# Patient Record
Sex: MALE | Race: WHITE | NOT HISPANIC OR LATINO | Employment: OTHER | URBAN - METROPOLITAN AREA
[De-identification: names, ages, dates, MRNs, and addresses within clinical notes are randomized per-mention and may not be internally consistent; named-entity substitution may affect disease eponyms.]

---

## 2017-01-10 ENCOUNTER — ALLIED HEALTH/NURSE VISIT (OUTPATIENT)
Dept: CARDIOLOGY | Facility: CLINIC | Age: 72
End: 2017-01-10
Payer: MEDICARE

## 2017-01-10 DIAGNOSIS — Z95.810 ICD (IMPLANTABLE CARDIOVERTER-DEFIBRILLATOR), SINGLE, IN SITU: Primary | ICD-10-CM

## 2017-01-10 PROCEDURE — 93295 DEV INTERROG REMOTE 1/2/MLT: CPT | Performed by: INTERNAL MEDICINE

## 2017-01-10 PROCEDURE — 93296 REM INTERROG EVL PM/IDS: CPT | Performed by: INTERNAL MEDICINE

## 2017-01-10 NOTE — PROGRESS NOTES
Dianeroniradha Rueda (S) ICD Remote Device Check  : 0 %  Mode: VVI        Presenting Rhythm: VS  Heart Rate: Stable  Sensing: NA    Pacing Threshold: NA    Impedance: WNL  Battery Status: MOL 1 (40%)  Atrial Arrhythmia: NONE  Ventricular Arrhythmia: NONE  ATP: NONE    Shocks: NONE    Care Plan: remote in 3 months. Call to pt with results/ left message. Letter with next transmission date mailed

## 2017-01-17 ENCOUNTER — OFFICE VISIT (OUTPATIENT)
Dept: CARDIOLOGY | Facility: CLINIC | Age: 72
End: 2017-01-17
Payer: MEDICARE

## 2017-01-17 VITALS
HEART RATE: 80 BPM | WEIGHT: 192 LBS | HEIGHT: 72 IN | SYSTOLIC BLOOD PRESSURE: 130 MMHG | DIASTOLIC BLOOD PRESSURE: 60 MMHG | BODY MASS INDEX: 26.01 KG/M2

## 2017-01-17 DIAGNOSIS — I25.10 CORONARY ARTERY DISEASE INVOLVING NATIVE CORONARY ARTERY OF NATIVE HEART WITHOUT ANGINA PECTORIS: Primary | ICD-10-CM

## 2017-01-17 DIAGNOSIS — K21.9 GASTROESOPHAGEAL REFLUX DISEASE, ESOPHAGITIS PRESENCE NOT SPECIFIED: Primary | ICD-10-CM

## 2017-01-17 DIAGNOSIS — I25.5 CARDIOMYOPATHY, ISCHEMIC: ICD-10-CM

## 2017-01-17 DIAGNOSIS — I21.4 NSTEMI (NON-ST ELEVATED MYOCARDIAL INFARCTION) (H): ICD-10-CM

## 2017-01-17 DIAGNOSIS — N18.6 ESRD (END STAGE RENAL DISEASE) ON DIALYSIS (H): ICD-10-CM

## 2017-01-17 DIAGNOSIS — Z99.2 ESRD (END STAGE RENAL DISEASE) ON DIALYSIS (H): ICD-10-CM

## 2017-01-17 PROCEDURE — 99214 OFFICE O/P EST MOD 30 MIN: CPT | Performed by: INTERNAL MEDICINE

## 2017-01-17 RX ORDER — PANTOPRAZOLE SODIUM 40 MG/1
40 TABLET, DELAYED RELEASE ORAL EVERY MORNING
Qty: 30 TABLET | Refills: 10 | Status: SHIPPED | OUTPATIENT
Start: 2017-01-17 | End: 2018-01-11

## 2017-01-17 NOTE — Clinical Note
1/17/2017    Josselin Kolb MD  [facility] [place]  [address] [place] [address]    RE: Westley Ness       Dear Colleague,    I had the pleasure of seeing Westley Grover in Cardiology Clinic today.  He is a 71-year-old male with a past medical history of coronary artery disease, ischemic cardiomyopathy with ejection fraction 25%-30% and previous AICD placement, end-stage renal disease and mild aortic root dilatation who returns for followup.  He had a non-ST elevation MI last year.  He underwent drug-eluting stent placed in the proximal left circumflex and obtuse marginal branch.  In November, he had recurrent event and had angioplasty of the first obtuse marginal branch for restenosis.  He is on dialysis.  Since then he has done well.  In December, he had one day when he was dialyzed long and then had some chest discomfort that responded promptly to sublingual nitro.  This has not recurred.  He remains quite active.  He has a chronic ischemic cardiomyopathy, EF of 25%-30%.  His last echo confirmed that ejection fraction.  He has a chronically occluded LAD and at last viability study in July had shown not much viability in the LAD distribution.      His last LDL was 37 in July on Pravachol 40 mg per day.  HDL remains low at 29.      PHYSICAL EXAMINATION:   VITAL SIGNS:  Blood pressure 130/60, pulse 80 per minute and regular.  Cardiopulmonary examination unremarkable.     Outpatient Encounter Prescriptions as of 1/17/2017   Medication Sig Dispense Refill     carvedilol (COREG) 6.25 MG tablet Take 1 tablet (6.25 mg) by mouth 2 times daily (with meals) Take after dialysis 180 tablet 3     isosorbide mononitrate (IMDUR) 30 MG 24 hr tablet Take 0.5 tablets (15 mg) by mouth daily 90 tablet 3     Levothyroxine Sodium 50 MCG CAPS Take 50 mcg by mouth daily 90 capsule 3     pravastatin (PRAVACHOL) 40 MG tablet Take 1 tablet (40 mg) by mouth daily 90 tablet 3     lisinopril (PRINIVIL,ZESTRIL) 2.5 MG  tablet Take one tablet after dialysis. Take second tablet at bedtime. 60 tablet 3     acetaminophen (TYLENOL) 325 MG tablet Take 2 tablets (650 mg) by mouth 4 times daily 60 tablet 0     nitroglycerin (NITROSTAT) 0.4 MG SL tablet 1 TAB EVERY 5 MIN AS NEEDED, UP TO 3 PER EPISODE 25 tablet 0     clopidogrel (PLAVIX) 75 MG tablet Take 1 tablet (75 mg) by mouth daily To protect your stent(s).  Do not stop taking unless directed by cardiology. 30 tablet 11     [DISCONTINUED] pantoprazole (PROTONIX) 40 MG enteric coated tablet Take 1 tablet (40 mg) by mouth every morning 30 tablet 2     aspirin EC 81 MG EC tablet Take 1 tablet (81 mg) by mouth daily 81 tablet      loperamide (IMODIUM) 2 MG capsule Take 1 mg by mouth as needed        psyllium 0.52 G capsule Take 1 capsule by mouth daily as needed        B complex-C-folic acid (NEPHROCAPS) 1 MG capsule Take 1 capsule by mouth every evening        MEXILETINE HCL PO Take 150 mg by mouth 2 times daily       RANITIDINE HCL PO Take 75 mg by mouth 1-2 tablets daily       sevelamer (RENVELA) 800 MG tablet Take 800-1,600 mg by mouth as needed ON HOLD       No facility-administered encounter medications on file as of 1/17/2017.      IMPRESSION:   1.  Coronary artery disease, stable with occasional chest discomfort that could be possibly angina, but responded promptly to nitroglycerin.  He has had no further episodes despite being quite active.  I told him if it recurs, he should call us and we may consider doing a Lexiscan stress nuclear study.  Otherwise, I will see him back in followup in September with a repeat echocardiogram prior to visit with me.  I asked him to continue Plavix long-term.   2.  Hyperlipidemia.  Continue Pravachol at 40 mg daily.      Thank you for allowing us to participate in the care of this nice patient.     Sincerely,    Po Sne MD     [facility]

## 2017-01-17 NOTE — PROGRESS NOTES
HPI and Plan:   See dictation  [id number]    Orders Placed This Encounter   Procedures     Follow-Up with Cardiologist     Echocardiogram       No orders of the defined types were placed in this encounter.       There are no discontinued medications.      Encounter Diagnoses   Name Primary?     Cardiomyopathy, ischemic      NSTEMI (non-ST elevated myocardial infarction) (H)      Coronary artery disease involving native coronary artery of native heart without angina pectoris Yes     ESRD (end stage renal disease) on dialysis (H)        CURRENT MEDICATIONS:  Current Outpatient Prescriptions   Medication Sig Dispense Refill     carvedilol (COREG) 6.25 MG tablet Take 1 tablet (6.25 mg) by mouth 2 times daily (with meals) Take after dialysis 180 tablet 3     isosorbide mononitrate (IMDUR) 30 MG 24 hr tablet Take 0.5 tablets (15 mg) by mouth daily 90 tablet 3     Levothyroxine Sodium 50 MCG CAPS Take 50 mcg by mouth daily 90 capsule 3     pravastatin (PRAVACHOL) 40 MG tablet Take 1 tablet (40 mg) by mouth daily 90 tablet 3     lisinopril (PRINIVIL,ZESTRIL) 2.5 MG tablet Take one tablet after dialysis. Take second tablet at bedtime. 60 tablet 3     acetaminophen (TYLENOL) 325 MG tablet Take 2 tablets (650 mg) by mouth 4 times daily 60 tablet 0     nitroglycerin (NITROSTAT) 0.4 MG SL tablet 1 TAB EVERY 5 MIN AS NEEDED, UP TO 3 PER EPISODE 25 tablet 0     clopidogrel (PLAVIX) 75 MG tablet Take 1 tablet (75 mg) by mouth daily To protect your stent(s).  Do not stop taking unless directed by cardiology. 30 tablet 11     pantoprazole (PROTONIX) 40 MG enteric coated tablet Take 1 tablet (40 mg) by mouth every morning 30 tablet 2     aspirin EC 81 MG EC tablet Take 1 tablet (81 mg) by mouth daily 81 tablet      loperamide (IMODIUM) 2 MG capsule Take 1 mg by mouth as needed        psyllium 0.52 G capsule Take 1 capsule by mouth daily as needed        B complex-C-folic acid (NEPHROCAPS) 1 MG capsule Take 1 capsule by mouth every evening         MEXILETINE HCL PO Take 150 mg by mouth 2 times daily       RANITIDINE HCL PO Take 75 mg by mouth 1-2 tablets daily       sevelamer (RENVELA) 800 MG tablet Take 800-1,600 mg by mouth as needed ON HOLD         ALLERGIES     Allergies   Allergen Reactions     Contrast Dye Hives     Does fine if he uses benadryl prior.     No Clinical Screening - See Comments      Green beans - Diarrhea.       PAST MEDICAL HISTORY:  Past Medical History   Diagnosis Date     Hypertension      Hyperlipidemia      Shortness of breath      Diabetes (H)      Renal disease      end stage     Syncope      Tachycardia      ICD (implantable cardioverter-defibrillator) in place      CAD (coronary artery disease)      H/O percutaneous transluminal coronary angioplasty 7-     Successful Percutaneous coronary intervention of the proximal OM1     NSTEMI (non-ST elevated myocardial infarction) (H)      Cardiomyopathy, ischemic      Aortic root dilatation (H)      mild     Hypothyroidism        PAST SURGICAL HISTORY:  Past Surgical History   Procedure Laterality Date     Vascular surgery       LUE fistula     Cholecystectomy       Hc left heart catheterization  7/14/2016     Hc left heart catheterization  9/21/2016       FAMILY HISTORY:  Family History   Problem Relation Age of Onset     Hypertension Mother      CEREBROVASCULAR DISEASE Mother      Hypertension Father      C.A.D. Paternal Grandmother        SOCIAL HISTORY:  Social History     Social History     Marital Status: Single     Spouse Name: N/A     Number of Children: N/A     Years of Education: N/A     Social History Main Topics     Smoking status: Former Smoker -- 2.00 packs/day for 35 years     Types: Cigarettes     Quit date: 11/01/1996     Smokeless tobacco: None     Alcohol Use: 0.0 oz/week     0 Standard drinks or equivalent per week      Comment: rare     Drug Use: No     Sexual Activity: No     Other Topics Concern     Parent/Sibling W/ Cabg, Mi Or Angioplasty Before 65f  55m? No     Caffeine Concern No     Sleep Concern No     Special Diet Yes     Exercise Yes     Walking     Seat Belt Yes     Social History Narrative       Review of Systems:  Skin:  Positive for (dialysis access left arm)       Eyes:  Positive for glasses    ENT:  Negative      Respiratory:  Positive for dyspnea on exertion     Cardiovascular:    edema;Positive for    Gastroenterology: Positive for heartburn    Genitourinary:  Positive for (Dialysis)      Musculoskeletal:  Positive for arthritis    Neurologic:  Negative headaches    Psychiatric:  Negative      Heme/Lymph/Imm:  Positive for allergies    Endocrine:  Positive for thyroid disorder      Physical Exam:  Vitals: /60 mmHg  Pulse 80  Ht 1.829 m (6')  Wt 87.091 kg (192 lb)  BMI 26.03 kg/m2    Constitutional:           Skin:           Head:           Eyes:           ENT:           Neck:           Chest:             Cardiac:                    Abdomen:           Vascular:                                          Extremities and Back:                 Neurological:                 CC  Josselin Kolb MD  [facility]  [address] [place] [address]

## 2017-01-17 NOTE — MR AVS SNAPSHOT
After Visit Summary   1/17/2017    Westley Ness    MRN: [id number]           Patient Information     Date Of Birth          1945        Visit Information        Provider Department      1/17/2017 8:45 AM Po Sen MD [facility]        Today's Diagnoses     Coronary artery disease involving native coronary artery of native heart without angina pectoris    -  1     Cardiomyopathy, ischemic         NSTEMI (non-ST elevated myocardial infarction) (H)         ESRD (end stage renal disease) on dialysis (H)            Follow-ups after your visit        Additional Services     Follow-Up with Cardiologist                 Your next 10 appointments already scheduled     Apr 20, 2017  4:30 PM   Remote ICD Check with CARD DCR2   [facility] ([facility])    [address] [place], [place] [address]  [facility] [address] [phone]           This appointment is for a remote check of your debrillator.  This is not an appointment at the office.              Future tests that were ordered for you today     Open Future Orders        Priority Expected Expires Ordered    Echocardiogram Routine 10/14/2017 1/17/2018 1/17/2017    Follow-Up with Cardiologist Routine 10/14/2017 1/17/2018 1/17/2017            Who to contact     If you have questions or need follow up information about today's clinic visit or your schedule please contact [facility] directly at [phone].  Normal or non-critical lab and imaging results will be communicated to you by MyChart, letter or phone within 4 business days after the clinic has received the results. If you do not hear from us within 7 days, please contact the clinic through MyChart or phone. If you have a critical or abnormal lab result, we will notify you by phone as soon as possible.  Submit refill requests through [facility] or call your  "pharmacy and they will forward the refill request to us. Please allow 3 business days for your refill to be completed.          Additional Information About Your Visit        MyChart Information     [employer] lets you send messages to your doctor, view your test results, renew your prescriptions, schedule appointments and more. To sign up, go to [email or web address] . Click on \"Log in\" on the left side of the screen, which will take you to the Welcome page. Then click on \"Sign up Now\" on the right side of the page.     You will be asked to enter the access code listed below, as well as some personal information. Please follow the directions to create your username and password.     Your access code is: [email or web address]  Expires: 2017  1:03 PM     Your access code will  in 90 days. If you need help or a new code, please call your [facility] clinic or [phone].        Care EveryWhere ID     This is your Care EveryWhere ID. This could be used by other organizations to access your [facility] medical records  [id number]        Your Vitals Were     Pulse Height BMI (Body Mass Index)             80 1.829 m (6') 26.03 kg/m2          Blood Pressure from Last 3 Encounters:   17 130/60   16 130/64   16 130/70    Weight from Last 3 Encounters:   17 87.091 kg (192 lb)   16 87.181 kg (192 lb 3.2 oz)   16 87.091 kg (192 lb)               Primary Care Provider Office Phone # Fax #    Josselin Kolb [phone]       [facility] [address] [facility] [address]        Thank you!     Thank you for choosing [facility] [place]  for your care. Our goal is always to provide you with excellent care. Hearing back from our patients is one way we can continue to improve our services. Please take a few minutes to complete the written survey that you may receive in the mail after your visit with us. Thank you!           "   Your Updated Medication List - Protect others around you: Learn how to safely use, store and throw away your medicines at www.disposemymeds.org.          This list is accurate as of: 1/17/17  9:12 AM.  Always use your most recent med list.                   Brand Name Dispense Instructions for use    acetaminophen 325 MG tablet    TYLENOL    60 tablet    Take 2 tablets (650 mg) by mouth 4 times daily       aspirin 81 MG EC tablet     81 tablet    Take 1 tablet (81 mg) by mouth daily       B complex-C-folic acid 1 MG capsule      Take 1 capsule by mouth every evening       carvedilol 6.25 MG tablet    COREG    180 tablet    Take 1 tablet (6.25 mg) by mouth 2 times daily (with meals) Take after dialysis       clopidogrel 75 MG tablet    PLAVIX    30 tablet    Take 1 tablet (75 mg) by mouth daily To protect your stent(s).  Do not stop taking unless directed by cardiology.       isosorbide mononitrate 30 MG 24 hr tablet    IMDUR    90 tablet    Take 0.5 tablets (15 mg) by mouth daily       Levothyroxine Sodium 50 MCG Caps     90 capsule    Take 50 mcg by mouth daily       lisinopril 2.5 MG tablet    PRINIVIL/Zestril    60 tablet    Take one tablet after dialysis. Take second tablet at bedtime.       loperamide 2 MG capsule    IMODIUM     Take 1 mg by mouth as needed       MEXILETINE HCL PO      Take 150 mg by mouth 2 times daily       nitroglycerin 0.4 MG sublingual tablet    NITROSTAT    25 tablet    1 TAB EVERY 5 MIN AS NEEDED, UP TO 3 PER EPISODE       pantoprazole 40 MG EC tablet    PROTONIX    30 tablet    Take 1 tablet (40 mg) by mouth every morning       pravastatin 40 MG tablet    PRAVACHOL    90 tablet    Take 1 tablet (40 mg) by mouth daily       psyllium 0.52 G capsule      Take 1 capsule by mouth daily as needed       RANITIDINE HCL PO      Take 75 mg by mouth 1-2 tablets daily       sevelamer 800 MG tablet    RENVELA     Take 800-1,600 mg by mouth as needed ON HOLD

## 2017-01-17 NOTE — TELEPHONE ENCOUNTER
pantoprazole (PROTONIX) 40 MG enteric coated tablet      Last Written Prescription Date: 09/18/2016  Last Fill Quantity: 30,  # refills: 2   Last Office Visit with [facility], [facility] or [facility] prescribing provider: 12/06/2016

## 2017-01-17 NOTE — PROGRESS NOTES
HISTORY OF PRESENT ILLNESS:   I had the pleasure of seeing Westley Ness in Cardiology Clinic today.  He is a 71-year-old male with a past medical history of coronary artery disease, ischemic cardiomyopathy with ejection fraction 25%-30% and previous AICD placement, end-stage renal disease and mild aortic root dilatation who returns for followup.  He had a non-ST elevation MI last year.  He underwent drug-eluting stent placed in the proximal left circumflex and obtuse marginal branch.  In November, he had recurrent event and had angioplasty of the first obtuse marginal branch for restenosis.  He is on dialysis.  Since then he has done well.  In December, he had one day when he was dialyzed long and then had some chest discomfort that responded promptly to sublingual nitro.  This has not recurred.  He remains quite active.  He has a chronic ischemic cardiomyopathy, EF of 25%-30%.  His last echo confirmed that ejection fraction.  He has a chronically occluded LAD and at last viability study in July had shown not much viability in the LAD distribution.      His last LDL was 37 in July on Pravachol 40 mg per day.  HDL remains low at 29.      PHYSICAL EXAMINATION:   VITAL SIGNS:  Blood pressure 130/60, pulse 80 per minute and regular.  Cardiopulmonary examination unremarkable.      IMPRESSION:   1.  Coronary artery disease, stable with occasional chest discomfort that could be possibly angina, but responded promptly to nitroglycerin.  He has had no further episodes despite being quite active.  I told him if it recurs, he should call us and we may consider doing a Lexiscan stress nuclear study.  Otherwise, I will see him back in followup in September with a repeat echocardiogram prior to visit with me.  I asked him to continue Plavix long-term.   2.  Hyperlipidemia.  Continue Pravachol at 40 mg daily.      Thank you for allowing us to participate in the care of this nice patient.      cc:   Josselin Kolb MD    [facility]   [address]  [address]         DERICK WEST MD             D: 2017 09:30   T: 2017 11:51   MT: SENTHIL      Name:     SOCORRO LÓPEZ   MRN:      [id number]        Account:      [id number]   :      1945           Service Date: 2017      Document: [id number]

## 2017-01-24 ENCOUNTER — TELEPHONE (OUTPATIENT)
Dept: INTERNAL MEDICINE | Facility: CLINIC | Age: 72
End: 2017-01-24

## 2017-01-24 DIAGNOSIS — Z99.2 ESRD (END STAGE RENAL DISEASE) ON DIALYSIS (H): Primary | ICD-10-CM

## 2017-01-24 DIAGNOSIS — N18.6 ESRD (END STAGE RENAL DISEASE) ON DIALYSIS (H): Primary | ICD-10-CM

## 2017-01-24 NOTE — TELEPHONE ENCOUNTER
Reason for Call:  Medication or medication refill:    Do you use a [facility]?  Name of the pharmacy and phone number for the current request:  [facility]  [phone]    Name of the medication requested: triphrocaps (vit B)    Other request: Pt got the medication formerly from Dr Naranjo at [facility]    Can we leave a detailed message on this number? YES    Phone number patient can be reached at: Home number on file [phone] (home)    Best Time: anytime    Call taken on 1/24/2017 at 4:59 PM by LYLA SKELTON

## 2017-01-25 NOTE — TELEPHONE ENCOUNTER
Nephrocaps refilled. Please let patient know and confirm that 1 capsule each evening is correct. Thank you.

## 2017-01-26 ENCOUNTER — CARE COORDINATION (OUTPATIENT)
Dept: CARE COORDINATION | Facility: CLINIC | Age: 72
End: 2017-01-26

## 2017-01-26 NOTE — PROGRESS NOTES
Clinic Care Coordination Contact  [facility]/Voicemail    Referral Source: CTS    No response from calls or letter.     Plan: Closed to clinic care coordination.    Salma Franco RN, CCM - Care Coordinator     1/26/2017    3:45 PM  [phone]

## 2017-02-06 ENCOUNTER — TRANSFERRED RECORDS (OUTPATIENT)
Dept: HEALTH INFORMATION MANAGEMENT | Facility: CLINIC | Age: 72
End: 2017-02-06

## 2017-02-12 ENCOUNTER — TRANSFERRED RECORDS (OUTPATIENT)
Dept: CARDIOLOGY | Facility: CLINIC | Age: 72
End: 2017-02-12

## 2017-02-12 LAB — EJECTION FRACTION: 20

## 2017-02-13 ENCOUNTER — TRANSFERRED RECORDS (OUTPATIENT)
Dept: CARDIOLOGY | Facility: CLINIC | Age: 72
End: 2017-02-13

## 2017-02-14 ENCOUNTER — TRANSFERRED RECORDS (OUTPATIENT)
Dept: CARDIOLOGY | Facility: CLINIC | Age: 72
End: 2017-02-14

## 2017-02-16 ENCOUNTER — TRANSFERRED RECORDS (OUTPATIENT)
Dept: CARDIOLOGY | Facility: CLINIC | Age: 72
End: 2017-02-16

## 2017-02-23 ENCOUNTER — OFFICE VISIT (OUTPATIENT)
Dept: INTERNAL MEDICINE | Facility: CLINIC | Age: 72
End: 2017-02-23
Payer: MEDICARE

## 2017-02-23 ENCOUNTER — TELEPHONE (OUTPATIENT)
Dept: INTERNAL MEDICINE | Facility: CLINIC | Age: 72
End: 2017-02-23

## 2017-02-23 VITALS
HEIGHT: 72 IN | SYSTOLIC BLOOD PRESSURE: 120 MMHG | OXYGEN SATURATION: 96 % | HEART RATE: 85 BPM | TEMPERATURE: 97.4 F | BODY MASS INDEX: 26.09 KG/M2 | DIASTOLIC BLOOD PRESSURE: 50 MMHG | WEIGHT: 192.6 LBS

## 2017-02-23 DIAGNOSIS — I21.9: ICD-10-CM

## 2017-02-23 DIAGNOSIS — Z09 HOSPITAL DISCHARGE FOLLOW-UP: Primary | ICD-10-CM

## 2017-02-23 DIAGNOSIS — I20.9 ANGINA PECTORIS (H): ICD-10-CM

## 2017-02-23 PROCEDURE — 99214 OFFICE O/P EST MOD 30 MIN: CPT | Performed by: INTERNAL MEDICINE

## 2017-02-23 RX ORDER — NITROGLYCERIN 0.4 MG/1
TABLET SUBLINGUAL
Qty: 25 TABLET | Refills: 0 | Status: SHIPPED | OUTPATIENT
Start: 2017-02-23 | End: 2017-12-13

## 2017-02-23 NOTE — MR AVS SNAPSHOT
"              After Visit Summary   2/23/2017    Westley Ness    MRN: [id number]           Patient Information     Date Of Birth          1945        Visit Information        Provider Department      2/23/2017 10:30 AM Josselin Kolb MD [facility]        Care Instructions    Let's get medical records!    Definitely follow-up with cardiology ASAP (best with records).         Follow-ups after your visit        Your next 10 appointments already scheduled     Apr 20, 2017  4:30 PM CDT   Remote ICD Check with CARD DCR2   [facility] [place] ([facility])    [address] [place], [place] [id number]  [facility] [id number] [phone]           This appointment is for a remote check of your debrillator.  This is not an appointment at the office.              Who to contact     If you have questions or need follow up information about today's clinic visit or your schedule please contact [facility] directly at [phone].  Normal or non-critical lab and imaging results will be communicated to you by [employer], letter or phone within 4 business days after the clinic has received the results. If you do not hear from us within 7 days, please contact the clinic through Zarangat or phone. If you have a critical or abnormal lab result, we will notify you by phone as soon as possible.  Submit refill requests through [employer] or call your pharmacy and they will forward the refill request to us. Please allow 3 business days for your refill to be completed.          Additional Information About Your Visit        [employer] Information     [employer] lets you send messages to your doctor, view your test results, renew your prescriptions, schedule appointments and more. To sign up, go to [email or web address] . Click on \"Log in\" on the left side of the screen, which will take you to the Welcome page. Then click on \"Sign up Now\" on the right side of " the page.     You will be asked to enter the access code listed below, as well as some personal information. Please follow the directions to create your username and password.     Your access code is: [email or web address]  Expires: 2017 10:53 AM     Your access code will  in 90 days. If you need help or a new code, please call your [facility] or [phone].        Care EveryWhere ID     This is your Care EveryWhere ID. This could be used by other organizations to access your [place] medical records  [id number]        Your Vitals Were     Pulse Temperature Height Pulse Oximetry BMI (Body Mass Index)       85 97.4  F (36.3  C) (Oral) 6' (1.829 m) 96% 26.12 kg/m2        Blood Pressure from Last 3 Encounters:   17 120/50   17 130/60   16 130/64    Weight from Last 3 Encounters:   17 192 lb 9.6 oz (87.4 kg)   17 192 lb (87.1 kg)   16 192 lb 3.2 oz (87.2 kg)              Today, you had the following     No orders found for display       Primary Care Provider Office Phone # Fax #    Josselin Kolb [phone]       [facility] [address] [facility] [address]        Thank you!     Thank you for choosing [facility]  for your care. Our goal is always to provide you with excellent care. Hearing back from our patients is one way we can continue to improve our services. Please take a few minutes to complete the written survey that you may receive in the mail after your visit with us. Thank you!             Your Updated Medication List - Protect others around you: Learn how to safely use, store and throw away your medicines at www.disposemymeds.org.          This list is accurate as of: 17 10:53 AM.  Always use your most recent med list.                   Brand Name Dispense Instructions for use    acetaminophen 325 MG tablet    TYLENOL    60 tablet    Take 2 tablets (650 mg) by mouth 4 times daily       aspirin  81 MG EC tablet     81 tablet    Take 1 tablet (81 mg) by mouth daily       B complex-C-folic acid 1 MG capsule     90 capsule    Take 1 capsule by mouth every evening       carvedilol 6.25 MG tablet    COREG    180 tablet    Take 1 tablet (6.25 mg) by mouth 2 times daily (with meals) Take after dialysis       clopidogrel 75 MG tablet    PLAVIX    30 tablet    Take 1 tablet (75 mg) by mouth daily To protect your stent(s).  Do not stop taking unless directed by cardiology.       isosorbide mononitrate 30 MG 24 hr tablet    IMDUR    90 tablet    Take 0.5 tablets (15 mg) by mouth daily       Levothyroxine Sodium 50 MCG Caps     90 capsule    Take 50 mcg by mouth daily       lisinopril 2.5 MG tablet    PRINIVIL/Zestril    60 tablet    Take one tablet after dialysis. Take second tablet at bedtime.       loperamide 2 MG capsule    IMODIUM     Take 1 mg by mouth as needed       MEXILETINE HCL PO      Take 150 mg by mouth 2 times daily       nitroglycerin 0.4 MG sublingual tablet    NITROSTAT    25 tablet    1 TAB EVERY 5 MIN AS NEEDED, UP TO 3 PER EPISODE       pantoprazole 40 MG EC tablet    PROTONIX    30 tablet    Take 1 tablet (40 mg) by mouth every morning       pravastatin 40 MG tablet    PRAVACHOL    90 tablet    Take 1 tablet (40 mg) by mouth daily       psyllium 0.52 G capsule      Take 1 capsule by mouth daily as needed       RANITIDINE HCL PO      Take 75 mg by mouth 1-2 tablets daily       sevelamer 800 MG tablet    RENVELA     Take 800-1,600 mg by mouth as needed ON HOLD

## 2017-02-23 NOTE — TELEPHONE ENCOUNTER
Patient will be having records coming over from [facility] faxed 02/23/2017 @ 11:04 am Please call patient once records are received.

## 2017-02-23 NOTE — NURSING NOTE
Chief Complaint   Patient presents with     ER F/U     On 2/12/17. went to ER in [place] for R arm pain and right to mid chest pain.        Initial /50 (BP Location: Right arm, Patient Position: Chair, Cuff Size: Adult Regular)  Pulse 85  Temp 97.4  F (36.3  C) (Oral)  Ht 6' (1.829 m)  Wt 192 lb 9.6 oz (87.4 kg)  SpO2 96%  BMI 26.12 kg/m2 Estimated body mass index is 26.12 kg/(m^2) as calculated from the following:    Height as of this encounter: 6' (1.829 m).    Weight as of this encounter: 192 lb 9.6 oz (87.4 kg).  Medication Reconciliation: complete     Kaminibose MA

## 2017-02-23 NOTE — PROGRESS NOTES
SUBJECTIVE:                                                      HPI: Westley Ness is a pleasant 71 year old male who presents for hospital discharge follow-up:    - hospitalized in [place] 2/12-2/16 for MI (unknown if STEMI or NSTEMI)   - [facility] - records requested   - unfortunately, patient does not know many details   - underwent PCI with angioplasty and 3 stents were placed (vessels intervened on and type of stents placed unknown)  - procedure complicated by bleeding and hematoma at right groin catheter site   - Hgb dropped from ~10.5 to 9s  - no new medications on discharge    Details re: presentation to hospital:  - developed right arm and central chest pain in the early morning (2am) of 2/12   - no improvement with 4 nitro so proceeded to local ER    Update since hospitalization:  - overall, doing well - no complaints  - still gets his occasional chest pains - baseline for him    - none currently  - still get out of breath sometimes - again, baseline for him   - no shortness of breath currently  - appetite normal  - bowel movements maureen  - energy still lower than normal, but improving each day    PMH significant for long-standing ischemic cardiomyopathy (EF 25%-30%, s/p AICD), ESRD on HD, and long-history CAD s/p multiple PCIs.      Last year: 3 NSTEMIs (June, September, and November) s/p 3 stents and an angioplasty performed.      Also with stable angina generally relieved with Nitrostat - needs refill.     The medication, allergy, and problem lists have been reviewed and updated as appropriate.     OBJECTIVE:                                                      /50 (BP Location: Right arm, Patient Position: Chair, Cuff Size: Adult Regular)  Pulse 85  Temp 97.4  F (36.3  C) (Oral)  Ht 6' (1.829 m)  Wt 192 lb 9.6 oz (87.4 kg)  SpO2 96%  BMI 26.12 kg/m2  Constitutional: well-appearing  Respiratory: normal respiratory effort; clear to auscultation bilaterally  Cardiovascular:  regular rate and rhythm; no edema  Psych: normal judgment and insight; normal mood and affect; recent and remote memory intact    ASSESSMENT/PLAN:                                                      (Z09) Hospital discharge follow-up  (primary encounter diagnosis)  (I21.3) Myocardial infarction within last four weeks (H)  Comment:    - s/p PCI (angioplasty and stent placement) - details forthcoming (records requested).   - generally doing well - back to baseline for the most part.   - no significant recurrent symptoms.   - no change in medication regimen.   Plan:    - working on getting outside records.   - patient encouraged to follow-up with cardiologist ASAP.     (I20.9) Angina pectoris (H)  Plan: refill of Nitrostat provided.     The instructions on the AVS were discussed and explained to the patient. Patient expressed understanding of instructions.    Josselin Kolb MD   [address] [address]  T: [phone], F: [phone]

## 2017-03-17 ENCOUNTER — OFFICE VISIT (OUTPATIENT)
Dept: INTERNAL MEDICINE | Facility: CLINIC | Age: 72
End: 2017-03-17
Payer: MEDICARE

## 2017-03-17 VITALS
BODY MASS INDEX: 25.7 KG/M2 | WEIGHT: 189.7 LBS | OXYGEN SATURATION: 98 % | HEART RATE: 76 BPM | TEMPERATURE: 98.6 F | HEIGHT: 72 IN | DIASTOLIC BLOOD PRESSURE: 70 MMHG | SYSTOLIC BLOOD PRESSURE: 120 MMHG

## 2017-03-17 DIAGNOSIS — M25.552 LATERAL PAIN OF LEFT HIP: ICD-10-CM

## 2017-03-17 DIAGNOSIS — M25.422 EFFUSION OF LEFT OLECRANON BURSA: Primary | ICD-10-CM

## 2017-03-17 PROCEDURE — 99214 OFFICE O/P EST MOD 30 MIN: CPT | Performed by: INTERNAL MEDICINE

## 2017-03-17 NOTE — PATIENT INSTRUCTIONS
For olecranon bursa effusion, recommend seeing ortho for possible fluid removal.    Phone number below.     ---    For left left hip pain suspect bursitis.     Best to avoid repetitive hip movements and apply cool compresses as needed.    Okay to use walker for several days to see if it improves.     If it worsens or does not improve, let me know.

## 2017-03-17 NOTE — MR AVS SNAPSHOT
After Visit Summary   3/17/2017    Westley Ness    MRN: [id number]           Patient Information     Date Of Birth          1945        Visit Information        Provider Department      3/17/2017 2:00 PM Josselin Kolb MD [facility]        Today's Diagnoses     Effusion of left olecranon bursa    -  1      Care Instructions    For olecranon bursa effusion, recommend seeing ortho for possible fluid removal.    Phone number below.     ---    For left left hip pain suspect bursitis.     Best to avoid repetitive hip movements and apply cool compresses as needed.    Okay to use walker for several days to see if it improves.     If it worsens or does not improve, let me know.             Follow-ups after your visit        Additional Services     ORTHOPEDICS ADULT REFERRAL       Your provider has referred you to: [facility]: [place] [facility] - [facility] [phone]   [email or web address]    Please be aware that coverage of these services is subject to the terms and limitations of your health insurance plan.  Call member services at your health plan with any benefit or coverage questions.      Please bring the following to your appointment:    >>   Any x-rays, CTs or MRIs which have been performed.  Contact the facility where they were done to arrange for  prior to your scheduled appointment.    >>   List of current medications   >>   This referral request   >>   Any documents/labs given to you for this referral                  Your next 10 appointments already scheduled     Apr 20, 2017  4:30 PM CDT   Remote ICD Check with CARD DCR2   [facility] [place] ([facility])    [address] [place] [id number] [phone]           This appointment is for a remote check of your debrillator.  This is not an appointment  "at the office.              Who to contact     If you have questions or need follow up information about today's clinic visit or your schedule please contact [facility] directly at [phone].  Normal or non-critical lab and imaging results will be communicated to you by MyChart, letter or phone within 4 business days after the clinic has received the results. If you do not hear from us within 7 days, please contact the clinic through MyChart or phone. If you have a critical or abnormal lab result, we will notify you by phone as soon as possible.  Submit refill requests through [employer] or call your pharmacy and they will forward the refill request to us. Please allow 3 business days for your refill to be completed.          Additional Information About Your Visit        "[email or web address] Information     [employer] lets you send messages to your doctor, view your test results, renew your prescriptions, schedule appointments and more. To sign up, go to [email or web address] . Click on \"Log in\" on the left side of the screen, which will take you to the Welcome page. Then click on \"Sign up Now\" on the right side of the page.     You will be asked to enter the access code listed below, as well as some personal information. Please follow the directions to create your username and password.     Your access code is: [email or web address]  Expires: 2017 11:53 AM     Your access code will  in 90 days. If you need help or a new code, please call your [place] clinic or [phone].        Care EveryWhere ID     This is your Care EveryWhere ID. This could be used by other organizations to access your [place] medical records  [id number]        Your Vitals Were     Pulse Temperature Height Pulse Oximetry BMI (Body Mass Index)       76 98.6  F (37  C) (Oral) 6' (1.829 m) 98% 25.73 kg/m2        Blood Pressure from Last 3 Encounters:   17 120/70   17 120/50   17 130/60    Weight from Last 3 " Encounters:   03/17/17 189 lb 11.2 oz (86 kg)   02/23/17 192 lb 9.6 oz (87.4 kg)   01/17/17 192 lb (87.1 kg)              We Performed the Following     ORTHOPEDICS ADULT REFERRAL        Primary Care Provider Office Phone # Fax #    Josselin Kolb [phone]       [facility] [address]  [facility][1] [address]        Thank you!     Thank you for choosing [facility]  for your care. Our goal is always to provide you with excellent care. Hearing back from our patients is one way we can continue to improve our services. Please take a few minutes to complete the written survey that you may receive in the mail after your visit with us. Thank you!             Your Updated Medication List - Protect others around you: Learn how to safely use, store and throw away your medicines at www.disposemymeds.org.          This list is accurate as of: 3/17/17  2:22 PM.  Always use your most recent med list.                   Brand Name Dispense Instructions for use    acetaminophen 325 MG tablet    TYLENOL    60 tablet    Take 2 tablets (650 mg) by mouth 4 times daily       aspirin 81 MG EC tablet     81 tablet    Take 1 tablet (81 mg) by mouth daily       B complex-C-folic acid 1 MG capsule     90 capsule    Take 1 capsule by mouth every evening       carvedilol 6.25 MG tablet    COREG    180 tablet    Take 1 tablet (6.25 mg) by mouth 2 times daily (with meals) Take after dialysis       clopidogrel 75 MG tablet    PLAVIX    30 tablet    Take 1 tablet (75 mg) by mouth daily To protect your stent(s).  Do not stop taking unless directed by cardiology.       isosorbide mononitrate 30 MG 24 hr tablet    IMDUR    90 tablet    Take 0.5 tablets (15 mg) by mouth daily       Levothyroxine Sodium 50 MCG Caps     90 capsule    Take 50 mcg by mouth daily       lisinopril 2.5 MG tablet    PRINIVIL/Zestril    60 tablet    Take one tablet after dialysis. Take second tablet at bedtime.        loperamide 2 MG capsule    IMODIUM     Take 1 mg by mouth as needed       MEXILETINE HCL PO      Take 150 mg by mouth 2 times daily       nitroglycerin 0.4 MG sublingual tablet    NITROSTAT    25 tablet    1 TAB EVERY 5 MIN AS NEEDED, UP TO 3 PER EPISODE       pantoprazole 40 MG EC tablet    PROTONIX    30 tablet    Take 1 tablet (40 mg) by mouth every morning       pravastatin 40 MG tablet    PRAVACHOL    90 tablet    Take 1 tablet (40 mg) by mouth daily       psyllium 0.52 G capsule      Take 1 capsule by mouth daily as needed       RANITIDINE HCL PO      Take 75 mg by mouth 1-2 tablets daily       sevelamer 800 MG tablet    RENVELA     Take 800-1,600 mg by mouth as needed ON HOLD

## 2017-03-17 NOTE — NURSING NOTE
Chief Complaint   Patient presents with     Derm Problem     x 2 weeks. Small swelling on the L Elbow, which opens up during any pessure on it.     Knee Pain     x 9 yrs. L knee pain. Last week, sudden severe pain after may be twisting it.        Initial /70 (BP Location: Right arm, Patient Position: Chair, Cuff Size: Adult Regular)  Pulse 76  Temp 98.6  F (37  C) (Oral)  Ht 6' (1.829 m)  Wt 189 lb 11.2 oz (86 kg)  SpO2 98%  BMI 25.73 kg/m2 Estimated body mass index is 25.73 kg/(m^2) as calculated from the following:    Height as of this encounter: 6' (1.829 m).    Weight as of this encounter: 189 lb 11.2 oz (86 kg).  Medication Reconciliation: complete     Kaminibose MA

## 2017-03-17 NOTE — PROGRESS NOTES
"  SUBJECTIVE:                                                      HPI: Westley Ness is a pleasant 71 year old male who presents with left elbow and knee symptoms:    Re: left elbow:  - reports \"fat\" deposition posterior elbow  - has been present \"for awhile\"  - has been painful in the past, but no longer  - now leaks clear and sometimes blood-tinged fluid with trauma (hitting elbow accidentally on hard surfaces)  - no fevers or chills    PSH significant for left olecranon fracture in 2008 s/p ORIF - hardware still in place    Re: left knee pain:  - has chronic left knee pain due to OA  - uses a cane to walk for stabilization  - developed acute worsening of pain last Friday after walking up the stairs   - no trauma or misstep  - pain was sharp \"inside the knee\"  - knee pain has since subsided, but now with left lateral hip pain:   - this started several days after the knee injury   - occurs with standing or walking, especially after periods of rest   - improves with rest and is less severe the more he walks   - improves with Tylenol and use of walker   - thinks it may due to change in gait to take weight of left knee    PMH significant for long-standing ischemic cardiomyopathy (EF 25%-30%, s/p AICD), ESRD on HD, and long-history CAD s/p multiple PCIs.      The medication, allergy, and problem lists have been reviewed and updated as appropriate.     OBJECTIVE:                                                      /70 (BP Location: Right arm, Patient Position: Chair, Cuff Size: Adult Regular)  Pulse 76  Temp 98.6  F (37  C) (Oral)  Ht 6' (1.829 m)  Wt 189 lb 11.2 oz (86 kg)  SpO2 98%  BMI 25.73 kg/m2  Constitutional: well-appearing  Left elbow:  Inspection: olecranon bursa swelling; overlying skin is pink, but not warm  Non-tender: throughout  Range of Motion: normal  Strength: normal  Left hip: pain reproduced with palpation of left lateral hip  Musculoskeletal: walks with cane    ASSESSMENT/PLAN:            "                                           (M25.422) Effusion of left olecranon bursa  (primary encounter diagnosis)  Comment: asymptomatic other than occasional drainage.   Plan: referred to ortho for evaluation and possible aspiration.     (M25.552) Lateral pain of left hip  Comment: suspect trochanteric bursitis from to change in gait due to left knee pain (right knee pain now resolved).  Plan:    - patient encouraged to use walker for next week or so.   - cool compresses and Tylenol as needed.   - avoid NSAIDs in setting of ESRD.     The instructions on the AVS were discussed and explained to the patient. Patient expressed understanding of instructions.    Josselin Kolb MD   [address] [address]  T: [phone], F: [phone]

## 2017-03-30 ENCOUNTER — OFFICE VISIT (OUTPATIENT)
Dept: ORTHOPEDICS | Facility: CLINIC | Age: 72
End: 2017-03-30
Payer: MEDICARE

## 2017-03-30 ENCOUNTER — RADIANT APPOINTMENT (OUTPATIENT)
Dept: GENERAL RADIOLOGY | Facility: CLINIC | Age: 72
End: 2017-03-30
Attending: PHYSICAL MEDICINE & REHABILITATION
Payer: MEDICARE

## 2017-03-30 VITALS
DIASTOLIC BLOOD PRESSURE: 73 MMHG | WEIGHT: 189 LBS | HEIGHT: 72 IN | SYSTOLIC BLOOD PRESSURE: 144 MMHG | BODY MASS INDEX: 25.6 KG/M2

## 2017-03-30 DIAGNOSIS — M25.522 LEFT ELBOW PAIN: ICD-10-CM

## 2017-03-30 DIAGNOSIS — Z98.890 HISTORY OF ELBOW SURGERY: ICD-10-CM

## 2017-03-30 DIAGNOSIS — M25.422 SWELLING OF LEFT ELBOW: Primary | ICD-10-CM

## 2017-03-30 PROCEDURE — 99203 OFFICE O/P NEW LOW 30 MIN: CPT | Performed by: PHYSICAL MEDICINE & REHABILITATION

## 2017-03-30 PROCEDURE — 73080 X-RAY EXAM OF ELBOW: CPT | Mod: LT | Performed by: PHYSICAL MEDICINE & REHABILITATION

## 2017-03-30 NOTE — Clinical Note
Dear Westley Connell saw me at [facility] on Mar 30, 2017.  Please refer to the visit note at your convenience and feel free to contact me should you have any questions.  Sincerely,  Eric Mendoza DO, CA[facility]

## 2017-03-30 NOTE — MR AVS SNAPSHOT
After Visit Summary   3/30/2017    Westley Ness    MRN: [id number]           Patient Information     Date Of Birth          1945        Visit Information        Provider Department      3/30/2017 11:00 AM Eric Mendoza DO [facility]        Today's Diagnoses     Left elbow pain    -  1      Care Instructions    We addressed the following today:    1. Left posterior elbow swelling    Activity modification as discussed  Topical Treatments: Ice  Over the counter medication: Acetaminophen (Tylenol) 1000 mg every 6 hours with food (Maximum of 3000 mg/day)  Other specific instructions: Monitor for fevers or chills along with any redness or warmth of the left elbow; Call if noted during clinic hours and if noted after hours presented to the emergency room for further evaluation/medical care  Follow up with orthopedic surgery as needed for further evaluation/medical care (sooner if needed; call direct clinic number [[phone]] at any time with questions or concerns)          Follow-ups after your visit        Your next 10 appointments already scheduled     Apr 20, 2017  4:30 PM CDT   Remote ICD Check with CARD DCR2   [facility] ([facility])    [address] [place] [id number] [phone]           This appointment is for a remote check of your debrillator.  This is not an appointment at the office.              Who to contact     If you have questions or need follow up information about today's clinic visit or your schedule please contact [facility] directly at [phone].  Normal or non-critical lab and imaging results will be communicated to you by MyChart, letter or phone within 4 business days after the clinic has received the results. If you do not hear from us within 7 days, please contact the clinic through MyChart or phone. If you have a critical or abnormal lab result, we  "will notify you by phone as soon as possible.  Submit refill requests through [employer] or call your pharmacy and they will forward the refill request to us. Please allow 3 business days for your refill to be completed.          Additional Information About Your Visit        [employer] Information     [employer] lets you send messages to your doctor, view your test results, renew your prescriptions, schedule appointments and more. To sign up, go to [email or web address] . Click on \"Log in\" on the left side of the screen, which will take you to the Welcome page. Then click on \"Sign up Now\" on the right side of the page.     You will be asked to enter the access code listed below, as well as some personal information. Please follow the directions to create your username and password.     Your access code is: [email or web address]  Expires: 2017 11:53 AM     Your access code will  in 90 days. If you need help or a new code, please call your [place] clinic or [phone].        Care EveryWhere ID     This is your Care EveryWhere ID. This could be used by other organizations to access your [place] medical records  [id number]        Your Vitals Were     Height BMI (Body Mass Index)                6' (1.829 m) 25.63 kg/m2           Blood Pressure from Last 3 Encounters:   17 144/73   17 120/70   17 120/50    Weight from Last 3 Encounters:   17 189 lb (85.7 kg)   17 189 lb 11.2 oz (86 kg)   17 192 lb 9.6 oz (87.4 kg)               Primary Care Provider Office Phone # Fax #    Josselin Kolb [phone]       [facility] [address] [place] [address]        Thank you!     Thank you for choosing [facility]  for your care. Our goal is always to provide you with excellent care. Hearing back from our patients is one way we can continue to improve our services. Please take a few minutes to complete the written survey that you " may receive in the mail after your visit with us. Thank you!             Your Updated Medication List - Protect others around you: Learn how to safely use, store and throw away your medicines at www.disposemymeds.org.          This list is accurate as of: 3/30/17 11:43 AM.  Always use your most recent med list.                   Brand Name Dispense Instructions for use    acetaminophen 325 MG tablet    TYLENOL    60 tablet    Take 2 tablets (650 mg) by mouth 4 times daily       aspirin 81 MG EC tablet     81 tablet    Take 1 tablet (81 mg) by mouth daily       B complex-C-folic acid 1 MG capsule     90 capsule    Take 1 capsule by mouth every evening       carvedilol 6.25 MG tablet    COREG    180 tablet    Take 1 tablet (6.25 mg) by mouth 2 times daily (with meals) Take after dialysis       clopidogrel 75 MG tablet    PLAVIX    30 tablet    Take 1 tablet (75 mg) by mouth daily To protect your stent(s).  Do not stop taking unless directed by cardiology.       isosorbide mononitrate 30 MG 24 hr tablet    IMDUR    90 tablet    Take 0.5 tablets (15 mg) by mouth daily       Levothyroxine Sodium 50 MCG Caps     90 capsule    Take 50 mcg by mouth daily       lisinopril 2.5 MG tablet    PRINIVIL/Zestril    60 tablet    Take one tablet after dialysis. Take second tablet at bedtime.       loperamide 2 MG capsule    IMODIUM     Take 1 mg by mouth as needed       MEXILETINE HCL PO      Take 150 mg by mouth 2 times daily       nitroglycerin 0.4 MG sublingual tablet    NITROSTAT    25 tablet    1 TAB EVERY 5 MIN AS NEEDED, UP TO 3 PER EPISODE       pantoprazole 40 MG EC tablet    PROTONIX    30 tablet    Take 1 tablet (40 mg) by mouth every morning       pravastatin 40 MG tablet    PRAVACHOL    90 tablet    Take 1 tablet (40 mg) by mouth daily       psyllium 0.52 G capsule      Take 1 capsule by mouth daily as needed       RANITIDINE HCL PO      Take 75 mg by mouth 1-2 tablets daily       sevelamer 800 MG tablet    RENVELA      Take 800-1,600 mg by mouth as needed ON HOLD

## 2017-03-30 NOTE — NURSING NOTE
Chief Complaint   Patient presents with     Musculoskeletal Problem     L elbow swelling and drainage       Initial /73 (BP Location: Right arm, Patient Position: Chair, Cuff Size: Adult Regular)  Ht 6' (1.829 m)  Wt 189 lb (85.7 kg)  BMI 25.63 kg/m2 Estimated body mass index is 25.63 kg/(m^2) as calculated from the following:    Height as of this encounter: 6' (1.829 m).    Weight as of this encounter: 189 lb (85.7 kg).  Medication Reconciliation: complete     Dario Overton, ATC

## 2017-03-30 NOTE — PROGRESS NOTES
[facility]   Clinic Visit s Mar 30, 2017    Subjective:  Westley Ness is a 71 year old right-hand dominant male, who is seen in consultation at the request of Dr. Kolb for evaluation of left posterior elbow swelling/pain.    Symptoms began 2 weeks ago.  Noticed swelling and drainage from the left elbow at that time.  Reports left elbow pain that is located posterior with radiation absent.  Pain is 1/10 in maximal severity and 0/10 currently.  Symptoms are generally worse with bumping the left elbow and with resting on a hard surface and better with putting something soft under the left elbow.  Other treatment has consisted of wound care (Iodine and bandage) with good relief.  Denies any numbness/tingling/weakness of the left upper extremity. Denies any fevers or chills. Denies any warmth of the left elbow. Notes redness of the left elbow.  Denies any warmth of the left elbow.  History of open reduction and internal fixation completed by Dr. Nava in January 2008 for a left olecranon fracture.    Patient's past medical, surgical, social, and family histories are reviewed today.  Significant medical history as noted above  Past Medical History:   Diagnosis Date     Aortic root dilatation (H)     mild     CAD (coronary artery disease)      Cardiomyopathy, ischemic      Diabetes (H)      H/O percutaneous transluminal coronary angioplasty 7-    Successful Percutaneous coronary intervention of the proximal OM1     Hyperlipidemia      Hypertension      Hypothyroidism      ICD (implantable cardioverter-defibrillator) in place      NSTEMI (non-ST elevated myocardial infarction) (H)      Renal disease     end stage     Shortness of breath      Syncope      Tachycardia        Review of Systems:  Constitutional: NEGATIVE for fever, chills, or change in weight  Skin: NEGATIVE for worrisome rashes, moles, or lesions  Neuro: NEGATIVE for weakness of the left upper extremity  MSK: see  HPI  Additional 10 point ROS is negative other than symptoms noted above and in HPI    Objective:  /73 (BP Location: Right arm, Patient Position: Chair, Cuff Size: Adult Regular)  Ht 6' (1.829 m)  Wt 189 lb (85.7 kg)  BMI 25.63 kg/m2  General: healthy, alert, and in no distress  Skin: no suspicious lesions or rashes  Psych: mentation appears normal and affect normal/bright  HEENT: no scleral icterus  CV: no pedal edema  Resp: normal respiratory effort without conversational dyspnea   Neuro: motor strength as noted below  Lymph: no palpable lymphadenopathy    MSK:    LEFT ELBOW  Inspection:    Minimal serosanguineous drainage noted of the posterior elbow with swelling present without erythema or ecchymosis  Palpation:    No tenderness over the lateral epicondyle, common extensor tendon, medial epicondyle, common flexor tendon, or olecranon bursa  Active Range of Motion:     Extension normal / flexion normal / pronation normal / supination normal  Strength:    Flexion within normal limits and extension within normal limits  Special Tests:    Positive: Pain with resisted pronation    Negative: Pain with resisted wrist extension, pain with resisted middle finger extension, pain with resisted wrist flexion, and pain with resisted supination    Imaging:  Previous films were reviewed today aan results were discussed with the patient  Left elbow x-rays (3 views) were ordered, independent visualization of images was performed, and interpreted in the office today  Impression:   1. Plate and screw fixation noted of the olecranon region from previous surgical intervention without evidence of complication/loosening.  2. Negative for fracture, subluxation, or other acute osseous abnormalities.    ASSESSMENT:  1. Swelling of left elbow  2. Acute left elbow pain  3. History of left elbow surgery    PLAN:  1. Instructed to monitor for any fevers or chills along with any redness or warmth of the left elbow region. If noted,  instructed to call our clinic for further evaluation/medical care (concern for infection).  2. Activity modification as discussed, including limitation of activities that cause pain/discomfort.  3. Acetaminophen as needed for improved pain control.  4. Follow-up as needed with orthopedic surgery if continued symptoms for further evaluation/medical care.  If symptoms resolve completely, can follow-up/call as needed.  Instructed to contact our office should the condition evolve or worsen.    Patient's conditions were thoroughly discussed during today's visit with greater than 50% of the visit spent counseling the patient with total time spent face-to-face with the patient being 15 minutes.    Eric Mendoza DO, CAM  [facility]    Disclaimer: This note consists of symbols derived from keyboarding, dictation and/or voice recognition software. As a result, there may be errors in the script that have gone undetected. Please consider this when interpreting information found in this chart.

## 2017-03-30 NOTE — PATIENT INSTRUCTIONS
We addressed the following today:    1. Left posterior elbow swelling    Activity modification as discussed  Topical Treatments: Ice  Over the counter medication: Acetaminophen (Tylenol) 1000 mg every 6 hours with food (Maximum of 3000 mg/day)  Other specific instructions: Monitor for fevers or chills along with any redness or warmth of the left elbow; Call if noted during clinic hours and if noted after hours present to the emergency room for further evaluation/medical care  Follow up with orthopedic surgery as needed for further evaluation/medical care (sooner if needed; call direct clinic number [[phone]] at any time with questions or concerns)

## 2017-04-05 ENCOUNTER — TRANSFERRED RECORDS (OUTPATIENT)
Dept: HEALTH INFORMATION MANAGEMENT | Facility: CLINIC | Age: 72
End: 2017-04-05

## 2017-04-07 ENCOUNTER — HOSPITAL ENCOUNTER (EMERGENCY)
Facility: CLINIC | Age: 72
Discharge: HOME | End: 2017-04-07
Attending: EMERGENCY MEDICINE | Admitting: EMERGENCY MEDICINE
Payer: MEDICARE

## 2017-04-07 ENCOUNTER — APPOINTMENT (OUTPATIENT)
Dept: GENERAL RADIOLOGY | Facility: CLINIC | Age: 72
End: 2017-04-07
Attending: EMERGENCY MEDICINE
Payer: MEDICARE

## 2017-04-07 ENCOUNTER — APPOINTMENT (OUTPATIENT)
Dept: NUCLEAR MEDICINE | Facility: CLINIC | Age: 72
End: 2017-04-07
Attending: EMERGENCY MEDICINE
Payer: MEDICARE

## 2017-04-07 VITALS
OXYGEN SATURATION: 98 % | BODY MASS INDEX: 25.38 KG/M2 | HEIGHT: 72 IN | DIASTOLIC BLOOD PRESSURE: 73 MMHG | WEIGHT: 187.39 LBS | TEMPERATURE: 99.2 F | SYSTOLIC BLOOD PRESSURE: 145 MMHG | RESPIRATION RATE: 12 BRPM

## 2017-04-07 DIAGNOSIS — J10.1 INFLUENZA B: ICD-10-CM

## 2017-04-07 LAB
ANION GAP SERPL CALCULATED.3IONS-SCNC: 11 MMOL/L (ref 3–14)
BASOPHILS # BLD AUTO: 0.1 10E9/L (ref 0–0.2)
BASOPHILS NFR BLD AUTO: 0.9 %
BUN SERPL-MCNC: 23 MG/DL (ref 7–30)
CALCIUM SERPL-MCNC: 9.3 MG/DL (ref 8.5–10.1)
CHLORIDE SERPL-SCNC: 98 MMOL/L (ref 94–109)
CO2 SERPL-SCNC: 28 MMOL/L (ref 20–32)
CREAT SERPL-MCNC: 5.77 MG/DL (ref 0.66–1.25)
D DIMER PPP FEU-MCNC: 0.9 UG/ML FEU (ref 0–0.5)
DIFFERENTIAL METHOD BLD: ABNORMAL
EOSINOPHIL # BLD AUTO: 0.5 10E9/L (ref 0–0.7)
EOSINOPHIL NFR BLD AUTO: 6.9 %
ERYTHROCYTE [DISTWIDTH] IN BLOOD BY AUTOMATED COUNT: 14.1 % (ref 10–15)
FLUAV+FLUBV AG SPEC QL: ABNORMAL
FLUAV+FLUBV AG SPEC QL: NEGATIVE
GFR SERPL CREATININE-BSD FRML MDRD: 10 ML/MIN/1.7M2
GLUCOSE SERPL-MCNC: 141 MG/DL (ref 70–99)
HCT VFR BLD AUTO: 33.6 % (ref 40–53)
HGB BLD-MCNC: 10.9 G/DL (ref 13.3–17.7)
IMM GRANULOCYTES # BLD: 0 10E9/L (ref 0–0.4)
IMM GRANULOCYTES NFR BLD: 0.1 %
INTERPRETATION ECG - MUSE: NORMAL
LACTATE BLD-SCNC: 0.8 MMOL/L (ref 0.7–2.1)
LYMPHOCYTES # BLD AUTO: 0.8 10E9/L (ref 0.8–5.3)
LYMPHOCYTES NFR BLD AUTO: 11.9 %
MCH RBC QN AUTO: 32.2 PG (ref 26.5–33)
MCHC RBC AUTO-ENTMCNC: 32.4 G/DL (ref 31.5–36.5)
MCV RBC AUTO: 99 FL (ref 78–100)
MONOCYTES # BLD AUTO: 0.4 10E9/L (ref 0–1.3)
MONOCYTES NFR BLD AUTO: 5.6 %
NEUTROPHILS # BLD AUTO: 5.2 10E9/L (ref 1.6–8.3)
NEUTROPHILS NFR BLD AUTO: 74.6 %
NRBC # BLD AUTO: 0 10*3/UL
NRBC BLD AUTO-RTO: 0 /100
PLATELET # BLD AUTO: 178 10E9/L (ref 150–450)
POTASSIUM SERPL-SCNC: 4.5 MMOL/L (ref 3.4–5.3)
RBC # BLD AUTO: 3.39 10E12/L (ref 4.4–5.9)
SODIUM SERPL-SCNC: 137 MMOL/L (ref 133–144)
SPECIMEN SOURCE: ABNORMAL
TROPONIN I SERPL-MCNC: 0.04 UG/L (ref 0–0.04)
WBC # BLD AUTO: 6.9 10E9/L (ref 4–11)

## 2017-04-07 PROCEDURE — 83605 ASSAY OF LACTIC ACID: CPT | Performed by: EMERGENCY MEDICINE

## 2017-04-07 PROCEDURE — 85379 FIBRIN DEGRADATION QUANT: CPT | Performed by: EMERGENCY MEDICINE

## 2017-04-07 PROCEDURE — 99285 EMERGENCY DEPT VISIT HI MDM: CPT | Mod: 25

## 2017-04-07 PROCEDURE — 80048 BASIC METABOLIC PNL TOTAL CA: CPT | Performed by: EMERGENCY MEDICINE

## 2017-04-07 PROCEDURE — 84484 ASSAY OF TROPONIN QUANT: CPT | Performed by: EMERGENCY MEDICINE

## 2017-04-07 PROCEDURE — 87040 BLOOD CULTURE FOR BACTERIA: CPT | Mod: 91 | Performed by: EMERGENCY MEDICINE

## 2017-04-07 PROCEDURE — 27210210 NM LUNG SCAN VENTILATION AND PERFUSION

## 2017-04-07 PROCEDURE — 85025 COMPLETE CBC W/AUTO DIFF WBC: CPT | Performed by: EMERGENCY MEDICINE

## 2017-04-07 PROCEDURE — 71020 XR CHEST 2 VW: CPT

## 2017-04-07 PROCEDURE — 93005 ELECTROCARDIOGRAM TRACING: CPT

## 2017-04-07 PROCEDURE — 34300033 ZZH RX 343: Performed by: EMERGENCY MEDICINE

## 2017-04-07 PROCEDURE — 87804 INFLUENZA ASSAY W/OPTIC: CPT | Mod: 91 | Performed by: EMERGENCY MEDICINE

## 2017-04-07 PROCEDURE — A9567 TECHNETIUM TC-99M AEROSOL: HCPCS | Performed by: EMERGENCY MEDICINE

## 2017-04-07 PROCEDURE — A9540 TC99M MAA: HCPCS | Performed by: EMERGENCY MEDICINE

## 2017-04-07 PROCEDURE — 36415 COLL VENOUS BLD VENIPUNCTURE: CPT

## 2017-04-07 RX ADMIN — Medication 3 MILLICURIE: at 09:17

## 2017-04-07 RX ADMIN — KIT FOR THE PREPARATION OF TECHNETIUM TC 99M PENTETATE 66.3 MILLICURIE: 20 INJECTION, POWDER, LYOPHILIZED, FOR SOLUTION INTRAVENOUS; RESPIRATORY (INHALATION) at 09:17

## 2017-04-07 ASSESSMENT — ENCOUNTER SYMPTOMS
FATIGUE: 1
DIARRHEA: 0
COUGH: 1
SHORTNESS OF BREATH: 1
ABDOMINAL PAIN: 0
NAUSEA: 0
UNEXPECTED WEIGHT CHANGE: 0
VOMITING: 0
CHILLS: 1

## 2017-04-07 NOTE — ED AVS SNAPSHOT
Emergency Department    [address] [place] [address]    Phone:  [phone]    Fax:  [phone]                                       Westley Ness   MRN: [id number]    Department:   Emergency Department   Date of Visit:  4/7/2017           After Visit Summary Signature Page     I have received my discharge instructions, and my questions have been answered. I have discussed any challenges I see with this plan with the nurse or doctor.    ..........................................................................................................................................  Patient/Patient Representative Signature      ..........................................................................................................................................  Patient Representative Print Name and Relationship to Patient    ..................................................               ................................................  Date                                            Time    ..........................................................................................................................................  Reviewed by Signature/Title    ...................................................              ..............................................  Date                                                            Time

## 2017-04-07 NOTE — PROGRESS NOTES
I was asked to set this pt up for Dialysis today.  This pt missed dialysis this morning to come to the ED and will discharge. He can dialyze this afternoon per the ED provider request.  Per chart review [facility] [place] and I contacted them.  This pt can dialyze today if he can be there by 11am. I confirmed with the staff that this can be done and they agreed.  A w/c ride is being set up per the [facility].  I confirmed with [facility] that this pt will be there by 11 am today. The bedside RN updated the patient.

## 2017-04-07 NOTE — ED PROVIDER NOTES
History     Chief Complaint:  Shortness of breath     HPI   Westley Ness is a 71 year old male with a history of CAD, ischemic cardiomyopathy, diabetes, ESRD on dialysis MWF x 12 years, on Plavix, who presents via EMS for evaluation of shortness of breath. The patient reports approximately 5 days of cough and shortness of breath. Shortness of breath is worse with lying down and he has trouble with sleeping at night. The patient's dialysis run 2 days ago was normal and he had no weight gain at that time. He had a mild improvement in his shortness of breath after dialysis. Symptoms worsened this morning, prompting him to call EMS. EMS found the patient to be hypertensive in the 160's, sating 95% on 2 liters. Patient is not on O2 chronically. Here, he states that he also feels fatigued. He did have a flu shot this year. He notes some mild chills the past couple days.  The patient denies any chest pain, abdominal pain, nausea, vomiting, change in baseline mild diarrhea, or any other symptoms.      PE/DVT Risk Factors:  He denies any personal or familial history of PE, DVT or clotting disorder.  He reports no recent travel, surgery or other immobilizations.  He is not on hormone therapy and has no known malignancy.       Allergies:  Contrast Dye - hives  Topical antibitoic    Medications:    nitroglycerin (NITROSTAT) 0.4 MG sublingual tablet  B complex-C-folic acid (NEPHROCAPS/TRIPHROCAPS) 1 MG capsule  pantoprazole (PROTONIX) 40 MG EC tablet  carvedilol (COREG) 6.25 MG tablet  isosorbide mononitrate (IMDUR) 30 MG 24 hr tablet  Levothyroxine Sodium 50 MCG CAPS  pravastatin (PRAVACHOL) 40 MG tablet  lisinopril (PRINIVIL,ZESTRIL) 2.5 MG tablet  acetaminophen (TYLENOL) 325 MG tablet  clopidogrel (PLAVIX) 75 MG tablet  aspirin EC 81 MG EC tablet  sevelamer (RENVELA) 800 MG tablet  loperamide (IMODIUM) 2 MG capsule  psyllium 0.52 G capsule  MEXILETINE HCL PO  RANITIDINE HCL PO    Past Medical History:    Aortic root  dilatation   CAD  Ischemic cardiomyopathy   Diabetes   Hyperlipidemia  Hypertension  ICD in place   NSTEMI  ESRD on dialysis     Past Surgical History:    Cholecystectomy   Left heart cath x 2 (2016)  LUE fistula     Family History:    Mother - hypertension  Father - hypertension  Grandmother - CAD    Social History:  Marital Status:  Single   Smoking status: Former smoker  Alcohol use: Yes      Review of Systems   Constitutional: Positive for chills and fatigue. Negative for unexpected weight change.   Respiratory: Positive for cough and shortness of breath.    Cardiovascular: Negative for chest pain.   Gastrointestinal: Negative for abdominal pain, diarrhea, nausea and vomiting.   All other systems reviewed and are negative.      Physical Exam     Patient Vitals for the past 24 hrs:   BP Temp Temp src Heart Rate Resp SpO2 Height Weight   04/07/17 0933 145/73 - - 96 12 98 % - -   04/07/17 0932 - - - 96 14 97 % - -   04/07/17 0931 141/65 - - 90 24 98 % - -   04/07/17 0814 (!) 146/106 - - 86 16 94 % - -   04/07/17 0715 154/78 99.2  F (37.3  C) Temporal 91 - 93 % 1.829 m (6') 85 kg (187 lb 6.3 oz)      Physical Exam   Physical Exam   General:  Sitting on bed with nasal cannula in place.  HENT:  No obvious trauma to head  Right Ear:  External ear normal.   Left Ear:  External ear normal.   Nose:  Nose normal.   Eyes:  Conjunctivae and EOM are normal. Pupils are equal, round, and reactive.   Neck: Normal range of motion. Neck supple. No tracheal deviation present.   CV:  Normal heart sounds. No murmur heard.  Pulm/Chest: Effort normal and breath sounds normal.   Abd: Soft. No distension. There is no tenderness. There is no rigidity, no rebound and no guarding.   M/S: Normal range of motion. No calf pain or swelling  Neuro: Alert. GCS 15.  Skin: Skin is warm and dry. No rash noted. Not diaphoretic.   Psych: Normal mood and affect. Behavior is normal.      Emergency Department Course   ECG:  @ 0711  Indication: Shortness  of breath   Vent. Rate 91 bpm. UT interval 174 ms. QRS duration 92 ms. QT/QTc 374/460 ms. P-R-T axis 91 149 48.   Normal sinus rhythm. Right axis deviation. Low voltage QRS. Cannot rule out anterior infarct, age undetermined. Abnormal ECG.  No significant change when compared to previous ECG from 11/15/16   Read @ 0712 by Dr. Rea.     Imaging:  Radiographic findings were communicated with the patient who voiced understanding of the findings.    XR Chest 2 views  IMPRESSION: Cardiomegaly with pacemaker in the heart. Pulmonary vascular engorgement. No pleural effusions or infiltrates. No change.  Preliminary radiology read.        NM Lung vent and perf   IMPRESSION: Low probability for pulmonary embolism.   Preliminary radiology read.        Laboratory:  CBC:  WBC 6.9, HGB 10.9 (L),   BMP: Glucose 141 (H), Creatinine 5.77 (H), GFR 10 (L)  Troponin: 0.038   Lactic acid: 0.8   Blood culture: pending x 2    (8028) D dimer: 0.9 (H)    Influenza A/B Antigen: Influenza A negative, Influenza B positive     Emergency Department Course:  The patient arrived in the emergency department via EMS.  Nursing notes and vitals reviewed.    Blood was drawn from the patient. This was sent for laboratory testing, findings above.   The patient was sent for a chest xray while in the emergency department, findings above.      (4033) I performed an exam of the patient as documented above.    (3014) Patient update.    The patient was sent for a VQ while in the emergency department, findings above.      (1018) Findings and plan explained to the patient. Patient discharged home with instructions regarding supportive care, medications, and reasons to return. The importance of close follow-up was reviewed. Patient was sent to dialysis in a cab.     Impression & Plan      Medical Decision Making:  Westley Ness is a very pleasant 71 year old year old patient who presents to the emergency department with concern of cough since  Monday. Patient does have a low grade fever here. He is well appearing. He is not hypoxic. Paramedics report no hypoxia upon arrival. Patient's labs are unremarkable other than the fact that he is positive for influenza B. The patient was complaining of dyspnea, influenza was not back and I considered PE as well. A D dimer is elevated after the influenza returned positive. I reviewed the risks and benefits of doing a VQ scan since he does have a contrast allergy. The patient still desired the scan and consented for this. The scan returned and revealed low probability for PE. At this time I suspect the patient's symptoms are related entirely to the influenza B. The symptoms began on Monday, being that it is Friday he is out of the Tamiflu window. I reviewed the risks and benefits of this and he is in agreement. The remained of his labs are unremarkable. Troponin and ECG are negative. Lactic acid is negative. He has no leukocytosis. The patient feels comfortable going home. He is suppose to have dialysis but he does not appear to be in any significant respiratory distress. I do not suspect any pulmonary edema as there is none seen on chest xray. His potassium is normal. I involved our  Josselyn to help make sure that he gets in later this week or early tomorrow morning. Fortunately, she was able to schedule the pt for dialysis at 11 AM today. Pt was transported there from the ED.    The treatment plan was discussed with the patient and they expressed understanding of this plan and consented to the plan.  In addition, the patient will return to the emergency department if their symptoms persist, worsen, if new symptoms arise or if there is any concern as other pathology may be present that is not evident at this time. They also understand the importance of close follow up in the clinic and if unable to do so will return to the emergency department for a reevaluation. All questions were answered.       Diagnosis:    ICD-10-CM   1. Influenza B J10.1       Disposition:  Patient is discharged to home.        Dario Winters  4/7/2017    EMERGENCY DEPARTMENT    Dario CARMONA, yadiel serving as a scribe on 4/7/2017 at 7:53 AM to personally document services performed by Dr. David Rea based on my observations and the provider's statements to me.       David Rea,   04/07/17 1105

## 2017-04-07 NOTE — ED AVS SNAPSHOT
Emergency Department    [address] [facility] [address]    Phone:  [phone]    Fax:  [phone]                                       Westley Ness   MRN: [id number]    Department:   Emergency Department   Date of Visit:  4/7/2017           Patient Information     Date Of Birth          1945        Your diagnoses for this visit were:     Influenza B        You were seen by David Rea DO.      Follow-up Information     Follow up with Josselin Kolb MD In 3 days.    Specialty:  Internal Medicine    Contact information:    [facility]  [address] [facility] [address]  [phone]          Follow up with  Emergency Department.    Specialty:  EMERGENCY MEDICINE    Why:  If symptoms worsen    Contact information:    [address] [facility] [id number] [phone]        Discharge Instructions         Influenza  Influenza ( the flu ) is an infection that affects your respiratory tract (the mouth, nose, and lungs, and the passages between them). Unlike a cold, the flu can make you very ill. And it can lead to pneumonia, a serious lung infection. For some people, especially older adults, young children, and people with certain chronic conditions, the flu can have serious complications and even be fatal.  What Are the Risk Factors for the Flu?     Viruses that cause influenza spread through the air in droplets when someone who has the flu coughs, sneezes, laughs, or talks.   Anyone can get the flu. But you re more likely to become infected if you:    Have a weakened immune system.    Work in a health care setting where you may be exposed to flu germs.    Live or work with someone who has the flu.    Haven t received an annual flu shot.  How Does the Flu Spread?  The flu is caused by viruses. The viruses spread through the air in droplets when someone who has the flu coughs, sneezes, laughs, or talks. You can become infected when  you inhale these viruses directly. You can also become infected when you touch a surface on which the droplets have landed and then transfer the germs to your eyes, nose, or mouth. Touching used tissues, or sharing utensils, drinking glasses, or a toothbrush with an infected person can expose you to flu viruses, too.  What Are the Symptoms of the Flu?  Flu symptoms tend to come on quickly and may last a few days to a few weeks. They include:    Fever usually higher than 101 F  (38.3 C) and chills    Sore throat and headache    Dry cough    Runny nose    Tiredness and weakness    Muscle aches  Factors That Can Make Flu Worse  For some people, the flu can be very serious. The risk of complications is greater for:    Children under age 5.    Adults 65 years of age and older.    People with a chronic illness, such as diabetes or heart, kidney, or lung disease.    People who live in a nursing home or long-term care facility.   How Is the Flu Treated?  Influenza usually improves after 7 days or so. In some cases, your health care provider may prescribe an antiviral medication. This may help you get well sooner. For the medication to help, you need to take it as soon as possible (ideally within 48 hours) after your symptoms start. If you develop pneumonia or other serious illness, hospital care may be needed.  Easing Flu Symptoms    Drink lots of fluids such as water, juice, and warm soup. A good rule is to drink enough so that you urinate your normal amount.    Get plenty of rest.    Ask your health care provider what to take for fever and pain.    Call your provider if your fever rises over 101 F (38.3 C) or you become dizzy, lightheaded, or short of breath.  Taking Steps to Protect Others    Wash your hands often, especially after coughing or sneezing. Or, clean your hands with an alcohol-based hand  containing at least 60 percent alcohol.    Cough or sneeze into a tissue. Then throw the tissue away and wash your  hands. If you don t have a tissue, cough and sneeze into the crook of your elbow.    Stay home until at least 24 hours after you no longer have a fever or chills. Be sure the fever isn t being hidden by fever-reducing medication.    Don t share food, utensils, drinking glasses, or a toothbrush with others.    Ask your health care provider if others in your household should receive antiviral medication to help them avoid infection.  How Can the Flu Be Prevented?    One of the best ways to avoid the flu is to get a flu vaccination each year. Viruses that cause the flu change from year to year. For that reason, doctors recommend getting the flu vaccine each year, as soon as it's available in your area. The vaccine may be given as a shot or as a nasal spray. Your health care provider can tell you which vaccine is right for you.    Wash your hands often. Frequent handwashing is a proven way to help prevent infection.    Carry an alcohol-based hand gel containing at least 60 percent alcohol. Use it when you don t have access to soap and water. Then wash your hands as soon as you can.    Avoid touching your eyes, nose, and mouth.    At home and work, clean phones, computer keyboards, and toys often with disinfectant wipes.    If possible, avoid close contact with others who have the flu or symptoms of the flu.  Handwashing Tips  Handwashing is one of the best ways to prevent many common infections. If you re caring for or visiting someone with the flu, wash your hands each time you enter and leave the room. Follow these steps:    Use warm water and plenty of soap. Rub your hands together well.    Clean the whole hand, under your nails, between your fingers, and up the wrists.    Wash for at least 15 seconds.    Rinse, letting the water run down your fingers, not up your wrists.    Dry your hands well. Use a paper towel to turn off the faucet and open the door.  Using Alcohol-Based Hand   Alcohol-based hand   are also a good choice. Use them when you don t have access to soap and water. Follow these steps:    Squeeze about a tablespoon of gel into the palm of one hand.    Rub your hands together briskly, cleaning the backs of your hands, the palms, between your fingers, and up the wrists.    Rub until the gel is gone and your hands are completely dry.  Preventing Influenza in Healthcare Settings  The flu is a special concern for people in hospitals and long-term care facilities. To help prevent the spread of flu, many hospitals and nursing homes take these steps:    Health care providers wash their hands or use an alcohol-based hand  before and after treating each patient.    People with the flu have private rooms and bathrooms or share a room with someone with the same infection.    High-risk patients who don t have the flu are encouraged to get the flu and pneumonia vaccines.    All health care workers are encouraged or required to get flu shots.        6625-8380 [employer]. [address] [place] [address]. All rights reserved. This information is not intended as a substitute for professional medical care. Always follow your healthcare professional's instructions.          Future Appointments        Provider Department Dept Phone Center    4/20/2017 4:30 PM CARD [facility] Heart Device RN [facility] [place] [phone] [facility]      24 Hour Appointment Hotline       To make an appointment at any [facility], call [phone] ([phone]). If you don't have a family doctor or clinic, we will help you find one. [facility] clinics are conveniently located to serve the needs of you and your family.             Review of your medicines      Our records show that you are taking the medicines listed below. If these are incorrect, please call your family doctor or clinic.        Dose / Directions Last dose taken    acetaminophen 325 MG tablet   Commonly  known as:  TYLENOL   Dose:  650 mg   Quantity:  60 tablet        Take 2 tablets (650 mg) by mouth 4 times daily   Refills:  0        aspirin 81 MG EC tablet   Dose:  81 mg   Quantity:  81 tablet        Take 1 tablet (81 mg) by mouth daily   Refills:  0        B complex-C-folic acid 1 MG capsule   Dose:  1 capsule   Quantity:  90 capsule        Take 1 capsule by mouth every evening   Refills:  3        carvedilol 6.25 MG tablet   Commonly known as:  COREG   Dose:  6.25 mg   Quantity:  180 tablet        Take 1 tablet (6.25 mg) by mouth 2 times daily (with meals) Take after dialysis   Refills:  3        clopidogrel 75 MG tablet   Commonly known as:  PLAVIX   Dose:  75 mg   Quantity:  30 tablet        Take 1 tablet (75 mg) by mouth daily To protect your stent(s).  Do not stop taking unless directed by cardiology.   Refills:  11        isosorbide mononitrate 30 MG 24 hr tablet   Commonly known as:  IMDUR   Dose:  15 mg   Quantity:  90 tablet        Take 0.5 tablets (15 mg) by mouth daily   Refills:  3        Levothyroxine Sodium 50 MCG Caps   Dose:  50 mcg   Quantity:  90 capsule        Take 50 mcg by mouth daily   Refills:  3        lisinopril 2.5 MG tablet   Commonly known as:  PRINIVIL/Zestril   Quantity:  60 tablet        Take one tablet after dialysis. Take second tablet at bedtime.   Refills:  3        loperamide 2 MG capsule   Commonly known as:  IMODIUM   Dose:  1 mg        Take 1 mg by mouth as needed   Refills:  0        MEXILETINE HCL PO   Dose:  150 mg        Take 150 mg by mouth 2 times daily   Refills:  0        nitroglycerin 0.4 MG sublingual tablet   Commonly known as:  NITROSTAT   Quantity:  25 tablet        1 TAB EVERY 5 MIN AS NEEDED, UP TO 3 PER EPISODE   Refills:  0        pantoprazole 40 MG EC tablet   Commonly known as:  PROTONIX   Dose:  40 mg   Quantity:  30 tablet        Take 1 tablet (40 mg) by mouth every morning   Refills:  10        pravastatin 40 MG tablet   Commonly known as:  PRAVACHOL    Dose:  40 mg   Quantity:  90 tablet        Take 1 tablet (40 mg) by mouth daily   Refills:  3        psyllium 0.52 G capsule   Dose:  1 capsule        Take 1 capsule by mouth daily as needed   Refills:  0        RANITIDINE HCL PO   Dose:  75 mg        Take 75 mg by mouth 1-2 tablets daily   Refills:  0        sevelamer 800 MG tablet   Commonly known as:  RENVELA   Dose:  800-1600 mg        Take 800-1,600 mg by mouth as needed ON HOLD   Refills:  0                Procedures and tests performed during your visit     Procedure/Test Number of Times Performed    Basic metabolic panel 1    Blood culture 2    CBC with platelets differential 1    D dimer quantitative 1    EKG 12 lead 1    Influenza A/B antigen 1    Lactic acid 1    Lung vent and perf (NM) 1    Troponin I 1    XR Chest 2 Views 1      Orders Needing Specimen Collection     None      Pending Results     Date and Time Order Name Status Description    4/7/2017 0832 Lung vent and perf (NM) Preliminary     4/7/2017 0731 Blood culture In process     4/7/2017 0731 Blood culture In process             Pending Culture Results     Date and Time Order Name Status Description    4/7/2017 0731 Blood culture In process     4/7/2017 0731 Blood culture In process             Test Results From Your Hospital Stay        4/7/2017  7:42 AM      Component Results     Component Value Ref Range & Units Status    WBC 6.9 4.0 - 11.0 10e9/L Final    RBC Count 3.39 (L) 4.4 - 5.9 10e12/L Final    Hemoglobin 10.9 (L) 13.3 - 17.7 g/dL Final    Hematocrit 33.6 (L) 40.0 - 53.0 % Final    MCV 99 78 - 100 fl Final    MCH 32.2 26.5 - 33.0 pg Final    MCHC 32.4 31.5 - 36.5 g/dL Final    RDW 14.1 10.0 - 15.0 % Final    Platelet Count 178 150 - 450 10e9/L Final    Diff Method Automated Method  Final    % Neutrophils 74.6 % Final    % Lymphocytes 11.9 % Final    % Monocytes 5.6 % Final    % Eosinophils 6.9 % Final    % Basophils 0.9 % Final    % Immature Granulocytes 0.1 % Final    Nucleated  RBCs 0 0 /100 Final    Absolute Neutrophil 5.2 1.6 - 8.3 10e9/L Final    Absolute Lymphocytes 0.8 0.8 - 5.3 10e9/L Final    Absolute Monocytes 0.4 0.0 - 1.3 10e9/L Final    Absolute Eosinophils 0.5 0.0 - 0.7 10e9/L Final    Absolute Basophils 0.1 0.0 - 0.2 10e9/L Final    Abs Immature Granulocytes 0.0 0 - 0.4 10e9/L Final    Absolute Nucleated RBC 0.0  Final         4/7/2017  8:04 AM      Component Results     Component Value Ref Range & Units Status    Sodium 137 133 - 144 mmol/L Final    Potassium 4.5 3.4 - 5.3 mmol/L Final    Chloride 98 94 - 109 mmol/L Final    Carbon Dioxide 28 20 - 32 mmol/L Final    Anion Gap 11 3 - 14 mmol/L Final    Glucose 141 (H) 70 - 99 mg/dL Final    Urea Nitrogen 23 7 - 30 mg/dL Final    Creatinine 5.77 (H) 0.66 - 1.25 mg/dL Final    GFR Estimate 10 (L) >60 mL/min/1.7m2 Final    Non  GFR Calc    GFR Estimate If Black 12 (L) >60 mL/min/1.7m2 Final    African American GFR Calc    Calcium 9.3 8.5 - 10.1 mg/dL Final         4/7/2017  8:09 AM      Component Results     Component Value Ref Range & Units Status    Troponin I ES 0.038 0.000 - 0.045 ug/L Final    The 99th percentile for upper reference range is 0.045 ug/L.  Troponin values   in   the range of 0.045 - 0.120 ug/L may be associated with risks of adverse   clinical events.           4/7/2017 10:03 AM         4/7/2017  8:18 AM         4/7/2017  9:56 AM      Narrative     CHEST TWO VIEWS April 7, 2017 7:49 AM     HISTORY: Cough.    COMPARISON: 11/13/2016.        Impression     IMPRESSION: Cardiomegaly with pacemaker in the heart. Pulmonary  vascular engorgement. No pleural effusions or infiltrates. No change.    OBED MENDEZ MD         4/7/2017  7:44 AM      Component Results     Component Value Ref Range & Units Status    Lactic Acid 0.8 0.7 - 2.1 mmol/L Final         4/7/2017  8:26 AM      Component Results     Component Value Ref Range & Units Status    Influenza A/B Agn Specimen Nose  Final    Influenza A  Negative NEG Final    Influenza B  NEG Final    Positive   Test results must be correlated with clinical data. If necessary, results   should be confirmed by a molecular assay or viral culture.   (A)         4/7/2017  8:21 AM      Component Results     Component Value Ref Range & Units Status    D Dimer 0.9 (H) 0.0 - 0.50 ug/ml FEU Final    This D-dimer assay is intended for use in conjuntion with a clinical pretest   probability assessment model to exclude pulmonary embolism (PE) and as an aid   in the diagnosis of deep venous thrombosis (DVT) in outpatients suspected of   PE   or DVT. The cut-off value is 0.5 g/mL FEU.           4/7/2017 10:14 AM      Narrative     NUCLEAR MEDICINE LUNG SCAN VENTILATION AND PERFUSION April 7, 2017  9:34 AM    HISTORY: Shortness of breath. Elevated D-dimer.    COMPARISON: Chest radiograph performed earlier today.    TECHNIQUE: Patient was administered 3 mCi Tc 99m MAA intravenously  prior to performing the perfusion images. Patient was then given  aerosolized 66.3 mCi Tc 99m DTPA for the ventilation images.     FINDINGS: Mild central deposition of DTPA is likely related to COPD.  Nonsegmental matched defect in the right midlung is due to the  presence of a cardiac device in the right chest wall. No other focal  perfusion defects are identified in the lungs. No mismatched  peripheral defects are present to suggest the presence of pulmonary  emboli.         Impression     IMPRESSION: Low probability for pulmonary embolism.                 Clinical Quality Measure: Blood Pressure Screening     Your blood pressure was checked while you were in the emergency department today. The last reading we obtained was  BP: 145/73 . Please read the guidelines below about what these numbers mean and what you should do about them.  If your systolic blood pressure (the top number) is less than 120 and your diastolic blood pressure (the bottom number) is less than 80, then your blood pressure is  "normal. There is nothing more that you need to do about it.  If your systolic blood pressure (the top number) is 120-139 or your diastolic blood pressure (the bottom number) is 80-89, your blood pressure may be higher than it should be. You should have your blood pressure rechecked within a year by a primary care provider.  If your systolic blood pressure (the top number) is 140 or greater or your diastolic blood pressure (the bottom number) is 90 or greater, you may have high blood pressure. High blood pressure is treatable, but if left untreated over time it can put you at risk for heart attack, stroke, or kidney failure. You should have your blood pressure rechecked by a primary care provider within the next 4 weeks.  If your provider in the emergency department today gave you specific instructions to follow-up with your doctor or provider even sooner than that, you should follow that instruction and not wait for up to 4 weeks for your follow-up visit.        Thank you for choosing [facility]       Thank you for choosing [facility] for your care. Our goal is always to provide you with excellent care. Hearing back from our patients is one way we can continue to improve our services. Please take a few minutes to complete the written survey that you may receive in the mail after you visit with us. Thank you!        [employer] Information     [employer] lets you send messages to your doctor, view your test results, renew your prescriptions, schedule appointments and more. To sign up, go to [email or web address] . Click on \"Log in\" on the left side of the screen, which will take you to the Welcome page. Then click on \"Sign up Now\" on the right side of the page.     You will be asked to enter the access code listed below, as well as some personal information. Please follow the directions to create your username and password.     Your access code is: [email or web address]  Expires: 2017 11:53 AM     Your access code will  in " 90 days. If you need help or a new code, please call your [facility] clinic or [phone].        Care EveryWhere ID     This is your Care EveryWhere ID. This could be used by other organizations to access your [facility] medical records  [id number]        After Visit Summary       This is your record. Keep this with you and show to your community pharmacist(s) and doctor(s) at your next visit.

## 2017-04-07 NOTE — ED NOTES
Bed: ED26  Expected date: 4/7/17  Expected time: 7:00 AM  Means of arrival: Ambulance  Comments:  Asaf 516 71M SOB

## 2017-04-07 NOTE — DISCHARGE INSTRUCTIONS
Influenza  Influenza ( the flu ) is an infection that affects your respiratory tract (the mouth, nose, and lungs, and the passages between them). Unlike a cold, the flu can make you very ill. And it can lead to pneumonia, a serious lung infection. For some people, especially older adults, young children, and people with certain chronic conditions, the flu can have serious complications and even be fatal.  What Are the Risk Factors for the Flu?     Viruses that cause influenza spread through the air in droplets when someone who has the flu coughs, sneezes, laughs, or talks.   Anyone can get the flu. But you re more likely to become infected if you:    Have a weakened immune system.    Work in a health care setting where you may be exposed to flu germs.    Live or work with someone who has the flu.    Haven t received an annual flu shot.  How Does the Flu Spread?  The flu is caused by viruses. The viruses spread through the air in droplets when someone who has the flu coughs, sneezes, laughs, or talks. You can become infected when you inhale these viruses directly. You can also become infected when you touch a surface on which the droplets have landed and then transfer the germs to your eyes, nose, or mouth. Touching used tissues, or sharing utensils, drinking glasses, or a toothbrush with an infected person can expose you to flu viruses, too.  What Are the Symptoms of the Flu?  Flu symptoms tend to come on quickly and may last a few days to a few weeks. They include:    Fever usually higher than 101 F  (38.3 C) and chills    Sore throat and headache    Dry cough    Runny nose    Tiredness and weakness    Muscle aches  Factors That Can Make Flu Worse  For some people, the flu can be very serious. The risk of complications is greater for:    Children under age 5.    Adults 65 years of age and older.    People with a chronic illness, such as diabetes or heart, kidney, or lung disease.    People who live in a nursing  home or long-term care facility.   How Is the Flu Treated?  Influenza usually improves after 7 days or so. In some cases, your health care provider may prescribe an antiviral medication. This may help you get well sooner. For the medication to help, you need to take it as soon as possible (ideally within 48 hours) after your symptoms start. If you develop pneumonia or other serious illness, hospital care may be needed.  Easing Flu Symptoms    Drink lots of fluids such as water, juice, and warm soup. A good rule is to drink enough so that you urinate your normal amount.    Get plenty of rest.    Ask your health care provider what to take for fever and pain.    Call your provider if your fever rises over 101 F (38.3 C) or you become dizzy, lightheaded, or short of breath.  Taking Steps to Protect Others    Wash your hands often, especially after coughing or sneezing. Or, clean your hands with an alcohol-based hand  containing at least 60 percent alcohol.    Cough or sneeze into a tissue. Then throw the tissue away and wash your hands. If you don t have a tissue, cough and sneeze into the crook of your elbow.    Stay home until at least 24 hours after you no longer have a fever or chills. Be sure the fever isn t being hidden by fever-reducing medication.    Don t share food, utensils, drinking glasses, or a toothbrush with others.    Ask your health care provider if others in your household should receive antiviral medication to help them avoid infection.  How Can the Flu Be Prevented?    One of the best ways to avoid the flu is to get a flu vaccination each year. Viruses that cause the flu change from year to year. For that reason, doctors recommend getting the flu vaccine each year, as soon as it's available in your area. The vaccine may be given as a shot or as a nasal spray. Your health care provider can tell you which vaccine is right for you.    Wash your hands often. Frequent handwashing is a proven way  to help prevent infection.    Carry an alcohol-based hand gel containing at least 60 percent alcohol. Use it when you don t have access to soap and water. Then wash your hands as soon as you can.    Avoid touching your eyes, nose, and mouth.    At home and work, clean phones, computer keyboards, and toys often with disinfectant wipes.    If possible, avoid close contact with others who have the flu or symptoms of the flu.  Handwashing Tips  Handwashing is one of the best ways to prevent many common infections. If you re caring for or visiting someone with the flu, wash your hands each time you enter and leave the room. Follow these steps:    Use warm water and plenty of soap. Rub your hands together well.    Clean the whole hand, under your nails, between your fingers, and up the wrists.    Wash for at least 15 seconds.    Rinse, letting the water run down your fingers, not up your wrists.    Dry your hands well. Use a paper towel to turn off the faucet and open the door.  Using Alcohol-Based Hand   Alcohol-based hand  are also a good choice. Use them when you don t have access to soap and water. Follow these steps:    Squeeze about a tablespoon of gel into the palm of one hand.    Rub your hands together briskly, cleaning the backs of your hands, the palms, between your fingers, and up the wrists.    Rub until the gel is gone and your hands are completely dry.  Preventing Influenza in Healthcare Settings  The flu is a special concern for people in hospitals and long-term care facilities. To help prevent the spread of flu, many hospitals and nursing homes take these steps:    Health care providers wash their hands or use an alcohol-based hand  before and after treating each patient.    People with the flu have private rooms and bathrooms or share a room with someone with the same infection.    High-risk patients who don t have the flu are encouraged to get the flu and pneumonia  vaccines.    All health care workers are encouraged or required to get flu shots.        1762-7652 [employer]. [address] [place] [address], [place] [address]. All rights reserved. This information is not intended as a substitute for professional medical care. Always follow your healthcare professional's instructions.

## 2017-04-10 ENCOUNTER — ALLIED HEALTH/NURSE VISIT (OUTPATIENT)
Dept: CARDIOLOGY | Facility: CLINIC | Age: 72
End: 2017-04-10
Payer: MEDICARE

## 2017-04-10 ENCOUNTER — CARE COORDINATION (OUTPATIENT)
Dept: CARE COORDINATION | Facility: CLINIC | Age: 72
End: 2017-04-10

## 2017-04-10 DIAGNOSIS — Z95.810 ICD (IMPLANTABLE CARDIOVERTER-DEFIBRILLATOR) IN PLACE: Primary | ICD-10-CM

## 2017-04-10 PROCEDURE — 93296 REM INTERROG EVL PM/IDS: CPT | Performed by: INTERNAL MEDICINE

## 2017-04-10 PROCEDURE — 93295 DEV INTERROG REMOTE 1/2/MLT: CPT | Performed by: INTERNAL MEDICINE

## 2017-04-10 NOTE — MR AVS SNAPSHOT
After Visit Summary   4/10/2017    Westley Ness    MRN: [id number]           Patient Information     Date Of Birth          1945        Visit Information        Provider Department      4/10/2017 4:30 PM CARD DCR2 [facility]        Today's Diagnoses     ICD (implantable cardioverter-defibrillator) in place    -  1       Follow-ups after your visit        Your next 10 appointments already scheduled     Apr 10, 2017  4:30 PM CDT   Remote ICD Check with CARD DCR2   [facility] ([facility])    [address] [place] [id number] [phone]           This appointment is for a remote check of your debrillator.  This is not an appointment at the office.            Apr 12, 2017  2:30 PM CDT   Office Visit with Josselin Kolb MD   [facility] ([facility])    [address] [place] [id number] [phone]           Bring a current list of meds and any records pertaining to this visit.  For Physicals, please bring immunization records and any forms needing to be filled out.  Please arrive 10 minutes early to complete paperwork.            Jul 13, 2017  4:30 PM CDT   Remote ICD Check with CARD DCR2   [facility] ([facility])    [address] [place] [id number] [phone]           This appointment is for a remote check of your debrillator.  This is not an appointment at the office.              Who to contact     If you have questions or need follow up information about today's clinic visit or your schedule please contact [facility] directly at [phone].  Normal or non-critical lab and imaging results will be communicated to you by MyChart, letter or phone within 4 business days after the clinic has received the  "results. If you do not hear from us within 7 days, please contact the clinic through PushCoin or phone. If you have a critical or abnormal lab result, we will notify you by phone as soon as possible.  Submit refill requests through PushCoin or call your pharmacy and they will forward the refill request to us. Please allow 3 business days for your refill to be completed.          Additional Information About Your Visit        [employer] Consumer Information     PushCoin lets you send messages to your doctor, view your test results, renew your prescriptions, schedule appointments and more. To sign up, go to [email or web address] . Click on \"Log in\" on the left side of the screen, which will take you to the Welcome page. Then click on \"Sign up Now\" on the right side of the page.     You will be asked to enter the access code listed below, as well as some personal information. Please follow the directions to create your username and password.     Your access code is: [email or web address]  Expires: 2017 11:53 AM     Your access code will  in 90 days. If you need help or a new code, please call your [place] clinic or [phone].        Care EveryWhere ID     This is your Care EveryWhere ID. This could be used by other organizations to access your [place] medical records  [id number]         Blood Pressure from Last 3 Encounters:   17 145/73   17 144/73   17 120/70    Weight from Last 3 Encounters:   17 85 kg (187 lb 6.3 oz)   17 85.7 kg (189 lb)   17 86 kg (189 lb 11.2 oz)              We Performed the Following     ICD DEVICE INTERROGAT REMOTE (88399)     INTERROGATION DEVICE EVAL REMOTE, PACER/ICD (47395)        Primary Care Provider Office Phone # Fax #    Josselin Kolb [phone]       [facility] [address] [facility] [address]        Thank you!     Thank you for choosing [facility] [place]  for your care. Our " goal is always to provide you with excellent care. Hearing back from our patients is one way we can continue to improve our services. Please take a few minutes to complete the written survey that you may receive in the mail after your visit with us. Thank you!             Your Updated Medication List - Protect others around you: Learn how to safely use, store and throw away your medicines at www.disposemymeds.org.          This list is accurate as of: 4/10/17  3:49 PM.  Always use your most recent med list.                   Brand Name Dispense Instructions for use    acetaminophen 325 MG tablet    TYLENOL    60 tablet    Take 2 tablets (650 mg) by mouth 4 times daily       aspirin 81 MG EC tablet     81 tablet    Take 1 tablet (81 mg) by mouth daily       B complex-C-folic acid 1 MG capsule     90 capsule    Take 1 capsule by mouth every evening       carvedilol 6.25 MG tablet    COREG    180 tablet    Take 1 tablet (6.25 mg) by mouth 2 times daily (with meals) Take after dialysis       clopidogrel 75 MG tablet    PLAVIX    30 tablet    Take 1 tablet (75 mg) by mouth daily To protect your stent(s).  Do not stop taking unless directed by cardiology.       isosorbide mononitrate 30 MG 24 hr tablet    IMDUR    90 tablet    Take 0.5 tablets (15 mg) by mouth daily       Levothyroxine Sodium 50 MCG Caps     90 capsule    Take 50 mcg by mouth daily       lisinopril 2.5 MG tablet    PRINIVIL/Zestril    60 tablet    Take one tablet after dialysis. Take second tablet at bedtime.       loperamide 2 MG capsule    IMODIUM     Take 1 mg by mouth as needed       MEXILETINE HCL PO      Take 150 mg by mouth 2 times daily       nitroglycerin 0.4 MG sublingual tablet    NITROSTAT    25 tablet    1 TAB EVERY 5 MIN AS NEEDED, UP TO 3 PER EPISODE       pantoprazole 40 MG EC tablet    PROTONIX    30 tablet    Take 1 tablet (40 mg) by mouth every morning       pravastatin 40 MG tablet    PRAVACHOL    90 tablet    Take 1 tablet (40 mg) by  mouth daily       psyllium 0.52 G capsule      Take 1 capsule by mouth daily as needed       RANITIDINE HCL PO      Take 75 mg by mouth 1-2 tablets daily       sevelamer 800 MG tablet    RENVELA     Take 800-1,600 mg by mouth as needed ON HOLD

## 2017-04-10 NOTE — PROGRESS NOTES
Clinic Care Coordination Contact  OUTREACH    Referral Information:  Referral Source: ED Follow-Up  Reason for Contact: ED visit 4/7/2014 SOB/Influenza B (Dialysis patient)        Universal Utilization:   ED Visits in last year: 0  Hospital visits in last year: 2  Last PCP appointment: 09/22/16  Missed Appointments: 0  Concerns: No  Multiple Providers or Specialists: Yes    Clinical Concerns:  Functional Status:  Mobility Status: Independent w/Device  Equipment Currently Used at Home: cane, quad     Psychosocial:  Current living arrangement:: I live alone     Resources and Interventions:  Current Resources: Transitional Care;  (NA)  PAS Number: [id number]  Senior Linkage Line Referral Placed: 09/29/16  Advanced Care Plans/Directives on file:: Yes  Referrals Placed: Senior Linkage Line    Clinic Care Coordination Contact  UTC/Voicemail    Referral Source: ED Follow-Up  Clinical Data: Care Coordinator Outreach  Outreach attempted x 1.  Left message on voicemail with call back information and requested return call.  Plan:  Care Coordinator will try to reach patient again in 1-2 business days.  Mariel Santos RN  Care Coordinator-[facility]  [email or web address]  [phone]

## 2017-04-10 NOTE — PROGRESS NOTES
[employer] Lumax ICD Remote Device Check  AP: 0 % : 0 %  Mode: VVI 40        Presenting Rhythm: NSR  Heart Rate: Stable with adequate variability  Sensing: Stable    Pacing Threshold: Stable    Impedance: Stable  Battery Status: MOL2 at 37% at 3.02 V with charge time of 12.1 seconds  Atrial Arrhythmia: NA  Ventricular Arrhythmia: None  ATP: None    Shocks: None    Care Plan: Writer called pt with results of transmission and he has no complaints. Informed of next remote scheduled for July. LAURENT Obrien RN.

## 2017-04-10 NOTE — PROGRESS NOTES
Incoming call from the patient .  Patient reports he is feeling better and decreased SOB,denies any wheezing or fever.  Coughing up the same green color sputum  Instructed to take Tylenol prn.  Patient started symptoms on Monday and ED visit was Friday and to late to take Tamiflu  CC transferred to the appointment line to make a hospital follow up with Dr Kolb .   Patient instructed to monitor increased symptoms of concern and take deep breaths and cough to exercise his lungs and rest.  Patient agreed with the plan .      CC will follow up in 3-5 business days     Mariel Santos RN  Care Coordinator-[facility]  [email or web address]  [phone]

## 2017-04-12 ENCOUNTER — OFFICE VISIT (OUTPATIENT)
Dept: INTERNAL MEDICINE | Facility: CLINIC | Age: 72
End: 2017-04-12
Payer: MEDICARE

## 2017-04-12 VITALS
HEIGHT: 72 IN | OXYGEN SATURATION: 96 % | DIASTOLIC BLOOD PRESSURE: 50 MMHG | TEMPERATURE: 98.6 F | BODY MASS INDEX: 25.75 KG/M2 | WEIGHT: 190.1 LBS | SYSTOLIC BLOOD PRESSURE: 110 MMHG | HEART RATE: 78 BPM

## 2017-04-12 DIAGNOSIS — M70.22 OLECRANON BURSITIS OF LEFT ELBOW: ICD-10-CM

## 2017-04-12 DIAGNOSIS — M25.552 HIP PAIN, LEFT: ICD-10-CM

## 2017-04-12 DIAGNOSIS — J10.1 INFLUENZA B: Primary | ICD-10-CM

## 2017-04-12 DIAGNOSIS — M25.622 ELBOW STIFFNESS, LEFT: ICD-10-CM

## 2017-04-12 PROCEDURE — 20605 DRAIN/INJ JOINT/BURSA W/O US: CPT | Performed by: INTERNAL MEDICINE

## 2017-04-12 PROCEDURE — 99214 OFFICE O/P EST MOD 30 MIN: CPT | Mod: 25 | Performed by: INTERNAL MEDICINE

## 2017-04-12 NOTE — MR AVS SNAPSHOT
"              After Visit Summary   4/12/2017    Westley Ness    MRN: [id number]           Patient Information     Date Of Birth          1945        Visit Information        Provider Department      4/12/2017 2:30 PM Josselin Kolb MD [facility]        Care Instructions    May try ibuprofen and Aleve - sparingly for elbow and left hip pain.    If symptoms worsen or don't improve, please let me know - will make an appropriate referral.         Follow-ups after your visit        Your next 10 appointments already scheduled     Jul 13, 2017  4:30 PM CDT   Remote ICD Check with CARD DCR2   [facility] [place] ([facility])    [address] [place], [place] [id number]  [facility] [id number] [phone]           This appointment is for a remote check of your debrillator.  This is not an appointment at the office.              Who to contact     If you have questions or need follow up information about today's clinic visit or your schedule please contact [facility] directly at [phone].  Normal or non-critical lab and imaging results will be communicated to you by [email or web address], letter or phone within 4 business days after the clinic has received the results. If you do not hear from us within 7 days, please contact the clinic through [email or web address] or phone. If you have a critical or abnormal lab result, we will notify you by phone as soon as possible.  Submit refill requests through [employer] or call your pharmacy and they will forward the refill request to us. Please allow 3 business days for your refill to be completed.          Additional Information About Your Visit        MyChart Information     [employer] lets you send messages to your doctor, view your test results, renew your prescriptions, schedule appointments and more. To sign up, go to [email or web address] . Click on \"Log in\" on the left side of the screen, which will take you " "to the Welcome page. Then click on \"Sign up Now\" on the right side of the page.     You will be asked to enter the access code listed below, as well as some personal information. Please follow the directions to create your username and password.     Your access code is: [email or web address]  Expires: 2017 11:53 AM     Your access code will  in 90 days. If you need help or a new code, please call your [place] clinic or [phone].        Care EveryWhere ID     This is your Care EveryWhere ID. This could be used by other organizations to access your [place] medical records  [id number]        Your Vitals Were     Pulse Temperature Height Pulse Oximetry BMI (Body Mass Index)       78 98.6  F (37  C) (Oral) 6' (1.829 m) 96% 25.78 kg/m2        Blood Pressure from Last 3 Encounters:   17 110/50   17 145/73   17 144/73    Weight from Last 3 Encounters:   17 190 lb 1.6 oz (86.2 kg)   17 187 lb 6.3 oz (85 kg)   17 189 lb (85.7 kg)              Today, you had the following     No orders found for display       Primary Care Provider Office Phone # Fax #    Josselin Kolb [phone]       [facility] [address] [facility] [address]        Thank you!     Thank you for choosing [facility]  for your care. Our goal is always to provide you with excellent care. Hearing back from our patients is one way we can continue to improve our services. Please take a few minutes to complete the written survey that you may receive in the mail after your visit with us. Thank you!             Your Updated Medication List - Protect others around you: Learn how to safely use, store and throw away your medicines at www.disposemymeds.org.          This list is accurate as of: 17  3:13 PM.  Always use your most recent med list.                   Brand Name Dispense Instructions for use    acetaminophen 325 MG tablet    TYLENOL    60 " tablet    Take 2 tablets (650 mg) by mouth 4 times daily       aspirin 81 MG EC tablet     81 tablet    Take 1 tablet (81 mg) by mouth daily       B complex-C-folic acid 1 MG capsule     90 capsule    Take 1 capsule by mouth every evening       carvedilol 6.25 MG tablet    COREG    180 tablet    Take 1 tablet (6.25 mg) by mouth 2 times daily (with meals) Take after dialysis       clopidogrel 75 MG tablet    PLAVIX    30 tablet    Take 1 tablet (75 mg) by mouth daily To protect your stent(s).  Do not stop taking unless directed by cardiology.       isosorbide mononitrate 30 MG 24 hr tablet    IMDUR    90 tablet    Take 0.5 tablets (15 mg) by mouth daily       Levothyroxine Sodium 50 MCG Caps     90 capsule    Take 50 mcg by mouth daily       lisinopril 2.5 MG tablet    PRINIVIL/Zestril    60 tablet    Take one tablet after dialysis. Take second tablet at bedtime.       loperamide 2 MG capsule    IMODIUM     Take 1 mg by mouth as needed       MEXILETINE HCL PO      Take 150 mg by mouth 2 times daily       nitroglycerin 0.4 MG sublingual tablet    NITROSTAT    25 tablet    1 TAB EVERY 5 MIN AS NEEDED, UP TO 3 PER EPISODE       pantoprazole 40 MG EC tablet    PROTONIX    30 tablet    Take 1 tablet (40 mg) by mouth every morning       pravastatin 40 MG tablet    PRAVACHOL    90 tablet    Take 1 tablet (40 mg) by mouth daily       psyllium 0.52 G capsule      Take 1 capsule by mouth daily as needed       RANITIDINE HCL PO      Take 75 mg by mouth 1-2 tablets daily       sevelamer 800 MG tablet    RENVELA     Take 800-1,600 mg by mouth as needed ON HOLD

## 2017-04-12 NOTE — NURSING NOTE
Chief Complaint   Patient presents with     ER F/U     4/7/17 [facility] for Influenza B     Musculoskeletal Problem     X 1 - 2 months. L sided hip and elbow pain.       Initial /50 (BP Location: Right arm, Patient Position: Chair, Cuff Size: Adult Regular)  Pulse 78  Temp 98.6  F (37  C) (Oral)  Ht 6' (1.829 m)  Wt 190 lb 1.6 oz (86.2 kg)  SpO2 96%  BMI 25.78 kg/m2 Estimated body mass index is 25.78 kg/(m^2) as calculated from the following:    Height as of this encounter: 6' (1.829 m).    Weight as of this encounter: 190 lb 1.6 oz (86.2 kg).  Medication Reconciliation: complete       Kaminibose MA

## 2017-04-12 NOTE — PROGRESS NOTES
SUBJECTIVE:                                                      HPI: Westley Ness is a pleasant 71 year old male who presents for follow-up of multiple issues:    1. Recently seen in ER for shortness of breath, cough, and fatigue  - labs unremarkable other than elevated D-dimer and positive influenza B  - V/Q scan low probability for PE  - because symptoms had been ongoing for awhile prior to presentation, Tamiflu was not recommended  - discharged home    Update since then:  - breathing and fatigue significantly better  - continued cough, but somewhat better  - overall, feels like he is on the mend    PMH significant for long-standing ischemic cardiomyopathy (EF 25%-30%, s/p AICD), ESRD on HD, and long-history CAD s/p multiple  PCIs.     2. Continued left elbow swelling and drainage:  - this was addressed at last visit (please see that note for details)  - thought to be olecranon bursitis  - was referred to ortho for possible drainage    - no particular intervention performed during that evaluation    - clear fluid continues to drain from elbow when pressed or lightly bumped  - needs to wear a bandage at all times  - no associated pain, discomfort, or redness  - does report associated stiffness - this is his main concern  - no fevers or chills    PSH significant for left olecranon fracture in 2008 s/p ORIF - hardware still in place    3. Continue left hip pain:  - this was also addressed at last visit (please see that note for details)  - was thought to be trochanteric bursitis from change in gait related to right knee pain  - right knee pain has improved and gait is normalizing  - left hip pain is still present, though improved from last visit  - patient wants to know if NSAIDs would help with pain    PMH significant for ESRD on HD and CAD on Plavix:  - NSAIDs okay-d by patient's nephrologist from a renal perspective  - bleeding risk, especially when combined with Plavix, discussed with patient    The  medication, allergy, and problem lists have been reviewed and updated as appropriate.       OBJECTIVE:                                                      /50 (BP Location: Right arm, Patient Position: Chair, Cuff Size: Adult Regular)  Pulse 78  Temp 98.6  F (37  C) (Oral)  Ht 6' (1.829 m)  Wt 190 lb 1.6 oz (86.2 kg)  SpO2 96%  BMI 25.78 kg/m2  Constitutional: well-appearing  Respiratory: normal respiratory effort; some rhonchi, but no crackles or wheezes and good air movement  Cardiovascular: regular rate and rhythm; no edema  Left elbow: olecranon bursa swelling; overlying skin is pink, but not warm or tender    Procedure:    Discussed potential risks and benefits of aspiration of olecranon bursa including pain, bleeding, infection, incomplete drainage, and recurrence.    Patient agreeable to proceed with procedure.     Area cleaned with alcohol swab.    Area numbed with 1% lidocaine with epi.    ~5cc of Straw-colored fluid aspirated from bursa.     Patient tolerated procedure well. EBL <2cc.     Time of procedure: 5 minutes.     Patient reports improved elbow stiffness after fluid removed.    ASSESSMENT/PLAN:                                                      (J10.1) Influenza B  (primary encounter diagnosis)  Comment: patient is improving over time.  Plan: patient to continue to monitor symptoms and contact M.D. if any worsening or change in symptoms.    (M28.665) Elbow stiffness, left  Comment:    - may be related to olecranon bursitis.   - suggested trial of physical therapy - patient declines.  Plan:    - encouraged to remain active.   - if recurrent swelling in olecranon bursa, will refer to different orthopedic surgeon for evaluation.   - patient may use NSAIDs very sparingly - made aware of potential risks including bleeding and GI upset.    (M70.22) Olecranon bursitis of left elbow  Comment:    - 5 cc of straw-colored fluid aspirated today.   - suspect more fluid remains.   - low suspicion  for infection due to lack of erythema, fevers, chills, or tenderness to palpation.  Plan: if area continues to drain or swell, will refer to different orthopedic surgeon for evaluation.    (M25.552) Hip pain, left  Comment:    - continue to suspect trochanteric bursitis.   - this is improving over time, as his gait is normalizing.   - physical therapy suggested - patient declines.  Plan:    - continue to monitor.   - encouraged to remain active.   - patient may use NSAIDs very sparingly - made aware of potential risks including bleeding and GI upset.    The instructions on the AVS were discussed and explained to the patient. Patient expressed understanding of instructions.    A total of 30 minutes were spent face-to-face with this patient during this encounter and over half of that time was spent on counseling and coordination of care re: above diagnoses and plans of care.     Josselin Kolb MD   [address] [address]  T: [phone], F: [phone]

## 2017-04-12 NOTE — PATIENT INSTRUCTIONS
May try ibuprofen and Aleve - sparingly for elbow and left hip pain.    If symptoms worsen or don't improve, please let me know - will make an appropriate referral.

## 2017-04-13 LAB
BACTERIA SPEC CULT: NO GROWTH
BACTERIA SPEC CULT: NO GROWTH
Lab: NORMAL
Lab: NORMAL
MICRO REPORT STATUS: NORMAL
MICRO REPORT STATUS: NORMAL
SPECIMEN SOURCE: NORMAL
SPECIMEN SOURCE: NORMAL

## 2017-04-17 DIAGNOSIS — I21.4 NSTEMI (NON-ST ELEVATED MYOCARDIAL INFARCTION) (H): ICD-10-CM

## 2017-04-17 RX ORDER — LISINOPRIL 2.5 MG/1
TABLET ORAL
Qty: 60 TABLET | Refills: 3 | Status: SHIPPED | OUTPATIENT
Start: 2017-04-17 | End: 2017-08-14

## 2017-04-18 ENCOUNTER — CARE COORDINATION (OUTPATIENT)
Dept: CARE COORDINATION | Facility: CLINIC | Age: 72
End: 2017-04-18

## 2017-04-18 DIAGNOSIS — R05.9 COUGH: Primary | ICD-10-CM

## 2017-04-18 RX ORDER — GUAIFENESIN/DEXTROMETHORPHAN 100-10MG/5
5 SYRUP ORAL EVERY 4 HOURS PRN
Qty: 560 ML | Refills: 3 | Status: SHIPPED | OUTPATIENT
Start: 2017-04-18 | End: 2017-12-08

## 2017-04-18 NOTE — PROGRESS NOTES
Clinic Care Coordination RN :      Incoming call from the patient requesting an RX for cough syrup.  Please send RX  to the [facility] pharmacy   Patient has a cough when he lies down. Describes as a green/brown productive cough and no recent change in the color.  Some rattles hear in chest but this sound is there just before dialysis     Mariel Satnos RN  Care Coordinator-[facility]  [email or web address]  [phone]

## 2017-04-26 ENCOUNTER — CARE COORDINATION (OUTPATIENT)
Dept: CARE COORDINATION | Facility: CLINIC | Age: 72
End: 2017-04-26

## 2017-04-26 NOTE — PROGRESS NOTES
Clinic Care Coordination RN:    Incoming call from the patient stating he is feeling much better .  Sleeping better at night since he is using cough syrup.  Sputum has changed from yellow brown to clear.   Patient is wondering if his records have come from [place].  CC informed the patient that they have not been scanned into the chart and that  Bjorn Nevarez Lead Station Coordinator / Lead Patient Representative will mail another medical release form    Mariel Santos RN  Care Coordinator-[facility]  [email or web address]  [phone]

## 2017-05-01 ENCOUNTER — TRANSFERRED RECORDS (OUTPATIENT)
Dept: HEALTH INFORMATION MANAGEMENT | Facility: CLINIC | Age: 72
End: 2017-05-01

## 2017-05-12 ENCOUNTER — CARE COORDINATION (OUTPATIENT)
Dept: CARE COORDINATION | Facility: CLINIC | Age: 72
End: 2017-05-12

## 2017-05-12 NOTE — PROGRESS NOTES
Clinic Care Coordination Contact  OUTREACH    Referral Information:  Referral Source: ED Follow-Up  Reason for Contact: Reason for Contact: ED visit 4/7/2014 SOB/Influenza B (Dialysis patient)  Care Conference: No     Universal Utilization:   ED Visits in last year: 0  Hospital visits in last year: 2  Last PCP appointment: 09/22/16  Missed Appointments: 0  Concerns: No  Multiple Providers or Specialists: Yes    Clinical Concerns:  Current Medical Concerns: Patient reports he has a intermittent cough clear in color.  Patient feels he has his strength back and is feeling good   Current Behavioral Concerns: Not discussed     Clinical Pathway Name: AMI        Functional Status:  Mobility Status: Independent w/Device  Equipment Currently Used at Home: cane, quad     Psychosocial:  Current living arrangement:: I live alone     Resources and Interventions:  Current Resources: Transitional Care;  (NA)  PAS Number: [id number]  Senior Linkage Line Referral Placed: 09/29/16  Advanced Care Plans/Directives on file:: Yes  Referrals Placed: Senior Linkage Line     Goals:   Goal 1 Statement:  (I will be aware of increased symptoms of concern)  Goal 1 Progression Percent: 100%  Goal 1 Progression Date: 05/12/17        Barriers: No barriers identified   Strengths: Recovered from Influenza B      Plan: No unmet needs, no further care coordination needed at this time       Mariel Santos, RN  Care Coordinator-[facility]  [email or web address]  [phone]

## 2017-05-19 ENCOUNTER — TRANSFERRED RECORDS (OUTPATIENT)
Dept: INTERNAL MEDICINE | Facility: CLINIC | Age: 72
End: 2017-05-19

## 2017-05-19 NOTE — PROGRESS NOTES
Records received from [facility]  Records forwarded to Dr. Kolb

## 2017-06-02 ENCOUNTER — TELEPHONE (OUTPATIENT)
Dept: CARDIOLOGY | Facility: CLINIC | Age: 72
End: 2017-06-02

## 2017-06-02 NOTE — TELEPHONE ENCOUNTER
Called Pt left message asking if he wanted hospital D/C follow up?  Received records from [facility] in [place] that Pt had been hospitalized for NSTEMI and had stenting of LAD and OM. TAYO Bauman RN

## 2017-06-07 ENCOUNTER — HOSPITAL ENCOUNTER (INPATIENT)
Facility: CLINIC | Age: 72
LOS: 2 days | Discharge: HOME | DRG: 273 | End: 2017-06-09
Attending: EMERGENCY MEDICINE | Admitting: INTERNAL MEDICINE
Payer: MEDICARE

## 2017-06-07 ENCOUNTER — APPOINTMENT (OUTPATIENT)
Dept: CARDIOLOGY | Facility: CLINIC | Age: 72
DRG: 273 | End: 2017-06-07
Attending: PHYSICIAN ASSISTANT
Payer: MEDICARE

## 2017-06-07 ENCOUNTER — TRANSFERRED RECORDS (OUTPATIENT)
Dept: HEALTH INFORMATION MANAGEMENT | Facility: CLINIC | Age: 72
End: 2017-06-07

## 2017-06-07 ENCOUNTER — APPOINTMENT (OUTPATIENT)
Dept: GENERAL RADIOLOGY | Facility: CLINIC | Age: 72
DRG: 273 | End: 2017-06-07
Attending: EMERGENCY MEDICINE
Payer: MEDICARE

## 2017-06-07 DIAGNOSIS — I48.91 ATRIAL FIBRILLATION, UNSPECIFIED TYPE (H): ICD-10-CM

## 2017-06-07 DIAGNOSIS — N18.9 CHRONIC RENAL FAILURE, UNSPECIFIED STAGE: ICD-10-CM

## 2017-06-07 DIAGNOSIS — R07.9 ACUTE CHEST PAIN: ICD-10-CM

## 2017-06-07 PROBLEM — I50.21 ACUTE SYSTOLIC CONGESTIVE HEART FAILURE (H): Status: ACTIVE | Noted: 2017-06-07

## 2017-06-07 LAB
ANION GAP SERPL CALCULATED.3IONS-SCNC: 9 MMOL/L (ref 3–14)
BASOPHILS # BLD AUTO: 0 10E9/L (ref 0–0.2)
BASOPHILS NFR BLD AUTO: 0.4 %
BUN SERPL-MCNC: 36 MG/DL (ref 7–30)
CALCIUM SERPL-MCNC: 9.7 MG/DL (ref 8.5–10.1)
CHLORIDE SERPL-SCNC: 102 MMOL/L (ref 94–109)
CO2 SERPL-SCNC: 26 MMOL/L (ref 20–32)
CREAT SERPL-MCNC: 5.25 MG/DL (ref 0.66–1.25)
DIFFERENTIAL METHOD BLD: ABNORMAL
EOSINOPHIL # BLD AUTO: 0.1 10E9/L (ref 0–0.7)
EOSINOPHIL NFR BLD AUTO: 1.7 %
ERYTHROCYTE [DISTWIDTH] IN BLOOD BY AUTOMATED COUNT: 15.7 % (ref 10–15)
ERYTHROCYTE [DISTWIDTH] IN BLOOD BY AUTOMATED COUNT: 15.7 % (ref 10–15)
GFR SERPL CREATININE-BSD FRML MDRD: 11 ML/MIN/1.7M2
GLUCOSE SERPL-MCNC: 166 MG/DL (ref 70–99)
HCT VFR BLD AUTO: 36 % (ref 40–53)
HCT VFR BLD AUTO: 37.2 % (ref 40–53)
HGB BLD-MCNC: 11.5 G/DL (ref 13.3–17.7)
HGB BLD-MCNC: 11.9 G/DL (ref 13.3–17.7)
IMM GRANULOCYTES # BLD: 0 10E9/L (ref 0–0.4)
IMM GRANULOCYTES NFR BLD: 0.3 %
INTERPRETATION ECG - MUSE: NORMAL
LYMPHOCYTES # BLD AUTO: 1 10E9/L (ref 0.8–5.3)
LYMPHOCYTES NFR BLD AUTO: 12.6 %
MAGNESIUM SERPL-MCNC: 1.9 MG/DL (ref 1.6–2.3)
MCH RBC QN AUTO: 30.7 PG (ref 26.5–33)
MCH RBC QN AUTO: 30.7 PG (ref 26.5–33)
MCHC RBC AUTO-ENTMCNC: 31.9 G/DL (ref 31.5–36.5)
MCHC RBC AUTO-ENTMCNC: 32 G/DL (ref 31.5–36.5)
MCV RBC AUTO: 96 FL (ref 78–100)
MCV RBC AUTO: 96 FL (ref 78–100)
MONOCYTES # BLD AUTO: 0.6 10E9/L (ref 0–1.3)
MONOCYTES NFR BLD AUTO: 8 %
NEUTROPHILS # BLD AUTO: 6 10E9/L (ref 1.6–8.3)
NEUTROPHILS NFR BLD AUTO: 77 %
NRBC # BLD AUTO: 0 10*3/UL
NRBC BLD AUTO-RTO: 0 /100
PHOSPHATE SERPL-MCNC: 3.4 MG/DL (ref 2.5–4.5)
PLATELET # BLD AUTO: 218 10E9/L (ref 150–450)
PLATELET # BLD AUTO: 250 10E9/L (ref 150–450)
POTASSIUM SERPL-SCNC: 4.2 MMOL/L (ref 3.4–5.3)
RBC # BLD AUTO: 3.75 10E12/L (ref 4.4–5.9)
RBC # BLD AUTO: 3.88 10E12/L (ref 4.4–5.9)
SODIUM SERPL-SCNC: 137 MMOL/L (ref 133–144)
TROPONIN I SERPL-MCNC: 0.04 UG/L (ref 0–0.04)
TSH SERPL DL<=0.005 MIU/L-ACNC: 3.15 MU/L (ref 0.4–4)
WBC # BLD AUTO: 7.8 10E9/L (ref 4–11)
WBC # BLD AUTO: 9.1 10E9/L (ref 4–11)

## 2017-06-07 PROCEDURE — 93306 TTE W/DOPPLER COMPLETE: CPT | Mod: 26 | Performed by: INTERNAL MEDICINE

## 2017-06-07 PROCEDURE — 25000128 H RX IP 250 OP 636: Performed by: INTERNAL MEDICINE

## 2017-06-07 PROCEDURE — 90937 HEMODIALYSIS REPEATED EVAL: CPT

## 2017-06-07 PROCEDURE — 36415 COLL VENOUS BLD VENIPUNCTURE: CPT | Performed by: PHYSICIAN ASSISTANT

## 2017-06-07 PROCEDURE — 25000132 ZZH RX MED GY IP 250 OP 250 PS 637: Mod: GY | Performed by: INTERNAL MEDICINE

## 2017-06-07 PROCEDURE — 96374 THER/PROPH/DIAG INJ IV PUSH: CPT

## 2017-06-07 PROCEDURE — 25000125 ZZHC RX 250: Performed by: INTERNAL MEDICINE

## 2017-06-07 PROCEDURE — 96376 TX/PRO/DX INJ SAME DRUG ADON: CPT

## 2017-06-07 PROCEDURE — 87340 HEPATITIS B SURFACE AG IA: CPT | Performed by: INTERNAL MEDICINE

## 2017-06-07 PROCEDURE — 84443 ASSAY THYROID STIM HORMONE: CPT | Performed by: EMERGENCY MEDICINE

## 2017-06-07 PROCEDURE — 25000125 ZZHC RX 250: Performed by: EMERGENCY MEDICINE

## 2017-06-07 PROCEDURE — 84100 ASSAY OF PHOSPHORUS: CPT | Performed by: EMERGENCY MEDICINE

## 2017-06-07 PROCEDURE — 86706 HEP B SURFACE ANTIBODY: CPT | Performed by: INTERNAL MEDICINE

## 2017-06-07 PROCEDURE — 85027 COMPLETE CBC AUTOMATED: CPT | Performed by: INTERNAL MEDICINE

## 2017-06-07 PROCEDURE — 99222 1ST HOSP IP/OBS MODERATE 55: CPT | Mod: 25 | Performed by: INTERNAL MEDICINE

## 2017-06-07 PROCEDURE — A9270 NON-COVERED ITEM OR SERVICE: HCPCS | Mod: GY | Performed by: INTERNAL MEDICINE

## 2017-06-07 PROCEDURE — 99285 EMERGENCY DEPT VISIT HI MDM: CPT | Mod: 25

## 2017-06-07 PROCEDURE — 71020 XR CHEST 2 VW: CPT

## 2017-06-07 PROCEDURE — 25500064 ZZH RX 255 OP 636: Performed by: INTERNAL MEDICINE

## 2017-06-07 PROCEDURE — 40000264 ECHO COMPLETE WITH LUMASON

## 2017-06-07 PROCEDURE — 99223 1ST HOSP IP/OBS HIGH 75: CPT | Mod: AI | Performed by: INTERNAL MEDICINE

## 2017-06-07 PROCEDURE — 80048 BASIC METABOLIC PNL TOTAL CA: CPT | Performed by: EMERGENCY MEDICINE

## 2017-06-07 PROCEDURE — 84484 ASSAY OF TROPONIN QUANT: CPT | Performed by: PHYSICIAN ASSISTANT

## 2017-06-07 PROCEDURE — 99207 ZZC APP CREDIT; MD BILLING SHARED VISIT: CPT | Performed by: PHYSICIAN ASSISTANT

## 2017-06-07 PROCEDURE — 21000001 ZZH R&B HEART CARE

## 2017-06-07 PROCEDURE — 36415 COLL VENOUS BLD VENIPUNCTURE: CPT | Performed by: INTERNAL MEDICINE

## 2017-06-07 PROCEDURE — 93005 ELECTROCARDIOGRAM TRACING: CPT

## 2017-06-07 PROCEDURE — 84484 ASSAY OF TROPONIN QUANT: CPT | Performed by: EMERGENCY MEDICINE

## 2017-06-07 PROCEDURE — 40000141 ZZH STATISTIC PERIPHERAL IV START W/O US GUIDANCE

## 2017-06-07 PROCEDURE — 83735 ASSAY OF MAGNESIUM: CPT | Performed by: EMERGENCY MEDICINE

## 2017-06-07 PROCEDURE — 25000132 ZZH RX MED GY IP 250 OP 250 PS 637: Mod: GY | Performed by: PHYSICIAN ASSISTANT

## 2017-06-07 PROCEDURE — A9270 NON-COVERED ITEM OR SERVICE: HCPCS | Mod: GY | Performed by: PHYSICIAN ASSISTANT

## 2017-06-07 PROCEDURE — 85025 COMPLETE CBC W/AUTO DIFF WBC: CPT | Performed by: EMERGENCY MEDICINE

## 2017-06-07 PROCEDURE — 85027 COMPLETE CBC AUTOMATED: CPT | Performed by: PHYSICIAN ASSISTANT

## 2017-06-07 RX ORDER — MEXILETINE HYDROCHLORIDE 150 MG/1
150 CAPSULE ORAL 2 TIMES DAILY
Status: DISCONTINUED | OUTPATIENT
Start: 2017-06-07 | End: 2017-06-09 | Stop reason: HOSPADM

## 2017-06-07 RX ORDER — LOPERAMIDE HCL 2 MG
2 CAPSULE ORAL 4 TIMES DAILY PRN
Status: DISCONTINUED | OUTPATIENT
Start: 2017-06-07 | End: 2017-06-09 | Stop reason: HOSPADM

## 2017-06-07 RX ORDER — PANTOPRAZOLE SODIUM 40 MG/1
40 TABLET, DELAYED RELEASE ORAL EVERY MORNING
Status: DISCONTINUED | OUTPATIENT
Start: 2017-06-07 | End: 2017-06-09 | Stop reason: HOSPADM

## 2017-06-07 RX ORDER — CARVEDILOL 6.25 MG/1
6.25 TABLET ORAL 2 TIMES DAILY WITH MEALS
Status: DISCONTINUED | OUTPATIENT
Start: 2017-06-07 | End: 2017-06-09 | Stop reason: HOSPADM

## 2017-06-07 RX ORDER — ALBUMIN, HUMAN INJ 5% 5 %
250 SOLUTION INTRAVENOUS
Status: DISCONTINUED | OUTPATIENT
Start: 2017-06-07 | End: 2017-06-09

## 2017-06-07 RX ORDER — PRAVASTATIN SODIUM 40 MG
40 TABLET ORAL DAILY
Status: DISCONTINUED | OUTPATIENT
Start: 2017-06-07 | End: 2017-06-09 | Stop reason: HOSPADM

## 2017-06-07 RX ORDER — ONDANSETRON 2 MG/ML
4 INJECTION INTRAMUSCULAR; INTRAVENOUS EVERY 6 HOURS PRN
Status: DISCONTINUED | OUTPATIENT
Start: 2017-06-07 | End: 2017-06-09 | Stop reason: HOSPADM

## 2017-06-07 RX ORDER — ONDANSETRON 4 MG/1
4 TABLET, ORALLY DISINTEGRATING ORAL EVERY 6 HOURS PRN
Status: DISCONTINUED | OUTPATIENT
Start: 2017-06-07 | End: 2017-06-09 | Stop reason: HOSPADM

## 2017-06-07 RX ORDER — NALOXONE HYDROCHLORIDE 0.4 MG/ML
.1-.4 INJECTION, SOLUTION INTRAMUSCULAR; INTRAVENOUS; SUBCUTANEOUS
Status: DISCONTINUED | OUTPATIENT
Start: 2017-06-07 | End: 2017-06-09 | Stop reason: HOSPADM

## 2017-06-07 RX ORDER — LIDOCAINE 40 MG/G
CREAM TOPICAL
Status: DISCONTINUED | OUTPATIENT
Start: 2017-06-07 | End: 2017-06-07

## 2017-06-07 RX ORDER — ACETAMINOPHEN 325 MG/1
650 TABLET ORAL 4 TIMES DAILY
Status: DISCONTINUED | OUTPATIENT
Start: 2017-06-07 | End: 2017-06-09 | Stop reason: HOSPADM

## 2017-06-07 RX ORDER — ASPIRIN 81 MG/1
81 TABLET ORAL DAILY
Status: DISCONTINUED | OUTPATIENT
Start: 2017-06-07 | End: 2017-06-08 | Stop reason: ALTCHOICE

## 2017-06-07 RX ORDER — PROCHLORPERAZINE MALEATE 5 MG
5 TABLET ORAL EVERY 6 HOURS PRN
Status: DISCONTINUED | OUTPATIENT
Start: 2017-06-07 | End: 2017-06-09 | Stop reason: HOSPADM

## 2017-06-07 RX ORDER — PROCHLORPERAZINE 25 MG
12.5 SUPPOSITORY, RECTAL RECTAL EVERY 12 HOURS PRN
Status: DISCONTINUED | OUTPATIENT
Start: 2017-06-07 | End: 2017-06-09 | Stop reason: HOSPADM

## 2017-06-07 RX ORDER — METOPROLOL TARTRATE 1 MG/ML
5 INJECTION, SOLUTION INTRAVENOUS EVERY 5 MIN PRN
Status: DISCONTINUED | OUTPATIENT
Start: 2017-06-07 | End: 2017-06-07

## 2017-06-07 RX ORDER — LISINOPRIL 2.5 MG/1
2.5 TABLET ORAL 2 TIMES DAILY
Status: DISCONTINUED | OUTPATIENT
Start: 2017-06-07 | End: 2017-06-09 | Stop reason: HOSPADM

## 2017-06-07 RX ORDER — LEVOTHYROXINE SODIUM 50 UG/1
50 TABLET ORAL DAILY
Status: DISCONTINUED | OUTPATIENT
Start: 2017-06-08 | End: 2017-06-09 | Stop reason: HOSPADM

## 2017-06-07 RX ORDER — CLOPIDOGREL BISULFATE 75 MG/1
75 TABLET ORAL DAILY
Status: DISCONTINUED | OUTPATIENT
Start: 2017-06-07 | End: 2017-06-09 | Stop reason: HOSPADM

## 2017-06-07 RX ORDER — METOPROLOL TARTRATE 1 MG/ML
5-15 INJECTION, SOLUTION INTRAVENOUS EVERY 6 HOURS PRN
Status: DISCONTINUED | OUTPATIENT
Start: 2017-06-07 | End: 2017-06-09 | Stop reason: HOSPADM

## 2017-06-07 RX ADMIN — METOPROLOL TARTRATE 5 MG: 5 INJECTION INTRAVENOUS at 08:14

## 2017-06-07 RX ADMIN — ACETAMINOPHEN 650 MG: 325 TABLET, FILM COATED ORAL at 13:57

## 2017-06-07 RX ADMIN — METOPROLOL TARTRATE 5 MG: 5 INJECTION INTRAVENOUS at 10:18

## 2017-06-07 RX ADMIN — PRAVASTATIN SODIUM 40 MG: 40 TABLET ORAL at 22:56

## 2017-06-07 RX ADMIN — SODIUM CHLORIDE 250 ML: 9 INJECTION, SOLUTION INTRAVENOUS at 19:23

## 2017-06-07 RX ADMIN — SODIUM CHLORIDE 250 ML: 9 INJECTION, SOLUTION INTRAVENOUS at 19:22

## 2017-06-07 RX ADMIN — APIXABAN 2.5 MG: 2.5 TABLET, FILM COATED ORAL at 22:56

## 2017-06-07 RX ADMIN — HUMAN ALBUMIN MICROSPHERES AND PERFLUTREN 3 ML: 10; .22 INJECTION, SOLUTION INTRAVENOUS at 15:45

## 2017-06-07 RX ADMIN — HEPARIN SODIUM 1000 UNITS/HR: 10000 INJECTION, SOLUTION INTRAVENOUS at 14:29

## 2017-06-07 RX ADMIN — MEXILETINE HYDROCHLORIDE 150 MG: 150 CAPSULE ORAL at 22:57

## 2017-06-07 RX ADMIN — DILTIAZEM HYDROCHLORIDE 5 MG/HR: 5 INJECTION INTRAVENOUS at 13:54

## 2017-06-07 RX ADMIN — LOPERAMIDE HYDROCHLORIDE 2 MG: 2 CAPSULE ORAL at 22:57

## 2017-06-07 RX ADMIN — CARVEDILOL 6.25 MG: 6.25 TABLET, FILM COATED ORAL at 22:57

## 2017-06-07 RX ADMIN — ACETAMINOPHEN 650 MG: 325 TABLET, FILM COATED ORAL at 22:56

## 2017-06-07 RX ADMIN — Medication 5000 UNITS: at 14:32

## 2017-06-07 RX ADMIN — PANTOPRAZOLE SODIUM 40 MG: 40 TABLET, DELAYED RELEASE ORAL at 22:57

## 2017-06-07 RX ADMIN — LISINOPRIL 2.5 MG: 2.5 TABLET ORAL at 22:56

## 2017-06-07 ASSESSMENT — ACTIVITIES OF DAILY LIVING (ADL)
WHICH_OF_THE_ABOVE_FUNCTIONAL_RISKS_HAD_A_RECENT_ONSET_OR_CHANGE?: AMBULATION
SWALLOWING: 0-->SWALLOWS FOODS/LIQUIDS WITHOUT DIFFICULTY
COGNITION: 0 - NO COGNITION ISSUES REPORTED
AMBULATION: 1-->ASSISTIVE EQUIPMENT
TOILETING: 0-->INDEPENDENT
RETIRED_COMMUNICATION: 0-->UNDERSTANDS/COMMUNICATES WITHOUT DIFFICULTY
TRANSFERRING: 1-->ASSISTIVE EQUIPMENT
RETIRED_EATING: 0-->INDEPENDENT

## 2017-06-07 NOTE — CONSULTS
[facility]    Cardiac Electrophysiology Consultation     Date of Admission:  6/7/2017  Date of Consult (When I saw the patient): 06/07/17    Assessment & Plan   Westley Ness is a 71 year old male who was admitted on 6/7/2017. I was asked to see the patient for new onset of atrial flutter. Last saw pt in 2011 for recurrent VT/VF in the background of severe ICM. Normal QRS, single chamber ICD (Biotronik) implanted at that time. No ICD shocks since. On HD since 2004. noted intermittent palpitations. Yesterday am felt not too well but no palpitations. This am noted rate was 120. At HD rate continued to be fast. ECG confirms atrial flutter with variable AV conduction.     Loss of AV synchrony along with mildly rapid rate likely responsible for feeling sob and fatigue. Agree with EP study and ablation. Since episode started <24 hours, no need for VIET. Given ventricular rate is less than <150 bpm doubtful that ICD would record any arrhythmias. Stop heparin and start Eliquis 2.5 mg bid and would need 4 weeks post ablation.    Brad Mayen    Code Status    Full Code    Primary Care Physician   Josselin Kolb    History is obtained from the patient    Past Medical History   I have reviewed this patient's medical history and updated it with pertinent information if needed.   Past Medical History:   Diagnosis Date     Aortic root dilatation (H)     mild     CAD (coronary artery disease)      Cardiomyopathy, ischemic      Diabetes (H)      H/O percutaneous transluminal coronary angioplasty 7-    Successful Percutaneous coronary intervention of the proximal OM1     Hyperlipidemia      Hypertension      Hypothyroidism      ICD (implantable cardioverter-defibrillator) in place      NSTEMI (non-ST elevated myocardial infarction) (H)      Renal disease     end stage     Shortness of breath      Syncope      Tachycardia        Past Surgical History   I have reviewed this patient's surgical history and  updated it with pertinent information if needed.  Past Surgical History:   Procedure Laterality Date     CHOLECYSTECTOMY       HC LEFT HEART CATHETERIZATION  7/14/2016     HC LEFT HEART CATHETERIZATION  9/21/2016     VASCULAR SURGERY      LUE fistula       Prior to Admission Medications   Prior to Admission Medications   Prescriptions Last Dose Informant Patient Reported? Taking?   B complex-C-folic acid (NEPHROCAPS/TRIPHROCAPS) 1 MG capsule 6/6/2017 at Unknown time Self No Yes   Sig: Take 1 capsule by mouth every evening   Levothyroxine Sodium 50 MCG CAPS 6/7/2017 at Unknown time Self No Yes   Sig: Take 50 mcg by mouth daily   MEXILETINE HCL PO 6/7/2017 at AM Self Yes Yes   Sig: Take 150 mg by mouth 2 times daily   RANITIDINE HCL PO 6/6/2017 at Unknown time Self Yes Yes   Sig: Take 75 mg by mouth 1-2 tablets daily   acetaminophen (TYLENOL) 325 MG tablet x 2 today Self No Yes   Sig: Take 2 tablets (650 mg) by mouth 4 times daily   aspirin EC 81 MG EC tablet 6/6/2017 at Unknown time Self No Yes   Sig: Take 1 tablet (81 mg) by mouth daily   carvedilol (COREG) 6.25 MG tablet 6/6/2017 at Unknown time Self No Yes   Sig: Take 1 tablet (6.25 mg) by mouth 2 times daily (with meals) Take after dialysis   clopidogrel (PLAVIX) 75 MG tablet 6/6/2017 at Unknown time Self No Yes   Sig: Take 1 tablet (75 mg) by mouth daily To protect your stent(s).  Do not stop taking unless directed by cardiology.   guaiFENesin-dextromethorphan (ROBITUSSIN DM) 100-10 MG/5ML syrup PRN Self No Yes   Sig: Take 5 mLs by mouth every 4 hours as needed for cough   isosorbide mononitrate (IMDUR) 30 MG 24 hr tablet 6/6/2017 at Unknown time Self No Yes   Sig: Take 0.5 tablets (15 mg) by mouth daily   lisinopril (PRINIVIL/ZESTRIL) 2.5 MG tablet 6/6/2017 at Unknown time Self No Yes   Sig: Take one tablet after dialysis. Take second tablet at bedtime.   loperamide (IMODIUM) 2 MG capsule PRN Self Yes Yes   Sig: Take 1 mg by mouth as needed    nitroglycerin  (NITROSTAT) 0.4 MG sublingual tablet PRN Self No Yes   Si TAB EVERY 5 MIN AS NEEDED, UP TO 3 PER EPISODE   pantoprazole (PROTONIX) 40 MG EC tablet 2017 at Unknown time Self No Yes   Sig: Take 1 tablet (40 mg) by mouth every morning   pravastatin (PRAVACHOL) 40 MG tablet 2017 at Unknown time Self No Yes   Sig: Take 1 tablet (40 mg) by mouth daily   psyllium 0.52 G capsule PRN Self Yes Yes   Sig: Take 1 capsule by mouth daily as needed    sevelamer (RENVELA) 800 MG tablet PRN Self Yes Yes   Sig: Take 800-1,600 mg by mouth as needed ON HOLD      Facility-Administered Medications: None     Allergies   Allergies   Allergen Reactions     Contrast Dye Hives     Does fine if he uses benadryl prior.     No Clinical Screening - See Comments      Green beans - Diarrhea.    Topical antibiotic - name unknown - caused swelling of the penis.       Social History   I have reviewed this patient's social history and updated it with pertinent information if needed. Westley HASSAN Estelitachrissy  reports that he quit smoking about 20 years ago. His smoking use included Cigarettes. He has a 70.00 pack-year smoking history. He does not have any smokeless tobacco history on file. He reports that he drinks alcohol. He reports that he does not use illicit drugs.    Family History   I have reviewed this patient's family history and updated it with pertinent information if needed.   Family History   Problem Relation Age of Onset     Hypertension Mother      CEREBROVASCULAR DISEASE Mother      Hypertension Father      C.A.D. Paternal Grandmother        Review of Systems   Comprehensive review of systems was performed with pertinent positives and negatives listed in assessment and plan section.    Physical Exam   Temp: 97.6  F (36.4  C) Temp src: Oral BP: 125/87 Pulse: 89 Heart Rate: 126 Resp: 17 SpO2: 97 % O2 Device: None (Room air)    Vital Signs with Ranges  Temp:  [97.6  F (36.4  C)] 97.6  F (36.4  C)  Pulse:  [] 89  Heart Rate:   [] 126  Resp:  [7-17] 17  BP: (101-134)/(44-95) 125/87  SpO2:  [93 %-98 %] 97 %  188 lbs 4.37 oz    Constitutional: awake, alert, cooperative, no apparent distress, and appears stated age  Eyes: Lids and lashes normal, pupils equal, round and reactive to light, extra ocular muscles intact, sclera clear, conjunctiva normal  ENT: Normocephalic, without obvious abnormality, atraumatic, sinuses nontender on palpation, external ears without lesions, oral pharynx with moist mucous membranes, tonsils without erythema or exudates, gums normal and good dentition.  Hematologic / Lymphatic: no cervical lymphadenopathy  Respiratory: No increased work of breathing, good air exchange, clear to auscultation bilaterally, no crackles or wheezing  Cardiovascular: irregularly irregular rhythm  GI: No scars, normal bowel sounds, soft, non-distended, non-tender, no masses palpated, no hepatosplenomegally  Skin: no bruising or bleeding  Musculoskeletal: There is no redness, warmth, or swelling of the joints.  Full range of motion noted.  Motor strength is 5 out of 5 all extremities bilaterally.  Tone is normal.  Neurologic: Awake, alert, oriented to name, place and time.    Neuropsychiatric: General: normal, calm and normal eye contact    Data   I personally reviewed all recent ECGs and images.  Results for orders placed or performed during the hospital encounter of 06/07/17 (from the past 24 hour(s))   EKG 12 lead   Result Value Ref Range    Interpretation ECG Click View Image link to view waveform and result    CBC with platelets differential   Result Value Ref Range    WBC 7.8 4.0 - 11.0 10e9/L    RBC Count 3.88 (L) 4.4 - 5.9 10e12/L    Hemoglobin 11.9 (L) 13.3 - 17.7 g/dL    Hematocrit 37.2 (L) 40.0 - 53.0 %    MCV 96 78 - 100 fl    MCH 30.7 26.5 - 33.0 pg    MCHC 32.0 31.5 - 36.5 g/dL    RDW 15.7 (H) 10.0 - 15.0 %    Platelet Count 250 150 - 450 10e9/L    Diff Method Automated Method     % Neutrophils 77.0 %    % Lymphocytes  12.6 %    % Monocytes 8.0 %    % Eosinophils 1.7 %    % Basophils 0.4 %    % Immature Granulocytes 0.3 %    Nucleated RBCs 0 0 /100    Absolute Neutrophil 6.0 1.6 - 8.3 10e9/L    Absolute Lymphocytes 1.0 0.8 - 5.3 10e9/L    Absolute Monocytes 0.6 0.0 - 1.3 10e9/L    Absolute Eosinophils 0.1 0.0 - 0.7 10e9/L    Absolute Basophils 0.0 0.0 - 0.2 10e9/L    Abs Immature Granulocytes 0.0 0 - 0.4 10e9/L    Absolute Nucleated RBC 0.0    Basic metabolic panel   Result Value Ref Range    Sodium 137 133 - 144 mmol/L    Potassium 4.2 3.4 - 5.3 mmol/L    Chloride 102 94 - 109 mmol/L    Carbon Dioxide 26 20 - 32 mmol/L    Anion Gap 9 3 - 14 mmol/L    Glucose 166 (H) 70 - 99 mg/dL    Urea Nitrogen 36 (H) 7 - 30 mg/dL    Creatinine 5.25 (H) 0.66 - 1.25 mg/dL    GFR Estimate 11 (L) >60 mL/min/1.7m2    GFR Estimate If Black 13 (L) >60 mL/min/1.7m2    Calcium 9.7 8.5 - 10.1 mg/dL   Troponin I   Result Value Ref Range    Troponin I ES 0.037 0.000 - 0.045 ug/L   Magnesium   Result Value Ref Range    Magnesium 1.9 1.6 - 2.3 mg/dL   Phosphorus   Result Value Ref Range    Phosphorus 3.4 2.5 - 4.5 mg/dL   TSH with free T4 reflex   Result Value Ref Range    TSH 3.15 0.40 - 4.00 mU/L   XR Chest 2 Views    Narrative    CHEST TWO VIEWS June 7, 2017 8:40 AM     HISTORY: Chest pain.    COMPARISON: 4/7/2017.      Impression    IMPRESSION: Pacemaker in the heart. Cardiomegaly. Slight pulmonary  vascular engorgement. No pulmonary infiltrates or pleural effusions.  No change.    OBED MENDEZ MD   Cardiology IP Consult: Patient to be seen: Routine - within 24 hours; New onset A fib with RVR; hx of CAD s/p multiple stent placement, followed by Dr. Sen; Consultant may enter orders: Yes    Narrative    Marlo Warren MD     6/7/2017  1:00 PM  [facility]    Cardiology Consultation     Date of Admission:  6/7/2017  Date of Consult (When I saw the patient): 06/07/17    Assessment & Plan   Westley Ness is a 71 year old male  who was admitted on   6/7/2017.    1-atrial flutter with rapid ventricular response, 2:1 and 3:1   conduction, commonly 2:1.  Patient symptomatic gently with just   some fatigue and lack of energy without new shortness of breath   or orthopnea.  No ischemic symptoms.  Already on beta blocker for   his ischemic cardiomyopathy.  Given his significant/severe   ischemic cardiomyopathy think the long-term lack of atrial   transport and rate control issue, as well as his need for   long-term dual antiplatelet therapy, merits a little control   approach and would recommend consideration of flutter ablation   this admission.  I have contacted the EP service to see the   patient.  In the meantime he should be on anticoagulation.  Clinically the   episode started last night and has been less than 24 hours.    Given continued relatively rapid rate I will try some IV   diltiazem for rate control until he can be considered for flutter   ablation,  as he did not get dialysis today and I do not want to   have him slipped into acute decompensated congestive heart   failure.    2-coronary artery disease with history of MI and ischemic   cardiomyopathy.  In April this year in [place] he had complex   extensive stenting of the mid LAD as well as in-stent restenosis   in the ostial OM.  He has not had recurrence of unstable new or   some limited angina since then.  Tolerating dual antiplatelet   therapy.    3-ischemic cardiomyopathy.  He is experiencing some PND the last   couple of months which may be slowly worsening.  He states his   appetite has not been that great but his weight has not changed   and so in fact he may have a somewhat lower dry weight and need   more volume removal at dialysis that he is getting.  I will ask   nephrology to consider this.    4-history of VT.  On mexiletine.  Status post ICD.  Clinically   without VT and no shocks.  Last ICD interrogation in April of   this year showed no significant ventricular  arrhythmias as well   as no atrial fibrillation or flutter at that time..    Marlo Warren M.D.    Primary Care Physician   Josselin Kolb    Reason for Consult   Reason for consult: I was asked by the Mar aMrks MD to   evaluate this patient for atrial arrythmia.    History of Present Illness   Westley Ness is a 71 year old male who presents with   complaints of tachycardia and fatigue.  This is a gentleman complex perivascular history including   previous MI, multiple coronary stenting, severe ischemic   cardiomyopathy, history of ventricular tachycardia status post   ICD and on chronic mexiletine, end-stage renal failure on chronic   hemodialysis and a number of other conditions listed below.  He notes that last evening his heart rate was rapid.  Generally   felt poor energy and fatigue but was not having shortness of   breath, orthopnea, dizziness, syncope, chest discomfort.  He went   to dialysis this morning and told him about this and they took   his heart rate was around 140 cm the emergency department where   an EKG shows atrial flutter with rapid ventricular response.    This is a new diagnosis for him.  His last ICD interrogation in   April of this year is reviewed and shows no ventricular or atrial   arrhythmias over the preceding interval.  He has a history of coronary disease with previous anterior   infarct is LAD territory haven't been stented with in-stent   restenosis in the past, but felt to be nonviable.  He underwent   stenting of the proximal circumflex and OM1 last year for   ischemic symptoms and a non-STEMI, then had in-stent restenosis   at the ostium of the OM1 for a underwent cutting balloon   angioplasty last November.  I reviewed those angiograms and he   had excellent results and a widely patent circumflex after that   study.  In April he was in [place] and presented with an acute   coronary syndrome.  He was taken to the catheter lab and he had   again in-stent restenosis  in the ostial LAD.  They did not obtain   all of his past medical records and they proceeded not only to   stent that to stand his entire mid LAD with 2 drug-eluting   stents.  They used an Impala percutaneous LVAD during that time.    Following that he had quite substantial hematoma in his groin   that eventually resolved.  He has not had recurrence of his   angina since that time.    He notes for the last several months he has been intermittently   waking up in the middle the night feeling uncomfortable and has   to sit up for about 10-15 minutes in then feels normal and can go   back to sleep.  He does not exactly describe marked dyspnea but   the way describes sounds like shortness of breath.  He states his   appetite has not been so good last few months, he weighs himself   every day and his weight pattern has not changed.  This raises   the issue whether he has lost some lean mass and is being under   dialyzed.    He denies any bleeding episodes on dual antiplatelet therapy, has   been without orthopnea.  He has chronic edema which she states is   not changed.  Denies ICD discharges, chest discomfort with   activity or during dialysis, syncope or falls, bleeding, fever   chills or rash, cough, nausea or vomiting, diaphoresis, trauma.    Today's EKG is personally reviewed.  He demonstrates atrial   flutter with 2-1 and 3-1 block after heart rate around 130.  When   I'm examining him his heart rate is around 140.  There are   occasional ectopic beats.  No acute ST changes.      Patient Active Problem List   Diagnosis     Tachycardia     CAD (coronary artery disease)     Syncope     Diabetes mellitus, type 2 (H)     Hypertension     Hyperlipidemia     Automatic implantable cardioverter-defibrillator in situ     Ischemic cardiomyopathy     ACS (acute coronary syndrome) (H)     CAD     ACP (advance care planning)     NSTEMI (non-ST elevated myocardial infarction) (H)     Health Care Home     Patellar sleeve  fracture of right knee     Closed fracture of right elbow with routine healing     Cardiomyopathy, ischemic     ESRD (end stage renal disease) on dialysis (H)     Coronary artery disease involving native coronary artery of   native heart without angina pectoris     Atrial fibrillation with RVR (H)       Past Medical History   I have reviewed this patient's medical history and updated it   with pertinent information if needed.   Past Medical History:   Diagnosis Date     Aortic root dilatation (H)     mild     CAD (coronary artery disease)      Cardiomyopathy, ischemic      Diabetes (H)      H/O percutaneous transluminal coronary angioplasty 7-    Successful Percutaneous coronary intervention of the proximal   OM1     Hyperlipidemia      Hypertension      Hypothyroidism      ICD (implantable cardioverter-defibrillator) in place      NSTEMI (non-ST elevated myocardial infarction) (H)      Renal disease     end stage     Shortness of breath      Syncope      Tachycardia        Past Surgical History   I have reviewed this patient's surgical history and updated it   with pertinent information if needed.  Past Surgical History:   Procedure Laterality Date     CHOLECYSTECTOMY       HC LEFT HEART CATHETERIZATION  7/14/2016     HC LEFT HEART CATHETERIZATION  9/21/2016     VASCULAR SURGERY      LUE fistula       Prior to Admission Medications   Prior to Admission Medications   Prescriptions Last Dose Informant Patient Reported? Taking?   B complex-C-folic acid (NEPHROCAPS/TRIPHROCAPS) 1 MG capsule   6/6/2017 at Unknown time Self No Yes   Sig: Take 1 capsule by mouth every evening   Levothyroxine Sodium 50 MCG CAPS 6/7/2017 at Unknown time Self No   Yes   Sig: Take 50 mcg by mouth daily   MEXILETINE HCL PO 6/7/2017 at AM Self Yes Yes   Sig: Take 150 mg by mouth 2 times daily   RANITIDINE HCL PO 6/6/2017 at Unknown time Self Yes Yes   Sig: Take 75 mg by mouth 1-2 tablets daily   acetaminophen (TYLENOL) 325 MG tablet x 2  today Self No Yes   Sig: Take 2 tablets (650 mg) by mouth 4 times daily   aspirin EC 81 MG EC tablet 2017 at Unknown time Self No Yes   Sig: Take 1 tablet (81 mg) by mouth daily   carvedilol (COREG) 6.25 MG tablet 2017 at Unknown time Self   No Yes   Sig: Take 1 tablet (6.25 mg) by mouth 2 times daily (with meals)   Take after dialysis   clopidogrel (PLAVIX) 75 MG tablet 2017 at Unknown time Self   No Yes   Sig: Take 1 tablet (75 mg) by mouth daily To protect your   stent(s).  Do not stop taking unless directed by cardiology.   guaiFENesin-dextromethorphan (ROBITUSSIN DM) 100-10 MG/5ML syrup   PRN Self No Yes   Sig: Take 5 mLs by mouth every 4 hours as needed for cough   isosorbide mononitrate (IMDUR) 30 MG 24 hr tablet 2017 at   Unknown time Self No Yes   Sig: Take 0.5 tablets (15 mg) by mouth daily   lisinopril (PRINIVIL/ZESTRIL) 2.5 MG tablet 2017 at Unknown   time Self No Yes   Sig: Take one tablet after dialysis. Take second tablet at   bedtime.   loperamide (IMODIUM) 2 MG capsule PRN Self Yes Yes   Sig: Take 1 mg by mouth as needed    nitroglycerin (NITROSTAT) 0.4 MG sublingual tablet PRN Self No   Yes   Si TAB EVERY 5 MIN AS NEEDED, UP TO 3 PER EPISODE   pantoprazole (PROTONIX) 40 MG EC tablet 2017 at Unknown time   Self No Yes   Sig: Take 1 tablet (40 mg) by mouth every morning   pravastatin (PRAVACHOL) 40 MG tablet 2017 at Unknown time   Self No Yes   Sig: Take 1 tablet (40 mg) by mouth daily   psyllium 0.52 G capsule PRN Self Yes Yes   Sig: Take 1 capsule by mouth daily as needed    sevelamer (RENVELA) 800 MG tablet PRN Self Yes Yes   Sig: Take 800-1,600 mg by mouth as needed ON HOLD      Facility-Administered Medications: None     Current Facility-Administered Medications   Medication Dose Route Frequency     acetaminophen  650 mg Oral 4x Daily     isosorbide mononitrate  15 mg Oral Daily     [START ON 2017] levothyroxine  50 mcg Oral Daily     lisinopril  2.5 mg Oral  BID     mexiletine (MEXITIL) capsule 150 mg  150 mg Oral BID     pantoprazole  40 mg Oral QAM     pravastatin  40 mg Oral Daily     aspirin  81 mg Oral Daily     carvedilol  6.25 mg Oral BID w/meals     clopidogrel  75 mg Oral Daily     Current Facility-Administered Medications   Medication Last Rate     Reason anticoagulant not prescribed for atrial fibrillation       Allergies   Allergies   Allergen Reactions     Contrast Dye Hives     Does fine if he uses benadryl prior.     No Clinical Screening - See Comments      Green beans - Diarrhea.    Topical antibiotic - name unknown - caused swelling of the penis.         Social History    reports that he quit smoking about 20 years ago. His smoking use   included Cigarettes. He has a 70.00 pack-year smoking history. He   does not have any smokeless tobacco history on file. He reports   that he drinks alcohol. He reports that he does not use illicit   drugs.    Family History   Family History   Problem Relation Age of Onset     Hypertension Mother      CEREBROVASCULAR DISEASE Mother      Hypertension Father      C.A.D. Paternal Grandmother        Review of Systems   The 10 point Review of Systems is negative other than noted in   the HPI or here.     Physical Exam   Vital Signs with Ranges  Temp:  [97.6  F (36.4  C)] 97.6  F (36.4  C)  Pulse:  [] 89  Heart Rate:  [] 126  Resp:  [7-17] 16  BP: (101-134)/(44-95) 127/86  SpO2:  [93 %-98 %] 97 %  Vitals:    06/07/17 0747 06/07/17 1130   Weight: 86.8 kg (191 lb 5.8 oz) 85.4 kg (188 lb 4.4 oz)          Vitals: /86 (BP Location: Right arm)  Pulse 89  Temp 97.6    F (36.4  C) (Oral)  Resp 16  Ht 1.829 m (6')  Wt 85.4 kg (188   lb 4.4 oz)  SpO2 97%  BMI 25.53 kg/m2    Constitutional: Very nice gentleman normally developed and in no   acute distress    Skin: Clear    Head/Eyes/ENT:  No icterus, cyanosis, pallor, throat clear, no   trauma    Neck:  Supple, normal carotid upstrokes without bruits.  No  mass    Chest:  Lungs are clear.  There is a supraclavicular bruit on the   left consistent with his fistula.  ICD site in the right   infraclavicular area is stable and healed    Cardiac: Rapid regular rhythm with occasional premature beats.    Soft systolic murmur at the base, probably reflects transmission   of his left supraclavicular bruit however.  No apical murmurs, no   gallop or heave.  No JVD.    Abdomen:  Nondistended, nontender, no hepatomegaly or bruits.    Vascular: Right radial pulse is normal.  Left radial pulse   decreased.  Fistula present in the left forearm.  Femoral pulses   are deep and 1+ without bruits.  Dorsalis pedis pulses are   normal.  Slight decrease in posterior pulse pedal pulses    Extremities and Back:  No cyanosis or erythema.  There is trace   to 1+ edema above the sock line bilaterally.     Neurological:  Alert and oriented ×3.  Extraocular motions   intact.  Pupils equal.  Face is symmetrical and speech fluent.    Upper and lower motor tone grossly normal and symmetrical.  No   focal motor findings.      Recent Labs  Lab 06/07/17  1155 06/07/17  0811   TROPI 0.043 0.037         Recent Labs  Lab 06/07/17  1155 06/07/17  0811   WBC  --  7.8   HGB  --  11.9*   MCV  --  96   PLT  --  250   NA  --  137   POTASSIUM  --  4.2   CHLORIDE  --  102   CO2  --  26   BUN  --  36*   CR  --  5.25*   GFRESTIMATED  --  11*   GFRESTBLACK  --  13*   ANIONGAP  --  9   CHRISTINE  --  9.7   GLC  --  166*   TROPI 0.043 0.037       Imaging:  Recent Results (from the past 48 hour(s))   XR Chest 2 Views    Narrative    CHEST TWO VIEWS June 7, 2017 8:40 AM     HISTORY: Chest pain.    COMPARISON: 4/7/2017.      Impression    IMPRESSION: Pacemaker in the heart. Cardiomegaly. Slight   pulmonary  vascular engorgement. No pulmonary infiltrates or pleural   effusions.  No change.    OBED MENDEZ MD       Cc:  Po Sen M.D.   [facility]    Jah Morgan M.D.   Troponin I -  Now then in 6 hours x 2     Result Value Ref Range    Troponin I ES 0.043 0.000 - 0.045 ug/L   CBC with platelets   Result Value Ref Range    WBC 9.1 4.0 - 11.0 10e9/L    RBC Count 3.75 (L) 4.4 - 5.9 10e12/L    Hemoglobin 11.5 (L) 13.3 - 17.7 g/dL    Hematocrit 36.0 (L) 40.0 - 53.0 %    MCV 96 78 - 100 fl    MCH 30.7 26.5 - 33.0 pg    MCHC 31.9 31.5 - 36.5 g/dL    RDW 15.7 (H) 10.0 - 15.0 %    Platelet Count 218 150 - 450 10e9/L

## 2017-06-07 NOTE — ED NOTES
Bed: ED10  Expected date:   Expected time:   Means of arrival:   Comments:  satinder - 516 - 71M - tachycardia eta 7251

## 2017-06-07 NOTE — CONSULTS
[facility]    Cardiology Consultation     Date of Admission:  6/7/2017  Date of Consult (When I saw the patient): 06/07/17    Assessment & Plan   Westley Ness is a 71 year old male who was admitted on 6/7/2017.    1-atrial flutter with rapid ventricular response, 2:1 and 3:1 conduction, commonly 2:1.  Patient symptomatic gently with just some fatigue and lack of energy without new shortness of breath or orthopnea.  No ischemic symptoms.  Already on beta blocker for his ischemic cardiomyopathy.  Given his significant/severe ischemic cardiomyopathy think the long-term lack of atrial transport and rate control issue, as well as his need for long-term dual antiplatelet therapy, merits a little control approach and would recommend consideration of flutter ablation this admission.  I have contacted the EP service to see the patient.  In the meantime he should be on anticoagulation.  Clinically the episode started last night and has been less than 24 hours.  Given continued relatively rapid rate I will try some IV diltiazem for rate control until he can be considered for flutter ablation,  as he did not get dialysis today and I do not want to have him slipped into acute decompensated congestive heart failure.    2-coronary artery disease with history of MI and ischemic cardiomyopathy.  In April this year in [place] he had complex extensive stenting of the mid LAD as well as in-stent restenosis in the ostial OM.  He has not had recurrence of unstable new or some limited angina since then.  Tolerating dual antiplatelet therapy.    3-ischemic cardiomyopathy.  He is experiencing some PND the last couple of months which may be slowly worsening.  He states his appetite has not been that great but his weight has not changed and so in fact he may have a somewhat lower dry weight and need more volume removal at dialysis that he is getting.  I will ask nephrology to consider this.    4-history of VT.  On  mexiletine.  Status post ICD.  Clinically without VT and no shocks.  Last ICD interrogation in April of this year showed no significant ventricular arrhythmias as well as no atrial fibrillation or flutter at that time..    Marlo Warren M.D.    Primary Care Physician   Josselin Kolb    Reason for Consult   Reason for consult: I was asked by the Mar Marks MD to evaluate this patient for atrial arrythmia.    History of Present Illness   Westley Ness is a 71 year old male who presents with complaints of tachycardia and fatigue.  This is a gentleman complex perivascular history including previous MI, multiple coronary stenting, severe ischemic cardiomyopathy, history of ventricular tachycardia status post ICD and on chronic mexiletine, end-stage renal failure on chronic hemodialysis and a number of other conditions listed below.  He notes that last evening his heart rate was rapid.  Generally felt poor energy and fatigue but was not having shortness of breath, orthopnea, dizziness, syncope, chest discomfort.  He went to dialysis this morning and told him about this and they took his heart rate was around 140 cm the emergency department where an EKG shows atrial flutter with rapid ventricular response.  This is a new diagnosis for him.  His last ICD interrogation in April of this year is reviewed and shows no ventricular or atrial arrhythmias over the preceding interval.  He has a history of coronary disease with previous anterior infarct is LAD territory haven't been stented with in-stent restenosis in the past, but felt to be nonviable.  He underwent stenting of the proximal circumflex and OM1 last year for ischemic symptoms and a non-STEMI, then had in-stent restenosis at the ostium of the OM1 for a underwent cutting balloon angioplasty last November.  I reviewed those angiograms and he had excellent results and a widely patent circumflex after that study.  In April he was in [place] and presented with  an acute coronary syndrome.  He was taken to the catheter lab and he had again in-stent restenosis in the ostial LAD.  They did not obtain all of his past medical records and they proceeded not only to stent that to stand his entire mid LAD with 2 drug-eluting stents.  They used an Impala percutaneous LVAD during that time.  Following that he had quite substantial hematoma in his groin that eventually resolved.  He has not had recurrence of his angina since that time.    He notes for the last several months he has been intermittently waking up in the middle the night feeling uncomfortable and has to sit up for about 10-15 minutes in then feels normal and can go back to sleep.  He does not exactly describe marked dyspnea but the way describes sounds like shortness of breath.  He states his appetite has not been so good last few months, he weighs himself every day and his weight pattern has not changed.  This raises the issue whether he has lost some lean mass and is being under dialyzed.    He denies any bleeding episodes on dual antiplatelet therapy, has been without orthopnea.  He has chronic edema which she states is not changed.  Denies ICD discharges, chest discomfort with activity or during dialysis, syncope or falls, bleeding, fever chills or rash, cough, nausea or vomiting, diaphoresis, trauma.    Today's EKG is personally reviewed.  He demonstrates atrial flutter with 2-1 and 3-1 block after heart rate around 130.  When I'm examining him his heart rate is around 140.  There are occasional ectopic beats.  No acute ST changes.      Patient Active Problem List   Diagnosis     Tachycardia     CAD (coronary artery disease)     Syncope     Diabetes mellitus, type 2 (H)     Hypertension     Hyperlipidemia     Automatic implantable cardioverter-defibrillator in situ     Ischemic cardiomyopathy     ACS (acute coronary syndrome) (H)     CAD     ACP (advance care planning)     NSTEMI (non-ST elevated myocardial  infarction) (H)     Health Care Home     Patellar sleeve fracture of right knee     Closed fracture of right elbow with routine healing     Cardiomyopathy, ischemic     ESRD (end stage renal disease) on dialysis (H)     Coronary artery disease involving native coronary artery of native heart without angina pectoris     Atrial fibrillation with RVR (H)       Past Medical History   I have reviewed this patient's medical history and updated it with pertinent information if needed.   Past Medical History:   Diagnosis Date     Aortic root dilatation (H)     mild     CAD (coronary artery disease)      Cardiomyopathy, ischemic      Diabetes (H)      H/O percutaneous transluminal coronary angioplasty 7-    Successful Percutaneous coronary intervention of the proximal OM1     Hyperlipidemia      Hypertension      Hypothyroidism      ICD (implantable cardioverter-defibrillator) in place      NSTEMI (non-ST elevated myocardial infarction) (H)      Renal disease     end stage     Shortness of breath      Syncope      Tachycardia        Past Surgical History   I have reviewed this patient's surgical history and updated it with pertinent information if needed.  Past Surgical History:   Procedure Laterality Date     CHOLECYSTECTOMY       HC LEFT HEART CATHETERIZATION  7/14/2016     HC LEFT HEART CATHETERIZATION  9/21/2016     VASCULAR SURGERY      LUE fistula       Prior to Admission Medications   Prior to Admission Medications   Prescriptions Last Dose Informant Patient Reported? Taking?   B complex-C-folic acid (NEPHROCAPS/TRIPHROCAPS) 1 MG capsule 6/6/2017 at Unknown time Self No Yes   Sig: Take 1 capsule by mouth every evening   Levothyroxine Sodium 50 MCG CAPS 6/7/2017 at Unknown time Self No Yes   Sig: Take 50 mcg by mouth daily   MEXILETINE HCL PO 6/7/2017 at AM Self Yes Yes   Sig: Take 150 mg by mouth 2 times daily   RANITIDINE HCL PO 6/6/2017 at Unknown time Self Yes Yes   Sig: Take 75 mg by mouth 1-2 tablets daily    acetaminophen (TYLENOL) 325 MG tablet x 2 today Self No Yes   Sig: Take 2 tablets (650 mg) by mouth 4 times daily   aspirin EC 81 MG EC tablet 2017 at Unknown time Self No Yes   Sig: Take 1 tablet (81 mg) by mouth daily   carvedilol (COREG) 6.25 MG tablet 2017 at Unknown time Self No Yes   Sig: Take 1 tablet (6.25 mg) by mouth 2 times daily (with meals) Take after dialysis   clopidogrel (PLAVIX) 75 MG tablet 2017 at Unknown time Self No Yes   Sig: Take 1 tablet (75 mg) by mouth daily To protect your stent(s).  Do not stop taking unless directed by cardiology.   guaiFENesin-dextromethorphan (ROBITUSSIN DM) 100-10 MG/5ML syrup PRN Self No Yes   Sig: Take 5 mLs by mouth every 4 hours as needed for cough   isosorbide mononitrate (IMDUR) 30 MG 24 hr tablet 2017 at Unknown time Self No Yes   Sig: Take 0.5 tablets (15 mg) by mouth daily   lisinopril (PRINIVIL/ZESTRIL) 2.5 MG tablet 2017 at Unknown time Self No Yes   Sig: Take one tablet after dialysis. Take second tablet at bedtime.   loperamide (IMODIUM) 2 MG capsule PRN Self Yes Yes   Sig: Take 1 mg by mouth as needed    nitroglycerin (NITROSTAT) 0.4 MG sublingual tablet PRN Self No Yes   Si TAB EVERY 5 MIN AS NEEDED, UP TO 3 PER EPISODE   pantoprazole (PROTONIX) 40 MG EC tablet 2017 at Unknown time Self No Yes   Sig: Take 1 tablet (40 mg) by mouth every morning   pravastatin (PRAVACHOL) 40 MG tablet 2017 at Unknown time Self No Yes   Sig: Take 1 tablet (40 mg) by mouth daily   psyllium 0.52 G capsule PRN Self Yes Yes   Sig: Take 1 capsule by mouth daily as needed    sevelamer (RENVELA) 800 MG tablet PRN Self Yes Yes   Sig: Take 800-1,600 mg by mouth as needed ON HOLD      Facility-Administered Medications: None     Current Facility-Administered Medications   Medication Dose Route Frequency     acetaminophen  650 mg Oral 4x Daily     isosorbide mononitrate  15 mg Oral Daily     [START ON 2017] levothyroxine  50 mcg Oral Daily      lisinopril  2.5 mg Oral BID     mexiletine (MEXITIL) capsule 150 mg  150 mg Oral BID     pantoprazole  40 mg Oral QAM     pravastatin  40 mg Oral Daily     aspirin  81 mg Oral Daily     carvedilol  6.25 mg Oral BID w/meals     clopidogrel  75 mg Oral Daily     Current Facility-Administered Medications   Medication Last Rate     Reason anticoagulant not prescribed for atrial fibrillation       Allergies   Allergies   Allergen Reactions     Contrast Dye Hives     Does fine if he uses benadryl prior.     No Clinical Screening - See Comments      Green beans - Diarrhea.    Topical antibiotic - name unknown - caused swelling of the penis.       Social History    reports that he quit smoking about 20 years ago. His smoking use included Cigarettes. He has a 70.00 pack-year smoking history. He does not have any smokeless tobacco history on file. He reports that he drinks alcohol. He reports that he does not use illicit drugs.    Family History   Family History   Problem Relation Age of Onset     Hypertension Mother      CEREBROVASCULAR DISEASE Mother      Hypertension Father      C.A.D. Paternal Grandmother        Review of Systems   The 10 point Review of Systems is negative other than noted in the HPI or here.     Physical Exam   Vital Signs with Ranges  Temp:  [97.6  F (36.4  C)] 97.6  F (36.4  C)  Pulse:  [] 89  Heart Rate:  [] 126  Resp:  [7-17] 16  BP: (101-134)/(44-95) 127/86  SpO2:  [93 %-98 %] 97 %  Vitals:    06/07/17 0747 06/07/17 1130   Weight: 86.8 kg (191 lb 5.8 oz) 85.4 kg (188 lb 4.4 oz)          Vitals: /86 (BP Location: Right arm)  Pulse 89  Temp 97.6  F (36.4  C) (Oral)  Resp 16  Ht 1.829 m (6')  Wt 85.4 kg (188 lb 4.4 oz)  SpO2 97%  BMI 25.53 kg/m2    Constitutional: Very nice gentleman normally developed and in no acute distress    Skin: Clear    Head/Eyes/ENT:  No icterus, cyanosis, pallor, throat clear, no trauma    Neck:  Supple, normal carotid upstrokes without bruits.  No  mass    Chest:  Lungs are clear.  There is a supraclavicular bruit on the left consistent with his fistula.  ICD site in the right infraclavicular area is stable and healed    Cardiac: Rapid regular rhythm with occasional premature beats.  Soft systolic murmur at the base, probably reflects transmission of his left supraclavicular bruit however.  No apical murmurs, no gallop or heave.  No JVD.    Abdomen:  Nondistended, nontender, no hepatomegaly or bruits.    Vascular: Right radial pulse is normal.  Left radial pulse decreased.  Fistula present in the left forearm.  Femoral pulses are deep and 1+ without bruits.  Dorsalis pedis pulses are normal.  Slight decrease in posterior pulse pedal pulses    Extremities and Back:  No cyanosis or erythema.  There is trace to 1+ edema above the sock line bilaterally.     Neurological:  Alert and oriented ×3.  Extraocular motions intact.  Pupils equal.  Face is symmetrical and speech fluent.  Upper and lower motor tone grossly normal and symmetrical.  No focal motor findings.      Recent Labs  Lab 06/07/17  1155 06/07/17  0811   TROPI 0.043 0.037         Recent Labs  Lab 06/07/17  1155 06/07/17  0811   WBC  --  7.8   HGB  --  11.9*   MCV  --  96   PLT  --  250   NA  --  137   POTASSIUM  --  4.2   CHLORIDE  --  102   CO2  --  26   BUN  --  36*   CR  --  5.25*   GFRESTIMATED  --  11*   GFRESTBLACK  --  13*   ANIONGAP  --  9   CHRISTINE  --  9.7   GLC  --  166*   TROPI 0.043 0.037       Imaging:  Recent Results (from the past 48 hour(s))   XR Chest 2 Views    Narrative    CHEST TWO VIEWS June 7, 2017 8:40 AM     HISTORY: Chest pain.    COMPARISON: 4/7/2017.      Impression    IMPRESSION: Pacemaker in the heart. Cardiomegaly. Slight pulmonary  vascular engorgement. No pulmonary infiltrates or pleural effusions.  No change.    OBED MENDEZ MD       Cc:  Po Sen M.D.   [facility] heart care, [facility]    Jah Morgan M.D.

## 2017-06-07 NOTE — ED PROVIDER NOTES
History     Chief Complaint:  Tachycardia.    HPI   Westley Ness is a 71 year old male with history of non-sustained ventricular tachycardia (2012) and a pacemaker in place who presents to the ED for evaluation of tachycardia. The patient reports that he had gone for dialysis today (per his regular schedule of MWF) but was told that his heart rate was too fast at the time; he denies having any issues with dialysis when he went on Monday. Upon evaluation, the patient reports to having some chest heaviness. The patient notes that he recently has had trouble sleeping, and took 1 Nitro last night and the night before to manage chest tightness and right arm pain. The patient notes that he uses his right arm for his cane, so is usually unsure if his arm pain is secondary to this. Of note, the patient mentioned having had a heart attack in the beginning of February of this past year, and had to have three stents placed as a result. The patient reports to having taking his medications this morning. Upon evaluation, he notes that he has some chest heaviness but the pain is  less than a 1  out of 10.     Allergies:  Contrast Dye      Medications:    Robitussin  Lisinopril  Protonix  Nitroglycerin  Coreg  Imdur  Pravachol  Tylenol  Plavix  Renvela  Aspirin 81 mg  Mexiletine  Ranitidine    Past Medical History:    ESRD on dialysis  CAD  Cardiomyopathy ischemic  Patellar sleeve fracture  NSTEMI  ACS  Type 2 diabetes  Hypertension  Hyperlipidemia  AICD  Aortic root dilatation    Past Surgical History:    Cholecystectomy  Left heart cath x2 - 2016  Create fistula left upper extremity    Family History:    Hypertension  CVD    Social History:  Smoking status: Former smoker  Alcohol use: Rarely    Marital Status:  Single      Review of Systems   Cardiovascular:        Positive for tachycardia, chest tightness, right arm pain.    All other systems reviewed and are negative.      Physical Exam   First Vitals:  BP: 134/87  Pulse:  129  Heart Rate: 130  Temp: 97.6  F (36.4  C)  Resp: 16  Height: 182.9 cm (6')  Weight: 86.8 kg (191 lb 5.8 oz)  SpO2: 97 %      Physical Exam  GENERAL: well developed, pleasant  HEAD: atraumatic  EYES: pupils reactive, extraocular muscles intact, conjunctivae normal  ENT:  mucus membranes moist  NECK:  trachea midline, normal range of motion  RESPIRATORY: no tachypnea. Diminished lung sounds  CVS: normal S1/S2, no murmurs, intact distal pulses. Tachycardic   ABDOMEN: soft, nontender, nondistention  MUSCULOSKELETAL: no deformities  SKIN: warm and dry, no acute rashes or ulceration  NEURO: GCS 15, cranial nerves intact, alert and oriented x3  PSYCH:  Mood/affect normal    Emergency Department Course     ECG (7:48:23):  Rate 126 bpm. FL interval *. QRS duration 82. QT/QTc 328/475. P-R-T axes * 116 63. Atrial fibrillation with RVR or aberrantly conducted complexes. Low voltage QRS. Possible anterolateral infarct, age undetermined. Interpreted at 0750 by Martin Monreal MD.     Imaging:  Radiographic findings were communicated with the patient who voiced understanding of the findings.    X-ray Chest, 2 views:  Pacemaker in the heart. Cardiomegaly. Slight pulmonary  vascular engorgement. No pulmonary infiltrates or pleural effusions.  No change.  Result per radiology.     Laboratory:  0811 - Troponin: 0.037  CBC: HGB 11.9 (L), o/w WNL (WBC 7.8, )    BMP: Glucose 166 (H), BUN 36 (H), Cr 5.25 (H), GFR 11 (L), o/w WNL  Magnesium: 1.9  Phosphorus: 3.4  TSH with free T4: 3.15     Interventions:  Medications   acetaminophen (TYLENOL) tablet 650 mg (not administered)   isosorbide mononitrate (IMDUR) 24 hr half-tab 15 mg (not administered)   levothyroxine (SYNTHROID/LEVOTHROID) tablet 50 mcg (not administered)   lisinopril (PRINIVIL/Zestril) tablet 2.5 mg (not administered)   mexiletine (MEXITIL) capsule 150 mg (not administered)   pantoprazole (PROTONIX) EC tablet 40 mg (not administered)   pravastatin (PRAVACHOL) tablet  40 mg (not administered)   naloxone (NARCAN) injection 0.1-0.4 mg (not administered)   No anticoagulants IF patient has had acute trauma/surgery or recent intracranial, GI or urinary tract bleeding.  (not administered)   ondansetron (ZOFRAN-ODT) ODT tab 4 mg (not administered)     Or   ondansetron (ZOFRAN) injection 4 mg (not administered)   prochlorperazine (COMPAZINE) injection 5 mg (not administered)     Or   prochlorperazine (COMPAZINE) tablet 5 mg (not administered)     Or   prochlorperazine (COMPAZINE) Suppository 12.5 mg (not administered)   metoprolol (LOPRESSOR) injection 5-15 mg (not administered)   aspirin EC EC tablet 81 mg (not administered)   carvedilol (COREG) tablet 6.25 mg (not administered)   clopidogrel (PLAVIX) tablet 75 mg (not administered)   diltiazem (CARDIZEM) 125 mg in NaCl 0.9 % 125 mL infusion (not administered)   1018 - Lopressor 5 mg IV    Emergency Department Course:  The patient arrived in the emergency department via EMS.   Past medical records, nursing notes, and vitals reviewed.  0750: I performed an exam of the patient and obtained history, as documented above.      IV inserted and blood drawn. The patient was placed on continuous cardiac monitoring and pulse oximetry.   The patient was sent for a X-ray while in the emergency department, findings above.     Interventions given as above.    Findings and plan explained to the Patient who consents to admission. Discussed the patient with Dr. Marks, who will admit the patient to a cardiac telemetry bed for further monitoring, evaluation, and treatment.    Impression & Plan      Medical Decision Making:  Westley Ness is a 71 year old male who presents with tachycardia from a dialysis center. EKG looks to be consistent with afib with RVR. I do not see this in his past medical history. He does have a history of non-sustained v-tach and has a defibrillator. The patient was given one dose of Metoprolol which brought his pulse rate  down to a 100. The patient does look to be in need of dialysis. I spoke to the nephrologist as well as the hospitalist regarding admission.     Diagnosis:    ICD-10-CM    1. Acute chest pain R07.9 Magnesium     Magnesium     Phosphorus     Phosphorus     TSH with free T4 reflex     TSH with free T4 reflex     Troponin I - Now then in 6 hours x 2     2. Atrial fibrillation, unspecified type (H) I48.91    3. Chronic renal failure, unspecified stage N18.9        Disposition:  Admitted to Anupama Gregorio, am serving as a scribe at 7:50 AM on 6/7/2017 to document services personally performed by Martin Monreal MD based on my observations and the provider's statements to me.    EMERGENCY DEPARTMENT       Martin Monreal MD  06/07/17 1949

## 2017-06-07 NOTE — IP AVS SNAPSHOT
MRN:[id number]                      After Visit Summary   6/7/2017    Westley Ness    MRN: [id number]           Thank you!     Thank you for choosing [facility] for your care. Our goal is always to provide you with excellent care. Hearing back from our patients is one way we can continue to improve our services. Please take a few minutes to complete the written survey that you may receive in the mail after you visit with us. Thank you!        Patient Information     Date Of Birth          1945        Designated Caregiver       Most Recent Value    Caregiver    Will someone help with your care after discharge? yes    Name of designated caregiver home aid (pt refused to provide the name    Phone number of caregiver n/a    Caregiver address n/a      About your hospital stay     You were admitted on:  June 7, 2017 You last received care in the:  [facility]    You were discharged on:  June 9, 2017        Reason for your hospital stay       Atrial flutter w/ RVR                  Who to Call     For medical emergencies, please call 911.  For non-urgent questions about your medical care, please call your primary care provider or clinic, [phone]          Attending Provider     Provider Specialty    Martin Monreal MD Emergency Medicine    [facility]       Primary Care Provider Office Phone # Fax #    Josselin Kolb [phone]      After Care Instructions     Activity       Your activity upon discharge: activity as tolerated            Diet       Follow this diet upon discharge:  Dialysis Diet                  Follow-up Appointments     Follow-up and recommended labs and tests        HD center on Monday, 6/12/17.                  Your next 10 appointments already scheduled     Jun 22, 2017  1:50 PM CDT   Return Discharge with Lisa Flaherty PA-C   [facility]YSIC[facility] [place] ([facility]  [facility])    [address]  [place] [address]   [phone]            Jul 12, 2017  3:10 PM CDT   Return Discharge with MARCIO Aviels CNP   [facility] ([facility])    [address]  [place] [address]   [phone]            Jul 13, 2017  4:30 PM CDT   Remote ICD Check with CARD DCR2   [facility] ([facility])    [address]  [place] [address]   [phone]           This appointment is for a remote check of your debrillator.  This is not an appointment at the office.              Additional Services     Follow-Up with Cardiac Advanced Practice Provider       S/P ablation                  Future tests that were ordered for you     EKG 12-lead complete w/read  (to be scheduled)                 Pending Results     Date and Time Order Name Status Description    6/8/2017 1156 EKG 12-lead, tracing only Preliminary     6/8/2017 0737 EKG 12-lead, tracing only Preliminary             Statement of Approval     Ordered          06/09/17 9436  I have reviewed and agree with all the recommendations and orders detailed in this document.  EFFECTIVE NOW     Approved and electronically signed by:  Charlette Mcrae MD           06/09/17 7414  I have reviewed and agree with all the recommendations and orders detailed in this document.  EFFECTIVE NOW     Approved and electronically signed by:  Charlette Mcrae MD             Admission Information     Date & Time Provider Department Dept. Phone    6/7/2017 [place] Marks MD [facility] [phone]      Your Vitals Were     Blood Pressure Pulse Temperature Respirations Height Weight    143/73 72 97.8  F (36.6  C) (Oral) 16 1.829 m (6') 83.2 kg (183 lb 6.8 oz)    Pulse Oximetry BMI (Body Mass Index)                96% 24.88 kg/m2          MyChart Information     [employer] lets you send  "messages to your doctor, view your test results, renew your prescriptions, schedule appointments and more. To sign up, go to [email or web address] . Click on \"Log in\" on the left side of the screen, which will take you to the Welcome page. Then click on \"Sign up Now\" on the right side of the page.     You will be asked to enter the access code listed below, as well as some personal information. Please follow the directions to create your username and password.     Your access code is: [email or web address]  Expires: 2017 10:05 AM     Your access code will  in 90 days. If you need help or a new code, please call your [facility] clinic or [phone].        Care EveryWhere ID     This is your Care EveryWhere ID. This could be used by other organizations to access your [facility] medical records  [id number]           Review of your medicines      START taking        Dose / Directions    apixaban ANTICOAGULANT 2.5 MG tablet   Commonly known as:  ELIQUIS   Used for:  Atrial fibrillation, unspecified type (H)        Dose:  2.5 mg   Take 1 tablet (2.5 mg) by mouth 2 times daily   Quantity:  60 tablet   Refills:  3         CONTINUE these medicines which have NOT CHANGED        Dose / Directions    acetaminophen 325 MG tablet   Commonly known as:  TYLENOL   Used for:  Closed nondisplaced fracture of right patella, unspecified fracture morphology, initial encounter, Elbow fracture, right, closed, initial encounter        Dose:  650 mg   Take 2 tablets (650 mg) by mouth 4 times daily   Quantity:  60 tablet   Refills:  0       aspirin 81 MG EC tablet   Used for:  Coronary artery disease involving native coronary artery of native heart without angina pectoris        Dose:  81 mg   Take 1 tablet (81 mg) by mouth daily   Quantity:  81 tablet   Refills:  0       B complex-C-folic acid 1 MG capsule   Used for:  ESRD (end stage renal disease) on dialysis (H)        Dose:  1 capsule   Take 1 capsule by mouth every evening "   Quantity:  90 capsule   Refills:  3       carvedilol 6.25 MG tablet   Commonly known as:  COREG   Used for:  NSTEMI (non-ST elevated myocardial infarction) (H), Mild CAD        Dose:  6.25 mg   Take 1 tablet (6.25 mg) by mouth 2 times daily (with meals) Take after dialysis   Quantity:  180 tablet   Refills:  3       clopidogrel 75 MG tablet   Commonly known as:  PLAVIX   Used for:  Coronary artery disease involving native coronary artery of native heart without angina pectoris        Dose:  75 mg   Take 1 tablet (75 mg) by mouth daily To protect your stent(s).  Do not stop taking unless directed by cardiology.   Quantity:  30 tablet   Refills:  11       guaiFENesin-dextromethorphan 100-10 MG/5ML syrup   Commonly known as:  ROBITUSSIN DM   Used for:  Cough        Dose:  5 mL   Take 5 mLs by mouth every 4 hours as needed for cough   Quantity:  560 mL   Refills:  3       isosorbide mononitrate 30 MG 24 hr tablet   Commonly known as:  IMDUR   Used for:  NSTEMI (non-ST elevated myocardial infarction) (H)        Dose:  15 mg   Take 0.5 tablets (15 mg) by mouth daily   Quantity:  90 tablet   Refills:  3       Levothyroxine Sodium 50 MCG Caps   Used for:  Hypothyroidism, unspecified type        Dose:  50 mcg   Take 50 mcg by mouth daily   Quantity:  90 capsule   Refills:  3       lisinopril 2.5 MG tablet   Commonly known as:  PRINIVIL/Zestril   Used for:  NSTEMI (non-ST elevated myocardial infarction) (H)        Take one tablet after dialysis. Take second tablet at bedtime.   Quantity:  60 tablet   Refills:  3       loperamide 2 MG capsule   Commonly known as:  IMODIUM        Dose:  1 mg   Take 1 mg by mouth as needed   Refills:  0       MEXILETINE HCL PO        Dose:  150 mg   Take 150 mg by mouth 2 times daily   Refills:  0       nitroglycerin 0.4 MG sublingual tablet   Commonly known as:  NITROSTAT   Used for:  Angina pectoris (H)        1 TAB EVERY 5 MIN AS NEEDED, UP TO 3 PER EPISODE   Quantity:  25 tablet   Refills:   0       pantoprazole 40 MG EC tablet   Commonly known as:  PROTONIX   Used for:  Gastroesophageal reflux disease, esophagitis presence not specified        Dose:  40 mg   Take 1 tablet (40 mg) by mouth every morning   Quantity:  30 tablet   Refills:  10       pravastatin 40 MG tablet   Commonly known as:  PRAVACHOL   Used for:  Coronary artery disease involving native coronary artery of native heart without angina pectoris        Dose:  40 mg   Take 1 tablet (40 mg) by mouth daily   Quantity:  90 tablet   Refills:  3       psyllium 0.52 G capsule        Dose:  1 capsule   Take 1 capsule by mouth daily as needed   Refills:  0       RANITIDINE HCL PO        Dose:  75 mg   Take 75 mg by mouth 1-2 tablets daily   Refills:  0       sevelamer 800 MG tablet   Commonly known as:  RENVELA        Dose:  800-1600 mg   Take 800-1,600 mg by mouth as needed ON HOLD   Refills:  0            Where to get your medicines      These medications were sent to [facility] - [address]  [address], [place] [address]     Phone:  [phone]     apixaban ANTICOAGULANT 2.5 MG tablet                Protect others around you: Learn how to safely use, store and throw away your medicines at www.disposemymeds.org.             Medication List: This is a list of all your medications and when to take them. Check marks below indicate your daily home schedule. Keep this list as a reference.      Medications           Morning Afternoon Evening Bedtime As Needed    acetaminophen 325 MG tablet   Commonly known as:  TYLENOL   Take 2 tablets (650 mg) by mouth 4 times daily   Last time this was given:  650 mg on 6/9/2017 12:58 PM   Next Dose Due:  Evening                                             apixaban ANTICOAGULANT 2.5 MG tablet   Commonly known as:  ELIQUIS   Take 1 tablet (2.5 mg) by mouth 2 times daily   Last time this was given:  2.5 mg on 6/9/2017 12:58 PM   Next Dose Due:  Tonight                                        aspirin 81 MG EC tablet   Take 1 tablet (81 mg) by mouth daily   Last time this was given:  81 mg on 6/8/2017  9:33 AM   Next Dose Due:  tmrw                                    B complex-C-folic acid 1 MG capsule   Take 1 capsule by mouth every evening                                   carvedilol 6.25 MG tablet   Commonly known as:  COREG   Take 1 tablet (6.25 mg) by mouth 2 times daily (with meals) Take after dialysis   Last time this was given:  6.25 mg on 6/9/2017 12:57 PM   Next Dose Due:  evening                                      clopidogrel 75 MG tablet   Commonly known as:  PLAVIX   Take 1 tablet (75 mg) by mouth daily To protect your stent(s).  Do not stop taking unless directed by cardiology.   Last time this was given:  75 mg on 6/9/2017 12:58 PM   Next Dose Due:  tmrw                                    guaiFENesin-dextromethorphan 100-10 MG/5ML syrup   Commonly known as:  ROBITUSSIN DM   Take 5 mLs by mouth every 4 hours as needed for cough                                isosorbide mononitrate 30 MG 24 hr tablet   Commonly known as:  IMDUR   Take 0.5 tablets (15 mg) by mouth daily   Last time this was given:  15 mg on 6/9/2017 12:58 PM                                Levothyroxine Sodium 50 MCG Caps   Take 50 mcg by mouth daily                                lisinopril 2.5 MG tablet   Commonly known as:  PRINIVIL/Zestril   Take one tablet after dialysis. Take second tablet at bedtime.   Last time this was given:  2.5 mg on 6/9/2017 12:57 PM                                   loperamide 2 MG capsule   Commonly known as:  IMODIUM   Take 1 mg by mouth as needed   Last time this was given:  2 mg on 6/7/2017 10:57 PM                                MEXILETINE HCL PO   Take 150 mg by mouth 2 times daily   Last time this was given:  150 mg on 6/9/2017 12:57 PM   Next Dose Due:  tonight                                      nitroglycerin 0.4 MG sublingual tablet   Commonly known as:   NITROSTAT   1 TAB EVERY 5 MIN AS NEEDED, UP TO 3 PER EPISODE                                pantoprazole 40 MG EC tablet   Commonly known as:  PROTONIX   Take 1 tablet (40 mg) by mouth every morning   Last time this was given:  40 mg on 6/9/2017 12:59 PM   Next Dose Due:  tmrw                                    pravastatin 40 MG tablet   Commonly known as:  PRAVACHOL   Take 1 tablet (40 mg) by mouth daily   Last time this was given:  40 mg on 6/8/2017  9:42 PM   Next Dose Due:  tmrw                                   psyllium 0.52 G capsule   Take 1 capsule by mouth daily as needed                                RANITIDINE HCL PO   Take 75 mg by mouth 1-2 tablets daily                                sevelamer 800 MG tablet   Commonly known as:  RENVELA   Take 800-1,600 mg by mouth as needed ON HOLD                                          More Information        Discharge Instructions for Cardiac Ablation  You have had a procedure called cardiac ablation, which was used to correct an abnormal heartbeat or rhythm. This procedure destroyed (ablated) a few of the cells in your heart that were causing your heart rhythm problem. During the procedure, a thin, flexible wire (called a catheter) was inserted into a blood vessel in your upper thigh and threaded up to the heart.  Home care    No one can drive home from a procedure after having sedation. You will need to make arrangements for a ride. Doctors typically advise that you not drive for 24 hours after the procedure.    Avoid heavy physical activity for several days after the procedure to allow your body to heal.    Ask your doctor when you can expect to return to work.    Take your temperature and check your incision for signs of infection (redness, swelling, drainage, or warmth) every day for a week. It is normal to have a small bruise or lump where the catheter was inserted.    Take your medications exactly as directed. Don t skip doses. There may be changes to your  medications as a result of the ablation procedure, so be sure to go over your medication instructions with your doctor before you are discharged.    Learn to take your own pulse. Keep a record of your results. Ask your doctor which readings mean that you need medical attention.    Avoid lifting heavy objects for a period of time after your ablation. Ask your doctor for specific recommendations.  Follow-up care  Make a follow-up appointment as directed by our staff.     When to call your doctor  Call your doctor right away if you have any of the following:    Redness, pain, swelling, bleeding, or drainage from your incision    Chest pain, shortness of breath, or dizziness    Fever above 100.0 F (37.7 C)    Sudden coldness, pain, or numbness in the leg or arm with the insertion site    Nausea or vomiting  Note: Ask your doctor what to expect about your heartbeat. Sometimes the irregularity goes away immediately after the procedure. Other times it may take longer to subside.     2350-4194 [employer]. [address] [place] [address], [place] [address]. All rights reserved. This information is not intended as a substitute for professional medical care. Always follow your healthcare professional's instructions.

## 2017-06-07 NOTE — PLAN OF CARE
Problem: Goal Outcome Summary  Goal: Goal Outcome Summary  Outcome: No Change  Patient transferred from ED at around 1100. Denies pain, no SOB. Up with SBA. TELE A-fib/flutter with RVR. HR in 130s. Cardiology consulted. Diltiazem started at 5 ml/hr.  Heparin gtt started at 1000 units/hr. Plan for flutter wave ablation tomorrow. Will continue to monitor.

## 2017-06-07 NOTE — H&P
DATE OF ADMISSION:  06/07/2017.       PRIMARY CARE PHYSICIAN:  Josselin Kolb MD.       CARDIOLOGIST:  Po Sen MD.       CHIEF COMPLAINT:  Fast heart rate.      History obtained from patient and review of the EMR.      HISTORY OF PRESENT ILLNESS:  Westley Ness is a 71-year-old gentleman with past medical history of CAD, status post numerous stent placements, most recently 2 drug-eluting stents to LAD and OM in 02/2017, history of nonsustained ventricular tachycardic,  AICD placement, ischemic cardiomyopathy with EF of 25-30%, end-stage renal disease on dialysis, hypothyroidism and diet-controlled type 2 diabetes mellitus who presents to the Emergency Department today from dialysis clinic after findings of a fast heart rate.  Earlier today the patient notes that he went to his dialysis clinic to receive dialysis.  He receives dialysis Monday, Wednesday, Friday.  At the clinic staff noted that his heart rates were in the 130s so he was therefore sent to the Emergency Department.      In the Emergency Department, the patient was seen and assessed by Dr. Martin Monreal who obtained basic labs and imaging which revealed initial vital signs within normal limits aside from a heart rate in the 130s.  Today labs revealed a creatinine of 5.25, troponin 0.037, hemoglobin 11.9.  Chest x-ray showed cardiomegaly and slight pulmonary vascular engorgement.  Initial EKG showed atrial fibrillation with rapid ventricular response with ventricular rate of 126.  This is a new diagnosis for the patient.  He has never been told of atrial fibrillation before.  He was given 2 doses of metoprolol 5 mg IV in the ED without significant improvement in heart rates.  The patient is asymptomatic but due to this new diagnosis the decision was made to admit for further evaluation and treatment.      On my interview, the patient confirms the above history.  He again notes he is asymptomatic without chest pain, palpitations, dizziness,  "lightheadedness, nausea or vomiting.  He again has never been diagnosed with this arrhythmia in the past.  Noted in the ED notes were complaints of some chest heaviness with associated right arm pain and patient taking a dose of nitro at home last evening.  When asked about this, he states \"I've had it for 20 years.\"  There has been no change in these symptoms.  He states that he will have chest heaviness, specifically with activity but when he stops, takes a few deep breaths in, the pain will go away and occasionally he will have a right arm pain.  He denies any shortness of breath at this time.  In regards to his cardiac history, it appears that his most recent intervention was in 02/2017 when he was in [place].  He was seen at [facility] at which time he presented with chest pain and was found to have an elevated troponin and underwent left heart catheterization with drug-eluting stent placement x2 to the LAD and OM.  Prior to that, he was seen in 11/2016 at which time a left heart catheterization revealed ostial and proximal segment of M1 in-stent restenosis so PCI was performed.  Then prior to that in 07/2016, he was noted to have 2-vessel disease with severe OM1 and LAD disease and the anterior wall was noted to not be viable, as he has a chronically occluded LAD.  He is followed by Dr. Sen of Cardiology and it appears that he was last seen in 01/2017.  He has known ischemic cardiomyopathy with last echocardiogram revealing an EF of 25-30%.  No recent hospitalizations for CHF.  Not on a diuretic.  He also has an AICD which appears to be last interrogated in April 2017 and unremarkable findings at that time.      REVIEW OF SYSTEMS:  A 10-point review of systems was performed and is otherwise negative unless specified in the HPI.      PAST MEDICAL HISTORY:   1.  CAD, status post numerous stent placements; initial heart attack in 1996 with recurrent NSTEMI in 07/2016 at which time a " stent was placed in the proximal OM1.  In November 2016, the patient had ostial and proximal segment of M1 in-stent restenosis so PCR was performed.  Most recently had NSTEMI in 02/2017 at which time drug-eluting stent placement was performed to the LAD and OM.  Followed by Dr. Sen.     2.  Ischemic cardiomyopathy with last echocardiogram showing an EF of 25-30%.   3.  AICD placement; pacemaker interrogated 04/2017.    4.  History of nonsustained ventricular tachycardia in 2012.    5.  End-stage renal disease repeating dialysis Monday, Wednesday, Friday, followed by Dr. Hammond.   6.  Hypothyroidism.   7.  Diet-controlled type 2 diabetes mellitus.      MEDICATIONS:    Prior to Admission Medications   Prescriptions Last Dose Informant Patient Reported? Taking?   B complex-C-folic acid (NEPHROCAPS/TRIPHROCAPS) 1 MG capsule 6/6/2017 at Unknown time Self No Yes   Sig: Take 1 capsule by mouth every evening   Levothyroxine Sodium 50 MCG CAPS 6/7/2017 at Unknown time Self No Yes   Sig: Take 50 mcg by mouth daily   MEXILETINE HCL PO 6/7/2017 at AM Self Yes Yes   Sig: Take 150 mg by mouth 2 times daily   RANITIDINE HCL PO 6/6/2017 at Unknown time Self Yes Yes   Sig: Take 75 mg by mouth 1-2 tablets daily   acetaminophen (TYLENOL) 325 MG tablet x 2 today Self No Yes   Sig: Take 2 tablets (650 mg) by mouth 4 times daily   aspirin EC 81 MG EC tablet 6/6/2017 at Unknown time Self No Yes   Sig: Take 1 tablet (81 mg) by mouth daily   carvedilol (COREG) 6.25 MG tablet 6/6/2017 at Unknown time Self No Yes   Sig: Take 1 tablet (6.25 mg) by mouth 2 times daily (with meals) Take after dialysis   clopidogrel (PLAVIX) 75 MG tablet 6/6/2017 at Unknown time Self No Yes   Sig: Take 1 tablet (75 mg) by mouth daily To protect your stent(s).  Do not stop taking unless directed by cardiology.   guaiFENesin-dextromethorphan (ROBITUSSIN DM) 100-10 MG/5ML syrup PRN Self No Yes   Sig: Take 5 mLs by mouth every 4 hours as needed for cough    isosorbide mononitrate (IMDUR) 30 MG 24 hr tablet 2017 at Unknown time Self No Yes   Sig: Take 0.5 tablets (15 mg) by mouth daily   lisinopril (PRINIVIL/ZESTRIL) 2.5 MG tablet 2017 at Unknown time Self No Yes   Sig: Take one tablet after dialysis. Take second tablet at bedtime.   loperamide (IMODIUM) 2 MG capsule PRN Self Yes Yes   Sig: Take 1 mg by mouth as needed    nitroglycerin (NITROSTAT) 0.4 MG sublingual tablet PRN Self No Yes   Si TAB EVERY 5 MIN AS NEEDED, UP TO 3 PER EPISODE   pantoprazole (PROTONIX) 40 MG EC tablet 2017 at Unknown time Self No Yes   Sig: Take 1 tablet (40 mg) by mouth every morning   pravastatin (PRAVACHOL) 40 MG tablet 2017 at Unknown time Self No Yes   Sig: Take 1 tablet (40 mg) by mouth daily   psyllium 0.52 G capsule PRN Self Yes Yes   Sig: Take 1 capsule by mouth daily as needed    sevelamer (RENVELA) 800 MG tablet PRN Self Yes Yes   Sig: Take 800-1,600 mg by mouth as needed ON HOLD      Facility-Administered Medications: None      ALLERGIES:  Contrast dye causing hives, otherwise reviewed and no medication allergies.      PAST SURGICAL HISTORY:    1. Left heart catheterization 2017, 2016, 2016  2. Vascular surgery; LUE fistula   3. Cholecystectomy      FAMILY HISTORY:  Coronary artery disease in both parents.      SOCIAL HISTORY:  The patient quit smoking in .  Up until that point he had about a 33-pack-year smoking history.  Rare alcohol use.  His hobby revolves around cedar and he was most recently at a conference for this in [place] where he had his most recent STEMI.  He is right-hand dominant and uses a cane for ambulatory assistance.      LABORATORY DATA:  Reviewed per Epic and noted in the HPI.      IMAGING:  Noted in HPI.        PHYSICAL EXAMINATION:   VITAL SIGNS:  Temperature 97.6, pulse 95, /44, R11, SpO2 94% on room air.   CONSTITUTIONAL:  The patient lying in bed, dressed in hospital garb.  Appears comfortable.  Cooperative with  interview.   HEENT:  Normocephalic, atraumatic.  Negative for conjunctival redness or scleral icterus.  Oral mucosa pink and moist; negative for ulcerations, erythema or exudates.   CARDIOVASCULAR:  Irregularly irregular rhythm.  Tachycardic.  No murmurs, rubs or extra heart sounds appreciated.   VASCULATURE:  Pulses +2/4 and irregular in upper and lower extremities bilaterally.   RESPIRATORY:  No increased work of breathing.  No supplemental oxygen at this time.   LUNGS:  Clear to auscultation bilaterally; no wheezes, rales or rhonchi appreciated.   GASTROINTESTINAL:  Abdomen soft, nondistended.  Bowel sounds auscultated in all 4 quadrants.  Negative for tenderness to palpation.   MUSCULOSKELETAL:  No gross deformities noted.  Normal muscle tone.   LYMPHATICS:  Negative for lower extremity edema bilaterally.   NEUROLOGIC:  Alert and oriented to person, place and time.   strength intact.  No focal neuro deficits appreciated.   SKIN:  Warm, dry, intact.  No jaundice noted.  Negative for suspicious lesions, rashes, bruising, open sores or abrasions.      ASSESSMENT AND PLAN:  Westley Ness is a 71-year-old gentleman with past medical history significant for coronary artery disease, status post numerous stent placements, ischemic cardiomyopathy, end-stage renal disease on hemodialysis, history of nonsustained V-tachycardia, AICD placement, hypothyroidism and diet-controlled type 2 diabetes mellitus who presents to the Emergency Department with complaints about fast heart rate from dialysis clinic, found to have atrial fibrillation with rapid ventricular response, which is a new diagnosis.  Admitted for further evaluation and treatment.  Vitals currently within normal limits aside from tachycardia in the 120s.  The patient is currently stable.     Atrial fibrillation with rapid ventricular response, new diagnosis.  Patient with no prior history of this.  He does have significant history for coronary artery disease.   He also has thyroid disease but this has been well controlled and he has been compliant with his current medication regimen.  No excessive alcohol use.  Electrocardiogram confirming atrial fibrillation with rapid ventricular response with ventricular rate at 126.  The patient was given 2 doses of intravenous metoprolol 5 mg in the Emergency Department.     -- Admit under inpatient status   -- Cardiology consulted; appreciate assistance, specifically with anticoagulation and further management given extensive heart history and CM.  I have decided not to initially start patient on heparin at this time as he notes prolonged bleeding times, specifically after a hemodialysis run, taking anywhere from 20-60 minutes.  His CHADS2-VASc score, however, is 4 so he is at high risk for thromboembolic event.     -- Continue prior to admission beta blocker, will have p.r.n. intravenous beta blocker available.   -- Monitor on telemetry.   -- Will check a thyroid stimulating hormone (TSH) and magnesium level.   -- Complete blood count and basic metabolic panel in the a.m.   -- Will also recheck an echocardiogram.     Coronary artery disease:  The patient with initial myocardial infarction in 1996, subsequent non-ST-segment myocardial infarction in 07/2016 at which time, catheterization showed 2-vessel coronary artery disease of left anterior descending and circumflex, proximal obtuse marginal 1.   Left anterior descending was noted to be chronically occluded.  The patient did have a stent placed to the proximal left circumflex obtuse marginal previously.  Presented in 11/2016 and found to have non-ST-segment myocardial infarction at which time he was noted to have ostial and proximal segment of obtuse marginal 1 in-stent restenosis so percutaneous coronary intervention was performed.  Subsequently, in 02/2017 an non-ST-segment myocardial infarction while in [place] and had drug-eluting stent placement x2 to the left anterior  descending and obtuse marginal.  Followed by Dr. Sen of Cardiology.   -- At this time, will continue prior to admission Plavix and aspirin.   -- Will continue prior to admission beta blocker, angiotensin converting enzyme inhibitor and statin.     Ischemic cardiomyopathy:  Last echocardiogram from our records from 11/2016 showed an ejection fraction of 25-30%.  The patient notes that he had repeat echocardiogram during non-ST-segment myocardial infarction in [place].    -- Given new diagnosis of atrial fibrillation, will repeat echocardiogram at this time.  The patient without shortness of breath.  Does not appear to be volume overloaded at this time.  Will monitor I's and O's and daily weights.      Hypothyroidism:  Continue prior to admission Synthroid.  Check thyroid stimulating hormone (TSH).       Type 2 diabetes mellitus, diet-controlled.  Patient not maintained on any medications.  States A1c's in the past have been around 5.7.  Will repeat A1c at this time.     History of nonsustained V-tachycardia s/p AICD placement (2011).  History of automatic implantable cardioverter defibrillator placement.  The patient last had his placement interrogated in 04/2017 without any abnormal findings at that time.  We will have a pacemaker interrogated while here.     Deep venous thrombosis prophylaxis:  Encourage ambulation.      CODE STATUS:  Full code.  This was confirmed with patient at bedside.  The patient notes that if he were to not be able to speak for himself and then his medical decision makers would be his friends down in [facility] and [facility] which can be seen in patient's health care agent information in the chart.        The patient was seen and assessed with Dr. Marks who agrees with the plan as outlined above.         LANDON MARKS MD       As dictated by FAWAD DUNN PA-C            D: 06/07/2017 11:08   T: 06/07/2017 12:26   MT: [id number]      Name:     SOCORRO LÓPEZ   MRN:      [id number]         Account:      [id number]   :      1945           Admitted:     [id number]      Document: [id number]       cc: Josselin Kolb MD

## 2017-06-07 NOTE — ED NOTES
"[facility]  ED Nurse Handoff Report    ED Chief complaint: Tachycardia (at dialysis, wouldn't dialyze d/t heart rate. EMS EKG shows ST in 120s. No CP, SOB)      ED Diagnosis:   Final diagnoses:   Acute chest pain   Atrial fibrillation, unspecified type (H)   Chronic renal failure, unspecified stage       Code Status: admitting MD to confirm    Allergies:   Allergies   Allergen Reactions     Contrast Dye Hives     Does fine if he uses benadryl prior.     No Clinical Screening - See Comments      Green beans - Diarrhea.    Topical antibiotic - name unknown - caused swelling of the penis.       Activity level - Baseline/Home:  Independent    Activity Level - Current:   Independent     Needed?: No    Isolation: No  Infection: Not Applicable    Bariatric?: No    Vital Signs:   Vitals:    06/07/17 0847 06/07/17 0859 06/07/17 0900 06/07/17 0915   BP: 103/78  113/75 131/44   Pulse:       Resp: 8 8  11   Temp:       TempSrc:       SpO2: 96% 98%  94%   Weight:       Height:           Cardiac Rhythm: ,   Cardiac  Cardiac Rhythm: Atrial fibrillation    Pain level: 0-10 Pain Scale: 0    Is this patient confused?: No    Patient Report: Initial Complaint: went to dialysis this morning. RNs called EMS. Patient tachy. Patient reports 'feeling odd\" last night as well but not necessarily palpitations  Focused Assessment: c/o scant chest pressure (but reports has had variable chest pressure for 20+ years), a.fib/flutter, HR 120s-130s,   Tests Performed: ekg, labs, cxr  Abnormal Results: creat 5.25, trop 0.037, hgb 11.9  Treatments provided: 5 mg metoprolol IV x2, monitoring    Family Comments: pt to notify    OBS brochure/video discussed/provided to patient: N/A    ED Medications:   Medications   lidocaine 1 % 1 mL (not administered)   lidocaine (LMX4) cream (not administered)   sodium chloride (PF) 0.9% PF flush 3 mL (not administered)   sodium chloride (PF) 0.9% PF flush 3 mL (not administered) "   metoprolol (LOPRESSOR) injection 5 mg (5 mg Intravenous Given 6/7/17 0814)       Drips infusing?:  No      ED NURSE PHONE NUMBER: [phone]

## 2017-06-07 NOTE — PROGRESS NOTES
I agree with the upcoming A+P per Ramandeep Del Cid PAC. Pt with ESRD on HD M/W/F who presented with palpitations, found to have a fib/flutter. Is asymptomatic. Has extensive CAD hx with multiple PCI's most recently in 02/2017. Will admit him to tele.    Temp: 97.6  F (36.4  C) Temp src: Oral BP: 127/86 Pulse: 89 Heart Rate: 126 Resp: 16 SpO2: 97 % O2 Device: None (Room air)       Gen - AAO x 3 in NAD.  Lungs - diminished BS.  Heart - tachycardic, S1+S2, no m/g/r.  Abd - soft, NT, ND + BS.  Ext - Trace edema.    A/P - Rate control with AVN blocking agents, cardiology consult, likely will need AC, will have cardiology assess, as he is already on ASA, plavix.

## 2017-06-07 NOTE — CONSULTS
RENAL CONSULTATION NOTE    REFERRING MD: Ramandeep Del Cid PA-C     REASON FOR CONSULTATION:  ESRD    HPI:  71 y.o man with extensive history of CAD s/p multiple coronary stents, ICM, s/p pacemaker and ESRD, who was sent directly from the dialysis unit for tachycardia. Patient has been on dialysis ins 2004. He gets dialysis on MWF at the [facility] in [place]. He came to to dialysis this morning, and he was found to have heart rate in the 140s. In the ER, he was found to have to Atria flutter. He was evaluated by cardiology. He was started on diltiazem and heparin gtt, and plan for possible ablation. Currently, he is still in atria flutter with RVR. He has some shortness of breath but not bad. He has fatigue with the atria flutter. He does not have any other complaints.     ROS:  A complete review of systems was performed and is negative except as noted above.    PMH:    Past Medical History:   Diagnosis Date     Aortic root dilatation (H)     mild     CAD (coronary artery disease)      Cardiomyopathy, ischemic      Diabetes (H)      H/O percutaneous transluminal coronary angioplasty 7-    Successful Percutaneous coronary intervention of the proximal OM1     Hyperlipidemia      Hypertension      Hypothyroidism      ICD (implantable cardioverter-defibrillator) in place      NSTEMI (non-ST elevated myocardial infarction) (H)      Renal disease     end stage     Shortness of breath      Syncope      Tachycardia        PSH:    Past Surgical History:   Procedure Laterality Date     CHOLECYSTECTOMY       HC LEFT HEART CATHETERIZATION  7/14/2016     HC LEFT HEART CATHETERIZATION  9/21/2016     VASCULAR SURGERY      LUE fistula       MEDICATIONS:      acetaminophen  650 mg Oral 4x Daily     isosorbide mononitrate  15 mg Oral Daily     [START ON 6/8/2017] levothyroxine  50 mcg Oral Daily     lisinopril  2.5 mg Oral BID     mexiletine (MEXITIL) capsule 150 mg  150 mg Oral BID     pantoprazole  40 mg Oral QAM      pravastatin  40 mg Oral Daily     aspirin  81 mg Oral Daily     carvedilol  6.25 mg Oral BID w/meals     clopidogrel  75 mg Oral Daily       ALLERGIES:    Allergies as of 06/07/2017 - Fernando as Reviewed 06/07/2017   Allergen Reaction Noted     Contrast dye Hives 07/10/2016     No clinical screening - see comments  12/06/2016       FH:    Family History   Problem Relation Age of Onset     Hypertension Mother      CEREBROVASCULAR DISEASE Mother      Hypertension Father      C.A.D. Paternal Grandmother        SH:    Social History     Social History     Marital status: Single     Spouse name: N/A     Number of children: N/A     Years of education: N/A     Occupational History     Not on file.     Social History Main Topics     Smoking status: Former Smoker     Packs/day: 2.00     Years: 35.00     Types: Cigarettes     Quit date: 11/1/1996     Smokeless tobacco: Not on file     Alcohol use 0.0 oz/week     0 Standard drinks or equivalent per week      Comment: rare     Drug use: No     Sexual activity: No     Other Topics Concern     Parent/Sibling W/ Cabg, Mi Or Angioplasty Before 65f 55m? No     Caffeine Concern No     Sleep Concern No     Special Diet Yes     Exercise Yes     Walking     Seat Belt Yes     Social History Narrative       PHYSICAL EXAM:    BP (!) 130/91  Pulse 89  Temp 97.6  F (36.4  C) (Oral)  Resp 17  Ht 1.829 m (6')  Wt 85.4 kg (188 lb 4.4 oz)  SpO2 97%  BMI 25.53 kg/m2  GENERAL: pleasant, alert, NAD  HEENT:  Normocephalic. No gross abnormalities.  Pupils equal.  MMM.    CV: RRR, no murmurs, no clicks, gallops, or rubs, no edema, no carotid bruits  RESP: Clear bilaterally with good efforts. No wheezes or crackles  GI: Abdomen obese, soft, NT  MUSCULOSKELETAL: extremities nl - no gross deformities noted  SKIN: no suspicious lesions or rashes, dry to touch   NEURO:  Strength normal and symmetric. A/O x 3  PSYCH: mood good, affect appropriate  LYMPH: No palpable ant/post cervical   LAVF    LABS:       CBC RESULTS:     Recent Labs  Lab 06/07/17  1322 06/07/17  0811   WBC 9.1 7.8   RBC 3.75* 3.88*   HGB 11.5* 11.9*   HCT 36.0* 37.2*    250       BMP RESULTS:    Recent Labs  Lab 06/07/17  0811      POTASSIUM 4.2   CHLORIDE 102   CO2 26   BUN 36*   CR 5.25*   *   CHRISTINE 9.7       INRNo lab results found in last 7 days.     DIAGNOSTICS:  Reviewed  CXR: no edema or infiltrate  TTE: pending result    A/P:  71 y.o man with extensive history of CAD s/p multiple coronary stents and ESRD, admitted for atria flutter with RVR.     1. ESRD   -3.5 hrs, LAVF, eDW 85 kg, Epogen 5000 units, Hectorol 3 mcg    2. Anemia.    -5000 units of Epogen    3. Atria flutter   -heparin gtt and diltiazem    4. Extensive history of CAD and ICM.     Brady Kelsey MD  [facility] - Nephrology  Office Phone: [phone]  Pager: [phone]

## 2017-06-08 ENCOUNTER — ALLIED HEALTH/NURSE VISIT (OUTPATIENT)
Dept: CARDIOLOGY | Facility: CLINIC | Age: 72
End: 2017-06-08
Payer: MEDICARE

## 2017-06-08 ENCOUNTER — APPOINTMENT (OUTPATIENT)
Dept: CARDIOLOGY | Facility: CLINIC | Age: 72
DRG: 273 | End: 2017-06-08
Attending: INTERNAL MEDICINE
Payer: MEDICARE

## 2017-06-08 DIAGNOSIS — Z95.810 ICD (IMPLANTABLE CARDIOVERTER-DEFIBRILLATOR) IN PLACE: Primary | ICD-10-CM

## 2017-06-08 LAB
ANION GAP SERPL CALCULATED.3IONS-SCNC: 8 MMOL/L (ref 3–14)
BASOPHILS # BLD AUTO: 0 10E9/L (ref 0–0.2)
BASOPHILS NFR BLD AUTO: 0.5 %
BUN SERPL-MCNC: 16 MG/DL (ref 7–30)
CALCIUM SERPL-MCNC: 9 MG/DL (ref 8.5–10.1)
CHLORIDE SERPL-SCNC: 101 MMOL/L (ref 94–109)
CO2 SERPL-SCNC: 29 MMOL/L (ref 20–32)
CREAT SERPL-MCNC: 3.31 MG/DL (ref 0.66–1.25)
DIFFERENTIAL METHOD BLD: ABNORMAL
EOSINOPHIL # BLD AUTO: 0.1 10E9/L (ref 0–0.7)
EOSINOPHIL NFR BLD AUTO: 1.4 %
ERYTHROCYTE [DISTWIDTH] IN BLOOD BY AUTOMATED COUNT: 15.7 % (ref 10–15)
GFR SERPL CREATININE-BSD FRML MDRD: 18 ML/MIN/1.7M2
GLUCOSE BLDC GLUCOMTR-MCNC: 105 MG/DL (ref 70–99)
GLUCOSE BLDC GLUCOMTR-MCNC: 132 MG/DL (ref 70–99)
GLUCOSE SERPL-MCNC: 132 MG/DL (ref 70–99)
HBV SURFACE AB SERPL IA-ACNC: 3.69 M[IU]/ML
HBV SURFACE AG SERPL QL IA: NONREACTIVE
HCT VFR BLD AUTO: 34.1 % (ref 40–53)
HGB BLD-MCNC: 10.8 G/DL (ref 13.3–17.7)
IMM GRANULOCYTES # BLD: 0 10E9/L (ref 0–0.4)
IMM GRANULOCYTES NFR BLD: 0.1 %
LYMPHOCYTES # BLD AUTO: 1.1 10E9/L (ref 0.8–5.3)
LYMPHOCYTES NFR BLD AUTO: 13.6 %
MCH RBC QN AUTO: 30.3 PG (ref 26.5–33)
MCHC RBC AUTO-ENTMCNC: 31.7 G/DL (ref 31.5–36.5)
MCV RBC AUTO: 96 FL (ref 78–100)
MONOCYTES # BLD AUTO: 0.8 10E9/L (ref 0–1.3)
MONOCYTES NFR BLD AUTO: 9.6 %
NEUTROPHILS # BLD AUTO: 5.9 10E9/L (ref 1.6–8.3)
NEUTROPHILS NFR BLD AUTO: 74.8 %
NRBC # BLD AUTO: 0 10*3/UL
NRBC BLD AUTO-RTO: 0 /100
PLATELET # BLD AUTO: 193 10E9/L (ref 150–450)
POTASSIUM SERPL-SCNC: 3.8 MMOL/L (ref 3.4–5.3)
RBC # BLD AUTO: 3.57 10E12/L (ref 4.4–5.9)
SODIUM SERPL-SCNC: 138 MMOL/L (ref 133–144)
WBC # BLD AUTO: 7.9 10E9/L (ref 4–11)

## 2017-06-08 PROCEDURE — 27210886 ZZH ACCESSORY CR5

## 2017-06-08 PROCEDURE — 02583ZZ DESTRUCTION OF CONDUCTION MECHANISM, PERCUTANEOUS APPROACH: ICD-10-PCS | Performed by: INTERNAL MEDICINE

## 2017-06-08 PROCEDURE — C1732 CATH, EP, DIAG/ABL, 3D/VECT: HCPCS

## 2017-06-08 PROCEDURE — 93005 ELECTROCARDIOGRAM TRACING: CPT

## 2017-06-08 PROCEDURE — 36415 COLL VENOUS BLD VENIPUNCTURE: CPT | Performed by: PHYSICIAN ASSISTANT

## 2017-06-08 PROCEDURE — 4A0234Z MEASUREMENT OF CARDIAC ELECTRICAL ACTIVITY, PERCUTANEOUS APPROACH: ICD-10-PCS | Performed by: INTERNAL MEDICINE

## 2017-06-08 PROCEDURE — 93653 COMPRE EP EVAL TX SVT: CPT | Performed by: INTERNAL MEDICINE

## 2017-06-08 PROCEDURE — 93613 INTRACARDIAC EPHYS 3D MAPG: CPT | Performed by: INTERNAL MEDICINE

## 2017-06-08 PROCEDURE — 25000125 ZZHC RX 250: Performed by: INTERNAL MEDICINE

## 2017-06-08 PROCEDURE — 99153 MOD SED SAME PHYS/QHP EA: CPT

## 2017-06-08 PROCEDURE — 4A023FZ MEASUREMENT OF CARDIAC RHYTHM, PERCUTANEOUS APPROACH: ICD-10-PCS | Performed by: INTERNAL MEDICINE

## 2017-06-08 PROCEDURE — 27210796 ZZH PAD EXTRNAL REFRENCE CARDIAC MAPPING CR14

## 2017-06-08 PROCEDURE — A9270 NON-COVERED ITEM OR SERVICE: HCPCS | Mod: GY | Performed by: INTERNAL MEDICINE

## 2017-06-08 PROCEDURE — 00000146 ZZHCL STATISTIC GLUCOSE BY METER IP

## 2017-06-08 PROCEDURE — 99232 SBSQ HOSP IP/OBS MODERATE 35: CPT | Mod: 25 | Performed by: INTERNAL MEDICINE

## 2017-06-08 PROCEDURE — 27210995 ZZH RX 272: Performed by: INTERNAL MEDICINE

## 2017-06-08 PROCEDURE — 93613 INTRACARDIAC EPHYS 3D MAPG: CPT

## 2017-06-08 PROCEDURE — 93010 ELECTROCARDIOGRAM REPORT: CPT | Performed by: INTERNAL MEDICINE

## 2017-06-08 PROCEDURE — 99153 MOD SED SAME PHYS/QHP EA: CPT | Performed by: INTERNAL MEDICINE

## 2017-06-08 PROCEDURE — 93621 COMP EP EVL L PAC&REC C SINS: CPT | Mod: 26 | Performed by: INTERNAL MEDICINE

## 2017-06-08 PROCEDURE — 25000128 H RX IP 250 OP 636: Performed by: INTERNAL MEDICINE

## 2017-06-08 PROCEDURE — 25000132 ZZH RX MED GY IP 250 OP 250 PS 637: Mod: GY | Performed by: PHYSICIAN ASSISTANT

## 2017-06-08 PROCEDURE — 99152 MOD SED SAME PHYS/QHP 5/>YRS: CPT | Performed by: INTERNAL MEDICINE

## 2017-06-08 PROCEDURE — 93621 COMP EP EVL L PAC&REC C SINS: CPT

## 2017-06-08 PROCEDURE — 21000001 ZZH R&B HEART CARE

## 2017-06-08 PROCEDURE — 85025 COMPLETE CBC W/AUTO DIFF WBC: CPT | Performed by: PHYSICIAN ASSISTANT

## 2017-06-08 PROCEDURE — 27210782 ZZH KIT EP TOTES DISP CR7

## 2017-06-08 PROCEDURE — 80048 BASIC METABOLIC PNL TOTAL CA: CPT | Performed by: PHYSICIAN ASSISTANT

## 2017-06-08 PROCEDURE — 93653 COMPRE EP EVAL TX SVT: CPT

## 2017-06-08 PROCEDURE — 02K83ZZ MAP CONDUCTION MECHANISM, PERCUTANEOUS APPROACH: ICD-10-PCS | Performed by: INTERNAL MEDICINE

## 2017-06-08 PROCEDURE — A9270 NON-COVERED ITEM OR SERVICE: HCPCS | Mod: GY | Performed by: PHYSICIAN ASSISTANT

## 2017-06-08 PROCEDURE — 99207 ZZC NO CHARGE LOS: CPT | Performed by: INTERNAL MEDICINE

## 2017-06-08 PROCEDURE — 99232 SBSQ HOSP IP/OBS MODERATE 35: CPT | Performed by: INTERNAL MEDICINE

## 2017-06-08 PROCEDURE — C1893 INTRO/SHEATH, FIXED,NON-PEEL: HCPCS

## 2017-06-08 PROCEDURE — 99152 MOD SED SAME PHYS/QHP 5/>YRS: CPT

## 2017-06-08 PROCEDURE — 25000132 ZZH RX MED GY IP 250 OP 250 PS 637: Mod: GY | Performed by: INTERNAL MEDICINE

## 2017-06-08 PROCEDURE — 27210795 ZZH PAD DEFIB QUICK CR4

## 2017-06-08 RX ORDER — PROTAMINE SULFATE 10 MG/ML
5-40 INJECTION, SOLUTION INTRAVENOUS EVERY 10 MIN PRN
Status: DISCONTINUED | OUTPATIENT
Start: 2017-06-08 | End: 2017-06-08 | Stop reason: HOSPADM

## 2017-06-08 RX ORDER — PROTAMINE SULFATE 10 MG/ML
1-5 INJECTION, SOLUTION INTRAVENOUS
Status: DISCONTINUED | OUTPATIENT
Start: 2017-06-08 | End: 2017-06-08 | Stop reason: HOSPADM

## 2017-06-08 RX ORDER — NALOXONE HYDROCHLORIDE 0.4 MG/ML
0.4 INJECTION, SOLUTION INTRAMUSCULAR; INTRAVENOUS; SUBCUTANEOUS EVERY 5 MIN PRN
Status: DISCONTINUED | OUTPATIENT
Start: 2017-06-08 | End: 2017-06-08 | Stop reason: HOSPADM

## 2017-06-08 RX ORDER — LORAZEPAM 2 MG/ML
.5-2 INJECTION INTRAMUSCULAR EVERY 10 MIN PRN
Status: DISCONTINUED | OUTPATIENT
Start: 2017-06-08 | End: 2017-06-08 | Stop reason: HOSPADM

## 2017-06-08 RX ORDER — SODIUM CHLORIDE 450 MG/100ML
INJECTION, SOLUTION INTRAVENOUS CONTINUOUS
Status: DISCONTINUED | OUTPATIENT
Start: 2017-06-08 | End: 2017-06-08 | Stop reason: HOSPADM

## 2017-06-08 RX ORDER — LIDOCAINE 40 MG/G
CREAM TOPICAL
Status: DISCONTINUED | OUTPATIENT
Start: 2017-06-08 | End: 2017-06-09 | Stop reason: HOSPADM

## 2017-06-08 RX ORDER — PROMETHAZINE HYDROCHLORIDE 25 MG/ML
6.25-25 INJECTION, SOLUTION INTRAMUSCULAR; INTRAVENOUS EVERY 4 HOURS PRN
Status: DISCONTINUED | OUTPATIENT
Start: 2017-06-08 | End: 2017-06-08 | Stop reason: HOSPADM

## 2017-06-08 RX ORDER — FLUMAZENIL 0.1 MG/ML
0.2 INJECTION, SOLUTION INTRAVENOUS
Status: DISCONTINUED | OUTPATIENT
Start: 2017-06-08 | End: 2017-06-08 | Stop reason: HOSPADM

## 2017-06-08 RX ORDER — DEXTROSE MONOHYDRATE 25 G/50ML
25-50 INJECTION, SOLUTION INTRAVENOUS
Status: DISCONTINUED | OUTPATIENT
Start: 2017-06-08 | End: 2017-06-09 | Stop reason: HOSPADM

## 2017-06-08 RX ORDER — BUPIVACAINE HYDROCHLORIDE 2.5 MG/ML
10-30 INJECTION, SOLUTION EPIDURAL; INFILTRATION; INTRACAUDAL
Status: DISCONTINUED | OUTPATIENT
Start: 2017-06-08 | End: 2017-06-08 | Stop reason: HOSPADM

## 2017-06-08 RX ORDER — ONDANSETRON 2 MG/ML
4 INJECTION INTRAMUSCULAR; INTRAVENOUS EVERY 4 HOURS PRN
Status: DISCONTINUED | OUTPATIENT
Start: 2017-06-08 | End: 2017-06-08 | Stop reason: HOSPADM

## 2017-06-08 RX ORDER — LIDOCAINE 40 MG/G
CREAM TOPICAL
Status: DISCONTINUED | OUTPATIENT
Start: 2017-06-08 | End: 2017-06-08 | Stop reason: HOSPADM

## 2017-06-08 RX ORDER — FENTANYL CITRATE 50 UG/ML
25-50 INJECTION, SOLUTION INTRAMUSCULAR; INTRAVENOUS
Status: DISCONTINUED | OUTPATIENT
Start: 2017-06-08 | End: 2017-06-08 | Stop reason: HOSPADM

## 2017-06-08 RX ORDER — DIPHENHYDRAMINE HYDROCHLORIDE 50 MG/ML
25-50 INJECTION INTRAMUSCULAR; INTRAVENOUS
Status: DISCONTINUED | OUTPATIENT
Start: 2017-06-08 | End: 2017-06-08 | Stop reason: HOSPADM

## 2017-06-08 RX ORDER — LIDOCAINE HYDROCHLORIDE AND EPINEPHRINE 10; 10 MG/ML; UG/ML
10-30 INJECTION, SOLUTION INFILTRATION; PERINEURAL
Status: DISCONTINUED | OUTPATIENT
Start: 2017-06-08 | End: 2017-06-08 | Stop reason: HOSPADM

## 2017-06-08 RX ORDER — MORPHINE SULFATE 2 MG/ML
1-2 INJECTION, SOLUTION INTRAMUSCULAR; INTRAVENOUS EVERY 5 MIN PRN
Status: DISCONTINUED | OUTPATIENT
Start: 2017-06-08 | End: 2017-06-08 | Stop reason: HOSPADM

## 2017-06-08 RX ORDER — ADENOSINE 3 MG/ML
6-12 INJECTION, SOLUTION INTRAVENOUS EVERY 5 MIN PRN
Status: DISCONTINUED | OUTPATIENT
Start: 2017-06-08 | End: 2017-06-08 | Stop reason: HOSPADM

## 2017-06-08 RX ORDER — DOBUTAMINE HYDROCHLORIDE 200 MG/100ML
5-40 INJECTION INTRAVENOUS CONTINUOUS PRN
Status: DISCONTINUED | OUTPATIENT
Start: 2017-06-08 | End: 2017-06-08 | Stop reason: HOSPADM

## 2017-06-08 RX ORDER — KETOROLAC TROMETHAMINE 30 MG/ML
15 INJECTION, SOLUTION INTRAMUSCULAR; INTRAVENOUS
Status: DISCONTINUED | OUTPATIENT
Start: 2017-06-08 | End: 2017-06-08 | Stop reason: HOSPADM

## 2017-06-08 RX ORDER — IBUTILIDE FUMARATE 1 MG/10ML
0.01 INJECTION, SOLUTION INTRAVENOUS
Status: DISCONTINUED | OUTPATIENT
Start: 2017-06-08 | End: 2017-06-08 | Stop reason: HOSPADM

## 2017-06-08 RX ORDER — NICOTINE POLACRILEX 4 MG
15-30 LOZENGE BUCCAL
Status: DISCONTINUED | OUTPATIENT
Start: 2017-06-08 | End: 2017-06-09 | Stop reason: HOSPADM

## 2017-06-08 RX ORDER — NALOXONE HYDROCHLORIDE 0.4 MG/ML
.1-.4 INJECTION, SOLUTION INTRAMUSCULAR; INTRAVENOUS; SUBCUTANEOUS
Status: DISCONTINUED | OUTPATIENT
Start: 2017-06-08 | End: 2017-06-08

## 2017-06-08 RX ORDER — IBUTILIDE FUMARATE 1 MG/10ML
1 INJECTION, SOLUTION INTRAVENOUS
Status: DISCONTINUED | OUTPATIENT
Start: 2017-06-08 | End: 2017-06-08 | Stop reason: HOSPADM

## 2017-06-08 RX ORDER — ATROPINE SULFATE 0.1 MG/ML
.5-1 INJECTION INTRAVENOUS
Status: DISCONTINUED | OUTPATIENT
Start: 2017-06-08 | End: 2017-06-08 | Stop reason: HOSPADM

## 2017-06-08 RX ORDER — HEPARIN SODIUM 1000 [USP'U]/ML
1000-10000 INJECTION, SOLUTION INTRAVENOUS; SUBCUTANEOUS EVERY 5 MIN PRN
Status: DISCONTINUED | OUTPATIENT
Start: 2017-06-08 | End: 2017-06-08 | Stop reason: HOSPADM

## 2017-06-08 RX ORDER — FUROSEMIDE 10 MG/ML
20-100 INJECTION INTRAMUSCULAR; INTRAVENOUS
Status: DISCONTINUED | OUTPATIENT
Start: 2017-06-08 | End: 2017-06-08 | Stop reason: HOSPADM

## 2017-06-08 RX ORDER — LIDOCAINE HYDROCHLORIDE 10 MG/ML
10-30 INJECTION, SOLUTION EPIDURAL; INFILTRATION; INTRACAUDAL; PERINEURAL
Status: DISCONTINUED | OUTPATIENT
Start: 2017-06-08 | End: 2017-06-08 | Stop reason: HOSPADM

## 2017-06-08 RX ORDER — LIDOCAINE HYDROCHLORIDE 10 MG/ML
10-30 INJECTION, SOLUTION EPIDURAL; INFILTRATION; INTRACAUDAL; PERINEURAL
Status: COMPLETED | OUTPATIENT
Start: 2017-06-08 | End: 2017-06-08

## 2017-06-08 RX ADMIN — ACETAMINOPHEN 650 MG: 325 TABLET, FILM COATED ORAL at 09:33

## 2017-06-08 RX ADMIN — MEXILETINE HYDROCHLORIDE 150 MG: 150 CAPSULE ORAL at 21:20

## 2017-06-08 RX ADMIN — LISINOPRIL 2.5 MG: 2.5 TABLET ORAL at 09:33

## 2017-06-08 RX ADMIN — CLOPIDOGREL BISULFATE 75 MG: 75 TABLET, FILM COATED ORAL at 09:32

## 2017-06-08 RX ADMIN — Medication 15 MG: at 09:32

## 2017-06-08 RX ADMIN — FENTANYL CITRATE 25 MCG: 50 INJECTION, SOLUTION INTRAMUSCULAR; INTRAVENOUS at 10:13

## 2017-06-08 RX ADMIN — MIDAZOLAM HYDROCHLORIDE 0.5 MG: 1 INJECTION, SOLUTION INTRAMUSCULAR; INTRAVENOUS at 11:03

## 2017-06-08 RX ADMIN — SODIUM CHLORIDE: 4.5 INJECTION, SOLUTION INTRAVENOUS at 09:40

## 2017-06-08 RX ADMIN — ACETAMINOPHEN 650 MG: 325 TABLET, FILM COATED ORAL at 13:27

## 2017-06-08 RX ADMIN — FENTANYL CITRATE 25 MCG: 50 INJECTION, SOLUTION INTRAMUSCULAR; INTRAVENOUS at 10:22

## 2017-06-08 RX ADMIN — MIDAZOLAM HYDROCHLORIDE 1 MG: 1 INJECTION, SOLUTION INTRAMUSCULAR; INTRAVENOUS at 10:09

## 2017-06-08 RX ADMIN — PRAVASTATIN SODIUM 40 MG: 40 TABLET ORAL at 21:42

## 2017-06-08 RX ADMIN — CARVEDILOL 6.25 MG: 6.25 TABLET, FILM COATED ORAL at 09:32

## 2017-06-08 RX ADMIN — CARVEDILOL 6.25 MG: 6.25 TABLET, FILM COATED ORAL at 18:07

## 2017-06-08 RX ADMIN — LIDOCAINE HYDROCHLORIDE 100 MG: 10 INJECTION, SOLUTION EPIDURAL; INFILTRATION; INTRACAUDAL; PERINEURAL at 10:20

## 2017-06-08 RX ADMIN — ACETAMINOPHEN 650 MG: 325 TABLET, FILM COATED ORAL at 18:07

## 2017-06-08 RX ADMIN — FENTANYL CITRATE 50 MCG: 50 INJECTION, SOLUTION INTRAMUSCULAR; INTRAVENOUS at 11:04

## 2017-06-08 RX ADMIN — APIXABAN 2.5 MG: 2.5 TABLET, FILM COATED ORAL at 21:20

## 2017-06-08 RX ADMIN — MIDAZOLAM HYDROCHLORIDE 0.5 MG: 1 INJECTION, SOLUTION INTRAMUSCULAR; INTRAVENOUS at 11:09

## 2017-06-08 RX ADMIN — PANTOPRAZOLE SODIUM 40 MG: 40 TABLET, DELAYED RELEASE ORAL at 09:33

## 2017-06-08 RX ADMIN — MEXILETINE HYDROCHLORIDE 150 MG: 150 CAPSULE ORAL at 09:32

## 2017-06-08 RX ADMIN — LISINOPRIL 2.5 MG: 2.5 TABLET ORAL at 21:20

## 2017-06-08 RX ADMIN — FENTANYL CITRATE 25 MCG: 50 INJECTION, SOLUTION INTRAMUSCULAR; INTRAVENOUS at 11:09

## 2017-06-08 RX ADMIN — MIDAZOLAM HYDROCHLORIDE 1 MG: 1 INJECTION, SOLUTION INTRAMUSCULAR; INTRAVENOUS at 10:51

## 2017-06-08 RX ADMIN — ACETAMINOPHEN 650 MG: 325 TABLET, FILM COATED ORAL at 21:42

## 2017-06-08 RX ADMIN — LEVOTHYROXINE SODIUM 50 MCG: 50 TABLET ORAL at 09:32

## 2017-06-08 RX ADMIN — MIDAZOLAM HYDROCHLORIDE 0.5 MG: 1 INJECTION, SOLUTION INTRAMUSCULAR; INTRAVENOUS at 10:20

## 2017-06-08 RX ADMIN — MIDAZOLAM HYDROCHLORIDE 0.5 MG: 1 INJECTION, SOLUTION INTRAMUSCULAR; INTRAVENOUS at 10:13

## 2017-06-08 RX ADMIN — DILTIAZEM HYDROCHLORIDE 5 MG/HR: 5 INJECTION INTRAVENOUS at 04:02

## 2017-06-08 RX ADMIN — ASPIRIN 81 MG: 81 TABLET, COATED ORAL at 09:33

## 2017-06-08 RX ADMIN — APIXABAN 2.5 MG: 2.5 TABLET, FILM COATED ORAL at 09:33

## 2017-06-08 ASSESSMENT — ACTIVITIES OF DAILY LIVING (ADL)
TRANSFERRING: 1-->ASSISTIVE EQUIPMENT
SWALLOWING: 0-->SWALLOWS FOODS/LIQUIDS WITHOUT DIFFICULTY
BATHING: 0-->INDEPENDENT
COGNITION: 0 - NO COGNITION ISSUES REPORTED
AMBULATION: 2-->ASSISTIVE PERSON
TRANSFERRING: 2-->ASSISTIVE PERSON
SWALLOWING: 0-->SWALLOWS FOODS/LIQUIDS WITHOUT DIFFICULTY
DRESS: 0-->INDEPENDENT
EATING: 0-->INDEPENDENT
AMBULATION: 1-->ASSISTIVE EQUIPMENT
COMMUNICATION: 0-->UNDERSTANDS/COMMUNICATES WITHOUT DIFFICULTY
BATHING: 2-->ASSISTIVE PERSON
DRESS: 2-->ASSISTIVE PERSON
WHICH_OF_THE_ABOVE_FUNCTIONAL_RISKS_HAD_A_RECENT_ONSET_OR_CHANGE?: AMBULATION
RETIRED_COMMUNICATION: 0-->UNDERSTANDS/COMMUNICATES WITHOUT DIFFICULTY
TOILETING: 0-->INDEPENDENT
TOILETING: 2-->ASSISTIVE PERSON
FALL_HISTORY_WITHIN_LAST_SIX_MONTHS: NO
RETIRED_EATING: 0-->INDEPENDENT

## 2017-06-08 NOTE — PLAN OF CARE
Problem: Goal Outcome Summary  Goal: Goal Outcome Summary  Outcome: No Change  A&O, VSS, tele is A-flutter 3:1 conduction with a HR in the lower 70s, denies having chest pains, on RA, requires SBA for BRPs, diltiazem gtt running @ 5mg/hr. Pt has been NPO since MN for a planned ablation today.

## 2017-06-08 NOTE — PHARMACY
Xarelto is preferred over Eliquis per ins (Eliquis copay is 50% cost of medication    Eliquis #60 copay = $202. (50% of cost of medication)    Pradaxa #60 copay = $47.    Savaysa is non-formulary and would require a prior authorization.    Xarelto #30 copay = $47.    Thank you,  Jaime Ernst [facility] Discharge Pharmacy Liaison  [phone]

## 2017-06-08 NOTE — PROGRESS NOTES
[facility]    Cardiology Progress Note    Date of Service (when I saw the patient): 06/08/2017     Assessment & Plan   Westley Ness is a 71 year old male who was admitted on 6/7/2017 with symptoms of tachycardia and fatigue. Hx of previous MI, multiple coronary stenting, severe ischemic CM, VT s/p ICD on chronic mexiletine, DM on ESRD on hemodialysis.    Aflutter with RVR  -Eliquis 2,5 BID indicated for ESRD  -[id number] 5  S/P successful aflutter ablation, HR 60s    Severe CAD with previous NSTEMI  -7/14/16: OM1 and LAD disease with PTCA and FOREST to proximal OM1  -11/14/16: PTCA/cutting balloon of ostial and  Proximal first marginal (in stent restenosis)  -2/14/17: arthrectomy/rotablator LAD with mid LAD FOREST placement and Ostial OM1 in-stent restenosis with FOREST placement   Plan  Stop ASA to avoid triple therapy now on eliquis.   Continue statin, Imdur, BB and Plavix    Acute on chronic CHF with hx of Ischemic CM  -EF 20-25%, mod RV dysfunction s/p ICD  -coreg, lisinopril, imdur  -dialysis 3x/week, wt 187 today    ESRD  -dialysis yesterday    Interval History   S/P aflutter ablation    Physical Exam   Temp: 97.4  F (36.3  C) Temp src: Oral BP: 103/62 Pulse: 76 Heart Rate: 63 Resp: 16 SpO2: 91 % O2 Device: None (Room air)    Vitals:    06/07/17 0747 06/07/17 1130 06/08/17 0551   Weight: 86.8 kg (191 lb 5.8 oz) 85.4 kg (188 lb 4.4 oz) 84.9 kg (187 lb 2.7 oz)     Vital Signs with Ranges  Temp:  [97.4  F (36.3  C)-97.8  F (36.6  C)] 97.4  F (36.3  C)  Pulse:  [71-78] 76  Heart Rate:  [63-75] 63  Resp:  [14-20] 16  BP: (101-133)/(54-91) 103/62  SpO2:  [91 %-97 %] 91 %  I/O last 3 completed shifts:  In: 500 [I.V.:500]  Out: 3500 [Other:3500]    Constitutional     alert and oriented, in no acute distress.     Skin     warm and dry to touch    ENT     no pallor or cyanosis    Neck    Supple, JVP normal, no carotid bruit    Chest     no tenderness to palpation    Lungs  clear to auscultation      Cardiac  regular rhythm, S1 normal, S2 normal, No S3 or S4, no murmurs, no rubs    Abdomen     abdomen soft, bowel sounds normoactive, no hepatosplenomegaly    Extremities and Back     No edema observed.        Neurological     no gross motor deficits noted, affect appropriate, oriented to time, person and place.    Medications     Reason anticoagulant not prescribed for atrial fibrillation       diltiazem (CARDIZEM) infusion ADULT Stopped (17 1132)     - MEDICATION INSTRUCTIONS -         insulin aspart  1-7 Units Subcutaneous TID AC     insulin aspart  1-5 Units Subcutaneous At Bedtime     sodium chloride (PF)  3 mL Intracatheter Q8H     acetaminophen  650 mg Oral 4x Daily     isosorbide mononitrate  15 mg Oral Daily     levothyroxine  50 mcg Oral Daily     lisinopril  2.5 mg Oral BID     mexiletine (MEXITIL) capsule 150 mg  150 mg Oral BID     pantoprazole  40 mg Oral QAM     pravastatin  40 mg Oral Daily     carvedilol  6.25 mg Oral BID w/meals     clopidogrel  75 mg Oral Daily     sodium chloride (PF)  10 mL Intravenous Once     apixaban ANTICOAGULANT  2.5 mg Oral BID       Data   Results for orders placed or performed during the hospital encounter of 17 (from the past 24 hour(s))   Echo Complete with Lumason    Narrative    [id number]  [id number]  [id number]  [id number]^MONA^FAWAD^JACQUIE           [facility]  Echocardiography Laboratory  [address] [place] [address]        Name: SOCORRO LÓPEZ  MRN: [id number]  : 1945  Study Date: 2017 02:52 PM  Age: 71 yrs  Gender: Male  Patient Location: [facility]  Reason For Study: Afib  Ordering Physician: FAWAD DUNN  Referring Physician: FAWAD DUNN  Performed By: [facility] Yessenia Huffman     BSA: 2.1 m2  Height: 72 in  Weight: 188 lb  HR: 110  BP: 127/86 mmHg  _____________________________________________________________________________  __        Procedure  Complete Portable Echo Adult. Contrast  Optison.  _____________________________________________________________________________  __        Interpretation Summary     Left ventricular systolic function is severely reduced.The visual ejection  fraction is estimated at 20-25%.E by E prime ratio is greater than 15, that  likely suggests increased left ventricular filling pressures.  Moderately decreased right ventricular systolic function  The left atrium is severely dilated.  The IVC is dilated and fails to change with respiration, suggesting elevated  central venous pressure.  Mild aortic root dilatation.The ascending aorta is mildly dilated.  The rhythm was atrial fibrillation.     On direct comparision to echo images dated 11/15/2016 LVEF appears similar,  rhythm now is afib.        _____________________________________________________________________________  __        Left Ventricle  The left ventricle is normal in size. There is normal left ventricular wall  thickness. E by E prime ratio is greater than 15, that likely suggests  increased left ventricular filling pressures. Left ventricular systolic  function is severely reduced. The visual ejection fraction is estimated at 20-  25%. apical, anterior wall and distal anteroseptal wall akinesia  other walls are hypokinetic. There is no thrombus seen in the left ventricle.     Right Ventricle  The right ventricle is normal size. Moderately decreased right ventricular  systolic function. There is a pacemaker lead in the right ventricle.     Atria  The left atrium is severely dilated. The right atrium is mildly dilated. There  is no color Doppler evidence of an atrial shunt.     Mitral Valve  There is mild mitral annular calcification. There is trace mitral  regurgitation.        Tricuspid Valve  There is trace tricuspid regurgitation. Right ventricular systolic pressure  could not be approximated due to inadequate tricuspid regurgitation.     Aortic Valve  The aortic valve is trileaflet. No aortic  regurgitation is present. No aortic  stenosis is present.     Pulmonic Valve  The pulmonic valve is not well visualized. There is trace pulmonic valvular  regurgitation. There is no pulmonic valvular stenosis.     Vessels  Mild aortic root dilatation. 4.0 cm. The ascending aorta is Mildly dilated.  3.8 cm. The IVC is dilated and fails to change with respiration, suggesting  elevated central venous pressure.     Pericardium  There is no pericardial effusion.        Rhythm  The rhythm was atrial fibrillation.  _____________________________________________________________________________  __  MMode/2D Measurements & Calculations  IVSd: 0.93 cm     LVIDd: 5.4 cm  LVIDs: 4.6 cm  LVPWd: 1.0 cm  FS: 14.1 %  EDV(Teich): 139.2 ml  ESV(Teich): 97.6 ml  LV mass(C)d: 201.0 grams  LV mass(C)dI: 96.9 grams/m2  Ao root diam: 4.0 cm  LA dimension: 4.7 cm  asc Aorta Diam: 3.8 cm  LA/Ao: 1.2  LVOT diam: 2.4 cm  LVOT area: 4.6 cm2  LA Volume (BP): 113.0 ml  LA Volume Index (BP): 54.3 ml/m2           Doppler Measurements & Calculations  MV E max zach: 109.4 cm/sec  MV dec time: 0.11 sec  Ao V2 max: 85.4 cm/sec  Ao max PG: 3.0 mmHg  Ao V2 mean: 62.8 cm/sec  Ao mean P.8 mmHg  Ao V2 VTI: 15.5 cm  MILIND(I,D): 3.4 cm2  MILIND(V,D): 3.9 cm2  LV V1 max P.1 mmHg  LV V1 max: 72.4 cm/sec  LV V1 VTI: 11.5 cm  SV(LVOT): 53.2 ml  SI(LVOT): 25.7 ml/m2  MILIND Index (cm2/m2): 1.7  Lateral E/e': 12.9  Medial E/e': 22.7           _____________________________________________________________________________  __           Report approved by: Mariana Jamison 2017 04:44 PM      Troponin I - Now then in 6 hours x 2     Result Value Ref Range    Troponin I ES 0.045 0.000 - 0.045 ug/L   Basic metabolic panel   Result Value Ref Range    Sodium 138 133 - 144 mmol/L    Potassium 3.8 3.4 - 5.3 mmol/L    Chloride 101 94 - 109 mmol/L    Carbon Dioxide 29 20 - 32 mmol/L    Anion Gap 8 3 - 14 mmol/L    Glucose 132 (H) 70 - 99 mg/dL    Urea Nitrogen 16 7 - 30  mg/dL    Creatinine 3.31 (H) 0.66 - 1.25 mg/dL    GFR Estimate 18 (L) >60 mL/min/1.7m2    GFR Estimate If Black 22 (L) >60 mL/min/1.7m2    Calcium 9.0 8.5 - 10.1 mg/dL   CBC with platelets differential   Result Value Ref Range    WBC 7.9 4.0 - 11.0 10e9/L    RBC Count 3.57 (L) 4.4 - 5.9 10e12/L    Hemoglobin 10.8 (L) 13.3 - 17.7 g/dL    Hematocrit 34.1 (L) 40.0 - 53.0 %    MCV 96 78 - 100 fl    MCH 30.3 26.5 - 33.0 pg    MCHC 31.7 31.5 - 36.5 g/dL    RDW 15.7 (H) 10.0 - 15.0 %    Platelet Count 193 150 - 450 10e9/L    Diff Method Automated Method     % Neutrophils 74.8 %    % Lymphocytes 13.6 %    % Monocytes 9.6 %    % Eosinophils 1.4 %    % Basophils 0.5 %    % Immature Granulocytes 0.1 %    Nucleated RBCs 0 0 /100    Absolute Neutrophil 5.9 1.6 - 8.3 10e9/L    Absolute Lymphocytes 1.1 0.8 - 5.3 10e9/L    Absolute Monocytes 0.8 0.0 - 1.3 10e9/L    Absolute Eosinophils 0.1 0.0 - 0.7 10e9/L    Absolute Basophils 0.0 0.0 - 0.2 10e9/L    Abs Immature Granulocytes 0.0 0 - 0.4 10e9/L    Absolute Nucleated RBC 0.0    EKG 12-lead, tracing only   Result Value Ref Range    Interpretation ECG Click View Image link to view waveform and result    EP Ablation atrial flutter    Narrative    PROCEDURES PERFORMED:  1. Conscious sedation.  2. Cardiac fluoroscopy (4 min 59 sec).  3. Electroanatomical mapping.  4. Electrophysiology study with left atrium recording/pacing via  coronary sinus catheter.  5. Atrial flutter ablation.    INDICATION FOR PROCEDURE: Symptomatic atrial flutter.    HISTORY OF PRESENT ILLNESS:  71-year-old gentleman with a history of  hypertension, diabetes, ESRD-HD and ischemic cardiomyopathy (EF of  25-30%; ?AICD placement), who presents with symptomatic atrial  flutter.  Device interrogation confirmed recent onset of arrhythmia  (06/07 at 4:30AM; less than 48 hrs).  He was started on  anticoagulation and was referred for electrophysiology study and  possible ablation.    Risks and benefits of the procedure  were reviewed including but not  limited to arrhythmias, pain, infection, bleeding, skin damage from  ionized radiation, kidney damage, allergic reaction to contrast dye,  injury to the neighboring vascular structures, need for emergent  cardiopulmonary resuscitation or permanent pacemaker implantation,  heart attacks, strokes, and/or death. Alternatives were discussed  including doing nothing.    All questions were answered to patient's satisfaction. The patient  wished to proceed, and consent was signed.    SEDATION PROCEDURE:   Sedatives were given by the nurse staff under my direct supervision.   The patient received a total of 4 mg of Versed and  125 mcg of  Fentanyl during procedure.  Total sedation time was 81 minutes. Heart  rate, blood pressure, oxygen saturation and patient responses were  monitored throughout the procedure with the RN assistance.    PROCEDURE DESCRIPTION:  After written informed consent was obtain, the patient was brought to  the EP lab. After the usual sterile prep and drape, the right was  locally anesthetized, and venous vascular sheaths were inserted via  ultrasound guidance and modified Seldinger technique (8 Citizen of [place] x1, 7  Citizen of [place] x1 and 6 Citizen of [place] x1).    With routine hemodynamic and electrocardiographic monitoring,  electrophysiology catheters were advanced under fluoroscopic guidance  to the right ventricle, right atrium and coronary sinus position.  Intracardiac electrograms and conduction were measured at rest and  with program stimulation.    Entrainment mapping revealed cavo tricuspid isthmus dependent atrial  flutter.  Using the Ac[employer] mapping system, an endocardial  mapping of the right atrium was created, and the cavo tricuspid  isthmus was localized.  Using a 8-mm tip radiofrequency ablation  catheter, the isthmus was ablated resulting in termination of atrial  flutter.   Bi-directional block across the cavo-tricuspid isthmus was  confirmed after ablation.    All  catheters and sheaths were removed and homeostasis obtained in the  EP lab prior to transfer to the recovery area.    MEASURED DATA:  Pre ablation: Atrial flutter.  milliseconds,   milliseconds,  milliseconds.  Post ablation: Normal sinus rhythm.   milliseconds,   milliseconds,  milliseconds,  milliseconds.   milliseconds and HV 39 milliseconds. AV node /500.       Impression    IMPRESSION:  - Typical counterclockwise cavotricuspid isthmus dependent flutter was  evidenced by:  1. Atrial flutter at baseline (flutter cycle length of 210 ms).  2. Pacing at the cavo-tricuspid isthmus demonstrated both concealed  entrainment and close approximation of the post-pacing interval (230  ms) to the native tachycardia cycle length (210 ms), suggestive of  isthmus-dependent flutter.  3. Using a 8-mm tip radiofrequency ablation catheter, the isthmus was  ablated resulting in termination of the arrhythmia.    - Successful ablation was evidenced by:  1. Bidirectional block across the cavo-tricuspid isthmus was confirmed  after ablation.  Cavo-tricuspid isthmus time post ablation of 110-120  milliseconds.  2. Failure to re-induce atrial flutter by atrial burst pacing and  atrial program stimulation.    RECOMMENDATIONS:  1. Please keep patient flat for at least an hour after the procedure.  2. Continue anticoagulation for at least a month after procedure.  3. No strenuous activity for 5 days.  4. Follow up in cardiology clinic in a month.    COMPLICATIONS:  No immediate complications.          MD Cass CANDELARIO APRN, CNP  Cardiology  Pager:  [phone]

## 2017-06-08 NOTE — PROVIDER NOTIFICATION
MD Notification    Notified Person:  On call hospitalist    Notified Persons Name: Dr. Edwards    Notification Date/Time: 6-7-17 2230    Notification Interaction:  Talked with Physician    Purpose of Notification: Pt requesting Immodium    Orders Received:    Comments:

## 2017-06-08 NOTE — PROGRESS NOTES
Patient is status post Aflutter ablation.  Procedure details are below:    Indication:  Symptomatic Aflutter  Findings: Successful Aflutter ablation  Estimated blood loss was minimal (< 20 cc)    No immediate complications are apparent.  Of note, pt is currently anticoagulated with heparin/ NOAC.  Device interrogation confirmed that arrhythmia started 06/07 around 4:30 AM (<24hrs of arrhythmia).        Jono Mejia MD

## 2017-06-08 NOTE — PLAN OF CARE
Problem: Goal Outcome Summary  Goal: Goal Outcome Summary  Outcome: Improving  Pt is A&O. Denies CP/SOB. Tele at end of shift is a flutter with CVR, HR in 70s. Diltiazem infusing at 10 cc/hr. Slight DACOSTA noted. Fine crackles present in RLL. Pt off unit at dialysis for majority of shift. Returned to unit around 2130. VSS. Pt is up to bathroom with SBA. Plan is for ablation tomorrow. Consent signed by provider. Pt NPO at midnight.

## 2017-06-08 NOTE — PROGRESS NOTES
AF-AFL  get with AHA guidelines  note  1. [id number]-Eyfh score: 5 (age, HTN, diabetes, CAD, CHF)  2. Anticoagulant on discharge: Eliquis     3. EF:20-25%  a. ACEI/ARB at discharge for EF<40%: lisinopril  b. Beta blocker at discharge for EF<40%:Coreg    4. History of CAD, PAD, CVA/TIA: Yes  a. Statin at discharge:pravastatin

## 2017-06-08 NOTE — PLAN OF CARE
Problem: Goal Outcome Summary  Goal: Goal Outcome Summary  Outcome: Improving  A/O, VSS, O2sats 96% on RA. Prior to ablation tele aflutter 4:1 CVR, now SR HR 60-70s. Site WDL, CMS intact, no hematoma or bruit, dressing CDI. Currently denying pain. Ax1 c cane, currently bedrest until 16:00.  Plan for possible d/c tomorrow.

## 2017-06-08 NOTE — PROVIDER NOTIFICATION
Brief update:    Reordered pt's PTA imodium per pt request.    Unable to provide 1 mg cap (1/2 capsule) in orders, though pt could use 1/2 capsule if able; request nursing to document this in the MAR if pt self administers 1/2 dose.    Eric Edwards MD  10:40 PM

## 2017-06-08 NOTE — PROGRESS NOTES
Potassium   Date Value Ref Range Status   06/07/2017 4.2 3.4 - 5.3 mmol/L Final     Lab Results   Component Value Date    HGB 11.5 06/07/2017     Weight: 85.4 kg (188 lb 4.4 oz)  POST WT: 83.9kg  DIALYSIS PROCEDURE NOTE    Patient dialyzed for 3.5 hrs. on a 3 K bath with a net fluid removal of  1.5 L.  A BFR of 450 ml/min was obtained via a left upper-arm AV fistula using 15 gauge needles.    The patient was seen by Dr. Kelsey during the treatment.  Total heparin received during the treatment: 0 units. Sites held x 12 min then  pressure drsgs applied.  Meds  Given: none Complications: none.  Procedure and ESRD teaching done and questions answered. See flowsheet in EPIC for further details and post assessment.  Machine water alarm in place and functioning.  Consent for dialysis signed prior to start of treatment.  Pt returned via wheelchair.  Vascular Access: Aseptic prep done for both on/off.  Report received from: Yessenia Sousa RN  Report given to: Yessenia Sousa RN  HEPATITIS B SURFACE ANTIGEN: Unknown DATE: drawn 6/7/17   HEPATITIS B SURFACE ANTIBODY: susceptible  Chlorine/Chloramine water system checked every 4 hours.  Outpatient Dialysis at [facility]    Alaina Duff RN  [facility]

## 2017-06-08 NOTE — PROGRESS NOTES
[facility]    Hospitalist Progress Note    Date of Service (when I saw the patient): 06/08/2017    Assessment & Plan   72 yo gentleman w/ h/o CAD, NSTEMI s/p PCI/stent placement to proximal OM1 in 7/16, ostial and proximal segment of M1 in-stent restenosis in 11/16 so PCR was performed, NSTEMI in 02/2017 and required FOREST placement to LAD and OM.  Also has a h/o VT/VF, chronic systolic CHF w/ EF of 25-30% requiring AICD placement in 4/17, ESKD on M, W and F HD since 2004, aortic root dilatation, DM type II and hypothyroidism.  He did not feel well on 6/6/17 and checked his HR and noted to be 135.  He was at HD center on 6/7/17 when he noted his HR is still fast.  EKG at HD center revealed Aflutter w/ RVR.  Sent to [facility] ED for further eval.  Admitted on 6/7/17.    1)    Aflutter w/ variable heart rate:  Seen by EP cardiology consult.  Recommendation is EPS w/ ablation today.  Continue dilt drip for rate control and Heparin drip for stroke prophylaxis (will d/c with Eliquis).    2)   CAD w/ multiple PCI/stents placement:   Continue ASA, Coreg, plavix, imdur, Lisinopril and pravachol.      3)   Chronic systolic CHF and h/o VT/VF:   Has an indwelling AICD.  Continue Mexiletine.    4)    ESKD:   Continue M, W and F HD.    5)    DM type II:   Diet controlled.  Start SSI.    DVT prophylaxis:  Heparin drip  Code status:  Full.    Interval History   No complaints.  Still iin aflutter but rate is better.  Denied CP or SOB.    -Data reviewed today: I reviewed all new labs and imaging results over the last 24 hours..    Physical Exam   Temp: 97.4  F (36.3  C) Temp src: Oral BP: 114/67 Pulse: 76 Heart Rate: 72 Resp: 18 SpO2: 97 % O2 Device: None (Room air)    Vitals:    06/07/17 0747 06/07/17 1130 06/08/17 0551   Weight: 86.8 kg (191 lb 5.8 oz) 85.4 kg (188 lb 4.4 oz) 84.9 kg (187 lb 2.7 oz)     Vital Signs with Ranges  Temp:  [97.4  F (36.3  C)-97.8  F (36.6  C)] 97.4  F (36.3  C)  Pulse:  [71-78] 76  Heart Rate:   [] 72  Resp:  [7-18] 18  BP: (101-133)/(44-95) 114/67  SpO2:  [93 %-98 %] 97 %  I/O last 3 completed shifts:  In: 500 [I.V.:500]  Out: 3500 [Other:3500]    General: aao x 3, NAD.  HEENT:  NC/AT, PERRL, EOMI, neck supple, no thyromegaly, op clear, mmm.  CVS:  irregular, no m/r/g.  Lungs:  CTA B/L.   Abd:  Soft, + bs, NT, no rebound or gaurding, no fluid shift.  Ext:  No c/c.  Lymph:  No edema.  Neuro:  Nonfocal.  Musculoskeletal: No calf tenderness to palpation.    Skin:  No rash.  Psychiatry:  Mood and affect appropriate.    Medications     NaCl       Reason anticoagulant not prescribed for atrial fibrillation       diltiazem (CARDIZEM) infusion ADULT 5 mg/hr (06/08/17 0402)     - MEDICATION INSTRUCTIONS -         sodium chloride (PF)  3 mL Intracatheter Q8H     acetaminophen  650 mg Oral 4x Daily     isosorbide mononitrate  15 mg Oral Daily     levothyroxine  50 mcg Oral Daily     lisinopril  2.5 mg Oral BID     mexiletine (MEXITIL) capsule 150 mg  150 mg Oral BID     pantoprazole  40 mg Oral QAM     pravastatin  40 mg Oral Daily     aspirin  81 mg Oral Daily     carvedilol  6.25 mg Oral BID w/meals     clopidogrel  75 mg Oral Daily     sodium chloride (PF)  10 mL Intravenous Once     apixaban ANTICOAGULANT  2.5 mg Oral BID       Data     Recent Labs  Lab 06/08/17  0530 06/07/17  1810 06/07/17  1322 06/07/17  1155 06/07/17  0811   WBC 7.9  --  9.1  --  7.8   HGB 10.8*  --  11.5*  --  11.9*   MCV 96  --  96  --  96     --  218  --  250     --   --   --  137   POTASSIUM 3.8  --   --   --  4.2   CHLORIDE 101  --   --   --  102   CO2 29  --   --   --  26   BUN 16  --   --   --  36*   CR 3.31*  --   --   --  5.25*   ANIONGAP 8  --   --   --  9   CHRISTINE 9.0  --   --   --  9.7   *  --   --   --  166*   TROPI  --  0.045  --  0.043 0.037       No results found for this or any previous visit (from the past 24 hour(s)).

## 2017-06-08 NOTE — PROGRESS NOTES
Courtesy check: No charge. Remote ICD alert for one episode of SVT on 6/7/17 at 2:30 PM lasting 6 sec. EGM shows  SVT at rate 120s with no change in farfield morphology. Pt admitted with new afib/flutter. He is having EPS today.

## 2017-06-08 NOTE — PROGRESS NOTES
EP Progress Note          Assessment and Plan:   Westley Ness is a 71-year-old gentleman with past medical history of CAD, status post numerous stent placements, most recently 2 drug-eluting stents to LAD and OM in 02/2017, history of nonsustained ventricular tachycardic,  AICD placement, ischemic cardiomyopathy with EF of 25-30%, end-stage renal disease on dialysis, hypothyroidism and diet-controlled type 2 diabetes mellitus who presents to the Emergency Department today from dialysis clinic after findings of a fast heart rate.  At dialysis his heart rates were in the 130s so he was sent to the Emergency Department.  He has noted to be in atrial flutter and EP was consulted to assist with his care.    1. Atrial flutter  Onset <24 hours, no need for VIET  Eliquis 2.5 mg bid started on 6/7/17 and will need to continue at least 4 weeks post ablation. Eliquis is the only one that indicated for ESRD. Explained risks and benefits. Risks including, but not limited to: bruising, bleeding, pericardial perforation/effusion, and rare risk of CVA, MI, death. Pt understands and agrees to accept these risks. Consent form signed.      2. CHF  Likely related to loss of AV synchrony and RVR  Echo shows EF 20-25%, with moderate RV dysfunction.  Has single chamber Biotronik ICD at VVI 40  On coreg, lisinopril, Imdur  Euvolemic with dialysis 3 x week    3. Severe CAD with previous NSTEMI  7/14/16: OM1 and LAD disease with PTCA and FOREST to proximal OM1  11/14/16: PTCA/cutting balloon of ostial and  Proximal first marginal (in stent restenosis)  2/14/17: arthrectomy/rotablator LAD with mid LAD FOREST placement and Ostial OM1 in-stent restenosis with FOREST placement     Statin, Imdur, bb, Plavix, asa, but will stop asa for now to reduce risk of bleeding while on Eliquis.    4. Renal failure  On hemodialysis 3 x week   Ref. Range 4/7/2017 07:28 6/7/2017 08:11 6/8/2017 05:30   Creatinine Latest Ref Range: 0.66 - 1.25 mg/dL 5.77 (H) 5.25 (H)  3.31 (H)       Lacy Taylor, CNP        Interval History:   Pt feeling well and NPO awaiting his flutter ablation. VSSA, tele-flutter with CVR on diltiazem gtt         Medications:       sodium chloride (PF)  3 mL Intracatheter Q8H     acetaminophen  650 mg Oral 4x Daily     isosorbide mononitrate  15 mg Oral Daily     levothyroxine  50 mcg Oral Daily     lisinopril  2.5 mg Oral BID     mexiletine (MEXITIL) capsule 150 mg  150 mg Oral BID     pantoprazole  40 mg Oral QAM     pravastatin  40 mg Oral Daily     aspirin  81 mg Oral Daily     carvedilol  6.25 mg Oral BID w/meals     clopidogrel  75 mg Oral Daily     sodium chloride (PF)  10 mL Intravenous Once     apixaban ANTICOAGULANT  2.5 mg Oral BID             Review of Systems: If done, described below  The Review of Systems is negative other than noted in the HPI             Physical Exam:   Blood pressure 114/67, pulse 76, temperature 97.4  F (36.3  C), temperature source Oral, resp. rate 18, height 1.829 m (6'), weight 84.9 kg (187 lb 2.7 oz), SpO2 97 %.        Vital Sign Ranges  Temperature Temp  Av.6  F (36.4  C)  Min: 97.4  F (36.3  C)  Max: 97.8  F (36.6  C)   Blood pressure Systolic (24hrs), Av , Min:101 , Max:133        Diastolic (24hrs), Av, Min:44, Max:95      Pulse Pulse  Av.2  Min: 71  Max: 78   Respirations Resp  Av.6  Min: 7  Max: 18   Pulse oximetry SpO2  Av.3 %  Min: 93 %  Max: 98 %         Intake/Output Summary (Last 24 hours) at 17 0843  Last data filed at 17 2110   Gross per 24 hour   Intake              500 ml   Output             3500 ml   Net            -3000 ml       Constitutional:   in no apparent distress     Lungs:   symmetric, clear to auscultation     Cardiovascular:   regular rate and rhythm and no murmur noted     Extremities and Back:   No edema, no femoral bruits noted              Data:     Lab Results   Component Value Date    WBC 7.9 2017    HGB 10.8 (L) 2017    HCT 34.1 (L)  06/08/2017     06/08/2017     06/08/2017    POTASSIUM 3.8 06/08/2017    CHLORIDE 101 06/08/2017    CO2 29 06/08/2017    BUN 16 06/08/2017    CR 3.31 (H) 06/08/2017     (H) 06/08/2017    DD 0.9 (H) 04/07/2017    NTBNPI >57107 (H) 07/22/2011    TROPI 0.045 06/07/2017    AST 12 10/30/2016    ALT 15 10/30/2016    ALKPHOS 130 10/30/2016    BILITOTAL 0.8 10/30/2016    INR 0.91 07/14/2016

## 2017-06-08 NOTE — MR AVS SNAPSHOT
After Visit Summary   6/8/2017    Westley Ness    MRN: [id number]           Patient Information     Date Of Birth          1945        Visit Information        Provider Department      6/8/2017 4:30 PM CARD DCR2 [facility]        Today's Diagnoses     ICD (implantable cardioverter-defibrillator) in place    -  1       Follow-ups after your visit        Your next 10 appointments already scheduled     Jun 08, 2017  4:30 PM CDT   Courtesy Device Check with CARD DCR2   [facility] ([facility])    [address]  [place] [address]   [phone]            Jun 22, 2017  1:50 PM CDT   Return Discharge with Lisa Flaherty PA-C   [facility] ([facility])    [address]  [place] [address]   [phone]            Jul 13, 2017  4:30 PM CDT   Remote ICD Check with CARD DCR2   [facility] ([facility])    [address]  [place] [address]   [phone]           This appointment is for a remote check of your debrillator.  This is not an appointment at the office.              Future tests that were ordered for you today     Open Standing Orders        Priority Remaining Interval Expires Ordered    Hemoglobin A1c Routine 1/1 AM DRAW  6/8/2017    Glucose monitor nursing POCT Routine 100/100 CONDITIONAL (SPECIFY)  6/8/2017    Glucose monitor nursing POCT Routine 100/100 CONDITIONAL (SPECIFY)  6/8/2017    Notify Provider Routine [id number] PRN  6/8/2017    Glucose monitor nursing POCT- IF PO (eating meals or on bolus enteral feedings), within 30 minutes prior to each meal and at bedtime. Routine 60/63 4 TIMES DAILY BEFORE MEALS & AT BEDTIME  6/8/2017    NPO for Medical/Clinical Reasons Except for: Other; Specify: May take medications with a drink of water  "prior to Electrophysiology study Routine  DIET EFFECTIVE NOW  2017    EKG 12-lead, tracing only STAT 100/100 CONDITIONAL (SPECIFY)  2017    Notify Provider (A-fib/flutter) Routine [id number] PRN  2017    Notify Provider (A-fib/flutter) Routine [id number] PRN  2017    Oxygen: Nasal cannula Routine [id number] PRN  2017    EKG 12-lead, tracing only (A-fib/flutter) Routine  CONDITIONAL X 1  2017    Activity: Up with assist Routine [id number] PRN  2017            Who to contact     If you have questions or need follow up information about today's clinic visit or your schedule please contact [facility] [place] directly at [phone].  Normal or non-critical lab and imaging results will be communicated to you by [employer], letter or phone within 4 business days after the clinic has received the results. If you do not hear from us within 7 days, please contact the clinic through [employer] or phone. If you have a critical or abnormal lab result, we will notify you by phone as soon as possible.  Submit refill requests through [employer] or call your pharmacy and they will forward the refill request to us. Please allow 3 business days for your refill to be completed.          Additional Information About Your Visit        [employer] Information     [employer] lets you send messages to your doctor, view your test results, renew your prescriptions, schedule appointments and more. To sign up, go to [email or web address] . Click on \"Log in\" on the left side of the screen, which will take you to the Welcome page. Then click on \"Sign up Now\" on the right side of the page.     You will be asked to enter the access code listed below, as well as some personal information. Please follow the directions to create your username and password.     Your access code is: [email or web address]  Expires: 2017 10:05 AM     Your access code will  in 90 days. If you need help or a new " code, please call your [facility] clinic or [phone].        Care EveryWhere ID     This is your Care EveryWhere ID. This could be used by other organizations to access your [facility] medical records  [id number]         Blood Pressure from Last 3 Encounters:   06/08/17 114/67   04/12/17 110/50   04/07/17 145/73    Weight from Last 3 Encounters:   06/08/17 84.9 kg (187 lb 2.7 oz)   04/12/17 86.2 kg (190 lb 1.6 oz)   04/07/17 85 kg (187 lb 6.3 oz)              Today, you had the following     No orders found for display         Today's Medication Changes      Notice     This visit is during an admission. Changes to the med list made in this visit will be reflected in the After Visit Summary of the admission.             Primary Care Provider Office Phone # Fax #    Josselin Kolb [phone]       [facility] [address] [place] [id number]        Thank you!     Thank you for choosing [facility] [place]  for your care. Our goal is always to provide you with excellent care. Hearing back from our patients is one way we can continue to improve our services. Please take a few minutes to complete the written survey that you may receive in the mail after your visit with us. Thank you!             Your Updated Medication List - Protect others around you: Learn how to safely use, store and throw away your medicines at www.disposemymeds.org.      Notice     This visit is during an admission. Changes to the med list made in this visit will be reflected in the After Visit Summary of the admission.

## 2017-06-09 VITALS
HEART RATE: 72 BPM | HEIGHT: 72 IN | WEIGHT: 183.42 LBS | TEMPERATURE: 97.8 F | SYSTOLIC BLOOD PRESSURE: 143 MMHG | BODY MASS INDEX: 24.84 KG/M2 | DIASTOLIC BLOOD PRESSURE: 73 MMHG | OXYGEN SATURATION: 96 % | RESPIRATION RATE: 16 BRPM

## 2017-06-09 LAB
GLUCOSE BLDC GLUCOMTR-MCNC: 113 MG/DL (ref 70–99)
GLUCOSE BLDC GLUCOMTR-MCNC: 93 MG/DL (ref 70–99)
HBA1C MFR BLD: 6 % (ref 4.3–6)

## 2017-06-09 PROCEDURE — 00000146 ZZHCL STATISTIC GLUCOSE BY METER IP

## 2017-06-09 PROCEDURE — 99232 SBSQ HOSP IP/OBS MODERATE 35: CPT | Performed by: NURSE PRACTITIONER

## 2017-06-09 PROCEDURE — 25000128 H RX IP 250 OP 636: Performed by: INTERNAL MEDICINE

## 2017-06-09 PROCEDURE — 5A1D00Z PERFORMANCE OF URINARY FILTRATION, SINGLE: ICD-10-PCS | Performed by: INTERNAL MEDICINE

## 2017-06-09 PROCEDURE — 36415 COLL VENOUS BLD VENIPUNCTURE: CPT | Performed by: INTERNAL MEDICINE

## 2017-06-09 PROCEDURE — 83036 HEMOGLOBIN GLYCOSYLATED A1C: CPT | Performed by: INTERNAL MEDICINE

## 2017-06-09 PROCEDURE — 93010 ELECTROCARDIOGRAM REPORT: CPT | Performed by: INTERNAL MEDICINE

## 2017-06-09 PROCEDURE — A9270 NON-COVERED ITEM OR SERVICE: HCPCS | Mod: GY | Performed by: INTERNAL MEDICINE

## 2017-06-09 PROCEDURE — 25000132 ZZH RX MED GY IP 250 OP 250 PS 637: Mod: GY | Performed by: INTERNAL MEDICINE

## 2017-06-09 PROCEDURE — 90937 HEMODIALYSIS REPEATED EVAL: CPT

## 2017-06-09 PROCEDURE — A9270 NON-COVERED ITEM OR SERVICE: HCPCS | Mod: GY | Performed by: PHYSICIAN ASSISTANT

## 2017-06-09 PROCEDURE — 25000132 ZZH RX MED GY IP 250 OP 250 PS 637: Mod: GY | Performed by: PHYSICIAN ASSISTANT

## 2017-06-09 PROCEDURE — 93005 ELECTROCARDIOGRAM TRACING: CPT

## 2017-06-09 PROCEDURE — 99239 HOSP IP/OBS DSCHRG MGMT >30: CPT | Performed by: INTERNAL MEDICINE

## 2017-06-09 PROCEDURE — 99232 SBSQ HOSP IP/OBS MODERATE 35: CPT | Performed by: INTERNAL MEDICINE

## 2017-06-09 PROCEDURE — 63400005 ZZH RX 634: Performed by: INTERNAL MEDICINE

## 2017-06-09 RX ORDER — ALBUMIN, HUMAN INJ 5% 5 %
250 SOLUTION INTRAVENOUS
Status: DISCONTINUED | OUTPATIENT
Start: 2017-06-09 | End: 2017-06-09

## 2017-06-09 RX ORDER — DOXERCALCIFEROL 4 UG/2ML
3 INJECTION INTRAVENOUS
Status: COMPLETED | OUTPATIENT
Start: 2017-06-09 | End: 2017-06-09

## 2017-06-09 RX ADMIN — PANTOPRAZOLE SODIUM 40 MG: 40 TABLET, DELAYED RELEASE ORAL at 12:59

## 2017-06-09 RX ADMIN — DOXERCALCIFEROL 3 MCG: 2 INJECTION, SOLUTION INTRAVENOUS at 08:42

## 2017-06-09 RX ADMIN — Medication 15 MG: at 12:58

## 2017-06-09 RX ADMIN — ACETAMINOPHEN 650 MG: 325 TABLET, FILM COATED ORAL at 12:58

## 2017-06-09 RX ADMIN — CLOPIDOGREL BISULFATE 75 MG: 75 TABLET, FILM COATED ORAL at 12:58

## 2017-06-09 RX ADMIN — SODIUM CHLORIDE 250 ML: 9 INJECTION, SOLUTION INTRAVENOUS at 07:30

## 2017-06-09 RX ADMIN — LISINOPRIL 2.5 MG: 2.5 TABLET ORAL at 12:57

## 2017-06-09 RX ADMIN — EPOETIN ALFA 5000 UNITS: 3000 SOLUTION INTRAVENOUS; SUBCUTANEOUS at 08:42

## 2017-06-09 RX ADMIN — CARVEDILOL 6.25 MG: 6.25 TABLET, FILM COATED ORAL at 12:57

## 2017-06-09 RX ADMIN — MEXILETINE HYDROCHLORIDE 150 MG: 150 CAPSULE ORAL at 12:57

## 2017-06-09 RX ADMIN — SODIUM CHLORIDE 400 ML: 9 INJECTION, SOLUTION INTRAVENOUS at 07:30

## 2017-06-09 RX ADMIN — APIXABAN 2.5 MG: 2.5 TABLET, FILM COATED ORAL at 12:58

## 2017-06-09 RX ADMIN — LEVOTHYROXINE SODIUM 50 MCG: 50 TABLET ORAL at 12:59

## 2017-06-09 NOTE — PROGRESS NOTES
EP Progress Note          Assessment and Plan:   Westley Ness is a 71-year-old gentleman with past medical history of CAD, status post numerous stent placements, most recently 2 drug-eluting stents to LAD and OM in 02/2017, history of nonsustained ventricular tachycardic,  AICD placement, ischemic cardiomyopathy with EF of 25-30%, end-stage renal disease on dialysis, hypothyroidism and diet-controlled type 2 diabetes mellitus who presents to the Emergency Department today from dialysis clinic after findings of a fast heart rate.  At dialysis his heart rates were in the 130s so he was sent to the Emergency Department.  He has noted to be in atrial flutter and EP was consulted to assist with his care.     1. Atrial flutter (resolved)  S/P flutter ablation on 6/8/17 with   Onset <24 hours, no need for VIET  Eliquis 2.5 mg bid started on 6/7/17 and will need to continue at least 4 weeks post ablation. Eliquis is the only one that indicated for ESRD.       2. CHF  Likely related to loss of AV synchrony and RVR  SOB improved in sinus  Echo shows EF 20-25%, with moderate RV dysfunction.  Has single chamber Biotronik ICD at VVI 40  On coreg, lisinopril, Imdur  Euvolemic with dialysis 3 x week     3. Severe CAD with previous NSTEMI  7/14/16: OM1 and LAD disease with PTCA and FOREST to proximal OM1  11/14/16: PTCA/cutting balloon of ostial and  Proximal first marginal (in stent restenosis)  2/14/17: arthrectomy/rotablator LAD with mid LAD FOREST placement and Ostial OM1 in-stent restenosis with FOREST placement      Statin, Imdur, bb, Plavix, asa, but will stop asa for now to reduce risk of bleeding while on Eliquis.     4. Renal failure  Dialysis Mon, Wed, and Fri-on run now    Restrictions and when to call were reviewed with the pt. Eliquis at least for 1 month then can be d/c'd. Can restart asa once Eliquis is stopped.  Pt scheduled to see [facility] 6/22 for CAD will add an EKG for that visit. Followed by  for general  cardiology and  for EP.  Will have pt see EP NORMA in 4 weeks with an EKG and groin check. Scheduled for  at 3:10 pm    EP signing off      Lacy Taylor CNP        Interval History:   Pt's breathing is better and is feeling good this morning. VSSA, tele sinus 70's and 12 lead sinus 74 bpm       Medications:       insulin aspart  1-7 Units Subcutaneous TID AC     insulin aspart  1-5 Units Subcutaneous At Bedtime     sodium chloride (PF)  3 mL Intracatheter Q8H     acetaminophen  650 mg Oral 4x Daily     isosorbide mononitrate  15 mg Oral Daily     levothyroxine  50 mcg Oral Daily     lisinopril  2.5 mg Oral BID     mexiletine (MEXITIL) capsule 150 mg  150 mg Oral BID     pantoprazole  40 mg Oral QAM     pravastatin  40 mg Oral Daily     carvedilol  6.25 mg Oral BID w/meals     clopidogrel  75 mg Oral Daily     apixaban ANTICOAGULANT  2.5 mg Oral BID             Review of Systems: If done, described below  The Review of Systems is negative other than noted in the HPI             Physical Exam:   Blood pressure 127/70, pulse 74, temperature 97.4  F (36.3  C), temperature source Oral, resp. rate 16, height 1.829 m (6'), weight 83.2 kg (183 lb 6.8 oz), SpO2 96 %.        Vital Sign Ranges  Temperature Temp  Av.4  F (36.3  C)  Min: 97.4  F (36.3  C)  Max: 97.6  F (36.4  C)   Blood pressure Systolic (24hrs), Av , Min:101 , Max:131        Diastolic (24hrs), Av, Min:54, Max:74      Pulse Pulse  Av  Min: 65  Max: 78   Respirations Resp  Av.3  Min: 16  Max: 20   Pulse oximetry SpO2  Av.1 %  Min: 91 %  Max: 97 %         Intake/Output Summary (Last 24 hours) at 17 0937  Last data filed at 17 2200   Gross per 24 hour   Intake              590 ml   Output                0 ml   Net              590 ml       Constitutional:   in no apparent distress     Lungs:   symmetric, clear to auscultation     Cardiovascular:   regular rate and rhythm, normal S1 and S2 and no murmur noted      Extremities and Back:   No edema, right femoral access site without hematoma or bruit.              Data:     Lab Results   Component Value Date    WBC 7.9 06/08/2017    HGB 10.8 (L) 06/08/2017    HCT 34.1 (L) 06/08/2017     06/08/2017     06/08/2017    POTASSIUM 3.8 06/08/2017    CHLORIDE 101 06/08/2017    CO2 29 06/08/2017    BUN 16 06/08/2017    CR 3.31 (H) 06/08/2017     (H) 06/08/2017    DD 0.9 (H) 04/07/2017    NTBNPI >66740 (H) 07/22/2011    TROPI 0.045 06/07/2017    AST 12 10/30/2016    ALT 15 10/30/2016    ALKPHOS 130 10/30/2016    BILITOTAL 0.8 10/30/2016    INR 0.91 07/14/2016

## 2017-06-09 NOTE — PROGRESS NOTES
[facility]    Cardiology Progress Note    Date of Service (when I saw the patient): 06/09/2017     Assessment & Plan   Westley Ness is a 71 year old male who was admitted on 6/7/2017 with symptoms of tachycardia and fatigue. Hx of previous MI, multiple coronary stenting, severe ischemic CM, VT s/p ICD on chronic mexiletine, DM on ESRD on hemodialysis.    Aflutter with RVR  -Eliquis 2,5 BID indicated for ESRD  -[id number] 5  S/P successful aflutter ablation, HR 60s    Severe CAD with previous NSTEMI  -7/14/16: OM1 and LAD disease with PTCA and FOREST to proximal OM1  -11/14/16: PTCA/cutting balloon of ostial and  Proximal first marginal (in stent restenosis)  -2/14/17: arthrectomy/rotablator LAD with mid LAD FOREST placement and Ostial OM1 in-stent restenosis with FOREST placement     Acute on chronic CHF with hx of Ischemic CM  -EF 20-25%, mod RV dysfunction s/p ICD  -coreg, lisinopril, imdur  -dialysis 3x/week, wt 183 today    ESRD  -dialysis today    PLAN  Ok for discharge today  Follow-up appts made  Continue with coreg, lisinopril and imdur    Interval History   S/P aflutter ablation-maintaining SR    Physical Exam   Temp: 97.4  F (36.3  C) Temp src: Oral BP: 137/65 Pulse: 70 Heart Rate: 71 Resp: 16 SpO2: 96 % O2 Device: None (Room air)    Vitals:    06/08/17 0551 06/09/17 0300 06/09/17 0735   Weight: 84.9 kg (187 lb 2.7 oz) 83.2 kg (183 lb 6.8 oz) 83.2 kg (183 lb 6.8 oz)     Vital Signs with Ranges  Temp:  [97.4  F (36.3  C)-97.6  F (36.4  C)] 97.4  F (36.3  C)  Pulse:  [65-78] 70  Heart Rate:  [62-74] 71  Resp:  [16-20] 16  BP: (101-137)/(54-74) 137/65  SpO2:  [91 %-97 %] 96 %  I/O last 3 completed shifts:  In: 740 [P.O.:740]  Out: -     Constitutional     alert and oriented, in no acute distress.     Skin     warm and dry to touch    ENT     no pallor or cyanosis    Neck    Supple, JVP normal, no carotid bruit    Chest     no tenderness to palpation    Lungs  clear to auscultation      Cardiac  regular rhythm, S1 normal, S2 normal, No S3 or S4, no murmurs, no rubs    Abdomen     abdomen soft, bowel sounds normoactive, no hepatosplenomegaly    Extremities and Back     No edema observed.        Neurological     no gross motor deficits noted, affect appropriate, oriented to time, person and place.    Medications     - MEDICATION INSTRUCTIONS -       Reason anticoagulant not prescribed for atrial fibrillation       diltiazem (CARDIZEM) infusion ADULT Stopped (06/08/17 1132)     - MEDICATION INSTRUCTIONS -         insulin aspart  1-7 Units Subcutaneous TID AC     insulin aspart  1-5 Units Subcutaneous At Bedtime     sodium chloride (PF)  3 mL Intracatheter Q8H     acetaminophen  650 mg Oral 4x Daily     isosorbide mononitrate  15 mg Oral Daily     levothyroxine  50 mcg Oral Daily     lisinopril  2.5 mg Oral BID     mexiletine (MEXITIL) capsule 150 mg  150 mg Oral BID     pantoprazole  40 mg Oral QAM     pravastatin  40 mg Oral Daily     carvedilol  6.25 mg Oral BID w/meals     clopidogrel  75 mg Oral Daily     apixaban ANTICOAGULANT  2.5 mg Oral BID       Data   Results for orders placed or performed during the hospital encounter of 06/07/17 (from the past 24 hour(s))   EP Ablation atrial flutter    Narrative    PROCEDURES PERFORMED:  1. Conscious sedation.  2. Cardiac fluoroscopy (4 min 59 sec).  3. Electroanatomical mapping.  4. Electrophysiology study with left atrium recording/pacing via  coronary sinus catheter.  5. Atrial flutter ablation.    INDICATION FOR PROCEDURE: Symptomatic atrial flutter.    HISTORY OF PRESENT ILLNESS:  71-year-old gentleman with a history of  hypertension, diabetes, ESRD-HD and ischemic cardiomyopathy (EF of  25-30%; ?AICD placement), who presents with symptomatic atrial  flutter.  Device interrogation confirmed recent onset of arrhythmia  (06/07 at 4:30AM; less than 48 hrs).  He was started on  anticoagulation and was referred for electrophysiology study  and  possible ablation.    Risks and benefits of the procedure were reviewed including but not  limited to arrhythmias, pain, infection, bleeding, skin damage from  ionized radiation, kidney damage, allergic reaction to contrast dye,  injury to the neighboring vascular structures, need for emergent  cardiopulmonary resuscitation or permanent pacemaker implantation,  heart attacks, strokes, and/or death. Alternatives were discussed  including doing nothing.    All questions were answered to patient's satisfaction. The patient  wished to proceed, and consent was signed.    SEDATION PROCEDURE:   Sedatives were given by the nurse staff under my direct supervision.   The patient received a total of 4 mg of Versed and  125 mcg of  Fentanyl during procedure.  Total sedation time was 81 minutes. Heart  rate, blood pressure, oxygen saturation and patient responses were  monitored throughout the procedure with the RN assistance.    PROCEDURE DESCRIPTION:  After written informed consent was obtain, the patient was brought to  the EP lab. After the usual sterile prep and drape, the right was  locally anesthetized, and venous vascular sheaths were inserted via  ultrasound guidance and modified Seldinger technique (8 Northern Irish x1, 7  Northern Irish x1 and 6 Northern Irish x1).    With routine hemodynamic and electrocardiographic monitoring,  electrophysiology catheters were advanced under fluoroscopic guidance  to the right ventricle, right atrium and coronary sinus position.  Intracardiac electrograms and conduction were measured at rest and  with program stimulation.    Entrainment mapping revealed cavo tricuspid isthmus dependent atrial  flutter.  Using the [employer] mapping system, an endocardial  mapping of the right atrium was created, and the cavo tricuspid  isthmus was localized.  Using a 8-mm tip radiofrequency ablation  catheter, the isthmus was ablated resulting in termination of atrial  flutter.   Bi-directional block across the  cavo-tricuspid isthmus was  confirmed after ablation.    All catheters and sheaths were removed and homeostasis obtained in the  EP lab prior to transfer to the recovery area.    MEASURED DATA:  Pre ablation: Atrial flutter.  milliseconds,   milliseconds,  milliseconds.  Post ablation: Normal sinus rhythm.   milliseconds,   milliseconds,  milliseconds,  milliseconds.   milliseconds and HV 39 milliseconds. AV node /500.       Impression    IMPRESSION:  - Typical counterclockwise cavotricuspid isthmus dependent flutter was  evidenced by:  1. Atrial flutter at baseline (flutter cycle length of 210 ms).  2. Pacing at the cavo-tricuspid isthmus demonstrated both concealed  entrainment and close approximation of the post-pacing interval (230  ms) to the native tachycardia cycle length (210 ms), suggestive of  isthmus-dependent flutter.  3. Using a 8-mm tip radiofrequency ablation catheter, the isthmus was  ablated resulting in termination of the arrhythmia.    - Successful ablation was evidenced by:  1. Bidirectional block across the cavo-tricuspid isthmus was confirmed  after ablation.  Cavo-tricuspid isthmus time post ablation of 110-120  milliseconds.  2. Failure to re-induce atrial flutter by atrial burst pacing and  atrial program stimulation.    RECOMMENDATIONS:  1. Please keep patient flat for at least an hour after the procedure.  2. Continue anticoagulation for at least a month after procedure.  3. No strenuous activity for 5 days.  4. Follow up in cardiology clinic in a month.    COMPLICATIONS:  No immediate complications.          EDIS DE JESUS MD   Glucose by meter   Result Value Ref Range    Glucose 105 (H) 70 - 99 mg/dL   Glucose by meter   Result Value Ref Range    Glucose 132 (H) 70 - 99 mg/dL   Hemoglobin A1c   Result Value Ref Range    Hemoglobin A1C 6.0 4.3 - 6.0 %   EKG 12-lead, tracing only   Result Value Ref Range    Interpretation ECG  Click View Image link to view waveform and result    Glucose by meter   Result Value Ref Range    Glucose 113 (H) 70 - 99 mg/dL       MARCIO Contreras, CNP  Cardiology  Pager:  [phone]

## 2017-06-09 NOTE — PROGRESS NOTES
"Potassium   Date Value Ref Range Status   06/08/2017 3.8 3.4 - 5.3 mmol/L Final     Lab Results   Component Value Date    HGB 10.8 06/08/2017     Weight: 83.2 kg (183 lb 6.8 oz)  POST WT 81.7 kg    DIALYSIS PROCEDURE NOTE    Patient dialyzed for 3.5 hrs. on a 3 K bath with a net fluid removal of  1.5 L.  A BFR of 450 ml/min was obtained via a LUAVF using 15 gauge needles. The patient was seen by Dr. Kelsey during the treatment. Total heparin received during the treatment: 0 units. Sites held x 20  min then  pressure drsgs applied.   Meds Given: Epogen 5,000 units IV and Hectorol 3 mcg IV.      Complications: Asymptomatic hypotension.  Pt wanted oxygen for comfort during dialysis, states, \"I use oxygen at my dialysis clinic to keep my blood pressure up, and I feel better.\" O2 applied at 2LPM.  Pt tolerated treatment well.     Procedure and ESRD teaching done and questions answered. See flowsheet in EPIC for further details and post assessment. Machine water alarm in place and functioning. Consent for dialysis signed prior.     Pt returned via w/c alert and oriented x4  Vascular Access: Aseptic prep done for both on/off. +T/B. Adequate BFR.   Report received from:  MIGUEL Berg RN  Report given to: MIGUEL Jensen RN    HEPATITIS B SURFACE ANTIGEN negative DATE 6/7/17 HEPATITIS B SURFACE ANTIBODY suceptible DATE 6/7/17. Chlorine/Chloramine water system checked every 4 hours.    Outpatient Dialysis at [facility]  Discharging today.     Bree Mann RN  "

## 2017-06-09 NOTE — PROGRESS NOTES
Inpatient Dialysis Progress Note        Assessment and Plan:     71 y.o man with extensive history of CAD s/p multiple coronary stents and ESRD, admitted for atria flutter with RVR s/p ablation     1. ESRD                         -3.5 hrs, LAVF, eDW 85 kg, Epogen 5000 units, Hectorol 3 mcg     2. Anemia.                          -5000 units of Epogen     3. Atria flutter s/p ablation     4. Extensive history of CAD and ICM.            Interval History:     Pt is doing well. He is getting dialysis. He does not have any cardiopulmonary complaints.          Dialysis Parameters:     Vitals:    06/08/17 0551 06/09/17 0300 06/09/17 0735   Weight: 84.9 kg (187 lb 2.7 oz) 83.2 kg (183 lb 6.8 oz) 83.2 kg (183 lb 6.8 oz)     I/O last 3 completed shifts:  In: 740 [P.O.:740]  Out: -   Temp:  [97.4  F (36.3  C)-97.6  F (36.4  C)] 97.4  F (36.3  C)  Pulse:  [65-78] 70  Heart Rate:  [62-74] 71  Resp:  [16-20] 16  BP: (101-137)/(54-74) 137/65  SpO2:  [91 %-97 %] 96 %    Current Weight: 85  Dry Weight: 83.2  Dialysis Temp: 36.5  C  Access Device: AVF  Access Site: left  Dialyzer: revaclear  Dialysis Bath: 3K  Sodium Profile: none  UF Goal: 1.2 literes  Blood Flow Rate (mL/min): 450  Total Treatment Time (hrs): 3.5  Heparin: none    Review of Systems:     Medications:     EPO dose: 5000 units  Hectorol: 3 mcg       Physical Exam:     Vitals were reviewed  Patient Vitals for the past 24 hrs:   BP Temp Temp src Pulse Heart Rate Resp SpO2 Weight   06/09/17 1030 137/65 - - 70 - - - -   06/09/17 1000 116/57 - - 70 - - - -   06/09/17 0930 127/70 - - 74 - - - -   06/09/17 0900 120/59 - - 70 - - - -   06/09/17 0830 111/59 - - 65 - - - -   06/09/17 0800 117/62 - - 71 - - - -   06/09/17 0744 128/63 - - 72 - - - -   06/09/17 0735 131/69 97.4  F (36.3  C) Oral 78 - 16 96 % 83.2 kg (183 lb 6.8 oz)   06/09/17 0433 111/65 97.4  F (36.3  C) Oral - 71 18 96 % -   06/09/17 0300 - - - - - - - 83.2 kg (183 lb 6.8 oz)   06/08/17 2119 124/67 - - - 74 18  - -   17 1909 106/66 97.6  F (36.4  C) Oral - 74 16 95 % -   17 1806 106/54 - - 74 - - - -   17 1630 125/74 - - - - - - -   17 1617 - - - 72 - 16 - -   17 1615 118/74 - - - - - - -   17 1600 122/71 - - - - - 95 % -   17 1545 123/73 - - - - - 96 % -   17 1535 119/66 97.4  F (36.3  C) Oral - 72 16 96 % -   17 1530 119/66 - - - - - 95 % -   17 1515 115/69 - - - - - 97 % -   17 1500 112/72 - - - - - 97 % -   17 1445 118/64 - - - - - 96 % -   17 1435 109/62 - - - 70 - 96 % -   17 1430 109/62 - - - - - 96 % -   17 1415 110/64 - - - - - 96 % -   17 1400 104/58 - - - - - 93 % -   17 1323 - - - - - - 94 % -   17 1315 101/61 97.4  F (36.3  C) Oral - 62 - - -   17 1240 102/62 - - - - - - -   17 1220 103/62 - - - 63 16 91 % -   17 1205 109/61 - - - 67 20 93 % -   17 1145 113/71 - - - 64 20 93 % -       Temperatures:  Current - Temp: 97.4  F (36.3  C); Max - Temp  Av.4  F (36.3  C)  Min: 97.4  F (36.3  C)  Max: 97.6  F (36.4  C)  Respiration range: Resp  Av.3  Min: 16  Max: 20  Pulse range: Pulse  Av.6  Min: 65  Max: 78  Blood pressure range: Systolic (24hrs), Av , Min:101 , Max:137   ; Diastolic (24hrs), Av, Min:54, Max:74    Pulse oximetry range: SpO2  Av.1 %  Min: 91 %  Max: 97 %  I/O last 3 completed shifts:  In: 740 [P.O.:740]  Out: -     Intake/Output Summary (Last 24 hours) at 17 1056  Last data filed at 17 2200   Gross per 24 hour   Intake              590 ml   Output                0 ml   Net              590 ml       Gen: NAD  CV: RRR  Pulm: Ctab  Abd; soft, nt  Ext: no edema  Neuro: a/o x3    Data:     Recent Labs   Lab Test  17   0530  17   0811   NA  138  137   POTASSIUM  3.8  4.2   CHLORIDE  101  102   CO2  29  26   ANIONGAP  8  9   GLC  132*  166*   BUN  16  36*   CR  3.31*  5.25*   CHRISTINE  9.0  9.7       Hemoglobin   Date Value Ref  Range Status   06/08/2017 10.8 (L) 13.3 - 17.7 g/dL Final              Brady Kelsey MD

## 2017-06-09 NOTE — PLAN OF CARE
Problem: Goal Outcome Summary  Goal: Goal Outcome Summary  Outcome: Improving  Pt A&O x 4, scheduled Tylenol for L knee and hip pain, Successful A-flutter ablation done - VSS now in SR, ambulating with cane and SBA, R groin site C/D/I good CMS. Plan to watch overnight and possible D/C home after dialysis.

## 2017-06-09 NOTE — DISCHARGE SUMMARY
[facility]    Discharge Summary  Hospitalist    Date of Admission:  6/7/2017  Date of Discharge:  6/9/2017  Discharging Provider: Charlette Mcrae MD  Date of Service (when I saw the patient): 06/09/17    Discharge Diagnoses   Atrial flutter with rapid ventricular response.  ESKD, om M, W and F HD.    PAST MEDICAL HISTORY:   1.  CAD, status post numerous stent placements; initial heart attack in 1996 with recurrent NSTEMI in 07/2016 at which time a stent was placed in the proximal OM1.  In November 2016, the patient had ostial and proximal segment of M1 in-stent restenosis so PCR was performed.  Most recently had NSTEMI in 02/2017 at which time drug-eluting stent placement was performed to the LAD and OM.  Followed by Dr. Sen.     2.  Ischemic cardiomyopathy with last echocardiogram showing an EF of 25-30%.   3.  AICD placement; pacemaker interrogated 04/2017.    4.  History of nonsustained ventricular tachycardia in 2012.    5.  End-stage renal disease repeating dialysis Monday, Wednesday, Friday, followed by Dr. Hammond.   6.  Hypothyroidism.   7.  Diet-controlled type 2 diabetes mellitus    History of Present Illness   70 yo gentleman w/ h/o CAD, NSTEMI s/p PCI/stent placement to proximal OM1 in 7/16, ostial and proximal segment of M1 in-stent restenosis in 11/16 so PCR was performed, NSTEMI in 02/2017 and required FOREST placement to LAD and OM.  Also has a h/o VT/VF, chronic systolic CHF w/ EF of 25-30% requiring AICD placement in 4/17, ESKD on M, W and F HD since 2004, aortic root dilatation, DM type II and hypothyroidism.  He did not feel well on 6/6/17 and checked his HR and noted to be 135.  He was at HD center on 6/7/17 when he noted his HR in the 130s.  Sent to [facility] ED for further eval.  EKG on the ED revealed Aflutter w/ RVR.  Admitted on 6/7/17.    Hospital Course   Westley Ness was admitted on 6/7/2017.  The following problems were addressed during his hospitalization:    1)    Aflutter  w/ variable heart rate:   Ruled for ACS w/ serial troponin and EKG.  TSH wnl.  no clear evidence of infection, dehydration or fluid overload.  Started on dilt drip for rate control and Heparin drip for stroke prophylaxis.  Seen by EP cardiology consult. Recommendation was EPS w/ ablation.   S/p Aflutter ablation on 6/8/17.  No indications for VIET since onset was < 24 hours PTA.  Will d/c to home with Eliquis 2.5 mg po bid, at least for a month.  PTA Coreg for rate control.     2)   CAD w/ multiple PCI/stents placement:   Angio on 7/14/16 discovered OM1 and LAD disease, s/p PTCA and FOREST to proximal OM1.  Angio on 11/14/16 with PTCA/cutting balloon of ostial and  Proximal first marginal (in stent restenosis) and in 2/14/17, s/p arthrectomy/rotablator LAD with mid LAD FOREST placement and Ostial OM1 in-stent restenosis with FOREST placement.  Continue ASA, Coreg, plavix, imdur, Lisinopril and pravachol.       3)   Chronic systolic CHF and h/o VT/VF:   Has an indwelling AICD.  Continue Mexiletine.     4)    ESKD:   Continue M, W and F HD.  He was dialyzed prior to d/c to home earlier today.     5)    DM type II:   Diet controlled.       Code status:  Full.     Charlette Mcrae MD.   Hospitalist.  [phone], pager.       Primary Care Physician   Josselin Kolb    Physical Exam   Temp: 97.4  F (36.3  C) Temp src: Oral BP: 127/70 Pulse: 74 Heart Rate: 71 Resp: 16 SpO2: 96 % O2 Device: None (Room air)    Vitals:    06/08/17 0551 06/09/17 0300 06/09/17 0735   Weight: 84.9 kg (187 lb 2.7 oz) 83.2 kg (183 lb 6.8 oz) 83.2 kg (183 lb 6.8 oz)     Vital Signs with Ranges  Temp:  [97.4  F (36.3  C)-97.6  F (36.4  C)] 97.4  F (36.3  C)  Pulse:  [65-78] 74  Heart Rate:  [62-74] 71  Resp:  [16-20] 16  BP: (101-131)/(54-74) 127/70  SpO2:  [91 %-97 %] 96 %  I/O last 3 completed shifts:  In: 740 [P.O.:740]  Out: -     General: aao x 3, NAD.  HEENT:  NC/AT, PERRL, EOMI, neck supple, no thyromegaly, op clear, mmm.  CVS:  NL s 1 and s2, no  m/r/g.  Lungs:  CTA B/L.   Abd:  Soft, + bs, NT, no rebound or gaurding, no fluid shift.  Ext:  No c/c.  Lymph:  No edema.  Neuro:  Nonfocal.  Musculoskeletal: No calf tenderness to palpation.    Skin:  No rash.  Psychiatry:  Mood and affect appropriate.    Discharge Disposition   Discharged to home  Condition at discharge: Stable    Consultations This Hospital Stay   CARDIOLOGY IP CONSULT  NEPHROLOGY IP CONSULT  PHARMACY TO DOSE HEPARIN  PHARMACY TO DOSE HEPARIN    Time Spent on this Encounter   I, Charlette Mcrae, personally saw the patient today and spent greater than 30 minutes discharging this patient.    Discharge Orders     Reason for your hospital stay   Atrial flutter w/ RVR     Follow-up and recommended labs and tests    HD center on Monday, 6/12/17.     Activity   Your activity upon discharge: activity as tolerated     Diet   Follow this diet upon discharge:  Dialysis Diet       Discharge Medications   Current Discharge Medication List      START taking these medications    Details   apixaban ANTICOAGULANT (ELIQUIS) 2.5 MG tablet Take 1 tablet (2.5 mg) by mouth 2 times daily  Qty: 60 tablet, Refills: 3    Associated Diagnoses: Atrial fibrillation, unspecified type (H)         CONTINUE these medications which have NOT CHANGED    Details   guaiFENesin-dextromethorphan (ROBITUSSIN DM) 100-10 MG/5ML syrup Take 5 mLs by mouth every 4 hours as needed for cough  Qty: 560 mL, Refills: 3    Associated Diagnoses: Cough      lisinopril (PRINIVIL/ZESTRIL) 2.5 MG tablet Take one tablet after dialysis. Take second tablet at bedtime.  Qty: 60 tablet, Refills: 3    Associated Diagnoses: NSTEMI (non-ST elevated myocardial infarction) (H)      nitroglycerin (NITROSTAT) 0.4 MG sublingual tablet 1 TAB EVERY 5 MIN AS NEEDED, UP TO 3 PER EPISODE  Qty: 25 tablet, Refills: 0    Associated Diagnoses: Angina pectoris (H)      B complex-C-folic acid (NEPHROCAPS/TRIPHROCAPS) 1 MG capsule Take 1 capsule by mouth every evening  Qty: 90  capsule, Refills: 3    Associated Diagnoses: ESRD (end stage renal disease) on dialysis (H)      pantoprazole (PROTONIX) 40 MG EC tablet Take 1 tablet (40 mg) by mouth every morning  Qty: 30 tablet, Refills: 10    Associated Diagnoses: Gastroesophageal reflux disease, esophagitis presence not specified      carvedilol (COREG) 6.25 MG tablet Take 1 tablet (6.25 mg) by mouth 2 times daily (with meals) Take after dialysis  Qty: 180 tablet, Refills: 3    Associated Diagnoses: NSTEMI (non-ST elevated myocardial infarction) (H); Mild CAD      isosorbide mononitrate (IMDUR) 30 MG 24 hr tablet Take 0.5 tablets (15 mg) by mouth daily  Qty: 90 tablet, Refills: 3    Associated Diagnoses: NSTEMI (non-ST elevated myocardial infarction) (H)      Levothyroxine Sodium 50 MCG CAPS Take 50 mcg by mouth daily  Qty: 90 capsule, Refills: 3    Associated Diagnoses: Hypothyroidism, unspecified type      pravastatin (PRAVACHOL) 40 MG tablet Take 1 tablet (40 mg) by mouth daily  Qty: 90 tablet, Refills: 3    Associated Diagnoses: Coronary artery disease involving native coronary artery of native heart without angina pectoris      acetaminophen (TYLENOL) 325 MG tablet Take 2 tablets (650 mg) by mouth 4 times daily  Qty: 60 tablet, Refills: 0    Associated Diagnoses: Closed nondisplaced fracture of right patella, unspecified fracture morphology, initial encounter; Elbow fracture, right, closed, initial encounter      clopidogrel (PLAVIX) 75 MG tablet Take 1 tablet (75 mg) by mouth daily To protect your stent(s).  Do not stop taking unless directed by cardiology.  Qty: 30 tablet, Refills: 11    Associated Diagnoses: Coronary artery disease involving native coronary artery of native heart without angina pectoris      aspirin EC 81 MG EC tablet Take 1 tablet (81 mg) by mouth daily  Qty: 81 tablet    Associated Diagnoses: Coronary artery disease involving native coronary artery of native heart without angina pectoris      sevelamer (RENVELA) 800  MG tablet Take 800-1,600 mg by mouth as needed ON HOLD      loperamide (IMODIUM) 2 MG capsule Take 1 mg by mouth as needed       psyllium 0.52 G capsule Take 1 capsule by mouth daily as needed       MEXILETINE HCL PO Take 150 mg by mouth 2 times daily      RANITIDINE HCL PO Take 75 mg by mouth 1-2 tablets daily           Allergies   Allergies   Allergen Reactions     Contrast Dye Hives     Does fine if he uses benadryl prior.     No Clinical Screening - See Comments      Green beans - Diarrhea.    Topical antibiotic - name unknown - caused swelling of the penis.

## 2017-06-09 NOTE — PLAN OF CARE
Problem: Goal Outcome Summary  Goal: Goal Outcome Summary  VSS, A&O, tele sinus rhythm w/ first degree AVB, Room air, SBA, pt denies pain. Right groin site is c/d/i and no bruit; old, dried blood on dressing; CMS intact. plan: dialysis and possible discharge. Will continue to monitor.

## 2017-06-09 NOTE — PROGRESS NOTES
D: Pt dc'd from IP today.  I: faxed to Zeinab Barron the following  PN 6/9 Nephrology  PN RN HD IP 6/9,,,   AVS    Discussed w/ OP Millstonesushila Arriola that today would be last IP HD w/ plan to fax records    Rebecca Benoit Care Transitions Nurse,  RN, BSN, [facility]  Cell Phone # [phone]

## 2017-06-10 LAB — INTERPRETATION ECG - MUSE: NORMAL

## 2017-06-12 ENCOUNTER — CARE COORDINATION (OUTPATIENT)
Dept: CARDIOLOGY | Facility: CLINIC | Age: 72
End: 2017-06-12

## 2017-06-12 NOTE — PROGRESS NOTES
Called patient and left  to discuss any post hospital d/c questions he may have, review medication changes, and confirm f/u appts. RN advised patient in  that he has an apt scheduled on 6/22/17 with NORMA Lisa Flaherty. Patient advised in  to call clinic with any cardiac related questions or concerns prior to his apt on 6/22/17.

## 2017-06-14 LAB — INTERPRETATION ECG - MUSE: NORMAL

## 2017-06-22 ENCOUNTER — OFFICE VISIT (OUTPATIENT)
Dept: CARDIOLOGY | Facility: CLINIC | Age: 72
End: 2017-06-22
Payer: MEDICARE

## 2017-06-22 VITALS
BODY MASS INDEX: 26.24 KG/M2 | HEART RATE: 68 BPM | WEIGHT: 193.7 LBS | SYSTOLIC BLOOD PRESSURE: 126 MMHG | DIASTOLIC BLOOD PRESSURE: 58 MMHG | HEIGHT: 72 IN

## 2017-06-22 DIAGNOSIS — Z95.810 ICD (IMPLANTABLE CARDIOVERTER-DEFIBRILLATOR) IN PLACE: ICD-10-CM

## 2017-06-22 DIAGNOSIS — Z99.2 ESRD (END STAGE RENAL DISEASE) ON DIALYSIS (H): ICD-10-CM

## 2017-06-22 DIAGNOSIS — I25.5 CARDIOMYOPATHY, ISCHEMIC: Primary | ICD-10-CM

## 2017-06-22 DIAGNOSIS — N18.6 ESRD (END STAGE RENAL DISEASE) ON DIALYSIS (H): ICD-10-CM

## 2017-06-22 DIAGNOSIS — I48.91 ATRIAL FIBRILLATION, UNSPECIFIED TYPE (H): ICD-10-CM

## 2017-06-22 DIAGNOSIS — I25.10 CORONARY ARTERY DISEASE INVOLVING NATIVE CORONARY ARTERY OF NATIVE HEART WITHOUT ANGINA PECTORIS: ICD-10-CM

## 2017-06-22 PROCEDURE — 99213 OFFICE O/P EST LOW 20 MIN: CPT | Mod: 25 | Performed by: PHYSICIAN ASSISTANT

## 2017-06-22 PROCEDURE — 93000 ELECTROCARDIOGRAM COMPLETE: CPT | Performed by: PHYSICIAN ASSISTANT

## 2017-06-22 NOTE — PROGRESS NOTES
HPI and Plan:   See dictation. Confirmation # [id number].    Orders this Visit:  Orders Placed This Encounter   Procedures     EKG 12-lead complete w/read - Clinics (performed today)     No orders of the defined types were placed in this encounter.    Medications Discontinued During This Encounter   Medication Reason     aspirin EC 81 MG EC tablet          Encounter Diagnoses   Name Primary?     ICD (implantable cardioverter-defibrillator) in place      Coronary artery disease involving native coronary artery of native heart without angina pectoris      Atrial fibrillation, unspecified type (H)      ESRD (end stage renal disease) on dialysis (H)      Cardiomyopathy, ischemic Yes       CURRENT MEDICATIONS:  Current Outpatient Prescriptions   Medication Sig Dispense Refill     apixaban ANTICOAGULANT (ELIQUIS) 2.5 MG tablet Take 1 tablet (2.5 mg) by mouth 2 times daily 60 tablet 3     guaiFENesin-dextromethorphan (ROBITUSSIN DM) 100-10 MG/5ML syrup Take 5 mLs by mouth every 4 hours as needed for cough 560 mL 3     lisinopril (PRINIVIL/ZESTRIL) 2.5 MG tablet Take one tablet after dialysis. Take second tablet at bedtime. 60 tablet 3     nitroglycerin (NITROSTAT) 0.4 MG sublingual tablet 1 TAB EVERY 5 MIN AS NEEDED, UP TO 3 PER EPISODE 25 tablet 0     B complex-C-folic acid (NEPHROCAPS/TRIPHROCAPS) 1 MG capsule Take 1 capsule by mouth every evening 90 capsule 3     pantoprazole (PROTONIX) 40 MG EC tablet Take 1 tablet (40 mg) by mouth every morning 30 tablet 10     carvedilol (COREG) 6.25 MG tablet Take 1 tablet (6.25 mg) by mouth 2 times daily (with meals) Take after dialysis 180 tablet 3     isosorbide mononitrate (IMDUR) 30 MG 24 hr tablet Take 0.5 tablets (15 mg) by mouth daily 90 tablet 3     Levothyroxine Sodium 50 MCG CAPS Take 50 mcg by mouth daily 90 capsule 3     pravastatin (PRAVACHOL) 40 MG tablet Take 1 tablet (40 mg) by mouth daily 90 tablet 3     acetaminophen (TYLENOL) 325 MG tablet Take 2 tablets (650 mg) by  mouth 4 times daily 60 tablet 0     clopidogrel (PLAVIX) 75 MG tablet Take 1 tablet (75 mg) by mouth daily To protect your stent(s).  Do not stop taking unless directed by cardiology. 30 tablet 11     sevelamer (RENVELA) 800 MG tablet Take 800-1,600 mg by mouth as needed ON HOLD       loperamide (IMODIUM) 2 MG capsule Take 1 mg by mouth as needed        psyllium 0.52 G capsule Take 1 capsule by mouth daily as needed        MEXILETINE HCL PO Take 150 mg by mouth 2 times daily       RANITIDINE HCL PO Take 75 mg by mouth 1-2 tablets daily         ALLERGIES     Allergies   Allergen Reactions     Contrast Dye Hives     Does fine if he uses benadryl prior.     No Clinical Screening - See Comments      Green beans - Diarrhea.    Topical antibiotic - name unknown - caused swelling of the penis.       PAST MEDICAL HISTORY:  Past Medical History:   Diagnosis Date     Aortic root dilatation (H)     mild     CAD (coronary artery disease)      Cardiomyopathy, ischemic      Diabetes (H)      H/O percutaneous transluminal coronary angioplasty 7-    Successful Percutaneous coronary intervention of the proximal OM1     Hyperlipidemia      Hypertension      Hypothyroidism      ICD (implantable cardioverter-defibrillator) in place      NSTEMI (non-ST elevated myocardial infarction) (H)      Renal disease     end stage     Shortness of breath      Syncope      Tachycardia        PAST SURGICAL HISTORY:  Past Surgical History:   Procedure Laterality Date     CHOLECYSTECTOMY       HC LEFT HEART CATHETERIZATION  7/14/2016     HC LEFT HEART CATHETERIZATION  9/21/2016     VASCULAR SURGERY      LUE fistula       FAMILY HISTORY:  Family History   Problem Relation Age of Onset     Hypertension Mother      CEREBROVASCULAR DISEASE Mother      Hypertension Father      C.A.D. Paternal Grandmother        SOCIAL HISTORY:  Social History     Social History     Marital status: Single     Spouse name: N/A     Number of children: N/A     Years of  "education: N/A     Social History Main Topics     Smoking status: Former Smoker     Packs/day: 2.00     Years: 35.00     Types: Cigarettes     Quit date: 11/1/1996     Smokeless tobacco: None     Alcohol use 0.0 oz/week     0 Standard drinks or equivalent per week      Comment: rare     Drug use: No     Sexual activity: No     Other Topics Concern     Parent/Sibling W/ Cabg, Mi Or Angioplasty Before 65f 55m? No     Caffeine Concern No     Sleep Concern No     Special Diet Yes     Exercise Yes     Walking     Seat Belt Yes     Social History Narrative       Review of Systems:  Skin:  Positive for (dialysis access left arm)     Eyes:  Positive for glasses  ENT:  Negative    Respiratory:  Positive for dyspnea on exertion  Cardiovascular:    edema;Positive for;chest pain  Gastroenterology: Positive for heartburn  Genitourinary:  Positive for (Dialysis)    Musculoskeletal:  Positive for arthritis  Neurologic:  Negative headaches  Psychiatric:  Negative    Heme/Lymph/Imm:  Positive for allergies  Endocrine:  Positive for thyroid disorder    Physical Exam:  Vitals: /58  Pulse 68  Ht 1.829 m (6' 0.01\")  Wt 87.9 kg (193 lb 11.2 oz)  BMI 26.26 kg/m2   Please refer to dictation for physical exam    Recent Lab Results:  LIPID RESULTS:  Lab Results   Component Value Date    CHOL 98 07/11/2016    HDL 29 (L) 07/11/2016    LDL 37 07/11/2016    TRIG 162 (H) 07/11/2016       LIVER ENZYME RESULTS:  Lab Results   Component Value Date    AST 12 10/30/2016    ALT 15 10/30/2016       CBC RESULTS:  Lab Results   Component Value Date    WBC 7.9 06/08/2017    RBC 3.57 (L) 06/08/2017    HGB 10.8 (L) 06/08/2017    HCT 34.1 (L) 06/08/2017    MCV 96 06/08/2017    MCH 30.3 06/08/2017    MCHC 31.7 06/08/2017    RDW 15.7 (H) 06/08/2017     06/08/2017       BMP RESULTS:  Lab Results   Component Value Date     06/08/2017    POTASSIUM 3.8 06/08/2017    CHLORIDE 101 06/08/2017    CO2 29 06/08/2017    ANIONGAP 8 06/08/2017    GLC " 132 (H) 06/08/2017    BUN 16 06/08/2017    CR 3.31 (H) 06/08/2017    GFRESTIMATED 18 (L) 06/08/2017    GFRESTBLACK 22 (L) 06/08/2017    CHRISTINE 9.0 06/08/2017        A1C RESULTS:  Lab Results   Component Value Date    A1C 6.0 06/09/2017       INR RESULTS:  Lab Results   Component Value Date    INR 0.91 07/14/2016    INR 1.09 08/12/2011           Lisa Flaherty PA-C   6/22/2017  Pager: [phone]

## 2017-06-22 NOTE — MR AVS SNAPSHOT
After Visit Summary   6/22/2017    Westley Ness    MRN: [id number]           Patient Information     Date Of Birth          1945        Visit Information        Provider Department      6/22/2017 1:50 PM Lisa Flaherty PA-C [facility] HEART AT [place]        Today's Diagnoses     Cardiomyopathy, ischemic    -  1    ICD (implantable cardioverter-defibrillator) in place        Coronary artery disease involving native coronary artery of native heart without angina pectoris        Atrial fibrillation, unspecified type (H)        ESRD (end stage renal disease) on dialysis (H)          Care Instructions    Today's Plan:   I am glad you are feeling better. You should not be on Aspirin. Continue with Plavix and Eliquis. Your Eliquis should be continued for at least 4 weeks.     If you have questions or concerns please call my nurse at [phone].     Scheduling phone number: [phone]  Reminder: Please bring in all current medications, over the counter supplements and vitamin bottles to your next appointment.    It was a pleasure seeing you today!     Lisa Flaherty  6/22/2017              Follow-ups after your visit        Your next 10 appointments already scheduled     Jul 12, 2017  3:10 PM CDT   Return Discharge with MARCIO Aviles CNP   [facility]ART [facility] ([facility])    [address] [place] [address] [address]  [facility] [id number] [phone]            Jul 13, 2017  4:30 PM CDT   Remote ICD Check with CARD DCR2   [facility] ([facility])    [address] [place], [place] [id number]  [facility] [id number] [phone]           This appointment is for a remote check of your debrillator.  This is not an appointment at the office.              Who to contact     If you have questions or need follow up information about today's clinic visit or your schedule please contact  "[facility]YS[facility] HEART AT [place] directly at [phone].  Normal or non-critical lab and imaging results will be communicated to you by MyChart, letter or phone within 4 business days after the clinic has received the results. If you do not hear from us within 7 days, please contact the clinic through [employer] or phone. If you have a critical or abnormal lab result, we will notify you by phone as soon as possible.  Submit refill requests through [employer] or call your pharmacy and they will forward the refill request to us. Please allow 3 business days for your refill to be completed.          Additional Information About Your Visit        [employer] Information     [employer] lets you send messages to your doctor, view your test results, renew your prescriptions, schedule appointments and more. To sign up, go to [email or web address] . Click on \"Log in\" on the left side of the screen, which will take you to the Welcome page. Then click on \"Sign up Now\" on the right side of the page.     You will be asked to enter the access code listed below, as well as some personal information. Please follow the directions to create your username and password.     Your access code is: [email or web address]  Expires: 2017 10:05 AM     Your access code will  in 90 days. If you need help or a new code, please call your [place] clinic or [phone].        Care EveryWhere ID     This is your Care EveryWhere ID. This could be used by other organizations to access your [place] medical records  [id number]        Your Vitals Were     Pulse Height BMI (Body Mass Index)             68 1.829 m (6' 0.01\") 26.26 kg/m2          Blood Pressure from Last 3 Encounters:   17 126/58   17 143/73   17 110/50    Weight from Last 3 Encounters:   17 87.9 kg (193 lb 11.2 oz)   17 83.2 kg (183 lb 6.8 oz)   17 86.2 kg (190 lb 1.6 oz)              We Performed the Following     EKG 12-lead complete " w/read - Clinics (performed today)          Today's Medication Changes          These changes are accurate as of: 6/22/17  2:23 PM.  If you have any questions, ask your nurse or doctor.               Stop taking these medicines if you haven't already. Please contact your care team if you have questions.     aspirin 81 MG EC tablet   Stopped by:  Lisa Flaherty PA-C                    Primary Care Provider Office Phone # Fax #    Josselin Kolb [phone]       [facility] [address] [facility] [address]        Equal Access to Services     [facility]: Hadii aad ku hadasho Soomaali, waaxda luqadaha, qaybta kaalmada adesauravyada, purnima cuenca . So [facility] [phone].    ATENCIÓN: Si habla español, tiene a jacome disposición servicios gratuitos de asistencia lingüística. [facility] [phone].    We comply with applicable federal civil rights laws and [place] laws. We do not discriminate on the basis of race, color, national origin, age, disability sex, sexual orientation or gender identity.            Thank you!     Thank you for choosing [facility] HEART AT [place]  for your care. Our goal is always to provide you with excellent care. Hearing back from our patients is one way we can continue to improve our services. Please take a few minutes to complete the written survey that you may receive in the mail after your visit with us. Thank you!             Your Updated Medication List - Protect others around you: Learn how to safely use, store and throw away your medicines at www.disposemymeds.org.          This list is accurate as of: 6/22/17  2:23 PM.  Always use your most recent med list.                   Brand Name Dispense Instructions for use Diagnosis    acetaminophen 325 MG tablet    TYLENOL    60 tablet    Take 2 tablets (650 mg) by mouth 4 times daily    Closed nondisplaced fracture of right patella, unspecified  fracture morphology, initial encounter, Elbow fracture, right, closed, initial encounter       apixaban ANTICOAGULANT 2.5 MG tablet    ELIQUIS    60 tablet    Take 1 tablet (2.5 mg) by mouth 2 times daily    Atrial fibrillation, unspecified type (H)       B complex-C-folic acid 1 MG capsule     90 capsule    Take 1 capsule by mouth every evening    ESRD (end stage renal disease) on dialysis (H)       carvedilol 6.25 MG tablet    COREG    180 tablet    Take 1 tablet (6.25 mg) by mouth 2 times daily (with meals) Take after dialysis    NSTEMI (non-ST elevated myocardial infarction) (H), Mild CAD       clopidogrel 75 MG tablet    PLAVIX    30 tablet    Take 1 tablet (75 mg) by mouth daily To protect your stent(s).  Do not stop taking unless directed by cardiology.    Coronary artery disease involving native coronary artery of native heart without angina pectoris       guaiFENesin-dextromethorphan 100-10 MG/5ML syrup    ROBITUSSIN DM    560 mL    Take 5 mLs by mouth every 4 hours as needed for cough    Cough       isosorbide mononitrate 30 MG 24 hr tablet    IMDUR    90 tablet    Take 0.5 tablets (15 mg) by mouth daily    NSTEMI (non-ST elevated myocardial infarction) (H)       Levothyroxine Sodium 50 MCG Caps     90 capsule    Take 50 mcg by mouth daily    Hypothyroidism, unspecified type       lisinopril 2.5 MG tablet    PRINIVIL/Zestril    60 tablet    Take one tablet after dialysis. Take second tablet at bedtime.    NSTEMI (non-ST elevated myocardial infarction) (H)       loperamide 2 MG capsule    IMODIUM     Take 1 mg by mouth as needed        MEXILETINE HCL PO      Take 150 mg by mouth 2 times daily        nitroglycerin 0.4 MG sublingual tablet    NITROSTAT    25 tablet    1 TAB EVERY 5 MIN AS NEEDED, UP TO 3 PER EPISODE    Angina pectoris (H)       pantoprazole 40 MG EC tablet    PROTONIX    30 tablet    Take 1 tablet (40 mg) by mouth every morning    Gastroesophageal reflux disease, esophagitis presence not  specified       pravastatin 40 MG tablet    PRAVACHOL    90 tablet    Take 1 tablet (40 mg) by mouth daily    Coronary artery disease involving native coronary artery of native heart without angina pectoris       psyllium 0.52 G capsule      Take 1 capsule by mouth daily as needed        RANITIDINE HCL PO      Take 75 mg by mouth 1-2 tablets daily        sevelamer 800 MG tablet    RENVELA     Take 800-1,600 mg by mouth as needed ON HOLD

## 2017-06-22 NOTE — LETTER
6/22/2017    Josselin Kolb MD  [address] [facility] [address]    RE: Westley Ness       Dear Colleague,    I had the pleasure of seeing Westley Ness in the [facility].    PRIMARY CARDIOLOGIST:  Po Sen MD      REASON FOR CLINIC VISIT:  Post-hospitalization followup.      Westley is a delightful 71-year-old gentleman who was admitted on 06/07/2017 with symptoms of tachycardia and fatigue.  His past medical history is notable for previous MI with multiple coronary stentings (in 07/2016 he had OM1 and LAD disease with PTCA and FOREST to proximal OM1; in 11/2016 he had PTCA and cutting balloon to ostial and proximal 1st OM [ISR]; more recently, in 02/2017, he had arthrectomy/Rotablator, LAD, with mid-LAD FOREST placement and ostial OM1 ISR with FOREST placement), severe ischemic cardiomyopathy with ejection fraction 20%-25% (status post ICD), ESRD (dialysis 3 times a week), recent successful atrial flutter ablation.  He was placed on Eliquis and his aspirin was discontinued.  He is to continue with Plavix and Eliquis for 4 weeks.  He will follow up with my colleague, Lacy Taylor NP, and at that time discontinuation of his Eliquis will be considered.      Westley tells me today that he is feeling much better.  He has no palpitations and his energy level is back.  He denies any shortness of breath, orthopnea, PND, abdominal distention or peripheral edema.  He has no new concerns.      CURRENT CARDIAC MEDICATIONS:   1.  Apixaban 2.5 mg 2 times daily.   2.  Lisinopril 2.5 mg 1 tablet after dialysis with second tablet at bedtime.   3.  Nitroglycerin 0.4 mg as needed for chest discomfort.   4.  Carvedilol 6.25 mg 2 times daily.   5.  Imdur 15 mg 1 time daily.   6.  Clopidogrel 75 mg daily.      The remainder of his allergies, social history and review of systems was reviewed as documented separately.      PHYSICAL EXAMINATION:   VITAL SIGNS:  Blood pressure 126/50 mmHg, pulse 68  beats per minute, weight 183 pounds.   GENERAL:  NAD, obese.   NECK:  Normal JVP.  No carotid bruits.   LUNGS:  Clear to auscultation bilaterally.   CARDIAC:  Regular rate and rhythm without murmurs, rubs.   ABDOMEN:  Soft, nontender.   EXTREMITIES:  No edema.   NEUROLOGIC:  Alert and oriented x3.      ASSESSMENT AND PLAN:   Westley is a delightful 71-year-old gentleman who was admitted on 06/07/2017 with symptoms of tachycardia and fatigue.  He was found to have rapid atrial flutter, for which he underwent ablation.  He was placed on Eliquis and his aspirin was discontinued.  He has done quite well and he was discharged in stable condition.      Westley tells me that he is doing well.  He denies any new symptoms.  His 12-lead EKG today demonstrates normal sinus rhythm with borderline 1st-degree AV block.  The EKG has low voltage.  Compared to his post atrial flutter ablation EKG, there is no significant difference.      He still has aspirin listed on his medication list and the patient has confusion regarding this.  I did inform him that he should not be taking aspirin.  He should only be on Eliquis and Plavix.  He does have an appointment with my colleague, Lacy Taylor NP, for a 4-week post-hospitalization followup.  At that time, possible discontinuation of Eliquis will be discussed.      Thank you for allowing me to participate in the care of this delightful patient today.     Sincerely,    Lisa Flaherty PA-C     [facility]

## 2017-06-22 NOTE — PATIENT INSTRUCTIONS
Today's Plan:   I am glad you are feeling better. You should not be on Aspirin. Continue with Plavix and Eliquis. Your Eliquis should be continued for at least 4 weeks.     If you have questions or concerns please call my nurse at [phone].     Scheduling phone number: [phone]  Reminder: Please bring in all current medications, over the counter supplements and vitamin bottles to your next appointment.    It was a pleasure seeing you today!     Lisa Flaherty  6/22/2017

## 2017-06-23 NOTE — PROGRESS NOTES
PRIMARY CARDIOLOGIST:  Po Sen MD      REASON FOR CLINIC VISIT:  Post-hospitalization followup.      HISTORY OF PRESENT ILLNESS:  Westley is a delightful 71-year-old gentleman who was admitted on 06/07/2017 with symptoms of tachycardia and fatigue.  His past medical history is notable for previous MI with multiple coronary stentings (in 07/2016 he had OM1 and LAD disease with PTCA and FOREST to proximal OM1; in 11/2016 he had PTCA and cutting balloon to ostial and proximal 1st OM [ISR]; more recently, in 02/2017, he had arthrectomy/Rotablator, LAD, with mid-LAD FOREST placement and ostial OM1 ISR with FOREST placement), severe ischemic cardiomyopathy with ejection fraction 20%-25% (status post ICD), ESRD (dialysis 3 times a week), recent successful atrial flutter ablation.  He was placed on Eliquis and his aspirin was discontinued.  He is to continue with Plavix and Eliquis for 4 weeks.  He will follow up with my colleague, Lacy Taylor NP, and at that time discontinuation of his Eliquis will be considered.      Westley tells me today that he is feeling much better.  He has no palpitations and his energy level is back.  He denies any shortness of breath, orthopnea, PND, abdominal distention or peripheral edema.  He has no new concerns.      CURRENT CARDIAC MEDICATIONS:   1.  Apixaban 2.5 mg 2 times daily.   2.  Lisinopril 2.5 mg 1 tablet after dialysis with second tablet at bedtime.   3.  Nitroglycerin 0.4 mg as needed for chest discomfort.   4.  Carvedilol 6.25 mg 2 times daily.   5.  Imdur 15 mg 1 time daily.   6.  Clopidogrel 75 mg daily.      The remainder of his allergies, social history and review of systems was reviewed as documented separately.      PHYSICAL EXAMINATION:   VITAL SIGNS:  Blood pressure 126/50 mmHg, pulse 68 beats per minute, weight 183 pounds.   GENERAL:  NAD, obese.   NECK:  Normal JVP.  No carotid bruits.   LUNGS:  Clear to auscultation bilaterally.   CARDIAC:  Regular rate and rhythm without  murmurs, rubs.   ABDOMEN:  Soft, nontender.   EXTREMITIES:  No edema.   NEUROLOGIC:  Alert and oriented x3.      ASSESSMENT AND PLAN:   Westley is a delightful 71-year-old gentleman who was admitted on 2017 with symptoms of tachycardia and fatigue.  He was found to have rapid atrial flutter, for which he underwent ablation.  He was placed on Eliquis and his aspirin was discontinued.  He has done quite well and he was discharged in stable condition.      Westley tells me that he is doing well.  He denies any new symptoms.  His 12-lead EKG today demonstrates normal sinus rhythm with borderline 1st-degree AV block.  The EKG has low voltage.  Compared to his post atrial flutter ablation EKG, there is no significant difference.      He still has aspirin listed on his medication list and the patient has confusion regarding this.  I did inform him that he should not be taking aspirin.  He should only be on Eliquis and Plavix.  He does have an appointment with my colleague, Layc Taylor NP, for a 4-week post-hospitalization followup.  At that time, possible discontinuation of Eliquis will be discussed.      Thank you for allowing me to participate in the care of this delightful patient today.         PILAR WILCOX PA-C             D: 2017 14:33   T: 2017 13:12   MT: ELISA      Name:     WESTLEY LÓPEZ   MRN:      [id number]        Account:      [id number]   :      1945           Service Date: 2017      Document: [id number]

## 2017-07-12 ENCOUNTER — OFFICE VISIT (OUTPATIENT)
Dept: CARDIOLOGY | Facility: CLINIC | Age: 72
End: 2017-07-12
Attending: NURSE PRACTITIONER
Payer: MEDICARE

## 2017-07-12 VITALS
BODY MASS INDEX: 25.6 KG/M2 | HEIGHT: 72 IN | WEIGHT: 189 LBS | SYSTOLIC BLOOD PRESSURE: 118 MMHG | DIASTOLIC BLOOD PRESSURE: 60 MMHG | HEART RATE: 72 BPM

## 2017-07-12 DIAGNOSIS — I48.3 TYPICAL ATRIAL FLUTTER (H): ICD-10-CM

## 2017-07-12 DIAGNOSIS — I25.5 CARDIOMYOPATHY, ISCHEMIC: Primary | ICD-10-CM

## 2017-07-12 PROCEDURE — 99214 OFFICE O/P EST MOD 30 MIN: CPT | Mod: 25 | Performed by: NURSE PRACTITIONER

## 2017-07-12 PROCEDURE — 93000 ELECTROCARDIOGRAM COMPLETE: CPT | Performed by: NURSE PRACTITIONER

## 2017-07-12 NOTE — MR AVS SNAPSHOT
"              After Visit Summary   7/12/2017    Westley Ness    MRN: [id number]           Patient Information     Date Of Birth          1945        Visit Information        Provider Department      7/12/2017 3:10 PM Lacy Taylor APRN CNP [facility]        Today's Diagnoses     Atrial fibrillation, unspecified type (H)          Care Instructions    Complete Eliquis prescription. When completed then restart aspirin 81 mg daily.  Continue Plavix           Follow-ups after your visit        Your next 10 appointments already scheduled     Jul 13, 2017  4:30 PM CDT   Remote ICD Check with CARD DCR2   [facility] ([facility])    [address] [place], [place] [address]  [facility] [id number] [phone]           This appointment is for a remote check of your debrillator.  This is not an appointment at the office.              Who to contact     If you have questions or need follow up information about today's clinic visit or your schedule please contact [facility] directly at [phone].  Normal or non-critical lab and imaging results will be communicated to you by [employer], letter or phone within 4 business days after the clinic has received the results. If you do not hear from us within 7 days, please contact the clinic through [employer] or phone. If you have a critical or abnormal lab result, we will notify you by phone as soon as possible.  Submit refill requests through [employer] or call your pharmacy and they will forward the refill request to us. Please allow 3 business days for your refill to be completed.          Additional Information About Your Visit        [employer] Information     [employer] lets you send messages to your doctor, view your test results, renew your prescriptions, schedule appointments and more. To sign up, go to [email or web address] . Click on \"Log in\" on the " "left side of the screen, which will take you to the Welcome page. Then click on \"Sign up Now\" on the right side of the page.     You will be asked to enter the access code listed below, as well as some personal information. Please follow the directions to create your username and password.     Your access code is: [email or web address]  Expires: 2017 10:05 AM     Your access code will  in 90 days. If you need help or a new code, please call your [facility] or [phone].        Care EveryWhere ID     This is your Care EveryWhere ID. This could be used by other organizations to access your [place] medical records  [id number]        Your Vitals Were     Pulse Height BMI (Body Mass Index)             72 1.829 m (6') 25.63 kg/m2          Blood Pressure from Last 3 Encounters:   17 118/60   17 126/58   17 143/73    Weight from Last 3 Encounters:   17 85.7 kg (189 lb)   17 87.9 kg (193 lb 11.2 oz)   17 83.2 kg (183 lb 6.8 oz)              We Performed the Following     EKG 12-lead complete w/read  (to be scheduled)     Follow-Up with Cardiac Advanced Practice Provider        Primary Care Provider Office Phone # Fax #    Josselin Kolb [phone]       [facility] [address] [facility] [address]        Equal Access to Services     LAZARO ROMERO : Hadii aad ku hadasho Soomaali, waaxda luqadaha, qaybta kaalmada adeegyada, purnima marquez. So [place] [phone].    ATENCIÓN: Si habla español, tiene a jacome disposición servicios gratuitos de asistencia lingüística. Llame al [phone].    We comply with applicable federal civil rights laws and [place] laws. We do not discriminate on the basis of race, color, national origin, age, disability sex, sexual orientation or gender identity.            Thank you!     Thank you for choosing [facility] HEART AT [place]  for your care. Our goal is " always to provide you with excellent care. Hearing back from our patients is one way we can continue to improve our services. Please take a few minutes to complete the written survey that you may receive in the mail after your visit with us. Thank you!             Your Updated Medication List - Protect others around you: Learn how to safely use, store and throw away your medicines at www.disposemymeds.org.          This list is accurate as of: 7/12/17  3:15 PM.  Always use your most recent med list.                   Brand Name Dispense Instructions for use Diagnosis    acetaminophen 325 MG tablet    TYLENOL    60 tablet    Take 2 tablets (650 mg) by mouth 4 times daily    Closed nondisplaced fracture of right patella, unspecified fracture morphology, initial encounter, Elbow fracture, right, closed, initial encounter       apixaban ANTICOAGULANT 2.5 MG tablet    ELIQUIS    60 tablet    Take 1 tablet (2.5 mg) by mouth 2 times daily    Atrial fibrillation, unspecified type (H)       B complex-C-folic acid 1 MG capsule     90 capsule    Take 1 capsule by mouth every evening    ESRD (end stage renal disease) on dialysis (H)       carvedilol 6.25 MG tablet    COREG    180 tablet    Take 1 tablet (6.25 mg) by mouth 2 times daily (with meals) Take after dialysis    NSTEMI (non-ST elevated myocardial infarction) (H), Mild CAD       clopidogrel 75 MG tablet    PLAVIX    30 tablet    Take 1 tablet (75 mg) by mouth daily To protect your stent(s).  Do not stop taking unless directed by cardiology.    Coronary artery disease involving native coronary artery of native heart without angina pectoris       guaiFENesin-dextromethorphan 100-10 MG/5ML syrup    ROBITUSSIN DM    560 mL    Take 5 mLs by mouth every 4 hours as needed for cough    Cough       isosorbide mononitrate 30 MG 24 hr tablet    IMDUR    90 tablet    Take 0.5 tablets (15 mg) by mouth daily    NSTEMI (non-ST elevated myocardial infarction) (H)       Levothyroxine  Sodium 50 MCG Caps     90 capsule    Take 50 mcg by mouth daily    Hypothyroidism, unspecified type       lisinopril 2.5 MG tablet    PRINIVIL/Zestril    60 tablet    Take one tablet after dialysis. Take second tablet at bedtime.    NSTEMI (non-ST elevated myocardial infarction) (H)       loperamide 2 MG capsule    IMODIUM     Take 1 mg by mouth as needed        MEXILETINE HCL PO      Take 150 mg by mouth 2 times daily        nitroGLYcerin 0.4 MG sublingual tablet    NITROSTAT    25 tablet    1 TAB EVERY 5 MIN AS NEEDED, UP TO 3 PER EPISODE    Angina pectoris (H)       pantoprazole 40 MG EC tablet    PROTONIX    30 tablet    Take 1 tablet (40 mg) by mouth every morning    Gastroesophageal reflux disease, esophagitis presence not specified       pravastatin 40 MG tablet    PRAVACHOL    90 tablet    Take 1 tablet (40 mg) by mouth daily    Coronary artery disease involving native coronary artery of native heart without angina pectoris       psyllium 0.52 G capsule      Take 1 capsule by mouth daily as needed        RANITIDINE HCL PO      Take 75 mg by mouth 1-2 tablets daily        sevelamer 800 MG tablet    RENVELA     Take 800-1,600 mg by mouth as needed ON HOLD

## 2017-07-12 NOTE — LETTER
2017    Josselin Kolb MD  [facility]   [address]  [facility] [address]    RE: Westley Ness       Dear Colleague,    I had the pleasure of seeing Westley Ness in the [facility].    Name:     WESTLEY NESS   MRN:      [id number]        Account:      [id number]   :      1945      HPI:   Mr. Ness is a delightful 71-year-old gentleman who was admitted on 2017 with symptoms of tachycardia and fatigue.   He was noted to be in atrial flutter with rapid ventricular response. EP was consulted. Device interrogation confirmed onset of arrhythmia ( at 4:30AM; less than 48 hrs from admission).  He was started on anticoagulation and was referred for electrophysiology study on 17 and was found to have a typical counterclockwise cavotricuspid isthmus dependent flutter which was successfully ablated. He was placed on Eliquis and his aspirin was discontinued. He is to continue with Plavix and Eliquis for at least 4 weeks.     Art's past medical history is notable for previous MI with multiple coronary stentings (in 2016 he had OM1 and LAD disease with PTCA and FOREST to proximal OM1; in 2016 he had PTCA and cutting balloon to ostial and proximal 1st OM [ISR]; more recently, in 2017, he had arthrectomy/Rotablator, LAD, with mid-LAD FOREST placement and ostial OM1 ISR with FOREST placement), severe ischemic cardiomyopathy with ejection fraction 20%-25% (status post ICD), and ESRD (dialysis 3 times a week).      He has no palpitations and his energy level is ok. He still tires easily. He denies any shortness of breath, orthopnea, PND, abdominal distention or peripheral edema.  He has no new concerns.     12 lead EKG today shows sinus rhythm with 1st degree AVB.    Assessment and Plan:  1. Atrial flutter (resolved)-sinus  S/P flutter ablation on 17 with   Onset <24 hours, no need for VIET  Eliquis 2.5 mg bid started on  6/7/17 Eliquis is the only one that indicated for ESRD.    Pt just refilled his script. Will complete his Eliquis then return to ASA 81 mg qd along with Plavix 75 mg qd.      2. Cardiomyopathy  SOB improved after flutter ablation, but still tired.  Echo shows EF 20-25% (25-30% previously), with moderate RV dysfunction while in flutter.  On coreg, lisinopril, Imdur  Euvolemic with dialysis 3 x week  Repeat echo ordered for October with  follow up.      3. Severe CAD with previous NSTEMI  7/14/16: OM1 and LAD disease with PTCA and FOREST to proximal OM1  11/14/16: PTCA/cutting balloon of ostial and  Proximal first marginal (in stent restenosis)  2/14/17: arthrectomy/rotablator LAD with mid LAD FOREST placement and Ostial OM1 in-stent restenosis with FOREST placement       No c/o of angina. Lipids are low: TC 98, HDL 29, LDL 37,  done 7/2016.  Pravastatin 40 mg, Imdur, coreg, Plavix.   Off asa, but will resume in 3 weeks when Eliquis is completed.      4. Renal failure  Dialysis Mon, Wed, and Fri-on run now.    5. VT/VF (2011)  Has single chamber Biotronik ICD at VVI 40 implanted 2011.   Normal QRS (~100 ms), however, monitor in the future to see if he's a candidate for a Bi-V upgrade.  Device check is scheduled for tomorrow.    Pt to call with questions or concerns. Follow up in October for echo and .    Lacy Taylor NP      No orders of the defined types were placed in this encounter.      No orders of the defined types were placed in this encounter.      There are no discontinued medications.      Encounter Diagnosis   Name Primary?     Atrial fibrillation, unspecified type (H)        CURRENT MEDICATIONS:  Current Outpatient Prescriptions   Medication Sig Dispense Refill     apixaban ANTICOAGULANT (ELIQUIS) 2.5 MG tablet Take 1 tablet (2.5 mg) by mouth 2 times daily 60 tablet 3     guaiFENesin-dextromethorphan (ROBITUSSIN DM) 100-10 MG/5ML syrup Take 5 mLs by mouth every 4 hours as needed for cough 560  mL 3     lisinopril (PRINIVIL/ZESTRIL) 2.5 MG tablet Take one tablet after dialysis. Take second tablet at bedtime. 60 tablet 3     nitroglycerin (NITROSTAT) 0.4 MG sublingual tablet 1 TAB EVERY 5 MIN AS NEEDED, UP TO 3 PER EPISODE 25 tablet 0     B complex-C-folic acid (NEPHROCAPS/TRIPHROCAPS) 1 MG capsule Take 1 capsule by mouth every evening 90 capsule 3     pantoprazole (PROTONIX) 40 MG EC tablet Take 1 tablet (40 mg) by mouth every morning 30 tablet 10     carvedilol (COREG) 6.25 MG tablet Take 1 tablet (6.25 mg) by mouth 2 times daily (with meals) Take after dialysis 180 tablet 3     isosorbide mononitrate (IMDUR) 30 MG 24 hr tablet Take 0.5 tablets (15 mg) by mouth daily 90 tablet 3     Levothyroxine Sodium 50 MCG CAPS Take 50 mcg by mouth daily 90 capsule 3     pravastatin (PRAVACHOL) 40 MG tablet Take 1 tablet (40 mg) by mouth daily 90 tablet 3     acetaminophen (TYLENOL) 325 MG tablet Take 2 tablets (650 mg) by mouth 4 times daily 60 tablet 0     clopidogrel (PLAVIX) 75 MG tablet Take 1 tablet (75 mg) by mouth daily To protect your stent(s).  Do not stop taking unless directed by cardiology. 30 tablet 11     sevelamer (RENVELA) 800 MG tablet Take 800-1,600 mg by mouth as needed ON HOLD       loperamide (IMODIUM) 2 MG capsule Take 1 mg by mouth as needed        psyllium 0.52 G capsule Take 1 capsule by mouth daily as needed        MEXILETINE HCL PO Take 150 mg by mouth 2 times daily       RANITIDINE HCL PO Take 75 mg by mouth 1-2 tablets daily         ALLERGIES     Allergies   Allergen Reactions     Contrast Dye Hives     Does fine if he uses benadryl prior.     No Clinical Screening - See Comments      Green beans - Diarrhea.    Topical antibiotic - name unknown - caused swelling of the penis.       PAST MEDICAL HISTORY:  Past Medical History:   Diagnosis Date     Aortic root dilatation (H)     mild     Atrial flutter (H)      CAD (coronary artery disease)      Cardiomyopathy, ischemic      Diabetes (H)       H/O percutaneous transluminal coronary angioplasty 7-    Successful Percutaneous coronary intervention of the proximal OM1     Hyperlipidemia      Hypertension      Hypothyroidism      ICD (implantable cardioverter-defibrillator) in place      NSTEMI (non-ST elevated myocardial infarction) (H)      Renal disease     end stage     Shortness of breath      Syncope      Tachycardia        PAST SURGICAL HISTORY:  Past Surgical History:   Procedure Laterality Date     CHOLECYSTECTOMY       H ABLATION ATRIAL FLUTTER  06/08/2017     HC LEFT HEART CATHETERIZATION  7/14/2016     HC LEFT HEART CATHETERIZATION  9/21/2016     VASCULAR SURGERY      LUE fistula       FAMILY HISTORY:  Family History   Problem Relation Age of Onset     Hypertension Mother      CEREBROVASCULAR DISEASE Mother      Hypertension Father      C.A.D. Paternal Grandmother        SOCIAL HISTORY:  Social History     Social History     Marital status: Single     Spouse name: N/A     Number of children: N/A     Years of education: N/A     Social History Main Topics     Smoking status: Former Smoker     Packs/day: 2.00     Years: 35.00     Types: Cigarettes     Quit date: 11/1/1996     Smokeless tobacco: None     Alcohol use 0.0 oz/week     0 Standard drinks or equivalent per week      Comment: rare     Drug use: No     Sexual activity: No     Other Topics Concern     Parent/Sibling W/ Cabg, Mi Or Angioplasty Before 65f 55m? No     Caffeine Concern No     Sleep Concern No     Special Diet Yes     Exercise Yes     Walking     Seat Belt Yes     Social History Narrative       Review of Systems:  Skin:  Positive for (dialysis access left arm)       Eyes:  Positive for glasses    ENT:  Negative      Respiratory:  Positive for dyspnea on exertion     Cardiovascular:    edema;Positive for;chest pain mild chest pain within last week; took his Nitro at that time.  Gastroenterology: Positive for heartburn    Genitourinary:  Positive for (Dialysis)       Musculoskeletal:  Positive for arthritis    Neurologic:  Negative headaches    Psychiatric:  Negative      Heme/Lymph/Imm:  Positive for allergies    Endocrine:  Positive for thyroid disorder      Physical Exam:  Vitals: /60  Pulse 72  Ht 1.829 m (6')  Wt 85.7 kg (189 lb)  BMI 25.63 kg/m2    Constitutional:  alert and oriented        Skin:  warm and dry to the touch        Head:  normocephalic, no masses or lesions        Eyes:  pupils equal and round        ENT:  no pallor or cyanosis, dentition good        Neck:       No JVD    Chest:  normal breath sounds, clear to auscultation, normal A-P diameter, normal symmetry, normal respiratory excursion, no use of accessory muscles   ICD incision in the right infraclavicular area was well-healed      Cardiac: regular rhythm;normal S1 and S2       RUSB;systolic ejection murmur;grade 1          Abdomen:  abdomen soft;BS normoactive        Extremities and Back:  no deformities, clubbing, cyanosis, erythema observed;no edema             Neurological:  affect appropriate, oriented to time, person and place        Thank you for allowing me to participate in the care of your patient.    Sincerely,     Lacy Taylor, NP, APRN CNP     [facility]

## 2017-07-12 NOTE — PROGRESS NOTES
Name:     SOCORRO NESS   MRN:      [id number]        Account:      [id number]   :      1945     HPI:   Mr. Ness is a delightful 71-year-old gentleman who was admitted on 2017 with symptoms of tachycardia and fatigue.  He was noted to be in atrial flutter with rapid ventricular response. EP was consulted. Device interrogation confirmed onset of arrhythmia ( at 4:30AM; less than 48 hrs from admission).  He was started on anticoagulation and was referred for electrophysiology study on 17 and was found to have a typical counterclockwise cavotricuspid isthmus dependent flutter which was successfully ablated. He was placed on Eliquis and his aspirin was discontinued. He is to continue with Plavix and Eliquis for at least 4 weeks.     Art's past medical history is notable for previous MI with multiple coronary stentings (in 2016 he had OM1 and LAD disease with PTCA and FOREST to proximal OM1; in 2016 he had PTCA and cutting balloon to ostial and proximal 1st OM [ISR]; more recently, in 2017, he had arthrectomy/Rotablator, LAD, with mid-LAD FOREST placement and ostial OM1 ISR with FOREST placement), severe ischemic cardiomyopathy with ejection fraction 20%-25% (status post ICD), and ESRD (dialysis 3 times a week).      He has no palpitations and his energy level is ok. He still tires easily. He denies any shortness of breath, orthopnea, PND, abdominal distention or peripheral edema.  He has no new concerns.     12 lead EKG today shows sinus rhythm with 1st degree AVB.    Assessment and Plan:  1. Atrial flutter (resolved)-sinus  S/P flutter ablation on 17 with   Onset <24 hours, no need for VIET  Eliquis 2.5 mg bid started on 17 Eliquis is the only one that indicated for ESRD.    Pt just refilled his script. Will complete his Eliquis then return to ASA 81 mg qd along with Plavix 75 mg qd.      2. Cardiomyopathy  SOB improved after flutter ablation, but still tired.  Echo shows  EF 20-25% (25-30% previously), with moderate RV dysfunction while in flutter.  On coreg, lisinopril, Imdur  Euvolemic with dialysis 3 x week  Repeat echo ordered for October with  follow up.      3. Severe CAD with previous NSTEMI  7/14/16: OM1 and LAD disease with PTCA and FOREST to proximal OM1  11/14/16: PTCA/cutting balloon of ostial and  Proximal first marginal (in stent restenosis)  2/14/17: arthrectomy/rotablator LAD with mid LAD FOREST placement and Ostial OM1 in-stent restenosis with FOREST placement       No c/o of angina. Lipids are low: TC 98, HDL 29, LDL 37,  done 7/2016.  Pravastatin 40 mg, Imdur, coreg, Plavix.   Off asa, but will resume in 3 weeks when Eliquis is completed.      4. Renal failure  Dialysis Mon, Wed, and Fri-on run now.    5. VT/VF (2011)  Has single chamber Biotronik ICD at VVI 40 implanted 2011.   Normal QRS (~100 ms), however, monitor in the future to see if he's a candidate for a Bi-V upgrade.  Device check is scheduled for tomorrow.    Pt to call with questions or concerns. Follow up in October for echo and .    Lacy Taylor NP      No orders of the defined types were placed in this encounter.      No orders of the defined types were placed in this encounter.      There are no discontinued medications.      Encounter Diagnosis   Name Primary?     Atrial fibrillation, unspecified type (H)        CURRENT MEDICATIONS:  Current Outpatient Prescriptions   Medication Sig Dispense Refill     apixaban ANTICOAGULANT (ELIQUIS) 2.5 MG tablet Take 1 tablet (2.5 mg) by mouth 2 times daily 60 tablet 3     guaiFENesin-dextromethorphan (ROBITUSSIN DM) 100-10 MG/5ML syrup Take 5 mLs by mouth every 4 hours as needed for cough 560 mL 3     lisinopril (PRINIVIL/ZESTRIL) 2.5 MG tablet Take one tablet after dialysis. Take second tablet at bedtime. 60 tablet 3     nitroglycerin (NITROSTAT) 0.4 MG sublingual tablet 1 TAB EVERY 5 MIN AS NEEDED, UP TO 3 PER EPISODE 25 tablet 0     B  complex-C-folic acid (NEPHROCAPS/TRIPHROCAPS) 1 MG capsule Take 1 capsule by mouth every evening 90 capsule 3     pantoprazole (PROTONIX) 40 MG EC tablet Take 1 tablet (40 mg) by mouth every morning 30 tablet 10     carvedilol (COREG) 6.25 MG tablet Take 1 tablet (6.25 mg) by mouth 2 times daily (with meals) Take after dialysis 180 tablet 3     isosorbide mononitrate (IMDUR) 30 MG 24 hr tablet Take 0.5 tablets (15 mg) by mouth daily 90 tablet 3     Levothyroxine Sodium 50 MCG CAPS Take 50 mcg by mouth daily 90 capsule 3     pravastatin (PRAVACHOL) 40 MG tablet Take 1 tablet (40 mg) by mouth daily 90 tablet 3     acetaminophen (TYLENOL) 325 MG tablet Take 2 tablets (650 mg) by mouth 4 times daily 60 tablet 0     clopidogrel (PLAVIX) 75 MG tablet Take 1 tablet (75 mg) by mouth daily To protect your stent(s).  Do not stop taking unless directed by cardiology. 30 tablet 11     sevelamer (RENVELA) 800 MG tablet Take 800-1,600 mg by mouth as needed ON HOLD       loperamide (IMODIUM) 2 MG capsule Take 1 mg by mouth as needed        psyllium 0.52 G capsule Take 1 capsule by mouth daily as needed        MEXILETINE HCL PO Take 150 mg by mouth 2 times daily       RANITIDINE HCL PO Take 75 mg by mouth 1-2 tablets daily         ALLERGIES     Allergies   Allergen Reactions     Contrast Dye Hives     Does fine if he uses benadryl prior.     No Clinical Screening - See Comments      Green beans - Diarrhea.    Topical antibiotic - name unknown - caused swelling of the penis.       PAST MEDICAL HISTORY:  Past Medical History:   Diagnosis Date     Aortic root dilatation (H)     mild     Atrial flutter (H)      CAD (coronary artery disease)      Cardiomyopathy, ischemic      Diabetes (H)      H/O percutaneous transluminal coronary angioplasty 7-    Successful Percutaneous coronary intervention of the proximal OM1     Hyperlipidemia      Hypertension      Hypothyroidism      ICD (implantable cardioverter-defibrillator) in place       NSTEMI (non-ST elevated myocardial infarction) (H)      Renal disease     end stage     Shortness of breath      Syncope      Tachycardia        PAST SURGICAL HISTORY:  Past Surgical History:   Procedure Laterality Date     CHOLECYSTECTOMY       H ABLATION ATRIAL FLUTTER  06/08/2017     HC LEFT HEART CATHETERIZATION  7/14/2016     HC LEFT HEART CATHETERIZATION  9/21/2016     VASCULAR SURGERY      LUE fistula       FAMILY HISTORY:  Family History   Problem Relation Age of Onset     Hypertension Mother      CEREBROVASCULAR DISEASE Mother      Hypertension Father      C.A.D. Paternal Grandmother        SOCIAL HISTORY:  Social History     Social History     Marital status: Single     Spouse name: N/A     Number of children: N/A     Years of education: N/A     Social History Main Topics     Smoking status: Former Smoker     Packs/day: 2.00     Years: 35.00     Types: Cigarettes     Quit date: 11/1/1996     Smokeless tobacco: None     Alcohol use 0.0 oz/week     0 Standard drinks or equivalent per week      Comment: rare     Drug use: No     Sexual activity: No     Other Topics Concern     Parent/Sibling W/ Cabg, Mi Or Angioplasty Before 65f 55m? No     Caffeine Concern No     Sleep Concern No     Special Diet Yes     Exercise Yes     Walking     Seat Belt Yes     Social History Narrative       Review of Systems:  Skin:  Positive for (dialysis access left arm)       Eyes:  Positive for glasses    ENT:  Negative      Respiratory:  Positive for dyspnea on exertion     Cardiovascular:    edema;Positive for;chest pain mild chest pain within last week; took his Nitro at that time.  Gastroenterology: Positive for heartburn    Genitourinary:  Positive for (Dialysis)      Musculoskeletal:  Positive for arthritis    Neurologic:  Negative headaches    Psychiatric:  Negative      Heme/Lymph/Imm:  Positive for allergies    Endocrine:  Positive for thyroid disorder      Physical Exam:  Vitals: /60  Pulse 72  Ht 1.829 m  (6')  Wt 85.7 kg (189 lb)  BMI 25.63 kg/m2    Constitutional:  alert and oriented        Skin:  warm and dry to the touch        Head:  normocephalic, no masses or lesions        Eyes:  pupils equal and round        ENT:  no pallor or cyanosis, dentition good        Neck:       No JVD    Chest:  normal breath sounds, clear to auscultation, normal A-P diameter, normal symmetry, normal respiratory excursion, no use of accessory muscles   ICD incision in the right infraclavicular area was well-healed      Cardiac: regular rhythm;normal S1 and S2       RUSB;systolic ejection murmur;grade 1          Abdomen:  abdomen soft;BS normoactive        Extremities and Back:  no deformities, clubbing, cyanosis, erythema observed;no edema             Neurological:  affect appropriate, oriented to time, person and place              CC  Lacy Taylor, APRN CNP  [facility]  [address]  [place] [id number]

## 2017-07-17 ENCOUNTER — ALLIED HEALTH/NURSE VISIT (OUTPATIENT)
Dept: CARDIOLOGY | Facility: CLINIC | Age: 72
End: 2017-07-17
Payer: MEDICARE

## 2017-07-17 DIAGNOSIS — Z95.810 ICD (IMPLANTABLE CARDIOVERTER-DEFIBRILLATOR) IN PLACE: Primary | ICD-10-CM

## 2017-07-17 PROCEDURE — 93295 DEV INTERROG REMOTE 1/2/MLT: CPT | Performed by: INTERNAL MEDICINE

## 2017-07-17 PROCEDURE — 93296 REM INTERROG EVL PM/IDS: CPT | Performed by: INTERNAL MEDICINE

## 2017-07-17 NOTE — MR AVS SNAPSHOT
"              After Visit Summary   7/17/2017    Westley Ness    MRN: [id number]           Patient Information     Date Of Birth          1945        Visit Information        Provider Department      7/17/2017 4:30 PM CARD DCR2 [facility]        Today's Diagnoses     ICD (implantable cardioverter-defibrillator) in place    -  1       Follow-ups after your visit        Additional Services     Follow-Up with Device Clinic       Please coordinate with Dr. Sen annual OV also due in October.                  Future tests that were ordered for you today     Open Future Orders        Priority Expected Expires Ordered    Follow-Up with Device Clinic Routine 10/18/2017 7/18/2018 7/18/2017            Who to contact     If you have questions or need follow up information about today's clinic visit or your schedule please contact [facility] directly at [phone].  Normal or non-critical lab and imaging results will be communicated to you by [employer], letter or phone within 4 business days after the clinic has received the results. If you do not hear from us within 7 days, please contact the clinic through MyChart or phone. If you have a critical or abnormal lab result, we will notify you by phone as soon as possible.  Submit refill requests through [employer] or call your pharmacy and they will forward the refill request to us. Please allow 3 business days for your refill to be completed.          Additional Information About Your Visit        MyChart Information     [employer] lets you send messages to your doctor, view your test results, renew your prescriptions, schedule appointments and more. To sign up, go to [email or web address] . Click on \"Log in\" on the left side of the screen, which will take you to the Welcome page. Then click on \"Sign up Now\" on the right side of the page.     You will be asked to enter the access code listed " below, as well as some personal information. Please follow the directions to create your username and password.     Your access code is: [email or web address]  Expires: 2017 10:05 AM     Your access code will  in 90 days. If you need help or a new code, please call your [place] clinic or [phone].        Care EveryWhere ID     This is your Care EveryWhere ID. This could be used by other organizations to access your [facility] medical records  [id number]         Blood Pressure from Last 3 Encounters:   17 118/60   17 126/58   17 143/73    Weight from Last 3 Encounters:   17 85.7 kg (189 lb)   17 87.9 kg (193 lb 11.2 oz)   17 83.2 kg (183 lb 6.8 oz)              We Performed the Following     ICD DEVICE INTERROGAT REMOTE (92251)     INTERROGATION DEVICE EVAL REMOTE, PACER/ICD (68812)        Primary Care Provider Office Phone # Fax #    Josselin Kolb [phone]       [place] [address] [facility] [address]        Equal Access to Services     LAZARO ROMERO AH: Hadii aad ku hadasho Soomaali, waaxda luqadaha, qaybta kaalmada adeegyada, waxay idiin hayhelenn felicia marquez. So [facility] [phone].    ATENCIÓN: Si habla español, tiene a jacome disposición servicios gratuitos de asistencia lingüística. Llame al [phone].    We comply with applicable federal civil rights laws and [place] laws. We do not discriminate on the basis of race, color, national origin, age, disability sex, sexual orientation or gender identity.            Thank you!     Thank you for choosing [facility] [place]  for your care. Our goal is always to provide you with excellent care. Hearing back from our patients is one way we can continue to improve our services. Please take a few minutes to complete the written survey that you may receive in the mail after your visit with us. Thank you!             Your Updated Medication List  - Protect others around you: Learn how to safely use, store and throw away your medicines at www.disposemymeds.org.          This list is accurate as of: 7/17/17 11:59 PM.  Always use your most recent med list.                   Brand Name Dispense Instructions for use Diagnosis    acetaminophen 325 MG tablet    TYLENOL    60 tablet    Take 2 tablets (650 mg) by mouth 4 times daily    Closed nondisplaced fracture of right patella, unspecified fracture morphology, initial encounter, Elbow fracture, right, closed, initial encounter       apixaban ANTICOAGULANT 2.5 MG tablet    ELIQUIS    60 tablet    Take 1 tablet (2.5 mg) by mouth 2 times daily    Atrial fibrillation, unspecified type (H)       B complex-C-folic acid 1 MG capsule     90 capsule    Take 1 capsule by mouth every evening    ESRD (end stage renal disease) on dialysis (H)       carvedilol 6.25 MG tablet    COREG    180 tablet    Take 1 tablet (6.25 mg) by mouth 2 times daily (with meals) Take after dialysis    NSTEMI (non-ST elevated myocardial infarction) (H), Mild CAD       clopidogrel 75 MG tablet    PLAVIX    30 tablet    Take 1 tablet (75 mg) by mouth daily To protect your stent(s).  Do not stop taking unless directed by cardiology.    Coronary artery disease involving native coronary artery of native heart without angina pectoris       guaiFENesin-dextromethorphan 100-10 MG/5ML syrup    ROBITUSSIN DM    560 mL    Take 5 mLs by mouth every 4 hours as needed for cough    Cough       isosorbide mononitrate 30 MG 24 hr tablet    IMDUR    90 tablet    Take 0.5 tablets (15 mg) by mouth daily    NSTEMI (non-ST elevated myocardial infarction) (H)       Levothyroxine Sodium 50 MCG Caps     90 capsule    Take 50 mcg by mouth daily    Hypothyroidism, unspecified type       lisinopril 2.5 MG tablet    PRINIVIL/Zestril    60 tablet    Take one tablet after dialysis. Take second tablet at bedtime.    NSTEMI (non-ST elevated myocardial infarction) (H)        loperamide 2 MG capsule    IMODIUM     Take 1 mg by mouth as needed        MEXILETINE HCL PO      Take 150 mg by mouth 2 times daily        nitroGLYcerin 0.4 MG sublingual tablet    NITROSTAT    25 tablet    1 TAB EVERY 5 MIN AS NEEDED, UP TO 3 PER EPISODE    Angina pectoris (H)       pantoprazole 40 MG EC tablet    PROTONIX    30 tablet    Take 1 tablet (40 mg) by mouth every morning    Gastroesophageal reflux disease, esophagitis presence not specified       pravastatin 40 MG tablet    PRAVACHOL    90 tablet    Take 1 tablet (40 mg) by mouth daily    Coronary artery disease involving native coronary artery of native heart without angina pectoris       psyllium 0.52 G capsule      Take 1 capsule by mouth daily as needed        RANITIDINE HCL PO      Take 75 mg by mouth 1-2 tablets daily        sevelamer 800 MG tablet    RENVELA     Take 800-1,600 mg by mouth as needed ON HOLD

## 2017-07-18 NOTE — PROGRESS NOTES
NuoDBroniCapsoVision Lumax ICD Remote Device Check  AP: 0 % : 0 %  Mode: VVI 40        Presenting Rhythm: NSR  Heart Rate: Stable with adequate variability  Sensing: Stable    Pacing Threshold: Stable    Impedance: Stable  Battery Status: 2.98 V at MOL 2 at 33% with charge time of 12.4 seconds  Atrial Arrhythmia: NA  Ventricular Arrhythmia: None  ATP: None    Shocks: None    Care Plan: Writer called pt with results and he has no complaints. Informed that he is due for annual threshold in Oct and he states he would like to coordinate this gilda't with Dr. Graf OV. Order sandra Obrien RN.

## 2017-07-19 ENCOUNTER — TELEPHONE (OUTPATIENT)
Dept: INTERNAL MEDICINE | Facility: CLINIC | Age: 72
End: 2017-07-19

## 2017-07-19 DIAGNOSIS — R00.0 TACHYCARDIA: Primary | ICD-10-CM

## 2017-07-19 RX ORDER — MEXILETINE HYDROCHLORIDE 150 MG/1
150 CAPSULE ORAL 2 TIMES DAILY
Qty: 180 CAPSULE | Refills: 3 | Status: SHIPPED | OUTPATIENT
Start: 2017-07-19 | End: 2018-07-15

## 2017-07-19 NOTE — TELEPHONE ENCOUNTER
MEXILETINE HCL PO     --   Sig: Take 150 mg by mouth 2 times daily   Class: Historical             Last Written Prescription Date: NA  Last Fill Quantity: 0,    # refills: 0  Last Office Visit with [facility] or [facility] prescribing provider:  4/12/17      Lab Results   Component Value Date    WBC 7.9 06/08/2017     Lab Results   Component Value Date    RBC 3.57 06/08/2017     Lab Results   Component Value Date    HGB 10.8 06/08/2017     Lab Results   Component Value Date    HCT 34.1 06/08/2017     No components found for: MCT  Lab Results   Component Value Date    MCV 96 06/08/2017     Lab Results   Component Value Date    MCH 30.3 06/08/2017     Lab Results   Component Value Date    MCHC 31.7 06/08/2017     Lab Results   Component Value Date    RDW 15.7 06/08/2017     Lab Results   Component Value Date     06/08/2017     Lab Results   Component Value Date    AST 12 10/30/2016     Lab Results   Component Value Date    ALT 15 10/30/2016     Creatinine   Date Value Ref Range Status   06/08/2017 3.31 (H) 0.66 - 1.25 mg/dL Final

## 2017-07-27 ENCOUNTER — TRANSFERRED RECORDS (OUTPATIENT)
Dept: HEALTH INFORMATION MANAGEMENT | Facility: CLINIC | Age: 72
End: 2017-07-27

## 2017-07-27 ENCOUNTER — HOSPITAL ENCOUNTER (EMERGENCY)
Facility: CLINIC | Age: 72
Discharge: HOME | End: 2017-07-27
Attending: EMERGENCY MEDICINE | Admitting: EMERGENCY MEDICINE
Payer: MEDICARE

## 2017-07-27 ENCOUNTER — APPOINTMENT (OUTPATIENT)
Dept: GENERAL RADIOLOGY | Facility: CLINIC | Age: 72
End: 2017-07-27
Attending: EMERGENCY MEDICINE
Payer: MEDICARE

## 2017-07-27 ENCOUNTER — TELEPHONE (OUTPATIENT)
Dept: INTERNAL MEDICINE | Facility: CLINIC | Age: 72
End: 2017-07-27

## 2017-07-27 VITALS
WEIGHT: 185.63 LBS | HEIGHT: 71 IN | BODY MASS INDEX: 25.99 KG/M2 | RESPIRATION RATE: 18 BRPM | HEART RATE: 77 BPM | TEMPERATURE: 97.8 F | DIASTOLIC BLOOD PRESSURE: 76 MMHG | SYSTOLIC BLOOD PRESSURE: 145 MMHG | OXYGEN SATURATION: 94 %

## 2017-07-27 DIAGNOSIS — R06.00 DYSPNEA, UNSPECIFIED TYPE: ICD-10-CM

## 2017-07-27 LAB
ANION GAP SERPL CALCULATED.3IONS-SCNC: 9 MMOL/L (ref 3–14)
BASOPHILS # BLD AUTO: 0 10E9/L (ref 0–0.2)
BASOPHILS NFR BLD AUTO: 0.5 %
BUN SERPL-MCNC: 10 MG/DL (ref 7–30)
CALCIUM SERPL-MCNC: 9 MG/DL (ref 8.5–10.1)
CHLORIDE SERPL-SCNC: 95 MMOL/L (ref 94–109)
CO2 SERPL-SCNC: 33 MMOL/L (ref 20–32)
CREAT SERPL-MCNC: 2.62 MG/DL (ref 0.66–1.25)
DIFFERENTIAL METHOD BLD: ABNORMAL
EOSINOPHIL # BLD AUTO: 0.1 10E9/L (ref 0–0.7)
EOSINOPHIL NFR BLD AUTO: 2.1 %
ERYTHROCYTE [DISTWIDTH] IN BLOOD BY AUTOMATED COUNT: 16.6 % (ref 10–15)
GFR SERPL CREATININE-BSD FRML MDRD: 24 ML/MIN/1.7M2
GLUCOSE SERPL-MCNC: 93 MG/DL (ref 70–99)
HCT VFR BLD AUTO: 37.2 % (ref 40–53)
HGB BLD-MCNC: 12.2 G/DL (ref 13.3–17.7)
IMM GRANULOCYTES # BLD: 0 10E9/L (ref 0–0.4)
IMM GRANULOCYTES NFR BLD: 0.2 %
INTERPRETATION ECG - MUSE: NORMAL
LYMPHOCYTES # BLD AUTO: 1 10E9/L (ref 0.8–5.3)
LYMPHOCYTES NFR BLD AUTO: 16.3 %
MCH RBC QN AUTO: 30.2 PG (ref 26.5–33)
MCHC RBC AUTO-ENTMCNC: 32.8 G/DL (ref 31.5–36.5)
MCV RBC AUTO: 92 FL (ref 78–100)
MONOCYTES # BLD AUTO: 0.5 10E9/L (ref 0–1.3)
MONOCYTES NFR BLD AUTO: 8.2 %
NEUTROPHILS # BLD AUTO: 4.5 10E9/L (ref 1.6–8.3)
NEUTROPHILS NFR BLD AUTO: 72.7 %
NRBC # BLD AUTO: 0 10*3/UL
NRBC BLD AUTO-RTO: 0 /100
PLATELET # BLD AUTO: 164 10E9/L (ref 150–450)
POTASSIUM SERPL-SCNC: 3.3 MMOL/L (ref 3.4–5.3)
RBC # BLD AUTO: 4.04 10E12/L (ref 4.4–5.9)
SODIUM SERPL-SCNC: 137 MMOL/L (ref 133–144)
TROPONIN I SERPL-MCNC: 0.04 UG/L (ref 0–0.04)
TROPONIN I SERPL-MCNC: 0.04 UG/L (ref 0–0.04)
WBC # BLD AUTO: 6.2 10E9/L (ref 4–11)

## 2017-07-27 PROCEDURE — 93005 ELECTROCARDIOGRAM TRACING: CPT

## 2017-07-27 PROCEDURE — 71020 XR CHEST 2 VW: CPT

## 2017-07-27 PROCEDURE — 80048 BASIC METABOLIC PNL TOTAL CA: CPT | Performed by: EMERGENCY MEDICINE

## 2017-07-27 PROCEDURE — 99285 EMERGENCY DEPT VISIT HI MDM: CPT | Mod: 25

## 2017-07-27 PROCEDURE — 84484 ASSAY OF TROPONIN QUANT: CPT | Mod: 91 | Performed by: EMERGENCY MEDICINE

## 2017-07-27 PROCEDURE — 85025 COMPLETE CBC W/AUTO DIFF WBC: CPT | Performed by: EMERGENCY MEDICINE

## 2017-07-27 ASSESSMENT — ENCOUNTER SYMPTOMS
SHORTNESS OF BREATH: 1
FATIGUE: 1
COUGH: 1

## 2017-07-27 NOTE — ED AVS SNAPSHOT
Emergency Department    [address] [facility] [address]    Phone:  [phone]    Fax:  [phone]                                       Westley Ness   MRN: [id number]    Department:   Emergency Department   Date of Visit:  7/27/2017           Patient Information     Date Of Birth          1945        Your diagnoses for this visit were:     Dyspnea, unspecified type        You were seen by David Rea DO.      Follow-up Information     Follow up with Josselin Kolb MD In 1 day.    Specialty:  Internal Medicine    Contact information:    [facility]  [address] [facility] [address]  [phone]          Follow up with  Emergency Department.    Specialty:  EMERGENCY MEDICINE    Why:  If symptoms worsen    Contact information:    [address] [facility] [address] [phone]        Discharge Instructions         Shortness of Breath (Dyspnea)  Shortness of breath is the feeling that you can't catch your breath or get enough air. It is also known as dyspnea.  Dyspnea can be caused by many different conditions. They include:    Acute asthma attack.    Worsening of chronic lung diseases such as chronic bronchitis and emphysema.    Heart failure. This is when weak heart muscle allows extra fluid to collect in the lungs.    Panic attacks or anxiety. Fear can cause rapid breathing (hyperventilation).    Pneumonia, or an infection in the lung tissue.    Exposure to toxic substances, fumes, smoke, or certain medicines.    Blood clot in the lung (pulmonary embolism). This is often from a piece of blood clot in a deep vein of the leg (deep vein thrombosis) that breaks off and travels to the lungs.    Heart attack or heart-related chest pain (angina).    Anemia.    Collapsed lung (pneumothorax).    Dehydration.    Pregnancy.  Based on your visit today, the exact cause of your shortness of breath is not certain. Your tests don t show  any of the serious causes of dyspnea. You may need other tests to find out if you have a serious problem. It s important to watch for any new symptoms or symptoms that get worse. Follow up with your healthcare provider as directed.  Home care  Follow these tips to take care of yourself at home:    When your symptoms are better, go back to your usual activities.    If you smoke, you should stop. Join a quit-smoking program or ask your healthcare provider for help.    Eat a healthy diet and get plenty of sleep.    Get regular exercise. Talk with your healthcare provider before starting to exercise, especially if you have other medical problems.    Cut down on the amount of caffeine and stimulants you consume.  Follow-up care  Follow up with your healthcare provider, or as advised.  If tests were done, you will be told if your treatment needs to be changed. You can call as directed for the results.  (Note: If an X-ray was taken, a specialist will review it. You will be notified of any new findings that may affect your care.)  Call 911 or get immediate medical care  Shortness of breath may be a sign of a serious medical problem. For example, it may be a problem with your heart or lungs. Call 911 if you have worsening shortness of breath or trouble breathing, especially with any of the symptoms below:    You are confused or it s difficult to wake you.    You faint or lose consciousness.    You have a fast heartbeat, or your heartbeat is irregular.    You are coughing up blood.    You have pain in your chest, arm, shoulder, neck, or upper back.    You break out in a sweat.  When to seek medical advice  Call your healthcare provider right away if any of these occur:    Slight shortness of breath or wheezing    Redness, pain or swelling in your leg, arm, or other body area    Swelling in both legs or ankles    Fast weight gain    Dizziness or weakness    Fever of 100.4 F (38 C) or higher, or as directed by your healthcare  provider  Date Last Reviewed: 9/13/2015 2000-2017 [employer]. [address] [place] [address], [place] [address]. All rights reserved. This information is not intended as a substitute for professional medical care. Always follow your healthcare professional's instructions.          24 Hour Appointment Hotline       To make an appointment at any [facility], call [phone] ([phone]). If you don't have a family doctor or clinic, we will help you find one. [facility] are conveniently located to serve the needs of you and your family.             Review of your medicines      Our records show that you are taking the medicines listed below. If these are incorrect, please call your family doctor or clinic.        Dose / Directions Last dose taken    acetaminophen 325 MG tablet   Commonly known as:  TYLENOL   Dose:  650 mg   Quantity:  60 tablet        Take 2 tablets (650 mg) by mouth 4 times daily   Refills:  0        apixaban ANTICOAGULANT 2.5 MG tablet   Commonly known as:  ELIQUIS   Dose:  2.5 mg   Quantity:  60 tablet        Take 1 tablet (2.5 mg) by mouth 2 times daily   Refills:  3        B complex-C-folic acid 1 MG capsule   Dose:  1 capsule   Quantity:  90 capsule        Take 1 capsule by mouth every evening   Refills:  3        carvedilol 6.25 MG tablet   Commonly known as:  COREG   Dose:  6.25 mg   Quantity:  180 tablet        Take 1 tablet (6.25 mg) by mouth 2 times daily (with meals) Take after dialysis   Refills:  3        clopidogrel 75 MG tablet   Commonly known as:  PLAVIX   Dose:  75 mg   Quantity:  30 tablet        Take 1 tablet (75 mg) by mouth daily To protect your stent(s).  Do not stop taking unless directed by cardiology.   Refills:  11        guaiFENesin-dextromethorphan 100-10 MG/5ML syrup   Commonly known as:  ROBITUSSIN DM   Dose:  5 mL   Quantity:  560 mL        Take 5 mLs by mouth every 4 hours as needed for cough   Refills:  3        isosorbide mononitrate 30 MG  24 hr tablet   Commonly known as:  IMDUR   Dose:  15 mg   Quantity:  90 tablet        Take 0.5 tablets (15 mg) by mouth daily   Refills:  3        Levothyroxine Sodium 50 MCG Caps   Dose:  50 mcg   Quantity:  90 capsule        Take 50 mcg by mouth daily   Refills:  3        lisinopril 2.5 MG tablet   Commonly known as:  PRINIVIL/Zestril   Quantity:  60 tablet        Take one tablet after dialysis. Take second tablet at bedtime.   Refills:  3        loperamide 2 MG capsule   Commonly known as:  IMODIUM   Dose:  1 mg        Take 1 mg by mouth as needed   Refills:  0        mexiletine 150 MG capsule   Commonly known as:  MEXITIL   Dose:  150 mg   Quantity:  180 capsule        Take 1 capsule (150 mg) by mouth 2 times daily   Refills:  3        nitroGLYcerin 0.4 MG sublingual tablet   Commonly known as:  NITROSTAT   Quantity:  25 tablet        1 TAB EVERY 5 MIN AS NEEDED, UP TO 3 PER EPISODE   Refills:  0        pantoprazole 40 MG EC tablet   Commonly known as:  PROTONIX   Dose:  40 mg   Quantity:  30 tablet        Take 1 tablet (40 mg) by mouth every morning   Refills:  10        pravastatin 40 MG tablet   Commonly known as:  PRAVACHOL   Dose:  40 mg   Quantity:  90 tablet        Take 1 tablet (40 mg) by mouth daily   Refills:  3        psyllium 0.52 G capsule   Dose:  1 capsule        Take 1 capsule by mouth daily as needed   Refills:  0        RANITIDINE HCL PO   Dose:  75 mg        Take 75 mg by mouth 1-2 tablets daily   Refills:  0        sevelamer 800 MG tablet   Commonly known as:  RENVELA   Dose:  800-1600 mg        Take 800-1,600 mg by mouth as needed ON HOLD   Refills:  0                Procedures and tests performed during your visit     Procedure/Test Number of Times Performed    Basic metabolic panel 1    CBC with platelets differential 1    EKG 12-lead, tracing only 1    Troponin I 2    XR Chest 2 Views 1      Orders Needing Specimen Collection     None      Pending Results     No orders found from  7/25/2017 to 7/28/2017.            Pending Culture Results     No orders found from 7/25/2017 to 7/28/2017.            Pending Results Instructions     If you had any lab results that were not finalized at the time of your Discharge, you can call the ED Lab Result RN at [phone]. You will be contacted by this team for any positive Lab results or changes in treatment. The nurses are available 7 days a week from 10A to 6:30P.  You can leave a message 24 hours per day and they will return your call.        Test Results From Your Hospital Stay        7/27/2017 12:29 PM      Component Results     Component Value Ref Range & Units Status    WBC 6.2 4.0 - 11.0 10e9/L Final    RBC Count 4.04 (L) 4.4 - 5.9 10e12/L Final    Hemoglobin 12.2 (L) 13.3 - 17.7 g/dL Final    Hematocrit 37.2 (L) 40.0 - 53.0 % Final    MCV 92 78 - 100 fl Final    MCH 30.2 26.5 - 33.0 pg Final    MCHC 32.8 31.5 - 36.5 g/dL Final    RDW 16.6 (H) 10.0 - 15.0 % Final    Platelet Count 164 150 - 450 10e9/L Final    Diff Method Automated Method  Final    % Neutrophils 72.7 % Final    % Lymphocytes 16.3 % Final    % Monocytes 8.2 % Final    % Eosinophils 2.1 % Final    % Basophils 0.5 % Final    % Immature Granulocytes 0.2 % Final    Nucleated RBCs 0 0 /100 Final    Absolute Neutrophil 4.5 1.6 - 8.3 10e9/L Final    Absolute Lymphocytes 1.0 0.8 - 5.3 10e9/L Final    Absolute Monocytes 0.5 0.0 - 1.3 10e9/L Final    Absolute Eosinophils 0.1 0.0 - 0.7 10e9/L Final    Absolute Basophils 0.0 0.0 - 0.2 10e9/L Final    Abs Immature Granulocytes 0.0 0 - 0.4 10e9/L Final    Absolute Nucleated RBC 0.0  Final         7/27/2017 12:42 PM      Component Results     Component Value Ref Range & Units Status    Sodium 137 133 - 144 mmol/L Final    Potassium 3.3 (L) 3.4 - 5.3 mmol/L Final    Chloride 95 94 - 109 mmol/L Final    Carbon Dioxide 33 (H) 20 - 32 mmol/L Final    Anion Gap 9 3 - 14 mmol/L Final    Glucose 93 70 - 99 mg/dL Final    Urea Nitrogen 10 7 - 30 mg/dL  Final    Creatinine 2.62 (H) 0.66 - 1.25 mg/dL Final    GFR Estimate 24 (L) >60 mL/min/1.7m2 Final    Non  GFR Calc    GFR Estimate If Black 29 (L) >60 mL/min/1.7m2 Final    African American GFR Calc    Calcium 9.0 8.5 - 10.1 mg/dL Final         7/27/2017 12:46 PM      Component Results     Component Value Ref Range & Units Status    Troponin I ES 0.037 0.000 - 0.045 ug/L Final    The 99th percentile for upper reference range is 0.045 ug/L.  Troponin values   in   the range of 0.045 - 0.120 ug/L may be associated with risks of adverse   clinical events.           7/27/2017 12:51 PM      Narrative     XR CHEST 2 VW 7/27/2017 12:31 PM    COMPARISON: 6/7/2017    HISTORY: Cough        Impression     IMPRESSION: Enlarged cardiac silhouette is again seen and unchanged.  Single-lead implanted cardiac pacer/defibrillator over the right  chest. No pleural effusion or pneumothorax. No focal airspace disease.    ELIJAH CARBALLO         7/27/2017  2:45 PM      Component Results     Component Value Ref Range & Units Status    Troponin I ES 0.035 0.000 - 0.045 ug/L Final    The 99th percentile for upper reference range is 0.045 ug/L.  Troponin values   in   the range of 0.045 - 0.120 ug/L may be associated with risks of adverse   clinical events.                  Clinical Quality Measure: Blood Pressure Screening     Your blood pressure was checked while you were in the emergency department today. The last reading we obtained was  BP: 132/71 . Please read the guidelines below about what these numbers mean and what you should do about them.  If your systolic blood pressure (the top number) is less than 120 and your diastolic blood pressure (the bottom number) is less than 80, then your blood pressure is normal. There is nothing more that you need to do about it.  If your systolic blood pressure (the top number) is 120-139 or your diastolic blood pressure (the bottom number) is 80-89, your blood pressure may be higher  "than it should be. You should have your blood pressure rechecked within a year by a primary care provider.  If your systolic blood pressure (the top number) is 140 or greater or your diastolic blood pressure (the bottom number) is 90 or greater, you may have high blood pressure. High blood pressure is treatable, but if left untreated over time it can put you at risk for heart attack, stroke, or kidney failure. You should have your blood pressure rechecked by a primary care provider within the next 4 weeks.  If your provider in the emergency department today gave you specific instructions to follow-up with your doctor or provider even sooner than that, you should follow that instruction and not wait for up to 4 weeks for your follow-up visit.        Thank you for choosing [facility]       Thank you for choosing [facility] for your care. Our goal is always to provide you with excellent care. Hearing back from our patients is one way we can continue to improve our services. Please take a few minutes to complete the written survey that you may receive in the mail after you visit with us. Thank you!        [employer] Information     [employer] lets you send messages to your doctor, view your test results, renew your prescriptions, schedule appointments and more. To sign up, go to [email or web address] . Click on \"Log in\" on the left side of the screen, which will take you to the Welcome page. Then click on \"Sign up Now\" on the right side of the page.     You will be asked to enter the access code listed below, as well as some personal information. Please follow the directions to create your username and password.     Your access code is: [email or web address]  Expires: 2017 10:05 AM     Your access code will  in 90 days. If you need help or a new code, please call your [facility] clinic or [phone].        Care EveryWhere ID     This is your Care EveryWhere ID. This could be used by other organizations to access your [facility] " medical records  [id number]        Equal Access to Services     LAZARO ROMERO : Timo Jeff, enmanuel maher, purnima kaplan. So [facility] [phone].    ATENCIÓN: Si habla español, tiene a jacome disposición servicios gratuitos de asistencia lingüística. Llame al [phone].    We comply with applicable federal civil rights laws and [place] laws. We do not discriminate on the basis of race, color, national origin, age, disability sex, sexual orientation or gender identity.            After Visit Summary       This is your record. Keep this with you and show to your community pharmacist(s) and doctor(s) at your next visit.

## 2017-07-27 NOTE — DISCHARGE INSTRUCTIONS
Shortness of Breath (Dyspnea)  Shortness of breath is the feeling that you can't catch your breath or get enough air. It is also known as dyspnea.  Dyspnea can be caused by many different conditions. They include:    Acute asthma attack.    Worsening of chronic lung diseases such as chronic bronchitis and emphysema.    Heart failure. This is when weak heart muscle allows extra fluid to collect in the lungs.    Panic attacks or anxiety. Fear can cause rapid breathing (hyperventilation).    Pneumonia, or an infection in the lung tissue.    Exposure to toxic substances, fumes, smoke, or certain medicines.    Blood clot in the lung (pulmonary embolism). This is often from a piece of blood clot in a deep vein of the leg (deep vein thrombosis) that breaks off and travels to the lungs.    Heart attack or heart-related chest pain (angina).    Anemia.    Collapsed lung (pneumothorax).    Dehydration.    Pregnancy.  Based on your visit today, the exact cause of your shortness of breath is not certain. Your tests don t show any of the serious causes of dyspnea. You may need other tests to find out if you have a serious problem. It s important to watch for any new symptoms or symptoms that get worse. Follow up with your healthcare provider as directed.  Home care  Follow these tips to take care of yourself at home:    When your symptoms are better, go back to your usual activities.    If you smoke, you should stop. Join a quit-smoking program or ask your healthcare provider for help.    Eat a healthy diet and get plenty of sleep.    Get regular exercise. Talk with your healthcare provider before starting to exercise, especially if you have other medical problems.    Cut down on the amount of caffeine and stimulants you consume.  Follow-up care  Follow up with your healthcare provider, or as advised.  If tests were done, you will be told if your treatment needs to be changed. You can call as directed for the results.  (Note:  If an X-ray was taken, a specialist will review it. You will be notified of any new findings that may affect your care.)  Call 911 or get immediate medical care  Shortness of breath may be a sign of a serious medical problem. For example, it may be a problem with your heart or lungs. Call 911 if you have worsening shortness of breath or trouble breathing, especially with any of the symptoms below:    You are confused or it s difficult to wake you.    You faint or lose consciousness.    You have a fast heartbeat, or your heartbeat is irregular.    You are coughing up blood.    You have pain in your chest, arm, shoulder, neck, or upper back.    You break out in a sweat.  When to seek medical advice  Call your healthcare provider right away if any of these occur:    Slight shortness of breath or wheezing    Redness, pain or swelling in your leg, arm, or other body area    Swelling in both legs or ankles    Fast weight gain    Dizziness or weakness    Fever of 100.4 F (38 C) or higher, or as directed by your healthcare provider  Date Last Reviewed: 9/13/2015 2000-2017 [employer]. [address] [place] [address]. All rights reserved. This information is not intended as a substitute for professional medical care. Always follow your healthcare professional's instructions.

## 2017-07-27 NOTE — ED PROVIDER NOTES
History     Chief Complaint:  Shortness of Breath      HPI   Westley Ness is a 71 year old male with a complex medical history including ESRD currently receiving dialysis (MWF) who presents with shortness of breath and generalized weakness. The patient reports for the past 2 weeks he has had a cough productive of green sputum as well as waxing and waning shortness of breath. The patient was scheduled for dialysis yesterday but felt too fatigued and so received a full run again today. The patient described his symptoms to his dialysis nurse who advised he visit the emergency department and 911 services were called. The patient feels at his typical baseline after he receives dialysis. No fever.    Allergies:  Contrast Dye       Medications:    Eliquis  Mexitil  Lisinopril  Protonix  Nitroglycerin  Coreg  Imdur  Pravachol  Tylenol  Plavix  Renvela  Aspirin 81 mg  Mexiletine  Ranitidine     Past Medical History:    ESRD on dialysis  CAD  Cardiomyopathy ischemic  Patellar sleeve fracture  NSTEMI  ACS  Type 2 diabetes  Hypertension  Hyperlipidemia  AICD  Aortic root dilatation     Past Surgical History:    Cholecystectomy  Left heart cath x2 - 2016  Create fistula left upper extremity     Family History:    Hypertension  CVD     Social History:  Smoking status: Former smoker  Alcohol use: Rarely    Marital Status:  Single       Review of Systems   Constitutional: Positive for fatigue.   Respiratory: Positive for cough and shortness of breath.    All other systems reviewed and are negative.    Physical Exam     Patient Vitals for the past 24 hrs:   BP Temp Temp src Pulse Resp SpO2 Height Weight   07/27/17 1500 145/76 - - - - 94 % - -   07/27/17 1445 144/70 - - - - 94 % - -   07/27/17 1430 132/71 - - - - 94 % - -   07/27/17 1415 133/60 - - - - 95 % - -   07/27/17 1400 140/66 - - - - 94 % - -   07/27/17 1345 137/69 - - - - 93 % - -   07/27/17 1330 132/68 - - - - 92 % - -   07/27/17 1315 125/83 - - - - 95 % - -  "  07/27/17 1300 131/77 - - - - 99 % - -   07/27/17 1245 123/66 - - - - 93 % - -   07/27/17 1234 - - - - - 94 % - -   07/27/17 1233 136/69 - - - - - - -   07/27/17 1204 139/67 97.8  F (36.6  C) Oral 77 18 97 % 1.803 m (5' 11\") 84.2 kg (185 lb 10 oz)   07/27/17 1203 139/67 - - - - - - -    :       Physical Exam   General:  Sitting on bed. Patient talkative and in no significant distress. Talking in full sentences.  HENT:  No obvious trauma to head  Right Ear:  External ear normal.   Left Ear:  External ear normal.   Nose:  Nose normal.   Eyes:  Conjunctivae and EOM are normal. Pupils are equal, round, and reactive.   Neck: Normal range of motion. Neck supple. No tracheal deviation present.   CV:  Normal heart sounds. No murmur heard.  Pulm/Chest: Effort normal and breath sounds normal.   Abd: Soft. No distension. There is no tenderness. There is no rigidity, no rebound and no guarding.   M/S: Normal range of motion. LUE with AV fistula, good thrill. No calf pain or swelling bilaterally.   Neuro: Alert.   Skin: Skin is warm and dry. No rash noted. Not diaphoretic.   Psych: Normal mood and affect. Behavior is normal.     Emergency Department Course   ECG:  Completed at 1209.  Read at 1210.   Rate 76 bpm. OH interval 188. QRS duration 90. QT/QTc 432/486. P-R-T axes 47 110 0. Sinus rhythm with occasional PVCs, low voltage QRS, possible inferior infarct, possible anterolateral infarct, abnormal ECG     Imaging:  Radiographic findings were communicated with the patient who voiced understanding of the findings.    Chest X-ray (PA & LAT):    Enlarged cardiac silhouette is again seen and unchanged. Single-lead implanted cardiac pacer/defibrillator over the right chest. No pleural effusion or pneumothorax. No focal airspace disease.  Results per radiology.     Laboratory:  CBC: HGB 12.2 (L), ow wnl (WBC 6.2, )  BMP: Creat 2.62 (H), K 3.3 (L), CO2 33 (H), ow wnl  1210 Troponin: 0.037   1410 Troponin: 0.035    Emergency " Department Course:  Nursing notes and vitals reviewed.  I performed an exam of the patient as documented above.     The work up above was undertaken.     1430: Updated patient on lab findings.    Findings and plan explained to the Patient. Patient discharged home with instructions regarding supportive care, medications, and reasons to return. The importance of close follow-up was reviewed.     Impression & Plan    Medical Decision Making:  Westley Ness is a very pleasant 71 year old year old patient who presents to the emergency department with concern of dyspnea. The patient also felt generally weak. The patient had dialysis yesterday and another one today because he thought that might help him feel better. The patient reports he feels at his baseline after receiving dialysis. The patient's main concern is having a cough for the past 2 weeks. The patient is on Eliquis and pulmonary embolism is therefore unlikely. The chest x-ray shows no evidence of pneumonia. The patient's lungs are clear. No wheezing to suggest bronchospasm. A screening EKG and troponin are both obtained and found to be negative. The patient had a strong desire to go home. He consented for a 2 hour troponin which is obtained and found to be negative as well. I suspect the patient has an upper respiratory infection. The patient was in agreement and suspected this as well. I do not believe antibiotics are necessary and I reviewed this with the patient. He agreed. He'll return if he develops a fever otherwise will follow up with his PCP.    The treatment plan was discussed with the patient and they expressed understanding of this plan and consented to the plan.  In addition, the patient will return to the emergency department if their symptoms persist, worsen, if new symptoms arise or if there is any concern as other pathology may be present that is not evident at this time. They also understand the importance of close follow up in the clinic  and if unable to do so will return to the emergency department for a reevaluation. All questions were answered.    Diagnosis:    ICD-10-CM    1. Dyspnea, unspecified type R06.00        Disposition:  Discharged.    Aris CARMONA, am serving as a scribe at 2:27 PM on 7/27/2017 to document services personally performed by Dr. Rea, based on my observations and the provider's statements to me.     EMERGENCY DEPARTMENT       David Rea,   07/27/17 1522

## 2017-07-27 NOTE — ED NOTES
Bed: ED11  Expected date: 7/27/17  Expected time: 11:49 AM  Means of arrival: Ambulance  Comments:  514 71M SOB after HD

## 2017-07-27 NOTE — TELEPHONE ENCOUNTER
Arelis from [facility] called reg the pt.    He is currently on Dialysis and feels very sick. He has been feeling tired with shortness of breath x 1 week.    Discussed with Dr Kolb, provided pt's previous complex medical history he needs to go to the ER.    Advised Arelis at the Dialysis center.

## 2017-07-31 ENCOUNTER — HOSPITAL ENCOUNTER (OUTPATIENT)
Facility: CLINIC | Age: 72
Setting detail: OBSERVATION
Discharge: HOME | End: 2017-08-01
Attending: EMERGENCY MEDICINE | Admitting: INTERNAL MEDICINE
Payer: MEDICARE

## 2017-07-31 ENCOUNTER — APPOINTMENT (OUTPATIENT)
Dept: GENERAL RADIOLOGY | Facility: CLINIC | Age: 72
End: 2017-07-31
Attending: EMERGENCY MEDICINE
Payer: MEDICARE

## 2017-07-31 DIAGNOSIS — R07.9 CHEST PAIN: ICD-10-CM

## 2017-07-31 LAB
ANION GAP SERPL CALCULATED.3IONS-SCNC: 10 MMOL/L (ref 3–14)
BASOPHILS # BLD AUTO: 0 10E9/L (ref 0–0.2)
BASOPHILS NFR BLD AUTO: 0.5 %
BUN SERPL-MCNC: 22 MG/DL (ref 7–30)
CALCIUM SERPL-MCNC: 9.2 MG/DL (ref 8.5–10.1)
CHLORIDE SERPL-SCNC: 101 MMOL/L (ref 94–109)
CO2 SERPL-SCNC: 27 MMOL/L (ref 20–32)
CREAT SERPL-MCNC: 4.63 MG/DL (ref 0.66–1.25)
DIFFERENTIAL METHOD BLD: ABNORMAL
EOSINOPHIL # BLD AUTO: 0.2 10E9/L (ref 0–0.7)
EOSINOPHIL NFR BLD AUTO: 2.3 %
ERYTHROCYTE [DISTWIDTH] IN BLOOD BY AUTOMATED COUNT: 17.2 % (ref 10–15)
GFR SERPL CREATININE-BSD FRML MDRD: 13 ML/MIN/1.7M2
GLUCOSE BLDC GLUCOMTR-MCNC: 107 MG/DL (ref 70–99)
GLUCOSE BLDC GLUCOMTR-MCNC: 124 MG/DL (ref 70–99)
GLUCOSE BLDC GLUCOMTR-MCNC: 126 MG/DL (ref 70–99)
GLUCOSE SERPL-MCNC: 165 MG/DL (ref 70–99)
HCT VFR BLD AUTO: 36.4 % (ref 40–53)
HGB BLD-MCNC: 11.4 G/DL (ref 13.3–17.7)
IMM GRANULOCYTES # BLD: 0 10E9/L (ref 0–0.4)
IMM GRANULOCYTES NFR BLD: 0.1 %
INTERPRETATION ECG - MUSE: NORMAL
LYMPHOCYTES # BLD AUTO: 0.8 10E9/L (ref 0.8–5.3)
LYMPHOCYTES NFR BLD AUTO: 9.9 %
MCH RBC QN AUTO: 29.6 PG (ref 26.5–33)
MCHC RBC AUTO-ENTMCNC: 31.3 G/DL (ref 31.5–36.5)
MCV RBC AUTO: 95 FL (ref 78–100)
MONOCYTES # BLD AUTO: 0.6 10E9/L (ref 0–1.3)
MONOCYTES NFR BLD AUTO: 7.5 %
NEUTROPHILS # BLD AUTO: 6.7 10E9/L (ref 1.6–8.3)
NEUTROPHILS NFR BLD AUTO: 79.7 %
PLATELET # BLD AUTO: 151 10E9/L (ref 150–450)
POTASSIUM SERPL-SCNC: 3.7 MMOL/L (ref 3.4–5.3)
RBC # BLD AUTO: 3.85 10E12/L (ref 4.4–5.9)
SODIUM SERPL-SCNC: 138 MMOL/L (ref 133–144)
TROPONIN I SERPL-MCNC: 0.03 UG/L (ref 0–0.04)
TROPONIN I SERPL-MCNC: 0.04 UG/L (ref 0–0.04)
WBC # BLD AUTO: 8.4 10E9/L (ref 4–11)

## 2017-07-31 PROCEDURE — A9270 NON-COVERED ITEM OR SERVICE: HCPCS | Mod: GY | Performed by: PHYSICIAN ASSISTANT

## 2017-07-31 PROCEDURE — 84484 ASSAY OF TROPONIN QUANT: CPT | Mod: 91 | Performed by: EMERGENCY MEDICINE

## 2017-07-31 PROCEDURE — 71020 XR CHEST 2 VW: CPT

## 2017-07-31 PROCEDURE — 93005 ELECTROCARDIOGRAM TRACING: CPT

## 2017-07-31 PROCEDURE — A9270 NON-COVERED ITEM OR SERVICE: HCPCS | Mod: GY | Performed by: INTERNAL MEDICINE

## 2017-07-31 PROCEDURE — 25000132 ZZH RX MED GY IP 250 OP 250 PS 637: Mod: GY | Performed by: PHYSICIAN ASSISTANT

## 2017-07-31 PROCEDURE — 84484 ASSAY OF TROPONIN QUANT: CPT | Mod: 91 | Performed by: PHYSICIAN ASSISTANT

## 2017-07-31 PROCEDURE — 99214 OFFICE O/P EST MOD 30 MIN: CPT | Performed by: INTERNAL MEDICINE

## 2017-07-31 PROCEDURE — 80048 BASIC METABOLIC PNL TOTAL CA: CPT | Performed by: EMERGENCY MEDICINE

## 2017-07-31 PROCEDURE — G0257 UNSCHED DIALYSIS ESRD PT HOS: HCPCS

## 2017-07-31 PROCEDURE — 25000128 H RX IP 250 OP 636: Performed by: INTERNAL MEDICINE

## 2017-07-31 PROCEDURE — 90937 HEMODIALYSIS REPEATED EVAL: CPT

## 2017-07-31 PROCEDURE — 36415 COLL VENOUS BLD VENIPUNCTURE: CPT | Performed by: PHYSICIAN ASSISTANT

## 2017-07-31 PROCEDURE — 99220 ZZC INITIAL OBSERVATION CARE,LEVL III: CPT | Mod: AI | Performed by: INTERNAL MEDICINE

## 2017-07-31 PROCEDURE — 85025 COMPLETE CBC W/AUTO DIFF WBC: CPT | Performed by: EMERGENCY MEDICINE

## 2017-07-31 PROCEDURE — 99285 EMERGENCY DEPT VISIT HI MDM: CPT | Mod: 25

## 2017-07-31 PROCEDURE — 25000132 ZZH RX MED GY IP 250 OP 250 PS 637: Mod: GY | Performed by: INTERNAL MEDICINE

## 2017-07-31 PROCEDURE — G0378 HOSPITAL OBSERVATION PER HR: HCPCS

## 2017-07-31 PROCEDURE — 00000146 ZZHCL STATISTIC GLUCOSE BY METER IP

## 2017-07-31 RX ORDER — HEPARIN SODIUM 1000 [USP'U]/ML
1000 INJECTION, SOLUTION INTRAVENOUS; SUBCUTANEOUS CONTINUOUS
Status: DISCONTINUED | OUTPATIENT
Start: 2017-07-31 | End: 2017-08-01

## 2017-07-31 RX ORDER — ACETAMINOPHEN 650 MG/1
650 SUPPOSITORY RECTAL EVERY 4 HOURS PRN
Status: DISCONTINUED | OUTPATIENT
Start: 2017-07-31 | End: 2017-08-01 | Stop reason: HOSPADM

## 2017-07-31 RX ORDER — NITROGLYCERIN 0.4 MG/1
0.4 TABLET SUBLINGUAL EVERY 5 MIN PRN
Status: DISCONTINUED | OUTPATIENT
Start: 2017-07-31 | End: 2017-08-01

## 2017-07-31 RX ORDER — CLOPIDOGREL BISULFATE 75 MG/1
75 TABLET ORAL DAILY
Status: DISCONTINUED | OUTPATIENT
Start: 2017-07-31 | End: 2017-08-01 | Stop reason: HOSPADM

## 2017-07-31 RX ORDER — NALOXONE HYDROCHLORIDE 0.4 MG/ML
.1-.4 INJECTION, SOLUTION INTRAMUSCULAR; INTRAVENOUS; SUBCUTANEOUS
Status: DISCONTINUED | OUTPATIENT
Start: 2017-07-31 | End: 2017-08-01 | Stop reason: HOSPADM

## 2017-07-31 RX ORDER — HEPARIN SODIUM 1000 [USP'U]/ML
1000 INJECTION, SOLUTION INTRAVENOUS; SUBCUTANEOUS
Status: COMPLETED | OUTPATIENT
Start: 2017-07-31 | End: 2017-07-31

## 2017-07-31 RX ORDER — LIDOCAINE 40 MG/G
CREAM TOPICAL
Status: DISCONTINUED | OUTPATIENT
Start: 2017-07-31 | End: 2017-08-01 | Stop reason: HOSPADM

## 2017-07-31 RX ORDER — NICOTINE POLACRILEX 4 MG
15-30 LOZENGE BUCCAL
Status: DISCONTINUED | OUTPATIENT
Start: 2017-07-31 | End: 2017-08-01 | Stop reason: HOSPADM

## 2017-07-31 RX ORDER — ALUMINA, MAGNESIA, AND SIMETHICONE 2400; 2400; 240 MG/30ML; MG/30ML; MG/30ML
15-30 SUSPENSION ORAL EVERY 4 HOURS PRN
Status: DISCONTINUED | OUTPATIENT
Start: 2017-07-31 | End: 2017-08-01 | Stop reason: HOSPADM

## 2017-07-31 RX ORDER — CARVEDILOL 6.25 MG/1
6.25 TABLET ORAL 2 TIMES DAILY WITH MEALS
Status: DISCONTINUED | OUTPATIENT
Start: 2017-07-31 | End: 2017-08-01 | Stop reason: HOSPADM

## 2017-07-31 RX ORDER — LISINOPRIL 2.5 MG/1
2.5 TABLET ORAL 2 TIMES DAILY
Status: DISCONTINUED | OUTPATIENT
Start: 2017-07-31 | End: 2017-08-01 | Stop reason: HOSPADM

## 2017-07-31 RX ORDER — PRAVASTATIN SODIUM 40 MG
40 TABLET ORAL AT BEDTIME
Status: DISCONTINUED | OUTPATIENT
Start: 2017-07-31 | End: 2017-08-01 | Stop reason: HOSPADM

## 2017-07-31 RX ORDER — ACETAMINOPHEN 325 MG/1
650 TABLET ORAL EVERY 4 HOURS PRN
Status: DISCONTINUED | OUTPATIENT
Start: 2017-07-31 | End: 2017-08-01 | Stop reason: HOSPADM

## 2017-07-31 RX ORDER — ALBUTEROL SULFATE 90 UG/1
2 AEROSOL, METERED RESPIRATORY (INHALATION) 4 TIMES DAILY
Status: DISCONTINUED | OUTPATIENT
Start: 2017-07-31 | End: 2017-08-01 | Stop reason: HOSPADM

## 2017-07-31 RX ORDER — MEXILETINE HYDROCHLORIDE 150 MG/1
150 CAPSULE ORAL 2 TIMES DAILY
Status: DISCONTINUED | OUTPATIENT
Start: 2017-07-31 | End: 2017-08-01 | Stop reason: HOSPADM

## 2017-07-31 RX ORDER — CODEINE PHOSPHATE AND GUAIFENESIN 10; 100 MG/5ML; MG/5ML
5 SOLUTION ORAL EVERY 4 HOURS PRN
Status: DISCONTINUED | OUTPATIENT
Start: 2017-07-31 | End: 2017-08-01 | Stop reason: HOSPADM

## 2017-07-31 RX ORDER — ACETAMINOPHEN 325 MG/1
650 TABLET ORAL 4 TIMES DAILY
Status: DISCONTINUED | OUTPATIENT
Start: 2017-07-31 | End: 2017-08-01 | Stop reason: HOSPADM

## 2017-07-31 RX ORDER — DEXTROSE MONOHYDRATE 25 G/50ML
25-50 INJECTION, SOLUTION INTRAVENOUS
Status: DISCONTINUED | OUTPATIENT
Start: 2017-07-31 | End: 2017-08-01 | Stop reason: HOSPADM

## 2017-07-31 RX ORDER — DOXERCALCIFEROL 4 UG/2ML
2.5 INJECTION INTRAVENOUS ONCE
Status: COMPLETED | OUTPATIENT
Start: 2017-07-31 | End: 2017-07-31

## 2017-07-31 RX ORDER — PANTOPRAZOLE SODIUM 40 MG/1
40 TABLET, DELAYED RELEASE ORAL
Status: DISCONTINUED | OUTPATIENT
Start: 2017-08-01 | End: 2017-08-01 | Stop reason: HOSPADM

## 2017-07-31 RX ADMIN — SODIUM CHLORIDE 250 ML: 9 INJECTION, SOLUTION INTRAVENOUS at 14:42

## 2017-07-31 RX ADMIN — MEXILETINE HYDROCHLORIDE 150 MG: 150 CAPSULE ORAL at 21:38

## 2017-07-31 RX ADMIN — RANITIDINE 75 MG: 75 TABLET, FILM COATED ORAL at 21:37

## 2017-07-31 RX ADMIN — CARVEDILOL 6.25 MG: 6.25 TABLET, FILM COATED ORAL at 18:36

## 2017-07-31 RX ADMIN — ACETAMINOPHEN 650 MG: 325 TABLET, FILM COATED ORAL at 18:42

## 2017-07-31 RX ADMIN — ISOSORBIDE MONONITRATE 15 MG: 30 TABLET, EXTENDED RELEASE ORAL at 18:37

## 2017-07-31 RX ADMIN — ALBUTEROL SULFATE 2 PUFF: 90 AEROSOL, METERED RESPIRATORY (INHALATION) at 18:44

## 2017-07-31 RX ADMIN — Medication 1 CAPSULE: at 21:37

## 2017-07-31 RX ADMIN — PRAVASTATIN SODIUM 40 MG: 40 TABLET ORAL at 21:38

## 2017-07-31 RX ADMIN — APIXABAN 2.5 MG: 2.5 TABLET, FILM COATED ORAL at 18:34

## 2017-07-31 RX ADMIN — CLOPIDOGREL BISULFATE 75 MG: 75 TABLET, FILM COATED ORAL at 18:36

## 2017-07-31 RX ADMIN — DOXERCALCIFEROL 2.5 MCG: 4 INJECTION INTRAVENOUS at 14:43

## 2017-07-31 RX ADMIN — ACETAMINOPHEN 650 MG: 325 TABLET, FILM COATED ORAL at 23:37

## 2017-07-31 RX ADMIN — HEPARIN SODIUM 1000 UNITS/HR: 1000 INJECTION, SOLUTION INTRAVENOUS; SUBCUTANEOUS at 14:44

## 2017-07-31 RX ADMIN — HEPARIN SODIUM 1000 UNITS: 1000 INJECTION, SOLUTION INTRAVENOUS; SUBCUTANEOUS at 14:44

## 2017-07-31 RX ADMIN — LISINOPRIL 2.5 MG: 2.5 TABLET ORAL at 21:38

## 2017-07-31 ASSESSMENT — ENCOUNTER SYMPTOMS
MYALGIAS: 1
SHORTNESS OF BREATH: 1
COUGH: 1

## 2017-07-31 ASSESSMENT — PAIN DESCRIPTION - DESCRIPTORS
DESCRIPTORS: ACHING
DESCRIPTORS: ACHING

## 2017-07-31 NOTE — PROGRESS NOTES
Case Management:  MD order for observation noted.  Explained observation status to patient and gave brochure.  Patient declined copy of Obs brochure stating he knew all about it and didn't have any questions.

## 2017-07-31 NOTE — CONSULTS
[facility]    Nephrology Consultation     Date of Admission:  7/31/2017    Assessment & Plan      Westley Ness is a 71 year old male with ESRD who was admitted on 7/31/2017.       1) ESRD due to DM/HTN.  On HD since 2004.  [facility].  Dr. Hammond.  CASSIDY AVF .  TW 85 kg.    2) Chest Heaviness:  CAD vs chest wall symptoms from coughing.    3) HTN.  Controlled.    4) Ischemic CM/HFrEF - on Coreg + ACEi    5) Anemia:  EPO held as hgb 11.4.      Plan:    HD per usual today.  Cards eval.      Marlo Tabor MD  [facility] - Nephrology  [phone]    Reason for Consult      I was asked to see the patient for management of ESRD.    Primary Care Physician      Josselin Kolb    Chief Complaint      Chest pain.     History is obtained from the patient    History of Present Illness      Westley Ness is a 71 year old male who presents with exertional chest heaviness.  He has had symptoms of a URI over the last 10 days accompanied more recently by exertional chest heaviness.  EKG and troponin so far not suggestive of acute coronary syndrome.        Past Medical History   I have reviewed this patient's medical history and updated it with pertinent information if needed.   Past Medical History:   Diagnosis Date     Aortic root dilatation (H)     mild     Atrial flutter (H)      CAD (coronary artery disease)      Cardiomyopathy, ischemic      Diabetes (H)      H/O percutaneous transluminal coronary angioplasty 7-    Successful Percutaneous coronary intervention of the proximal OM1     Hyperlipidemia      Hypertension      Hypothyroidism      ICD (implantable cardioverter-defibrillator) in place      NSTEMI (non-ST elevated myocardial infarction) (H)      Renal disease     end stage     Shortness of breath      Syncope      Tachycardia        Past Surgical History   I have reviewed this patient's surgical history and updated it with pertinent information if needed.  Past  Surgical History:   Procedure Laterality Date     CHOLECYSTECTOMY       H ABLATION ATRIAL FLUTTER  06/08/2017     HC LEFT HEART CATHETERIZATION  7/14/2016     HC LEFT HEART CATHETERIZATION  9/21/2016     VASCULAR SURGERY      LUE fistula       Prior to Admission Medications   Prior to Admission Medications   Prescriptions Last Dose Informant Patient Reported? Taking?   B complex-C-folic acid (NEPHROCAPS/TRIPHROCAPS) 1 MG capsule 7/30/2017 at Unknown time Self No Yes   Sig: Take 1 capsule by mouth every evening   Levothyroxine Sodium 50 MCG CAPS 7/31/2017 at Unknown time Self No Yes   Sig: Take 50 mcg by mouth daily   RANITIDINE HCL PO 7/30/2017 at Unknown time Self Yes Yes   Sig: Take 75 mg by mouth At Bedtime    acetaminophen (TYLENOL) 325 MG tablet 7/31/2017 at x1 Self No Yes   Sig: Take 2 tablets (650 mg) by mouth 4 times daily   apixaban ANTICOAGULANT (ELIQUIS) 2.5 MG tablet 7/30/2017 at Unknown time Self No Yes   Sig: Take 1 tablet (2.5 mg) by mouth 2 times daily   carvedilol (COREG) 6.25 MG tablet 7/30/2017 at Unknown time Self No Yes   Sig: Take 1 tablet (6.25 mg) by mouth 2 times daily (with meals) Take after dialysis   clopidogrel (PLAVIX) 75 MG tablet 7/30/2017 at Unknown time Self No Yes   Sig: Take 1 tablet (75 mg) by mouth daily To protect your stent(s).  Do not stop taking unless directed by cardiology.   guaiFENesin-dextromethorphan (ROBITUSSIN DM) 100-10 MG/5ML syrup PRN Self No Yes   Sig: Take 5 mLs by mouth every 4 hours as needed for cough   isosorbide mononitrate (IMDUR) 30 MG 24 hr tablet 7/30/2017 at Unknown time Self No Yes   Sig: Take 0.5 tablets (15 mg) by mouth daily   lisinopril (PRINIVIL/ZESTRIL) 2.5 MG tablet 7/30/2017 at Unknown time Self No Yes   Sig: Take one tablet after dialysis. Take second tablet at bedtime.   loperamide (IMODIUM) 2 MG capsule PRN Self Yes Yes   Sig: Take 1 mg by mouth as needed    mexiletine (MEXITIL) 150 MG capsule 7/31/2017 at x1 Self No Yes   Sig: Take 1  capsule (150 mg) by mouth 2 times daily   nitroglycerin (NITROSTAT) 0.4 MG sublingual tablet PRN Self No Yes   Si TAB EVERY 5 MIN AS NEEDED, UP TO 3 PER EPISODE   pantoprazole (PROTONIX) 40 MG EC tablet 2017 at Unknown time Self No Yes   Sig: Take 1 tablet (40 mg) by mouth every morning   pravastatin (PRAVACHOL) 40 MG tablet 2017 at HS Self No Yes   Sig: Take 1 tablet (40 mg) by mouth daily   psyllium 0.52 G capsule PRN Self Yes Yes   Sig: Take 1 capsule by mouth daily as needed    sevelamer (RENVELA) 800 MG tablet PRN Self Yes Yes   Sig: Take 800-1,600 mg by mouth as needed ON HOLD      Facility-Administered Medications: None     Allergies   Allergies   Allergen Reactions     Contrast Dye Hives     Does fine if he uses benadryl prior.     No Clinical Screening - See Comments      Green beans - Diarrhea.    Topical antibiotic - name unknown - caused swelling of the penis.       Social History   I have reviewed this patient's social history and updated it with pertinent information if needed. Westley Ness  reports that he quit smoking about 20 years ago. His smoking use included Cigarettes. He has a 70.00 pack-year smoking history. He has never used smokeless tobacco. He reports that he drinks alcohol. He reports that he does not use illicit drugs.    Family History   I have reviewed this patient's family history and updated it with pertinent information if needed.   Family History   Problem Relation Age of Onset     Hypertension Mother      CEREBROVASCULAR DISEASE Mother      Hypertension Father      C.A.D. Paternal Grandmother        Review of Systems   The 10 point Review of Systems is negative other than noted in the HPI or here.     Physical Exam   Temp: 97.5  F (36.4  C) Temp src: Oral BP: 142/78 Pulse: 68 Heart Rate: 78 Resp: 18 SpO2: 98 % O2 Device: None (Room air) Oxygen Delivery: 3 LPM  Vital Signs with Ranges  Temp:  [97.5  F (36.4  C)-98.2  F (36.8  C)] 97.5  F (36.4  C)  Pulse:  [68-70]  68  Heart Rate:  [16-80] 78  Resp:  [13-20] 18  BP: (115-142)/(64-84) 142/78  SpO2:  [93 %-99 %] 98 %  192 lbs 14.44 oz    GENERAL: healthy, alert, NAD  HEENT:  Normocephalic. No gross abnormalities.  Pupils equal.  MMM.  Dentition is ok.  CV: RRR, no murmurs, no clicks, gallops, or rubs, no edema, no carotid bruits  RESP: Clear bilaterally with good efforts  GI: Abdomen soft/nt/nd, BS normal. No masses, organomegaly  MUSCULOSKELETAL: extremities nl - no gross deformities noted  SKIN: no suspicious lesions or rashes, dry to touch  NEURO:  Strength normal and symmetric.   PSYCH: mood good, affect appropriate  LYMPH: No palpable ant/post cervical and supraclavicular adenopathy    Data   BMP  Recent Labs  Lab 07/31/17  0730 07/27/17  1210    137   POTASSIUM 3.7 3.3*   CHLORIDE 101 95   CHRISTINE 9.2 9.0   CO2 27 33*   BUN 22 10   CR 4.63* 2.62*   * 93     Phos@[id number](phos:4)  CBC)  Recent Labs  Lab 07/31/17  0730 07/27/17  1210   WBC 8.4 6.2   HGB 11.4* 12.2*   HCT 36.4* 37.2*   MCV 95 92    164     No lab results found in last 7 days.    Invalid input(s): BILIRUBININDIRECT  No lab results found in last 7 days.  No results found for: D2VIT, D3VIT, DTOT    Recent Labs  Lab 07/31/17  0730   HGB 11.4*   HCT 36.4*   MCV 95     No results for input(s): PTHI in the last 168 hours.

## 2017-07-31 NOTE — PLAN OF CARE
Problem: Goal Outcome Summary  Goal: Goal Outcome Summary  Outcome: Improving  Pt arrived on unit at 1000. Slightly hypertensive but other VSS. Denies pain. Regular diet, no appetite this shift. Tele normal SR. Independent in room. Pt left floor for dialysis at 1300 and is due to return later this afternoon. Plan for continued observation.

## 2017-07-31 NOTE — ED PROVIDER NOTES
"  History     Chief Complaint:  Chest Pain     HPI   Westley Ness is a 71 year old male with a complex medical history including ESRD currently receiving dialysis (MWF), who is anticoagulated on Eliquis and presents to the emergency department today for evaluation of chest pain. In February, the patient was in [place] when he had 3 stents placed during his last cath lab visit. The patient was seen back in June of this year for chest pain and a visit for shortness of breath about a week ago. The patient notes that he has been coughing and cold-like symptoms for the past 7 days. The patient reports having a \"heaviness\" feeling on his chest with movement and causes him associated shortness of breath for this morning. He reports having these symptoms intermittently while he has been experiencing the cold-like symptoms. No arm pain was noted with his past episodes of these symptoms, but today he reports that he was having arm pain. The patient was transported via EMS to the hospital this morning and received a dose of nitroglycerin in transport. He states feeling better here in the hospital with some improvement of his shortness of breath and some heaviness feeling in his chest.   Of note, the patient next scheduled follow up with his primary care physician on Thursday later this week. He is a patient of the [facility].    Allergies:  Contrast Dye        Medications:    Eliquis  Mexitil  Lisinopril  Protonix  Nitroglycerin  Coreg  Imdur  Pravachol  Tylenol  Plavix  Renvela  Aspirin 81 mg  Mexiletine  Ranitidine      Past Medical History:    ESRD on dialysis  CAD  Cardiomyopathy ischemic  Patellar sleeve fracture  NSTEMI  ACS  Type 2 diabetes  Hypertension  Hyperlipidemia  AICD  Aortic root dilatation      Past Surgical History:    Cholecystectomy  Left heart cath - 2016  Create fistula left upper extremity      Family History:    Hypertension  CVD      Social History:  Smoking status: " Former smoker  Alcohol use: Rarely    Marital Status:  Single       Review of Systems   HENT: Positive for congestion.    Respiratory: Positive for cough and shortness of breath.    Cardiovascular: Positive for chest pain (heaviness).   Musculoskeletal: Positive for myalgias (arm pains).   All other systems reviewed and are negative.    Physical Exam     Patient Vitals for the past 24 hrs:   BP Temp Temp src Pulse Heart Rate Resp SpO2 Height Weight   07/31/17 1030 140/70 - - - 75 20 97 % - -   07/31/17 1012 130/64 - - 68 - 20 94 % - -   07/31/17 1000 - - - - - - 93 % - -   07/31/17 0945 - - - - - - 95 % - -   07/31/17 0930 - - - - - - 93 % - -   07/31/17 0915 - - - - - - 95 % - -   07/31/17 0900 - - - - - - 94 % - -   07/31/17 0845 - - - - - - 94 % - -   07/31/17 0830 135/67 - - 70 - 20 98 % - -   07/31/17 0715 - - - - 74 13 98 % - -   07/31/17 0701 134/70 98.2  F (36.8  C) Oral - 80 - 99 % 1.829 m (6') 87.5 kg (192 lb 14.4 oz)      Physical Exam  General: Appears well-developed and well-nourished.   Head: No signs of trauma.   CV: Normal rate and regular rhythm.    Resp: Effort normal and breath sounds normal. No respiratory distress.   GI: Soft. There is no tenderness or guarding.  Normal bowel sounds.  No CVA tenderness.  MSK: Normal range of motion. No Calf tenderness.  Neuro: The patient is alert and oriented.  Speech normal.  GCS 15  Skin: Skin is warm and dry. No rash noted.   Psych: normal mood and affect. behavior is normal.     Emergency Department Course     ECG:  ECG taken at 0705, ECG read at 0706  Normal sinus rhythm   Possible lateral infarct, age undetermined   Abnormal ECG   Rate 78 bpm. ID interval 178. QRS duration 90. QT/QTc 410/467. P-R-T axes 35,125,8.    Imaging:  Radiology findings were communicated with the patient who voiced understanding of the findings.  Chest X-ray   Right subclavian unipolar cardiac device. Cardiomegaly.  No infiltrates or acute process. Otherwise negative.   Reading  per radiology     Laboratory:  Laboratory findings were communicated with the patient who voiced understanding of the findings.  CBC: HGB 11.4 (L) o/w WNL. (WBC 8.4, )   BMP: Creat 4.63 (H), GFR 13 (L), Glucose 165 (H) o/w WNL  Troponin (Collected 0730): 0.028      Emergency Department Course:  Nursing notes and vitals reviewed.  0705 I performed an exam of the patient as documented above.   IV was inserted and blood was drawn for laboratory testing, results above.  The patient was sent for an Xray while in the emergency department, results above.    0905 the patient was rechecked and was updated on the results of his laboratory and imaging studies.   I discussed the treatment plan with the patient. They expressed understanding of this plan and consented to admission.   0928 I discussed the patient with Dr. Norris, who will admit the patient to a monitored bed for further evaluation and treatment.    Impression & Plan      Medical Decision Making:  Westley Ness is a 71-year-old gentleman with a complex medical history including end-stage renal disease on dialysis and significant CAD with multiple stenting, who presents due to chest pressure.  He was at home getting ready to go to dialysis, then developed chest pressure, which became worse with activity.  He ultimately called EMS, and he received nitroglycerin, which improved his symptoms.  On my evaluation, he did not appear in distress.  EKG did not show significant ST segment changes or differences from prior.  Blood work was obtained that showed a negative troponin.  Chest x-ray did not show any signs of infiltrate, or others acute process.  Given the patient's complex cardiac history, I did discuss with him diagnostic options, ultimately the patient preferred to come in to the hospital for further workup, which I feel is reasonable.  The patient was admitted to the hospital service for further evaluation and monitoring. He remained stable through his  time in the ER.    Diagnosis:   ICD-10-CM   Chest pain R07.9     Disposition:   The patient was admitted to the hospital for observation and further care.    Scribe Disclosure:  I, Anish Carrasco, am serving as a scribe at 7:09 AM on 7/31/2017 to document services personally performed by Scott Ojeda MD, based on my observations and the provider's statements to me.  7/31/2017    EMERGENCY DEPARTMENT       Scott Ojeda MD  08/01/17 1420

## 2017-07-31 NOTE — IP AVS SNAPSHOT
MRN:[id number]                      After Visit Summary   7/31/2017    Westley Ness    MRN: [id number]           Thank you!     Thank you for choosing [facility] for your care. Our goal is always to provide you with excellent care. Hearing back from our patients is one way we can continue to improve our services. Please take a few minutes to complete the written survey that you may receive in the mail after you visit with us. Thank you!        Patient Information     Date Of Birth          1945        About your hospital stay     You were admitted on:  July 31, 2017 You last received care in the:  [facility]    You were discharged on:  August 1, 2017        Reason for your hospital stay       You were admitted with atypical, non cardiac chest pain, your troponin were negative                  Who to Call     For medical emergencies, please call 911.  For non-urgent questions about your medical care, please call your primary care provider or clinic, [phone]          Attending Provider     Provider Specialty    Brice Vickers MD Emergency Medicine    [facility], Scott CRANDALL MD --    Karri Norris MD Internal Medicine       Primary Care Provider Office Phone # Fax #    Josselin Kolb [phone]      After Care Instructions     Activity       Your activity upon discharge: activity as tolerated            Diet       Follow this diet upon discharge: Orders Placed This Encounter      Combination Diet 3552-6045 Calories: Moderate Consistent CHO (4-6 CHO units/meal); Low Saturated Fat Na <2400mg Diet                  Follow-up Appointments     Follow-up and recommended labs and tests        Follow up with primary care provider, Josselin Kolb, within 7 days for hospital follow- up if not improved.                  Your next 10 appointments already scheduled     Aug 03, 2017  6:00 PM CDT   Office Visit with Josselin Kolb MD  "  [facility]    [address] [address] [phone]           Bring a current list of meds and any records pertaining to this visit. For Physicals, please bring immunization records and any forms needing to be filled out. Please arrive 10 minutes early to complete paperwork.              Pending Results     No orders found for last 3 day(s).            Statement of Approval     Ordered          17 1025  I have reviewed and agree with all the recommendations and orders detailed in this document.  EFFECTIVE NOW     Approved and electronically signed by:  Jackson Atkinson MD             Admission Information     Date & Time Provider Department Dept. Phone    2017 Karri Norris MD [facility] [phone]      Your Vitals Were     Blood Pressure Pulse Temperature Respirations Height Weight    109/55 (BP Location: Right arm) 68 97.6  F (36.4  C) (Oral) 16 1.829 m (6') 87.5 kg (192 lb 14.4 oz)    Pulse Oximetry BMI (Body Mass Index)                91% 26.16 kg/m2          MyChart Information     [employer] lets you send messages to your doctor, view your test results, renew your prescriptions, schedule appointments and more. To sign up, go to [email or web address] . Click on \"Log in\" on the left side of the screen, which will take you to the Welcome page. Then click on \"Sign up Now\" on the right side of the page.     You will be asked to enter the access code listed below, as well as some personal information. Please follow the directions to create your username and password.     Your access code is: [email or web address]  Expires: 2017 10:05 AM     Your access code will  in 90 days. If you need help or a new code, please call your [facility] or [phone].        Care EveryWhere ID     This is your Care EveryWhere ID. This could be used by other organizations to access your " [facility] medical records  [id number]        Equal Access to Services     MUSHAYNA NICK : Hadii aad ku hadalinapati Jackilandon, wajessicada luqadaha, qaybta kamerlykeeley duarteosmankeeley, purnima bhandarimattsydni marquez. So [facility] [phone].    ATENCIÓN: Si habla español, tiene a jacome disposición servicios gratuitos de asistencia lingüística. Llame al [phone].    We comply with applicable federal civil rights laws and [place] laws. We do not discriminate on the basis of race, color, national origin, age, disability sex, sexual orientation or gender identity.               Review of your medicines      CONTINUE these medicines which have NOT CHANGED        Dose / Directions    acetaminophen 325 MG tablet   Commonly known as:  TYLENOL   Used for:  Closed nondisplaced fracture of right patella, unspecified fracture morphology, initial encounter, Elbow fracture, right, closed, initial encounter        Dose:  650 mg   Take 2 tablets (650 mg) by mouth 4 times daily   Quantity:  60 tablet   Refills:  0       apixaban ANTICOAGULANT 2.5 MG tablet   Commonly known as:  ELIQUIS   Used for:  Atrial fibrillation, unspecified type (H)        Dose:  2.5 mg   Take 1 tablet (2.5 mg) by mouth 2 times daily   Quantity:  60 tablet   Refills:  3       B complex-C-folic acid 1 MG capsule   Used for:  ESRD (end stage renal disease) on dialysis (H)        Dose:  1 capsule   Take 1 capsule by mouth every evening   Quantity:  90 capsule   Refills:  3       carvedilol 6.25 MG tablet   Commonly known as:  COREG   Used for:  NSTEMI (non-ST elevated myocardial infarction) (H), Mild CAD        Dose:  6.25 mg   Take 1 tablet (6.25 mg) by mouth 2 times daily (with meals) Take after dialysis   Quantity:  180 tablet   Refills:  3       clopidogrel 75 MG tablet   Commonly known as:  PLAVIX   Used for:  Coronary artery disease involving native coronary artery of native heart without angina pectoris        Dose:  75 mg   Take 1 tablet (75 mg) by mouth daily To  protect your stent(s).  Do not stop taking unless directed by cardiology.   Quantity:  30 tablet   Refills:  11       guaiFENesin-dextromethorphan 100-10 MG/5ML syrup   Commonly known as:  ROBITUSSIN DM   Used for:  Cough        Dose:  5 mL   Take 5 mLs by mouth every 4 hours as needed for cough   Quantity:  560 mL   Refills:  3       isosorbide mononitrate 30 MG 24 hr tablet   Commonly known as:  IMDUR   Used for:  NSTEMI (non-ST elevated myocardial infarction) (H)        Dose:  15 mg   Take 0.5 tablets (15 mg) by mouth daily   Quantity:  90 tablet   Refills:  3       Levothyroxine Sodium 50 MCG Caps   Used for:  Hypothyroidism, unspecified type        Dose:  50 mcg   Take 50 mcg by mouth daily   Quantity:  90 capsule   Refills:  3       lisinopril 2.5 MG tablet   Commonly known as:  PRINIVIL/Zestril   Used for:  NSTEMI (non-ST elevated myocardial infarction) (H)        Take one tablet after dialysis. Take second tablet at bedtime.   Quantity:  60 tablet   Refills:  3       loperamide 2 MG capsule   Commonly known as:  IMODIUM        Dose:  1 mg   Take 1 mg by mouth as needed   Refills:  0       mexiletine 150 MG capsule   Commonly known as:  MEXITIL   Used for:  Tachycardia        Dose:  150 mg   Take 1 capsule (150 mg) by mouth 2 times daily   Quantity:  180 capsule   Refills:  3       nitroGLYcerin 0.4 MG sublingual tablet   Commonly known as:  NITROSTAT   Used for:  Angina pectoris (H)        1 TAB EVERY 5 MIN AS NEEDED, UP TO 3 PER EPISODE   Quantity:  25 tablet   Refills:  0       pantoprazole 40 MG EC tablet   Commonly known as:  PROTONIX   Used for:  Gastroesophageal reflux disease, esophagitis presence not specified        Dose:  40 mg   Take 1 tablet (40 mg) by mouth every morning   Quantity:  30 tablet   Refills:  10       pravastatin 40 MG tablet   Commonly known as:  PRAVACHOL   Used for:  Coronary artery disease involving native coronary artery of native heart without angina pectoris        Dose:  40  mg   Take 1 tablet (40 mg) by mouth daily   Quantity:  90 tablet   Refills:  3       psyllium 0.52 G capsule        Dose:  1 capsule   Take 1 capsule by mouth daily as needed   Refills:  0       RANITIDINE HCL PO        Dose:  75 mg   Take 75 mg by mouth At Bedtime   Refills:  0       sevelamer 800 MG tablet   Commonly known as:  RENVELA        Dose:  800-1600 mg   Take 800-1,600 mg by mouth as needed ON HOLD   Refills:  0                Protect others around you: Learn how to safely use, store and throw away your medicines at www.disposemymeds.org.             Medication List: This is a list of all your medications and when to take them. Check marks below indicate your daily home schedule. Keep this list as a reference.      Medications           Morning Afternoon Evening Bedtime As Needed    acetaminophen 325 MG tablet   Commonly known as:  TYLENOL   Take 2 tablets (650 mg) by mouth 4 times daily   Last time this was given:  650 mg on 8/1/2017  6:37 AM                                         apixaban ANTICOAGULANT 2.5 MG tablet   Commonly known as:  ELIQUIS   Take 1 tablet (2.5 mg) by mouth 2 times daily   Last time this was given:  2.5 mg on 8/1/2017  8:13 AM   Next Dose Due:  8/1/17 bedtime                                        B complex-C-folic acid 1 MG capsule   Take 1 capsule by mouth every evening   Last time this was given:  1 capsule on 7/31/2017  9:37 PM   Next Dose Due:  8/1/17 evening                                      carvedilol 6.25 MG tablet   Commonly known as:  COREG   Take 1 tablet (6.25 mg) by mouth 2 times daily (with meals) Take after dialysis   Last time this was given:  6.25 mg on 8/1/2017  8:13 AM   Next Dose Due:  8/1/17 bedtime                                         clopidogrel 75 MG tablet   Commonly known as:  PLAVIX   Take 1 tablet (75 mg) by mouth daily To protect your stent(s).  Do not stop taking unless directed by cardiology.   Last time this was given:  75 mg on 8/1/2017  8:13  AM   Next Dose Due:  8/2/17 AM                                      guaiFENesin-dextromethorphan 100-10 MG/5ML syrup   Commonly known as:  ROBITUSSIN DM   Take 5 mLs by mouth every 4 hours as needed for cough                                   isosorbide mononitrate 30 MG 24 hr tablet   Commonly known as:  IMDUR   Take 0.5 tablets (15 mg) by mouth daily   Last time this was given:  15 mg on 8/1/2017  8:13 AM   Next Dose Due:  8/2/17 AM                                    Levothyroxine Sodium 50 MCG Caps   Take 50 mcg by mouth daily   Next Dose Due:  8/2/17 AM                                      lisinopril 2.5 MG tablet   Commonly known as:  PRINIVIL/Zestril   Take one tablet after dialysis. Take second tablet at bedtime.   Last time this was given:  2.5 mg on 8/1/2017  8:13 AM   Next Dose Due:  8/1/17 bedtime                                        loperamide 2 MG capsule   Commonly known as:  IMODIUM   Take 1 mg by mouth as needed                                   mexiletine 150 MG capsule   Commonly known as:  MEXITIL   Take 1 capsule (150 mg) by mouth 2 times daily   Last time this was given:  150 mg on 8/1/2017  8:13 AM   Next Dose Due:  8/1/17 bedtime                                       nitroGLYcerin 0.4 MG sublingual tablet   Commonly known as:  NITROSTAT   1 TAB EVERY 5 MIN AS NEEDED, UP TO 3 PER EPISODE                                   pantoprazole 40 MG EC tablet   Commonly known as:  PROTONIX   Take 1 tablet (40 mg) by mouth every morning   Last time this was given:  40 mg on 8/1/2017  6:37 AM   Next Dose Due:  8/2/17 AM                                    pravastatin 40 MG tablet   Commonly known as:  PRAVACHOL   Take 1 tablet (40 mg) by mouth daily   Last time this was given:  40 mg on 7/31/2017  9:38 PM   Next Dose Due:  8/1/17 bedtime                                     psyllium 0.52 G capsule   Take 1 capsule by mouth daily as needed                                   RANITIDINE HCL PO   Take 75 mg by  mouth At Bedtime   Last time this was given:  75 mg on 7/31/2017  9:37 PM   Next Dose Due:  8/1/17 bedtime                                     sevelamer 800 MG tablet   Commonly known as:  RENVELA   Take 800-1,600 mg by mouth as needed ON HOLD

## 2017-07-31 NOTE — PROGRESS NOTES
Potassium   Date Value Ref Range Status   07/31/2017 3.7 3.4 - 5.3 mmol/L Final     Lab Results   Component Value Date    HGB 11.4 07/31/2017     Weight: 87.5 kg (192 lb 14.4 oz)  POST WT 85.5kg  DIALYSIS PROCEDURE NOTE    Patient dialyzed for 3.5 hrs. on a 3 K bath with a net fluid removal of  2L.  A BFR of 450 ml/min was obtained via a LUAF using 15gauge needles.    The patient was seen by Dr. Tabor during the treatment.  Total heparin received during the treatment: 2000 units.Meds  Given: Hectoral 2.5mcgIV Complications:  NONE.  Procedure and ESRD teaching done and questions answered. See flowsheet in EPIC for further details and post assessment.  Machine water alarm in place and functioning.  Consent for dialysis signed 7/31/17  Pt returned via wheelchair  Vascular Access: Aseptic prep done for both on/off.  Report received from:Asmita Walker R.N.  Report given to:Castillo Huffman R.N.  HEPATITIS B SURFACE ANTIGEN neg DATE 7/7/17 HEPATITIS B SURFACE ANTIBODY Succept DATE 4/5/17  Chlorine/Chloramine water system checked every 4 hours.  Outpatient Dialysis at [facility]

## 2017-07-31 NOTE — IP AVS SNAPSHOT
[facility]    [address] [place] [address]., [address]    [place] [address]    Phone:  [phone]                                       After Visit Summary   7/31/2017    Westley Ness    MRN: [id number]           After Visit Summary Signature Page     I have received my discharge instructions, and my questions have been answered. I have discussed any challenges I see with this plan with the nurse or doctor.    ..........................................................................................................................................  Patient/Patient Representative Signature      ..........................................................................................................................................  Patient Representative Print Name and Relationship to Patient    ..................................................               ................................................  Date                                            Time    ..........................................................................................................................................  Reviewed by Signature/Title    ...................................................              ..............................................  Date                                                            Time

## 2017-07-31 NOTE — PHARMACY-ADMISSION MEDICATION HISTORY
Admission medication history interview status for the 7/31/2017  admission is complete. See EPIC admission navigator for prior to admission medications     Medication history source reliability:Good    Actions taken by pharmacist (provider contacted, etc): spoke with patient     Additional medication history information not noted on PTA med list : Pt states he takes his BP meds and blood thinners after dialysis (noonish) on HD days (MWF). Also surescripts shows a metoprolol XL 50mg daily fileld 7/27 but patient does not know about this or if he's being changed from carvedilol.  Also, his renvella is a standing prescription which sometimes he takes depending on his twice monthly phosphorous checks.  Currently he is not taking regularly.    Medication reconciliation/reorder completed by provider prior to medication history? Yes    Time spent in this activity: 20 minutes    Prior to Admission medications    Medication Sig Last Dose Taking? Auth Provider   mexiletine (MEXITIL) 150 MG capsule Take 1 capsule (150 mg) by mouth 2 times daily 7/31/2017 at x1 Yes Josselin Kolb MD   apixaban ANTICOAGULANT (ELIQUIS) 2.5 MG tablet Take 1 tablet (2.5 mg) by mouth 2 times daily 7/30/2017 at Unknown time Yes Charlette Mcrae MD   guaiFENesin-dextromethorphan (ROBITUSSIN DM) 100-10 MG/5ML syrup Take 5 mLs by mouth every 4 hours as needed for cough PRN Yes Josselin Kolb MD   lisinopril (PRINIVIL/ZESTRIL) 2.5 MG tablet Take one tablet after dialysis. Take second tablet at bedtime. 7/30/2017 at Unknown time Yes Lisa Flaherty PA-C   nitroglycerin (NITROSTAT) 0.4 MG sublingual tablet 1 TAB EVERY 5 MIN AS NEEDED, UP TO 3 PER EPISODE PRN Yes Josselin Kolb MD   B complex-C-folic acid (NEPHROCAPS/TRIPHROCAPS) 1 MG capsule Take 1 capsule by mouth every evening 7/30/2017 at Unknown time Yes Josselin Kolb MD   pantoprazole (PROTONIX) 40 MG EC tablet Take 1 tablet (40 mg) by mouth every morning 7/31/2017 at Unknown  time Yes Josselin Kobl MD   carvedilol (COREG) 6.25 MG tablet Take 1 tablet (6.25 mg) by mouth 2 times daily (with meals) Take after dialysis 7/30/2017 at Unknown time Yes Josselin Kolb MD   isosorbide mononitrate (IMDUR) 30 MG 24 hr tablet Take 0.5 tablets (15 mg) by mouth daily 7/30/2017 at Unknown time Yes Josselin Kolb MD   Levothyroxine Sodium 50 MCG CAPS Take 50 mcg by mouth daily 7/31/2017 at Unknown time Yes Josselin Kolb MD   pravastatin (PRAVACHOL) 40 MG tablet Take 1 tablet (40 mg) by mouth daily 7/30/2017 at HS Yes Josselin Kolb MD   acetaminophen (TYLENOL) 325 MG tablet Take 2 tablets (650 mg) by mouth 4 times daily 7/31/2017 at x1 Yes Marco Crockett MD   clopidogrel (PLAVIX) 75 MG tablet Take 1 tablet (75 mg) by mouth daily To protect your stent(s).  Do not stop taking unless directed by cardiology. 7/30/2017 at Unknown time Yes Jackson Wolf MD   sevelamer (RENVELA) 800 MG tablet Take 800-1,600 mg by mouth as needed ON HOLD PRN Yes Unknown, Entered By History   loperamide (IMODIUM) 2 MG capsule Take 1 mg by mouth as needed  PRN Yes Reported, Patient   psyllium 0.52 G capsule Take 1 capsule by mouth daily as needed  PRN Yes Reported, Patient   RANITIDINE HCL PO Take 75 mg by mouth At Bedtime  7/30/2017 at Unknown time Yes Reported, Patient       Meg Jett, PharmD

## 2017-07-31 NOTE — H&P
[facility]  History and Physical   Hospitalist Service  7/31/2017  Eve Barroso PA-C pager [phone]    Westley Ness MRN# [id number]   YOB: 1945 Age: 71 year old      Date of Admission:  7/31/2017    Primary Care Physician: Josselin Kolb [phone]      CHIEF COMPLAINT: Chest pain    HISTORY OBTAINED: Patient and chart review    HPI  Westley Ness is a very pleasant 71-year-old male with past medical history of coronary artery disease with multiple previous stentings most recently in February 2017 he had rotablation to the LAD with mid LAD drug-eluting stent placement. His ischemic cardiomyopathy with ejection fraction of 20-25% status post ICD placement. Atrial flutter status post ablation as well as end-stage renal disease on dialysis who presented to the emergency department with complaints of exertional chest heaviness. Despite his complex cardiac history he states that overall he does quite well from a cardiac standpoint. He is able to do basic activities of daily living and grocery shopping without exertional symptoms. For the past 10 days he is been suffering from an upper respiratory infection. Started as a runny nose and sore throat and has progressed to congestion and cough. He's noted worsening exertional chest heaviness over the past few days is unclear if it's related to his URI versus symptomatic coronary artery disease. It feels very similar however it is not associated with right arm pain which he typically experiences during cardiac events. He was seen in our emergency department last week with negative workup and discharged home. He had recurrent symptoms today and given his complex cardiac history elected to present for evaluation.    He was given nitroglycerin by EMS and Route with mild improvement in his chest pain. He is overall feeling better. Workup in the emergency department is essentially negative with normal troponin, nonischemic EKG  and negative chest x-ray. Given his complex cardiac history admission was requested for observation      PAST MEDICAL HISTORY  Past Medical History:   Diagnosis Date     Aortic root dilatation (H)     mild     Atrial flutter (H)      CAD (coronary artery disease)      Cardiomyopathy, ischemic      Diabetes (H)      H/O percutaneous transluminal coronary angioplasty 7-    Successful Percutaneous coronary intervention of the proximal OM1     Hyperlipidemia      Hypertension      Hypothyroidism      ICD (implantable cardioverter-defibrillator) in place      NSTEMI (non-ST elevated myocardial infarction) (H)      Renal disease     end stage     Shortness of breath      Syncope      Tachycardia        PAST SURGICAL HISTORY  Past Surgical History:   Procedure Laterality Date     CHOLECYSTECTOMY       H ABLATION ATRIAL FLUTTER  06/08/2017     HC LEFT HEART CATHETERIZATION  7/14/2016     HC LEFT HEART CATHETERIZATION  9/21/2016     VASCULAR SURGERY      LUE fistula       HOME MEDICATIONS  Prior to Admission medications    Medication Sig Last Dose Taking? Auth Provider   mexiletine (MEXITIL) 150 MG capsule Take 1 capsule (150 mg) by mouth 2 times daily   Josselin Kolb MD   apixaban ANTICOAGULANT (ELIQUIS) 2.5 MG tablet Take 1 tablet (2.5 mg) by mouth 2 times daily   Charlette Mcrae MD   guaiFENesin-dextromethorphan (ROBITUSSIN DM) 100-10 MG/5ML syrup Take 5 mLs by mouth every 4 hours as needed for cough   Josselin Kolb MD   lisinopril (PRINIVIL/ZESTRIL) 2.5 MG tablet Take one tablet after dialysis. Take second tablet at bedtime.   Lisa Flaherty, PA-C   nitroglycerin (NITROSTAT) 0.4 MG sublingual tablet 1 TAB EVERY 5 MIN AS NEEDED, UP TO 3 PER EPISODE   Josselin Kolb MD   B complex-C-folic acid (NEPHROCAPS/TRIPHROCAPS) 1 MG capsule Take 1 capsule by mouth every evening   Josselin Kolb MD   pantoprazole (PROTONIX) 40 MG EC tablet Take 1 tablet (40 mg) by mouth every morning   Josselin Kolb  MD AIME   carvedilol (COREG) 6.25 MG tablet Take 1 tablet (6.25 mg) by mouth 2 times daily (with meals) Take after dialysis   Josselin Kolb MD   isosorbide mononitrate (IMDUR) 30 MG 24 hr tablet Take 0.5 tablets (15 mg) by mouth daily   Josselin Kolb MD   Levothyroxine Sodium 50 MCG CAPS Take 50 mcg by mouth daily   Josselin Kolb MD   pravastatin (PRAVACHOL) 40 MG tablet Take 1 tablet (40 mg) by mouth daily   Josselin Kolb MD   acetaminophen (TYLENOL) 325 MG tablet Take 2 tablets (650 mg) by mouth 4 times daily   Marco Crockett MD   clopidogrel (PLAVIX) 75 MG tablet Take 1 tablet (75 mg) by mouth daily To protect your stent(s).  Do not stop taking unless directed by cardiology.   Jackson Wolf MD   sevelamer (RENVELA) 800 MG tablet Take 800-1,600 mg by mouth as needed ON HOLD   Unknown, Entered By History   loperamide (IMODIUM) 2 MG capsule Take 1 mg by mouth as needed    Reported, Patient   psyllium 0.52 G capsule Take 1 capsule by mouth daily as needed    Reported, Patient   RANITIDINE HCL PO Take 75 mg by mouth 1-2 tablets daily   Reported, Patient       ALLERGIES  Allergies   Allergen Reactions     Contrast Dye Hives     Does fine if he uses benadryl prior.     No Clinical Screening - See Comments      Green beans - Diarrhea.    Topical antibiotic - name unknown - caused swelling of the penis.       SOCIAL HISTORY   reports that he quit smoking about 20 years ago. His smoking use included Cigarettes. He has a 70.00 pack-year smoking history. He has never used smokeless tobacco. He reports that he drinks alcohol. He reports that he does not use illicit drugs.    FAMILY HISTORY  family history includes C.A.D. in his paternal grandmother; CEREBROVASCULAR DISEASE in his mother; Hypertension in his father and mother.    REVIEW OF SYSTEMS  A 10 point ROS was negative other than the symptoms noted above in the HPI.    PHYSICAL EXAM  /67  Pulse 70  Temp 98.2  F (36.8  C) (Oral)   Resp 20  Ht 1.829 m (6')  Wt 87.5 kg (192 lb 14.4 oz)  SpO2 94%  BMI 26.16 kg/m2  Gen: sitting in bed, alert, cooperative and in no acute distress  HEENT: normocephalic; oropharynx clear; thyroid not enlarged  Card: RRR, S1, S2, no murmurs  Resp: lungs clear to auscultation bilaterally, no wheezing  GI: abdomen soft, not-tender, non-distended, no organomegaly, +BS  MSK: normal muscle tone, no LE edema  Neuro: CX II-XII grossly in tact; ROM in all four extremities grossly in tact  Psych: alert and oriented x3; normal affect  Heme/Lymph: no supraclavicular or cervical adenopathy  Skin: warm, no suspicious rashes or lesions noted    DATA  Laboratory Results:  Recent Labs   Lab Test  07/31/17   0730  07/27/17   1210   07/14/16   1144   08/12/11   1325   WBC  8.4  6.2   < >   --    < >  5.0   HGB  11.4*  12.2*   < >   --    < >  11.7*   MCV  95  92   < >   --    < >  93   PLT  151  164   < >   --    < >  201   INR   --    --    --   0.91   --   1.09    < > = values in this interval not displayed.      Recent Labs   Lab Test  07/31/17   0730  07/27/17   1210   NA  138  137   POTASSIUM  3.7  3.3*   CHLORIDE  101  95   CO2  27  33*   BUN  22  10   CR  4.63*  2.62*   ANIONGAP  10  9   CHRISTINE  9.2  9.0   GLC  165*  93     Recent Labs   Lab Test  07/31/17   0730  07/27/17   1410  07/27/17   1210   TROPI  0.028  0.035  0.037       Imaging Results:   Personally reviewed today as per Epic.   Recent Results (from the past 24 hour(s))   Chest XR,  PA & LAT    Narrative    XR CHEST 2 VW  7/31/2017 7:47 AM    HISTORY:  chest pain, cough    COMPARISON:  7/27/2017      Impression    IMPRESSION:  Right subclavian unipolar cardiac device. Cardiomegaly.  No infiltrates or acute process. Otherwise negative.     RENEA BUTCHER MD       ASSESSMENT AND PLAN: Westley Ness is a 71 year old male with a PMH of ischemic cardiomyopathy, end-stage renal disease, and atrial flutter who presented to the emergency department with complaints of  chest heaviness is currently being admitted for observation.    Chest heaviness suspect likely secondary to viral URI  Ischemic cardiomyopathy with ejection fraction 25%  Coronary artery disease: Patient has a complex cardiac history with multiple previous stenting most recently in February 2017. He presents with exertional symptoms that have worsened since development of a viral upper respiratory infection 10 days ago. EKG and troponins are negative. Chest x-ray without infiltrate. He is afebrile with no leukocytosis.  - Admit to observation  - Serial troponins  - Given complex cardiac history will consult cardiology for further recommendations. He recently underwent an echocardiogram in June 2017 so will not order any cardiac imaging at this time.  - Continue protein admission cardiac medications including Eliquis, Coreg, Plavix, lisinopril, and pravastatin  - Robitussin-AC and albuterol inhaler ordered for viral URI    End-stage renal disease: He dialyzes Monday Wednesday Friday. His nephrologist is   - Nephrology consult for dialysis  - Continue protein admission Nephrocaps    Atrial flutter: He is status post recent ablation.  - Continue prior to admission Eliquis and Coreg. Per chart review when patient completes Eliquis he is due to transition to ASA    Diet-controlled type 2 diabetes, A1c 6.0:   - SSI    Hypothyroidism: Hold Synthroid will observation    DVT Prophylaxis: Ambulate every shift    Code Status: Full    Dispo: Likely tomorrow if trops negative and Cardiology does not recommend further work-up

## 2017-08-01 VITALS
DIASTOLIC BLOOD PRESSURE: 55 MMHG | BODY MASS INDEX: 26.13 KG/M2 | WEIGHT: 192.9 LBS | TEMPERATURE: 97.6 F | RESPIRATION RATE: 16 BRPM | SYSTOLIC BLOOD PRESSURE: 109 MMHG | HEART RATE: 68 BPM | HEIGHT: 72 IN | OXYGEN SATURATION: 91 %

## 2017-08-01 LAB
ANION GAP SERPL CALCULATED.3IONS-SCNC: 8 MMOL/L (ref 3–14)
BUN SERPL-MCNC: 14 MG/DL (ref 7–30)
CALCIUM SERPL-MCNC: 9.5 MG/DL (ref 8.5–10.1)
CHLORIDE SERPL-SCNC: 102 MMOL/L (ref 94–109)
CO2 SERPL-SCNC: 31 MMOL/L (ref 20–32)
CREAT SERPL-MCNC: 3.33 MG/DL (ref 0.66–1.25)
GFR SERPL CREATININE-BSD FRML MDRD: 18 ML/MIN/1.7M2
GLUCOSE BLDC GLUCOMTR-MCNC: 117 MG/DL (ref 70–99)
GLUCOSE BLDC GLUCOMTR-MCNC: 138 MG/DL (ref 70–99)
GLUCOSE BLDC GLUCOMTR-MCNC: 97 MG/DL (ref 70–99)
GLUCOSE SERPL-MCNC: 94 MG/DL (ref 70–99)
POTASSIUM SERPL-SCNC: 4 MMOL/L (ref 3.4–5.3)
SODIUM SERPL-SCNC: 141 MMOL/L (ref 133–144)

## 2017-08-01 PROCEDURE — 99217 ZZC OBSERVATION CARE DISCHARGE: CPT | Performed by: INTERNAL MEDICINE

## 2017-08-01 PROCEDURE — A9270 NON-COVERED ITEM OR SERVICE: HCPCS | Mod: GY | Performed by: PHYSICIAN ASSISTANT

## 2017-08-01 PROCEDURE — 25000132 ZZH RX MED GY IP 250 OP 250 PS 637: Mod: GY | Performed by: INTERNAL MEDICINE

## 2017-08-01 PROCEDURE — A9270 NON-COVERED ITEM OR SERVICE: HCPCS | Mod: GY | Performed by: INTERNAL MEDICINE

## 2017-08-01 PROCEDURE — 36415 COLL VENOUS BLD VENIPUNCTURE: CPT | Performed by: PHYSICIAN ASSISTANT

## 2017-08-01 PROCEDURE — 80048 BASIC METABOLIC PNL TOTAL CA: CPT | Performed by: PHYSICIAN ASSISTANT

## 2017-08-01 PROCEDURE — 25000132 ZZH RX MED GY IP 250 OP 250 PS 637: Mod: GY | Performed by: PHYSICIAN ASSISTANT

## 2017-08-01 PROCEDURE — G0378 HOSPITAL OBSERVATION PER HR: HCPCS

## 2017-08-01 PROCEDURE — 99214 OFFICE O/P EST MOD 30 MIN: CPT | Performed by: INTERNAL MEDICINE

## 2017-08-01 PROCEDURE — 00000146 ZZHCL STATISTIC GLUCOSE BY METER IP

## 2017-08-01 RX ORDER — SEVELAMER CARBONATE 800 MG/1
800-1600 TABLET, FILM COATED ORAL
Status: DISCONTINUED | OUTPATIENT
Start: 2017-08-01 | End: 2017-08-01 | Stop reason: HOSPADM

## 2017-08-01 RX ORDER — NITROGLYCERIN 0.4 MG/1
0.4 TABLET SUBLINGUAL EVERY 5 MIN PRN
Status: DISCONTINUED | OUTPATIENT
Start: 2017-08-01 | End: 2017-08-01 | Stop reason: HOSPADM

## 2017-08-01 RX ORDER — LEVOTHYROXINE SODIUM 50 UG/1
50 TABLET ORAL DAILY
Status: DISCONTINUED | OUTPATIENT
Start: 2017-08-01 | End: 2017-08-01 | Stop reason: HOSPADM

## 2017-08-01 RX ADMIN — LISINOPRIL 2.5 MG: 2.5 TABLET ORAL at 08:13

## 2017-08-01 RX ADMIN — ALBUTEROL SULFATE 2 PUFF: 90 AEROSOL, METERED RESPIRATORY (INHALATION) at 01:36

## 2017-08-01 RX ADMIN — GUAIFENESIN AND CODEINE PHOSPHATE 5 ML: 100; 10 SOLUTION ORAL at 05:23

## 2017-08-01 RX ADMIN — APIXABAN 2.5 MG: 2.5 TABLET, FILM COATED ORAL at 08:13

## 2017-08-01 RX ADMIN — ISOSORBIDE MONONITRATE 15 MG: 30 TABLET, EXTENDED RELEASE ORAL at 08:13

## 2017-08-01 RX ADMIN — ACETAMINOPHEN 650 MG: 325 TABLET, FILM COATED ORAL at 06:37

## 2017-08-01 RX ADMIN — LEVOTHYROXINE SODIUM 50 MCG: 50 TABLET ORAL at 11:10

## 2017-08-01 RX ADMIN — CARVEDILOL 6.25 MG: 6.25 TABLET, FILM COATED ORAL at 08:13

## 2017-08-01 RX ADMIN — CLOPIDOGREL BISULFATE 75 MG: 75 TABLET, FILM COATED ORAL at 08:13

## 2017-08-01 RX ADMIN — MEXILETINE HYDROCHLORIDE 150 MG: 150 CAPSULE ORAL at 08:13

## 2017-08-01 RX ADMIN — PANTOPRAZOLE SODIUM 40 MG: 40 TABLET, DELAYED RELEASE ORAL at 06:37

## 2017-08-01 NOTE — PROGRESS NOTES
[facility] Cardiology Progress Note       Assessment and Plan:   Musculoskeletal chest pain.  Chronic CAD:  Stable, continue DAPT, no bleeding.  ESRD:  On HD.  ICD in place:  Ischemic VT.  Ischemic CM:  Severe LV systolic dysfunction.    Cardiac status stable, can go home today.            Interval History:   No further chest pain.                  Medications:     Current Facility-Administered Medications   Medication     acetaminophen (TYLENOL) tablet 650 mg     apixaban ANTICOAGULANT (ELIQUIS) tablet 2.5 mg     B complex-C-folic acid (NEPHROCAPS/TRIPHROCAPS) capsule 1 capsule     carvedilol (COREG) tablet 6.25 mg     clopidogrel (PLAVIX) tablet 75 mg     isosorbide mononitrate (IMDUR) 24 hr half-tab 15 mg     lisinopril (PRINIVIL/Zestril) tablet 2.5 mg     mexiletine (MEXITIL) capsule 150 mg     pantoprazole (PROTONIX) EC tablet 40 mg     pravastatin (PRAVACHOL) tablet 40 mg     guaiFENesin-codeine (ROBITUSSIN AC) 100-10 MG/5ML solution 5 mL     albuterol (PROAIR HFA/PROVENTIL HFA/VENTOLIN HFA) Inhaler 2 puff     lidocaine 1 % 1 mL     lidocaine (LMX4) cream     sodium chloride (PF) 0.9% PF flush 3 mL     sodium chloride (PF) 0.9% PF flush 3 mL     nitroGLYcerin (NITROSTAT) sublingual tablet 0.4 mg     alum & mag hydroxide-simethicone (MYLANTA ES/MAALOX  ES) suspension 15-30 mL     acetaminophen (TYLENOL) tablet 650 mg     acetaminophen (TYLENOL) Suppository 650 mg     naloxone (NARCAN) injection 0.1-0.4 mg     glucose 40 % gel 15-30 g    Or     dextrose 50 % injection 25-50 mL    Or     glucagon injection 1 mg     insulin aspart (NovoLOG) inj (RAPID ACTING)     insulin aspart (NovoLOG) inj (RAPID ACTING)     0.9% sodium chloride BOLUS     0.9% sodium chloride BOLUS     heparin 10,000 units/10 mL infusion (DIALYSIS USE)     - MEDICATION INSTRUCTIONS for Dialysis Patients -     ranitidine (ZANTAC) tablet 75 mg             Physical Exam:   Blood pressure 124/66, pulse 68, temperature 97.4  F (36.3  C),  temperature source Oral, resp. rate 16, height 1.829 m (6'), weight 87.5 kg (192 lb 14.4 oz), SpO2 98 %.  Wt Readings from Last 4 Encounters:   17 87.5 kg (192 lb 14.4 oz)   17 84.2 kg (185 lb 10 oz)   17 85.7 kg (189 lb)   17 87.9 kg (193 lb 11.2 oz)         Vital Sign Ranges  Temperature Temp  Av.8  F (36.6  C)  Min: 97.4  F (36.3  C)  Max: 98.8  F (37.1  C)   Blood pressure Systolic (24hrs), Av , Min:115 , Max:145        Diastolic (24hrs), Av, Min:62, Max:88      Pulse Pulse  Av  Min: 68  Max: 70   Respirations Resp  Av.5  Min: 15  Max: 20   Pulse oximetry SpO2  Av.5 %  Min: 93 %  Max: 98 %         Intake/Output Summary (Last 24 hours) at 17 0822  Last data filed at 17 1945   Gross per 24 hour   Intake              100 ml   Output             2000 ml   Net            -1900 ml          Cardiovascular:   Normal apical impulse, regular rate and rhythm, normal S1 and S2, no S3 or S4, and no murmur noted   Chest clear, icd in place.  No peripheral oedema         Data:     Labs:  Lab Results   Component Value Date     2017    Lab Results   Component Value Date    CHLORIDE 102 2017    Lab Results   Component Value Date    BUN 14 2017      Lab Results   Component Value Date    POTASSIUM 4.0 2017    Lab Results   Component Value Date    CO2 31 2017    Lab Results   Component Value Date    CR 3.33 2017        Lab Results   Component Value Date    WBC 8.4 2017    HGB 11.4 (L) 2017    HCT 36.4 (L) 2017    MCV 95 2017     2017     Lab Results   Component Value Date    TROPI 0.039 2017           Attestation:  I have reviewed today's vital signs, notes, medications, labs and imaging.         DR JARED IBARRA MD 2017  8:22 AM

## 2017-08-01 NOTE — PLAN OF CARE
Problem: Goal Outcome Summary  Goal: Goal Outcome Summary  Outcome: Improving  Pt VSS on 2-3L for comfort. NSR. A&Ox4. SBA, using bathroom. Denies pain. Pt did report some SOB after exertion, eased with oxygen. Given robitussin w/ codeine for cough with relief. Slept. Plan for cardiology consult, possible discharge home pending cards. Continue to monitor.

## 2017-08-01 NOTE — PLAN OF CARE
Problem: Goal Outcome Summary  Goal: Goal Outcome Summary  Outcome: No Change  Pt is alert and oriented.VSS.NSR.c/o Sob.O2 92% in RA.put on 2L oxygen for comfort.satting 97% with 2L NC.Pt had dialysis today.pt likely to d/c if trops negative and cards does not recommend  further work up as per MD.Will caitlin.to monitor.

## 2017-08-01 NOTE — PLAN OF CARE
Problem: Goal Outcome Summary  Goal: Goal Outcome Summary  Outcome: Improving  VSS. Tele: SR. Denies pain. Weaned off of O2. IV out and monitor off. Reviewed discharge instructions and medications with pt. All questions and concerns were addressed.Pt states he has all of his belongings. Pt got wallet from security. Pt brought down in WC to door 6 where a friend picked him up.

## 2017-08-01 NOTE — DISCHARGE SUMMARY
[facility]  Discharge Summary        Westley Ness MRN# [id number]   YOB: 1945 Age: 71 year old     Date of Admission:  7/31/2017  Date of Discharge:  8/1/2017  Admitting Physician:  Karri Norris MD  Discharge Physician: Jackson Atkinson MD  Discharging Service: Hospitalist     Primary Provider:  Josselin Kolb  Primary Care Physician Phone Number: [phone]     Discharge Diagnoses:     1. Atypical chest pain, suspected bronchitis  2. ASCVD with troponin above reference range secondary to ESRD  3. ESRD- s/p HD  4. Viral bronchitis  5. Atrial flutter  6. Hypothyroidism    Problem Oriented Hospital Course   Westley Ness was admitted on 7/31/2017 by Karri Norris MD and I would refer you to their history and physical.  The following problems were addressed during his hospitalization:    ASSESSMENT AND PLAN: Westley Ness is a 71 year old male with a PMH of ischemic cardiomyopathy, end-stage renal disease, and atrial flutter who presented to the emergency department with complaints of chest heaviness is currently being admitted for observation.     Chest heaviness suspect likely secondary to viral URI  Ischemic cardiomyopathy with ejection fraction 25%  Coronary artery disease: Patient has a complex cardiac history with multiple previous stenting most recently in February 2017. He presents with exertional symptoms that have worsened since development of a viral upper respiratory infection 10 days ago. EKG and troponins are negative. Chest x-ray without infiltrate. He is afebrile with no leukocytosis.  - Admit to observation  - Serial troponins were flat  - Given complex cardiac history will consult cardiology for further recommendations. He recently underwent an echocardiogram in June 2017 so will not order any cardiac imaging at this time.  - Continue protein admission cardiac medications including Eliquis, Coreg, Plavix, lisinopril, and pravastatin  -  Robitussin-AC and albuterol inhaler ordered for viral URI  - seen by cardiology and in agreement this is non-cardiac chest pain  - has follow up with PCP on 8/3, can consider z-brien if not improving, he tells me he is improving.     End-stage renal disease: He dialyzes Monday Wednesday Friday. His nephrologist is   - Nephrology consulted and underwent HD on 7/31  - Continue chronic RRT medications.     Atrial flutter: He is status post recent ablation.  - Continue prior to admission Eliquis and Coreg. Per chart review when patient completes Eliquis he is due to transition to ASA     Diet-controlled type 2 diabetes, A1c 6.0:   - SSI     Hypothyroidism:   - resume     DVT Prophylaxis: Ambulate every shift   Code Status: Full     Dispo:  - home today          Code Status:      Full Code        Brief Hospital Stay Summary Sent Home With Patient in AVS:        Reason for your hospital stay       You were admitted with atypical, non cardiac chest pain, your troponin   were negative                        Important Results:      See below        Pending Results:        Unresulted Labs Ordered in the Past 30 Days of this Admission     No orders found for last 61 day(s).            Discharge Instructions and Follow-Up:      Follow-up Appointments     Follow-up and recommended labs and tests        Follow up with primary care provider, Josselin Kolb, within 7 days   for hospital follow- up if not improved.                      Discharge Disposition:      Discharged to home        Discharge Medications:        Current Discharge Medication List      CONTINUE these medications which have NOT CHANGED    Details   mexiletine (MEXITIL) 150 MG capsule Take 1 capsule (150 mg) by mouth 2 times daily  Qty: 180 capsule, Refills: 3    Associated Diagnoses: Tachycardia      apixaban ANTICOAGULANT (ELIQUIS) 2.5 MG tablet Take 1 tablet (2.5 mg) by mouth 2 times daily  Qty: 60 tablet, Refills: 3    Associated Diagnoses: Atrial  fibrillation, unspecified type (H)      guaiFENesin-dextromethorphan (ROBITUSSIN DM) 100-10 MG/5ML syrup Take 5 mLs by mouth every 4 hours as needed for cough  Qty: 560 mL, Refills: 3    Associated Diagnoses: Cough      lisinopril (PRINIVIL/ZESTRIL) 2.5 MG tablet Take one tablet after dialysis. Take second tablet at bedtime.  Qty: 60 tablet, Refills: 3    Associated Diagnoses: NSTEMI (non-ST elevated myocardial infarction) (H)      nitroglycerin (NITROSTAT) 0.4 MG sublingual tablet 1 TAB EVERY 5 MIN AS NEEDED, UP TO 3 PER EPISODE  Qty: 25 tablet, Refills: 0    Associated Diagnoses: Angina pectoris (H)      B complex-C-folic acid (NEPHROCAPS/TRIPHROCAPS) 1 MG capsule Take 1 capsule by mouth every evening  Qty: 90 capsule, Refills: 3    Associated Diagnoses: ESRD (end stage renal disease) on dialysis (H)      pantoprazole (PROTONIX) 40 MG EC tablet Take 1 tablet (40 mg) by mouth every morning  Qty: 30 tablet, Refills: 10    Associated Diagnoses: Gastroesophageal reflux disease, esophagitis presence not specified      carvedilol (COREG) 6.25 MG tablet Take 1 tablet (6.25 mg) by mouth 2 times daily (with meals) Take after dialysis  Qty: 180 tablet, Refills: 3    Associated Diagnoses: NSTEMI (non-ST elevated myocardial infarction) (H); Mild CAD      isosorbide mononitrate (IMDUR) 30 MG 24 hr tablet Take 0.5 tablets (15 mg) by mouth daily  Qty: 90 tablet, Refills: 3    Associated Diagnoses: NSTEMI (non-ST elevated myocardial infarction) (H)      Levothyroxine Sodium 50 MCG CAPS Take 50 mcg by mouth daily  Qty: 90 capsule, Refills: 3    Associated Diagnoses: Hypothyroidism, unspecified type      pravastatin (PRAVACHOL) 40 MG tablet Take 1 tablet (40 mg) by mouth daily  Qty: 90 tablet, Refills: 3    Associated Diagnoses: Coronary artery disease involving native coronary artery of native heart without angina pectoris      acetaminophen (TYLENOL) 325 MG tablet Take 2 tablets (650 mg) by mouth 4 times daily  Qty: 60 tablet,  Refills: 0    Associated Diagnoses: Closed nondisplaced fracture of right patella, unspecified fracture morphology, initial encounter; Elbow fracture, right, closed, initial encounter      clopidogrel (PLAVIX) 75 MG tablet Take 1 tablet (75 mg) by mouth daily To protect your stent(s).  Do not stop taking unless directed by cardiology.  Qty: 30 tablet, Refills: 11    Associated Diagnoses: Coronary artery disease involving native coronary artery of native heart without angina pectoris      sevelamer (RENVELA) 800 MG tablet Take 800-1,600 mg by mouth as needed ON HOLD      loperamide (IMODIUM) 2 MG capsule Take 1 mg by mouth as needed       psyllium 0.52 G capsule Take 1 capsule by mouth daily as needed       RANITIDINE HCL PO Take 75 mg by mouth At Bedtime                Allergies:         Allergies   Allergen Reactions     Contrast Dye Hives     Does fine if he uses benadryl prior.     No Clinical Screening - See Comments      Green beans - Diarrhea.    Topical antibiotic - name unknown - caused swelling of the penis.           Consultations This Hospital Stay:      Consultation during this admission received from cardiology and nephrology        Condition and Physical on Discharge:      Discharge condition: Stable   Vitals: Blood pressure 124/66, pulse 68, temperature 97.4  F (36.3  C), temperature source Oral, resp. rate 16, height 1.829 m (6'), weight 87.5 kg (192 lb 14.4 oz), SpO2 98 %.     Constitutional: NAD   Lungs: diminished   Cardiovascular: RRR   Abdomen: soft   Skin: No edema   Other:          Discharge Time:      Less than 30 minutes.        Image Results From This Hospital Stay (For Non-EPIC Providers):        Results for orders placed or performed during the hospital encounter of 07/31/17   Chest XR,  PA & LAT    Narrative    XR CHEST 2 VW  7/31/2017 7:47 AM    HISTORY:  chest pain, cough    COMPARISON:  7/27/2017      Impression    IMPRESSION:  Right subclavian unipolar cardiac device.  Cardiomegaly.  No infiltrates or acute process. Otherwise negative.     RENEA BUTCHER MD           Most Recent Lab Results In EPIC (For Non-EPIC Providers):    Most Recent 3 CBC's:  Recent Labs   Lab Test  07/31/17   0730  07/27/17   1210  06/08/17   0530   WBC  8.4  6.2  7.9   HGB  11.4*  12.2*  10.8*   MCV  95  92  96   PLT  151  164  193      Most Recent 3 BMP's:  Recent Labs   Lab Test  08/01/17   0520  07/31/17   0730  07/27/17   1210   NA  141  138  137   POTASSIUM  4.0  3.7  3.3*   CHLORIDE  102  101  95   CO2  31  27  33*   BUN  14  22  10   CR  3.33*  4.63*  2.62*   ANIONGAP  8  10  9   CHRISTINE  9.5  9.2  9.0   GLC  94  165*  93     Most Recent 3 Troponin's:  Recent Labs   Lab Test  07/31/17   2250  07/31/17   1645  07/31/17   1117   TROPI  0.039  0.034  0.032     Most Recent 3 INR's:  Recent Labs   Lab Test  07/14/16   1144  08/12/11   1325  07/22/11   1545   INR  0.91  1.09  1.02     Most Recent 2 LFT's:  Recent Labs   Lab Test  10/30/16   1014  09/14/16   0300   AST  12  22   ALT  15  15   ALKPHOS  130  82   BILITOTAL  0.8  0.6     Most Recent Cholesterol Panel:  Recent Labs   Lab Test  07/11/16   0302   CHOL  98   LDL  37   HDL  29*   TRIG  162*     Most Recent 6 Bacteria Isolates From Any Culture (See EPIC Reports for Culture Details):  Recent Labs   Lab Test  04/07/17   0755  04/07/17   0736   CULT  No growth  No growth     Most Recent TSH, T4 and HgbA1c:   Recent Labs   Lab Test  06/09/17   0505  06/07/17   0811   TSH   --   3.15   A1C  6.0   --

## 2017-08-02 ENCOUNTER — TELEPHONE (OUTPATIENT)
Dept: INTERNAL MEDICINE | Facility: CLINIC | Age: 72
End: 2017-08-02

## 2017-08-02 ENCOUNTER — CARE COORDINATION (OUTPATIENT)
Dept: CARDIOLOGY | Facility: CLINIC | Age: 72
End: 2017-08-02

## 2017-08-02 NOTE — CONSULTS
[facility]    Cardiology Consultation     Date of Admission:  7/31/2017    Primary Care Physician   Josselin Kolb     REFERRING PHYSICIAN:  Hospitalist service.    IMPRESSION:    1.  Noncardiac chest pain.  2.  Recent flu-like illness with profuse coughing leading to number 1.    3.  Ischemic cardiomyopathy with severe left ventricular systolic dysfunction.  ICD in place for secondary prophylaxis.  4.  Extensive and severe coronary artery disease status post recent stenting to the left anterior descending in 02/2007.  Multiple other stents placed as well in the past.  5.  Atrial flutter status post ablation.  On Eliquis as well as Plavix.  6.  End-stage renal disease.  7.  Longstanding diabetes mellitus.  8.  Hypertension.    This gentleman's pain is atypical of that of his usual angina.  It is worsened with coughing.  The location of the pain is upper abdominal and across his chest.  It is more consistent with musculoskeletal pain.  There is no significant rise and fall in his troponins.  He does have a microscopic elevation entirely consistent with the presence of chronic kidney disease.  His EKG showed no acute changes.    He is on all the appropriate medications from my point of view.  I reassured him and at this time I do not feel that there is any need for further cardiac evaluation.  I will sign off.    HISTORY OF PRESENT ILLNESS:  A very pleasant gentleman on dialysis with multiple illnesses as stated above.  He has had multiple stenting in 2016 and 2017.  He has had interventions to OM1, LAD.  Ejection fraction is severely depressed.    He was well until recently when he developed a cold with severe profuse coughing.  When he was preparing to come to dialysis this morning, he experienced diffuse chest discomfort across the lower part of his chest and upper abdomen.  It is unlike his usual angina as it does not radiate down his arm.  There is no nausea, vomiting, diaphoresis.  He has  been feeling more fatigued recently due to the flu-like illness.  As he is not sure whether this is from his heart, he came to the hospital from where he was admitted for further evaluation and treatment.  Currently he feels fine.    Past Medical History   I have reviewed this patient's medical history and updated it with pertinent information if needed.   Past Medical History:   Diagnosis Date     Aortic root dilatation (H)     mild     Atrial flutter (H)      CAD (coronary artery disease)      Cardiomyopathy, ischemic      Diabetes (H)      H/O percutaneous transluminal coronary angioplasty 7-    Successful Percutaneous coronary intervention of the proximal OM1     Hyperlipidemia      Hypertension      Hypothyroidism      ICD (implantable cardioverter-defibrillator) in place      NSTEMI (non-ST elevated myocardial infarction) (H)      Renal disease     end stage     Shortness of breath      Syncope      Tachycardia        Past Surgical History   I have reviewed this patient's surgical history and updated it with pertinent information if needed.  Past Surgical History:   Procedure Laterality Date     CHOLECYSTECTOMY       H ABLATION ATRIAL FLUTTER  06/08/2017     HC LEFT HEART CATHETERIZATION  7/14/2016     HC LEFT HEART CATHETERIZATION  9/21/2016     VASCULAR SURGERY      LUE fistula       Prior to Admission Medications   Prior to Admission Medications   Prescriptions Last Dose Informant Patient Reported? Taking?   B complex-C-folic acid (NEPHROCAPS/TRIPHROCAPS) 1 MG capsule 7/30/2017 at Unknown time Self No Yes   Sig: Take 1 capsule by mouth every evening   Levothyroxine Sodium 50 MCG CAPS 7/31/2017 at Unknown time Self No Yes   Sig: Take 50 mcg by mouth daily   RANITIDINE HCL PO 7/30/2017 at Unknown time Self Yes Yes   Sig: Take 75 mg by mouth At Bedtime    acetaminophen (TYLENOL) 325 MG tablet 7/31/2017 at x1 Self No Yes   Sig: Take 2 tablets (650 mg) by mouth 4 times daily   apixaban ANTICOAGULANT  (ELIQUIS) 2.5 MG tablet 2017 at Unknown time Self No Yes   Sig: Take 1 tablet (2.5 mg) by mouth 2 times daily   carvedilol (COREG) 6.25 MG tablet 2017 at Unknown time Self No Yes   Sig: Take 1 tablet (6.25 mg) by mouth 2 times daily (with meals) Take after dialysis   clopidogrel (PLAVIX) 75 MG tablet 2017 at Unknown time Self No Yes   Sig: Take 1 tablet (75 mg) by mouth daily To protect your stent(s).  Do not stop taking unless directed by cardiology.   guaiFENesin-dextromethorphan (ROBITUSSIN DM) 100-10 MG/5ML syrup PRN Self No Yes   Sig: Take 5 mLs by mouth every 4 hours as needed for cough   isosorbide mononitrate (IMDUR) 30 MG 24 hr tablet 2017 at Unknown time Self No Yes   Sig: Take 0.5 tablets (15 mg) by mouth daily   lisinopril (PRINIVIL/ZESTRIL) 2.5 MG tablet 2017 at Unknown time Self No Yes   Sig: Take one tablet after dialysis. Take second tablet at bedtime.   loperamide (IMODIUM) 2 MG capsule PRN Self Yes Yes   Sig: Take 1 mg by mouth as needed    mexiletine (MEXITIL) 150 MG capsule 2017 at x1 Self No Yes   Sig: Take 1 capsule (150 mg) by mouth 2 times daily   nitroglycerin (NITROSTAT) 0.4 MG sublingual tablet PRN Self No Yes   Si TAB EVERY 5 MIN AS NEEDED, UP TO 3 PER EPISODE   pantoprazole (PROTONIX) 40 MG EC tablet 2017 at Unknown time Self No Yes   Sig: Take 1 tablet (40 mg) by mouth every morning   pravastatin (PRAVACHOL) 40 MG tablet 2017 at HS Self No Yes   Sig: Take 1 tablet (40 mg) by mouth daily   psyllium 0.52 G capsule PRN Self Yes Yes   Sig: Take 1 capsule by mouth daily as needed    sevelamer (RENVELA) 800 MG tablet PRN Self Yes Yes   Sig: Take 800-1,600 mg by mouth as needed ON HOLD      Facility-Administered Medications: None     Allergies   Allergies   Allergen Reactions     Contrast Dye Hives     Does fine if he uses benadryl prior.     No Clinical Screening - See Comments      Green beans - Diarrhea.    Topical antibiotic - name unknown -  caused swelling of the penis.       Social History   I have reviewed this patient's social history and updated it with pertinent information if needed. Westley Ness  reports that he quit smoking about 20 years ago. His smoking use included Cigarettes. He has a 70.00 pack-year smoking history. He has never used smokeless tobacco. He reports that he drinks alcohol. He reports that he does not use illicit drugs.    Family History   I have reviewed this patient's family history and updated it with pertinent information if needed.   Family History   Problem Relation Age of Onset     Hypertension Mother      CEREBROVASCULAR DISEASE Mother      Hypertension Father      C.A.D. Paternal Grandmother        Review of Systems   The 10 point Review of Systems is negative other than noted in the HPI or here.     Physical Exam          PHYSICAL EXAMINATION:  A pleasant gentleman in no acute distress.  Blood pressure 131/74, afebrile, heart rate is in the 60s to 70s, normal sinus rhythm.  Respiratory rate is normal.  HEENT examination unremarkable.  Mucous membranes normal.     There is no clubbing, no peripheral or central cyanosis.  Neck examination revealed no raised JVP and no thyromegaly.      Cardiac examination: Cardiac apex not palpable.  Soft systolic murmur at the left sternal border.  Chest examination symmetrical expansion without the use of accessory muscles.  Breath sounds are normal.  Abdomen is soft, nontender.  No hepatosplenomegaly.  The abdominal aorta is not palpable.  ICD insertion site is clean and nontender.     No significant peripheral edema.  Foot pulses are diminished.     Central nervous system examination grossly intact.    Psychiatric examination:  Mood appears normal.    LABORATORY DATA:  His EKG shows sinus rhythm with nonspecific ST-segment changes.  Troponins are 0.028 and 0.032.  This is very similar to recent numbers.  Creatinine is 4.63.  Potassium is 3.7, sodium is 138.  Hemoglobin is 11.4,  white cell count 8.4, platelet count of 151.  His chest x-ray is reported as showing no infiltrates or acute processes.                     Vital Signs with Ranges     192 lbs 14.44 oz    Data   No results found for this or any previous visit (from the past 24 hour(s)).                  Julius De León MD, [facility]    D:  07/31/2017 17:19 T:  07/31/2017 20:43  Document:  [id number]

## 2017-08-02 NOTE — TELEPHONE ENCOUNTER
"Hospital/TCU/ED for chronic condition Discharge Protocol    \"Hi, my name is Lola Rodriguez, a registered nurse, and I am calling from [facility].  I am calling to follow up and see how things are going for you after your recent emergency visit/hospital/TCU stay.\"    Tell me how you are doing now that you are home?\" I am doing better thanks.  I guess the chest pain I was having was due to suspected bronchitis.      Discharge Instructions    \"Let's review your discharge instructions.  What is/are the follow-up recommendations?  Pt. Response: I am going to see Dr. Kolb tomorrow.    \"Has an appointment with your primary care provider been scheduled?\"   Yes. (confirm)    \"When you see the provider, I would recommend that you bring your medications with you.\"    Medications    \"Tell me what changed about your medicines when you discharged?\"    Changes to chronic meds?    0-1    \"What questions do you have about your medications?\"    None     New diagnoses of heart failure, COPD, diabetes, or MI?    No                  Medication reconciliation completed? Yes  Was MTM referral placed (*Make sure to put transitions as reason for referral)?   No    Call Summary    \"What questions or concerns do you have about your recent visit and your follow-up care?\"     none    \"If you have questions or things don't continue to improve, we encourage you contact us through the main clinic number (give number).  Even if the clinic is not open, triage nurses are available 24/7 to help you.     We would like you to know that our clinic has extended hours (provide information).  We also have urgent care (provide details on closest location and hours/contact info)\"      \"Thank you for your time and take care!\"         "

## 2017-08-02 NOTE — TELEPHONE ENCOUNTER
ED / Discharge Outreach Protocol    Patient Contact    Attempt # 1    Was call answered?  No.  Left message on voicemail with information to call me back. Has appointment tomorrow.

## 2017-08-02 NOTE — PROGRESS NOTES
Called patient and left VM to discuss any post hospital d/c questions he may have, review medication changes, and confirm f/u appts. RN advised patient in  that per d/c instructions patient should schedule a f/u apt with PMD. Patient advised in  to call clinic with any cardiac related questions or concerns.

## 2017-08-03 ENCOUNTER — OFFICE VISIT (OUTPATIENT)
Dept: INTERNAL MEDICINE | Facility: CLINIC | Age: 72
End: 2017-08-03
Payer: MEDICARE

## 2017-08-03 VITALS
BODY MASS INDEX: 25.37 KG/M2 | DIASTOLIC BLOOD PRESSURE: 60 MMHG | HEART RATE: 76 BPM | HEIGHT: 72 IN | SYSTOLIC BLOOD PRESSURE: 124 MMHG | OXYGEN SATURATION: 95 % | WEIGHT: 187.3 LBS

## 2017-08-03 DIAGNOSIS — Z76.89 ENCOUNTER TO ESTABLISH CARE: ICD-10-CM

## 2017-08-03 DIAGNOSIS — Z09 HOSPITAL DISCHARGE FOLLOW-UP: Primary | ICD-10-CM

## 2017-08-03 DIAGNOSIS — J06.9 UPPER RESPIRATORY TRACT INFECTION, UNSPECIFIED TYPE: ICD-10-CM

## 2017-08-03 DIAGNOSIS — R07.89 ATYPICAL CHEST PAIN: ICD-10-CM

## 2017-08-03 PROBLEM — I48.3 TYPICAL ATRIAL FLUTTER (H): Status: RESOLVED | Noted: 2017-06-07 | Resolved: 2017-08-03

## 2017-08-03 PROBLEM — I25.10 CORONARY ARTERY DISEASE INVOLVING NATIVE CORONARY ARTERY OF NATIVE HEART WITHOUT ANGINA PECTORIS: Status: RESOLVED | Noted: 2017-01-17 | Resolved: 2017-08-03

## 2017-08-03 PROBLEM — Z99.2 ESRD (END STAGE RENAL DISEASE) ON DIALYSIS (H): Status: RESOLVED | Noted: 2017-01-17 | Resolved: 2017-08-03

## 2017-08-03 PROBLEM — N18.6 ESRD (END STAGE RENAL DISEASE) ON DIALYSIS (H): Status: RESOLVED | Noted: 2017-01-17 | Resolved: 2017-08-03

## 2017-08-03 PROBLEM — I50.21 ACUTE SYSTOLIC CONGESTIVE HEART FAILURE (H): Status: RESOLVED | Noted: 2017-06-07 | Resolved: 2017-08-03

## 2017-08-03 PROCEDURE — 99496 TRANSJ CARE MGMT HIGH F2F 7D: CPT | Performed by: INTERNAL MEDICINE

## 2017-08-03 RX ORDER — CODEINE PHOSPHATE AND GUAIFENESIN 10; 100 MG/5ML; MG/5ML
1 SOLUTION ORAL EVERY 4 HOURS PRN
Qty: 120 ML | Refills: 0 | Status: SHIPPED | OUTPATIENT
Start: 2017-08-03 | End: 2017-12-08

## 2017-08-03 RX ORDER — AZITHROMYCIN 250 MG/1
TABLET, FILM COATED ORAL
Qty: 6 TABLET | Refills: 0 | Status: SHIPPED | OUTPATIENT
Start: 2017-08-03 | End: 2017-12-08

## 2017-08-03 NOTE — PROGRESS NOTES
SUBJECTIVE:      Westley Ness is a pleasant 71 year old male who presents for hospital follow-up:    Hospital: [facility]  Date of Admission: 7/31/17  Date of Discharge: 8/1/17  Reason(s) for Admission: chest pain    Diagnostic tests, treatments/interventions, and discharge summary reviewed.    PMH significant for CAD s/p multiple PCI's, ischemic cardiomyopathy (EF 25-30% s/p AICD), and ESRD on HD.     Summary of hospitalization:  - had been suffering from URI symptoms (runny nose, congestion, sore throat, cough) for 10 days prior to admission  - presented to ER with worsening, exertional chest heaviness over a couple of days  - was given nitroglycerin en route to ER from dialysis with mild improvement in chest pain  - workup in the ER was essentially negative with normal troponin, nonischemic EKG, and negative chest x-ray  - admitted overnight for observation in light of his complex cardiac history  - presenting chest pain thought to be due to viral URI  - patient was discharged on HOD#2 after unremarkable observation period    Medication changes since discharge: none  Adherent to discharge medications: yes  Problems taking discharge medications: no     Follow-up: n/a     Update since discharge:   - chest heaviness is improved, but still present due to continued coughing  - continued URI symptoms - now ongoing 10-14 days   - nose is still running   - continued productive cough   - sinus congestion and right-sided sinus pressure and pain   - shortness of breath    - no fevers, chills, and sweats    Additionally, patient's PMH, PSH, FH, SH, medications, allergies, and immunizations were reviewed today:    Past Medical History:   Diagnosis Date     Acid reflux disease      Atrial flutter (H)     s/p ablation 6/2017     CAD (coronary artery disease)     s/p multiple NSTEMIs and PCI's     ESRD on hemodialysis (H)     MWF     Hypothyroidism      Ischemic cardiomyopathy     EF 25-30%, s/p AICD     NSTEMI (non-ST elevated  myocardial infarction) (H)     multiple     Type 2 diabetes mellitus (H)     diet-controlled     Past Surgical History:   Procedure Laterality Date     CHOLECYSTECTOMY, OPEN  2013     ELBOW SURGERY Left 2008    ORIF - plates still in place     H ABLATION ATRIAL FLUTTER  06/08/2017     HC LEFT HEART CATHETERIZATION  7/14/2016     HC LEFT HEART CATHETERIZATION  9/21/2016     IMPLANT VENTRICULAR DEVICE  08/15/2011     TONSILLECTOMY & ADENOIDECTOMY       VASCULAR SURGERY  2004, 2010    LUE fistulas (upper and lower); upper one is functional     Family History   Problem Relation Age of Onset     CEREBROVASCULAR DISEASE Mother      later in life     Hypertension Father      Bladder Cancer Father      Myocardial Infarction Paternal Grandmother      DIABETES No family hx of      Prostate Cancer No family hx of      Colon Cancer No family hx of      Occupational History     Retired - CPA      Social History Main Topics     Smoking status: Former Smoker     Packs/day: 2.00     Years: 35.00     Types: Cigarettes     Quit date: 11/1/1996     Smokeless tobacco: Never Used     Alcohol use       Comment: rare     Drug use: No     Sexual activity: No     Social History Narrative    Single.    No kids.     Maria Dolores Pabon (friend- healthcare POA), Joaquina Nair (friend - healthcare POA)    No formal exercise.      Immunization History   Administered Date(s) Administered     Hepatitis A Vac Ped/Adol-2 Dose 08/06/2004, 09/10/2004, 10/20/2004, 02/25/2005, 03/16/2012, 07/10/2013     Influenza (H1N1) 01/08/2010     Influenza (High Dose) 3 valent vaccine 10/16/2015, 10/16/2016     Influenza Vaccine IM 3yrs+ 4 Valent IIV4 09/14/2009, 09/22/2010, 09/21/2011, 09/24/2012, 09/13/2013, 09/10/2014     Influenza Vaccine, 3 YRS +, IM (QUADRIVALENT W/PRESERVATIVES) 11/20/2004, 10/24/2005, 11/13/2006, 10/03/2007, 10/26/2007, 10/29/2008     Pneumococcal (PCV 13) 09/22/2016     Pneumococcal 23 valent 10/15/2009      Allergies   Allergen Reactions      Contrast Dye Hives     Does fine if he uses benadryl prior.     No Clinical Screening - See Comments      Green beans - Diarrhea.  Topical antibiotic - name unknown - caused swelling of the penis.     Current Outpatient Prescriptions   Medication Sig     azithromycin (ZITHROMAX) 250 MG tablet Two tablets first day, then one tablet daily for four days.     guaiFENesin-codeine (ROBITUSSIN AC) 100-10 MG/5ML SOLN solution Take 5 mLs by mouth every 4 hours as needed for cough     mexiletine (MEXITIL) 150 MG capsule Take 1 capsule (150 mg) by mouth 2 times daily     apixaban ANTICOAGULANT (ELIQUIS) 2.5 MG tablet Take 1 tablet (2.5 mg) by mouth 2 times daily     guaiFENesin-dextromethorphan (ROBITUSSIN DM) 100-10 MG/5ML syrup Take 5 mLs by mouth every 4 hours as needed for cough     lisinopril (PRINIVIL/ZESTRIL) 2.5 MG tablet Take one tablet after dialysis. Take second tablet at bedtime.     nitroglycerin (NITROSTAT) 0.4 MG sublingual tablet 1 TAB EVERY 5 MIN AS NEEDED, UP TO 3 PER EPISODE     B complex-C-folic acid (NEPHROCAPS/TRIPHROCAPS) 1 MG capsule Take 1 capsule by mouth every evening     pantoprazole (PROTONIX) 40 MG EC tablet Take 1 tablet (40 mg) by mouth every morning     carvedilol (COREG) 6.25 MG tablet Take 1 tablet (6.25 mg) by mouth 2 times daily (with meals) Take after dialysis     isosorbide mononitrate (IMDUR) 30 MG 24 hr tablet Take 0.5 tablets (15 mg) by mouth daily     Levothyroxine Sodium 50 MCG CAPS Take 50 mcg by mouth daily     pravastatin (PRAVACHOL) 40 MG tablet Take 1 tablet (40 mg) by mouth daily     acetaminophen (TYLENOL) 325 MG tablet Take 2 tablets (650 mg) by mouth 4 times daily     clopidogrel (PLAVIX) 75 MG tablet Take 1 tablet (75 mg) by mouth daily To protect your stent(s).  Do not stop taking unless directed by cardiology.     sevelamer (RENVELA) 800 MG tablet Take 800-1,600 mg by mouth as needed ON HOLD     loperamide (IMODIUM) 2 MG capsule Take 1 mg by mouth as needed      psyllium  0.52 G capsule Take 1 capsule by mouth daily as needed      RANITIDINE HCL PO Take 75 mg by mouth At Bedtime      OBJECTIVE:       /60 (BP Location: Right arm, Patient Position: Chair, Cuff Size: Adult Regular)  Pulse 76  Ht 6' (1.829 m)  Wt 187 lb 4.8 oz (85 kg)  SpO2 95%  BMI 25.4 kg/m2  Constitutional: well-appearing  Head, Ears, Nose, and Throat: normocephalic, atraumatic; normal external auditory canal and pinna; tympanic membranes visualized and normal; nasal septum straight; nasal mucosa pink; no oropharyngeal lesions or ulcers; dentition normal; no tonsillar exudates  Respiratory: normal respiratory effort; rhonchi upper lung fields; no crackles or wheezes; frequent cough  Cardiovascular: regular rate and rhythm; trace edema bilaterally   Musculoskeletal: walks with a 4-pronged cane   Psych: normal judgment and insight; normal mood and affect; recent and remote memory intact    ASSESSMENT/PLAN:       (Z09) Hospital discharge follow-up  (primary encounter diagnosis)  (R07.89) Atypical chest pain  (J06.9) Upper respiratory tract infection, unspecified type  Comment:    - chest pain improved but still present due to continued URI symptoms.   - rhonchi on exam.   - due to chronicity of symptoms and underlying comorbid conditions, recommend course of antibiotics.  Plan:    - course of his azithromycin.   - Guaifenesin (Robitussin) as needed during the day for cough.   - Robitussin AC as needed during the night for cough.   - if symptoms worsen, change, or do not improve, patient to contact M.D.    (Z76.89) Encounter to establish care  Comment: patient's PMH, PSH, FH, SH, medications, allergies, and immunizations were reviewed today.    The instructions on the AVS were discussed and explained to the patient. Patient expressed understanding of instructions.    Josselin Kolb MD   [address] [address]  T: [phone], F: [phone]

## 2017-08-03 NOTE — NURSING NOTE
Chief Complaint   Patient presents with     Hospital F/U     7/31/17 - 8/1/17 [facility] for chest pain       Initial /60 (BP Location: Right arm, Patient Position: Chair, Cuff Size: Adult Regular)  Pulse 76  Ht 6' (1.829 m)  Wt 187 lb 4.8 oz (85 kg)  SpO2 95%  BMI 25.4 kg/m2 Estimated body mass index is 25.4 kg/(m^2) as calculated from the following:    Height as of this encounter: 6' (1.829 m).    Weight as of this encounter: 187 lb 4.8 oz (85 kg).  Medication Reconciliation: complete     Kaminibose MA

## 2017-08-03 NOTE — PATIENT INSTRUCTIONS
We'll email my note to you tomorrow.     ---    Azithromycin - 2 tabs the first day, one tab daily x 4 days after that.    Cough syrup at night.    Robitussin during the day.    ---

## 2017-08-03 NOTE — MR AVS SNAPSHOT
"              After Visit Summary   8/3/2017    Westley Ness    MRN: [id number]           Patient Information     Date Of Birth          1945        Visit Information        Provider Department      8/3/2017 6:00 PM Josselin Kolb MD [facility]        Today's Diagnoses     Upper respiratory tract infection, unspecified type    -  1      Care Instructions    We'll email my note to you tomorrow.     ---    Azithromycin - 2 tabs the first day, one tab daily x 4 days after that.    Cough syrup at night.    Robitussin during the day.    ---                  Follow-ups after your visit        Who to contact     If you have questions or need follow up information about today's clinic visit or your schedule please contact [facility] directly at [phone].  Normal or non-critical lab and imaging results will be communicated to you by MyChart, letter or phone within 4 business days after the clinic has received the results. If you do not hear from us within 7 days, please contact the clinic through MyChart or phone. If you have a critical or abnormal lab result, we will notify you by phone as soon as possible.  Submit refill requests through [employer] or call your pharmacy and they will forward the refill request to us. Please allow 3 business days for your refill to be completed.          Additional Information About Your Visit        MyChart Information     [employer] lets you send messages to your doctor, view your test results, renew your prescriptions, schedule appointments and more. To sign up, go to [email or web address] . Click on \"Log in\" on the left side of the screen, which will take you to the Welcome page. Then click on \"Sign up Now\" on the right side of the page.     You will be asked to enter the access code listed below, as well as some personal information. Please follow the directions to create your username and password.     Your access code " is: [email or web address]  Expires: 2017 10:05 AM     Your access code will  in 90 days. If you need help or a new code, please call your [facility] clinic or [phone].        Care EveryWhere ID     This is your Care EveryWhere ID. This could be used by other organizations to access your [facility] medical records  [id number]        Your Vitals Were     Pulse Height Pulse Oximetry BMI (Body Mass Index)          76 6' (1.829 m) 95% 25.4 kg/m2         Blood Pressure from Last 3 Encounters:   17 124/60   17 109/55   17 145/76    Weight from Last 3 Encounters:   17 187 lb 4.8 oz (85 kg)   17 192 lb 14.4 oz (87.5 kg)   17 185 lb 10 oz (84.2 kg)              Today, you had the following     No orders found for display         Today's Medication Changes          These changes are accurate as of: 8/3/17  6:34 PM.  If you have any questions, ask your nurse or doctor.               Start taking these medicines.        Dose/Directions    azithromycin 250 MG tablet   Commonly known as:  ZITHROMAX   Used for:  Upper respiratory tract infection, unspecified type   Started by:  Josselin Kolb MD        Two tablets first day, then one tablet daily for four days.   Quantity:  6 tablet   Refills:  0       guaiFENesin-codeine 100-10 MG/5ML Soln solution   Commonly known as:  ROBITUSSIN AC   Used for:  Upper respiratory tract infection, unspecified type   Started by:  Josselin Kolb MD        Dose:  1 tsp.   Take 5 mLs by mouth every 4 hours as needed for cough   Quantity:  120 mL   Refills:  0            Where to get your medicines      These medications were sent to [address] [address]     Phone:  [phone]     azithromycin 250 MG tablet         Some of these will need a paper prescription and others can be bought over the counter.  Ask your nurse if you have questions.     Bring a paper prescription for each  of these medications     guaiFENesin-codeine 100-10 MG/5ML Soln solution                Primary Care Provider Office Phone # Fax #    Josselin Kolb [phone]       [facility] [address] [facility] [address]        Equal Access to Services     LAZARO ROMERO : Hadii aad ku hadasho Soomaali, waaxda luqadaha, qaybta kaalmada adeegyada, waxay idiin hayaan adeeg kharash la'aan ah. So [facility] [phone].    ATENCIÓN: Si habla español, tiene a jacome disposición servicios gratuitos de asistencia lingüística. Llame al [phone].    We comply with applicable federal civil rights laws and [place] laws. We do not discriminate on the basis of race, color, national origin, age, disability sex, sexual orientation or gender identity.            Thank you!     Thank you for choosing [facility]  for your care. Our goal is always to provide you with excellent care. Hearing back from our patients is one way we can continue to improve our services. Please take a few minutes to complete the written survey that you may receive in the mail after your visit with us. Thank you!             Your Updated Medication List - Protect others around you: Learn how to safely use, store and throw away your medicines at www.disposemymeds.org.          This list is accurate as of: 8/3/17  6:34 PM.  Always use your most recent med list.                   Brand Name Dispense Instructions for use Diagnosis    acetaminophen 325 MG tablet    TYLENOL    60 tablet    Take 2 tablets (650 mg) by mouth 4 times daily    Closed nondisplaced fracture of right patella, unspecified fracture morphology, initial encounter, Elbow fracture, right, closed, initial encounter       apixaban ANTICOAGULANT 2.5 MG tablet    ELIQUIS    60 tablet    Take 1 tablet (2.5 mg) by mouth 2 times daily    Atrial fibrillation, unspecified type (H)       azithromycin 250 MG tablet    ZITHROMAX    6 tablet    Two tablets  first day, then one tablet daily for four days.    Upper respiratory tract infection, unspecified type       B complex-C-folic acid 1 MG capsule     90 capsule    Take 1 capsule by mouth every evening    ESRD (end stage renal disease) on dialysis (H)       carvedilol 6.25 MG tablet    COREG    180 tablet    Take 1 tablet (6.25 mg) by mouth 2 times daily (with meals) Take after dialysis    NSTEMI (non-ST elevated myocardial infarction) (H), Mild CAD       clopidogrel 75 MG tablet    PLAVIX    30 tablet    Take 1 tablet (75 mg) by mouth daily To protect your stent(s).  Do not stop taking unless directed by cardiology.    Coronary artery disease involving native coronary artery of native heart without angina pectoris       guaiFENesin-codeine 100-10 MG/5ML Soln solution    ROBITUSSIN AC    120 mL    Take 5 mLs by mouth every 4 hours as needed for cough    Upper respiratory tract infection, unspecified type       guaiFENesin-dextromethorphan 100-10 MG/5ML syrup    ROBITUSSIN DM    560 mL    Take 5 mLs by mouth every 4 hours as needed for cough    Cough       isosorbide mononitrate 30 MG 24 hr tablet    IMDUR    90 tablet    Take 0.5 tablets (15 mg) by mouth daily    NSTEMI (non-ST elevated myocardial infarction) (H)       Levothyroxine Sodium 50 MCG Caps     90 capsule    Take 50 mcg by mouth daily    Hypothyroidism, unspecified type       lisinopril 2.5 MG tablet    PRINIVIL/Zestril    60 tablet    Take one tablet after dialysis. Take second tablet at bedtime.    NSTEMI (non-ST elevated myocardial infarction) (H)       loperamide 2 MG capsule    IMODIUM     Take 1 mg by mouth as needed        mexiletine 150 MG capsule    MEXITIL    180 capsule    Take 1 capsule (150 mg) by mouth 2 times daily    Tachycardia       nitroGLYcerin 0.4 MG sublingual tablet    NITROSTAT    25 tablet    1 TAB EVERY 5 MIN AS NEEDED, UP TO 3 PER EPISODE    Angina pectoris (H)       pantoprazole 40 MG EC tablet    PROTONIX    30 tablet    Take 1  tablet (40 mg) by mouth every morning    Gastroesophageal reflux disease, esophagitis presence not specified       pravastatin 40 MG tablet    PRAVACHOL    90 tablet    Take 1 tablet (40 mg) by mouth daily    Coronary artery disease involving native coronary artery of native heart without angina pectoris       psyllium 0.52 G capsule      Take 1 capsule by mouth daily as needed        RANITIDINE HCL PO      Take 75 mg by mouth At Bedtime        sevelamer 800 MG tablet    RENVELA     Take 800-1,600 mg by mouth as needed ON HOLD

## 2017-08-14 DIAGNOSIS — I21.4 NSTEMI (NON-ST ELEVATED MYOCARDIAL INFARCTION) (H): ICD-10-CM

## 2017-08-14 RX ORDER — LISINOPRIL 2.5 MG/1
TABLET ORAL
Qty: 180 TABLET | Refills: 3 | Status: SHIPPED | OUTPATIENT
Start: 2017-08-14 | End: 2018-09-06

## 2017-10-03 DIAGNOSIS — I25.10 CORONARY ARTERY DISEASE INVOLVING NATIVE CORONARY ARTERY OF NATIVE HEART WITHOUT ANGINA PECTORIS: ICD-10-CM

## 2017-10-03 RX ORDER — CLOPIDOGREL BISULFATE 75 MG/1
75 TABLET ORAL DAILY
Qty: 30 TABLET | Refills: 11 | Status: SHIPPED | OUTPATIENT
Start: 2017-10-03 | End: 2018-10-01

## 2017-10-03 NOTE — TELEPHONE ENCOUNTER
clopidogrel           Last Written Prescription Date: 09/18/16  Last Fill Quantity: 30, # refills: 11    Last Office Visit with [facility] or [facility] prescribing provider:  08/03/17   Future Office Visit:       Lab Results   Component Value Date    WBC 8.4 07/31/2017     Lab Results   Component Value Date    RBC 3.85 07/31/2017     Lab Results   Component Value Date    HGB 11.4 07/31/2017     Lab Results   Component Value Date    HCT 36.4 07/31/2017     No components found for: MCT  Lab Results   Component Value Date    MCV 95 07/31/2017     Lab Results   Component Value Date    MCH 29.6 07/31/2017     Lab Results   Component Value Date    MCHC 31.3 07/31/2017     Lab Results   Component Value Date    RDW 17.2 07/31/2017     Lab Results   Component Value Date     07/31/2017     Lab Results   Component Value Date    AST 12 10/30/2016     Lab Results   Component Value Date    ALT 15 10/30/2016     Creatinine   Date Value Ref Range Status   08/01/2017 3.33 (H) 0.66 - 1.25 mg/dL Final   ]

## 2017-10-31 DIAGNOSIS — N18.6 ESRD (END STAGE RENAL DISEASE) ON DIALYSIS (H): ICD-10-CM

## 2017-10-31 DIAGNOSIS — Z99.2 ESRD (END STAGE RENAL DISEASE) ON DIALYSIS (H): ICD-10-CM

## 2017-10-31 RX ORDER — B COMPLEX, C NO.20/FOLIC ACID 1 MG
CAPSULE ORAL
Qty: 90 CAPSULE | Refills: 3 | Status: SHIPPED | OUTPATIENT
Start: 2017-10-31 | End: 2018-11-05

## 2017-12-08 ENCOUNTER — APPOINTMENT (OUTPATIENT)
Dept: GENERAL RADIOLOGY | Facility: CLINIC | Age: 72
DRG: 273 | End: 2017-12-08
Attending: EMERGENCY MEDICINE
Payer: MEDICARE

## 2017-12-08 ENCOUNTER — HOSPITAL ENCOUNTER (INPATIENT)
Facility: CLINIC | Age: 72
LOS: 4 days | Discharge: HOME | DRG: 273 | End: 2017-12-12
Attending: EMERGENCY MEDICINE | Admitting: INTERNAL MEDICINE
Payer: MEDICARE

## 2017-12-08 DIAGNOSIS — I25.10 MILD CAD: ICD-10-CM

## 2017-12-08 DIAGNOSIS — I21.4 NSTEMI (NON-ST ELEVATED MYOCARDIAL INFARCTION) (H): ICD-10-CM

## 2017-12-08 DIAGNOSIS — R06.00 DYSPNEA, UNSPECIFIED TYPE: ICD-10-CM

## 2017-12-08 DIAGNOSIS — I48.92 ATRIAL FLUTTER WITH RAPID VENTRICULAR RESPONSE (H): ICD-10-CM

## 2017-12-08 LAB
ALBUMIN SERPL-MCNC: 3.6 G/DL (ref 3.4–5)
ALP SERPL-CCNC: 192 U/L (ref 40–150)
ALT SERPL W P-5'-P-CCNC: 22 U/L (ref 0–70)
ANION GAP SERPL CALCULATED.3IONS-SCNC: 11 MMOL/L (ref 3–14)
AST SERPL W P-5'-P-CCNC: 29 U/L (ref 0–45)
BASOPHILS # BLD AUTO: 0 10E9/L (ref 0–0.2)
BASOPHILS NFR BLD AUTO: 0.4 %
BILIRUB SERPL-MCNC: 1.4 MG/DL (ref 0.2–1.3)
BUN SERPL-MCNC: 30 MG/DL (ref 7–30)
CALCIUM SERPL-MCNC: 9.5 MG/DL (ref 8.5–10.1)
CHLORIDE SERPL-SCNC: 97 MMOL/L (ref 94–109)
CO2 SERPL-SCNC: 27 MMOL/L (ref 20–32)
CREAT SERPL-MCNC: 5.63 MG/DL (ref 0.66–1.25)
DIFFERENTIAL METHOD BLD: ABNORMAL
EOSINOPHIL # BLD AUTO: 0.2 10E9/L (ref 0–0.7)
EOSINOPHIL NFR BLD AUTO: 2.7 %
ERYTHROCYTE [DISTWIDTH] IN BLOOD BY AUTOMATED COUNT: 14.9 % (ref 10–15)
GFR SERPL CREATININE-BSD FRML MDRD: 10 ML/MIN/1.7M2
GLUCOSE SERPL-MCNC: 145 MG/DL (ref 70–99)
HCT VFR BLD AUTO: 34.9 % (ref 40–53)
HGB BLD-MCNC: 11.6 G/DL (ref 13.3–17.7)
IMM GRANULOCYTES # BLD: 0 10E9/L (ref 0–0.4)
IMM GRANULOCYTES NFR BLD: 0.3 %
INTERPRETATION ECG - MUSE: NORMAL
LMWH PPP CHRO-ACNC: 0.22 IU/ML
LMWH PPP CHRO-ACNC: NORMAL IU/ML
LYMPHOCYTES # BLD AUTO: 0.5 10E9/L (ref 0.8–5.3)
LYMPHOCYTES NFR BLD AUTO: 6.5 %
MCH RBC QN AUTO: 33.4 PG (ref 26.5–33)
MCHC RBC AUTO-ENTMCNC: 33.2 G/DL (ref 31.5–36.5)
MCV RBC AUTO: 101 FL (ref 78–100)
MONOCYTES # BLD AUTO: 0.7 10E9/L (ref 0–1.3)
MONOCYTES NFR BLD AUTO: 10.3 %
NEUTROPHILS # BLD AUTO: 5.7 10E9/L (ref 1.6–8.3)
NEUTROPHILS NFR BLD AUTO: 79.8 %
NRBC # BLD AUTO: 0 10*3/UL
NRBC BLD AUTO-RTO: 0 /100
NT-PROBNP SERPL-MCNC: ABNORMAL PG/ML (ref 0–900)
PHOSPHATE SERPL-MCNC: 4 MG/DL (ref 2.5–4.5)
PLATELET # BLD AUTO: 164 10E9/L (ref 150–450)
POTASSIUM SERPL-SCNC: 4.6 MMOL/L (ref 3.4–5.3)
PROT SERPL-MCNC: 7.3 G/DL (ref 6.8–8.8)
RBC # BLD AUTO: 3.47 10E12/L (ref 4.4–5.9)
SODIUM SERPL-SCNC: 135 MMOL/L (ref 133–144)
TROPONIN I SERPL-MCNC: 0.03 UG/L (ref 0–0.04)
TROPONIN I SERPL-MCNC: 0.04 UG/L (ref 0–0.04)
TROPONIN I SERPL-MCNC: 0.04 UG/L (ref 0–0.04)
WBC # BLD AUTO: 7.1 10E9/L (ref 4–11)

## 2017-12-08 PROCEDURE — 84100 ASSAY OF PHOSPHORUS: CPT | Performed by: EMERGENCY MEDICINE

## 2017-12-08 PROCEDURE — 93005 ELECTROCARDIOGRAM TRACING: CPT

## 2017-12-08 PROCEDURE — 85520 HEPARIN ASSAY: CPT | Performed by: INTERNAL MEDICINE

## 2017-12-08 PROCEDURE — 83880 ASSAY OF NATRIURETIC PEPTIDE: CPT | Performed by: EMERGENCY MEDICINE

## 2017-12-08 PROCEDURE — 96365 THER/PROPH/DIAG IV INF INIT: CPT

## 2017-12-08 PROCEDURE — 99285 EMERGENCY DEPT VISIT HI MDM: CPT | Mod: 25

## 2017-12-08 PROCEDURE — 25000128 H RX IP 250 OP 636: Performed by: EMERGENCY MEDICINE

## 2017-12-08 PROCEDURE — 99223 1ST HOSP IP/OBS HIGH 75: CPT | Mod: AI | Performed by: INTERNAL MEDICINE

## 2017-12-08 PROCEDURE — 25000125 ZZHC RX 250: Performed by: EMERGENCY MEDICINE

## 2017-12-08 PROCEDURE — 96366 THER/PROPH/DIAG IV INF ADDON: CPT

## 2017-12-08 PROCEDURE — 85025 COMPLETE CBC W/AUTO DIFF WBC: CPT | Performed by: EMERGENCY MEDICINE

## 2017-12-08 PROCEDURE — 25000128 H RX IP 250 OP 636: Performed by: INTERNAL MEDICINE

## 2017-12-08 PROCEDURE — 71020 XR CHEST 2 VW: CPT

## 2017-12-08 PROCEDURE — 80053 COMPREHEN METABOLIC PANEL: CPT | Performed by: EMERGENCY MEDICINE

## 2017-12-08 PROCEDURE — A9270 NON-COVERED ITEM OR SERVICE: HCPCS | Mod: GY | Performed by: INTERNAL MEDICINE

## 2017-12-08 PROCEDURE — 84484 ASSAY OF TROPONIN QUANT: CPT | Performed by: INTERNAL MEDICINE

## 2017-12-08 PROCEDURE — 36415 COLL VENOUS BLD VENIPUNCTURE: CPT | Performed by: INTERNAL MEDICINE

## 2017-12-08 PROCEDURE — 21000001 ZZH R&B HEART CARE

## 2017-12-08 PROCEDURE — 25000132 ZZH RX MED GY IP 250 OP 250 PS 637: Mod: GY | Performed by: INTERNAL MEDICINE

## 2017-12-08 PROCEDURE — 84484 ASSAY OF TROPONIN QUANT: CPT | Performed by: EMERGENCY MEDICINE

## 2017-12-08 PROCEDURE — 96368 THER/DIAG CONCURRENT INF: CPT

## 2017-12-08 RX ORDER — LEVOTHYROXINE SODIUM 50 UG/1
50 TABLET ORAL DAILY
Status: DISCONTINUED | OUTPATIENT
Start: 2017-12-09 | End: 2017-12-12 | Stop reason: HOSPADM

## 2017-12-08 RX ORDER — NITROGLYCERIN 0.4 MG/1
0.4 TABLET SUBLINGUAL EVERY 5 MIN PRN
Status: DISCONTINUED | OUTPATIENT
Start: 2017-12-08 | End: 2017-12-12 | Stop reason: HOSPADM

## 2017-12-08 RX ORDER — ASPIRIN 81 MG/1
81 TABLET ORAL DAILY
Status: DISCONTINUED | OUTPATIENT
Start: 2017-12-09 | End: 2017-12-12

## 2017-12-08 RX ORDER — POTASSIUM CL/LIDO/0.9 % NACL 10MEQ/0.1L
10 INTRAVENOUS SOLUTION, PIGGYBACK (ML) INTRAVENOUS
Status: DISCONTINUED | OUTPATIENT
Start: 2017-12-08 | End: 2017-12-08

## 2017-12-08 RX ORDER — PANTOPRAZOLE SODIUM 40 MG/1
40 TABLET, DELAYED RELEASE ORAL EVERY MORNING
Status: DISCONTINUED | OUTPATIENT
Start: 2017-12-09 | End: 2017-12-12 | Stop reason: HOSPADM

## 2017-12-08 RX ORDER — ACETAMINOPHEN 325 MG/1
650 TABLET ORAL 4 TIMES DAILY
Status: DISCONTINUED | OUTPATIENT
Start: 2017-12-08 | End: 2017-12-12 | Stop reason: HOSPADM

## 2017-12-08 RX ORDER — LOPERAMIDE HCL 2 MG
2 CAPSULE ORAL 2 TIMES DAILY PRN
Status: DISCONTINUED | OUTPATIENT
Start: 2017-12-08 | End: 2017-12-12 | Stop reason: HOSPADM

## 2017-12-08 RX ORDER — SEVELAMER CARBONATE 800 MG/1
800 TABLET, FILM COATED ORAL
Status: DISCONTINUED | OUTPATIENT
Start: 2017-12-09 | End: 2017-12-09

## 2017-12-08 RX ORDER — NALOXONE HYDROCHLORIDE 0.4 MG/ML
.1-.4 INJECTION, SOLUTION INTRAMUSCULAR; INTRAVENOUS; SUBCUTANEOUS
Status: DISCONTINUED | OUTPATIENT
Start: 2017-12-08 | End: 2017-12-12

## 2017-12-08 RX ORDER — ONDANSETRON 4 MG/1
4 TABLET, ORALLY DISINTEGRATING ORAL EVERY 6 HOURS PRN
Status: DISCONTINUED | OUTPATIENT
Start: 2017-12-08 | End: 2017-12-12 | Stop reason: HOSPADM

## 2017-12-08 RX ORDER — CLOPIDOGREL BISULFATE 75 MG/1
75 TABLET ORAL DAILY
Status: DISCONTINUED | OUTPATIENT
Start: 2017-12-09 | End: 2017-12-12 | Stop reason: HOSPADM

## 2017-12-08 RX ORDER — ONDANSETRON 2 MG/ML
4 INJECTION INTRAMUSCULAR; INTRAVENOUS EVERY 6 HOURS PRN
Status: DISCONTINUED | OUTPATIENT
Start: 2017-12-08 | End: 2017-12-12 | Stop reason: HOSPADM

## 2017-12-08 RX ORDER — POTASSIUM CHLORIDE 1.5 G/1.58G
20-40 POWDER, FOR SOLUTION ORAL
Status: DISCONTINUED | OUTPATIENT
Start: 2017-12-08 | End: 2017-12-08

## 2017-12-08 RX ORDER — MEXILETINE HYDROCHLORIDE 150 MG/1
150 CAPSULE ORAL 2 TIMES DAILY
Status: DISCONTINUED | OUTPATIENT
Start: 2017-12-08 | End: 2017-12-12 | Stop reason: HOSPADM

## 2017-12-08 RX ORDER — POTASSIUM CHLORIDE 1500 MG/1
20-40 TABLET, EXTENDED RELEASE ORAL
Status: DISCONTINUED | OUTPATIENT
Start: 2017-12-08 | End: 2017-12-08

## 2017-12-08 RX ORDER — ACETAMINOPHEN 325 MG/1
650 TABLET ORAL EVERY 4 HOURS PRN
Status: DISCONTINUED | OUTPATIENT
Start: 2017-12-08 | End: 2017-12-12 | Stop reason: HOSPADM

## 2017-12-08 RX ORDER — POTASSIUM CHLORIDE 29.8 MG/ML
20 INJECTION INTRAVENOUS
Status: DISCONTINUED | OUTPATIENT
Start: 2017-12-08 | End: 2017-12-08

## 2017-12-08 RX ORDER — POTASSIUM CHLORIDE 7.45 MG/ML
10 INJECTION INTRAVENOUS
Status: DISCONTINUED | OUTPATIENT
Start: 2017-12-08 | End: 2017-12-08

## 2017-12-08 RX ORDER — PRAVASTATIN SODIUM 40 MG
40 TABLET ORAL AT BEDTIME
Status: DISCONTINUED | OUTPATIENT
Start: 2017-12-08 | End: 2017-12-12 | Stop reason: HOSPADM

## 2017-12-08 RX ORDER — LISINOPRIL 2.5 MG/1
2.5 TABLET ORAL AT BEDTIME
Status: DISCONTINUED | OUTPATIENT
Start: 2017-12-08 | End: 2017-12-12 | Stop reason: HOSPADM

## 2017-12-08 RX ORDER — CARVEDILOL 6.25 MG/1
6.25 TABLET ORAL 2 TIMES DAILY WITH MEALS
Status: DISCONTINUED | OUTPATIENT
Start: 2017-12-08 | End: 2017-12-09

## 2017-12-08 RX ADMIN — Medication 1 CAPSULE: at 22:00

## 2017-12-08 RX ADMIN — ACETAMINOPHEN 650 MG: 325 TABLET, FILM COATED ORAL at 22:01

## 2017-12-08 RX ADMIN — SODIUM CHLORIDE 250 ML: 9 INJECTION, SOLUTION INTRAVENOUS at 23:57

## 2017-12-08 RX ADMIN — LISINOPRIL 2.5 MG: 2.5 TABLET ORAL at 22:01

## 2017-12-08 RX ADMIN — CARVEDILOL 6.25 MG: 6.25 TABLET, FILM COATED ORAL at 22:00

## 2017-12-08 RX ADMIN — DILTIAZEM HYDROCHLORIDE 5 MG/HR: 5 INJECTION INTRAVENOUS at 07:58

## 2017-12-08 RX ADMIN — HEPARIN SODIUM 950 UNITS/HR: 10000 INJECTION, SOLUTION INTRAVENOUS at 10:11

## 2017-12-08 RX ADMIN — RANITIDINE 75 MG: 75 TABLET, FILM COATED ORAL at 22:45

## 2017-12-08 RX ADMIN — Medication 4800 UNITS: at 10:11

## 2017-12-08 RX ADMIN — PRAVASTATIN SODIUM 40 MG: 40 TABLET ORAL at 22:01

## 2017-12-08 RX ADMIN — MEXILETINE HYDROCHLORIDE 150 MG: 150 CAPSULE ORAL at 22:45

## 2017-12-08 ASSESSMENT — ACTIVITIES OF DAILY LIVING (ADL)
BATHING: 0-->INDEPENDENT
TRANSFERRING: 0-->INDEPENDENT
AMBULATION: 0-->INDEPENDENT
SWALLOWING: 0-->SWALLOWS FOODS/LIQUIDS WITHOUT DIFFICULTY
DRESS: 0-->INDEPENDENT
FALL_HISTORY_WITHIN_LAST_SIX_MONTHS: NO
RETIRED_EATING: 0-->INDEPENDENT
TOILETING: 0-->INDEPENDENT
COGNITION: 0 - NO COGNITION ISSUES REPORTED
RETIRED_COMMUNICATION: 0-->UNDERSTANDS/COMMUNICATES WITHOUT DIFFICULTY

## 2017-12-08 ASSESSMENT — ENCOUNTER SYMPTOMS
SHORTNESS OF BREATH: 1
COUGH: 1
CHILLS: 0
BLOOD IN STOOL: 0

## 2017-12-08 NOTE — IP AVS SNAPSHOT
[facility]    [address] [place] [address]., [address]    [place] [address]    Phone:  [phone]                                       After Visit Summary   12/8/2017    Westley Ness    MRN: [id number]           After Visit Summary Signature Page     I have received my discharge instructions, and my questions have been answered. I have discussed any challenges I see with this plan with the nurse or doctor.    ..........................................................................................................................................  Patient/Patient Representative Signature      ..........................................................................................................................................  Patient Representative Print Name and Relationship to Patient    ..................................................               ................................................  Date                                            Time    ..........................................................................................................................................  Reviewed by Signature/Title    ...................................................              ..............................................  Date                                                            Time

## 2017-12-08 NOTE — ED PROVIDER NOTES
History     Chief Complaint:    Shortness of breath       HPI   Westley Ness is a 71 year old male who presents to the ED today with shortness of breath. The patient states that he has had shortness of breath on and off for quite some time now. He states that he decided to come in to the ED today since he could not walk more than 10 feet without stopping to catch his breath. He denies any chills, bloody stools, or leg swelling, but reports to some minor chest pain, coughing, and a fever. With the coughing, he reports to having some white colored sputum as well. He states that his cold symptoms started about a week ago. Of note, the patient has a pacemaker defibrillator and is on dialysis with his last session being 2 days ago. He was also given 324 of Asprin and 1 Nitro by EMS, and reported to some relief of his symptoms.     Allergies:  Contrast dye - hives      Medications:    Triphrocaps   Plavix   Zithromax   Mexitil   Nitrostat   Protonix   Coreg   Imdur   Pravachol   Tylenol   Reneleva   Imodium   Baby aspirin      Past Medical History:    Acid reflux disease   Atril flutter   CAD   ESRD on hemodialysis  Hypothyroidism   Ischemic cardiomyopathy   NSTEMI   Type 2 diabetes mellitis    Past Surgical History:    Cholecystectomy - 2013  Elbow surgery - 2008  Ablation atrial flutter - 06/08/2017  Left heart catheterization - 7/14/2016, 9/21/2016  Implant ventricular device - 8/15/2011  Vascular surgery - 2004, 2010    Family History:    Cerebrovascular disease - mother   Hypertension - father   Bladder cancer - father     Social History:  Marital Status: single   Presents to the ED alone   Tobacco Use: former smoker   Alcohol Use: no   PCP: Josselin Kolb     Review of Systems   Constitutional: Negative for chills.   Respiratory: Positive for cough and shortness of breath.    Cardiovascular: Positive for chest pain. Negative for leg swelling.   Gastrointestinal: Negative for blood in stool.   All other  systems reviewed and are negative.      Physical Exam   First Vitals:  BP: 121/75  Heart Rate: 109  Temp: 98.4  F (36.9  C)  Resp: 20  Height: 182.9 cm (6')  Weight: 85 kg (187 lb 6.3 oz)  SpO2: 98 %      Physical Exam  Nursing note and vitals reviewed.  Constitutional:  Appears well-developed and well-nourished.   HENT:   Head:    Atraumatic.   Mouth/Throat:   Oropharynx is clear and moist. No oropharyngeal exudate. Multiple missing teeth but no oral bleeding or signs of infection.  Eyes:    Pupils are equal, round, and reactive to light.   Neck:    Normal range of motion. Neck supple.      No tracheal deviation present. No thyromegaly present.   Cardiovascular:  Mildly tachycardic rate, regular rhythm, no murmur   Pulmonary/Chest: Bibasilar crackles   Abdominal:   Soft. Bowel sounds are normal. Exhibits no distension and      no mass. There is no tenderness.      There is no rebound and no guarding.   Musculoskeletal:  Exhibits 1+ pitting edema bilateral lower extremities  Lymphadenopathy:  No cervical adenopathy.   Neurological:   Alert and oriented to person, place, and time. GCS 15.  CN 2-12 intact.  and proximal upper extremity strength strong and equal.  Bilateral lower extremity strength strong and equal, including strong dorsiflexion and plantarflexion strength.  Sensation intact and equal to the face, arms and legs.  No facial droop or weakness. Normal speech.  Follows commands and answers questions normally.     Skin:    Skin is warm and dry. No rash noted. No pallor. Dialysis fistula in left arm.    Emergency Department Course   ECG:  @ 0645  Indication: shortness of breath   Vent. Rate 104 bpm. LA interval * ms. QRS duration 82 ms. QT/QTc 328/431 ms. P-R-T axis * 221 -59.   Atrial flutter with variable AV block with premature ventricular or aberrantly conducted complexes. Low voltage QRS, Septal infarct, age undetermined. Possible lateral infarct, age undetermined. Abnormal ECG.   Read @ 0646 by  Dr. Lujan.     Imaging:  Xray chest, PA and LAT   Enlarged cardiac silhouette is again seen and unchanged.  Single-lead implanted cardiac pacer/defibrillator over the right chest  remains in place. No focal airspace disease, pleural effusion or  pneumothorax. No significant pulmonary edema is seen.  Report per radiology.   Radiographic findings were communicated with the patient who voiced understanding of the findings.    Laboratory:  CBC:  WBC 7.1, HGB 11.6 (L),   CMP: glucose 145 (H), creatinine 5.63 (H), GFR estimate 10 (L), bilirubin total 1.4 (H), alkphos 192 (H), otherwise WNL  Phosphorous: 4.0  BNP: >867821 (H)   Troponin I: 0.034    Interventions:  (0758) Cardizem 5 mg/hr, IV infusion    (1011) Heparin 950 units per hour, IV infusion   (1011) Heparin 4,800bolus, IV bolus    Emergency Department Course:  Nursing notes and vitals reviewed.  (7461) I performed an exam of the patient as documented above.    EKG was done, interpretation as above.   A peripheral IV was established. Blood was drawn from the patient. This was sent for laboratory testing, findings above.    The patient was sent for a chest xray while in the emergency department, findings above.    Findings and plan explained to the patient who consents to admission.   (7219) I discussed the patient with Dr. Bautista of the hospitalist service, who will [place] the patient [place] [facility] [place] further monitoring, evaluation, and treatment.   Impression & Plan    Medical Decision Making:  Westley Ness is a 71 year old male who presents with dyspnea, that I feel is caused by him going back into atrial flutter with rapid ventricular response. He was placed on a diltiazem drip and a heparin drip since he is no longer on the Elequis. I felt that the patient likely does not tolerate the rapid Afib because he has an ejection fraction less than 25%. He is having some signs of congestive heart failure with a  Very high BMP, but there is no signs of  acute myocardial infarction and I did not feel that was likely any pulmonary embolism. There was no sign of pneumonia on his chest xray either. HE was admitted to the [facility] under the care of the hospitalist, Dr. Bautista.      Diagnosis:    ICD-10-CM    1. Atrial flutter with rapid ventricular response (H) I48.92    2. Dyspnea, unspecified type R06.00        Disposition:  Admitted to hospitalist.     ISunitha, am serving as a scribe on 12/8/2017 at 6:37 AM to personally document services performed by Dr. Lujan based on my observations and the provider's statements to me.      12/8/2017    EMERGENCY DEPARTMENT       Gabbi Lujan MD  12/08/17 7520

## 2017-12-08 NOTE — ED NOTES
[facility]  ED Nurse Handoff Report    ED Chief complaint: Shortness of Breath (PT c/o intermittent Chest pain and SOB for 10 days that has progressively gotten worse in the last couple of days.)      ED Diagnosis:   Final diagnoses:   Atrial flutter with rapid ventricular response (H)   Dyspnea, unspecified type       Code Status: Full Code    Allergies:   Allergies   Allergen Reactions     Contrast Dye Hives     Does fine if he uses benadryl prior.     No Clinical Screening - See Comments      Green beans - Diarrhea.    Topical antibiotic - name unknown - caused swelling of the penis.       Activity level - Baseline/Home:  Independent    Activity Level - Current:   Stand with Assist     Needed?: No    Isolation: No  Infection: Not Applicable    Bariatric?: No    Vital Signs:   Vitals:    12/08/17 0648 12/08/17 0700 12/08/17 0735 12/08/17 0800   BP: 121/75 112/61 128/73 129/76   Resp: 20 15 12 (!) 31   Temp: 98.4  F (36.9  C)      TempSrc: Oral      SpO2: 98% 97% 98% 98%   Weight: 85 kg (187 lb 6.3 oz)      Height: 1.829 m (6')          Cardiac Rhythm: ,   Cardiac  Cardiac Rhythm: (S) Atrial flutter    Pain level:      Is this patient confused?: No    Patient Report: Initial Complaint: pt with sob with activity this am pt is a dialysis pt is due for dialysis this am  Focused Assessment: pt found to be in atrial flutter with bnp [id number]  Tests Performed: labs and chest x ray  Abnormal Results: elevated bnp  Treatments provided: cardizem gtt started and will start a heparin gtt     Family Comments: none here    OBS brochure/video discussed/provided to patient: N/A    ED Medications:   Medications   diltiazem (CARDIZEM) 125 mg in NaCl 0.9 % 125 mL infusion (5 mg/hr Intravenous New Bag 12/8/17 4869)       Drips infusing?:  Yes      ED NURSE PHONE NUMBER: [phone]

## 2017-12-08 NOTE — H&P
PRIMARY CARE PROVIDER:  Josselin Kolb MD      DATE OF ADMISSION: 12/08/2017.      CHIEF COMPLAINT:  Shortness of breath.      HISTORY OF PRESENT ILLNESS:  Mr. Westley Ness is a 71-year-old gentleman with history of atrial flutter status post ablation in 06/2017, coronary artery disease with multiple PCIs, chronic systolic congestive heart failure with last EF of 20% to 25%, history of VT and VF status post AICD, end-stage renal disease on hemodialysis Monday, Wednesday and Friday, diabetes mellitus type 2, hyperlipidemia, hypertension, hypothyroidism who presents to the emergency room with shortness of breath.  The patient has been having ongoing symptoms for the past 10 days or so, which acutely got worse in the last 48 hours.  Lately he has noticed that he is getting more short of breath.  However, for the past 2 days he has been short of breath with minimal activities which is unusual for him.  He does endorse intermittent chest pain which is chronically had without any new changes.  He does have lower extremity swelling which is at baseline; however, he has noticed that lately he has been more orthopneic and has been sleeping pretty much upright.  He also endorses cough with white sputum production.  No fever or chills.  He does dialyze Monday, Wednesday and Friday.  Last dialysis was Wednesday.  Reportedly, his weight has been stable without any new changes.  He does have a SpO2 probe at home and yesterday he was checking his heart rate and it was in the 130s, but it stayed there only for 10-15 minutes and for the most part he thinks his heart rate has been in the 90s-100s.  The patient was previously on Eliquis after his a-flutter ablation, but now is just on aspirin and Plavix.  Denies any hematochezia or melena.  Denies diarrhea or vomiting.  No dysuria, urinary urgency or frequency.      In the emergency room, the patient was evaluated by Dr. Gabbi Lujan.  He was found to be in atrial flutter with  heart rate fluctuating between 100 to 120s and was started on Cardizem drip and also heparin drip.      PAST MEDICAL HISTORY:   1.  Ischemic cardiomyopathy.   2.  Coronary artery disease with multiple PCIs.   3.  Diabetes mellitus type 2.   4.  Hypertension.   5.  Hyperlipidemia.   6.  Hypothyroidism.   7.  Atrial flutter, status post ablation.   8.  VT, status post AICD placement.   9.  End-stage renal disease on hemodialysis Monday, Wednesday and Friday.      PAST SURGICAL HISTORY:  Status post cholecystectomy and left upper extremity fistula creation.      ALLERGIES:  Contrast dyes.      PERSONAL HISTORY:  Former smoker, quit in the 1990s, lives by himself.  No alcohol or recreational drug use.      FAMILY HISTORY:  Mother with cerebrovascular accident, hypertension in father and mother.      HOME MEDICATIONS:   Prior to Admission Medications   Prescriptions Last Dose Informant Patient Reported? Taking?   ASPIRIN EC PO 12/7/2017 at noon  Yes No   Sig: Take 81 mg by mouth daily noon   RaNITidine HCl (ZANTAC PO) prn  Yes Yes   Sig: Take 75 mg by mouth nightly as needed for heartburn   TRIPHROCAPS 1 MG capsule 12/7/2017 at hs  No Yes   Sig: TAKE 1 CAPSULE BY MOUTH EVERY EVENING   acetaminophen (TYLENOL) 325 MG tablet 12/7/2017 Self No Yes   Sig: Take 2 tablets (650 mg) by mouth 4 times daily   clopidogrel (PLAVIX) 75 MG tablet 12/7/2017 at noon  No Yes   Sig: Take 1 tablet (75 mg) by mouth daily To protect your stent(s).  Do not stop taking unless directed by cardiology.   isosorbide mononitrate (IMDUR) 30 MG 24 hr tablet 12/7/2017 at Unknown time Self No Yes   Sig: Take 0.5 tablets (15 mg) by mouth daily   lisinopril (PRINIVIL/ZESTRIL) 2.5 MG tablet 12/7/2017  No Yes   Sig: Take one tablet after dialysis. Take second tablet at bedtime.   loperamide (IMODIUM) 2 MG capsule prn Self Yes Yes   Sig: Take 1 mg by mouth as needed    mexiletine (MEXITIL) 150 MG capsule 12/7/2017 Self No Yes   Sig: Take 1 capsule (150 mg)  by mouth 2 times daily   pantoprazole (PROTONIX) 40 MG EC tablet 12/7/2017 at am Self No Yes   Sig: Take 1 tablet (40 mg) by mouth every morning   psyllium 0.52 G capsule prn Self Yes Yes   Sig: Take 1 capsule by mouth daily as needed       Facility-Administered Medications: None        REVIEW OF SYSTEMS:  A complete review of system was done and was negative for anything else other than that mentioned in the HPI.      PHYSICAL EXAMINATION:   GENERAL:  Mr. Ness is a 71-year-old male.  He is not in distress, but does appear to be slightly tachypneic and mildly uncomfortable.   VITAL SIGNS:  Temperature  98.4, blood pressure is 129/73, heart rate is 97, respiration rate is 31 and O2 sat is 98% on 3 liters nasal cannula.   HEENT:  Atraumatic, normocephalic, no pallor or icterus.   NECK:  Supple, with elevated JVD.   RESPIRATORY:  Crackles at the bases and he has scattered wheezes bilaterally in all the lung fields.  He is slightly tachypneic but not using accessory muscles of respiration.  He is orthopneic at the time of my evaluation.   CARDIOVASCULAR:  Irregular, tachycardic.  There is a soft 2/6 systolic murmur in the left precordium.   ABDOMEN:  Soft, nontender, bowel sounds normoactive.     EXTREMITIES:  Trace to 1+ pitting edema bilaterally.   SKIN:  Warm and dry.   NEUROLOGIC:  Awake, alert, oriented x3, moving all 4 extremities without any problems.  Cranial nerves II-XII intact.      LABORATORY AND DIAGNOSTIC DATA:  Sodium is 135, potassium is 4.6, creatinine is 5.63.  BNP is more than 175,000.  Troponin is detectable but in the normal range at 0.034.  White cell count is 7.1.  Hemoglobin 11.6, hematocrit is 34.9.  Chest x-ray shows cardiomegaly but no infiltrates.      A 12-lead EKG shows atrial flutter with heart rate of 104 with variable blocks, this was reviewed by me.      ASSESSMENT AND PLAN:  Mr. Westlye Ness is a 71-year-old male with multiple medical problems as described above who presents to the  emergency room with shortness of breath.   1.  Atrial flutter with rapid ventricular response.  The patient does have a history of atrial flutter but was ablated in June.  Since then, it appears like he was in sinus rhythm and actually came off Eliquis in August.  The patient has noticed intermittent tachycardia at home also.  This could be the primary reason for his worsening shortness of breath.  Probably he is not tolerating the tachycardia well given his significant ischemic cardiomyopathy with last ejection fraction in June of 20-25%.  At this time, he has been started on Cardizem drip.  I will continue that.  He is also on a heparin drip.  I will continue that until reevaluated by Electrophysiology.  Will consult Electrophysiology for further guidance and input.  In the meantime, he is also on Coreg at home 6.25 mg twice a day.  We will continue that.   2.  Acute exacerbation of chronic systolic congestive heart failure.  The patient does present with worsening dyspnea, orthopnea and is requiring 3 liters oxygen at the moment.  His chest x-ray does not appear to have florid pulmonary edema; however, clinically he does appear to be in congestive heart failure.  More than likely this has been exacerbated by his rapid heart rate.  He does dialyze Monday, Wednesday and Friday. We will place a consult for Nephrology for dialysis.  Hopefully, that will help.  Moreover, controlling his rate will likely help.   3.  History of coronary artery disease with multiple PCIs.  The patient endorses intermittent chest pain, but on questioning further, he says that is not new for him.  He has always had intermittent chest pain, the character or the intensity has not changed.  Will cycle his troponins.  If it stays negative, no further ischemic workup at this time.  Continue his aspirin and Plavix and pravastatin.   4.  Hypertension.  His blood pressure is optimal at the moment.  We will continue his lisinopril 2.5 mg daily,  Coreg 6.25 mg twice a day.   5.  Hypothyroidism:  Continue prior to admission levothyroxine.   6.  End-stage renal disease on Monday, Wednesday and Friday.  Consult Nephrology for hemodialysis.  His electrolytes are okay.   7.  Chronic anemia, most likely due to chronic kidney disease.  This appears to be stable at 11.6.  The patient denies any history of hematochezia or melena.  Monitor it intermittently.   8.  Diabetes mellitus type 2, diet-controlled.  It does not appear like he is on any treatment for this currently.  His blood glucose was 145 in the emergency room, monitor it intermittently.      Anticipated length of stay more than 2 midnights.      CODE STATUS:  Full code.         VAN SHEA MD             D: 2017 09:31   T: 2017 10:04   MT: [id number]      Name:     SOCORRO LÓPEZ   MRN:      [id number]        Account:      [id number]   :      1945           Admitted:     [id number]      Document: [id number]       cc: Josselin Kolb MD

## 2017-12-08 NOTE — PHARMACY-ADMISSION MEDICATION HISTORY
Admission medication history interview status for the 12/8/2017  admission is complete. See EPIC admission navigator for prior to admission medications     Medication history source reliability:Good    Actions taken by pharmacist (provider contacted, etc):  Interviewed pt.  Pt knows his medications well.     Additional medication history information not noted on PTA med list :    ---  Pt usually goes to dialysis MWF.    ---  He stopped taking Eliquis (2.5 mg po bid) a few months ago.  He is now on aspirin and Plavix.  ---  Per pt, he is on a low phos diet so he hasn't need his Sevelamer in months but still would like to have it on his list since he still have the medication at home and will use when needed.    Medication reconciliation/reorder completed by provider prior to medication history? No    Time spent in this activity: 15 minutes    Prior to Admission medications    Medication Sig Last Dose Taking? Auth Provider   ASPIRIN EC PO Take 81 mg by mouth daily noon 12/7/2017 at noon Yes Unknown, Entered By History   RaNITidine HCl (ZANTAC PO) Take 75 mg by mouth nightly as needed for heartburn prn Yes Unknown, Entered By History   TRIPHROCAPS 1 MG capsule TAKE 1 CAPSULE BY MOUTH EVERY EVENING 12/7/2017 at hs Yes Josselin Kolb MD   clopidogrel (PLAVIX) 75 MG tablet Take 1 tablet (75 mg) by mouth daily To protect your stent(s).  Do not stop taking unless directed by cardiology. 12/7/2017 at noon Yes Josselin Kolb MD   lisinopril (PRINIVIL/ZESTRIL) 2.5 MG tablet Take one tablet after dialysis. Take second tablet at bedtime. 12/7/2017 Yes Lacy Taylor, APRN CNP   mexiletine (MEXITIL) 150 MG capsule Take 1 capsule (150 mg) by mouth 2 times daily 12/7/2017 Yes Josselin Kolb MD   nitroglycerin (NITROSTAT) 0.4 MG sublingual tablet 1 TAB EVERY 5 MIN AS NEEDED, UP TO 3 PER EPISODE prn Yes Josselin Kolb MD   pantoprazole (PROTONIX) 40 MG EC tablet Take 1 tablet (40 mg) by mouth every morning 12/7/2017  at am Yes Josselin Kolb MD   carvedilol (COREG) 6.25 MG tablet Take 1 tablet (6.25 mg) by mouth 2 times daily (with meals) Take after dialysis 12/7/2017 Yes Josselin Kolb MD   isosorbide mononitrate (IMDUR) 30 MG 24 hr tablet Take 0.5 tablets (15 mg) by mouth daily 12/7/2017 at Unknown time Yes Josselin Kolb MD   Levothyroxine Sodium 50 MCG CAPS Take 50 mcg by mouth daily 12/7/2017 at am Yes Josselin Kolb MD   pravastatin (PRAVACHOL) 40 MG tablet Take 1 tablet (40 mg) by mouth daily 12/7/2017 at hs Yes Josselin oKlb MD   acetaminophen (TYLENOL) 325 MG tablet Take 2 tablets (650 mg) by mouth 4 times daily 12/7/2017 Yes Marco Crockett MD   loperamide (IMODIUM) 2 MG capsule Take 1 mg by mouth as needed  prn Yes Reported, Patient   psyllium 0.52 G capsule Take 1 capsule by mouth daily as needed  prn Yes Reported, Patient   RANITIDINE HCL PO Take 75 mg by mouth At Bedtime Sometimes need an additional 75 mg qhs prn 12/7/2017 at hs Yes Reported, Patient   sevelamer (RENVELA) 800 MG tablet Take 800-1,600 mg by mouth as needed ON HOLD More than a month  Unknown, Entered By History

## 2017-12-08 NOTE — IP AVS SNAPSHOT
MRN:[id number]                      After Visit Summary   12/8/2017    Westley Ness    MRN: [id number]           Thank you!     Thank you for choosing [facility] for your care. Our goal is always to provide you with excellent care. Hearing back from our patients is one way we can continue to improve our services. Please take a few minutes to complete the written survey that you may receive in the mail after you visit with us. Thank you!        Patient Information     Date Of Birth          1945        Designated Caregiver       Most Recent Value    Caregiver    Will someone help with your care after discharge? no      About your hospital stay     You were admitted on:  December 8, 2017 You last received care in the:  [facility]    You were discharged on:  December 12, 2017        Reason for your hospital stay       A-flutter with RVR                  Who to Call     For medical emergencies, please call 911.  For non-urgent questions about your medical care, please call your primary care provider or clinic, [phone]          Attending Provider     Provider Specialty    Gabbi Lujan MD Emergency Medicine    [facility]       Primary Care Provider Office Phone # Fax #    Josselin Kolb [phone]      After Care Instructions     Activity       Your activity upon discharge: activity as tolerated            Diet       Follow this diet upon discharge: Orders Placed This Encounter      Combination Diet Regular Diet Adult; 2 gm K Diet; 8389-3704 Calories: Moderate Consistent CHO (4-6 CHO units/meal)                    Follow-up Appointments     Follow-up and recommended labs and tests       Follow up with primary care provider, Josselin Kolb, within 7 days for hospital follow- up.   Cardiology in 1-2 weeks                  Pending Results     Date and Time Order Name Status Description    12/11/2017 1748  "EKG 12-lead, tracing only Preliminary     2017 1019 EKG 12-lead, tracing only Preliminary             Statement of Approval     Ordered          17 1324  I have reviewed and agree with all the recommendations and orders detailed in this document.  EFFECTIVE NOW     Approved and electronically signed by:  Deondre Bautista MD             Admission Information     Date & Time Provider Department Dept. Phone    2017 Deondre Bautista MD [facility] [phone]      Your Vitals Were     Blood Pressure Pulse Temperature Respirations Height Weight    87/45 (BP Location: Right arm) 83 97.4  F (36.3  C) (Oral) 16 1.829 m (6') 80.7 kg (178 lb)    Pulse Oximetry BMI (Body Mass Index)                96% 24.14 kg/m2          [facility] Information     [employer] [facility] lets you send messages to your doctor, view your test results, renew your prescriptions, schedule appointments and more. To sign up, go to [email or web address] . Click on \"Log in\" on the left side of the screen, which will take you to the Welcome page. Then click on \"Sign up Now\" on the right side of the page.     You will be asked to enter the access code listed below, as well as some personal information. Please follow the directions to create your username and password.     Your access code is: [email or web address]  Expires: 3/12/2018  1:40 PM     Your access code will  in 90 days. If you need help or a new code, please call your [facility] clinic or [phone].        Care EveryWhere ID     This is your Care EveryWhere ID. This could be used by other organizations to access your [facility] medical records  [id number]        Equal Access to Services     [facility] AH: Hadii fede Jeff, wajessicada luqadaha, qaybta kaalmada ademartida, purnima marquez. So [facility] [phone].    ATENCIÓN: Si habla español, tiene a jacome disposición servicios gratuitos de asistencia lingüística. Llame al " [phone].    We comply with applicable federal civil rights laws and [place] laws. We do not discriminate on the basis of race, color, national origin, age, disability, sex, sexual orientation, or gender identity.               Review of your medicines      START taking        Dose / Directions    apixaban ANTICOAGULANT 2.5 MG tablet   Commonly known as:  ELIQUIS        Dose:  2.5 mg   Take 1 tablet (2.5 mg) by mouth 2 times daily   Quantity:  60 tablet   Refills:  0         CONTINUE these medicines which have NOT CHANGED        Dose / Directions    acetaminophen 325 MG tablet   Commonly known as:  TYLENOL   Used for:  Closed nondisplaced fracture of right patella, unspecified fracture morphology, initial encounter, Elbow fracture, right, closed, initial encounter        Dose:  650 mg   Take 2 tablets (650 mg) by mouth 4 times daily   Quantity:  60 tablet   Refills:  0       carvedilol 6.25 MG tablet   Commonly known as:  COREG   Used for:  NSTEMI (non-ST elevated myocardial infarction) (H), Mild CAD        Dose:  6.25 mg   Take 1 tablet (6.25 mg) by mouth 2 times daily (with meals) Take after dialysis   Quantity:  60 tablet   Refills:  1       clopidogrel 75 MG tablet   Commonly known as:  PLAVIX   Used for:  Coronary artery disease involving native coronary artery of native heart without angina pectoris        Dose:  75 mg   Take 1 tablet (75 mg) by mouth daily To protect your stent(s).  Do not stop taking unless directed by cardiology.   Quantity:  30 tablet   Refills:  11       isosorbide mononitrate 30 MG 24 hr tablet   Commonly known as:  IMDUR   Used for:  NSTEMI (non-ST elevated myocardial infarction) (H)        Dose:  15 mg   Take 0.5 tablets (15 mg) by mouth daily   Quantity:  90 tablet   Refills:  3       Levothyroxine Sodium 50 MCG Caps   Used for:  Hypothyroidism, unspecified type        Dose:  50 mcg   Take 50 mcg by mouth daily   Quantity:  90 capsule   Refills:  3       lisinopril 2.5 MG tablet    Commonly known as:  PRINIVIL/Zestril   Used for:  NSTEMI (non-ST elevated myocardial infarction) (H)        Take one tablet after dialysis. Take second tablet at bedtime.   Quantity:  180 tablet   Refills:  3       loperamide 2 MG capsule   Commonly known as:  IMODIUM        Dose:  1 mg   Take 1 mg by mouth as needed   Refills:  0       mexiletine 150 MG capsule   Commonly known as:  MEXITIL   Used for:  Tachycardia        Dose:  150 mg   Take 1 capsule (150 mg) by mouth 2 times daily   Quantity:  180 capsule   Refills:  3       nitroGLYcerin 0.4 MG sublingual tablet   Commonly known as:  NITROSTAT   Used for:  Angina pectoris (H)        1 TAB EVERY 5 MIN AS NEEDED, UP TO 3 PER EPISODE   Quantity:  25 tablet   Refills:  0       pantoprazole 40 MG EC tablet   Commonly known as:  PROTONIX   Used for:  Gastroesophageal reflux disease, esophagitis presence not specified        Dose:  40 mg   Take 1 tablet (40 mg) by mouth every morning   Quantity:  30 tablet   Refills:  10       pravastatin 40 MG tablet   Commonly known as:  PRAVACHOL   Used for:  Coronary artery disease involving native coronary artery of native heart without angina pectoris        Dose:  40 mg   Take 1 tablet (40 mg) by mouth daily   Quantity:  90 tablet   Refills:  3       psyllium 0.52 G capsule        Dose:  1 capsule   Take 1 capsule by mouth daily as needed   Refills:  0       * RANITIDINE HCL PO        Dose:  75 mg   Take 75 mg by mouth At Bedtime Sometimes need an additional 75 mg qhs prn   Refills:  0       * ZANTAC PO        Dose:  75 mg   Take 75 mg by mouth nightly as needed for heartburn   Refills:  0       sevelamer 800 MG tablet   Commonly known as:  RENVELA        Dose:  800-1600 mg   Take 800-1,600 mg by mouth as needed ON HOLD   Refills:  0       TRIPHROCAPS 1 MG capsule   Used for:  ESRD (end stage renal disease) on dialysis (H)   Generic drug:  NEPHROCAPS        TAKE 1 CAPSULE BY MOUTH EVERY EVENING   Quantity:  90 capsule    Refills:  3       * Notice:  This list has 2 medication(s) that are the same as other medications prescribed for you. Read the directions carefully, and ask your doctor or other care provider to review them with you.      STOP taking     ASPIRIN EC PO                Where to get your medicines      These medications were sent to [facility] [place], [place] - [address]  [address], [place] [address]     Phone:  [phone]     apixaban ANTICOAGULANT 2.5 MG tablet    carvedilol 6.25 MG tablet                Protect others around you: Learn how to safely use, store and throw away your medicines at www.disposemymeds.org.             Medication List: This is a list of all your medications and when to take them. Check marks below indicate your daily home schedule. Keep this list as a reference.      Medications           Morning Afternoon Evening Bedtime As Needed    acetaminophen 325 MG tablet   Commonly known as:  TYLENOL   Take 2 tablets (650 mg) by mouth 4 times daily   Last time this was given:  650 mg on 12/12/2017 12:52 PM   Next Dose Due:  Tonight 12/12/17                          Tonight 12/12/17                  apixaban ANTICOAGULANT 2.5 MG tablet   Commonly known as:  ELIQUIS   Take 1 tablet (2.5 mg) by mouth 2 times daily   Last time this was given:  2.5 mg on 12/12/2017 12:52 PM   Next Dose Due:  Tonight 12/12/17                       Tonight 12/12/17               carvedilol 6.25 MG tablet   Commonly known as:  COREG   Take 1 tablet (6.25 mg) by mouth 2 times daily (with meals) Take after dialysis   Last time this was given:  12.5 mg on 12/12/2017  8:48 AM   Next Dose Due:  Tonight 12/12/17                       Tonight 12/12/17               clopidogrel 75 MG tablet   Commonly known as:  PLAVIX   Take 1 tablet (75 mg) by mouth daily To protect your stent(s).  Do not stop taking unless directed by cardiology.   Last time this was given:  75 mg on 12/12/2017 12:52 PM    Next Dose Due:  Tomorrow morning 12/13/17            Tomorrow morning 12/13/17                       isosorbide mononitrate 30 MG 24 hr tablet   Commonly known as:  IMDUR   Take 0.5 tablets (15 mg) by mouth daily   Next Dose Due:  Tomorrow morning 12/13/17            Tomorrow morning 12/13/17                       Levothyroxine Sodium 50 MCG Caps   Take 50 mcg by mouth daily   Next Dose Due:  Tomorrow morning 12/13/17            Tomorrow morning 12/13/17                       lisinopril 2.5 MG tablet   Commonly known as:  PRINIVIL/Zestril   Take one tablet after dialysis. Take second tablet at bedtime.   Last time this was given:  2.5 mg on 12/10/2017 10:03 PM   Next Dose Due:  Tomorrow morning 12/13/17 after dialysis                 Tomorrow morning 12/13/17 after dialysis                      loperamide 2 MG capsule   Commonly known as:  IMODIUM   Take 1 mg by mouth as needed   Last time this was given:  2 mg on 12/10/2017 12:26 PM   Next Dose Due:  As needed                            As needed       mexiletine 150 MG capsule   Commonly known as:  MEXITIL   Take 1 capsule (150 mg) by mouth 2 times daily   Last time this was given:  150 mg on 12/12/2017  8:48 AM                       Tonight 12/12/17               nitroGLYcerin 0.4 MG sublingual tablet   Commonly known as:  NITROSTAT   1 TAB EVERY 5 MIN AS NEEDED, UP TO 3 PER EPISODE   Next Dose Due:  As needed for chest pain                            As needed for chest pain       pantoprazole 40 MG EC tablet   Commonly known as:  PROTONIX   Take 1 tablet (40 mg) by mouth every morning   Last time this was given:  40 mg on 12/12/2017  8:48 AM            Tomorrow 12/13/17                       pravastatin 40 MG tablet   Commonly known as:  PRAVACHOL   Take 1 tablet (40 mg) by mouth daily   Last time this was given:  40 mg on 12/11/2017  9:36 PM   Next Dose Due:  Tomorrow 12/13/17                    Tomorrow 12/13/17               psyllium 0.52 G capsule   Take  1 capsule by mouth daily as needed   Next Dose Due:  As needed                            As needed       * RANITIDINE HCL PO   Take 75 mg by mouth At Bedtime Sometimes need an additional 75 mg qhs prn   Last time this was given:  75 mg on 12/11/2017  9:36 PM   Next Dose Due:  Tonight 12/12/17                        Tonight 12/12/17              * ZANTAC PO   Take 75 mg by mouth nightly as needed for heartburn   Next Dose Due:  Tonight 12/12/17                        Tonight 12/12/17           sevelamer 800 MG tablet   Commonly known as:  RENVELA   Take 800-1,600 mg by mouth as needed ON HOLD                                TRIPHROCAPS 1 MG capsule   TAKE 1 CAPSULE BY MOUTH EVERY EVENING   Last time this was given:  1 capsule on 12/11/2017  9:36 PM   Generic drug:  NEPHROCAPS   Next Dose Due:  Tonight 12/12/17                        Tonight 12/12/17           * Notice:  This list has 2 medication(s) that are the same as other medications prescribed for you. Read the directions carefully, and ask your doctor or other care provider to review them with you.

## 2017-12-09 LAB
ANION GAP SERPL CALCULATED.3IONS-SCNC: 11 MMOL/L (ref 3–14)
BASOPHILS # BLD AUTO: 0 10E9/L (ref 0–0.2)
BASOPHILS NFR BLD AUTO: 0.6 %
BUN SERPL-MCNC: 44 MG/DL (ref 7–30)
CALCIUM SERPL-MCNC: 8.9 MG/DL (ref 8.5–10.1)
CHLORIDE SERPL-SCNC: 97 MMOL/L (ref 94–109)
CO2 SERPL-SCNC: 28 MMOL/L (ref 20–32)
CREAT SERPL-MCNC: 6.88 MG/DL (ref 0.66–1.25)
DIFFERENTIAL METHOD BLD: ABNORMAL
EOSINOPHIL # BLD AUTO: 0.2 10E9/L (ref 0–0.7)
EOSINOPHIL NFR BLD AUTO: 3.1 %
ERYTHROCYTE [DISTWIDTH] IN BLOOD BY AUTOMATED COUNT: 14.9 % (ref 10–15)
GFR SERPL CREATININE-BSD FRML MDRD: 8 ML/MIN/1.7M2
GLUCOSE BLDC GLUCOMTR-MCNC: 112 MG/DL (ref 70–99)
GLUCOSE BLDC GLUCOMTR-MCNC: 114 MG/DL (ref 70–99)
GLUCOSE BLDC GLUCOMTR-MCNC: 117 MG/DL (ref 70–99)
GLUCOSE BLDC GLUCOMTR-MCNC: 124 MG/DL (ref 70–99)
GLUCOSE BLDC GLUCOMTR-MCNC: 133 MG/DL (ref 70–99)
GLUCOSE SERPL-MCNC: 110 MG/DL (ref 70–99)
HCT VFR BLD AUTO: 33.8 % (ref 40–53)
HGB BLD-MCNC: 11.3 G/DL (ref 13.3–17.7)
IMM GRANULOCYTES # BLD: 0 10E9/L (ref 0–0.4)
IMM GRANULOCYTES NFR BLD: 0.2 %
LMWH PPP CHRO-ACNC: 0.3 IU/ML
LMWH PPP CHRO-ACNC: 1.05 IU/ML
LYMPHOCYTES # BLD AUTO: 0.7 10E9/L (ref 0.8–5.3)
LYMPHOCYTES NFR BLD AUTO: 10.2 %
MCH RBC QN AUTO: 33.9 PG (ref 26.5–33)
MCHC RBC AUTO-ENTMCNC: 33.4 G/DL (ref 31.5–36.5)
MCV RBC AUTO: 102 FL (ref 78–100)
MONOCYTES # BLD AUTO: 0.9 10E9/L (ref 0–1.3)
MONOCYTES NFR BLD AUTO: 14 %
NEUTROPHILS # BLD AUTO: 4.7 10E9/L (ref 1.6–8.3)
NEUTROPHILS NFR BLD AUTO: 71.9 %
NRBC # BLD AUTO: 0 10*3/UL
NRBC BLD AUTO-RTO: 0 /100
PLATELET # BLD AUTO: 132 10E9/L (ref 150–450)
POTASSIUM SERPL-SCNC: 4.9 MMOL/L (ref 3.4–5.3)
RBC # BLD AUTO: 3.33 10E12/L (ref 4.4–5.9)
SODIUM SERPL-SCNC: 136 MMOL/L (ref 133–144)
TROPONIN I SERPL-MCNC: 0.05 UG/L (ref 0–0.04)
WBC # BLD AUTO: 6.5 10E9/L (ref 4–11)

## 2017-12-09 PROCEDURE — 85520 HEPARIN ASSAY: CPT | Performed by: EMERGENCY MEDICINE

## 2017-12-09 PROCEDURE — 80048 BASIC METABOLIC PNL TOTAL CA: CPT | Performed by: INTERNAL MEDICINE

## 2017-12-09 PROCEDURE — 25000132 ZZH RX MED GY IP 250 OP 250 PS 637: Mod: GY | Performed by: INTERNAL MEDICINE

## 2017-12-09 PROCEDURE — A9270 NON-COVERED ITEM OR SERVICE: HCPCS | Mod: GY | Performed by: INTERNAL MEDICINE

## 2017-12-09 PROCEDURE — 85025 COMPLETE CBC W/AUTO DIFF WBC: CPT | Performed by: INTERNAL MEDICINE

## 2017-12-09 PROCEDURE — 90937 HEMODIALYSIS REPEATED EVAL: CPT

## 2017-12-09 PROCEDURE — 00000146 ZZHCL STATISTIC GLUCOSE BY METER IP

## 2017-12-09 PROCEDURE — 85520 HEPARIN ASSAY: CPT | Performed by: INTERNAL MEDICINE

## 2017-12-09 PROCEDURE — 84484 ASSAY OF TROPONIN QUANT: CPT | Performed by: INTERNAL MEDICINE

## 2017-12-09 PROCEDURE — 21000001 ZZH R&B HEART CARE

## 2017-12-09 PROCEDURE — 25000125 ZZHC RX 250: Performed by: INTERNAL MEDICINE

## 2017-12-09 PROCEDURE — 5A1D70Z PERFORMANCE OF URINARY FILTRATION, INTERMITTENT, LESS THAN 6 HOURS PER DAY: ICD-10-PCS | Performed by: INTERNAL MEDICINE

## 2017-12-09 PROCEDURE — 36415 COLL VENOUS BLD VENIPUNCTURE: CPT | Performed by: INTERNAL MEDICINE

## 2017-12-09 PROCEDURE — 99221 1ST HOSP IP/OBS SF/LOW 40: CPT | Performed by: INTERNAL MEDICINE

## 2017-12-09 PROCEDURE — 25000128 H RX IP 250 OP 636: Performed by: INTERNAL MEDICINE

## 2017-12-09 PROCEDURE — 25000128 H RX IP 250 OP 636

## 2017-12-09 PROCEDURE — 99232 SBSQ HOSP IP/OBS MODERATE 35: CPT | Performed by: INTERNAL MEDICINE

## 2017-12-09 RX ORDER — CARVEDILOL 12.5 MG/1
12.5 TABLET ORAL 2 TIMES DAILY WITH MEALS
Status: DISCONTINUED | OUTPATIENT
Start: 2017-12-09 | End: 2017-12-12

## 2017-12-09 RX ORDER — DOXERCALCIFEROL 4 UG/2ML
3 INJECTION INTRAVENOUS ONCE
Status: COMPLETED | OUTPATIENT
Start: 2017-12-09 | End: 2017-12-09

## 2017-12-09 RX ADMIN — RANITIDINE 75 MG: 75 TABLET, FILM COATED ORAL at 22:37

## 2017-12-09 RX ADMIN — MEXILETINE HYDROCHLORIDE 150 MG: 150 CAPSULE ORAL at 22:36

## 2017-12-09 RX ADMIN — PRAVASTATIN SODIUM 40 MG: 40 TABLET ORAL at 22:37

## 2017-12-09 RX ADMIN — LOPERAMIDE HYDROCHLORIDE 2 MG: 2 CAPSULE ORAL at 10:51

## 2017-12-09 RX ADMIN — ACETAMINOPHEN 650 MG: 325 TABLET, FILM COATED ORAL at 17:18

## 2017-12-09 RX ADMIN — ONDANSETRON 4 MG: 4 TABLET, ORALLY DISINTEGRATING ORAL at 17:26

## 2017-12-09 RX ADMIN — DOXERCALCIFEROL 3 MCG: 2 INJECTION, SOLUTION INTRAVENOUS at 13:45

## 2017-12-09 RX ADMIN — Medication 1 CAPSULE: at 22:37

## 2017-12-09 RX ADMIN — LEVOTHYROXINE SODIUM 50 MCG: 50 TABLET ORAL at 06:48

## 2017-12-09 RX ADMIN — HEPARIN SODIUM 950 UNITS/HR: 10000 INJECTION, SOLUTION INTRAVENOUS at 08:01

## 2017-12-09 RX ADMIN — SODIUM CHLORIDE 1000 ML: 9 INJECTION, SOLUTION INTRAVENOUS at 12:00

## 2017-12-09 RX ADMIN — PANTOPRAZOLE SODIUM 40 MG: 40 TABLET, DELAYED RELEASE ORAL at 08:17

## 2017-12-09 RX ADMIN — ACETAMINOPHEN 650 MG: 325 TABLET, FILM COATED ORAL at 22:37

## 2017-12-09 RX ADMIN — CARVEDILOL 12.5 MG: 12.5 TABLET, FILM COATED ORAL at 17:18

## 2017-12-09 RX ADMIN — MEXILETINE HYDROCHLORIDE 150 MG: 150 CAPSULE ORAL at 08:24

## 2017-12-09 RX ADMIN — ACETAMINOPHEN 650 MG: 325 TABLET, FILM COATED ORAL at 08:17

## 2017-12-09 NOTE — PROGRESS NOTES
[facility]    Hospitalist Progress Note    Assessment & Plan    Mr. Westley Ness is a 71-year-old male with multiple medical problems as described above who presents to the emergency room with shortness of breath.    Atrial flutter with rapid ventricular response.    -History of atrial flutter ablation in June.    -Since then, it appears like he was in sinus rhythm and actually came off Eliquis in August.    -Placed on Cardizem drip at presentation; heart rate better controlled now, off Cardizem drip  -appreciate cardiology consult  -noted plans to increase beta-blocker while discontinuing nitrate  -discontinued heparin drip per cardiology   -per cardiology follow-up with Dr. Graf to discuss options such as amiodarone    Acute exacerbation of chronic systolic congestive heart failure.    -The patient does presented with worsening dyspnea, orthopnea and requiring 3 liters oxygen  -continues to require oxygen at 4 L  -hemodialysis today; await response to hemodialysis    History of coronary artery disease with multiple PCIs.    Hypertension:  -Ongoing intermittent chest pain  -troponin flat  -continue prior to admission aspirin, Plavix, beta-blocker, patient intolerant to statins    Hypothyroidism:    -Continue prior to admission levothyroxine.     End-stage renal disease  -dialyzes on Monday, Wednesday and Friday   -Nephrology consult for hemodialysis    Chronic anemia, most likely due to chronic kidney disease.    -Stable at baseline    Diabetes mellitus type 2, diet-controlled.    -It does not appear like he is on any treatment for this currently.   -Adequate control of the moment    D/W: RN  DVT Prophylaxis: Pneumatic Compression Devices  Code Status: Full Code    Disposition: Expected discharge in 1-2 days    Deondre Bautista MD    Interval History   dyspnea is marginally better today. Heartrate better control today. Cough with clear sputum production. Blood pressure have been hypotensive to  soft. Getting dialyzed today    -Data reviewed today: I reviewed all new labs and imaging results over the last 24 hours. I personally reviewed the EKG tracing showing controlled A-flutter.    Physical Exam   Temp: 97.8  F (36.6  C) Temp src: Oral BP: 96/55   Heart Rate: 86 Resp: 16 SpO2: 100 % O2 Device: Nasal cannula Oxygen Delivery: 4 LPM  Vitals:    12/08/17 0648 12/09/17 0501   Weight: 85 kg (187 lb 6.3 oz) 85.2 kg (187 lb 13.3 oz)     Vital Signs with Ranges  Temp:  [97.2  F (36.2  C)-97.9  F (36.6  C)] 97.8  F (36.6  C)  Heart Rate:  [60-96] 86  Resp:  [11-19] 16  BP: ()/() 96/55  SpO2:  [91 %-100 %] 100 %  I/O last 3 completed shifts:  In: 1174 [P.O.:650; I.V.:524]  Out: -     Constitutional: AAOX3, NAD, Appears comfortable  HEENT: Moist oral mucosa, no oral lesions, No pallor or icterus  Neck- Supple, Good ROM, No JVD  Respiratory: scattered bibasilar crackles, normal effort of breathing  Cardiovascular: RRR, No murmur  GI: Soft, Non- tender, BS- normoactive, No Guarding/rebound/rigidity  Skin/Integument: Warm and dry, no rashes  MSK: No joint deformity or swelling, trace edema  Neuro: CN- grossly intact     Medications     - MEDICATION INSTRUCTIONS -         sodium chloride 0.9%  250 mL Hemodialysis Machine Once     sodium chloride 0.9%  250-1,000 mL Intravenous Once in dialysis     doxercalciferol  3 mcg Intravenous Once     carvedilol  12.5 mg Oral BID w/meals     - MEDICATION INSTRUCTIONS for Dialysis Patients -   Does not apply See Admin Instructions     acetaminophen  650 mg Oral 4x Daily     aspirin EC EC tablet 81 mg  81 mg Oral Daily     clopidogrel  75 mg Oral Daily     levothyroxine  50 mcg Oral Daily     NEPHROCAPS  1 capsule Oral At Bedtime     sevelamer  800 mg Oral TID w/meals     ranitidine  75 mg Oral At Bedtime     pravastatin  40 mg Oral At Bedtime     pantoprazole  40 mg Oral QAM     mexiletine  150 mg Oral BID     lisinopril  2.5 mg Oral At Bedtime       Data     Recent  Labs  Lab 12/09/17  0555 12/09/17  0135 12/08/17  2143 12/08/17  1730 12/08/17  0713   WBC 6.5  --   --   --  7.1   HGB 11.3*  --   --   --  11.6*   *  --   --   --  101*   *  --   --   --  164     --   --   --  135   POTASSIUM 4.9  --   --   --  4.6   CHLORIDE 97  --   --   --  97   CO2 28  --   --   --  27   BUN 44*  --   --   --  30   CR 6.88*  --   --   --  5.63*   ANIONGAP 11  --   --   --  11   CHRISTINE 8.9  --   --   --  9.5   *  --   --   --  145*   ALBUMIN  --   --   --   --  3.6   PROTTOTAL  --   --   --   --  7.3   BILITOTAL  --   --   --   --  1.4*   ALKPHOS  --   --   --   --  192*   ALT  --   --   --   --  22   AST  --   --   --   --  29   TROPI  --  0.048* 0.040 0.039 0.034       No results found for this or any previous visit (from the past 24 hour(s)).

## 2017-12-09 NOTE — PLAN OF CARE
Problem: Cardiac: Heart Failure (Adult)  Goal: Signs and Symptoms of Listed Potential Problems Will be Absent, Minimized or Managed (Cardiac: Heart Failure)  Signs and symptoms of listed potential problems will be absent, minimized or managed by discharge/transition of care (reference Cardiac: Heart Failure (Adult) CPG).   Outcome: No Change  Heart Failure Care Pathway  GOALS TO BE MET BEFORE DISCHARGE:    1. Decrease congestion and/or edema with diuretic therapy to achieve near      optimal volume status.            Dyspnea improved:  Yes, per pt report. Pt up in chair to sleep throughout shift.             Edema improved:     No, please explain: still has LE edema         Net I/O and Weights since admission:          12/03 2300 - 12/08 2259  In: 480 [P.O.:480]  Out: -   Net: 480            Vitals:    12/08/17 0648 12/09/17 0501   Weight: 85 kg (187 lb 6.3 oz) 85.2 kg (187 lb 13.3 oz)       2.  O2 sats > 92% on RA or at prior home O2 therapy level.          Current oxygenation status:       SpO2: 99 %         O2 Device: Nasal cannula,  Oxygen Delivery: 4 LPM         Able to wean O2 this shift to keep sats > 92%:  No, please explain: requiring 4L NC to maintain O2 sats > 92%       Does patient use Home O2? No    3.  Tolerates ambulation and mobility near baseline: No, please explain: unable to lay flat still.         How many times did the patient ambulate with nursing staff this shift? 2    Please review the Heart Failure Care Pathway for additional HF goal outcomes.    Pt hypotensive during shift, asymptomatic, received 250 ml bolus BP improving. Tele Aflutter CVR, dilt gtt off overnight, HR in 60's. Heparin gtt running at 950 ml/hr, next Hep10A this AM. Plan for dialysis today around noon, and cardiology consult. Will continue to monitor.     Oanh Li RN  12/9/2017

## 2017-12-09 NOTE — PLAN OF CARE
Problem: Patient Care Overview  Goal: Plan of Care/Patient Progress Review  Outcome: No Change  VSS, reports no pain, systolic  >100, HR 70. Sat in chair all evening. Aflutter with RVR; dilt and heparin drip all evening.

## 2017-12-09 NOTE — CONSULTS
[facility]    Cardiology Consultation     Date of Admission:  12/8/2017  Date of Consult (When I saw the patient): 12/09/17    Assessment & Plan   Westley Ness is a 71 year old male who was admitted on 12/8/2017. I was asked to see the patient for atrial fibrillation.    Active Problems:    Atrial flutter with rapid ventricular response (H)    Fatigue    HFrEF      Assessment/Plan: The patient has been in controlled atrial fibrillation with the diltiazem drip.  He still is symptomatic with fatigue and general feeling of being unwell.  We'll go ahead and discontinue the drip and increase his beta blocker while discontinuing his isosorbide mononitrate to give us more room in terms of blood pressure.  Would recommend an outpatient follow-up with Dr. Graf to discuss further options such as amiodarone which the patient claims does not work.  CV will sign off for now. This was discussed with Dr. Rizo and he was in agreement.      David Harvey MD    Code Status    Full Code    Reason for Consult   Reason for consult: I was asked by Dr. Holden to evaluate this patient for atrial fibrillation.    Primary Care Physician   Josselin Kolb    Chief Complaint   Fatigue and malaise.    History is obtained from the patient.    History of Present Illness   Westley Ness is a 71 year old male who presents with atrial fibrillation. The patient states he has been feeling unwell for quite some time, however yesterday took his pulse and found it to be elevated above 100 bpm. He presented to the ED and was found to be in rapid afib.  he was placed on a diltiazem drip, subsequently has had slower rates, however still feels unwell.  He states there is some dyspnea on exertion that has not changed as well as some mild lower extremity edema which has not changed. He denies any chest pain, jaw or arm pain, claudication, cough, wheeze, or any other cardiovascular signs or symptoms.        Past Medical History    I have reviewed this patient's medical history and updated it with pertinent information if needed.   Past Medical History:   Diagnosis Date     Acid reflux disease      Atrial flutter (H)     s/p ablation 6/2017     CAD (coronary artery disease)     s/p multiple NSTEMIs and PCI's     ESRD on hemodialysis (H)     MWF     Hypothyroidism      Ischemic cardiomyopathy     EF 25-30%, s/p AICD     NSTEMI (non-ST elevated myocardial infarction) (H)     multiple     Type 2 diabetes mellitus (H)     diet-controlled       Past Surgical History   I have reviewed this patient's surgical history and updated it with pertinent information if needed.  Past Surgical History:   Procedure Laterality Date     CHOLECYSTECTOMY, OPEN  2013     ELBOW SURGERY Left 2008    ORIF - plates still in place     H ABLATION ATRIAL FLUTTER  06/08/2017     HC LEFT HEART CATHETERIZATION  7/14/2016     HC LEFT HEART CATHETERIZATION  9/21/2016     IMPLANT VENTRICULAR DEVICE  08/15/2011     TONSILLECTOMY & ADENOIDECTOMY       VASCULAR SURGERY  2004, 2010    LUE fistulas (upper and lower); upper one is functional       Prior to Admission Medications   Prior to Admission Medications   Prescriptions Last Dose Informant Patient Reported? Taking?   ASPIRIN EC PO 12/7/2017 at noon  Yes Yes   Sig: Take 81 mg by mouth daily noon   Levothyroxine Sodium 50 MCG CAPS 12/7/2017 at am Self No Yes   Sig: Take 50 mcg by mouth daily   RANITIDINE HCL PO 12/7/2017 at hs Self Yes Yes   Sig: Take 75 mg by mouth At Bedtime Sometimes need an additional 75 mg qhs prn   RaNITidine HCl (ZANTAC PO) prn  Yes Yes   Sig: Take 75 mg by mouth nightly as needed for heartburn   TRIPHROCAPS 1 MG capsule 12/7/2017 at hs  No Yes   Sig: TAKE 1 CAPSULE BY MOUTH EVERY EVENING   acetaminophen (TYLENOL) 325 MG tablet 12/7/2017 Self No Yes   Sig: Take 2 tablets (650 mg) by mouth 4 times daily   carvedilol (COREG) 6.25 MG tablet 12/7/2017 Self No Yes   Sig: Take 1 tablet (6.25 mg) by mouth 2  times daily (with meals) Take after dialysis   clopidogrel (PLAVIX) 75 MG tablet 2017 at noon  No Yes   Sig: Take 1 tablet (75 mg) by mouth daily To protect your stent(s).  Do not stop taking unless directed by cardiology.   isosorbide mononitrate (IMDUR) 30 MG 24 hr tablet 2017 at Unknown time Self No Yes   Sig: Take 0.5 tablets (15 mg) by mouth daily   lisinopril (PRINIVIL/ZESTRIL) 2.5 MG tablet 2017  No Yes   Sig: Take one tablet after dialysis. Take second tablet at bedtime.   loperamide (IMODIUM) 2 MG capsule prn Self Yes Yes   Sig: Take 1 mg by mouth as needed    mexiletine (MEXITIL) 150 MG capsule 2017 Self No Yes   Sig: Take 1 capsule (150 mg) by mouth 2 times daily   nitroglycerin (NITROSTAT) 0.4 MG sublingual tablet prn Self No Yes   Si TAB EVERY 5 MIN AS NEEDED, UP TO 3 PER EPISODE   pantoprazole (PROTONIX) 40 MG EC tablet 2017 at am Self No Yes   Sig: Take 1 tablet (40 mg) by mouth every morning   pravastatin (PRAVACHOL) 40 MG tablet 2017 at hs Self No Yes   Sig: Take 1 tablet (40 mg) by mouth daily   psyllium 0.52 G capsule prn Self Yes Yes   Sig: Take 1 capsule by mouth daily as needed    sevelamer (RENVELA) 800 MG tablet More than a month Self Yes No   Sig: Take 800-1,600 mg by mouth as needed ON HOLD      Facility-Administered Medications: None     Allergies   Allergies   Allergen Reactions     Contrast Dye Hives     Does fine if he uses benadryl prior.     No Clinical Screening - See Comments      Green beans - Diarrhea.    Topical antibiotic - name unknown - caused swelling of the penis.       Social History   I have reviewed this patient's social history and updated it with pertinent information if needed. Westley MO Ness  reports that he quit smoking about 21 years ago. His smoking use included Cigarettes. He has a 70.00 pack-year smoking history. He has never used smokeless tobacco. He reports that he drinks alcohol. He reports that he does not use illicit  drugs.    Family History   I have reviewed this patient's family history and updated it with pertinent information if needed.   Family History   Problem Relation Age of Onset     CEREBROVASCULAR DISEASE Mother      later in life     Hypertension Father      Bladder Cancer Father      Myocardial Infarction Paternal Grandmother      DIABETES No family hx of      Prostate Cancer No family hx of      Colon Cancer No family hx of        Review of Systems   The 10 point Review of Systems is negative other than noted in the HPI.    Physical Exam   Temp: 97.2  F (36.2  C) Temp src: Oral BP: 106/61   Heart Rate: 88 Resp: 16 SpO2: 97 % O2 Device: Nasal cannula Oxygen Delivery: 4 LPM  Vital Signs with Ranges  Temp:  [97.2  F (36.2  C)-97.9  F (36.6  C)] 97.2  F (36.2  C)  Heart Rate:  [60-93] 88  Resp:  [11-24] 16  BP: ()/() 106/61  SpO2:  [97 %-100 %] 97 %  187 lbs 13.31 oz    Constitutional: Examined and normal.  Hematologic / Lymphatic: Examined and normal.  Respiratory: Crackles heard at the bases with expiratory rales heard diffusely.  Cardiovascular: Precordium is quiet with normal JVP, PMI is slightly enlarged and displaced  GI: No scars, normal bowel sounds, soft, non-distended, non-tender, no masses palpated, no hepatosplenomegally  Skin: No redness, warmth, or swelling  Musculoskeletal: Trace lower extremity pitting edema present      Data   Most Recent 2 LFT's:  Recent Labs   Lab Test  12/08/17   0713  10/30/16   1014   AST  29  12   ALT  22  15   ALKPHOS  192*  130   BILITOTAL  1.4*  0.8     Most Recent 3 Creatinines:  Recent Labs   Lab Test  12/09/17   0555  12/08/17   0713  08/01/17   0520   CR  6.88*  5.63*  3.33*     Most Recent 3 Hemoglobins:  Recent Labs   Lab Test  12/09/17   0555  12/08/17   0713  07/31/17   0730   HGB  11.3*  11.6*  11.4*     Most Recent 3 Troponin's:  Recent Labs   Lab Test  12/09/17   0135  12/08/17   2143  12/08/17   1730   TROPI  0.048*  0.040  0.039     Most Recent  Urinalysis:  Recent Labs   Lab Test  07/11/16   1715   COLOR  Yellow   APPEARANCE  Clear   URINEGLC  150*   URINEBILI  Negative   URINEKETONE  Negative   SG  1.022   UBLD  Negative   URINEPH  8.5*   PROTEIN  300*   NITRITE  Negative   LEUKEST  Negative   RBCU  1   WBCU  0     Most Recent CPK:No lab results found.

## 2017-12-09 NOTE — PROVIDER NOTIFICATION
MD Notification    Notified Persons Name:  Adina    Notification Date/Time: 12/09/17 2:25 AM     Notification Interaction:  Talked with Physician    Purpose of Notification:  BP 88/46 but improving. Troponin 0.048.     Comments: No new orders at this time. BP improving after fluid bolus, pt asymptomatic. Troponin stable, Heparin gtt running at 950 units/hr. Will continue to monitor.

## 2017-12-09 NOTE — PROVIDER NOTIFICATION
Hep 10A:1.05, pharmacist notified, requested to have lab redrawn and continue gtt at current rate. STAT redraw order placed, lab notified. Will await new results.

## 2017-12-09 NOTE — PROGRESS NOTES
Asked pt if he would like to wear cpap at [facility] and he refused.  Will continue to follow  Valentín Tristan

## 2017-12-09 NOTE — PLAN OF CARE
Off CSC for several hours this afternoon due to HD.  Returned feeling nauseated with AFlutter up to 130/mn.  Given scheduled Coreg.  Refuses to eat supper.  States he did not eat lunch in HD either.  Appears to be depressed and withdrawn.

## 2017-12-09 NOTE — PROVIDER NOTIFICATION
MD Notification    Notified Person:  MD    Notified Persons Name:Dr. Dave    Notification Date/Time:12/8/2017 at 2100    Notification Interaction:  Talked with Physician    Purpose of Notification:repaged for home meds to be ordered.    Orders Received:no call back.    Comments:

## 2017-12-09 NOTE — PROGRESS NOTES
Westley Ness is a 71 year old male with ESRD who was admitted on 12/8/2017.      1) Aflutter with RVR - Better rate control.  History of A Fib on and off.   Decreased LVEF noted.     2) ESRD:  He runs in the [place].  MWF.  Missed his run yesterday.  3.5 H.  LAVF .  Target weight of 84 kg.  He has been getting to his target weight.  He normally has 2-3 KG removed.     3) Anemia:  He has not been on OSBALDO of late.  HGB consistently 11-12.       BB titrated up  Avoid amiodarone due to concern for proarrhythmia and Ca++ blockers with CM/CHF   EMILY Sherman

## 2017-12-09 NOTE — PROGRESS NOTES
Called by the RN for low BP at 70/30. Dialysis pt who received his scheduled coreg, lisinopril at 10PM. Pt is asymptomatic with that BP. Most likley meds contributed. Hold parameters placed on coreg and lisinopril. Will give 250cc IVF bolus and monitor for now  Krysten Holden MD

## 2017-12-09 NOTE — PROVIDER NOTIFICATION
MD Notification    Notified Person:  MD    Notified Persons Name:Dr. aDve    Notification Date/Time:12/8/2017 at 2030    Notification Interaction:  Talked with Physician    Purpose of Notification:need home meds reordered    Orders Received:no call back    Comments:

## 2017-12-09 NOTE — PROGRESS NOTES
Lab Results   Component Value Date    POTASSIUM 4.9 12/09/2017     Lab Results   Component Value Date    HGB 11.3 12/09/2017          Hemodialysis Note:      All machine safety checks completed and passed.  Total chlorine checks less than 0.1ppm   All connections secured, saline line double clamped.  Venous and arterial parameters set  Report rec'd from Radha Londono RN    Rec'd pt per w/c acc'd by transport team. Consent obtained for dialysis Rx this hospitalization.  Hepatitis B labs verified on machine log from previous patient.  Good circulation to fistula arm. Time out taken.    LUAF cannulated with 15 Ga needles with no difficulty. Tourniquet used.  Pt ran 3.5 hours on a K 2 bath, with 3L removed. Blood flow rate of 450 ml/min was obtained.   PRE-WEIGHT:85.2kgs,    TARGET WEIGHT 84kgs,     POST-WT 82.2kgs.      Complications: None.   Education regarding ESRD given to patient with good understanding.     Protocol explained to staff RN regarding possibility of incapacitated dialysis RN.  Vascular Access: Aseptic prep done for both on/off  Total Heparin given: 0 units    Meds given: [facility].      Dr. Tabor visited pt during run.   Transducers checked Q 15 minutes, remain clear throughout Rx.  Machine water alarms in place and functioning.  Total blood volume processed:87.1L  Hemostasis of needle sites achieved after 30 minutes. Patient dialyzes at [place] Q M-W-F.  POST ASSESSMENTS: Skin warm and dry, alert, denies discomfort, Lungs coarse with loose cough, Resp rate regular, apical rate regular. No edema.  See flowsheet in EPIC for further details.  Report given to Radha Londono RN.   Evelyn Jean-Baptiste RN  [facility]

## 2017-12-09 NOTE — CONSULTS
[facility]    Nephrology Consultation     Date of Admission:  12/8/2017    Assessment & Plan      Westley Ness is a 71 year old male with ESRD who was admitted on 12/8/2017.     1) Aflutter with RVR - Better rate control.    2) ESRD:  He runs in the [facility].  MWF.  Missed his run yesterday.  3.5 H.  LAVF .  Target weight of 84 kg.  He has been getting to his target weight.  He normally has 2-3 KG removed.    3) Anemia:  He has not been on OSBALDO of late.  HGB consistently 11-12.      4) 2HPT:  .  Hectorol 4 mcg.      Plan:      Attempt 3 kg off as BP tolerates.    Marlo Tabor MD  [facility] - Nephrology  [phone]    Reason for Consult      I was asked to see the patient for ESRD.      Primary Care Physician      Josselin Kolb    Chief Complaint      I don't know how much fluid I have on.      History is obtained from the patient and chart review.      History of Present Illness      Westley Ness is a 71 year old male who presents with SOB and aflutter with RVR.    His SOB had been gradually getting worse for about a week.    He was found to be in aflutter with RVR when he came into ED on 12/8/17.    He has been on rate control with IV diltiazem.    His beta blocker was then increased and his IV dilt was d/c'd.    He is now under going HD.      Past Medical History   I have reviewed this patient's medical history and updated it with pertinent information if needed.   Past Medical History:   Diagnosis Date     Acid reflux disease      Atrial flutter (H)     s/p ablation 6/2017     CAD (coronary artery disease)     s/p multiple NSTEMIs and PCI's     ESRD on hemodialysis (H)     MWF     Hypothyroidism      Ischemic cardiomyopathy     EF 25-30%, s/p AICD     NSTEMI (non-ST elevated myocardial infarction) (H)     multiple     Type 2 diabetes mellitus (H)     diet-controlled       Past Surgical History   I have reviewed this patient's surgical  history and updated it with pertinent information if needed.  Past Surgical History:   Procedure Laterality Date     CHOLECYSTECTOMY, OPEN  2013     ELBOW SURGERY Left 2008    ORIF - plates still in place     H ABLATION ATRIAL FLUTTER  06/08/2017     HC LEFT HEART CATHETERIZATION  7/14/2016     HC LEFT HEART CATHETERIZATION  9/21/2016     IMPLANT VENTRICULAR DEVICE  08/15/2011     TONSILLECTOMY & ADENOIDECTOMY       VASCULAR SURGERY  2004, 2010    LUE fistulas (upper and lower); upper one is functional       Prior to Admission Medications   Prior to Admission Medications   Prescriptions Last Dose Informant Patient Reported? Taking?   ASPIRIN EC PO 12/7/2017 at noon  Yes Yes   Sig: Take 81 mg by mouth daily noon   Levothyroxine Sodium 50 MCG CAPS 12/7/2017 at am Self No Yes   Sig: Take 50 mcg by mouth daily   RANITIDINE HCL PO 12/7/2017 at hs Self Yes Yes   Sig: Take 75 mg by mouth At Bedtime Sometimes need an additional 75 mg qhs prn   RaNITidine HCl (ZANTAC PO) prn  Yes Yes   Sig: Take 75 mg by mouth nightly as needed for heartburn   TRIPHROCAPS 1 MG capsule 12/7/2017 at hs  No Yes   Sig: TAKE 1 CAPSULE BY MOUTH EVERY EVENING   acetaminophen (TYLENOL) 325 MG tablet 12/7/2017 Self No Yes   Sig: Take 2 tablets (650 mg) by mouth 4 times daily   carvedilol (COREG) 6.25 MG tablet 12/7/2017 Self No Yes   Sig: Take 1 tablet (6.25 mg) by mouth 2 times daily (with meals) Take after dialysis   clopidogrel (PLAVIX) 75 MG tablet 12/7/2017 at noon  No Yes   Sig: Take 1 tablet (75 mg) by mouth daily To protect your stent(s).  Do not stop taking unless directed by cardiology.   isosorbide mononitrate (IMDUR) 30 MG 24 hr tablet 12/7/2017 at Unknown time Self No Yes   Sig: Take 0.5 tablets (15 mg) by mouth daily   lisinopril (PRINIVIL/ZESTRIL) 2.5 MG tablet 12/7/2017  No Yes   Sig: Take one tablet after dialysis. Take second tablet at bedtime.   loperamide (IMODIUM) 2 MG capsule prn Self Yes Yes   Sig: Take 1 mg by mouth as needed     mexiletine (MEXITIL) 150 MG capsule 2017 Self No Yes   Sig: Take 1 capsule (150 mg) by mouth 2 times daily   nitroglycerin (NITROSTAT) 0.4 MG sublingual tablet prn Self No Yes   Si TAB EVERY 5 MIN AS NEEDED, UP TO 3 PER EPISODE   pantoprazole (PROTONIX) 40 MG EC tablet 2017 at am Self No Yes   Sig: Take 1 tablet (40 mg) by mouth every morning   pravastatin (PRAVACHOL) 40 MG tablet 2017 at hs Self No Yes   Sig: Take 1 tablet (40 mg) by mouth daily   psyllium 0.52 G capsule prn Self Yes Yes   Sig: Take 1 capsule by mouth daily as needed    sevelamer (RENVELA) 800 MG tablet More than a month Self Yes No   Sig: Take 800-1,600 mg by mouth as needed ON HOLD      Facility-Administered Medications: None     Allergies   Allergies   Allergen Reactions     Contrast Dye Hives     Does fine if he uses benadryl prior.     No Clinical Screening - See Comments      Green beans - Diarrhea.    Topical antibiotic - name unknown - caused swelling of the penis.       Social History   I have reviewed this patient's social history and updated it with pertinent information if needed. Westley Ness  reports that he quit smoking about 21 years ago. His smoking use included Cigarettes. He has a 70.00 pack-year smoking history. He has never used smokeless tobacco. He reports that he drinks alcohol. He reports that he does not use illicit drugs.    Family History   I have reviewed this patient's family history and updated it with pertinent information if needed.   Family History   Problem Relation Age of Onset     CEREBROVASCULAR DISEASE Mother      later in life     Hypertension Father      Bladder Cancer Father      Myocardial Infarction Paternal Grandmother      DIABETES No family hx of      Prostate Cancer No family hx of      Colon Cancer No family hx of        Review of Systems   The 10 point Review of Systems is negative other than noted in the HPI.      Physical Exam   Temp: 97.8  F (36.6  C) Temp src: Oral BP:  128/57   Heart Rate: 99 Resp: 16 SpO2: 99 % O2 Device: Nasal cannula Oxygen Delivery: 4 LPM  Vital Signs with Ranges  Temp:  [97.2  F (36.2  C)-97.9  F (36.6  C)] 97.8  F (36.6  C)  Heart Rate:  [60-99] 99  Resp:  [11-18] 16  BP: ()/(37-71) 128/57  SpO2:  [91 %-100 %] 99 %  187 lbs 13.31 oz    GENERAL: healthy, alert, NAD  HEENT:  Normocephalic. No gross abnormalities.  Pupils equal.  MMM.  Dentition is fair.    CV: RRR, no murmurs, no clicks, gallops, or rubs, no edema, no carotid bruits  RESP: upper airway rattle.    GI: Abdomen soft/nt/nd, BS normal. No masses, organomegaly  MUSCULOSKELETAL: extremities nl - no gross deformities noted  SKIN: no suspicious lesions or rashes, dry to touch  NEURO:  Strength normal and symmetric.   PSYCH: mood good, affect appropriate  LYMPH: No palpable ant/post cervical and supraclavicular adenopathy    Data   BMP  Recent Labs  Lab 12/09/17 0555 12/08/17  0713    135   POTASSIUM 4.9 4.6   CHLORIDE 97 97   CHRISTINE 8.9 9.5   CO2 28 27   BUN 44* 30   CR 6.88* 5.63*   * 145*     [id number](phos:4)  CBC)  Recent Labs  Lab 12/09/17 0555 12/08/17  0713   WBC 6.5 7.1   HGB 11.3* 11.6*   HCT 33.8* 34.9*   * 101*   * 164       Recent Labs  Lab 12/08/17  0713   AST 29   ALT 22   ALKPHOS 192*   BILITOTAL 1.4*     No lab results found in last 7 days.  No results found for: D2VIT, D3VIT, DTOT    Recent Labs  Lab 12/09/17  0555   HGB 11.3*   HCT 33.8*   *     No results for input(s): PTHI in the last 168 hours.

## 2017-12-09 NOTE — PLAN OF CARE
Problem: Patient Care Overview  Goal: Plan of Care/Patient Progress Review  PT: Pt attempted at scheduled time. Is out of room at dialysis at this time. Will attempt back as able once pt is finished, will otherwise r/s for 12/10/17.

## 2017-12-10 ENCOUNTER — APPOINTMENT (OUTPATIENT)
Dept: PHYSICAL THERAPY | Facility: CLINIC | Age: 72
DRG: 273 | End: 2017-12-10
Attending: INTERNAL MEDICINE
Payer: MEDICARE

## 2017-12-10 LAB
GLUCOSE BLDC GLUCOMTR-MCNC: 137 MG/DL (ref 70–99)
GLUCOSE BLDC GLUCOMTR-MCNC: 156 MG/DL (ref 70–99)
GLUCOSE BLDC GLUCOMTR-MCNC: 92 MG/DL (ref 70–99)

## 2017-12-10 PROCEDURE — 97161 PT EVAL LOW COMPLEX 20 MIN: CPT | Mod: GP

## 2017-12-10 PROCEDURE — 25000132 ZZH RX MED GY IP 250 OP 250 PS 637: Mod: GY | Performed by: INTERNAL MEDICINE

## 2017-12-10 PROCEDURE — A9270 NON-COVERED ITEM OR SERVICE: HCPCS | Mod: GY | Performed by: INTERNAL MEDICINE

## 2017-12-10 PROCEDURE — 21000001 ZZH R&B HEART CARE

## 2017-12-10 PROCEDURE — 97116 GAIT TRAINING THERAPY: CPT | Mod: GP

## 2017-12-10 PROCEDURE — 00000146 ZZHCL STATISTIC GLUCOSE BY METER IP

## 2017-12-10 PROCEDURE — 97530 THERAPEUTIC ACTIVITIES: CPT | Mod: GP

## 2017-12-10 PROCEDURE — 99232 SBSQ HOSP IP/OBS MODERATE 35: CPT | Performed by: INTERNAL MEDICINE

## 2017-12-10 PROCEDURE — 40000193 ZZH STATISTIC PT WARD VISIT

## 2017-12-10 RX ADMIN — MEXILETINE HYDROCHLORIDE 150 MG: 150 CAPSULE ORAL at 09:45

## 2017-12-10 RX ADMIN — RANITIDINE 75 MG: 75 TABLET, FILM COATED ORAL at 22:03

## 2017-12-10 RX ADMIN — ASPIRIN 81 MG: 81 TABLET, COATED ORAL at 12:27

## 2017-12-10 RX ADMIN — ACETAMINOPHEN 650 MG: 325 TABLET, FILM COATED ORAL at 09:44

## 2017-12-10 RX ADMIN — ACETAMINOPHEN 650 MG: 325 TABLET, FILM COATED ORAL at 18:06

## 2017-12-10 RX ADMIN — ACETAMINOPHEN 650 MG: 325 TABLET, FILM COATED ORAL at 22:03

## 2017-12-10 RX ADMIN — LISINOPRIL 2.5 MG: 2.5 TABLET ORAL at 22:03

## 2017-12-10 RX ADMIN — LEVOTHYROXINE SODIUM 50 MCG: 50 TABLET ORAL at 06:54

## 2017-12-10 RX ADMIN — PRAVASTATIN SODIUM 40 MG: 40 TABLET ORAL at 22:03

## 2017-12-10 RX ADMIN — Medication 1 CAPSULE: at 22:02

## 2017-12-10 RX ADMIN — LOPERAMIDE HYDROCHLORIDE 2 MG: 2 CAPSULE ORAL at 12:26

## 2017-12-10 RX ADMIN — PANTOPRAZOLE SODIUM 40 MG: 40 TABLET, DELAYED RELEASE ORAL at 09:45

## 2017-12-10 RX ADMIN — CLOPIDOGREL 75 MG: 75 TABLET, FILM COATED ORAL at 12:27

## 2017-12-10 RX ADMIN — CARVEDILOL 12.5 MG: 12.5 TABLET, FILM COATED ORAL at 09:45

## 2017-12-10 RX ADMIN — MEXILETINE HYDROCHLORIDE 150 MG: 150 CAPSULE ORAL at 22:02

## 2017-12-10 RX ADMIN — CARVEDILOL 12.5 MG: 12.5 TABLET, FILM COATED ORAL at 18:07

## 2017-12-10 NOTE — PROGRESS NOTES
12/10/17 1000   Quick Adds   Type of Visit Initial PT Evaluation   Living Environment   Lives With alone   Living Arrangements house   Home Accessibility stairs to enter home;stairs within home;grab bars present (bathtub);grab bars present (toilet)   Number of Stairs to Enter Home 9  (1x4 and 1x5 with landing)   Number of Stairs Within Home 5  (up to second level and down to basement)   Stair Railings at Home inside, present at both sides;outside, present at both sides   Transportation Available car  (pt drives at baseline)   Living Environment Comment pt s IND with all daily activities including driving, 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   Self-Care   Usual Activity Tolerance good   Current Activity Tolerance moderate   Regular Exercise no   Equipment Currently Used at Home cane, straight;walker, rolling;grab bar   Functional Level Prior   Ambulation 1-->assistive equipment   Transferring 0-->independent   Toileting 0-->independent   Bathing 1-->assistive equipment  (grab bars)   Dressing 0-->independent   Eating 0-->independent   Communication 0-->understands/communicates without difficulty   Swallowing 0-->swallows foods/liquids without difficulty   Cognition 0 - no cognition issues reported   Fall history within last six months no   Which of the above functional risks had a recent onset or change? ambulation   Prior Functional Level Comment pt uses SEC with ambulation at most times, reports occasional IND ambulation in home, occasional use of walker out of home   General Information   Onset of Illness/Injury or Date of Surgery - Date 12/08/17   Referring Physician Deondre Bautista MD   Patient/Family Goals Statement home   Pertinent History of Current Problem (include personal factors and/or comorbidities that impact the POC) Pt is a 72 y/o male admitted 12/8/17 for further evaluation of shortness of breath, unable to ambulate >10', pt also with productive cough. Pt with  "acute exacerbation of CHF, on 2L O2 at this time, reports only use of supplemental O2 outside of when hospitalized is at dialysis \"too keep his blood pressure stable\". Pt also with atrial flutter with rapid ventricular response, better rate control at this time d/t medical management.    Precautions/Limitations fall precautions;oxygen therapy device and L/min  (2L)   General Observations pt is pleasant and cooperative   General Info Comments Activity: up with assist   Cognitive Status Examination   Orientation orientation to person, place and time   Level of Consciousness alert   Follows Commands and Answers Questions 100% of the time;able to follow multistep instructions   Personal Safety and Judgment intact   Memory intact   Pain Assessment   Patient Currently in Pain No   Integumentary/Edema   Integumentary/Edema no deficits were identifed   Posture    Posture Forward head position;Protracted shoulders   Range of Motion (ROM)   ROM Quick Adds No deficits were identified   Strength   Manual Muscle Testing Quick Adds No deficits were identified   Strength Comments pt with general fatigue, demonstrates good functional strength   Bed Mobility   Bed Mobility Comments pt completes supine<>Sit IND   Transfer Skills   Transfer Comments pt completes sit<>stand IND, bed> chair Lazaro with use of FWW   Gait   Gait Comments pt ambulates with FWW and SBAx10', slowed brock, flexed posturing   Balance   Balance Comments no LOB or unsteadiness with mobility; pt declined use of SEC with mobiltiy 2/2 fatigue   Sensory Examination   Sensory Perception no deficits were identified   General Therapy Interventions   Planned Therapy Interventions gait training;home program guidelines;progressive activity/exercise   Clinical Impression   Criteria for Skilled Therapeutic Intervention yes, treatment indicated   PT Diagnosis difficulty with gait   Influenced by the following impairments decreased activity tolerance, increased supplemental " "O2 needs   Functional limitations due to impairments difficulty with gait, stairs, transfers   Clinical Presentation Evolving/Changing   Clinical Presentation Rationale pt down to 2L O2 supplemental, does not use at baseline with mobility   Clinical Decision Making (Complexity) Low complexity   Therapy Frequency` 5 times/week   Predicted Duration of Therapy Intervention (days/wks) 1 week   Anticipated Discharge Disposition Home   Risk & Benefits of therapy have been explained Yes   Patient, Family & other staff in agreement with plan of care Yes   [facility] AM-PAC TM \"6 Clicks\"   2016, Trustees of [facility], under license to [employer].  All rights reserved.   6 Clicks Short Forms Basic Mobility Inpatient Short Form   [facility] AM-PAC  \"6 Clicks\" V.2 Basic Mobility Inpatient Short Form   1. Turning from your back to your side while in a flat bed without using bedrails? 4 - None   2. Moving from lying on your back to sitting on the side of a flat bed without using bedrails? 4 - None   3. Moving to and from a bed to a chair (including a wheelchair)? 4 - None   4. Standing up from a chair using your arms (e.g., wheelchair, or bedside chair)? 4 - None   5. To walk in hospital room? 3 - A Little   6. Climbing 3-5 steps with a railing? 3 - A Little   Basic Mobility Raw Score (Score out of 24.Lower scores equate to lower levels of function) 22   Total Evaluation Time   Total Evaluation Time (Minutes) 10     "

## 2017-12-10 NOTE — PLAN OF CARE
Problem: Patient Care Overview  Goal: Plan of Care/Patient Progress Review  Outcome: No Change  A&Ox4. BP's soft but stable. All other VSS on 2 L O2. Tele: A flutter HR 80-90's w/ occasional bump into 100's. Mike SOB/CP. Up w/ 1 and walker. Able to wean from 4 L O2 down to 2 L w/ sats 94%. 90% oxygen on room air. Encouraged activity throughout shift, pt refused getting out of bed. Pt did manage to sit at edge of bed w/ feet dangling overnight. Will continue to encourage activity.

## 2017-12-10 NOTE — PROVIDER NOTIFICATION
Spoke with Dr. Sherman regarding the SBP hold parameters of 110 for Coreg. Coreg increased yesterday to 12.5 BID and Imdur d/c'd to help with low BP's. Pt has had SBP ranging from  with MAP's maintained above 74.  Per Dr. Sherman SBP parameter will be lowered to hold if less than 90.

## 2017-12-10 NOTE — PLAN OF CARE
"Problem: Patient Care Overview  Goal: Plan of Care/Patient Progress Review  PT: Orders received, chart reviewed, eval completed and treatment initiated. Pt is a 72 y/o male admitted 12/8/17 for further evaluation of shortness of breath, unable to ambulate >10', pt also with productive cough. Pt with acute exacerbation of CHF, on 2L O2 at this time, reports only use of supplemental O2 outside of when hospitalized is at dialysis \"too keep his blood pressure stable\". Pt also with atrial flutter with rapid ventricular response, better rate control at this time d/t medical management. 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    Discharge Planner PT   Patient plan for discharge: home  Current status: Pt completes bed mobility and sit<>stand transfers IND, bed<>chair transfer with FWW, gait with FWW and SBA, ascended/descended 5 stairs with B rails and SBA. See below for pt's O2 sats/ needs with activity.  Barriers to return to prior living situation: current level of assist, O2 needs, decreased activity tolerance  Recommendations for discharge: anticipate home, at this time may benefit from HHPT as well  Rationale for recommendations: pending pt progression and ability to demonstrate ambulation with use of SEC anticipate home without further therapy needs, however if continues to be limited by decreased activity tolerance and requesting use of walker would benefit from HHPT to progress towards PLOF.       Entered by: Theresa Medina 12/10/2017 10:36 AM       *Pulse oximetry (SpO2) = 92% on room air at rest while awake.  *SpO2 improved to 96% on 2liters/minute at rest.  *SpO2 = 91% on room air after short distance ambulation in [place]; 84% after stairs and second bought of ambulation, " increases to 88% on RA in 45-60 seconds, increases to >90% in <30 seconds.  *SpO2 improved to 94% on 2liters/minute during short distance ambulation in room.

## 2017-12-10 NOTE — PROGRESS NOTES
[facility]    Hospitalist Progress Note    Assessment & Plan    Mr. Westley Ness is a 71-year-old male with multiple medical problems as described above who presents to the emergency room with shortness of breath.    Atrial flutter with rapid ventricular response.    -History of atrial flutter ablation in June.    -Since then, it appears like he was in sinus rhythm and actually came off Eliquis in August.    -Placed on Cardizem drip at presentation; heart rate better controlled now, off Cardizem drip  -appreciate cardiology consult  -Coreg increase by cardiology to 12.5 mg BID; continue mexitil  -patient does not think he has made significant improvement, will discuss with Dr. Graf tomorrow morning.    Acute exacerbation of chronic systolic congestive heart failure.    -The patient does presented with worsening dyspnea, orthopnea and requiring 3 liters oxygen  -oxygen weaned down to 2 L  -s/p hemodialysis yesterday, hemodialysis per nephrology    History of coronary artery disease with multiple PCIs.    Hypertension:  -Ongoing intermittent chest pain  -troponin flat  -continue prior to admission aspirin, Plavix, beta-blocker, patient intolerant to statins    Hypothyroidism:    -Continue prior to admission levothyroxine.     End-stage renal disease  -dialyzes on Monday, Wednesday and Friday   -HD per nephrology    Chronic anemia, most likely due to chronic kidney disease.    -Stable; at baseline    Diabetes mellitus type 2, diet-controlled.    -Adequate control of the moment    D/W: RN  DVT Prophylaxis: Pneumatic Compression Devices  Code Status: Full Code    Disposition: Expected discharge in 1-2 days    Deondre Bautista MD    Interval History   dyspnea is about the same, patient is frustrated that he is not getting significantly better. Endorses dry cough. No chest pain. Heart rate adequately controlled    -Data reviewed today: I reviewed all new labs and imaging results over the last 24 hours.  I personally reviewed the EKG tracing showing controlled A-flutter.    Physical Exam   Temp: 97.6  F (36.4  C) Temp src: Oral BP: (!) 80/44   Heart Rate: 87 Resp: 18 SpO2: 98 % O2 Device: Nasal cannula Oxygen Delivery: 2 LPM  Vitals:    12/08/17 0648 12/09/17 0501 12/10/17 0500   Weight: 85 kg (187 lb 6.3 oz) 85.2 kg (187 lb 13.3 oz) 82.5 kg (181 lb 14.1 oz)     Vital Signs with Ranges  Temp:  [97.4  F (36.3  C)-98.1  F (36.7  C)] 97.6  F (36.4  C)  Heart Rate:  [] 87  Resp:  [16-20] 18  BP: ()/(37-95) 80/44  SpO2:  [91 %-100 %] 98 %  I/O last 3 completed shifts:  In: -   Out: 3075 [Urine:75; Other:3000]    Constitutional: AAOX3, NAD, Appears comfortable  HEENT: Moist oral mucosa, no oral lesions, No pallor or icterus  Neck- Supple, Good ROM, No JVD  Respiratory: scattered bibasilar crackles, normal effort of breathing  Cardiovascular: RRR, No murmur  GI: Soft, Non- tender, BS- normoactive, No Guarding/rebound/rigidity  Skin/Integument: Warm and dry, no rashes  MSK: No joint deformity or swelling, trace edema  Neuro: CN- grossly intact     Medications        carvedilol  12.5 mg Oral BID w/meals     - MEDICATION INSTRUCTIONS for Dialysis Patients -   Does not apply See Admin Instructions     acetaminophen  650 mg Oral 4x Daily     aspirin EC EC tablet 81 mg  81 mg Oral Daily     clopidogrel  75 mg Oral Daily     levothyroxine  50 mcg Oral Daily     NEPHROCAPS  1 capsule Oral At Bedtime     ranitidine  75 mg Oral At Bedtime     pravastatin  40 mg Oral At Bedtime     pantoprazole  40 mg Oral QAM     mexiletine  150 mg Oral BID     lisinopril  2.5 mg Oral At Bedtime       Data     Recent Labs  Lab 12/09/17  0555 12/09/17  0135 12/08/17  2143 12/08/17  1730 12/08/17  0713   WBC 6.5  --   --   --  7.1   HGB 11.3*  --   --   --  11.6*   *  --   --   --  101*   *  --   --   --  164     --   --   --  135   POTASSIUM 4.9  --   --   --  4.6   CHLORIDE 97  --   --   --  97   CO2 28  --   --   --   27   BUN 44*  --   --   --  30   CR 6.88*  --   --   --  5.63*   ANIONGAP 11  --   --   --  11   CHRISTINE 8.9  --   --   --  9.5   *  --   --   --  145*   ALBUMIN  --   --   --   --  3.6   PROTTOTAL  --   --   --   --  7.3   BILITOTAL  --   --   --   --  1.4*   ALKPHOS  --   --   --   --  192*   ALT  --   --   --   --  22   AST  --   --   --   --  29   TROPI  --  0.048* 0.040 0.039 0.034       No results found for this or any previous visit (from the past 24 hour(s)).

## 2017-12-10 NOTE — PLAN OF CARE
Problem: Patient Care Overview  Goal: Plan of Care/Patient Progress Review  Outcome: No Change  Neuro: A/O x 4.    Cardiac:  Hypotensive at times, SBP 80's after coreg dose however pt tolerating.  HR 90's A-flutter.  Resp:  O2 sats > 90 on RA however pt requesting to stay on supplemental O2.  Currently 98% on 1.5 liters per NC.  LS are clear-diminished.     GI:  Diarrhea x 1.  Requested Imodium.    :  Anuric, received hemodialysis yesterday.  Plan-continue to monitor.  Will see primary cardiologist Dr. Graf tomorrow re: arrhythmia.

## 2017-12-11 ENCOUNTER — APPOINTMENT (OUTPATIENT)
Dept: CARDIOLOGY | Facility: CLINIC | Age: 72
DRG: 273 | End: 2017-12-11
Attending: INTERNAL MEDICINE
Payer: MEDICARE

## 2017-12-11 LAB
ALBUMIN SERPL-MCNC: 3.3 G/DL (ref 3.4–5)
ANION GAP SERPL CALCULATED.3IONS-SCNC: 9 MMOL/L (ref 3–14)
BUN SERPL-MCNC: 39 MG/DL (ref 7–30)
CALCIUM SERPL-MCNC: 9.1 MG/DL (ref 8.5–10.1)
CHLORIDE SERPL-SCNC: 97 MMOL/L (ref 94–109)
CO2 SERPL-SCNC: 31 MMOL/L (ref 20–32)
CREAT SERPL-MCNC: 6.28 MG/DL (ref 0.66–1.25)
ERYTHROCYTE [DISTWIDTH] IN BLOOD BY AUTOMATED COUNT: 14.5 % (ref 10–15)
ERYTHROCYTE [DISTWIDTH] IN BLOOD BY AUTOMATED COUNT: 14.7 % (ref 10–15)
GFR SERPL CREATININE-BSD FRML MDRD: 9 ML/MIN/1.7M2
GLUCOSE SERPL-MCNC: 96 MG/DL (ref 70–99)
HCT VFR BLD AUTO: 35.8 % (ref 40–53)
HCT VFR BLD AUTO: 37.1 % (ref 40–53)
HGB BLD-MCNC: 11.5 G/DL (ref 13.3–17.7)
HGB BLD-MCNC: 11.5 G/DL (ref 13.3–17.7)
HGB BLD-MCNC: 12.1 G/DL (ref 13.3–17.7)
INR PPP: 1 (ref 0.86–1.14)
MCH RBC QN AUTO: 33 PG (ref 26.5–33)
MCH RBC QN AUTO: 33.5 PG (ref 26.5–33)
MCHC RBC AUTO-ENTMCNC: 32.1 G/DL (ref 31.5–36.5)
MCHC RBC AUTO-ENTMCNC: 32.6 G/DL (ref 31.5–36.5)
MCV RBC AUTO: 103 FL (ref 78–100)
MCV RBC AUTO: 103 FL (ref 78–100)
PHOSPHATE SERPL-MCNC: 4.1 MG/DL (ref 2.5–4.5)
PLATELET # BLD AUTO: 132 10E9/L (ref 150–450)
PLATELET # BLD AUTO: 133 10E9/L (ref 150–450)
POTASSIUM SERPL-SCNC: 4 MMOL/L (ref 3.4–5.3)
RBC # BLD AUTO: 3.49 10E12/L (ref 4.4–5.9)
RBC # BLD AUTO: 3.61 10E12/L (ref 4.4–5.9)
SODIUM SERPL-SCNC: 137 MMOL/L (ref 133–144)
WBC # BLD AUTO: 6.7 10E9/L (ref 4–11)
WBC # BLD AUTO: 6.9 10E9/L (ref 4–11)

## 2017-12-11 PROCEDURE — 85027 COMPLETE CBC AUTOMATED: CPT | Performed by: INTERNAL MEDICINE

## 2017-12-11 PROCEDURE — 93320 DOPPLER ECHO COMPLETE: CPT | Mod: 26 | Performed by: INTERNAL MEDICINE

## 2017-12-11 PROCEDURE — 02K83ZZ MAP CONDUCTION MECHANISM, PERCUTANEOUS APPROACH: ICD-10-PCS | Performed by: INTERNAL MEDICINE

## 2017-12-11 PROCEDURE — 25000128 H RX IP 250 OP 636: Performed by: INTERNAL MEDICINE

## 2017-12-11 PROCEDURE — 4A0234Z MEASUREMENT OF CARDIAC ELECTRICAL ACTIVITY, PERCUTANEOUS APPROACH: ICD-10-PCS | Performed by: INTERNAL MEDICINE

## 2017-12-11 PROCEDURE — 93320 DOPPLER ECHO COMPLETE: CPT

## 2017-12-11 PROCEDURE — 25000128 H RX IP 250 OP 636

## 2017-12-11 PROCEDURE — 25000125 ZZHC RX 250: Performed by: INTERNAL MEDICINE

## 2017-12-11 PROCEDURE — 93613 INTRACARDIAC EPHYS 3D MAPG: CPT

## 2017-12-11 PROCEDURE — 93653 COMPRE EP EVAL TX SVT: CPT | Performed by: INTERNAL MEDICINE

## 2017-12-11 PROCEDURE — C1893 INTRO/SHEATH, FIXED,NON-PEEL: HCPCS

## 2017-12-11 PROCEDURE — A9270 NON-COVERED ITEM OR SERVICE: HCPCS | Mod: GY | Performed by: INTERNAL MEDICINE

## 2017-12-11 PROCEDURE — 36415 COLL VENOUS BLD VENIPUNCTURE: CPT | Performed by: INTERNAL MEDICINE

## 2017-12-11 PROCEDURE — 93613 INTRACARDIAC EPHYS 3D MAPG: CPT | Performed by: INTERNAL MEDICINE

## 2017-12-11 PROCEDURE — 27210995 ZZH RX 272: Performed by: INTERNAL MEDICINE

## 2017-12-11 PROCEDURE — 93621 COMP EP EVL L PAC&REC C SINS: CPT | Mod: 26 | Performed by: INTERNAL MEDICINE

## 2017-12-11 PROCEDURE — 27210886 ZZH ACCESSORY CR5

## 2017-12-11 PROCEDURE — 80069 RENAL FUNCTION PANEL: CPT | Performed by: INTERNAL MEDICINE

## 2017-12-11 PROCEDURE — 99153 MOD SED SAME PHYS/QHP EA: CPT

## 2017-12-11 PROCEDURE — 99232 SBSQ HOSP IP/OBS MODERATE 35: CPT | Performed by: INTERNAL MEDICINE

## 2017-12-11 PROCEDURE — 27210782 ZZH KIT EP TOTES DISP CR7

## 2017-12-11 PROCEDURE — 27210796 ZZH PAD EXTRNAL REFRENCE CARDIAC MAPPING CR14

## 2017-12-11 PROCEDURE — 93312 ECHO TRANSESOPHAGEAL: CPT | Mod: 26 | Performed by: INTERNAL MEDICINE

## 2017-12-11 PROCEDURE — 25000132 ZZH RX MED GY IP 250 OP 250 PS 637: Mod: GY | Performed by: INTERNAL MEDICINE

## 2017-12-11 PROCEDURE — 21000001 ZZH R&B HEART CARE

## 2017-12-11 PROCEDURE — 27210795 ZZH PAD DEFIB QUICK CR4

## 2017-12-11 PROCEDURE — C1732 CATH, EP, DIAG/ABL, 3D/VECT: HCPCS

## 2017-12-11 PROCEDURE — 93653 COMPRE EP EVAL TX SVT: CPT

## 2017-12-11 PROCEDURE — 99152 MOD SED SAME PHYS/QHP 5/>YRS: CPT | Performed by: INTERNAL MEDICINE

## 2017-12-11 PROCEDURE — 93005 ELECTROCARDIOGRAM TRACING: CPT

## 2017-12-11 PROCEDURE — 90937 HEMODIALYSIS REPEATED EVAL: CPT

## 2017-12-11 PROCEDURE — 40000235 ZZH STATISTIC TELEMETRY

## 2017-12-11 PROCEDURE — 99222 1ST HOSP IP/OBS MODERATE 55: CPT | Mod: 25 | Performed by: INTERNAL MEDICINE

## 2017-12-11 PROCEDURE — 99152 MOD SED SAME PHYS/QHP 5/>YRS: CPT

## 2017-12-11 PROCEDURE — 85610 PROTHROMBIN TIME: CPT | Performed by: INTERNAL MEDICINE

## 2017-12-11 PROCEDURE — 02583ZZ DESTRUCTION OF CONDUCTION MECHANISM, PERCUTANEOUS APPROACH: ICD-10-PCS | Performed by: INTERNAL MEDICINE

## 2017-12-11 PROCEDURE — 85018 HEMOGLOBIN: CPT | Performed by: INTERNAL MEDICINE

## 2017-12-11 PROCEDURE — 27210964 ZZH ACCESS EP PROC CR8

## 2017-12-11 PROCEDURE — 40000857 ZZH STATISTIC TEE INCLUDES SEDATION

## 2017-12-11 PROCEDURE — 93621 COMP EP EVL L PAC&REC C SINS: CPT

## 2017-12-11 PROCEDURE — 4A023FZ MEASUREMENT OF CARDIAC RHYTHM, PERCUTANEOUS APPROACH: ICD-10-PCS | Performed by: INTERNAL MEDICINE

## 2017-12-11 PROCEDURE — 93010 ELECTROCARDIOGRAM REPORT: CPT | Performed by: INTERNAL MEDICINE

## 2017-12-11 PROCEDURE — 93325 DOPPLER ECHO COLOR FLOW MAPG: CPT | Mod: 26 | Performed by: INTERNAL MEDICINE

## 2017-12-11 RX ORDER — GLYCOPYRROLATE 0.2 MG/ML
0.1 INJECTION, SOLUTION INTRAMUSCULAR; INTRAVENOUS ONCE
Status: COMPLETED | OUTPATIENT
Start: 2017-12-11 | End: 2017-12-11

## 2017-12-11 RX ORDER — BUPIVACAINE HYDROCHLORIDE 2.5 MG/ML
10-30 INJECTION, SOLUTION EPIDURAL; INFILTRATION; INTRACAUDAL
Status: DISCONTINUED | OUTPATIENT
Start: 2017-12-11 | End: 2017-12-11 | Stop reason: HOSPADM

## 2017-12-11 RX ORDER — FLUMAZENIL 0.1 MG/ML
0.2 INJECTION, SOLUTION INTRAVENOUS
Status: DISCONTINUED | OUTPATIENT
Start: 2017-12-11 | End: 2017-12-11 | Stop reason: HOSPADM

## 2017-12-11 RX ORDER — LIDOCAINE HYDROCHLORIDE 10 MG/ML
10-30 INJECTION, SOLUTION EPIDURAL; INFILTRATION; INTRACAUDAL; PERINEURAL
Status: DISCONTINUED | OUTPATIENT
Start: 2017-12-11 | End: 2017-12-11 | Stop reason: HOSPADM

## 2017-12-11 RX ORDER — DIPHENHYDRAMINE HYDROCHLORIDE 50 MG/ML
25-50 INJECTION INTRAMUSCULAR; INTRAVENOUS
Status: DISCONTINUED | OUTPATIENT
Start: 2017-12-11 | End: 2017-12-11 | Stop reason: HOSPADM

## 2017-12-11 RX ORDER — KETOROLAC TROMETHAMINE 30 MG/ML
15 INJECTION, SOLUTION INTRAMUSCULAR; INTRAVENOUS
Status: DISCONTINUED | OUTPATIENT
Start: 2017-12-11 | End: 2017-12-11 | Stop reason: HOSPADM

## 2017-12-11 RX ORDER — NALOXONE HYDROCHLORIDE 0.4 MG/ML
.1-.4 INJECTION, SOLUTION INTRAMUSCULAR; INTRAVENOUS; SUBCUTANEOUS
Status: DISCONTINUED | OUTPATIENT
Start: 2017-12-11 | End: 2017-12-12 | Stop reason: HOSPADM

## 2017-12-11 RX ORDER — IBUTILIDE FUMARATE 1 MG/10ML
0.01 INJECTION, SOLUTION INTRAVENOUS
Status: DISCONTINUED | OUTPATIENT
Start: 2017-12-11 | End: 2017-12-11 | Stop reason: HOSPADM

## 2017-12-11 RX ORDER — NALOXONE HYDROCHLORIDE 0.4 MG/ML
0.4 INJECTION, SOLUTION INTRAMUSCULAR; INTRAVENOUS; SUBCUTANEOUS EVERY 5 MIN PRN
Status: DISCONTINUED | OUTPATIENT
Start: 2017-12-11 | End: 2017-12-11 | Stop reason: HOSPADM

## 2017-12-11 RX ORDER — LIDOCAINE HYDROCHLORIDE 40 MG/ML
1.5 SOLUTION TOPICAL ONCE
Status: COMPLETED | OUTPATIENT
Start: 2017-12-11 | End: 2017-12-11

## 2017-12-11 RX ORDER — NALOXONE HYDROCHLORIDE 0.4 MG/ML
.1-.4 INJECTION, SOLUTION INTRAMUSCULAR; INTRAVENOUS; SUBCUTANEOUS
Status: DISCONTINUED | OUTPATIENT
Start: 2017-12-11 | End: 2017-12-11 | Stop reason: HOSPADM

## 2017-12-11 RX ORDER — DOXERCALCIFEROL 4 UG/2ML
3 INJECTION INTRAVENOUS
Status: COMPLETED | OUTPATIENT
Start: 2017-12-11 | End: 2017-12-11

## 2017-12-11 RX ORDER — FENTANYL CITRATE 50 UG/ML
25-50 INJECTION, SOLUTION INTRAMUSCULAR; INTRAVENOUS
Status: COMPLETED | OUTPATIENT
Start: 2017-12-11 | End: 2017-12-11

## 2017-12-11 RX ORDER — FUROSEMIDE 10 MG/ML
20-100 INJECTION INTRAMUSCULAR; INTRAVENOUS
Status: DISCONTINUED | OUTPATIENT
Start: 2017-12-11 | End: 2017-12-11 | Stop reason: HOSPADM

## 2017-12-11 RX ORDER — LIDOCAINE 40 MG/G
CREAM TOPICAL
Status: DISCONTINUED | OUTPATIENT
Start: 2017-12-11 | End: 2017-12-11 | Stop reason: HOSPADM

## 2017-12-11 RX ORDER — LIDOCAINE HYDROCHLORIDE 10 MG/ML
10-30 INJECTION, SOLUTION EPIDURAL; INFILTRATION; INTRACAUDAL; PERINEURAL
Status: COMPLETED | OUTPATIENT
Start: 2017-12-11 | End: 2017-12-11

## 2017-12-11 RX ORDER — ONDANSETRON 2 MG/ML
4 INJECTION INTRAMUSCULAR; INTRAVENOUS EVERY 4 HOURS PRN
Status: DISCONTINUED | OUTPATIENT
Start: 2017-12-11 | End: 2017-12-11 | Stop reason: HOSPADM

## 2017-12-11 RX ORDER — PROTAMINE SULFATE 10 MG/ML
5-40 INJECTION, SOLUTION INTRAVENOUS EVERY 10 MIN PRN
Status: DISCONTINUED | OUTPATIENT
Start: 2017-12-11 | End: 2017-12-11 | Stop reason: HOSPADM

## 2017-12-11 RX ORDER — PROTAMINE SULFATE 10 MG/ML
1-5 INJECTION, SOLUTION INTRAVENOUS
Status: DISCONTINUED | OUTPATIENT
Start: 2017-12-11 | End: 2017-12-11 | Stop reason: HOSPADM

## 2017-12-11 RX ORDER — FENTANYL CITRATE 50 UG/ML
25-50 INJECTION, SOLUTION INTRAMUSCULAR; INTRAVENOUS
Status: DISCONTINUED | OUTPATIENT
Start: 2017-12-11 | End: 2017-12-11 | Stop reason: HOSPADM

## 2017-12-11 RX ORDER — FENTANYL CITRATE 50 UG/ML
25 INJECTION, SOLUTION INTRAMUSCULAR; INTRAVENOUS
Status: DISCONTINUED | OUTPATIENT
Start: 2017-12-11 | End: 2017-12-11 | Stop reason: HOSPADM

## 2017-12-11 RX ORDER — LIDOCAINE HYDROCHLORIDE AND EPINEPHRINE 10; 10 MG/ML; UG/ML
10-30 INJECTION, SOLUTION INFILTRATION; PERINEURAL
Status: DISCONTINUED | OUTPATIENT
Start: 2017-12-11 | End: 2017-12-11 | Stop reason: HOSPADM

## 2017-12-11 RX ORDER — IBUTILIDE FUMARATE 1 MG/10ML
1 INJECTION, SOLUTION INTRAVENOUS
Status: DISCONTINUED | OUTPATIENT
Start: 2017-12-11 | End: 2017-12-11 | Stop reason: HOSPADM

## 2017-12-11 RX ORDER — DOBUTAMINE HYDROCHLORIDE 200 MG/100ML
5-40 INJECTION INTRAVENOUS CONTINUOUS PRN
Status: DISCONTINUED | OUTPATIENT
Start: 2017-12-11 | End: 2017-12-11 | Stop reason: HOSPADM

## 2017-12-11 RX ORDER — PROMETHAZINE HYDROCHLORIDE 25 MG/ML
6.25-25 INJECTION, SOLUTION INTRAMUSCULAR; INTRAVENOUS EVERY 4 HOURS PRN
Status: DISCONTINUED | OUTPATIENT
Start: 2017-12-11 | End: 2017-12-11 | Stop reason: HOSPADM

## 2017-12-11 RX ORDER — MORPHINE SULFATE 2 MG/ML
1-2 INJECTION, SOLUTION INTRAMUSCULAR; INTRAVENOUS EVERY 5 MIN PRN
Status: DISCONTINUED | OUTPATIENT
Start: 2017-12-11 | End: 2017-12-11 | Stop reason: HOSPADM

## 2017-12-11 RX ORDER — ATROPINE SULFATE 0.1 MG/ML
.5-1 INJECTION INTRAVENOUS
Status: DISCONTINUED | OUTPATIENT
Start: 2017-12-11 | End: 2017-12-11 | Stop reason: HOSPADM

## 2017-12-11 RX ORDER — SODIUM CHLORIDE 450 MG/100ML
INJECTION, SOLUTION INTRAVENOUS CONTINUOUS
Status: DISCONTINUED | OUTPATIENT
Start: 2017-12-11 | End: 2017-12-11 | Stop reason: HOSPADM

## 2017-12-11 RX ORDER — OXYCODONE AND ACETAMINOPHEN 5; 325 MG/1; MG/1
1 TABLET ORAL EVERY 4 HOURS PRN
Status: DISCONTINUED | OUTPATIENT
Start: 2017-12-11 | End: 2017-12-12 | Stop reason: HOSPADM

## 2017-12-11 RX ORDER — ADENOSINE 3 MG/ML
6-12 INJECTION, SOLUTION INTRAVENOUS EVERY 5 MIN PRN
Status: DISCONTINUED | OUTPATIENT
Start: 2017-12-11 | End: 2017-12-11 | Stop reason: HOSPADM

## 2017-12-11 RX ORDER — LORAZEPAM 2 MG/ML
.5-2 INJECTION INTRAMUSCULAR EVERY 10 MIN PRN
Status: DISCONTINUED | OUTPATIENT
Start: 2017-12-11 | End: 2017-12-11 | Stop reason: HOSPADM

## 2017-12-11 RX ORDER — HEPARIN SODIUM 1000 [USP'U]/ML
1000-10000 INJECTION, SOLUTION INTRAVENOUS; SUBCUTANEOUS EVERY 5 MIN PRN
Status: DISCONTINUED | OUTPATIENT
Start: 2017-12-11 | End: 2017-12-11 | Stop reason: HOSPADM

## 2017-12-11 RX ORDER — SODIUM CHLORIDE 9 MG/ML
INJECTION, SOLUTION INTRAVENOUS CONTINUOUS PRN
Status: DISCONTINUED | OUTPATIENT
Start: 2017-12-11 | End: 2017-12-11 | Stop reason: HOSPADM

## 2017-12-11 RX ORDER — LIDOCAINE 40 MG/G
CREAM TOPICAL
Status: DISCONTINUED | OUTPATIENT
Start: 2017-12-11 | End: 2017-12-12 | Stop reason: HOSPADM

## 2017-12-11 RX ADMIN — MIDAZOLAM 2.5 MG: 1 INJECTION INTRAMUSCULAR; INTRAVENOUS at 17:23

## 2017-12-11 RX ADMIN — GLYCOPYRROLATE 0.1 MG: 0.2 INJECTION, SOLUTION INTRAMUSCULAR; INTRAVENOUS at 14:09

## 2017-12-11 RX ADMIN — SODIUM CHLORIDE 250 ML: 9 INJECTION, SOLUTION INTRAVENOUS at 08:47

## 2017-12-11 RX ADMIN — LIDOCAINE HYDROCHLORIDE 300 MG: 10 INJECTION, SOLUTION EPIDURAL; INFILTRATION; INTRACAUDAL; PERINEURAL at 15:51

## 2017-12-11 RX ADMIN — RANITIDINE 75 MG: 75 TABLET, FILM COATED ORAL at 21:36

## 2017-12-11 RX ADMIN — CLOPIDOGREL 75 MG: 75 TABLET, FILM COATED ORAL at 12:54

## 2017-12-11 RX ADMIN — Medication 1 CAPSULE: at 21:36

## 2017-12-11 RX ADMIN — FENTANYL CITRATE 175 MCG: 50 INJECTION, SOLUTION INTRAMUSCULAR; INTRAVENOUS at 17:22

## 2017-12-11 RX ADMIN — LIDOCAINE HYDROCHLORIDE 1.5 ML: 40 SOLUTION TOPICAL at 14:12

## 2017-12-11 RX ADMIN — DOXERCALCIFEROL 3 MCG: 4 INJECTION, SOLUTION INTRAVENOUS at 09:45

## 2017-12-11 RX ADMIN — ACETAMINOPHEN 650 MG: 325 TABLET, FILM COATED ORAL at 12:54

## 2017-12-11 RX ADMIN — ASPIRIN 81 MG: 81 TABLET, COATED ORAL at 12:55

## 2017-12-11 RX ADMIN — SODIUM CHLORIDE: 9 INJECTION, SOLUTION INTRAVENOUS at 14:25

## 2017-12-11 RX ADMIN — SODIUM CHLORIDE 1000 ML: 9 INJECTION, SOLUTION INTRAVENOUS at 08:46

## 2017-12-11 RX ADMIN — MEXILETINE HYDROCHLORIDE 150 MG: 150 CAPSULE ORAL at 21:36

## 2017-12-11 RX ADMIN — TOPICAL ANESTHETIC 2 SPRAY: 200 SPRAY DENTAL; PERIODONTAL at 14:25

## 2017-12-11 RX ADMIN — HEPARIN SODIUM 950 UNITS/HR: 10000 INJECTION, SOLUTION INTRAVENOUS at 21:36

## 2017-12-11 RX ADMIN — ACETAMINOPHEN 650 MG: 325 TABLET, FILM COATED ORAL at 20:21

## 2017-12-11 RX ADMIN — FENTANYL CITRATE 50 MCG: 50 INJECTION, SOLUTION INTRAMUSCULAR; INTRAVENOUS at 14:36

## 2017-12-11 RX ADMIN — SODIUM CHLORIDE: 4.5 INJECTION, SOLUTION INTRAVENOUS at 13:23

## 2017-12-11 RX ADMIN — MIDAZOLAM HYDROCHLORIDE 1.5 MG: 1 INJECTION, SOLUTION INTRAMUSCULAR; INTRAVENOUS at 14:36

## 2017-12-11 RX ADMIN — PRAVASTATIN SODIUM 40 MG: 40 TABLET ORAL at 21:36

## 2017-12-11 NOTE — PROGRESS NOTES
[facility]    Hospitalist Progress Note    Assessment & Plan    Mr. Westley Ness is a 71-year-old male with multiple medical problems as described above who presents to the emergency room with shortness of breath.    Atrial flutter with rapid ventricular response.    -History of atrial flutter ablation in June.    -Since then, it appears like he was in sinus rhythm and actually came off Eliquis in August.    -Placed on Cardizem drip at presentation; heart rate better controlled now, off Cardizem drip  -patient still continues to be dyspneic  -discussed with Dr. Graf this morning, plans for VIET and A-flutter ablation today  -continue Coreg 12.5 mg BID; monitor blood pressure closely    Acute exacerbation of chronic systolic congestive heart failure.    -The patient presented with worsening dyspnea, orthopnea requiring 3 liters oxygen  -oxygen weaned down to 1.5 L  -s/p hemodialysis today    History of coronary artery disease with multiple PCIs.    Hypertension:  -troponin flat  -continue prior to admission aspirin, Plavix, beta-blocker, patient intolerant to statins    Hypothyroidism:    -Continue prior to admission levothyroxine.     End-stage renal disease  -dialyzes on Monday, Wednesday and Friday   -HD per nephrology    Chronic anemia, most likely due to chronic kidney disease.    -Stable; at baseline    Diabetes mellitus type 2, diet-controlled.    -Adequate control of the moment    D/W: RN  DVT Prophylaxis: Pneumatic Compression Devices  Code Status: Full Code    Disposition: Expected discharge 12/12    Deondre Bautista MD    Interval History   Heart rate adequately controlled however dyspnea is about the same, maybe marginally better. Denies chest pain today. No nausea or vomiting    -Data reviewed today: I reviewed all new labs and imaging results over the last 24 hours. I personally reviewed the EKG tracing showing controlled A-flutter.    Physical Exam   Temp: 97.8  F (36.6  C) Temp src:  Oral BP: 107/62   Heart Rate: 87 Resp: 16 SpO2: 99 % O2 Device: Nasal cannula Oxygen Delivery: 1.5 LPM  Vitals:    12/09/17 0501 12/10/17 0500 12/11/17 0500   Weight: 85.2 kg (187 lb 13.3 oz) 82.5 kg (181 lb 14.1 oz) 81 kg (178 lb 9.2 oz)     Vital Signs with Ranges  Temp:  [97.2  F (36.2  C)-97.8  F (36.6  C)] 97.8  F (36.6  C)  Heart Rate:  [80-94] 87  Resp:  [16] 16  BP: ()/(36-63) 107/62  SpO2:  [99 %-100 %] 99 %  I/O last 3 completed shifts:  In: 800 [P.O.:800]  Out: 0     Constitutional: AAOX3, NAD, Appears comfortable  HEENT: Moist oral mucosa, no oral lesions, No pallor or icterus  Neck- Supple, Good ROM, No JVD  Respiratory: diminished breath sounds at the base, normal effort of breathing  Cardiovascular: irregular, no murmur  GI: Soft, Non- tender, BS- normoactive  Skin/Integument: Warm and dry, no rashes  MSK: No joint deformity or swelling, trace edema  Neuro: CN- grossly intact     Medications     NaCl 30 mL/hr at 12/11/17 1323     - MEDICATION INSTRUCTIONS -       - MEDICATION INSTRUCTIONS -       NaCl         sodium chloride (PF)  3 mL Intracatheter Q8H     sodium chloride (PF)  3 mL Intravenous Q8H     sodium chloride (PF)  3 mL Intracatheter Q8H     glycopyrrolate  0.1 mg Intravenous Once     Benzocaine  1-4 spray Mouth/Throat Once     midazolam  0.5-1 mg Intravenous Once within 24 hrs     fentaNYL  25-50 mcg Intravenous Once within 24 hrs     lidocaine  1.5 mL Topical Once     carvedilol  12.5 mg Oral BID w/meals     - MEDICATION INSTRUCTIONS for Dialysis Patients -   Does not apply See Admin Instructions     acetaminophen  650 mg Oral 4x Daily     aspirin EC EC tablet 81 mg  81 mg Oral Daily     clopidogrel  75 mg Oral Daily     levothyroxine  50 mcg Oral Daily     NEPHROCAPS  1 capsule Oral At Bedtime     ranitidine  75 mg Oral At Bedtime     pravastatin  40 mg Oral At Bedtime     pantoprazole  40 mg Oral QAM     mexiletine  150 mg Oral BID     lisinopril  2.5 mg Oral At Bedtime       Data      Recent Labs  Lab 12/11/17  1030 12/11/17  0540 12/09/17  0555 12/09/17  0135 12/08/17  2143 12/08/17  1730 12/08/17  0713   WBC 6.7  --  6.5  --   --   --  7.1   HGB 11.5* 11.5* 11.3*  --   --   --  11.6*   *  --  102*  --   --   --  101*   *  --  132*  --   --   --  164   INR 1.00  --   --   --   --   --   --    NA  --  137 136  --   --   --  135   POTASSIUM  --  4.0 4.9  --   --   --  4.6   CHLORIDE  --  97 97  --   --   --  97   CO2  --  31 28  --   --   --  27   BUN  --  39* 44*  --   --   --  30   CR  --  6.28* 6.88*  --   --   --  5.63*   ANIONGAP  --  9 11  --   --   --  11   CHRISTINE  --  9.1 8.9  --   --   --  9.5   GLC  --  96 110*  --   --   --  145*   ALBUMIN  --  3.3*  --   --   --   --  3.6   PROTTOTAL  --   --   --   --   --   --  7.3   BILITOTAL  --   --   --   --   --   --  1.4*   ALKPHOS  --   --   --   --   --   --  192*   ALT  --   --   --   --   --   --  22   AST  --   --   --   --   --   --  29   TROPI  --   --   --  0.048* 0.040 0.039 0.034       No results found for this or any previous visit (from the past 24 hour(s)).

## 2017-12-11 NOTE — PLAN OF CARE
Problem: Patient Care Overview  Goal: Plan of Care/Patient Progress Review  PT pt in dialysis this am, will attempt to return later today.

## 2017-12-11 NOTE — PROGRESS NOTES
Sedation Post Procedure Summary:    Immediately prior to starting the procedure a Time Out was conducted with procedural staff and re-confirmed the patient s name, procedure, and site/side. Md completed sedation assessment.   Consent obtained from patient after discussing the risks, benefits and alternatives.       Indication:  Sedation was required to allow for VIET    Sedatives: Fentanyl 50 Mcg and Versed 1.5 Mg    Vital signs, airway, and pulse oximetry were monitored throughout the procedure and sedation was maintained until the procedure was complete.      Patient tolerance: Patient tolerated the procedure well with no immediate complications.    Post-procedure report will be given to: Morgan LEMOS and pt transferred to E.p dept for Cardiac ablation    (See Doc Flow-sheets and MAR for additional information)    Eric Watters

## 2017-12-11 NOTE — PROCEDURES
Prelim VIET report    LVEF around 25-30%  No left atrial appendage clot  Negative bubble study    Full report to follow.

## 2017-12-11 NOTE — PROGRESS NOTES
Patient is status post Aflutter ablation.  Procedure details are below:    Indication: Symptomatic Afluter  Findings: Successful Aflutter ablation  Estimated blood loss was minimal (< 20 cc)    No immediate complications are apparent.      Jono Mejia MD

## 2017-12-11 NOTE — PLAN OF CARE
"Problem: Cardiac: Heart Failure (Adult)  Goal: Signs and Symptoms of Listed Potential Problems Will be Absent, Minimized or Managed (Cardiac: Heart Failure)  Signs and symptoms of listed potential problems will be absent, minimized or managed by discharge/transition of care (reference Cardiac: Heart Failure (Adult) CPG).   Outcome: Improving  Heart Failure Care Pathway  GOALS TO BE MET BEFORE DISCHARGE:    1. Decrease congestion and/or edema with diuretic therapy to achieve near      optimal volume status.            Dyspnea improved:  Yes            Edema improved:     NA        Net I/O and Weights since admission:          12/05 2300 - 12/10 2259  In: 1794 [P.O.:1270; I.V.:524]  Out: 3075 [Urine:75]  Net: -1281            Vitals:    12/08/17 0648 12/09/17 0501 12/10/17 0500 12/11/17 0500   Weight: 85 kg (187 lb 6.3 oz) 85.2 kg (187 lb 13.3 oz) 82.5 kg (181 lb 14.1 oz) 81 kg (178 lb 9.2 oz)       2.  O2 sats > 92% on RA or at prior home O2 therapy level.          Current oxygenation status:       SpO2: 100 %         O2 Device: Nasal cannula,  Oxygen Delivery: 1.5 LPM         Able to wean O2 this shift to keep sats > 92%:  NA       Does patient use Home O2?  No  3.  Tolerates ambulation and mobility near baseline: Yes        How many times did the patient ambulate with nursing staff this shift? 0    Please review the Heart Failure Care Pathway for additional HF goal outcomes.    Patient had stable night.  Wearing oxygen mainly for comfort and, according to patient, renal nurses told him \"it would help keep his BP's up\".  BP's soft, but SBP >95.  Patient down in weight by 9 pounds since admission.  Patient will have EPS consult today and aware of remaining NPO.  Dialysis scheduled.    Cortney Berg RN  12/11/2017         "

## 2017-12-11 NOTE — PLAN OF CARE
Problem: Patient Care Overview  Goal: Plan of Care/Patient Progress Review  Outcome: No Change  A&OX$. Denies pain, SOB on exertion. On 2L/min via NC. O2 sat stable 95-96%. Came back from dialysis at 1300, 1.5L removed. EP consulted. Kept NPO. Plan for VIET and ablation today. Up with SBA with walker. TELE A-flutter with CVR. Will continue to monitor.

## 2017-12-11 NOTE — CONSULTS
[facility]    Cardiac Electrophysiology Consultation     Date of Admission:  12/8/2017  Date of Consult (When I saw the patient): 12/11/17    Assessment & Plan   Westley Ness is a 71 year old male who was admitted on 12/8/2017. I was asked to see the patient for recurrent atrial flutter. Noted intermittent hr in 130's this past Thursday along with worsening sob. Normally rate would be in 70-80's. Known ICM with EF of 25% requiring multiple PCIs and ESRD on HD. Had typical right sided isthmus dependent atrial flutter this past July. Current ECG is suggestive of recurrent typical right sided atrial flutter. Pt has been on Mexiletine for frequent non-sustained VT with excellent result. No ICD shocks since a single chamber device was implanted in 2011.     Recurrent atrial flutter past several days. Recommending VIET prior to EP study and ablation. Discussed risks and benefits including but not limited to vascular injury and CVA. Pt does need to be on OAC (Eliquis which took before) for at least a month post ablation.    Brad Mayen    Code Status    Full Code    Primary Care Physician   Josselin Kolb    History is obtained from the patient    Past Medical History   I have reviewed this patient's medical history and updated it with pertinent information if needed.   Past Medical History:   Diagnosis Date     Acid reflux disease      Atrial flutter (H)     s/p ablation 6/2017     CAD (coronary artery disease)     s/p multiple NSTEMIs and PCI's     ESRD on hemodialysis (H)     MWF     Hypothyroidism      Ischemic cardiomyopathy     EF 25-30%, s/p AICD     NSTEMI (non-ST elevated myocardial infarction) (H)     multiple     Type 2 diabetes mellitus (H)     diet-controlled       Past Surgical History   I have reviewed this patient's surgical history and updated it with pertinent information if needed.  Past Surgical History:   Procedure Laterality Date     CHOLECYSTECTOMY, OPEN  2013     ELBOW  SURGERY Left 2008    ORIF - plates still in place     H ABLATION ATRIAL FLUTTER  06/08/2017     HC LEFT HEART CATHETERIZATION  7/14/2016     HC LEFT HEART CATHETERIZATION  9/21/2016     IMPLANT VENTRICULAR DEVICE  08/15/2011     TONSILLECTOMY & ADENOIDECTOMY       VASCULAR SURGERY  2004, 2010    LUE fistulas (upper and lower); upper one is functional       Prior to Admission Medications   Prior to Admission Medications   Prescriptions Last Dose Informant Patient Reported? Taking?   ASPIRIN EC PO 12/7/2017 at noon  Yes Yes   Sig: Take 81 mg by mouth daily noon   Levothyroxine Sodium 50 MCG CAPS 12/7/2017 at am Self No Yes   Sig: Take 50 mcg by mouth daily   RANITIDINE HCL PO 12/7/2017 at hs Self Yes Yes   Sig: Take 75 mg by mouth At Bedtime Sometimes need an additional 75 mg qhs prn   RaNITidine HCl (ZANTAC PO) prn  Yes Yes   Sig: Take 75 mg by mouth nightly as needed for heartburn   TRIPHROCAPS 1 MG capsule 12/7/2017 at hs  No Yes   Sig: TAKE 1 CAPSULE BY MOUTH EVERY EVENING   acetaminophen (TYLENOL) 325 MG tablet 12/7/2017 Self No Yes   Sig: Take 2 tablets (650 mg) by mouth 4 times daily   carvedilol (COREG) 6.25 MG tablet 12/7/2017 Self No Yes   Sig: Take 1 tablet (6.25 mg) by mouth 2 times daily (with meals) Take after dialysis   clopidogrel (PLAVIX) 75 MG tablet 12/7/2017 at noon  No Yes   Sig: Take 1 tablet (75 mg) by mouth daily To protect your stent(s).  Do not stop taking unless directed by cardiology.   isosorbide mononitrate (IMDUR) 30 MG 24 hr tablet 12/7/2017 at Unknown time Self No Yes   Sig: Take 0.5 tablets (15 mg) by mouth daily   lisinopril (PRINIVIL/ZESTRIL) 2.5 MG tablet 12/7/2017  No Yes   Sig: Take one tablet after dialysis. Take second tablet at bedtime.   loperamide (IMODIUM) 2 MG capsule prn Self Yes Yes   Sig: Take 1 mg by mouth as needed    mexiletine (MEXITIL) 150 MG capsule 12/7/2017 Self No Yes   Sig: Take 1 capsule (150 mg) by mouth 2 times daily   nitroglycerin (NITROSTAT) 0.4 MG  sublingual tablet prn Self No Yes   Si TAB EVERY 5 MIN AS NEEDED, UP TO 3 PER EPISODE   pantoprazole (PROTONIX) 40 MG EC tablet 2017 at am Self No Yes   Sig: Take 1 tablet (40 mg) by mouth every morning   pravastatin (PRAVACHOL) 40 MG tablet 2017 at hs Self No Yes   Sig: Take 1 tablet (40 mg) by mouth daily   psyllium 0.52 G capsule prn Self Yes Yes   Sig: Take 1 capsule by mouth daily as needed    sevelamer (RENVELA) 800 MG tablet More than a month Self Yes No   Sig: Take 800-1,600 mg by mouth as needed ON HOLD      Facility-Administered Medications: None     Allergies   Allergies   Allergen Reactions     Contrast Dye Hives     Does fine if he uses benadryl prior.     No Clinical Screening - See Comments      Green beans - Diarrhea.    Topical antibiotic - name unknown - caused swelling of the penis.       Social History   I have reviewed this patient's social history and updated it with pertinent information if needed. Westley Ness  reports that he quit smoking about 21 years ago. His smoking use included Cigarettes. He has a 70.00 pack-year smoking history. He has never used smokeless tobacco. He reports that he drinks alcohol. He reports that he does not use illicit drugs.    Family History   I have reviewed this patient's family history and updated it with pertinent information if needed.   Family History   Problem Relation Age of Onset     CEREBROVASCULAR DISEASE Mother      later in life     Hypertension Father      Bladder Cancer Father      Myocardial Infarction Paternal Grandmother      DIABETES No family hx of      Prostate Cancer No family hx of      Colon Cancer No family hx of        Review of Systems   Comprehensive review of systems was performed with pertinent positives and negatives listed in assessment and plan section.    Physical Exam   Temp: 97.8  F (36.6  C) Temp src: Oral BP: 109/61   Heart Rate: 86 Resp: 16 SpO2: 99 % O2 Device: Nasal cannula Oxygen Delivery: 1.5 LPM  Vital  Signs with Ranges  Temp:  [97.2  F (36.2  C)-97.8  F (36.6  C)] 97.8  F (36.6  C)  Heart Rate:  [80-94] 86  Resp:  [16-18] 16  BP: ()/(36-63) 109/61  SpO2:  [98 %-100 %] 99 %  178 lbs 9.16 oz    Constitutional: awake, alert, cooperative, no apparent distress, and appears stated age  Eyes: Lids and lashes normal, pupils equal, round and reactive to light, extra ocular muscles intact, sclera clear, conjunctiva normal  ENT: Normocephalic, without obvious abnormality, atraumatic, sinuses nontender on palpation, external ears without lesions, oral pharynx with moist mucous membranes, tonsils without erythema or exudates, gums normal and good dentition.  Hematologic / Lymphatic: no cervical lymphadenopathy  Respiratory: No increased work of breathing, good air exchange, clear to auscultation bilaterally, no crackles or wheezing  Cardiovascular: regular rate and rhythm  GI: No scars, normal bowel sounds, soft, non-distended, non-tender, no masses palpated, no hepatosplenomegally  Skin: no bruising or bleeding  Musculoskeletal: There is no redness, warmth, or swelling of the joints.  Full range of motion noted.    Neurologic: Awake, alert, oriented to name, place and time.   Neuropsychiatric: General: normal, calm and normal eye contact    Data   I personally reviewed all recent ECGs and images.  Results for orders placed or performed during the hospital encounter of 12/08/17 (from the past 24 hour(s))   Glucose by meter   Result Value Ref Range    Glucose 156 (H) 70 - 99 mg/dL   Glucose by meter   Result Value Ref Range    Glucose 92 70 - 99 mg/dL   Glucose by meter   Result Value Ref Range    Glucose 137 (H) 70 - 99 mg/dL   Renal panel   Result Value Ref Range    Sodium 137 133 - 144 mmol/L    Potassium 4.0 3.4 - 5.3 mmol/L    Chloride 97 94 - 109 mmol/L    Carbon Dioxide 31 20 - 32 mmol/L    Anion Gap 9 3 - 14 mmol/L    Glucose 96 70 - 99 mg/dL    Urea Nitrogen 39 (H) 7 - 30 mg/dL    Creatinine 6.28 (H) 0.66 - 1.25  mg/dL    GFR Estimate 9 (L) >60 mL/min/1.7m2    GFR Estimate If Black 11 (L) >60 mL/min/1.7m2    Calcium 9.1 8.5 - 10.1 mg/dL    Phosphorus 4.1 2.5 - 4.5 mg/dL    Albumin 3.3 (L) 3.4 - 5.0 g/dL   Hemoglobin   Result Value Ref Range    Hemoglobin 11.5 (L) 13.3 - 17.7 g/dL

## 2017-12-11 NOTE — PROGRESS NOTES
Potassium   Date Value Ref Range Status   12/11/2017 4.0 3.4 - 5.3 mmol/L Final   ]  Lab Results   Component Value Date    HGB 11.5 12/11/2017     Weight: 81 kg (178 lb 9.2 oz)    DIALYSIS PROCEDURE NOTE    Patient dialyzed for 3.5 hrs on a 3 K bath with a  net fluid removal of 1L.  A BFR of 450ml/min was obtained via LUAF and all connections secured.   Patient was seen by  during treatment.  Total heparin received during treatment:: 0units.   Meds given:Hectoral. Complications:none   Procedure and ESRD teaching done and questions answered.  See flowsheet in EPIC for further details.  Hepatitis status confirmed on previous patient.    RO log completed  Prime given: NS  Saline double clamped and Arterial/Venous parameters set.   Patient transported via wc to the dialysis unit   Aseptic prep done for both on/off.  Transducer connectors checked q15 minutes with vital sign check  :  Report received from: KARLEY Lui RN  Report given to: GAMAL Cleaning RN  Outpatient Dialysis at  [facility]    Hepatitis Antigen neg 12/4/17  Hepatitis Antibody susceptible

## 2017-12-11 NOTE — PLAN OF CARE
Problem: Patient Care Overview  Goal: Plan of Care/Patient Progress Review  PT: PM treatment attempted, pt off unit for VIET/ablation. Will continue to follow.

## 2017-12-11 NOTE — PROGRESS NOTES
Inpatient Dialysis Progress Note        Assessment and Plan:     # ESRD  # Atria flutter s/p ablation with recurrent  # 2HPTH. He is getting Hectorol and Renveal.   # CAD with multiple PCIs  # VT s/o AICD         Interval History:     He says he feels okay. He has a cough. He is getting HD treatment.         Dialysis Parameters:     Vitals:    12/09/17 0501 12/10/17 0500 12/11/17 0500   Weight: 85.2 kg (187 lb 13.3 oz) 82.5 kg (181 lb 14.1 oz) 81 kg (178 lb 9.2 oz)     I/O last 3 completed shifts:  In: 800 [P.O.:800]  Out: 0   Temp:  [97.2  F (36.2  C)-97.8  F (36.6  C)] 97.8  F (36.6  C)  Heart Rate:  [80-94] 88  Resp:  [16-18] 16  BP: ()/(44-63) 113/60  SpO2:  [98 %-100 %] 99 %    Current Weight: 81  Dry Weight: 84  Dialysis Temp: 36.5  C  Access Device: AVF  Access Site: L arm  Dialyzer: revaclear  Dialysis Bath: 3K  Sodium Profile: none  UF Goal: 1-1.5 liters  Blood Flow Rate (mL/min): 450  Total Treatment Time (hrs): 3.5hrs  Heparin: none    Review of Systems:          Medications:     EPO dose: none  Hectorol: 3 mcg       Physical Exam:     Vitals were reviewed  Patient Vitals for the past 24 hrs:   BP Temp Temp src Heart Rate Resp SpO2 Weight   12/11/17 1000 113/60 - - 88 - - -   12/11/17 0945 112/55 - - 84 - - -   12/11/17 0930 104/44 - - 84 - - -   12/11/17 0915 116/54 - - 82 - - -   12/11/17 0900 112/54 - - 94 - - -   12/11/17 0845 116/57 - - 90 - - -   12/11/17 0830 116/63 - - 89 - - -   12/11/17 0815 112/61 - - 84 - - -   12/11/17 0800 108/59 97.8  F (36.6  C) Oral 90 16 99 % -   12/11/17 0500 109/56 - - 90 16 - 81 kg (178 lb 9.2 oz)   12/10/17 2200 96/49 - - 80 16 - -   12/10/17 1932 (!) 86/49 97.2  F (36.2  C) Oral 83 16 100 % -   12/10/17 1800 92/47 - - 87 - - -   12/10/17 1535 96/49 97.5  F (36.4  C) Oral 89 16 100 % -   12/10/17 1227 (!) 81/51 - - 88 - 99 % -   12/10/17 1125 (!) 80/44 97.6  F (36.4  C) Oral 87 18 98 % -       Temperatures:  Current - Temp: 97.8  F (36.6  C); Max - Temp  Avg:  97.5  F (36.4  C)  Min: 97.2  F (36.2  C)  Max: 97.8  F (36.6  C)  Respiration range: Resp  Av.3  Min: 16  Max: 18  Pulse range: No Data Recorded  Blood pressure range: Systolic (24hrs), Av , Min:80 , Max:116   ; Diastolic (24hrs), Av, Min:44, Max:63    Pulse oximetry range: SpO2  Av.2 %  Min: 98 %  Max: 100 %  I/O last 3 completed shifts:  In: 800 [P.O.:800]  Out: 0     Intake/Output Summary (Last 24 hours) at 17 1007  Last data filed at 17 0600   Gross per 24 hour   Intake              420 ml   Output                0 ml   Net              420 ml       Gen: NAD  CV: irregular  Pulm: Poor airflow  Abd: soft, NT  Ext: no edema  Neuro: a/o x3      Data:     Recent Labs   Lab Test  17   0540  17   0555   NA  137  136   POTASSIUM  4.0  4.9   CHLORIDE  97  97   CO2  31  28   ANIONGAP  9  11   GLC  96  110*   BUN  39*  44*   CR  6.28*  6.88*   CHRISTINE  9.1  8.9       Hemoglobin   Date Value Ref Range Status   2017 11.5 (L) 13.3 - 17.7 g/dL Final              Brady Kelsey MD

## 2017-12-12 VITALS
SYSTOLIC BLOOD PRESSURE: 87 MMHG | HEIGHT: 72 IN | DIASTOLIC BLOOD PRESSURE: 45 MMHG | WEIGHT: 178 LBS | TEMPERATURE: 97.4 F | OXYGEN SATURATION: 96 % | RESPIRATION RATE: 16 BRPM | HEART RATE: 83 BPM | BODY MASS INDEX: 24.11 KG/M2

## 2017-12-12 LAB
GLUCOSE BLDC GLUCOMTR-MCNC: 134 MG/DL (ref 70–99)
LMWH PPP CHRO-ACNC: 0.18 IU/ML

## 2017-12-12 PROCEDURE — 99239 HOSP IP/OBS DSCHRG MGMT >30: CPT | Performed by: INTERNAL MEDICINE

## 2017-12-12 PROCEDURE — 25000132 ZZH RX MED GY IP 250 OP 250 PS 637: Mod: GY | Performed by: INTERNAL MEDICINE

## 2017-12-12 PROCEDURE — A9270 NON-COVERED ITEM OR SERVICE: HCPCS | Mod: GY | Performed by: INTERNAL MEDICINE

## 2017-12-12 PROCEDURE — 93005 ELECTROCARDIOGRAM TRACING: CPT

## 2017-12-12 PROCEDURE — 40000193 ZZH STATISTIC PT WARD VISIT

## 2017-12-12 PROCEDURE — 99232 SBSQ HOSP IP/OBS MODERATE 35: CPT | Performed by: INTERNAL MEDICINE

## 2017-12-12 PROCEDURE — 85520 HEPARIN ASSAY: CPT | Performed by: INTERNAL MEDICINE

## 2017-12-12 PROCEDURE — 97116 GAIT TRAINING THERAPY: CPT | Mod: GP

## 2017-12-12 PROCEDURE — 00000146 ZZHCL STATISTIC GLUCOSE BY METER IP

## 2017-12-12 PROCEDURE — 36415 COLL VENOUS BLD VENIPUNCTURE: CPT | Performed by: INTERNAL MEDICINE

## 2017-12-12 PROCEDURE — 93010 ELECTROCARDIOGRAM REPORT: CPT | Performed by: INTERNAL MEDICINE

## 2017-12-12 RX ORDER — CARVEDILOL 6.25 MG/1
6.25 TABLET ORAL 2 TIMES DAILY WITH MEALS
Qty: 60 TABLET | Refills: 1 | Status: SHIPPED | OUTPATIENT
Start: 2017-12-12 | End: 2018-01-05

## 2017-12-12 RX ORDER — CARVEDILOL 6.25 MG/1
6.25 TABLET ORAL 2 TIMES DAILY WITH MEALS
Status: DISCONTINUED | OUTPATIENT
Start: 2017-12-12 | End: 2017-12-12 | Stop reason: HOSPADM

## 2017-12-12 RX ADMIN — MEXILETINE HYDROCHLORIDE 150 MG: 150 CAPSULE ORAL at 08:48

## 2017-12-12 RX ADMIN — ACETAMINOPHEN 650 MG: 325 TABLET, FILM COATED ORAL at 08:48

## 2017-12-12 RX ADMIN — LEVOTHYROXINE SODIUM 50 MCG: 50 TABLET ORAL at 06:54

## 2017-12-12 RX ADMIN — CLOPIDOGREL 75 MG: 75 TABLET, FILM COATED ORAL at 12:52

## 2017-12-12 RX ADMIN — PANTOPRAZOLE SODIUM 40 MG: 40 TABLET, DELAYED RELEASE ORAL at 08:48

## 2017-12-12 RX ADMIN — CARVEDILOL 12.5 MG: 12.5 TABLET, FILM COATED ORAL at 08:48

## 2017-12-12 RX ADMIN — ACETAMINOPHEN 650 MG: 325 TABLET, FILM COATED ORAL at 12:52

## 2017-12-12 RX ADMIN — APIXABAN 2.5 MG: 2.5 TABLET, FILM COATED ORAL at 12:52

## 2017-12-12 NOTE — PROGRESS NOTES
EP- Cardiology Progress Note           Assessment and Plan:   71-year-old gentleman with a history of hypertension, diabetes, ESRD-HD, ischemic cardiomyopathy (EF of  25-30%; previous ICD placement) and symptomatic atrial flutter (previous ablation done 06/2017), who presents with recurrent of atrial flutter. He underwent re-do ablation yesterday.    He continues to do well and is back inn NSR.    Plan:  1. Atrial flutter. S/p ablation.  OK to decrease coreg dose to 6.25 mg BID.  2. Embolic prevention.  He will need at least 1 month of OAC and likely indefinitely if Afib is found.  In ESRD, I favor coumadin.  However, patient would like to go home and tolerated Eliquis well in the past.  Since eliquis is FDA approved for OAC in HD pts, I agree with starting eliquis.  We will stop heparin and start Eliquis.  We will discussed with pharmacy ideal dose. He should switch to coumadin after 1 months of OAC. May stop ASA.   3.HTN.  BP is well controlled.  4.  I-CMP.  Continue lisinopril, statin and coreg    He may go home today and f/u in clinic in 1 week to discuss change in OAC.    Physical Exam:  Vitals: /59 (BP Location: Right arm)  Pulse 83  Temp 97.7  F (36.5  C) (Oral)  Resp 16  Ht 1.829 m (6')  Wt 80.7 kg (178 lb)  SpO2 94%  BMI 24.14 kg/m2      Intake/Output Summary (Last 24 hours) at 12/12/17 1006  Last data filed at 12/11/17 2300   Gross per 24 hour   Intake              957 ml   Output                1 ml   Net              956 ml     Vitals:    12/08/17 0648 12/09/17 0501 12/10/17 0500 12/11/17 0500   Weight: 85 kg (187 lb 6.3 oz) 85.2 kg (187 lb 13.3 oz) 82.5 kg (181 lb 14.1 oz) 81 kg (178 lb 9.2 oz)    12/12/17 0557   Weight: 80.7 kg (178 lb)       Constitutional:  AAO x3.  Pt is in NAD.  HEAD: Normocephalic.  SKIN: Skin normal color, texture and turgor with no lesions or eruptions.  Eyes: PERRL, EOMI.  ENT:  Supple, normal JVP. No lymphadenopathy or thyroid enlargement.  Chest:  CTAB.  Cardiac:   RRR, normal  S1 and S2.  Systolic murmur in LLSB II/VI.  Abdomen:  Normal BS.  Soft, non-tender and non-distended.  No rebound or guarding.    Extremities:  Pedious pulses palpable B/L.  No LE edema noticed.   Neurological: Strength and sensation grossly symmetric and intact throughout.                            Review of Systems:   As per subjective, otherwise 5 systems reviewed and negative.         Medications:          sodium chloride (PF)  3 mL Intracatheter Q8H     carvedilol  12.5 mg Oral BID w/meals     - MEDICATION INSTRUCTIONS for Dialysis Patients -   Does not apply See Admin Instructions     acetaminophen  650 mg Oral 4x Daily     aspirin EC EC tablet 81 mg  81 mg Oral Daily     clopidogrel  75 mg Oral Daily     levothyroxine  50 mcg Oral Daily     NEPHROCAPS  1 capsule Oral At Bedtime     ranitidine  75 mg Oral At Bedtime     pravastatin  40 mg Oral At Bedtime     pantoprazole  40 mg Oral QAM     mexiletine  150 mg Oral BID     lisinopril  2.5 mg Oral At Bedtime     PRN Meds: lidocaine (buffered or not buffered), lidocaine 4%, sodium chloride (PF), oxyCODONE-acetaminophen, naloxone, HOLD MEDICATION, HOLD MEDICATION, acetaminophen, melatonin, ondansetron **OR** ondansetron, ranitidine, psyllium, nitroGLYcerin, loperamide             Data:       Recent Labs  Lab 12/11/17  2030 12/11/17  1030 12/11/17  0540 12/09/17  0555 12/09/17  0135 12/08/17  2143 12/08/17  1730 12/08/17  0713   WBC 6.9 6.7  --  6.5  --   --   --  7.1   HGB 12.1* 11.5* 11.5* 11.3*  --   --   --  11.6*   * 103*  --  102*  --   --   --  101*   * 133*  --  132*  --   --   --  164   INR  --  1.00  --   --   --   --   --   --    NA  --   --  137 136  --   --   --  135   POTASSIUM  --   --  4.0 4.9  --   --   --  4.6   CHLORIDE  --   --  97 97  --   --   --  97   CO2  --   --  31 28  --   --   --  27   BUN  --   --  39* 44*  --   --   --  30   CR  --   --  6.28* 6.88*  --   --   --  5.63*   ANIONGAP  --   --  9 11  --   --    --  11   CHRISTINE  --   --  9.1 8.9  --   --   --  9.5   GLC  --   --  96 110*  --   --   --  145*   ALBUMIN  --   --  3.3*  --   --   --   --  3.6   PROTTOTAL  --   --   --   --   --   --   --  7.3   BILITOTAL  --   --   --   --   --   --   --  1.4*   ALKPHOS  --   --   --   --   --   --   --  192*   ALT  --   --   --   --   --   --   --  22   AST  --   --   --   --   --   --   --  29   TROPI  --   --   --   --  0.048* 0.040 0.039 0.034

## 2017-12-12 NOTE — PROGRESS NOTES
Patient is alert and oriented x 4. Tele SR with rates in the 60's and BP low in the 90's/60's. Coreg dose has been decreased after morning dose. Patient was discharged this afternoon with a friend as transport home with services. He had all belongings at the time of DC. All DC paperwork was reviewed with the patient and he was able to verbalize understanding. One new prescription was sent home with the patient.

## 2017-12-12 NOTE — DISCHARGE SUMMARY
[facility]    Discharge Summary  Hospitalist    Date of Admission:  12/8/2017  Date of Discharge:  12/12/2017  Discharging Provider: Deondre Bautista MD    Discharge Diagnoses     Atrial flutter with rapid ventricular response s/p ablation.    Acute exacerbation of chronic systolic congestive heart failure.    History of coronary artery disease with multiple PCIs.    Hypertension  Hypothyroidism:    End-stage renal disease  Chronic anemia, most likely due to chronic kidney disease.    Diabetes mellitus type 2, diet-controlled.      Hospital Course   Mr. Westley Ness is a 71-year-old gentleman with history of atrial flutter status post ablation in 06/2017, coronary artery disease with multiple PCIs, chronic systolic congestive heart failure with last EF of 20% to 25%, history of VT and VF status post AICD, end-stage renal disease on hemodialysis Monday, Wednesday and Friday, diabetes mellitus type 2, hyperlipidemia, hypertension, hypothyroidism who presents to the emergency room with shortness of breath.     Atrial flutter with rapid ventricular response.    -History of atrial flutter ablation in June.    -Since then, it appears like he was in sinus rhythm and actually came off Eliquis in August.    -At presentation again found to be a flutter with rapid ventricular response with the heartrate in the 120s  -despite heartrate control, patient continued to be dyspneic; electrophysiology was consulted again  -patient underwent VIET and ablation on 12/11, with improvement of symptoms  -patient has been restarted on Eliquis, 2.5 mg PO BID  -continue Coreg at 6.25 mg PO BID     Acute exacerbation of chronic systolic congestive heart failure.    -The patient presented with worsening dyspnea, orthopnea requiring 3 liters oxygen  -more than likely his heart failure was exacerbated by rapid A-flutter   -off oxygen post ablation     History of coronary artery disease with multiple PCIs.     Hypertension:  -continue prior to admission Plavix, beta-blocker, patient intolerant to statins  -aspirin discontinued.     Hypothyroidism:    -Continue prior to admission levothyroxine.      End-stage renal disease  -dialyzes on Monday, Wednesday and Friday   -HD per nephrology     Chronic anemia, most likely due to chronic kidney disease.    -Stable; at baseline     Diabetes mellitus type 2, diet-controlled.    -Adequate control of the moment        Deondre Bautista MD    Significant Results and Procedures   See below    Pending Results     Unresulted Labs Ordered in the Past 30 Days of this Admission     No orders found from 10/9/2017 to 12/9/2017.          Code Status   Full Code       Primary Care Physician   Josselin Kolb    Physical Exam   Temp: 97.4  F (36.3  C) Temp src: Oral BP: (!) 87/45 Pulse: 83 Heart Rate: 68 Resp: 16 SpO2: 96 % O2 Device: None (Room air)      Constitutional: AAOX3, NAD, Appears comfortable  Neck- Supple, Good ROM, No JVD  Respiratory: diminished at both base, normal effort of breathing  Cardiovascular: RRR, No murmur  GI: Soft, Non- tender, BS- normoactive  Skin/Integument: Warm and dry, no rashes  MSK: No joint deformity or swelling, no edema  Neuro: CN- grossly intact, moving all four appropriately    Discharge Disposition   Discharged to home  Condition at discharge: Stable    Consultations This Hospital Stay   PHARMACY TO DOSE HEPARIN  PHYSICAL THERAPY ADULT IP CONSULT  CARDIOLOGY IP CONSULT  NEPHROLOGY IP CONSULT  PHARMACY TO DOSE HEPARIN  PHARMACY TO DOSE HEPARIN    Time Spent on this Encounter   I, Deondre Bautista, personally saw the patient today and spent greater than 30 minutes discharging this patient.    Discharge Orders     Reason for your hospital stay   A-flutter with RVR     Follow-up and recommended labs and tests   Follow up with primary care provider, Josselin Kolb, within 7 days for hospital follow- up.   Cardiology in 1-2 weeks     Activity   Your  activity upon discharge: activity as tolerated     Full Code     Diet   Follow this diet upon discharge: Orders Placed This Encounter     Combination Diet Regular Diet Adult; 2 gm K Diet; 5733-8484 Calories: Moderate Consistent CHO (4-6 CHO units/meal)         Discharge Medications   Current Discharge Medication List      START taking these medications    Details   apixaban ANTICOAGULANT (ELIQUIS) 2.5 MG tablet Take 1 tablet (2.5 mg) by mouth 2 times daily  Qty: 60 tablet, Refills: 0    Associated Diagnoses: Atrial flutter with rapid ventricular response (H)         CONTINUE these medications which have CHANGED    Details   carvedilol (COREG) 6.25 MG tablet Take 1 tablet (6.25 mg) by mouth 2 times daily (with meals) Take after dialysis  Qty: 60 tablet, Refills: 1    Associated Diagnoses: NSTEMI (non-ST elevated myocardial infarction) (H); Mild CAD         CONTINUE these medications which have NOT CHANGED    Details   !! RaNITidine HCl (ZANTAC PO) Take 75 mg by mouth nightly as needed for heartburn      TRIPHROCAPS 1 MG capsule TAKE 1 CAPSULE BY MOUTH EVERY EVENING  Qty: 90 capsule, Refills: 3    Associated Diagnoses: ESRD (end stage renal disease) on dialysis (H)      clopidogrel (PLAVIX) 75 MG tablet Take 1 tablet (75 mg) by mouth daily To protect your stent(s).  Do not stop taking unless directed by cardiology.  Qty: 30 tablet, Refills: 11    Associated Diagnoses: Coronary artery disease involving native coronary artery of native heart without angina pectoris      lisinopril (PRINIVIL/ZESTRIL) 2.5 MG tablet Take one tablet after dialysis. Take second tablet at bedtime.  Qty: 180 tablet, Refills: 3    Associated Diagnoses: NSTEMI (non-ST elevated myocardial infarction) (H)      mexiletine (MEXITIL) 150 MG capsule Take 1 capsule (150 mg) by mouth 2 times daily  Qty: 180 capsule, Refills: 3    Associated Diagnoses: Tachycardia      nitroglycerin (NITROSTAT) 0.4 MG sublingual tablet 1 TAB EVERY 5 MIN AS NEEDED, UP TO  3 PER EPISODE  Qty: 25 tablet, Refills: 0    Associated Diagnoses: Angina pectoris (H)      pantoprazole (PROTONIX) 40 MG EC tablet Take 1 tablet (40 mg) by mouth every morning  Qty: 30 tablet, Refills: 10    Associated Diagnoses: Gastroesophageal reflux disease, esophagitis presence not specified      isosorbide mononitrate (IMDUR) 30 MG 24 hr tablet Take 0.5 tablets (15 mg) by mouth daily  Qty: 90 tablet, Refills: 3    Associated Diagnoses: NSTEMI (non-ST elevated myocardial infarction) (H)      Levothyroxine Sodium 50 MCG CAPS Take 50 mcg by mouth daily  Qty: 90 capsule, Refills: 3    Associated Diagnoses: Hypothyroidism, unspecified type      pravastatin (PRAVACHOL) 40 MG tablet Take 1 tablet (40 mg) by mouth daily  Qty: 90 tablet, Refills: 3    Associated Diagnoses: Coronary artery disease involving native coronary artery of native heart without angina pectoris      acetaminophen (TYLENOL) 325 MG tablet Take 2 tablets (650 mg) by mouth 4 times daily  Qty: 60 tablet, Refills: 0    Associated Diagnoses: Closed nondisplaced fracture of right patella, unspecified fracture morphology, initial encounter; Elbow fracture, right, closed, initial encounter      loperamide (IMODIUM) 2 MG capsule Take 1 mg by mouth as needed       psyllium 0.52 G capsule Take 1 capsule by mouth daily as needed       !! RANITIDINE HCL PO Take 75 mg by mouth At Bedtime Sometimes need an additional 75 mg qhs prn      sevelamer (RENVELA) 800 MG tablet Take 800-1,600 mg by mouth as needed ON HOLD       !! - Potential duplicate medications found. Please discuss with provider.      STOP taking these medications       ASPIRIN EC PO Comments:   Reason for Stopping:             Allergies   Allergies   Allergen Reactions     Contrast Dye Hives     Does fine if he uses benadryl prior.     No Clinical Screening - See Comments      Green beans - Diarrhea.    Topical antibiotic - name unknown - caused swelling of the penis.     Data   Most Recent 3  CBC's:  Recent Labs   Lab Test  12/11/17   2030  12/11/17   1030  12/11/17   0540  12/09/17   0555   WBC  6.9  6.7   --   6.5   HGB  12.1*  11.5*  11.5*  11.3*   MCV  103*  103*   --   102*   PLT  132*  133*   --   132*      Most Recent 3 BMP's:  Recent Labs   Lab Test  12/11/17   0540  12/09/17   0555  12/08/17   0713   NA  137  136  135   POTASSIUM  4.0  4.9  4.6   CHLORIDE  97  97  97   CO2  31  28  27   BUN  39*  44*  30   CR  6.28*  6.88*  5.63*   ANIONGAP  9  11  11   CHRISTINE  9.1  8.9  9.5   GLC  96  110*  145*     Most Recent 2 LFT's:  Recent Labs   Lab Test  12/08/17   0713  10/30/16   1014   AST  29  12   ALT  22  15   ALKPHOS  192*  130   BILITOTAL  1.4*  0.8     Most Recent INR's and Anticoagulation Dosing History:  Anticoagulation Dose History     Recent Dosing and Labs Latest Ref Rng & Units 2/2/2008 5/20/2008 9/18/2009 7/22/2011 8/12/2011 7/14/2016 12/11/2017    INR 0.86 - 1.14 1.51(H) 1.01 0.88 1.02 1.09 0.91 1.00        Most Recent 3 Troponin's:  Recent Labs   Lab Test  12/09/17   0135  12/08/17   2143  12/08/17   1730   TROPI  0.048*  0.040  0.039     Most Recent Cholesterol Panel:  Recent Labs   Lab Test  07/11/16   0302   CHOL  98   LDL  37   HDL  29*   TRIG  162*     Most Recent 6 Bacteria Isolates From Any Culture (See EPIC Reports for Culture Details):  Recent Labs   Lab Test  04/07/17   0755  04/07/17   0736   CULT  No growth  No growth     Most Recent TSH, T4 and A1c Labs:  Recent Labs   Lab Test  06/09/17   0505  06/07/17   0811   TSH   --   3.15   A1C  6.0   --        Results for orders placed or performed during the hospital encounter of 12/08/17   Chest XR,  PA & LAT    Narrative    XR CHEST 2 VW 12/8/2017 7:26 AM    COMPARISON: 7/31/2017    HISTORY: Shortness of breath, concern for congestive heart failure.      Impression    IMPRESSION: Enlarged cardiac silhouette is again seen and unchanged.  Single-lead implanted cardiac pacer/defibrillator over the right chest  remains in place. No  focal airspace disease, pleural effusion or  pneumothorax. No significant pulmonary edema is seen.    ELIJAH CARBALLO MD

## 2017-12-12 NOTE — PLAN OF CARE
Problem: Patient Care Overview  Goal: Plan of Care/Patient Progress Review  Outcome: Improving  VSS, soft BP's. A&O.  Tele SR w/ PAC. Up with 1 and walker.  L arm fistula, R arm heparin PIV @ 950units/hr. C/o RT shoulder pain, managed with tylenol. Denies chest pain and shortness of breath. Reports of baseline numbness in extremities. Rt groin site, band-aid in place, c/d/i.  Plan for discharge today.  Will continue to monitor.

## 2017-12-12 NOTE — PLAN OF CARE
Problem: Patient Care Overview  Goal: Plan of Care/Patient Progress Review  Discharge Planner PT   Patient plan for discharge: home today  Current status: Pt demonstrates IND with bed mobility and sit<>stand transfers. Pt ambulates with FWW ModIND x375' and x75'. Pt ascends/descends 5 stairs with B rails x1 with SBA, x1 ModIND. O2 sats >92% with activity throughout. Pt without further concerns for home.  Barriers to return to prior living situation: none  Recommendations for discharge: home  Rationale for recommendations: pt is at baseline with functional mobility, has walker at home for mobility and prefers to use at this time; declines use of SEC currently d/t fatigue but feels he can transition without therapies when he feels appropriate       Entered by: Theresa Medina 12/12/2017 3:33 PM     Physical Therapy Discharge Summary    Reason for therapy discharge:    Discharged to home.    Progress towards therapy goal(s). See goals on Care Plan in [facility] electronic health record for goal details.  Goals met    Therapy recommendation(s):    No further therapy is recommended.

## 2017-12-12 NOTE — PLAN OF CARE
Problem: Cardiac: Heart Failure (Adult)  Goal: Signs and Symptoms of Listed Potential Problems Will be Absent, Minimized or Managed (Cardiac: Heart Failure)  Signs and symptoms of listed potential problems will be absent, minimized or managed by discharge/transition of care (reference Cardiac: Heart Failure (Adult) CPG).   Outcome: No Change  Heart Failure Care Pathway  GOALS TO BE MET BEFORE DISCHARGE:    D/C to TCU tomorrow    1. Decrease congestion and/or edema with diuretic therapy to achieve near      optimal volume status.            Dyspnea improved:  Yes            Edema improved:     Yes        Net I/O and Weights since admission:          12/06 2300 - 12/11 2259  In: 2691 [P.O.:2167; I.V.:524]  Out: 3076 [Urine:75]  Net: -385            Vitals:    12/08/17 0648 12/09/17 0501 12/10/17 0500 12/11/17 0500   Weight: 85 kg (187 lb 6.3 oz) 85.2 kg (187 lb 13.3 oz) 82.5 kg (181 lb 14.1 oz) 81 kg (178 lb 9.2 oz)       2.  O2 sats > 92% on RA or at prior home O2 therapy level.          Current oxygenation status:       SpO2: 93 %         O2 Device: None (Room air),  Oxygen Delivery: 1.5 LPM         Able to wean O2 this shift to keep sats > 92%:  Yes       Does patient use Home O2? No    3.  Tolerates ambulation and mobility near baseline: Yes        How many times did the patient ambulate with nursing staff this shift? 2    Please review the Heart Failure Care Pathway for additional HF goal outcomes.    Sarina Guevara RN  12/11/2017

## 2017-12-13 ENCOUNTER — TELEPHONE (OUTPATIENT)
Dept: INTERNAL MEDICINE | Facility: CLINIC | Age: 72
End: 2017-12-13

## 2017-12-13 ENCOUNTER — CARE COORDINATION (OUTPATIENT)
Dept: CARDIOLOGY | Facility: CLINIC | Age: 72
End: 2017-12-13

## 2017-12-13 DIAGNOSIS — I48.91 ATRIAL FIBRILLATION (H): Primary | ICD-10-CM

## 2017-12-13 DIAGNOSIS — I20.9 ANGINA PECTORIS (H): ICD-10-CM

## 2017-12-13 LAB
INTERPRETATION ECG - MUSE: NORMAL
INTERPRETATION ECG - MUSE: NORMAL

## 2017-12-13 RX ORDER — NITROGLYCERIN 0.4 MG/1
TABLET SUBLINGUAL
Qty: 25 TABLET | Refills: 0 | Status: SHIPPED | OUTPATIENT
Start: 2017-12-13 | End: 2019-07-09

## 2017-12-13 NOTE — PROGRESS NOTES
Patient was evaluated by cardiology while inpatient for afib and underwent ablation. Patient d/c in NSR. Called patient to discuss any post hospital d/c questions he may have, review medication changes, and confirm f/u appts.Patient denied any questions regarding new medications or changes to some of his current medications that he was taking prior to admission. Patient denied any SOB, chest pain, or light headedness. RN confirmed with patient that we would like patient to f/u with EP NORMA within 1 week (per Dr. Mejia). Patient advised to call clinic with any cardiac related questions or concerns. Patient verbalized understanding and agreed with plan. RN transferred patient to scheduling to arrange f/u apt with EP NORMA.

## 2017-12-18 ENCOUNTER — APPOINTMENT (OUTPATIENT)
Dept: GENERAL RADIOLOGY | Facility: CLINIC | Age: 72
End: 2017-12-18
Attending: EMERGENCY MEDICINE
Payer: MEDICARE

## 2017-12-18 ENCOUNTER — HOSPITAL ENCOUNTER (OUTPATIENT)
Facility: CLINIC | Age: 72
Setting detail: OBSERVATION
Discharge: HOME | End: 2017-12-19
Attending: EMERGENCY MEDICINE | Admitting: INTERNAL MEDICINE
Payer: MEDICARE

## 2017-12-18 DIAGNOSIS — Z99.2 ESRD (END STAGE RENAL DISEASE) ON DIALYSIS (H): ICD-10-CM

## 2017-12-18 DIAGNOSIS — N18.6 ESRD (END STAGE RENAL DISEASE) ON DIALYSIS (H): ICD-10-CM

## 2017-12-18 DIAGNOSIS — J06.9 UPPER RESPIRATORY TRACT INFECTION, UNSPECIFIED TYPE: Primary | ICD-10-CM

## 2017-12-18 DIAGNOSIS — I50.9 ACUTE ON CHRONIC CONGESTIVE HEART FAILURE, UNSPECIFIED CONGESTIVE HEART FAILURE TYPE: ICD-10-CM

## 2017-12-18 PROBLEM — R06.02 SHORTNESS OF BREATH: Status: ACTIVE | Noted: 2017-12-18

## 2017-12-18 LAB
ALBUMIN SERPL-MCNC: 2.9 G/DL (ref 3.4–5)
ALP SERPL-CCNC: 154 U/L (ref 40–150)
ALT SERPL W P-5'-P-CCNC: 15 U/L (ref 0–70)
ANION GAP SERPL CALCULATED.3IONS-SCNC: 7 MMOL/L (ref 3–14)
AST SERPL W P-5'-P-CCNC: 13 U/L (ref 0–45)
BASOPHILS # BLD AUTO: 0 10E9/L (ref 0–0.2)
BASOPHILS NFR BLD AUTO: 0.2 %
BILIRUB SERPL-MCNC: 1.8 MG/DL (ref 0.2–1.3)
BUN SERPL-MCNC: 35 MG/DL (ref 7–30)
CALCIUM SERPL-MCNC: 9.7 MG/DL (ref 8.5–10.1)
CHLORIDE SERPL-SCNC: 100 MMOL/L (ref 94–109)
CO2 SERPL-SCNC: 30 MMOL/L (ref 20–32)
CREAT SERPL-MCNC: 6.47 MG/DL (ref 0.66–1.25)
DIFFERENTIAL METHOD BLD: ABNORMAL
EOSINOPHIL # BLD AUTO: 0.1 10E9/L (ref 0–0.7)
EOSINOPHIL NFR BLD AUTO: 1 %
ERYTHROCYTE [DISTWIDTH] IN BLOOD BY AUTOMATED COUNT: 14.4 % (ref 10–15)
GFR SERPL CREATININE-BSD FRML MDRD: 9 ML/MIN/1.7M2
GLUCOSE BLDC GLUCOMTR-MCNC: 123 MG/DL (ref 70–99)
GLUCOSE SERPL-MCNC: 146 MG/DL (ref 70–99)
HCT VFR BLD AUTO: 34.1 % (ref 40–53)
HGB BLD-MCNC: 11.3 G/DL (ref 13.3–17.7)
IMM GRANULOCYTES # BLD: 0 10E9/L (ref 0–0.4)
IMM GRANULOCYTES NFR BLD: 0.2 %
INTERPRETATION ECG - MUSE: NORMAL
LYMPHOCYTES # BLD AUTO: 0.7 10E9/L (ref 0.8–5.3)
LYMPHOCYTES NFR BLD AUTO: 6 %
MCH RBC QN AUTO: 33.3 PG (ref 26.5–33)
MCHC RBC AUTO-ENTMCNC: 33.1 G/DL (ref 31.5–36.5)
MCV RBC AUTO: 101 FL (ref 78–100)
MONOCYTES # BLD AUTO: 1.5 10E9/L (ref 0–1.3)
MONOCYTES NFR BLD AUTO: 12.4 %
NEUTROPHILS # BLD AUTO: 9.8 10E9/L (ref 1.6–8.3)
NEUTROPHILS NFR BLD AUTO: 80.2 %
NRBC # BLD AUTO: 0 10*3/UL
NRBC BLD AUTO-RTO: 0 /100
NT-PROBNP SERPL-MCNC: ABNORMAL PG/ML (ref 0–900)
PLATELET # BLD AUTO: 197 10E9/L (ref 150–450)
POTASSIUM SERPL-SCNC: 4.2 MMOL/L (ref 3.4–5.3)
PROT SERPL-MCNC: 6.9 G/DL (ref 6.8–8.8)
RBC # BLD AUTO: 3.39 10E12/L (ref 4.4–5.9)
SODIUM SERPL-SCNC: 137 MMOL/L (ref 133–144)
TROPONIN I SERPL-MCNC: 0.08 UG/L (ref 0–0.04)
WBC # BLD AUTO: 12.1 10E9/L (ref 4–11)

## 2017-12-18 PROCEDURE — 25000128 H RX IP 250 OP 636: Performed by: INTERNAL MEDICINE

## 2017-12-18 PROCEDURE — 00000146 ZZHCL STATISTIC GLUCOSE BY METER IP

## 2017-12-18 PROCEDURE — G0257 UNSCHED DIALYSIS ESRD PT HOS: HCPCS

## 2017-12-18 PROCEDURE — 36415 COLL VENOUS BLD VENIPUNCTURE: CPT | Performed by: EMERGENCY MEDICINE

## 2017-12-18 PROCEDURE — G0378 HOSPITAL OBSERVATION PER HR: HCPCS

## 2017-12-18 PROCEDURE — 25000132 ZZH RX MED GY IP 250 OP 250 PS 637: Mod: GY | Performed by: INTERNAL MEDICINE

## 2017-12-18 PROCEDURE — 90937 HEMODIALYSIS REPEATED EVAL: CPT

## 2017-12-18 PROCEDURE — 25000125 ZZHC RX 250: Performed by: EMERGENCY MEDICINE

## 2017-12-18 PROCEDURE — 80053 COMPREHEN METABOLIC PANEL: CPT | Performed by: EMERGENCY MEDICINE

## 2017-12-18 PROCEDURE — 99285 EMERGENCY DEPT VISIT HI MDM: CPT | Mod: 25

## 2017-12-18 PROCEDURE — 94640 AIRWAY INHALATION TREATMENT: CPT

## 2017-12-18 PROCEDURE — 84484 ASSAY OF TROPONIN QUANT: CPT | Performed by: EMERGENCY MEDICINE

## 2017-12-18 PROCEDURE — 83880 ASSAY OF NATRIURETIC PEPTIDE: CPT | Performed by: EMERGENCY MEDICINE

## 2017-12-18 PROCEDURE — 71020 XR CHEST 2 VW: CPT

## 2017-12-18 PROCEDURE — 93005 ELECTROCARDIOGRAM TRACING: CPT

## 2017-12-18 PROCEDURE — A9270 NON-COVERED ITEM OR SERVICE: HCPCS | Mod: GY | Performed by: INTERNAL MEDICINE

## 2017-12-18 PROCEDURE — 99220 ZZC INITIAL OBSERVATION CARE,LEVL III: CPT | Performed by: INTERNAL MEDICINE

## 2017-12-18 PROCEDURE — 85025 COMPLETE CBC W/AUTO DIFF WBC: CPT | Performed by: EMERGENCY MEDICINE

## 2017-12-18 RX ORDER — LISINOPRIL 2.5 MG/1
2.5 TABLET ORAL DAILY
Status: DISCONTINUED | OUTPATIENT
Start: 2017-12-18 | End: 2017-12-19 | Stop reason: HOSPADM

## 2017-12-18 RX ORDER — ACETAMINOPHEN 325 MG/1
650 TABLET ORAL EVERY 4 HOURS PRN
Status: DISCONTINUED | OUTPATIENT
Start: 2017-12-18 | End: 2017-12-19 | Stop reason: HOSPADM

## 2017-12-18 RX ORDER — CLOPIDOGREL BISULFATE 75 MG/1
75 TABLET ORAL DAILY
Status: DISCONTINUED | OUTPATIENT
Start: 2017-12-18 | End: 2017-12-19 | Stop reason: HOSPADM

## 2017-12-18 RX ORDER — NALOXONE HYDROCHLORIDE 0.4 MG/ML
.1-.4 INJECTION, SOLUTION INTRAMUSCULAR; INTRAVENOUS; SUBCUTANEOUS
Status: DISCONTINUED | OUTPATIENT
Start: 2017-12-18 | End: 2017-12-19 | Stop reason: HOSPADM

## 2017-12-18 RX ORDER — AZITHROMYCIN 250 MG/1
500 TABLET, FILM COATED ORAL ONCE
Status: COMPLETED | OUTPATIENT
Start: 2017-12-18 | End: 2017-12-18

## 2017-12-18 RX ORDER — CARVEDILOL 6.25 MG/1
6.25 TABLET ORAL 2 TIMES DAILY WITH MEALS
Status: DISCONTINUED | OUTPATIENT
Start: 2017-12-18 | End: 2017-12-19 | Stop reason: HOSPADM

## 2017-12-18 RX ORDER — DOXERCALCIFEROL 4 UG/2ML
3 INJECTION INTRAVENOUS
Status: DISCONTINUED | OUTPATIENT
Start: 2017-12-18 | End: 2017-12-19 | Stop reason: HOSPADM

## 2017-12-18 RX ORDER — PROCHLORPERAZINE 25 MG
12.5 SUPPOSITORY, RECTAL RECTAL EVERY 12 HOURS PRN
Status: DISCONTINUED | OUTPATIENT
Start: 2017-12-18 | End: 2017-12-19 | Stop reason: HOSPADM

## 2017-12-18 RX ORDER — ONDANSETRON 4 MG/1
4 TABLET, ORALLY DISINTEGRATING ORAL EVERY 6 HOURS PRN
Status: DISCONTINUED | OUTPATIENT
Start: 2017-12-18 | End: 2017-12-19 | Stop reason: HOSPADM

## 2017-12-18 RX ORDER — MEXILETINE HYDROCHLORIDE 150 MG/1
150 CAPSULE ORAL 2 TIMES DAILY
Status: DISCONTINUED | OUTPATIENT
Start: 2017-12-18 | End: 2017-12-19 | Stop reason: HOSPADM

## 2017-12-18 RX ORDER — ONDANSETRON 2 MG/ML
4 INJECTION INTRAMUSCULAR; INTRAVENOUS EVERY 6 HOURS PRN
Status: DISCONTINUED | OUTPATIENT
Start: 2017-12-18 | End: 2017-12-19 | Stop reason: HOSPADM

## 2017-12-18 RX ORDER — ACETAMINOPHEN 325 MG/1
650 TABLET ORAL 4 TIMES DAILY
Status: DISCONTINUED | OUTPATIENT
Start: 2017-12-18 | End: 2017-12-19 | Stop reason: HOSPADM

## 2017-12-18 RX ORDER — ALBUTEROL SULFATE 5 MG/ML
2.5 SOLUTION RESPIRATORY (INHALATION) EVERY 10 MIN PRN
Status: DISCONTINUED | OUTPATIENT
Start: 2017-12-18 | End: 2017-12-18

## 2017-12-18 RX ORDER — AZITHROMYCIN 250 MG/1
250 TABLET, FILM COATED ORAL DAILY
Status: DISCONTINUED | OUTPATIENT
Start: 2017-12-19 | End: 2017-12-19 | Stop reason: HOSPADM

## 2017-12-18 RX ORDER — ACETAMINOPHEN 650 MG/1
650 SUPPOSITORY RECTAL EVERY 4 HOURS PRN
Status: DISCONTINUED | OUTPATIENT
Start: 2017-12-18 | End: 2017-12-19 | Stop reason: HOSPADM

## 2017-12-18 RX ORDER — PROCHLORPERAZINE MALEATE 5 MG
5 TABLET ORAL EVERY 6 HOURS PRN
Status: DISCONTINUED | OUTPATIENT
Start: 2017-12-18 | End: 2017-12-19 | Stop reason: HOSPADM

## 2017-12-18 RX ADMIN — AZITHROMYCIN 500 MG: 250 TABLET, FILM COATED ORAL at 18:36

## 2017-12-18 RX ADMIN — ALBUTEROL SULFATE 2.5 MG: 2.5 SOLUTION RESPIRATORY (INHALATION) at 09:38

## 2017-12-18 RX ADMIN — DOXERCALCIFEROL 3 MCG: 4 INJECTION, SOLUTION INTRAVENOUS at 15:54

## 2017-12-18 RX ADMIN — GUAIFENESIN 10 ML: 200 SOLUTION ORAL at 20:39

## 2017-12-18 RX ADMIN — LISINOPRIL 2.5 MG: 2.5 TABLET ORAL at 18:36

## 2017-12-18 RX ADMIN — APIXABAN 2.5 MG: 2.5 TABLET, FILM COATED ORAL at 20:36

## 2017-12-18 RX ADMIN — CARVEDILOL 6.25 MG: 6.25 TABLET, FILM COATED ORAL at 18:36

## 2017-12-18 RX ADMIN — ACETAMINOPHEN 650 MG: 325 TABLET, FILM COATED ORAL at 20:36

## 2017-12-18 RX ADMIN — RANITIDINE 75 MG: 75 TABLET, FILM COATED ORAL at 20:36

## 2017-12-18 RX ADMIN — ISOSORBIDE MONONITRATE 15 MG: 30 TABLET, EXTENDED RELEASE ORAL at 18:36

## 2017-12-18 RX ADMIN — Medication 1 CAPSULE: at 20:36

## 2017-12-18 RX ADMIN — MEXILETINE HYDROCHLORIDE 150 MG: 150 CAPSULE ORAL at 20:36

## 2017-12-18 RX ADMIN — ALBUTEROL SULFATE 2.5 MG: 2.5 SOLUTION RESPIRATORY (INHALATION) at 10:31

## 2017-12-18 RX ADMIN — CLOPIDOGREL 75 MG: 75 TABLET, FILM COATED ORAL at 18:36

## 2017-12-18 ASSESSMENT — ENCOUNTER SYMPTOMS
FEVER: 0
SHORTNESS OF BREATH: 1
COUGH: 1

## 2017-12-18 NOTE — ED PROVIDER NOTES
History     Chief Complaint:  Shortness of Breath    HPI   Westley Ness is a 71 year old male with a complex medical history, including CAD, CHF, atrial flutter, type II DM, ERSD on dialysis MWF, anticoagulated on Eliquis, who presents with shortness of breath. The patient had recently been admitted to this hospital from 12/8-12/12 with atrial flutter with RVR. The patient reports he began feeling short of breath shortly after being discharged, and this has been worsening over the past week. He has been coughing up a thick sputum, and his coughing has been getting worse as well. The patient has not measured any fevers. He does report his current symptoms are somewhat similar to an illness he had last July, which he reports was just a bad cold.     Allergies:  Contrast dye     Medications:    Eliquis  Coreg  Zantac  Plavix  Lisinopril  Mexitil  Protonix  Imdur  Levothyroxine sodium  Pravachol  Renvela  Imodium  Psyllium  Ranitidine HCl     Past Medical History:    Atrial flutter  CAD  ERSD  Hypothyroidism  Ischemic cardiomyopathy  NSTEMI  Type II DM  HLD  HTN  CHF    Past Surgical History:    Cholecystectomy  Cardiac ablation  Elbow ORIF  LUE fistulas  Tonsillectomy & adenoidectomy  Heart catheterization    Family History:    Cerebrovascular disease  Bladder cancer  HTN    Social History:  Relationship status: Single  Tobacco use: Former smoker (Quit 1996)  Alcohol use: Seldom  The patient presents via EMS.    The patient lives by himself.    Review of Systems   Constitutional: Negative for fever.   Respiratory: Positive for cough and shortness of breath.    All other systems reviewed and are negative.      Physical Exam   First Vitals:  BP: 123/84  Pulse: 80  Heart Rate: 80  Temp: 98.4  F (36.9  C)  Resp: 20  Weight: 85 kg (187 lb 6.3 oz)  SpO2: 96 %    Physical Exam  Physical Exam   Nursing note and vitals reviewed.  General: Oriented to person, place, and time. Appears well-developed and well-nourished.    Head: No signs of trauma.   Mouth/Throat: Oropharynx is clear and moist.   Eyes: Conjunctivae are normal. Pupils are equal, round, and reactive to light.   Neck: Normal range of motion. No nuchal rigidity.   Cardiovascular: Normal rate and regular rhythm.    Respiratory: Coarse sounding breath sounds. No respiratory distress.   Abdominal: Soft. There is no tenderness. There is no guarding.   Musculoskeletal: Normal range of motion. no edema. Left upper arm shunt.   Neurological: The patient is alert and oriented to person, place, and time.  PERRLA, EOMI, visual fields intact, strength in upper/lower extremities normal and symmetrical.   Sensation normal. Speech normal  GCS eye subscore is 4. GCS verbal subscore is 5. GCS motor subscore is 6.   Skin: Skin is warm and dry. No rash noted.   Psychiatric: normal mood and affect. behavior is normal.       Emergency Department Course   ECG (9:31:08):  Indication: Shortness of breath.   Rate 78 bpm. IL interval 190. QRS duration 86. QT/QTc 402/458. P-R-T axes 130.   Interpretation: Sinus rhythm with premature atrial complexes. Right axis deviation. Low voltage QRS. Cannot rule out anterior infarct, age undetermined. T wave abnormality, consider inferior ischemia  Agree with computer interpretation.   No significant change compared to EKG dated 12/12/2017.   Interpreted at 0935 by Dr. Louise.    Imaging:  Radiographic findings were communicated with the patient who voiced understanding of the findings.    Chest XR, per radiology:  Right-sided cardiac device is unchanged in position. Mild atelectasis or scarring of the right lateral lung base, similar to prior exam. Mild atelectasis of the left lateral lung base. No pleural effusions or pneumothorax. Stable cardiomegaly.     Laboratory:  CBC: WBC 12.1 (H), HGB 11.3 (L),   CMP: Glucose 146 (H), BUN 35 (H), GFR 9 (L), Bilirubin 1.8 (H), Albumin 2.9 (L), ALKPHOS 154 (H), Creatinine 6.47 (H), o/w WNL  0950: Troponin I:  0.080 (H)  BNP: >060088 (H)     Interventions:  1031: Albuterol neb, 2.5 mg, Nebulization    Emergency Department Course:  Nursing notes and vitals reviewed.  I performed an exam of the patient as documented above.  The above workup was undertaken.   I rechecked the patient and discussed results.  1137: I discussed the patient with Dr. Shen of cardiology.  1151: I discussed the patient with Dr. Dickens of the hospitalist service.  1213: I discussed the patient with Dr. Smith of nephrology.      Findings and plan explained to the Patient who consents to admission. Discussed the patient with Dr. Dickens, who will admit the patient to an Obs bed for further monitoring, evaluation, and treatment.      Impression & Plan      Medical Decision Making:  Westley Ness is a 71 year old male with a PMH of CAD, CHF, a-fib, type II DM, ERSD, who presents for evaluation of shortness of breath.  I considered a broad differential of their dyspnea including CHF exacerbation, PE, dissection, hemothorax, pleural effusion, pneumonia, acute coronary syndrome, reactive airway disease, bronchitis, upper airway obstruction, foreign body, etc.  The patient complains of increased shortness of breath and coarse lung sounds on pulmonary exam. CHF is the most likely diagnosis.  Troponin obtained and 0.080.  (This is most likely related to cardiac strain, however, should be trended.)    Given the history and exam plus laboratory findings I feel congestive heart failure is the most likely etiology.   Will admit at this time for further cares. On oxygen.        Diagnosis:    ICD-10-CM   1. Acute on chronic congestive heart failure, unspecified congestive heart failure type (H) I50.9   2. ESRD (end stage renal disease) on dialysis (H) N18.6    Z99.2     Disposition:  Admit to an Obs bed under the care of Dr. Dickens.    MATHEW, Jaron Cazares, am serving as a scribe on 12/18/2017 at 9:18 AM to personally document services performed by León Louise  MD Glen, based on my observations and the provider's statements to me.     EMERGENCY DEPARTMENT       León Louise MD  12/19/17 012

## 2017-12-18 NOTE — IP AVS SNAPSHOT
[facility]    [address] [place] [id number]    Phone:  [phone]                                       After Visit Summary   12/18/2017    Westley Ness    MRN: [id number]           After Visit Summary Signature Page     I have received my discharge instructions, and my questions have been answered. I have discussed any challenges I see with this plan with the nurse or doctor.    ..........................................................................................................................................  Patient/Patient Representative Signature      ..........................................................................................................................................  Patient Representative Print Name and Relationship to Patient    ..................................................               ................................................  Date                                            Time    ..........................................................................................................................................  Reviewed by Signature/Title    ...................................................              ..............................................  Date                                                            Time

## 2017-12-18 NOTE — PROGRESS NOTES
Inpatient Dialysis Progress Note           Assessment and Plan:   ESRD status quo.  Examined on run, thus far stable.  Expect good chemical exchange and improved fluid balance by end of run.  Next run likely 12/20, but will assess for additional fluid removal needs in AM.  He may need further reduction of goal wt with dialysis due to recent poor oral intake.      * No active hospital problems. *    * No resolved hospital problems. *               Interval History:   Examined during run.  Pt re-admitted only 6 days after last discharge with renewed dyspnea.  He has marked LV systolic dysfunction.  Previous admn was for control of atrial tachyarrhythmias.  Last HD 12/15 at [facility] was stable, but post-run wt 83.8 was slightly higher than current goal wt, 83.5 kg.  Productive cough but no fever.  Pt wonders about new resp infection, though note CXR shows no infiltrate.  Recently poor oral intake but no other GI sx's.  Makes little urine.  VS ok.  Good SaO2 with NC supp.  Meds and labs reviewed.  No antibiotic Rx yet, though Dr. Dickens is considering Z-Jose.  CBC shows WBC 12K with left shift; Hgb ok, >11.  Chemistries ok, except very high BNP and sl high Troponin I.                  Dialysis Details:   Current weight: 85 kg (actual weight)  Dry Weight: 83.5  Dialysis Temp: 36.5  C  Access Device: AVF  Access Site: left  Dialyzer: Optiflux 160  Dialysis Bath: K+ 3  Sodium Profile: no  UF Goal: 2-2.5L  Blood Flow Rate (mL/min): 450  Total Treatment Time (hrs): 3.5  Heparin: none    Medications:  EPO dose: no  Zemplar: 3 mcg  IV Fe: no            Medications:       gelatin absorbable  1 each Topical During Hemodialysis (from stock)     doxercalciferol  3 mcg Intravenous Once per day on Mon Wed Fri                       Physical Exam:       Vital Sign Ranges  Temp:  [98.4  F (36.9  C)] 98.4  F (36.9  C)  Pulse:  [80] 80  Heart Rate:  [73-83] 82  Resp:  [10-20] 16  BP: (109-135)/(50-84) 123/61  SpO2:  [96 %-99 %]  99 %    Weight, current: 85 kg (actual weight)  Weight change:          Physical Exam:   General:  Patient comfortable, in no apparent distress.  Awake, alert, oriented x3.  Neck:  Supple, no JVD.  Lungs:  Few crackles to auscultation bilaterally.  Cardiac:  Regular rate and rhythm, no murmurs, rub, or gallops.  Abdomen:  Soft, nontender, physiologic sounds.  Extremities:  Trace edema.  2+ pulses.  Skin:  Warm, dry.  Neurologic:  No focal deficits.             Data:        Lab Results   Component Value Date     12/18/2017    Lab Results   Component Value Date    CHLORIDE 100 12/18/2017    Lab Results   Component Value Date    BUN 35 (H) 12/18/2017      Lab Results   Component Value Date    POTASSIUM 4.2 12/18/2017    Lab Results   Component Value Date    CO2 30 12/18/2017    Lab Results   Component Value Date    CR 6.47 (H) 12/18/2017        Lab Results   Component Value Date     12/18/2017     12/11/2017     12/09/2017     Lab Results   Component Value Date    POTASSIUM 4.2 12/18/2017    POTASSIUM 4.0 12/11/2017    POTASSIUM 4.9 12/09/2017     Lab Results   Component Value Date    CHLORIDE 100 12/18/2017    CHLORIDE 97 12/11/2017    CHLORIDE 97 12/09/2017     Lab Results   Component Value Date    CO2 30 12/18/2017    CO2 31 12/11/2017    CO2 28 12/09/2017     Lab Results   Component Value Date    CR 6.47 (H) 12/18/2017    CR 6.28 (H) 12/11/2017    CR 6.88 (H) 12/09/2017     Lab Results   Component Value Date    BUN 35 (H) 12/18/2017    BUN 39 (H) 12/11/2017    BUN 44 (H) 12/09/2017     Lab Results   Component Value Date    HGB 11.3 (L) 12/18/2017    HGB 12.1 (L) 12/11/2017    HGB 11.5 (L) 12/11/2017     No results found for: PH, PHARTERIAL, PO2, [id number], SAT, PCO2, HCO3, BASEEXCESS, DONNIE, BEB        Reji Smith MD  Nephrology; [employer]  [phone]

## 2017-12-18 NOTE — H&P
DATE OF SERVICE:  12/18/2017      PRIMARY CARE PHYSICIAN:  Josselin Kolb.      CHIEF COMPLAINT:  Shortness of breath.      HISTORY OF PRESENT ILLNESS:  Westley Ness is a very pleasant 71-year-old male with a rather complex past medical history which includes chronic systolic CHF with most recent ejection fraction of 25% to 30% on echocardiogram on 12/8/17, atrial fibrillation, status post recent ablation on 12/11/17, hypertension, end-stage renal disease on dialysis, coronary artery disease with history of stent placement, type 2 diabetes, hypothyroidism and chronic anemia who presents to the emergency room today for increased shortness of breath.      He was recently hospitalized here from 12/8/17 to 12/12/17 for management of atrial flutter with rapid ventricular response and exacerbation of his chronic systolic CHF.  His CHF exacerbation was attributed to difficulties with rate control prior to undergoing an ablation on 12/11/17.  His respiratory status improved.  He was started on Eliquis and his Coreg dose was adjusted.  He was not requiring supplemental oxygen at time of discharge.  He underwent dialysis per routine during that hospitalization.  The patient says he discharged home and initially felt okay for a day or so.  He says within 2-3 days after discharge, he began to feel more short of breath.  He endorses a cough which is productive of thick greenish sputum though tells me this has been ongoing for some time and not particularly worse in the past few days.  He does not endorse fevers.  He tells me he dialyzed per his routine with his last run of dialysis being Friday, 12/15.  Over the weekend, his breathing continued to worsen.  He denies any chest pain, palpitations, dizziness or lightheadedness.  Today, he felt too short of breath to be able to drive himself to dialysis.  Thus, he presents to the emergency room.  He denies any changes in appetite.  No upset stomach, nausea or vomiting and has been  taking all his medications as prescribed.  He tells me his lower extremities may be more swollen than usual today.      In the emergency room, he was seen and evaluated by Dr. Louise.  He was afebrile and hemodynamically stable.  He remains in normal sinus rhythm.  He is requiring 2 liters of supplemental oxygen to maintain sats in the 90s.  His labs are notable for a proBNP of greater than 175,000 and his initial troponin is 0.08 in the context of end-stage renal disease.  EKG showed normal sinus rhythm with no acute ischemic changes.  His CBC showed a white count of 12.1 and a hemoglobin of 11.3.  Chest x-ray was obtained, which did not show significant pulmonary edema.  He did have some findings of scarring and atelectasis at the bilateral lung bases.  There is no evidence of pleural effusion and no infiltrate.  In the emergency room, he was given an albuterol neb with minimal improvement in symptoms.  Dr. Louise discussed the patient's presentation with Dr. Garcia of the Cardiology Service and Dr. Smith of Nephrology Service.  The plan at this point is to have the patient undergo dialysis and be admitted under observation status following dialysis to continue to monitor his respiratory status.  When I saw the patient, he was alert and resting comfortably on the gurney and was speaking in complete sentences without evidence of increased work of breathing.      PAST MEDICAL HISTORY:   1.  Coronary artery disease with history of multiple stent placements.   2.  Atrial flutter with history of ablation initially in 06/2017 with subsequent ablation on 12/11/2017.   3.  Ischemic cardiomyopathy with ejection fraction of 25% to 30% on most recent echocardiogram.  He has history of AICD placement in 2011.   4.  Type 2 diabetes.  This reportedly is diet-controlled.   5.  End-stage renal disease on hemodialysis Monday, Wednesday and Fridays.   6.  Hypothyroidism.      PAST SURGICAL HISTORY:   1.  History of left upper  extremity fistula formation in 2004 and 2010.   2.  History of open cholecystectomy in 2013.   3.  History of left elbow surgery (ORIF) in 2008.   4.  A-flutter ablation as above.   5.  History of AICD placement in 2011.   6.  Remote tonsillectomy and adenoidectomy.      FAMILY HISTORY:  His mother had a history of stroke, his father a history of hypertension and bladder cancer.      SOCIAL HISTORY:  The patient has a history of tobacco use.  He smoked 2 packs per day for 35 years, though quit in 1996.  He does not consume alcohol.  He currently lives at home alone.  He is not .  He says he has someone who comes and helps him around his home on an as-needed basis.  Since the time of last discharge he has been ambulating with the use of a walker.      HOME MEDICATIONS:    1.  Sublingual nitro as needed.    2.  Eliquis 2.5 mg b.i.d.   3.  Coreg 6.25 mg b.i.d.   4.  Zantac 75 mg daily as needed for heartburn.     5.  Triphrocaps 1 capsule every day.   6.  Plavix 75 mg daily.   7.  Lisinopril 2.5 mg daily after dialysis at bedtime.    8.  Mexiletine 150 mg b.i.d.   9.  Protonix 40 mg daily.   10.  Imdur 15 mg daily.   11.  Levothyroxine 55 mcg daily.   12.  Pravastatin 40 mg daily.   13.  Tylenol 650 mg 4 times daily.   14.  Imodium 1 mg daily as needed.   15.  Psyllium 1 capsule daily as needed.      ALLERGIES:  Contrast dye causes hives though patient does well if he receives Benadryl prior.  Green beans cause diarrhea and a history of reaction to a topical antibiotic, which causes swelling of the penis.      REVIEW OF SYSTEMS:  A full 10-point systems was discussed with the patient and negative unless otherwise stated per HPI.      PHYSICAL EXAMINATION:   VITAL SIGNS:  Temperature 98.4, pulse 83, respirations 10, blood pressure 124/61, O2 sat is 97% on 2 liters nasal cannula.   GENERAL:  The patient is a well-nourished, well-developed male who appears stated age, is alert, conversing appropriately.  Appears to  be in no acute distress.   HEENT:  Pupils equal, round, reactive to light.  Extraocular movements are intact.  Mucous membranes are moist.    CARDIOVASCULAR:  Heart rate and rhythm regular.  No murmurs, gallops or rubs.  Pulses are +2 and symmetric in bilateral upper and lower extremities.  There is positive bilateral lower extremity pitting edema to the mid shins.  The patient has a fistula in his left upper extremity with palpable thrill.   RESPIRATORY:  There is good air movement throughout all fields.  I do not appreciate any wheezes, rales or rhonchi, no increased work of breathing or accessory muscle use.   ABDOMEN:  Soft, nontender, nondistended, positive bowel sounds throughout.   INTEGUMENT:  Skin is warm and dry, no rash, jaundice or ecchymosis.        LABORATORY DATA AND IMAGING:  BMP shows sodium of 137, potassium 4.2, creatinine 6.47, calcium 9.7 and glucose 123.        LFTs showed AST 13, ALT of 15, alkaline phosphatase of 154, total bilirubin 1.8.  ProBNP was greater than 175,000.  Initial troponin 0.080.      EKG showed normal sinus rhythm with no acute ST-T wave changes.  PVCs are appreciated.        CBC showed a white count of 12.1, hemoglobin 11.3 and platelet count of 197,000.      Chest x-ray showed mild atelectasis and scarring at the bilateral lung bases which was overall similar to exam just over a week ago and there is no evidence of pulmonary vascular congestion, edema, effusions or pneumothorax, and no acute infiltrate was identified.      ASSESSMENT AND PLAN:  Westley Ness is a 71-year-old male with a complicated past medical history including coronary artery disease with history of stent placement, chronic ischemic cardiomyopathy and systolic congestive heart failure with most recent ejection fraction of 25% to 30% with history of AICD placement, history of atrial flutter, status post recent ablation on chronic anticoagulation, end-stage renal disease on hemodialysis and hypothyroidism  who presents to the emergency room today for evaluation of worsening shortness of breath.   1.  Shortness of breath.  I suspect his clinical presentation is secondary to fluid overload in the setting of his known congestive heart failure and his end-stage renal disease for which he is on dialysis.  The patient did have some issues with shortness of breath during his last stay which was thought to be secondary to his atrial flutter and rapid ventricular response.  His respiratory status improved at time of discharge.  Given his end-stage renal disease and his need for dialysis, he is not maintained on any diuretics.  He does not endorse any dietary indiscretions and says he has otherwise been compliant with medications.  He has had no complaints of chest pain or palpitations.  He does endorse some ongoing cough that has been productive of greenish sputum but does not recall any fevers.  At this juncture, I am inclined to think his respiratory difficulties are related to volume.  Nephrology has been contacted and he will undergo dialysis here this afternoon.  He does endorse a cough with has been productive of greenish sputum and a mildly elevated white count at 12.1 though no fevers.  There is no evidence of infiltrate or pneumonia on his chest x-ray.  He could possibly have some component of a viral respiratory infection.  Will place him on a Z-Jose and monitor his response.  Can use p.r.n.s such as Robitussin for cough should it persist following dialysis.  No need to repeat an echocardiogram at this time as he just had one over a week ago.   2.  Coronary artery disease with history of stent placement, hypertension, hyperlipidemia and ischemic cardiomyopathy.  Again, he denies complaints of chest pain or discomfort.  His troponin is mildly elevated on admission and again in the setting of known end-stage renal disease.  Given his lack of complaints of chest pain and the fact that his initial EKG does not show any  concerning ischemic findings per my interpretation, I do not feel we need to continue to trend troponins at this time.  If patient develops chest discomfort, would consider obtaining a troponin and repeat EKG. At this juncture, I will have him continue on his home regimen of medications which include Coreg, lisinopril, Imdur and his Plavix.  Given he will be admitted under observation, his statin can be held for now and resumed at time of discharge.   3.  Atrial flutter status post recent ablation.  He remains in normal sinus rhythm today.  Will continue his Coreg.  Continue his anticoagulation with Eliquis which has been renally dosed.   4.  End-stage renal disease.  He typically dialyzes Monday, Wednesday and Friday.  He endorses compliance with going to dialysis.  As above, Nephrology has been consulted and he will undergo dialysis today.   5.  Anemia of chronic disease.  Hemoglobin is stable.  No acute concerns at this time.   6.  Hypothyroidism.  Chronic and stable on levothyroxine.  This will be held given admission under observation and can be resumed at discharge or sooner if needed.   7.  Type 2 diabetes, reportedly diet-controlled.  No concerns at this time.   8.  Deep venous thrombosis prophylaxis.  Continue chronic anticoagulation with Eliquis.     Code status:  The patient is full code.      Following dialysis today, patient will be admitted under observation.  I anticipate his breathing will improve after dialysis and he can discharge home the following morning, thus he is felt to be appropriate for admission under observation.         RADHA QUEZADA DO             D: 2017 13:16   T: 2017 14:18   MT: MINNA      Name:     SOCORRO LÓPEZ   MRN:      [id number]        Account:      [id number]   :      1945           Admitted:     [id number]      Document: [id number]       cc: Josselin Kolb MD

## 2017-12-18 NOTE — IP AVS SNAPSHOT
MRN:[id number]                      After Visit Summary   12/18/2017    Westley Ness    MRN: [id number]           Thank you!     Thank you for choosing [facility] for your care. Our goal is always to provide you with excellent care. Hearing back from our patients is one way we can continue to improve our services. Please take a few minutes to complete the written survey that you may receive in the mail after you visit with us. Thank you!        Patient Information     Date Of Birth          1945        About your hospital stay     You were admitted on:  December 18, 2017 [facility]    You were discharged on:  December 19, 2017        Reason for your hospital stay       You were admitted with worsening shortness of breath. Your symptoms appeared to be related to an excessive amount of volume in your system and therefore you received an additional run of dialysis. Additionally, your cough and congestion symptoms suggest you may have a respiratory infection contributing to symptoms. You will be discharged with an antibiotic for treatment.                  Who to Call     For medical emergencies, please call 911.  For non-urgent questions about your medical care, please call your primary care provider or clinic, [phone]          Attending Provider     Provider Specialty    León Louise MD Emergency Medicine    Latesha Dickens DO Internal Medicine       Primary Care Provider Office Phone # Fax #    Josselin Kolb [phone]      After Care Instructions     Activity       Your activity upon discharge: activity as tolerated            Diet       Follow this diet upon discharge: Orders Placed This Encounter      Dialysis Diet                  Follow-up Appointments     Follow-up and recommended labs and tests        Follow up with primary care provider, Josselin Kolb, within 5 days for hospital follow- up.   The following labs/tests are recommended: vital sign check.                  Your next 10 appointments already scheduled     Dec 20, 2017  1:30 PM CST   [facility]    [address] [id number] [phone]           Bring a current list of meds and any records pertaining to this visit. For Physicals, please bring immunization records and any forms needing to be filled out. Please arrive 10 minutes early to complete paperwork.            Jan 11, 2018  8:30 AM CST   [facility] EP RETURN with MARCIO Aviles CNP   [facility] ([facility])    [address]  [facility] [id number] [phone]            Jan 11, 2018  9:00 AM CST   ICD Check with CARD DCR2   [facility] ([facility])    [address]  [facility] [id number] [phone]              Further instructions from your care team       A follow-up appointment was scheduled for you [see above].  It is very important to go to it--bring these papers and your insurance card with you.  At the visit, talk about your hospital stay.  Tell your doctor how you feel.  Show your new list of medications.  Your doctor will update records, make sure you are still doing OK, and decide if any tests or medication changes are needed.    + Keep the appointment you already made for your heart rhythm, but tell them about your Observation stay too.  + You will also resume your MWF dialysis at [facility] (Phone [phone] Fax [phone]).       Pending Results     Date and Time Order Name Status Description    12/19/2017 0943 EKG 12-lead, tracing only Preliminary             Statement of Approval     Ordered          12/19/17 1416  I have reviewed and agree with all the recommendations and orders detailed in this document.   "EFFECTIVE NOW     Approved and electronically signed by:  Leno Fallon PA-C             Admission Information     Date & Time Provider Department Dept. Phone    2017 Latesha Dickens DO [facility] [phone]      Your Vitals Were     Blood Pressure Pulse Temperature Respirations Height Weight    112/51 (BP Location: Right arm) 67 96.8  F (36  C) (Oral) 18 1.778 m (5' 10\") 83.6 kg (184 lb 4.9 oz)    Pulse Oximetry BMI (Body Mass Index)                91% 26.44 kg/m2          [facility] Information     [facility] lets you send messages to your doctor, view your test results, renew your prescriptions, schedule appointments and more. To sign up, go to [email or web address] . Click on \"Log in\" on the left side of the screen, which will take you to the Welcome page. Then click on \"Sign up Now\" on the right side of the page.     You will be asked to enter the access code listed below, as well as some personal information. Please follow the directions to create your username and password.     Your access code is: [email or web address]  Expires: 3/12/2018  1:40 PM     Your access code will  in 90 days. If you need help or a new code, please call your [facility] clinic or [phone].        Care EveryWhere ID     This is your Care EveryWhere ID. This could be used by other organizations to access your [facility] medical records  [id number]        Equal Access to Services     [facility] Center[facility]IME : Hadii fede ku hadasho Soomaali, waaxda luqadaha, qaybta kaalmada adeegyada, purnima cuenca . So [facility] [phone].    ATENCIÓN: Si habla español, tiene a jacome disposición servicios gratuitos de asistencia lingüística. Llame al [phone].    We comply with applicable federal civil rights laws and [place] laws. We do not discriminate on the basis of race, color, national origin, age, disability, sex, sexual orientation, or gender identity.               Review of your medicines      START " taking        Dose / Directions    azithromycin 250 MG tablet   Commonly known as:  ZITHROMAX   Indication:  URI   Used for:  Upper respiratory tract infection, unspecified type        Dose:  250 mg   Start taking on:  12/20/2017   Take 1 tablet (250 mg) by mouth daily   Quantity:  4 tablet   Refills:  0         CONTINUE these medicines which have NOT CHANGED        Dose / Directions    acetaminophen 325 MG tablet   Commonly known as:  TYLENOL   Used for:  Closed nondisplaced fracture of right patella, unspecified fracture morphology, initial encounter, Elbow fracture, right, closed, initial encounter        Dose:  650 mg   Take 2 tablets (650 mg) by mouth 4 times daily   Quantity:  60 tablet   Refills:  0       apixaban ANTICOAGULANT 2.5 MG tablet   Commonly known as:  ELIQUIS   Used for:  Atrial flutter with rapid ventricular response (H)        Dose:  2.5 mg   Take 1 tablet (2.5 mg) by mouth 2 times daily   Quantity:  60 tablet   Refills:  0       carvedilol 6.25 MG tablet   Commonly known as:  COREG   Used for:  NSTEMI (non-ST elevated myocardial infarction) (H), Mild CAD        Dose:  6.25 mg   Take 1 tablet (6.25 mg) by mouth 2 times daily (with meals) Take after dialysis   Quantity:  60 tablet   Refills:  1       clopidogrel 75 MG tablet   Commonly known as:  PLAVIX   Used for:  Coronary artery disease involving native coronary artery of native heart without angina pectoris        Dose:  75 mg   Take 1 tablet (75 mg) by mouth daily To protect your stent(s).  Do not stop taking unless directed by cardiology.   Quantity:  30 tablet   Refills:  11       isosorbide mononitrate 30 MG 24 hr tablet   Commonly known as:  IMDUR   Used for:  NSTEMI (non-ST elevated myocardial infarction) (H)        Dose:  15 mg   Take 0.5 tablets (15 mg) by mouth daily   Quantity:  90 tablet   Refills:  3       Levothyroxine Sodium 50 MCG Caps   Used for:  Hypothyroidism, unspecified type        Dose:  50 mcg   Take 50 mcg by mouth  daily   Quantity:  90 capsule   Refills:  3       lisinopril 2.5 MG tablet   Commonly known as:  PRINIVIL/Zestril   Used for:  NSTEMI (non-ST elevated myocardial infarction) (H)        Take one tablet after dialysis. Take second tablet at bedtime.   Quantity:  180 tablet   Refills:  3       loperamide 2 MG capsule   Commonly known as:  IMODIUM        Dose:  1 mg   Take 1 mg by mouth as needed   Refills:  0       mexiletine 150 MG capsule   Commonly known as:  MEXITIL   Used for:  Tachycardia        Dose:  150 mg   Take 1 capsule (150 mg) by mouth 2 times daily   Quantity:  180 capsule   Refills:  3       nitroGLYcerin 0.4 MG sublingual tablet   Commonly known as:  NITROSTAT   Used for:  Angina pectoris (H)        1 TAB EVERY 5 MIN AS NEEDED, UP TO 3 PER EPISODE   Quantity:  25 tablet   Refills:  0       pantoprazole 40 MG EC tablet   Commonly known as:  PROTONIX   Used for:  Gastroesophageal reflux disease, esophagitis presence not specified        Dose:  40 mg   Take 1 tablet (40 mg) by mouth every morning   Quantity:  30 tablet   Refills:  10       pravastatin 40 MG tablet   Commonly known as:  PRAVACHOL   Used for:  Coronary artery disease involving native coronary artery of native heart without angina pectoris        Dose:  40 mg   Take 1 tablet (40 mg) by mouth daily   Quantity:  90 tablet   Refills:  3       psyllium 0.52 G capsule        Dose:  1 capsule   Take 1 capsule by mouth daily as needed   Refills:  0       TRIPHROCAPS 1 MG capsule   Generic drug:  NEPHROCAPS        TAKE 1 CAPSULE BY MOUTH EVERY EVENING   Quantity:  90 capsule   Refills:  3       ZANTAC PO        Dose:  75 mg   Take 75 mg by mouth nightly as needed for heartburn   Refills:  0            Where to get your medicines      These medications were sent to [facility] - [address]  [address] [place] [address]     Phone:  [phone]     azithromycin 250 MG tablet               ANTIBIOTIC  INSTRUCTION     You've Been Prescribed an Antibiotic - Now What?  Your healthcare team thinks that you or your loved one might have an infection. Some infections can be treated with antibiotics, which are powerful, life-saving drugs. Like all medications, antibiotics have side effects and should only be used when necessary. There are some important things you should know about your antibiotic treatment.      Your healthcare team may run tests before you start taking an antibiotic.    Your team may take samples (e.g., from your blood, urine or other areas) to run tests to look for bacteria. These test can be important to determine if you need an antibiotic at all and, if you do, which antibiotic will work best.      Within a few days, your healthcare team might change or even stop your antibiotic.    Your team may start you on an antibiotic while they are working to find out what is making you sick.    Your team might change your antibiotic because test results show that a different antibiotic would be better to treat your infection.    In some cases, once your team has more information, they learn that you do not need an antibiotic at all. They may find out that you don't have an infection, or that the antibiotic you're taking won't work against your infection. For example, an infection caused by a virus can't be treated with antibiotics. Staying on an antibiotic when you don't need it is more likely to be harmful than helpful.      You may experience side effects from your antibiotic.    Like all medications, antibiotics have side effects. Some of these can be serious.    Let you healthcare team know if you have any known allergies when you are admitted to the hospital.    One significant side effect of nearly all antibiotics is the risk of severe and sometimes deadly diarrhea caused by Clostridium difficile (C. Difficile). This occurs when a person takes antibiotics because some good germs are destroyed.  Antibiotic use allows C. diificile to take over, putting patients at high risk for this serious infection.    As a patient or caregiver, it is important to understand your or your loved one's antibiotic treatment. It is especially important for caregivers to speak up when patients can't speak for themselves. Here are some important questions to ask your healthcare team.    What infection is this antibiotic treating and how do you know I have that infection?    What side effects might occur from this antibiotic?    How long will I need to take this antibiotic?    Is it safe to take this antibiotic with other medications or supplements (e.g., vitamins) that I am taking?     Are there any special directions I need to know about taking this antibiotic? For example, should I take it with food?    How will I be monitored to know whether my infection is responding to the antibiotic?    What tests may help to make sure the right antibiotic is prescribed for me?      Information provided by:  www.cdc.gov/getsmart  U.S. Department of Health and Human Services  Centers for disease Control and Prevention  National Center for Emerging and Zoonotic Infectious Diseases  Division of Healthcare Quality Promotion         Protect others around you: Learn how to safely use, store and throw away your medicines at www.disposemymeds.org.             Medication List: This is a list of all your medications and when to take them. Check marks below indicate your daily home schedule. Keep this list as a reference.      Medications           Morning Afternoon Evening Bedtime As Needed    acetaminophen 325 MG tablet   Commonly known as:  TYLENOL   Take 2 tablets (650 mg) by mouth 4 times daily   Last time this was given:  650 mg on 12/19/2017  9:05 AM                                apixaban ANTICOAGULANT 2.5 MG tablet   Commonly known as:  ELIQUIS   Take 1 tablet (2.5 mg) by mouth 2 times daily   Last time this was given:  2.5 mg on 12/19/2017  11:36 AM                                azithromycin 250 MG tablet   Commonly known as:  ZITHROMAX   Take 1 tablet (250 mg) by mouth daily   Start taking on:  12/20/2017   Last time this was given:  250 mg on 12/19/2017  9:06 AM                                carvedilol 6.25 MG tablet   Commonly known as:  COREG   Take 1 tablet (6.25 mg) by mouth 2 times daily (with meals) Take after dialysis   Last time this was given:  6.25 mg on 12/19/2017 11:37 AM                                clopidogrel 75 MG tablet   Commonly known as:  PLAVIX   Take 1 tablet (75 mg) by mouth daily To protect your stent(s).  Do not stop taking unless directed by cardiology.   Last time this was given:  75 mg on 12/19/2017 11:36 AM                                isosorbide mononitrate 30 MG 24 hr tablet   Commonly known as:  IMDUR   Take 0.5 tablets (15 mg) by mouth daily   Last time this was given:  15 mg on 12/19/2017 11:36 AM                                Levothyroxine Sodium 50 MCG Caps   Take 50 mcg by mouth daily                                lisinopril 2.5 MG tablet   Commonly known as:  PRINIVIL/Zestril   Take one tablet after dialysis. Take second tablet at bedtime.   Last time this was given:  2.5 mg on 12/19/2017 11:36 AM                                loperamide 2 MG capsule   Commonly known as:  IMODIUM   Take 1 mg by mouth as needed                                mexiletine 150 MG capsule   Commonly known as:  MEXITIL   Take 1 capsule (150 mg) by mouth 2 times daily   Last time this was given:  150 mg on 12/19/2017  9:06 AM                                nitroGLYcerin 0.4 MG sublingual tablet   Commonly known as:  NITROSTAT   1 TAB EVERY 5 MIN AS NEEDED, UP TO 3 PER EPISODE                                pantoprazole 40 MG EC tablet   Commonly known as:  PROTONIX   Take 1 tablet (40 mg) by mouth every morning                                pravastatin 40 MG tablet   Commonly known as:  PRAVACHOL   Take 1 tablet (40 mg) by  mouth daily                                psyllium 0.52 G capsule   Take 1 capsule by mouth daily as needed                                TRIPHROCAPS 1 MG capsule   TAKE 1 CAPSULE BY MOUTH EVERY EVENING   Last time this was given:  1 capsule on 12/18/2017  8:36 PM   Generic drug:  NEPHROCAPS                                ZANTAC PO   Take 75 mg by mouth nightly as needed for heartburn

## 2017-12-18 NOTE — ED NOTES
[facility]  ED Nurse Handoff Report    ED Chief complaint: Shortness of Breath (pt having increasing sob over weekend with cough.)      ED Diagnosis:   Final diagnoses:   Acute on chronic congestive heart failure, unspecified congestive heart failure type (H)   ESRD (end stage renal disease) on dialysis (H)       Code Status: see epic    Allergies:   Allergies   Allergen Reactions     Contrast Dye Hives     Does fine if he uses benadryl prior.     No Clinical Screening - See Comments      Green beans - Diarrhea.    Topical antibiotic - name unknown - caused swelling of the penis.       Activity level - Baseline/Home:  Independent    Activity Level - Current:   Stand with Assist c walker      Needed?: No    Isolation: No  Infection: Not Applicable    Bariatric?: No    Vital Signs:   Vitals:    12/18/17 0859 12/18/17 1032 12/18/17 1126   BP: 123/84 115/75 126/69   Pulse: 80     Resp: 20 10 19   Temp: 98.4  F (36.9  C)     TempSrc: Oral     SpO2: 96% 97% 97%   Weight: 85 kg (187 lb 6.3 oz)         Cardiac Rhythm: ,   Cardiac  Cardiac Rhythm: Normal sinus rhythm    Pain level: 0-10 Pain Scale: 2    Is this patient confused?: No    Patient Report: Initial Complaint: lives at home independently, has an aide that comes in 2x/week. C/o productive cough X 1 week, c increased generalized weakness. Recently admitted to [facility]. Dialysis pt, was due for dialysis today but didn't feel he could make it due to not feeling well, anju helped him to call EMS.  Focused Assessment: lungs diminished. Pale. Frequent productive cough, coughing up copious amounts of sputum. Given nebs in ED, states they helped. AOX4. Sating WDL on RA, but prefers O2. Placed on 2L O2 for comfort   Tests Performed: labs, cxr  Abnormal Results: wbc^, crt^, BNP^, trop^  Treatments provided: nebs, plan for inpt dialysis today    Family Comments: none present, states he has friends in the area    OBS brochure/video discussed/provided to  patient: No    ED Medications:   Medications   albuterol (PROVENTIL) neb solution 2.5 mg (2.5 mg Nebulization Given 12/18/17 1031)       Drips infusing?:  No      ED NURSE PHONE NUMBER: [phone]

## 2017-12-18 NOTE — PROGRESS NOTES
Potassium   Date Value Ref Range Status   12/18/2017 4.2 3.4 - 5.3 mmol/L Final     Lab Results   Component Value Date    HGB 11.3 12/18/2017     Weight: 85 kg (187 lb 6.3 oz)  POST WT  DIALYSIS PROCEDURE NOTE  Hepatitis status of previous patient on machine log was checked and verified ok to use with this patients hepatitis status.  Patient dialyzed for 3.5 hrs. on a 3 K bath with a net fluid removal of  2L.  A BFR of 450 ml/min was obtained via a LUE AVF using 15gauge needles.    The patient was seen by Dr. Smith during the treatment.  Total heparin received during the treatment: 0 units. Sites held x 15 min then  pressure drsgs applied.  Meds  Given: Hectorol. Complications: None.  Procedure and ESRD teaching done and questions answered. See flowsheet in EPIC for further details and post assessment.  Machine water alarm in place and functioning.  Pt returned via WC  Vascular Access: Aseptic prep done for both on/off.  Report received from: CHANCE Adame RN  Report given to:  HEPATITIS B SURFACE ANTIGEN Non-Reactive DATE 12/4/17   HEPATITIS B SURFACE ANTIBODY Susceptible DATE 12/4/17  Chlorine/Chloramine water system checked every 4 hours.  Outpatient Dialysis at [facility]

## 2017-12-18 NOTE — PHARMACY-ADMISSION MEDICATION HISTORY
Admission medication history interview status for the 12/18/2017  admission is complete. See EPIC admission navigator for prior to admission medications     Medication history source reliability:Good    Actions taken by pharmacist (provider contacted, etc):None     Additional medication history information not noted on PTA med list :None    Medication reconciliation/reorder completed by provider prior to medication history? No    Time spent in this activity: 15min    Prior to Admission medications    Medication Sig Last Dose Taking? Auth Provider   nitroGLYcerin (NITROSTAT) 0.4 MG sublingual tablet 1 TAB EVERY 5 MIN AS NEEDED, UP TO 3 PER EPISODE prn Yes Josselin Kolb MD   apixaban ANTICOAGULANT (ELIQUIS) 2.5 MG tablet Take 1 tablet (2.5 mg) by mouth 2 times daily 12/17/2017 at Unknown time Yes Deondre Bautista MD   carvedilol (COREG) 6.25 MG tablet Take 1 tablet (6.25 mg) by mouth 2 times daily (with meals) Take after dialysis 12/17/2017 at Unknown time Yes Deondre Bautista MD   RaNITidine HCl (ZANTAC PO) Take 75 mg by mouth nightly as needed for heartburn prn Yes Unknown, Entered By History   TRIPHROCAPS 1 MG capsule TAKE 1 CAPSULE BY MOUTH EVERY EVENING 12/17/2017 at Unknown time Yes Josselin Kolb MD   clopidogrel (PLAVIX) 75 MG tablet Take 1 tablet (75 mg) by mouth daily To protect your stent(s).  Do not stop taking unless directed by cardiology. 12/17/2017 at Unknown time Yes Josselin Kolb MD   lisinopril (PRINIVIL/ZESTRIL) 2.5 MG tablet Take one tablet after dialysis. Take second tablet at bedtime. 12/17/2017 at Unknown time Yes Lacy Taylor APRN CNP   mexiletine (MEXITIL) 150 MG capsule Take 1 capsule (150 mg) by mouth 2 times daily 12/17/2017 at Unknown time Yes Josseiln Kolb MD   pantoprazole (PROTONIX) 40 MG EC tablet Take 1 tablet (40 mg) by mouth every morning 12/17/2017 at Unknown time Yes Josselin Kolb MD   isosorbide mononitrate (IMDUR) 30 MG 24 hr tablet Take 0.5  tablets (15 mg) by mouth daily 12/17/2017 at Unknown time Yes Josselin Kolb MD   Levothyroxine Sodium 50 MCG CAPS Take 50 mcg by mouth daily 12/17/2017 at Unknown time Yes Josselin Kolb MD   pravastatin (PRAVACHOL) 40 MG tablet Take 1 tablet (40 mg) by mouth daily 12/17/2017 at Unknown time Yes Josselin Kolb MD   acetaminophen (TYLENOL) 325 MG tablet Take 2 tablets (650 mg) by mouth 4 times daily prn Yes Marco Crockett MD   loperamide (IMODIUM) 2 MG capsule Take 1 mg by mouth as needed  prn Yes Reported, Patient   psyllium 0.52 G capsule Take 1 capsule by mouth daily as needed  prn Yes Reported, Patient

## 2017-12-18 NOTE — ED NOTES
Bed: ED15  Expected date: 12/18/17  Expected time: 8:51 AM  Means of arrival: Ambulance  Comments:  514 71m SOB

## 2017-12-19 VITALS
TEMPERATURE: 96.8 F | OXYGEN SATURATION: 91 % | BODY MASS INDEX: 26.39 KG/M2 | RESPIRATION RATE: 18 BRPM | SYSTOLIC BLOOD PRESSURE: 112 MMHG | HEART RATE: 67 BPM | DIASTOLIC BLOOD PRESSURE: 51 MMHG | WEIGHT: 184.3 LBS | HEIGHT: 70 IN

## 2017-12-19 LAB
ERYTHROCYTE [DISTWIDTH] IN BLOOD BY AUTOMATED COUNT: 14.4 % (ref 10–15)
FLUAV+FLUBV AG SPEC QL: NEGATIVE
FLUAV+FLUBV AG SPEC QL: NEGATIVE
HCT VFR BLD AUTO: 31.2 % (ref 40–53)
HGB BLD-MCNC: 10.3 G/DL (ref 13.3–17.7)
MCH RBC QN AUTO: 33.3 PG (ref 26.5–33)
MCHC RBC AUTO-ENTMCNC: 33 G/DL (ref 31.5–36.5)
MCV RBC AUTO: 101 FL (ref 78–100)
PLATELET # BLD AUTO: 187 10E9/L (ref 150–450)
PROCALCITONIN SERPL-MCNC: 0.88 NG/ML
RBC # BLD AUTO: 3.09 10E12/L (ref 4.4–5.9)
SPECIMEN SOURCE: NORMAL
WBC # BLD AUTO: 13.5 10E9/L (ref 4–11)

## 2017-12-19 PROCEDURE — 84145 PROCALCITONIN (PCT): CPT | Performed by: PHYSICIAN ASSISTANT

## 2017-12-19 PROCEDURE — 87804 INFLUENZA ASSAY W/OPTIC: CPT | Performed by: PHYSICIAN ASSISTANT

## 2017-12-19 PROCEDURE — 85027 COMPLETE CBC AUTOMATED: CPT | Performed by: INTERNAL MEDICINE

## 2017-12-19 PROCEDURE — 93005 ELECTROCARDIOGRAM TRACING: CPT

## 2017-12-19 PROCEDURE — 25000132 ZZH RX MED GY IP 250 OP 250 PS 637: Mod: GY | Performed by: INTERNAL MEDICINE

## 2017-12-19 PROCEDURE — A9270 NON-COVERED ITEM OR SERVICE: HCPCS | Mod: GY | Performed by: INTERNAL MEDICINE

## 2017-12-19 PROCEDURE — 36415 COLL VENOUS BLD VENIPUNCTURE: CPT | Performed by: INTERNAL MEDICINE

## 2017-12-19 PROCEDURE — 99217 ZZC OBSERVATION CARE DISCHARGE: CPT | Performed by: PHYSICIAN ASSISTANT

## 2017-12-19 PROCEDURE — 36415 COLL VENOUS BLD VENIPUNCTURE: CPT | Performed by: PHYSICIAN ASSISTANT

## 2017-12-19 PROCEDURE — G0378 HOSPITAL OBSERVATION PER HR: HCPCS

## 2017-12-19 RX ORDER — AZITHROMYCIN 250 MG/1
250 TABLET, FILM COATED ORAL DAILY
Qty: 4 TABLET | Refills: 0 | Status: SHIPPED | OUTPATIENT
Start: 2017-12-20 | End: 2017-12-29

## 2017-12-19 RX ADMIN — GUAIFENESIN 10 ML: 200 SOLUTION ORAL at 11:35

## 2017-12-19 RX ADMIN — ISOSORBIDE MONONITRATE 15 MG: 30 TABLET, EXTENDED RELEASE ORAL at 11:36

## 2017-12-19 RX ADMIN — AZITHROMYCIN 250 MG: 250 TABLET, FILM COATED ORAL at 09:06

## 2017-12-19 RX ADMIN — CLOPIDOGREL 75 MG: 75 TABLET, FILM COATED ORAL at 11:36

## 2017-12-19 RX ADMIN — LISINOPRIL 2.5 MG: 2.5 TABLET ORAL at 11:36

## 2017-12-19 RX ADMIN — GUAIFENESIN 10 ML: 200 SOLUTION ORAL at 04:28

## 2017-12-19 RX ADMIN — MEXILETINE HYDROCHLORIDE 150 MG: 150 CAPSULE ORAL at 09:06

## 2017-12-19 RX ADMIN — APIXABAN 2.5 MG: 2.5 TABLET, FILM COATED ORAL at 11:36

## 2017-12-19 RX ADMIN — GUAIFENESIN 10 ML: 200 SOLUTION ORAL at 00:02

## 2017-12-19 RX ADMIN — ACETAMINOPHEN 650 MG: 325 TABLET, FILM COATED ORAL at 09:05

## 2017-12-19 RX ADMIN — CARVEDILOL 6.25 MG: 6.25 TABLET, FILM COATED ORAL at 11:37

## 2017-12-19 NOTE — PROGRESS NOTES
Nephrology Progress Note          Assessment and Plan:   ESRD status quo.  Looks better today, even though still O2 dependent.  Seems possible that he has some new infection, despite no fever.  Watch on empiric Z-Jose.  Next dialysis 12/20 AM.      Shortness of breath    * No resolved hospital problems. *               Interval History:   no new complaints, alert, oriented to person, place and time and doing well; no cp, sob, n/v/d, or abd pain.  VS ok; no fever or tachycardia.  Good SaO2 with low flow NC supp.  Fair intake.  Some UO.  Wt down to less than goal wt [facility] by [facility].  Meds and labs reviewed.  On Z-Jose.  Note nasal swab for Influenza is negative.  WBC higher, 13.5K, and Procalcitonin in moderate range.                 Medications:       - MEDICATION INSTRUCTIONS for Dialysis Patients -   Does not apply See Admin Instructions     acetaminophen  650 mg Oral 4x Daily     apixaban ANTICOAGULANT  2.5 mg Oral BID     carvedilol  6.25 mg Oral BID w/meals     clopidogrel  75 mg Oral Daily     isosorbide mononitrate  15 mg Oral Daily     mexiletine  150 mg Oral BID     NEPHROCAPS  1 capsule Oral QPM     lisinopril  2.5 mg Oral Daily     azithromycin  250 mg Oral Daily     doxercalciferol  3 mcg Intravenous Once per day on Mon Wed Fri                       Physical Exam:       Vital Sign Ranges  Temp:  [96.7  F (35.9  C)-98.1  F (36.7  C)] 96.8  F (36  C)  Pulse:  [67] 67  Heart Rate:  [74-89] 74  Resp:  [10-18] 18  BP: ()/(39-64) 112/51  SpO2:  [95 %-99 %] 96 %    Weight, current:  83.6 kg (actual weight)  Weight change:     I/O last 3 completed shifts:  In: 240 [P.O.:240]  Out: 2000 [Other:2000]    Physical Exam:   General:  Patient comfortable, in no apparent distress.  Awake, alert, oriented x3.  Neck:  Supple, no JVD.  Lungs:  Few crackles at bases to auscultation bilaterally.  Cardiac:  Regular rate and rhythm, no murmurs, rub, or gallops.  Abdomen:  Soft, nontender, physiologic  sounds.  Extremities:  Less edema.  2+ pulses.  Skin:  Warm, dry.  Neurologic:  No focal deficits.             Data:        Lab Results   Component Value Date     12/18/2017    Lab Results   Component Value Date    CHLORIDE 100 12/18/2017    Lab Results   Component Value Date    BUN 35 (H) 12/18/2017      Lab Results   Component Value Date    POTASSIUM 4.2 12/18/2017    Lab Results   Component Value Date    CO2 30 12/18/2017    Lab Results   Component Value Date    CR 6.47 (H) 12/18/2017        Lab Results   Component Value Date     12/18/2017     12/11/2017     12/09/2017     Lab Results   Component Value Date    POTASSIUM 4.2 12/18/2017    POTASSIUM 4.0 12/11/2017    POTASSIUM 4.9 12/09/2017     Lab Results   Component Value Date    CHLORIDE 100 12/18/2017    CHLORIDE 97 12/11/2017    CHLORIDE 97 12/09/2017     Lab Results   Component Value Date    CO2 30 12/18/2017    CO2 31 12/11/2017    CO2 28 12/09/2017     Lab Results   Component Value Date    CR 6.47 (H) 12/18/2017    CR 6.28 (H) 12/11/2017    CR 6.88 (H) 12/09/2017     Lab Results   Component Value Date    BUN 35 (H) 12/18/2017    BUN 39 (H) 12/11/2017    BUN 44 (H) 12/09/2017     Lab Results   Component Value Date    HGB 10.3 (L) 12/19/2017    HGB 11.3 (L) 12/18/2017    HGB 12.1 (L) 12/11/2017     No results found for: PH, PHARTERIAL, PO2, [id number], SAT, PCO2, HCO3, BASEEXCESS, DONNIE, BEB          Reji Smith MD  Nephrology; [employer]  [phone]

## 2017-12-19 NOTE — PROGRESS NOTES
Care Coordination:    Pt to d/c and resume MWF dialysis at [facility] (Phone [phone] Fax [phone]).      Called to confirm [facility] would like records sent.  They would, and I faxed our dialysis records.      Hosp f/u scheduled for 12/20 at 1:30pm.       Anette Bloom RN, BSN  [facility] Care Coordinator   Mobile Phone: [phone]

## 2017-12-19 NOTE — PLAN OF CARE
Problem: Patient Care Overview  Goal: Plan of Care/Patient Progress Review   A&O4. VSS on 2L O2 via NC at 95% SPO2 . Denies pain. Frequent non-productive/congested cough and dyspnea on exertion. Robitussin given x2. Crackles, course, and diminishment noted in bilateral lower lobes. +2 edema to BLE. Dialysis done yesterday, olyguirc. Up A1. Nephrology following pt.

## 2017-12-19 NOTE — PLAN OF CARE
Problem: Patient Care Overview  Goal: Plan of Care/Patient Progress Review  Outcome: No Change  [id number]: A&O. VSS on 2L O2 via NC. Denies pain. Frequent productive cough and DACOSTA. Robitussin given x1. Some SOB reported. Crackles noted in bilateral lower lobes. +2 edema to BLE. Dialysis done today. Up A1.

## 2017-12-19 NOTE — PLAN OF CARE
Problem: Patient Care Overview  Goal: Plan of Care/Patient Progress Review  -vital signs normal or at patient baseline: Not met, continues to be on O2 per NC.     Nurse to notify provider when observation goals have been met and patient is ready for discharge.

## 2017-12-19 NOTE — PLAN OF CARE
Problem: Patient Care Overview  Goal: Plan of Care/Patient Progress Review  Outcome: No Change  -vital signs normal or at patient baseline - met    Pt admitted to OBS unit. AO, VSS on 2L. Denies pain. Frequent productive cough and DACOSTA. Crackles noted in bila lower lobes. +2 edema BLE. Denies pain. Dialysis today. Up A1.

## 2017-12-19 NOTE — PLAN OF CARE
Problem: Patient Care Overview  Goal: Plan of Care/Patient Progress Review  Outcome: Adequate for Discharge Date Met: 12/19/17  Patient discharged to home TCU by wheelchair at 1530 PM 12/19/17.  Medication regimen and new medications discussed with patient and patient verbalizes understanding.  Diet and activity discussed with patient.  Upcoming appointments reviewed.  No questions at this time.

## 2017-12-19 NOTE — DISCHARGE SUMMARY
[facility]    Discharge Summary  Hospitalist    Date of Admission:  12/18/2017  Date of Discharge:  12/19/2017  Discharging Provider: Leno Fallon PA-C    Discharge Diagnoses      Acute on chronic congestive heart failure, unspecified congestive heart failure type (H)  ESRD (end stage renal disease) on dialysis (H)  Upper respiratory tract infection, unspecified type    History of Present Illness   Westley Ness is an 72 year old male who presented with shortness of breath.    HPI from admission H&P:  Westley Nses is a very pleasant 71-year-old male with a rather complex past medical history which includes chronic systolic CHF with most recent ejection fraction of 25% to 30% on echocardiogram on 12/8/17, atrial fibrillation, status post recent ablation on 12/11/17, hypertension, end-stage renal disease on dialysis, coronary artery disease with history of stent placement, type 2 diabetes, hypothyroidism and chronic anemia who presents to the emergency room today for increased shortness of breath.       He was recently hospitalized here from 12/8/17 to 12/12/17 for management of atrial flutter with rapid ventricular response and exacerbation of his chronic systolic CHF.  His CHF exacerbation was attributed to difficulties with rate control prior to undergoing an ablation on 12/11/17.  His respiratory status improved.  He was started on Eliquis and his Coreg dose was adjusted.  He was not requiring supplemental oxygen at time of discharge.  He underwent dialysis per routine during that hospitalization.  The patient says he discharged home and initially felt okay for a day or so.  He says within 2-3 days after discharge, he began to feel more short of breath.  He endorses a cough which is productive of thick greenish sputum though tells me this has been ongoing for some time and not particularly worse in the past few days.  He does not endorse fevers.  He tells me he dialyzed per his routine with  his last run of dialysis being Friday, 12/15.  Over the weekend, his breathing continued to worsen.  He denies any chest pain, palpitations, dizziness or lightheadedness.  Today, he felt too short of breath to be able to drive himself to dialysis.  Thus, he presents to the emergency room.  He denies any changes in appetite.  No upset stomach, nausea or vomiting and has been taking all his medications as prescribed.  He tells me his lower extremities may be more swollen than usual today.       In the emergency room, he was seen and evaluated by Dr. Louise.  He was afebrile and hemodynamically stable.  He remains in normal sinus rhythm.  He is requiring 2 liters of supplemental oxygen to maintain sats in the 90s.  His labs are notable for a proBNP of greater than 175,000 and his initial troponin is 0.08 in the context of end-stage renal disease.  EKG showed normal sinus rhythm with no acute ischemic changes.  His CBC showed a white count of 12.1 and a hemoglobin of 11.3.  Chest x-ray was obtained, which did not show significant pulmonary edema.  He did have some findings of scarring and atelectasis at the bilateral lung bases.  There is no evidence of pleural effusion and no infiltrate.  In the emergency room, he was given an albuterol neb with minimal improvement in symptoms.  Dr. Louise discussed the patient's presentation with Dr. Garcia of the Cardiology Service and Dr. Smith of Nephrology Service.  The plan at this point is to have the patient undergo dialysis and be admitted under observation status following dialysis to continue to monitor his respiratory status.  When I saw the patient, he was alert and resting comfortably on the gurney and was speaking in complete sentences without evidence of increased work of breathing.     Hospital Course   Westley Ness was admitted on 12/18/2017.  The following problems were addressed during his hospitalization:    Shortness of breath  Likely secondary to a  combination of volume overload and probable viral URI. The patient did have some issues with shortness of breath during recent admission which was thought to be secondary to his atrial flutter and rapid ventricular response.  His respiratory status improved at time of discharge.  Given his end-stage renal disease and his need for dialysis, he is not maintained on any diuretics. No dietary indiscretions, chest pain or palpitations. Also with complaints of productive cough with green sputum, afebrile with a negative CXR. He was seen by nephrology who dialyzed him per his usual schedule on 12/18 with improvement in symptoms. He was also started on a Z-rbien for slight leukocytosis and elevated procalcitonin. On day of discharge, he was able to ambulate in halls without requiring supplemental oxgyen or developing worsening shortness of breath. He will follow closely with PCP at discharge and volume status will need to be closely monitored in correlation with nephrology. He was discharged with prescription to complete the Z-brien.    Coronary artery disease with history of stent placement, hypertension, hyperlipidemia and ischemic cardiomyopathy  Asymptomatic on admission. With mild troponin elevation on admission, in setting of ESRD-HD. Patient remained asymptomatic during hospitalization. He was resumed on PTA Coreg, lisinopril, Imdur, statin, and Plavix.    End-stage renal disease.  He typically dialyzes Monday, Wednesday and Friday.      Chronic medical conditions that remained stable:  Atrial flutter status post recent ablation.  He remained in normal sinus rhythm.  Will continue his Coreg.  Continue his anticoagulation with Eliquis which has been renally dosed.   Anemia of chronic disease.  Hemoglobin is stable.   Hypothyroidism.  Chronic and stable on levothyroxine.    Type 2 diabetes, reportedly diet-controlled.  No concerns at this time.     Leno Fallon PA-C    Significant Results and Procedures   As noted  below    Pending Results   These results will be followed up by n/a  Unresulted Labs Ordered in the Past 30 Days of this Admission     No orders found for last 61 day(s).          Code Status   Full Code       Primary Care Physician   Josselin Kolb    Physical Exam   Temp: 96.8  F (36  C) Temp src: Oral BP: 112/51 Pulse: 67 Heart Rate: 74 Resp: 18 SpO2: 91 % (at rest) O2 Device: None (Room air) Oxygen Delivery: 2 LPM  Vitals:    12/18/17 0859 12/18/17 1300 12/19/17 0905   Weight: 85 kg (187 lb 6.3 oz) 85 kg (187 lb 6.3 oz) 83.6 kg (184 lb 4.9 oz)     Vital Signs with Ranges  Temp:  [96.7  F (35.9  C)-98.1  F (36.7  C)] 96.8  F (36  C)  Pulse:  [67] 67  Heart Rate:  [74-89] 74  Resp:  [16-18] 18  BP: ()/(39-64) 112/51  SpO2:  [91 %-96 %] 91 %  I/O last 3 completed shifts:  In: 240 [P.O.:240]  Out: 2000 [Other:2000]    GEN: well-developed, well-nourished, appears comfortable  PULM: lungs with faint crackles in bilateral bases, no increased work of breathing, no wheeze  CV: RRR, S1 & S2  GI: soft, nontender, nondistended, no guarding or rigidity, +BS in all 4 quadrants  SKIN: warm & dry without rash, wound, trace pedal edema    Discharge Disposition   Discharged to home  Condition at discharge: Stable    Consultations This Hospital Stay   NEPHROLOGY IP CONSULT    Time Spent on this Encounter   ILeno, personally saw the patient today and spent less than or equal to 30 minutes discharging this patient.    Discharge Orders     Reason for your hospital stay   You were admitted with worsening shortness of breath. Your symptoms appeared to be related to an excessive amount of volume in your system and therefore you received an additional run of dialysis. Additionally, your cough and congestion symptoms suggest you may have a respiratory infection contributing to symptoms. You will be discharged with an antibiotic for treatment.     Follow-up and recommended labs and tests    Follow up with primary care  provider, Josselin Kolb, within 5 days for hospital follow- up.  The following labs/tests are recommended: vital sign check.     Activity   Your activity upon discharge: activity as tolerated     Diet   Follow this diet upon discharge: Orders Placed This Encounter     Dialysis Diet       Discharge Medications   Current Discharge Medication List      START taking these medications    Details   azithromycin (ZITHROMAX) 250 MG tablet Take 1 tablet (250 mg) by mouth daily  Qty: 4 tablet, Refills: 0    Associated Diagnoses: Upper respiratory tract infection, unspecified type         CONTINUE these medications which have NOT CHANGED    Details   nitroGLYcerin (NITROSTAT) 0.4 MG sublingual tablet 1 TAB EVERY 5 MIN AS NEEDED, UP TO 3 PER EPISODE  Qty: 25 tablet, Refills: 0    Associated Diagnoses: Angina pectoris (H)      apixaban ANTICOAGULANT (ELIQUIS) 2.5 MG tablet Take 1 tablet (2.5 mg) by mouth 2 times daily  Qty: 60 tablet, Refills: 0    Associated Diagnoses: Atrial flutter with rapid ventricular response (H)      carvedilol (COREG) 6.25 MG tablet Take 1 tablet (6.25 mg) by mouth 2 times daily (with meals) Take after dialysis  Qty: 60 tablet, Refills: 1    Associated Diagnoses: NSTEMI (non-ST elevated myocardial infarction) (H); Mild CAD      RaNITidine HCl (ZANTAC PO) Take 75 mg by mouth nightly as needed for heartburn      TRIPHROCAPS 1 MG capsule TAKE 1 CAPSULE BY MOUTH EVERY EVENING  Qty: 90 capsule, Refills: 3    Associated Diagnoses: ESRD (end stage renal disease) on dialysis (H)      clopidogrel (PLAVIX) 75 MG tablet Take 1 tablet (75 mg) by mouth daily To protect your stent(s).  Do not stop taking unless directed by cardiology.  Qty: 30 tablet, Refills: 11    Associated Diagnoses: Coronary artery disease involving native coronary artery of native heart without angina pectoris      lisinopril (PRINIVIL/ZESTRIL) 2.5 MG tablet Take one tablet after dialysis. Take second tablet at bedtime.  Qty: 180 tablet,  Refills: 3    Associated Diagnoses: NSTEMI (non-ST elevated myocardial infarction) (H)      mexiletine (MEXITIL) 150 MG capsule Take 1 capsule (150 mg) by mouth 2 times daily  Qty: 180 capsule, Refills: 3    Associated Diagnoses: Tachycardia      pantoprazole (PROTONIX) 40 MG EC tablet Take 1 tablet (40 mg) by mouth every morning  Qty: 30 tablet, Refills: 10    Associated Diagnoses: Gastroesophageal reflux disease, esophagitis presence not specified      isosorbide mononitrate (IMDUR) 30 MG 24 hr tablet Take 0.5 tablets (15 mg) by mouth daily  Qty: 90 tablet, Refills: 3    Associated Diagnoses: NSTEMI (non-ST elevated myocardial infarction) (H)      Levothyroxine Sodium 50 MCG CAPS Take 50 mcg by mouth daily  Qty: 90 capsule, Refills: 3    Associated Diagnoses: Hypothyroidism, unspecified type      pravastatin (PRAVACHOL) 40 MG tablet Take 1 tablet (40 mg) by mouth daily  Qty: 90 tablet, Refills: 3    Associated Diagnoses: Coronary artery disease involving native coronary artery of native heart without angina pectoris      acetaminophen (TYLENOL) 325 MG tablet Take 2 tablets (650 mg) by mouth 4 times daily  Qty: 60 tablet, Refills: 0    Associated Diagnoses: Closed nondisplaced fracture of right patella, unspecified fracture morphology, initial encounter; Elbow fracture, right, closed, initial encounter      loperamide (IMODIUM) 2 MG capsule Take 1 mg by mouth as needed       psyllium 0.52 G capsule Take 1 capsule by mouth daily as needed            Allergies   Allergies   Allergen Reactions     Contrast Dye Hives     Does fine if he uses benadryl prior.     No Clinical Screening - See Comments      Green beans - Diarrhea.    Topical antibiotic - name unknown - caused swelling of the penis.     Data   Most Recent 3 CBC's:  Recent Labs   Lab Test  12/19/17   0610  12/18/17   0950  12/11/17   2030   WBC  13.5*  12.1*  6.9   HGB  10.3*  11.3*  12.1*   MCV  101*  101*  103*   PLT  187  197  132*      Most Recent 3  BMP's:  Recent Labs   Lab Test  12/18/17   0950  12/11/17   0540  12/09/17   0555   NA  137  137  136   POTASSIUM  4.2  4.0  4.9   CHLORIDE  100  97  97   CO2  30  31  28   BUN  35*  39*  44*   CR  6.47*  6.28*  6.88*   ANIONGAP  7  9  11   CHRISTINE  9.7  9.1  8.9   GLC  146*  96  110*     Most Recent 2 LFT's:  Recent Labs   Lab Test  12/18/17   0950  12/08/17   0713   AST  13  29   ALT  15  22   ALKPHOS  154*  192*   BILITOTAL  1.8*  1.4*     Most Recent INR's and Anticoagulation Dosing History:  Anticoagulation Dose History     Recent Dosing and Labs Latest Ref Rng & Units 2/2/2008 5/20/2008 9/18/2009 7/22/2011 8/12/2011 7/14/2016 12/11/2017    INR 0.86 - 1.14 1.51(H) 1.01 0.88 1.02 1.09 0.91 1.00        Most Recent 3 Troponin's:  Recent Labs   Lab Test  12/18/17   0950  12/09/17   0135  12/08/17   2143   TROPI  0.080*  0.048*  0.040     Most Recent Cholesterol Panel:  Recent Labs   Lab Test  07/11/16   0302   CHOL  98   LDL  37   HDL  29*   TRIG  162*     Most Recent 6 Bacteria Isolates From Any Culture (See EPIC Reports for Culture Details):  Recent Labs   Lab Test  04/07/17   0755  04/07/17   0736   CULT  No growth  No growth     Most Recent TSH, T4 and A1c Labs:  Recent Labs   Lab Test  06/09/17   0505  06/07/17   0811   TSH   --   3.15   A1C  6.0   --      Results for orders placed or performed during the hospital encounter of 12/18/17   XR Chest 2 Views    Narrative    XR CHEST 2 VW 12/18/2017 10:20 AM    HISTORY: Pain.    COMPARISON: December 8, 2017.      Impression    IMPRESSION: Right-sided cardiac device is unchanged in position. Mild  atelectasis or scarring of the right lateral lung base, similar to  prior exam. Mild atelectasis of the left lateral lung base. No pleural  effusions or pneumothorax. Stable cardiomegaly.    KRISS ZAFAR MD

## 2017-12-19 NOTE — DISCHARGE INSTRUCTIONS
A follow-up appointment was scheduled for you [see above].  It is very important to go to it--bring these papers and your insurance card with you.  At the visit, talk about your hospital stay.  Tell your doctor how you feel.  Show your new list of medications.  Your doctor will update records, make sure you are still doing OK, and decide if any tests or medication changes are needed.    + Keep the appointment you already made for your heart rhythm, but tell them about your Observation stay too.  + You will also resume your MWF dialysis at [facility] (Phone [phone] Fax [phone]).

## 2017-12-20 ENCOUNTER — TELEPHONE (OUTPATIENT)
Dept: INTERNAL MEDICINE | Facility: CLINIC | Age: 72
End: 2017-12-20

## 2017-12-21 ENCOUNTER — TELEPHONE (OUTPATIENT)
Dept: INTERNAL MEDICINE | Facility: CLINIC | Age: 72
End: 2017-12-21

## 2017-12-21 DIAGNOSIS — I25.10 CORONARY ARTERY DISEASE INVOLVING NATIVE CORONARY ARTERY OF NATIVE HEART WITHOUT ANGINA PECTORIS: ICD-10-CM

## 2017-12-21 DIAGNOSIS — E03.9 HYPOTHYROIDISM, UNSPECIFIED TYPE: ICD-10-CM

## 2017-12-21 LAB — INTERPRETATION ECG - MUSE: NORMAL

## 2017-12-21 RX ORDER — PRAVASTATIN SODIUM 40 MG
40 TABLET ORAL DAILY
Qty: 30 TABLET | Refills: 0 | Status: SHIPPED | OUTPATIENT
Start: 2017-12-21 | End: 2018-01-16

## 2017-12-21 RX ORDER — LEVOTHYROXINE SODIUM 50 UG/1
50 CAPSULE ORAL DAILY
Qty: 90 CAPSULE | Refills: 1 | Status: SHIPPED | OUTPATIENT
Start: 2017-12-21 | End: 2018-06-17

## 2017-12-21 NOTE — TELEPHONE ENCOUNTER
"Hospital/TCU/ED for chronic condition Discharge Protocol    \"Hi, my name is Evy Hernandez, a registered nurse, and I am calling from [facility].  I am calling to follow up and see how things are going for you after your recent emergency visit/hospital/TCU stay.\"    Tell me how you are doing now that you are home?\" Patient stated to be feeling better.  Reports breathing has improved.  Denies any questions or concerns.        Discharge Instructions    \"Let's review your discharge instructions.  What is/are the follow-up recommendations?  Pt. Response: Follow up with Dr. Kolb     \"Has an appointment with your primary care provider been scheduled?\"   Yes. (confirm)    \"When you see the provider, I would recommend that you bring your medications with you.\"    Medications    \"Tell me what changed about your medicines when you discharged?\"    Changes to chronic meds?    0-1    \"What questions do you have about your medications?\"    None     New diagnoses of heart failure, COPD, diabetes, or MI?    No                  Medication reconciliation completed? Yes  Was MTM referral placed (*Make sure to put transitions as reason for referral)?   No    Call Summary    \"What questions or concerns do you have about your recent visit and your follow-up care?\"     none    \"If you have questions or things don't continue to improve, we encourage you contact us through the main clinic number (give number).  Even if the clinic is not open, triage nurses are available 24/7 to help you.     We would like you to know that our clinic has extended hours (provide information).  We also have urgent care (provide details on closest location and hours/contact info)\"      \"Thank you for your time and take care!\"         "

## 2017-12-21 NOTE — TELEPHONE ENCOUNTER
ED / Discharge Outreach Protocol    Patient Contact    Attempt # 2    Was call answered?  No.  Left message on voicemail with information to call me back.  Pt has appt 12/29

## 2017-12-29 ENCOUNTER — OFFICE VISIT (OUTPATIENT)
Dept: INTERNAL MEDICINE | Facility: CLINIC | Age: 72
End: 2017-12-29
Payer: MEDICARE

## 2017-12-29 VITALS
SYSTOLIC BLOOD PRESSURE: 118 MMHG | OXYGEN SATURATION: 96 % | WEIGHT: 185.3 LBS | BODY MASS INDEX: 26.53 KG/M2 | TEMPERATURE: 98.2 F | HEIGHT: 70 IN | HEART RATE: 72 BPM | DIASTOLIC BLOOD PRESSURE: 60 MMHG

## 2017-12-29 DIAGNOSIS — I50.23 ACUTE ON CHRONIC SYSTOLIC HEART FAILURE (H): ICD-10-CM

## 2017-12-29 DIAGNOSIS — Z09 HOSPITAL DISCHARGE FOLLOW-UP: Primary | ICD-10-CM

## 2017-12-29 DIAGNOSIS — R09.82 POST-NASAL DRIP: ICD-10-CM

## 2017-12-29 DIAGNOSIS — Z23 NEED FOR VACCINATION: ICD-10-CM

## 2017-12-29 DIAGNOSIS — I48.92 ATRIAL FLUTTER WITH RAPID VENTRICULAR RESPONSE (H): ICD-10-CM

## 2017-12-29 PROCEDURE — 90732 PPSV23 VACC 2 YRS+ SUBQ/IM: CPT | Performed by: INTERNAL MEDICINE

## 2017-12-29 PROCEDURE — 99495 TRANSJ CARE MGMT MOD F2F 14D: CPT | Mod: 25 | Performed by: INTERNAL MEDICINE

## 2017-12-29 PROCEDURE — G0009 ADMIN PNEUMOCOCCAL VACCINE: HCPCS | Performed by: INTERNAL MEDICINE

## 2017-12-29 RX ORDER — FLUTICASONE PROPIONATE 50 MCG
2 SPRAY, SUSPENSION (ML) NASAL DAILY
Qty: 1 BOTTLE | Refills: 11 | Status: SHIPPED | OUTPATIENT
Start: 2017-12-29 | End: 2018-04-23

## 2017-12-29 NOTE — NURSING NOTE
Screening Questionnaire for Adult Immunization    Are you sick today?   No   Do you have allergies to medications, food, a vaccine component or latex?   No   Have you ever had a serious reaction after receiving a vaccination?   No   Do you have a long-term health problem with heart disease, lung disease, asthma, kidney disease, metabolic disease (e.g. diabetes), anemia, or other blood disorder?   Yes   Do you have cancer, leukemia, HIV/AIDS, or any other immune system problem?   No   In the past 3 months, have you taken medications that affect  your immune system, such as prednisone, other steroids, or anticancer drugs; drugs for the treatment of rheumatoid arthritis, Crohn s disease, or psoriasis; or have you had radiation treatments?   No   Have you had a seizure, or a brain or other nervous system problem?   No   During the past year, have you received a transfusion of blood or blood     products, or been given immune (gamma) globulin or antiviral drug?   No   For women: Are you pregnant or is there a chance you could become        pregnant during the next month?   No   Have you received any vaccinations in the past 4 weeks?   No     Immunization questionnaire was positive for at least one answer.  Notified Dr Kolb.        Per orders of Dr. Kolb, injection of Pneumovax 23 given by Aaron Boyce. Patient instructed to remain in clinic for 15 minutes afterwards, and to report any adverse reaction to me immediately.       Screening performed by Aaron Boyce on 12/29/2017 at 1:51 PM.

## 2017-12-29 NOTE — NURSING NOTE
"Chief Complaint   Patient presents with     Hospital F/U     12/8/17 and 12/18/17 [facility]       Initial /60 (BP Location: Left arm, Patient Position: Chair, Cuff Size: Adult Regular)  Pulse 72  Temp 98.2  F (36.8  C) (Oral)  Ht 5' 10\" (1.778 m)  Wt 185 lb 4.8 oz (84.1 kg)  SpO2 96%  BMI 26.59 kg/m2 Estimated body mass index is 26.59 kg/(m^2) as calculated from the following:    Height as of this encounter: 5' 10\" (1.778 m).    Weight as of this encounter: 185 lb 4.8 oz (84.1 kg).  Medication Reconciliation: complete     Kaminibose MA      "

## 2017-12-29 NOTE — PATIENT INSTRUCTIONS
Pneumonia vaccine today.    ---    Refill of Eliquis sent to [facility].    TRY Flonase nasal spray - 2 sprays/nostril daily - use consistently for best results.

## 2017-12-29 NOTE — MR AVS SNAPSHOT
After Visit Summary   12/29/2017    Westley Ness    MRN: [id number]           Patient Information     Date Of Birth          1945        Visit Information        Provider Department      12/29/2017 1:00 PM Josselin Kolb MD [facility]        Today's Diagnoses     Post-nasal drip    -  1    Atrial flutter with rapid ventricular response (H)        Need for vaccination          Care Instructions    Pneumonia vaccine today.    ---    Refill of Eliquis sent to [facility].    TRY Flonase nasal spray - 2 sprays/nostril daily - use consistently for best results.           Follow-ups after your visit        Your next 10 appointments already scheduled     Jan 11, 2018  8:30 AM CST   [facility] EP RETURN with MARCIO Aviles CNP   [facility])    [address] [place] [id number] [phone]            Jan 11, 2018  9:00 AM CST   ICD Check with CARD DCR2   [facility])    [address] [place] [id number] [phone]              Who to contact     If you have questions or need follow up information about today's clinic visit or your schedule please contact [facility] directly at [phone].  Normal or non-critical lab and imaging results will be communicated to you by MyChart, letter or phone within 4 business days after the clinic has received the results. If you do not hear from us within 7 days, please contact the clinic through MyChart or phone. If you have a critical or abnormal lab result, we will notify you by phone as soon as possible.  Submit refill requests through [employer] or call your pharmacy and they will forward the refill request to us. Please allow 3 business days for your refill to be completed.          Additional Information About Your Visit        MyChart Information  "    [employer] lets you send messages to your doctor, view your test results, renew your prescriptions, schedule appointments and more. To sign up, go to [email or web address] . Click on \"Log in\" on the left side of the screen, which will take you to the Welcome page. Then click on \"Sign up Now\" on the right side of the page.     You will be asked to enter the access code listed below, as well as some personal information. Please follow the directions to create your username and password.     Your access code is: [email or web address]  Expires: 3/12/2018  1:40 PM     Your access code will  in 90 days. If you need help or a new code, please call your [place] clinic or [phone].        Care EveryWhere ID     This is your Care EveryWhere ID. This could be used by other organizations to access your [place] medical records  [id number]        Your Vitals Were     Pulse Temperature Height Pulse Oximetry BMI (Body Mass Index)       72 98.2  F (36.8  C) (Oral) 5' 10\" (1.778 m) 96% 26.59 kg/m2        Blood Pressure from Last 3 Encounters:   17 118/60   17 112/51   17 (!) 87/45    Weight from Last 3 Encounters:   17 185 lb 4.8 oz (84.1 kg)   17 184 lb 4.9 oz (83.6 kg)   17 178 lb (80.7 kg)              We Performed the Following     Pneumococcal vaccine 23 valent PPSV23  (Pneumovax) [27223]          Today's Medication Changes          These changes are accurate as of: 17  1:42 PM.  If you have any questions, ask your nurse or doctor.               Start taking these medicines.        Dose/Directions    fluticasone 50 MCG/ACT spray   Commonly known as:  FLONASE   Used for:  Post-nasal drip   Started by:  Josselin Kolb MD        Dose:  2 spray   Spray 2 sprays into both nostrils daily   Quantity:  1 Bottle   Refills:  11            Where to get your medicines      These medications were sent to [address], " [facility] [address]     Phone:  [phone]     apixaban ANTICOAGULANT 2.5 MG tablet    fluticasone 50 MCG/ACT spray                Primary Care Provider Office Phone # Fax #    Josselin Kolb [phone]       [address]  [facility] [address]        Equal Access to Services     MUSHAYNA NICK : Hadii aad ku hadasho Soomaali, waaxda luqadaha, qaybta kaalmada adeegyada, waxay idiin hayaan adeeg khmattsh la'aan . So [facility] [phone].    ATENCIÓN: Si habla español, tiene a jacome disposición servicios gratuitos de asistencia lingüística. Llame al [phone].    We comply with applicable federal civil rights laws and [place] laws. We do not discriminate on the basis of race, color, national origin, age, disability, sex, sexual orientation, or gender identity.            Thank you!     Thank you for choosing [facility]  for your care. Our goal is always to provide you with excellent care. Hearing back from our patients is one way we can continue to improve our services. Please take a few minutes to complete the written survey that you may receive in the mail after your visit with us. Thank you!             Your Updated Medication List - Protect others around you: Learn how to safely use, store and throw away your medicines at www.disposemymeds.org.          This list is accurate as of: 12/29/17  1:42 PM.  Always use your most recent med list.                   Brand Name Dispense Instructions for use Diagnosis    acetaminophen 325 MG tablet    TYLENOL    60 tablet    Take 2 tablets (650 mg) by mouth 4 times daily    Closed nondisplaced fracture of right patella, unspecified fracture morphology, initial encounter, Elbow fracture, right, closed, initial encounter       apixaban ANTICOAGULANT 2.5 MG tablet    ELIQUIS    60 tablet    Take 1 tablet (2.5 mg) by mouth 2 times daily    Atrial flutter with rapid ventricular response (H)       carvedilol 6.25 MG tablet    COREG     60 tablet    Take 1 tablet (6.25 mg) by mouth 2 times daily (with meals) Take after dialysis    NSTEMI (non-ST elevated myocardial infarction) (H), Mild CAD       clopidogrel 75 MG tablet    PLAVIX    30 tablet    Take 1 tablet (75 mg) by mouth daily To protect your stent(s).  Do not stop taking unless directed by cardiology.    Coronary artery disease involving native coronary artery of native heart without angina pectoris       fluticasone 50 MCG/ACT spray    FLONASE    1 Bottle    Spray 2 sprays into both nostrils daily    Post-nasal drip       isosorbide mononitrate 30 MG 24 hr tablet    IMDUR    90 tablet    Take 0.5 tablets (15 mg) by mouth daily    NSTEMI (non-ST elevated myocardial infarction) (H)       Levothyroxine Sodium 50 MCG Caps     90 capsule    Take 50 mcg by mouth daily    Hypothyroidism, unspecified type       lisinopril 2.5 MG tablet    PRINIVIL/Zestril    180 tablet    Take one tablet after dialysis. Take second tablet at bedtime.    NSTEMI (non-ST elevated myocardial infarction) (H)       loperamide 2 MG capsule    IMODIUM     Take 1 mg by mouth as needed        mexiletine 150 MG capsule    MEXITIL    180 capsule    Take 1 capsule (150 mg) by mouth 2 times daily    Tachycardia       nitroGLYcerin 0.4 MG sublingual tablet    NITROSTAT    25 tablet    1 TAB EVERY 5 MIN AS NEEDED, UP TO 3 PER EPISODE    Angina pectoris (H)       pantoprazole 40 MG EC tablet    PROTONIX    30 tablet    Take 1 tablet (40 mg) by mouth every morning    Gastroesophageal reflux disease, esophagitis presence not specified       pravastatin 40 MG tablet    PRAVACHOL    30 tablet    Take 1 tablet (40 mg) by mouth daily    Coronary artery disease involving native coronary artery of native heart without angina pectoris       psyllium 0.52 G capsule      Take 1 capsule by mouth daily as needed        TRIPHROCAPS 1 MG capsule   Generic drug:  NEPHROCAPS     90 capsule    TAKE 1 CAPSULE BY MOUTH EVERY EVENING    ESRD (end stage  renal disease) on dialysis (H)       ZANTAC PO      Take 75 mg by mouth nightly as needed for heartburn

## 2018-01-05 DIAGNOSIS — I25.10 MILD CAD: ICD-10-CM

## 2018-01-05 DIAGNOSIS — I21.4 NSTEMI (NON-ST ELEVATED MYOCARDIAL INFARCTION) (H): ICD-10-CM

## 2018-01-06 NOTE — TELEPHONE ENCOUNTER
Requested Prescriptions   Pending Prescriptions Disp Refills     carvedilol (COREG) 6.25 MG tablet [Pharmacy Med Name: CARVEDILOL 6.25 MG TAB 6.25 TAB]  Last Written Prescription Date:  12/12/17  Last Fill Quantity: 60 tablet,  # refills: 1   Last Office Visit with [facility], P or [facility] prescribing provider:  12/29/17   Future Office Visit:      180 tablet 3     Sig: TAKE 1 TABLET BY MOUTH TWICE DAILY WITH MEALS AFTER DIALYSIS    Beta-Blockers Protocol Passed    1/5/2018  3:43 PM       Passed - Blood pressure under 140/90    BP Readings from Last 3 Encounters:   12/29/17 118/60   12/19/17 112/51   12/12/17 (!) 87/45          Passed - Patient is age 6 or older       Passed - Recent or future visit with authorizing provider's specialty    Patient had office visit in the last year or has a visit in the next 30 days with authorizing provider.  See chart review.

## 2018-01-08 RX ORDER — CARVEDILOL 6.25 MG/1
TABLET ORAL
Qty: 180 TABLET | Refills: 3 | Status: SHIPPED | OUTPATIENT
Start: 2018-01-08 | End: 2019-01-03

## 2018-01-09 PROBLEM — I48.92 ATRIAL FLUTTER WITH RAPID VENTRICULAR RESPONSE (H): Status: RESOLVED | Noted: 2017-12-08 | Resolved: 2018-01-09

## 2018-01-11 ENCOUNTER — ALLIED HEALTH/NURSE VISIT (OUTPATIENT)
Dept: CARDIOLOGY | Facility: CLINIC | Age: 73
End: 2018-01-11
Payer: MEDICARE

## 2018-01-11 ENCOUNTER — OFFICE VISIT (OUTPATIENT)
Dept: CARDIOLOGY | Facility: CLINIC | Age: 73
End: 2018-01-11
Attending: INTERNAL MEDICINE
Payer: MEDICARE

## 2018-01-11 VITALS
SYSTOLIC BLOOD PRESSURE: 123 MMHG | DIASTOLIC BLOOD PRESSURE: 71 MMHG | HEIGHT: 72 IN | HEART RATE: 73 BPM | BODY MASS INDEX: 25.12 KG/M2 | WEIGHT: 185.5 LBS

## 2018-01-11 DIAGNOSIS — K21.9 GASTROESOPHAGEAL REFLUX DISEASE, ESOPHAGITIS PRESENCE NOT SPECIFIED: ICD-10-CM

## 2018-01-11 DIAGNOSIS — I48.0 PAROXYSMAL ATRIAL FIBRILLATION (H): ICD-10-CM

## 2018-01-11 DIAGNOSIS — I48.92 ATRIAL FLUTTER WITH RAPID VENTRICULAR RESPONSE (H): ICD-10-CM

## 2018-01-11 DIAGNOSIS — I25.5 CARDIOMYOPATHY, ISCHEMIC: ICD-10-CM

## 2018-01-11 DIAGNOSIS — Z95.810 ICD (IMPLANTABLE CARDIOVERTER-DEFIBRILLATOR) IN PLACE: Primary | ICD-10-CM

## 2018-01-11 DIAGNOSIS — I25.10 CORONARY ARTERY DISEASE INVOLVING NATIVE CORONARY ARTERY OF NATIVE HEART WITHOUT ANGINA PECTORIS: Primary | ICD-10-CM

## 2018-01-11 PROCEDURE — 93282 PRGRMG EVAL IMPLANTABLE DFB: CPT | Performed by: INTERNAL MEDICINE

## 2018-01-11 PROCEDURE — 99214 OFFICE O/P EST MOD 30 MIN: CPT | Mod: 25 | Performed by: NURSE PRACTITIONER

## 2018-01-11 PROCEDURE — 93000 ELECTROCARDIOGRAM COMPLETE: CPT | Performed by: NURSE PRACTITIONER

## 2018-01-11 RX ORDER — PANTOPRAZOLE SODIUM 40 MG/1
40 TABLET, DELAYED RELEASE ORAL EVERY MORNING
Qty: 30 TABLET | Refills: 10 | Status: SHIPPED | OUTPATIENT
Start: 2018-01-11 | End: 2018-12-04

## 2018-01-11 NOTE — MR AVS SNAPSHOT
After Visit Summary   1/11/2018    Westley Ness    MRN: [id number]           Patient Information     Date Of Birth          1945        Visit Information        Provider Department      1/11/2018 8:30 AM Lacy Taylor APRN CNP [facility]        Today's Diagnoses     Coronary artery disease involving native coronary artery of native heart without angina pectoris    -  1    Paroxysmal atrial fibrillation (H)        Atrial flutter with rapid ventricular response (H)          Care Instructions    No changes  If you have bleeding concerns we will need to switch Eliquis to warfarin  See  with labs next month          Follow-ups after your visit        Future tests that were ordered for you today     Open Future Orders        Priority Expected Expires Ordered    Lipid Profile Routine 2/10/2018 1/11/2019 1/11/2018    ALT Routine 2/10/2018 1/11/2019 1/11/2018    TSH Routine 2/11/2018 1/11/2019 1/11/2018            Who to contact     If you have questions or need follow up information about today's clinic visit or your schedule please contact [facility] directly at [phone].  Normal or non-critical lab and imaging results will be communicated to you by [employer], letter or phone within 4 business days after the clinic has received the results. If you do not hear from us within 7 days, please contact the clinic through [employer] or phone. If you have a critical or abnormal lab result, we will notify you by phone as soon as possible.  Submit refill requests through [employer] or call your pharmacy and they will forward the refill request to us. Please allow 3 business days for your refill to be completed.          Additional Information About Your Visit        [employer] Information     [employer] lets you send messages to your doctor, view your test results, renew your prescriptions, schedule appointments and more. To sign up,  "go to [email or web address] . Click on \"Log in\" on the left side of the screen, which will take you to the Welcome page. Then click on \"Sign up Now\" on the right side of the page.     You will be asked to enter the access code listed below, as well as some personal information. Please follow the directions to create your username and password.     Your access code is: [email or web address]  Expires: 3/12/2018  1:40 PM     Your access code will  in 90 days. If you need help or a new code, please call your [place] clinic or [phone].        Care EveryWhere ID     This is your Care EveryWhere ID. This could be used by other organizations to access your [place] medical records  [id number]        Your Vitals Were     Pulse Height BMI (Body Mass Index)             73 1.829 m (6') 25.16 kg/m2          Blood Pressure from Last 3 Encounters:   18 123/71   17 118/60   17 112/51    Weight from Last 3 Encounters:   18 84.1 kg (185 lb 8 oz)   17 84.1 kg (185 lb 4.8 oz)   17 83.6 kg (184 lb 4.9 oz)              We Performed the Following     EKG 12-lead complete w/read - Clinics (performed today)     EKG 12-lead complete w/read - Clinics (performed today)     Follow-Up with Cardiac Advanced Practice Provider        Primary Care Provider Office Phone # Fax #    Josselin Kolb [phone]       [address] [place] [id number]        Equal Access to Services     LAZARO ROMERO : Hadjessie Jeff, enmanuel maher, qapurnima mccarty. So [facility] [phone].    ATENCIÓN: Si habla español, tiene a jacome disposición servicios gratuitos de asistencia lingüística. Ava al [phone].    We comply with applicable federal civil rights laws and [place] laws. We do not discriminate on the basis of race, color, national origin, age, disability, sex, sexual orientation, or gender identity.            Thank you!     " Thank you for choosing [facility]  for your care. Our goal is always to provide you with excellent care. Hearing back from our patients is one way we can continue to improve our services. Please take a few minutes to complete the written survey that you may receive in the mail after your visit with us. Thank you!             Your Updated Medication List - Protect others around you: Learn how to safely use, store and throw away your medicines at www.disposemymeds.org.          This list is accurate as of: 1/11/18  9:04 AM.  Always use your most recent med list.                   Brand Name Dispense Instructions for use Diagnosis    acetaminophen 325 MG tablet    TYLENOL    60 tablet    Take 2 tablets (650 mg) by mouth 4 times daily    Closed nondisplaced fracture of right patella, unspecified fracture morphology, initial encounter, Elbow fracture, right, closed, initial encounter       apixaban ANTICOAGULANT 2.5 MG tablet    ELIQUIS    60 tablet    Take 1 tablet (2.5 mg) by mouth 2 times daily    Atrial flutter with rapid ventricular response (H)       carvedilol 6.25 MG tablet    COREG    180 tablet    TAKE 1 TABLET BY MOUTH TWICE DAILY WITH MEALS AFTER DIALYSIS    NSTEMI (non-ST elevated myocardial infarction) (H), Mild CAD       clopidogrel 75 MG tablet    PLAVIX    30 tablet    Take 1 tablet (75 mg) by mouth daily To protect your stent(s).  Do not stop taking unless directed by cardiology.    Coronary artery disease involving native coronary artery of native heart without angina pectoris       fluticasone 50 MCG/ACT spray    FLONASE    1 Bottle    Spray 2 sprays into both nostrils daily    Post-nasal drip       isosorbide mononitrate 30 MG 24 hr tablet    IMDUR    90 tablet    Take 0.5 tablets (15 mg) by mouth daily    NSTEMI (non-ST elevated myocardial infarction) (H)       Levothyroxine Sodium 50 MCG Caps     90 capsule    Take 50 mcg by mouth daily    Hypothyroidism,  unspecified type       lisinopril 2.5 MG tablet    PRINIVIL/Zestril    180 tablet    Take one tablet after dialysis. Take second tablet at bedtime.    NSTEMI (non-ST elevated myocardial infarction) (H)       loperamide 2 MG capsule    IMODIUM     Take 1 mg by mouth as needed        mexiletine 150 MG capsule    MEXITIL    180 capsule    Take 1 capsule (150 mg) by mouth 2 times daily    Tachycardia       nitroGLYcerin 0.4 MG sublingual tablet    NITROSTAT    25 tablet    1 TAB EVERY 5 MIN AS NEEDED, UP TO 3 PER EPISODE    Angina pectoris (H)       pantoprazole 40 MG EC tablet    PROTONIX    30 tablet    Take 1 tablet (40 mg) by mouth every morning    Gastroesophageal reflux disease, esophagitis presence not specified       pravastatin 40 MG tablet    PRAVACHOL    30 tablet    Take 1 tablet (40 mg) by mouth daily    Coronary artery disease involving native coronary artery of native heart without angina pectoris       psyllium 0.52 G capsule      Take 1 capsule by mouth daily as needed        TRIPHROCAPS 1 MG capsule   Generic drug:  NEPHROCAPS     90 capsule    TAKE 1 CAPSULE BY MOUTH EVERY EVENING    ESRD (end stage renal disease) on dialysis (H)       ZANTAC PO      Take 75 mg by mouth nightly as needed for heartburn

## 2018-01-11 NOTE — TELEPHONE ENCOUNTER
"Requested Prescriptions   Pending Prescriptions Disp Refills     pantoprazole (PROTONIX) 40 MG EC tablet  Last Written Prescription Date:  01/17/2017  Last Fill Quantity: 30,  # refills: 10   Last Office Visit with G, P or [facility] prescribing provider:  12/29/2017   Future Office Visit:      30 tablet 10     Sig: Take 1 tablet (40 mg) by mouth every morning    PPI Protocol Passed    1/11/2018 10:07 AM       Passed - Not on Clopidogrel (unless Pantoprazole ordered)       Passed - No diagnosis of osteoporosis on record       Passed - Recent or future visit with authorizing provider's specialty    Patient had office visit in the last year or has a visit in the next 30 days with authorizing provider.  See \"Patient Info\" tab in inbasket, or \"Choose Columns\" in Meds & Orders section of the refill encounter.              Passed - Patient is age 18 or older          "

## 2018-01-11 NOTE — PROGRESS NOTES
[employer] Lumax (S) ICD Device Check    : 0 %  Mode: VVI 40        Underlying Rhythm: SR in the 70s  Heart Rate: stable with excellent variability  Sensing: WNL    Pacing Threshold: WNL    Impedance: WNL  Battery Status: MOL2 with 25% remaining  Device Site: remains well healed  Ventricular Arrhythmia: none  ATP: 0    Shocks: 0  Setting Change: no changes made today    Care Plan: Return to Q3 month remote checks on 5/1/2018. Migdalia

## 2018-01-11 NOTE — LETTER
1/11/2018    Josselin Kolb MD  [address] [facility] [address]    RE: Westley Ness       Dear Colleague,    I had the pleasure of seeing Westley Ness in the [facility].    Westley Ness  MRN: [id number]  Male, 72 year old, 1945    HPI    Mr. Ness is a delightful 72-year-old gentleman who was admitted on 06/07/2017 with symptoms of tachycardia and fatigue.  He was noted to be in atrial flutter with rapid ventricular response. EP was consulted. Device interrogation confirmed onset of arrhythmia (06/07 at 4:30AM; less than 48 hrs from admission).  He was started on anticoagulation and was referred for electrophysiology study on 6/8/17 and was found to have a typical counterclockwise cavotricuspid isthmus dependent flutter which was successfully ablated. He was placed on Eliquis and his aspirin was discontinued. Art's past medical history is notable for previous MI with multiple coronary stentings (in 07/2016 he had OM1 and LAD disease with PTCA and FOREST to proximal OM1; in 11/2016 he had PTCA and cutting balloon to ostial and proximal 1st OM [ISR]; more recently, in 02/2017, he had arthrectomy/Rotablator, LAD, with mid-LAD FOREST placement and ostial OM1 ISR with FOREST placement), severe ischemic cardiomyopathy with ejection fraction 20%-25% (status post ICD), and ESRD (dialysis 3 times a week).  He is to continue with Plavix and Eliquis for at least 4 weeks.     There was admitted on December 8 with recurrent atrial flutter. His first ablation was in June 2017. He presented with a rate in the 120s and underwent a VIET with a repeat ablation on December 11. He was restarted on Eliquis 2.5 mg b.i.d. Although he is on hemodialysis, he has tolerated Eliquis without bleeding difficulties. The plan was to change the patient to refrain however, he just refilled his Eliquis. His right femoral access site is completely healed. Twelve-lead EKG shows sinus rhythm. Currently  denies chest discomfort, shortness of breath, lightheadedness, dizziness. He still is suffering from fatigue. This has been ongoing for 2 years. He has been on dialysis for 13 years and has chronic anemia secondary to kidney disease.    Review of systems and past medical history noted below.    Plan:  1.  Recurrent atrial flutter with rapid ventricular response  Repeat ablation has terminated the rhythm. He remains on Eliquis.     2. Coronary artery disease with multiple PCI's  On Plavix, Coreg. Aspirin was discontinued. He is intolerant of statins.    3. End-stage renal disease with hemodialysis and chronic anemia. Patient is dialysis 3 times a week.    4. Hypothyroidism  Patient's on levothyroxin however, TSH has not recently been completed. This will be completed with his next lab draw.    5. Type 2 diabetes mellitus, diet controlled    The patient will either follow-up with Dr. Sen and Dr. Mejia next month. I will leave it up to the cardiologist discretion whether or not the patient should remain on our course be changed to warfarin. Again the patient prefers Eliquis.   Thank you for including us in his care    Lacy Taylor NP      Orders Placed This Encounter   Procedures     Lipid Profile     ALT     TSH     EKG 12-lead complete w/read - Clinics (performed today)     EKG 12-lead complete w/read - Clinics (performed today)       No orders of the defined types were placed in this encounter.      There are no discontinued medications.      Encounter Diagnoses   Name Primary?     Paroxysmal atrial fibrillation (H)      Coronary artery disease involving native coronary artery of native heart without angina pectoris Yes     Atrial flutter with rapid ventricular response (H)        CURRENT MEDICATIONS:  Current Outpatient Prescriptions   Medication Sig Dispense Refill     carvedilol (COREG) 6.25 MG tablet TAKE 1 TABLET BY MOUTH TWICE DAILY WITH MEALS AFTER DIALYSIS 180 tablet 3     fluticasone (FLONASE) 50 MCG/ACT  spray Spray 2 sprays into both nostrils daily 1 Bottle 11     apixaban ANTICOAGULANT (ELIQUIS) 2.5 MG tablet Take 1 tablet (2.5 mg) by mouth 2 times daily 60 tablet 3     Levothyroxine Sodium 50 MCG CAPS Take 50 mcg by mouth daily 90 capsule 1     pravastatin (PRAVACHOL) 40 MG tablet Take 1 tablet (40 mg) by mouth daily 30 tablet 0     nitroGLYcerin (NITROSTAT) 0.4 MG sublingual tablet 1 TAB EVERY 5 MIN AS NEEDED, UP TO 3 PER EPISODE 25 tablet 0     RaNITidine HCl (ZANTAC PO) Take 75 mg by mouth nightly as needed for heartburn       TRIPHROCAPS 1 MG capsule TAKE 1 CAPSULE BY MOUTH EVERY EVENING 90 capsule 3     clopidogrel (PLAVIX) 75 MG tablet Take 1 tablet (75 mg) by mouth daily To protect your stent(s).  Do not stop taking unless directed by cardiology. 30 tablet 11     lisinopril (PRINIVIL/ZESTRIL) 2.5 MG tablet Take one tablet after dialysis. Take second tablet at bedtime. 180 tablet 3     mexiletine (MEXITIL) 150 MG capsule Take 1 capsule (150 mg) by mouth 2 times daily 180 capsule 3     isosorbide mononitrate (IMDUR) 30 MG 24 hr tablet Take 0.5 tablets (15 mg) by mouth daily 90 tablet 3     acetaminophen (TYLENOL) 325 MG tablet Take 2 tablets (650 mg) by mouth 4 times daily 60 tablet 0     loperamide (IMODIUM) 2 MG capsule Take 1 mg by mouth as needed        psyllium 0.52 G capsule Take 1 capsule by mouth daily as needed        pantoprazole (PROTONIX) 40 MG EC tablet Take 1 tablet (40 mg) by mouth every morning 30 tablet 10       ALLERGIES     Allergies   Allergen Reactions     Contrast Dye Hives     Does fine if he uses benadryl prior.     No Clinical Screening - See Comments      Green beans - Diarrhea.    Topical antibiotic - name unknown - caused swelling of the penis.       PAST MEDICAL HISTORY:  Past Medical History:   Diagnosis Date     Acid reflux disease      CAD (coronary artery disease)     s/p multiple NSTEMIs and PCI's     ESRD on hemodialysis (H)     MWF     Hypothyroidism      Ischemic  cardiomyopathy     EF 25-30%, s/p AICD     NSTEMI (non-ST elevated myocardial infarction) (H)     multiple     Type 2 diabetes mellitus (H)     diet-controlled     Typical atrial flutter (H)        PAST SURGICAL HISTORY:  Past Surgical History:   Procedure Laterality Date     CHOLECYSTECTOMY, OPEN  2013     ELBOW SURGERY Left 2008    ORIF - plates still in place     H ABLATION ATRIAL FLUTTER  06/08/2017, 12/11/17     HC LEFT HEART CATHETERIZATION  7/14/2016     HC LEFT HEART CATHETERIZATION  9/21/2016     IMPLANT VENTRICULAR DEVICE  08/15/2011     TONSILLECTOMY & ADENOIDECTOMY       VASCULAR SURGERY  2004, 2010    LUE fistulas (upper and lower); upper one is functional       FAMILY HISTORY:  Family History   Problem Relation Age of Onset     CEREBROVASCULAR DISEASE Mother      later in life     Hypertension Father      Bladder Cancer Father      Myocardial Infarction Paternal Grandmother      DIABETES No family hx of      Prostate Cancer No family hx of      Colon Cancer No family hx of        SOCIAL HISTORY:  Social History     Social History     Marital status: Single     Spouse name: N/A     Number of children: N/A     Years of education: N/A     Occupational History     Retired - CPA      Social History Main Topics     Smoking status: Former Smoker     Packs/day: 2.00     Years: 35.00     Types: Cigarettes     Quit date: 11/1/1996     Smokeless tobacco: Never Used     Alcohol use 0.0 oz/week     0 Standard drinks or equivalent per week      Comment: rare     Drug use: No     Sexual activity: No     Other Topics Concern     Parent/Sibling W/ Cabg, Mi Or Angioplasty Before 65f 55m? No     Caffeine Concern No     Sleep Concern No     Special Diet Yes     Exercise Yes     Walking     Seat Belt Yes     Social History Narrative    Single.    No kids.     Maria Dolores Pabon (friend- healthcare POA), Joaquina Nari (friend - healthcare POA)    No formal exercise.        Review of Systems:  Skin:  Negative       Eyes:  Positive  for glasses;cataracts pt reports early stage cataracts; no change within the previous year.  ENT:  Positive for postnasal drainage occ  Respiratory:  Positive for dyspnea on exertion pt reports occ.   Cardiovascular:    chest pain;Positive for;edema;fatigue pt reports mild chest pain on occ.  Gastroenterology: Positive for heartburn    Genitourinary:         Musculoskeletal:  Positive for arthritis;joint pain left knee pain  Neurologic:  Positive for numbness or tingling of hands due to pinched nerve.  Psychiatric:  Negative      Heme/Lymph/Imm:  Positive for allergies    Endocrine:  Positive for thyroid disorder takes thyroid pill    Physical Exam:  Vitals: /71  Pulse 73  Ht 1.829 m (6')  Wt 84.1 kg (185 lb 8 oz)  BMI 25.16 kg/m2    Constitutional:           Skin:  warm and dry to the touch   ICD incision in the right infraclavicular area was well-healed      Head:  normocephalic, no masses or lesions        Eyes:  pupils equal and round        Lymph:      ENT:  no pallor or cyanosis, dentition good        Neck:           Respiratory:  normal breath sounds, clear to auscultation, normal A-P diameter, normal symmetry, normal respiratory excursion, no use of accessory muscles         Cardiac: regular rhythm;normal S1 and S2       RUSB;systolic ejection murmur;grade 1        not assessed this visit                                   Left AV fistula, alexander heard over left hand and infraclavicular region    GI:  abdomen soft;BS normoactive        Extremities and Muscular Skeletal:  no deformities, clubbing, cyanosis, erythema observed;no edema         RT leg in brace     Neurological:  no gross motor deficits        Psych:  Alert and Oriented x 3      Thank you for allowing me to participate in the care of your patient.    Sincerely,     Lacy Taylor, RULA, APRN CNP     [facility]

## 2018-01-11 NOTE — PATIENT INSTRUCTIONS
No changes  If you have bleeding concerns we will need to switch Eliquis to warfarin  See  with labs next month

## 2018-01-11 NOTE — MR AVS SNAPSHOT
After Visit Summary   1/11/2018    Westley Ness    MRN: [id number]           Patient Information     Date Of Birth          1945        Visit Information        Provider Department      1/11/2018 9:00 AM STEPHIE WINTER2 [facility]        Today's Diagnoses     ICD (implantable cardioverter-defibrillator) in place    -  1    Cardiomyopathy, ischemic           Follow-ups after your visit        Your next 10 appointments already scheduled     Feb 27, 2018  9:50 AM CST   LAB with CARD LAB   [facility] ([facility])    [address] [place] [address]   [phone]           Please do not eat 10-12 hours before your appointment if you are coming in fasting for labs on lipids, cholesterol, or glucose (sugar). This does not apply to pregnant women. Water, hot tea and black coffee (with nothing added) are okay. Do not drink other fluids, diet soda or chew gum.            Feb 27, 2018 10:45 AM CST   [facility] EP RETURN with Jono Mejia MD   [facility] ([facility])    [address] [place] [address]   [phone]            May 01, 2018  4:30 PM CDT   Remote ICD Check with STEPHIE WINTER2   [facility] ([facility])    [address] [place] [address]   [phone]           This appointment is for a remote check of your debrillator.  This is not an appointment at the office.              Future tests that were ordered for you today     Open Future Orders        Priority Expected Expires Ordered    Lipid Profile Routine 2/10/2018 1/11/2019 1/11/2018    ALT Routine 2/10/2018 1/11/2019 1/11/2018    TSH Routine 2/11/2018 1/11/2019 1/11/2018            Who to contact     If you have questions or need follow up information about today's clinic visit or your schedule please  "contact [facility] directly at [phone].  Normal or non-critical lab and imaging results will be communicated to you by MyChart, letter or phone within 4 business days after the clinic has received the results. If you do not hear from us within 7 days, please contact the clinic through MyChart or phone. If you have a critical or abnormal lab result, we will notify you by phone as soon as possible.  Submit refill requests through [employer] or call your pharmacy and they will forward the refill request to us. Please allow 3 business days for your refill to be completed.          Additional Information About Your Visit        [employer] Account[employer] Information     [employer] lets you send messages to your doctor, view your test results, renew your prescriptions, schedule appointments and more. To sign up, go to [email or web address] . Click on \"Log in\" on the left side of the screen, which will take you to the Welcome page. Then click on \"Sign up Now\" on the right side of the page.     You will be asked to enter the access code listed below, as well as some personal information. Please follow the directions to create your username and password.     Your access code is: [email or web address]  Expires: 3/12/2018  1:40 PM     Your access code will  in 90 days. If you need help or a new code, please call your [place] clinic or [phone].        Care EveryWhere ID     This is your Care EveryWhere ID. This could be used by other organizations to access your [place] medical records  [id number]         Blood Pressure from Last 3 Encounters:   18 123/71   17 118/60   17 112/51    Weight from Last 3 Encounters:   18 84.1 kg (185 lb 8 oz)   17 84.1 kg (185 lb 4.8 oz)   17 83.6 kg (184 lb 4.9 oz)              We Performed the Following     ICD DEVICE PROGRAMMING EVAL, SINGLE LEAD ICD (66590)        Primary Care Provider Office Phone # Fax #    Josselin Kolb MD " [phone]       [address] [facility] [address]        Equal Access to Services     LAZARO ROMERO : Hadjessie fede monge randee Jeff, wajessicada lufarnaz, qaybta kamerlyda jw, purnima maldonadohandy alma. So [facility] [phone].    ATENCIÓN: Si habla español, tiene a jacome disposición servicios gratuitos de asistencia lingüística. Llame al [phone].    We comply with applicable federal civil rights laws and [place] laws. We do not discriminate on the basis of race, color, national origin, age, disability, sex, sexual orientation, or gender identity.            Thank you!     Thank you for choosing [facility]  for your care. Our goal is always to provide you with excellent care. Hearing back from our patients is one way we can continue to improve our services. Please take a few minutes to complete the written survey that you may receive in the mail after your visit with us. Thank you!             Your Updated Medication List - Protect others around you: Learn how to safely use, store and throw away your medicines at www.disposemymeds.org.          This list is accurate as of: 1/11/18  9:40 AM.  Always use your most recent med list.                   Brand Name Dispense Instructions for use Diagnosis    acetaminophen 325 MG tablet    TYLENOL    60 tablet    Take 2 tablets (650 mg) by mouth 4 times daily    Closed nondisplaced fracture of right patella, unspecified fracture morphology, initial encounter, Elbow fracture, right, closed, initial encounter       apixaban ANTICOAGULANT 2.5 MG tablet    ELIQUIS    60 tablet    Take 1 tablet (2.5 mg) by mouth 2 times daily    Atrial flutter with rapid ventricular response (H)       carvedilol 6.25 MG tablet    COREG    180 tablet    TAKE 1 TABLET BY MOUTH TWICE DAILY WITH MEALS AFTER DIALYSIS    NSTEMI (non-ST elevated myocardial infarction) (H), Mild CAD       clopidogrel 75 MG tablet    PLAVIX    30  tablet    Take 1 tablet (75 mg) by mouth daily To protect your stent(s).  Do not stop taking unless directed by cardiology.    Coronary artery disease involving native coronary artery of native heart without angina pectoris       fluticasone 50 MCG/ACT spray    FLONASE    1 Bottle    Spray 2 sprays into both nostrils daily    Post-nasal drip       isosorbide mononitrate 30 MG 24 hr tablet    IMDUR    90 tablet    Take 0.5 tablets (15 mg) by mouth daily    NSTEMI (non-ST elevated myocardial infarction) (H)       Levothyroxine Sodium 50 MCG Caps     90 capsule    Take 50 mcg by mouth daily    Hypothyroidism, unspecified type       lisinopril 2.5 MG tablet    PRINIVIL/Zestril    180 tablet    Take one tablet after dialysis. Take second tablet at bedtime.    NSTEMI (non-ST elevated myocardial infarction) (H)       loperamide 2 MG capsule    IMODIUM     Take 1 mg by mouth as needed        mexiletine 150 MG capsule    MEXITIL    180 capsule    Take 1 capsule (150 mg) by mouth 2 times daily    Tachycardia       nitroGLYcerin 0.4 MG sublingual tablet    NITROSTAT    25 tablet    1 TAB EVERY 5 MIN AS NEEDED, UP TO 3 PER EPISODE    Angina pectoris (H)       pantoprazole 40 MG EC tablet    PROTONIX    30 tablet    Take 1 tablet (40 mg) by mouth every morning    Gastroesophageal reflux disease, esophagitis presence not specified       pravastatin 40 MG tablet    PRAVACHOL    30 tablet    Take 1 tablet (40 mg) by mouth daily    Coronary artery disease involving native coronary artery of native heart without angina pectoris       psyllium 0.52 G capsule      Take 1 capsule by mouth daily as needed        TRIPHROCAPS 1 MG capsule   Generic drug:  NEPHROCAPS     90 capsule    TAKE 1 CAPSULE BY MOUTH EVERY EVENING    ESRD (end stage renal disease) on dialysis (H)       ZANTAC PO      Take 75 mg by mouth nightly as needed for heartburn

## 2018-01-11 NOTE — PROGRESS NOTES
Westley Ness  MRN: [id number]  Male, 72 year old, 1945    HPI    Mr. Ness is a delightful 72-year-old gentleman who was admitted on 06/07/2017 with symptoms of tachycardia and fatigue.  He was noted to be in atrial flutter with rapid ventricular response. EP was consulted. Device interrogation confirmed onset of arrhythmia (06/07 at 4:30AM; less than 48 hrs from admission).  He was started on anticoagulation and was referred for electrophysiology study on 6/8/17 and was found to have a typical counterclockwise cavotricuspid isthmus dependent flutter which was successfully ablated. He was placed on Eliquis and his aspirin was discontinued. Bill's past medical history is notable for previous MI with multiple coronary stentings (in 07/2016 he had OM1 and LAD disease with PTCA and FOREST to proximal OM1; in 11/2016 he had PTCA and cutting balloon to ostial and proximal 1st OM [ISR]; more recently, in 02/2017, he had arthrectomy/Rotablator, LAD, with mid-LAD FOREST placement and ostial OM1 ISR with FOREST placement), severe ischemic cardiomyopathy with ejection fraction 20%-25% (status post ICD), and ESRD (dialysis 3 times a week).  He is to continue with Plavix and Eliquis for at least 4 weeks.     There was admitted on December 8 with recurrent atrial flutter. His first ablation was in June 2017. He presented with a rate in the 120s and underwent a VIET with a repeat ablation on December 11. He was restarted on Eliquis 2.5 mg b.i.d. Although he is on hemodialysis, he has tolerated Eliquis without bleeding difficulties. The plan was to change the patient to refrain however, he just refilled his Eliquis. His right femoral access site is completely healed. Twelve-lead EKG shows sinus rhythm. Currently denies chest discomfort, shortness of breath, lightheadedness, dizziness. He still is suffering from fatigue. This has been ongoing for 2 years. He has been on dialysis for 13 years and has chronic anemia secondary to  kidney disease.    Review of systems and past medical history noted below.    Plan:  1.  Recurrent atrial flutter with rapid ventricular response  Repeat ablation has terminated the rhythm. He remains on Eliquis.     2. Coronary artery disease with multiple PCI's  On Plavix, Coreg. Aspirin was discontinued. He is intolerant of statins.    3. End-stage renal disease with hemodialysis and chronic anemia. Patient is dialysis 3 times a week.    4. Hypothyroidism  Patient's on levothyroxin however, TSH has not recently been completed. This will be completed with his next lab draw.    5. Type 2 diabetes mellitus, diet controlled    The patient will either follow-up with Dr. Sen and Dr. Mejia next month. I will leave it up to the cardiologist discretion whether or not the patient should remain on our course be changed to warfarin. Again the patient prefers Eliquis.   Thank you for including us in his care    Lacy Taylor NP      Orders Placed This Encounter   Procedures     Lipid Profile     ALT     TSH     EKG 12-lead complete w/read - Clinics (performed today)     EKG 12-lead complete w/read - Clinics (performed today)       No orders of the defined types were placed in this encounter.      There are no discontinued medications.      Encounter Diagnoses   Name Primary?     Paroxysmal atrial fibrillation (H)      Coronary artery disease involving native coronary artery of native heart without angina pectoris Yes     Atrial flutter with rapid ventricular response (H)        CURRENT MEDICATIONS:  Current Outpatient Prescriptions   Medication Sig Dispense Refill     carvedilol (COREG) 6.25 MG tablet TAKE 1 TABLET BY MOUTH TWICE DAILY WITH MEALS AFTER DIALYSIS 180 tablet 3     fluticasone (FLONASE) 50 MCG/ACT spray Spray 2 sprays into both nostrils daily 1 Bottle 11     apixaban ANTICOAGULANT (ELIQUIS) 2.5 MG tablet Take 1 tablet (2.5 mg) by mouth 2 times daily 60 tablet 3     Levothyroxine Sodium 50 MCG CAPS Take 50 mcg  by mouth daily 90 capsule 1     pravastatin (PRAVACHOL) 40 MG tablet Take 1 tablet (40 mg) by mouth daily 30 tablet 0     nitroGLYcerin (NITROSTAT) 0.4 MG sublingual tablet 1 TAB EVERY 5 MIN AS NEEDED, UP TO 3 PER EPISODE 25 tablet 0     RaNITidine HCl (ZANTAC PO) Take 75 mg by mouth nightly as needed for heartburn       TRIPHROCAPS 1 MG capsule TAKE 1 CAPSULE BY MOUTH EVERY EVENING 90 capsule 3     clopidogrel (PLAVIX) 75 MG tablet Take 1 tablet (75 mg) by mouth daily To protect your stent(s).  Do not stop taking unless directed by cardiology. 30 tablet 11     lisinopril (PRINIVIL/ZESTRIL) 2.5 MG tablet Take one tablet after dialysis. Take second tablet at bedtime. 180 tablet 3     mexiletine (MEXITIL) 150 MG capsule Take 1 capsule (150 mg) by mouth 2 times daily 180 capsule 3     isosorbide mononitrate (IMDUR) 30 MG 24 hr tablet Take 0.5 tablets (15 mg) by mouth daily 90 tablet 3     acetaminophen (TYLENOL) 325 MG tablet Take 2 tablets (650 mg) by mouth 4 times daily 60 tablet 0     loperamide (IMODIUM) 2 MG capsule Take 1 mg by mouth as needed        psyllium 0.52 G capsule Take 1 capsule by mouth daily as needed        pantoprazole (PROTONIX) 40 MG EC tablet Take 1 tablet (40 mg) by mouth every morning 30 tablet 10       ALLERGIES     Allergies   Allergen Reactions     Contrast Dye Hives     Does fine if he uses benadryl prior.     No Clinical Screening - See Comments      Green beans - Diarrhea.    Topical antibiotic - name unknown - caused swelling of the penis.       PAST MEDICAL HISTORY:  Past Medical History:   Diagnosis Date     Acid reflux disease      CAD (coronary artery disease)     s/p multiple NSTEMIs and PCI's     ESRD on hemodialysis (H)     MWF     Hypothyroidism      Ischemic cardiomyopathy     EF 25-30%, s/p AICD     NSTEMI (non-ST elevated myocardial infarction) (H)     multiple     Type 2 diabetes mellitus (H)     diet-controlled     Typical atrial flutter (H)        PAST SURGICAL  HISTORY:  Past Surgical History:   Procedure Laterality Date     CHOLECYSTECTOMY, OPEN  2013     ELBOW SURGERY Left 2008    ORIF - plates still in place     H ABLATION ATRIAL FLUTTER  06/08/2017, 12/11/17     HC LEFT HEART CATHETERIZATION  7/14/2016     HC LEFT HEART CATHETERIZATION  9/21/2016     IMPLANT VENTRICULAR DEVICE  08/15/2011     TONSILLECTOMY & ADENOIDECTOMY       VASCULAR SURGERY  2004, 2010    LUE fistulas (upper and lower); upper one is functional       FAMILY HISTORY:  Family History   Problem Relation Age of Onset     CEREBROVASCULAR DISEASE Mother      later in life     Hypertension Father      Bladder Cancer Father      Myocardial Infarction Paternal Grandmother      DIABETES No family hx of      Prostate Cancer No family hx of      Colon Cancer No family hx of        SOCIAL HISTORY:  Social History     Social History     Marital status: Single     Spouse name: N/A     Number of children: N/A     Years of education: N/A     Occupational History     Retired - CPA      Social History Main Topics     Smoking status: Former Smoker     Packs/day: 2.00     Years: 35.00     Types: Cigarettes     Quit date: 11/1/1996     Smokeless tobacco: Never Used     Alcohol use 0.0 oz/week     0 Standard drinks or equivalent per week      Comment: rare     Drug use: No     Sexual activity: No     Other Topics Concern     Parent/Sibling W/ Cabg, Mi Or Angioplasty Before 65f 55m? No     Caffeine Concern No     Sleep Concern No     Special Diet Yes     Exercise Yes     Walking     Seat Belt Yes     Social History Narrative    Single.    No kids.     Maria Dolores Pabon (friend- healthcare POA), Joaquina Nair (friend - healthcare POA)    No formal exercise.        Review of Systems:  Skin:  Negative       Eyes:  Positive for glasses;cataracts pt reports early stage cataracts; no change within the previous year.  ENT:  Positive for postnasal drainage occ  Respiratory:  Positive for dyspnea on exertion pt reports occ.    Cardiovascular:    chest pain;Positive for;edema;fatigue pt reports mild chest pain on occ.  Gastroenterology: Positive for heartburn    Genitourinary:         Musculoskeletal:  Positive for arthritis;joint pain left knee pain  Neurologic:  Positive for numbness or tingling of hands due to pinched nerve.  Psychiatric:  Negative      Heme/Lymph/Imm:  Positive for allergies    Endocrine:  Positive for thyroid disorder takes thyroid pill    Physical Exam:  Vitals: /71  Pulse 73  Ht 1.829 m (6')  Wt 84.1 kg (185 lb 8 oz)  BMI 25.16 kg/m2    Constitutional:           Skin:  warm and dry to the touch   ICD incision in the right infraclavicular area was well-healed      Head:  normocephalic, no masses or lesions        Eyes:  pupils equal and round        Lymph:      ENT:  no pallor or cyanosis, dentition good        Neck:           Respiratory:  normal breath sounds, clear to auscultation, normal A-P diameter, normal symmetry, normal respiratory excursion, no use of accessory muscles         Cardiac: regular rhythm;normal S1 and S2       RUSB;systolic ejection murmur;grade 1        not assessed this visit                                   Left AV fistula, alexander heard over left hand and infraclavicular region    GI:  abdomen soft;BS normoactive        Extremities and Muscular Skeletal:  no deformities, clubbing, cyanosis, erythema observed;no edema         RT leg in brace     Neurological:  no gross motor deficits        Psych:  Alert and Oriented x 3        CC  Jono Mejia MD  [address]  [place] [id number]

## 2018-02-06 ENCOUNTER — TELEPHONE (OUTPATIENT)
Dept: CARDIOLOGY | Facility: CLINIC | Age: 73
End: 2018-02-06

## 2018-02-06 NOTE — TELEPHONE ENCOUNTER
Received message from [employer] Web site that no messages have been received from home monitor for 21 days. Pt has reset home monitor but still no messages. Called [employer] and there is interference with his signal. They will send him new home monitor. Called and informed pt.

## 2018-02-27 ENCOUNTER — OFFICE VISIT (OUTPATIENT)
Dept: CARDIOLOGY | Facility: CLINIC | Age: 73
End: 2018-02-27
Payer: MEDICARE

## 2018-02-27 VITALS
HEART RATE: 68 BPM | DIASTOLIC BLOOD PRESSURE: 52 MMHG | BODY MASS INDEX: 25.19 KG/M2 | WEIGHT: 186 LBS | SYSTOLIC BLOOD PRESSURE: 110 MMHG | HEIGHT: 72 IN

## 2018-02-27 DIAGNOSIS — I48.3 TYPICAL ATRIAL FLUTTER (H): Primary | ICD-10-CM

## 2018-02-27 DIAGNOSIS — I25.10 CORONARY ARTERY DISEASE INVOLVING NATIVE CORONARY ARTERY OF NATIVE HEART WITHOUT ANGINA PECTORIS: ICD-10-CM

## 2018-02-27 DIAGNOSIS — I21.4 NSTEMI (NON-ST ELEVATED MYOCARDIAL INFARCTION) (H): ICD-10-CM

## 2018-02-27 DIAGNOSIS — I48.92 ATRIAL FLUTTER WITH RAPID VENTRICULAR RESPONSE (H): ICD-10-CM

## 2018-02-27 DIAGNOSIS — I48.0 PAROXYSMAL ATRIAL FIBRILLATION (H): ICD-10-CM

## 2018-02-27 DIAGNOSIS — R06.02 SHORTNESS OF BREATH: ICD-10-CM

## 2018-02-27 DIAGNOSIS — I25.5 ISCHEMIC CARDIOMYOPATHY: ICD-10-CM

## 2018-02-27 LAB
ALT SERPL W P-5'-P-CCNC: <5 U/L (ref 5–30)
CHOLEST SERPL-MCNC: 104 MG/DL
HDLC SERPL-MCNC: 44 MG/DL
LDLC SERPL CALC-MCNC: 46 MG/DL
NONHDLC SERPL-MCNC: 60 MG/DL
TRIGL SERPL-MCNC: 71 MG/DL
TSH SERPL DL<=0.005 MIU/L-ACNC: 2.92 MU/L (ref 0.4–4)

## 2018-02-27 PROCEDURE — 99214 OFFICE O/P EST MOD 30 MIN: CPT | Performed by: INTERNAL MEDICINE

## 2018-02-27 PROCEDURE — 84460 ALANINE AMINO (ALT) (SGPT): CPT | Performed by: NURSE PRACTITIONER

## 2018-02-27 PROCEDURE — 84443 ASSAY THYROID STIM HORMONE: CPT | Performed by: NURSE PRACTITIONER

## 2018-02-27 PROCEDURE — 80061 LIPID PANEL: CPT | Performed by: NURSE PRACTITIONER

## 2018-02-27 PROCEDURE — 36415 COLL VENOUS BLD VENIPUNCTURE: CPT | Performed by: NURSE PRACTITIONER

## 2018-02-27 NOTE — MR AVS SNAPSHOT
After Visit Summary   2/27/2018    Westley Ness    MRN: [id number]           Patient Information     Date Of Birth          1945        Visit Information        Provider Department      2/27/2018 10:45 AM Jono Mejia MD [facility]        Today's Diagnoses     Typical atrial flutter (H)    -  1    NSTEMI (non-ST elevated myocardial infarction) (H)        Shortness of breath        Coronary artery disease involving native coronary artery of native heart without angina pectoris        Ischemic cardiomyopathy          Care Instructions    - Stop Eliquis    - Start Aspirin (81 mg daily)    - Ziopatch monitor for 2 weeks              Follow-ups after your visit        Additional Services     Follow-Up with Cardiac Advanced Practice Provider                 Your next 10 appointments already scheduled     Mar 01, 2018  9:30 AM CST   ZIOPATCH MONITOR with [facility] Radiology - [facility] ([facility])    [address]  [place] [address]   [phone]            May 01, 2018  4:30 PM CDT   Remote ICD Check with CARD DCR2   [facility] ([facility])    [address] [facility] [address]  [place] [address] [phone]           This appointment is for a remote check of your debrillator.  This is not an appointment at the office.              Future tests that were ordered for you today     Open Future Orders        Priority Expected Expires Ordered    Zio Patch Monitor Routine 3/6/2018 2/27/2019 2/27/2018    Follow-Up with Cardiac Advanced Practice Provider Routine 8/26/2018 2/27/2019 2/27/2018            Who to contact     If you have questions or need follow up information about today's clinic visit or your schedule please contact [facility] directly at [phone].  Normal or non-critical lab and imaging results  "will be communicated to you by MyChart, letter or phone within 4 business days after the clinic has received the results. If you do not hear from us within 7 days, please contact the clinic through [employer] or phone. If you have a critical or abnormal lab result, we will notify you by phone as soon as possible.  Submit refill requests through [employer] or call your pharmacy and they will forward the refill request to us. Please allow 3 business days for your refill to be completed.          Additional Information About Your Visit        [employer] Information     [employer] lets you send messages to your doctor, view your test results, renew your prescriptions, schedule appointments and more. To sign up, go to [email or web address] . Click on \"Log in\" on the left side of the screen, which will take you to the Welcome page. Then click on \"Sign up Now\" on the right side of the page.     You will be asked to enter the access code listed below, as well as some personal information. Please follow the directions to create your username and password.     Your access code is: [email or web address]  Expires: 3/12/2018  1:40 PM     Your access code will  in 90 days. If you need help or a new code, please call your [facility] clinic or [phone].        Care EveryWhere ID     This is your Care EveryWhere ID. This could be used by other organizations to access your [facility] medical records  [id number]        Your Vitals Were     Pulse Height BMI (Body Mass Index)             68 1.829 m (6' 0.01\") 25.22 kg/m2          Blood Pressure from Last 3 Encounters:   18 110/52   18 123/71   17 118/60    Weight from Last 3 Encounters:   18 84.4 kg (186 lb)   18 84.1 kg (185 lb 8 oz)   17 84.1 kg (185 lb 4.8 oz)               Primary Care Provider Office Phone # Fax #    Josselin Kolb [phone]       [address] [facility] [address]        Equal Access to Services     LAZARO ROMERO AH: " Hadii aad ku nayelialinao Sorizwanali, waaxda luqadaha, qaybta kaalmada jw, purnima anne-marienayla marquez. So [facility] [phone].    ATENCIÓN: Si konrad hawyard, tiene a jacome disposición servicios gratuitos de asistencia lingüística. Llame al [phone].    We comply with applicable federal civil rights laws and [place] laws. We do not discriminate on the basis of race, color, national origin, age, disability, sex, sexual orientation, or gender identity.            Thank you!     Thank you for choosing [facility]  for your care. Our goal is always to provide you with excellent care. Hearing back from our patients is one way we can continue to improve our services. Please take a few minutes to complete the written survey that you may receive in the mail after your visit with us. Thank you!             Your Updated Medication List - Protect others around you: Learn how to safely use, store and throw away your medicines at www.disposemymeds.org.          This list is accurate as of 2/27/18 10:59 AM.  Always use your most recent med list.                   Brand Name Dispense Instructions for use Diagnosis    acetaminophen 325 MG tablet    TYLENOL    60 tablet    Take 2 tablets (650 mg) by mouth 4 times daily    Closed nondisplaced fracture of right patella, unspecified fracture morphology, initial encounter, Elbow fracture, right, closed, initial encounter       apixaban ANTICOAGULANT 2.5 MG tablet    ELIQUIS    60 tablet    Take 1 tablet (2.5 mg) by mouth 2 times daily    Atrial flutter with rapid ventricular response (H)       carvedilol 6.25 MG tablet    COREG    180 tablet    TAKE 1 TABLET BY MOUTH TWICE DAILY WITH MEALS AFTER DIALYSIS    NSTEMI (non-ST elevated myocardial infarction) (H), Mild CAD       clopidogrel 75 MG tablet    PLAVIX    30 tablet    Take 1 tablet (75 mg) by mouth daily To protect your stent(s).  Do not stop taking unless directed by cardiology.     Coronary artery disease involving native coronary artery of native heart without angina pectoris       fluticasone 50 MCG/ACT spray    FLONASE    1 Bottle    Spray 2 sprays into both nostrils daily    Post-nasal drip       isosorbide mononitrate 30 MG 24 hr tablet    IMDUR    90 tablet    TAKE 1/2 TABLET BY MOUTH ONCE DAILY    NSTEMI (non-ST elevated myocardial infarction) (H)       Levothyroxine Sodium 50 MCG Caps     90 capsule    Take 50 mcg by mouth daily    Hypothyroidism, unspecified type       lisinopril 2.5 MG tablet    PRINIVIL/Zestril    180 tablet    Take one tablet after dialysis. Take second tablet at bedtime.    NSTEMI (non-ST elevated myocardial infarction) (H)       loperamide 2 MG capsule    IMODIUM     Take 1 mg by mouth as needed        mexiletine 150 MG capsule    MEXITIL    180 capsule    Take 1 capsule (150 mg) by mouth 2 times daily    Tachycardia       nitroGLYcerin 0.4 MG sublingual tablet    NITROSTAT    25 tablet    1 TAB EVERY 5 MIN AS NEEDED, UP TO 3 PER EPISODE    Angina pectoris (H)       pantoprazole 40 MG EC tablet    PROTONIX    30 tablet    Take 1 tablet (40 mg) by mouth every morning    Gastroesophageal reflux disease, esophagitis presence not specified       pravastatin 40 MG tablet    PRAVACHOL    90 tablet    TAKE 1 TABLET (40 MG) BY MOUTH DAILY    Coronary artery disease involving native coronary artery of native heart without angina pectoris       psyllium 0.52 G capsule      Take 1 capsule by mouth daily as needed        TRIPHROCAPS 1 MG capsule   Generic drug:  NEPHROCAPS     90 capsule    TAKE 1 CAPSULE BY MOUTH EVERY EVENING    ESRD (end stage renal disease) on dialysis (H)       ZANTAC PO      Take 75 mg by mouth nightly as needed for heartburn

## 2018-02-27 NOTE — LETTER
"2/27/2018    Josselin Kolb MD  [address] [facility] [address]    RE: Westley Ness       Dear Colleague,    I had the pleasure of seeing Westley Ness in the [facility].    [id number]    Electrophysiology/ Clinic Note         H&P and Plan:         Jono Mejia MD    Physical Exam:  Vitals: /52  Pulse 68  Ht 1.829 m (6' 0.01\")  Wt 84.4 kg (186 lb)  BMI 25.22 kg/m2    Constitutional:  AAO x3.  Pt is in NAD.  HEAD: normocephalic.  SKIN: Skin normal color, texture and turgor with no lesions or eruptions.  Eyes: PERRL, EOMI.  ENT:  Supple, normal JVP. No lymphadenopathy or thyroid enlargement.  Chest:  CTAB.  Cardiac: RRR, normal  S1 and S2.  No murmurs rubs or gallop.    Abdomen:  Normal BS.  Soft, non-tender and non-distended.  No rebound or guarding.    Extremities:  Pedious pulses palpable B/L.  Trace LE edema noticed.   Neurological: Strength and sensation grossly symmetric and intact throughout.       CURRENT MEDICATIONS:  Current Outpatient Prescriptions   Medication Sig Dispense Refill     pravastatin (PRAVACHOL) 40 MG tablet TAKE 1 TABLET (40 MG) BY MOUTH DAILY 90 tablet 3     isosorbide mononitrate (IMDUR) 30 MG 24 hr tablet TAKE 1/2 TABLET BY MOUTH ONCE DAILY 90 tablet 2     pantoprazole (PROTONIX) 40 MG EC tablet Take 1 tablet (40 mg) by mouth every morning 30 tablet 10     carvedilol (COREG) 6.25 MG tablet TAKE 1 TABLET BY MOUTH TWICE DAILY WITH MEALS AFTER DIALYSIS 180 tablet 3     fluticasone (FLONASE) 50 MCG/ACT spray Spray 2 sprays into both nostrils daily 1 Bottle 11     apixaban ANTICOAGULANT (ELIQUIS) 2.5 MG tablet Take 1 tablet (2.5 mg) by mouth 2 times daily 60 tablet 3     Levothyroxine Sodium 50 MCG CAPS Take 50 mcg by mouth daily 90 capsule 1     nitroGLYcerin (NITROSTAT) 0.4 MG sublingual tablet 1 TAB EVERY 5 MIN AS NEEDED, UP TO 3 PER EPISODE 25 tablet 0     RaNITidine HCl (ZANTAC PO) Take 75 mg by mouth nightly as needed for heartburn   "     TRIPHROCAPS 1 MG capsule TAKE 1 CAPSULE BY MOUTH EVERY EVENING 90 capsule 3     clopidogrel (PLAVIX) 75 MG tablet Take 1 tablet (75 mg) by mouth daily To protect your stent(s).  Do not stop taking unless directed by cardiology. 30 tablet 11     lisinopril (PRINIVIL/ZESTRIL) 2.5 MG tablet Take one tablet after dialysis. Take second tablet at bedtime. 180 tablet 3     mexiletine (MEXITIL) 150 MG capsule Take 1 capsule (150 mg) by mouth 2 times daily 180 capsule 3     acetaminophen (TYLENOL) 325 MG tablet Take 2 tablets (650 mg) by mouth 4 times daily 60 tablet 0     loperamide (IMODIUM) 2 MG capsule Take 1 mg by mouth as needed        psyllium 0.52 G capsule Take 1 capsule by mouth daily as needed          ALLERGIES     Allergies   Allergen Reactions     Contrast Dye Hives     Does fine if he uses benadryl prior.     No Clinical Screening - See Comments      Green beans - Diarrhea.    Topical antibiotic - name unknown - caused swelling of the penis.       PAST MEDICAL HISTORY:  Past Medical History:   Diagnosis Date     Acid reflux disease      CAD (coronary artery disease)     s/p multiple NSTEMIs and PCI's     ESRD on hemodialysis (H)     MWF     Hypothyroidism      Ischemic cardiomyopathy     EF 25-30%, s/p AICD     NSTEMI (non-ST elevated myocardial infarction) (H)     multiple     Type 2 diabetes mellitus (H)     diet-controlled     Typical atrial flutter (H)        PAST SURGICAL HISTORY:  Past Surgical History:   Procedure Laterality Date     CHOLECYSTECTOMY, OPEN  2013     ELBOW SURGERY Left 2008    ORIF - plates still in place     H ABLATION ATRIAL FLUTTER  06/08/2017, 12/11/17      LEFT HEART CATHETERIZATION  7/14/2016      LEFT HEART CATHETERIZATION  9/21/2016     IMPLANT VENTRICULAR DEVICE  08/15/2011     TONSILLECTOMY & ADENOIDECTOMY       VASCULAR SURGERY  2004, 2010    LUE fistulas (upper and lower); upper one is functional       FAMILY HISTORY:  Family History   Problem Relation Age of Onset      CEREBROVASCULAR DISEASE Mother      later in life     Hypertension Father      Bladder Cancer Father      Myocardial Infarction Paternal Grandmother      DIABETES No family hx of      Prostate Cancer No family hx of      Colon Cancer No family hx of        SOCIAL HISTORY:  Social History     Social History     Marital status: Single     Spouse name: N/A     Number of children: N/A     Years of education: N/A     Occupational History     Retired - CPA      Social History Main Topics     Smoking status: Former Smoker     Packs/day: 2.00     Years: 35.00     Types: Cigarettes     Quit date: 11/1/1996     Smokeless tobacco: Never Used     Alcohol use 0.0 oz/week     0 Standard drinks or equivalent per week      Comment: rare     Drug use: No     Sexual activity: No     Other Topics Concern     Parent/Sibling W/ Cabg, Mi Or Angioplasty Before 65f 55m? No     Caffeine Concern No     Sleep Concern No     Special Diet Yes     Exercise Yes     Walking     Seat Belt Yes     Social History Narrative    Single.    No kids.     Maria Dolores Pabon (friend- healthcare POA), Joaquina Nair (friend - healthcare POA)    No formal exercise.        Review of Systems:  Skin:  Negative     Eyes:  Positive for glasses;cataracts  ENT:  Positive for postnasal drainage  Respiratory:  Positive for dyspnea on exertion  Cardiovascular:    chest pain;Positive for;edema;fatigue  Gastroenterology: Positive for heartburn  Genitourinary:       Musculoskeletal:  Positive for arthritis;joint pain  Neurologic:  Positive for numbness or tingling of hands  Psychiatric:  Negative    Heme/Lymph/Imm:  Positive for allergies  Endocrine:  Positive for thyroid disorder;diabetes      Recent Lab Results:  LIPID RESULTS:  Lab Results   Component Value Date    CHOL 104 02/27/2018    HDL 44 02/27/2018    LDL 46 02/27/2018    TRIG 71 02/27/2018       LIVER ENZYME RESULTS:  Lab Results   Component Value Date    AST 13 12/18/2017    ALT <5 (L) 02/27/2018       CBC  RESULTS:  Lab Results   Component Value Date    WBC 13.5 (H) 12/19/2017    RBC 3.09 (L) 12/19/2017    HGB 10.3 (L) 12/19/2017    HCT 31.2 (L) 12/19/2017     (H) 12/19/2017    MCH 33.3 (H) 12/19/2017    MCHC 33.0 12/19/2017    RDW 14.4 12/19/2017     12/19/2017       BMP RESULTS:  Lab Results   Component Value Date     12/18/2017    POTASSIUM 4.2 12/18/2017    CHLORIDE 100 12/18/2017    CO2 30 12/18/2017    ANIONGAP 7 12/18/2017     (H) 12/18/2017    BUN 35 (H) 12/18/2017    CR 6.47 (H) 12/18/2017    GFRESTIMATED 9 (L) 12/18/2017    GFRESTBLACK 10 (L) 12/18/2017    CHRISTINE 9.7 12/18/2017        A1C RESULTS:  Lab Results   Component Value Date    A1C 6.0 06/09/2017       INR RESULTS:  Lab Results   Component Value Date    INR 1.00 12/11/2017    INR 0.91 07/14/2016         ECHOCARDIOGRAM  No results found for this or any previous visit (from the past 8760 hour(s)).      Orders Placed This Encounter   Procedures     Follow-Up with Cardiac Advanced Practice Provider     Zio Patch Monitor     No orders of the defined types were placed in this encounter.    There are no discontinued medications.      Encounter Diagnoses   Name Primary?     Typical atrial flutter (H) Yes     NSTEMI (non-ST elevated myocardial infarction) (H)      Shortness of breath      Coronary artery disease involving native coronary artery of native heart without angina pectoris      Ischemic cardiomyopathy          CC  No referring provider defined for this encounter.                Thank you for allowing me to participate in the care of your patient.      Sincerely,     Jono Mejia MD     [facility]    cc:   No referring provider defined for this encounter.

## 2018-02-27 NOTE — LETTER
2/27/2018      Josselin Kolb MD  [address] [facility] [address]      RE: Westley Ness       Dear Colleague,    I had the pleasure of seeing Westley Ness in the [facility].    Service Date: 02/27/2018      REASON FOR VISIT:  Evaluation after atrial flutter ablation.      HISTORY OF PRESENT ILLNESS:  Mr. Ness is a pleasant 72-year-old gentleman with a history of hypertension, diabetes, hyperlipidemia, end-stage renal disease on hemodialysis and heart failure secondary to ischemic cardiomyopathy (EF around 25%-30%, previous ICD implantation) who presents with recurrent symptomatic atrial flutter (ablation done in 06/2017 and redo ablation done in 12/2017) who is here for routine followup.      At the moment, he is doing well.  He was recently readmitted with recurrence of typical flutter on 12/11/2017.  He underwent successful redo ablation.  Since ablation, he has not had any recurrence of atrial flutter or fibrillation.      During this visit he denies any symptoms such as chest pain, lightheadedness, near syncope or syncopal episode.  EKG obtained on 01/11/2018 showed normal sinus rhythm.      Of note, patient has no diagnosis of atrial fibrillation.      ASSESSMENT AND PLAN:   1.  Recurrent episodes of typical atrial flutter status post second ablation procedure.  The ablation procedure seemed to be successful.  Today, he is requesting to come off of the Eliquis.      We discussed that if he does have atrial fibrillation, he may have to continue anticoagulation indefinitely.  However, we have not confirmed the diagnosis of atrial fibrillation.  His defibrillator is set up for monitor zone and heart rate at 130 beats per minute.  Have not seen any fast heart rates.  At this time, I recommend Zio Patch monitor for 2 weeks to rule out underlying atrial fibrillation.  If no atrial fibrillation is seen, I am okay to discontinue anticoagulation, as also there is no good  data confirming that anticoagulation in patients with end-stage renal disease will be beneficial.  He should restart aspirin and Plavix.        2.  Embolic prevention.  Plan as detailed above.        3.  Hypertension.  Blood pressure well controlled.        4.  Heart failure secondary to ischemic cardiomyopathy.  He is euvolemic on physical exam.  Will continue pravastatin, Imdur, Coreg, lisinopril, Plavix and aspirin.        5.  Previous VT.  He is taking mexiletine.  Will continue current medical therapy.       6.  Hyperlipidemia.  A lipid profile was checked today.  His total cholesterol is 104 with HDL 44 and LDL 46.  Continue pravastatin.         EDIS DE JESUS MD             D: 2018   T: 2018   MT: WILLAM      Name:     SOCORRO LÓPEZ   MRN:      [id number]        Account:      [id number]   :      1945           Service Date: 2018      Document: [id number]         Outpatient Encounter Prescriptions as of 2018   Medication Sig Dispense Refill     pravastatin (PRAVACHOL) 40 MG tablet TAKE 1 TABLET (40 MG) BY MOUTH DAILY 90 tablet 3     isosorbide mononitrate (IMDUR) 30 MG 24 hr tablet TAKE 1/2 TABLET BY MOUTH ONCE DAILY 90 tablet 2     pantoprazole (PROTONIX) 40 MG EC tablet Take 1 tablet (40 mg) by mouth every morning 30 tablet 10     carvedilol (COREG) 6.25 MG tablet TAKE 1 TABLET BY MOUTH TWICE DAILY WITH MEALS AFTER DIALYSIS 180 tablet 3     fluticasone (FLONASE) 50 MCG/ACT spray Spray 2 sprays into both nostrils daily 1 Bottle 11     apixaban ANTICOAGULANT (ELIQUIS) 2.5 MG tablet Take 1 tablet (2.5 mg) by mouth 2 times daily 60 tablet 3     Levothyroxine Sodium 50 MCG CAPS Take 50 mcg by mouth daily 90 capsule 1     nitroGLYcerin (NITROSTAT) 0.4 MG sublingual tablet 1 TAB EVERY 5 MIN AS NEEDED, UP TO 3 PER EPISODE 25 tablet 0     RaNITidine HCl (ZANTAC PO) Take 75 mg by mouth nightly as needed for heartburn       TRIPHROCAPS 1 MG capsule TAKE 1 CAPSULE BY MOUTH EVERY  EVENING 90 capsule 3     clopidogrel (PLAVIX) 75 MG tablet Take 1 tablet (75 mg) by mouth daily To protect your stent(s).  Do not stop taking unless directed by cardiology. 30 tablet 11     lisinopril (PRINIVIL/ZESTRIL) 2.5 MG tablet Take one tablet after dialysis. Take second tablet at bedtime. 180 tablet 3     mexiletine (MEXITIL) 150 MG capsule Take 1 capsule (150 mg) by mouth 2 times daily 180 capsule 3     acetaminophen (TYLENOL) 325 MG tablet Take 2 tablets (650 mg) by mouth 4 times daily 60 tablet 0     loperamide (IMODIUM) 2 MG capsule Take 1 mg by mouth as needed        psyllium 0.52 G capsule Take 1 capsule by mouth daily as needed        No facility-administered encounter medications on file as of 2/27/2018.        Again, thank you for allowing me to participate in the care of your patient.      Sincerely,    Jono Mejia MD     [facility]

## 2018-02-27 NOTE — PROGRESS NOTES
Service Date: 02/27/2018      REASON FOR VISIT:  Evaluation after atrial flutter ablation.      HISTORY OF PRESENT ILLNESS:  Mr. Ness is a pleasant 72-year-old gentleman with a history of hypertension, diabetes, hyperlipidemia, end-stage renal disease on hemodialysis and heart failure secondary to ischemic cardiomyopathy (EF around 25%-30%, previous ICD implantation) who presents with recurrent symptomatic atrial flutter (ablation done in 06/2017 and redo ablation done in 12/2017), who is here for routine followup.      At the moment, he is doing well.  He was recently readmitted with recurrence of typical flutter on 12/11/2017.  He underwent successful redo ablation.  Since ablation, he has not had any recurrence of atrial flutter or fibrillation.      During this visit he denies any symptoms such as chest pain, lightheadedness, near syncope or syncopal episode.  EKG obtained on 01/11/2018 showed normal sinus rhythm.      Of note, patient has no diagnosis of atrial fibrillation.      ASSESSMENT AND PLAN:   1.  Recurrent episodes of typical atrial flutter, status post second ablation procedure.  The ablation procedure seemed to be successful.  Today, he is requesting to come off of the Eliquis.      We discussed that if he does have atrial fibrillation, he may have to continue anticoagulation indefinitely.  However, we have not confirmed the diagnosis of atrial fibrillation.  His defibrillator is set up for monitor zone at 130 beats per minute, and based on this settings we have not seen any episodes of Afib.  At this time, I recommend Zio Patch monitor for 2 weeks to rule out underlying slower atrial fibrillation.  If no atrial fibrillation is seen, due to the fact that he would like to come off OAC, I am okay to discontinue anticoagulation.  Additionally, there is no strong evidence data use of OAC in patients with end-stage renal disease is beneficial on long term.  He should restart aspirin and Plavix.         2.  Embolic prevention.  Plan as detailed above.        3.  Hypertension.  Blood pressure well controlled.        4.  Heart failure secondary to ischemic cardiomyopathy.  He is euvolemic on physical exam.  Will continue pravastatin, Imdur, Coreg, lisinopril, Plavix and aspirin.        5.  Previous VT.  He is taking mexiletine.  Will continue current medical therapy.       6.  Hyperlipidemia.  A lipid profile was checked today.  His total cholesterol is 104 with HDL 44 and LDL 46.  Continue pravastatin.         EDIS DE JESUS MD             D: 2018   T: 2018   MT: WILLAM      Name:     SOCORRO LÓPEZ   MRN:      [id number]        Account:      [id number]   :      1945           Service Date: 2018      Document: [id number]

## 2018-02-27 NOTE — PROGRESS NOTES
"[id number]    Electrophysiology/ Clinic Note         H&P and Plan:         Jono Mejia MD    Physical Exam:  Vitals: /52  Pulse 68  Ht 1.829 m (6' 0.01\")  Wt 84.4 kg (186 lb)  BMI 25.22 kg/m2    Constitutional:  AAO x3.  Pt is in NAD.  HEAD: normocephalic.  SKIN: Skin normal color, texture and turgor with no lesions or eruptions.  Eyes: PERRL, EOMI.  ENT:  Supple, normal JVP. No lymphadenopathy or thyroid enlargement.  Chest:  CTAB.  Cardiac: RRR, normal  S1 and S2.  No murmurs rubs or gallop.    Abdomen:  Normal BS.  Soft, non-tender and non-distended.  No rebound or guarding.    Extremities:  Pedious pulses palpable B/L.  Trace LE edema noticed.   Neurological: Strength and sensation grossly symmetric and intact throughout.       CURRENT MEDICATIONS:  Current Outpatient Prescriptions   Medication Sig Dispense Refill     pravastatin (PRAVACHOL) 40 MG tablet TAKE 1 TABLET (40 MG) BY MOUTH DAILY 90 tablet 3     isosorbide mononitrate (IMDUR) 30 MG 24 hr tablet TAKE 1/2 TABLET BY MOUTH ONCE DAILY 90 tablet 2     pantoprazole (PROTONIX) 40 MG EC tablet Take 1 tablet (40 mg) by mouth every morning 30 tablet 10     carvedilol (COREG) 6.25 MG tablet TAKE 1 TABLET BY MOUTH TWICE DAILY WITH MEALS AFTER DIALYSIS 180 tablet 3     fluticasone (FLONASE) 50 MCG/ACT spray Spray 2 sprays into both nostrils daily 1 Bottle 11     apixaban ANTICOAGULANT (ELIQUIS) 2.5 MG tablet Take 1 tablet (2.5 mg) by mouth 2 times daily 60 tablet 3     Levothyroxine Sodium 50 MCG CAPS Take 50 mcg by mouth daily 90 capsule 1     nitroGLYcerin (NITROSTAT) 0.4 MG sublingual tablet 1 TAB EVERY 5 MIN AS NEEDED, UP TO 3 PER EPISODE 25 tablet 0     RaNITidine HCl (ZANTAC PO) Take 75 mg by mouth nightly as needed for heartburn       TRIPHROCAPS 1 MG capsule TAKE 1 CAPSULE BY MOUTH EVERY EVENING 90 capsule 3     clopidogrel (PLAVIX) 75 MG tablet Take 1 tablet (75 mg) by mouth daily To protect your stent(s).  Do not stop taking unless directed by " cardiology. 30 tablet 11     lisinopril (PRINIVIL/ZESTRIL) 2.5 MG tablet Take one tablet after dialysis. Take second tablet at bedtime. 180 tablet 3     mexiletine (MEXITIL) 150 MG capsule Take 1 capsule (150 mg) by mouth 2 times daily 180 capsule 3     acetaminophen (TYLENOL) 325 MG tablet Take 2 tablets (650 mg) by mouth 4 times daily 60 tablet 0     loperamide (IMODIUM) 2 MG capsule Take 1 mg by mouth as needed        psyllium 0.52 G capsule Take 1 capsule by mouth daily as needed          ALLERGIES     Allergies   Allergen Reactions     Contrast Dye Hives     Does fine if he uses benadryl prior.     No Clinical Screening - See Comments      Green beans - Diarrhea.    Topical antibiotic - name unknown - caused swelling of the penis.       PAST MEDICAL HISTORY:  Past Medical History:   Diagnosis Date     Acid reflux disease      CAD (coronary artery disease)     s/p multiple NSTEMIs and PCI's     ESRD on hemodialysis (H)     MWF     Hypothyroidism      Ischemic cardiomyopathy     EF 25-30%, s/p AICD     NSTEMI (non-ST elevated myocardial infarction) (H)     multiple     Type 2 diabetes mellitus (H)     diet-controlled     Typical atrial flutter (H)        PAST SURGICAL HISTORY:  Past Surgical History:   Procedure Laterality Date     CHOLECYSTECTOMY, OPEN  2013     ELBOW SURGERY Left 2008    ORIF - plates still in place     H ABLATION ATRIAL FLUTTER  06/08/2017, 12/11/17      LEFT HEART CATHETERIZATION  7/14/2016      LEFT HEART CATHETERIZATION  9/21/2016     IMPLANT VENTRICULAR DEVICE  08/15/2011     TONSILLECTOMY & ADENOIDECTOMY       VASCULAR SURGERY  2004, 2010    LUE fistulas (upper and lower); upper one is functional       FAMILY HISTORY:  Family History   Problem Relation Age of Onset     CEREBROVASCULAR DISEASE Mother      later in life     Hypertension Father      Bladder Cancer Father      Myocardial Infarction Paternal Grandmother      DIABETES No family hx of      Prostate Cancer No family hx of       Colon Cancer No family hx of        SOCIAL HISTORY:  Social History     Social History     Marital status: Single     Spouse name: N/A     Number of children: N/A     Years of education: N/A     Occupational History     Retired - CPA      Social History Main Topics     Smoking status: Former Smoker     Packs/day: 2.00     Years: 35.00     Types: Cigarettes     Quit date: 11/1/1996     Smokeless tobacco: Never Used     Alcohol use 0.0 oz/week     0 Standard drinks or equivalent per week      Comment: rare     Drug use: No     Sexual activity: No     Other Topics Concern     Parent/Sibling W/ Cabg, Mi Or Angioplasty Before 65f 55m? No     Caffeine Concern No     Sleep Concern No     Special Diet Yes     Exercise Yes     Walking     Seat Belt Yes     Social History Narrative    Single.    No kids.     Maria Dolores Pabon (friend- healthcare POA), Joaquina Nair (friend - healthcare POA)    No formal exercise.        Review of Systems:  Skin:  Negative     Eyes:  Positive for glasses;cataracts  ENT:  Positive for postnasal drainage  Respiratory:  Positive for dyspnea on exertion  Cardiovascular:    chest pain;Positive for;edema;fatigue  Gastroenterology: Positive for heartburn  Genitourinary:       Musculoskeletal:  Positive for arthritis;joint pain  Neurologic:  Positive for numbness or tingling of hands  Psychiatric:  Negative    Heme/Lymph/Imm:  Positive for allergies  Endocrine:  Positive for thyroid disorder;diabetes      Recent Lab Results:  LIPID RESULTS:  Lab Results   Component Value Date    CHOL 104 02/27/2018    HDL 44 02/27/2018    LDL 46 02/27/2018    TRIG 71 02/27/2018       LIVER ENZYME RESULTS:  Lab Results   Component Value Date    AST 13 12/18/2017    ALT <5 (L) 02/27/2018       CBC RESULTS:  Lab Results   Component Value Date    WBC 13.5 (H) 12/19/2017    RBC 3.09 (L) 12/19/2017    HGB 10.3 (L) 12/19/2017    HCT 31.2 (L) 12/19/2017     (H) 12/19/2017    MCH 33.3 (H) 12/19/2017    MCHC 33.0  12/19/2017    RDW 14.4 12/19/2017     12/19/2017       BMP RESULTS:  Lab Results   Component Value Date     12/18/2017    POTASSIUM 4.2 12/18/2017    CHLORIDE 100 12/18/2017    CO2 30 12/18/2017    ANIONGAP 7 12/18/2017     (H) 12/18/2017    BUN 35 (H) 12/18/2017    CR 6.47 (H) 12/18/2017    GFRESTIMATED 9 (L) 12/18/2017    GFRESTBLACK 10 (L) 12/18/2017    CHRISTINE 9.7 12/18/2017        A1C RESULTS:  Lab Results   Component Value Date    A1C 6.0 06/09/2017       INR RESULTS:  Lab Results   Component Value Date    INR 1.00 12/11/2017    INR 0.91 07/14/2016         ECHOCARDIOGRAM  No results found for this or any previous visit (from the past 8760 hour(s)).      Orders Placed This Encounter   Procedures     Follow-Up with Cardiac Advanced Practice Provider     Zio Patch Monitor     No orders of the defined types were placed in this encounter.    There are no discontinued medications.      Encounter Diagnoses   Name Primary?     Typical atrial flutter (H) Yes     NSTEMI (non-ST elevated myocardial infarction) (H)      Shortness of breath      Coronary artery disease involving native coronary artery of native heart without angina pectoris      Ischemic cardiomyopathy          CC  No referring provider defined for this encounter.

## 2018-03-01 ENCOUNTER — HOSPITAL ENCOUNTER (OUTPATIENT)
Dept: CARDIOLOGY | Facility: CLINIC | Age: 73
Discharge: HOME | End: 2018-03-01
Attending: INTERNAL MEDICINE | Admitting: INTERNAL MEDICINE
Payer: MEDICARE

## 2018-03-01 DIAGNOSIS — I48.3 TYPICAL ATRIAL FLUTTER (H): ICD-10-CM

## 2018-03-01 PROCEDURE — 0298T ZZC EXT ECG > 48HR TO 21 DAY REVIEW AND INTERPRETATN: CPT | Performed by: INTERNAL MEDICINE

## 2018-03-01 PROCEDURE — 0296T ZIO PATCH HOLTER: CPT

## 2018-03-23 ENCOUNTER — TELEPHONE (OUTPATIENT)
Dept: CARDIOLOGY | Facility: CLINIC | Age: 73
End: 2018-03-23

## 2018-03-23 NOTE — TELEPHONE ENCOUNTER
LM Requesting call back to discuss below. Chart update with D/C the AC and adding 81 mg ASA. Reggie,RN      3-26-18 7978: Call from pt; results given per notes. SueLangenbrunnerRN    Monitor did not show Afib.  Defibrillator monitor zone is at 130 beats per minute, and we have not seen Afib through the device either.       I believe he is interested in discontinue OAC, I am okay to discontinue anticoagulation, start  aspirin and Plavix and continue monitoring through the defibrillation.       Please update patient on results and plan.     Thank you,     Jono Mejia MD

## 2018-04-23 ENCOUNTER — APPOINTMENT (OUTPATIENT)
Dept: GENERAL RADIOLOGY | Facility: CLINIC | Age: 73
DRG: 871 | End: 2018-04-23
Attending: EMERGENCY MEDICINE
Payer: MEDICARE

## 2018-04-23 ENCOUNTER — HOSPITAL ENCOUNTER (INPATIENT)
Facility: CLINIC | Age: 73
LOS: 3 days | Discharge: HOME HEALTH | DRG: 871 | End: 2018-04-26
Attending: EMERGENCY MEDICINE | Admitting: INTERNAL MEDICINE
Payer: MEDICARE

## 2018-04-23 DIAGNOSIS — N18.6 ESRD ON HEMODIALYSIS (H): ICD-10-CM

## 2018-04-23 DIAGNOSIS — R78.81 GRAM-POSITIVE BACTEREMIA: Primary | ICD-10-CM

## 2018-04-23 DIAGNOSIS — J18.9 HCAP (HEALTHCARE-ASSOCIATED PNEUMONIA): ICD-10-CM

## 2018-04-23 DIAGNOSIS — Z99.2 ESRD ON HEMODIALYSIS (H): ICD-10-CM

## 2018-04-23 LAB
ALBUMIN SERPL-MCNC: 3.3 G/DL (ref 3.4–5)
ALP SERPL-CCNC: 202 U/L (ref 40–150)
ALT SERPL W P-5'-P-CCNC: 15 U/L (ref 0–70)
ANION GAP SERPL CALCULATED.3IONS-SCNC: 11 MMOL/L (ref 3–14)
APTT PPP: 33 SEC (ref 22–37)
AST SERPL W P-5'-P-CCNC: 19 U/L (ref 0–45)
BASOPHILS # BLD AUTO: 0 10E9/L (ref 0–0.2)
BASOPHILS NFR BLD AUTO: 0.2 %
BILIRUB SERPL-MCNC: 2.3 MG/DL (ref 0.2–1.3)
BUN SERPL-MCNC: 35 MG/DL (ref 7–30)
CALCIUM SERPL-MCNC: 9.2 MG/DL (ref 8.5–10.1)
CHLORIDE SERPL-SCNC: 99 MMOL/L (ref 94–109)
CO2 SERPL-SCNC: 26 MMOL/L (ref 20–32)
CREAT SERPL-MCNC: 6.4 MG/DL (ref 0.66–1.25)
DIFFERENTIAL METHOD BLD: ABNORMAL
EOSINOPHIL # BLD AUTO: 0.1 10E9/L (ref 0–0.7)
EOSINOPHIL NFR BLD AUTO: 1 %
ERYTHROCYTE [DISTWIDTH] IN BLOOD BY AUTOMATED COUNT: 15 % (ref 10–15)
GFR SERPL CREATININE-BSD FRML MDRD: 9 ML/MIN/1.7M2
GLUCOSE BLDC GLUCOMTR-MCNC: 123 MG/DL (ref 70–99)
GLUCOSE BLDC GLUCOMTR-MCNC: 127 MG/DL (ref 70–99)
GLUCOSE BLDC GLUCOMTR-MCNC: 87 MG/DL (ref 70–99)
GLUCOSE SERPL-MCNC: 134 MG/DL (ref 70–99)
HBA1C MFR BLD: 6.2 % (ref 0–6.4)
HCT VFR BLD AUTO: 35 % (ref 40–53)
HGB BLD-MCNC: 11.7 G/DL (ref 13.3–17.7)
IMM GRANULOCYTES # BLD: 0 10E9/L (ref 0–0.4)
IMM GRANULOCYTES NFR BLD: 0.2 %
INR PPP: 1.2 (ref 0.86–1.14)
INTERPRETATION ECG - MUSE: NORMAL
LACTATE BLD-SCNC: 0.8 MMOL/L (ref 0.7–2)
LYMPHOCYTES # BLD AUTO: 0.4 10E9/L (ref 0.8–5.3)
LYMPHOCYTES NFR BLD AUTO: 2.9 %
MCH RBC QN AUTO: 33.2 PG (ref 26.5–33)
MCHC RBC AUTO-ENTMCNC: 33.4 G/DL (ref 31.5–36.5)
MCV RBC AUTO: 99 FL (ref 78–100)
MONOCYTES # BLD AUTO: 1.2 10E9/L (ref 0–1.3)
MONOCYTES NFR BLD AUTO: 9 %
NEUTROPHILS # BLD AUTO: 11.8 10E9/L (ref 1.6–8.3)
NEUTROPHILS NFR BLD AUTO: 86.7 %
NRBC # BLD AUTO: 0 10*3/UL
NRBC BLD AUTO-RTO: 0 /100
PLATELET # BLD AUTO: 136 10E9/L (ref 150–450)
POTASSIUM SERPL-SCNC: 4 MMOL/L (ref 3.4–5.3)
PROT SERPL-MCNC: 7.3 G/DL (ref 6.8–8.8)
RBC # BLD AUTO: 3.52 10E12/L (ref 4.4–5.9)
SODIUM SERPL-SCNC: 136 MMOL/L (ref 133–144)
WBC # BLD AUTO: 13.6 10E9/L (ref 4–11)

## 2018-04-23 PROCEDURE — 83036 HEMOGLOBIN GLYCOSYLATED A1C: CPT | Performed by: EMERGENCY MEDICINE

## 2018-04-23 PROCEDURE — 80053 COMPREHEN METABOLIC PANEL: CPT | Performed by: EMERGENCY MEDICINE

## 2018-04-23 PROCEDURE — 93005 ELECTROCARDIOGRAM TRACING: CPT

## 2018-04-23 PROCEDURE — 99223 1ST HOSP IP/OBS HIGH 75: CPT | Mod: AI | Performed by: INTERNAL MEDICINE

## 2018-04-23 PROCEDURE — 96375 TX/PRO/DX INJ NEW DRUG ADDON: CPT

## 2018-04-23 PROCEDURE — 71046 X-RAY EXAM CHEST 2 VIEWS: CPT

## 2018-04-23 PROCEDURE — 83605 ASSAY OF LACTIC ACID: CPT | Performed by: EMERGENCY MEDICINE

## 2018-04-23 PROCEDURE — 25000128 H RX IP 250 OP 636: Performed by: INTERNAL MEDICINE

## 2018-04-23 PROCEDURE — 87181 SC STD AGAR DILUTION PER AGT: CPT | Performed by: EMERGENCY MEDICINE

## 2018-04-23 PROCEDURE — 87077 CULTURE AEROBIC IDENTIFY: CPT | Performed by: EMERGENCY MEDICINE

## 2018-04-23 PROCEDURE — 5A1D70Z PERFORMANCE OF URINARY FILTRATION, INTERMITTENT, LESS THAN 6 HOURS PER DAY: ICD-10-PCS | Performed by: INTERNAL MEDICINE

## 2018-04-23 PROCEDURE — 00000146 ZZHCL STATISTIC GLUCOSE BY METER IP

## 2018-04-23 PROCEDURE — 87800 DETECT AGNT MULT DNA DIREC: CPT | Performed by: EMERGENCY MEDICINE

## 2018-04-23 PROCEDURE — 85730 THROMBOPLASTIN TIME PARTIAL: CPT | Performed by: EMERGENCY MEDICINE

## 2018-04-23 PROCEDURE — 87040 BLOOD CULTURE FOR BACTERIA: CPT | Performed by: EMERGENCY MEDICINE

## 2018-04-23 PROCEDURE — 25000128 H RX IP 250 OP 636: Performed by: EMERGENCY MEDICINE

## 2018-04-23 PROCEDURE — 85610 PROTHROMBIN TIME: CPT | Performed by: EMERGENCY MEDICINE

## 2018-04-23 PROCEDURE — 21000001 ZZH R&B HEART CARE

## 2018-04-23 PROCEDURE — 90937 HEMODIALYSIS REPEATED EVAL: CPT

## 2018-04-23 PROCEDURE — 99285 EMERGENCY DEPT VISIT HI MDM: CPT | Mod: 25

## 2018-04-23 PROCEDURE — 96365 THER/PROPH/DIAG IV INF INIT: CPT

## 2018-04-23 PROCEDURE — A9270 NON-COVERED ITEM OR SERVICE: HCPCS | Mod: GY | Performed by: INTERNAL MEDICINE

## 2018-04-23 PROCEDURE — 85025 COMPLETE CBC W/AUTO DIFF WBC: CPT | Performed by: EMERGENCY MEDICINE

## 2018-04-23 PROCEDURE — 25000132 ZZH RX MED GY IP 250 OP 250 PS 637: Mod: GY | Performed by: INTERNAL MEDICINE

## 2018-04-23 RX ORDER — POLYETHYLENE GLYCOL 3350 17 G/17G
17 POWDER, FOR SOLUTION ORAL DAILY PRN
Status: DISCONTINUED | OUTPATIENT
Start: 2018-04-23 | End: 2018-04-26 | Stop reason: HOSPADM

## 2018-04-23 RX ORDER — ACETAMINOPHEN 325 MG/1
650 TABLET ORAL EVERY 4 HOURS PRN
Status: DISCONTINUED | OUTPATIENT
Start: 2018-04-23 | End: 2018-04-26 | Stop reason: HOSPADM

## 2018-04-23 RX ORDER — DEXTROSE MONOHYDRATE 25 G/50ML
25-50 INJECTION, SOLUTION INTRAVENOUS
Status: DISCONTINUED | OUTPATIENT
Start: 2018-04-23 | End: 2018-04-26 | Stop reason: HOSPADM

## 2018-04-23 RX ORDER — ACETAMINOPHEN 650 MG/1
650 SUPPOSITORY RECTAL EVERY 4 HOURS PRN
Status: DISCONTINUED | OUTPATIENT
Start: 2018-04-23 | End: 2018-04-26 | Stop reason: HOSPADM

## 2018-04-23 RX ORDER — MEXILETINE HYDROCHLORIDE 150 MG/1
150 CAPSULE ORAL 2 TIMES DAILY
Status: DISCONTINUED | OUTPATIENT
Start: 2018-04-23 | End: 2018-04-26 | Stop reason: HOSPADM

## 2018-04-23 RX ORDER — PIPERACILLIN SODIUM, TAZOBACTAM SODIUM 2; .25 G/10ML; G/10ML
2.25 INJECTION, POWDER, LYOPHILIZED, FOR SOLUTION INTRAVENOUS ONCE
Status: COMPLETED | OUTPATIENT
Start: 2018-04-23 | End: 2018-04-23

## 2018-04-23 RX ORDER — AMOXICILLIN 250 MG
2 CAPSULE ORAL 2 TIMES DAILY PRN
Status: DISCONTINUED | OUTPATIENT
Start: 2018-04-23 | End: 2018-04-26 | Stop reason: HOSPADM

## 2018-04-23 RX ORDER — AMOXICILLIN 250 MG
1 CAPSULE ORAL 2 TIMES DAILY PRN
Status: DISCONTINUED | OUTPATIENT
Start: 2018-04-23 | End: 2018-04-26 | Stop reason: HOSPADM

## 2018-04-23 RX ORDER — HEPARIN SODIUM 1000 [USP'U]/ML
1000 INJECTION, SOLUTION INTRAVENOUS; SUBCUTANEOUS
Status: DISCONTINUED | OUTPATIENT
Start: 2018-04-23 | End: 2018-04-23

## 2018-04-23 RX ORDER — PROCHLORPERAZINE 25 MG
12.5 SUPPOSITORY, RECTAL RECTAL EVERY 12 HOURS PRN
Status: DISCONTINUED | OUTPATIENT
Start: 2018-04-23 | End: 2018-04-26 | Stop reason: HOSPADM

## 2018-04-23 RX ORDER — PIPERACILLIN SODIUM, TAZOBACTAM SODIUM 2; .25 G/10ML; G/10ML
2.25 INJECTION, POWDER, LYOPHILIZED, FOR SOLUTION INTRAVENOUS EVERY 8 HOURS SCHEDULED
Status: DISCONTINUED | OUTPATIENT
Start: 2018-04-23 | End: 2018-04-25

## 2018-04-23 RX ORDER — PROCHLORPERAZINE MALEATE 5 MG
5 TABLET ORAL EVERY 6 HOURS PRN
Status: DISCONTINUED | OUTPATIENT
Start: 2018-04-23 | End: 2018-04-26 | Stop reason: HOSPADM

## 2018-04-23 RX ORDER — FLUTICASONE PROPIONATE 50 MCG
2 SPRAY, SUSPENSION (ML) NASAL DAILY PRN
COMMUNITY
End: 2019-02-26

## 2018-04-23 RX ORDER — CALCIUM CARBONATE 500 MG/1
1000 TABLET, CHEWABLE ORAL 4 TIMES DAILY PRN
Status: DISCONTINUED | OUTPATIENT
Start: 2018-04-23 | End: 2018-04-26 | Stop reason: HOSPADM

## 2018-04-23 RX ORDER — CARVEDILOL 6.25 MG/1
6.25 TABLET ORAL 2 TIMES DAILY WITH MEALS
Status: DISCONTINUED | OUTPATIENT
Start: 2018-04-23 | End: 2018-04-26 | Stop reason: HOSPADM

## 2018-04-23 RX ORDER — ONDANSETRON 2 MG/ML
4 INJECTION INTRAMUSCULAR; INTRAVENOUS EVERY 6 HOURS PRN
Status: DISCONTINUED | OUTPATIENT
Start: 2018-04-23 | End: 2018-04-26 | Stop reason: HOSPADM

## 2018-04-23 RX ORDER — HEPARIN SODIUM 5000 [USP'U]/.5ML
5000 INJECTION, SOLUTION INTRAVENOUS; SUBCUTANEOUS EVERY 12 HOURS
Status: DISCONTINUED | OUTPATIENT
Start: 2018-04-23 | End: 2018-04-26 | Stop reason: HOSPADM

## 2018-04-23 RX ORDER — IPRATROPIUM BROMIDE AND ALBUTEROL SULFATE 2.5; .5 MG/3ML; MG/3ML
3 SOLUTION RESPIRATORY (INHALATION) EVERY 4 HOURS PRN
Status: DISCONTINUED | OUTPATIENT
Start: 2018-04-23 | End: 2018-04-26 | Stop reason: HOSPADM

## 2018-04-23 RX ORDER — ONDANSETRON 2 MG/ML
4 INJECTION INTRAMUSCULAR; INTRAVENOUS EVERY 30 MIN PRN
Status: DISCONTINUED | OUTPATIENT
Start: 2018-04-23 | End: 2018-04-23

## 2018-04-23 RX ORDER — LEVOTHYROXINE SODIUM 50 UG/1
50 CAPSULE ORAL DAILY
Status: DISCONTINUED | OUTPATIENT
Start: 2018-04-23 | End: 2018-04-23

## 2018-04-23 RX ORDER — LEVOTHYROXINE SODIUM 50 UG/1
50 TABLET ORAL DAILY
Status: DISCONTINUED | OUTPATIENT
Start: 2018-04-23 | End: 2018-04-26 | Stop reason: HOSPADM

## 2018-04-23 RX ORDER — LIDOCAINE 40 MG/G
CREAM TOPICAL
Status: DISCONTINUED | OUTPATIENT
Start: 2018-04-23 | End: 2018-04-26 | Stop reason: HOSPADM

## 2018-04-23 RX ORDER — NICOTINE POLACRILEX 4 MG
15-30 LOZENGE BUCCAL
Status: DISCONTINUED | OUTPATIENT
Start: 2018-04-23 | End: 2018-04-26 | Stop reason: HOSPADM

## 2018-04-23 RX ORDER — ASPIRIN 81 MG/1
81 TABLET ORAL DAILY
Status: DISCONTINUED | OUTPATIENT
Start: 2018-04-23 | End: 2018-04-26 | Stop reason: HOSPADM

## 2018-04-23 RX ORDER — BISACODYL 10 MG
10 SUPPOSITORY, RECTAL RECTAL DAILY PRN
Status: DISCONTINUED | OUTPATIENT
Start: 2018-04-23 | End: 2018-04-26 | Stop reason: HOSPADM

## 2018-04-23 RX ORDER — LISINOPRIL 2.5 MG/1
2.5 TABLET ORAL
Status: DISCONTINUED | OUTPATIENT
Start: 2018-04-23 | End: 2018-04-26 | Stop reason: HOSPADM

## 2018-04-23 RX ORDER — CLOPIDOGREL BISULFATE 75 MG/1
75 TABLET ORAL DAILY
Status: DISCONTINUED | OUTPATIENT
Start: 2018-04-23 | End: 2018-04-26 | Stop reason: HOSPADM

## 2018-04-23 RX ORDER — NALOXONE HYDROCHLORIDE 0.4 MG/ML
.1-.4 INJECTION, SOLUTION INTRAMUSCULAR; INTRAVENOUS; SUBCUTANEOUS
Status: DISCONTINUED | OUTPATIENT
Start: 2018-04-23 | End: 2018-04-26 | Stop reason: HOSPADM

## 2018-04-23 RX ORDER — ONDANSETRON 4 MG/1
4 TABLET, ORALLY DISINTEGRATING ORAL EVERY 6 HOURS PRN
Status: DISCONTINUED | OUTPATIENT
Start: 2018-04-23 | End: 2018-04-26 | Stop reason: HOSPADM

## 2018-04-23 RX ORDER — PRAVASTATIN SODIUM 40 MG
40 TABLET ORAL DAILY
Status: DISCONTINUED | OUTPATIENT
Start: 2018-04-23 | End: 2018-04-26 | Stop reason: HOSPADM

## 2018-04-23 RX ORDER — HEPARIN SODIUM 1000 [USP'U]/ML
1000 INJECTION, SOLUTION INTRAVENOUS; SUBCUTANEOUS CONTINUOUS
Status: DISCONTINUED | OUTPATIENT
Start: 2018-04-23 | End: 2018-04-23

## 2018-04-23 RX ORDER — PANTOPRAZOLE SODIUM 40 MG/1
40 TABLET, DELAYED RELEASE ORAL EVERY MORNING
Status: DISCONTINUED | OUTPATIENT
Start: 2018-04-23 | End: 2018-04-26 | Stop reason: HOSPADM

## 2018-04-23 RX ADMIN — ASPIRIN 81 MG: 81 TABLET, COATED ORAL at 19:17

## 2018-04-23 RX ADMIN — LEVOTHYROXINE SODIUM 50 MCG: 50 TABLET ORAL at 11:52

## 2018-04-23 RX ADMIN — Medication 1 CAPSULE: at 22:20

## 2018-04-23 RX ADMIN — HEPARIN SODIUM 5000 UNITS: 5000 INJECTION, SOLUTION INTRAVENOUS; SUBCUTANEOUS at 22:21

## 2018-04-23 RX ADMIN — PIPERACILLIN SODIUM,TAZOBACTAM SODIUM 2.25 G: 2; .25 INJECTION, POWDER, FOR SOLUTION INTRAVENOUS at 19:17

## 2018-04-23 RX ADMIN — RANITIDINE 75 MG: 75 TABLET, FILM COATED ORAL at 22:20

## 2018-04-23 RX ADMIN — PANTOPRAZOLE SODIUM 40 MG: 40 TABLET, DELAYED RELEASE ORAL at 11:52

## 2018-04-23 RX ADMIN — PRAVASTATIN SODIUM 40 MG: 40 TABLET ORAL at 22:20

## 2018-04-23 RX ADMIN — MEXILETINE HYDROCHLORIDE 150 MG: 150 CAPSULE ORAL at 11:52

## 2018-04-23 RX ADMIN — MEXILETINE HYDROCHLORIDE 150 MG: 150 CAPSULE ORAL at 22:21

## 2018-04-23 RX ADMIN — SODIUM CHLORIDE 250 ML: 9 INJECTION, SOLUTION INTRAVENOUS at 14:12

## 2018-04-23 RX ADMIN — Medication 15 MG: at 22:29

## 2018-04-23 RX ADMIN — ONDANSETRON 4 MG: 2 INJECTION INTRAMUSCULAR; INTRAVENOUS at 07:21

## 2018-04-23 RX ADMIN — PIPERACILLIN SODIUM,TAZOBACTAM SODIUM 2.25 G: 2; .25 INJECTION, POWDER, FOR SOLUTION INTRAVENOUS at 08:13

## 2018-04-23 RX ADMIN — CLOPIDOGREL 75 MG: 75 TABLET, FILM COATED ORAL at 19:17

## 2018-04-23 RX ADMIN — SODIUM CHLORIDE 300 ML: 9 INJECTION, SOLUTION INTRAVENOUS at 14:11

## 2018-04-23 ASSESSMENT — ENCOUNTER SYMPTOMS
DIARRHEA: 0
NAUSEA: 1
SHORTNESS OF BREATH: 1
FEVER: 0
ABDOMINAL PAIN: 0
COUGH: 1

## 2018-04-23 ASSESSMENT — ACTIVITIES OF DAILY LIVING (ADL)
TOILETING: 1-->ASSISTIVE EQUIPMENT
BATHING: 1-->ASSISTIVE EQUIPMENT
FALL_HISTORY_WITHIN_LAST_SIX_MONTHS: NO
DRESS: 0-->INDEPENDENT
SWALLOWING: 0-->SWALLOWS FOODS/LIQUIDS WITHOUT DIFFICULTY
COGNITION: 0 - NO COGNITION ISSUES REPORTED
AMBULATION: 1-->ASSISTIVE EQUIPMENT
TRANSFERRING: 1-->ASSISTIVE EQUIPMENT
RETIRED_EATING: 0-->INDEPENDENT
RETIRED_COMMUNICATION: 0-->UNDERSTANDS/COMMUNICATES WITHOUT DIFFICULTY

## 2018-04-23 NOTE — PHARMACY-ADMISSION MEDICATION HISTORY
Admission medication history interview status for the 4/23/2018  admission is complete. See EPIC admission navigator for prior to admission medications     Medication history source reliability:Good    Actions taken by pharmacist (provider contacted, etc): interview with patient.     Additional medication history information not noted on PTA med list :None    Medication reconciliation/reorder completed by provider prior to medication history? No    Time spent in this activity: 15 min    Prior to Admission medications    Medication Sig Last Dose Taking? Auth Provider   acetaminophen (TYLENOL) 325 MG tablet Take 2 tablets (650 mg) by mouth 4 times daily 4/22/2018 at pm Yes Marco Crockett MD   aspirin 81 MG tablet Take 1 tablet (81 mg) by mouth daily 4/22/2018 at am Yes Jono Mejia MD   carvedilol (COREG) 6.25 MG tablet TAKE 1 TABLET BY MOUTH TWICE DAILY WITH MEALS AFTER DIALYSIS 4/22/2018 at pm Yes Josselin Kolb MD   clopidogrel (PLAVIX) 75 MG tablet Take 1 tablet (75 mg) by mouth daily To protect your stent(s).  Do not stop taking unless directed by cardiology. 4/22/2018 at am Yes Josselin Kolb MD   fluticasone (FLONASE) 50 MCG/ACT spray Spray 2 sprays into both nostrils daily as needed for rhinitis or allergies prn Yes Unknown, Entered By History   isosorbide mononitrate (IMDUR) 30 MG 24 hr tablet TAKE 1/2 TABLET BY MOUTH ONCE DAILY 4/22/2018 at am Yes Josselin Kolb MD   Levothyroxine Sodium 50 MCG CAPS Take 50 mcg by mouth daily 4/22/2018 at am Yes Josselin Kolb MD   lisinopril (PRINIVIL/ZESTRIL) 2.5 MG tablet Take one tablet after dialysis. Take second tablet at bedtime.  Patient taking differently: Take 2.5 mg by mouth 2 times daily Take one tablet after dialysis. Take second tablet at bedtime. 4/22/2018 at pm Yes Lacy Taylor APRN CNP   loperamide (IMODIUM) 2 MG capsule Take 1 mg by mouth as needed  prn Yes Reported, Patient   mexiletine (MEXITIL) 150 MG capsule Take  1 capsule (150 mg) by mouth 2 times daily 4/22/2018 at pm Yes Josselin Kolb MD   nitroGLYcerin (NITROSTAT) 0.4 MG sublingual tablet 1 TAB EVERY 5 MIN AS NEEDED, UP TO 3 PER EPISODE prn Yes Josselin Kolb MD   pantoprazole (PROTONIX) 40 MG EC tablet Take 1 tablet (40 mg) by mouth every morning 4/22/2018 at am Yes Josselin Kolb MD   pravastatin (PRAVACHOL) 40 MG tablet TAKE 1 TABLET (40 MG) BY MOUTH DAILY 4/22/2018 at hs Yes Josselin Kolb MD   psyllium 0.52 G capsule Take 1 capsule by mouth daily as needed  prn Yes Reported, Patient   RaNITidine HCl (ZANTAC PO) Take 75 mg by mouth At Bedtime  4/22/2018 at hs Yes Unknown, Entered By History   TRIPHROCAPS 1 MG capsule TAKE 1 CAPSULE BY MOUTH EVERY EVENING 4/22/2018 at hs Yes Josselin Kolb MD

## 2018-04-23 NOTE — CONSULTS
See dictation and today's dialysis note.    Reji Smith MD  Nephrology; [facility] [facility]  [phone]

## 2018-04-23 NOTE — IP AVS SNAPSHOT
[facility] [address] [facility]    [address] [place] [address]    Phone:  [phone]                                       After Visit Summary   4/23/2018    Westley Ness    MRN: [id number]           After Visit Summary Signature Page     I have received my discharge instructions, and my questions have been answered. I have discussed any challenges I see with this plan with the nurse or doctor.    ..........................................................................................................................................  Patient/Patient Representative Signature      ..........................................................................................................................................  Patient Representative Print Name and Relationship to Patient    ..................................................               ................................................  Date                                            Time    ..........................................................................................................................................  Reviewed by Signature/Title    ...................................................              ..............................................  Date                                                            Time

## 2018-04-23 NOTE — PROGRESS NOTES
Potassium   Date Value Ref Range Status   04/23/2018 4.0 3.4 - 5.3 mmol/L Final   ]  Lab Results   Component Value Date    HGB 11.7 04/23/2018     Weight: 83.5 kg (184 lb)    DIALYSIS PROCEDURE NOTE    Patient dialyzed for 3.5 hrs. on a 3 K bath with a net fluid removal of  2L.  A BFR of 450 ml/min was obtained via a LUAF using 15guage needles and all connections were secured. The patient was seen by Dr. Smith during the treatment.  Total heparin received during the treatment: 0 units. Sites held x 15 min then  pressure drsgs applied.  Meds  given:none Complications: some cramping resolved with decreasing UF and NS.  Procedure and ESRD teaching done and questions answered. See flowsheet in EPIC for further details.  Hepatitis B labs verified on machine log from previous patient.  Medical aseptic prep utilized  Patient dialyzed at bedside in patients room.  Water alarm in place and used.  [facility] consent form signed yes  Incapacitated dialysis nurse policy reviewed.

## 2018-04-23 NOTE — ED PROVIDER NOTES
History     Chief Complaint:  Shortness of breath    HPI   Westley Ness is a 72 year old male who presents with shortness of breath.  Patient says that for the last 2-3 weeks he has had a cough and shortness of breath, which he thought was due to a cold.  The patient goes to dialysis on Monday, Wednesday, and Friday but today woke up at 0430 for his dialysis appointment but did not feel like he had energy to go to his appointment.  Therefore, the patient presents the ED via EMS for further evaluation.  Here in the ED, the patient notes significant fatigue, cough, and was found to be febrile.  He denies arm pain, fever, abdominal pain, diarrhea, or recent antibiotic use.  Of note, the patient is on Plavix and aspirin, has a right-sided pacemaker, and lives alone in a private home.  Also, the patient does not use at home oxygen.    Allergies:  Contrast Dye  No Clinical Screening - See Comments     Medications:    acetaminophen (TYLENOL)  aspirin   carvedilol (COREG)   clopidogrel (PLAVIX)   fluticasone (FLONASE)   isosorbide mononitrate (IMDUR)   Levothyroxine Sodium  lisinopril (PRINIVIL/ZESTRIL)   loperamide (IMODIUM)  mexiletine (MEXITIL)   nitroGLYcerin (NITROSTAT)   pantoprazole (PROTONIX)   pravastatin (PRAVACHOL)   psyllium  RaNITidine HCl (ZANTAC PO)  TRIPHROCAPS    Past Medical History:    Acid reflux disease   CAD (coronary artery disease)   ESRD on hemodialysis    Hypothyroidism   Ischemic cardiomyopathy   NSTEMI (non-ST elevated myocardial infarction)    Type 2 diabetes mellitus    Typical atrial flutter      Past Surgical History:    Cholecystectomy   Elbow surgery  Ablation atrial flutter  Left heart catheterization  Implant ventricular device  Tonsillectomy and adenoidectomy  Vascular surgery    Family History:    Cerebrovascular disease  Hypertension  Bladder cancer    Social History:  Smoking Status: Former Smoker  Alcohol Use: No  Patient presents alone.  Marital Status:  Single [1]     Review  of Systems   Constitutional: Negative for fever.   Respiratory: Positive for cough and shortness of breath.    Cardiovascular: Negative for chest pain.   Gastrointestinal: Positive for nausea. Negative for abdominal pain and diarrhea.   All other systems reviewed and are negative.    Physical Exam     Patient Vitals for the past 24 hrs:   BP Temp Temp src Pulse Heart Rate Resp SpO2   04/23/18 0815 98/52 - - - 73 14 97 %   04/23/18 0658 113/62 - - - 76 23 97 %   04/23/18 0630 - - - - 80 9 94 %   04/23/18 0610 115/55 101.6  F (38.7  C) Oral 83 - 16 94 %       Physical Exam  General: Alert and cooperative with exam. Patient in mild distress. Normal mentation.  Head:  Scalp is NC/AT  Eyes:  No scleral icterus, PERRL  ENT:  The external nose and ears are normal. The oropharynx is normal and without erythema; mucus membranes are moist. Uvula midline, no evidence of deep space infection.  Neck:  Normal range of motion without rigidity.  CV:  Regular rate and rhythm    No pathologic murmur     Right sided pacemaker, left upper extremity dialysis fistula C/D/I  Resp:  Non-labored, no retractions or accessory muscle use    Diminished air movement throughout, poor respiratory output, crackles in lung bases, nasal cannula in place  GI:  Abdomen is soft, no distension, no tenderness. No peritoneal signs  MS:  No lower extremity edema. Left upper extremity dialysis fistula    Skin:  Warm and dry, No rash or lesions noted.  Neuro: Oriented x 3. No gross motor deficits.      Emergency Department Course   ECG (6:14:27):  Rate 82 bpm. KY interval 200. QRS duration 86. QT/QTc 402/469. P-R-T axes 75 98 29.  Normal sinus rhythm, low voltage QRS, possible anterolateral infarct, age undetermined, abnormal ECG interpreted at 0620 by Rafa Aguilar DO.    Imaging:  Radiographic findings were communicated with the patient who voiced understanding of the findings.  X-ray chest, 2 views:  Left basilar and left perihilar infiltrate may  be  pneumonia.  Result per radiology.     Laboratory:  Blood culture: Pending  CBC: WBC: 13.6 high, HGB: 11.7 low, PLT: 136 low  INR: 1.20 high  Partial thromboplastin time: 33  CMP: Glucose 134 high, BUN 35 high, creatinine 6.40 high, GFR estimate 9 low, GFR estimate if black 10 low, bilirubin total 2.3 high, albumin 3.3 low, alk phos 202 high, o/w WNL   Blood culture: Pending  0632 Lactic Acid: 0.8    Interventions:  Medications   ondansetron (ZOFRAN) injection 4 mg (4 mg Intravenous Given 4/23/18 0721)   piperacillin-tazobactam (ZOSYN) 2.25 g vial to attach to  ml bag (2.25 g Intravenous New Bag 4/23/18 0813)   0721 Zofran 4 mg IV      Emergency Department Course:  Nursing notes and vitals reviewed. 0627 I performed an exam of the patient as documented above.     IV inserted. Medicine administered as documented above. Blood drawn. This was sent to the lab for further testing, results above.    0614 Patient had an EKG, results above.    The patient was sent for a Chest XR while in the emergency department, findings above.     0726 I rechecked the patient and discussed the results of his workup thus far.     0820  I consulted with Dr. Singh of the hospitalist services. They are in agreement to accept the patient for admission.    Findings and plan explained to the Patient who consents to admission. Discussed the patient with Dr. Singh, who will admit the patient to a med/surg bed for further monitoring, evaluation, and treatment.    Impression & Plan      Medical Decision Making:  Patient is a 72-year-old male presents with cough and shortness of breath; noted to be febrile in the emergency department.  Patient's medical history and records reviewed.  Initial consideration for, not limited to, ACS, volume overload, pneumothorax, electrolyte abnormality, metabolic disturbance, among others.  Labs, EKG, imaging was obtained.  EKG demonstrates normal sinus rhythm which is without change from previous;  patient denies chest pain.  Labs notable for elevation of white count (13.6), mild thrombocytopenia (136), chronic anemia (hemoglobin 11.7), and chronic elevations of creatinine and LFTs as noted above (patient dialysis dependent).  Lactic acid is not significantly elevated and patient remained normotensive throughout the ED course.  Chest x-ray shows left basilar and perihilar infiltrates which is a likely pneumonia given patient's symptoms.  Elected to treat with Zosyn; deferred additional antibiotics to the hospitalist team is patient will require dialysis today per his normal schedule.  Case discussed with Dr. Singh the hospitalist service who excepts for admission.    Diagnosis:    ICD-10-CM    1. HCAP (healthcare-associated pneumonia) J18.9        Disposition:  Admitted to med/surg bed    Estevan Huertaana  4/23/2018     EMERGENCY DEPARTMENT  Estevan CARMONA am serving as a scribe at 6:27 AM on 4/23/2018 to document services personally performed by Rafa Aguilar DO based on my observations and the provider's statements to me.        Rafa Aguilar DO  04/23/18 6521

## 2018-04-23 NOTE — PLAN OF CARE
Has been resting in bed since arrival from ED.  No complaints.  HD being run in pt's room with HD RN in attendance.

## 2018-04-23 NOTE — ED NOTES
[facility]  ED Nurse Handoff Report    ED Chief complaint: Shortness of Breath (SOB for a few weeks, worse today with cough, productive with green brown and clear sputum. hx dialysis, due today)      ED Diagnosis:   Final diagnoses:   HCAP (healthcare-associated pneumonia)       Code Status: Full Code    Allergies:   Allergies   Allergen Reactions     Contrast Dye Hives     Does fine if he uses benadryl prior.     No Clinical Screening - See Comments      Green beans - Diarrhea.    Topical antibiotic - name unknown - caused swelling of the penis.       Activity level - Baseline/Home:  Stand with Assist    Activity Level - Current:   Stand with Assist     Needed?: No    Isolation: No  Infection: Not Applicable    Bariatric?: No    Vital Signs:   Vitals:    04/23/18 0610 04/23/18 0630 04/23/18 0658 04/23/18 0815   BP: 115/55  113/62 98/52   Pulse: 83      Resp: 16 9 23 14   Temp: 101.6  F (38.7  C)      TempSrc: Oral      SpO2: 94% 94% 97% 97%       Cardiac Rhythm: ,   Cardiac  Cardiac Rhythm: Normal sinus rhythm    Pain level:      Is this patient confused?: No     Patient Report: Initial Complaint: Cough  Focused Assessment: 72 year old male who presents with shortness of breath.  Patient says that for the last 2-3 weeks he has had a cough and shortness of breath, which he thought was due to a cold.  The patient goes to dialysis on Monday, Wednesday, and Friday but today woke up at 0430 for his dialysis appointment but did not feel like he had energy to go to his appointment.  The patient does not use at home oxygen. XR shows pneumonia    Tests Performed: labs, xr  Abnormal Results:   Results for orders placed or performed during the hospital encounter of 04/23/18   XR Chest 2 Views    Narrative    CHEST TWO VIEWS April 23, 2018 7:08 AM     HISTORY: Chest pain/shortness of breath.    COMPARISON: 12/18/2017.    FINDINGS: Right subclavian cardiac device in place. No pneumothorax.  The heart  is enlarged. No pulmonary edema. There is infiltrate in the  left lower lung, increased since the previous exam. There is also  probable left perihilar infiltrate. The lungs are otherwise clear. No  pleural effusion. Degenerative disease in the thoracic spine.       Impression    IMPRESSION: Left basilar and left perihilar infiltrate may be  pneumonia.   CBC with platelets differential   Result Value Ref Range    WBC 13.6 (H) 4.0 - 11.0 10e9/L    RBC Count 3.52 (L) 4.4 - 5.9 10e12/L    Hemoglobin 11.7 (L) 13.3 - 17.7 g/dL    Hematocrit 35.0 (L) 40.0 - 53.0 %    MCV 99 78 - 100 fl    MCH 33.2 (H) 26.5 - 33.0 pg    MCHC 33.4 31.5 - 36.5 g/dL    RDW 15.0 10.0 - 15.0 %    Platelet Count 136 (L) 150 - 450 10e9/L    Diff Method Automated Method     % Neutrophils 86.7 %    % Lymphocytes 2.9 %    % Monocytes 9.0 %    % Eosinophils 1.0 %    % Basophils 0.2 %    % Immature Granulocytes 0.2 %    Nucleated RBCs 0 0 /100    Absolute Neutrophil 11.8 (H) 1.6 - 8.3 10e9/L    Absolute Lymphocytes 0.4 (L) 0.8 - 5.3 10e9/L    Absolute Monocytes 1.2 0.0 - 1.3 10e9/L    Absolute Eosinophils 0.1 0.0 - 0.7 10e9/L    Absolute Basophils 0.0 0.0 - 0.2 10e9/L    Abs Immature Granulocytes 0.0 0 - 0.4 10e9/L    Absolute Nucleated RBC 0.0    INR   Result Value Ref Range    INR 1.20 (H) 0.86 - 1.14   Partial thromboplastin time   Result Value Ref Range    PTT 33 22 - 37 sec   Comprehensive metabolic panel   Result Value Ref Range    Sodium 136 133 - 144 mmol/L    Potassium 4.0 3.4 - 5.3 mmol/L    Chloride 99 94 - 109 mmol/L    Carbon Dioxide 26 20 - 32 mmol/L    Anion Gap 11 3 - 14 mmol/L    Glucose 134 (H) 70 - 99 mg/dL    Urea Nitrogen 35 (H) 7 - 30 mg/dL    Creatinine 6.40 (H) 0.66 - 1.25 mg/dL    GFR Estimate 9 (L) >60 mL/min/1.7m2    GFR Estimate If Black 10 (L) >60 mL/min/1.7m2    Calcium 9.2 8.5 - 10.1 mg/dL    Bilirubin Total 2.3 (H) 0.2 - 1.3 mg/dL    Albumin 3.3 (L) 3.4 - 5.0 g/dL    Protein Total 7.3 6.8 - 8.8 g/dL    Alkaline  Phosphatase 202 (H) 40 - 150 U/L    ALT 15 0 - 70 U/L    AST 19 0 - 45 U/L   Lactic acid   Result Value Ref Range    Lactic Acid 0.8 0.7 - 2.0 mmol/L   EKG 12-lead, tracing only   Result Value Ref Range    Interpretation ECG Click View Image link to view waveform and result        Treatments provided: abx    Family Comments: no family at bedside    OBS brochure/video discussed/provided to patient: N/A    ED Medications:   Medications   ondansetron (ZOFRAN) injection 4 mg (4 mg Intravenous Given 4/23/18 1021)   piperacillin-tazobactam (ZOSYN) 2.25 g vial to attach to  ml bag (2.25 g Intravenous New Bag 4/23/18 6713)       Drips infusing?:  No    For the majority of the shift this patient was Green.   Interventions performed were .    Severe Sepsis OR Septic Shock Diagnosis Present: No      ED NURSE PHONE NUMBER: [phone]

## 2018-04-23 NOTE — IP AVS SNAPSHOT
MRN:[id number]                      After Visit Summary   4/23/2018    Westley Ness    MRN: [id number]           Thank you!     Thank you for choosing [facility] for your care. Our goal is always to provide you with excellent care. Hearing back from our patients is one way we can continue to improve our services. Please take a few minutes to complete the written survey that you may receive in the mail after you visit with us. Thank you!        Patient Information     Date Of Birth          1945        About your hospital stay     You were admitted on:  April 23, 2018 You last received care in the:  [facility] [facility] [address] [facility]    You were discharged on:  April 26, 2018        Reason for your hospital stay       Pneumonia                  Who to Call     For medical emergencies, please call 911.  For non-urgent questions about your medical care, please call your primary care provider or clinic, [phone]          Attending Provider     Provider Specialty    Rafa Aguilar DO Emergency Medicine    Zulema Singh MD Internal Medicine       Primary Care Provider Office Phone # Fax #    Josselin Kolb [phone]      After Care Instructions     Activity       Your activity upon discharge: activity as tolerated            Diet       Follow this diet upon discharge: Orders Placed This Encounter      Combination Diet Dialysis Diet                  Follow-up Appointments     Follow-up and recommended labs and tests        Follow up with primary care provider, Josselin Kolb, within 2-4 weeks, for hospital follow- up. The following labs/tests are recommended: Blood cultures 1-2 weeks after finishing antibiotics.                  Your next 10 appointments already scheduled     May 01, 2018  4:30 PM CDT   Remote ICD Check with CARD DCR2   [facility] ([facility])    [address] [place] [address]  [address]  [place] [address] [phone] OPT 2           This appointment is for a remote check of your debrillator.  This is not an appointment at the office.            May 02, 2018  2:00 PM CDT   Office Visit with Josselin Kolb MD   [facility] ([facility]    [address] [facility] [address] [phone]           Bring a current list of meds and any records pertaining to this visit. For Physicals, please bring immunization records and any forms needing to be filled out. Please arrive 10 minutes early to complete paperwork.              Additional Services     Home care nursing referral       Resumption of home health     Your provider has ordered home care nursing services. If you have not been contacted within 2 days of your discharge please call the inpatient department phone number at [phone] .                  Pending Results     Date and Time Order Name Status Description    4/26/2018 0000 Blood culture In process     4/25/2018 0842 Blood culture Preliminary     4/23/2018 2359 Blood culture Preliminary             Statement of Approval     Ordered          04/26/18 0910  I have reviewed and agree with all the recommendations and orders detailed in this document.  EFFECTIVE NOW     Approved and electronically signed by:  Mika Varela,              Admission Information     Date & Time Provider Department Dept. Phone    4/23/2018 Zulema Singh MD [place] [address] [facility] [phone]      Your Vitals Were     Blood Pressure Pulse Temperature Respirations Weight Pulse Oximetry    120/63 (BP Location: Right arm) 67 97.8  F (36.6  C) (Oral) 16 78.7 kg (173 lb 8 oz) 94%    BMI (Body Mass Index)                   23.53 kg/m2           MyChart Information     [employer] lets you send messages to your doctor, view your test results, renew your prescriptions, schedule appointments and more. To sign up, go  "to [email or web address] . Click on \"Log in\" on the left side of the screen, which will take you to the Welcome page. Then click on \"Sign up Now\" on the right side of the page.     You will be asked to enter the access code listed below, as well as some personal information. Please follow the directions to create your username and password.     Your access code is: [email or web address]  Expires: 2018  1:16 PM     Your access code will  in 90 days. If you need help or a new code, please call your [facility] clinic or [phone].        Care EveryWhere ID     This is your Care EveryWhere ID. This could be used by other organizations to access your [facility] medical records  [id number]        Equal Access to Services     LAZARO ROMERO : Timo Jeff, enmanuel maher, zahra escalera, purnima cuenca . So [facility] [phone].    ATENCIÓN: Si habla español, tiene a jacome disposición servicios gratuitos de asistencia lingüística. Ava al [phone].    We comply with applicable federal civil rights laws and [place] laws. We do not discriminate on the basis of race, color, national origin, age, disability, sex, sexual orientation, or gender identity.               Review of your medicines      START taking        Dose / Directions    cefuroxime 500 MG tablet   Commonly known as:  CEFTIN   Used for:  Gram-positive bacteremia        Dose:  500 mg   Start taking on:  2018   Take 1 tablet (500 mg) by mouth daily for 10 days   Quantity:  10 tablet   Refills:  0         CONTINUE these medicines which may have CHANGED, or have new prescriptions. If we are uncertain of the size of tablets/capsules you have at home, strength may be listed as something that might have changed.        Dose / Directions    lisinopril 2.5 MG tablet   Commonly known as:  PRINIVIL/Zestril   This may have changed:    - how much to take  - how to take this  - when to take this  - additional " instructions   Used for:  NSTEMI (non-ST elevated myocardial infarction) (H)        Take one tablet after dialysis. Take second tablet at bedtime.   Quantity:  180 tablet   Refills:  3         CONTINUE these medicines which have NOT CHANGED        Dose / Directions    acetaminophen 325 MG tablet   Commonly known as:  TYLENOL   Used for:  Closed nondisplaced fracture of right patella, unspecified fracture morphology, initial encounter, Elbow fracture, right, closed, initial encounter        Dose:  650 mg   Take 2 tablets (650 mg) by mouth 4 times daily   Quantity:  60 tablet   Refills:  0       aspirin 81 MG tablet        Dose:  81 mg   Take 1 tablet (81 mg) by mouth daily   Quantity:  1 tablet   Refills:  0       carvedilol 6.25 MG tablet   Commonly known as:  COREG   Used for:  NSTEMI (non-ST elevated myocardial infarction) (H), Mild CAD        TAKE 1 TABLET BY MOUTH TWICE DAILY WITH MEALS AFTER DIALYSIS   Quantity:  180 tablet   Refills:  3       clopidogrel 75 MG tablet   Commonly known as:  PLAVIX   Used for:  Coronary artery disease involving native coronary artery of native heart without angina pectoris        Dose:  75 mg   Take 1 tablet (75 mg) by mouth daily To protect your stent(s).  Do not stop taking unless directed by cardiology.   Quantity:  30 tablet   Refills:  11       fluticasone 50 MCG/ACT spray   Commonly known as:  FLONASE        Dose:  2 spray   Spray 2 sprays into both nostrils daily as needed for rhinitis or allergies   Refills:  0       isosorbide mononitrate 30 MG 24 hr tablet   Commonly known as:  IMDUR   Used for:  NSTEMI (non-ST elevated myocardial infarction) (H)        TAKE 1/2 TABLET BY MOUTH ONCE DAILY   Quantity:  90 tablet   Refills:  2       Levothyroxine Sodium 50 MCG Caps   Used for:  Hypothyroidism, unspecified type        Dose:  50 mcg   Take 50 mcg by mouth daily   Quantity:  90 capsule   Refills:  1       loperamide 2 MG capsule   Commonly known as:  IMODIUM        Dose:  1  mg   Take 1 mg by mouth as needed   Refills:  0       mexiletine 150 MG capsule   Commonly known as:  MEXITIL   Used for:  Tachycardia        Dose:  150 mg   Take 1 capsule (150 mg) by mouth 2 times daily   Quantity:  180 capsule   Refills:  3       nitroGLYcerin 0.4 MG sublingual tablet   Commonly known as:  NITROSTAT   Used for:  Angina pectoris (H)        1 TAB EVERY 5 MIN AS NEEDED, UP TO 3 PER EPISODE   Quantity:  25 tablet   Refills:  0       pantoprazole 40 MG EC tablet   Commonly known as:  PROTONIX   Used for:  Gastroesophageal reflux disease, esophagitis presence not specified        Dose:  40 mg   Take 1 tablet (40 mg) by mouth every morning   Quantity:  30 tablet   Refills:  10       pravastatin 40 MG tablet   Commonly known as:  PRAVACHOL   Used for:  Coronary artery disease involving native coronary artery of native heart without angina pectoris        TAKE 1 TABLET (40 MG) BY MOUTH DAILY   Quantity:  90 tablet   Refills:  3       psyllium 0.52 g capsule        Dose:  1 capsule   Take 1 capsule by mouth daily as needed   Refills:  0       TRIPHROCAPS 1 MG capsule   Used for:  ESRD (end stage renal disease) on dialysis (H)   Generic drug:  NEPHROCAPS        TAKE 1 CAPSULE BY MOUTH EVERY EVENING   Quantity:  90 capsule   Refills:  3       ZANTAC PO        Dose:  75 mg   Take 75 mg by mouth At Bedtime   Refills:  0            Where to get your medicines      These medications were sent to [facility] - [address]  [address], [place] [address]     Phone:  [phone]     cefuroxime 500 MG tablet                Protect others around you: Learn how to safely use, store and throw away your medicines at www.disposemymeds.org.        ANTIBIOTIC INSTRUCTION     You've Been Prescribed an Antibiotic - Now What?  Your healthcare team thinks that you or your loved one might have an infection. Some infections can be treated with antibiotics, which are powerful,  life-saving drugs. Like all medications, antibiotics have side effects and should only be used when necessary. There are some important things you should know about your antibiotic treatment.      Your healthcare team may run tests before you start taking an antibiotic.    Your team may take samples (e.g., from your blood, urine or other areas) to run tests to look for bacteria. These test can be important to determine if you need an antibiotic at all and, if you do, which antibiotic will work best.      Within a few days, your healthcare team might change or even stop your antibiotic.    Your team may start you on an antibiotic while they are working to find out what is making you sick.    Your team might change your antibiotic because test results show that a different antibiotic would be better to treat your infection.    In some cases, once your team has more information, they learn that you do not need an antibiotic at all. They may find out that you don't have an infection, or that the antibiotic you're taking won't work against your infection. For example, an infection caused by a virus can't be treated with antibiotics. Staying on an antibiotic when you don't need it is more likely to be harmful than helpful.      You may experience side effects from your antibiotic.    Like all medications, antibiotics have side effects. Some of these can be serious.    Let you healthcare team know if you have any known allergies when you are admitted to the hospital.    One significant side effect of nearly all antibiotics is the risk of severe and sometimes deadly diarrhea caused by Clostridium difficile (C. Difficile). This occurs when a person takes antibiotics because some good germs are destroyed. Antibiotic use allows C. diificile to take over, putting patients at high risk for this serious infection.    As a patient or caregiver, it is important to understand your or your loved one's antibiotic treatment. It is  especially important for caregivers to speak up when patients can't speak for themselves. Here are some important questions to ask your healthcare team.    What infection is this antibiotic treating and how do you know I have that infection?    What side effects might occur from this antibiotic?    How long will I need to take this antibiotic?    Is it safe to take this antibiotic with other medications or supplements (e.g., vitamins) that I am taking?     Are there any special directions I need to know about taking this antibiotic? For example, should I take it with food?    How will I be monitored to know whether my infection is responding to the antibiotic?    What tests may help to make sure the right antibiotic is prescribed for me?      Information provided by:  www.cdc.gov/getsmart  U.S. Department of Health and Human Services  Centers for disease Control and Prevention  National Center for Emerging and Zoonotic Infectious Diseases  Division of Healthcare Quality Promotion             Medication List: This is a list of all your medications and when to take them. Check marks below indicate your daily home schedule. Keep this list as a reference.      Medications           Morning Afternoon Evening Bedtime As Needed    acetaminophen 325 MG tablet   Commonly known as:  TYLENOL   Take 2 tablets (650 mg) by mouth 4 times daily   Last time this was given:  650 mg on 4/26/2018 12:16 AM         Take 4 times daily                       aspirin 81 MG tablet   Take 1 tablet (81 mg) by mouth daily                                   carvedilol 6.25 MG tablet   Commonly known as:  COREG   TAKE 1 TABLET BY MOUTH TWICE DAILY WITH MEALS AFTER DIALYSIS   Last time this was given:  6.25 mg on 4/26/2018  8:05 AM                                cefuroxime 500 MG tablet   Commonly known as:  CEFTIN   Take 1 tablet (500 mg) by mouth daily for 10 days   Start taking on:  4/27/2018         1 tab by mouth for 10 days                        clopidogrel 75 MG tablet   Commonly known as:  PLAVIX   Take 1 tablet (75 mg) by mouth daily To protect your stent(s).  Do not stop taking unless directed by cardiology.   Last time this was given:  75 mg on 4/26/2018  8:05 AM                                   fluticasone 50 MCG/ACT spray   Commonly known as:  FLONASE   Spray 2 sprays into both nostrils daily as needed for rhinitis or allergies                                   isosorbide mononitrate 30 MG 24 hr tablet   Commonly known as:  IMDUR   TAKE 1/2 TABLET BY MOUTH ONCE DAILY   Last time this was given:  15 mg on 4/26/2018  8:05 AM                                   Levothyroxine Sodium 50 MCG Caps   Take 50 mcg by mouth daily                                   lisinopril 2.5 MG tablet   Commonly known as:  PRINIVIL/Zestril   Take one tablet after dialysis. Take second tablet at bedtime.   Last time this was given:  2.5 mg on 4/25/2018 10:03 PM                                loperamide 2 MG capsule   Commonly known as:  IMODIUM   Take 1 mg by mouth as needed                                   mexiletine 150 MG capsule   Commonly known as:  MEXITIL   Take 1 capsule (150 mg) by mouth 2 times daily   Last time this was given:  150 mg on 4/26/2018  8:05 AM                                      nitroGLYcerin 0.4 MG sublingual tablet   Commonly known as:  NITROSTAT   1 TAB EVERY 5 MIN AS NEEDED, UP TO 3 PER EPISODE                                   pantoprazole 40 MG EC tablet   Commonly known as:  PROTONIX   Take 1 tablet (40 mg) by mouth every morning   Last time this was given:  40 mg on 4/26/2018  8:05 AM                                   pravastatin 40 MG tablet   Commonly known as:  PRAVACHOL   TAKE 1 TABLET (40 MG) BY MOUTH DAILY   Last time this was given:  40 mg on 4/25/2018 10:03 PM                                   psyllium 0.52 g capsule   Take 1 capsule by mouth daily as needed                                   TRIPHROCAPS 1 MG capsule   TAKE 1 CAPSULE  BY MOUTH EVERY EVENING   Last time this was given:  1 capsule on 4/25/2018 10:03 PM   Generic drug:  NEPHROCAPS                                   ZANTAC PO   Take 75 mg by mouth At Bedtime

## 2018-04-23 NOTE — CONSULTS
Consult Date:  04/23/2018      Westley Ness is a 72-year-old man with end-stage renal disease likely secondary to hypertension and diabetes mellitus type 2.  The patient dialyzes under the care of Dr. Hammond from our group at the [facility] every Monday, Wednesday and Friday.  The patient presented to the emergency room earlier today with a several-day history of a cough, shortness of breath and generalized weakness.  He has been evaluated in the emergency room and chest x-ray indicates a probable left-sided pneumonia.  The patient has been begun on IV antibiotic therapy.  He did not have his usually scheduled dialysis earlier today.      Mr. Ness was most recently hospitalized here last December.  At that time, he also had problems with respiratory insufficiency.  He does have a significant cardiac history with marked left ventricular systolic dysfunction and a history of atrial tachyarrhythmias.  The patient's recent dialysis treatments at the [facility] unit have apparently been uncomplicated, and he has reached his target postdialysis weight of approximately 82 to 83 kilograms routinely.      Since admission, the patient has been on nasal cannula oxygen supplementation and has had a satisfactory O2 saturation.  He has been afebrile after an initial temperature of 101.6 in the emergency room.  He is hemodynamically stable except that blood pressure is only about 100-105/50-55.  His respiratory rate is not increased, and he is in no tadeo respiratory distress.      His prior to admission medication list is lengthy.  It includes aspirin and clopidogrel.  He is on low doses of carvedilol, isosorbide and lisinopril to assist with his heart failure.  Other noteworthy medications include ranitidine in the evening and pantoprazole in the morning for reflux symptoms and also a generous dose of Pravachol for hyperlipidemia.  He is on replacement therapy for hypothyroidism.  He is on no medication for  his diabetes.      LABORATORY DATA:  On admission show generally satisfactory chemistries, electrolytes are normal with potassium 4.0 and a normal acid base status, BUN and creatinine are 35 and 6.4.  He has a mildly elevated bilirubin and alkaline phosphatase, but otherwise normal liver function tests.  Albumin is 3.3.  His lactate level is normal at 0.8.  Hemoglobin A1c is good 6.2%.  Glucoses since admission have been about 120-130.  CBC shows an elevated white blood cell count of 13,600 with a left shifted differential, platelet count of 136,000 reflects what has been in the past a mildly low platelet count on most occasions.  Hemoglobin is satisfactory on erythropoietin through the dialysis unit at 11.7.  His electrocardiogram shows a sinus rhythm with low voltage.  There are no diagnostic abnormalities.  The patient has had poor R-wave progression across the precordium for many years.      PHYSICAL EXAMINATION:   GENERAL:  Mr. Ness is examined during the course of an urgently initiated dialysis run, which he is tolerating well.   VITAL SIGNS:  His vital signs throughout dialysis have remained satisfactory, but for the above-mentioned soft blood pressure.  Most recent oxygen saturation on low flow nasal cannula supplement is 98% and respiratory rate is 16.   SKIN:  Shows satisfactory color and turgor with no suspicious skin lesions.  There is no lymphadenopathy appreciated.   HEENT:  Unremarkable.     NECK:  Unremarkable.  There is no JVD.   LUNGS:  Do not seem significantly abnormal at this time, though the patient is examined only in a supine position during dialysis.   HEART:  Demonstrates no murmur, rub or gallop.  Peripheral pulses are satisfactory throughout.   ABDOMEN:  Soft with normal bowel sounds, no tenderness, masses or organomegaly.   EXTREMITIES:  Warm.  There is no edema.  Peripheral pulses are satisfactory.  Left upper arm AV fistula is functioning well.      ASSESSMENT:  Mr. Ness's  presentation does indeed seem consistent with an infectious cause of his respiratory insufficiency.  There is no evidence of significant volume excess at this time.  I see that he has been continued on his usual doses of aspirin, clopidogrel, carvedilol, lisinopril and isosorbide.  We will continue monitoring of his volume status and blood chemistry balance and dialyze more frequently than usual as needed.  I expect, however, that he will not need to dialyze again until the usually scheduled run on 2018.      Thank you for the opportunity to assist again in Mr. Ness's care.  Our group will follow as appropriate during the remainder of his hospital stay.         CARLO MARTINEZ MD             D: 2018   T: 2018   MT: JOYA      Name:     SOCORRO NESS   MRN:      [id number]        Account:       [id number]   :      1945           Consult Date:  2018      Document: [id number]       cc: Zulema Singh MD

## 2018-04-23 NOTE — H&P
[facility]    History and Physical  Hospitalist       Date of Admission:  4/23/2018  Date of Service (when I saw the patient): 04/23/18    Assessment & Plan   Westley Ness is a 72 year old male with complex PMHx including ESRD on HD, CAD s/p PCI, hx of VTach, CHF s/p AICD, paroxysmal a-flutter s/p ablation, and DM2 who presents with cough, shortness of breath, and generalized weakness, and is being admitted for HCAP with sepsis    Left-sided HCAP with sepsis  Presented with 3 week history of productive cough and shortness of breath. Fever to 101.6 and leukocytosis to 13.6k present on arrival. CXR on arrival showed left-sided infiltrates. In the ED, he was initiated on pip-tazo.  - Continues on pip-tazo. Hold vancomycin unless patient worsens or fails to improve with pip-tazo  - Encourage incentive spirometry  - Blood cultures x2 NGTD    Generalized weakness  Typically ambulates with a cane occasionally, but has been using it more frequently since becoming acutely ill  - PT/OT    ESRD  Dialyzes at [facility] via LUE AVF. Primary nephrologist: Dr. Hammond  - HD will be continued as per Nephrology  - Continues on home Triphocaps    CAD, native heart, native vessels s/p PCI  Chronic systolic CHF due to ischemic cardiomyopathy, s/p AICD  *[PTA: aspirin 81 mg daily, Plavix, carvedilol 6.25 mg BID, Imdur 15 mg daily, lisinopril 2.5 mg BID, pravastatin 40 mg daily]  *LVEF 20-25% per TTE on 6/7/2017  - Appears compensated  - Continues on PTA meds    History of VTach  Maintained on mexiletine which has been continued    Paroxysmal atrial flutter s/p ablation  [PTA: carvedilol 6.25 mg BID] Recently taken off anticoagulation by Dr. Mejia due to questionable utility in patient with ESRD. He has since been resumed on aspirin and Plavix.  - Continues on PTA carvedilol    DM2 with nephropathy  Diet-controlled.  - A1c ordered  - Low SSI available    Hypothyroidism  Chronic and stable on  levothyroxine    GERD  Chronic and stable on pantoprazole    FEN: Dialysis diet  DVT Prophylaxis: Heparin SQ  Code Status: Full Code    Disposition: Expected discharge once afebrile >24 hours and on oral antibiotics, in the next 2-3 days.    Zulema Singh    Primary Care Physician   Josselin Kolb    Chief Complaint   Cough, shortness of breath, weakness    History is obtained from the patient and medical records    History of Present Illness   Westley Ness is a 72 year old male with complex PMHx including ESRD on HD, CAD s/p PCI, CHF s/p AICD, paroxysmal a-flutter s/p ablation, and DM2 who presents with cough, shortness of breath, and generalized weakness. The patient reports 3-week history of productive cough and progressive shortness of breath. He was getting ready to go to dialysis today, but felt too weak to drive himself there, so presented to the Emergency Dept. He denies fevers/chills. He endorses diffuse chest discomfort associated with cough, but otherwise denies dizziness/lightheadedness, or syncope. He denies PND/orthopnea, or LE edema. He reports poor appetite with decreased intake. He typically ambulates only occasionally with a cane, but has been using it more lately. He denies recent falls.    In the ED, he was found to be febrile to 101.6 with leukocytosis to 13.6k. CXR showed left-sided infiltrates. He was initiated on pip-tazo for HCAP.    Past Medical History    I have reviewed this patient's medical history and updated the medical record  Past Medical History:   Diagnosis Date     Acid reflux disease      CAD (coronary artery disease)     s/p multiple NSTEMIs and PCI's     ESRD on hemodialysis (H)     MWF     Hypothyroidism      Ischemic cardiomyopathy     EF 25-30%, s/p AICD     NSTEMI (non-ST elevated myocardial infarction) (H)     multiple     Type 2 diabetes mellitus (H)     diet-controlled     Typical atrial flutter (H)        Past Surgical History   I have reviewed this  patient's surgical history and updated the medical record  Past Surgical History:   Procedure Laterality Date     CHOLECYSTECTOMY, OPEN  2013     ELBOW SURGERY Left 2008    ORIF - plates still in place     H ABLATION ATRIAL FLUTTER  2017, 17     HC LEFT HEART CATHETERIZATION  2016     HC LEFT HEART CATHETERIZATION  2016     IMPLANT VENTRICULAR DEVICE  08/15/2011     TONSILLECTOMY & ADENOIDECTOMY       VASCULAR SURGERY  2004, 2010    LUE fistulas (upper and lower); upper one is functional       Prior to Admission Medications   Prior to Admission Medications   Prescriptions Last Dose Informant Patient Reported? Taking?   Levothyroxine Sodium 50 MCG CAPS   No No   Sig: Take 50 mcg by mouth daily   RaNITidine HCl (ZANTAC PO)   Yes No   Sig: Take 75 mg by mouth nightly as needed for heartburn   TRIPHROCAPS 1 MG capsule   No No   Sig: TAKE 1 CAPSULE BY MOUTH EVERY EVENING   acetaminophen (TYLENOL) 325 MG tablet  Self No No   Sig: Take 2 tablets (650 mg) by mouth 4 times daily   aspirin 81 MG tablet   Yes No   Sig: Take 1 tablet (81 mg) by mouth daily   carvedilol (COREG) 6.25 MG tablet   No No   Sig: TAKE 1 TABLET BY MOUTH TWICE DAILY WITH MEALS AFTER DIALYSIS   clopidogrel (PLAVIX) 75 MG tablet   No No   Sig: Take 1 tablet (75 mg) by mouth daily To protect your stent(s).  Do not stop taking unless directed by cardiology.   fluticasone (FLONASE) 50 MCG/ACT spray   No No   Sig: Spray 2 sprays into both nostrils daily   isosorbide mononitrate (IMDUR) 30 MG 24 hr tablet   No No   Sig: TAKE 1/2 TABLET BY MOUTH ONCE DAILY   lisinopril (PRINIVIL/ZESTRIL) 2.5 MG tablet   No No   Sig: Take one tablet after dialysis. Take second tablet at bedtime.   loperamide (IMODIUM) 2 MG capsule  Self Yes No   Sig: Take 1 mg by mouth as needed    mexiletine (MEXITIL) 150 MG capsule  Self No No   Sig: Take 1 capsule (150 mg) by mouth 2 times daily   nitroGLYcerin (NITROSTAT) 0.4 MG sublingual tablet   No No   Si TAB  EVERY 5 MIN AS NEEDED, UP TO 3 PER EPISODE   pantoprazole (PROTONIX) 40 MG EC tablet   No No   Sig: Take 1 tablet (40 mg) by mouth every morning   pravastatin (PRAVACHOL) 40 MG tablet   No No   Sig: TAKE 1 TABLET (40 MG) BY MOUTH DAILY   psyllium 0.52 G capsule  Self Yes No   Sig: Take 1 capsule by mouth daily as needed       Facility-Administered Medications: None     Allergies   Allergies   Allergen Reactions     Contrast Dye Hives     Does fine if he uses benadryl prior.     No Clinical Screening - See Comments      Green beans - Diarrhea.    Topical antibiotic - name unknown - caused swelling of the penis.       Social History   Quit smoking in 1996. He denies alcohol use. He lives alone and is independent of cares. He sometimes uses a cane to ambulate    Family History   I have reviewed this patient's family history  Family History   Problem Relation Age of Onset     CEREBROVASCULAR DISEASE Mother      later in life     Hypertension Father      Bladder Cancer Father      Myocardial Infarction Paternal Grandmother      DIABETES No family hx of      Prostate Cancer No family hx of      Colon Cancer No family hx of        Review of Systems   The 10 point Review of Systems is negative other than noted in the HPI    Physical Exam   Temp: 101.6  F (38.7  C) Temp src: Oral BP: 98/52 Pulse: 83 Heart Rate: 73 Resp: 14 SpO2: 97 % O2 Device: None (Room air)    Vital Signs with Ranges  Temp:  [101.6  F (38.7  C)] 101.6  F (38.7  C)  Pulse:  [83] 83  Heart Rate:  [73-80] 73  Resp:  [9-23] 14  BP: ()/(52-62) 98/52  SpO2:  [94 %-97 %] 97 %  0 lbs 0 oz    Constitutional: Chronically-ill appearing, NAD  HEENT: NC/AT, sclera white, conjunctiva clear, EOMI, MMM  Respiratory: Breathing non-labored. Diminished breath sounds bilaterally  Cardiovascular: Heart RRR, no m/r/g. No pedal edema. +Palpable thrill over LUE  GI: +BS. Abd soft/NT  Lymph/Hematologic: No cervical LAD  Skin: Warm and dry. No rash.  Musculoskeletal: Normal  muscle bulk and tone  Neurologic: Alert and appropriate. HUANG  Psychiatric: Normal affect    Data   Data reviewed today:  I personally reviewed the EKG tracing showing sinus rhythm without ischemic changes and the chest x-ray image(s) showing left-sided infiltrates.    Recent Labs  Lab 04/23/18  0632   WBC 13.6*   HGB 11.7*   MCV 99   *   INR 1.20*      POTASSIUM 4.0   CHLORIDE 99   CO2 26   BUN 35*   CR 6.40*   ANIONGAP 11   CHRISTINE 9.2   *   ALBUMIN 3.3*   PROTTOTAL 7.3   BILITOTAL 2.3*   ALKPHOS 202*   ALT 15   AST 19       Recent Results (from the past 24 hour(s))   XR Chest 2 Views    Narrative    CHEST TWO VIEWS April 23, 2018 7:08 AM     HISTORY: Chest pain/shortness of breath.    COMPARISON: 12/18/2017.    FINDINGS: Right subclavian cardiac device in place. No pneumothorax.  The heart is enlarged. No pulmonary edema. There is infiltrate in the  left lower lung, increased since the previous exam. There is also  probable left perihilar infiltrate. The lungs are otherwise clear. No  pleural effusion. Degenerative disease in the thoracic spine.       Impression    IMPRESSION: Left basilar and left perihilar infiltrate may be  pneumonia.

## 2018-04-23 NOTE — PLAN OF CARE
Problem: Patient Care Overview  Goal: Plan of Care/Patient Progress Review  OT and PT: Patient just admitted. Rescheduling evals for tomorrow to allow for medical work up and plan in place.

## 2018-04-23 NOTE — LETTER
Transition Communication Hand-off for Care Transitions to Next Level of Care Provider    Name: Westley Ness  : 1945  MRN #: [id number]  Primary Care Provider: Josselin Kolb     Primary Clinic: [address] [place] [id number]     Reason for Hospitalization:  HCAP (healthcare-associated pneumonia) [J18.9]  Pneumonia [J18.9]  Admit Date/Time: 2018  6:12 AM  Discharge Date: 2018  Payor Source: Payor: MEDICARE / Plan: MEDICARE / Product Type: Medicare /     Readmission Assessment Measure (PAUL) Risk Score/category: Elevated  Reason for Communication Hand-off Referral: Admission diagnoses: PN    Discharge Plan:Home   Concern for non-adherence with plan of care: NO    Discharge Needs Assessment:  Needs       Most Recent Value    Equipment Currently Used at Home cane, straight, walker, rolling, grab bar    Transportation Available car          Already enrolled in Tele-monitoring program and name of program:  No  Follow-up specialty is recommended: Yes    Follow-up plan:  Future Appointments  Date Time Provider Department Center   2018 4:30 PM CARD DCR2 [facility]   2018 2:00 PM Josselin Kolb MD [facility]       Any outstanding tests or procedures:        Referrals     Future Labs/Procedures    Home care nursing referral     Comments:    Resumption of home health     Your provider has ordered home care nursing services. If you have not been contacted within 2 days of your discharge please call the inpatient department phone number at [phone] .          Becerril Recommendations:   Lives alone,has home health aide for grocery shopping and house keeping. H/o ESRD on dialysis MWF,admitted with pneumonia. Patient stable at discharge on room air and denies any shortness of breath. 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 Patient at baseline uses a cane for ambulation and continues to  drive to dialysis. Patient would benefit from a follow up phone call.    Jl Varghese RN CC    AVS/Discharge Summary is the source of truth; this is a helpful guide for improved communication of patient story

## 2018-04-23 NOTE — PROGRESS NOTES
Inpatient Dialysis Progress Note           Assessment and Plan:   ESRD status quo.  Stable dialysis run thus far.  Expect good chemical exchange and improved fluid balance by end of run.  Next HD likely 4/25, per usual schedule.      HCAP (healthcare-associated pneumonia)    * No resolved hospital problems. *               Interval History:   alert, oriented to person, place and time and doing well; no cp, sob, n/v/d, or abd pain.  Examined during run.  Stable VS though soft BP.                   Dialysis Details:   Current weight: 83.5 kg (actual weight)  Dry Weight: ~82  Dialysis Temp: 36.5  C  Access Device: AVF  Access Site: [facility]  Dialyzer: Optiflux 160  Dialysis Bath: K+ 3  Sodium Profile: no  UF Goal: 2L  Blood Flow Rate (mL/min): 450  Total Treatment Time (hrs): 3.5  Heparin: Low dose as required    Medications:  EPO dose: no  Zemplar: no  IV Fe: no            Medications:       aspirin EC  81 mg Oral Daily     carvedilol  6.25 mg Oral BID w/meals     clopidogrel  75 mg Oral Daily     heparin (porcine)  1,000 Units Hemodialysis Machine Once in dialysis     heparin  5,000 Units Subcutaneous Q12H     insulin aspart  1-3 Units Subcutaneous TID AC     insulin aspart  1-3 Units Subcutaneous At Bedtime     isosorbide mononitrate  15 mg Oral Daily     levothyroxine  50 mcg Oral Daily     lisinopril  2.5 mg Oral 2 times per day     mexiletine  150 mg Oral BID     NEPHROCAPS  1 capsule Oral QPM     pantoprazole  40 mg Oral QAM     piperacillin-tazobactam  2.25 g Intravenous Q8H SUSHMA     pravastatin  40 mg Oral Daily     ranitidine  75 mg Oral At Bedtime     sodium chloride (PF)  3 mL Intracatheter Q8H       heparin (porcine)                      Physical Exam:       Vital Sign Ranges  Temp:  [97.8  F (36.6  C)-101.6  F (38.7  C)] 97.8  F (36.6  C)  Pulse:  [66-83] 66  Heart Rate:  [] 66  Resp:  [9-23] 18  BP: ()/(40-62) 105/51  SpO2:  [94 %-100 %] 98 %    Weight, current: 83.5 kg (actual weight)  Weight  change:          Physical Exam:   General:  Patient comfortable, in no apparent distress.  Awake, alert, oriented x3.  Neck:  Supple, no JVD.  Lungs:  Clear to auscultation bilaterally.  Cardiac:  Regular rate and rhythm, no murmurs, rub, or gallops.  Abdomen:  Soft, nontender, physiologic sounds.  Extremities:  Without edema.  2+ pulses.  Skin:  Warm, dry.  Neurologic:  No focal deficits.             Data:        Lab Results   Component Value Date     04/23/2018    Lab Results   Component Value Date    CHLORIDE 99 04/23/2018    Lab Results   Component Value Date    BUN 35 (H) 04/23/2018      Lab Results   Component Value Date    POTASSIUM 4.0 04/23/2018    Lab Results   Component Value Date    CO2 26 04/23/2018    Lab Results   Component Value Date    CR 6.40 (H) 04/23/2018        Lab Results   Component Value Date     04/23/2018     12/18/2017     12/11/2017     Lab Results   Component Value Date    POTASSIUM 4.0 04/23/2018    POTASSIUM 4.2 12/18/2017    POTASSIUM 4.0 12/11/2017     Lab Results   Component Value Date    CHLORIDE 99 04/23/2018    CHLORIDE 100 12/18/2017    CHLORIDE 97 12/11/2017     Lab Results   Component Value Date    CO2 26 04/23/2018    CO2 30 12/18/2017    CO2 31 12/11/2017     Lab Results   Component Value Date    CR 6.40 (H) 04/23/2018    CR 6.47 (H) 12/18/2017    CR 6.28 (H) 12/11/2017     Lab Results   Component Value Date    BUN 35 (H) 04/23/2018    BUN 35 (H) 12/18/2017    BUN 39 (H) 12/11/2017     Lab Results   Component Value Date    HGB 11.7 (L) 04/23/2018    HGB 10.3 (L) 12/19/2017    HGB 11.3 (L) 12/18/2017     No results found for: PH, PHARTERIAL, PO2, [id number], SAT, PCO2, HCO3, BASEEXCESS, DONNIE, BEB        Reji Smith MD  Nephrology; [facility]  [phone]

## 2018-04-24 ENCOUNTER — APPOINTMENT (OUTPATIENT)
Dept: CARDIOLOGY | Facility: CLINIC | Age: 73
DRG: 871 | End: 2018-04-24
Attending: INTERNAL MEDICINE
Payer: MEDICARE

## 2018-04-24 ENCOUNTER — APPOINTMENT (OUTPATIENT)
Dept: PHYSICAL THERAPY | Facility: CLINIC | Age: 73
DRG: 871 | End: 2018-04-24
Attending: INTERNAL MEDICINE
Payer: MEDICARE

## 2018-04-24 LAB
ANION GAP SERPL CALCULATED.3IONS-SCNC: 9 MMOL/L (ref 3–14)
BUN SERPL-MCNC: 20 MG/DL (ref 7–30)
CALCIUM SERPL-MCNC: 8.5 MG/DL (ref 8.5–10.1)
CHLORIDE SERPL-SCNC: 100 MMOL/L (ref 94–109)
CO2 SERPL-SCNC: 29 MMOL/L (ref 20–32)
CREAT SERPL-MCNC: 4.02 MG/DL (ref 0.66–1.25)
ERYTHROCYTE [DISTWIDTH] IN BLOOD BY AUTOMATED COUNT: 15.1 % (ref 10–15)
GFR SERPL CREATININE-BSD FRML MDRD: 15 ML/MIN/1.7M2
GLUCOSE BLDC GLUCOMTR-MCNC: 104 MG/DL (ref 70–99)
GLUCOSE BLDC GLUCOMTR-MCNC: 107 MG/DL (ref 70–99)
GLUCOSE BLDC GLUCOMTR-MCNC: 108 MG/DL (ref 70–99)
GLUCOSE BLDC GLUCOMTR-MCNC: 129 MG/DL (ref 70–99)
GLUCOSE BLDC GLUCOMTR-MCNC: 93 MG/DL (ref 70–99)
GLUCOSE SERPL-MCNC: 87 MG/DL (ref 70–99)
HCT VFR BLD AUTO: 30.4 % (ref 40–53)
HGB BLD-MCNC: 10.1 G/DL (ref 13.3–17.7)
MCH RBC QN AUTO: 33.6 PG (ref 26.5–33)
MCHC RBC AUTO-ENTMCNC: 33.2 G/DL (ref 31.5–36.5)
MCV RBC AUTO: 101 FL (ref 78–100)
PLATELET # BLD AUTO: 133 10E9/L (ref 150–450)
POTASSIUM SERPL-SCNC: 3.9 MMOL/L (ref 3.4–5.3)
RBC # BLD AUTO: 3.01 10E12/L (ref 4.4–5.9)
SODIUM SERPL-SCNC: 138 MMOL/L (ref 133–144)
WBC # BLD AUTO: 6.5 10E9/L (ref 4–11)

## 2018-04-24 PROCEDURE — 25000132 ZZH RX MED GY IP 250 OP 250 PS 637: Mod: GY | Performed by: INTERNAL MEDICINE

## 2018-04-24 PROCEDURE — 00000146 ZZHCL STATISTIC GLUCOSE BY METER IP

## 2018-04-24 PROCEDURE — 93306 TTE W/DOPPLER COMPLETE: CPT | Mod: 26 | Performed by: INTERNAL MEDICINE

## 2018-04-24 PROCEDURE — 85027 COMPLETE CBC AUTOMATED: CPT | Performed by: INTERNAL MEDICINE

## 2018-04-24 PROCEDURE — 40000264 ECHO COMPLETE WITH OPTISON

## 2018-04-24 PROCEDURE — 25000128 H RX IP 250 OP 636: Performed by: INTERNAL MEDICINE

## 2018-04-24 PROCEDURE — 87040 BLOOD CULTURE FOR BACTERIA: CPT | Performed by: INTERNAL MEDICINE

## 2018-04-24 PROCEDURE — 36415 COLL VENOUS BLD VENIPUNCTURE: CPT | Performed by: INTERNAL MEDICINE

## 2018-04-24 PROCEDURE — 97161 PT EVAL LOW COMPLEX 20 MIN: CPT | Mod: GP

## 2018-04-24 PROCEDURE — 40000193 ZZH STATISTIC PT WARD VISIT

## 2018-04-24 PROCEDURE — 21000001 ZZH R&B HEART CARE

## 2018-04-24 PROCEDURE — 80048 BASIC METABOLIC PNL TOTAL CA: CPT | Performed by: INTERNAL MEDICINE

## 2018-04-24 PROCEDURE — 99232 SBSQ HOSP IP/OBS MODERATE 35: CPT | Performed by: INTERNAL MEDICINE

## 2018-04-24 PROCEDURE — A9270 NON-COVERED ITEM OR SERVICE: HCPCS | Mod: GY | Performed by: INTERNAL MEDICINE

## 2018-04-24 PROCEDURE — 25500064 ZZH RX 255 OP 636: Performed by: INTERNAL MEDICINE

## 2018-04-24 RX ADMIN — PIPERACILLIN SODIUM,TAZOBACTAM SODIUM 2.25 G: 2; .25 INJECTION, POWDER, FOR SOLUTION INTRAVENOUS at 18:51

## 2018-04-24 RX ADMIN — Medication 1 CAPSULE: at 22:20

## 2018-04-24 RX ADMIN — MEXILETINE HYDROCHLORIDE 150 MG: 150 CAPSULE ORAL at 09:01

## 2018-04-24 RX ADMIN — LEVOTHYROXINE SODIUM 50 MCG: 50 TABLET ORAL at 09:01

## 2018-04-24 RX ADMIN — ASPIRIN 81 MG: 81 TABLET, COATED ORAL at 09:01

## 2018-04-24 RX ADMIN — RANITIDINE 75 MG: 75 TABLET, FILM COATED ORAL at 22:20

## 2018-04-24 RX ADMIN — PRAVASTATIN SODIUM 40 MG: 40 TABLET ORAL at 22:20

## 2018-04-24 RX ADMIN — HEPARIN SODIUM 5000 UNITS: 5000 INJECTION, SOLUTION INTRAVENOUS; SUBCUTANEOUS at 22:23

## 2018-04-24 RX ADMIN — HEPARIN SODIUM 5000 UNITS: 5000 INJECTION, SOLUTION INTRAVENOUS; SUBCUTANEOUS at 10:39

## 2018-04-24 RX ADMIN — PIPERACILLIN SODIUM,TAZOBACTAM SODIUM 2.25 G: 2; .25 INJECTION, POWDER, FOR SOLUTION INTRAVENOUS at 03:05

## 2018-04-24 RX ADMIN — CARVEDILOL 6.25 MG: 6.25 TABLET, FILM COATED ORAL at 09:01

## 2018-04-24 RX ADMIN — CARVEDILOL 6.25 MG: 6.25 TABLET, FILM COATED ORAL at 18:42

## 2018-04-24 RX ADMIN — ACETAMINOPHEN 650 MG: 325 TABLET, FILM COATED ORAL at 03:45

## 2018-04-24 RX ADMIN — CLOPIDOGREL 75 MG: 75 TABLET, FILM COATED ORAL at 09:01

## 2018-04-24 RX ADMIN — LISINOPRIL 2.5 MG: 2.5 TABLET ORAL at 16:14

## 2018-04-24 RX ADMIN — MEXILETINE HYDROCHLORIDE 150 MG: 150 CAPSULE ORAL at 22:20

## 2018-04-24 RX ADMIN — PANTOPRAZOLE SODIUM 40 MG: 40 TABLET, DELAYED RELEASE ORAL at 09:01

## 2018-04-24 RX ADMIN — HUMAN ALBUMIN MICROSPHERES AND PERFLUTREN 9 ML: 10; .22 INJECTION, SOLUTION INTRAVENOUS at 15:54

## 2018-04-24 RX ADMIN — PIPERACILLIN SODIUM,TAZOBACTAM SODIUM 2.25 G: 2; .25 INJECTION, POWDER, FOR SOLUTION INTRAVENOUS at 10:39

## 2018-04-24 RX ADMIN — LISINOPRIL 2.5 MG: 2.5 TABLET ORAL at 22:23

## 2018-04-24 RX ADMIN — Medication 15 MG: at 09:01

## 2018-04-24 ASSESSMENT — PAIN DESCRIPTION - DESCRIPTORS: DESCRIPTORS: DISCOMFORT;DULL

## 2018-04-24 NOTE — PROVIDER NOTIFICATION
"Notified Person:  Hospitalist  Notified Persons Name: Dr. Varela  Notification Date/Time: 4/24/18; 1230  Notification Interaction: web paged  Purpose of Notification:  update  Orders Received:  Comments: text paged MD, \"FYI pt just had 10 beat VT. Asymptomatic, VS stable.\"    "

## 2018-04-24 NOTE — PLAN OF CARE
Problem: Patient Care Overview  Goal: Plan of Care/Patient Progress Review  Outcome: No Change  [id number] Patient A/Ox4. Hypotensive at times asymptomatic,other VSS on RA. Held medications per parameters. Slight back discomfort relieved with Tylenol. Patient denies SOB, nausea. RUE numbness/tingling baseline. Right dorsalis pulses + 2 doppler, unable to palpate. Dialysis this evening 2L removed. LS diminished , frequent productive cough. Tele SR PAC, then SR. LUE fistula, bruit/thrill present. Tolerating renal diet. BG 87,127,107. On call notified about multiple positive cultures critical values. Plan: continue monitoring of his volume status and blood chemistry balance and dialyze more frequently than usual as needed

## 2018-04-24 NOTE — CONSULTS
"CLINICAL NUTRITION SERVICES  -  ASSESSMENT NOTE      Malnutrition:   % Weight Loss:  None noted  % Intake:  </= 50% for >/= 1 month (severe malnutrition)  Subcutaneous Fat Loss:  None observed  Muscle Loss:  None observed  Fluid Retention:  None noted    Malnutrition Diagnosis: Patient does not meet two of the above criteria necessary for diagnosing malnutrition        REASON FOR ASSESSMENT  Westley Ness is a 72 year old male seen by Registered Dietitian for Admission Nutrition Risk Screen - Reduced oral intake over the last month      NUTRITION HISTORY  - Information obtained from patient.  He states that he has had a poor appetite for the last month.  During this time period he has been consuming about half of what he normally does.  He does follow with an RD at his outpatient dialysis center and they are aware of his poor appetite and intake.  Patient states that he still tries to do lean protein with meals and avoids high K, high Phos, and high Na food items.  He has used Nepro in the past and \"hates it\".  Has recently thrown out half a case as it was .  Will take Zone Bars for snacks if he hasn't been eating much during the day.      CURRENT NUTRITION ORDERS  Diet Order:     Dialysis     Current Intake/Tolerance:  Consumed 100% dinner last night ---> Boiled egg, blueberry muffin, and lemonade + 100% breakfast today ---> Omelet, banana bread, and lemonade.  States that appetite isn't necessarily any better but just \"hasn't eaten much in awhile\".      PHYSICAL FINDINGS  Observed  No nutrition-related physical findings observed  Obtained from Chart/Interdisciplinary Team  None noted    ANTHROPOMETRICS  Height: 6'0\"  Weight: 81.7 kg (180#)()  Body mass index is 24.43 kg/(m^2)  Weight Status:  Normal BMI  IBW: 80.9 kg   % IBW: 101%  Weight History: Patient denies any weight loss --> 180# is \"my new normal\".  Noted that Neph indicated goal weight is 82-83 kg (180#-183#)    LABS  Labs " reviewed    MEDICATIONS  Nephrocaps       ASSESSED NUTRITION NEEDS PER APPROVED PRACTICE GUIDELINES:    Dosing Weight 81.7 kg   Estimated Energy Needs: 1752-2490 kcals (25-30 Kcal/Kg)  Justification: maintenance  Estimated Protein Needs: 100-125 grams protein (1.2-1.5 g pro/Kg)  Justification: dialysis and preservation of lean body mass  Estimated Fluid Needs: 5338-3183 mL (1 mL/Kcal)  Justification: maintenance    MALNUTRITION:  % Weight Loss:  None noted  % Intake:  </= 50% for >/= 1 month (severe malnutrition)  Subcutaneous Fat Loss:  None observed  Muscle Loss:  None observed  Fluid Retention:  None noted    Malnutrition Diagnosis: Patient does not meet two of the above criteria necessary for diagnosing malnutrition    NUTRITION DIAGNOSIS:  Predicted inadequate nutrient intake related to recent decreased appetite x 1 month as evidenced by consuming < 50% of meals at home but increased intake during hospitalization       NUTRITION INTERVENTIONS  Recommendations / Nutrition Prescription  Dialysis diet as tolerated  Recommended Nepro supplements between meals but patient declined     Implementation  Nutrition education: Per Provider order if indicated.  Patient does follow with a dietitian for education needs at the dialysis clinic.     Nutrition Goals  Patient will consume >/= 75% meals TID     MONITORING AND EVALUATION:  Progress towards goals will be monitored and evaluated per protocol and Practice Guidelines    Yvette Strong RD, LD, CNSC   Clinical Dietitian - [facility]

## 2018-04-24 NOTE — PROGRESS NOTES
Nephrology Progress Note          Assessment and Plan:   ESRD status quo.  No need for dialysis today with good fluid balance and improved chemistries.  Next HD 4/26.  Continue IV antibiotic for Strep pneumo bacteremia.      HCAP (healthcare-associated pneumonia)    * No resolved hospital problems. *               Interval History:   no new complaints, up and ambulating, alert, oriented to person, place and time and doing well; no cp, sob, n/v/d, or abd pain.  Feels much better.  VS ok.  No fever.  Good rm air SaO2.  Good intake.  Small amt UO.  Wt down ~2kg.  Meds and labs reviewed.  Positive BC's for pneumococcus.  Lytes nl.  BUN/Cr in expected range.  WBC down to nl.                Medications:       aspirin EC  81 mg Oral Daily     carvedilol  6.25 mg Oral BID w/meals     clopidogrel  75 mg Oral Daily     heparin  5,000 Units Subcutaneous Q12H     insulin aspart  1-3 Units Subcutaneous TID AC     insulin aspart  1-3 Units Subcutaneous At Bedtime     isosorbide mononitrate  15 mg Oral Daily     levothyroxine  50 mcg Oral Daily     lisinopril  2.5 mg Oral 2 times per day     mexiletine  150 mg Oral BID     NEPHROCAPS  1 capsule Oral QPM     pantoprazole  40 mg Oral QAM     piperacillin-tazobactam  2.25 g Intravenous Q8H SUSHMA     pravastatin  40 mg Oral Daily     ranitidine  75 mg Oral At Bedtime     sodium chloride (PF)  3 mL Intracatheter Q8H                      Physical Exam:       Vital Sign Ranges  Temp:  [97.5  F (36.4  C)-98.3  F (36.8  C)] 98.2  F (36.8  C)  Pulse:  [64] 64  Heart Rate:  [64-73] 69  Resp:  [15-18] 18  BP: ()/(39-55) 101/55  SpO2:  [93 %-98 %] 93 %    Weight, current:  81.7 kg (actual weight)  Weight change:     I/O last 3 completed shifts:  In: 450 [P.O.:440; I.V.:10]  Out: 2025 [Urine:25; Other:2000]    Physical Exam:   General:  Patient comfortable, in no apparent distress.  Awake, alert, oriented x3.  Neck:  Supple, no JVD.  Lungs:  Crackles at bases to auscultation bilaterally,  L>R.  Cardiac:  Regular rate and rhythm, no murmurs, rub, or gallops.  Abdomen:  Soft, nontender, physiologic sounds.  Extremities:  Without edema.  2+ pulses.  Skin:  Warm, dry.  Neurologic:  No focal deficits.             Data:        Lab Results   Component Value Date     04/24/2018    Lab Results   Component Value Date    CHLORIDE 100 04/24/2018    Lab Results   Component Value Date    BUN 20 04/24/2018      Lab Results   Component Value Date    POTASSIUM 3.9 04/24/2018    Lab Results   Component Value Date    CO2 29 04/24/2018    Lab Results   Component Value Date    CR 4.02 (H) 04/24/2018        Lab Results   Component Value Date     04/24/2018     04/23/2018     12/18/2017     Lab Results   Component Value Date    POTASSIUM 3.9 04/24/2018    POTASSIUM 4.0 04/23/2018    POTASSIUM 4.2 12/18/2017     Lab Results   Component Value Date    CHLORIDE 100 04/24/2018    CHLORIDE 99 04/23/2018    CHLORIDE 100 12/18/2017     Lab Results   Component Value Date    CO2 29 04/24/2018    CO2 26 04/23/2018    CO2 30 12/18/2017     Lab Results   Component Value Date    CR 4.02 (H) 04/24/2018    CR 6.40 (H) 04/23/2018    CR 6.47 (H) 12/18/2017     Lab Results   Component Value Date    BUN 20 04/24/2018    BUN 35 (H) 04/23/2018    BUN 35 (H) 12/18/2017     Lab Results   Component Value Date    HGB 10.1 (L) 04/24/2018    HGB 11.7 (L) 04/23/2018    HGB 10.3 (L) 12/19/2017     No results found for: PH, PHARTERIAL, PO2, [id number], SAT, PCO2, HCO3, BASEEXCESS, DONNIE, BEB          Reji Smith MD  Nephrology; [facility]  [phone]

## 2018-04-24 NOTE — PROGRESS NOTES
[facility]    Hospitalist Progress Note    Assessment & Plan   Westley Ness is a 72 year old male with complex PMHx including ESRD on HD, CAD s/p PCI, hx of VTach, CHF s/p AICD, paroxysmal a-flutter s/p ablation, and DM2 who presents with cough, shortness of breath, and generalized weakness, and is being admitted for HCAP with sepsis     Left-sided HCAP   Bacteremia w/GPC  Sepsis  Presented with 3 week history of productive cough and shortness of breath. Fever to 101.6 and leukocytosis to 13.6k present on arrival. CXR on arrival showed left-sided infiltrates. In the ED, he was initiated on pip-tazo.  Since starting antibiotics no recurrence of fever and his WBC has gone down  Unfortunately 2/2 blood cultures on arrival have grown GPC  - Continues on pip-tazo. Will continue to hold vancomycin unless patient worsens or fails to improve with pip-tazo  - Encourage incentive spirometry  - Follow sensitivities  - Echocardiogram ordered  - ID consulted to help with antibiotic management      Generalized weakness  Typically ambulates with a cane occasionally, but has been using it more frequently since becoming acutely ill  - PT/OT     ESRD  Dialyzes at [facility] via LUE AVF. Primary nephrologist: Dr. Hammond  - HD will be continued as per Nephrology  - Continues on home Triphocaps     CAD, native heart, native vessels s/p PCI  Chronic systolic CHF due to ischemic cardiomyopathy, s/p AICD  PTA: aspirin 81 mg daily, Plavix, carvedilol 6.25 mg BID, Imdur 15 mg daily, lisinopril 2.5 mg BID, pravastatin 40 mg daily.  LVEF 20-25% per TTE on 6/7/2017  - Appears compensated  - Continues on PTA meds     History of V Tach  Maintained on mexiletine which has been continued     Paroxysmal atrial flutter s/p ablation  PTA: carvedilol 6.25 mg BID Recently taken off anticoagulation by Dr. Mejia due to questionable utility in patient with ESRD. He has since been resumed on aspirin and Plavix.  - Continues  on PTA carvedilol     DM2 with nephropathy  Diet-controlled.  HgbA1c is 6.2 on this admission   - Low SSI available     Hypothyroidism  Chronic and stable on levothyroxine     GERD  Chronic and stable on pantoprazole    # Pain Assessment:  Current Pain Score 4/24/2018   Patient currently in pain? denies   Pain score (0-10) -   Pain location -   Pain descriptors -   Westley warner pain level was assessed and he currently denies pain.        DVT Prophylaxis: Pneumatic Compression Devices  Code Status: Full Code    Disposition: Expected discharge TBD.  Will need to see sensitivities and determine antibiotic regimen.    Mika Varela, DO  Text Page (7am to 6pm)    Interval History   Patient seen and examined.  Overall he is feeling improved.  No fevers over night. No pain or difficulty breathing currently.     -Data reviewed today: I reviewed all new labs and imaging results over the last 24 hours. I personally reviewed no images or EKG's today.    Physical Exam   Temp: 98  F (36.7  C) Temp src: Oral BP: 92/46 Pulse: 66 Heart Rate: 65 Resp: 16 SpO2: 95 % O2 Device: None (Room air) Oxygen Delivery: 2 LPM  Vitals:    04/23/18 1238 04/24/18 0300   Weight: 83.5 kg (184 lb) 81.7 kg (180 lb 3.2 oz)     Vital Signs with Ranges  Temp:  [97.5  F (36.4  C)-98.3  F (36.8  C)] 98  F (36.7  C)  Pulse:  [66] 66  Heart Rate:  [] 65  Resp:  [15-18] 16  BP: ()/(40-59) 92/46  SpO2:  [95 %-98 %] 95 %  I/O last 3 completed shifts:  In: 200 [P.O.:200]  Out: 2025 [Urine:25; Other:2000]    Constitutional: Awake, alert, cooperative, no apparent distress  Respiratory: Clear to auscultation bilaterally, no crackles or wheezing  Cardiovascular: Regular rate and rhythm, normal S1 and S2, and no murmur noted  GI: Normal bowel sounds, soft, non-distended, non-tender  Skin/Integumen: No rashes, no cyanosis  MSK: No edema     Medications       aspirin EC  81 mg Oral Daily     carvedilol  6.25 mg Oral BID w/meals     clopidogrel  75 mg Oral  Daily     heparin  5,000 Units Subcutaneous Q12H     insulin aspart  1-3 Units Subcutaneous TID AC     insulin aspart  1-3 Units Subcutaneous At Bedtime     isosorbide mononitrate  15 mg Oral Daily     levothyroxine  50 mcg Oral Daily     lisinopril  2.5 mg Oral 2 times per day     mexiletine  150 mg Oral BID     NEPHROCAPS  1 capsule Oral QPM     pantoprazole  40 mg Oral QAM     piperacillin-tazobactam  2.25 g Intravenous Q8H SUSHMA     pravastatin  40 mg Oral Daily     ranitidine  75 mg Oral At Bedtime     sodium chloride (PF)  3 mL Intracatheter Q8H       Data     Recent Labs  Lab 04/24/18  0625 04/23/18  0632   WBC 6.5 13.6*   HGB 10.1* 11.7*   * 99   * 136*   INR  --  1.20*    136   POTASSIUM 3.9 4.0   CHLORIDE 100 99   CO2 29 26   BUN 20 35*   CR 4.02* 6.40*   ANIONGAP 9 11   CHRISTINE 8.5 9.2   GLC 87 134*   ALBUMIN  --  3.3*   PROTTOTAL  --  7.3   BILITOTAL  --  2.3*   ALKPHOS  --  202*   ALT  --  15   AST  --  19       Imaging:   No results found for this or any previous visit (from the past 24 hour(s)).

## 2018-04-24 NOTE — PLAN OF CARE
Problem: Patient Care Overview  Goal: Plan of Care/Patient Progress Review  PT: Orders received, evaluation completed. 72 year old male admitted with L sided HCAP, sepsis. Pt reports mod I to IND with ambulation (intermittent use of straight cane). Lives alone in a house with 9 stairs to enter and 5 inside. Has HHA once a week to assist with shopping. Also has hired a /laundry assist. Pt manages his own meds and completes all ADLs indep.    Discharge Planner PT   Patient plan for discharge: Home  Current status: Pt currently performs bed mobility, transfers and ambulation with mod I to IND.  Ambulates 300' with quad cane; no LOB. Able to pick an object off the floor with no LOB. Performs toilet cares/transfers with mod I and use of grab bar.   Barriers to return to prior living situation: None.   Recommendations for discharge: Home with previous assist.   Rationale for recommendations: Patient currently moving close to his baseline levels. He denies concerns with returning home when medically ready. No further skilled IP PT/OT needs identified. Pt agreeable to ambulate at least three times per day in [place] with RN staff for duration of stay to maintain strength.        Entered by: Bertram López 04/24/2018 8:32 AM

## 2018-04-24 NOTE — PROGRESS NOTES
INTERNAL MEDICINE UPDATE    Called re: positive blood cultures    BC's from 4/23 growing:  Gram positive cocci in pairs and chains     PLAN:  - Continue Zosyn.  - Repeat cultures this am.    Hair Shea Jr., MD  [phone] (p)  Text Page

## 2018-04-24 NOTE — PLAN OF CARE
Problem: Patient Care Overview  Goal: Plan of Care/Patient Progress Review  Discharge Planner OT   Patient plan for discharge: home  Current status: OT orders received, chart reviewed and per consult with physical therapy it was determined pt has no skilled OT needs as it ambulating and performing self-cares at or near his baseline level of performance. Therefore, OT order completed.   Barriers to return to prior living situation: see PT note  Recommendations for discharge: home w/previous level of assist  Rationale for recommendations: see PT note       Entered by: Piotr Singh 04/24/2018 9:58 AM

## 2018-04-24 NOTE — PROGRESS NOTES
04/24/18 0800   Quick Adds   Type of Visit Initial PT Evaluation   Living Environment   Lives With alone   Living Arrangements house   Home Accessibility stairs to enter home;stairs within home;grab bars present (bathtub);grab bars present (toilet)   Number of Stairs to Enter Home 9   Number of Stairs Within Home 5   Transportation Available car   Living Environment Comment Has HHA once a week to assist with shopping. Also has hired a /laundry assist. Pt manages his own meds and completes all ADLs indep.   Self-Care   Usual Activity Tolerance good  (Able to walk community distances)   Current Activity Tolerance fair   Equipment Currently Used at Home cane, straight;walker, rolling;grab bar   Functional Level Prior   Ambulation 1-->assistive equipment   Transferring 1-->assistive equipment   Toileting 1-->assistive equipment   Bathing 1-->assistive equipment   Dressing 0-->independent   Eating 0-->independent   Which of the above functional risks had a recent onset or change? none   General Information   Onset of Illness/Injury or Date of Surgery - Date 04/23/18   Referring Physician MD Francisco   Patient/Family Goals Statement Return home   Pertinent History of Current Problem (include personal factors and/or comorbidities that impact the POC) 72 year old male admitted with L sided HCAP, sepsis.    Precautions/Limitations fall precautions   Cognitive Status Examination   Orientation orientation to person, place and time   Level of Consciousness alert   Follows Commands and Answers Questions 100% of the time;able to follow multistep instructions   Personal Safety and Judgment intact   Pain Assessment   Patient Currently in Pain No   Posture    Posture Forward head position   Range of Motion (ROM)   ROM Comment LE/UE AROM WFLs.    Strength   Strength Comments 5/5 bilateral LE   Bed Mobility   Bed Mobility Comments Supine to sit with mod I   Transfer Skills   Transfer Comments Sit to/from stand with mod  "I from bed and toilet.    Gait   Gait Comments Ambulates 300' with mod I with quad cane   Balance   Balance Comments No LOB; overall steady with ambulation. Able to reach to the ground to  trash without LOB.    Coordination   Coordination Comments Able to perform standing brief management with assist.    Clinical Impression   Criteria for Skilled Therapeutic Intervention evaluation only;no problems identified which require skilled intervention;current level of function same as previous level of function   Clinical Presentation Stable/Uncomplicated   Clinical Presentation Rationale Stable.    Clinical Decision Making (Complexity) Low complexity   Anticipated Discharge Disposition Home  (With previous assist)   Risk & Benefits of therapy have been explained Yes   Patient, Family & other staff in agreement with plan of care Yes   [facility] [employer]-PAC TM \"6 Clicks\"   2016, Trustees of [facility], under license to [employer].  All rights reserved.   6 Clicks Short Forms Basic Mobility Inpatient Short Form   [facility] AM-PAC  \"6 Clicks\" V.2 Basic Mobility Inpatient Short Form   1. Turning from your back to your side while in a flat bed without using bedrails? 4 - None   2. Moving from lying on your back to sitting on the side of a flat bed without using bedrails? 4 - None   3. Moving to and from a bed to a chair (including a wheelchair)? 4 - None   4. Standing up from a chair using your arms (e.g., wheelchair, or bedside chair)? 4 - None   5. To walk in hospital room? 4 - None   6. Climbing 3-5 steps with a railing? 4 - None   Basic Mobility Raw Score (Score out of 24.Lower scores equate to lower levels of function) 24   Total Evaluation Time   Total Evaluation Time (Minutes) 19     "

## 2018-04-24 NOTE — PLAN OF CARE
Problem: Patient Care Overview  Goal: Plan of Care/Patient Progress Review  Outcome: No Change  [id number] VS stable, /50. Did have 10 beat run VT, asymptomatic, MD notified.  Amb halls x2, tolerated really well.  Denies pain, SOB. Continues on IV Zosyn, ID Consulted today for continuing + blood cultures.

## 2018-04-24 NOTE — PROVIDER NOTIFICATION
MD Shea  04/23/18 2350    Continued low BP, asymptomatic. Positives cultures    MD will order recheck of cultures in am. Already on Zosyn

## 2018-04-25 LAB
ANION GAP SERPL CALCULATED.3IONS-SCNC: 9 MMOL/L (ref 3–14)
BACTERIA SPEC CULT: ABNORMAL
BUN SERPL-MCNC: 34 MG/DL (ref 7–30)
CALCIUM SERPL-MCNC: 8.9 MG/DL (ref 8.5–10.1)
CHLORIDE SERPL-SCNC: 100 MMOL/L (ref 94–109)
CO2 SERPL-SCNC: 29 MMOL/L (ref 20–32)
CREAT SERPL-MCNC: 5.24 MG/DL (ref 0.66–1.25)
ERYTHROCYTE [DISTWIDTH] IN BLOOD BY AUTOMATED COUNT: 14.6 % (ref 10–15)
GFR SERPL CREATININE-BSD FRML MDRD: 11 ML/MIN/1.7M2
GLUCOSE BLDC GLUCOMTR-MCNC: 100 MG/DL (ref 70–99)
GLUCOSE BLDC GLUCOMTR-MCNC: 109 MG/DL (ref 70–99)
GLUCOSE BLDC GLUCOMTR-MCNC: 115 MG/DL (ref 70–99)
GLUCOSE BLDC GLUCOMTR-MCNC: 83 MG/DL (ref 70–99)
GLUCOSE BLDC GLUCOMTR-MCNC: 97 MG/DL (ref 70–99)
GLUCOSE SERPL-MCNC: 109 MG/DL (ref 70–99)
HCT VFR BLD AUTO: 31.6 % (ref 40–53)
HGB BLD-MCNC: 10.5 G/DL (ref 13.3–17.7)
Lab: ABNORMAL
MCH RBC QN AUTO: 33.1 PG (ref 26.5–33)
MCHC RBC AUTO-ENTMCNC: 33.2 G/DL (ref 31.5–36.5)
MCV RBC AUTO: 100 FL (ref 78–100)
PLATELET # BLD AUTO: 144 10E9/L (ref 150–450)
POTASSIUM SERPL-SCNC: 4 MMOL/L (ref 3.4–5.3)
RBC # BLD AUTO: 3.17 10E12/L (ref 4.4–5.9)
SODIUM SERPL-SCNC: 138 MMOL/L (ref 133–144)
SPECIMEN SOURCE: ABNORMAL
WBC # BLD AUTO: 4.6 10E9/L (ref 4–11)

## 2018-04-25 PROCEDURE — 63400005 ZZH RX 634: Performed by: INTERNAL MEDICINE

## 2018-04-25 PROCEDURE — A9270 NON-COVERED ITEM OR SERVICE: HCPCS | Mod: GY | Performed by: INTERNAL MEDICINE

## 2018-04-25 PROCEDURE — 25000128 H RX IP 250 OP 636: Performed by: INTERNAL MEDICINE

## 2018-04-25 PROCEDURE — 25000132 ZZH RX MED GY IP 250 OP 250 PS 637: Mod: GY | Performed by: INTERNAL MEDICINE

## 2018-04-25 PROCEDURE — 90937 HEMODIALYSIS REPEATED EVAL: CPT

## 2018-04-25 PROCEDURE — 36415 COLL VENOUS BLD VENIPUNCTURE: CPT | Performed by: INTERNAL MEDICINE

## 2018-04-25 PROCEDURE — 80048 BASIC METABOLIC PNL TOTAL CA: CPT | Performed by: INTERNAL MEDICINE

## 2018-04-25 PROCEDURE — 99232 SBSQ HOSP IP/OBS MODERATE 35: CPT | Performed by: INTERNAL MEDICINE

## 2018-04-25 PROCEDURE — 00000146 ZZHCL STATISTIC GLUCOSE BY METER IP

## 2018-04-25 PROCEDURE — 12000000 ZZH R&B MED SURG/OB

## 2018-04-25 PROCEDURE — 87040 BLOOD CULTURE FOR BACTERIA: CPT

## 2018-04-25 PROCEDURE — 85027 COMPLETE CBC AUTOMATED: CPT | Performed by: INTERNAL MEDICINE

## 2018-04-25 RX ORDER — HEPARIN SODIUM 1000 [USP'U]/ML
500 INJECTION, SOLUTION INTRAVENOUS; SUBCUTANEOUS
Status: DISCONTINUED | OUTPATIENT
Start: 2018-04-25 | End: 2018-04-25

## 2018-04-25 RX ORDER — DOXERCALCIFEROL 4 UG/2ML
2 INJECTION INTRAVENOUS
Status: DISCONTINUED | OUTPATIENT
Start: 2018-04-25 | End: 2018-04-26 | Stop reason: HOSPADM

## 2018-04-25 RX ORDER — HEPARIN SODIUM 1000 [USP'U]/ML
500 INJECTION, SOLUTION INTRAVENOUS; SUBCUTANEOUS CONTINUOUS
Status: DISCONTINUED | OUTPATIENT
Start: 2018-04-25 | End: 2018-04-25

## 2018-04-25 RX ORDER — CEFTRIAXONE 2 G/1
2 INJECTION, POWDER, FOR SOLUTION INTRAMUSCULAR; INTRAVENOUS EVERY 24 HOURS
Status: DISCONTINUED | OUTPATIENT
Start: 2018-04-25 | End: 2018-04-26 | Stop reason: HOSPADM

## 2018-04-25 RX ADMIN — LISINOPRIL 2.5 MG: 2.5 TABLET ORAL at 22:03

## 2018-04-25 RX ADMIN — CEFTRIAXONE SODIUM 2 G: 2 INJECTION, POWDER, FOR SOLUTION INTRAMUSCULAR; INTRAVENOUS at 13:05

## 2018-04-25 RX ADMIN — Medication 1 CAPSULE: at 22:03

## 2018-04-25 RX ADMIN — ASPIRIN 81 MG: 81 TABLET, COATED ORAL at 13:07

## 2018-04-25 RX ADMIN — SODIUM CHLORIDE 300 ML: 9 INJECTION, SOLUTION INTRAVENOUS at 08:42

## 2018-04-25 RX ADMIN — SODIUM CHLORIDE 250 ML: 9 INJECTION, SOLUTION INTRAVENOUS at 08:43

## 2018-04-25 RX ADMIN — MEXILETINE HYDROCHLORIDE 150 MG: 150 CAPSULE ORAL at 13:06

## 2018-04-25 RX ADMIN — PRAVASTATIN SODIUM 40 MG: 40 TABLET ORAL at 22:03

## 2018-04-25 RX ADMIN — CLOPIDOGREL 75 MG: 75 TABLET, FILM COATED ORAL at 13:07

## 2018-04-25 RX ADMIN — LISINOPRIL 2.5 MG: 2.5 TABLET ORAL at 17:05

## 2018-04-25 RX ADMIN — DOXERCALCIFEROL 2 MCG: 4 INJECTION, SOLUTION INTRAVENOUS at 10:01

## 2018-04-25 RX ADMIN — HEPARIN SODIUM 5000 UNITS: 5000 INJECTION, SOLUTION INTRAVENOUS; SUBCUTANEOUS at 13:06

## 2018-04-25 RX ADMIN — MEXILETINE HYDROCHLORIDE 150 MG: 150 CAPSULE ORAL at 22:03

## 2018-04-25 RX ADMIN — PANTOPRAZOLE SODIUM 40 MG: 40 TABLET, DELAYED RELEASE ORAL at 13:06

## 2018-04-25 RX ADMIN — PIPERACILLIN SODIUM,TAZOBACTAM SODIUM 2.25 G: 2; .25 INJECTION, POWDER, FOR SOLUTION INTRAVENOUS at 03:10

## 2018-04-25 RX ADMIN — RANITIDINE 75 MG: 75 TABLET, FILM COATED ORAL at 22:03

## 2018-04-25 RX ADMIN — LEVOTHYROXINE SODIUM 50 MCG: 50 TABLET ORAL at 13:06

## 2018-04-25 RX ADMIN — EPOETIN ALFA 4000 UNITS: 4000 SOLUTION INTRAVENOUS; SUBCUTANEOUS at 10:02

## 2018-04-25 RX ADMIN — Medication 15 MG: at 13:07

## 2018-04-25 RX ADMIN — CARVEDILOL 6.25 MG: 6.25 TABLET, FILM COATED ORAL at 13:06

## 2018-04-25 NOTE — PROGRESS NOTES
"Potassium   Date Value Ref Range Status   04/25/2018 4.0 3.4 - 5.3 mmol/L Final     Comment:     Specimen slightly hemolyzed, potassium may be falsely elevated     Lab Results   Component Value Date    HGB 10.5 04/25/2018     Weight: 82.8 kg (182 lb 8.7 oz)  POST WT 80.8 kg  DIALYSIS PROCEDURE NOTE  Hepatitis status of previous patient on machine log was checked and verified ok to use with this patients hepatitis status.  Patient dialyzed for 3.5 hrs. on a 3 K bath with a net fluid removal of  2 L.  A BFR of 450 ml/min was obtained via a LUAVF using 15 gauge needles. The patient was seen by Dr. Smith during the treatment.  Total heparin received during the treatment: 0 units. Sites held x 30 min then pressure drsgs applied.    Meds Given: Epogen and Hectorol IV   Complications: Post bleeding 30 min. This is \"normal\" for him, per patient.     Procedure and ESRD teaching done and questions answered. See flowsheet in EPIC for further details and post assessment.  Machine water alarm in place and functioning.  Pt returned via w/c alert and oriented x4  Vascular Access: Aseptic prep done for both on/off. Site WDL. No issues cannulating.   Report received from: MARIA E Burns RN  Report given to: MARIA E Burns RN  HEPATITIS B SURFACE ANTIGEN non-reactive DATE 4/20/18 HEPATITIS B SURFACE ANTIBODY susceptible DATE 4/20/18  Chlorine/Chloramine water system checked every 4 hours.  Outpatient Dialysis at [facility]    Bree Mann RN  "

## 2018-04-25 NOTE — CONSULTS
ID consult dictated IMP 1 71 yo male acute pneumonia, strep pneumo bacteremia    REC assume FU BC neg should be OK ceftriaxone while here, as early as tomorrow po ceftin 500 daily n10 days and home, despite AICD no need extended IV

## 2018-04-25 NOTE — PROGRESS NOTES
Inpatient Dialysis Progress Note           Assessment and Plan:   ESRD status quo.  Stable dialysis today.  Expect good chemical exchange and improved fluid balance by end of run.  Next HD 4/27; assume back at [facility] by then.  Await disposition plan.  Recovering well from pneumococcal bacteremia.      HCAP (healthcare-associated pneumonia)    * No resolved hospital problems. *               Interval History:   no new complaints, up and ambulating, alert, oriented to person, place and time and doing well; no cp, sob, n/v/d, or abd pain.  Feeling well.  Examined during run.  No resp sx's.  VS ok.  Good rm air SaO2.  Good intake.  Wt up ~1kg.  Minimal UO.  Meds and labs reviewed.  Lytes nl.  BUN/Cr in expected range.  CBC ok.  Hgb sl higher, 10.5.                     Dialysis Details:   Current weight: 82.8 kg (actual weight)  Dry Weight: TBD  Dialysis Temp: 36.5  C  Access Device: AVF  Access Site: left  Dialyzer: Optiflux 160  Dialysis Bath: K+ 3  Sodium Profile: no  UF Goal: 2L  Blood Flow Rate (mL/min): 400  Total Treatment Time (hrs): 3.5  Heparin: none    Medications:  EPO dose: 4000  Zemplar: 2mcg  IV Fe: no            Medications:       aspirin EC  81 mg Oral Daily     carvedilol  6.25 mg Oral BID w/meals     cefTRIAXone  2 g Intravenous Q24H     clopidogrel  75 mg Oral Daily     doxercalciferol  2 mcg Intravenous Once per day on Mon Wed Fri     epoetin wei (EPOGEN,PROCRIT) inj ESRD  4,000 Units Intravenous Once per day on Mon Wed Fri     heparin  5,000 Units Subcutaneous Q12H     insulin aspart  1-3 Units Subcutaneous TID AC     insulin aspart  1-3 Units Subcutaneous At Bedtime     isosorbide mononitrate  15 mg Oral Daily     levothyroxine  50 mcg Oral Daily     lisinopril  2.5 mg Oral 2 times per day     mexiletine  150 mg Oral BID     NEPHROCAPS  1 capsule Oral QPM     pantoprazole  40 mg Oral QAM     pravastatin  40 mg Oral Daily     ranitidine  75 mg Oral At Bedtime     sodium chloride (PF)   3 mL Intracatheter Q8H                      Physical Exam:       Vital Sign Ranges  Temp:  [97.4  F (36.3  C)-98.5  F (36.9  C)] 97.6  F (36.4  C)  Pulse:  [64] 64  Heart Rate:  [58-72] 72  Resp:  [16-18] 16  BP: ()/(37-84) 122/67  SpO2:  [93 %-98 %] 96 %    Weight, current: 82.8 kg (actual weight)  Weight change: -0.635 kg (-1 lb 6.4 oz)    I/O last 3 completed shifts:  In: 562 [P.O.:440; I.V.:122]  Out: 50 [Urine:50]    Physical Exam:   General:  Patient comfortable, in no apparent distress.  Awake, alert, oriented x3.  Neck:  Supple, no JVD.  Lungs:  Few crackles to auscultation bilaterally still, L>R.  Cardiac:  Regular rate and rhythm, no murmurs, rub, or gallops.  Abdomen:  Soft, nontender, physiologic sounds.  Extremities:  Without edema.  2+ pulses.  Skin:  Warm, dry.  Neurologic:  No focal deficits.             Data:        Lab Results   Component Value Date     04/25/2018    Lab Results   Component Value Date    CHLORIDE 100 04/25/2018    Lab Results   Component Value Date    BUN 34 (H) 04/25/2018      Lab Results   Component Value Date    POTASSIUM 4.0 04/25/2018    Lab Results   Component Value Date    CO2 29 04/25/2018    Lab Results   Component Value Date    CR 5.24 (H) 04/25/2018        Lab Results   Component Value Date     04/25/2018     04/24/2018     04/23/2018     Lab Results   Component Value Date    POTASSIUM 4.0 04/25/2018    POTASSIUM 3.9 04/24/2018    POTASSIUM 4.0 04/23/2018     Lab Results   Component Value Date    CHLORIDE 100 04/25/2018    CHLORIDE 100 04/24/2018    CHLORIDE 99 04/23/2018     Lab Results   Component Value Date    CO2 29 04/25/2018    CO2 29 04/24/2018    CO2 26 04/23/2018     Lab Results   Component Value Date    CR 5.24 (H) 04/25/2018    CR 4.02 (H) 04/24/2018    CR 6.40 (H) 04/23/2018     Lab Results   Component Value Date    BUN 34 (H) 04/25/2018    BUN 20 04/24/2018    BUN 35 (H) 04/23/2018     Lab Results   Component Value Date    HGB  10.5 (L) 04/25/2018    HGB 10.1 (L) 04/24/2018    HGB 11.7 (L) 04/23/2018     No results found for: PH, PHARTERIAL, PO2, [id number], SAT, PCO2, HCO3, BASEEXCESS, DONNIE, BEB        Reji Smith MD  Nephrology; [employer]  [phone]

## 2018-04-25 NOTE — CONSULTS
Consult Date:  04/25/2018      INFECTIOUS DISEASE CONSULTATION       LOCATION:  Room [facility].      REFERRING PHYSICIAN:  Mika Varela DO      IMPRESSION:   1.  A 72-year-old male with a complicated medical history, admitted with acute fever and respiratory symptoms, found to have pneumonia, concern for hospital-acquired pneumonia given dialysis status, etc., but blood cultures growing Streptococcus pneumoniae should define the illness.  Despite positive blood cultures and an automatic implantable cardioverter-defibrillator in place, quite unlikely any secondary involved site, almost certainly conventional pneumonia, clinically improved on antibiotics.   2.  End-stage renal disease on dialysis.   3.  Coronary artery disease.   4.  History of cardiomyopathy and an automatic implantable cardioverter-defibrillator in place.   5.  Paroxysmal atrial flutter, status post ablation.   6.  Diabetes mellitus.      RECOMMENDATIONS:     1.  Ceftriaxone while here, dose today, dose tomorrow and then probably oral therapy acceptable.   2.  If followup blood cultures are positive, reconsider options, but assuming followup cultures are negative and he is still doing clinically well, I would be okay with Ceftin 500 mg once daily (renal reduced dose)  for 10 days and disposition home.   3.  On small chance of an AICD infection, probably reasonable to get blood cultures in roughly 2 weeks after completes the antibiotics.      HISTORY:  This 72-year-old male is seen in consultation due to bacteremia.  The patient has a history of being in his usual state of health until about 2 or 3 weeks ago when he started having upper respiratory symptoms.  He then over the last several days developed increasing weakness, malaise, ongoing cough and fever into the 101 degree range.  He called 911 and was brought to the hospital.  He was found to have pneumonia on x-ray.  Blood cultures were obtained and are growing  Streptococcus pneumoniae.  He has no significant headaches, no sinus congestion or symptoms.  He does have an AICD in place, but has not had any problems or issues up until now.  He has fairly frequent recurrent upper respiratory infections, but has not had pneumonia recently.      PAST MEDICAL HISTORY:  End-stage renal disease on dialysis, history of coronary artery disease and cardiomyopathy and paroxysmal atrial fibrillation and has had an ablation and has an AICD in place due to prior v. tach, diabetes reasonably controlled.      ALLERGIES:  NO ANTIMICROBIAL ALLERGIES.      SOCIAL AND FAMILY HISTORY:  No recent travels or exposures.  No one else he has been around has been ill, at risk for resistant pathogens due to his dialysis status.      REVIEW OF SYSTEMS:  Largely as listed above.  Feels much better currently.  The weakness has  improved, shortness of breath is better, not really requiring oxygen.      PHYSICAL EXAMINATION:   GENERAL:  The patient appears his stated age.  He looks relatively well.   VITAL SIGNS:  Currently normal including being afebrile.   HEENT:  No thrush or intraoral lesions.  No sinus tenderness.   NECK:  Supple and nontender without lymphadenopathy or thyromegaly.   HEART:  Regular rhythm, maybe slight irregular beats present.  No major murmur.  His AICD site looks unremarkable.   LUNGS:  Relatively clear, although crackles and rhonchi actually in both lateral lung fields.   ABDOMEN:  Soft and nontender, no hepatosplenomegaly.   EXTREMITIES:  No rash or skin lesions.  His fistula site in his arm looks okay.      LABORATORY DATA:  Blood cultures 2/2 growing a pansensitive Streptococcus pneumoniae.        Chest x-ray shows infiltrate.      Thank you very much for consultation.  I will follow the patient with you.         BROOK MEJIAS MD             D: 04/25/2018   T: 04/25/2018   MT: ANTOINE      Name:     SOCORRO LÓPEZ   MRN:      [id number]        Account:       [id number]    :      1945           Consult Date:  2018      Document: [id number]       cc: Josselin Kolb MD

## 2018-04-25 NOTE — PLAN OF CARE
Problem: Patient Care Overview  Goal: Plan of Care/Patient Progress Review  Outcome: Improving  A&O. Soft BP's overnight, all other VSS on room air. Tele: SR w/ PAC's, HR 60's. No c/o SOB or CP. Up w/ SBA w/ cane. IV zosyn given overnight. Plan for dialysis today w/ possible discharge to follow.

## 2018-04-25 NOTE — PROGRESS NOTES
[facility]    Hospitalist Progress Note    Assessment & Plan   Westley Ness is a 72 year old male with complex PMHx including ESRD on HD, CAD s/p PCI, hx of VTach, CHF s/p AICD, paroxysmal a-flutter s/p ablation, and DM2 who presents with cough, shortness of breath, and generalized weakness, and is being admitted for HCAP with sepsis      Left-sided HCAP   Strep Pneumoniae Bacteremia  Sepsis  Presented with 3 week history of productive cough and shortness of breath. Fever to 101.6 and leukocytosis to 13.6k present on arrival. CXR on arrival showed left-sided infiltrates. In the ED, he was initiated on pip-tazo.  Since starting antibiotics no recurrence of fever and his WBC has gone down  2/2 initial blood cultures have grown Strep pneumoniae that is pan-sensitive.  Echocardiogram was performed without any evidence of vegetations or new valvular disorder   - Continue Zosyn at this time   - Encourage incentive spirometry  - ID consulted to help with antibiotic management       Generalized weakness  Typically ambulates with a cane occasionally, but has been using it more frequently since becoming acutely ill  - PT/OT      ESRD  Dialyzes at [facility] via LUE AVF. Primary nephrologist: Dr. Hammond  - HD will be continued as per Nephrology  - Continues on home Triphocaps      CAD, native heart, native vessels s/p PCI  Chronic systolic CHF due to ischemic cardiomyopathy, s/p AICD  PTA: aspirin 81 mg daily, Plavix, carvedilol 6.25 mg BID, Imdur 15 mg daily, lisinopril 2.5 mg BID, pravastatin 40 mg daily.  LVEF 20-25% per TTE on 6/7/2017 and repeat here showed baseline EF   - Appears compensated  - Continues on PTA meds      History of V Tach  Maintained on mexiletine which has been continued      Paroxysmal atrial flutter s/p ablation  PTA: carvedilol 6.25 mg BID Recently taken off anticoagulation by Dr. Mejia due to questionable utility in patient with ESRD. He has since been resumed on  aspirin and Plavix.  - Continues on PTA carvedilol      DM2 with nephropathy  Diet-controlled.  HgbA1c is 6.2 on this admission   - Low SSI available      Hypothyroidism  Chronic and stable on levothyroxine      GERD  Chronic and stable on pantoprazole      # Pain Assessment:  Current Pain Score 4/25/2018   Patient currently in pain? denies   Pain score (0-10) -   Pain location -   Pain descriptors -   Westley warner pain level was assessed and he currently denies pain.        DVT Prophylaxis: Heparin SQ  Code Status: Full Code    Disposition: Expected discharge in 2-3 days once antibiotic regimen determined.    Mika Varela, DO  Text Page (7am to 6pm)    Interval History   Patient seen and examined at dialysis.  Doing well with no complaints.  No pain at this time.  No difficulty breathing.     -Data reviewed today: I reviewed all new labs and imaging results over the last 24 hours. I personally reviewed echocardiogram results    Physical Exam   Temp: 97.6  F (36.4  C) Temp src: Oral BP: 122/67 Pulse: 64 Heart Rate: 72 Resp: 16 SpO2: 96 % O2 Device: None (Room air)    Vitals:    04/24/18 0300 04/25/18 0645 04/25/18 0734   Weight: 81.7 kg (180 lb 3.2 oz) 82.8 kg (182 lb 9.6 oz) 82.8 kg (182 lb 8.7 oz)     Vital Signs with Ranges  Temp:  [97.4  F (36.3  C)-98.5  F (36.9  C)] 97.6  F (36.4  C)  Pulse:  [64] 64  Heart Rate:  [58-72] 72  Resp:  [16-18] 16  BP: ()/(37-84) 122/67  SpO2:  [93 %-98 %] 96 %  I/O last 3 completed shifts:  In: 562 [P.O.:440; I.V.:122]  Out: 50 [Urine:50]    Constitutional: Awake, alert, cooperative, no apparent distress  Respiratory: Clear to auscultation bilaterally, no crackles or wheezing  Cardiovascular: Regular rate and rhythm, normal S1 and S2, and no murmur noted  GI: Normal bowel sounds, soft, non-distended, non-tender  Skin/Integumen: No rashes, no cyanosis  MSK: No edema     Medications       - MEDICATION INSTRUCTIONS for Dialysis Patients -   Does not apply See Admin  Instructions     aspirin EC  81 mg Oral Daily     carvedilol  6.25 mg Oral BID w/meals     cefTRIAXone  2 g Intravenous Q24H     clopidogrel  75 mg Oral Daily     doxercalciferol  2 mcg Intravenous Once per day on Mon Wed Fri     epoetin wei (EPOGEN,PROCRIT) inj ESRD  4,000 Units Intravenous Once per day on Mon Wed Fri     heparin  5,000 Units Subcutaneous Q12H     insulin aspart  1-3 Units Subcutaneous TID AC     insulin aspart  1-3 Units Subcutaneous At Bedtime     isosorbide mononitrate  15 mg Oral Daily     levothyroxine  50 mcg Oral Daily     lisinopril  2.5 mg Oral 2 times per day     mexiletine  150 mg Oral BID     NEPHROCAPS  1 capsule Oral QPM     pantoprazole  40 mg Oral QAM     pravastatin  40 mg Oral Daily     ranitidine  75 mg Oral At Bedtime     sodium chloride (PF)  3 mL Intracatheter Q8H       Data     Recent Labs  Lab 04/25/18  0543 04/24/18  0625 04/23/18  0632   WBC 4.6 6.5 13.6*   HGB 10.5* 10.1* 11.7*    101* 99   * 133* 136*   INR  --   --  1.20*    138 136   POTASSIUM 4.0 3.9 4.0   CHLORIDE 100 100 99   CO2 29 29 26   BUN 34* 20 35*   CR 5.24* 4.02* 6.40*   ANIONGAP 9 9 11   CHRISTINE 8.9 8.5 9.2   * 87 134*   ALBUMIN  --   --  3.3*   PROTTOTAL  --   --  7.3   BILITOTAL  --   --  2.3*   ALKPHOS  --   --  202*   ALT  --   --  15   AST  --   --  19       Imaging:   No results found for this or any previous visit (from the past 24 hour(s)).

## 2018-04-25 NOTE — PLAN OF CARE
Problem: Patient Care Overview  Goal: Plan of Care/Patient Progress Review  Outcome: No Change  Vss, AOx4. Lung sounds are diminished bilaterally. Fistula in L arm, wdl. Tele: nsr. Tolerating room air. Up with assist of 1 and cane. Dialysis diet. Denies pain, denies nausea.

## 2018-04-26 VITALS
BODY MASS INDEX: 23.53 KG/M2 | HEART RATE: 67 BPM | WEIGHT: 173.5 LBS | RESPIRATION RATE: 16 BRPM | DIASTOLIC BLOOD PRESSURE: 63 MMHG | OXYGEN SATURATION: 94 % | SYSTOLIC BLOOD PRESSURE: 120 MMHG | TEMPERATURE: 97.8 F

## 2018-04-26 LAB
BACTERIA SPEC CULT: ABNORMAL
GLUCOSE BLDC GLUCOMTR-MCNC: 101 MG/DL (ref 70–99)
GLUCOSE BLDC GLUCOMTR-MCNC: 138 MG/DL (ref 70–99)
GLUCOSE BLDC GLUCOMTR-MCNC: 157 MG/DL (ref 70–99)
Lab: ABNORMAL
PLATELET # BLD AUTO: 194 10E9/L (ref 150–450)
SPECIMEN SOURCE: ABNORMAL

## 2018-04-26 PROCEDURE — 99238 HOSP IP/OBS DSCHRG MGMT 30/<: CPT | Performed by: INTERNAL MEDICINE

## 2018-04-26 PROCEDURE — 25000132 ZZH RX MED GY IP 250 OP 250 PS 637: Mod: GY | Performed by: INTERNAL MEDICINE

## 2018-04-26 PROCEDURE — A9270 NON-COVERED ITEM OR SERVICE: HCPCS | Mod: GY | Performed by: INTERNAL MEDICINE

## 2018-04-26 PROCEDURE — 25000128 H RX IP 250 OP 636: Performed by: INTERNAL MEDICINE

## 2018-04-26 PROCEDURE — 00000146 ZZHCL STATISTIC GLUCOSE BY METER IP

## 2018-04-26 PROCEDURE — 36415 COLL VENOUS BLD VENIPUNCTURE: CPT | Performed by: INTERNAL MEDICINE

## 2018-04-26 PROCEDURE — 25000131 ZZH RX MED GY IP 250 OP 636 PS 637: Mod: GY | Performed by: INTERNAL MEDICINE

## 2018-04-26 PROCEDURE — 85049 AUTOMATED PLATELET COUNT: CPT | Performed by: INTERNAL MEDICINE

## 2018-04-26 PROCEDURE — 87040 BLOOD CULTURE FOR BACTERIA: CPT | Performed by: INTERNAL MEDICINE

## 2018-04-26 RX ORDER — CEFUROXIME AXETIL 500 MG/1
500 TABLET ORAL DAILY
Qty: 10 TABLET | Refills: 0 | Status: SHIPPED | OUTPATIENT
Start: 2018-04-27 | End: 2018-05-07

## 2018-04-26 RX ADMIN — LEVOTHYROXINE SODIUM 50 MCG: 50 TABLET ORAL at 08:05

## 2018-04-26 RX ADMIN — MEXILETINE HYDROCHLORIDE 150 MG: 150 CAPSULE ORAL at 08:05

## 2018-04-26 RX ADMIN — CEFTRIAXONE SODIUM 2 G: 2 INJECTION, POWDER, FOR SOLUTION INTRAMUSCULAR; INTRAVENOUS at 11:14

## 2018-04-26 RX ADMIN — HEPARIN SODIUM 5000 UNITS: 5000 INJECTION, SOLUTION INTRAVENOUS; SUBCUTANEOUS at 00:16

## 2018-04-26 RX ADMIN — ASPIRIN 81 MG: 81 TABLET, COATED ORAL at 08:05

## 2018-04-26 RX ADMIN — INSULIN ASPART 1 UNITS: 100 INJECTION, SOLUTION INTRAVENOUS; SUBCUTANEOUS at 12:36

## 2018-04-26 RX ADMIN — CLOPIDOGREL 75 MG: 75 TABLET, FILM COATED ORAL at 08:05

## 2018-04-26 RX ADMIN — ACETAMINOPHEN 650 MG: 325 TABLET, FILM COATED ORAL at 00:16

## 2018-04-26 RX ADMIN — Medication 15 MG: at 08:05

## 2018-04-26 RX ADMIN — CARVEDILOL 6.25 MG: 6.25 TABLET, FILM COATED ORAL at 08:05

## 2018-04-26 RX ADMIN — PANTOPRAZOLE SODIUM 40 MG: 40 TABLET, DELAYED RELEASE ORAL at 08:05

## 2018-04-26 NOTE — DISCHARGE SUMMARY
[facility]    Discharge Summary  Hospitalist    Date of Admission:  4/23/2018  Date of Discharge:  4/26/2018  2:07 PM  Discharging Provider: Mika Varela DO    Discharge Diagnoses   1. Left sided HCAP   2. Strep pneumo bacteremia  3. Sepsis   4. ESRD  5. H/o CAD   6. Chronic systolic CHF, not decompensated  7. History of paroxysmal V Tach   8. Paroxysmal A flutter  9. DM type II   10. Hypothyroidism  11. GERD     History of Present Illness from Dr. Christy's H&P  Westley Ness is a 72 year old male with complex PMHx including ESRD on HD, CAD s/p PCI, CHF s/p AICD, paroxysmal a-flutter s/p ablation, and DM2 who presents with cough, shortness of breath, and generalized weakness. The patient reports 3-week history of productive cough and progressive shortness of breath. He was getting ready to go to dialysis today, but felt too weak to drive himself there, so presented to the Emergency Dept. He denies fevers/chills. He endorses diffuse chest discomfort associated with cough, but otherwise denies dizziness/lightheadedness, or syncope. He denies PND/orthopnea, or LE edema. He reports poor appetite with decreased intake. He typically ambulates only occasionally with a cane, but has been using it more lately. He denies recent falls.    Hospital Course   Westley Ness was admitted on 4/23/2018.  The following problems were addressed during his hospitalization:    Presented with fevers, cough and SOB.  In the ED, he was found to be febrile to 101.6 with leukocytosis to 13.6k. CXR showed left-sided infiltrates. He was initiated on pip-tazo for HCAP.  Given the fever and leukocytosis patient met sepsis criteria but lactic acid was negative and no evidence of organ dysfunction to suggest severe sepsis.  2/2 initial blood cultures grew strep pneumo that was pan-sensitive.  Because of the bacteremia ID was consulted and patient was switched to Ceftriaxone IV while here and then discharged with a 10  day course of Ceftin.      While here nephrology was also consulted to manage ESRD and his dialysis       # Discharge Pain Plan:   - Patient currently has NO PAIN and is not being prescribed pain medications on discharge.    Mika Varela, DO    Significant Results and Procedures   See below    Pending Results   These results will be followed up by PCP   Unresulted Labs Ordered in the Past 30 Days of this Admission     Date and Time Order Name Status Description    4/25/2018 0842 Blood culture Preliminary     4/23/2018 2359 Blood culture Preliminary           Code Status   Full Code       Primary Care Physician   Josselin Kolb    Physical Exam   Temp: 97.8  F (36.6  C) Temp src: Oral BP: 120/63 Pulse: 67 Heart Rate: 65 Resp: 16 SpO2: 94 % O2 Device: None (Room air)    Vitals:    04/25/18 0645 04/25/18 0734 04/26/18 0650   Weight: 82.8 kg (182 lb 9.6 oz) 82.8 kg (182 lb 8.7 oz) 78.7 kg (173 lb 8 oz)     Vital Signs with Ranges  Temp:  [97.5  F (36.4  C)-98.7  F (37.1  C)] 97.8  F (36.6  C)  Pulse:  [64-67] 67  Heart Rate:  [61-72] 65  Resp:  [16] 16  BP: (103-126)/(47-67) 120/63  SpO2:  [94 %-100 %] 94 %  I/O last 3 completed shifts:  In: 640 [P.O.:540; I.V.:100]  Out: 2000 [Other:2000]    Constitutional: Resting comfortably.  NAD  Eyes: EOMI  ENT: Moist mucous membranes  Respiratory: Clear to auscultation.  No respiratory distress   Cardiovascular: RRR  GI: Bowel sounds present   Musculoskeletal: No edema       Discharge Disposition   Discharged to home  Condition at discharge: Stable    Consultations This Hospital Stay   PHYSICAL THERAPY ADULT IP CONSULT  OCCUPATIONAL THERAPY ADULT IP CONSULT  NEPHROLOGY IP CONSULT  INFECTIOUS DISEASES IP CONSULT    Time Spent on this Encounter   IMika, personally saw the patient today and spent less than or equal to 30 minutes discharging this patient.    Discharge Orders     Home care nursing referral     Reason for your hospital stay   Pneumonia      Follow-up and recommended labs and tests    Follow up with primary care provider, Josselin Kolb, within 2-4 weeks, for hospital follow- up. The following labs/tests are recommended: Blood cultures 1-2 weeks after finishing antibiotics.     Activity   Your activity upon discharge: activity as tolerated     Full Code     Diet   Follow this diet upon discharge: Orders Placed This Encounter     Combination Diet Dialysis Diet       Discharge Medications   Current Discharge Medication List      START taking these medications    Details   cefuroxime (CEFTIN) 500 MG tablet Take 1 tablet (500 mg) by mouth daily for 10 days  Qty: 10 tablet, Refills: 0    Associated Diagnoses: Gram-positive bacteremia         CONTINUE these medications which have NOT CHANGED    Details   acetaminophen (TYLENOL) 325 MG tablet Take 2 tablets (650 mg) by mouth 4 times daily  Qty: 60 tablet, Refills: 0    Associated Diagnoses: Closed nondisplaced fracture of right patella, unspecified fracture morphology, initial encounter; Elbow fracture, right, closed, initial encounter      aspirin 81 MG tablet Take 1 tablet (81 mg) by mouth daily  Qty: 1 tablet, Refills: 0      carvedilol (COREG) 6.25 MG tablet TAKE 1 TABLET BY MOUTH TWICE DAILY WITH MEALS AFTER DIALYSIS  Qty: 180 tablet, Refills: 3    Associated Diagnoses: NSTEMI (non-ST elevated myocardial infarction) (H); Mild CAD      clopidogrel (PLAVIX) 75 MG tablet Take 1 tablet (75 mg) by mouth daily To protect your stent(s).  Do not stop taking unless directed by cardiology.  Qty: 30 tablet, Refills: 11    Associated Diagnoses: Coronary artery disease involving native coronary artery of native heart without angina pectoris      fluticasone (FLONASE) 50 MCG/ACT spray Spray 2 sprays into both nostrils daily as needed for rhinitis or allergies      isosorbide mononitrate (IMDUR) 30 MG 24 hr tablet TAKE 1/2 TABLET BY MOUTH ONCE DAILY  Qty: 90 tablet, Refills: 2    Associated Diagnoses: NSTEMI  (non-ST elevated myocardial infarction) (H)      Levothyroxine Sodium 50 MCG CAPS Take 50 mcg by mouth daily  Qty: 90 capsule, Refills: 1    Associated Diagnoses: Hypothyroidism, unspecified type      lisinopril (PRINIVIL/ZESTRIL) 2.5 MG tablet Take one tablet after dialysis. Take second tablet at bedtime.  Qty: 180 tablet, Refills: 3    Associated Diagnoses: NSTEMI (non-ST elevated myocardial infarction) (H)      loperamide (IMODIUM) 2 MG capsule Take 1 mg by mouth as needed       mexiletine (MEXITIL) 150 MG capsule Take 1 capsule (150 mg) by mouth 2 times daily  Qty: 180 capsule, Refills: 3    Associated Diagnoses: Tachycardia      nitroGLYcerin (NITROSTAT) 0.4 MG sublingual tablet 1 TAB EVERY 5 MIN AS NEEDED, UP TO 3 PER EPISODE  Qty: 25 tablet, Refills: 0    Associated Diagnoses: Angina pectoris (H)      pantoprazole (PROTONIX) 40 MG EC tablet Take 1 tablet (40 mg) by mouth every morning  Qty: 30 tablet, Refills: 10    Associated Diagnoses: Gastroesophageal reflux disease, esophagitis presence not specified      pravastatin (PRAVACHOL) 40 MG tablet TAKE 1 TABLET (40 MG) BY MOUTH DAILY  Qty: 90 tablet, Refills: 3    Associated Diagnoses: Coronary artery disease involving native coronary artery of native heart without angina pectoris      psyllium 0.52 G capsule Take 1 capsule by mouth daily as needed       RaNITidine HCl (ZANTAC PO) Take 75 mg by mouth At Bedtime       TRIPHROCAPS 1 MG capsule TAKE 1 CAPSULE BY MOUTH EVERY EVENING  Qty: 90 capsule, Refills: 3    Associated Diagnoses: ESRD (end stage renal disease) on dialysis (H)           Allergies   Allergies   Allergen Reactions     Contrast Dye Hives     Does fine if he uses benadryl prior.     No Clinical Screening - See Comments      Green beans - Diarrhea.    Topical antibiotic - name unknown - caused swelling of the penis.     Data   Most Recent 3 CBC's:  Recent Labs   Lab Test  04/26/18   0914  04/25/18   0543  04/24/18   0625  04/23/18   0632   WBC   --    4.6  6.5  13.6*   HGB   --   10.5*  10.1*  11.7*   MCV   --   100  101*  99   PLT  194  144*  133*  136*      Most Recent 3 BMP's:  Recent Labs   Lab Test  04/25/18   0543  04/24/18   0625  04/23/18   0632   NA  138  138  136   POTASSIUM  4.0  3.9  4.0   CHLORIDE  100  100  99   CO2  29  29  26   BUN  34*  20  35*   CR  5.24*  4.02*  6.40*   ANIONGAP  9  9  11   CHRISTINE  8.9  8.5  9.2   GLC  109*  87  134*     Most Recent 2 LFT's:  Recent Labs   Lab Test  04/23/18   0632  02/27/18   0921  12/18/17   0950   AST  19   --   13   ALT  15  <5*  15   ALKPHOS  202*   --   154*   BILITOTAL  2.3*   --   1.8*     Most Recent INR's and Anticoagulation Dosing History:  Anticoagulation Dose History     Recent Dosing and Labs Latest Ref Rng & Units 5/20/2008 9/18/2009 7/22/2011 8/12/2011 7/14/2016 12/11/2017 4/23/2018    INR 0.86 - 1.14 1.01 0.88 1.02 1.09 0.91 1.00 1.20(H)        Most Recent 3 Troponin's:  Recent Labs   Lab Test  12/18/17   0950  12/09/17   0135  12/08/17   2143   TROPI  0.080*  0.048*  0.040     Most Recent Cholesterol Panel:  Recent Labs   Lab Test  02/27/18   0921   CHOL  104   LDL  46   HDL  44   TRIG  71     Most Recent 6 Bacteria Isolates From Any Culture (See EPIC Reports for Culture Details):  Recent Labs   Lab Test  04/26/18   0914  04/25/18   1040  04/24/18   0625  04/23/18   0702  04/23/18   0632  04/07/17   0755   CULT  No growth after 4 hours  No growth after 1 day  No growth after 2 days  Cultured on the 1st day of incubation:  Streptococcus pneumoniae  *  Critical Value/Significant Value, preliminary result only, called to and read back by   LUZMARIA HAMM RN @6601 4/23/18. CT    (Note)  POSITIVE for STREPTOCOCCUS PNEUMONIAE by [email or web address] multiplex nucleic  acid test. Note: Streptococcus mitis group is known to cross react  with S. pneumoniae. Correlation with culture results and clinical  presentation is necessary. Final identification and antimicrobial  susceptibility testing will be verified by  standard methods.    Specimen tested with Rhoman[employer]ig[employer] multiplex, gram-positive blood culture  nucleic acid test for the following targets: Staph aureus, Staph  epidermidis, Staph lugdunensis, other Staph species, Enterococcus  faecalis, Enterococcus faecium, Streptococcus species, S. agalactiae,  S. anginosus grp., S. pneumoniae, S. pyogenes, Listeria sp., mecA  (methicillin resistance) and Ginette/B (vancomycin resistance).    Critical Value/Significant Value called to and read back by LUZMARIA HAMM  RN @0153 4/24/18. SCG      Cultured on the 1st day of incubation:  Streptococcus pneumoniae  *  Critical Value/Significant Value, preliminary result only, called to and read back by  LUZMARIA HAMM RN ([facility]).  04.24.18 0302 GJS    Susceptibility testing done on previous specimen  No growth     Most Recent TSH, T4 and A1c Labs:  Recent Labs   Lab Test  04/23/18   0632  02/27/18   0921   TSH   --   2.92   A1C  6.2   --      Results for orders placed or performed during the hospital encounter of 04/23/18   XR Chest 2 Views    Narrative    CHEST TWO VIEWS April 23, 2018 7:08 AM     HISTORY: Chest pain/shortness of breath.    COMPARISON: 12/18/2017.    FINDINGS: Right subclavian cardiac device in place. No pneumothorax.  The heart is enlarged. No pulmonary edema. There is infiltrate in the  left lower lung, increased since the previous exam. There is also  probable left perihilar infiltrate. The lungs are otherwise clear. No  pleural effusion. Degenerative disease in the thoracic spine.       Impression    IMPRESSION: Left basilar and left perihilar infiltrate may be  pneumonia.    MEDINA ROJAS MD

## 2018-04-26 NOTE — PROGRESS NOTES
Doing well.  Home today on oral antibiotic Rx.  Next dialysis 4/27, per usual, at [facility]  I have called them and updated orders.  Thanks.    Reji Smith MD  Nephrology; [email or web address]  [phone]

## 2018-04-26 NOTE — PROGRESS NOTES
Care Transition Initial Assessment - RN  Met with: Patient.  DATA   Active Problems:    HCAP (healthcare-associated pneumonia)       Cognitive Status: alert and oriented.  Contact information and PCP information verified: Yes  Lives With: alone  Living Arrangements: house   Insurance concerns: No Insurance issues identified  ASSESSMENT  Patient currently receives the following services:  HHA through [facility]        Identified issues/concerns regarding health management: Lives alone,has HHA for 2 days /week for grocery shopping and house keeping. Patient stating he still drives for appointment and Dialysis MWF,he has stairs in his home to the bedroom and basement. Patient uses a cane for ambulation and ensures that he will diligently use it. Patient declining home RN or therapies. States he will be able to manage on his own. Dialysis RN can monitor his respiratory status. 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 Patient is being transported by the Home Health Agency aide at 1400  PLAN  Financial costs for the patient include None .  Patient given options and choices for discharge yes  Patient/family is agreeable to the plan?  Yes:   Patient anticipates discharging to Home        Patient anticipates needs for home equipment: Yes  Plan/Disposition: Home   Appointments: Completed 5/2 at 2 pm     Care  (CTS) will continue to follow as needed.

## 2018-04-26 NOTE — PROGRESS NOTES
[facility]  Infectious Disease Progress Note          Assessment and Plan:      IMPRESSION:   1.  A 72-year-old male with a complicated medical history, admitted with acute fever and respiratory symptoms, found to have pneumonia, concern for hospital-acquired pneumonia given dialysis status, etc., but blood cultures growing Streptococcus pneumoniae should define the illness.  Despite positive blood cultures and an automatic implantable cardioverter-defibrillator in place, quite unlikely any secondary involved site, almost certainly conventional pneumonia, clinically improved on antibiotics.   2.  End-stage renal disease on dialysis.   3.  Coronary artery disease.   4.  History of cardiomyopathy and an automatic implantable cardioverter-defibrillator in place.   5.  Paroxysmal atrial flutter, status post ablation.   6.  Diabetes mellitus.       RECOMMENDATIONS:     1.  Ceftriaxone , dose today, then can switch to PO   Ceftin 500 mg once daily (renal reduced dose)  for 10 days and disposition home.   3.  On small chance of an AICD infection, probably reasonable to get blood cultures in roughly 2 weeks after completes the antibiotics.             Interval History:   Feels much better.  Feels well enough to go home.  Remains afebrile.  Repeat blood cxs NGTD.              Medications:       - MEDICATION INSTRUCTIONS for Dialysis Patients -   Does not apply See Admin Instructions     aspirin EC  81 mg Oral Daily     carvedilol  6.25 mg Oral BID w/meals     cefTRIAXone  2 g Intravenous Q24H     clopidogrel  75 mg Oral Daily     doxercalciferol  2 mcg Intravenous Once per day on Mon Wed Fri     epoetin wei (EPOGEN,PROCRIT) inj ESRD  4,000 Units Intravenous Once per day on Mon Wed Fri     heparin  5,000 Units Subcutaneous Q12H     insulin aspart  1-3 Units Subcutaneous TID AC     insulin aspart  1-3 Units Subcutaneous At Bedtime     isosorbide mononitrate  15 mg Oral Daily     levothyroxine  50 mcg  Oral Daily     lisinopril  2.5 mg Oral 2 times per day     mexiletine  150 mg Oral BID     NEPHROCAPS  1 capsule Oral QPM     pantoprazole  40 mg Oral QAM     pravastatin  40 mg Oral Daily     ranitidine  75 mg Oral At Bedtime     sodium chloride (PF)  3 mL Intracatheter Q8H                  Physical Exam:   Blood pressure 113/56, pulse 67, temperature 97.6  F (36.4  C), temperature source Oral, resp. rate 16, weight 78.7 kg (173 lb 8 oz), SpO2 95 %.  [unfilled]  Vital Signs with Ranges  Temp:  [97.5  F (36.4  C)-98.7  F (37.1  C)] 97.6  F (36.4  C)  Pulse:  [64-67] 67  Heart Rate:  [58-72] 64  Resp:  [16-18] 16  BP: (101-126)/(47-84) 113/56  SpO2:  [94 %-100 %] 95 %    Constitutional: Awake, alert, cooperative, no apparent distress   Lungs: Clear to auscultation bilaterally, no crackles or wheezing   Cardiovascular: Regular rate and rhythm, normal S1 and S2, and no murmur noted   Abdomen: Normal bowel sounds, soft, non-distended, non-tender   Skin: No rashes, no cyanosis, no edema   Other:           Data:   All microbiology laboratory data reviewed.  Recent Labs   Lab Test  04/25/18   0543  04/24/18   0625  04/23/18   0632   WBC  4.6  6.5  13.6*   HGB  10.5*  10.1*  11.7*   HCT  31.6*  30.4*  35.0*   MCV  100  101*  99   PLT  144*  133*  136*     Recent Labs   Lab Test  04/25/18   0543  04/24/18   0625  04/23/18   0632   CR  5.24*  4.02*  6.40*     No lab results found.

## 2018-04-26 NOTE — PLAN OF CARE
Problem: Patient Care Overview  Goal: Plan of Care/Patient Progress Review  Pt alert and oriented x 4. VSS on RA. Denies pain, nausea, sob. Up with SBA and GB, ambulating in hallways x 1. LS crackles in BLLs. Tele SR with BBB. , 100. Dialysis MWF. Neph, ID following. Nursing will continue to monitor.

## 2018-04-26 NOTE — PLAN OF CARE
Problem: Patient Care Overview  Goal: Plan of Care/Patient Progress Review  Outcome: Improving  A&Ox4, VSS on RA. Tele NSR with PACs. LS crackles in bases. Stand by assist with gait belt. Dialysis diet. Thrill and bruit auscultated. Dialysis MWF. Denies pain/nausea/shortness of breath. Will continue to monitor.

## 2018-04-26 NOTE — PLAN OF CARE
Problem: Patient Care Overview  Goal: Plan of Care/Patient Progress Review  Outcome: Improving  A/Ox4, denies pain, nausea, SOB.  SBA to bathroom with cane.  Plan is discharge after noon dose of antibiotic.

## 2018-04-26 NOTE — PROGRESS NOTES
Discharge    Patient discharged to home via wheelchair with home health aide    AVS, prescriptions reviewed. Belongings returned from security.  Patient dressed, IV removed and follow up plans discussed.  Patient denies questions, verbalizes understanding.    Listed belongings gathered and returned to patient. Yes, envelope from security  Care Plan and Patient education resolved: yes  Prescriptions if needed, hard copies sent with patient  Yes, given to patient, reviewed administration and follow up plan  Home and hospital acquired medications returned to patient: n/a  Medication Bin checked and emptied on discharge yes   Follow up appointment made for patient: yes, reviewed via AVS

## 2018-04-27 ENCOUNTER — TELEPHONE (OUTPATIENT)
Dept: INTERNAL MEDICINE | Facility: CLINIC | Age: 73
End: 2018-04-27

## 2018-04-27 ENCOUNTER — PATIENT OUTREACH (OUTPATIENT)
Dept: INTERNAL MEDICINE | Facility: CLINIC | Age: 73
End: 2018-04-27

## 2018-04-27 DIAGNOSIS — J18.9 HCAP (HEALTHCARE-ASSOCIATED PNEUMONIA): Primary | ICD-10-CM

## 2018-04-27 NOTE — TELEPHONE ENCOUNTER
See care coordination encounter.  This encounter closed     Mariel Santos RN / Clinical Care Coordinator     [address] [address]  [email or web address]  Office :  [phone] / Fax :  [phone]

## 2018-04-27 NOTE — PROGRESS NOTES
Clinic Care Coordination Contact  [facility]/Voicemail       Clinical Data: Care Coordinator Outreach  Hospitalization at [facility]  4/23-4/26/2018-HCAP (Healthcare associated pneumonia)   Outreach attempted x 1.  Left message on voicemail with call back information and requested return call. Left a message with a reminder he has a hospital follow up with Dr Kolb 5/2/2018    Plan: will try to reach patient again in 1-2 business days.  Mariel Santos RN / Clinical Care Coordinator     [address] [address]  [email or web address]  Office :  [phone] / Fax :  [phone]

## 2018-04-30 LAB
BACTERIA SPEC CULT: NO GROWTH
Lab: NORMAL
SPECIMEN SOURCE: NORMAL

## 2018-04-30 NOTE — PROGRESS NOTES
Clinic Care Coordination Contact    OUTREACH    Referral Information:  Referral Source: IP Handoff    Primary Diagnosis: Pneumonia    Chief Complaint   Patient presents with     Clinic Care Coordination - Post Hospital     Clinic Care Coordination RN         [place] Utilization: Hospitalization at [facility]  4/23-4/26/2018-HCAP (Healthcare associated pneumonia)  Utilization    Last refreshed: 4/30/2018  2:33 PM:  No Show Count (past year) 1       Last refreshed: 4/30/2018  2:33 PM:  ED visits 1       Last refreshed: 4/30/2018  2:33 PM:  Hospital admissions 5          Current as of: 4/30/2018  2:33 PM             Clinical Concerns:  Current Medical Concerns:  Patient has had loose stools from his antibiotic since last Thursday patient will finish the antibiotic on Sunday.  Controlled with taking imodium  Clinic Care Coordination Pneumonia Assessment:  Discharge:    Day of hospital discharge:  4/26/2018    What recommendations were made for follow up after your recent hospitalization? Fish course of Antibiotic     Have the follow up appointments been scheduled? Yes    If not, can I help you set up the appointments? No    Transportation concerns? No    Is there a referral/need for Pulmonary Rehab? No    Is the patient enrolled in [employer] Tele-Assurance program? No    Symptom Review:    How have you been feeling now that you are home?  A little tired     Are you having any increased shortness of breath? No    Are you having any fevers? No    When you cough, are you coughing up anything? No  Medications:     Were you started on any new medications?  Ceftin    Were any of your previous medications changed? No    Do you have all of your medications? Yes,     Have you had trouble filling your prescriptions? No    Are your medications effective in controlling your symptoms? Yes,     Are you currently on Prednisone?  No    Medication reconciliation completed? Yes    Was MTM or Diabetic Education referral  placed ? No    Oxygen/DME    Are you currently on oxygen? No     Review with patient how to use/maintain nebulizers and inhalers: No  Activity:    Have you had to reduce your activities because of dyspnea or other symptoms? Pacing activity     Were you instructed on how to cough and deep breathe? Yes,   Emotions/Lifestyle:      Do you smoke? No  Clinical Pathway Name: Pneumonia  Health Maintenance Reviewed: Not assessed    Medication Management:  Reviewed     Transportation means:: Accessible car        Goals:   Goals        General    Medical (pt-stated)     Notes - Note created  4/30/2018  2:32 PM by Mariel Santos, RN    Goal Statement: I will prevent re-occurence of Pneumonia  Measure of Success: decreased hospital visits   Supportive Steps to Achieve: I will cough deep breathe and drink water within his restrictions due to dialysis   Barriers: No barriers identified   Strengths: Continues antibiotic   Date to Achieve By: After course of antibiotic           Patient/Caregiver understanding: Patient expresses good understanding of discharge instructions   Outreach Frequency: 2 weeks  Future Appointments              Tomorrow CARD DCR2 [facility] PSA CLIN    In 2 days Josselin Kolb MD [facility]           Plan: CC will follow up in 3-5 business days   Mariel Santos RN / Clinical Care Coordinator     [address] [address]  [email or web address]  Office :  [phone] / Fax :  [phone]

## 2018-04-30 NOTE — PROGRESS NOTES
Clinic Care Coordination Contact  [facility]/Voicemail    Referral Source: IP Handoff  Clinical Data: Care Coordinator Outreach  Outreach attempted x 2.  Left message on voicemail with call back information and requested return call.  Plan: Care Coordinator will await a return call from the patient   Mariel Santos RN / Clinical Care Coordinator     [address] [address]  [email or web address]  Office :  [phone] / Fax :  [phone]

## 2018-05-01 LAB
BACTERIA SPEC CULT: NO GROWTH
Lab: NORMAL
SPECIMEN SOURCE: NORMAL

## 2018-05-02 ENCOUNTER — OFFICE VISIT (OUTPATIENT)
Dept: INTERNAL MEDICINE | Facility: CLINIC | Age: 73
End: 2018-05-02
Payer: MEDICARE

## 2018-05-02 VITALS
SYSTOLIC BLOOD PRESSURE: 122 MMHG | HEART RATE: 65 BPM | RESPIRATION RATE: 17 BRPM | TEMPERATURE: 97.5 F | DIASTOLIC BLOOD PRESSURE: 60 MMHG | OXYGEN SATURATION: 96 % | WEIGHT: 179.9 LBS | BODY MASS INDEX: 27.26 KG/M2 | HEIGHT: 68 IN

## 2018-05-02 DIAGNOSIS — Z09 HOSPITAL DISCHARGE FOLLOW-UP: Primary | ICD-10-CM

## 2018-05-02 DIAGNOSIS — J18.9 HEALTHCARE-ASSOCIATED PNEUMONIA: ICD-10-CM

## 2018-05-02 LAB
BACTERIA SPEC CULT: NO GROWTH
Lab: NORMAL
SPECIMEN SOURCE: NORMAL

## 2018-05-02 PROCEDURE — 99495 TRANSJ CARE MGMT MOD F2F 14D: CPT | Performed by: INTERNAL MEDICINE

## 2018-05-02 RX ORDER — APIXABAN 2.5 MG/1
2.5 TABLET, FILM COATED ORAL PRN
COMMUNITY
Start: 2018-02-06 | End: 2018-07-04

## 2018-05-02 ASSESSMENT — PAIN SCALES - GENERAL: PAINLEVEL: NO PAIN (0)

## 2018-05-02 NOTE — PROGRESS NOTES
"  SUBJECTIVE:      Westley Ness is a pleasant 72 year old male who presents for hospital follow-up:    Hospital: [facility]  Date of Admission: 4/23/18  Date of Discharge: 4/26/18  Reason(s) for Admission: Left-sided healthcare-associated pneumonia    Diagnostic tests, treatments/interventions, and discharge summary reviewed.    Summary of hospitalization:  - presented with cough, shortness of breath, and generalized weakness  - febrile to 101.6 in the ER with leukocytosis of 13.6  - CXR demonstrated left-sided infiltrates   - started on pip-tazo for HCAP  - 2/2 initial blood cultures grew strep pneumonia   - ID consulted; patient switched to IV ceftriaxone  - discharged on 10-day course of Ceftin    Medication changes since discharge: none  Adherent to discharge medications: yes  Problems taking discharge medications: no     Follow-up: blood cultures 1-2 weeks after completing antibiotics    Update since discharge:   - overall, feeling better  - cough has significantly improved  - shortness of breath has resolved  - no fevers or chills   - energy is improving each day, though still low  - continues to have mild nausea and poor appetite  - has been having a couple \"flaky\" bowel movements daily; no abdominal pain, melena, hematochezia     OBJECTIVE:       /60 (BP Location: Right arm, Patient Position: Chair, Cuff Size: Adult Regular)  Pulse 65  Temp 97.5  F (36.4  C) (Oral)  Resp 17  Ht 5' 8\" (1.727 m)  Wt 179 lb 14.4 oz (81.6 kg)  SpO2 96%  BMI 27.35 kg/m2  Constitutional: well, but tired appearing  Respiratory: normal respiratory effort; clear to auscultation bilaterally -no crackles, rhonchi, or wheezes  Cardiovascular: regular rate and rhythm; no edema  Gastrointestinal: soft, non-tender, non-distended, and bowel sounds present; no organomegaly or masses   Psych: normal judgment and insight; normal mood and affect; recent and remote memory intact    ASSESSMENT/PLAN:       (Z09) Hospital discharge " follow-up  (primary encounter diagnosis)  (J18.9) Healthcare-associated pneumonia  Comment:    - overall, improving.   - mild loose stools likely a side effect from antibiotic.   - nausea and anorexia may be a side effect from the antibiotic or due to resolving pneumonia.  Plan:    - continue and complete antibiotics.   - rest and stay well-hydrated.   - plan for blood cultures 1-2 weeks after completing antibiotics.   - if symptoms worsen, change, or if new symptoms develop, patient to contact MD.    The instructions on the AVS were discussed and explained to the patient. Patient expressed understanding of instructions.    (Chart documentation was completed, in part, with [employer] voice-recognition software. Even though reviewed, some grammatical, spelling, and word errors may remain.)    Josselin Kolb MD   [address] [address]  T: [phone], F: [phone]

## 2018-05-02 NOTE — MR AVS SNAPSHOT
"              After Visit Summary   2018    Westley Ness    MRN: [id number]           Patient Information     Date Of Birth          1945        Visit Information        Provider Department      2018 2:00 PM Josselin Kolb MD [facility]        Care Instructions    Continue and complete antibiotics (sorry).    Try to eat and drink during the day.           Follow-ups after your visit        Who to contact     If you have questions or need follow up information about today's clinic visit or your schedule please contact [facility] directly at [phone].  Normal or non-critical lab and imaging results will be communicated to you by [employer] e[employer]coveryhart, letter or phone within 4 business days after the clinic has received the results. If you do not hear from us within 7 days, please contact the clinic through [employer] or phone. If you have a critical or abnormal lab result, we will notify you by phone as soon as possible.  Submit refill requests through M[employer]ax or call your pharmacy and they will forward the refill request to us. Please allow 3 business days for your refill to be completed.          Additional Information About Your Visit        MyChart Information     Mpax lets you send messages to your doctor, view your test results, renew your prescriptions, schedule appointments and more. To sign up, go to [email or web address] . Click on \"Log in\" on the left side of the screen, which will take you to the Welcome page. Then click on \"Sign up Now\" on the right side of the page.     You will be asked to enter the access code listed below, as well as some personal information. Please follow the directions to create your username and password.     Your access code is: [email or web address]  Expires: 2018  1:16 PM     Your access code will  in 90 days. If you need help or a new code, please call your [facility] or [phone].        Care EveryWhere " "ID     This is your Care EveryWhere ID. This could be used by other organizations to access your [facility] medical records  [id number]        Your Vitals Were     Pulse Temperature Respirations Height Pulse Oximetry BMI (Body Mass Index)    65 97.5  F (36.4  C) (Oral) 17 5' 8\" (1.727 m) 96% 27.35 kg/m2       Blood Pressure from Last 3 Encounters:   05/02/18 122/60   04/26/18 120/63   02/27/18 110/52    Weight from Last 3 Encounters:   05/02/18 179 lb 14.4 oz (81.6 kg)   04/26/18 173 lb 8 oz (78.7 kg)   02/27/18 186 lb (84.4 kg)              Today, you had the following     No orders found for display         Today's Medication Changes          These changes are accurate as of 5/2/18  2:22 PM.  If you have any questions, ask your nurse or doctor.               These medicines have changed or have updated prescriptions.        Dose/Directions    lisinopril 2.5 MG tablet   Commonly known as:  PRINIVIL/Zestril   This may have changed:    - how much to take  - how to take this  - when to take this  - additional instructions   Used for:  NSTEMI (non-ST elevated myocardial infarction) (H)        Take one tablet after dialysis. Take second tablet at bedtime.   Quantity:  180 tablet   Refills:  3                Primary Care Provider Office Phone # Fax #    Josselin Kolb [phone]       [address] [place] [address]        Goals        General    Medical (pt-stated)     Notes - Note created  4/30/2018  2:32 PM by Mariel Santos, RN    Goal Statement: I will prevent re-occurence of Pneumonia  Measure of Success: decreased hospital visits   Supportive Steps to Achieve: I will cough deep breathe and drink water within his restrictions due to dialysis   Barriers: No barriers identified   Strengths: Continues antibiotic   Date to Achieve By: After course of antibiotic         Equal Access to Services     LAZARO ROMERO AH: Hadii fede Jeff, hardikda gunnar, qaybta kapurnima blackburn " tom bazansaurav kam'aan ah. So [facility] [phone].    ATENCIÓN: Si konrad hayward, tiene a jacome disposición servicios gratuitos de asistencia lingüística. Ava juárez [phone].    We comply with applicable federal civil rights laws and [place] laws. We do not discriminate on the basis of race, color, national origin, age, disability, sex, sexual orientation, or gender identity.            Thank you!     Thank you for choosing [facility]  for your care. Our goal is always to provide you with excellent care. Hearing back from our patients is one way we can continue to improve our services. Please take a few minutes to complete the written survey that you may receive in the mail after your visit with us. Thank you!             Your Updated Medication List - Protect others around you: Learn how to safely use, store and throw away your medicines at www.disposemymeds.org.          This list is accurate as of 5/2/18  2:22 PM.  Always use your most recent med list.                   Brand Name Dispense Instructions for use Diagnosis    acetaminophen 325 MG tablet    TYLENOL    60 tablet    Take 2 tablets (650 mg) by mouth 4 times daily    Closed nondisplaced fracture of right patella, unspecified fracture morphology, initial encounter, Elbow fracture, right, closed, initial encounter       aspirin 81 MG tablet     1 tablet    Take 1 tablet (81 mg) by mouth daily        carvedilol 6.25 MG tablet    COREG    180 tablet    TAKE 1 TABLET BY MOUTH TWICE DAILY WITH MEALS AFTER DIALYSIS    NSTEMI (non-ST elevated myocardial infarction) (H), Mild CAD       cefuroxime 500 MG tablet    CEFTIN    10 tablet    Take 1 tablet (500 mg) by mouth daily for 10 days    Gram-positive bacteremia       clopidogrel 75 MG tablet    PLAVIX    30 tablet    Take 1 tablet (75 mg) by mouth daily To protect your stent(s).  Do not stop taking unless directed by cardiology.    Coronary artery disease involving native  coronary artery of native heart without angina pectoris       ELIQUIS 2.5 MG tablet   Generic drug:  apixaban ANTICOAGULANT      Take 2.5 mg by mouth as needed As directed by cardiology        fluticasone 50 MCG/ACT spray    FLONASE     Spray 2 sprays into both nostrils daily as needed for rhinitis or allergies        isosorbide mononitrate 30 MG 24 hr tablet    IMDUR    90 tablet    TAKE 1/2 TABLET BY MOUTH ONCE DAILY    NSTEMI (non-ST elevated myocardial infarction) (H)       Levothyroxine Sodium 50 MCG Caps     90 capsule    Take 50 mcg by mouth daily    Hypothyroidism, unspecified type       lisinopril 2.5 MG tablet    PRINIVIL/Zestril    180 tablet    Take one tablet after dialysis. Take second tablet at bedtime.    NSTEMI (non-ST elevated myocardial infarction) (H)       loperamide 2 MG capsule    IMODIUM     Take 1 mg by mouth as needed        mexiletine 150 MG capsule    MEXITIL    180 capsule    Take 1 capsule (150 mg) by mouth 2 times daily    Tachycardia       nitroGLYcerin 0.4 MG sublingual tablet    NITROSTAT    25 tablet    1 TAB EVERY 5 MIN AS NEEDED, UP TO 3 PER EPISODE    Angina pectoris (H)       pantoprazole 40 MG EC tablet    PROTONIX    30 tablet    Take 1 tablet (40 mg) by mouth every morning    Gastroesophageal reflux disease, esophagitis presence not specified       pravastatin 40 MG tablet    PRAVACHOL    90 tablet    TAKE 1 TABLET (40 MG) BY MOUTH DAILY    Coronary artery disease involving native coronary artery of native heart without angina pectoris       psyllium 0.52 g capsule      Take 1 capsule by mouth daily as needed        TRIPHROCAPS 1 MG capsule   Generic drug:  NEPHROCAPS     90 capsule    TAKE 1 CAPSULE BY MOUTH EVERY EVENING    ESRD (end stage renal disease) on dialysis (H)       ZANTAC PO      Take 75 mg by mouth At Bedtime

## 2018-05-09 ENCOUNTER — PATIENT OUTREACH (OUTPATIENT)
Dept: INTERNAL MEDICINE | Facility: CLINIC | Age: 73
End: 2018-05-09

## 2018-05-09 DIAGNOSIS — J18.9 HCAP (HEALTHCARE-ASSOCIATED PNEUMONIA): Primary | ICD-10-CM

## 2018-05-09 NOTE — LETTER
[facility]  Complex Care Plan  About Me  Patient Name:  Westley Ness    YOB: 1945  Age:     72 year old   [facility] MRN:   [id number] Telephone Information:    Home Phone [phone]   Mobile Not on file.       Address:    [address] [facility] [address] Email address:  [email or web address]      Emergency Contact(s)  Name Relationship Lgl Grd Work Phone Home Phone Mobile Phone   1. LEELEE HUGHES Friend   [phone]   2. DIONNA EMIGDIO Friend   [phone]   3. ALIARENEAZAHRA Friend   [phone]           Primary language:  English     needed? No   [facility] [employer]:  [phone] op. 1  Other communication barriers:    Preferred Method of Communication:  Mail  Current living arrangement:    Mobility Status/ Medical Equipment:      Health Maintenance  Health Maintenance Reviewed: Not assessed    My Access Plan  Medical Emergency 911   Primary Clinic Line   -     24 Hour Appointment Line [phone] or  [phone]) (toll-free)   24 Hour Nurse Line [phone] (toll-free)   Preferred Urgent Care     Preferred Hospital     Preferred Pharmacy [place], [place] - [address] [place] [address]     Behavioral Health Crisis Line The National Suicide Prevention Lifeline at 1-229.895.6832 or 911     My Care Team Members    Patient Care Team       Relationship Specialty Notifications Start End    Josselin Kolb MD PCP - General Internal Medicine  9/17/16     Phone: [phone] Fax: [phone]         [address] [place] [address]            My Care Plans  Self Management and Treatment Plan  Goals and (Comments)  Goals        General    Medical (pt-stated)     Notes - Note created  4/30/2018  2:32 PM by Mariel Santos, OLAMIDE    Goal Statement: I will prevent re-occurence of Pneumonia  Measure of Success: decreased hospital visits   Supportive Steps to Achieve: I will cough deep breathe and  drink water within his restrictions due to dialysis   Barriers: No barriers identified   Strengths: Continues antibiotic   Date to Achieve By: After course of antibiotic              Action Plans on File: Hear Failure Action Plan                       Advance Care Plans/Directives Type: None       My Medical and Care Information  Problem List   Patient Active Problem List   Diagnosis     CAD (coronary artery disease)     Ischemic cardiomyopathy     ESRD on hemodialysis (H)     Hypothyroidism     Type 2 diabetes mellitus (H)     Acid reflux disease     Shortness of breath     Typical atrial flutter (H)     NSTEMI (non-ST elevated myocardial infarction) (H)     HCAP (healthcare-associated pneumonia)      Current Medications and Allergies:  See printed Medication Report.    Care Coordination Start Date: 4/27/2018   Frequency of Care Coordination:  Closed to CC 5/9/2018   Form Last Updated: 05/09/2018

## 2018-05-09 NOTE — LETTER
[facility]  [address] [address]  [phone]      5/9/2018       Westley Ness  [address] [facility] [address]        Dear Westley,  It was a pleasure to speak with you over the phone on Wednesday .  I would like to provide you with the enclosed emergency contact information  for your records.      As always, feel free to contact your nurse care coordinator  if you have any questions or concerns.  I look forward to working with you in the effort to achieve your health care and wellness goals .        Sincerely,        Mariel Santos RN, Care Coordinator  [facility]   [email or web address]   [phone]

## 2018-05-09 NOTE — PROGRESS NOTES
Clinic Care Coordination Contact  [facility]/Voicemail       Clinical Data: Care Coordinator Outreach  Hospitalization at [facility]  4/23-4/26/2018-HCAP (Healthcare associated pneumonia    Outreach attempted x 1.  Left message on voicemail with call back information and requested return call.    Plan: . Care Coordinator will try to reach patient again in 3-5 business days.    Mariel Santos RN / Clinical Care Coordinator     [place] [address]  [email or web address]  Office :  [phone] / Fax :  [phone]

## 2018-05-09 NOTE — PROGRESS NOTES
Clinic Care Coordination Contact    Clinic Care Coordination Contact  OUTREACH    Referral Information:            Chief Complaint   Patient presents with     Clinic Care Coordination - Follow-up     ministerio Care Coordination RN         [place] Utilization: Hospitalization at [facility]  4/23-4/26/2018-HCAP (Healthcare associated pneumonia         Utilization    Last refreshed: 5/4/2018  9:35 PM:  No Show Count (past year) 1       Last refreshed: 5/4/2018  9:35 PM:  ED visits 1       Last refreshed: 5/4/2018  9:35 PM:  Hospital admissions 5          Current as of: 5/4/2018  9:35 PM             Clinical Concerns:  Current Medical Concerns:  Patient stopped the antibiotic May 6  Continues to have 2 loose stools a day  Using the Imodium.  Patient will call the clinic in a few days  if loose stools continue or increase     Health Maintenance Reviewed: Not assessed  Clinical Pathway: Clinic Care Coordination Pneumonia Assessment  Symptom Review:    How have you been feeling now that you are home?  Stronger and more energy     Are you having any increased shortness of breath? No    Are you having any fevers? No    When you cough, are you coughing up anything? No      Medications:     Were you started on any new medications?  Finished antibiotic Sunday 5/6    Are your medications effective in controlling your symptoms? Yes,     Are you currently on Prednisone (* does pt understand the tapering instructions)?  No    Medication reconciliation completed? No, no changes     Was MTM or Diabetic Education referral placed? No    Oxygen/DME    Are you currently on oxygen? No   Activity:    Have you had to reduce your activities because of dyspnea or other symptoms?  Pacing activity    Were you instructed on how to cough and deep breathe? Yes,   Emotions/Lifestyle:      Do you smoke? reports that he quit smoking about 21 years ago. His smoking use included Cigarettes. He has a 70.00 pack-year smoking history. He  has never used smokeless tobacco.    Functional Status: Independent with walker or cane        Living Situation:       Diet/Exercise/Sleep:  Inadequate activity/exercise (GOAL):: No  Significant changes in sleep pattern (GOAL): No         Psychosocial:  Informal Support system:: Family        Goals:   Goals        General    Medical (pt-stated)     Notes - Note created  4/30/2018  2:32 PM by Mariel Santos, RN    Goal Statement: I will prevent re-occurence of Pneumonia  Measure of Success: decreased hospital visits   Supportive Steps to Achieve: I will cough deep breathe and drink water within his restrictions due to dialysis   Barriers: No barriers identified   Strengths: Continues antibiotic   Date to Achieve By: After course of antibiotic                 Plan: no unmet needs, no further care coordination needed at this time     Mariel Santos RN / Clinical Care Coordinator     [address] [address]  [email or web address]  Office :  [phone] / Fax :  [phone]

## 2018-05-17 ENCOUNTER — ALLIED HEALTH/NURSE VISIT (OUTPATIENT)
Dept: CARDIOLOGY | Facility: CLINIC | Age: 73
End: 2018-05-17
Payer: MEDICARE

## 2018-05-17 DIAGNOSIS — Z95.810 ICD (IMPLANTABLE CARDIOVERTER-DEFIBRILLATOR) IN PLACE: Primary | ICD-10-CM

## 2018-05-17 PROCEDURE — 93295 DEV INTERROG REMOTE 1/2/MLT: CPT | Performed by: INTERNAL MEDICINE

## 2018-05-17 PROCEDURE — 93296 REM INTERROG EVL PM/IDS: CPT | Performed by: INTERNAL MEDICINE

## 2018-05-17 NOTE — PROGRESS NOTES
[employer] Remote ICD Device Check  AP: 0 % : 0 %  Mode: VVI 40        Underlying Rhythm: NSR  Heart Rate: Stable   Sensing: Stable    Pacing Threshold: Stable    Impedance: Stable  Battery Status: 2.94 V with charge time of 12.8, MOL 2 at 21%  Atrial Arrhythmia: NA  Ventricular Arrhythmia: None  ATP: None    Shocks: None  Setting Change: None    Care Plan: Writer called pt with results, and he has no complaints. Next remote scheduled for Sept. Due to see Lacy in Sept. DBravo Obrien RN.

## 2018-05-17 NOTE — MR AVS SNAPSHOT
After Visit Summary   5/17/2018    Westley Ness    MRN: [id number]           Patient Information     Date Of Birth          1945        Visit Information        Provider Department      5/17/2018 4:30 PM CARD DCR2 [facility]        Today's Diagnoses     ICD (implantable cardioverter-defibrillator) in place    -  1       Follow-ups after your visit        Your next 10 appointments already scheduled     May 17, 2018  4:30 PM CDT   Remote ICD Check with CARD DCR2   [facility] ([facility])    [address] [id number] [phone] OPT 2           This appointment is for a remote check of your debrillator.  This is not an appointment at the office.            Aug 16, 2018 10:00 AM CDT   [facility] EP RETURN with MARCIO Aviles CNP   [facility] ([facility])    [address] [id number] [phone] OPT 2            Sep 13, 2018  4:30 PM CDT   Remote ICD Check with CARD DCR2   [facility] ([facility])    [address] [id number] [phone] OPT 2           This appointment is for a remote check of your debrillator.  This is not an appointment at the office.              Who to contact     If you have questions or need follow up information about today's clinic visit or your schedule please contact [facility] directly at [phone].  Normal or non-critical lab and imaging results will be communicated to you by MyChart, letter or phone within 4 business days after the clinic has received the results. If you do not hear from us within 7 days, please contact the clinic through MyChart or phone. If you have a critical or abnormal lab result, we will notify you by phone as soon as possible.  Submit refill  "requests through [employer] or call your pharmacy and they will forward the refill request to us. Please allow 3 business days for your refill to be completed.          Additional Information About Your Visit        [employer] Information     [employer] lets you send messages to your doctor, view your test results, renew your prescriptions, schedule appointments and more. To sign up, go to [email or web address] . Click on \"Log in\" on the left side of the screen, which will take you to the Welcome page. Then click on \"Sign up Now\" on the right side of the page.     You will be asked to enter the access code listed below, as well as some personal information. Please follow the directions to create your username and password.     Your access code is: [email or web address]  Expires: 2018  1:16 PM     Your access code will  in 90 days. If you need help or a new code, please call your [facility] clinic or [phone].        Care EveryWhere ID     This is your Care EveryWhere ID. This could be used by other organizations to access your [facility] medical records  [id number]         Blood Pressure from Last 3 Encounters:   18 122/60   18 120/63   18 110/52    Weight from Last 3 Encounters:   18 81.6 kg (179 lb 14.4 oz)   18 78.7 kg (173 lb 8 oz)   18 84.4 kg (186 lb)              We Performed the Following     ICD DEVICE INTERROGAT REMOTE (76477)     INTERROGATION DEVICE EVAL REMOTE, PACER/ICD (48780)          Today's Medication Changes          These changes are accurate as of 18  2:51 PM.  If you have any questions, ask your nurse or doctor.               These medicines have changed or have updated prescriptions.        Dose/Directions    lisinopril 2.5 MG tablet   Commonly known as:  PRINIVIL/Zestril   This may have changed:    - how much to take  - how to take this  - when to take this  - additional instructions   Used for:  NSTEMI (non-ST elevated myocardial infarction) (H)        " Take one tablet after dialysis. Take second tablet at bedtime.   Quantity:  180 tablet   Refills:  3                Primary Care Provider Office Phone # Fax #    Josselin Kolb [phone] [address] [place] [id number]        Goals        General    Medical (pt-stated)     Notes - Note created  4/30/2018  2:32 PM by Mariel Santos, RN    Goal Statement: I will prevent re-occurence of Pneumonia  Measure of Success: decreased hospital visits   Supportive Steps to Achieve: I will cough deep breathe and drink water within his restrictions due to dialysis   Barriers: No barriers identified   Strengths: Continues antibiotic   Date to Achieve By: After course of antibiotic         Equal Access to Services     [facility]O [facility] : Hadii aad ku hadasho Soomaali, waaxda luqadaha, qaybta kaalmada adeegyada, waxmissy cuenca . So [facility] [phone].    ATENCIÓN: Si habla español, tiene a jacome disposición servicios gratuitos de asistencia lingüística. Llame al [phone].    We comply with applicable federal civil rights laws and [place] laws. We do not discriminate on the basis of race, color, national origin, age, disability, sex, sexual orientation, or gender identity.            Thank you!     Thank you for choosing [facility]  for your care. Our goal is always to provide you with excellent care. Hearing back from our patients is one way we can continue to improve our services. Please take a few minutes to complete the written survey that you may receive in the mail after your visit with us. Thank you!             Your Updated Medication List - Protect others around you: Learn how to safely use, store and throw away your medicines at www.disposemymeds.org.          This list is accurate as of 5/17/18  2:51 PM.  Always use your most recent med list.                   Brand Name Dispense Instructions for use Diagnosis    acetaminophen 325  MG tablet    TYLENOL    60 tablet    Take 2 tablets (650 mg) by mouth 4 times daily    Closed nondisplaced fracture of right patella, unspecified fracture morphology, initial encounter, Elbow fracture, right, closed, initial encounter       aspirin 81 MG tablet     1 tablet    Take 1 tablet (81 mg) by mouth daily        carvedilol 6.25 MG tablet    COREG    180 tablet    TAKE 1 TABLET BY MOUTH TWICE DAILY WITH MEALS AFTER DIALYSIS    NSTEMI (non-ST elevated myocardial infarction) (H), Mild CAD       clopidogrel 75 MG tablet    PLAVIX    30 tablet    Take 1 tablet (75 mg) by mouth daily To protect your stent(s).  Do not stop taking unless directed by cardiology.    Coronary artery disease involving native coronary artery of native heart without angina pectoris       ELIQUIS 2.5 MG tablet   Generic drug:  apixaban ANTICOAGULANT      Take 2.5 mg by mouth as needed As directed by cardiology        fluticasone 50 MCG/ACT spray    FLONASE     Spray 2 sprays into both nostrils daily as needed for rhinitis or allergies        isosorbide mononitrate 30 MG 24 hr tablet    IMDUR    90 tablet    TAKE 1/2 TABLET BY MOUTH ONCE DAILY    NSTEMI (non-ST elevated myocardial infarction) (H)       Levothyroxine Sodium 50 MCG Caps     90 capsule    Take 50 mcg by mouth daily    Hypothyroidism, unspecified type       lisinopril 2.5 MG tablet    PRINIVIL/Zestril    180 tablet    Take one tablet after dialysis. Take second tablet at bedtime.    NSTEMI (non-ST elevated myocardial infarction) (H)       loperamide 2 MG capsule    IMODIUM     Take 1 mg by mouth as needed        mexiletine 150 MG capsule    MEXITIL    180 capsule    Take 1 capsule (150 mg) by mouth 2 times daily    Tachycardia       nitroGLYcerin 0.4 MG sublingual tablet    NITROSTAT    25 tablet    1 TAB EVERY 5 MIN AS NEEDED, UP TO 3 PER EPISODE    Angina pectoris (H)       pantoprazole 40 MG EC tablet    PROTONIX    30 tablet    Take 1 tablet (40 mg) by mouth every morning     Gastroesophageal reflux disease, esophagitis presence not specified       pravastatin 40 MG tablet    PRAVACHOL    90 tablet    TAKE 1 TABLET (40 MG) BY MOUTH DAILY    Coronary artery disease involving native coronary artery of native heart without angina pectoris       psyllium 0.52 g capsule      Take 1 capsule by mouth daily as needed        TRIPHROCAPS 1 MG capsule   Generic drug:  NEPHROCAPS     90 capsule    TAKE 1 CAPSULE BY MOUTH EVERY EVENING    ESRD (end stage renal disease) on dialysis (H)       ZANTAC PO      Take 75 mg by mouth At Bedtime

## 2018-05-24 DIAGNOSIS — J18.9 HEALTHCARE-ASSOCIATED PNEUMONIA: ICD-10-CM

## 2018-05-24 DIAGNOSIS — Z09 HOSPITAL DISCHARGE FOLLOW-UP: ICD-10-CM

## 2018-05-24 PROCEDURE — 36415 COLL VENOUS BLD VENIPUNCTURE: CPT | Performed by: INTERNAL MEDICINE

## 2018-05-24 PROCEDURE — 87040 BLOOD CULTURE FOR BACTERIA: CPT | Mod: 91 | Performed by: INTERNAL MEDICINE

## 2018-05-30 LAB
BACTERIA SPEC CULT: NO GROWTH
BACTERIA SPEC CULT: NO GROWTH
Lab: NORMAL
Lab: NORMAL
SPECIMEN SOURCE: NORMAL
SPECIMEN SOURCE: NORMAL

## 2018-06-17 DIAGNOSIS — E03.9 HYPOTHYROIDISM, UNSPECIFIED TYPE: ICD-10-CM

## 2018-06-18 RX ORDER — LEVOTHYROXINE SODIUM 50 UG/1
TABLET ORAL
Qty: 90 TABLET | Refills: 2 | Status: SHIPPED | OUTPATIENT
Start: 2018-06-18 | End: 2019-03-12

## 2018-06-18 NOTE — TELEPHONE ENCOUNTER
"Requested Prescriptions   Pending Prescriptions Disp Refills     levothyroxine (SYNTHROID/LEVOTHROID) 50 MCG tablet [Pharmacy Med Name: LEVOTHYROXINE 50 MCG TAB 50 TAB] 90 tablet 1     Sig: TAKE 1 TABLET BY MOUTH DAILY    Thyroid Protocol Passed    6/17/2018  2:21 PM       Passed - Patient is 12 years or older       Passed - Recent (12 mo) or future (30 days) visit within the authorizing provider's specialty    Patient had office visit in the last 12 months or has a visit in the next 30 days with authorizing provider or within the authorizing provider's specialty.  See \"Patient Info\" tab in inbasket, or \"Choose Columns\" in Meds & Orders section of the refill encounter.           Passed - Normal TSH on file in past 12 months    Recent Labs   Lab Test  02/27/18   0921   TSH  2.92              Last Written Prescription Date:  12/21/17  Last Fill Quantity: 90,  # refills: 0   Last office visit: 5/2/2018 with prescribing provider:     Future Office Visit:      "

## 2018-07-04 ENCOUNTER — HOSPITAL ENCOUNTER (EMERGENCY)
Facility: CLINIC | Age: 73
Discharge: HOME | End: 2018-07-04
Attending: EMERGENCY MEDICINE | Admitting: EMERGENCY MEDICINE
Payer: MEDICARE

## 2018-07-04 ENCOUNTER — APPOINTMENT (OUTPATIENT)
Dept: CT IMAGING | Facility: CLINIC | Age: 73
End: 2018-07-04
Attending: EMERGENCY MEDICINE
Payer: MEDICARE

## 2018-07-04 VITALS
HEIGHT: 68 IN | RESPIRATION RATE: 18 BRPM | SYSTOLIC BLOOD PRESSURE: 145 MMHG | OXYGEN SATURATION: 94 % | TEMPERATURE: 98 F | DIASTOLIC BLOOD PRESSURE: 78 MMHG

## 2018-07-04 DIAGNOSIS — N39.0 URINARY TRACT INFECTION WITH HEMATURIA, SITE UNSPECIFIED: ICD-10-CM

## 2018-07-04 DIAGNOSIS — R31.9 URINARY TRACT INFECTION WITH HEMATURIA, SITE UNSPECIFIED: ICD-10-CM

## 2018-07-04 DIAGNOSIS — N21.9 CALCULUS OF LOWER URINARY TRACT: ICD-10-CM

## 2018-07-04 DIAGNOSIS — N13.30 HYDRONEPHROSIS OF LEFT KIDNEY: ICD-10-CM

## 2018-07-04 LAB
ALBUMIN UR-MCNC: >600 MG/DL
ANION GAP SERPL CALCULATED.3IONS-SCNC: 11 MMOL/L (ref 3–14)
APPEARANCE UR: ABNORMAL
BASOPHILS # BLD AUTO: 0 10E9/L (ref 0–0.2)
BASOPHILS NFR BLD AUTO: 0.2 %
BILIRUB UR QL STRIP: NEGATIVE
BUN SERPL-MCNC: 22 MG/DL (ref 7–30)
CALCIUM SERPL-MCNC: 9.5 MG/DL (ref 8.5–10.1)
CHLORIDE SERPL-SCNC: 101 MMOL/L (ref 94–109)
CO2 SERPL-SCNC: 27 MMOL/L (ref 20–32)
COLOR UR AUTO: ABNORMAL
CREAT SERPL-MCNC: 5 MG/DL (ref 0.66–1.25)
DIFFERENTIAL METHOD BLD: ABNORMAL
EOSINOPHIL # BLD AUTO: 0.1 10E9/L (ref 0–0.7)
EOSINOPHIL NFR BLD AUTO: 1.2 %
ERYTHROCYTE [DISTWIDTH] IN BLOOD BY AUTOMATED COUNT: 14.6 % (ref 10–15)
GFR SERPL CREATININE-BSD FRML MDRD: 11 ML/MIN/1.7M2
GLUCOSE SERPL-MCNC: 155 MG/DL (ref 70–99)
GLUCOSE UR STRIP-MCNC: NEGATIVE MG/DL
HCT VFR BLD AUTO: 38.2 % (ref 40–53)
HGB BLD-MCNC: 12.6 G/DL (ref 13.3–17.7)
HGB UR QL STRIP: ABNORMAL
IMM GRANULOCYTES # BLD: 0 10E9/L (ref 0–0.4)
IMM GRANULOCYTES NFR BLD: 0.2 %
KETONES UR STRIP-MCNC: NEGATIVE MG/DL
LEUKOCYTE ESTERASE UR QL STRIP: ABNORMAL
LYMPHOCYTES # BLD AUTO: 0.6 10E9/L (ref 0.8–5.3)
LYMPHOCYTES NFR BLD AUTO: 6.1 %
MCH RBC QN AUTO: 33.6 PG (ref 26.5–33)
MCHC RBC AUTO-ENTMCNC: 33 G/DL (ref 31.5–36.5)
MCV RBC AUTO: 102 FL (ref 78–100)
MONOCYTES # BLD AUTO: 0.7 10E9/L (ref 0–1.3)
MONOCYTES NFR BLD AUTO: 7.9 %
NEUTROPHILS # BLD AUTO: 7.9 10E9/L (ref 1.6–8.3)
NEUTROPHILS NFR BLD AUTO: 84.4 %
NITRATE UR QL: NEGATIVE
NRBC # BLD AUTO: 0 10*3/UL
NRBC BLD AUTO-RTO: 0 /100
PH UR STRIP: 7.5 PH (ref 5–7)
PLATELET # BLD AUTO: 135 10E9/L (ref 150–450)
POTASSIUM SERPL-SCNC: 3.7 MMOL/L (ref 3.4–5.3)
RBC # BLD AUTO: 3.75 10E12/L (ref 4.4–5.9)
RBC #/AREA URNS AUTO: >182 /HPF (ref 0–2)
SODIUM SERPL-SCNC: 139 MMOL/L (ref 133–144)
SOURCE: ABNORMAL
SP GR UR STRIP: 1.01 (ref 1–1.03)
UROBILINOGEN UR STRIP-MCNC: 4 MG/DL (ref 0–2)
WBC # BLD AUTO: 9.3 10E9/L (ref 4–11)
WBC #/AREA URNS AUTO: >182 /HPF (ref 0–5)

## 2018-07-04 PROCEDURE — 87088 URINE BACTERIA CULTURE: CPT | Performed by: EMERGENCY MEDICINE

## 2018-07-04 PROCEDURE — 96376 TX/PRO/DX INJ SAME DRUG ADON: CPT

## 2018-07-04 PROCEDURE — 96375 TX/PRO/DX INJ NEW DRUG ADDON: CPT

## 2018-07-04 PROCEDURE — 80048 BASIC METABOLIC PNL TOTAL CA: CPT | Performed by: EMERGENCY MEDICINE

## 2018-07-04 PROCEDURE — 85025 COMPLETE CBC W/AUTO DIFF WBC: CPT | Performed by: EMERGENCY MEDICINE

## 2018-07-04 PROCEDURE — 99285 EMERGENCY DEPT VISIT HI MDM: CPT | Mod: 25

## 2018-07-04 PROCEDURE — 74176 CT ABD & PELVIS W/O CONTRAST: CPT

## 2018-07-04 PROCEDURE — 81001 URINALYSIS AUTO W/SCOPE: CPT | Performed by: EMERGENCY MEDICINE

## 2018-07-04 PROCEDURE — 87186 SC STD MICRODIL/AGAR DIL: CPT | Performed by: EMERGENCY MEDICINE

## 2018-07-04 PROCEDURE — 87086 URINE CULTURE/COLONY COUNT: CPT | Performed by: EMERGENCY MEDICINE

## 2018-07-04 PROCEDURE — 25000128 H RX IP 250 OP 636: Performed by: EMERGENCY MEDICINE

## 2018-07-04 PROCEDURE — 96365 THER/PROPH/DIAG IV INF INIT: CPT

## 2018-07-04 PROCEDURE — 25000128 H RX IP 250 OP 636

## 2018-07-04 RX ORDER — CEFTRIAXONE 1 G/1
1 INJECTION, POWDER, FOR SOLUTION INTRAMUSCULAR; INTRAVENOUS ONCE
Status: COMPLETED | OUTPATIENT
Start: 2018-07-04 | End: 2018-07-04

## 2018-07-04 RX ORDER — MORPHINE SULFATE 4 MG/ML
INJECTION, SOLUTION INTRAMUSCULAR; INTRAVENOUS
Status: COMPLETED
Start: 2018-07-04 | End: 2018-07-04

## 2018-07-04 RX ORDER — CEPHALEXIN 500 MG/1
500 CAPSULE ORAL 3 TIMES DAILY
Qty: 21 CAPSULE | Refills: 0 | Status: SHIPPED | OUTPATIENT
Start: 2018-07-04 | End: 2018-07-06 | Stop reason: ALTCHOICE

## 2018-07-04 RX ORDER — MORPHINE SULFATE 4 MG/ML
4 INJECTION, SOLUTION INTRAMUSCULAR; INTRAVENOUS
Status: COMPLETED | OUTPATIENT
Start: 2018-07-04 | End: 2018-07-04

## 2018-07-04 RX ORDER — ONDANSETRON 2 MG/ML
4 INJECTION INTRAMUSCULAR; INTRAVENOUS EVERY 30 MIN PRN
Status: DISCONTINUED | OUTPATIENT
Start: 2018-07-04 | End: 2018-07-04 | Stop reason: HOSPADM

## 2018-07-04 RX ORDER — ONDANSETRON 2 MG/ML
INJECTION INTRAMUSCULAR; INTRAVENOUS
Status: COMPLETED
Start: 2018-07-04 | End: 2018-07-04

## 2018-07-04 RX ORDER — ONDANSETRON 4 MG/1
4 TABLET, ORALLY DISINTEGRATING ORAL EVERY 8 HOURS PRN
Qty: 10 TABLET | Refills: 0 | Status: SHIPPED | OUTPATIENT
Start: 2018-07-04 | End: 2018-07-07

## 2018-07-04 RX ORDER — HYDROCODONE BITARTRATE AND ACETAMINOPHEN 5; 325 MG/1; MG/1
1 TABLET ORAL EVERY 6 HOURS PRN
Qty: 10 TABLET | Refills: 0 | Status: SHIPPED | OUTPATIENT
Start: 2018-07-04 | End: 2019-02-26

## 2018-07-04 RX ORDER — MORPHINE SULFATE 4 MG/ML
4 INJECTION, SOLUTION INTRAMUSCULAR; INTRAVENOUS ONCE
Status: DISCONTINUED | OUTPATIENT
Start: 2018-07-04 | End: 2018-07-04 | Stop reason: HOSPADM

## 2018-07-04 RX ADMIN — ONDANSETRON 4 MG: 2 INJECTION INTRAMUSCULAR; INTRAVENOUS at 01:28

## 2018-07-04 RX ADMIN — MORPHINE SULFATE 4 MG: 4 INJECTION INTRAVENOUS at 04:34

## 2018-07-04 RX ADMIN — MORPHINE SULFATE 4 MG: 4 INJECTION INTRAVENOUS at 01:29

## 2018-07-04 RX ADMIN — CEFTRIAXONE 1 G: 1 INJECTION, POWDER, FOR SOLUTION INTRAMUSCULAR; INTRAVENOUS at 05:16

## 2018-07-04 RX ADMIN — ONDANSETRON 4 MG: 2 INJECTION INTRAMUSCULAR; INTRAVENOUS at 04:13

## 2018-07-04 RX ADMIN — MORPHINE SULFATE 4 MG: 4 INJECTION, SOLUTION INTRAMUSCULAR; INTRAVENOUS at 04:34

## 2018-07-04 RX ADMIN — MORPHINE SULFATE 4 MG: 4 INJECTION, SOLUTION INTRAMUSCULAR; INTRAVENOUS at 01:29

## 2018-07-04 ASSESSMENT — ENCOUNTER SYMPTOMS
SHORTNESS OF BREATH: 0
HEMATURIA: 0
COUGH: 0
NAUSEA: 1
DYSURIA: 0
BACK PAIN: 1
FLANK PAIN: 1
FEVER: 0
VOMITING: 1
ABDOMINAL PAIN: 1

## 2018-07-04 NOTE — ED AVS SNAPSHOT
Emergency Department    [address] [facility] [address]    Phone:  [phone]    Fax:  [phone]                                       Westley Ness   MRN: [id number]    Department:   Emergency Department   Date of Visit:  7/4/2018           Patient Information     Date Of Birth          1945        Your diagnoses for this visit were:     Hydronephrosis of left kidney     Urinary tract infection with hematuria, site unspecified     Calculus of lower urinary tract        You were seen by Lisbet Alonzo MD.      Follow-up Information     Follow up with Josselin Kolb MD.    Specialty:  Internal Medicine    Contact information:    [address] [facility] [address] [phone]          Follow up with dialysis.        Follow up with  Emergency Department.    Specialty:  EMERGENCY MEDICINE    Why:  If symptoms worsen    Contact information:    [address] [facility] [address] [phone]        Discharge Instructions         Blood in the Urine    Blood in the urine (hematuria) has many possible causes. If it occurs after an injury (such as a car accident or fall), it is most often a sign of bruising to the kidney or bladder. Common causes of blood in the urine include urinary tract infections, kidney stones, inflammation, tumors, or certain other diseases of the kidney or bladder. Menstruation can cause blood to appear in the urine sample, although it is not coming from the urinary tract.  If only a trace amount of blood is present, it will show up on the urine test, even though the urine may be yellow and not pink or red. This may occur with any of the above conditions, as well as heavy exercise or high fever. In this case, your doctor may want to repeat the urine test on another day. This will show if the blood is still present. If it is, then other tests can be done to find out the cause.  Home care  Follow these home care guidelines:    If your  "urine does not appear bloody (pink, brown or red) then you do not need to restrict your activity in any way.    If you can see blood in your urine, rest and avoid heavy exertion until your next exam. Do not use aspirin, blood thinners, or anti-platelet or anti-inflammatory medicines. These include ibuprofen and naproxen. These thin the blood and may increase bleeding.  Follow-up care  Follow up with your healthcare provider, or as advised. If you were injured and had blood in your urine, you should have a repeat urine test in 1 to 2 days. Contact your doctor for this test.  A radiologist will review any X-rays that were taken. You will be told of any new findings that may affect your care.  When to seek medical advice  Call your healthcare provider right away if any of these occur:    Bright red blood or blood clots in the urine (if you did not have this before)    Weakness, dizziness or fainting    New groin, abdominal, or back pain    Fever of 100.4 F (38 C) or higher, or as directed by your healthcare provider    Repeated vomiting    Bleeding from the nose or gums or easy bruising  Date Last Reviewed: 9/1/2016 2000-2017 [employer]. [address] [place] [address]. All rights reserved. This information is not intended as a substitute for professional medical care. Always follow your healthcare professional's instructions.             * BLADDER INFECTION: Male (Adult)    A bladder infection (\"cystitis\" or \"UTI\") usually causes a constant urge to urinate, and a burning when passing urine. Urine may be cloudy, smelly or dark. There may be also be pain in the lower abdomen.  Cystitis in males is not common. It may be caused by a partial blockage in the urinary system that keeps the bladder from emptying completely. This is most often related to an enlarged prostate gland.  HOME CARE:  1. Drink lots of fluids (at least 6-8 glasses a day). This will flush the bacteria out of your bladder. " Cranberry juice has been shown to help clear out the bacteria.  2. Avoid sexual intercourse until your symptoms are gone.  3. A bladder infection is treated with antibiotics. You may also be given Pyridium (generic - phenazopyridine) to reduce burning with urination. This will cause urine to become a bright orange color, which can stain clothing.  FOLLOW UP with your doctor or this facility if ALL symptoms have not cleared within five days. It is important to keep your follow up appointment to discuss with your doctor the need for further tests of the urinary tract.  GET PROMPT MEDICAL ATTENTION if any of the following occur:    Fever over 101  F (38.3  C)    No improvement by the third day of treatment    Increasing back or abdominal pain    Repeated vomiting; unable to keep medicine down    Weakness, dizziness or fainting    4265-6334 [employer]. [address] [place] [address]. All rights reserved. This information is not intended as a substitute for professional medical care. Always follow your healthcare professional's instructions.  This information has been modified by your health care provider with permission from the publisher.      Your next 10 appointments already scheduled     Aug 15, 2018  3:50 PM CDT   [facility] EP RETURN with MARCIO Aviles CNP   [facility]    [address] [place], [place] [id number]  [facility] [id number] [phone] OPT 2            Sep 13, 2018  4:30 PM CDT   Remote ICD Check with CARD DCR2   [facility])    [address] [place] [address] [address]  [facility] [id number] [phone] OPT 2           This appointment is for a remote check of your debrillator.  This is not an appointment at the office.              24 Hour Appointment Hotline       To make an appointment at any [facility], call [phone] ([phone]). If you don't have a family  doctor or clinic, we will help you find one. [facility] are conveniently located to serve the needs of you and your family.             Review of your medicines      START taking        Dose / Directions Last dose taken    cephALEXin 500 MG capsule   Commonly known as:  KEFLEX   Dose:  500 mg   Quantity:  21 capsule        Take 1 capsule (500 mg) by mouth 3 times daily for 7 days   Refills:  0        HYDROcodone-acetaminophen 5-325 MG per tablet   Commonly known as:  NORCO   Dose:  1 tablet   Quantity:  10 tablet        Take 1 tablet by mouth every 6 hours as needed for severe pain   Refills:  0        ondansetron 4 MG ODT tab   Commonly known as:  ZOFRAN ODT   Dose:  4 mg   Quantity:  10 tablet        Take 1 tablet (4 mg) by mouth every 8 hours as needed   Refills:  0          Our records show that you are taking the medicines listed below. If these are incorrect, please call your family doctor or clinic.        Dose / Directions Last dose taken    acetaminophen 325 MG tablet   Commonly known as:  TYLENOL   Dose:  650 mg   Quantity:  60 tablet        Take 2 tablets (650 mg) by mouth 4 times daily   Refills:  0        aspirin 81 MG tablet   Dose:  81 mg   Quantity:  1 tablet        Take 1 tablet (81 mg) by mouth daily   Refills:  0        carvedilol 6.25 MG tablet   Commonly known as:  COREG   Quantity:  180 tablet        TAKE 1 TABLET BY MOUTH TWICE DAILY WITH MEALS AFTER DIALYSIS   Refills:  3        clopidogrel 75 MG tablet   Commonly known as:  PLAVIX   Dose:  75 mg   Quantity:  30 tablet        Take 1 tablet (75 mg) by mouth daily To protect your stent(s).  Do not stop taking unless directed by cardiology.   Refills:  11        fluticasone 50 MCG/ACT spray   Commonly known as:  FLONASE   Dose:  2 spray        Spray 2 sprays into both nostrils daily as needed for rhinitis or allergies   Refills:  0        isosorbide mononitrate 30 MG 24 hr tablet   Commonly known as:  IMDUR   Quantity:  90 tablet         TAKE 1/2 TABLET BY MOUTH ONCE DAILY   Refills:  2        levothyroxine 50 MCG tablet   Commonly known as:  SYNTHROID/LEVOTHROID   Quantity:  90 tablet        TAKE 1 TABLET BY MOUTH DAILY   Refills:  2        lisinopril 2.5 MG tablet   Commonly known as:  PRINIVIL/Zestril   Quantity:  180 tablet        Take one tablet after dialysis. Take second tablet at bedtime.   Refills:  3        loperamide 2 MG capsule   Commonly known as:  IMODIUM   Dose:  1 mg        Take 1 mg by mouth as needed   Refills:  0        mexiletine 150 MG capsule   Commonly known as:  MEXITIL   Dose:  150 mg   Quantity:  180 capsule        Take 1 capsule (150 mg) by mouth 2 times daily   Refills:  3        nitroGLYcerin 0.4 MG sublingual tablet   Commonly known as:  NITROSTAT   Quantity:  25 tablet        1 TAB EVERY 5 MIN AS NEEDED, UP TO 3 PER EPISODE   Refills:  0        pantoprazole 40 MG EC tablet   Commonly known as:  PROTONIX   Dose:  40 mg   Quantity:  30 tablet        Take 1 tablet (40 mg) by mouth every morning   Refills:  10        pravastatin 40 MG tablet   Commonly known as:  PRAVACHOL   Quantity:  90 tablet        TAKE 1 TABLET (40 MG) BY MOUTH DAILY   Refills:  3        psyllium 0.52 g capsule   Dose:  1 capsule        Take 1 capsule by mouth daily as needed   Refills:  0        TRIPHROCAPS 1 MG capsule   Quantity:  90 capsule   Generic drug:  NEPHROCAPS        TAKE 1 CAPSULE BY MOUTH EVERY EVENING   Refills:  3        ZANTAC PO   Dose:  75 mg        Take 75 mg by mouth At Bedtime   Refills:  0                Information about OPIOIDS     PRESCRIPTION OPIOIDS: WHAT YOU NEED TO KNOW   We gave you an opioid (narcotic) pain medicine. It is important to manage your pain, but opioids are not always the best choice. You should first try all the other options your care team gave you. Take this medicine for as short a time (and as few doses) as possible.     These medicines have risks:    DO NOT drive when on new or higher doses of pain  medicine. These medicines can affect your alertness and reaction times, and you could be arrested for driving under the influence (DUI). If you need to use opioids long-term, talk to your care team about driving.    DO NOT operate heave machinery    DO NOT do any other dangerous activities while taking these medicines.     DO NOT drink any alcohol while taking these medicines.      If the opioid prescribed includes acetaminophen, DO NOT take with any other medicines that contain acetaminophen. Read all labels carefully. Look for the word  acetaminophen  or  Tylenol.  Ask your pharmacist if you have questions or are unsure.    You can get addicted to pain medicines, especially if you have a history of addiction (chemical, alcohol or substance dependence). Talk to your care team about ways to reduce this risk.    Store your pills in a secure place, locked if possible. We will not replace any lost or stolen medicine. If you don t finish your medicine, please throw away (dispose) as directed by your pharmacist. The [place] [employer]ution [employer] has more information about safe disposal: https://www.pca.[facility].[place].us/living-green/managing-unwanted-medications.     All opioids tend to cause constipation. Drink plenty of water and eat foods that have a lot of fiber, such as fruits, vegetables, prune juice, apple juice and high-fiber cereal. Take a laxative (Miralax, milk of magnesia, Colace, Senna) if you don t move your bowels at least every other day.         Prescriptions were sent or printed at these locations (3 Prescriptions)                   Other Prescriptions                Printed at Department/Unit printer (3 of 3)         cephALEXin (KEFLEX) 500 MG capsule               HYDROcodone-acetaminophen (NORCO) 5-325 MG per tablet               ondansetron (ZOFRAN ODT) 4 MG ODT tab                Procedures and tests performed during your visit     Basic metabolic panel    CBC with platelets differential    CT  Abdomen Pelvis w/o Contrast    UA reflex to Microscopic and Culture    Urine Culture Aerobic Bacterial      Orders Needing Specimen Collection     None      Pending Results     Date and Time Order Name Status Description    7/4/2018 0425 Urine Culture Aerobic Bacterial In process             Pending Culture Results     Date and Time Order Name Status Description    7/4/2018 0425 Urine Culture Aerobic Bacterial In process             Pending Results Instructions     If you had any lab results that were not finalized at the time of your Discharge, you can call the ED Lab Result RN at [phone]. You will be contacted by this team for any positive Lab results or changes in treatment. The nurses are available 7 days a week from 10A to 6:30P.  You can leave a message 24 hours per day and they will return your call.        Test Results From Your Hospital Stay        7/4/2018  1:19 AM      Component Results     Component Value Ref Range & Units Status    WBC 9.3 4.0 - 11.0 10e9/L Final    RBC Count 3.75 (L) 4.4 - 5.9 10e12/L Final    Hemoglobin 12.6 (L) 13.3 - 17.7 g/dL Final    Hematocrit 38.2 (L) 40.0 - 53.0 % Final     (H) 78 - 100 fl Final    MCH 33.6 (H) 26.5 - 33.0 pg Final    MCHC 33.0 31.5 - 36.5 g/dL Final    RDW 14.6 10.0 - 15.0 % Final    Platelet Count 135 (L) 150 - 450 10e9/L Final    Diff Method Automated Method  Final    % Neutrophils 84.4 % Final    % Lymphocytes 6.1 % Final    % Monocytes 7.9 % Final    % Eosinophils 1.2 % Final    % Basophils 0.2 % Final    % Immature Granulocytes 0.2 % Final    Nucleated RBCs 0 0 /100 Final    Absolute Neutrophil 7.9 1.6 - 8.3 10e9/L Final    Absolute Lymphocytes 0.6 (L) 0.8 - 5.3 10e9/L Final    Absolute Monocytes 0.7 0.0 - 1.3 10e9/L Final    Absolute Eosinophils 0.1 0.0 - 0.7 10e9/L Final    Absolute Basophils 0.0 0.0 - 0.2 10e9/L Final    Abs Immature Granulocytes 0.0 0 - 0.4 10e9/L Final    Absolute Nucleated RBC 0.0  Final         7/4/2018  1:33 AM       Component Results     Component Value Ref Range & Units Status    Sodium 139 133 - 144 mmol/L Final    Potassium 3.7 3.4 - 5.3 mmol/L Final    Chloride 101 94 - 109 mmol/L Final    Carbon Dioxide 27 20 - 32 mmol/L Final    Anion Gap 11 3 - 14 mmol/L Final    Glucose 155 (H) 70 - 99 mg/dL Final    Urea Nitrogen 22 7 - 30 mg/dL Final    Creatinine 5.00 (H) 0.66 - 1.25 mg/dL Final    GFR Estimate 11 (L) >60 mL/min/1.7m2 Final    Non  GFR Calc    GFR Estimate If Black 14 (L) >60 mL/min/1.7m2 Final    African American GFR Calc    Calcium 9.5 8.5 - 10.1 mg/dL Final         7/4/2018  4:44 AM      Component Results     Component Value Ref Range & Units Status    Color Urine Dark Yellow  Final    Appearance Urine Cloudy  Final    Glucose Urine Negative NEG^Negative mg/dL Final    Bilirubin Urine Negative NEG^Negative Final    Ketones Urine Negative NEG^Negative mg/dL Final    Specific Gravity Urine 1.015 1.003 - 1.035 Final    Blood Urine Large (A) NEG^Negative Final    pH Urine 7.5 (H) 5.0 - 7.0 pH Final    Protein Albumin Urine >600 (A) NEG^Negative mg/dL Final    Urobilinogen mg/dL 4.0 (H) 0.0 - 2.0 mg/dL Final    Nitrite Urine Negative NEG^Negative Final    Leukocyte Esterase Urine Large (A) NEG^Negative Final    Source Midstream Urine  Final    RBC Urine >182 (H) 0 - 2 /HPF Final    WBC Urine >182 (H) 0 - 5 /HPF Final         7/4/2018  5:12 AM      Narrative     CT ABDOMEN PELVIS WITHOUT CONTRAST  7/4/2018 4:23 AM    TECHNIQUE: Images from diaphragm to pubic symphysis without contrast.  Radiation dose for this scan was reduced using automated exposure  control, adjustment of the mA and/or kV according to patient size, or  iterative reconstruction technique.    HISTORY: Left flank pain.    COMPARISON: 1/27/2008 CT chest, abdomen and pelvis.    FINDINGS:   Abdomen and Pelvis: Mild left hydronephrosis with mild stranding near  the left renal pelvis and ureter. There is no cause for obstruction or  stone  identified. Possibilities include a recently passed stone versus  nonvisualized cause for obstruction. There is a 0.2 cm nonobstructing  kidney stone lower pole left kidney. No evidence for right  hydronephrosis or ureteral calculi.    Bilateral renal atrophy with cortical thinning and prominent  peripelvic fat. Multiple hypodense bilateral kidney lesions consistent  with probable cysts but incompletely characterized. 1 cm round high  attenuation lateral right renal cortex lesion consistent with a  hyperdense cyst.    Extensive atherosclerotic vascular calcification extending into the  proximal thighs. Cholecystectomy clips. Liver, spleen, pancreas and  adrenal glands demonstrate no worrisome focal lesion or acute  abnormality within the limits of a noncontrast exam. No periaortic or  pelvic adenopathy or free fluid. Mild sigmoid diverticulosis. No acute  bowel abnormality. Very small fat-containing midline ventral hernia  extends to the umbilicus. No aggressive bone lesions.    Lung bases: Curvilinear calcification at the left ventricular apex.  Lead in the right ventricle. Minimal opacity right lung base may be  atelectasis.        Impression     IMPRESSION:  1. Mild left hydronephrosis. No obstructing stone identified. This  could be due to a recently passed stone. Cannot exclude left urinary  tract obstruction without identifiable cause. There is a  nonobstructing stone in the lower pole of the left kidney.  2. Bilateral probable renal cysts and 1 cm hyperdense cyst in the  right kidney.  3. Extensive atherosclerotic vascular calcification.  4. Cardiomegaly, pacing lead, left ventricular calcification which is  nonspecific, could be related to aneurysm or previous myocardial  infarct. No definite aneurysm identified on CT.    TODD DELGADO MD         7/4/2018  4:55 AM                Clinical Quality Measure: Blood Pressure Screening     Your blood pressure was checked while you were in the emergency department  "today. The last reading we obtained was  BP: 133/77 . Please read the guidelines below about what these numbers mean and what you should do about them.  If your systolic blood pressure (the top number) is less than 120 and your diastolic blood pressure (the bottom number) is less than 80, then your blood pressure is normal. There is nothing more that you need to do about it.  If your systolic blood pressure (the top number) is 120-139 or your diastolic blood pressure (the bottom number) is 80-89, your blood pressure may be higher than it should be. You should have your blood pressure rechecked within a year by a primary care provider.  If your systolic blood pressure (the top number) is 140 or greater or your diastolic blood pressure (the bottom number) is 90 or greater, you may have high blood pressure. High blood pressure is treatable, but if left untreated over time it can put you at risk for heart attack, stroke, or kidney failure. You should have your blood pressure rechecked by a primary care provider within the next 4 weeks.  If your provider in the emergency department today gave you specific instructions to follow-up with your doctor or provider even sooner than that, you should follow that instruction and not wait for up to 4 weeks for your follow-up visit.        Thank you for choosing [facility]       Thank you for choosing [facility] for your care. Our goal is always to provide you with excellent care. Hearing back from our patients is one way we can continue to improve our services. Please take a few minutes to complete the written survey that you may receive in the mail after you visit with us. Thank you!        [employer] Information     [employer] lets you send messages to your doctor, view your test results, renew your prescriptions, schedule appointments and more. To sign up, go to [email or web address] . Click on \"Log in\" on the left side of the screen, which will take you to the Welcome page. Then " "click on \"Sign up Now\" on the right side of the page.     You will be asked to enter the access code listed below, as well as some personal information. Please follow the directions to create your username and password.     Your access code is: [email or web address]  Expires: 2018  1:16 PM     Your access code will  in 90 days. If you need help or a new code, please call your [facility] clinic or [phone].        Care EveryWhere ID     This is your Care EveryWhere ID. This could be used by other organizations to access your [facility] medical records  [id number]        Equal Access to Services     [facility]: Timo Jeff, enmanuel maher, zahra escalera, purnima cuenca . So [facility] [phone].    ATENCIÓN: Si habla español, tiene a jacome disposición servicios gratuitos de asistencia lingüística. Llame al [phone].    We comply with applicable federal civil rights laws and [place] laws. We do not discriminate on the basis of race, color, national origin, age, disability, sex, sexual orientation, or gender identity.            After Visit Summary       This is your record. Keep this with you and show to your community pharmacist(s) and doctor(s) at your next visit.                  "

## 2018-07-04 NOTE — DISCHARGE INSTRUCTIONS
Blood in the Urine    Blood in the urine (hematuria) has many possible causes. If it occurs after an injury (such as a car accident or fall), it is most often a sign of bruising to the kidney or bladder. Common causes of blood in the urine include urinary tract infections, kidney stones, inflammation, tumors, or certain other diseases of the kidney or bladder. Menstruation can cause blood to appear in the urine sample, although it is not coming from the urinary tract.  If only a trace amount of blood is present, it will show up on the urine test, even though the urine may be yellow and not pink or red. This may occur with any of the above conditions, as well as heavy exercise or high fever. In this case, your doctor may want to repeat the urine test on another day. This will show if the blood is still present. If it is, then other tests can be done to find out the cause.  Home care  Follow these home care guidelines:    If your urine does not appear bloody (pink, brown or red) then you do not need to restrict your activity in any way.    If you can see blood in your urine, rest and avoid heavy exertion until your next exam. Do not use aspirin, blood thinners, or anti-platelet or anti-inflammatory medicines. These include ibuprofen and naproxen. These thin the blood and may increase bleeding.  Follow-up care  Follow up with your healthcare provider, or as advised. If you were injured and had blood in your urine, you should have a repeat urine test in 1 to 2 days. Contact your doctor for this test.  A radiologist will review any X-rays that were taken. You will be told of any new findings that may affect your care.  When to seek medical advice  Call your healthcare provider right away if any of these occur:    Bright red blood or blood clots in the urine (if you did not have this before)    Weakness, dizziness or fainting    New groin, abdominal, or back pain    Fever of 100.4 F (38 C) or higher, or as directed by  "your healthcare provider    Repeated vomiting    Bleeding from the nose or gums or easy bruising  Date Last Reviewed: 9/1/2016 2000-2017 [employer]. [address] [place] [address]. All rights reserved. This information is not intended as a substitute for professional medical care. Always follow your healthcare professional's instructions.             * BLADDER INFECTION: Male (Adult)    A bladder infection (\"cystitis\" or \"UTI\") usually causes a constant urge to urinate, and a burning when passing urine. Urine may be cloudy, smelly or dark. There may be also be pain in the lower abdomen.  Cystitis in males is not common. It may be caused by a partial blockage in the urinary system that keeps the bladder from emptying completely. This is most often related to an enlarged prostate gland.  HOME CARE:  1. Drink lots of fluids (at least 6-8 glasses a day). This will flush the bacteria out of your bladder. Cranberry juice has been shown to help clear out the bacteria.  2. Avoid sexual intercourse until your symptoms are gone.  3. A bladder infection is treated with antibiotics. You may also be given Pyridium (generic - phenazopyridine) to reduce burning with urination. This will cause urine to become a bright orange color, which can stain clothing.  FOLLOW UP with your doctor or this facility if ALL symptoms have not cleared within five days. It is important to keep your follow up appointment to discuss with your doctor the need for further tests of the urinary tract.  GET PROMPT MEDICAL ATTENTION if any of the following occur:    Fever over 101  F (38.3  C)    No improvement by the third day of treatment    Increasing back or abdominal pain    Repeated vomiting; unable to keep medicine down    Weakness, dizziness or fainting    8497-9635 [employer]. [address] [place] [address]. All rights reserved. This information is not intended as a substitute for professional " medical care. Always follow your healthcare professional's instructions.  This information has been modified by your health care provider with permission from the publisher.

## 2018-07-04 NOTE — ED AVS SNAPSHOT
Emergency Department    [address] [place] [address]    Phone:  [phone]    Fax:  [phone]                                       Westley Ness   MRN: [id number]    Department:   Emergency Department   Date of Visit:  7/4/2018           After Visit Summary Signature Page     I have received my discharge instructions, and my questions have been answered. I have discussed any challenges I see with this plan with the nurse or doctor.    ..........................................................................................................................................  Patient/Patient Representative Signature      ..........................................................................................................................................  Patient Representative Print Name and Relationship to Patient    ..................................................               ................................................  Date                                            Time    ..........................................................................................................................................  Reviewed by Signature/Title    ...................................................              ..............................................  Date                                                            Time

## 2018-07-04 NOTE — ED PROVIDER NOTES
History     Chief Complaint:  Flank Pain    HPI   Westley Ness is a 72 year old male who presents with flank pain.  Patient reports that he developed left-sided flank pain, back pain, and left lower quadrant abdominal pain that started approximately noon yesterday.  This has gotten worse since onset: With the development of nausea and some vomiting.  Patient is a dialysis patient and went to his most recent appointment on 7/2.  On presentation patient denies any dysuria, but reports that his urine has been darker in color for the last week.  He denies any fevers, diarrhea, black/bloody stools, testicular pain, penile swelling, or any other symptoms.  Patient denies chest pain, upper back pain or shortness of breath.     Allergies:  No known drug allergies.     Medications:    Tylenol  Aspirin  Coreg  Plavix  Isosorbide mononitrate  Lisinopril  Levothyroxine  Imodium  Mexitil  Protonix  Pravastatin  Ranitidine  Tpiphrocaps   Flonase   Nitroglycerin  Psyllium     Past Medical History:    Acid reflux  CAD  ESRD  Hypothyroidism  Ischemic cardiomyopathy  End STEMI  Type 2 diabetes  Typical atrial flutter    Past Surgical History:    Cholecystectomy  Elbow surgery  Ablation for atrial flutter  Left heart catheterization  Implant ventricular device  Tonsillectomy & Adenoidectomy  Vascular surgery    Family History:    Mother-cerebrovascular disease  Father-hypertension, bladder cancer  Grandmother-myocardial infarction    Social History:  Smoking status: Former smoker (quit 1996)  Alcohol use: No  Marital Status:  Single      Review of Systems   Constitutional: Negative for fever.   Respiratory: Negative for cough and shortness of breath.    Gastrointestinal: Positive for abdominal pain, nausea and vomiting.   Genitourinary: Positive for decreased urine volume and flank pain. Negative for discharge, dysuria, hematuria and penile pain.   Musculoskeletal: Positive for back pain.   All other systems reviewed and are  "negative.    Physical Exam     Patient Vitals for the past 24 hrs:   BP Temp Temp src Heart Rate Resp SpO2 Height   07/04/18 0458 - - - - 16 - -   07/04/18 0430 135/75 - - 73 18 97 % -   07/04/18 0400 136/70 - - 69 18 99 % -   07/04/18 0330 135/64 - - 63 18 98 % -   07/04/18 0300 132/72 - - 68 18 98 % -   07/04/18 0230 122/56 - - 66 18 93 % -   07/04/18 0210 - - - - 18 - -   07/04/18 0200 128/58 - - 69 18 93 % -   07/04/18 0137 - - - 69 18 95 % -   07/04/18 0130 146/77 - - 73 18 95 % -   07/04/18 0100 - - - 72 18 97 % -   07/04/18 0054 133/86 98  F (36.7  C) Oral 72 18 98 % 1.727 m (5' 8\")         Physical Exam  General: Patient is alert and uncomfortable appearing.  HEENT: Head atraumatic    Eyes: pupils equal and reactive. Conjunctiva clear   Nares: patent   Oropharynx: no lesions, uvula midline, no palatal draping, normal voice, no trismus  Neck: Supple without lymphadenopathy, no meningismus  Chest: Heart regular rate and rhythm.   Lungs: Equal clear to auscultation with no wheeze or rales  Abdomen: Soft, non tender, nondistended, normal bowel sounds  Back: Left costovertebral angle tenderness, no midline C, T or L spine tenderness  Neuro: Grossly nonfocal, normal speech, strength equal bilaterally, CN 2-12 intact  Extremities: No deformities, equal radial and DP pulses. No clubbing, cyanosis.  No edema  Skin: Warm and dry with no rash.       Emergency Department Course   Imaging:  Abdomen/Pelvis CT with IV contrast:  IMPRESSION:  1. Mild left hydronephrosis. No obstructing stone identified. This could be due to a recently passed stone. Cannot exclude left urinary tract obstruction without identifiable cause. There is a nonobstructing stone in the lower pole of the left kidney.  2. Bilateral probable renal cysts and 1 cm hyperdense cyst in the right kidney.  3. Extensive atherosclerotic vascular calcification.  4. Cardiomegaly, pacing lead, left ventricular calcification which is nonspecific, could be related to " aneurysm or previous myocardial infarct. No definite aneurysm identified on CT.  Report per radiology.      Laboratory:  CBC:  WBC 9.3, HGB 12.6 (L),  (L),  BMP: Glucose 155 (H), Creatinine 5.00 (H), GFR 11 (L), otherwise WNL    UA: Dark Yellow, Cloudy urine, Blood Large, pH 7.5 (H), Albumin >600, Urobilinogen 4.0 (H), Leukocyte Esterase Large, RBC >182 (H), WBC >182 (H), otherwise WNL    Interventions:  (0413) Zofran, 4 mg, IV injection  (0434) Morphine, 4 mg, IV injection  (0515) Ceftriaxone, 1 g, IV infusion    Emergency Department Course:  Nursing notes and vitals reviewed.  (0059) I performed an exam of the patient as documented above.    Blood was drawn from the patient. This was sent for laboratory testing, findings above.   Urine sample was obtained and sent for laboratory analysis, findings above.  The patient was sent for a CT scan while in the emergency department, findings above.     Findings and plan explained to the patient. Patient discharged home with instructions regarding supportive care, medications, and reasons to return. The importance of close follow-up was reviewed. The patient was prescribed Keflex, Norco, and Zofran.    Impression & Plan    Medical Decision Making:  Patient is a 72-year-old man with history of end-stage renal disease due for dialysis this morning who presents the emergency department with left flank pain.  Broad differential was considered including AAA, pyelonephritis, kidney stone, diverticulitis, ischemic bowel, musculoskeletal back pain, pneumonia among others.  He said some mild nausea with no vomiting associated with it.  He also relates dark urine and some difficulty with urination.  Patient denies fevers, sweats, chills.  Patient denies history of kidney stones in the past.  Initial urinalysis here demonstrates large leukocyte esterase red blood cells greater than 182 and white blood cells greater than 182.  Patient underwent CT scan to evaluate for kidney  stone.  Does demonstrate left-sided hydronephrosis but no obstructing ureteral stone is identified.  This could be associated with recently passed stone.  Patient does have relief of the symptoms here in the emergency department.  There was concern that there is calcification of the left ventricle which could be related to aneurysm but no definitive aneurysm seen on CT.  Patient has no chest pain, shortness of breath and I feel this is unlikely as the cause.  Patient had good pain relief with morphine here in the emergency department.   I am concerned that there could be a component of urinary tract infection associated with this.  He is given a dose of Rocephin here.  Asked him to discuss that dialysis whether he should get another dose following dialysis.  Patient will be discharged with Keflex for UTI.  He understands that it may be that he has hydronephrosis related to another cause.  If his symptoms fail to improve, or progressive, or new symptoms develop he should return for reevaluation.  I have asked him to follow-up with urology regarding the hydronephrosis as well.  He should have repeat urinalysis to evaluate for whether infection has cleared following antibiotics.    Diagnosis:    ICD-10-CM   1. Hydronephrosis of left kidney N13.30   2. Urinary tract infection with hematuria, site unspecified N39.0   3. Calculus of lower urinary tract N21.9       Disposition:  Patient is discharged to home.      Discharge Medications:  New Prescriptions    CEPHALEXIN (KEFLEX) 500 MG CAPSULE    Take 1 capsule (500 mg) by mouth 3 times daily for 7 days    HYDROCODONE-ACETAMINOPHEN (NORCO) 5-325 MG PER TABLET    Take 1 tablet by mouth every 6 hours as needed for severe pain    ONDANSETRON (ZOFRAN ODT) 4 MG ODT TAB    Take 1 tablet (4 mg) by mouth every 8 hours as needed         Ajith CARMONA, am serving as a scribe on 7/4/2018 at 12:59 AM to personally document services performed by Dr. Alonzo based on my observations  and the provider's statements to me.         Ajith Perez  7/4/2018    EMERGENCY DEPARTMENT       Lisbet Alonzo MD  07/04/18 0656

## 2018-07-04 NOTE — ED NOTES
SaO2 86% on RA. Patient placed on nasal cannula 2 LPM. SaO2 recovered to 95% on 2 LPM NC. Patient reports that he is intermittently on 2 LPM NC at home.

## 2018-07-05 ENCOUNTER — PATIENT OUTREACH (OUTPATIENT)
Dept: CARE COORDINATION | Facility: CLINIC | Age: 73
End: 2018-07-05

## 2018-07-05 NOTE — LETTER
[facility] CARE COORDINATION  July 5, 2018    Westlye Ness  [address] [facility] [address]      Dear Westley,    I am a clinic care coordinator who works with Josselin Kolb MD. I recently tried to call and was unable to reach you. I wanted to introduce myself and provide you with my contact information so that you can call me with questions or concerns about your health care. Below is a description of clinic care coordination and how I can further assist you.     The clinic care coordinator is a registered nurse and/or  who understand the health care system. The goal of clinic care coordination is to help you manage your health and improve access to the [facility] system in the most efficient manner. The registered nurse can assist you in meeting your health care goals by providing education, coordinating services, and strengthening the communication among your providers. The  can assist you with financial, behavioral, psychosocial, chemical dependency, counseling, and/or psychiatric resources.    Please feel free to contact me at [phone], with any questions or concerns. We at [facility] are focused on providing you with the highest-quality healthcare experience possible and that all starts with you.     Sincerely,     Sabrina Hull    Enclosed: I have enclosed a copy of a 24 Hour Access Plan. This has helpful phone numbers for you to call when needed. Please keep this in an easy to access place to use as needed.

## 2018-07-05 NOTE — LETTER
Health Care Home - Access Care Plan    About Me  Patient Name:  Westley Ness    YOB: 1945  Age:                             72 year old   Arelis MRN:            [id number] Telephone Information:     Home Phone [phone]   Mobile None       Address:    [address] [facility] [address] Email address:  [email or web address]      Emergency Contact(s)  Name Relationship Lgl Grd Work Phone Home Phone Mobile Phone   1. LEELEE HUGHES Friend   [phone]   2. EMIGDIO VILLAREAL Friend   [phone]   3. ZAHRA ROJO Friend   [phone]             Health Maintenance:      My Access Plan  Medical Emergency 911   Questions or concerns during clinic hours Primary Clinic Line, I will call the clinic directly:   -     24 Hour Appointment Line [phone] or  [phone]) (toll free)   24 Hour Nurse Line [phone] (toll free)   Questions or concerns outside clinic hours 24 Hour Appointment Line, I will call the after-hours on-call line:   [facility] [phone] or [phone]) (toll-free)   Preferred Urgent Care [facility], [phone]   Preferred Hospital     Preferred Pharmacy [facility] [place], [place] - [address] [place] [address]     Behavioral Health Crisis Line The National Suicide Prevention Lifeline at 1-983.592.8309 or 911     My Care Team Members  Patient Care Team       Relationship Specialty Notifications Start End    Josselin Kolb MD PCP - General Internal Medicine  9/17/16     Phone: [phone] Fax: [phone]         [address] [facility] [address]    Sabrina Hull, RN Clinic Care Coordinator Primary Care - CC  7/5/18            My Medical and Care Information  Problem List   Patient Active Problem List   Diagnosis     CAD (coronary artery disease)     Ischemic cardiomyopathy     ESRD on hemodialysis (H)     Hypothyroidism     Type 2 diabetes mellitus (H)      Acid reflux disease     Shortness of breath     Typical atrial flutter (H)     NSTEMI (non-ST elevated myocardial infarction) (H)     HCAP (healthcare-associated pneumonia)      Current Medications and Allergies:  See printed Medication Report

## 2018-07-05 NOTE — PROGRESS NOTES
"Clinic Care Coordination Contact  Care Team Conversations    1118- Patient returned CC RN's phone call; he indicates he was at dialysis this morning. He is feeling \"better\" and confirmed he was able to fill and initiate his Keflex antibiotic treatment; no further questions or concerns expressed.   CC RN encouraged patient to contact CC RN or CC SW in the future if new needs or questions arise.   No additional scheduled outreaches     Sabrina Hull RN  Clinic Care Coordinator  [phone]    "

## 2018-07-05 NOTE — PROGRESS NOTES
Clinic Care Coordination Contact  [facility]/Voicemail    Referral Source: ED Follow-Up  Patient seen at [facility] with complaints of L)flank pain, diagnosed with Urinary Tract Infection (UTI) and possible recently passed kidney stone; sent home with new RX for oral Keflex.     Clinical Data: Care Coordinator Outreach  Outreach attempted x 1.  Left message on voicemail with call back information and requested return call.  Plan: Care Coordinator will mail out care coordination introduction letter with care coordinator contact information and explanation of care coordination services. Care Coordinator will try to reach patient again in 3-5 business days.    Sabrina Hull, RN  Clinic Care Coordinator  [phone]

## 2018-07-06 ENCOUNTER — TELEPHONE (OUTPATIENT)
Dept: EMERGENCY MEDICINE | Facility: CLINIC | Age: 73
End: 2018-07-06

## 2018-07-06 DIAGNOSIS — N13.30 HYDRONEPHROSIS: ICD-10-CM

## 2018-07-06 LAB
BACTERIA SPEC CULT: ABNORMAL
SPECIMEN SOURCE: ABNORMAL

## 2018-07-06 NOTE — TELEPHONE ENCOUNTER
[facility] Emergency Department Lab result notification [Adult-Male]    [facility] ED lab result protocol used  Urine Culture    Reason for call  Notify of lab results, assess symptoms,  review ED providers recommendations/discharge instructions (if necessary) and advise per ED lab result f/u protocol    Lab Result (including Rx patient on, if applicable)  Final Urine Culture Report on 7/6/18  Emergency Dept discharge antibiotic prescribed: Cephalexin (Keflex) 500 mg capsule, 1 capsule (500 mg) by mouth 3 times daily for 7 days.  #1. Bacteria, >100,000 colonies/mL Enterococcus faecalis,  is [NOT TESTED] to antibiotic.   Change in treatment as per [facility] ED Lab result protocol.      Information table from ED Provider visit on 7/4/18  ED diagnosis:   Hydronephrosis of left kidney   ED provider   Lisbet Alonzo MD    Symptoms reported at ED visit (Chief complaint, HPI) Westley Ness is a 72 year old male who presents with flank pain.  Patient reports that he developed left-sided flank pain, back pain, and left lower quadrant abdominal pain that started approximately noon yesterday.  This has gotten worse since onset: With the development of nausea and some vomiting.  Patient is a dialysis patient and went to his most recent appointment on 7/2.  On presentation patient denies any dysuria, but reports that his urine has been darker in color for the last week.  He denies any fevers, diarrhea, black/bloody stools, testicular pain, penile swelling, or any other symptoms.  Patient denies chest pain, upper back pain or shortness of breath.    ED providers Impression and Plan (applicable information) Patient is a 72-year-old man with history of end-stage renal disease due for dialysis this morning who presents the emergency department with left flank pain.  Broad differential was considered including AAA, pyelonephritis, kidney stone, diverticulitis, ischemic bowel, musculoskeletal back pain, pneumonia  among others.  He said some mild nausea with no vomiting associated with it.  He also relates dark urine and some difficulty with urination.  Patient denies fevers, sweats, chills.  Patient denies history of kidney stones in the past.  Initial urinalysis here demonstrates large leukocyte esterase red blood cells greater than 182 and white blood cells greater than 182.  Patient underwent CT scan to evaluate for kidney stone.  Does demonstrate left-sided hydronephrosis but no obstructing ureteral stone is identified.  This could be associated with recently passed stone.  Patient does have relief of the symptoms here in the emergency department.  There was concern that there is calcification of the left ventricle which could be related to aneurysm but no definitive aneurysm seen on CT.  Patient has no chest pain, shortness of breath and I feel this is unlikely as the cause.  Patient had good pain relief with morphine here in the emergency department.   I am concerned that there could be a component of urinary tract infection associated with this.  He is given a dose of Rocephin here.  Asked him to discuss that dialysis whether he should get another dose following dialysis.  Patient will be discharged with Keflex for UTI.  He understands that it may be that he has hydronephrosis related to another cause.  If his symptoms fail to improve, or progressive, or new symptoms develop he should return for reevaluation.  I have asked him to follow-up with urology regarding the hydronephrosis as well.  He should have repeat urinalysis to evaluate for whether infection has cleared following antibiotics.      Significant Medical hx, if applicable CAD, NSTEMI, cardiomyopathy,ESRD,A flutter, DM   Coumadin/Warfarin [Yes or No] No   Creatinine Level (mg/dl) 5.0   Creatinine clearance (ml/min), if applicable 15.214   Allergies Contrast dye   Weight, if applicable 179 #      RN Assessment (Patient s current Symptoms), include time  "called.  [Insert Left message here if message left]  \"I'm feeling fine\".  Did advise of final UC results and recommendations, verbalizes understanding.    RN Recommendations/Instructions per [facility] ED lab result protocol  Patient notified of lab result and treatment recommendations.  Rx for Augmentin sent to [Pharmacy - [facility]].  RN reviewed information about stopping Keflex once Augmentin obtained.     Please Contact your PCP clinic or return to the Emergency department if your:    Symptoms return.    Symptoms do not resolve after completing antibiotic.    Symptoms worsen or other concerning symptom's.    PCP follow-up Questions asked: YES       Ana Bello RN    [facility] RN  Lung Nodule and ED Lab Results F/U RN  Epic pool (ED late result f/u RN) : P [id number]   # [phone]     Copy of Lab result   Order   Urine Culture Aerobic Bacterial [[id number]] (Order [id number])   Exam Information   Exam Date Exam Time Accession # Results    7/4/18  4:25 AM [id number]    Component Results   Component Collected Lab   Specimen Description 07/04/2018  4:25    Midstream Urine   Culture Micro (Abnormal) 07/04/2018  4:25    >100,000 colonies/mL   Enterococcus faecalis      Culture & Susceptibility   ENTEROCOCCUS FAECALIS   Antibiotic Interpretation Sensitivity Unit Method Status   AMPICILLIN Sensitive <=2 ug/mL NATHALIA Final   NITROFURANTOIN Sensitive <=16 ug/mL NATHALIA Final   PENICILLIN Sensitive 2 ug/mL NATHALIA Final   VANCOMYCIN Sensitive 2 ug/mL NATHALIA Final          "

## 2018-07-15 DIAGNOSIS — R00.0 TACHYCARDIA: ICD-10-CM

## 2018-07-16 NOTE — TELEPHONE ENCOUNTER
"Requested Prescriptions   Pending Prescriptions Disp Refills     mexiletine (MEXITIL) 150 MG capsule [Pharmacy Med Name: MEXILETINE 150MG  CAP] 180 capsule 3     Sig: TAKE 1 CAPSULE (150 MG) BY MOUTH 2 TIMES DAILY    Anti Arrhythmic Agents Protocol Failed    7/15/2018  1:35 PM       Failed - Medication needs approval from authorizing provider    Please forward this request to the authorizing provider for approval.          Passed - Lipid Panel on file in past year    Recent Labs   Lab Test  02/27/18   0921   CHOL  104   TRIG  71   HDL  44   LDL  46   NHDL  60              Passed - CBC on file in past year    Recent Labs   Lab Test  07/04/18   0107   WBC  9.3   RBC  3.75*   HGB  12.6*   HCT  38.2*   PLT  135*       For [facility] ONLY: [id number] = WBC, XPHS449 = RBC         Passed - ALT on file in past year    Recent Labs   Lab Test  04/23/18   0632   ALT  15            Passed - Serum Creatinine on file in past year    Recent Labs   Lab Test  07/04/18   0107   CR  5.00*            Passed - Serum Sodium on file in past year    Recent Labs   Lab Test  07/04/18   0107   NA  139             Passed - Serum Potassium on file in past year    Recent Labs   Lab Test  07/04/18   0107   POTASSIUM  3.7            Passed - Patient is 18 years of age or older       Passed - Recent (6 mo) or future (30 days) visit with authorizing provider's specialty    Patient had office visit in the last 6 months or has a visit in the next 30 days with authorizing provider.  See \"Patient Info\" tab in inbasket, or \"Choose Columns\" in Meds & Orders section of the refill encounter.            Last Written Prescription Date:  7/9/17  Last Fill Quantity: 180,  # refills: 3   Last office visit: 5/2/2018 with prescribing provider:  5/2/18   Future Office Visit:      "

## 2018-07-17 RX ORDER — MEXILETINE HYDROCHLORIDE 150 MG/1
CAPSULE ORAL
Qty: 180 CAPSULE | Refills: 3 | Status: SHIPPED | OUTPATIENT
Start: 2018-07-17 | End: 2019-04-30

## 2018-08-08 ENCOUNTER — OFFICE VISIT (OUTPATIENT)
Dept: INTERNAL MEDICINE | Facility: CLINIC | Age: 73
End: 2018-08-08
Payer: MEDICARE

## 2018-08-08 VITALS
RESPIRATION RATE: 16 BRPM | DIASTOLIC BLOOD PRESSURE: 55 MMHG | BODY MASS INDEX: 28.11 KG/M2 | OXYGEN SATURATION: 95 % | TEMPERATURE: 97.6 F | HEART RATE: 74 BPM | WEIGHT: 184.9 LBS | SYSTOLIC BLOOD PRESSURE: 120 MMHG

## 2018-08-08 DIAGNOSIS — N30.01 ACUTE CYSTITIS WITH HEMATURIA: Primary | ICD-10-CM

## 2018-08-08 DIAGNOSIS — Z99.2 ESRD ON HEMODIALYSIS (H): ICD-10-CM

## 2018-08-08 DIAGNOSIS — N18.6 ESRD ON HEMODIALYSIS (H): ICD-10-CM

## 2018-08-08 DIAGNOSIS — N39.0 RECURRENT UTI: ICD-10-CM

## 2018-08-08 DIAGNOSIS — N13.30 HYDRONEPHROSIS, UNSPECIFIED HYDRONEPHROSIS TYPE: ICD-10-CM

## 2018-08-08 LAB
ALBUMIN UR-MCNC: >=300 MG/DL
APPEARANCE UR: ABNORMAL
BACTERIA #/AREA URNS HPF: ABNORMAL /HPF
BILIRUB UR QL STRIP: ABNORMAL
COLOR UR AUTO: ABNORMAL
GLUCOSE UR STRIP-MCNC: NEGATIVE MG/DL
HGB UR QL STRIP: ABNORMAL
KETONES UR STRIP-MCNC: NEGATIVE MG/DL
LEUKOCYTE ESTERASE UR QL STRIP: ABNORMAL
NITRATE UR QL: POSITIVE
PH UR STRIP: 8 PH (ref 5–7)
RBC #/AREA URNS AUTO: >100 /HPF
SOURCE: ABNORMAL
SP GR UR STRIP: 1.02 (ref 1–1.03)
UROBILINOGEN UR STRIP-ACNC: 1 EU/DL (ref 0.2–1)
WBC #/AREA URNS AUTO: >100 /HPF

## 2018-08-08 PROCEDURE — 87186 SC STD MICRODIL/AGAR DIL: CPT | Performed by: INTERNAL MEDICINE

## 2018-08-08 PROCEDURE — 81001 URINALYSIS AUTO W/SCOPE: CPT | Performed by: INTERNAL MEDICINE

## 2018-08-08 PROCEDURE — 87086 URINE CULTURE/COLONY COUNT: CPT | Performed by: INTERNAL MEDICINE

## 2018-08-08 PROCEDURE — 99214 OFFICE O/P EST MOD 30 MIN: CPT | Performed by: INTERNAL MEDICINE

## 2018-08-08 PROCEDURE — 87088 URINE BACTERIA CULTURE: CPT | Performed by: INTERNAL MEDICINE

## 2018-08-08 RX ORDER — AMOXICILLIN 500 MG/1
500 CAPSULE ORAL DAILY
Qty: 10 CAPSULE | Refills: 0 | Status: SHIPPED | OUTPATIENT
Start: 2018-08-08 | End: 2018-08-29

## 2018-08-08 NOTE — PROGRESS NOTES
"  SUBJECTIVE:   Westley Ness is a 72 year old male who presents to clinic today for the following health issues:      Genitourinary symptoms      Duration: 2-3 days    Description:  Don't produce much urine, no appetite, no energy, urine frequency increased     Intensity:  No pain    Accompanying signs and symptoms (fever/discharge/nausea/vomiting/back or abdominal pain):  None    History (frequent UTI's/kidney stones/prostate problems): UTI July 4th  Sexually active: no     Precipitating or alleviating factors: None    Therapies tried and outcome: none            Problem list and histories reviewed & adjusted, as indicated.  Additional history: as documented    Very pleasant gentleman, history remarkable for end-stage renal disease secondary to diabetic and hypertensive nephropathy, for which he receives hemodialysis 3 times weekly.    In early July, he had presented to [facility] ED with acute onset left flank pain, with associated change in appearance of urine - was darker with more \"sediment\" than usual.  He is not anuric.  Urinalysis was consistent with cystitis with significant hematuria, noncontrast CT revealed partial left hydronephrosis with no obvious ureterolithiasis seen.  Was presumed he passed a ureteral stone, he was initially treated empirically with Keflex and discharged from ER.  Urine culture at that time eventually revealed pansensitive enterococcus, and he was changed to Augmentin.  Follow-up urinalysis following completion of antibiotics had been recommended, does not appear this was done.    Felt well after completion of this antibiotic course.  However, for the last 2-3 days he has had recurrence of urinary symptoms, specifically darker urine than usual with increased sediment.  Denies any obvious dysuria or frequency, is not complaining of flank pain at this time.    Reviewed and updated as needed this visit by clinical staff  Tobacco  Allergies  Meds  Problems       Reviewed and updated as " needed this visit by Provider  Allergies  Meds  Problems         ROS:  Constitutional, HEENT, cardiovascular, pulmonary, GI, , musculoskeletal, neuro, skin, endocrine and psych systems are negative, except as otherwise noted.    OBJECTIVE:     /55  Pulse 74  Temp 97.6  F (36.4  C) (Oral)  Resp 16  Wt 184 lb 14.4 oz (83.9 kg)  SpO2 95%  BMI 28.11 kg/m2  Body mass index is 28.11 kg/(m^2).  GENERAL: healthy, alert and no distress  NECK: no adenopathy, no asymmetry, masses, or scars and thyroid normal to palpation  RESP: lungs clear to auscultation - no rales, rhonchi or wheezes  CV: regular rate and rhythm, normal S1 S2, no S3 or S4, no murmur, click or rub, no peripheral edema and peripheral pulses strong  ABDOMEN: soft, nontender, no hepatosplenomegaly, no masses and bowel sounds normal  MS: no gross musculoskeletal defects noted, no edema    Diagnostic Test Results:  Results for orders placed or performed in visit on 08/08/18 (from the past 24 hour(s))   UA reflex to Microscopic and Culture   Result Value Ref Range    Color Urine Brown     Appearance Urine Cloudy     Glucose Urine Negative NEG^Negative mg/dL    Bilirubin Urine Moderate (A) NEG^Negative    Ketones Urine Negative NEG^Negative mg/dL    Specific Gravity Urine 1.020 1.003 - 1.035    Blood Urine Large (A) NEG^Negative    pH Urine 8.0 (H) 5.0 - 7.0 pH    Protein Albumin Urine >=300 (A) NEG^Negative mg/dL    Urobilinogen Urine 1.0 0.2 - 1.0 EU/dL    Nitrite Urine Positive (A) NEG^Negative    Leukocyte Esterase Urine Moderate (A) NEG^Negative    Source Midstream Urine    Urine Microscopic   Result Value Ref Range    WBC Urine >100 (A) OTO5^0 - 5 /HPF    RBC Urine >100 (A) OTO2^O - 2 /HPF    Bacteria Urine Moderate (A) NEG^Negative /HPF       ASSESSMENT/PLAN:     1. Acute cystitis with hematuria  We will treat based on results of previous urine culture, with once daily amoxicillin (dosed after dialysis on dialysis days).  Await culture  results and adjust as necessary.  - amoxicillin (AMOXIL) 500 MG capsule; Take 1 capsule (500 mg) by mouth daily for 10 days - take after dialysis on dialysis days  Dispense: 10 capsule; Refill: 0    2. Hydronephrosis, unspecified hydronephrosis type  Given recurrent UTI, hydronephrosis seen on previous imaging study, and microscopic hematuria, will refer to urology.  Patient somewhat reluctant to consider this, but did give him the referral and instructed him to make an appointment if this does not quickly brennen in the next few days.  - UROLOGY ADULT REFERRAL    3. Recurrent UTI  As above  - UROLOGY ADULT REFERRAL    4. ESRD on hemodialysis (H)  Dose amoxicillin after dialysis, as above.          Rivas Patino MD  [facility]

## 2018-08-08 NOTE — PATIENT INSTRUCTIONS
- Start taking amoxicillin once daily, for 10 days.  Take AFTER dialysis on your dialysis days.  (Prescription has been sent to your pharmacy)    - Follow-up with Urology if this fails to quickly brennen.

## 2018-08-08 NOTE — MR AVS SNAPSHOT
After Visit Summary   8/8/2018    Westley Ness    MRN: [id number]           Patient Information     Date Of Birth          1945        Visit Information        Provider Department      8/8/2018 2:00 PM Rivas Patino MD [facility]        Today's Diagnoses     Acute cystitis with hematuria    -  1    ESRD on hemodialysis (H)        Hydronephrosis, unspecified hydronephrosis type        Recurrent UTI          Care Instructions    - Start taking amoxicillin once daily, for 10 days.  Take AFTER dialysis on your dialysis days.  (Prescription has been sent to your pharmacy)    - Follow-up with Urology if this fails to quickly brennen.            Follow-ups after your visit        Additional Services     UROLOGY ADULT REFERRAL       Your provider has referred you to: [facility]: [facility] [place] [phone]   [email or web address]    Please be aware that coverage of these services is subject to the terms and limitations of your health insurance plan.  Call member services at your health plan with any benefit or coverage questions.      Please bring the following with you to your appointment:    (1) Any X-Rays, CTs or MRIs which have been performed.  Contact the facility where they were done to arrange for  prior to your scheduled appointment.    (2) List of current medications  (3) This referral request   (4) Any documents/labs given to you for this referral                  Your next 10 appointments already scheduled     Aug 15, 2018  3:50 PM CDT   [facility] EP RETURN with MARCIO Aviles CNP   [facility] ([facility] [place] [facility])    [address] [place], [place] [id number]  [facility] [id number]   [phone] OPT 2            Sep 13, 2018  4:30 PM CDT   Remote ICD Check with CARD DCR2   [facility] ([facility] [place])    [address] [place] [address]  [place]  [id number] [phone] OPT 2           This appointment is for a remote check of your debrillator.  This is not an appointment at the office.              Who to contact     If you have questions or need follow up information about today's clinic visit or your schedule please contact [facility] directly at [phone].  Normal or non-critical lab and imaging results will be communicated to you by MyChart, letter or phone within 4 business days after the clinic has received the results. If you do not hear from us within 7 days, please contact the clinic through MyChart or phone. If you have a critical or abnormal lab result, we will notify you by phone as soon as possible.  Submit refill requests through [employer] or call your pharmacy and they will forward the refill request to us. Please allow 3 business days for your refill to be completed.          Additional Information About Your Visit        Care EveryWhere ID     This is your Care EveryWhere ID. This could be used by other organizations to access your [facility] medical records  [id number]        Your Vitals Were     Pulse Temperature Respirations Pulse Oximetry BMI (Body Mass Index)       74 97.6  F (36.4  C) (Oral) 16 95% 28.11 kg/m2        Blood Pressure from Last 3 Encounters:   08/08/18 120/55   07/04/18 145/78   05/02/18 122/60    Weight from Last 3 Encounters:   08/08/18 184 lb 14.4 oz (83.9 kg)   05/02/18 179 lb 14.4 oz (81.6 kg)   04/26/18 173 lb 8 oz (78.7 kg)              We Performed the Following     UA reflex to Microscopic and Culture     Urine Culture Aerobic Bacterial     Urine Microscopic     UROLOGY ADULT REFERRAL          Today's Medication Changes          These changes are accurate as of 8/8/18  2:58 PM.  If you have any questions, ask your nurse or doctor.               Start taking these medicines.        Dose/Directions    amoxicillin 500 MG capsule   Commonly known as:  AMOXIL   Used for:  Acute cystitis  with hematuria   Started by:  Rivas Patino MD        Dose:  500 mg   Take 1 capsule (500 mg) by mouth daily for 10 days - take after dialysis on dialysis days   Quantity:  10 capsule   Refills:  0         These medicines have changed or have updated prescriptions.        Dose/Directions    lisinopril 2.5 MG tablet   Commonly known as:  PRINIVIL/Zestril   This may have changed:    - how much to take  - how to take this  - when to take this  - additional instructions   Used for:  NSTEMI (non-ST elevated myocardial infarction) (H)        Take one tablet after dialysis. Take second tablet at bedtime.   Quantity:  180 tablet   Refills:  3            Where to get your medicines      These medications were sent to [facility] [address]  [address] [place] [address]     Phone:  [phone]     amoxicillin 500 MG capsule                Primary Care Provider Office Phone # Fax #    Josselin Kolb [phone]       [address] [place] [address]        Goals        General    Medical (pt-stated)     Notes - Note created  4/30/2018  2:32 PM by Mariel Santos, RN    Goal Statement: I will prevent re-occurence of Pneumonia  Measure of Success: decreased hospital visits   Supportive Steps to Achieve: I will cough deep breathe and drink water within his restrictions due to dialysis   Barriers: No barriers identified   Strengths: Continues antibiotic   Date to Achieve By: After course of antibiotic         Equal Access to Services     LAZARO ROMERO AH: Hadii fede jeff Solandon, waaxda luqadaha, qaybta kaalmada purnima escalera . So [facility] [phone].    ATENCIÓN: Si habla español, tiene a jacome disposición servicios gratuitos de asistencia lingüística. Ava al [phone].    We comply with applicable federal civil rights laws and [place] laws. We do not discriminate on the basis of race, color, national origin, age,  disability, sex, sexual orientation, or gender identity.            Thank you!     Thank you for choosing [facility]  for your care. Our goal is always to provide you with excellent care. Hearing back from our patients is one way we can continue to improve our services. Please take a few minutes to complete the written survey that you may receive in the mail after your visit with us. Thank you!             Your Updated Medication List - Protect others around you: Learn how to safely use, store and throw away your medicines at www.disposemymeds.org.          This list is accurate as of 8/8/18  2:58 PM.  Always use your most recent med list.                   Brand Name Dispense Instructions for use Diagnosis    acetaminophen 325 MG tablet    TYLENOL    60 tablet    Take 2 tablets (650 mg) by mouth 4 times daily    Closed nondisplaced fracture of right patella, unspecified fracture morphology, initial encounter, Elbow fracture, right, closed, initial encounter       amoxicillin 500 MG capsule    AMOXIL    10 capsule    Take 1 capsule (500 mg) by mouth daily for 10 days - take after dialysis on dialysis days    Acute cystitis with hematuria       aspirin 81 MG tablet     1 tablet    Take 1 tablet (81 mg) by mouth daily        carvedilol 6.25 MG tablet    COREG    180 tablet    TAKE 1 TABLET BY MOUTH TWICE DAILY WITH MEALS AFTER DIALYSIS    NSTEMI (non-ST elevated myocardial infarction) (H), Mild CAD       clopidogrel 75 MG tablet    PLAVIX    30 tablet    Take 1 tablet (75 mg) by mouth daily To protect your stent(s).  Do not stop taking unless directed by cardiology.    Coronary artery disease involving native coronary artery of native heart without angina pectoris       fluticasone 50 MCG/ACT spray    FLONASE     Spray 2 sprays into both nostrils daily as needed for rhinitis or allergies        HYDROcodone-acetaminophen 5-325 MG per tablet    NORCO    10 tablet    Take 1 tablet by mouth every  6 hours as needed for severe pain        isosorbide mononitrate 30 MG 24 hr tablet    IMDUR    90 tablet    TAKE 1/2 TABLET BY MOUTH ONCE DAILY    NSTEMI (non-ST elevated myocardial infarction) (H)       levothyroxine 50 MCG tablet    SYNTHROID/LEVOTHROID    90 tablet    TAKE 1 TABLET BY MOUTH DAILY    Hypothyroidism, unspecified type       lisinopril 2.5 MG tablet    PRINIVIL/Zestril    180 tablet    Take one tablet after dialysis. Take second tablet at bedtime.    NSTEMI (non-ST elevated myocardial infarction) (H)       loperamide 2 MG capsule    IMODIUM     Take 1 mg by mouth as needed        mexiletine 150 MG capsule    MEXITIL    180 capsule    TAKE 1 CAPSULE (150 MG) BY MOUTH 2 TIMES DAILY    Tachycardia       nitroGLYcerin 0.4 MG sublingual tablet    NITROSTAT    25 tablet    1 TAB EVERY 5 MIN AS NEEDED, UP TO 3 PER EPISODE    Angina pectoris (H)       pantoprazole 40 MG EC tablet    PROTONIX    30 tablet    Take 1 tablet (40 mg) by mouth every morning    Gastroesophageal reflux disease, esophagitis presence not specified       pravastatin 40 MG tablet    PRAVACHOL    90 tablet    TAKE 1 TABLET (40 MG) BY MOUTH DAILY    Coronary artery disease involving native coronary artery of native heart without angina pectoris       psyllium 0.52 g capsule      Take 1 capsule by mouth daily as needed        TRIPHROCAPS 1 MG capsule   Generic drug:  NEPHROCAPS     90 capsule    TAKE 1 CAPSULE BY MOUTH EVERY EVENING    ESRD (end stage renal disease) on dialysis (H)       ZANTAC PO      Take 75 mg by mouth At Bedtime

## 2018-08-11 LAB
BACTERIA SPEC CULT: ABNORMAL
SPECIMEN SOURCE: ABNORMAL

## 2018-08-15 ENCOUNTER — OFFICE VISIT (OUTPATIENT)
Dept: CARDIOLOGY | Facility: CLINIC | Age: 73
End: 2018-08-15
Payer: MEDICARE

## 2018-08-15 VITALS
DIASTOLIC BLOOD PRESSURE: 34 MMHG | HEART RATE: 70 BPM | SYSTOLIC BLOOD PRESSURE: 100 MMHG | BODY MASS INDEX: 28.19 KG/M2 | WEIGHT: 186 LBS | HEIGHT: 68 IN

## 2018-08-15 DIAGNOSIS — I48.3 TYPICAL ATRIAL FLUTTER (H): ICD-10-CM

## 2018-08-15 DIAGNOSIS — R06.02 SHORTNESS OF BREATH: ICD-10-CM

## 2018-08-15 DIAGNOSIS — I25.5 CARDIOMYOPATHY, ISCHEMIC: Primary | ICD-10-CM

## 2018-08-15 PROCEDURE — 93000 ELECTROCARDIOGRAM COMPLETE: CPT | Performed by: NURSE PRACTITIONER

## 2018-08-15 PROCEDURE — 99214 OFFICE O/P EST MOD 30 MIN: CPT | Mod: 25 | Performed by: NURSE PRACTITIONER

## 2018-08-15 RX ORDER — ONDANSETRON 4 MG/1
4 TABLET, ORALLY DISINTEGRATING ORAL EVERY 8 HOURS PRN
COMMUNITY
End: 2019-02-26

## 2018-08-15 NOTE — LETTER
8/15/2018    Josselin Kolb MD  [address] [facility] [address]    RE: Westley Ness       Dear Colleague,    I had the pleasure of seeing Westley Ness in the [facility].    HPI and Plan: #[id number]  See dictation    Orders Placed This Encounter   Procedures     Follow-Up with Cardiologist     EKG 12-lead complete w/read - Clinics     Echocardiogram       Orders Placed This Encounter   Medications     ondansetron (ZOFRAN-ODT) 4 MG ODT tab     Sig: Take 4 mg by mouth every 8 hours as needed for nausea       There are no discontinued medications.      Encounter Diagnoses   Name Primary?     Typical atrial flutter (H)      NSTEMI (non-ST elevated myocardial infarction) (H) Yes     Shortness of breath      Cardiomyopathy, ischemic        CURRENT MEDICATIONS:  Current Outpatient Prescriptions   Medication Sig Dispense Refill     acetaminophen (TYLENOL) 325 MG tablet Take 2 tablets (650 mg) by mouth 4 times daily 60 tablet 0     amoxicillin (AMOXIL) 500 MG capsule Take 1 capsule (500 mg) by mouth daily for 10 days - take after dialysis on dialysis days 10 capsule 0     aspirin 81 MG tablet Take 1 tablet (81 mg) by mouth daily 1 tablet 0     carvedilol (COREG) 6.25 MG tablet TAKE 1 TABLET BY MOUTH TWICE DAILY WITH MEALS AFTER DIALYSIS 180 tablet 3     clopidogrel (PLAVIX) 75 MG tablet Take 1 tablet (75 mg) by mouth daily To protect your stent(s).  Do not stop taking unless directed by cardiology. 30 tablet 11     fluticasone (FLONASE) 50 MCG/ACT spray Spray 2 sprays into both nostrils daily as needed for rhinitis or allergies       HYDROcodone-acetaminophen (NORCO) 5-325 MG per tablet Take 1 tablet by mouth every 6 hours as needed for severe pain 10 tablet 0     isosorbide mononitrate (IMDUR) 30 MG 24 hr tablet TAKE 1/2 TABLET BY MOUTH ONCE DAILY 90 tablet 2     levothyroxine (SYNTHROID/LEVOTHROID) 50 MCG tablet TAKE 1 TABLET BY MOUTH DAILY 90 tablet 2     lisinopril  (PRINIVIL/ZESTRIL) 2.5 MG tablet Take one tablet after dialysis. Take second tablet at bedtime. (Patient taking differently: Take 2.5 mg by mouth 2 times daily Take one tablet after dialysis. Take second tablet at bedtime.) 180 tablet 3     loperamide (IMODIUM) 2 MG capsule Take 1 mg by mouth as needed        mexiletine (MEXITIL) 150 MG capsule TAKE 1 CAPSULE (150 MG) BY MOUTH 2 TIMES DAILY 180 capsule 3     nitroGLYcerin (NITROSTAT) 0.4 MG sublingual tablet 1 TAB EVERY 5 MIN AS NEEDED, UP TO 3 PER EPISODE 25 tablet 0     ondansetron (ZOFRAN-ODT) 4 MG ODT tab Take 4 mg by mouth every 8 hours as needed for nausea       pantoprazole (PROTONIX) 40 MG EC tablet Take 1 tablet (40 mg) by mouth every morning 30 tablet 10     pravastatin (PRAVACHOL) 40 MG tablet TAKE 1 TABLET (40 MG) BY MOUTH DAILY 90 tablet 3     psyllium 0.52 G capsule Take 1 capsule by mouth daily as needed        RaNITidine HCl (ZANTAC PO) Take 75 mg by mouth At Bedtime        TRIPHROCAPS 1 MG capsule TAKE 1 CAPSULE BY MOUTH EVERY EVENING 90 capsule 3       ALLERGIES     Allergies   Allergen Reactions     Contrast Dye Hives     Does fine if he uses benadryl prior.     No Clinical Screening - See Comments      Green beans - Diarrhea.    Topical antibiotic - name unknown - caused swelling of the penis.       PAST MEDICAL HISTORY:  Past Medical History:   Diagnosis Date     Acid reflux disease      CAD (coronary artery disease)     s/p multiple NSTEMIs and PCI's     ESRD on hemodialysis (H)     MWF     Hypothyroidism      Ischemic cardiomyopathy     EF 25-30%, s/p AICD     NSTEMI (non-ST elevated myocardial infarction) (H)     multiple     Type 2 diabetes mellitus (H)     diet-controlled     Typical atrial flutter (H)     s/p ablation       PAST SURGICAL HISTORY:  Past Surgical History:   Procedure Laterality Date     CHOLECYSTECTOMY, OPEN  2013     ELBOW SURGERY Left 2008    ORIF - plates still in place     H ABLATION ATRIAL FLUTTER  06/08/2017, 12/11/17      HC LEFT HEART CATHETERIZATION  7/14/2016     HC LEFT HEART CATHETERIZATION  9/21/2016     IMPLANT VENTRICULAR DEVICE  08/15/2011     TONSILLECTOMY & ADENOIDECTOMY       VASCULAR SURGERY  2004, 2010    LUE fistulas (upper and lower); upper one is functional       FAMILY HISTORY:  Family History   Problem Relation Age of Onset     Cerebrovascular Disease Mother      later in life     Hypertension Father      Bladder Cancer Father      Myocardial Infarction Paternal Grandmother      Diabetes No family hx of      Prostate Cancer No family hx of      Colon Cancer No family hx of        SOCIAL HISTORY:  Social History     Social History     Marital status: Single     Spouse name: N/A     Number of children: N/A     Years of education: N/A     Occupational History     Retired - CPA      Social History Main Topics     Smoking status: Former Smoker     Packs/day: 2.00     Years: 35.00     Types: Cigarettes     Start date: 1965     Quit date: 11/1/1996     Smokeless tobacco: Never Used     Alcohol use 0.0 oz/week     0 Standard drinks or equivalent per week      Comment: rare     Drug use: No     Sexual activity: No     Other Topics Concern     Parent/Sibling W/ Cabg, Mi Or Angioplasty Before 65f 55m? No     Caffeine Concern No     Sleep Concern No     Special Diet Yes     Exercise Yes     Walking     Seat Belt Yes     Social History Narrative    Single.    No kids.     Maria Dolores Pabon (friend- healthcare POA), Joaquina Nair (friend - healthcare POA)    No formal exercise.        Review of Systems:  Skin:  Negative       Eyes:  Positive for glasses;cataracts pt reports early stage cataracts; no change within the previous year.  ENT:  Positive for postnasal drainage occ  Respiratory:  Positive for dyspnea on exertion pt reports occ.   Cardiovascular:    chest pain;Positive for;edema;fatigue pt reports mild chest pain on occ.  Gastroenterology: Positive for heartburn    Genitourinary:         Musculoskeletal:  Positive for  "arthritis;joint pain left knee pain  Neurologic:  Positive for numbness or tingling of hands due to pinched nerve.  Psychiatric:  Negative      Heme/Lymph/Imm:  Positive for allergies    Endocrine:  Positive for thyroid disorder;diabetes takes thyroid pill    Physical Exam:  Vitals: BP (!) 100/34  Pulse 70  Ht 1.727 m (5' 7.99\")  Wt 84.4 kg (186 lb)  BMI 28.29 kg/m2    Constitutional:  cooperative, alert and oriented, well developed, well nourished, in no acute distress        Skin:  warm and dry to the touch   ICD incision in the right infraclavicular area was well-healed      Head:  normocephalic, no masses or lesions        Eyes:  pupils equal and round          ENT:  no pallor or cyanosis, dentition good        Neck:       No JVD    Respiratory:  normal breath sounds, clear to auscultation, normal A-P diameter, normal symmetry, normal respiratory excursion, no use of accessory muscles         Cardiac: normal S1 and S2       RUSB;systolic ejection murmur;grade 1        not assessed this visit                                       GI:  abdomen soft;BS normoactive        Extremities and Muscular Skeletal:  no deformities, clubbing, cyanosis, erythema observed;no edema         RT leg in brace     Neurological:  no gross motor deficits        Psych:  Alert and Oriented x 3;affect appropriate, oriented to time, person and place        CC  Jono Mejia MD  [address]  [place] [id number]                Thank you for allowing me to participate in the care of your patient.      Sincerely,     Lacy Taylor, RULA, APRN CNP     [facility]    cc:   Jono Mejia MD  [address]  [place] [id number]        "

## 2018-08-15 NOTE — LETTER
8/15/2018      Josselin Kolb MD  [address] [facility] [address]      RE: Westley Ness       Dear Colleague,    I had the pleasure of seeing Westley Ness in the [facility].    Service Date: 08/15/2018      HISTORY OF PRESENT ILLNESS:  Mr. Ness is a delightful 72-year-old gentleman that I am having the pleasure of seeing today for a 6-month followup.  He was most recently seen by Dr. Mejia for followup of his atrial flutter ablation.        Briefly, he has a history of hypertension, diabetes mellitus, hyperlipidemia, end-stage renal disease on hemodialysis 3 times a week and heart failure secondary to ischemic cardiomyopathy.  His ejection fraction has been about 25%-30% with previous ICD implantation.        He presented with recurrent symptomatic atrial flutter with an ablation 06/2017.  He had a redo ablation 12/2017.  Following his visit with Dr. Mejia, he ordered a ZIO Patch on 03/15 to assess for episodes of atrial fibrillation or flutter.  The patient's principal rhythm was sinus rhythm.  He had no atrial fibrillation or flutter found.  He did have some nonsustained VT and also some PSVT noted.      Coronary artery disease includes a drug-eluting stent placed to the proximal left circumflex and obtuse marginal in 11/2016.  He had recurrent event and had angioplasty of the first obtuse marginal for restenosis.  At this time, he has no complaints of angina.  He states that he occasionally gets some chest pressure but has not needed to use nitro.  He also denies orthopnea, PND, syncope or peripheral edema.  He states that his energy level has been improving.  All other review of systems are noted below.      ASSESSMENT AND PLAN:  Mr. Ness is a delightful 72-year-old gentleman with a very complex past medical history.   1.  Atrial flutter    He had recurrent flutter requiring 2 ablations.  These appear to have been successful.  A ZIO Patch showed that he had no  recurrence of atrial flutter, nor atrial fibrillation.  The patient was requesting to get off Eliquis therapy.  Dr. Mejia stop his Eliquis after the ZIO Patch and he is back on baby aspirin.  He has no complaints of palpitations.  I did do a 12-lead EKG today which shows a sinus rhythm with a first-degree AV block at 224 milliseconds.  Frequent PVCs are noted.  Electrolytes were checked at his last dialysis run, which was done 3 times a week.  Last documented potassium on 08/11 was 4.8.     2.  Ischemic cardiomyopathy/coronary artery disease with multiple PCIs and stenting as outlined above.   EF 27% with severe RV dysfunction noted on last echo. Pt is euvolemic on exam today. Repeat echo was ordered for January for his visit with .  CAD: He is on pravastatin 40 mg daily, clopidogrel, which is a lifelong, carvedilol 6.25 mg b.i.d., aspirin 81 mg daily, lisinopril 2.5 mg daily and isosorbide 15 mg once daily.  The patient has not had any issues with recurrent chest pain.   The patient is established patient of Dr. Sen for his coronary disease.  He was lost to followup in the fall of 2017.  I have placed an order to have an echocardiogram to see Dr. Sen to review the results.  Since the patient has not had any complaints of chest discomfort, I have not placed order for Lexiscan.  I will leave that up to his discretion.     3.  Hyperlipidemia  on Pravachol 40 mg daily.   4.  Hypothyroidism  on levothyroxine and managed by Dr. Kolb.   5.  End-stage renal disease on hemodialysis with chronic anemia.    The patient is doing well on his dialysis runs.   6.  Type 2 diabetes mellitus   diet-controlled.      Thank you as always for including me in the care of this delightful gentleman.  If you have questions or concerns, please feel free to contact us.         LORA MOLINA NP             D: 08/15/2018   T: 08/15/2018   MT: MITCHELL      Name:     SOCORRO LÓPEZ   MRN:      [id number]        Account:      [id number]    :      1945           Service Date: 08/15/2018      Document: [id number]           Outpatient Encounter Prescriptions as of 8/15/2018   Medication Sig Dispense Refill     acetaminophen (TYLENOL) 325 MG tablet Take 2 tablets (650 mg) by mouth 4 times daily 60 tablet 0     [] amoxicillin (AMOXIL) 500 MG capsule Take 1 capsule (500 mg) by mouth daily for 10 days - take after dialysis on dialysis days 10 capsule 0     aspirin 81 MG tablet Take 1 tablet (81 mg) by mouth daily 1 tablet 0     carvedilol (COREG) 6.25 MG tablet TAKE 1 TABLET BY MOUTH TWICE DAILY WITH MEALS AFTER DIALYSIS 180 tablet 3     clopidogrel (PLAVIX) 75 MG tablet Take 1 tablet (75 mg) by mouth daily To protect your stent(s).  Do not stop taking unless directed by cardiology. 30 tablet 11     fluticasone (FLONASE) 50 MCG/ACT spray Spray 2 sprays into both nostrils daily as needed for rhinitis or allergies       HYDROcodone-acetaminophen (NORCO) 5-325 MG per tablet Take 1 tablet by mouth every 6 hours as needed for severe pain 10 tablet 0     isosorbide mononitrate (IMDUR) 30 MG 24 hr tablet TAKE 1/2 TABLET BY MOUTH ONCE DAILY 90 tablet 2     levothyroxine (SYNTHROID/LEVOTHROID) 50 MCG tablet TAKE 1 TABLET BY MOUTH DAILY 90 tablet 2     lisinopril (PRINIVIL/ZESTRIL) 2.5 MG tablet Take one tablet after dialysis. Take second tablet at bedtime. (Patient taking differently: Take 2.5 mg by mouth 2 times daily Take one tablet after dialysis. Take second tablet at bedtime.) 180 tablet 3     loperamide (IMODIUM) 2 MG capsule Take 1 mg by mouth as needed        mexiletine (MEXITIL) 150 MG capsule TAKE 1 CAPSULE (150 MG) BY MOUTH 2 TIMES DAILY 180 capsule 3     nitroGLYcerin (NITROSTAT) 0.4 MG sublingual tablet 1 TAB EVERY 5 MIN AS NEEDED, UP TO 3 PER EPISODE 25 tablet 0     ondansetron (ZOFRAN-ODT) 4 MG ODT tab Take 4 mg by mouth every 8 hours as needed for nausea       pantoprazole (PROTONIX) 40 MG EC tablet Take 1 tablet (40 mg) by mouth every  morning 30 tablet 10     pravastatin (PRAVACHOL) 40 MG tablet TAKE 1 TABLET (40 MG) BY MOUTH DAILY 90 tablet 3     psyllium 0.52 G capsule Take 1 capsule by mouth daily as needed        RaNITidine HCl (ZANTAC PO) Take 75 mg by mouth At Bedtime        TRIPHROCAPS 1 MG capsule TAKE 1 CAPSULE BY MOUTH EVERY EVENING 90 capsule 3     No facility-administered encounter medications on file as of 8/15/2018.        Again, thank you for allowing me to participate in the care of your patient.      Sincerely,    Lacy Taylor NP, APRN [facility]

## 2018-08-15 NOTE — PROGRESS NOTES
HPI and Plan: #[id number]  See dictation    Orders Placed This Encounter   Procedures     Follow-Up with Cardiologist     EKG 12-lead complete w/read - Clinics     Echocardiogram       Orders Placed This Encounter   Medications     ondansetron (ZOFRAN-ODT) 4 MG ODT tab     Sig: Take 4 mg by mouth every 8 hours as needed for nausea       There are no discontinued medications.      Encounter Diagnoses   Name Primary?     Typical atrial flutter (H)      NSTEMI (non-ST elevated myocardial infarction) (H) Yes     Shortness of breath      Cardiomyopathy, ischemic        CURRENT MEDICATIONS:  Current Outpatient Prescriptions   Medication Sig Dispense Refill     acetaminophen (TYLENOL) 325 MG tablet Take 2 tablets (650 mg) by mouth 4 times daily 60 tablet 0     amoxicillin (AMOXIL) 500 MG capsule Take 1 capsule (500 mg) by mouth daily for 10 days - take after dialysis on dialysis days 10 capsule 0     aspirin 81 MG tablet Take 1 tablet (81 mg) by mouth daily 1 tablet 0     carvedilol (COREG) 6.25 MG tablet TAKE 1 TABLET BY MOUTH TWICE DAILY WITH MEALS AFTER DIALYSIS 180 tablet 3     clopidogrel (PLAVIX) 75 MG tablet Take 1 tablet (75 mg) by mouth daily To protect your stent(s).  Do not stop taking unless directed by cardiology. 30 tablet 11     fluticasone (FLONASE) 50 MCG/ACT spray Spray 2 sprays into both nostrils daily as needed for rhinitis or allergies       HYDROcodone-acetaminophen (NORCO) 5-325 MG per tablet Take 1 tablet by mouth every 6 hours as needed for severe pain 10 tablet 0     isosorbide mononitrate (IMDUR) 30 MG 24 hr tablet TAKE 1/2 TABLET BY MOUTH ONCE DAILY 90 tablet 2     levothyroxine (SYNTHROID/LEVOTHROID) 50 MCG tablet TAKE 1 TABLET BY MOUTH DAILY 90 tablet 2     lisinopril (PRINIVIL/ZESTRIL) 2.5 MG tablet Take one tablet after dialysis. Take second tablet at bedtime. (Patient taking differently: Take 2.5 mg by mouth 2 times daily Take one tablet after dialysis. Take second tablet at bedtime.) 180  tablet 3     loperamide (IMODIUM) 2 MG capsule Take 1 mg by mouth as needed        mexiletine (MEXITIL) 150 MG capsule TAKE 1 CAPSULE (150 MG) BY MOUTH 2 TIMES DAILY 180 capsule 3     nitroGLYcerin (NITROSTAT) 0.4 MG sublingual tablet 1 TAB EVERY 5 MIN AS NEEDED, UP TO 3 PER EPISODE 25 tablet 0     ondansetron (ZOFRAN-ODT) 4 MG ODT tab Take 4 mg by mouth every 8 hours as needed for nausea       pantoprazole (PROTONIX) 40 MG EC tablet Take 1 tablet (40 mg) by mouth every morning 30 tablet 10     pravastatin (PRAVACHOL) 40 MG tablet TAKE 1 TABLET (40 MG) BY MOUTH DAILY 90 tablet 3     psyllium 0.52 G capsule Take 1 capsule by mouth daily as needed        RaNITidine HCl (ZANTAC PO) Take 75 mg by mouth At Bedtime        TRIPHROCAPS 1 MG capsule TAKE 1 CAPSULE BY MOUTH EVERY EVENING 90 capsule 3       ALLERGIES     Allergies   Allergen Reactions     Contrast Dye Hives     Does fine if he uses benadryl prior.     No Clinical Screening - See Comments      Green beans - Diarrhea.    Topical antibiotic - name unknown - caused swelling of the penis.       PAST MEDICAL HISTORY:  Past Medical History:   Diagnosis Date     Acid reflux disease      CAD (coronary artery disease)     s/p multiple NSTEMIs and PCI's     ESRD on hemodialysis (H)     MWF     Hypothyroidism      Ischemic cardiomyopathy     EF 25-30%, s/p AICD     NSTEMI (non-ST elevated myocardial infarction) (H)     multiple     Type 2 diabetes mellitus (H)     diet-controlled     Typical atrial flutter (H)     s/p ablation       PAST SURGICAL HISTORY:  Past Surgical History:   Procedure Laterality Date     CHOLECYSTECTOMY, OPEN  2013     ELBOW SURGERY Left 2008    ORIF - plates still in place     H ABLATION ATRIAL FLUTTER  06/08/2017, 12/11/17      LEFT HEART CATHETERIZATION  7/14/2016      LEFT HEART CATHETERIZATION  9/21/2016     IMPLANT VENTRICULAR DEVICE  08/15/2011     TONSILLECTOMY & ADENOIDECTOMY       VASCULAR SURGERY  2004, 2010    LUE fistulas (upper and  lower); upper one is functional       FAMILY HISTORY:  Family History   Problem Relation Age of Onset     Cerebrovascular Disease Mother      later in life     Hypertension Father      Bladder Cancer Father      Myocardial Infarction Paternal Grandmother      Diabetes No family hx of      Prostate Cancer No family hx of      Colon Cancer No family hx of        SOCIAL HISTORY:  Social History     Social History     Marital status: Single     Spouse name: N/A     Number of children: N/A     Years of education: N/A     Occupational History     Retired - CPA      Social History Main Topics     Smoking status: Former Smoker     Packs/day: 2.00     Years: 35.00     Types: Cigarettes     Start date: 1965     Quit date: 11/1/1996     Smokeless tobacco: Never Used     Alcohol use 0.0 oz/week     0 Standard drinks or equivalent per week      Comment: rare     Drug use: No     Sexual activity: No     Other Topics Concern     Parent/Sibling W/ Cabg, Mi Or Angioplasty Before 65f 55m? No     Caffeine Concern No     Sleep Concern No     Special Diet Yes     Exercise Yes     Walking     Seat Belt Yes     Social History Narrative    Single.    No kids.     Maria Dolores Pabon (friend- healthcare POA), Joaquina Nair (friend - healthcare POA)    No formal exercise.        Review of Systems:  Skin:  Negative       Eyes:  Positive for glasses;cataracts pt reports early stage cataracts; no change within the previous year.  ENT:  Positive for postnasal drainage occ  Respiratory:  Positive for dyspnea on exertion pt reports occ.   Cardiovascular:    chest pain;Positive for;edema;fatigue pt reports mild chest pain on occ.  Gastroenterology: Positive for heartburn    Genitourinary:         Musculoskeletal:  Positive for arthritis;joint pain left knee pain  Neurologic:  Positive for numbness or tingling of hands due to pinched nerve.  Psychiatric:  Negative      Heme/Lymph/Imm:  Positive for allergies    Endocrine:  Positive for thyroid  "disorder;diabetes takes thyroid pill    Physical Exam:  Vitals: BP (!) 100/34  Pulse 70  Ht 1.727 m (5' 7.99\")  Wt 84.4 kg (186 lb)  BMI 28.29 kg/m2    Constitutional:  cooperative, alert and oriented, well developed, well nourished, in no acute distress        Skin:  warm and dry to the touch   ICD incision in the right infraclavicular area was well-healed      Head:  normocephalic, no masses or lesions        Eyes:  pupils equal and round          ENT:  no pallor or cyanosis, dentition good        Neck:       No JVD    Respiratory:  normal breath sounds, clear to auscultation, normal A-P diameter, normal symmetry, normal respiratory excursion, no use of accessory muscles         Cardiac: normal S1 and S2       RUSB;systolic ejection murmur;grade 1        not assessed this visit                                       GI:  abdomen soft;BS normoactive        Extremities and Muscular Skeletal:  no deformities, clubbing, cyanosis, erythema observed;no edema         RT leg in brace     Neurological:  no gross motor deficits        Psych:  Alert and Oriented x 3;affect appropriate, oriented to time, person and place        CC  Jono Mejia MD  [address]  [place] [id number]              "

## 2018-08-15 NOTE — MR AVS SNAPSHOT
After Visit Summary   8/15/2018    Westley Ness    MRN: [id number]           Patient Information     Date Of Birth          1945        Visit Information        Provider Department      8/15/2018 3:50 PM Lacy Taylor APRN CNP [facility]        Today's Diagnoses     NSTEMI (non-ST elevated myocardial infarction) (H)    -  1    Typical atrial flutter (H)        Shortness of breath        Cardiomyopathy, ischemic          Care Instructions    Please call with any concerns  See  in January with an echo  No changes at this time          Follow-ups after your visit        Additional Services     Follow-Up with Cardiologist       Pt was a lost to follow up in 2017 with                   Your next 10 appointments already scheduled     Sep 13, 2018  4:30 PM CDT   Remote ICD Check with CARD DCR2   [facility] ([facility])    [address] [place] [address] [phone] OPT 2           This appointment is for a remote check of your debrillator.  This is not an appointment at the office.              Future tests that were ordered for you today     Open Future Orders        Priority Expected Expires Ordered    Echocardiogram Routine 1/7/2019 8/15/2019 8/15/2018    Follow-Up with Cardiologist Routine 12/13/2018 8/15/2019 8/15/2018            Who to contact     If you have questions or need follow up information about today's clinic visit or your schedule please contact [facility] directly at [phone].  Normal or non-critical lab and imaging results will be communicated to you by MyChart, letter or phone within 4 business days after the clinic has received the results. If you do not hear from us within 7 days, please contact the clinic through MyChart or phone. If you have a critical or abnormal lab result, we will notify you by phone as soon  "as possible.  Submit refill requests through [employer] or call your pharmacy and they will forward the refill request to us. Please allow 3 business days for your refill to be completed.          Additional Information About Your Visit        Care EveryWhere ID     This is your Care EveryWhere ID. This could be used by other organizations to access your [place] medical records  [id number]        Your Vitals Were     Pulse Height BMI (Body Mass Index)             70 1.727 m (5' 7.99\") 28.29 kg/m2          Blood Pressure from Last 3 Encounters:   08/15/18 (!) 100/34   08/08/18 120/55   07/04/18 145/78    Weight from Last 3 Encounters:   08/15/18 84.4 kg (186 lb)   08/08/18 83.9 kg (184 lb 14.4 oz)   05/02/18 81.6 kg (179 lb 14.4 oz)              We Performed the Following     EKG 12-lead complete w/read - Clinics     Follow-Up with Cardiac Advanced Practice Provider          Today's Medication Changes          These changes are accurate as of 8/15/18  4:27 PM.  If you have any questions, ask your nurse or doctor.               These medicines have changed or have updated prescriptions.        Dose/Directions    lisinopril 2.5 MG tablet   Commonly known as:  PRINIVIL/Zestril   This may have changed:    - how much to take  - how to take this  - when to take this  - additional instructions   Used for:  NSTEMI (non-ST elevated myocardial infarction) (H)        Take one tablet after dialysis. Take second tablet at bedtime.   Quantity:  180 tablet   Refills:  3                Primary Care Provider Office Phone # Fax #    Josselin Kolb [phone]       [address] [place] [address]        Goals        General    Medical (pt-stated)     Notes - Note created  4/30/2018  2:32 PM by Mariel Snatos, RN    Goal Statement: I will prevent re-occurence of Pneumonia  Measure of Success: decreased hospital visits   Supportive Steps to Achieve: I will cough deep breathe and drink water within his " restrictions due to dialysis   Barriers: No barriers identified   Strengths: Continues antibiotic   Date to Achieve By: After course of antibiotic         Equal Access to Services     LAZARO ROMERO : Hadii fede Jeff, enmanuel maher, melvanapoleon diormerlypurnima ramseymattsydni marquez. So [facility] [phone].    ATENCIÓN: Si habla español, tiene a jacome disposición servicios gratuitos de asistencia lingüística. Llame al [phone].    We comply with applicable federal civil rights laws and [place] laws. We do not discriminate on the basis of race, color, national origin, age, disability, sex, sexual orientation, or gender identity.            Thank you!     Thank you for choosing [facility]  for your care. Our goal is always to provide you with excellent care. Hearing back from our patients is one way we can continue to improve our services. Please take a few minutes to complete the written survey that you may receive in the mail after your visit with us. Thank you!             Your Updated Medication List - Protect others around you: Learn how to safely use, store and throw away your medicines at www.disposemymeds.org.          This list is accurate as of 8/15/18  4:27 PM.  Always use your most recent med list.                   Brand Name Dispense Instructions for use Diagnosis    acetaminophen 325 MG tablet    TYLENOL    60 tablet    Take 2 tablets (650 mg) by mouth 4 times daily    Closed nondisplaced fracture of right patella, unspecified fracture morphology, initial encounter, Elbow fracture, right, closed, initial encounter       amoxicillin 500 MG capsule    AMOXIL    10 capsule    Take 1 capsule (500 mg) by mouth daily for 10 days - take after dialysis on dialysis days    Acute cystitis with hematuria       aspirin 81 MG tablet     1 tablet    Take 1 tablet (81 mg) by mouth daily        carvedilol 6.25 MG tablet    COREG    180 tablet     TAKE 1 TABLET BY MOUTH TWICE DAILY WITH MEALS AFTER DIALYSIS    NSTEMI (non-ST elevated myocardial infarction) (H), Mild CAD       clopidogrel 75 MG tablet    PLAVIX    30 tablet    Take 1 tablet (75 mg) by mouth daily To protect your stent(s).  Do not stop taking unless directed by cardiology.    Coronary artery disease involving native coronary artery of native heart without angina pectoris       fluticasone 50 MCG/ACT spray    FLONASE     Spray 2 sprays into both nostrils daily as needed for rhinitis or allergies        HYDROcodone-acetaminophen 5-325 MG per tablet    NORCO    10 tablet    Take 1 tablet by mouth every 6 hours as needed for severe pain        isosorbide mononitrate 30 MG 24 hr tablet    IMDUR    90 tablet    TAKE 1/2 TABLET BY MOUTH ONCE DAILY    NSTEMI (non-ST elevated myocardial infarction) (H)       levothyroxine 50 MCG tablet    SYNTHROID/LEVOTHROID    90 tablet    TAKE 1 TABLET BY MOUTH DAILY    Hypothyroidism, unspecified type       lisinopril 2.5 MG tablet    PRINIVIL/Zestril    180 tablet    Take one tablet after dialysis. Take second tablet at bedtime.    NSTEMI (non-ST elevated myocardial infarction) (H)       loperamide 2 MG capsule    IMODIUM     Take 1 mg by mouth as needed        mexiletine 150 MG capsule    MEXITIL    180 capsule    TAKE 1 CAPSULE (150 MG) BY MOUTH 2 TIMES DAILY    Tachycardia       nitroGLYcerin 0.4 MG sublingual tablet    NITROSTAT    25 tablet    1 TAB EVERY 5 MIN AS NEEDED, UP TO 3 PER EPISODE    Angina pectoris (H)       ondansetron 4 MG ODT tab    ZOFRAN-ODT     Take 4 mg by mouth every 8 hours as needed for nausea        pantoprazole 40 MG EC tablet    PROTONIX    30 tablet    Take 1 tablet (40 mg) by mouth every morning    Gastroesophageal reflux disease, esophagitis presence not specified       pravastatin 40 MG tablet    PRAVACHOL    90 tablet    TAKE 1 TABLET (40 MG) BY MOUTH DAILY    Coronary artery disease involving native coronary artery of native  heart without angina pectoris       psyllium 0.52 g capsule      Take 1 capsule by mouth daily as needed        TRIPHROCAPS 1 MG capsule   Generic drug:  NEPHROCAPS     90 capsule    TAKE 1 CAPSULE BY MOUTH EVERY EVENING    ESRD (end stage renal disease) on dialysis (H)       ZANTAC PO      Take 75 mg by mouth At Bedtime

## 2018-08-15 NOTE — PROGRESS NOTES
Service Date: 08/15/2018      HISTORY OF PRESENT ILLNESS:  Mr. Ness is a delightful 72-year-old gentleman that I am having the pleasure of seeing today for a 6-month followup.  He was most recently seen by Dr. Mejia for followup of his atrial flutter ablation.        Briefly, he has a history of hypertension, diabetes mellitus, hyperlipidemia, end-stage renal disease on hemodialysis 3 times a week and heart failure secondary to ischemic cardiomyopathy.  His ejection fraction has been about 25%-30% with previous ICD implantation.        He presented with recurrent symptomatic atrial flutter with an ablation 06/2017.  He had a redo ablation 12/2017.  Following his visit with Dr. Mejia, he ordered a ZIO Patch on 03/15 to assess for episodes of atrial fibrillation or flutter.  The patient's principal rhythm was sinus rhythm.  He had no atrial fibrillation or flutter found.  He did have some nonsustained VT and also some PSVT noted.      Coronary artery disease includes a drug-eluting stent placed to the proximal left circumflex and obtuse marginal in 11/2016.  He had recurrent event and had angioplasty of the first obtuse marginal for restenosis.  At this time, he has no complaints of angina.  He states that he occasionally gets some chest pressure but has not needed to use nitro.  He also denies orthopnea, PND, syncope or peripheral edema.  He states that his energy level has been improving.  All other review of systems are noted below.      ASSESSMENT AND PLAN:  Mr. Ness is a delightful 72-year-old gentleman with a very complex past medical history.   1.  Atrial flutter    He had recurrent flutter requiring 2 ablations.  These appear to have been successful.  A ZIO Patch showed that he had no recurrence of atrial flutter, nor atrial fibrillation.  The patient was requesting to get off Eliquis therapy.  Dr. Mejia stop his Eliquis after the ZIO Patch and he is back on baby aspirin.  He has no complaints of  palpitations.  I did do a 12-lead EKG today which shows a sinus rhythm with a first-degree AV block at 224 milliseconds.  Frequent PVCs are noted.  Electrolytes were checked at his last dialysis run, which was done 3 times a week.  Last documented potassium on  was 4.8.     2.  Ischemic cardiomyopathy/coronary artery disease with multiple PCIs and stenting as outlined above.   EF 27% with severe RV dysfunction noted on last echo. Pt is euvolemic on exam today. Repeat echo was ordered for January for his visit with .  CAD: He is on pravastatin 40 mg daily, clopidogrel, which is a lifelong, carvedilol 6.25 mg b.i.d., aspirin 81 mg daily, lisinopril 2.5 mg daily and isosorbide 15 mg once daily.  The patient has not had any issues with recurrent chest pain.   The patient is established patient of Dr. Sen for his coronary disease.  He was lost to followup in the  of .  I have placed an order to have an echocardiogram to see Dr. Sen to review the results.  Since the patient has not had any complaints of chest discomfort, I have not placed order for Lexiscan.  I will leave that up to his discretion.     3.  Hyperlipidemia  on Pravachol 40 mg daily.   4.  Hypothyroidism  on levothyroxine and managed by Dr. Klob.   5.  End-stage renal disease on hemodialysis with chronic anemia.    The patient is doing well on his dialysis runs.   6.  Type 2 diabetes mellitus   diet-controlled.      Thank you as always for including me in the care of this delightful gentleman.  If you have questions or concerns, please feel free to contact us.         LORA MOLINA NP             D: 08/15/2018   T: 08/15/2018   MT: MITCHELL      Name:     SOCORRO LÓPEZ   MRN:      [id number]        Account:      [id number]   :      1945           Service Date: 08/15/2018      Document: [id number]

## 2018-08-29 ENCOUNTER — OFFICE VISIT (OUTPATIENT)
Dept: INTERNAL MEDICINE | Facility: CLINIC | Age: 73
End: 2018-08-29
Payer: MEDICARE

## 2018-08-29 VITALS
HEART RATE: 68 BPM | BODY MASS INDEX: 28.29 KG/M2 | WEIGHT: 186 LBS | TEMPERATURE: 98 F | RESPIRATION RATE: 16 BRPM | SYSTOLIC BLOOD PRESSURE: 102 MMHG | OXYGEN SATURATION: 96 % | DIASTOLIC BLOOD PRESSURE: 50 MMHG

## 2018-08-29 DIAGNOSIS — R39.9 LOWER URINARY TRACT SYMPTOMS (LUTS): Primary | ICD-10-CM

## 2018-08-29 LAB
ALBUMIN UR-MCNC: >=300 MG/DL
APPEARANCE UR: ABNORMAL
BACTERIA #/AREA URNS HPF: ABNORMAL /HPF
BILIRUB UR QL STRIP: ABNORMAL
COLOR UR AUTO: YELLOW
GLUCOSE UR STRIP-MCNC: NEGATIVE MG/DL
HGB UR QL STRIP: ABNORMAL
KETONES UR STRIP-MCNC: NEGATIVE MG/DL
LEUKOCYTE ESTERASE UR QL STRIP: ABNORMAL
NITRATE UR QL: NEGATIVE
PH UR STRIP: 8.5 PH (ref 5–7)
RBC #/AREA URNS AUTO: ABNORMAL /HPF
SOURCE: ABNORMAL
SP GR UR STRIP: 1.02 (ref 1–1.03)
UROBILINOGEN UR STRIP-ACNC: 1 EU/DL (ref 0.2–1)
WBC #/AREA URNS AUTO: >100 /HPF

## 2018-08-29 PROCEDURE — 87186 SC STD MICRODIL/AGAR DIL: CPT | Performed by: INTERNAL MEDICINE

## 2018-08-29 PROCEDURE — 87088 URINE BACTERIA CULTURE: CPT | Performed by: INTERNAL MEDICINE

## 2018-08-29 PROCEDURE — 87086 URINE CULTURE/COLONY COUNT: CPT | Performed by: INTERNAL MEDICINE

## 2018-08-29 PROCEDURE — 81001 URINALYSIS AUTO W/SCOPE: CPT | Performed by: INTERNAL MEDICINE

## 2018-08-29 PROCEDURE — 99214 OFFICE O/P EST MOD 30 MIN: CPT | Performed by: INTERNAL MEDICINE

## 2018-08-29 RX ORDER — AMOXICILLIN 500 MG/1
500 CAPSULE ORAL DAILY
Qty: 10 CAPSULE | Refills: 0 | Status: SHIPPED | OUTPATIENT
Start: 2018-08-29 | End: 2019-02-26

## 2018-08-29 NOTE — MR AVS SNAPSHOT
After Visit Summary   8/29/2018    Westley Ness    MRN: [id number]           Patient Information     Date Of Birth          1945        Visit Information        Provider Department      8/29/2018 2:45 PM Josselin Kolb MD [facility]        Today's Diagnoses     Lower urinary tract symptoms (LUTS)    -  1    Nonspecific finding on examination of urine        Acute cystitis with hematuria          Care Instructions    Please call urology and make an appointment ASAP - we need to figure out why this is happening.     Amoxicillin 500mg daily x 10 days.           Follow-ups after your visit        Your next 10 appointments already scheduled     Sep 13, 2018  4:30 PM CDT   Remote ICD Check with CARD DCR2   [facility] ([facility])    [address]  [facility] [address] [phone] OPT 2           This appointment is for a remote check of your debrillator.  This is not an appointment at the office.              Who to contact     If you have questions or need follow up information about today's clinic visit or your schedule please contact [facility] directly at [phone].  Normal or non-critical lab and imaging results will be communicated to you by MyChart, letter or phone within 4 business days after the clinic has received the results. If you do not hear from us within 7 days, please contact the clinic through MyChart or phone. If you have a critical or abnormal lab result, we will notify you by phone as soon as possible.  Submit refill requests through [employer] or call your pharmacy and they will forward the refill request to us. Please allow 3 business days for your refill to be completed.          Additional Information About Your Visit        Care EveryWhere ID     This is your Care EveryWhere ID. This could be used by other organizations to access your [facility]  records  [id number]        Your Vitals Were     Pulse Temperature Respirations Pulse Oximetry BMI (Body Mass Index)       68 98  F (36.7  C) (Oral) 16 96% 28.29 kg/m2        Blood Pressure from Last 3 Encounters:   08/29/18 102/50   08/15/18 (!) 100/34   08/08/18 120/55    Weight from Last 3 Encounters:   08/29/18 186 lb (84.4 kg)   08/15/18 186 lb (84.4 kg)   08/08/18 184 lb 14.4 oz (83.9 kg)              We Performed the Following     UA with Microscopic reflex to Culture     Urine Culture Aerobic Bacterial          Today's Medication Changes          These changes are accurate as of 8/29/18  3:01 PM.  If you have any questions, ask your nurse or doctor.               Start taking these medicines.        Dose/Directions    amoxicillin 500 MG capsule   Commonly known as:  AMOXIL   Used for:  Acute cystitis with hematuria   Started by:  Josselin Kolb MD        Dose:  500 mg   Take 1 capsule (500 mg) by mouth daily - take after dialysis on dialysis days   Quantity:  10 capsule   Refills:  0         These medicines have changed or have updated prescriptions.        Dose/Directions    lisinopril 2.5 MG tablet   Commonly known as:  PRINIVIL/Zestril   This may have changed:    - how much to take  - how to take this  - when to take this  - additional instructions   Used for:  NSTEMI (non-ST elevated myocardial infarction) (H)        Take one tablet after dialysis. Take second tablet at bedtime.   Quantity:  180 tablet   Refills:  3            Where to get your medicines      These medications were sent to [place], [place] - [address]  [address] [address]     Phone:  [phone]     amoxicillin 500 MG capsule                Primary Care Provider Office Phone # Fax #    Josselin Kolb [phone] [address] [place] [address]        Goals        General    Medical (pt-stated)     Notes - Note created  4/30/2018  2:32 PM by Mariel Santos,  RN    Goal Statement: I will prevent re-occurence of Pneumonia  Measure of Success: decreased hospital visits   Supportive Steps to Achieve: I will cough deep breathe and drink water within his restrictions due to dialysis   Barriers: No barriers identified   Strengths: Continues antibiotic   Date to Achieve By: After course of antibiotic         Equal Access to Services     LAZARO ROMERO : Hadii aad ku hadalinapati Sorizwanali, waaxda luqadaha, qaybta kaalmada adeegyada, waxmissy tom finahandy duarte ronelsydni marquez. So [facility] [phone].    ATENCIÓN: Si habla español, tiene a jacome disposición servicios gratuitos de asistencia lingüística. Llame al [phone].    We comply with applicable federal civil rights laws and [place] laws. We do not discriminate on the basis of race, color, national origin, age, disability, sex, sexual orientation, or gender identity.            Thank you!     Thank you for choosing [facility]  for your care. Our goal is always to provide you with excellent care. Hearing back from our patients is one way we can continue to improve our services. Please take a few minutes to complete the written survey that you may receive in the mail after your visit with us. Thank you!             Your Updated Medication List - Protect others around you: Learn how to safely use, store and throw away your medicines at www.disposemymeds.org.          This list is accurate as of 8/29/18  3:01 PM.  Always use your most recent med list.                   Brand Name Dispense Instructions for use Diagnosis    acetaminophen 325 MG tablet    TYLENOL    60 tablet    Take 2 tablets (650 mg) by mouth 4 times daily    Closed nondisplaced fracture of right patella, unspecified fracture morphology, initial encounter, Elbow fracture, right, closed, initial encounter       amoxicillin 500 MG capsule    AMOXIL    10 capsule    Take 1 capsule (500 mg) by mouth daily - take after dialysis on dialysis days     Acute cystitis with hematuria       aspirin 81 MG tablet     1 tablet    Take 1 tablet (81 mg) by mouth daily        carvedilol 6.25 MG tablet    COREG    180 tablet    TAKE 1 TABLET BY MOUTH TWICE DAILY WITH MEALS AFTER DIALYSIS    NSTEMI (non-ST elevated myocardial infarction) (H), Mild CAD       clopidogrel 75 MG tablet    PLAVIX    30 tablet    Take 1 tablet (75 mg) by mouth daily To protect your stent(s).  Do not stop taking unless directed by cardiology.    Coronary artery disease involving native coronary artery of native heart without angina pectoris       fluticasone 50 MCG/ACT spray    FLONASE     Spray 2 sprays into both nostrils daily as needed for rhinitis or allergies        HYDROcodone-acetaminophen 5-325 MG per tablet    NORCO    10 tablet    Take 1 tablet by mouth every 6 hours as needed for severe pain        isosorbide mononitrate 30 MG 24 hr tablet    IMDUR    90 tablet    TAKE 1/2 TABLET BY MOUTH ONCE DAILY    NSTEMI (non-ST elevated myocardial infarction) (H)       levothyroxine 50 MCG tablet    SYNTHROID/LEVOTHROID    90 tablet    TAKE 1 TABLET BY MOUTH DAILY    Hypothyroidism, unspecified type       lisinopril 2.5 MG tablet    PRINIVIL/Zestril    180 tablet    Take one tablet after dialysis. Take second tablet at bedtime.    NSTEMI (non-ST elevated myocardial infarction) (H)       loperamide 2 MG capsule    IMODIUM     Take 1 mg by mouth as needed        mexiletine 150 MG capsule    MEXITIL    180 capsule    TAKE 1 CAPSULE (150 MG) BY MOUTH 2 TIMES DAILY    Tachycardia       nitroGLYcerin 0.4 MG sublingual tablet    NITROSTAT    25 tablet    1 TAB EVERY 5 MIN AS NEEDED, UP TO 3 PER EPISODE    Angina pectoris (H)       ondansetron 4 MG ODT tab    ZOFRAN-ODT     Take 4 mg by mouth every 8 hours as needed for nausea        pantoprazole 40 MG EC tablet    PROTONIX    30 tablet    Take 1 tablet (40 mg) by mouth every morning    Gastroesophageal reflux disease, esophagitis presence not specified        pravastatin 40 MG tablet    PRAVACHOL    90 tablet    TAKE 1 TABLET (40 MG) BY MOUTH DAILY    Coronary artery disease involving native coronary artery of native heart without angina pectoris       psyllium 0.52 g capsule      Take 1 capsule by mouth daily as needed        TRIPHROCAPS 1 MG capsule   Generic drug:  NEPHROCAPS     90 capsule    TAKE 1 CAPSULE BY MOUTH EVERY EVENING    ESRD (end stage renal disease) on dialysis (H)       ZANTAC PO      Take 75 mg by mouth At Bedtime

## 2018-08-29 NOTE — PROGRESS NOTES
SUBJECTIVE:                                                      HPI: Westley Ness is a pleasant 72 year old male who presents with urinary symptoms:    - ongoing for 3 days  - main symptom is urinary frequency; had urgency, but this resolved  - no dysuria or hematuria  - does endorse mild subjective fevers, no chills or sweats    Patient has ESRD on HD, but still makes a little urine.    He has had 2 culture proven UTIs in the last couple of months, both treated with appropriate antibiotics.    He was referred to urology earlier this month, but has not yet made an appointment.    PMH also significant for well-controlled, diet controlled diabetes.    The medication, allergy, and problem lists have been reviewed and updated as appropriate.       OBJECTIVE:                                                      /50  Pulse 68  Temp 98  F (36.7  C) (Oral)  Resp 16  Wt 186 lb (84.4 kg)  SpO2 96%  BMI 28.29 kg/m2  Constitutional: well-appearing  Psych: normal judgment and insight; normal mood and affect; recent and remote memory intact    UA: suggestive of UTI    ASSESSMENT/PLAN:                                                      (R39.9) Lower urinary tract symptoms (LUTS)  (primary encounter diagnosis)  Comment: UA suggestive of UTI.  Plan:    - culture and sensitivities pending.    - START amoxicillin 500 mg daily.   - patient strongly encouraged to schedule appointment with urology for further evaluation.    The instructions on the AVS were discussed and explained to the patient. Patient expressed understanding of instructions.    (Chart documentation was completed, in part, with [employer] voice-recognition software. Even though reviewed, some grammatical, spelling, and word errors may remain.)    Josselin Kolb MD   [address] [address]  T: [phone], F: [phone]

## 2018-08-29 NOTE — PATIENT INSTRUCTIONS
Please call urology and make an appointment ASAP - we need to figure out why this is happening.     Amoxicillin 500mg daily x 10 days.

## 2018-09-03 LAB
BACTERIA SPEC CULT: ABNORMAL
BACTERIA SPEC CULT: ABNORMAL
SPECIMEN SOURCE: ABNORMAL

## 2018-09-06 DIAGNOSIS — I21.4 NSTEMI (NON-ST ELEVATED MYOCARDIAL INFARCTION) (H): ICD-10-CM

## 2018-09-06 RX ORDER — LISINOPRIL 2.5 MG/1
TABLET ORAL
Qty: 180 TABLET | Refills: 3 | Status: SHIPPED | OUTPATIENT
Start: 2018-09-06 | End: 2019-09-03

## 2018-09-13 ENCOUNTER — HOSPITAL ENCOUNTER (EMERGENCY)
Facility: CLINIC | Age: 73
Discharge: HOME | End: 2018-09-13
Attending: NURSE PRACTITIONER | Admitting: NURSE PRACTITIONER
Payer: MEDICARE

## 2018-09-13 ENCOUNTER — APPOINTMENT (OUTPATIENT)
Dept: GENERAL RADIOLOGY | Facility: CLINIC | Age: 73
End: 2018-09-13
Attending: NURSE PRACTITIONER
Payer: MEDICARE

## 2018-09-13 VITALS
HEART RATE: 71 BPM | WEIGHT: 187.39 LBS | TEMPERATURE: 97.8 F | HEIGHT: 68 IN | OXYGEN SATURATION: 96 % | RESPIRATION RATE: 16 BRPM | BODY MASS INDEX: 28.4 KG/M2 | DIASTOLIC BLOOD PRESSURE: 82 MMHG | SYSTOLIC BLOOD PRESSURE: 139 MMHG

## 2018-09-13 DIAGNOSIS — R68.89 FEELING UNWELL: ICD-10-CM

## 2018-09-13 DIAGNOSIS — R07.89 CHEST TIGHTNESS: ICD-10-CM

## 2018-09-13 LAB
ANION GAP SERPL CALCULATED.3IONS-SCNC: 10 MMOL/L (ref 3–14)
BUN SERPL-MCNC: 23 MG/DL (ref 7–30)
CALCIUM SERPL-MCNC: 9.6 MG/DL (ref 8.5–10.1)
CHLORIDE SERPL-SCNC: 99 MMOL/L (ref 94–109)
CO2 SERPL-SCNC: 29 MMOL/L (ref 20–32)
CREAT SERPL-MCNC: 4.72 MG/DL (ref 0.66–1.25)
ERYTHROCYTE [DISTWIDTH] IN BLOOD BY AUTOMATED COUNT: 13.8 % (ref 10–15)
GFR SERPL CREATININE-BSD FRML MDRD: 12 ML/MIN/1.7M2
GLUCOSE SERPL-MCNC: 113 MG/DL (ref 70–99)
HCT VFR BLD AUTO: 37.9 % (ref 40–53)
HGB BLD-MCNC: 12.7 G/DL (ref 13.3–17.7)
INTERPRETATION ECG - MUSE: NORMAL
MCH RBC QN AUTO: 33.7 PG (ref 26.5–33)
MCHC RBC AUTO-ENTMCNC: 33.5 G/DL (ref 31.5–36.5)
MCV RBC AUTO: 101 FL (ref 78–100)
PLATELET # BLD AUTO: 139 10E9/L (ref 150–450)
POTASSIUM SERPL-SCNC: 4.4 MMOL/L (ref 3.4–5.3)
RBC # BLD AUTO: 3.77 10E12/L (ref 4.4–5.9)
SODIUM SERPL-SCNC: 138 MMOL/L (ref 133–144)
TROPONIN I SERPL-MCNC: 0.02 UG/L (ref 0–0.04)
WBC # BLD AUTO: 5.2 10E9/L (ref 4–11)

## 2018-09-13 PROCEDURE — 84484 ASSAY OF TROPONIN QUANT: CPT | Performed by: NURSE PRACTITIONER

## 2018-09-13 PROCEDURE — 85027 COMPLETE CBC AUTOMATED: CPT | Performed by: NURSE PRACTITIONER

## 2018-09-13 PROCEDURE — 80048 BASIC METABOLIC PNL TOTAL CA: CPT | Performed by: NURSE PRACTITIONER

## 2018-09-13 PROCEDURE — 93005 ELECTROCARDIOGRAM TRACING: CPT

## 2018-09-13 PROCEDURE — 71046 X-RAY EXAM CHEST 2 VIEWS: CPT

## 2018-09-13 PROCEDURE — 99285 EMERGENCY DEPT VISIT HI MDM: CPT | Mod: 25

## 2018-09-13 ASSESSMENT — ENCOUNTER SYMPTOMS
NAUSEA: 0
DIZZINESS: 1
WOUND: 0
BACK PAIN: 0
CHEST TIGHTNESS: 1
FEVER: 0
WEAKNESS: 0
VOMITING: 0
CHILLS: 0

## 2018-09-13 NOTE — ED NOTES
Bed: ED26  Expected date:   Expected time:   Means of arrival:   Comments:  satinder - 519 - 72 M chest pain [id number]

## 2018-09-13 NOTE — ED AVS SNAPSHOT
Emergency Department    [address] [place] [address]    Phone:  [phone]    Fax:  [phone]                                       Westley Ness   MRN: [id number]    Department:   Emergency Department   Date of Visit:  9/13/2018           After Visit Summary Signature Page     I have received my discharge instructions, and my questions have been answered. I have discussed any challenges I see with this plan with the nurse or doctor.    ..........................................................................................................................................  Patient/Patient Representative Signature      ..........................................................................................................................................  Patient Representative Print Name and Relationship to Patient    ..................................................               ................................................  Date                                   Time    ..........................................................................................................................................  Reviewed by Signature/Title    ...................................................              ..............................................  Date                                               Time          22EPIC Rev 08/18

## 2018-09-13 NOTE — ED AVS SNAPSHOT
Emergency Department    [address] [facility] [address]    Phone:  [phone]    Fax:  [phone]                                       Westley Ness   MRN: [id number]    Department:   Emergency Department   Date of Visit:  9/13/2018           Patient Information     Date Of Birth          1945        Your diagnoses for this visit were:     Chest tightness     Feeling unwell        You were seen by Eric Miguel APRN CNP.      Follow-up Information     Follow up with Josselin Kolb MD In 3 days.    Specialty:  Internal Medicine    Why:  tomorrow to schedule appointment for follow up    Contact information:    [address] [facility] [address]  [phone]          Follow up with  Emergency Department.    Specialty:  EMERGENCY MEDICINE    Why:  If symptoms worsen    Contact information:    [address] [facility] [id number] [phone]        Discharge Instructions          *CHEST PAIN, UNCERTAIN CAUSE    Based on your exam today, the exact cause of your chest pain is not certain. Your condition does not seem serious at this time, and your pain does not appear to be coming from your heart. However, sometimes the signs of a serious problem take more time to appear. Therefore, watch for the warning signs listed below.  HOME CARE:    1. Rest today and avoid strenuous activity.  2. Take any prescribed medicine as directed.  FOLLOW UP with your doctor in 1-3 days.   GET PROMPT MEDICAL ATTENTION if any of the following occur:    A change in the type of pain: if it feels different, becomes more severe, lasts longer, or begins to spread into your shoulder, arm, neck, jaw or back    Shortness of breath or increased pain with breathing    Weakness, dizziness, or fainting    Cough with blood or dark colored sputum (phlegm)    Fever over 101  F (38.3  C)    Swelling, pain or redness in one leg    3885-4529 [employer]. [address] [place] [address],  [employer] [address]. All rights reserved. This information is not intended as a substitute for professional medical care. Always follow your healthcare professional's instructions.  This information has been modified by your health care provider with permission from the publisher.      Symptoms With Uncertain Cause (Adult)  You have been examined, and tests may have been done. However, the exact cause of your symptoms is still not certain. Watch for any new symptoms or worsening of your condition. Another exam or more testing at a later time may be needed. Unless told otherwise, you can go back to your normal routine. Continue to take prescribed medicines as directed. Contact your healthcare provider if you have questions or concerns.   Follow-up care  Follow up with your healthcare provider if your symptoms do not begin to improve in the next few days, or as advised by our staff.   When to seek medical advice  Call your healthcare provider if your symptoms get worse or if new symptoms appear.  Date Last Reviewed: 10/1/2017    9230-3400 [employer]. [address] [place] [address], [place] [address]. All rights reserved. This information is not intended as a substitute for professional medical care. Always follow your healthcare professional's instructions.          24 Hour Appointment Hotline       To make an appointment at any [facility], call [phone] ([phone]). If you don't have a family doctor or clinic, we will help you find one. [facility] clinics are conveniently located to serve the needs of you and your family.             Review of your medicines      Our records show that you are taking the medicines listed below. If these are incorrect, please call your family doctor or clinic.        Dose / Directions Last dose taken    acetaminophen 325 MG tablet   Commonly known as:  TYLENOL   Dose:  650 mg   Quantity:  60 tablet        Take 2 tablets (650 mg) by mouth 4 times daily   Refills:  0         amoxicillin 500 MG capsule   Commonly known as:  AMOXIL   Dose:  500 mg   Quantity:  10 capsule        Take 1 capsule (500 mg) by mouth daily - take after dialysis on dialysis days   Refills:  0        aspirin 81 MG tablet   Dose:  81 mg   Quantity:  1 tablet        Take 1 tablet (81 mg) by mouth daily   Refills:  0        carvedilol 6.25 MG tablet   Commonly known as:  COREG   Quantity:  180 tablet        TAKE 1 TABLET BY MOUTH TWICE DAILY WITH MEALS AFTER DIALYSIS   Refills:  3        clopidogrel 75 MG tablet   Commonly known as:  PLAVIX   Dose:  75 mg   Quantity:  30 tablet        Take 1 tablet (75 mg) by mouth daily To protect your stent(s).  Do not stop taking unless directed by cardiology.   Refills:  11        fluticasone 50 MCG/ACT spray   Commonly known as:  FLONASE   Dose:  2 spray        Spray 2 sprays into both nostrils daily as needed for rhinitis or allergies   Refills:  0        HYDROcodone-acetaminophen 5-325 MG per tablet   Commonly known as:  NORCO   Dose:  1 tablet   Quantity:  10 tablet        Take 1 tablet by mouth every 6 hours as needed for severe pain   Refills:  0        isosorbide mononitrate 30 MG 24 hr tablet   Commonly known as:  IMDUR   Quantity:  90 tablet        TAKE 1/2 TABLET BY MOUTH ONCE DAILY   Refills:  2        levothyroxine 50 MCG tablet   Commonly known as:  SYNTHROID/LEVOTHROID   Quantity:  90 tablet        TAKE 1 TABLET BY MOUTH DAILY   Refills:  2        lisinopril 2.5 MG tablet   Commonly known as:  PRINIVIL/Zestril   Quantity:  180 tablet        Take one tablet after dialysis. Take second tablet at bedtime.   Refills:  3        loperamide 2 MG capsule   Commonly known as:  IMODIUM   Dose:  1 mg        Take 1 mg by mouth as needed   Refills:  0        mexiletine 150 MG capsule   Commonly known as:  MEXITIL   Quantity:  180 capsule        TAKE 1 CAPSULE (150 MG) BY MOUTH 2 TIMES DAILY   Refills:  3        nitroGLYcerin 0.4 MG sublingual tablet   Commonly known as:   NITROSTAT   Quantity:  25 tablet        1 TAB EVERY 5 MIN AS NEEDED, UP TO 3 PER EPISODE   Refills:  0        ondansetron 4 MG ODT tab   Commonly known as:  ZOFRAN-ODT   Dose:  4 mg        Take 4 mg by mouth every 8 hours as needed for nausea   Refills:  0        pantoprazole 40 MG EC tablet   Commonly known as:  PROTONIX   Dose:  40 mg   Quantity:  30 tablet        Take 1 tablet (40 mg) by mouth every morning   Refills:  10        pravastatin 40 MG tablet   Commonly known as:  PRAVACHOL   Quantity:  90 tablet        TAKE 1 TABLET (40 MG) BY MOUTH DAILY   Refills:  3        psyllium 0.52 g capsule   Dose:  1 capsule        Take 1 capsule by mouth daily as needed   Refills:  0        TRIPHROCAPS 1 MG capsule   Quantity:  90 capsule   Generic drug:  NEPHROCAPS        TAKE 1 CAPSULE BY MOUTH EVERY EVENING   Refills:  3        ZANTAC PO   Dose:  75 mg        Take 75 mg by mouth At Bedtime   Refills:  0                Procedures and tests performed during your visit     Basic metabolic panel    CBC (+ platelets, no diff)    EKG 12 lead    Troponin I    XR Chest 2 Views      Orders Needing Specimen Collection     None      Pending Results     Date and Time Order Name Status Description    9/13/2018 1556 XR Chest 2 Views Preliminary             Pending Culture Results     No orders found from 9/11/2018 to 9/14/2018.            Pending Results Instructions     If you had any lab results that were not finalized at the time of your Discharge, you can call the ED Lab Result RN at [phone]. You will be contacted by this team for any positive Lab results or changes in treatment. The nurses are available 7 days a week from 10A to 6:30P.  You can leave a message 24 hours per day and they will return your call.        Test Results From Your Hospital Stay        9/13/2018  4:20 PM      Component Results     Component Value Ref Range & Units Status    WBC 5.2 4.0 - 11.0 10e9/L Final    RBC Count 3.77 (L) 4.4 - 5.9 10e12/L Final     Hemoglobin 12.7 (L) 13.3 - 17.7 g/dL Final    Hematocrit 37.9 (L) 40.0 - 53.0 % Final     (H) 78 - 100 fl Final    MCH 33.7 (H) 26.5 - 33.0 pg Final    MCHC 33.5 31.5 - 36.5 g/dL Final    RDW 13.8 10.0 - 15.0 % Final    Platelet Count 139 (L) 150 - 450 10e9/L Final         9/13/2018  4:30 PM      Narrative     XR CHEST 2 VW   9/13/2018 4:18 PM     HISTORY: Chest pain    COMPARISON: Film dated 4/23/2018    FINDINGS: The cardiac silhouette is enlarged. A right cardiac device  is in place. No focal infiltrate. No gross congestive failure.  The  lungs are clear. The pulmonary vasculature is normal.  Degenerative  changes seen throughout the thoracic spine with ossification of the  anterior longitudinal ligament..        Impression     IMPRESSION:   1. Cardiomegaly.  2. No gross congestive heart failure.      3. No focal alveolar infiltrate.         9/13/2018  4:41 PM      Component Results     Component Value Ref Range & Units Status    Sodium 138 133 - 144 mmol/L Final    Potassium 4.4 3.4 - 5.3 mmol/L Final    Chloride 99 94 - 109 mmol/L Final    Carbon Dioxide 29 20 - 32 mmol/L Final    Anion Gap 10 3 - 14 mmol/L Final    Glucose 113 (H) 70 - 99 mg/dL Final    Urea Nitrogen 23 7 - 30 mg/dL Final    Creatinine 4.72 (H) 0.66 - 1.25 mg/dL Final    GFR Estimate 12 (L) >60 mL/min/1.7m2 Final    Non  GFR Calc    GFR Estimate If Black 15 (L) >60 mL/min/1.7m2 Final    African American GFR Calc    Calcium 9.6 8.5 - 10.1 mg/dL Final         9/13/2018  4:43 PM      Component Results     Component Value Ref Range & Units Status    Troponin I ES 0.019 0.000 - 0.045 ug/L Final    The 99th percentile for upper reference range is 0.045 ug/L.  Troponin values   in the range of 0.045 - 0.120 ug/L may be associated with risks of adverse   clinical events.                  Clinical Quality Measure: Blood Pressure Screening     Your blood pressure was checked while you were in the emergency department today. The  last reading we obtained was  BP: 139/82 . Please read the guidelines below about what these numbers mean and what you should do about them.  If your systolic blood pressure (the top number) is less than 120 and your diastolic blood pressure (the bottom number) is less than 80, then your blood pressure is normal. There is nothing more that you need to do about it.  If your systolic blood pressure (the top number) is 120-139 or your diastolic blood pressure (the bottom number) is 80-89, your blood pressure may be higher than it should be. You should have your blood pressure rechecked within a year by a primary care provider.  If your systolic blood pressure (the top number) is 140 or greater or your diastolic blood pressure (the bottom number) is 90 or greater, you may have high blood pressure. High blood pressure is treatable, but if left untreated over time it can put you at risk for heart attack, stroke, or kidney failure. You should have your blood pressure rechecked by a primary care provider within the next 4 weeks.  If your provider in the emergency department today gave you specific instructions to follow-up with your doctor or provider even sooner than that, you should follow that instruction and not wait for up to 4 weeks for your follow-up visit.        Thank you for choosing [facility]       Thank you for choosing [facility] for your care. Our goal is always to provide you with excellent care. Hearing back from our patients is one way we can continue to improve our services. Please take a few minutes to complete the written survey that you may receive in the mail after you visit with us. Thank you!        Care EveryWhere ID     This is your Care EveryWhere ID. This could be used by other organizations to access your [facility] medical records  [id number]        Equal Access to Services     LAZARO ROMERO : Timo Jeff, enmanuel maher, purnima kaplan  bailey cuenca ah. [facility] [phone].    ATENCIÓN: Si habla español, tiene a jacome disposición servicios gratuitos de asistencia lingüística. Llame al [phone].    We comply with applicable federal civil rights laws and [place] laws. We do not discriminate on the basis of race, color, national origin, age, disability, sex, sexual orientation, or gender identity.            After Visit Summary       This is your record. Keep this with you and show to your community pharmacist(s) and doctor(s) at your next visit.

## 2018-09-13 NOTE — DISCHARGE INSTRUCTIONS
*CHEST PAIN, UNCERTAIN CAUSE    Based on your exam today, the exact cause of your chest pain is not certain. Your condition does not seem serious at this time, and your pain does not appear to be coming from your heart. However, sometimes the signs of a serious problem take more time to appear. Therefore, watch for the warning signs listed below.  HOME CARE:    1. Rest today and avoid strenuous activity.  2. Take any prescribed medicine as directed.  FOLLOW UP with your doctor in 1-3 days.   GET PROMPT MEDICAL ATTENTION if any of the following occur:    A change in the type of pain: if it feels different, becomes more severe, lasts longer, or begins to spread into your shoulder, arm, neck, jaw or back    Shortness of breath or increased pain with breathing    Weakness, dizziness, or fainting    Cough with blood or dark colored sputum (phlegm)    Fever over 101  F (38.3  C)    Swelling, pain or redness in one leg    9317-6067 [employer]. [address] [place] [address]. All rights reserved. This information is not intended as a substitute for professional medical care. Always follow your healthcare professional's instructions.  This information has been modified by your health care provider with permission from the publisher.      Symptoms With Uncertain Cause (Adult)  You have been examined, and tests may have been done. However, the exact cause of your symptoms is still not certain. Watch for any new symptoms or worsening of your condition. Another exam or more testing at a later time may be needed. Unless told otherwise, you can go back to your normal routine. Continue to take prescribed medicines as directed. Contact your healthcare provider if you have questions or concerns.   Follow-up care  Follow up with your healthcare provider if your symptoms do not begin to improve in the next few days, or as advised by our staff.   When to seek medical advice  Call your healthcare provider if  your symptoms get worse or if new symptoms appear.  Date Last Reviewed: 10/1/2017    7089-1976 [employer]. [address] [facility], [place] [address]. All rights reserved. This information is not intended as a substitute for professional medical care. Always follow your healthcare professional's instructions.

## 2018-09-13 NOTE — ED PROVIDER NOTES
"  History     Chief Complaint:  Chest Pain (Pt complains of dizziness that started last night, pt complains of chest \"heaviness\" that started today.  )      HPI   Westley Ness is a 72 year old male who presents with complaints of not feeling well and \"almost but not quite dizzy\" . He reports that his symptoms started at 0000 last night. He reports that he took his medications but without much improvement. He also reports \"maybe some chest heaviness\"  with activity, but would not describe it as a chest pain. The patient also describes \"maybe\" some mild shortness of breath. He denies new changes to his chronic cough. He reports no other acute symptoms today. The patient has a history of Type 2 diabetes and is a dialysis patient, last run yesterday.    Allergies:  Contrast dye     Medications:    Acetaminophen   Amoxicillin   Aspirin 81 Mg Tablet  Carvedilol   Clopidogrel   Fluticasone   Hydrocodone-Acetaminophen   Isosorbide Mononitrate   Levothyroxine   Lisinopril   Loperamide   Mexiletine   Nitroglycerin   Ondansetron   Pantoprazole   Pravastatin   Psyllium 0.52 G Capsule  Ranitidine Hcl   Triphrocaps 1 Mg Capsule     Past Medical History:    Acid reflux  CAD  ESRD  Hypothyroidism  Ischemic cardiomyopathy  End STEMI  Type 2 diabetes  Typical atrial flutter     Past Surgical History:    Cholecystectomy  Elbow surgery  Ablation for atrial flutter  Left heart catheterization  Implant ventricular device  Tonsillectomy & Adenoidectomy  Vascular surgery     Family History:    Mother-cerebrovascular disease  Father-hypertension, bladder cancer  Grandmother-myocardial infarction     Social History:  The patient  reports that he quit smoking about 21 years ago. His smoking use included Cigarettes. He started smoking about 53 years ago. He has a 70.00 pack-year smoking history. He has never used smokeless tobacco. He reports that he drinks alcohol. He reports that he does not use illicit drugs.   Marital Status:  Single   " "    Review of Systems   Constitutional: Negative for chills and fever.   Respiratory: Positive for chest tightness.    Cardiovascular: Negative for chest pain.   Gastrointestinal: Negative for nausea and vomiting.   Musculoskeletal: Negative for back pain.   Skin: Negative for wound.   Neurological: Positive for dizziness (\"almost dizzy\" ). Negative for weakness.   All other systems reviewed and are negative.    Physical Exam     Vital signs    Estimated body mass index is 28.49 kg/(m^2) as calculated from the following:    Height as of this encounter: 1.727 m (5' 8\").    Weight as of this encounter: 85 kg (187 lb 6.3 oz).    Patient Vitals for the past 24 hrs:   BP Temp Temp src Pulse Heart Rate Resp SpO2 Height Weight   09/13/18 1726 139/82 - - - 71 - 96 % - -   09/13/18 1551 148/78 97.8  F (36.6  C) Oral 71 - 16 96 % 1.727 m (5' 8\") 85 kg (187 lb 6.3 oz)          Physical Exam  General: Alert, No obvious discomfort, well kept  Eyes: PERRL, conjunctivae pink no scleral icterus or conjunctival injection  ENT:   Moist mucus membranes, posterior oropharynx clear without erythema or exudates, No lymphadenopathy, Normal voice  Resp:  Lungs clear to auscultation bilaterally, no crackles/rubs/wheezes. Good air movement  CV:  Normal rate and rhythm, no murmurs/rubs/gallops  GI:  Abdomen soft and non-distended.  Normoactive BS.  No tenderness, guarding or rebound, No masses  Skin:  Warm, dry.  No rashes or petechiae  Musculoskeletal: No peripheral edema or calf tenderness, Normal gross ROM   Neuro: Alert and oriented to person/place/time, normal sensation  Psychiatric: Normal affect, cooperative, good eye contact      Emergency Department Course   ECG (15:47:51):  Rate 70 bpm. HI interval 212. QRS duration 94. QT/QTc 438/473. P-R-T axes 72 159 48. Sinus rhythm with 1st degree AV block. Possible Anterolateral infarct, age undetermined. Abnormal EKG[place]  [place]ed [place] [address] by Eric Miguel APRN.     Imaging:  XR Chest 2 " "Views   Preliminary Result   IMPRESSION:    1. Cardiomegaly.   2. No gross congestive heart failure.       3. No focal alveolar infiltrate.           I communicated the results of the imaging studies with the patient who expressed understanding of these findings.      Laboratory:  CBC: RBC 3.77, HGB 12.7, 'HCT 37.9, , MCH 33.7, otherwise within normal limits  BMP: Glucose 113, Creatinine 4.72, GFR Estimate 12, GFR Estimate if black 115  Troponin: 0.019    Emergency Department Course:  Past medical records, nursing notes, and vitals reviewed.  1615: I performed an exam of the patient and obtained history, as documented above.      IV inserted and blood drawn for the above work up to be conducted.     1733: Rechecked the patient, findings and plan explained to the patient. Patient discharged home, status improved, with instructions regarding supportive care, medications, and reasons to return as well as the importance of close follow-up was reviewed.    Impression & Plan      Medical Decision Making:  Westley Ness is a 72 year old male who presents today for evaluation of vague symptoms including feeling \"almost dizzy\" and \"maybe\" chest heaviness.  He had his full dialysis run yesterday.  His head still physical exam showed no significant findings.  Chest x-ray was obtained without indication for congestive heart failure or infiltrate.  He has a nonacute EKG and negative troponin.  I doubt ACS.  His main concern was for his potassium which was normal.  The remainder of his laboratory studies appear to be within normal limits for patient.  There is no focal neurologic deficits.  I doubt CVA.  He is ambulatory around the department without difficulty.  At this point time there is no indication for further testing.  He appears to be safe and appropriate for outpatient management and follow-up.  He is discharged to home.      Diagnosis:    ICD-10-CM    1. Chest tightness R07.89    2. Feeling unwell R68.89  "       Disposition:  discharged to home    I, Holger Vick, am serving as a scribe at 4:15 PM on 9/13/2018 to document services personally performed by Eric Miguel APRN based on my observations and the provider's statements to me.   EMERGENCY DEPARTMENT       Eric Miguel APRN CNP  09/13/18 8995

## 2018-10-01 DIAGNOSIS — I25.10 CORONARY ARTERY DISEASE INVOLVING NATIVE CORONARY ARTERY OF NATIVE HEART WITHOUT ANGINA PECTORIS: ICD-10-CM

## 2018-10-02 RX ORDER — CLOPIDOGREL BISULFATE 75 MG/1
TABLET ORAL
Qty: 30 TABLET | Refills: 11 | Status: SHIPPED | OUTPATIENT
Start: 2018-10-02 | End: 2019-10-08

## 2018-10-02 NOTE — TELEPHONE ENCOUNTER
Routing refill request to provider for review/approval because:  Labs out of range:  Hgb, platelet

## 2018-10-02 NOTE — TELEPHONE ENCOUNTER
"Requested Prescriptions   Pending Prescriptions Disp Refills     clopidogrel (PLAVIX) 75 MG tablet [Pharmacy Med Name: CLOPIDOGREL 75 MG TABLET 75 TAB]  Last Written Prescription Date:  10/03/2017  Last Fill Quantity: 30,  # refills: 11   Last Office Visit: 8/29/2018   Future Office Visit:      30 tablet 11     Sig: TAKE 1 TABLET (75 MG) BY MOUTH DAILY TO PROTECT YOUR STENT(S). DO NOT STOP TAKING UNLESS DIRECTED BY CARDIOLOGY.    Plavix Failed    10/1/2018  4:02 PM       Failed - Normal HGB on file in past 12 months    Recent Labs   Lab Test  09/13/18   1610   HGB  12.7*              Failed - Normal Platelets on file in past 12 months    Recent Labs   Lab Test  09/13/18   1610   PLT  139*              Passed - No active PPI on record unless is Protonix       Passed - Recent (12 mo) or future (30 days) visit within the authorizing provider's specialty    Patient had office visit in the last 12 months or has a visit in the next 30 days with authorizing provider or within the authorizing provider's specialty.  See \"Patient Info\" tab in inbasket, or \"Choose Columns\" in Meds & Orders section of the refill encounter.           Passed - Patient is age 18 or older          "

## 2018-10-09 ENCOUNTER — ALLIED HEALTH/NURSE VISIT (OUTPATIENT)
Dept: CARDIOLOGY | Facility: CLINIC | Age: 73
End: 2018-10-09
Payer: MEDICARE

## 2018-10-09 DIAGNOSIS — Z95.810 ICD (IMPLANTABLE CARDIOVERTER-DEFIBRILLATOR) IN PLACE: Primary | ICD-10-CM

## 2018-10-09 DIAGNOSIS — Z95.0 CARDIAC PACEMAKER IN SITU: Primary | ICD-10-CM

## 2018-10-09 DIAGNOSIS — I25.5 CARDIOMYOPATHY, ISCHEMIC: ICD-10-CM

## 2018-10-09 PROCEDURE — 99207 ZZC NO CHARGE LOS: CPT

## 2018-10-09 PROCEDURE — 93296 REM INTERROG EVL PM/IDS: CPT | Performed by: INTERNAL MEDICINE

## 2018-10-09 PROCEDURE — 93295 DEV INTERROG REMOTE 1/2/MLT: CPT | Performed by: INTERNAL MEDICINE

## 2018-10-09 NOTE — MR AVS SNAPSHOT
After Visit Summary   10/9/2018    Westley Ness    MRN: [id number]           Patient Information     Date Of Birth          1945        Visit Information        Provider Department      10/9/2018 4:30 PM CARD DCR2 [facility]        Today's Diagnoses     ICD (implantable cardioverter-defibrillator) in place    -  1    Cardiomyopathy, ischemic           Follow-ups after your visit        Additional Services     Follow-Up with Device Clinic                 Your next 10 appointments already scheduled     Oct 09, 2018  4:30 PM CDT   Remote ICD Check with CARD DCR2   [facility] ([facility])    [address]  [facility] [id number] [phone] OPT 2           This appointment is for a remote check of your debrillator.  This is not an appointment at the office.              Future tests that were ordered for you today     Open Future Orders        Priority Expected Expires Ordered    Follow-Up with Device Clinic Routine 1/9/2019 10/8/2020 10/9/2018            Who to contact     If you have questions or need follow up information about today's clinic visit or your schedule please contact [facility] directly at [phone].  Normal or non-critical lab and imaging results will be communicated to you by MyChart, letter or phone within 4 business days after the clinic has received the results. If you do not hear from us within 7 days, please contact the clinic through MyChart or phone. If you have a critical or abnormal lab result, we will notify you by phone as soon as possible.  Submit refill requests through iNeed or call your pharmacy and they will forward the refill request to us. Please allow 3 business days for your refill to be completed.          Additional Information About Your Visit        Care EveryWhere ID     This is your Care EveryWhere ID. This  could be used by other organizations to access your [place] medical records  [id number]         Blood Pressure from Last 3 Encounters:   09/13/18 139/82   08/29/18 102/50   08/15/18 (!) 100/34    Weight from Last 3 Encounters:   09/13/18 85 kg (187 lb 6.3 oz)   08/29/18 84.4 kg (186 lb)   08/15/18 84.4 kg (186 lb)              We Performed the Following     ICD DEVICE INTERROGAT REMOTE (11804)     INTERROGATION DEVICE EVAL REMOTE, PACER/ICD (60432)        Primary Care Provider Office Phone # Fax #    Josselin Kolb [phone]       [address] [place] [id number]        Goals        General    Medical (pt-stated)     Notes - Note created  4/30/2018  2:32 PM by Mariel Santos, RN    Goal Statement: I will prevent re-occurence of Pneumonia  Measure of Success: decreased hospital visits   Supportive Steps to Achieve: I will cough deep breathe and drink water within his restrictions due to dialysis   Barriers: No barriers identified   Strengths: Continues antibiotic   Date to Achieve By: After course of antibiotic         Equal Access to Services     LAZARO ROMERO AH: Hadii aad ku hadasho [place], waaxda luqadaha, qaybta kaalmada jw, purnima cuenca . So [facility] [phone].    ATENCIÓN: Si habla español, tiene a jacome disposición servicios gratuitos de asistencia lingüística. Llame al [phone].    We comply with applicable federal civil rights laws and [place] laws. We do not discriminate on the basis of race, color, national origin, age, disability, sex, sexual orientation, or gender identity.            Thank you!     Thank you for choosing [facility]  for your care. Our goal is always to provide you with excellent care. Hearing back from our patients is one way we can continue to improve our services. Please take a few minutes to complete the written survey that you may receive in the mail after your visit with us.  Thank you!             Your Updated Medication List - Protect others around you: Learn how to safely use, store and throw away your medicines at www.disposemymeds.org.          This list is accurate as of 10/9/18 11:06 AM.  Always use your most recent med list.                   Brand Name Dispense Instructions for use Diagnosis    acetaminophen 325 MG tablet    TYLENOL    60 tablet    Take 2 tablets (650 mg) by mouth 4 times daily    Closed nondisplaced fracture of right patella, unspecified fracture morphology, initial encounter, Elbow fracture, right, closed, initial encounter       amoxicillin 500 MG capsule    AMOXIL    10 capsule    Take 1 capsule (500 mg) by mouth daily - take after dialysis on dialysis days    Lower urinary tract symptoms (LUTS)       aspirin 81 MG tablet     1 tablet    Take 1 tablet (81 mg) by mouth daily        carvedilol 6.25 MG tablet    COREG    180 tablet    TAKE 1 TABLET BY MOUTH TWICE DAILY WITH MEALS AFTER DIALYSIS    NSTEMI (non-ST elevated myocardial infarction) (H), Mild CAD       clopidogrel 75 MG tablet    PLAVIX    30 tablet    TAKE 1 TABLET (75 MG) BY MOUTH DAILY TO PROTECT YOUR STENT(S). DO NOT STOP TAKING UNLESS DIRECTED BY CARDIOLOGY.    Coronary artery disease involving native coronary artery of native heart without angina pectoris       fluticasone 50 MCG/ACT spray    FLONASE     Spray 2 sprays into both nostrils daily as needed for rhinitis or allergies        HYDROcodone-acetaminophen 5-325 MG per tablet    NORCO    10 tablet    Take 1 tablet by mouth every 6 hours as needed for severe pain        isosorbide mononitrate 30 MG 24 hr tablet    IMDUR    90 tablet    TAKE 1/2 TABLET BY MOUTH ONCE DAILY    NSTEMI (non-ST elevated myocardial infarction) (H)       levothyroxine 50 MCG tablet    SYNTHROID/LEVOTHROID    90 tablet    TAKE 1 TABLET BY MOUTH DAILY    Hypothyroidism, unspecified type       lisinopril 2.5 MG tablet    PRINIVIL/Zestril    180 tablet    Take one  tablet after dialysis. Take second tablet at bedtime.    NSTEMI (non-ST elevated myocardial infarction) (H)       loperamide 2 MG capsule    IMODIUM     Take 1 mg by mouth as needed        mexiletine 150 MG capsule    MEXITIL    180 capsule    TAKE 1 CAPSULE (150 MG) BY MOUTH 2 TIMES DAILY    Tachycardia       nitroGLYcerin 0.4 MG sublingual tablet    NITROSTAT    25 tablet    1 TAB EVERY 5 MIN AS NEEDED, UP TO 3 PER EPISODE    Angina pectoris (H)       ondansetron 4 MG ODT tab    ZOFRAN-ODT     Take 4 mg by mouth every 8 hours as needed for nausea        pantoprazole 40 MG EC tablet    PROTONIX    30 tablet    Take 1 tablet (40 mg) by mouth every morning    Gastroesophageal reflux disease, esophagitis presence not specified       pravastatin 40 MG tablet    PRAVACHOL    90 tablet    TAKE 1 TABLET (40 MG) BY MOUTH DAILY    Coronary artery disease involving native coronary artery of native heart without angina pectoris       psyllium 0.52 g capsule      Take 1 capsule by mouth daily as needed        TRIPHROCAPS 1 MG capsule   Generic drug:  NEPHROCAPS     90 capsule    TAKE 1 CAPSULE BY MOUTH EVERY EVENING    ESRD (end stage renal disease) on dialysis (H)       ZANTAC PO      Take 75 mg by mouth At Bedtime

## 2018-11-05 DIAGNOSIS — Z99.2 ESRD (END STAGE RENAL DISEASE) ON DIALYSIS (H): ICD-10-CM

## 2018-11-05 DIAGNOSIS — N18.6 ESRD (END STAGE RENAL DISEASE) ON DIALYSIS (H): ICD-10-CM

## 2018-11-06 RX ORDER — B COMPLEX, C NO.20/FOLIC ACID 1 MG
CAPSULE ORAL
Qty: 90 CAPSULE | Refills: 3 | Status: SHIPPED | OUTPATIENT
Start: 2018-11-06 | End: 2019-10-29

## 2018-11-06 NOTE — TELEPHONE ENCOUNTER
Requested Prescriptions   Pending Prescriptions Disp Refills     TRIPHROCAPS 1 MG capsule [Pharmacy Med Name: TRIPHROCAPS CAP 1 CAP]  Last Written Prescription Date:  10/31/2017  Last Fill Quantity: 90,  # refills: 03   Last Office Visit: 8/29/2018   Future Office Visit:      90 capsule 3     Sig: TAKE 1 CAPSULE BY MOUTH EVERY EVENING    There is no refill protocol information for this order

## 2018-12-04 DIAGNOSIS — K21.9 GASTROESOPHAGEAL REFLUX DISEASE, ESOPHAGITIS PRESENCE NOT SPECIFIED: ICD-10-CM

## 2018-12-04 RX ORDER — PANTOPRAZOLE SODIUM 40 MG/1
TABLET, DELAYED RELEASE ORAL
Qty: 30 TABLET | Refills: 9 | Status: SHIPPED | OUTPATIENT
Start: 2018-12-04 | End: 2019-10-01

## 2018-12-04 NOTE — TELEPHONE ENCOUNTER
"Requested Prescriptions   Pending Prescriptions Disp Refills     pantoprazole (PROTONIX) 40 MG EC tablet [Pharmacy Med Name: PANTOPRAZOLE 40MG TAB 40 TAB] 30 tablet 10     Sig: TAKE 1 TABLET (40 MG) BY MOUTH EVERY MORNING    PPI Protocol Passed    12/4/2018  8:51 AM       Passed - Not on Clopidogrel (unless Pantoprazole ordered)       Passed - No diagnosis of osteoporosis on record       Passed - Recent (12 mo) or future (30 days) visit within the authorizing provider's specialty    Patient had office visit in the last 12 months or has a visit in the next 30 days with authorizing provider or within the authorizing provider's specialty.  See \"Patient Info\" tab in inbasket, or \"Choose Columns\" in Meds & Orders section of the refill encounter.             Passed - Patient is age 18 or older        Last Written Prescription Date:  1/11/2018  Last Fill Quantity: 30,  # refills: 10   Last office visit: 8/29/2018 with prescribing provider:  8/29/18   Future Office Visit:      "

## 2019-01-03 DIAGNOSIS — I25.10 MILD CAD: ICD-10-CM

## 2019-01-03 DIAGNOSIS — I21.4 NSTEMI (NON-ST ELEVATED MYOCARDIAL INFARCTION) (H): ICD-10-CM

## 2019-01-03 RX ORDER — CARVEDILOL 6.25 MG/1
TABLET ORAL
Qty: 180 TABLET | Refills: 0 | Status: SHIPPED | OUTPATIENT
Start: 2019-01-03 | End: 2019-02-08 | Stop reason: ALTCHOICE

## 2019-01-04 NOTE — TELEPHONE ENCOUNTER
"Requested Prescriptions   Pending Prescriptions Disp Refills     carvedilol (COREG) 6.25 MG tablet [Pharmacy Med Name: CARVEDILOL 6.25MG TAB 6.25 TAB] 180 tablet 3     Sig: TAKE 1 TABLET BY MOUTH TWICE DAILY WITH MEALS AFTER DIALYSIS    Beta-Blockers Protocol Passed - 1/3/2019  6:31 PM       Passed - Blood pressure under 140/90 in past 12 months    BP Readings from Last 3 Encounters:   09/13/18 139/82   08/29/18 102/50   08/15/18 (!) 100/34                Passed - Patient is age 6 or older       Passed - Recent (12 mo) or future (30 days) visit within the authorizing provider's specialty    Patient had office visit in the last 12 months or has a visit in the next 30 days with authorizing provider or within the authorizing provider's specialty.  See \"Patient Info\" tab in inbasket, or \"Choose Columns\" in Meds & Orders section of the refill encounter.                Prescription approved per [facility] [place] Protocol.    Danyell RIVERA RN, BSN, PHN      "

## 2019-01-14 DIAGNOSIS — I25.10 CORONARY ARTERY DISEASE INVOLVING NATIVE CORONARY ARTERY OF NATIVE HEART WITHOUT ANGINA PECTORIS: ICD-10-CM

## 2019-01-14 RX ORDER — PRAVASTATIN SODIUM 40 MG
TABLET ORAL
Qty: 90 TABLET | Refills: 1 | Status: SHIPPED | OUTPATIENT
Start: 2019-01-14 | End: 2019-07-09

## 2019-01-14 NOTE — TELEPHONE ENCOUNTER
"Prescription approved per [facility] Refill Protocol.    Requested Prescriptions   Pending Prescriptions Disp Refills     pravastatin (PRAVACHOL) 40 MG tablet [Pharmacy Med Name: PRAVASTATIN NA 40 MG TAB 40 TAB] 90 tablet 3     Sig: TAKE 1 TABLET (40 MG) BY MOUTH DAILY    Statins Protocol Passed - 1/14/2019  2:37 PM       Passed - LDL on file in past 12 months    Recent Labs   Lab Test 02/27/18  0921   LDL 46            Passed - No abnormal creatine kinase in past 12 months    No lab results found.            Passed - Recent (12 mo) or future (30 days) visit within the authorizing provider's specialty    Patient had office visit in the last 12 months or has a visit in the next 30 days with authorizing provider or within the authorizing provider's specialty.  See \"Patient Info\" tab in inbasket, or \"Choose Columns\" in Meds & Orders section of the refill encounter.             Passed - Medication is active on med list       Passed - Patient is age 18 or older          "

## 2019-02-08 ENCOUNTER — TELEPHONE (OUTPATIENT)
Dept: CARDIOLOGY | Facility: CLINIC | Age: 74
End: 2019-02-08

## 2019-02-08 DIAGNOSIS — I25.5 ISCHEMIC CARDIOMYOPATHY: Primary | ICD-10-CM

## 2019-02-08 RX ORDER — METOPROLOL SUCCINATE 25 MG/1
12.5 TABLET, EXTENDED RELEASE ORAL DAILY
Qty: 45 TABLET | Refills: 3 | Status: SHIPPED | OUTPATIENT
Start: 2019-02-08 | End: 2019-04-30 | Stop reason: DRUGHIGH

## 2019-02-08 NOTE — TELEPHONE ENCOUNTER
Pt states that he is not dizzy or lightheaded, but is tired.  Pt does state that this is not everyday, but is getting to be more and more.  Pt would like to try the Metoprolol ER 12.5 mg daily, but take at noon, after his Dialysis on Dialysis days. Escript has been sent.  Pt then stated that he also is late with his OV's with Dr Mejia.  Scheduled for 2/21 at 0845.  Pt then stated that he needs a device check also, this will need to be done by scheduling.  This writer then finds an order for an Echo also. Have message Anette in scheduling to get all this coordinated.  Pt will call if he continues to have problems with his BP and becomes for symptomatic.  No further questions at this time. Ann

## 2019-02-08 NOTE — TELEPHONE ENCOUNTER
Arelis Stahl NP from [facility] with Dr Yap called and LM stating that they are having problems with Hypotension and pt dialysis and wondering if they can decrease his medication dose or doses. Pt is currently on Carvedilol 6.25 bid, lisinopril 2.5 mg daily.  Has thought about starting pt Midodrine, but knew that pt would want Cardiology input.  Will send message to Lacy to review.  Arelis can be contacted at [phone]. Ann

## 2019-02-19 ENCOUNTER — OFFICE VISIT (OUTPATIENT)
Dept: URGENT CARE | Facility: URGENT CARE | Age: 74
End: 2019-02-19
Payer: MEDICARE

## 2019-02-19 ENCOUNTER — ANCILLARY PROCEDURE (OUTPATIENT)
Dept: GENERAL RADIOLOGY | Facility: CLINIC | Age: 74
End: 2019-02-19
Attending: PHYSICIAN ASSISTANT
Payer: MEDICARE

## 2019-02-19 VITALS
SYSTOLIC BLOOD PRESSURE: 88 MMHG | OXYGEN SATURATION: 97 % | BODY MASS INDEX: 29.12 KG/M2 | HEART RATE: 82 BPM | DIASTOLIC BLOOD PRESSURE: 50 MMHG | WEIGHT: 191.5 LBS | TEMPERATURE: 98 F

## 2019-02-19 DIAGNOSIS — M54.10 RADICULAR PAIN OF UPPER EXTREMITY: ICD-10-CM

## 2019-02-19 DIAGNOSIS — M54.2 NECK PAIN: ICD-10-CM

## 2019-02-19 DIAGNOSIS — M54.9 UPPER BACK PAIN: ICD-10-CM

## 2019-02-19 DIAGNOSIS — M50.30 DDD (DEGENERATIVE DISC DISEASE), CERVICAL: ICD-10-CM

## 2019-02-19 DIAGNOSIS — M54.2 NECK PAIN: Primary | ICD-10-CM

## 2019-02-19 PROCEDURE — 72040 X-RAY EXAM NECK SPINE 2-3 VW: CPT

## 2019-02-19 PROCEDURE — 99214 OFFICE O/P EST MOD 30 MIN: CPT | Performed by: PHYSICIAN ASSISTANT

## 2019-02-19 RX ORDER — METHYLPREDNISOLONE 4 MG
TABLET, DOSE PACK ORAL
Qty: 21 TABLET | Refills: 0 | Status: SHIPPED | OUTPATIENT
Start: 2019-02-19 | End: 2019-02-26

## 2019-02-19 NOTE — PROGRESS NOTES
SUBJECTIVE:  Chief Complaint   Patient presents with     Urgent Care     sore neck and shoulders and lower back 2xweeks tylenol is not working     Westley Ness is a 73 year old male presents with a chief complaint of having neck tenderness, upper back pain and radicular pain into the right upper arm  .  How: no known injury.  The patient complained of mild pain  and has had decreased ROM.  Pain exacerbated by movement.  Relieved by rest.  He treated it initially with no therapy. This is the first time this type of injury has occurred to this patient.     Past Medical History:   Diagnosis Date     Acid reflux disease      CAD (coronary artery disease)     s/p multiple NSTEMIs and PCI's     ESRD on hemodialysis (H)     MWF     Hypothyroidism      Ischemic cardiomyopathy     EF 25-30%, s/p AICD     NSTEMI (non-ST elevated myocardial infarction) (H)     multiple     Type 2 diabetes mellitus (H)     diet-controlled     Typical atrial flutter (H)     s/p ablation     Allergies   Allergen Reactions     Contrast Dye Hives     Does fine if he uses benadryl prior.     No Clinical Screening - See Comments      Green beans - Diarrhea.    Topical antibiotic - name unknown - caused swelling of the penis.     Social History     Tobacco Use     Smoking status: Former Smoker     Packs/day: 2.00     Years: 35.00     Pack years: 70.00     Types: Cigarettes     Start date:      Last attempt to quit: 1996     Years since quittin.3     Smokeless tobacco: Never Used   Substance Use Topics     Alcohol use: Yes     Alcohol/week: 0.0 oz     Comment: rare     Family History   Problem Relation Age of Onset     Cerebrovascular Disease Mother         later in life     Hypertension Father      Bladder Cancer Father      Myocardial Infarction Paternal Grandmother      Diabetes No family hx of      Prostate Cancer No family hx of      Colon Cancer No family hx of        ROS:  CONSTITUTIONAL:NEGATIVE for fever, chills, change in  weight  INTEGUMENTARY/SKIN: NEGATIVE for worrisome rashes, moles or lesions  ENT/MOUTH: NEGATIVE for ear, mouth and throat problems  NECK: POSITIVE for neck tenderness, midline  RESP:NEGATIVE for significant cough or SOB  CV: NEGATIVE for chest pain, palpitations or peripheral edema  GI: NEGATIVE for nausea, abdominal pain, heartburn, or change in bowel habits  MUSCULOSKELETAL: POSITIVE  for upper back pain, tenderness  NEURO: POSITIVE for radicular pain into right upper arm    EXAM:   BP (!) 88/50   Pulse 82   Temp 98  F (36.7  C) (Oral)   Wt 86.9 kg (191 lb 8 oz)   SpO2 97%   BMI 29.12 kg/m    Gen: healthy,alert,no distress  Extremity: full ROM of upper arms bilaterally .   There is not compromise to the distal circulation.  Pulses are +2 and CRT is brisk  NECK:  tenderness to palpation along posterior aspect both sides  CHEST: clear to auscultation  CV: regular rate and rhythm  EXTREMITIES: peripheral pulses normal  MS:  tender to palpation upper back bilaterally  SKIN: no suspicious lesions or rashes  NEURO: Normal strength and tone, sensory exam grossly normal, mentation intact and speech normal    X-RAY was done and negative for acute changes including fracture Xray read by Eric Guzman at time of visit    ASSESSMENT/PLAN      ICD-10-CM    1. Neck pain M54.2 XR Cervical Spine 2/3 Views     methylPREDNISolone (MEDROL DOSEPAK) 4 MG tablet therapy pack   2. Upper back pain M54.9 XR Cervical Spine 2/3 Views   3. DDD (degenerative disc disease), cervical M50.30 methylPREDNISolone (MEDROL DOSEPAK) 4 MG tablet therapy pack   4. Radicular pain of upper extremity M54.10 methylPREDNISolone (MEDROL DOSEPAK) 4 MG tablet therapy pack       Medrol for DDD  Continue taking protonix while on medication  Follow up with PCP as needed

## 2019-02-22 ENCOUNTER — TELEPHONE (OUTPATIENT)
Dept: OTHER | Facility: CLINIC | Age: 74
End: 2019-02-22

## 2019-02-22 NOTE — TELEPHONE ENCOUNTER
Pt is s/p proximal left radial to cephalic arterial venous fistula in 2010 with Dr. Griggs.  LOV with Dr. Griggs on 1/20/16.  Appears pt has history of overlying skin bleeding/thinning.    Arelis Stahl CNP with [facility] called concerned about an area over pt's AVF site.  Arelis states it appears as though there was a scab that had come off.  Denies bleeding.  Approximately 1 cm.  Arelis is requesting pt to be seen.          Will route to Dr. Griggs to determine if pt needs to be seen or continue to monitor.      Livia Schultz, RUIN, RN

## 2019-02-26 ENCOUNTER — OFFICE VISIT (OUTPATIENT)
Dept: INTERNAL MEDICINE | Facility: CLINIC | Age: 74
End: 2019-02-26
Payer: MEDICARE

## 2019-02-26 ENCOUNTER — TELEPHONE (OUTPATIENT)
Dept: INTERNAL MEDICINE | Facility: CLINIC | Age: 74
End: 2019-02-26

## 2019-02-26 ENCOUNTER — ANCILLARY PROCEDURE (OUTPATIENT)
Dept: GENERAL RADIOLOGY | Facility: CLINIC | Age: 74
End: 2019-02-26
Attending: INTERNAL MEDICINE
Payer: MEDICARE

## 2019-02-26 VITALS
HEART RATE: 87 BPM | SYSTOLIC BLOOD PRESSURE: 122 MMHG | HEIGHT: 68 IN | WEIGHT: 191 LBS | BODY MASS INDEX: 28.95 KG/M2 | TEMPERATURE: 97.9 F | DIASTOLIC BLOOD PRESSURE: 60 MMHG | OXYGEN SATURATION: 97 % | RESPIRATION RATE: 16 BRPM

## 2019-02-26 DIAGNOSIS — I48.3 TYPICAL ATRIAL FLUTTER (H): ICD-10-CM
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
## 2019-02-26 DIAGNOSIS — M25.511 CHRONIC PAIN OF BOTH SHOULDERS: ICD-10-CM

## 2019-02-26 DIAGNOSIS — Z99.2 ESRD ON HEMODIALYSIS (H): ICD-10-CM
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
## 2019-02-26 DIAGNOSIS — I25.5 ISCHEMIC CARDIOMYOPATHY: ICD-10-CM

## 2019-02-26 DIAGNOSIS — M25.512 CHRONIC PAIN OF BOTH SHOULDERS: ICD-10-CM

## 2019-02-26 DIAGNOSIS — Z11.1 SCREENING EXAMINATION FOR PULMONARY TUBERCULOSIS: ICD-10-CM

## 2019-02-26 DIAGNOSIS — I25.10 CORONARY ARTERY DISEASE INVOLVING NATIVE CORONARY ARTERY OF NATIVE HEART WITHOUT ANGINA PECTORIS: ICD-10-CM

## 2019-02-26 DIAGNOSIS — M79.671 RIGHT FOOT PAIN: ICD-10-CM

## 2019-02-26 DIAGNOSIS — G89.29 CHRONIC PAIN OF BOTH SHOULDERS: ICD-10-CM

## 2019-02-26 DIAGNOSIS — N18.6 ESRD ON HEMODIALYSIS (H): ICD-10-CM

## 2019-02-26 DIAGNOSIS — R07.81 RIB PAIN ON LEFT SIDE: ICD-10-CM

## 2019-02-26 DIAGNOSIS — Z00.00 ROUTINE HISTORY AND PHYSICAL EXAMINATION OF ADULT: ICD-10-CM

## 2019-02-26 DIAGNOSIS — G89.29 CHRONIC NECK PAIN: Primary | ICD-10-CM

## 2019-02-26 DIAGNOSIS — M54.2 CHRONIC NECK PAIN: Primary | ICD-10-CM

## 2019-02-26 PROBLEM — R06.02 SHORTNESS OF BREATH: Status: RESOLVED | Noted: 2017-12-18 | Resolved: 2019-02-26

## 2019-02-26 PROBLEM — J18.9 HCAP (HEALTHCARE-ASSOCIATED PNEUMONIA): Status: RESOLVED | Noted: 2018-04-23 | Resolved: 2019-02-26

## 2019-02-26 PROBLEM — I20.9 ANGINA PECTORIS (H): Status: RESOLVED | Noted: 2019-02-26 | Resolved: 2019-02-26

## 2019-02-26 PROBLEM — I20.9 ANGINA PECTORIS (H): Status: ACTIVE | Noted: 2019-02-26

## 2019-02-26 PROCEDURE — 93000 ELECTROCARDIOGRAM COMPLETE: CPT | Performed by: INTERNAL MEDICINE

## 2019-02-26 PROCEDURE — 99214 OFFICE O/P EST MOD 30 MIN: CPT | Performed by: INTERNAL MEDICINE

## 2019-02-26 PROCEDURE — 71046 X-RAY EXAM CHEST 2 VIEWS: CPT

## 2019-02-26 RX ORDER — CYCLOBENZAPRINE HCL 5 MG
5-10 TABLET ORAL
Qty: 20 TABLET | Refills: 0 | Status: SHIPPED | OUTPATIENT
Start: 2019-02-26 | End: 2019-03-26

## 2019-02-26 ASSESSMENT — MIFFLIN-ST. JEOR: SCORE: 1585.87

## 2019-02-26 NOTE — PROGRESS NOTES
SUBJECTIVE:                                                      HPI: Westley Ness is a pleasant 73 year old male who presents with neck and shoulder pain:    Was seen in urgent care last week for same concern. Cervical spine x-ray demonstrated degenerative changes, but was otherwise unremarkable. Patient was prescribed a Medrol Dosepak with significant improvement in symptoms - completed steroids yesterday. Now with minimal neck and shoulder pain. Patient would like to discuss a plan going forward in case of recurrent neck and shoulder pain. These have been ongoing for years but flared in the weeks before being seen in urgent care.     Patient also complains of left rib and right foot pain after falling into his bed several days ago while getting dressed. Pain is mild to moderate, not severe.     Patient also requires an updated physical, EKG, and CXR for continued dialysis.    ROS:  Constitutional: denies unintentional weight loss or gain; denies fevers, chills, or sweats     Cardiovascular: see PMH below; denies chest pain, palpitations, or edema  Respiratory: denies cough, wheezing, shortness of breath, or dyspnea on exertion  Gastrointestinal: see PMH below; denies nausea, vomiting, constipation, diarrhea, or abdominal pain  Genitourinary: denies urinary frequency, urgency, dysuria, or hematuria  Integumentary: denies rash or pruritus  Musculoskeletal: see above  Neurologic: denies focal weakness, numbness, or tingling  Hematologic/Immunologic: denies history of anemia or blood transfusions  Endocrine: see PMH below  Psychiatric: denies anxiety; see preventative health below    Past Medical History:   Diagnosis Date     Acid reflux disease      CAD (coronary artery disease)     s/p multiple NSTEMIs and PCI's     ESRD on hemodialysis (H)     MWF     Hypothyroidism      Ischemic cardiomyopathy     EF 25-30%, s/p AICD     Type 2 diabetes mellitus (H)     diet-controlled     Typical atrial flutter (H)     s/p  ablation     Past Surgical History:   Procedure Laterality Date     CHOLECYSTECTOMY, OPEN  2013     ELBOW SURGERY Left 2008    ORIF - plates still in place     H ABLATION ATRIAL FLUTTER  2017, 17     HC LEFT HEART CATHETERIZATION  2016     HC LEFT HEART CATHETERIZATION  2016     IMPLANT VENTRICULAR DEVICE  08/15/2011     TONSILLECTOMY & ADENOIDECTOMY       VASCULAR SURGERY  ,     LUE fistulas (upper and lower); upper one is functional     Family History   Problem Relation Age of Onset     Cerebrovascular Disease Mother         later in life     Hypertension Father      Bladder Cancer Father      Myocardial Infarction Paternal Grandmother      Diabetes No family hx of      Prostate Cancer No family hx of      Colon Cancer No family hx of      Social History     Occupational History     Occupation: Retired - CPA   Tobacco Use     Smoking status: Former Smoker     Packs/day: 2.00     Years: 35.00     Pack years: 70.00     Types: Cigarettes     Start date:      Last attempt to quit: 1996     Years since quittin.3     Smokeless tobacco: Never Used   Substance and Sexual Activity     Alcohol use: Yes     Alcohol/week: 0.0 oz     Comment: rare     Drug use: No     Sexual activity: No   Social History Narrative    Single.    No kids.     Maria Dolores Pabon (friend- healthcare POA), Joaqiuna Nair (friend - healthcare POA)    No formal exercise.      Allergies   Allergen Reactions     Contrast Dye Hives     Does fine if he uses benadryl prior.     No Clinical Screening - See Comments      Green beans - Diarrhea.    Topical antibiotic - name unknown - caused swelling of the penis.     Current Outpatient Medications   Medication Sig     acetaminophen (TYLENOL) 325 MG tablet Take 2 tablets (650 mg) by mouth 4 times daily     aspirin 81 MG tablet Take 1 tablet (81 mg) by mouth daily     clopidogrel (PLAVIX) 75 MG tablet TAKE 1 TABLET (75 MG) BY MOUTH DAILY TO PROTECT YOUR STENT(S). DO NOT STOP  TAKING UNLESS DIRECTED BY CARDIOLOGY.     cyclobenzaprine (FLEXERIL) 5 MG tablet Take 1-2 tablets (5-10 mg) by mouth nightly as needed for muscle spasms     isosorbide mononitrate (IMDUR) 30 MG 24 hr tablet TAKE 1/2 TABLET BY MOUTH ONCE DAILY     levothyroxine (SYNTHROID/LEVOTHROID) 50 MCG tablet TAKE 1 TABLET BY MOUTH DAILY     lisinopril (PRINIVIL/ZESTRIL) 2.5 MG tablet Take one tablet after dialysis. Take second tablet at bedtime.     loperamide (IMODIUM) 2 MG capsule Take 1 mg by mouth as needed      metoprolol succinate ER (TOPROL-XL) 25 MG 24 hr tablet Take 0.5 tablets (12.5 mg) by mouth daily     mexiletine (MEXITIL) 150 MG capsule TAKE 1 CAPSULE (150 MG) BY MOUTH 2 TIMES DAILY     nitroGLYcerin (NITROSTAT) 0.4 MG sublingual tablet 1 TAB EVERY 5 MIN AS NEEDED, UP TO 3 PER EPISODE     pantoprazole (PROTONIX) 40 MG EC tablet TAKE 1 TABLET (40 MG) BY MOUTH EVERY MORNING     pravastatin (PRAVACHOL) 40 MG tablet TAKE 1 TABLET (40 MG) BY MOUTH DAILY     psyllium 0.52 G capsule Take 1 capsule by mouth daily as needed      RaNITidine HCl (ZANTAC PO) Take 75 mg by mouth At Bedtime      TRIPHROCAPS 1 MG capsule TAKE 1 CAPSULE BY MOUTH EVERY EVENING     Immunization History   Administered Date(s) Administered     HEPA 08/06/2004, 09/10/2004, 10/20/2004, 02/25/2005, 03/16/2012, 07/10/2013     HepA-Adult 10/11/2002     Influenza (H1N1) 01/08/2010     Influenza (High Dose) 3 valent vaccine 10/16/2015, 10/16/2016, 10/17/2017, 09/30/2018     Influenza Vaccine IM 3yrs+ 4 Valent IIV4 09/14/2009, 09/22/2010, 09/21/2011, 09/24/2012, 09/13/2013, 09/10/2014     Influenza Vaccine, 3 YRS +, IM (QUADRIVALENT W/PRESERVATIVES) 11/20/2004, 10/24/2005, 11/13/2006, 10/03/2007, 10/26/2007, 10/29/2008     Pneumo Conj 13-V (2010&after) 09/22/2016     Pneumococcal 23 valent 10/15/2009, 12/29/2017     Td (Adult), Adsorbed 10/11/2002     Yellow Fever 10/11/2002     OBJECTIVE:                                                      /60    "Pulse 87   Temp 97.9  F (36.6  C) (Oral)   Resp 16   Ht 1.727 m (5' 8\")   Wt 86.6 kg (191 lb)   SpO2 97%   BMI 29.04 kg/m    Constitutional: well-appearing  Neck: supple and symmetric; no lymphadenopathy; no thyromegaly or masses  Cervical spine: no deformity, crepitus, or step-off; no spinal; or paraspinal tenderness to palpation  Respiratory: normal respiratory effort; clear to auscultation bilaterally  Cardiovascular: regular rate and rhythm  Shoulders: no deformity; mildly tender to palpation along lateral aspects; normal exam otherwise.  Left ribs: no deformity, crepitus, or step-off; mildly tender to palpation  Right foot: no deformity, redness, or swelling; mild bruising near second and third MTPs; area of bruising mildly tender to palpation  Gastrointestinal: soft, non-tender, non-distended, and bowel sounds present  Psych: normal judgment and insight; normal mood and affect; recent and remote memory intact; oriented to time, place, and person    ASSESSMENT/PLAN:                                                       (M54.2,  G89.29) Chronic neck pain  (primary encounter diagnosis)  (M25.511,  G89.29,  M25.512) Chronic pain of both shoulders  Comment: responded very well to course of oral steroids.   Plan:   - referred to PT for further evaluation and treatment - patient will be contacted to schedule.    - may use Flexeril at night as needed.    (W19.XXXA,  Y92.009) Fall in home, initial encounter  Comment: mild mechanical fall into bed,   Plan: see next.     (R07.81) Rib pain on left side  Comment: bruising from recent fall and mild trauma; low suspicion for fracture.  Plan: continue supportive care measures.     (M79.671) Right foot pain  Comment: bruising from recent fall and mild trauma; low suspicion for fracture.  Plan: continue supportive care measures.     (Z00.00) Routine history and physical examination of adult  Comment: PMH, PSH, FH, SH, medications, allergies, and immunizations reviewed. "     (Z11.1) Screening examination for pulmonary tuberculosis  Comment: for continued HD.  Plan: CXR today.    (I25.10) Coronary artery disease involving native coronary artery of native heart without angina pectoris  Comment: s/p multiple NSTEMIs and PCIs.   Plan: EKG today.    (I25.5) Ischemic cardiomyopathy  Comment: on optimal medical therapy.   Plan: CPM.     (N18.6,  Z99.2) ESRD on hemodialysis (H)  Comment: MWF.  Plan: CPM.     The instructions on the AVS were discussed and explained to the patient. Patient expressed understanding of instructions.    A total of 30 minutes were spent face-to-face with this patient during this encounter and over half of that time was spent on counseling and coordination of care re: above diagnoses and plans of care.     (Chart documentation was completed, in part, with [employer] voice-recognition software. Even though reviewed, some grammatical, spelling, and word errors may remain.)    Josselin Kolb MD   [address] [address]  T: [phone], F: [phone]

## 2019-02-26 NOTE — PATIENT INSTRUCTIONS
EKG and Chest xray today.    ---    Will send H&P to you.    ---    Flexeril as needed at night.    ---    Recommend physical therapy - they will contact you to schedule this.

## 2019-02-26 NOTE — H&P (VIEW-ONLY)
SUBJECTIVE:                                                      HPI: Westley Ness is a pleasant 73 year old male who presents with neck and shoulder pain:    Was seen in urgent care last week for same concern. Cervical spine x-ray demonstrated degenerative changes, but was otherwise unremarkable. Patient was prescribed a Medrol Dosepak with significant improvement in symptoms - completed steroids yesterday. Now with minimal neck and shoulder pain. Patient would like to discuss a plan going forward in case of recurrent neck and shoulder pain. These have been ongoing for years but flared in the weeks before being seen in urgent care.     Patient also complains of left rib and right foot pain after falling into his bed several days ago while getting dressed. Pain is mild to moderate, not severe.     Patient also requires an updated physical, EKG, and CXR for continued dialysis.    ROS:  Constitutional: denies unintentional weight loss or gain; denies fevers, chills, or sweats     Cardiovascular: see PMH below; denies chest pain, palpitations, or edema  Respiratory: denies cough, wheezing, shortness of breath, or dyspnea on exertion  Gastrointestinal: see PMH below; denies nausea, vomiting, constipation, diarrhea, or abdominal pain  Genitourinary: denies urinary frequency, urgency, dysuria, or hematuria  Integumentary: denies rash or pruritus  Musculoskeletal: see above  Neurologic: denies focal weakness, numbness, or tingling  Hematologic/Immunologic: denies history of anemia or blood transfusions  Endocrine: see PMH below  Psychiatric: denies anxiety; see preventative health below    Past Medical History:   Diagnosis Date     Acid reflux disease      CAD (coronary artery disease)     s/p multiple NSTEMIs and PCI's     ESRD on hemodialysis (H)     MWF     Hypothyroidism      Ischemic cardiomyopathy     EF 25-30%, s/p AICD     Type 2 diabetes mellitus (H)     diet-controlled     Typical atrial flutter (H)     s/p  ablation     Past Surgical History:   Procedure Laterality Date     CHOLECYSTECTOMY, OPEN  2013     ELBOW SURGERY Left 2008    ORIF - plates still in place     H ABLATION ATRIAL FLUTTER  2017, 17     HC LEFT HEART CATHETERIZATION  2016     HC LEFT HEART CATHETERIZATION  2016     IMPLANT VENTRICULAR DEVICE  08/15/2011     TONSILLECTOMY & ADENOIDECTOMY       VASCULAR SURGERY  ,     LUE fistulas (upper and lower); upper one is functional     Family History   Problem Relation Age of Onset     Cerebrovascular Disease Mother         later in life     Hypertension Father      Bladder Cancer Father      Myocardial Infarction Paternal Grandmother      Diabetes No family hx of      Prostate Cancer No family hx of      Colon Cancer No family hx of      Social History     Occupational History     Occupation: Retired - CPA   Tobacco Use     Smoking status: Former Smoker     Packs/day: 2.00     Years: 35.00     Pack years: 70.00     Types: Cigarettes     Start date:      Last attempt to quit: 1996     Years since quittin.3     Smokeless tobacco: Never Used   Substance and Sexual Activity     Alcohol use: Yes     Alcohol/week: 0.0 oz     Comment: rare     Drug use: No     Sexual activity: No   Social History Narrative    Single.    No kids.     Maria Dolores Pabon (friend- healthcare POA), Joaquina Nair (friend - healthcare POA)    No formal exercise.      Allergies   Allergen Reactions     Contrast Dye Hives     Does fine if he uses benadryl prior.     No Clinical Screening - See Comments      Green beans - Diarrhea.    Topical antibiotic - name unknown - caused swelling of the penis.     Current Outpatient Medications   Medication Sig     acetaminophen (TYLENOL) 325 MG tablet Take 2 tablets (650 mg) by mouth 4 times daily     aspirin 81 MG tablet Take 1 tablet (81 mg) by mouth daily     clopidogrel (PLAVIX) 75 MG tablet TAKE 1 TABLET (75 MG) BY MOUTH DAILY TO PROTECT YOUR STENT(S). DO NOT STOP  TAKING UNLESS DIRECTED BY CARDIOLOGY.     cyclobenzaprine (FLEXERIL) 5 MG tablet Take 1-2 tablets (5-10 mg) by mouth nightly as needed for muscle spasms     isosorbide mononitrate (IMDUR) 30 MG 24 hr tablet TAKE 1/2 TABLET BY MOUTH ONCE DAILY     levothyroxine (SYNTHROID/LEVOTHROID) 50 MCG tablet TAKE 1 TABLET BY MOUTH DAILY     lisinopril (PRINIVIL/ZESTRIL) 2.5 MG tablet Take one tablet after dialysis. Take second tablet at bedtime.     loperamide (IMODIUM) 2 MG capsule Take 1 mg by mouth as needed      metoprolol succinate ER (TOPROL-XL) 25 MG 24 hr tablet Take 0.5 tablets (12.5 mg) by mouth daily     mexiletine (MEXITIL) 150 MG capsule TAKE 1 CAPSULE (150 MG) BY MOUTH 2 TIMES DAILY     nitroGLYcerin (NITROSTAT) 0.4 MG sublingual tablet 1 TAB EVERY 5 MIN AS NEEDED, UP TO 3 PER EPISODE     pantoprazole (PROTONIX) 40 MG EC tablet TAKE 1 TABLET (40 MG) BY MOUTH EVERY MORNING     pravastatin (PRAVACHOL) 40 MG tablet TAKE 1 TABLET (40 MG) BY MOUTH DAILY     psyllium 0.52 G capsule Take 1 capsule by mouth daily as needed      RaNITidine HCl (ZANTAC PO) Take 75 mg by mouth At Bedtime      TRIPHROCAPS 1 MG capsule TAKE 1 CAPSULE BY MOUTH EVERY EVENING     Immunization History   Administered Date(s) Administered     HEPA 08/06/2004, 09/10/2004, 10/20/2004, 02/25/2005, 03/16/2012, 07/10/2013     HepA-Adult 10/11/2002     Influenza (H1N1) 01/08/2010     Influenza (High Dose) 3 valent vaccine 10/16/2015, 10/16/2016, 10/17/2017, 09/30/2018     Influenza Vaccine IM 3yrs+ 4 Valent IIV4 09/14/2009, 09/22/2010, 09/21/2011, 09/24/2012, 09/13/2013, 09/10/2014     Influenza Vaccine, 3 YRS +, IM (QUADRIVALENT W/PRESERVATIVES) 11/20/2004, 10/24/2005, 11/13/2006, 10/03/2007, 10/26/2007, 10/29/2008     Pneumo Conj 13-V (2010&after) 09/22/2016     Pneumococcal 23 valent 10/15/2009, 12/29/2017     Td (Adult), Adsorbed 10/11/2002     Yellow Fever 10/11/2002     OBJECTIVE:                                                      /60    "Pulse 87   Temp 97.9  F (36.6  C) (Oral)   Resp 16   Ht 1.727 m (5' 8\")   Wt 86.6 kg (191 lb)   SpO2 97%   BMI 29.04 kg/m    Constitutional: well-appearing  Neck: supple and symmetric; no lymphadenopathy; no thyromegaly or masses  Cervical spine: no deformity, crepitus, or step-off; no spinal; or paraspinal tenderness to palpation  Respiratory: normal respiratory effort; clear to auscultation bilaterally  Cardiovascular: regular rate and rhythm  Shoulders: no deformity; mildly tender to palpation along lateral aspects; normal exam otherwise.  Left ribs: no deformity, crepitus, or step-off; mildly tender to palpation  Right foot: no deformity, redness, or swelling; mild bruising near second and third MTPs; area of bruising mildly tender to palpation  Gastrointestinal: soft, non-tender, non-distended, and bowel sounds present  Psych: normal judgment and insight; normal mood and affect; recent and remote memory intact; oriented to time, place, and person    ASSESSMENT/PLAN:                                                       (M54.2,  G89.29) Chronic neck pain  (primary encounter diagnosis)  (M25.511,  G89.29,  M25.512) Chronic pain of both shoulders  Comment: responded very well to course of oral steroids.   Plan:   - referred to PT for further evaluation and treatment - patient will be contacted to schedule.    - may use Flexeril at night as needed.    (W19.XXXA,  Y92.009) Fall in home, initial encounter  Comment: mild mechanical fall into bed,   Plan: see next.     (R07.81) Rib pain on left side  Comment: bruising from recent fall and mild trauma; low suspicion for fracture.  Plan: continue supportive care measures.     (M79.671) Right foot pain  Comment: bruising from recent fall and mild trauma; low suspicion for fracture.  Plan: continue supportive care measures.     (Z00.00) Routine history and physical examination of adult  Comment: PMH, PSH, FH, SH, medications, allergies, and immunizations reviewed. "     (Z11.1) Screening examination for pulmonary tuberculosis  Comment: for continued HD.  Plan: CXR today.    (I25.10) Coronary artery disease involving native coronary artery of native heart without angina pectoris  Comment: s/p multiple NSTEMIs and PCIs.   Plan: EKG today.    (I25.5) Ischemic cardiomyopathy  Comment: on optimal medical therapy.   Plan: CPM.     (N18.6,  Z99.2) ESRD on hemodialysis (H)  Comment: MWF.  Plan: CPM.     The instructions on the AVS were discussed and explained to the patient. Patient expressed understanding of instructions.    A total of 30 minutes were spent face-to-face with this patient during this encounter and over half of that time was spent on counseling and coordination of care re: above diagnoses and plans of care.     (Chart documentation was completed, in part, with [employer] voice-recognition software. Even though reviewed, some grammatical, spelling, and word errors may remain.)    Josselin Kolb MD   [address] [address]  T: [phone], F: [phone]

## 2019-02-26 NOTE — TELEPHONE ENCOUNTER
Prior Authorization Retail Medication Request    Medication/Dose: Cyclobenzaprine  ICD code (if different than what is on RX):  M25.511, G89.29, M25.512  Previously Tried and Failed:    Rationale:      Insurance Name:  Medicare  Insurance ID:  [id number]        Pharmacy Information (if different than what is on RX)  Name:  [facility]  Phone:  [phone]

## 2019-02-26 NOTE — TELEPHONE ENCOUNTER
Discussed with Dr. Griggs who recommends pt OV this Thursday and/or pt to be scheduled for surgical repair of fistula site/open area.    I called pt to schedule OV this Thursday, left vm requesting call back.     Anila Walker, RUIN, RN

## 2019-02-28 ENCOUNTER — OFFICE VISIT (OUTPATIENT)
Dept: OTHER | Facility: CLINIC | Age: 74
End: 2019-02-28
Attending: SURGERY
Payer: MEDICARE

## 2019-02-28 VITALS — SYSTOLIC BLOOD PRESSURE: 127 MMHG | DIASTOLIC BLOOD PRESSURE: 70 MMHG | HEART RATE: 80 BPM

## 2019-02-28 DIAGNOSIS — N18.6 ESRD (END STAGE RENAL DISEASE) ON DIALYSIS (H): ICD-10-CM

## 2019-02-28 DIAGNOSIS — I77.0 AVF (ARTERIOVENOUS FISTULA) (H): Primary | ICD-10-CM

## 2019-02-28 DIAGNOSIS — Z99.2 ESRD (END STAGE RENAL DISEASE) ON DIALYSIS (H): ICD-10-CM

## 2019-02-28 PROCEDURE — 99213 OFFICE O/P EST LOW 20 MIN: CPT | Mod: ZP | Performed by: SURGERY

## 2019-02-28 PROCEDURE — G0463 HOSPITAL OUTPT CLINIC VISIT: HCPCS

## 2019-02-28 NOTE — PROGRESS NOTES
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  He dialyzes at the [facility] unit every [facility] and the fistula has been working well.  He was in [place] and holiday and they used the site just above the elbow crease multiple times developing a small scab.  No evidence of infection but this is failed to heal.  They sent me a picture this week and with the ulceration I was concerned and asked him to see me in the office.      The aVF was placed in 2010.  This has required no interventions since a vein patch placed on 6/4/2010     PMH:.  Medications: Aspirin, Plavix, Imdur, lisinopril, Toprol-XL, Pravachol, Protonix              Medical: History of coronary stents for the last several years ago.  On chronic                              Plavix due to this.                          Chronic renal failure on dialysis                        Cardiac murmur.                        Well-controlled hypertension       Non-smoker.  Lives independently.  Uses a cane for ambulation.      Exam: Alert and appropriate.  Normal affect.  Very pleasant and talkative.  Blood pressure 127/70 right arm.  Pulse 80  Chest= clear to auscultation  Cardiovascular= regular rate with grade 3/6 systolic murmur  Left upper arm fistula has some mild aneurysmal dilatation.  The area just above the elbow were slightly aneurysmal has a ulceration measuring 0.6 x 0.6 cm.  There is a scab at the base.  No infection.        Impression: Nonhealing ulcerated area over the left otherwise normally functioning arteriovenous fistula some mild aneurysmal dilatation.  I am very concerned there may be an underlying defect in the fistula which may lead to significant bleeding we discussed with the patient.  I does feel that we should take him to surgery where under local anesthetic with sedation we excised the  overlying skin and repair of the underlying fistula if necessary with primary skin closure.  This will allow the dialysis unit to continue using the more upper arm portion of the fistula which they are presently doing and repair this defect.  We will schedule this to be performed on next week.  I will have him hold his Plavix for 3 days prior to the procedure to decrease bleeding and this will be started immediately post procedure.    He had no further questions following her visit today.       Westley Griggs MD                    .

## 2019-02-28 NOTE — LETTER
[facility]  [address]  [place] [address]  Phone: [phone]  Fax: [phone]      2019    RE: Westley Ness, : 1945      [facility] [facility]     Westley Ness comes to see me today because of a nonhealing ulceration of his left upper arm proximal radial to cephalic arteriovenous fistula.  He dialyzes at the [facility] unit every [facility]q-[facility]zl[facility]a-Kzpm[facility] and the fistula has been working well.  He was in [place] and holiday and they used the site just above the elbow crease multiple times developing a small scab.  No evidence of infection but this is failed to heal.  They sent me a picture this week and with the ulceration I was concerned and asked him to see me in the office.       The aVF was placed in .  This has required no interventions since a vein patch placed on 2010      PMH:.  Medications: Aspirin, Plavix, Imdur, lisinopril, Toprol-XL, Pravachol, Protonix              Medical: History of coronary stents for the last several years ago.  On chronic                              Plavix due to this.                           Chronic renal failure on dialysis                        Cardiac murmur.                        Well-controlled hypertension       Non-smoker.  Lives independently.  Uses a cane for ambulation.      Exam: Alert and appropriate.  Normal affect.  Very pleasant and talkative.  Blood pressure 127/70 right arm.  Pulse 80  Chest= clear to auscultation  Cardiovascular= regular rate with grade 3/6 systolic murmur  Left upper arm fistula has some mild aneurysmal dilatation.  The area just above the elbow were slightly aneurysmal has a ulceration measuring 0.6 x 0.6 cm.  There is a scab at the base.  No infection.         Impression: Nonhealing ulcerated area over the left otherwise normally functioning arteriovenous fistula some mild aneurysmal dilatation.  I am very concerned there may be an  underlying defect in the fistula which may lead to significant bleeding we discussed with the patient.  I does feel that we should take him to surgery where under local anesthetic with sedation we excised the overlying skin and repair of the underlying fistula if necessary with primary skin closure.  This will allow the dialysis unit to continue using the more upper arm portion of the fistula which they are presently doing and repair this defect.  We will schedule this to be performed on next week.  I will have him hold his Plavix for 3 days prior to the procedure       to decrease bleeding and this will be started immediately post procedure.     He had no further questions following her visit today.        Sincerely,     Westley Griggs MD          [facility]  [address]  [facility] [address]  Phone: [phone]  Fax: [phone]

## 2019-02-28 NOTE — NURSING NOTE
Patient Education    Procedure: REPAIR LEFT UPPER ARM ARTERIOVENOUS FISTULA SKIN ULCER  Diagnosis: SKIN ULCER   Anticoagulation Instruction: HOLD PLAVIX 3 DAYS PRIOR  Pre-Operative Physical Exam: You need to have a pre-op physical exam within 30 days of your procedure. Your procedure may be cancelled if you do not have a current History and Physical. Call your PCP's office to schedule.  Allergies:  Updated in Epic  Bowel Prep: NO  Post Procedure Education: [facility] patient post-procedure fact sheet reviewed with patient.    Learner(s):patient  Method: Listening  Barriers to Learning:No Barrier  Outcome: Patient did verbalize understanding of above education.    Darlyn FABIANN, RN

## 2019-02-28 NOTE — NURSING NOTE
"Westley Ness is a 73 year old male who presents for:  Chief Complaint   Patient presents with     Consult     discuss AVF open wound, see phone enc        Vitals:    Vitals:    02/28/19 0946   BP: 127/70   BP Location: Right arm   Patient Position: Chair   Cuff Size: Adult Regular   Pulse: 80       BMI:  Estimated body mass index is 29.04 kg/m  as calculated from the following:    Height as of 2/26/19: 5' 8\" (1.727 m).    Weight as of 2/26/19: 191 lb (86.6 kg).    Pain Score:  Data Unavailable        Noy Alvarez MA    "

## 2019-03-01 ENCOUNTER — TELEPHONE (OUTPATIENT)
Dept: OTHER | Facility: CLINIC | Age: 74
End: 2019-03-01

## 2019-03-01 NOTE — TELEPHONE ENCOUNTER
Type of surgery: REPAIR LEFT UPPER ARM AVF SKIN ULCER  Location of surgery: [facility]  Date and time of surgery: 03/05/19 @ 8:30AM  Surgeon: DR. RODRIGEZ  Pre-Op Appt Date: PT HAD DONE W/DR. RAI  Post-Op Appt Date: PT TO SCHEDULE   Packet sent out: GIVEN TO PT IN CLINIC   Pre-cert/Authorization completed:  Yes  Date: 03/01/19

## 2019-03-03 NOTE — TELEPHONE ENCOUNTER
Central Prior Authorization Team   Phone: [phone]    PA Initiation    Medication: Cyclobenzaprine  Insurance Company: [employer] - Phone [phone] Fax [phone]  Pharmacy Filling the Rx: [facility] [place] - [address] [place] [address]  Filling Pharmacy Phone: [phone]  Filling Pharmacy Fax: [phone]  Start Date: 3/2/2019

## 2019-03-04 NOTE — PROGRESS NOTES
PT HAS [employer] ICD, INTERROGATION REPORT FROM 10/15/2018, SHOWN TO MATEO DURANT WITH NO FURTHER ORDERS RECD.

## 2019-03-04 NOTE — TELEPHONE ENCOUNTER
Prior Authorization Approval    Authorization Effective Date: 12/30/2018  Authorization Expiration Date: 12/31/2019  Medication: Cyclobenzaprine-APPROVED  Approved Dose/Quantity:    Reference #:     Insurance Company: [employer] - [employer] [phone] Fax [phone]  Expected CoPay:       CoPay Card Available:      Foundation Assistance Needed:    Which Pharmacy is filling the prescription (Not needed for infusion/clinic administered): [facility] - [place], [place] - [address] [place] [address]  Pharmacy Notified: Yes  Patient Notified: Yes

## 2019-03-05 ENCOUNTER — APPOINTMENT (OUTPATIENT)
Dept: SURGERY | Facility: PHYSICIAN GROUP | Age: 74
End: 2019-03-05
Payer: MEDICARE

## 2019-03-05 ENCOUNTER — ANESTHESIA EVENT (OUTPATIENT)
Dept: SURGERY | Facility: CLINIC | Age: 74
End: 2019-03-05
Payer: MEDICARE

## 2019-03-05 ENCOUNTER — ANESTHESIA (OUTPATIENT)
Dept: SURGERY | Facility: CLINIC | Age: 74
End: 2019-03-05
Payer: MEDICARE

## 2019-03-05 ENCOUNTER — HOSPITAL ENCOUNTER (OUTPATIENT)
Facility: CLINIC | Age: 74
Discharge: HOME | End: 2019-03-05
Attending: SURGERY | Admitting: SURGERY
Payer: MEDICARE

## 2019-03-05 VITALS
RESPIRATION RATE: 12 BRPM | HEIGHT: 68 IN | OXYGEN SATURATION: 95 % | BODY MASS INDEX: 28.49 KG/M2 | HEART RATE: 84 BPM | SYSTOLIC BLOOD PRESSURE: 117 MMHG | DIASTOLIC BLOOD PRESSURE: 59 MMHG | TEMPERATURE: 97.3 F | WEIGHT: 188 LBS

## 2019-03-05 LAB — POTASSIUM SERPL-SCNC: 4.2 MMOL/L (ref 3.4–5.3)

## 2019-03-05 PROCEDURE — 25800030 ZZH RX IP 258 OP 636: Performed by: NURSE ANESTHETIST, CERTIFIED REGISTERED

## 2019-03-05 PROCEDURE — 25000128 H RX IP 250 OP 636: Performed by: SURGERY

## 2019-03-05 PROCEDURE — 25800030 ZZH RX IP 258 OP 636: Performed by: SURGERY

## 2019-03-05 PROCEDURE — 84132 ASSAY OF SERUM POTASSIUM: CPT | Performed by: SURGERY

## 2019-03-05 PROCEDURE — 25000125 ZZHC RX 250: Performed by: NURSE ANESTHETIST, CERTIFIED REGISTERED

## 2019-03-05 PROCEDURE — 37000008 ZZH ANESTHESIA TECHNICAL FEE, 1ST 30 MIN: Performed by: SURGERY

## 2019-03-05 PROCEDURE — 25000128 H RX IP 250 OP 636: Performed by: NURSE ANESTHETIST, CERTIFIED REGISTERED

## 2019-03-05 PROCEDURE — 36000056 ZZH SURGERY LEVEL 3 1ST 30 MIN: Performed by: SURGERY

## 2019-03-05 PROCEDURE — 36832 AV FISTULA REVISION OPEN: CPT | Mod: LT | Performed by: SURGERY

## 2019-03-05 PROCEDURE — 71000012 ZZH RECOVERY PHASE 1 LEVEL 1 FIRST HR: Performed by: SURGERY

## 2019-03-05 PROCEDURE — 36415 COLL VENOUS BLD VENIPUNCTURE: CPT | Performed by: SURGERY

## 2019-03-05 PROCEDURE — 25000125 ZZHC RX 250: Performed by: SURGERY

## 2019-03-05 PROCEDURE — 27210794 ZZH OR GENERAL SUPPLY STERILE: Performed by: SURGERY

## 2019-03-05 PROCEDURE — 40000170 ZZH STATISTIC PRE-PROCEDURE ASSESSMENT II: Performed by: SURGERY

## 2019-03-05 PROCEDURE — 36000058 ZZH SURGERY LEVEL 3 EA 15 ADDTL MIN: Performed by: SURGERY

## 2019-03-05 PROCEDURE — 71000027 ZZH RECOVERY PHASE 2 EACH 15 MINS: Performed by: SURGERY

## 2019-03-05 PROCEDURE — 37000009 ZZH ANESTHESIA TECHNICAL FEE, EACH ADDTL 15 MIN: Performed by: SURGERY

## 2019-03-05 RX ORDER — CEFAZOLIN SODIUM 2 G/100ML
2 INJECTION, SOLUTION INTRAVENOUS
Status: COMPLETED | OUTPATIENT
Start: 2019-03-05 | End: 2019-03-05

## 2019-03-05 RX ORDER — DIMENHYDRINATE 50 MG/ML
12.5 INJECTION, SOLUTION INTRAMUSCULAR; INTRAVENOUS
Status: CANCELLED | OUTPATIENT
Start: 2019-03-05

## 2019-03-05 RX ORDER — ALBUTEROL SULFATE 0.83 MG/ML
2.5 SOLUTION RESPIRATORY (INHALATION)
Status: CANCELLED | OUTPATIENT
Start: 2019-03-05

## 2019-03-05 RX ORDER — ONDANSETRON 2 MG/ML
4 INJECTION INTRAMUSCULAR; INTRAVENOUS EVERY 30 MIN PRN
Status: CANCELLED | OUTPATIENT
Start: 2019-03-05

## 2019-03-05 RX ORDER — LIDOCAINE HYDROCHLORIDE 10 MG/ML
INJECTION, SOLUTION EPIDURAL; INFILTRATION; INTRACAUDAL; PERINEURAL
Status: DISCONTINUED
Start: 2019-03-05 | End: 2019-03-05 | Stop reason: HOSPADM

## 2019-03-05 RX ORDER — HEPARIN SODIUM 1000 [USP'U]/ML
INJECTION, SOLUTION INTRAVENOUS; SUBCUTANEOUS
Status: DISCONTINUED
Start: 2019-03-05 | End: 2019-03-05 | Stop reason: HOSPADM

## 2019-03-05 RX ORDER — ONDANSETRON 4 MG/1
4 TABLET, ORALLY DISINTEGRATING ORAL
Status: CANCELLED | OUTPATIENT
Start: 2019-03-05

## 2019-03-05 RX ORDER — BUPIVACAINE HYDROCHLORIDE 5 MG/ML
INJECTION, SOLUTION PERINEURAL PRN
Status: DISCONTINUED | OUTPATIENT
Start: 2019-03-05 | End: 2019-03-05 | Stop reason: HOSPADM

## 2019-03-05 RX ORDER — PROPOFOL 10 MG/ML
INJECTION, EMULSION INTRAVENOUS CONTINUOUS PRN
Status: DISCONTINUED | OUTPATIENT
Start: 2019-03-05 | End: 2019-03-05

## 2019-03-05 RX ORDER — SODIUM CHLORIDE 9 MG/ML
INJECTION, SOLUTION INTRAVENOUS CONTINUOUS PRN
Status: DISCONTINUED | OUTPATIENT
Start: 2019-03-05 | End: 2019-03-05

## 2019-03-05 RX ORDER — ONDANSETRON 4 MG/1
4 TABLET, ORALLY DISINTEGRATING ORAL EVERY 30 MIN PRN
Status: CANCELLED | OUTPATIENT
Start: 2019-03-05

## 2019-03-05 RX ORDER — FENTANYL CITRATE 50 UG/ML
INJECTION, SOLUTION INTRAMUSCULAR; INTRAVENOUS PRN
Status: DISCONTINUED | OUTPATIENT
Start: 2019-03-05 | End: 2019-03-05

## 2019-03-05 RX ORDER — ONDANSETRON 2 MG/ML
INJECTION INTRAMUSCULAR; INTRAVENOUS PRN
Status: DISCONTINUED | OUTPATIENT
Start: 2019-03-05 | End: 2019-03-05

## 2019-03-05 RX ORDER — NALOXONE HYDROCHLORIDE 0.4 MG/ML
.1-.4 INJECTION, SOLUTION INTRAMUSCULAR; INTRAVENOUS; SUBCUTANEOUS
Status: CANCELLED | OUTPATIENT
Start: 2019-03-05 | End: 2019-03-06

## 2019-03-05 RX ORDER — ACETAMINOPHEN 500 MG
1000 TABLET ORAL 4 TIMES DAILY PRN
Status: ON HOLD | COMMUNITY
End: 2020-10-16

## 2019-03-05 RX ORDER — HEPARIN SODIUM 1000 [USP'U]/ML
INJECTION, SOLUTION INTRAVENOUS; SUBCUTANEOUS PRN
Status: DISCONTINUED | OUTPATIENT
Start: 2019-03-05 | End: 2019-03-05

## 2019-03-05 RX ORDER — BUPIVACAINE HYDROCHLORIDE 5 MG/ML
INJECTION, SOLUTION EPIDURAL; INTRACAUDAL
Status: DISCONTINUED
Start: 2019-03-05 | End: 2019-03-05 | Stop reason: HOSPADM

## 2019-03-05 RX ORDER — EPHEDRINE SULFATE 50 MG/ML
INJECTION, SOLUTION INTRAMUSCULAR; INTRAVENOUS; SUBCUTANEOUS PRN
Status: DISCONTINUED | OUTPATIENT
Start: 2019-03-05 | End: 2019-03-05

## 2019-03-05 RX ORDER — SODIUM CHLORIDE, SODIUM LACTATE, POTASSIUM CHLORIDE, CALCIUM CHLORIDE 600; 310; 30; 20 MG/100ML; MG/100ML; MG/100ML; MG/100ML
INJECTION, SOLUTION INTRAVENOUS CONTINUOUS
Status: CANCELLED | OUTPATIENT
Start: 2019-03-05

## 2019-03-05 RX ORDER — ACETAMINOPHEN 325 MG/1
650 TABLET ORAL
Status: CANCELLED | OUTPATIENT
Start: 2019-03-05

## 2019-03-05 RX ORDER — HEPARIN SODIUM 1000 [USP'U]/ML
INJECTION, SOLUTION INTRAVENOUS; SUBCUTANEOUS PRN
Status: DISCONTINUED | OUTPATIENT
Start: 2019-03-05 | End: 2019-03-05 | Stop reason: HOSPADM

## 2019-03-05 RX ORDER — DEXAMETHASONE SODIUM PHOSPHATE 4 MG/ML
4 INJECTION, SOLUTION INTRA-ARTICULAR; INTRALESIONAL; INTRAMUSCULAR; INTRAVENOUS; SOFT TISSUE EVERY 10 MIN PRN
Status: CANCELLED | OUTPATIENT
Start: 2019-03-05

## 2019-03-05 RX ORDER — FENTANYL CITRATE 50 UG/ML
25-50 INJECTION, SOLUTION INTRAMUSCULAR; INTRAVENOUS EVERY 5 MIN PRN
Status: CANCELLED | OUTPATIENT
Start: 2019-03-05

## 2019-03-05 RX ADMIN — FENTANYL CITRATE 50 MCG: 50 INJECTION, SOLUTION INTRAMUSCULAR; INTRAVENOUS at 08:46

## 2019-03-05 RX ADMIN — PHENYLEPHRINE HYDROCHLORIDE 100 MCG: 10 INJECTION, SOLUTION INTRAMUSCULAR; INTRAVENOUS; SUBCUTANEOUS at 08:59

## 2019-03-05 RX ADMIN — PROPOFOL 25 MCG/KG/MIN: 10 INJECTION, EMULSION INTRAVENOUS at 08:38

## 2019-03-05 RX ADMIN — ONDANSETRON 4 MG: 2 INJECTION INTRAMUSCULAR; INTRAVENOUS at 08:57

## 2019-03-05 RX ADMIN — HEPARIN SODIUM 3000 UNITS: 1000 INJECTION, SOLUTION INTRAVENOUS; SUBCUTANEOUS at 08:52

## 2019-03-05 RX ADMIN — CEFAZOLIN SODIUM 2 G: 2 INJECTION, SOLUTION INTRAVENOUS at 08:38

## 2019-03-05 RX ADMIN — Medication 5 MG: at 08:59

## 2019-03-05 RX ADMIN — PHENYLEPHRINE HYDROCHLORIDE 100 MCG: 10 INJECTION, SOLUTION INTRAMUSCULAR; INTRAVENOUS; SUBCUTANEOUS at 08:55

## 2019-03-05 RX ADMIN — SODIUM CHLORIDE: 9 INJECTION, SOLUTION INTRAVENOUS at 08:35

## 2019-03-05 RX ADMIN — PHENYLEPHRINE HYDROCHLORIDE 200 MCG: 10 INJECTION, SOLUTION INTRAMUSCULAR; INTRAVENOUS; SUBCUTANEOUS at 08:57

## 2019-03-05 RX ADMIN — DEXMEDETOMIDINE HYDROCHLORIDE 0.2 MCG/KG/HR: 100 INJECTION, SOLUTION INTRAVENOUS at 08:38

## 2019-03-05 RX ADMIN — FENTANYL CITRATE 50 MCG: 50 INJECTION, SOLUTION INTRAMUSCULAR; INTRAVENOUS at 08:40

## 2019-03-05 ASSESSMENT — LIFESTYLE VARIABLES: TOBACCO_USE: 1

## 2019-03-05 ASSESSMENT — COPD QUESTIONNAIRES: COPD: 0

## 2019-03-05 ASSESSMENT — MIFFLIN-ST. JEOR: SCORE: 1572.26

## 2019-03-05 ASSESSMENT — ENCOUNTER SYMPTOMS: SEIZURES: 0

## 2019-03-05 NOTE — ANESTHESIA POSTPROCEDURE EVALUATION
Patient: Westley Ness    Procedure(s):  REPAIR LEFT UPPER ARM ARTERIOVENOUS FISTULA SKIN ULCER    Diagnosis:LEFT ARTERIOVENOUS FISTULA SKIN ULCER  Diagnosis Additional Information: No value filed.    Anesthesia Type:  MAC    Note:  Anesthesia Post Evaluation    Patient location during evaluation: Bedside  Patient participation: Able to fully participate in evaluation  Level of consciousness: awake and alert  Pain management: adequate  Airway patency: patent  Cardiovascular status: acceptable  Respiratory status: acceptable  Hydration status: acceptable  PONV: none             Last vitals:  Vitals:    03/05/19 1000 03/05/19 1015 03/05/19 1047   BP: 111/88 115/68 117/59   Pulse: 86 84    Resp: 12 12 12   Temp:      SpO2: 98% 98% 95%         Electronically Signed By: Jah Larson MD  March 5, 2019  4:17 PM

## 2019-03-05 NOTE — BRIEF OP NOTE
[facility]    Brief Operative Note    Pre-operative diagnosis: LEFT ARTERIOVENOUS FISTULA SKIN ULCER, ESRD on HD  Post-operative diagnosis same  Procedure: Procedure(s):  REPAIR LEFT UPPER ARM ARTERIOVENOUS FISTULA SKIN ULCER  Surgeon: Surgeon(s) and Role:     * Westley rGiggs MD - Primary  Anesthesia: Monitor Anesthesia Care   Estimated blood loss: * No values recorded between 3/5/2019  8:45 AM and 3/5/2019  9:42 AM *  Drains: None  Specimens: * No specimens in log *  Findings:   Aneurysm beneath skin ulceration, overlying skin removed  Good thrill at completion of case  Complications: None.  Implants: None.

## 2019-03-05 NOTE — ANESTHESIA PREPROCEDURE EVALUATION
Anesthesia Pre-Procedure Evaluation    Patient: Westley Ness   MRN: [id number] : 1945          Preoperative Diagnosis: LEFT ARTERIOVENOUS FISTULA SKIN ULCER    Procedure(s):  REPAIR LEFT UPPER ARM ARTERIOVENOUS FISTULA SKIN ULCER    Past Medical History:   Diagnosis Date     Acid reflux disease      CAD (coronary artery disease)     s/p multiple NSTEMIs and PCI's     ESRD on hemodialysis (H)     MWF     History of atrial flutter     s/p ablation     Hypothyroidism      Ischemic cardiomyopathy     EF 25-30%, s/p AICD     Type 2 diabetes mellitus (H)     diet-controlled     Past Surgical History:   Procedure Laterality Date     CHOLECYSTECTOMY, OPEN  2013     ELBOW SURGERY Left 2008    ORIF - plates still in place     H ABLATION ATRIAL FLUTTER  2017, 17     HC LEFT HEART CATHETERIZATION  2016     HC LEFT HEART CATHETERIZATION  2016     IMPLANT VENTRICULAR DEVICE  08/15/2011     TONSILLECTOMY & ADENOIDECTOMY       VASCULAR SURGERY  ,     LUE fistulas (upper and lower); upper one is functional       Anesthesia Evaluation     .             ROS/MED HX    ENT/Pulmonary:     (+)tobacco use, Past use , . .   (-) asthma, COPD and sleep apnea   Neurologic:      (-) seizures, CVA and migraines   Cardiovascular: Comment: Atrial flutter s/p ablation    Mild aortic root dilatation. Sinus Valsalva 41mm, asc aorta 36mm.      (+) hypertension--CAD, -past MI,-stent,2017  . : . CHF . . pacemaker :type: Biotronik settings: VVI ICD  type;Biotronik . . Previous cardiac testing Echodate:2018results:Interpretation Summary     The left ventricle is mildly dilated.  There is mild to moderate concentric left ventricular hypertrophy.  The visual ejection fraction is estimated at 27%.  Grade III or advanced diastolic dysfunction.  Diastolic Doppler findings (E/E' ratio and/or other parameters) suggest left  ventricular filling pressures are increased.  Flattened septum is consistent with RV pressure  overload.  Global LV hypokinesis. Prox septum mod/severe hypokinesis. Distal septum/apex  akinetic. Basal inf/lat walls move best  The right ventricle is mild to moderately dilated.  The right ventricular systolic function is moderate to severely reduced.  The left atrium is severely dilated.  The right atrium is moderate to severely dilated.  Dilated IVC (>2.5cm) with <50% respiratory collapse; right atrial pressure is  estimated at 15-20mmHg.  Right ventricular systolic pressure is elevated, consistent with moderate to  severe pulmonary hypertension.  Mild aortic root dilatation.  _____________________________________________________________________________  __        Left Ventricle  The left ventricle is mildly dilated. There is mild to moderate concentric  left ventricular hypertrophy. The visual ejection fraction is estimated at  27%. Grade III or advanced diastolic dysfunction. Diastolic Doppler findings  (E/E' ratio and/or other parameters) suggest left ventricular filling  pressures are increased. There is mod-severe global hypokinesia of the left  ventricle. There is moderate to severe septal hypokinesis. There is septal  akinesis. Flattened septum is consistent with RV pressure overload. Global LV  hypokinesis. Prox septum mod/severe hypokinesis. Distal septum/apex akinetic.  Basal inf/lat walls move best. There is no thrombus seen in the left  ventricle.     Right Ventricle  The right ventricle is mild to moderately dilated. There is a  catheter/pacemaker lead seen in the right ventricle. The right ventricular  systolic function is moderate to severely reduced.     Atria  The left atrium is severely dilated. The right atrium is moderate to severely  dilated. There is no color Doppler evidence of an atrial shunt.     Mitral Valve  There is trace mitral regurgitation.        Tricuspid Valve  There is mild (1+) tricuspid regurgitation. The right ventricular systolic  pressure is approximated at 50.1 mmHg  plus the right atrial pressure. Dilated  IVC (>2.5cm) with <50% respiratory collapse; right atrial pressure is  estimated at 15-20mmHg. Right ventricular systolic pressure is elevated,  consistent with moderate to severe pulmonary hypertension.     Aortic Valve  The aortic valve is trileaflet. No hemodynamically significant valvular aortic  stenosis.     Pulmonic Valve  The pulmonic valve is not well seen, but is grossly normal.     Vessels  Mild aortic root dilatation. Sinus Valsalva 41mm, asc aorta 36mm.     Pericardium  The pericardium appears normal.        Rhythm  Sinus rhythm was noted.date: results:ECG reviewed date:2/2019 results:NSR, LAD, LAFB date: results:          METS/Exercise Tolerance:     Hematologic:         Musculoskeletal:         GI/Hepatic:     (+) GERD Asymptomatic on medication,      (-) liver disease   Renal/Genitourinary: Comment: Dialyzes MWF    (+) chronic renal disease, type: ESRD, Pt requires dialysis, type: Hemodialysis,       Endo:     (+) type II DM Not using insulin thyroid problem hypothyroidism, .      Psychiatric:         Infectious Disease:         Malignancy:         Other:                          Physical Exam  Normal systems: dental    Airway   Mallampati: III  TM distance: >3 FB  Neck ROM: full    Dental     Cardiovascular   Rhythm and rate: regular      Pulmonary    breath sounds clear to auscultation            Lab Results   Component Value Date    WBC 5.2 09/13/2018    HGB 12.7 (L) 09/13/2018    HCT 37.9 (L) 09/13/2018     (L) 09/13/2018     09/13/2018    POTASSIUM 4.4 09/13/2018    CHLORIDE 99 09/13/2018    CO2 29 09/13/2018    BUN 23 09/13/2018    CR 4.72 (H) 09/13/2018     (H) 09/13/2018    CHRISTINE 9.6 09/13/2018    PHOS 4.1 12/11/2017    MAG 1.9 06/07/2017    ALBUMIN 3.3 (L) 04/23/2018    PROTTOTAL 7.3 04/23/2018    ALT 15 04/23/2018    AST 19 04/23/2018    ALKPHOS 202 (H) 04/23/2018    BILITOTAL 2.3 (H) 04/23/2018    LIPASE 99 08/08/2011    PTT 33  "04/23/2018    INR 1.20 (H) 04/23/2018    TSH 2.92 02/27/2018       Preop Vitals  BP Readings from Last 3 Encounters:   02/28/19 127/70   02/26/19 122/60   02/19/19 (!) 88/50    Pulse Readings from Last 3 Encounters:   02/28/19 80   02/26/19 87   02/19/19 82      Resp Readings from Last 3 Encounters:   02/26/19 16   09/13/18 16   08/29/18 16    SpO2 Readings from Last 3 Encounters:   02/26/19 97%   02/19/19 97%   09/13/18 96%      Temp Readings from Last 1 Encounters:   02/26/19 36.6  C (97.9  F) (Oral)    Ht Readings from Last 1 Encounters:   02/26/19 1.727 m (5' 8\")      Wt Readings from Last 1 Encounters:   02/26/19 86.6 kg (191 lb)    Estimated body mass index is 29.04 kg/m  as calculated from the following:    Height as of 2/26/19: 1.727 m (5' 8\").    Weight as of 2/26/19: 86.6 kg (191 lb).       Anesthesia Plan      History & Physical Review  History and physical reviewed and following examination; no interval change.    ASA Status:  4 .    NPO Status:  > 8 hours    Plan for MAC   PONV prophylaxis:  Ondansetron (or other 5HT-3)  Dexmedetomidine/propofol mix - no midazolam please      Postoperative Care  Postoperative pain management:  Multi-modal analgesia.      Consents  Anesthetic plan, risks, benefits and alternatives discussed with:  Patient..                 Jah Larson MD  "

## 2019-03-05 NOTE — ANESTHESIA CARE TRANSFER NOTE
Patient: Westley Ness    Procedure(s):  REPAIR LEFT UPPER ARM ARTERIOVENOUS FISTULA SKIN ULCER    Diagnosis: LEFT ARTERIOVENOUS FISTULA SKIN ULCER  Diagnosis Additional Information: No value filed.    Anesthesia Type:   MAC     Note:  Airway :Room Air  Patient transferred to:PACU  Comments: Transported in wheelchair. AwakeHandoff Report: Identifed the Patient, Identified the Reponsible Provider, Reviewed the pertinent medical history, Discussed the surgical course, Reviewed Intra-OP anesthesia mangement and issues during anesthesia, Set expectations for post-procedure period and Allowed opportunity for questions and acknowledgement of understanding      Vitals: (Last set prior to Anesthesia Care Transfer)    CRNA VITALS  3/5/2019 0907 - 3/5/2019 0947      3/5/2019             Resp Rate (set):  10                Electronically Signed By: MARCIO Perez CRNA  March 5, 2019  9:47 AM

## 2019-03-05 NOTE — OR NURSING
Patient presents with area on scalp, neck and left side of face with multiple purplish scratched areas. Patient states he has been scratching his head in his sleep.

## 2019-03-05 NOTE — DISCHARGE INSTRUCTIONS
ARTERIAL VENOUS FISTULA  Discharge Instructions    Dr. Westley Griggs  [phone]        In 48 hours, remove dressing.  Leave steri-strips in place until they fall off by themselves--usually 7-10 days.      Ok to shower once dressing is removed.  No bathing or soaking for 4 weeks      Follow up with Dr. Griggs in 2 weeks.     Call Dr Griggs's office for problems:    Signs of infection--redness, swelling, drainage, temperature.     Fever over 101     Persistent nausea and vomiting    Any questions or concerns            Same Day Surgery Discharge Instructions for  Sedation and General Anesthesia       It's not unusual to feel dizzy, light-headed or faint for up to 24 hours after surgery or while taking pain medication.  If you have these symptoms: sit for a few minutes before standing and have someone assist you when you get up to walk or use the bathroom.      You should rest and relax for the next 24 hours. We recommend you make arrangements to have an adult stay with you for at least 24 hours after your discharge.  Avoid hazardous and strenuous activity.      DO NOT DRIVE any vehicle or operate mechanical equipment for 24 hours following the end of your surgery.  Even though you may feel normal, your reactions may be affected by the medication you have received.      Do not drink alcoholic beverages for 24 hours following surgery.       Slowly progress to your regular diet as you feel able. It's not unusual to feel nauseated and/or vomit after receiving anesthesia.  If you develop these symptoms, drink clear liquids (apple juice, ginger ale, broth, 7-up, etc. ) until you feel better.  If your nausea and vomiting persists for 24 hours, please notify your surgeon.        All narcotic pain medications, along with inactivity and anesthesia, can cause constipation. Drinking plenty of liquids and increasing fiber intake will help.      For any questions of a medical nature, call your surgeon.      Do not make  important decisions for 24 hours.      If you feel your pain is not well managed with the pain medications prescribed by your surgeon, please contact your surgeon's office to let them know so they can address your concerns.         **If you have questions or concerns about your procedure  call Dr Westley Griggs at [phone]**

## 2019-03-05 NOTE — PROGRESS NOTES
Admission medication history interview status for the 3/5/2019  admission is complete. See EPIC admission navigator for prior to admission medications     Medication history source reliability:Moderate    Medication history interview source(s):Patient    Medication history resources (including written lists, pill bottles, clinic record):mailed in clinic record    Primary pharmacy.[facility]    Additional medication history information not noted on PTA med list :Brought own supply of Nitrostat.    Time spent in this activity: 50 minutes    Prior to Admission medications    Medication Sig Last Dose Taking? Auth Provider   acetaminophen (TYLENOL) 500 MG tablet Take 1,000 mg by mouth 4 times daily as needed for mild pain (500mg x 2 = 1,000mg) 3/4/2019 at prn Yes Reported, Patient   aspirin 81 MG tablet Take 1 tablet (81 mg) by mouth daily 3/4/2019 at 1200 Yes Jono Mejia MD   clopidogrel (PLAVIX) 75 MG tablet TAKE 1 TABLET (75 MG) BY MOUTH DAILY TO PROTECT YOUR STENT(S). DO NOT STOP TAKING UNLESS DIRECTED BY CARDIOLOGY. 3/1/2019 at 1200 Yes Josselin Kolb MD   cyclobenzaprine (FLEXERIL) 5 MG tablet Take 1-2 tablets (5-10 mg) by mouth nightly as needed for muscle spasms  Patient taking differently: Take 5 mg by mouth nightly as needed for muscle spasms  3/4/2019 at 2330 Yes Josselin Kolb MD   isosorbide mononitrate (IMDUR) 30 MG 24 hr tablet TAKE 1/2 TABLET BY MOUTH ONCE DAILY 3/4/2019 at 1200 Yes Josselin Kolb MD   levothyroxine (SYNTHROID/LEVOTHROID) 50 MCG tablet TAKE 1 TABLET BY MOUTH DAILY 3/4/2019 at am Yes Josselin Kolb MD   lisinopril (PRINIVIL/ZESTRIL) 2.5 MG tablet Take one tablet after dialysis. Take second tablet at bedtime.  Patient taking differently: Take 2.5 mg by mouth 2 times daily (Take one tablet after dialysis. Take second tablet at bedtime.) 3/4/2019 at pm Yes Lacy Taylor APRN CNP   loperamide (IMODIUM) 2 MG capsule Take 1 mg by mouth daily as needed  3/4/2019  at pm Yes Reported, Patient   metoprolol succinate ER (TOPROL-XL) 25 MG 24 hr tablet Take 0.5 tablets (12.5 mg) by mouth daily  Patient taking differently: Take 12.5 mg by mouth daily (25mg x 0.5 = 12.5mg) 3/4/2019 at 1200 Yes Lacy Taylor APRN CNP   mexiletine (MEXITIL) 150 MG capsule TAKE 1 CAPSULE (150 MG) BY MOUTH 2 TIMES DAILY 3/5/2019 at 0530 Yes Josselin Kolb MD   nitroGLYcerin (NITROSTAT) 0.4 MG sublingual tablet 1 TAB EVERY 5 MIN AS NEEDED, UP TO 3 PER EPISODE  Patient taking differently: Place 0.4 mg under the tongue every 5 minutes as needed UP TO 3 PER EPISODE on hand Yes Josselin Kolb MD   pantoprazole (PROTONIX) 40 MG EC tablet TAKE 1 TABLET (40 MG) BY MOUTH EVERY MORNING 3/4/2019 at am Yes Josselin Kolb MD   pravastatin (PRAVACHOL) 40 MG tablet TAKE 1 TABLET (40 MG) BY MOUTH DAILY 3/4/2019 at 2330 Yes Josselin Kolb MD   psyllium 0.52 G capsule Take 1 capsule by mouth daily as needed  more than a month at prn Yes Reported, Patient   RaNITidine HCl (ZANTAC PO) Take 75 mg by mouth At Bedtime  3/4/2019 at 2330 Yes Unknown, Entered By History   TRIPHROCAPS 1 MG capsule TAKE 1 CAPSULE BY MOUTH EVERY EVENING 3/4/2019 at 1130 Yes Josselin Kolb MD

## 2019-03-05 NOTE — OP NOTE
Procedure Date: 03/05/2019      PREOPERATIVE DIAGNOSES:  Nonhealing ulceration left proximal antecubital radial to cephalic arteriovenous fistula with underlying aneurysmal dilatation.      POSTOPERATIVE DIAGNOSES:  Nonhealing ulceration left proximal antecubital radial to cephalic arteriovenous fistula with underlying aneurysmal dilatation.      PROCEDURES:  Excision of overlying skin ulceration and endo-aneurysmorrhaphy with primary closure.      SURGEON:  Westley Griggs MD        ASSISTANT:  Stacey Lejeune, MD (vascular fellow).      ANESTHESIA:  Local with intravenous supplementation.      PREOPERATIVE MEDICATIONS:  Ancef 2 grams IV.      INDICATIONS:  This 73-year-old patient is on chronic hemodialysis at the [facility] via left upper arm proximal radial to cephalic radial arteriovenous fistula.  They have been using the upper arm portion with no difficulty.  He has developed some aneurysmal dilatation, which has been stable.  However, he developed an ulceration with a scab measuring approximately 0.8 x 0.8 cm just above the elbow region of the fistula, where there is a 3 cm aneurysmal dilatation.  We were very concerned that this could result in significant bleeding and that surgical treatment was indicated.  He comes to the operating today under informed consent.      DESCRIPTION OF PROCEDURE:  The patient was brought to the operating room.  The left arm and axillary were prepped and draped.  A timeout was called.  Sites were identified.      EXCISION OF OVERLYING SKIN ULCERATION:  1% lidocaine with bicarbonate was injected in a field block fashion.  We placed a tourniquet on the forearm portion of the fistula along with a Penrose drain and closed the axilla.  A vertical elliptical incision was made around the ulcerated area for a length of 3 cm and a width of 1.2 cm, where the skin was completely normal.  We dissected around the aneurysmal area of the fistula.  We were able to dissect the inflow  portion of the fistula, which measured approximately 1 cm in diameter, with a vessel loop for vascular control.      EXCISION OF ANEURYSMAL SEGMENT WITH PRIMARY ENDO-ANEURYSMORRHAPHY REPAIR:  3000 units of intravenous heparin were given.  After 3 minutes, the vessel loops were tightened around the inflow port of the fistula.  We decompressed the fistula and the Penrose was tightened over the upper arm to prevent backbleeding.  We then excised the overlying skin ulceration into the aneurysm.  There was actually no defect underlying the ulcer in the aneurysm itself.  Aneurysm walls were well developed.  We excised the redundancy of the aneurysm so it would be approximately 1.5 cm in diameter.  This was closed with 5-0 Prolene suture in a running mattress technique followed by a running simple 5-0 Prolene closure.  With release of the vessel loops, we had excellent flow within the fistula.  Two mattress 5-0 Prolene sutures were used for good hemostasis on the suture line.  Our suture line measured 3.5 cm in length upon completion.      Hemostasis was obtained using electrocautery.  Subcutaneous tissue was infiltrated 0.5% Marcaine for postop analgesia.  Subcutaneous tissue was closed with interrupted 3-0 Vicryl and the skin was closed with 4-0 Monocryl in subcuticular fashion, followed by Steri-Strips, gauze and Justine roll.      The patient tolerated the procedure well.      ESTIMATED BLOOD LOSS:  20 mL.      COMPLICATIONS:  None.      The patient will be able to use the more proximal portion of the fistula as the surgical site heals for the next 4 weeks.  He will follow up with me in the office as needed.         SOCORRO RODRIGEZ MD             D: 2019   T: 2019   MT: KRISTYN      Name:     SOCORRO LÓPEZ   MRN:      [id number]        Account:        [id number]   :      1945           Procedure Date: 2019      Document: [id number]       cc: [facility]  Indu SILVERMAN

## 2019-03-07 ENCOUNTER — HOSPITAL ENCOUNTER (OUTPATIENT)
Dept: CARDIOLOGY | Facility: CLINIC | Age: 74
Discharge: HOME | End: 2019-03-07
Attending: NURSE PRACTITIONER | Admitting: NURSE PRACTITIONER
Payer: MEDICARE

## 2019-03-07 ENCOUNTER — ANCILLARY PROCEDURE (OUTPATIENT)
Dept: CARDIOLOGY | Facility: CLINIC | Age: 74
End: 2019-03-07
Attending: INTERNAL MEDICINE
Payer: MEDICARE

## 2019-03-07 DIAGNOSIS — Z95.810 ICD (IMPLANTABLE CARDIOVERTER-DEFIBRILLATOR) IN PLACE: ICD-10-CM

## 2019-03-07 DIAGNOSIS — I25.5 CARDIOMYOPATHY, ISCHEMIC: ICD-10-CM

## 2019-03-07 PROCEDURE — 93306 TTE W/DOPPLER COMPLETE: CPT | Mod: 26 | Performed by: INTERNAL MEDICINE

## 2019-03-07 PROCEDURE — 40000264 ECHOCARDIOGRAM COMPLETE

## 2019-03-07 PROCEDURE — 93282 PRGRMG EVAL IMPLANTABLE DFB: CPT | Mod: 26 | Performed by: INTERNAL MEDICINE

## 2019-03-07 PROCEDURE — 25500064 ZZH RX 255 OP 636: Performed by: NURSE PRACTITIONER

## 2019-03-07 RX ADMIN — HUMAN ALBUMIN MICROSPHERES AND PERFLUTREN 6 ML: 10; .22 INJECTION, SOLUTION INTRAVENOUS at 14:21

## 2019-03-12 ENCOUNTER — OFFICE VISIT (OUTPATIENT)
Dept: INTERNAL MEDICINE | Facility: CLINIC | Age: 74
End: 2019-03-12
Payer: MEDICARE

## 2019-03-12 ENCOUNTER — THERAPY VISIT (OUTPATIENT)
Dept: PHYSICAL THERAPY | Facility: CLINIC | Age: 74
End: 2019-03-12
Payer: MEDICARE

## 2019-03-12 VITALS
TEMPERATURE: 97.5 F | DIASTOLIC BLOOD PRESSURE: 60 MMHG | RESPIRATION RATE: 16 BRPM | OXYGEN SATURATION: 95 % | SYSTOLIC BLOOD PRESSURE: 115 MMHG | HEART RATE: 83 BPM | WEIGHT: 192.2 LBS | BODY MASS INDEX: 29.22 KG/M2

## 2019-03-12 DIAGNOSIS — G89.29 CHRONIC PAIN OF BOTH SHOULDERS: ICD-10-CM

## 2019-03-12 DIAGNOSIS — M25.512 CHRONIC PAIN OF BOTH SHOULDERS: ICD-10-CM

## 2019-03-12 DIAGNOSIS — E03.9 HYPOTHYROIDISM, UNSPECIFIED TYPE: ICD-10-CM

## 2019-03-12 DIAGNOSIS — M54.2 NECK PAIN: ICD-10-CM

## 2019-03-12 DIAGNOSIS — M25.511 CHRONIC PAIN OF BOTH SHOULDERS: ICD-10-CM

## 2019-03-12 DIAGNOSIS — B02.9 HERPES ZOSTER WITHOUT COMPLICATION: Primary | ICD-10-CM

## 2019-03-12 PROCEDURE — 97110 THERAPEUTIC EXERCISES: CPT | Mod: GP | Performed by: PHYSICAL THERAPIST

## 2019-03-12 PROCEDURE — 97161 PT EVAL LOW COMPLEX 20 MIN: CPT | Mod: GP | Performed by: PHYSICAL THERAPIST

## 2019-03-12 PROCEDURE — 99214 OFFICE O/P EST MOD 30 MIN: CPT | Performed by: PHYSICIAN ASSISTANT

## 2019-03-12 RX ORDER — LEVOTHYROXINE SODIUM 50 UG/1
TABLET ORAL
Qty: 30 TABLET | Refills: 0 | Status: SHIPPED | OUTPATIENT
Start: 2019-03-12 | End: 2019-04-11

## 2019-03-12 RX ORDER — VALACYCLOVIR HYDROCHLORIDE 1 G/1
1000 TABLET, FILM COATED ORAL 3 TIMES DAILY
Qty: 21 TABLET | Refills: 0 | Status: SHIPPED | OUTPATIENT
Start: 2019-03-12 | End: 2019-03-14

## 2019-03-12 NOTE — LETTER
[facility]  [address] [address]  [phone]            Westley MO Ness  [address] [facility] [address]        March 12, 2019    Dear Art,    While refilling your prescription today, we noticed that you are due to have labs drawn.  We will refill your prescription for 30 days, but a follow-up appointment must be made before any additional refills can be approved.     Taking care of your health is important to us and we look forward to seeing you in the near future.  Please call us at [phone] or [phone] (or use [employer]) to schedule an appointment.     Please disregard this notice if you have already made an appointment.    Sincerely,        [facility]

## 2019-03-12 NOTE — PROGRESS NOTES
[facility] Initial Evaluation -- Cervical    Evaluation Date: March 12, 2019  Westley Ness is a 73 year old male with a neck and shoulder condition.   Referral: IM  Work mechanical stresses: retired   Employment status: retired  Leisure mechanical stresses: sedentary, watching TV, computer use  Functional disability score (NDI):  18%  VAS score (0-10): 6/10  Patient goals/expectations:  To be able to sit and move my neck with less pain.    HISTORY:    Present symptoms:  B neck and shoulder pain.  Pain quality (sharp/shooting/stabbing/aching/burning/cramping):   aching.  Paresthesia (yes/no):  Yes, numbness R index and middle fingers--pt states he has had this for 30+ years.    Present since (onset date): problems for 20+ years with his neck, pain has been worsening since 01/12/2019 for unknown reasons.   He was seen in Urgent care on 02/19/2019 and given a steroid dose pack which helped for a couple of weeks, but the pain returned.   Symptoms (improving/unchanging/worsening):  unchanging.    Symptoms commenced as a result of: unknown   Condition occurred in the following environment:  unknown    Symptoms at onset (neck/arm/forearm/headache): B neck and shoulders  Constant symptoms (neck/arm/forearm/headache): none  Intermittent symptoms (neck/arm/forearm/headache): B neck and shoulders    Symptoms are made worse with the following: Always Sitting 2 hours and sleeping--loses 2 hours/night, looking down, computer use 2-3 hours  Symptoms are made better with the following: in the morning    Disturbed sleep (yes/no): yes--loses 2 hours/night  Number of pillows: 1  Sleeping postures (prone/sup/side R/L): R side    Previous episodes (0/1-5/6-10/11+): 11+ Year of first episode: years ago    Previous history: ongoing episode of neck pain for many years  Previous treatments: none    Specific Questions: (as reported by the patient)  Dizziness/Tinnitus/Nausea/Swallowing (pos/neg): neg  Gait/Upper Limbs  (normal/abnormal): abnormal--uses SEC, slouched posture  Medications (nil/NSAIDS/anlag/steroids/anticoag/other):  See medication list in EPIC  Medical allergies:  Contrast dye  General health (excellent/good/fair/poor):  fair  Pertinent medical history:  Diabetes, Heart problems--blockage and electrical problem--multiple stents and surgery for electrical problem, High blood pressure, History of fractures, Kidney disease--on dialysis for 15 years, Overweight, Smoking, Thyroid problems and Chest pain  Imaging (None/Xray/MRI/Other):  X-ray--degenerative changes in c-spine.  Recent or major surgery (yes/no): no; history of heart and L shoulder  Night pain (yes/no): no  Accidents (yes/no): no  Unexplained weight loss (yes/no): no  Barriers at home: no  Other red flags: no    EXAMINATION    Posture:   Sitting (good/fair/poor): poor  Standing (good/fair/poor): poor     Protruded head (yes/no): yes    Wry Neck (right/left/nil):  nil  Relevant (yes/no):  na     Correction of posture(better/worse/no effect): better  Other observations:  Significantly increased thoracic kyphosis, decreased lumbar lordosis, significant forward head    Neurological:    Motor Deficit:  normal   Reflexes:  Not assessed  Sensory Deficit:  normal   Dural signs:  Not assessed    Movement Loss:   Beau Mod Min Nil Pain   Protrusion    x NE   Flexion   x  NE   Retraction x    Increases central neck   Extension x    Increases central neck   Lateral flexion R  x   Stretch L neck   Lateral flexion L  x   Stretch R neck   Rotation R   x  Increases B neck   Rotation L x    Increases B neck      Test Movements:   During: produces, abolishes, increases, decreases, no effect, centralizing, peripheralizing  After: better, worse, no better, no worse, no effect, centralized, peripheralized    Pretest symptoms sitting: central neck pain 5/10   Symptoms During Symptoms After ROM increased ROM decreased No Effect   PRO        Rep PRO        RET W/self-OP--Increases No  Worse      Rep RET W/self-OP--Increases    Better X--imporved L rotation      RET EXT        Rep RET EXT        Pretest symptoms lying:     Symptoms During Symptoms After ROM increased ROM decreased No Effect   RET        Rep RET        RET EXT        Rep RET EXT        If required, pretest symptoms sitting:      Symptoms During Symptoms After ROM increased ROM decreased No Effect   LF-R        Rep LF-R        LF-L        Rep LF-L        ROT-R        Rep ROT-R        ROT-L        Rep ROT-L        FLEX        Rep FLEX            Static Tests:   Protrusion:    Flexion:    Retraction:    Extension (sitting/prone/supine):      Other Tests:  B shoulder AROM:  Flexion R=115 degrees, L=98 degrees; abduction B~120 degrees.     Provisional Classification:  Derangement - Bilateral, symmetrical, symptoms above elbow    Principle of Management:  Education:  Posture--use of lumbar roll in sitting, importance/impact of posture to decrease stress on neck, avoid soft chairs and slouching    Equipment provided:  none  Mechanical therapy (Y/N):  y   Extension principle:  Repeated cervical retraction with self-OP in sitting x10 reps, every 2 hours   Lateral principle:    Flexion principle:     Other:      ASSESSMENT/PLAN:    Patient is a 73 year old male with cervical complaints.  Provisional classification of derangement with directional preference for retraction.  He has significant loss of motion in cervical and thoracic extension and demonstrates significant kyphotic posture with protruded and flexed cervical spine positioning.  Cervical retraction ROM is limited by his upper thoracic kyphosis.  He had decreased pain overall and improved L cervical rotation ROM after repeated cervical retraction in sitting with self-overpressure and will try at home to assess further.  He needs to work on posture in addition to the exercises to reduce stress on his spine and promote improved mobility.    Patient has the following significant  findings with corresponding treatment plan.                Diagnosis 1:  Neck, B shoulder pain  Pain -  self management, education, directional preference exercise and home program  Decreased ROM/flexibility - therapeutic exercise and home program  Decreased function - therapeutic activities and home program  Impaired posture - neuro re-education and home program    Cumulative Therapy Evaluation is: Low complexity.    Previous and current functional limitations:  (See Goal Flow Sheet for this information)    Short term and Long term goals: (See Goal Flow Sheet for this information)     Communication ability:  Patient appears to be able to clearly communicate and understand verbal and written communication and follow directions correctly.  Treatment Explanation - The following has been discussed with the patient:   RX ordered/plan of care  Anticipated outcomes  Possible risks and side effects  This patient would benefit from PT intervention to resume normal activities.   Rehab potential is fair due to significant cervical mobility limitations and general overall health.    Frequency:  1 X week, once daily  Duration:  for 6 weeks  Discharge Plan:  Achieve all LTG.  Independent in home treatment program.  Reach maximal therapeutic benefit.    Please refer to the daily flowsheet for treatment today, total treatment time and time spent performing 1:1 timed codes.

## 2019-03-12 NOTE — TELEPHONE ENCOUNTER
"Requested Prescriptions   Pending Prescriptions Disp Refills     levothyroxine (SYNTHROID/LEVOTHROID) 50 MCG tablet [Pharmacy Med Name: LEVOTHYROXINE 50 MCG TAB 50 TAB] 90 tablet 2     Sig: TAKE 1 TABLET BY MOUTH DAILY    Thyroid Protocol Failed - 3/12/2019  2:17 PM       Failed - Normal TSH on file in past 12 months    Recent Labs   Lab Test 02/27/18  0921   TSH 2.92             Passed - Patient is 12 years or older       Passed - Recent (12 mo) or future (30 days) visit within the authorizing provider's specialty    Patient had office visit in the last 12 months or has a visit in the next 30 days with authorizing provider or within the authorizing provider's specialty.  See \"Patient Info\" tab in inbasket, or \"Choose Columns\" in Meds & Orders section of the refill encounter.             Passed - Medication is active on med list        Medication is being filled for 1 time refill only due to:  Patient needs labs tsh.    "

## 2019-03-12 NOTE — LETTER
DEPARTMENT OF HEALTH AND HUMAN SERVICES  [facility]ENT[facility] FOR MEDICARE & MEDICAID SERVICES    PLAN/UPDATED PLAN OF PROGRESS FOR OUTPATIENT REHABILITATION    PATIENTS NAME:  Westley Ness   : 1945  PROVIDER NUMBER:    [id number]  HICN:  [id number]   PROVIDER NAME: [facility] PHYSICAL [facility]ERAP[facility]  MEDICAL RECORD NUMBER: [id number]   START OF CARE DATE:  SOC Date: 19   TYPE:  PT  PRIMARY/TREATMENT DIAGNOSIS: (Pertinent Medical Diagnosis)     Chronic pain of both shoulders  Neck pain    VISITS FROM START OF CARE:  Rxs Used: 1     [facility] Initial Evaluation -- Cervical  Evaluation Date: 2019  Westley Ness is a 73 year old male with a neck and shoulder condition.   Referral: IM  Work mechanical stresses: retired   Employment status: retired  Leisure mechanical stresses: sedentary, watching TV, computer use  Functional disability score (NDI):  18%  VAS score (0-10): 6/10  Patient goals/expectations:  To be able to sit and move my neck with less pain.    HISTORY:  Present symptoms:  B neck and shoulder pain.  Pain quality (sharp/shooting/stabbing/aching/burning/cramping):   aching.  Paresthesia (yes/no):  Yes, numbness R index and middle fingers--pt states he has had this for 30+ years.  Present since (onset date): problems for 20+ years with his neck, pain has been worsening since 2019 for unknown reasons.   He was seen in Urgent care on 2019 and given a steroid dose pack which helped for a couple of weeks, but the pain returned.   Symptoms (improving/unchanging/worsening):  unchanging.  Symptoms commenced as a result of: unknown   Condition occurred in the following environment:  unknown  Symptoms at onset (neck/arm/forearm/headache): B neck and shoulders  Constant symptoms (neck/arm/forearm/headache): none  Intermittent symptoms (neck/arm/forearm/headache): B neck and shoulders  Symptoms are made worse with the following: Always  Sitting 2 hours and sleeping--loses 2 hours/night, looking down, computer use 2-3 hours  Symptoms are made better with the following: in the morning  Disturbed sleep (yes/no): yes--loses 2 hours/night  Number of pillows: 1  Sleeping postures (prone/sup/side R/L): R side  Previous episodes (0/1-5/6-10/11+): 11+ Year of first episode: years ago  Previous history: ongoing episode of neck pain for many years  Previous treatments: none  Specific Questions: (as reported by the patient)  Dizziness/Tinnitus/Nausea/Swallowing (pos/neg): neg  Gait/Upper Limbs (normal/abnormal): abnormal--uses SEC, slouched posture  Medications (nil/NSAIDS/anlag/steroids/anticoag/other):  See medication list in EPIC  Medical allergies:  Contrast dye  General health (excellent/good/fair/poor):  fair  Pertinent medical history:  Diabetes, Heart problems--blockage and electrical problem--multiple stents and surgery for electrical problem, High blood pressure, History of fractures, Kidney disease--on dialysis for 15 years, Overweight, Smoking, Thyroid problems and Chest pain  Imaging (None/Xray/MRI/Other):  X-ray--degenerative changes in c-spine.  Recent or major surgery (yes/no): no; history of heart and L shoulder  Night pain (yes/no): no  Accidents (yes/no): no  Unexplained weight loss (yes/no): no  Barriers at home: no  Other red flags: no    EXAMINATION  Posture:   Sitting (good/fair/poor): poor  Standing (good/fair/poor): poor   Protruded head (yes/no): yes    Wry Neck (right/left/nil):  nil  Relevant (yes/no):  na   Correction of posture(better/worse/no effect): better  Other observations:  Significantly increased thoracic kyphosis, decreased lumbar lordosis, significant forward head    Neurological:    Motor Deficit:  normal   Reflexes:  Not assessed  Sensory Deficit:  normal   Dural signs:  Not assessed    Movement Loss:   Beau Mod Min Nil Pain   Protrusion    x NE   Flexion   x  NE   Retraction x    Increases central neck   Extension x     Increases central neck   Lateral flexion R  x   Stretch L neck   Lateral flexion L  x   Stretch R neck   Rotation R   x  Increases B neck   Rotation L x    Increases B neck      Test Movements:   During: produces, abolishes, increases, decreases, no effect, centralizing, peripheralizing  After: better, worse, no better, no worse, no effect, centralized, peripheralized              Pretest symptoms sitting: central neck pain 5/10   Symptoms During Symptoms After ROM increased ROM decreased No Effect   PRO        Rep PRO        RET W/self-OP--Increases No Worse      Rep RET W/self-OP--Increases    Better X--imporved L rotation      RET EXT        Rep RET EXT        Pretest symptoms lying:     Symptoms During Symptoms After ROM increased ROM decreased No Effect   RET        Rep RET        RET EXT        Rep RET EXT        If required, pretest symptoms sitting:      Symptoms During Symptoms After ROM increased ROM decreased No Effect   LF-R        Rep LF-R        LF-L        Rep LF-L        ROT-R        Rep ROT-R        ROT-L        Rep ROT-L        FLEX        Rep FLEX            Static Tests:   Protrusion:    Flexion:    Retraction:    Extension (sitting/prone/supine):      Other Tests:  B shoulder AROM:  Flexion R=115 degrees, L=98 degrees; abduction B~120 degrees.     Provisional Classification:  Derangement - Bilateral, symmetrical, symptoms above elbow    Principle of Management:  Education:  Posture--use of lumbar roll in sitting, importance/impact of posture to decrease stress on neck, avoid soft chairs and slouching    Equipment provided:  none  Mechanical therapy (Y/N):  y   Extension principle:  Repeated cervical retraction with self-OP in sitting x10 reps, every 2 hours   Lateral principle:    Flexion principle:     Other:    ASSESSMENT/PLAN:  Patient is a 73 year old male with cervical complaints.  Provisional classification of derangement with directional preference for retraction.  He has significant loss  of motion in cervical and thoracic extension and demonstrates significant kyphotic posture with protruded and flexed cervical spine positioning.  Cervical retraction ROM is limited by his upper thoracic kyphosis.  He had decreased pain overall and improved L cervical rotation ROM after repeated cervical retraction in sitting with self-overpressure and will try at home to assess further.  He needs to work on posture in addition to the exercises to reduce stress on his spine and promote improved mobility.    Patient has the following significant findings with corresponding treatment plan.                Diagnosis 1:  Neck, B shoulder pain  Pain -  self management, education, directional preference exercise and home program  Decreased ROM/flexibility - therapeutic exercise and home program  Decreased function - therapeutic activities and home program  Impaired posture - neuro re-education and home program    Cumulative Therapy Evaluation is: Low complexity.    Previous and current functional limitations:  (See Goal Flow Sheet for this information)    Short term and Long term goals: (See Goal Flow Sheet for this information)     Communication ability:  Patient appears to be able to clearly communicate and understand verbal and written communication and follow directions correctly.  Treatment Explanation - The following has been discussed with the patient:   RX ordered/plan of care  Anticipated outcomes  Possible risks and side effects  This patient would benefit from PT intervention to resume normal activities.   Rehab potential is fair due to significant cervical mobility limitations and general overall health.    Frequency:  1 X week, once daily  Duration:  for 6 weeks  Discharge Plan:  Achieve all LTG.  Independent in home treatment program.  Reach maximal therapeutic benefit.      Caregiver Signature/Credentials _____________________________ Date ________       Treating Provider: Sara Leger, PT   I have reviewed and  "certified the need for these services and plan of treatment while under my care.      PHYSICIAN'S SIGNATURE:   _______________________________  Date___________                                 Josselin Kolb MD    Certification period:  Beginning of Cert date period: 03/12/19 to  End of Cert period date: 05/21/19     Functional Level Progress Report: Please see attached \"Goal Flow sheet for Functional level.\"    ____X____ Continue Services or       ________ DC Services              Service dates: From  SOC Date: 03/12/19 date to present                       "

## 2019-03-12 NOTE — PROGRESS NOTES
SUBJECTIVE:   Westley Ness is a 73 year old male who presents to clinic today for the following health issues:      Ear Problem      Duration: x2 weeks    Description (location/character/radiation): Left (external) ear (skin issue) and sores on head (thinking it has to do with his bamboo bedding maybe)    Intensity:  0-5/10    Accompanying signs and symptoms: tender to the touch    History (similar episodes/previous evaluation): None    Precipitating or alleviating factors: None    Therapies tried and outcome: cortisone 10 he uses on his head x2 weeks       Problem list and histories reviewed & adjusted, as indicated.  Additional history: Patient notes that these areas are sore.          ROS:  Constitutional, HEENT, cardiovascular, pulmonary, gi and gu systems are negative, except as otherwise noted.    OBJECTIVE:     /60 (BP Location: Right arm, Patient Position: Chair, Cuff Size: Adult Regular)   Pulse 83   Temp 97.5  F (36.4  C) (Oral)   Resp 16   Wt 87.2 kg (192 lb 3.2 oz)   SpO2 95%   BMI 29.22 kg/m    Body mass index is 29.22 kg/m .  GENERAL: healthy, alert and no distress  SKIN: scabbed lesions on L occiput c/w resolving vesicles in different states.      Diagnostic Test Results:  none     ASSESSMENT/PLAN:   1. Herpes zoster without complication  - valACYclovir (VALTREX) 1000 mg tablet; Take 1 tablet (1,000 mg) by mouth 3 times daily for 7 days  Dispense: 21 tablet; Refill: 0    Use medication as directed.  Vaseline topically as needed.  Follow up in 7 days for  A recheck, sooner if needed.  Patient amenable to this follow up plan.     America Garcia PA-C  [facility]

## 2019-03-13 LAB
MDC_IDC_LEAD_IMPLANT_DT: NORMAL
MDC_IDC_LEAD_LOCATION: NORMAL
MDC_IDC_LEAD_LOCATION_DETAIL_1: NORMAL
MDC_IDC_LEAD_MFG: NORMAL
MDC_IDC_LEAD_MODEL: NORMAL
MDC_IDC_LEAD_SERIAL: NORMAL
MDC_IDC_LEAD_SPECIAL_FUNCTION: NORMAL
MDC_IDC_MSMT_BATTERY_STATUS: NORMAL
MDC_IDC_MSMT_BATTERY_VOLTAGE: 2.92 V
MDC_IDC_MSMT_CAP_CHARGE_ENERGY: 40 J
MDC_IDC_MSMT_CAP_CHARGE_TIME: 13.3 S
MDC_IDC_MSMT_CAP_CHARGE_TYPE: NORMAL
MDC_IDC_MSMT_LEADCHNL_RV_IMPEDANCE_VALUE: 525 OHM
MDC_IDC_MSMT_LEADCHNL_RV_PACING_THRESHOLD_AMPLITUDE: 0.9 V
MDC_IDC_MSMT_LEADCHNL_RV_PACING_THRESHOLD_PULSEWIDTH: 0.4 MS
MDC_IDC_MSMT_LEADCHNL_RV_SENSING_INTR_AMPL: 16.1 MV
MDC_IDC_PG_IMPLANT_DTM: NORMAL
MDC_IDC_PG_MFG: NORMAL
MDC_IDC_PG_MODEL: NORMAL
MDC_IDC_PG_SERIAL: NORMAL
MDC_IDC_PG_TYPE: NORMAL
MDC_IDC_SESS_CLINIC_NAME: NORMAL
MDC_IDC_SESS_DTM: NORMAL
MDC_IDC_SESS_REPROGRAMMED: NORMAL
MDC_IDC_SESS_TYPE: NORMAL
MDC_IDC_SET_BRADY_AT_MODE_SWITCH_MODE: NORMAL
MDC_IDC_SET_BRADY_HYSTRATE: 40 {BEATS}/MIN
MDC_IDC_SET_BRADY_LOWRATE: 40 {BEATS}/MIN
MDC_IDC_SET_BRADY_MODE: NORMAL
MDC_IDC_SET_BRADY_NIGHT_RATE: 40 {BEATS}/MIN
MDC_IDC_SET_CRT_PACED_CHAMBERS: NORMAL
MDC_IDC_SET_LEADCHNL_LV_PACING_ANODE_ELECTRODE_1: NORMAL
MDC_IDC_SET_LEADCHNL_LV_PACING_ANODE_LOCATION_1: NORMAL
MDC_IDC_SET_LEADCHNL_LV_PACING_CATHODE_ELECTRODE_1: NORMAL
MDC_IDC_SET_LEADCHNL_LV_PACING_CATHODE_LOCATION_1: NORMAL
MDC_IDC_SET_LEADCHNL_LV_PACING_POLARITY: NORMAL
MDC_IDC_SET_LEADCHNL_LV_SENSING_ANODE_ELECTRODE_1: NORMAL
MDC_IDC_SET_LEADCHNL_LV_SENSING_ANODE_LOCATION_1: NORMAL
MDC_IDC_SET_LEADCHNL_LV_SENSING_CATHODE_ELECTRODE_1: NORMAL
MDC_IDC_SET_LEADCHNL_LV_SENSING_CATHODE_LOCATION_1: NORMAL
MDC_IDC_SET_LEADCHNL_LV_SENSING_POLARITY: NORMAL
MDC_IDC_SET_LEADCHNL_RA_PACING_ANODE_ELECTRODE_1: NORMAL
MDC_IDC_SET_LEADCHNL_RA_PACING_ANODE_LOCATION_1: NORMAL
MDC_IDC_SET_LEADCHNL_RA_PACING_CATHODE_ELECTRODE_1: NORMAL
MDC_IDC_SET_LEADCHNL_RA_PACING_CATHODE_LOCATION_1: NORMAL
MDC_IDC_SET_LEADCHNL_RA_PACING_POLARITY: NORMAL
MDC_IDC_SET_LEADCHNL_RA_SENSING_ANODE_ELECTRODE_1: NORMAL
MDC_IDC_SET_LEADCHNL_RA_SENSING_ANODE_LOCATION_1: NORMAL
MDC_IDC_SET_LEADCHNL_RA_SENSING_CATHODE_ELECTRODE_1: NORMAL
MDC_IDC_SET_LEADCHNL_RA_SENSING_CATHODE_LOCATION_1: NORMAL
MDC_IDC_SET_LEADCHNL_RA_SENSING_POLARITY: NORMAL
MDC_IDC_SET_LEADCHNL_RV_PACING_AMPLITUDE: 2 V
MDC_IDC_SET_LEADCHNL_RV_PACING_POLARITY: NORMAL
MDC_IDC_SET_LEADCHNL_RV_PACING_PULSEWIDTH: 0.4 MS
MDC_IDC_SET_LEADCHNL_RV_SENSING_POLARITY: NORMAL
MDC_IDC_SET_LEADCHNL_RV_SENSING_SENSITIVITY: 0.8 MV
MDC_IDC_SET_ZONE_DETECTION_BEATS_DENOMINATOR: 16 {BEATS}
MDC_IDC_SET_ZONE_DETECTION_BEATS_NUMERATOR: 12 {BEATS}
MDC_IDC_SET_ZONE_DETECTION_INTERVAL: 240 MS
MDC_IDC_SET_ZONE_DETECTION_INTERVAL: 320 MS
MDC_IDC_SET_ZONE_DETECTION_INTERVAL: 460 MS
MDC_IDC_SET_ZONE_TYPE: NORMAL
MDC_IDC_STAT_EPISODE_RECENT_COUNT: 0
MDC_IDC_STAT_EPISODE_TYPE: NORMAL
MDC_IDC_STAT_TACHYTHERAPY_SHOCKS_ABORTED_RECENT: 0
MDC_IDC_STAT_TACHYTHERAPY_SHOCKS_ABORTED_TOTAL: 0
MDC_IDC_STAT_TACHYTHERAPY_SHOCKS_DELIVERED_RECENT: 0
MDC_IDC_STAT_TACHYTHERAPY_SHOCKS_DELIVERED_TOTAL: 4

## 2019-03-14 ENCOUNTER — TELEPHONE (OUTPATIENT)
Dept: INTERNAL MEDICINE | Facility: CLINIC | Age: 74
End: 2019-03-14

## 2019-03-14 DIAGNOSIS — B02.9 HERPES ZOSTER WITHOUT COMPLICATION: ICD-10-CM

## 2019-03-14 RX ORDER — VALACYCLOVIR HYDROCHLORIDE 500 MG/1
500 TABLET, FILM COATED ORAL DAILY
COMMUNITY
Start: 2019-03-14 | End: 2019-10-23

## 2019-03-14 NOTE — TELEPHONE ENCOUNTER
Patient informed but he is concerned because he still feels rotten and last dose was midnight. Yesterday his doses were taken after dialysis around 1130, another one around 7 pm, and one around midnight. Still feels rotten and had weird dreams overnight. Feeling maybe 1% better. He did cancel cardiology appt and says they are now booked out until May, advised he contact cardiology nurse to discuss if patient can wait that long to be seen next by cardiology or if they can work him in sooner. He said it was an annual visit and to follow up on his ECHO. Please advise regarding medication, patient willing to try BID but is concerned since he is not really feeling any better this morning and has not had pill since midnight.

## 2019-03-14 NOTE — TELEPHONE ENCOUNTER
Patient informed. He has follow up appt with America WHALEN on 3/21, declined earlier appt. Advised to call back if symptoms not improving/resolving or if worsening.

## 2019-03-14 NOTE — TELEPHONE ENCOUNTER
"Patient feels he is having a reaction to valACYclovir (VALTREX) 1000 mg tablet, prescribed 3/12 by America WHALEN. Patient feels drunk. Took one tablet three times a day yesterday. Took one dose on the 12th as well. Feels loopy. Tenative in walking. Not much concentration. Reports his BP is 140s/80s, pulse 83, oxygen 95%.  Not dizzy. Cannot read fine print as well. No blurry vision, just harder to read. No N/v, swelling, difficulty breathing, chest pain, or difficulty swallowing. No fever that he knows of. Feeling better in regards to possible shingles. Patient also notes he was supposed to see cardiology today but will have to reschedule unless things \"turn around\" because he does not feel he can drive in this condition and does not have another ride.    Please advise and update allergy and med list as needed. Routing to PCP and America WHALEN. Did advise patient to hold off on taking further Valtrex until get provider's advice.  "

## 2019-03-14 NOTE — TELEPHONE ENCOUNTER
Decrease to twice daily.  Ok to reschedule cardiology if needed and unable to get a ride.  Be sure to take for 7 days.  Bogdan URIARTE

## 2019-03-14 NOTE — TELEPHONE ENCOUNTER
Spoke with America WHALEN and she recommends patient take 500 mg (cut 1000 mg tab in half) once daily starting tomorrow for 5 more doses. On dialysis days, take dose after dialysis.

## 2019-03-14 NOTE — TELEPHONE ENCOUNTER
Tell Patient to take only 500 mg daily for 7 days.  Then follow up in clinic for a recheck.  Thank you for checking on cardiology rescheduling.   Bogdan URIARTE

## 2019-03-21 ENCOUNTER — THERAPY VISIT (OUTPATIENT)
Dept: PHYSICAL THERAPY | Facility: CLINIC | Age: 74
End: 2019-03-21
Payer: MEDICARE

## 2019-03-21 DIAGNOSIS — M54.2 NECK PAIN: ICD-10-CM

## 2019-03-21 PROCEDURE — 97112 NEUROMUSCULAR REEDUCATION: CPT | Mod: GP | Performed by: PHYSICAL THERAPIST

## 2019-03-21 PROCEDURE — 97110 THERAPEUTIC EXERCISES: CPT | Mod: GP | Performed by: PHYSICAL THERAPIST

## 2019-03-25 ENCOUNTER — TELEPHONE (OUTPATIENT)
Dept: INTERNAL MEDICINE | Facility: CLINIC | Age: 74
End: 2019-03-25

## 2019-03-25 NOTE — TELEPHONE ENCOUNTER
Received form(s) from [facility] for Treatment notes and POC.  Placed form(s) in/on Dr. Kolb's  tray.  Forms need to be signed and faxed to [phone]..    Call pt to verify form was sent: No  Copy needs to be sent for scanning after completion: Yes    Thank you, CHANCE Hernandez CMA        Completed form faxed and returned to faxed file on [facility].CHANCE Hernandez CMA

## 2019-03-26 ENCOUNTER — OFFICE VISIT (OUTPATIENT)
Dept: INTERNAL MEDICINE | Facility: CLINIC | Age: 74
End: 2019-03-26
Payer: MEDICARE

## 2019-03-26 VITALS
TEMPERATURE: 97.9 F | RESPIRATION RATE: 16 BRPM | HEART RATE: 78 BPM | WEIGHT: 191.5 LBS | SYSTOLIC BLOOD PRESSURE: 120 MMHG | BODY MASS INDEX: 29.12 KG/M2 | DIASTOLIC BLOOD PRESSURE: 60 MMHG | OXYGEN SATURATION: 96 %

## 2019-03-26 DIAGNOSIS — I48.21 PERMANENT ATRIAL FIBRILLATION (H): ICD-10-CM

## 2019-03-26 DIAGNOSIS — Z99.2 TYPE 2 DIABETES MELLITUS WITH CHRONIC KIDNEY DISEASE ON CHRONIC DIALYSIS, WITHOUT LONG-TERM CURRENT USE OF INSULIN (H): Primary | ICD-10-CM

## 2019-03-26 DIAGNOSIS — Z13.89 SCREENING FOR DIABETIC PERIPHERAL NEUROPATHY: ICD-10-CM

## 2019-03-26 DIAGNOSIS — Z12.11 SCREEN FOR COLON CANCER: ICD-10-CM

## 2019-03-26 DIAGNOSIS — E11.22 TYPE 2 DIABETES MELLITUS WITH CHRONIC KIDNEY DISEASE ON CHRONIC DIALYSIS, WITHOUT LONG-TERM CURRENT USE OF INSULIN (H): Primary | ICD-10-CM

## 2019-03-26 DIAGNOSIS — N18.6 TYPE 2 DIABETES MELLITUS WITH CHRONIC KIDNEY DISEASE ON CHRONIC DIALYSIS, WITHOUT LONG-TERM CURRENT USE OF INSULIN (H): Primary | ICD-10-CM

## 2019-03-26 DIAGNOSIS — Z11.59 NEED FOR HEPATITIS C SCREENING TEST: ICD-10-CM

## 2019-03-26 DIAGNOSIS — M25.511 CHRONIC PAIN OF BOTH SHOULDERS: ICD-10-CM

## 2019-03-26 DIAGNOSIS — M25.512 CHRONIC PAIN OF BOTH SHOULDERS: ICD-10-CM

## 2019-03-26 DIAGNOSIS — G89.29 CHRONIC PAIN OF BOTH SHOULDERS: ICD-10-CM

## 2019-03-26 PROBLEM — I48.91 ATRIAL FIBRILLATION (H): Status: ACTIVE | Noted: 2019-03-26

## 2019-03-26 LAB
ALT SERPL W P-5'-P-CCNC: 16 U/L (ref 0–70)
ANION GAP SERPL CALCULATED.3IONS-SCNC: 10 MMOL/L (ref 3–14)
BUN SERPL-MCNC: 20 MG/DL (ref 7–30)
CALCIUM SERPL-MCNC: 9.9 MG/DL (ref 8.5–10.1)
CHLORIDE SERPL-SCNC: 100 MMOL/L (ref 94–109)
CHOLEST SERPL-MCNC: 126 MG/DL
CO2 SERPL-SCNC: 29 MMOL/L (ref 20–32)
CREAT SERPL-MCNC: 5.07 MG/DL (ref 0.66–1.25)
GFR SERPL CREATININE-BSD FRML MDRD: 10 ML/MIN/{1.73_M2}
GLUCOSE SERPL-MCNC: 117 MG/DL (ref 70–99)
HBA1C MFR BLD: 5.1 % (ref 0–5.6)
HDLC SERPL-MCNC: 52 MG/DL
LDLC SERPL CALC-MCNC: 53 MG/DL
NONHDLC SERPL-MCNC: 74 MG/DL
POTASSIUM SERPL-SCNC: 4.9 MMOL/L (ref 3.4–5.3)
SODIUM SERPL-SCNC: 139 MMOL/L (ref 133–144)
TRIGL SERPL-MCNC: 107 MG/DL
TSH SERPL DL<=0.005 MIU/L-ACNC: 4.4 MU/L (ref 0.4–4)

## 2019-03-26 PROCEDURE — 80048 BASIC METABOLIC PNL TOTAL CA: CPT | Performed by: PHYSICIAN ASSISTANT

## 2019-03-26 PROCEDURE — 99214 OFFICE O/P EST MOD 30 MIN: CPT | Performed by: PHYSICIAN ASSISTANT

## 2019-03-26 PROCEDURE — 36415 COLL VENOUS BLD VENIPUNCTURE: CPT | Performed by: PHYSICIAN ASSISTANT

## 2019-03-26 PROCEDURE — 83036 HEMOGLOBIN GLYCOSYLATED A1C: CPT | Performed by: PHYSICIAN ASSISTANT

## 2019-03-26 PROCEDURE — G0472 HEP C SCREEN HIGH RISK/OTHER: HCPCS | Performed by: PHYSICIAN ASSISTANT

## 2019-03-26 PROCEDURE — 84460 ALANINE AMINO (ALT) (SGPT): CPT | Performed by: PHYSICIAN ASSISTANT

## 2019-03-26 PROCEDURE — 84443 ASSAY THYROID STIM HORMONE: CPT | Performed by: PHYSICIAN ASSISTANT

## 2019-03-26 PROCEDURE — 99207 C FOOT EXAM  NO CHARGE: CPT | Mod: 25 | Performed by: PHYSICIAN ASSISTANT

## 2019-03-26 PROCEDURE — 80061 LIPID PANEL: CPT | Performed by: PHYSICIAN ASSISTANT

## 2019-03-26 RX ORDER — CYCLOBENZAPRINE HCL 5 MG
5 TABLET ORAL
Qty: 20 TABLET | Refills: 0 | Status: SHIPPED | OUTPATIENT
Start: 2019-03-26 | End: 2020-05-21

## 2019-03-26 NOTE — PROGRESS NOTES
SUBJECTIVE:   Westley Ness is a 73 year old male who presents to clinic today for the following health issues:      Pt is here for a f/u on shingles on 3/12/19. Pt states that back of left head and left ear/behind ear where glasses set, still little painful and tender cortisone seems to help relieve these symptoms.       Problem list and histories reviewed & adjusted, as indicated.  Additional history: Patient presents for DM follow up as well.  Notes he is doing well with recent Pacemaker tune this last week.  Dialysis 3 x weekly.        Reviewed and updated as needed this visit by clinical staff  Tobacco  Allergies  Meds          ROS:  Constitutional, HEENT, cardiovascular, pulmonary, gi and gu systems are negative, except as otherwise noted.    OBJECTIVE:     /60 (BP Location: Left arm, Patient Position: Chair, Cuff Size: Adult Regular)   Pulse 78   Temp 97.9  F (36.6  C) (Oral)   Resp 16   Wt 86.9 kg (191 lb 8 oz)   SpO2 96%   BMI 29.12 kg/m    Body mass index is 29.12 kg/m .  GENERAL: healthy, alert and no distress  NECK: no adenopathy, no asymmetry, masses, or scars and thyroid normal to palpation  RESP: lungs clear to auscultation - no rales, rhonchi or wheezes  CV: regular rate and rhythm, normal S1 S2, no S3 or S4, no murmur, click or rub, no peripheral edema and peripheral pulses strong  MS: no gross musculoskeletal defects noted, no edema  SKIN: marked improvement of scalp lesions with no residual erythema.   Diabetic foot exam: normal DP and PT pulses, no trophic changes or ulcerative lesions and normal sensory exam    Diagnostic Test Results:  none     ASSESSMENT/PLAN:   1. Type 2 diabetes mellitus with chronic kidney disease on chronic dialysis, without long-term current use of insulin (H)  - ALT  - BASIC METABOLIC PANEL  - HEMOGLOBIN A1C  - Lipid panel reflex to direct LDL Fasting  - TSH  - Albumin Random Urine Quantitative with Creat Ratio; Future    2. Permanent atrial  fibrillation (H)  -follow up per Cardiology    3. Screen for colon cancer  - GASTROENTEROLOGY ADULT REF PROCEDURE ONLY    4. Screening for diabetic peripheral neuropathy  - FOOT EXAM  NO CHARGE [61330.442]    5. Need for hepatitis C screening test  - Hepatitis C Screen Reflex to HCV RNA Quant and Genotype    6. Chronic pain of both shoulders  - cyclobenzaprine (FLEXERIL) 5 MG tablet; Take 1 tablet (5 mg) by mouth nightly as needed for muscle spasms  Dispense: 20 tablet; Refill: 0    Use medication as directed.  Follow up on labs  Follow up if symptoms should persist, change or worsen.  Patient amenable to this follow up plan.     America Garcia PA-C  [facility]

## 2019-03-27 LAB — HCV AB SERPL QL IA: NONREACTIVE

## 2019-03-28 ENCOUNTER — THERAPY VISIT (OUTPATIENT)
Dept: PHYSICAL THERAPY | Facility: CLINIC | Age: 74
End: 2019-03-28
Payer: MEDICARE

## 2019-03-28 DIAGNOSIS — M54.2 NECK PAIN: ICD-10-CM

## 2019-03-28 PROCEDURE — 97112 NEUROMUSCULAR REEDUCATION: CPT | Mod: GP | Performed by: PHYSICAL THERAPIST

## 2019-03-28 PROCEDURE — 97110 THERAPEUTIC EXERCISES: CPT | Mod: GP | Performed by: PHYSICAL THERAPIST

## 2019-03-28 NOTE — PROGRESS NOTES
Subjective:  HPI                    Objective:  System    Physical Exam    General     ROS    Assessment/Plan:    PROGRESS  REPORT    Progress reporting period is from 03/12/2019 to 03/28/2019.       SUBJECTIVE  Subjective: Patient reports his neck is feeling better and does not hurt as much.  He still has pain on a daily basis but not as intense.  He has the most pain if he stays in one position too long.  He has increased neck pain after about 1 hour of sitting and really notices it with sitting 4 hours for dialysis.  He still wakes at night due to neck pain but loses less than 1 hour of sleep/night.  He does his exercises 2x/day and feels better after doing them.      Current Pain level: 2/10.     Initial Pain level: 6/10.   Changes in function:  Yes, improved sleep.  Adverse reaction to treatment or activity: None    OBJECTIVE  Objective: Cervical AROM: R rotation 50%, L rotation 75%, stiffness with both directions.  R rotation improved to 66% after repeated cerv retraction with self-OP.  Pt to continue but needs to do more reps--try for at least 4x/day, more if able.       ASSESSMENT/PLAN  Patient has been seen for 3 visits with treatment focusing on cervical retraction exercises in addition to posture exercises and training to address neck pain.  He has made progress since his initial visit and reports decreased pain and improved function.  He will continue with his HEP independently at this point and return for a follow-up visit if not progressing.  If no additional follow-up visits are scheduled, he will be discharged from PT.    Updated problem list and treatment plan: Diagnosis 1:  Neck pain  Pain -  self management, education, directional preference exercise and home program  Decreased ROM/flexibility - home program  Decreased strength - home program  Decreased function - home program  Impaired posture - home program  STG/LTGs have been met or progress has been made towards goals:  Yes, STG for sleep has  been met, but LTG is progressing.  Goals for sitting have not been met.    Assessment of Progress: The patient's condition is improving.  Self Management Plans:  Patient has been instructed in a home treatment program.  Patient is independent in a home treatment program.  Patient  has been instructed in self management of symptoms.  Patient is independent in self management of symptoms.  Westley continues to require the following intervention to meet STG and LTG's:  PT intervention is no longer required to meet STG/LTG.    Recommendations:  Patient will continue with his HEP independently at this point and return for a follow-up visit if needed.  If no further follow-up visits are scheduled, patient will be discharged from PT.    Please refer to the daily flowsheet for treatment today, total treatment time and time spent performing 1:1 timed codes.

## 2019-04-11 DIAGNOSIS — E03.9 HYPOTHYROIDISM, UNSPECIFIED TYPE: ICD-10-CM

## 2019-04-11 DIAGNOSIS — I21.4 NSTEMI (NON-ST ELEVATED MYOCARDIAL INFARCTION) (H): ICD-10-CM

## 2019-04-11 RX ORDER — LEVOTHYROXINE SODIUM 50 UG/1
TABLET ORAL
Qty: 30 TABLET | Refills: 0 | Status: SHIPPED | OUTPATIENT
Start: 2019-04-11 | End: 2019-05-06

## 2019-04-11 RX ORDER — ISOSORBIDE MONONITRATE 30 MG/1
TABLET, EXTENDED RELEASE ORAL
Qty: 90 TABLET | Refills: 2 | Status: SHIPPED | OUTPATIENT
Start: 2019-04-11 | End: 2019-04-30

## 2019-04-11 NOTE — TELEPHONE ENCOUNTER
"Requested Prescriptions   Pending Prescriptions Disp Refills     isosorbide mononitrate (IMDUR) 30 MG 24 hr tablet [Pharmacy Med Name: ISOSORBIDE MONO ER 30MG TAB 30 TAB] 90 tablet 2     Sig: TAKE 1/2 TABLET BY MOUTH ONCE DAILY       Nitrates Passed - 4/11/2019 12:34 PM        Passed - Blood pressure under 140/90 in past 12 months     BP Readings from Last 3 Encounters:   03/26/19 120/60   03/12/19 115/60   03/05/19 117/59                 Passed - Pt is not on erectile dysfunction medications        Passed - Recent (12 mo) or future (30 days) visit within the authorizing provider's specialty     Patient had office visit in the last 12 months or has a visit in the next 30 days with authorizing provider or within the authorizing provider's specialty.  See \"Patient Info\" tab in inbasket, or \"Choose Columns\" in Meds & Orders section of the refill encounter.              Passed - Medication is active on med list        Passed - Patient is age 18 or older        levothyroxine (SYNTHROID/LEVOTHROID) 50 MCG tablet [Pharmacy Med Name: LEVOTHYROXINE 50MCG TAB 50 TAB] 30 tablet 0     Sig: TAKE 1 TABLET (50 MCG) BY MOUTH DAILY       Thyroid Protocol Failed - 4/11/2019 12:34 PM        Failed - Normal TSH on file in past 12 months     Recent Labs   Lab Test 03/26/19  0847   TSH 4.40*              Passed - Patient is 12 years or older        Passed - Recent (12 mo) or future (30 days) visit within the authorizing provider's specialty     Patient had office visit in the last 12 months or has a visit in the next 30 days with authorizing provider or within the authorizing provider's specialty.  See \"Patient Info\" tab in inbasket, or \"Choose Columns\" in Meds & Orders section of the refill encounter.              Passed - Medication is active on med list        Routing refill request to provider for review/approval because:  Labs out of range:  tsh        "

## 2019-04-19 NOTE — PROGRESS NOTES
Biotronik Lumax (S) ICD Remote Device Check    : 0 %  Mode: VVI 40        Presenting Rhythm: SR  Heart Rate: WNL  Sensing: WNL    Pacing Threshold: WNL    Impedance: WNL  Battery Status: MOL2 15% with a charge of 13 seconds   Ventricular Arrhythmia: 0  ATP: 0    Shocks: 0    Care Plan: Due in clinic for next check, has orders for EP in January as well.  He did not want to schedule now, order placed. SK               INR 2.9; cont coumadin 4 mg qHS; re-check in 2 weeks; please call [facility]

## 2019-04-30 ENCOUNTER — OFFICE VISIT (OUTPATIENT)
Dept: CARDIOLOGY | Facility: CLINIC | Age: 74
End: 2019-04-30
Payer: MEDICARE

## 2019-04-30 VITALS
HEART RATE: 84 BPM | SYSTOLIC BLOOD PRESSURE: 126 MMHG | BODY MASS INDEX: 29.18 KG/M2 | HEIGHT: 68 IN | WEIGHT: 192.5 LBS | DIASTOLIC BLOOD PRESSURE: 64 MMHG

## 2019-04-30 DIAGNOSIS — Z99.2 ESRD ON HEMODIALYSIS (H): ICD-10-CM

## 2019-04-30 DIAGNOSIS — N18.6 TYPE 2 DIABETES MELLITUS WITH CHRONIC KIDNEY DISEASE ON CHRONIC DIALYSIS, WITHOUT LONG-TERM CURRENT USE OF INSULIN (H): ICD-10-CM

## 2019-04-30 DIAGNOSIS — Z99.2 TYPE 2 DIABETES MELLITUS WITH CHRONIC KIDNEY DISEASE ON CHRONIC DIALYSIS, WITHOUT LONG-TERM CURRENT USE OF INSULIN (H): ICD-10-CM

## 2019-04-30 DIAGNOSIS — I25.118 CORONARY ARTERY DISEASE OF NATIVE ARTERY OF NATIVE HEART WITH STABLE ANGINA PECTORIS (H): Primary | ICD-10-CM

## 2019-04-30 DIAGNOSIS — I48.3 TYPICAL ATRIAL FLUTTER (H): ICD-10-CM

## 2019-04-30 DIAGNOSIS — I21.4 NSTEMI (NON-ST ELEVATED MYOCARDIAL INFARCTION) (H): ICD-10-CM

## 2019-04-30 DIAGNOSIS — I25.5 ISCHEMIC CARDIOMYOPATHY: ICD-10-CM

## 2019-04-30 DIAGNOSIS — N18.6 ESRD ON HEMODIALYSIS (H): ICD-10-CM

## 2019-04-30 DIAGNOSIS — E11.22 TYPE 2 DIABETES MELLITUS WITH CHRONIC KIDNEY DISEASE ON CHRONIC DIALYSIS, WITHOUT LONG-TERM CURRENT USE OF INSULIN (H): ICD-10-CM

## 2019-04-30 PROCEDURE — 99215 OFFICE O/P EST HI 40 MIN: CPT | Performed by: INTERNAL MEDICINE

## 2019-04-30 RX ORDER — MEXILETINE HYDROCHLORIDE 150 MG/1
150 CAPSULE ORAL 2 TIMES DAILY
COMMUNITY
End: 2020-05-21

## 2019-04-30 RX ORDER — ISOSORBIDE MONONITRATE 30 MG/1
30 TABLET, EXTENDED RELEASE ORAL DAILY
Qty: 90 TABLET | Refills: 3 | Status: SHIPPED | OUTPATIENT
Start: 2019-04-30 | End: 2020-07-16

## 2019-04-30 SDOH — HEALTH STABILITY: MENTAL HEALTH: HOW OFTEN DO YOU HAVE A DRINK CONTAINING ALCOHOL?: NEVER

## 2019-04-30 ASSESSMENT — MIFFLIN-ST. JEOR: SCORE: 1592.67

## 2019-04-30 NOTE — LETTER
4/30/2019    Josselin Kolb MD  [address] [facility] [address]    RE: Westley MO Ness       Dear Colleague,    I had the pleasure of seeing Westley Ness in the [facility].    HPI and Plan:   I had the pleasure of seeing Westley Ness in Cardiology Clinic today.      He is a 73-year-old male with a past medical history of coronary artery disease, ischemic cardiomyopathy with ejection fraction 25%-30% and previous AICD placement, end-stage renal disease and mild aortic root dilatation who returns for followup.  He had a non-ST elevation MI in July 2016.  He underwent drug-eluting stent placed in the proximal left circumflex and obtuse marginal branch in 2016.  In November, he had recurrent event and had angioplasty of the first obtuse marginal branch for restenosis.  He has a chronically occluded LAD and at last nuclear viability study had shown not much viability in the LAD distribution.     He has history of ischemic cardiomyopathy EF of 25 to 30% with recent echo in March showed no change with the EF.  He has been following with EP for atrial flutter and underwent 2 ablation successfully.    He is on dialysis for end-stage renal disease.  He has done well for last 14 years on dialysis.    He is doing reasonably well.  He has occasional episodes of chest pain requiring sublingual nitroglycerin about once or twice a month.  Usually is with some kind of exertion.  This is unchanged for him.  He is on 50 mg of isosorbide mononitrate.    There is no history of orthopnea or PND.  I reviewed the echocardiogram results with him which showed mildly dilated left ventricle with stable ejection fraction of 25 to 30%.    Since his atrial flutter was ablated successfully with no recurrence on repeat ZIO Patch monitor, the electrophysiology team has discontinued his Eliquis and is now back on baby aspirin and Plavix.    We talked at length today about repeating a stress nuclear study  to assess ischemic burden of myocardium given his stable angina but he is reluctant.  He would rather do it only if there is some worsening.  For now, he wants to optimize medical therapy.  We discussed courage trial data.     PHYSICAL EXAMINATION:   See below     IMPRESSION:   1.  Coronary artery disease, stable with occasional chest discomfort that could be possibly angina, but responded promptly to nitroglycerin.  He has had no further episodes despite being quite active.     I will increase isosorbide mononitrate from 15 to 30 mg daily for better antianginal effect.  We discussed side effects.  He is not interested in pursuing a stress nuclear study to assess ischemic burden of myocardium at this time.    2.  Chronic systolic dysfunction with ischemic cardia myopathy, stable, no overt congestive heart failure.  History of AICD with no recent shocks.  The battery is 10% and will need to be replaced at some point.  We will continue to follow the device clinic.    3.  End-stage renal disease on hemodialysis    4.  Hyperlipidemia, continue Pravachol, last LDL 53 and HDL 52    5.  History of proximal atrial flutter, status post successful ablation.  Electrophysiology team stopped Eliquis.   He was having bleeding issues with his access site on hemodialysis.      Thank you for allowing us to participate in the care of this nice patient.  I will have him see Lacy MCLEOD in 6 months as she has seen him before and also does see patients with heart failure.  I will see him back in follow-up in a year with a repeat echocardiogram prior to visit with me.         Sincerely,    DERICK WEST MD    cc:   Josselin Kolb MD   [address]  [address]      Orders Placed This Encounter   Procedures     Follow-Up with Cardiac Advanced Practice Provider       Orders Placed This Encounter   Medications     Metoprolol Succinate 25 MG CS24     Sig: Take 12.5 mg by mouth daily      mexiletine (MEXITIL) 150 MG capsule     Sig: Take 150 mg by mouth 2 times daily     isosorbide mononitrate (IMDUR) 30 MG 24 hr tablet     Sig: Take 1 tablet (30 mg) by mouth daily     Dispense:  90 tablet     Refill:  3       Medications Discontinued During This Encounter   Medication Reason     metoprolol succinate ER (TOPROL-XL) 25 MG 24 hr tablet Dose adjustment     mexiletine (MEXITIL) 150 MG capsule Medication Reconciliation Clean Up     isosorbide mononitrate (IMDUR) 30 MG 24 hr tablet          Encounter Diagnoses   Name Primary?     Coronary artery disease of native artery of native heart with stable angina pectoris (H) Yes     Ischemic cardiomyopathy      ESRD on hemodialysis (H)      Type 2 diabetes mellitus with chronic kidney disease on chronic dialysis, without long-term current use of insulin (H)      Typical atrial flutter (H)      NSTEMI (non-ST elevated myocardial infarction) (H)        CURRENT MEDICATIONS:  Current Outpatient Medications   Medication Sig Dispense Refill     acetaminophen (TYLENOL) 500 MG tablet Take 1,000 mg by mouth 4 times daily as needed for mild pain (500mg x 2 = 1,000mg)       aspirin 81 MG tablet Take 1 tablet (81 mg) by mouth daily 1 tablet 0     clopidogrel (PLAVIX) 75 MG tablet TAKE 1 TABLET (75 MG) BY MOUTH DAILY TO PROTECT YOUR STENT(S). DO NOT STOP TAKING UNLESS DIRECTED BY CARDIOLOGY. 30 tablet 11     cyclobenzaprine (FLEXERIL) 5 MG tablet Take 1 tablet (5 mg) by mouth nightly as needed for muscle spasms 20 tablet 0     isosorbide mononitrate (IMDUR) 30 MG 24 hr tablet Take 1 tablet (30 mg) by mouth daily 90 tablet 3     levothyroxine (SYNTHROID/LEVOTHROID) 50 MCG tablet TAKE 1 TABLET (50 MCG) BY MOUTH DAILY 30 tablet 0     lisinopril (PRINIVIL/ZESTRIL) 2.5 MG tablet Take one tablet after dialysis. Take second tablet at bedtime. (Patient taking differently: Take 2.5 mg by mouth 2 times daily (Take one tablet after dialysis. Take second tablet at bedtime.)) 180  tablet 3     loperamide (IMODIUM) 2 MG capsule Take 1 mg by mouth daily as needed        Metoprolol Succinate 25 MG CS24 Take 12.5 mg by mouth daily       mexiletine (MEXITIL) 150 MG capsule Take 150 mg by mouth 2 times daily       nitroGLYcerin (NITROSTAT) 0.4 MG sublingual tablet 1 TAB EVERY 5 MIN AS NEEDED, UP TO 3 PER EPISODE (Patient taking differently: Place 0.4 mg under the tongue every 5 minutes as needed UP TO 3 PER EPISODE) 25 tablet 0     pantoprazole (PROTONIX) 40 MG EC tablet TAKE 1 TABLET (40 MG) BY MOUTH EVERY MORNING 30 tablet 9     pravastatin (PRAVACHOL) 40 MG tablet TAKE 1 TABLET (40 MG) BY MOUTH DAILY 90 tablet 1     psyllium 0.52 G capsule Take 1 capsule by mouth daily as needed        RaNITidine HCl (ZANTAC PO) Take 75 mg by mouth At Bedtime        TRIPHROCAPS 1 MG capsule TAKE 1 CAPSULE BY MOUTH EVERY EVENING 90 capsule 3     valACYclovir (VALTREX) 500 MG tablet Take 1 tablet (500 mg) by mouth daily         ALLERGIES     Allergies   Allergen Reactions     Contrast Dye Hives     Does fine if he uses benadryl prior.     No Clinical Screening - See Comments      Green beans - Diarrhea.    Topical antibiotic - name unknown - caused swelling of the penis.       PAST MEDICAL HISTORY:  Past Medical History:   Diagnosis Date     Acid reflux disease      CAD (coronary artery disease)     s/p multiple NSTEMIs and PCI's     ESRD on hemodialysis (H)     MWF     History of atrial flutter     s/p ablation     Hypertension      Hypothyroidism      Ischemic cardiomyopathy     EF 25-30%, s/p AICD     Obese      Type 2 diabetes mellitus (H)     diet-controlled       PAST SURGICAL HISTORY:  Past Surgical History:   Procedure Laterality Date     CHOLECYSTECTOMY, OPEN  2013     ELBOW SURGERY Left 2008    ORIF - plates still in place     H ABLATION ATRIAL FLUTTER  06/08/2017, 12/11/17      LEFT HEART CATHETERIZATION  7/14/2016     HC LEFT HEART CATHETERIZATION  9/21/2016     IMPLANT VENTRICULAR DEVICE  08/15/2011      REPAIR FISTULA ARTERIOVENOUS UPPER EXTREMITY Left 3/5/2019    Procedure: REPAIR LEFT UPPER ARM ARTERIOVENOUS FISTULA SKIN ULCER;  Surgeon: Westley Griggs MD;  Location: [facility] OR     TONSILLECTOMY & ADENOIDECTOMY       VASCULAR SURGERY  ,     LUE fistulas (upper and lower); upper one is functional       FAMILY HISTORY:  Family History   Problem Relation Age of Onset     Cerebrovascular Disease Mother         later in life     Hypertension Father      Bladder Cancer Father      Myocardial Infarction Paternal Grandmother      Diabetes No family hx of      Prostate Cancer No family hx of      Colon Cancer No family hx of        SOCIAL HISTORY:  Social History     Socioeconomic History     Marital status: Single     Spouse name: None     Number of children: None     Years of education: None     Highest education level: None   Occupational History     Occupation: Retired - CPA   Social Needs     Financial resource strain: None     Food insecurity:     Worry: None     Inability: None     Transportation needs:     Medical: None     Non-medical: None   Tobacco Use     Smoking status: Former Smoker     Packs/day: 2.00     Years: 35.00     Pack years: 70.00     Types: Cigarettes     Start date:      Last attempt to quit: 1996     Years since quittin.5     Smokeless tobacco: Never Used   Substance and Sexual Activity     Alcohol use: Not Currently     Alcohol/week: 0.0 oz     Frequency: Never     Drug use: No     Sexual activity: Never   Lifestyle     Physical activity:     Days per week: None     Minutes per session: None     Stress: None   Relationships     Social connections:     Talks on phone: None     Gets together: None     Attends [employer] service: None     Active member of club or organization: None     Attends meetings of clubs or organizations: None     Relationship status: None     Intimate partner violence:     Fear of current or ex partner: None     Emotionally abused: None      "Physically abused: None     Forced sexual activity: None   Other Topics Concern     Parent/sibling w/ CABG, MI or angioplasty before 65F 55M? No      Service Not Asked     Blood Transfusions Not Asked     Caffeine Concern No     Occupational Exposure Not Asked     Hobby Hazards Not Asked     Sleep Concern No     Stress Concern Not Asked     Weight Concern Not Asked     Special Diet Yes     Back Care Not Asked     Exercise Yes     Comment: Walking     Bike Helmet Not Asked     Seat Belt Yes     Self-Exams Not Asked   Social History Narrative    Single.    No kids.     Maria Doloresevon Pabon (friend- healthcare POA), Joaquina Nair (friend - healthcare POA)    No formal exercise.        Review of Systems:  Skin:  Positive for   recent shingles   Eyes:  Positive for glasses;cataracts    ENT:  Negative      Respiratory:  Negative       Cardiovascular:  Negative;palpitations;syncope or near-syncope;cyanosis Positive for chest pain, every 3-4 months, after walking too much, takes nitro, 1-2X effective, lightheadedness occ. when standing up too fast, energy level varies after dialysis, edema, occ.  Gastroenterology: Positive for heartburn;reflux appetite fair  Genitourinary:  not assessed      Musculoskeletal:  Positive for arthritis;joint pain    Neurologic:  Positive for numbness or tingling of hands    Psychiatric:  Negative      Heme/Lymph/Imm:  Positive for allergies    Endocrine:  Positive for thyroid disorder dialysis 3 days week    Physical Exam:  Vitals: /64 (BP Location: Right arm, Cuff Size: Adult Large)   Pulse 84   Ht 1.727 m (5' 8\")   Wt 87.3 kg (192 lb 8 oz)   BMI 29.27 kg/m       Constitutional:  in no acute distress        Skin:  warm and dry to the touch   ICD incision in the right infraclavicular area was well-healed      Head:  normocephalic, no masses or lesions        Eyes:  pupils equal and round        Lymph:      ENT:  no pallor or cyanosis, dentition good        Neck:           Respiratory:  " clear to auscultation         Cardiac: normal S1 and S2       RUSB;systolic ejection murmur;grade 1        not assessed this visit                                   Left AV fistula, bruit heard over left hand and infraclavicular region    GI:  not assessed this visit        Extremities and Muscular Skeletal:  no deformities, clubbing, cyanosis, erythema observed;no edema         RT leg in brace     Neurological:  no gross motor deficits        Psych:  Alert and Oriented x 3;affect appropriate, oriented to time, person and place            Thank you for allowing me to participate in the care of your patient.    Sincerely,     Po Sen MD     [facility]

## 2019-04-30 NOTE — LETTER
4/30/2019    Josselin Kolb MD  [address] [facility] [address]    RE: Westley MO Ness       Dear Colleague,    I had the pleasure of seeing Westley Ness in the [facility].    HPI and Plan:   I had the pleasure of seeing Westley Ness in Cardiology Clinic today.      He is a 73-year-old male with a past medical history of coronary artery disease, ischemic cardiomyopathy with ejection fraction 25%-30% and previous AICD placement, end-stage renal disease and mild aortic root dilatation who returns for followup.  He had a non-ST elevation MI in July 2016.  He underwent drug-eluting stent placed in the proximal left circumflex and obtuse marginal branch in 2016.  In November, he had recurrent event and had angioplasty of the first obtuse marginal branch for restenosis.  He has a chronically occluded LAD and at last nuclear viability study had shown not much viability in the LAD distribution.     He has history of ischemic cardiomyopathy EF of 25 to 30% with recent echo in March showed no change with the EF.  He has been following with EP for atrial flutter and underwent 2 ablation successfully.    He is on dialysis for end-stage renal disease.  He has done well for last 14 years on dialysis.    He is doing reasonably well.  He has occasional episodes of chest pain requiring sublingual nitroglycerin about once or twice a month.  Usually is with some kind of exertion.  This is unchanged for him.  He is on 50 mg of isosorbide mononitrate.    There is no history of orthopnea or PND.  I reviewed the echocardiogram results with him which showed mildly dilated left ventricle with stable ejection fraction of 25 to 30%.    Since his atrial flutter was ablated successfully with no recurrence on repeat ZIO Patch monitor, the electrophysiology team has discontinued his Eliquis and is now back on baby aspirin and Plavix.    We talked at length today about repeating a stress nuclear study  to assess ischemic burden of myocardium given his stable angina but he is reluctant.  He would rather do it only if there is some worsening.  For now, he wants to optimize medical therapy.  We discussed courage trial data.     PHYSICAL EXAMINATION:   See below     IMPRESSION:   1.  Coronary artery disease, stable with occasional chest discomfort that could be possibly angina, but responded promptly to nitroglycerin.  He has had no further episodes despite being quite active.     I will increase isosorbide mononitrate from 15 to 30 mg daily for better antianginal effect.  We discussed side effects.  He is not interested in pursuing a stress nuclear study to assess ischemic burden of myocardium at this time.    2.  Chronic systolic dysfunction with ischemic cardia myopathy, stable, no overt congestive heart failure.  History of AICD with no recent shocks.  The battery is 10% and will need to be replaced at some point.  We will continue to follow the device clinic.    3.  End-stage renal disease on hemodialysis    4.  Hyperlipidemia, continue Pravachol, last LDL 53 and HDL 52    5.  History of proximal atrial flutter, status post successful ablation.  Electrophysiology team stopped Eliquis.   He was having bleeding issues with his access site on hemodialysis.      Thank you for allowing us to participate in the care of this nice patient.  I will have him see Lacy MCLEOD in 6 months as she has seen him before and also does see patients with heart failure.  I will see him back in follow-up in a year with a repeat echocardiogram prior to visit with me.         Sincerely,    EDRICK WEST MD    cc:   Josselin Kolb MD   [address]  [address]      Orders Placed This Encounter   Procedures     Follow-Up with Cardiac Advanced Practice Provider       Orders Placed This Encounter   Medications     Metoprolol Succinate 25 MG CS24     Sig: Take 12.5 mg by mouth daily      mexiletine (MEXITIL) 150 MG capsule     Sig: Take 150 mg by mouth 2 times daily     isosorbide mononitrate (IMDUR) 30 MG 24 hr tablet     Sig: Take 1 tablet (30 mg) by mouth daily     Dispense:  90 tablet     Refill:  3       Medications Discontinued During This Encounter   Medication Reason     metoprolol succinate ER (TOPROL-XL) 25 MG 24 hr tablet Dose adjustment     mexiletine (MEXITIL) 150 MG capsule Medication Reconciliation Clean Up     isosorbide mononitrate (IMDUR) 30 MG 24 hr tablet          Encounter Diagnoses   Name Primary?     Coronary artery disease of native artery of native heart with stable angina pectoris (H) Yes     Ischemic cardiomyopathy      ESRD on hemodialysis (H)      Type 2 diabetes mellitus with chronic kidney disease on chronic dialysis, without long-term current use of insulin (H)      Typical atrial flutter (H)      NSTEMI (non-ST elevated myocardial infarction) (H)        CURRENT MEDICATIONS:  Current Outpatient Medications   Medication Sig Dispense Refill     acetaminophen (TYLENOL) 500 MG tablet Take 1,000 mg by mouth 4 times daily as needed for mild pain (500mg x 2 = 1,000mg)       aspirin 81 MG tablet Take 1 tablet (81 mg) by mouth daily 1 tablet 0     clopidogrel (PLAVIX) 75 MG tablet TAKE 1 TABLET (75 MG) BY MOUTH DAILY TO PROTECT YOUR STENT(S). DO NOT STOP TAKING UNLESS DIRECTED BY CARDIOLOGY. 30 tablet 11     cyclobenzaprine (FLEXERIL) 5 MG tablet Take 1 tablet (5 mg) by mouth nightly as needed for muscle spasms 20 tablet 0     isosorbide mononitrate (IMDUR) 30 MG 24 hr tablet Take 1 tablet (30 mg) by mouth daily 90 tablet 3     levothyroxine (SYNTHROID/LEVOTHROID) 50 MCG tablet TAKE 1 TABLET (50 MCG) BY MOUTH DAILY 30 tablet 0     lisinopril (PRINIVIL/ZESTRIL) 2.5 MG tablet Take one tablet after dialysis. Take second tablet at bedtime. (Patient taking differently: Take 2.5 mg by mouth 2 times daily (Take one tablet after dialysis. Take second tablet at bedtime.)) 180  tablet 3     loperamide (IMODIUM) 2 MG capsule Take 1 mg by mouth daily as needed        Metoprolol Succinate 25 MG CS24 Take 12.5 mg by mouth daily       mexiletine (MEXITIL) 150 MG capsule Take 150 mg by mouth 2 times daily       nitroGLYcerin (NITROSTAT) 0.4 MG sublingual tablet 1 TAB EVERY 5 MIN AS NEEDED, UP TO 3 PER EPISODE (Patient taking differently: Place 0.4 mg under the tongue every 5 minutes as needed UP TO 3 PER EPISODE) 25 tablet 0     pantoprazole (PROTONIX) 40 MG EC tablet TAKE 1 TABLET (40 MG) BY MOUTH EVERY MORNING 30 tablet 9     pravastatin (PRAVACHOL) 40 MG tablet TAKE 1 TABLET (40 MG) BY MOUTH DAILY 90 tablet 1     psyllium 0.52 G capsule Take 1 capsule by mouth daily as needed        RaNITidine HCl (ZANTAC PO) Take 75 mg by mouth At Bedtime        TRIPHROCAPS 1 MG capsule TAKE 1 CAPSULE BY MOUTH EVERY EVENING 90 capsule 3     valACYclovir (VALTREX) 500 MG tablet Take 1 tablet (500 mg) by mouth daily         ALLERGIES     Allergies   Allergen Reactions     Contrast Dye Hives     Does fine if he uses benadryl prior.     No Clinical Screening - See Comments      Green beans - Diarrhea.    Topical antibiotic - name unknown - caused swelling of the penis.       PAST MEDICAL HISTORY:  Past Medical History:   Diagnosis Date     Acid reflux disease      CAD (coronary artery disease)     s/p multiple NSTEMIs and PCI's     ESRD on hemodialysis (H)     MWF     History of atrial flutter     s/p ablation     Hypertension      Hypothyroidism      Ischemic cardiomyopathy     EF 25-30%, s/p AICD     Obese      Type 2 diabetes mellitus (H)     diet-controlled       PAST SURGICAL HISTORY:  Past Surgical History:   Procedure Laterality Date     CHOLECYSTECTOMY, OPEN  2013     ELBOW SURGERY Left 2008    ORIF - plates still in place     H ABLATION ATRIAL FLUTTER  06/08/2017, 12/11/17      LEFT HEART CATHETERIZATION  7/14/2016     HC LEFT HEART CATHETERIZATION  9/21/2016     IMPLANT VENTRICULAR DEVICE  08/15/2011      REPAIR FISTULA ARTERIOVENOUS UPPER EXTREMITY Left 3/5/2019    Procedure: REPAIR LEFT UPPER ARM ARTERIOVENOUS FISTULA SKIN ULCER;  Surgeon: Westley Griggs MD;  Location: [facility] OR     TONSILLECTOMY & ADENOIDECTOMY       VASCULAR SURGERY  ,     LUE fistulas (upper and lower); upper one is functional       FAMILY HISTORY:  Family History   Problem Relation Age of Onset     Cerebrovascular Disease Mother         later in life     Hypertension Father      Bladder Cancer Father      Myocardial Infarction Paternal Grandmother      Diabetes No family hx of      Prostate Cancer No family hx of      Colon Cancer No family hx of        SOCIAL HISTORY:  Social History     Socioeconomic History     Marital status: Single     Spouse name: None     Number of children: None     Years of education: None     Highest education level: None   Occupational History     Occupation: Retired - CPA   Social Needs     Financial resource strain: None     Food insecurity:     Worry: None     Inability: None     Transportation needs:     Medical: None     Non-medical: None   Tobacco Use     Smoking status: Former Smoker     Packs/day: 2.00     Years: 35.00     Pack years: 70.00     Types: Cigarettes     Start date:      Last attempt to quit: 1996     Years since quittin.5     Smokeless tobacco: Never Used   Substance and Sexual Activity     Alcohol use: Not Currently     Alcohol/week: 0.0 oz     Frequency: Never     Drug use: No     Sexual activity: Never   Lifestyle     Physical activity:     Days per week: None     Minutes per session: None     Stress: None   Relationships     Social connections:     Talks on phone: None     Gets together: None     Attends Rastafari service: None     Active member of club or organization: None     Attends meetings of clubs or organizations: None     Relationship status: None     Intimate partner violence:     Fear of current or ex partner: None     Emotionally abused: None      "Physically abused: None     Forced sexual activity: None   Other Topics Concern     Parent/sibling w/ CABG, MI or angioplasty before 65F 55M? No      Service Not Asked     Blood Transfusions Not Asked     Caffeine Concern No     Occupational Exposure Not Asked     Hobby Hazards Not Asked     Sleep Concern No     Stress Concern Not Asked     Weight Concern Not Asked     Special Diet Yes     Back Care Not Asked     Exercise Yes     Comment: Walking     Bike Helmet Not Asked     Seat Belt Yes     Self-Exams Not Asked   Social History Narrative    Single.    No kids.     Maria Doloresevon Pabon (friend- healthcare POA), Joaquina Nair (friend - healthcare POA)    No formal exercise.        Review of Systems:  Skin:  Positive for   recent shingles   Eyes:  Positive for glasses;cataracts    ENT:  Negative      Respiratory:  Negative       Cardiovascular:  Negative;palpitations;syncope or near-syncope;cyanosis Positive for chest pain, every 3-4 months, after walking too much, takes nitro, 1-2X effective, lightheadedness occ. when standing up too fast, energy level varies after dialysis, edema, occ.  Gastroenterology: Positive for heartburn;reflux appetite fair  Genitourinary:  not assessed      Musculoskeletal:  Positive for arthritis;joint pain    Neurologic:  Positive for numbness or tingling of hands    Psychiatric:  Negative      Heme/Lymph/Imm:  Positive for allergies    Endocrine:  Positive for thyroid disorder dialysis 3 days week    Physical Exam:  Vitals: /64 (BP Location: Right arm, Cuff Size: Adult Large)   Pulse 84   Ht 1.727 m (5' 8\")   Wt 87.3 kg (192 lb 8 oz)   BMI 29.27 kg/m       Constitutional:  in no acute distress        Skin:  warm and dry to the touch   ICD incision in the right infraclavicular area was well-healed      Head:  normocephalic, no masses or lesions        Eyes:  pupils equal and round        Lymph:      ENT:  no pallor or cyanosis, dentition good        Neck:           Respiratory:  " clear to auscultation         Cardiac: normal S1 and S2       RUSB;systolic ejection murmur;grade 1        not assessed this visit                                   Left AV fistula, bruit heard over left hand and infraclavicular region    GI:  not assessed this visit        Extremities and Muscular Skeletal:  no deformities, clubbing, cyanosis, erythema observed;no edema         RT leg in brace     Neurological:  no gross motor deficits        Psych:  Alert and Oriented x 3;affect appropriate, oriented to time, person and place        CC  No referring provider defined for this encounter.                Thank you for allowing me to participate in the care of your patient.      Sincerely,     Po Sen MD     [facility]    cc:   No referring provider defined for this encounter.

## 2019-04-30 NOTE — PROGRESS NOTES
HPI and Plan:   I had the pleasure of seeing Westley Ness in Cardiology Clinic today.      He is a 73-year-old male with a past medical history of coronary artery disease, ischemic cardiomyopathy with ejection fraction 25%-30% and previous AICD placement, end-stage renal disease and mild aortic root dilatation who returns for followup.  He had a non-ST elevation MI in July 2016.  He underwent drug-eluting stent placed in the proximal left circumflex and obtuse marginal branch in 2016.  In November, he had recurrent event and had angioplasty of the first obtuse marginal branch for restenosis.  He has a chronically occluded LAD and at last nuclear viability study had shown not much viability in the LAD distribution.     He has history of ischemic cardiomyopathy EF of 25 to 30% with recent echo in March showed no change with the EF.  He has been following with EP for atrial flutter and underwent 2 ablation successfully.    He is on dialysis for end-stage renal disease.  He has done well for last 14 years on dialysis.    He is doing reasonably well.  He has occasional episodes of chest pain requiring sublingual nitroglycerin about once or twice a month.  Usually is with some kind of exertion.  This is unchanged for him.  He is on 50 mg of isosorbide mononitrate.    There is no history of orthopnea or PND.  I reviewed the echocardiogram results with him which showed mildly dilated left ventricle with stable ejection fraction of 25 to 30%.    Since his atrial flutter was ablated successfully with no recurrence on repeat ZIO Patch monitor, the electrophysiology team has discontinued his Eliquis and is now back on baby aspirin and Plavix.    We talked at length today about repeating a stress nuclear study to assess ischemic burden of myocardium given his stable angina but he is reluctant.  He would rather do it only if there is some worsening.  For now, he wants to optimize medical therapy.  We discussed courage trial  data.     PHYSICAL EXAMINATION:   See below     IMPRESSION:   1.  Coronary artery disease, stable with occasional chest discomfort that could be possibly angina, but responded promptly to nitroglycerin.  He has had no further episodes despite being quite active.    I will increase isosorbide mononitrate from 15 to 30 mg daily for better antianginal effect.  We discussed side effects.  He is not interested in pursuing a stress nuclear study to assess ischemic burden of myocardium at this time.    2.  Chronic systolic dysfunction with ischemic cardia myopathy, stable, no overt congestive heart failure.  History of AICD with no recent shocks.  The battery is 10% and will need to be replaced at some point.  We will continue to follow the device clinic.    3.  End-stage renal disease on hemodialysis    4.  Hyperlipidemia, continue Pravachol, last LDL 53 and HDL 52    5.  History of proximal atrial flutter, status post successful ablation.  Electrophysiology team stopped Eliquis.   He was having bleeding issues with his access site on hemodialysis.      Thank you for allowing us to participate in the care of this nice patient.  I will have him see Lacy HARSHA in 6 months as she has seen him before and also does see patients with heart failure.  I will see him back in follow-up in a year with a repeat echocardiogram prior to visit with me.        Sincerely,    DERICK WEST MD    cc:   Josselin Kolb MD   [address]  [address]     Orders Placed This Encounter   Procedures     Follow-Up with Cardiac Advanced Practice Provider       Orders Placed This Encounter   Medications     Metoprolol Succinate 25 MG CS24     Sig: Take 12.5 mg by mouth daily     mexiletine (MEXITIL) 150 MG capsule     Sig: Take 150 mg by mouth 2 times daily     isosorbide mononitrate (IMDUR) 30 MG 24 hr tablet     Sig: Take 1 tablet (30 mg) by mouth daily     Dispense:  90 tablet     Refill:   3       Medications Discontinued During This Encounter   Medication Reason     metoprolol succinate ER (TOPROL-XL) 25 MG 24 hr tablet Dose adjustment     mexiletine (MEXITIL) 150 MG capsule Medication Reconciliation Clean Up     isosorbide mononitrate (IMDUR) 30 MG 24 hr tablet          Encounter Diagnoses   Name Primary?     Coronary artery disease of native artery of native heart with stable angina pectoris (H) Yes     Ischemic cardiomyopathy      ESRD on hemodialysis (H)      Type 2 diabetes mellitus with chronic kidney disease on chronic dialysis, without long-term current use of insulin (H)      Typical atrial flutter (H)      NSTEMI (non-ST elevated myocardial infarction) (H)        CURRENT MEDICATIONS:  Current Outpatient Medications   Medication Sig Dispense Refill     acetaminophen (TYLENOL) 500 MG tablet Take 1,000 mg by mouth 4 times daily as needed for mild pain (500mg x 2 = 1,000mg)       aspirin 81 MG tablet Take 1 tablet (81 mg) by mouth daily 1 tablet 0     clopidogrel (PLAVIX) 75 MG tablet TAKE 1 TABLET (75 MG) BY MOUTH DAILY TO PROTECT YOUR STENT(S). DO NOT STOP TAKING UNLESS DIRECTED BY CARDIOLOGY. 30 tablet 11     cyclobenzaprine (FLEXERIL) 5 MG tablet Take 1 tablet (5 mg) by mouth nightly as needed for muscle spasms 20 tablet 0     isosorbide mononitrate (IMDUR) 30 MG 24 hr tablet Take 1 tablet (30 mg) by mouth daily 90 tablet 3     levothyroxine (SYNTHROID/LEVOTHROID) 50 MCG tablet TAKE 1 TABLET (50 MCG) BY MOUTH DAILY 30 tablet 0     lisinopril (PRINIVIL/ZESTRIL) 2.5 MG tablet Take one tablet after dialysis. Take second tablet at bedtime. (Patient taking differently: Take 2.5 mg by mouth 2 times daily (Take one tablet after dialysis. Take second tablet at bedtime.)) 180 tablet 3     loperamide (IMODIUM) 2 MG capsule Take 1 mg by mouth daily as needed        Metoprolol Succinate 25 MG CS24 Take 12.5 mg by mouth daily       mexiletine (MEXITIL) 150 MG capsule Take 150 mg by mouth 2 times daily        nitroGLYcerin (NITROSTAT) 0.4 MG sublingual tablet 1 TAB EVERY 5 MIN AS NEEDED, UP TO 3 PER EPISODE (Patient taking differently: Place 0.4 mg under the tongue every 5 minutes as needed UP TO 3 PER EPISODE) 25 tablet 0     pantoprazole (PROTONIX) 40 MG EC tablet TAKE 1 TABLET (40 MG) BY MOUTH EVERY MORNING 30 tablet 9     pravastatin (PRAVACHOL) 40 MG tablet TAKE 1 TABLET (40 MG) BY MOUTH DAILY 90 tablet 1     psyllium 0.52 G capsule Take 1 capsule by mouth daily as needed        RaNITidine HCl (ZANTAC PO) Take 75 mg by mouth At Bedtime        TRIPHROCAPS 1 MG capsule TAKE 1 CAPSULE BY MOUTH EVERY EVENING 90 capsule 3     valACYclovir (VALTREX) 500 MG tablet Take 1 tablet (500 mg) by mouth daily         ALLERGIES     Allergies   Allergen Reactions     Contrast Dye Hives     Does fine if he uses benadryl prior.     No Clinical Screening - See Comments      Green beans - Diarrhea.    Topical antibiotic - name unknown - caused swelling of the penis.       PAST MEDICAL HISTORY:  Past Medical History:   Diagnosis Date     Acid reflux disease      CAD (coronary artery disease)     s/p multiple NSTEMIs and PCI's     ESRD on hemodialysis (H)     MWF     History of atrial flutter     s/p ablation     Hypertension      Hypothyroidism      Ischemic cardiomyopathy     EF 25-30%, s/p AICD     Obese      Type 2 diabetes mellitus (H)     diet-controlled       PAST SURGICAL HISTORY:  Past Surgical History:   Procedure Laterality Date     CHOLECYSTECTOMY, OPEN  2013     ELBOW SURGERY Left 2008    ORIF - plates still in place     H ABLATION ATRIAL FLUTTER  06/08/2017, 12/11/17     HC LEFT HEART CATHETERIZATION  7/14/2016     HC LEFT HEART CATHETERIZATION  9/21/2016     IMPLANT VENTRICULAR DEVICE  08/15/2011     REPAIR FISTULA ARTERIOVENOUS UPPER EXTREMITY Left 3/5/2019    Procedure: REPAIR LEFT UPPER ARM ARTERIOVENOUS FISTULA SKIN ULCER;  Surgeon: Westley Griggs MD;  Location: [facility]     TONSILLECTOMY & ADENOIDECTOMY        VASCULAR SURGERY  ,     LUE fistulas (upper and lower); upper one is functional       FAMILY HISTORY:  Family History   Problem Relation Age of Onset     Cerebrovascular Disease Mother         later in life     Hypertension Father      Bladder Cancer Father      Myocardial Infarction Paternal Grandmother      Diabetes No family hx of      Prostate Cancer No family hx of      Colon Cancer No family hx of        SOCIAL HISTORY:  Social History     Socioeconomic History     Marital status: Single     Spouse name: None     Number of children: None     Years of education: None     Highest education level: None   Occupational History     Occupation: Retired - CPA   Social Needs     Financial resource strain: None     Food insecurity:     Worry: None     Inability: None     Transportation needs:     Medical: None     Non-medical: None   Tobacco Use     Smoking status: Former Smoker     Packs/day: 2.00     Years: 35.00     Pack years: 70.00     Types: Cigarettes     Start date:      Last attempt to quit: 1996     Years since quittin.5     Smokeless tobacco: Never Used   Substance and Sexual Activity     Alcohol use: Not Currently     Alcohol/week: 0.0 oz     Frequency: Never     Drug use: No     Sexual activity: Never   Lifestyle     Physical activity:     Days per week: None     Minutes per session: None     Stress: None   Relationships     Social connections:     Talks on phone: None     Gets together: None     Attends Taoist service: None     Active member of club or organization: None     Attends meetings of clubs or organizations: None     Relationship status: None     Intimate partner violence:     Fear of current or ex partner: None     Emotionally abused: None     Physically abused: None     Forced sexual activity: None   Other Topics Concern     Parent/sibling w/ CABG, MI or angioplasty before 65F 55M? No      Service Not Asked     Blood Transfusions Not Asked     Caffeine Concern  "No     Occupational Exposure Not Asked     Hobby Hazards Not Asked     Sleep Concern No     Stress Concern Not Asked     Weight Concern Not Asked     Special Diet Yes     Back Care Not Asked     Exercise Yes     Comment: Walking     Bike Helmet Not Asked     Seat Belt Yes     Self-Exams Not Asked   Social History Narrative    Single.    No kids.     Maria Dolores Pabon (friend- healthcare POA), Joaquina Nair (friend - healthcare POA)    No formal exercise.        Review of Systems:  Skin:  Positive for   recent shingles   Eyes:  Positive for glasses;cataracts    ENT:  Negative      Respiratory:  Negative       Cardiovascular:  Negative;palpitations;syncope or near-syncope;cyanosis Positive for chest pain, every 3-4 months, after walking too much, takes nitro, 1-2X effective, lightheadedness occ. when standing up too fast, energy level varies after dialysis, edema, occ.  Gastroenterology: Positive for heartburn;reflux appetite fair  Genitourinary:  not assessed      Musculoskeletal:  Positive for arthritis;joint pain    Neurologic:  Positive for numbness or tingling of hands    Psychiatric:  Negative      Heme/Lymph/Imm:  Positive for allergies    Endocrine:  Positive for thyroid disorder dialysis 3 days week    Physical Exam:  Vitals: /64 (BP Location: Right arm, Cuff Size: Adult Large)   Pulse 84   Ht 1.727 m (5' 8\")   Wt 87.3 kg (192 lb 8 oz)   BMI 29.27 kg/m      Constitutional:  in no acute distress        Skin:  warm and dry to the touch   ICD incision in the right infraclavicular area was well-healed      Head:  normocephalic, no masses or lesions        Eyes:  pupils equal and round        Lymph:      ENT:  no pallor or cyanosis, dentition good        Neck:           Respiratory:  clear to auscultation         Cardiac: normal S1 and S2       RUSB;systolic ejection murmur;grade 1        not assessed this visit                                   Left AV fistula, bruit heard over left hand and infraclavicular " region    GI:  not assessed this visit        Extremities and Muscular Skeletal:  no deformities, clubbing, cyanosis, erythema observed;no edema         RT leg in brace     Neurological:  no gross motor deficits        Psych:  Alert and Oriented x 3;affect appropriate, oriented to time, person and place        CC  No referring provider defined for this encounter.

## 2019-05-06 DIAGNOSIS — E03.9 HYPOTHYROIDISM, UNSPECIFIED TYPE: ICD-10-CM

## 2019-05-06 NOTE — TELEPHONE ENCOUNTER
"Requested Prescriptions   Pending Prescriptions Disp Refills     levothyroxine (SYNTHROID/LEVOTHROID) 50 MCG tablet [Pharmacy Med Name: LEVOTHYROXINE 50MCG TAB 50 TAB] 30 tablet 0     Sig: TAKE 1 TABLET (50 MCG) BY MOUTH DAILY       Thyroid Protocol Failed - 5/6/2019  4:18 PM        Failed - Normal TSH on file in past 12 months     Recent Labs   Lab Test 03/26/19  0847   TSH 4.40*              Passed - Patient is 12 years or older        Passed - Recent (12 mo) or future (30 days) visit within the authorizing provider's specialty     Patient had office visit in the last 12 months or has a visit in the next 30 days with authorizing provider or within the authorizing provider's specialty.  See \"Patient Info\" tab in inbasket, or \"Choose Columns\" in Meds & Orders section of the refill encounter.              Passed - Medication is active on med list        "

## 2019-05-07 RX ORDER — LEVOTHYROXINE SODIUM 50 UG/1
TABLET ORAL
Qty: 30 TABLET | Refills: 0 | Status: SHIPPED | OUTPATIENT
Start: 2019-05-07 | End: 2019-06-10

## 2019-05-20 DIAGNOSIS — I25.10 CAD (CORONARY ARTERY DISEASE): Primary | ICD-10-CM

## 2019-06-24 NOTE — PROGRESS NOTES
Patient is discharged from PT per progress note dated 03/28/2019 as he did not return for further follow-up visits.

## 2019-06-26 ENCOUNTER — HOSPITAL ENCOUNTER (EMERGENCY)
Facility: CLINIC | Age: 74
Discharge: HOME | End: 2019-06-26
Attending: EMERGENCY MEDICINE | Admitting: EMERGENCY MEDICINE
Payer: MEDICARE

## 2019-06-26 VITALS
HEART RATE: 81 BPM | OXYGEN SATURATION: 98 % | RESPIRATION RATE: 18 BRPM | DIASTOLIC BLOOD PRESSURE: 93 MMHG | TEMPERATURE: 98 F | SYSTOLIC BLOOD PRESSURE: 140 MMHG

## 2019-06-26 DIAGNOSIS — T82.838A HEMORRHAGE OF ARTERIOVENOUS FISTULA, INITIAL ENCOUNTER (H): ICD-10-CM

## 2019-06-26 PROCEDURE — 12001 RPR S/N/AX/GEN/TRNK 2.5CM/<: CPT | Mod: LT

## 2019-06-26 PROCEDURE — 99283 EMERGENCY DEPT VISIT LOW MDM: CPT

## 2019-06-26 ASSESSMENT — ENCOUNTER SYMPTOMS: WOUND: 1

## 2019-06-26 NOTE — ED PROVIDER NOTES
History     Chief Complaint:  Wound check     HPI   Westley Ness is a 73 year old male who presents with a wound check. The patient has two fistulas in his left arm (the distal is no longer used) and the upper one started bleeding today sometime prior to 1100. He has previously completed his round of dialysis which is generally done every Monday, Wednesday, and Friday and was on his way home when he noticed the bleeding. He had left his dialysis center around 1115. At home the patient tried to stop the bleeding but it continued thus he called 911 who bandaged the wound. Due to concern, the patient has been taken to the ED for evaluation and treatment via ambulance. Upon arrival, the patient states the bandage was applied 30 minutes prior to arrival.    Allergies:  Contrast dye      Medications:    Tylenol   Plavix   Flexeril   Imdur   Synthroid   Prinivil   Imodium   Metoprolol succinate   mexitil   Nitrostat   Protonix   Pravachol   Psyllium   Zantac   Triphrocaps   Valtrex      Past Medical History:   Gastro esophageal reflux disease   Coronary artery disease  ESRD on hemodialysis   Atrial flutter   Hypertension   Hypothyroidism   Ischemic cardiomyopathy   Obese   Diabetes mellitus II  Coronary artery disease    Past Surgical History:    Cholecystectomy   Elbow surgery   Ablation atrial flutter   Left hear cath X2   Implant ventricular device   Repair fistula arteriovenous upper extremity   Tonsillectomy and adenoidectomy   Vascular surgery    Family History:    Cerebrovascular disease   Hypertension   Bladder cancer   Myocardial infarction     Social History:  Marital Status:  Single   Smoker:   Former, 2 packs a day   Smokeless:   Never   Alcohol:   Not currently   Drugs:   No     Review of Systems   Skin: Positive for wound.   All other systems reviewed and are negative.    Physical Exam     Patient Vitals for the past 24 hrs:   BP Temp Temp src Pulse Heart Rate Resp SpO2   06/26/19 1220 (!) 140/93 98  F  (36.7  C) Oral 81 81 18 98 %     Physical Exam  SKIN:  2 fistular punctures of the left upper extremity, the upper oozes blood when the fistula is not compressed.  No inflammation about the puncture sites.  HEMATOLOGIC/IMMUNOLOGIC/LYMPHATIC:  No pallor in general or focally of the left upper extremity.  EYES:  Conjunctivae normal.  CARDIOVASCULAR:  Regular rate and rhythm.  Palpable thrill over the left upper extremity fistula.  RESPIRATORY:  No respiratory distress, breath sounds equal and normal.  MUSCULOSKELETAL:  Full painless active range of motion of the left upper extremity.  NEUROLOGIC:  Alert, conversant.  PSYCHIATRIC:  Normal mood.    Emergency Department Course   Procedures:    Procedure: Hemostasis         LOCATION:  Proximal fistula left arm puncture      ANESTHESIA:  Local using 1% lidocaine total of 2 mLs      PREPARATION:  Cleansed before with alcohol prep pad      CLOSURE:  Wound was closed with One Layer just around puncture site.  Skin closed with 2 x 5.0 Vicryl Sutures using interrupted   sutures.        NOTE: Hemostasis achieved.           Emergency Department Course:  1227 I performed an exam of the patient as documented above.   1410 I rechecked the patient and discussed the results of their workup thus far.     Findings and plan explained. Patient discharged home with instructions regarding supportive care and reasons to return. The importance of close follow-up was reviewed. I personally answered all related questions prior to discharge.    Impression & Plan    Medical Decision Making:  Art Ness is a 73 years old male who presents with bleeding fistula site after dialysis. He could not control this and nor could we with direct compression and time. So I performed two sutures absorbable vicryl at the puncture site which was affective for hemostasis. This was bandaged again and I advised follow up with Dr. Griggs his vascular surgeon as needed.     Diagnosis:    ICD-10-CM    1. Hemorrhage of  arteriovenous fistula, initial encounter (H) T82.838A      Disposition:  discharged to home    Scribe Disclosure:  I, Andres Gao, am serving as a scribe on 6/26/2019 at 12:27 PM to personally document services performed by Reji Lyle MD based on my observations and the provider's statements to me.     Andres Gao  6/26/2019    EMERGENCY DEPARTMENT       Reji Lyle MD  06/26/19 [id number]

## 2019-06-26 NOTE — ED TRIAGE NOTES
Patient presents to the ED after he had a difficulty time controlling his dialysis shunt bleeding.  Bleeding is controled at this time.

## 2019-06-26 NOTE — ED NOTES
Bed: ED28  Expected date:   Expected time:   Means of arrival:   Comments:  516  73 M bleeding from fistula  1206

## 2019-07-05 ENCOUNTER — ANCILLARY PROCEDURE (OUTPATIENT)
Dept: CARDIOLOGY | Facility: CLINIC | Age: 74
End: 2019-07-05
Attending: INTERNAL MEDICINE
Payer: MEDICARE

## 2019-07-05 DIAGNOSIS — Z95.810 ICD (IMPLANTABLE CARDIOVERTER-DEFIBRILLATOR) IN PLACE: ICD-10-CM

## 2019-07-05 PROCEDURE — 93295 DEV INTERROG REMOTE 1/2/MLT: CPT | Performed by: INTERNAL MEDICINE

## 2019-07-05 PROCEDURE — 93296 REM INTERROG EVL PM/IDS: CPT | Performed by: INTERNAL MEDICINE

## 2019-07-09 ENCOUNTER — HOSPITAL ENCOUNTER (OUTPATIENT)
Dept: ULTRASOUND IMAGING | Facility: CLINIC | Age: 74
Discharge: HOME | End: 2019-07-09
Attending: NURSE PRACTITIONER | Admitting: NURSE PRACTITIONER
Payer: MEDICARE

## 2019-07-09 DIAGNOSIS — I25.10 CORONARY ARTERY DISEASE INVOLVING NATIVE CORONARY ARTERY OF NATIVE HEART WITHOUT ANGINA PECTORIS: ICD-10-CM

## 2019-07-09 DIAGNOSIS — R00.0 TACHYCARDIA: ICD-10-CM

## 2019-07-09 DIAGNOSIS — I20.9 ANGINA PECTORIS (H): ICD-10-CM

## 2019-07-09 DIAGNOSIS — R58 BLEEDING: ICD-10-CM

## 2019-07-09 PROCEDURE — 93990 DOPPLER FLOW TESTING: CPT

## 2019-07-09 RX ORDER — MEXILETINE HYDROCHLORIDE 150 MG/1
CAPSULE ORAL
Qty: 180 CAPSULE | Refills: 3 | Status: SHIPPED | OUTPATIENT
Start: 2019-07-09 | End: 2020-07-07

## 2019-07-09 RX ORDER — PRAVASTATIN SODIUM 40 MG
TABLET ORAL
Qty: 90 TABLET | Refills: 2 | Status: SHIPPED | OUTPATIENT
Start: 2019-07-09 | End: 2020-04-10

## 2019-07-09 RX ORDER — NITROGLYCERIN 0.4 MG/1
0.4 TABLET SUBLINGUAL EVERY 5 MIN PRN
Qty: 20 TABLET | Refills: 0 | Status: SHIPPED | OUTPATIENT
Start: 2019-07-09 | End: 2020-05-21

## 2019-07-09 NOTE — TELEPHONE ENCOUNTER
"Requested Prescriptions   Pending Prescriptions Disp Refills     nitroGLYcerin (NITROSTAT) 0.4 MG sublingual tablet [Pharmacy Med Name: NITROGLYCERIN 0.4 MG SUBL 0.4 TAB] 25 tablet 0     Sig: DISSOLVE 1 TABLET UNDER THE TONGUE EVERY FIVE MINUTES AS NEEDED FOR CHEST PAIN. UP TO 3 PER EPISODE.       Nitrates Failed - 7/9/2019  9:52 AM        Failed - Blood pressure under 140/90 in past 12 months     BP Readings from Last 3 Encounters:   06/26/19 (!) 140/93   04/30/19 126/64   03/26/19 120/60                 Failed - Sublingual nitro order needs review     If refill exceeds 1 bottle per month, please forward request to provider.           Passed - Pt is not on erectile dysfunction medications        Passed - Recent (12 mo) or future (30 days) visit within the authorizing provider's specialty     Patient had office visit in the last 12 months or has a visit in the next 30 days with authorizing provider or within the authorizing provider's specialty.  See \"Patient Info\" tab in inbasket, or \"Choose Columns\" in Meds & Orders section of the refill encounter.              Passed - Medication is active on med list        Passed - Patient is age 18 or older        pravastatin (PRAVACHOL) 40 MG tablet [Pharmacy Med Name: PRAVASTATIN NA 40 MG TAB 40 TAB] 90 tablet 1     Sig: TAKE 1 TABLET (40 MG) BY MOUTH DAILY       Statins Protocol Passed - 7/9/2019  9:52 AM        Passed - LDL on file in past 12 months     Recent Labs   Lab Test 03/26/19  0847   LDL 53             Passed - No abnormal creatine kinase in past 12 months     No lab results found.             Passed - Recent (12 mo) or future (30 days) visit within the authorizing provider's specialty     Patient had office visit in the last 12 months or has a visit in the next 30 days with authorizing provider or within the authorizing provider's specialty.  See \"Patient Info\" tab in inbasket, or \"Choose Columns\" in Meds & Orders section of the refill encounter.              " "Passed - Medication is active on med list        Passed - Patient is age 18 or older        mexiletine (MEXITIL) 150 MG capsule [Pharmacy Med Name: MEXILETINE 150MG  CAP] 180 capsule 3     Sig: TAKE 1 CAPSULE (150 MG) BY MOUTH 2 TIMES DAILY       Anti Arrhythmic Agents Protocol Failed - 7/9/2019  9:52 AM        Failed - Medication needs approval from authorizing provider     Please forward this request to the authorizing provider for approval.           Passed - Lipid Panel on file in past year     Recent Labs   Lab Test 03/26/19  0847   CHOL 126   TRIG 107   HDL 52   LDL 53   NHDL 74               Passed - CBC on file in past year     Recent Labs   Lab Test 09/13/18  1610   WBC 5.2   RBC 3.77*   HGB 12.7*   HCT 37.9*   *                 Passed - ALT on file in past year     Recent Labs   Lab Test 03/26/19  0847   ALT 16             Passed - Serum Creatinine on file in past year     Recent Labs   Lab Test 03/26/19  0847   CR 5.07*             Passed - Serum Sodium on file in past year     Recent Labs   Lab Test 03/26/19  0847                 Passed - Serum Potassium on file in past year     Recent Labs   Lab Test 03/26/19  0847   POTASSIUM 4.9             Passed - Patient is 18 years of age or older        Passed - Recent (6 mo) or future (30 days) visit with authorizing provider's specialty     Patient had office visit in the last 6 months or has a visit in the next 30 days with authorizing provider.  See \"Patient Info\" tab in inbasket, or \"Choose Columns\" in Meds & Orders section of the refill encounter.              "

## 2019-07-10 LAB
MDC_IDC_LEAD_IMPLANT_DT: NORMAL
MDC_IDC_LEAD_LOCATION: NORMAL
MDC_IDC_LEAD_LOCATION_DETAIL_1: NORMAL
MDC_IDC_LEAD_MFG: NORMAL
MDC_IDC_LEAD_MODEL: NORMAL
MDC_IDC_LEAD_SERIAL: NORMAL
MDC_IDC_LEAD_SPECIAL_FUNCTION: NORMAL
MDC_IDC_MSMT_BATTERY_REMAINING_PERCENTAGE: 4 %
MDC_IDC_MSMT_BATTERY_RRT_TRIGGER: NORMAL
MDC_IDC_MSMT_BATTERY_STATUS: NORMAL
MDC_IDC_MSMT_BATTERY_VOLTAGE: 2.9 V
MDC_IDC_MSMT_CAP_CHARGE_DTM: NORMAL
MDC_IDC_MSMT_CAP_CHARGE_ENERGY: 40 J
MDC_IDC_MSMT_CAP_CHARGE_TIME: 13.5 S
MDC_IDC_MSMT_CAP_CHARGE_TYPE: NORMAL
MDC_IDC_MSMT_LEADCHNL_RV_IMPEDANCE_VALUE: 531 OHM
MDC_IDC_MSMT_LEADCHNL_RV_LEAD_CHANNEL_STATUS: NORMAL
MDC_IDC_MSMT_LEADCHNL_RV_PACING_THRESHOLD_AMPLITUDE: 0.9 V
MDC_IDC_MSMT_LEADCHNL_RV_PACING_THRESHOLD_PULSEWIDTH: 0.4 MS
MDC_IDC_PG_IMPLANT_DTM: NORMAL
MDC_IDC_PG_MFG: NORMAL
MDC_IDC_PG_MODEL: NORMAL
MDC_IDC_PG_SERIAL: NORMAL
MDC_IDC_PG_TYPE: NORMAL
MDC_IDC_SESS_CLINIC_NAME: NORMAL
MDC_IDC_SESS_DTM: NORMAL
MDC_IDC_SESS_REPROGRAMMED: NO
MDC_IDC_SESS_TYPE: NORMAL
MDC_IDC_SET_BRADY_LOWRATE: 40 {BEATS}/MIN
MDC_IDC_SET_BRADY_MODE: NORMAL
MDC_IDC_SET_LEADCHNL_RV_PACING_AMPLITUDE: 2 V
MDC_IDC_SET_LEADCHNL_RV_PACING_POLARITY: NORMAL
MDC_IDC_SET_LEADCHNL_RV_PACING_PULSEWIDTH: 0.4 MS
MDC_IDC_SET_LEADCHNL_RV_SENSING_ADAPTATION_MODE: NORMAL
MDC_IDC_SET_LEADCHNL_RV_SENSING_POLARITY: NORMAL
MDC_IDC_SET_ZONE_DETECTION_INTERVAL: 240 MS
MDC_IDC_SET_ZONE_DETECTION_INTERVAL: 320 MS
MDC_IDC_SET_ZONE_DETECTION_INTERVAL: 460 MS
MDC_IDC_SET_ZONE_TYPE: NORMAL
MDC_IDC_STAT_AT_DTM_END: NORMAL
MDC_IDC_STAT_AT_DTM_START: NORMAL
MDC_IDC_STAT_BRADY_DTM_END: NORMAL
MDC_IDC_STAT_BRADY_DTM_START: NORMAL
MDC_IDC_STAT_BRADY_RV_PERCENT_PACED: 0 %
MDC_IDC_STAT_CRT_DTM_END: NORMAL
MDC_IDC_STAT_CRT_DTM_START: NORMAL
MDC_IDC_STAT_EPISODE_TOTAL_COUNT: 1
MDC_IDC_STAT_EPISODE_TOTAL_COUNT: 2
MDC_IDC_STAT_EPISODE_TOTAL_COUNT: 2
MDC_IDC_STAT_EPISODE_TOTAL_COUNT: 69
MDC_IDC_STAT_EPISODE_TOTAL_COUNT_DTM_END: NORMAL
MDC_IDC_STAT_EPISODE_TOTAL_COUNT_DTM_START: NORMAL
MDC_IDC_STAT_EPISODE_TYPE: NORMAL
MDC_IDC_STAT_TACHYTHERAPY_ATP_DELIVERED_TOTAL: 4
MDC_IDC_STAT_TACHYTHERAPY_SHOCKS_ABORTED_TOTAL: 0
MDC_IDC_STAT_TACHYTHERAPY_SHOCKS_DELIVERED_TOTAL: 4
MDC_IDC_STAT_TACHYTHERAPY_TOTAL_DTM_END: NORMAL
MDC_IDC_STAT_TACHYTHERAPY_TOTAL_DTM_START: NORMAL

## 2019-07-15 DIAGNOSIS — N18.6 END STAGE RENAL DISEASE (H): Primary | ICD-10-CM

## 2019-07-15 RX ORDER — DIPHENHYDRAMINE HYDROCHLORIDE 50 MG/ML
25 INJECTION INTRAMUSCULAR; INTRAVENOUS ONCE
Status: DISCONTINUED | OUTPATIENT
Start: 2019-07-22 | End: 2019-07-22

## 2019-07-22 ENCOUNTER — APPOINTMENT (OUTPATIENT)
Dept: INTERVENTIONAL RADIOLOGY/VASCULAR | Facility: CLINIC | Age: 74
End: 2019-07-22
Attending: NURSE PRACTITIONER
Payer: MEDICARE

## 2019-07-22 ENCOUNTER — HOSPITAL ENCOUNTER (OUTPATIENT)
Facility: CLINIC | Age: 74
Discharge: HOME | End: 2019-07-22
Attending: RADIOLOGY | Admitting: RADIOLOGY
Payer: MEDICARE

## 2019-07-22 VITALS
TEMPERATURE: 96.2 F | SYSTOLIC BLOOD PRESSURE: 149 MMHG | HEIGHT: 68 IN | WEIGHT: 195.11 LBS | RESPIRATION RATE: 14 BRPM | HEART RATE: 77 BPM | DIASTOLIC BLOOD PRESSURE: 70 MMHG | BODY MASS INDEX: 29.57 KG/M2 | OXYGEN SATURATION: 95 %

## 2019-07-22 DIAGNOSIS — N18.6 END STAGE RENAL DISEASE (H): ICD-10-CM

## 2019-07-22 LAB
APTT PPP: 30 SEC (ref 22–37)
INR PPP: 0.97 (ref 0.86–1.14)

## 2019-07-22 PROCEDURE — 85610 PROTHROMBIN TIME: CPT | Performed by: RADIOLOGY

## 2019-07-22 PROCEDURE — 27210886 ZZH ACCESSORY CR5

## 2019-07-22 PROCEDURE — C1769 GUIDE WIRE: HCPCS

## 2019-07-22 PROCEDURE — 27210845 ZZH DEVICE INFLATION CR5

## 2019-07-22 PROCEDURE — 40000854 ZZH STATISTIC SIMPLE TUBE INSERTION/CHARGE, PORT, CATH, FISTULOGRAM

## 2019-07-22 PROCEDURE — 85730 THROMBOPLASTIN TIME PARTIAL: CPT | Performed by: RADIOLOGY

## 2019-07-22 PROCEDURE — 25000128 H RX IP 250 OP 636: Performed by: RADIOLOGY

## 2019-07-22 PROCEDURE — 36415 COLL VENOUS BLD VENIPUNCTURE: CPT

## 2019-07-22 PROCEDURE — 25000128 H RX IP 250 OP 636

## 2019-07-22 PROCEDURE — 25000125 ZZHC RX 250

## 2019-07-22 PROCEDURE — 27210740 ZZH ACCESSORY CR3

## 2019-07-22 PROCEDURE — 27210804 ZZH SHEATH CR3

## 2019-07-22 PROCEDURE — 36901 INTRO CATH DIALYSIS CIRCUIT: CPT

## 2019-07-22 PROCEDURE — C1725 CATH, TRANSLUMIN NON-LASER: HCPCS

## 2019-07-22 PROCEDURE — 25800030 ZZH RX IP 258 OP 636: Performed by: RADIOLOGY

## 2019-07-22 PROCEDURE — 27210742 ZZH CATH CR1

## 2019-07-22 PROCEDURE — 25500064 ZZH RX 255 OP 636: Performed by: RADIOLOGY

## 2019-07-22 PROCEDURE — 99153 MOD SED SAME PHYS/QHP EA: CPT

## 2019-07-22 RX ORDER — FENTANYL CITRATE 50 UG/ML
25-50 INJECTION, SOLUTION INTRAMUSCULAR; INTRAVENOUS EVERY 5 MIN PRN
Status: DISCONTINUED | OUTPATIENT
Start: 2019-07-22 | End: 2019-07-22 | Stop reason: HOSPADM

## 2019-07-22 RX ORDER — FENTANYL CITRATE 50 UG/ML
INJECTION, SOLUTION INTRAMUSCULAR; INTRAVENOUS
Status: COMPLETED
Start: 2019-07-22 | End: 2019-07-22

## 2019-07-22 RX ORDER — NALOXONE HYDROCHLORIDE 0.4 MG/ML
.1-.4 INJECTION, SOLUTION INTRAMUSCULAR; INTRAVENOUS; SUBCUTANEOUS
Status: DISCONTINUED | OUTPATIENT
Start: 2019-07-22 | End: 2019-07-22 | Stop reason: HOSPADM

## 2019-07-22 RX ORDER — LIDOCAINE 40 MG/G
CREAM TOPICAL
Status: DISCONTINUED | OUTPATIENT
Start: 2019-07-22 | End: 2019-07-22 | Stop reason: HOSPADM

## 2019-07-22 RX ORDER — DEXTROSE MONOHYDRATE 25 G/50ML
25-50 INJECTION, SOLUTION INTRAVENOUS
Status: DISCONTINUED | OUTPATIENT
Start: 2019-07-22 | End: 2019-07-22 | Stop reason: HOSPADM

## 2019-07-22 RX ORDER — LIDOCAINE HYDROCHLORIDE 10 MG/ML
INJECTION, SOLUTION INFILTRATION; PERINEURAL
Status: COMPLETED
Start: 2019-07-22 | End: 2019-07-22

## 2019-07-22 RX ORDER — DIPHENHYDRAMINE HYDROCHLORIDE 50 MG/ML
INJECTION INTRAMUSCULAR; INTRAVENOUS
Status: COMPLETED
Start: 2019-07-22 | End: 2019-07-22

## 2019-07-22 RX ORDER — DIPHENHYDRAMINE HYDROCHLORIDE 50 MG/ML
25 INJECTION INTRAMUSCULAR; INTRAVENOUS ONCE
Status: COMPLETED | OUTPATIENT
Start: 2019-07-22 | End: 2019-07-22

## 2019-07-22 RX ORDER — NICOTINE POLACRILEX 4 MG
15-30 LOZENGE BUCCAL
Status: DISCONTINUED | OUTPATIENT
Start: 2019-07-22 | End: 2019-07-22 | Stop reason: HOSPADM

## 2019-07-22 RX ORDER — IOPAMIDOL 612 MG/ML
50 INJECTION, SOLUTION INTRAVASCULAR ONCE
Status: COMPLETED | OUTPATIENT
Start: 2019-07-22 | End: 2019-07-22

## 2019-07-22 RX ORDER — SODIUM CHLORIDE 9 MG/ML
INJECTION, SOLUTION INTRAVENOUS CONTINUOUS
Status: DISCONTINUED | OUTPATIENT
Start: 2019-07-22 | End: 2019-07-22 | Stop reason: HOSPADM

## 2019-07-22 RX ORDER — FLUMAZENIL 0.1 MG/ML
0.2 INJECTION, SOLUTION INTRAVENOUS
Status: DISCONTINUED | OUTPATIENT
Start: 2019-07-22 | End: 2019-07-22 | Stop reason: HOSPADM

## 2019-07-22 RX ORDER — HEPARIN SODIUM 1000 [USP'U]/ML
500-6000 INJECTION, SOLUTION INTRAVENOUS; SUBCUTANEOUS
Status: DISCONTINUED | OUTPATIENT
Start: 2019-07-22 | End: 2019-07-22 | Stop reason: HOSPADM

## 2019-07-22 RX ADMIN — DIPHENHYDRAMINE HYDROCHLORIDE 25 MG: 50 INJECTION INTRAMUSCULAR; INTRAVENOUS at 09:44

## 2019-07-22 RX ADMIN — DIPHENHYDRAMINE HYDROCHLORIDE 25 MG: 50 INJECTION, SOLUTION INTRAMUSCULAR; INTRAVENOUS at 09:44

## 2019-07-22 RX ADMIN — LIDOCAINE HYDROCHLORIDE 1 ML: 10 INJECTION, SOLUTION INFILTRATION; PERINEURAL at 10:02

## 2019-07-22 RX ADMIN — FENTANYL CITRATE 25 MCG: 50 INJECTION, SOLUTION INTRAMUSCULAR; INTRAVENOUS at 09:45

## 2019-07-22 RX ADMIN — HEPARIN SODIUM 10000 UNITS: 10000 INJECTION INTRAVENOUS; SUBCUTANEOUS at 09:47

## 2019-07-22 RX ADMIN — SODIUM CHLORIDE: 9 INJECTION, SOLUTION INTRAVENOUS at 08:14

## 2019-07-22 RX ADMIN — IOPAMIDOL 20 ML: 612 INJECTION, SOLUTION INTRAVENOUS at 10:12

## 2019-07-22 RX ADMIN — MIDAZOLAM HYDROCHLORIDE 0.5 MG: 1 INJECTION, SOLUTION INTRAMUSCULAR; INTRAVENOUS at 09:45

## 2019-07-22 ASSESSMENT — MIFFLIN-ST. JEOR: SCORE: 1604.5

## 2019-07-22 NOTE — PROGRESS NOTES
Pt a/o x 4. VSS. Denies pain. Dressing is CDI. Tolerating regular diet. Discharge instructions given, all questions answered. All belongings sent with pt. Pt set to discharge home with Maria Dolores. W/c ride to care via RN.

## 2019-07-22 NOTE — PROCEDURES
RADIOLOGY POST PROCEDURE NOTE w/ SEDATION  Patient name: Westley Ness  MRN: [id number]  : 1945    Pre-procedure diagnosis: prolonged bleeding.   Post-procedure diagnosis: Same    Procedure Date/Time: 2019  10:41 AM  Procedure: fistulogram with angioplasty of the cephalic arch.   Estimated blood loss: None  Specimen(s) collected with description: none    I determined this patient to be an appropriate candidate for the planned sedation and procedure and reassessed the patient IMMEDIATELY PRIOR to sedation and procedure.     The patient tolerated the procedure well with no immediate complications.  Significant findings:none    See imaging dictation for procedural details.    Provider name: Ariana Singh  Assistant(s):None

## 2019-07-22 NOTE — PROGRESS NOTES
Interventional Radiology Pre-Procedure Sedation Assessment   Time of Assessment: 9:06 AM    Expected Level: Moderate Sedation    Indication: Sedation is required for the following type of Procedure: Left upper arm fistulagram with possible angioplasty with IV moderate sedation    Sedation and procedural consent: Risks, benefits and alternatives were discussed with Patient    PO Intake: Appropriately NPO for procedure    ASA Class: Class 3 - SEVERE SYSTEMIC DISEASE, DEFINITE FUNCTIONAL LIMITATIONS.    Mallampati: Grade 2:  Soft palate, base of uvula, tonsillar pillars, and portion of posterior pharyngeal wall visible    Lungs: Lungs Clear with good breath sounds bilaterally    Heart: Normal heart sounds and rate    History and physical reviewed and no updates needed. I have reviewed the lab findings, diagnostic data, medications, and the plan for sedation. I have determined this patient to be an appropriate candidate for the planned sedation and procedure and have reassessed the patient IMMEDIATELY PRIOR to sedation and procedure.    Zhane Levine, APRN CNP

## 2019-07-22 NOTE — IR NOTE
Interventional Radiology Intra-procedural Nursing Note    Patient Name: Westley Ness  Medical Record Number: [id number]  Today's Date: July 22, 2019    Start Time: 0945  End of procedure time: 1009  Procedure: Left Upper Extremity Fistulogram with Angioplasty  Report given to: [facility] RN staff    Other Notes: Pt. transferred to IR table. Prepped and draped appropriately. Monitoring equipment applied. NC applied. No complaints of pain at this time. Timeout recorded.    7f sheath to Left Upper Arm Fistula. Removed at 1014. Manual pressure held to site for 5 minutes. Hemostasis at 1019. Site dressed with gauze and covered with tegaderm.    Medications administered:    Benadryl 25 mg IVP  Fentanyl 25 mcg IVP  Versed 0.5 mg IVP

## 2019-07-22 NOTE — DISCHARGE INSTRUCTIONS
Fistulagram Discharge Instructions     After you go home:      You may resume your normal diet    Have an adult stay with you for 6 hours if you received sedation       For 24 hours - due to the sedation you received:    Relax and take it easy    Do NOT make any important or legal decisions    Do NOT drive or operate machines at home or at work    Do NOT drink alcohol    Care of Puncture Site:      For the first 48 hrs, check your puncture site every couple hours while you are awake     You may remove/change the bandage tomorrow    You may shower tomorrow    No tub baths, whirlpools or swimming until your puncture site has fully healed    If puncture site starts to bleed - apply light pressure to site for 5 minutes or until bleeding stops     Activity       You should try to elevate your arm for the remainder of today to prevent increased swelling    You may go back to your normal activity in 24 hours     Wait 48 hours before lifting, straining, exercise or other strenuous activity    Medicines:      You may resume all your medications    For minor pain, you may take Acetaminophen (Tylenol) or Ibuprofen (Advil)                 Call the provider who ordered this procedure if:      Increased pain or a large or growing hard lump around the site    Blood or fluid is draining from the site    The site is red, swollen, hot or tender    Chills or a fever greater than 101 F (38 C)    Pain that is getting worse    Any questions or concerns      Call  911 or go to the Emergency Room if:    Severe pain or trouble breathing    Bleeding that you cannot control      If you have questions call:          [facility] @ [phone]

## 2019-09-03 DIAGNOSIS — I21.4 NSTEMI (NON-ST ELEVATED MYOCARDIAL INFARCTION) (H): ICD-10-CM

## 2019-09-04 RX ORDER — LISINOPRIL 2.5 MG/1
2.5 TABLET ORAL 2 TIMES DAILY
Qty: 180 TABLET | Refills: 3 | Status: SHIPPED | OUTPATIENT
Start: 2019-09-04 | End: 2020-08-25

## 2019-09-04 NOTE — TELEPHONE ENCOUNTER
"Requested Prescriptions   Pending Prescriptions Disp Refills     lisinopril (PRINIVIL/ZESTRIL) 2.5 MG tablet 180 tablet 3     Sig: Take one tablet after dialysis. Take second tablet at bedtime.       ACE Inhibitors (Including Combos) Protocol Failed - 9/3/2019  8:25 PM        Failed - Blood pressure under 140/90 in past 12 months     BP Readings from Last 3 Encounters:   07/22/19 149/70   06/26/19 (!) 140/93   04/30/19 126/64                 Failed - Normal serum creatinine on file in past 12 months     Recent Labs   Lab Test 03/26/19  0847   CR 5.07*             Passed - Recent (12 mo) or future (30 days) visit within the authorizing provider's specialty     Patient had office visit in the last 12 months or has a visit in the next 30 days with authorizing provider or within the authorizing provider's specialty.  See \"Patient Info\" tab in inbasket, or \"Choose Columns\" in Meds & Orders section of the refill encounter.              Passed - Medication is active on med list        Passed - Patient is age 18 or older        Passed - Normal serum potassium on file in past 12 months     Recent Labs   Lab Test 03/26/19  0847   POTASSIUM 4.9             Routing refill request to provider for review/approval because:  Labs out of range:  creat        "

## 2019-09-29 ENCOUNTER — HEALTH MAINTENANCE LETTER (OUTPATIENT)
Age: 74
End: 2019-09-29

## 2019-10-01 DIAGNOSIS — K21.9 GASTROESOPHAGEAL REFLUX DISEASE, ESOPHAGITIS PRESENCE NOT SPECIFIED: ICD-10-CM

## 2019-10-01 RX ORDER — PANTOPRAZOLE SODIUM 40 MG/1
TABLET, DELAYED RELEASE ORAL
Qty: 30 TABLET | Refills: 3 | Status: SHIPPED | OUTPATIENT
Start: 2019-10-01 | End: 2020-01-31

## 2019-10-01 NOTE — TELEPHONE ENCOUNTER
"Requested Prescriptions   Pending Prescriptions Disp Refills     pantoprazole (PROTONIX) 40 MG EC tablet [Pharmacy Med Name: PANTOPRAZOLE 40MG TAB 40 TAB] 30 tablet 9     Sig: TAKE 1 TABLET (40 MG) BY MOUTH EVERY MORNING       PPI Protocol Passed - 10/1/2019  9:44 AM        Passed - Not on Clopidogrel (unless Pantoprazole ordered)        Passed - No diagnosis of osteoporosis on record        Passed - Recent (12 mo) or future (30 days) visit within the authorizing provider's specialty     Patient has had an office visit with the authorizing provider or a provider within the authorizing providers department within the previous 12 mos or has a future within next 30 days. See \"Patient Info\" tab in inbasket, or \"Choose Columns\" in Meds & Orders section of the refill encounter.              Passed - Medication is active on med list        Passed - Patient is age 18 or older          "

## 2019-10-03 ENCOUNTER — ANCILLARY PROCEDURE (OUTPATIENT)
Dept: CARDIOLOGY | Facility: CLINIC | Age: 74
End: 2019-10-03
Attending: INTERNAL MEDICINE
Payer: MEDICARE

## 2019-10-03 DIAGNOSIS — Z95.810 ICD (IMPLANTABLE CARDIOVERTER-DEFIBRILLATOR) IN PLACE: ICD-10-CM

## 2019-10-03 PROCEDURE — 93295 DEV INTERROG REMOTE 1/2/MLT: CPT | Performed by: INTERNAL MEDICINE

## 2019-10-03 PROCEDURE — 93296 REM INTERROG EVL PM/IDS: CPT | Performed by: INTERNAL MEDICINE

## 2019-10-08 DIAGNOSIS — I25.10 CORONARY ARTERY DISEASE INVOLVING NATIVE CORONARY ARTERY OF NATIVE HEART WITHOUT ANGINA PECTORIS: ICD-10-CM

## 2019-10-08 NOTE — TELEPHONE ENCOUNTER
"Requested Prescriptions   Pending Prescriptions Disp Refills     clopidogrel (PLAVIX) 75 MG tablet [Pharmacy Med Name: CLOPIDOGREL 75 MG TABLET 75 TAB] 30 tablet 11     Sig: TAKE 1 TABLET (75 MG) BY MOUTH DAILY TO PROTECT YOUR STENT(S). DO NOT STOP TAKING UNLESS DIRECTED BY CARDIOLOGY.       Plavix Failed - 10/8/2019  9:50 AM        Failed - Normal HGB on file in past 12 months     Recent Labs   Lab Test 09/13/18  1610   HGB 12.7*               Failed - Normal Platelets on file in past 12 months     Recent Labs   Lab Test 09/13/18  1610   *               Passed - No active PPI on record unless is Protonix        Passed - Recent (12 mo) or future (30 days) visit within the authorizing provider's specialty     Patient has had an office visit with the authorizing provider or a provider within the authorizing providers department within the previous 12 mos or has a future within next 30 days. See \"Patient Info\" tab in inbasket, or \"Choose Columns\" in Meds & Orders section of the refill encounter.              Passed - Medication is active on med list        Passed - Patient is age 18 or older        Last Written Prescription Date:  10/2/18  Last Fill Quantity: 30,  # refills: 11   Last office visit: 3/26/2019 with prescribing provider:  3/26/19   Future Office Visit:   Next 5 appointments (look out 90 days)    Oct 23, 2019  3:50 PM CDT  Return Visit with MARCIO Aviles CNP  [facility] ([facility]) [address] [place] [address] [id number] [phone] OPT 2           "

## 2019-10-09 RX ORDER — CLOPIDOGREL BISULFATE 75 MG/1
TABLET ORAL
Qty: 30 TABLET | Refills: 11 | Status: SHIPPED | OUTPATIENT
Start: 2019-10-09 | End: 2020-03-30

## 2019-10-16 LAB
MDC_IDC_LEAD_IMPLANT_DT: NORMAL
MDC_IDC_LEAD_LOCATION: NORMAL
MDC_IDC_LEAD_LOCATION_DETAIL_1: NORMAL
MDC_IDC_LEAD_MFG: NORMAL
MDC_IDC_LEAD_MODEL: NORMAL
MDC_IDC_LEAD_POLARITY_TYPE: NORMAL
MDC_IDC_LEAD_SERIAL: NORMAL
MDC_IDC_LEAD_SPECIAL_FUNCTION: NORMAL
MDC_IDC_MSMT_BATTERY_REMAINING_PERCENTAGE: 1 %
MDC_IDC_MSMT_BATTERY_RRT_TRIGGER: NORMAL
MDC_IDC_MSMT_BATTERY_STATUS: NORMAL
MDC_IDC_MSMT_BATTERY_VOLTAGE: 2.89 V
MDC_IDC_MSMT_CAP_CHARGE_DTM: NORMAL
MDC_IDC_MSMT_CAP_CHARGE_ENERGY: 40 J
MDC_IDC_MSMT_CAP_CHARGE_TIME: 13.8 S
MDC_IDC_MSMT_CAP_CHARGE_TYPE: NORMAL
MDC_IDC_MSMT_LEADCHNL_RV_IMPEDANCE_VALUE: 526 OHM
MDC_IDC_MSMT_LEADCHNL_RV_LEAD_CHANNEL_STATUS: NORMAL
MDC_IDC_MSMT_LEADCHNL_RV_PACING_THRESHOLD_AMPLITUDE: 0.9 V
MDC_IDC_MSMT_LEADCHNL_RV_PACING_THRESHOLD_PULSEWIDTH: 0.4 MS
MDC_IDC_PG_IMPLANT_DTM: NORMAL
MDC_IDC_PG_MFG: NORMAL
MDC_IDC_PG_MODEL: NORMAL
MDC_IDC_PG_SERIAL: NORMAL
MDC_IDC_PG_TYPE: NORMAL
MDC_IDC_SESS_CLINIC_NAME: NORMAL
MDC_IDC_SESS_DTM: NORMAL
MDC_IDC_SESS_REPROGRAMMED: NO
MDC_IDC_SESS_TYPE: NORMAL
MDC_IDC_SET_BRADY_LOWRATE: 40 {BEATS}/MIN
MDC_IDC_SET_BRADY_MODE: NORMAL
MDC_IDC_SET_LEADCHNL_RV_PACING_AMPLITUDE: 2 V
MDC_IDC_SET_LEADCHNL_RV_PACING_POLARITY: NORMAL
MDC_IDC_SET_LEADCHNL_RV_PACING_PULSEWIDTH: 0.4 MS
MDC_IDC_SET_LEADCHNL_RV_SENSING_ADAPTATION_MODE: NORMAL
MDC_IDC_SET_LEADCHNL_RV_SENSING_POLARITY: NORMAL
MDC_IDC_SET_ZONE_DETECTION_INTERVAL: 240 MS
MDC_IDC_SET_ZONE_DETECTION_INTERVAL: 320 MS
MDC_IDC_SET_ZONE_DETECTION_INTERVAL: 460 MS
MDC_IDC_SET_ZONE_TYPE: NORMAL
MDC_IDC_STAT_AT_DTM_END: NORMAL
MDC_IDC_STAT_AT_DTM_START: NORMAL
MDC_IDC_STAT_BRADY_DTM_END: NORMAL
MDC_IDC_STAT_BRADY_DTM_START: NORMAL
MDC_IDC_STAT_BRADY_RV_PERCENT_PACED: 0 %
MDC_IDC_STAT_CRT_DTM_END: NORMAL
MDC_IDC_STAT_CRT_DTM_START: NORMAL
MDC_IDC_STAT_EPISODE_TOTAL_COUNT: 1
MDC_IDC_STAT_EPISODE_TOTAL_COUNT: 2
MDC_IDC_STAT_EPISODE_TOTAL_COUNT: 2
MDC_IDC_STAT_EPISODE_TOTAL_COUNT: 69
MDC_IDC_STAT_EPISODE_TOTAL_COUNT_DTM_END: NORMAL
MDC_IDC_STAT_EPISODE_TOTAL_COUNT_DTM_START: NORMAL
MDC_IDC_STAT_EPISODE_TYPE: NORMAL
MDC_IDC_STAT_TACHYTHERAPY_ATP_DELIVERED_TOTAL: 4
MDC_IDC_STAT_TACHYTHERAPY_SHOCKS_ABORTED_TOTAL: 0
MDC_IDC_STAT_TACHYTHERAPY_SHOCKS_DELIVERED_TOTAL: 4
MDC_IDC_STAT_TACHYTHERAPY_TOTAL_DTM_END: NORMAL
MDC_IDC_STAT_TACHYTHERAPY_TOTAL_DTM_START: NORMAL

## 2019-10-23 ENCOUNTER — OFFICE VISIT (OUTPATIENT)
Dept: CARDIOLOGY | Facility: CLINIC | Age: 74
End: 2019-10-23
Attending: INTERNAL MEDICINE
Payer: MEDICARE

## 2019-10-23 VITALS
BODY MASS INDEX: 29.39 KG/M2 | SYSTOLIC BLOOD PRESSURE: 110 MMHG | HEIGHT: 68 IN | DIASTOLIC BLOOD PRESSURE: 50 MMHG | HEART RATE: 76 BPM | WEIGHT: 193.9 LBS

## 2019-10-23 DIAGNOSIS — I25.5 ISCHEMIC CARDIOMYOPATHY: Primary | ICD-10-CM

## 2019-10-23 DIAGNOSIS — I25.10 CORONARY ARTERY DISEASE INVOLVING NATIVE CORONARY ARTERY OF NATIVE HEART WITHOUT ANGINA PECTORIS: ICD-10-CM

## 2019-10-23 PROCEDURE — 99214 OFFICE O/P EST MOD 30 MIN: CPT | Performed by: NURSE PRACTITIONER

## 2019-10-23 ASSESSMENT — MIFFLIN-ST. JEOR: SCORE: 1599.02

## 2019-10-23 NOTE — LETTER
10/23/2019      Josselin Kolb MD  [address] [facility] [address]      RE: Westley Ness       Dear Colleague,    I had the pleasure of seeing Westley Ness in the [facility].    Service Date: 10/23/2019      HISTORY OF PRESENT ILLNESS:  Mr. Ness is a delightful 73-year-old gentleman well known to me here at the clinic.  He is an established patient of Dr. Sen's.  He has also been seen in the past by Dr. Mejia for his electrophysiology needs.      He has a history of known coronary artery disease, ischemic cardiomyopathy with an EF of 25%-30%.  He has end-stage renal failure and has been on dialysis 3 times a week for the last 14 years.  He underwent previous drug-eluting stent to the proximal left circumflex and obtuse marginal artery in 2016.  He had a recurrent event with angioplasty of the first obtuse marginal branch for restenosis.  He has chronically occluded LAD, and last nuclear viability study had shown not much viability in the LAD distribution.  Last angiogram was 11/2016.  At his last visit, Dr. Sen increased his isosorbide from 15 to 30 mg daily.  This did help improve his intermittent chest pain he was having; however, he is now having low blood pressures on dialysis days.  He is taking his isosorbide in the evening and was hoping this would offset hypotension for his dialysis days.      Currently, he denies orthopnea, PND, syncope or peripheral edema.  His dry weights have been stable.  He brought a copy of his labs with him from his dialysis runs.  His post-dialysis runs are usually coming in at 86 kg.  His hemoglobin has been stable at 11.5, potassium of 4.6.  There has been an elevation of white count over the past month, ranging from 1.7 to 6.4.      He has a history of atrial flutter and underwent a flutter ablation.  He had no reoccurrence on his Zio Patch.  We did discontinue his Eliquis.  He remains on baby aspirin and clopidogrel.  His  flutter ablation was in 12/2017.  He remains on mexiletine for his history of ventricular tachycardia.      He has a Biotronik Lumax.  His last device interrogation showed that he was nearing NORM.  He had no ventricular arrhythmias noted.  All other review of systems and past medical history are noted below.      ASSESSMENT AND PLAN:  Mr. Ness is a delightful 73-year-old gentleman here today for followup.   1.  History of typical atrial flutter.  He underwent 2 ablations.  The second procedure appears to have been successful.  He is off Eliquis.  He had a followup Zio Patch which showed no reoccurrence of his atrial arrhythmias.   2.  Known coronary artery disease with history of ventricular tachycardia.  He was having some intermittent episodes of chest discomfort.  The increased Imdur has been helpful.  I did recommend that the night prior to his dialysis runs to take a 15 mg tablet instead of 30 mg.  I am hopeful this will help offset hypotension prior to dialysis.  They are also adjusting his dry weight.  Again, he is not interested in pursuing a nuclear study.  He is feeling much better with the higher dose of isosorbide.  The patient remains on aspirin and clopidogrel.  He has no complaints of bleeding or bruising at this time.   3.  Acute-on-chronic ischemic cardiomyopathy.  He is euvolemic on exam.  His weights have been very stable based on dialysis.  His last echocardiogram 03/2019 shows a stable ejection fraction of 25%-30%.  RV function was mildly decreased.   4.  Hemodialysis.  The patient has been on dialysis for the last 14 years and is followed by Dr. Hammond.     5.  Hyperlipidemia, on pravastatin 40 mg daily.  Last lipids in March showed an LDL 53, total cholesterol 126, HDL 52, triglycerides of 107.      As always, I appreciate being involved in the care of this delightful patient.  He will follow up with Dr. Sen in 6 months with an echocardiogram.  If there are any questions or concerns in the  interim, he will contact us.  As mentioned above, he is nearing NORM.  When his device reaches NORM, I would recommend that we hold clopidogrel at least 3 days prior to his procedure to decrease his risk of hematoma.      Thank you for including us in his care.         LORA MOLINA NP             D: 10/23/2019   T: 10/23/2019   MT: KEYSHA      Name:     SOCORRO LÓPEZ   MRN:      [id number]        Account:      [id number]   :      1945           Service Date: 10/23/2019      Document: [id number]         Outpatient Encounter Medications as of 10/23/2019   Medication Sig Dispense Refill     acetaminophen (TYLENOL) 500 MG tablet Take 1,000 mg by mouth 4 times daily as needed for mild pain (500mg x 2 = 1,000mg)       aspirin 81 MG tablet Take 1 tablet (81 mg) by mouth daily 1 tablet 0     clopidogrel (PLAVIX) 75 MG tablet TAKE 1 TABLET (75 MG) BY MOUTH DAILY TO PROTECT YOUR STENT(S). DO NOT STOP TAKING UNLESS DIRECTED BY CARDIOLOGY. 30 tablet 11     cyclobenzaprine (FLEXERIL) 5 MG tablet Take 1 tablet (5 mg) by mouth nightly as needed for muscle spasms 20 tablet 0     isosorbide mononitrate (IMDUR) 30 MG 24 hr tablet Take 1 tablet (30 mg) by mouth daily 90 tablet 3     levothyroxine (SYNTHROID/LEVOTHROID) 50 MCG tablet Take 1 tablet (50 mcg) by mouth daily 90 tablet 3     lisinopril (PRINIVIL/ZESTRIL) 2.5 MG tablet Take 1 tablet (2.5 mg) by mouth 2 times daily (Take one tablet after dialysis. Take second tablet at bedtime.) 180 tablet 3     loperamide (IMODIUM) 2 MG capsule Take 1 mg by mouth daily as needed        Metoprolol Succinate 25 MG CS24 Take 12.5 mg by mouth daily 90 capsule 3     mexiletine (MEXITIL) 150 MG capsule TAKE 1 CAPSULE (150 MG) BY MOUTH 2 TIMES DAILY 180 capsule 3     mexiletine (MEXITIL) 150 MG capsule Take 150 mg by mouth 2 times daily       nitroGLYcerin (NITROSTAT) 0.4 MG sublingual tablet Place 1 tablet (0.4 mg) under the tongue every 5 minutes as needed for chest pain UP TO 3 PER  EPISODE 20 tablet 0     pantoprazole (PROTONIX) 40 MG EC tablet TAKE 1 TABLET (40 MG) BY MOUTH EVERY MORNING 30 tablet 3     pravastatin (PRAVACHOL) 40 MG tablet TAKE 1 TABLET (40 MG) BY MOUTH DAILY 90 tablet 2     psyllium 0.52 G capsule Take 1 capsule by mouth daily as needed        RaNITidine HCl (ZANTAC PO) Take 75 mg by mouth At Bedtime        TRIPHROCAPS 1 MG capsule TAKE 1 CAPSULE BY MOUTH EVERY EVENING 90 capsule 3     [DISCONTINUED] valACYclovir (VALTREX) 500 MG tablet Take 1 tablet (500 mg) by mouth daily       No facility-administered encounter medications on file as of 10/23/2019.        Again, thank you for allowing me to participate in the care of your patient.      Sincerely,    Lacy Taylor NP, APRN [facility]

## 2019-10-23 NOTE — LETTER
10/23/2019    Josselin Kolb MD  [address] [facility] [address]    RE: Westley Ness       Dear Colleague,    I had the pleasure of seeing Westley Ness in the [facility].    HPI and Plan: #[id number]  See dictation    Orders Placed This Encounter   Procedures     Follow-Up with Cardiologist     Echocardiogram Complete       No orders of the defined types were placed in this encounter.      Medications Discontinued During This Encounter   Medication Reason     valACYclovir (VALTREX) 500 MG tablet Therapy completed         Encounter Diagnosis   Name Primary?     Ischemic cardiomyopathy        CURRENT MEDICATIONS:  Current Outpatient Medications   Medication Sig Dispense Refill     acetaminophen (TYLENOL) 500 MG tablet Take 1,000 mg by mouth 4 times daily as needed for mild pain (500mg x 2 = 1,000mg)       aspirin 81 MG tablet Take 1 tablet (81 mg) by mouth daily 1 tablet 0     clopidogrel (PLAVIX) 75 MG tablet TAKE 1 TABLET (75 MG) BY MOUTH DAILY TO PROTECT YOUR STENT(S). DO NOT STOP TAKING UNLESS DIRECTED BY CARDIOLOGY. 30 tablet 11     cyclobenzaprine (FLEXERIL) 5 MG tablet Take 1 tablet (5 mg) by mouth nightly as needed for muscle spasms 20 tablet 0     isosorbide mononitrate (IMDUR) 30 MG 24 hr tablet Take 1 tablet (30 mg) by mouth daily 90 tablet 3     levothyroxine (SYNTHROID/LEVOTHROID) 50 MCG tablet Take 1 tablet (50 mcg) by mouth daily 90 tablet 3     lisinopril (PRINIVIL/ZESTRIL) 2.5 MG tablet Take 1 tablet (2.5 mg) by mouth 2 times daily (Take one tablet after dialysis. Take second tablet at bedtime.) 180 tablet 3     loperamide (IMODIUM) 2 MG capsule Take 1 mg by mouth daily as needed        Metoprolol Succinate 25 MG CS24 Take 12.5 mg by mouth daily 90 capsule 3     mexiletine (MEXITIL) 150 MG capsule TAKE 1 CAPSULE (150 MG) BY MOUTH 2 TIMES DAILY 180 capsule 3     mexiletine (MEXITIL) 150 MG capsule Take 150 mg by mouth 2 times daily       nitroGLYcerin  (NITROSTAT) 0.4 MG sublingual tablet Place 1 tablet (0.4 mg) under the tongue every 5 minutes as needed for chest pain UP TO 3 PER EPISODE 20 tablet 0     pantoprazole (PROTONIX) 40 MG EC tablet TAKE 1 TABLET (40 MG) BY MOUTH EVERY MORNING 30 tablet 3     pravastatin (PRAVACHOL) 40 MG tablet TAKE 1 TABLET (40 MG) BY MOUTH DAILY 90 tablet 2     psyllium 0.52 G capsule Take 1 capsule by mouth daily as needed        RaNITidine HCl (ZANTAC PO) Take 75 mg by mouth At Bedtime        TRIPHROCAPS 1 MG capsule TAKE 1 CAPSULE BY MOUTH EVERY EVENING 90 capsule 3       ALLERGIES     Allergies   Allergen Reactions     Contrast Dye Hives     Does fine if he uses benadryl prior.     No Clinical Screening - See Comments      Green beans - Diarrhea.    Topical antibiotic - name unknown - caused swelling of the penis.       PAST MEDICAL HISTORY:  Past Medical History:   Diagnosis Date     Acid reflux disease      CAD (coronary artery disease)     s/p multiple NSTEMIs and PCI's     ESRD on hemodialysis (H)     MWF     History of atrial flutter     s/p ablation     Hypertension      Hypothyroidism      Ischemic cardiomyopathy     EF 25-30%, s/p AICD     Obese      Type 2 diabetes mellitus (H)     diet-controlled       PAST SURGICAL HISTORY:  Past Surgical History:   Procedure Laterality Date     CHOLECYSTECTOMY, OPEN  2013     ELBOW SURGERY Left 2008    ORIF - plates still in place     H ABLATION ATRIAL FLUTTER  06/08/2017, 12/11/17      LEFT HEART CATHETERIZATION  7/14/2016     HC LEFT HEART CATHETERIZATION  9/21/2016     IMPLANT VENTRICULAR DEVICE  08/15/2011     IR DIALYSIS FISTULOGRAM LEFT  7/22/2019     REPAIR FISTULA ARTERIOVENOUS UPPER EXTREMITY Left 3/5/2019    Procedure: REPAIR LEFT UPPER ARM ARTERIOVENOUS FISTULA SKIN ULCER;  Surgeon: Westley Griggs MD;  Location:  OR     TONSILLECTOMY & ADENOIDECTOMY       VASCULAR SURGERY  2004, 2010    LUE fistulas (upper and lower); upper one is functional       FAMILY  HISTORY:  Family History   Problem Relation Age of Onset     Cerebrovascular Disease Mother         later in life     Hypertension Father      Bladder Cancer Father      Myocardial Infarction Paternal Grandmother      Diabetes No family hx of      Prostate Cancer No family hx of      Colon Cancer No family hx of        SOCIAL HISTORY:  Social History     Socioeconomic History     Marital status: Single     Spouse name: None     Number of children: None     Years of education: None     Highest education level: None   Occupational History     Occupation: Retired - CPA   Social Needs     Financial resource strain: None     Food insecurity:     Worry: None     Inability: None     Transportation needs:     Medical: None     Non-medical: None   Tobacco Use     Smoking status: Former Smoker     Packs/day: 2.00     Years: 35.00     Pack years: 70.00     Types: Cigarettes     Start date:      Last attempt to quit: 1996     Years since quittin.9     Smokeless tobacco: Never Used   Substance and Sexual Activity     Alcohol use: Not Currently     Alcohol/week: 0.0 standard drinks     Frequency: Never     Drug use: No     Sexual activity: Never   Lifestyle     Physical activity:     Days per week: None     Minutes per session: None     Stress: None   Relationships     Social connections:     Talks on phone: None     Gets together: None     Attends Yazdanism service: None     Active member of club or organization: None     Attends meetings of clubs or organizations: None     Relationship status: None     Intimate partner violence:     Fear of current or ex partner: None     Emotionally abused: None     Physically abused: None     Forced sexual activity: None   Other Topics Concern     Parent/sibling w/ CABG, MI or angioplasty before 65F 55M? No      Service Not Asked     Blood Transfusions Not Asked     Caffeine Concern No     Occupational Exposure Not Asked     Hobby Hazards Not Asked     Sleep Concern No  "    Stress Concern Not Asked     Weight Concern Not Asked     Special Diet Yes     Back Care Not Asked     Exercise Yes     Comment: Walking     Bike Helmet Not Asked     Seat Belt Yes     Self-Exams Not Asked   Social History Narrative    Single.    No kids.     Maria Dolores Pabon (friend- healthcare POA), Joaquina Nair (friend - healthcare POA)    No formal exercise.        Review of Systems:  Skin:  Negative       Eyes:  Positive for glasses;cataracts    ENT:  Negative      Respiratory:  Positive for dyspnea on exertion occ   Cardiovascular:    Positive for;fatigue;chest pain chest pain, every 3-4 months, after walking too much, takes nitro, 1-2X effective, lightheadedness occ. when standing up too fast, energy level varies after dialysis, edema, occ.  Gastroenterology: Positive for heartburn;reflux appetite fair  Genitourinary:  Negative      Musculoskeletal:  Positive for arthritis;joint pain    Neurologic:  Positive for numbness or tingling of hands right  Psychiatric:  Negative      Heme/Lymph/Imm:  Positive for allergies seasonal  Endocrine:  Positive for thyroid disorder dialysis 3 days week    Physical Exam:  Vitals: /50   Pulse 76   Ht 1.727 m (5' 8\")   Wt 88 kg (193 lb 14.4 oz)   BMI 29.48 kg/m       Constitutional:  cooperative, alert and oriented, well developed, well nourished, in no acute distress        Skin:  warm and dry to the touch   ICD incision in the right infraclavicular area was well-healed      Head:  normocephalic, no masses or lesions        Eyes:  pupils equal and round        Lymph:      ENT:  no pallor or cyanosis, dentition good        Neck:  JVP normal        Respiratory:  clear to auscultation         Cardiac: normal S1 and S2;regular rhythm       RUSB;systolic ejection murmur;grade 1        not assessed this visit                                   Left AV fistula, bruit heard over left hand and infraclavicular region    GI:  not assessed this visit        Extremities and " Muscular Skeletal:  no deformities, clubbing, cyanosis, erythema observed;no edema         RT leg in brace     Neurological:  no gross motor deficits        Psych:  Alert and Oriented x 3;affect appropriate, oriented to time, person and place        CC  Po Sen MD  [address]  [place] [address]                Thank you for allowing me to participate in the care of your patient.      Sincerely,     Lacy Taylor, NP, APRN CNP     [facility]    cc:   Po Sen MD  [address]  [place] [address]

## 2019-10-23 NOTE — PROGRESS NOTES
HPI and Plan: #[id number]  See dictation    Orders Placed This Encounter   Procedures     Follow-Up with Cardiologist     Echocardiogram Complete       No orders of the defined types were placed in this encounter.      Medications Discontinued During This Encounter   Medication Reason     valACYclovir (VALTREX) 500 MG tablet Therapy completed         Encounter Diagnosis   Name Primary?     Ischemic cardiomyopathy        CURRENT MEDICATIONS:  Current Outpatient Medications   Medication Sig Dispense Refill     acetaminophen (TYLENOL) 500 MG tablet Take 1,000 mg by mouth 4 times daily as needed for mild pain (500mg x 2 = 1,000mg)       aspirin 81 MG tablet Take 1 tablet (81 mg) by mouth daily 1 tablet 0     clopidogrel (PLAVIX) 75 MG tablet TAKE 1 TABLET (75 MG) BY MOUTH DAILY TO PROTECT YOUR STENT(S). DO NOT STOP TAKING UNLESS DIRECTED BY CARDIOLOGY. 30 tablet 11     cyclobenzaprine (FLEXERIL) 5 MG tablet Take 1 tablet (5 mg) by mouth nightly as needed for muscle spasms 20 tablet 0     isosorbide mononitrate (IMDUR) 30 MG 24 hr tablet Take 1 tablet (30 mg) by mouth daily 90 tablet 3     levothyroxine (SYNTHROID/LEVOTHROID) 50 MCG tablet Take 1 tablet (50 mcg) by mouth daily 90 tablet 3     lisinopril (PRINIVIL/ZESTRIL) 2.5 MG tablet Take 1 tablet (2.5 mg) by mouth 2 times daily (Take one tablet after dialysis. Take second tablet at bedtime.) 180 tablet 3     loperamide (IMODIUM) 2 MG capsule Take 1 mg by mouth daily as needed        Metoprolol Succinate 25 MG CS24 Take 12.5 mg by mouth daily 90 capsule 3     mexiletine (MEXITIL) 150 MG capsule TAKE 1 CAPSULE (150 MG) BY MOUTH 2 TIMES DAILY 180 capsule 3     mexiletine (MEXITIL) 150 MG capsule Take 150 mg by mouth 2 times daily       nitroGLYcerin (NITROSTAT) 0.4 MG sublingual tablet Place 1 tablet (0.4 mg) under the tongue every 5 minutes as needed for chest pain UP TO 3 PER EPISODE 20 tablet 0     pantoprazole (PROTONIX) 40 MG EC tablet TAKE 1 TABLET (40 MG) BY MOUTH  EVERY MORNING 30 tablet 3     pravastatin (PRAVACHOL) 40 MG tablet TAKE 1 TABLET (40 MG) BY MOUTH DAILY 90 tablet 2     psyllium 0.52 G capsule Take 1 capsule by mouth daily as needed        RaNITidine HCl (ZANTAC PO) Take 75 mg by mouth At Bedtime        TRIPHROCAPS 1 MG capsule TAKE 1 CAPSULE BY MOUTH EVERY EVENING 90 capsule 3       ALLERGIES     Allergies   Allergen Reactions     Contrast Dye Hives     Does fine if he uses benadryl prior.     No Clinical Screening - See Comments      Green beans - Diarrhea.    Topical antibiotic - name unknown - caused swelling of the penis.       PAST MEDICAL HISTORY:  Past Medical History:   Diagnosis Date     Acid reflux disease      CAD (coronary artery disease)     s/p multiple NSTEMIs and PCI's     ESRD on hemodialysis (H)     MWF     History of atrial flutter     s/p ablation     Hypertension      Hypothyroidism      Ischemic cardiomyopathy     EF 25-30%, s/p AICD     Obese      Type 2 diabetes mellitus (H)     diet-controlled       PAST SURGICAL HISTORY:  Past Surgical History:   Procedure Laterality Date     CHOLECYSTECTOMY, OPEN  2013     ELBOW SURGERY Left 2008    ORIF - plates still in place     H ABLATION ATRIAL FLUTTER  06/08/2017, 12/11/17      LEFT HEART CATHETERIZATION  7/14/2016     HC LEFT HEART CATHETERIZATION  9/21/2016     IMPLANT VENTRICULAR DEVICE  08/15/2011     IR DIALYSIS FISTULOGRAM LEFT  7/22/2019     REPAIR FISTULA ARTERIOVENOUS UPPER EXTREMITY Left 3/5/2019    Procedure: REPAIR LEFT UPPER ARM ARTERIOVENOUS FISTULA SKIN ULCER;  Surgeon: Westley Griggs MD;  Location: [facility]     TONSILLECTOMY & ADENOIDECTOMY       VASCULAR SURGERY  2004, 2010    LUE fistulas (upper and lower); upper one is functional       FAMILY HISTORY:  Family History   Problem Relation Age of Onset     Cerebrovascular Disease Mother         later in life     Hypertension Father      Bladder Cancer Father      Myocardial Infarction Paternal Grandmother      Diabetes No  family hx of      Prostate Cancer No family hx of      Colon Cancer No family hx of        SOCIAL HISTORY:  Social History     Socioeconomic History     Marital status: Single     Spouse name: None     Number of children: None     Years of education: None     Highest education level: None   Occupational History     Occupation: Retired - CPA   Social Needs     Financial resource strain: None     Food insecurity:     Worry: None     Inability: None     Transportation needs:     Medical: None     Non-medical: None   Tobacco Use     Smoking status: Former Smoker     Packs/day: 2.00     Years: 35.00     Pack years: 70.00     Types: Cigarettes     Start date:      Last attempt to quit: 1996     Years since quittin.9     Smokeless tobacco: Never Used   Substance and Sexual Activity     Alcohol use: Not Currently     Alcohol/week: 0.0 standard drinks     Frequency: Never     Drug use: No     Sexual activity: Never   Lifestyle     Physical activity:     Days per week: None     Minutes per session: None     Stress: None   Relationships     Social connections:     Talks on phone: None     Gets together: None     Attends Zoroastrianism service: None     Active member of club or organization: None     Attends meetings of clubs or organizations: None     Relationship status: None     Intimate partner violence:     Fear of current or ex partner: None     Emotionally abused: None     Physically abused: None     Forced sexual activity: None   Other Topics Concern     Parent/sibling w/ CABG, MI or angioplasty before 65F 55M? No      Service Not Asked     Blood Transfusions Not Asked     Caffeine Concern No     Occupational Exposure Not Asked     Hobby Hazards Not Asked     Sleep Concern No     Stress Concern Not Asked     Weight Concern Not Asked     Special Diet Yes     Back Care Not Asked     Exercise Yes     Comment: Walking     Bike Helmet Not Asked     Seat Belt Yes     Self-Exams Not Asked   Social History  "Narrative    Single.    No kids.     Maria Dolores Pabon (friend- healthcare POA), Joaquina Nair (friend - healthcare POA)    No formal exercise.        Review of Systems:  Skin:  Negative       Eyes:  Positive for glasses;cataracts    ENT:  Negative      Respiratory:  Positive for dyspnea on exertion occ   Cardiovascular:    Positive for;fatigue;chest pain chest pain, every 3-4 months, after walking too much, takes nitro, 1-2X effective, lightheadedness occ. when standing up too fast, energy level varies after dialysis, edema, occ.  Gastroenterology: Positive for heartburn;reflux appetite fair  Genitourinary:  Negative      Musculoskeletal:  Positive for arthritis;joint pain    Neurologic:  Positive for numbness or tingling of hands right  Psychiatric:  Negative      Heme/Lymph/Imm:  Positive for allergies seasonal  Endocrine:  Positive for thyroid disorder dialysis 3 days week    Physical Exam:  Vitals: /50   Pulse 76   Ht 1.727 m (5' 8\")   Wt 88 kg (193 lb 14.4 oz)   BMI 29.48 kg/m      Constitutional:  cooperative, alert and oriented, well developed, well nourished, in no acute distress        Skin:  warm and dry to the touch   ICD incision in the right infraclavicular area was well-healed      Head:  normocephalic, no masses or lesions        Eyes:  pupils equal and round        Lymph:      ENT:  no pallor or cyanosis, dentition good        Neck:  JVP normal        Respiratory:  clear to auscultation         Cardiac: normal S1 and S2;regular rhythm       RUSB;systolic ejection murmur;grade 1        not assessed this visit                                   Left AV fistula, bruit heard over left hand and infraclavicular region    GI:  not assessed this visit        Extremities and Muscular Skeletal:  no deformities, clubbing, cyanosis, erythema observed;no edema         RT leg in brace     Neurological:  no gross motor deficits        Psych:  Alert and Oriented x 3;affect appropriate, oriented to time, person " and place          Po Sen MD  [address]  [address]  [place] [id number]

## 2019-10-23 NOTE — PATIENT INSTRUCTIONS
Call my nurse with any questions or concerns:  [phone]  *If you have concerns after hours, please call [phone], option 2 to speak with on call Cardiologist.    1. Medication changes from today:  **  OK to take 1/2 tablet of Imdur the evening  before dialysis (for low blood pressure)  Echo and  in April 2020

## 2019-10-24 NOTE — PROGRESS NOTES
Service Date: 10/23/2019      HISTORY OF PRESENT ILLNESS:  Mr. Ness is a delightful 73-year-old gentleman well known to me here at the clinic.  He is an established patient of Dr. Sen's.  He has also been seen in the past by Dr. Mejia for his electrophysiology needs.      He has a history of known coronary artery disease, ischemic cardiomyopathy with an EF of 25%-30%.  He has end-stage renal failure and has been on dialysis 3 times a week for the last 14 years.  He underwent previous drug-eluting stent to the proximal left circumflex and obtuse marginal artery in 2016.  He had a recurrent event with angioplasty of the first obtuse marginal branch for restenosis.  He has chronically occluded LAD, and last nuclear viability study had shown not much viability in the LAD distribution.  Last angiogram was 11/2016.  At his last visit, Dr. Sen increased his isosorbide from 15 to 30 mg daily.  This did help improve his intermittent chest pain he was having; however, he is now having low blood pressures on dialysis days.  He is taking his isosorbide in the evening and was hoping this would offset hypotension for his dialysis days.      Currently, he denies orthopnea, PND, syncope or peripheral edema.  His dry weights have been stable.  He brought a copy of his labs with him from his dialysis runs.  His post-dialysis runs are usually coming in at 86 kg.  His hemoglobin has been stable at 11.5, potassium of 4.6.  There has been an elevation of white count over the past month, ranging from 1.7 to 6.4.      He has a history of atrial flutter and underwent a flutter ablation.  He had no reoccurrence on his Zio Patch.  We did discontinue his Eliquis.  He remains on baby aspirin and clopidogrel.  His flutter ablation was in 12/2017.  He remains on mexiletine for his history of ventricular tachycardia.      He has a [employer] Lumax.  His last device interrogation showed that he was nearing NORM.  He had no ventricular arrhythmias  noted.  All other review of systems and past medical history are noted below.      ASSESSMENT AND PLAN:  Mr. Ness is a delightful 73-year-old gentleman here today for followup.   1.  History of typical atrial flutter.    He underwent 2 ablations.  The second procedure appears to have been successful.  He is off Eliquis.  He had a followup Zio Patch which showed no reoccurrence of his atrial arrhythmias.     2.  Known coronary artery disease with history of ventricular tachycardia.    He was having some intermittent episodes of chest discomfort.  The increased Imdur has been helpful.  I did recommend that the night prior to his dialysis runs to take a 15 mg tablet instead of 30 mg.  I am hopeful this will help offset hypotension prior to dialysis.  They are also adjusting his dry weight.  Again, he is not interested in pursuing a nuclear study.  He is feeling much better with the higher dose of isosorbide.  The patient remains on aspirin and clopidogrel.  He has no complaints of bleeding or bruising at this time.     3.  Acute-on-chronic ischemic cardiomyopathy.    He is euvolemic on exam.  His weights have been very stable based on dialysis.  His last echocardiogram 03/2019 shows a stable ejection fraction of 25%-30%.  RV function was mildly decreased.     4.  Hemodialysis.  The patient has been on dialysis for the last 14 years and is followed by Dr. Hammond.       5.  Hyperlipidemia, on pravastatin 40 mg daily.    Last lipids in March showed an LDL 53, total cholesterol 126, HDL 52, triglycerides of 107.      As always, I appreciate being involved in the care of this delightful patient.  He will follow up with Dr. Sen in 6 months with an echocardiogram.  If there are any questions or concerns in the interim, he will contact us.  As mentioned above, he is nearing NORM.  When his device reaches NORM, I would recommend that we hold clopidogrel at least 3 days prior to his procedure to decrease his risk of hematoma.       Thank you for including us in his care.         LORA MOLINA NP             D: 10/23/2019   T: 10/23/2019   MT: [id number]      Name:     SOCORRO LÓPEZ   MRN:      [id number]        Account:      [id number]   :      1945           Service Date: 10/23/2019      Document: [id number]

## 2019-10-25 ENCOUNTER — TELEPHONE (OUTPATIENT)
Dept: CARDIOLOGY | Facility: CLINIC | Age: 74
End: 2019-10-25

## 2019-10-25 NOTE — TELEPHONE ENCOUNTER
Patient her for OV on 10/23.  Patient filled out consent to communicated.  Sent to HIM to scan.  OLAMIDE Hamilton

## 2019-10-29 DIAGNOSIS — Z99.2 ESRD (END STAGE RENAL DISEASE) ON DIALYSIS (H): ICD-10-CM

## 2019-10-29 DIAGNOSIS — N18.6 ESRD (END STAGE RENAL DISEASE) ON DIALYSIS (H): ICD-10-CM

## 2019-10-29 RX ORDER — B COMPLEX, C NO.20/FOLIC ACID 1 MG
CAPSULE ORAL
Qty: 90 CAPSULE | Refills: 3 | Status: SHIPPED | OUTPATIENT
Start: 2019-10-29 | End: 2021-01-11

## 2019-10-31 ENCOUNTER — TELEPHONE (OUTPATIENT)
Dept: CARDIOLOGY | Facility: CLINIC | Age: 74
End: 2019-10-31

## 2019-10-31 DIAGNOSIS — I25.5 ISCHEMIC CARDIOMYOPATHY: Primary | ICD-10-CM

## 2019-10-31 NOTE — TELEPHONE ENCOUNTER
Alert received today that patient's ICD has triggered NORM. Patient was seen by Lacy Taylor on 10/23/19. Will have Dr. Mejia update H&P prior to procedure. Left voicemail asking patient to call back to discuss.       Addendum: Spoke with patient and he verbalized understanding. Order placed for ICD gen change. Patient states that he discussed the procedure with Lacy Taylor on 10/23/19 and has no further questions. Papers given to Anette in scheduling.

## 2019-11-17 NOTE — PLAN OF CARE
Problem: Patient Care Overview  Goal: Plan of Care/Patient Progress Review  Outcome: No Change  [id number]  VS stable.  Denies SOB, pain, dizziness.  Up with 1 and cane. Pt off unit by 0800 for HD. Back to floor at 1300. 2L removed from dialysis.  IV Zosyn changed to Rocephin.         Initial (On Arrival)

## 2019-11-18 ENCOUNTER — TELEPHONE (OUTPATIENT)
Dept: CARDIOLOGY | Facility: CLINIC | Age: 74
End: 2019-11-18

## 2019-11-18 RX ORDER — LIDOCAINE 40 MG/G
CREAM TOPICAL
Status: CANCELLED | OUTPATIENT
Start: 2019-11-18

## 2019-11-18 RX ORDER — CEFAZOLIN SODIUM 2 G/100ML
2 INJECTION, SOLUTION INTRAVENOUS
Status: CANCELLED | OUTPATIENT
Start: 2019-11-18

## 2019-11-18 RX ORDER — SODIUM CHLORIDE 450 MG/100ML
INJECTION, SOLUTION INTRAVENOUS CONTINUOUS
Status: CANCELLED | OUTPATIENT
Start: 2019-11-18

## 2019-11-18 NOTE — TELEPHONE ENCOUNTER
Called patient with pre-procedure instructions for device implant: Called and left message with the following instructions.     Anticoagulation: Hold plavix 3 days   Oral diabetes meds: none  Insulin: none  Diuretic: none  Contrast allergy: Dye  Pt informed to be NPO at midnight    Instructed pt to shower the morning of the procedure, and then put on a clean shirt in order to help prevent infection.     Pt has post-procedure transportation and 24 hours monitoring set up.   Pt aware of no driving for 24 hours post procedure due to sedation.

## 2019-11-21 ENCOUNTER — HOSPITAL ENCOUNTER (OUTPATIENT)
Facility: CLINIC | Age: 74
Discharge: HOME | End: 2019-11-21
Admitting: INTERNAL MEDICINE
Payer: MEDICARE

## 2019-11-21 ENCOUNTER — SURGERY (OUTPATIENT)
Age: 74
End: 2019-11-21
Payer: MEDICARE

## 2019-11-21 VITALS
OXYGEN SATURATION: 99 % | BODY MASS INDEX: 29.22 KG/M2 | WEIGHT: 192.8 LBS | DIASTOLIC BLOOD PRESSURE: 53 MMHG | HEIGHT: 68 IN | TEMPERATURE: 97.8 F | RESPIRATION RATE: 16 BRPM | SYSTOLIC BLOOD PRESSURE: 121 MMHG | HEART RATE: 70 BPM

## 2019-11-21 DIAGNOSIS — I25.5 ISCHEMIC CARDIOMYOPATHY: ICD-10-CM

## 2019-11-21 LAB
ANION GAP SERPL CALCULATED.3IONS-SCNC: 7 MMOL/L (ref 3–14)
BUN SERPL-MCNC: 23 MG/DL (ref 7–30)
CALCIUM SERPL-MCNC: 9.6 MG/DL (ref 8.5–10.1)
CHLORIDE SERPL-SCNC: 100 MMOL/L (ref 94–109)
CO2 SERPL-SCNC: 28 MMOL/L (ref 20–32)
CREAT SERPL-MCNC: 4.97 MG/DL (ref 0.66–1.25)
ERYTHROCYTE [DISTWIDTH] IN BLOOD BY AUTOMATED COUNT: 13.1 % (ref 10–15)
GFR SERPL CREATININE-BSD FRML MDRD: 11 ML/MIN/{1.73_M2}
GLUCOSE SERPL-MCNC: 125 MG/DL (ref 70–99)
HCT VFR BLD AUTO: 35.8 % (ref 40–53)
HGB BLD-MCNC: 11.8 G/DL (ref 13.3–17.7)
MCH RBC QN AUTO: 33.7 PG (ref 26.5–33)
MCHC RBC AUTO-ENTMCNC: 33 G/DL (ref 31.5–36.5)
MCV RBC AUTO: 102 FL (ref 78–100)
PLATELET # BLD AUTO: 219 10E9/L (ref 150–450)
POTASSIUM SERPL-SCNC: 4.2 MMOL/L (ref 3.4–5.3)
RBC # BLD AUTO: 3.5 10E12/L (ref 4.4–5.9)
SODIUM SERPL-SCNC: 135 MMOL/L (ref 133–144)
WBC # BLD AUTO: 8.5 10E9/L (ref 4–11)

## 2019-11-21 PROCEDURE — 33262 RMVL& REPLC PULSE GEN 1 LEAD: CPT | Performed by: INTERNAL MEDICINE

## 2019-11-21 PROCEDURE — 99152 MOD SED SAME PHYS/QHP 5/>YRS: CPT | Performed by: INTERNAL MEDICINE

## 2019-11-21 PROCEDURE — C1722 AICD, SINGLE CHAMBER: HCPCS | Performed by: INTERNAL MEDICINE

## 2019-11-21 PROCEDURE — 27210794 ZZH OR GENERAL SUPPLY STERILE: Performed by: INTERNAL MEDICINE

## 2019-11-21 PROCEDURE — 99153 MOD SED SAME PHYS/QHP EA: CPT | Performed by: INTERNAL MEDICINE

## 2019-11-21 PROCEDURE — 36415 COLL VENOUS BLD VENIPUNCTURE: CPT

## 2019-11-21 PROCEDURE — 25000128 H RX IP 250 OP 636: Performed by: INTERNAL MEDICINE

## 2019-11-21 PROCEDURE — C1721 AICD, DUAL CHAMBER: HCPCS | Performed by: INTERNAL MEDICINE

## 2019-11-21 PROCEDURE — 25800029 ZZH RX IP 258 OP 250: Performed by: INTERNAL MEDICINE

## 2019-11-21 PROCEDURE — 85027 COMPLETE CBC AUTOMATED: CPT | Mod: AY | Performed by: INTERNAL MEDICINE

## 2019-11-21 PROCEDURE — 40000852 ZZH STATISTIC HEART CATH LAB OR EP LAB

## 2019-11-21 PROCEDURE — 25000125 ZZHC RX 250: Performed by: INTERNAL MEDICINE

## 2019-11-21 PROCEDURE — 80048 BASIC METABOLIC PNL TOTAL CA: CPT | Mod: AY | Performed by: INTERNAL MEDICINE

## 2019-11-21 DEVICE — IMPLANTABLE DEVICE: Type: IMPLANTABLE DEVICE | Status: FUNCTIONAL

## 2019-11-21 RX ORDER — LIDOCAINE 40 MG/G
CREAM TOPICAL
Status: DISCONTINUED | OUTPATIENT
Start: 2019-11-21 | End: 2019-11-21 | Stop reason: HOSPADM

## 2019-11-21 RX ORDER — SODIUM CHLORIDE 450 MG/100ML
INJECTION, SOLUTION INTRAVENOUS CONTINUOUS
Status: DISCONTINUED | OUTPATIENT
Start: 2019-11-21 | End: 2019-11-21 | Stop reason: HOSPADM

## 2019-11-21 RX ORDER — BUPIVACAINE HYDROCHLORIDE 2.5 MG/ML
INJECTION, SOLUTION EPIDURAL; INFILTRATION; INTRACAUDAL
Status: DISCONTINUED | OUTPATIENT
Start: 2019-11-21 | End: 2019-11-21 | Stop reason: HOSPADM

## 2019-11-21 RX ORDER — DOBUTAMINE HYDROCHLORIDE 200 MG/100ML
5-40 INJECTION INTRAVENOUS CONTINUOUS PRN
Status: DISCONTINUED | OUTPATIENT
Start: 2019-11-21 | End: 2019-11-21 | Stop reason: HOSPADM

## 2019-11-21 RX ORDER — CEFAZOLIN SODIUM 2 G/100ML
2 INJECTION, SOLUTION INTRAVENOUS
Status: COMPLETED | OUTPATIENT
Start: 2019-11-21 | End: 2019-11-21

## 2019-11-21 RX ORDER — NALOXONE HYDROCHLORIDE 0.4 MG/ML
.1-.4 INJECTION, SOLUTION INTRAMUSCULAR; INTRAVENOUS; SUBCUTANEOUS
Status: DISCONTINUED | OUTPATIENT
Start: 2019-11-21 | End: 2019-11-21 | Stop reason: HOSPADM

## 2019-11-21 RX ORDER — HEPARIN SODIUM 200 [USP'U]/100ML
100-500 INJECTION, SOLUTION INTRAVENOUS CONTINUOUS PRN
Status: DISCONTINUED | OUTPATIENT
Start: 2019-11-21 | End: 2019-11-21 | Stop reason: HOSPADM

## 2019-11-21 RX ORDER — HEPARIN SODIUM 200 [USP'U]/100ML
100-600 INJECTION, SOLUTION INTRAVENOUS CONTINUOUS PRN
Status: DISCONTINUED | OUTPATIENT
Start: 2019-11-21 | End: 2019-11-21 | Stop reason: HOSPADM

## 2019-11-21 RX ORDER — FENTANYL CITRATE 50 UG/ML
INJECTION, SOLUTION INTRAMUSCULAR; INTRAVENOUS
Status: DISCONTINUED | OUTPATIENT
Start: 2019-11-21 | End: 2019-11-21 | Stop reason: HOSPADM

## 2019-11-21 RX ORDER — OXYCODONE AND ACETAMINOPHEN 5; 325 MG/1; MG/1
1 TABLET ORAL EVERY 4 HOURS PRN
Status: DISCONTINUED | OUTPATIENT
Start: 2019-11-21 | End: 2019-11-21 | Stop reason: HOSPADM

## 2019-11-21 RX ORDER — CEFAZOLIN SODIUM 1 G/3ML
1 INJECTION, POWDER, FOR SOLUTION INTRAMUSCULAR; INTRAVENOUS
Status: DISCONTINUED | OUTPATIENT
Start: 2019-11-21 | End: 2019-11-21 | Stop reason: HOSPADM

## 2019-11-21 RX ADMIN — MIDAZOLAM 1 MG: 1 INJECTION INTRAMUSCULAR; INTRAVENOUS at 11:14

## 2019-11-21 RX ADMIN — SODIUM CHLORIDE: 4.5 INJECTION, SOLUTION INTRAVENOUS at 09:43

## 2019-11-21 RX ADMIN — MIDAZOLAM 1 MG: 1 INJECTION INTRAMUSCULAR; INTRAVENOUS at 11:06

## 2019-11-21 RX ADMIN — LIDOCAINE HYDROCHLORIDE 10 ML: 10 INJECTION, SOLUTION EPIDURAL; INFILTRATION; INTRACAUDAL; PERINEURAL at 11:09

## 2019-11-21 RX ADMIN — CEFAZOLIN SODIUM 2 G: 2 INJECTION, SOLUTION INTRAVENOUS at 10:40

## 2019-11-21 RX ADMIN — BUPIVACAINE HYDROCHLORIDE 10 ML: 2.5 INJECTION, SOLUTION EPIDURAL; INFILTRATION; INTRACAUDAL; PERINEURAL at 11:10

## 2019-11-21 RX ADMIN — FENTANYL CITRATE 50 MCG: 50 INJECTION, SOLUTION INTRAMUSCULAR; INTRAVENOUS at 11:06

## 2019-11-21 RX ADMIN — MIDAZOLAM 1 MG: 1 INJECTION INTRAMUSCULAR; INTRAVENOUS at 10:55

## 2019-11-21 RX ADMIN — BACITRACIN 20 ML: 5000 INJECTION, POWDER, FOR SOLUTION INTRAMUSCULAR at 11:14

## 2019-11-21 RX ADMIN — FENTANYL CITRATE 50 MCG: 50 INJECTION, SOLUTION INTRAMUSCULAR; INTRAVENOUS at 10:55

## 2019-11-21 RX ADMIN — FENTANYL CITRATE 50 MCG: 50 INJECTION, SOLUTION INTRAMUSCULAR; INTRAVENOUS at 11:14

## 2019-11-21 ASSESSMENT — MIFFLIN-ST. JEOR: SCORE: 1594.04

## 2019-11-21 NOTE — PROGRESS NOTES
Care Suites Admission Nursing Note    Reason for admission: Gen. Change  CS arrival time: 0908  Name/phone of DC : Glendy with [facility]  Medications held: none   Consent signed: to be done at bedside by MD  Abnormal assessment/labs:none  Education/questions answered: Yes  Plan: Proceed

## 2019-11-21 NOTE — PRE-PROCEDURE
GENERAL PRE-PROCEDURE:   Procedure:  ICD battery change  Date/Time:  11/21/2019 10:10 AM    Written consent obtained?: Yes    Risks and benefits: Risks, benefits and alternatives were discussed    Consent given by:  Patient  Patient states understanding of procedure being performed: Yes    Patient's understanding of procedure matches consent: Yes    Procedure consent matches procedure scheduled: Yes    Expected level of sedation:  Moderate  Appropriately NPO:  Yes  ASA Class:  Class 3- Severe systemic disease, definite functional limitations  Mallampati  :  Grade 2- soft palate, base of uvula, tonsillar pillars, and portion of posterior pharyngeal wall visible  Lungs:  Lungs clear with good breath sounds bilaterally  Heart:  Normal heart sounds and rate  History & Physical reviewed:  History and physical reviewed and no updates needed  Statement of review:  I have reviewed the lab findings, diagnostic data, medications, and the plan for sedation

## 2019-11-21 NOTE — PROGRESS NOTES
"1140: Pt returned from EP lab. A/O. Drsg CDI to left chest. No oozing or hematoma noted. Area soft & flat. Pt denies pain.     1150: Pt had small yellow emesis after being settled in to care suites. Est. 25 ml.  No other complaints. States he feels \"fine\" now. VSS, Right subclavian site Site dry and intact with no bleeding. Will cont. To monitor.      "

## 2019-11-21 NOTE — PROGRESS NOTES
Care Suites Discharge Summary    Discharge Criteria:   Discharge Criteria met per MD orders: Yes. No further nausea/emesis. VSS, no complaints at discharge.   Vital signs stable.     Pt demonstrates ability to ambulate safely: Yes.  (See discharge questionnaire for additional information)    Discharge instructions & education:   Discharge instructions reviewed with patient. Patient verbalizes understanding.   Additional patient education provided:  gen change  Medications:   Patient will be discharging on new medications- No. Patient verbalizes reason for use, start date, and side effects NA.    Items returned to patient:   Home and hospital acquired medications returned to patient NA   Listed belongings gathered and returned to patient: Yes    Patient discharged to home with Glendy.     Eric Watters RN

## 2019-11-21 NOTE — DISCHARGE INSTRUCTIONS
Pacemaker/ICD Generator Change Discharge Instructions    After you go home:      Have an adult stay with you until tomorrow.    You may resume your normal diet.       For 24 hours - due to the sedation you received:    Relax and take it easy.    Do NOT make any important or legal decisions.    Do NOT drive or operate machines at home or at work.    Do NOT drink alcohol.    Care of Chest Incision:      Keep the bandage on at least 3 days. You may remove the dressing on 11/23./19. Change it only if it gets loose or soaked. If you need to change it, use 4x4-inch gauze and a large clear bandage.     If there is a pressure dressing (gauze & tape) - 24 hours after your procedure you may remove ONLY the top dressing. Leave the bottom dressing on.    Leave the strips of tape on. They will fall off on their own, or we will remove them at your first check-up.    Check your wound daily for signs of infection, such as increased redness, severe swelling or draining. Fever may also be a sign of infection. Call us if you see any of these signs.    If there are no signs of infection, you may shower after the bandage comes off in 3 days. If you take a tub bath, keep the wound dry.    No soaking the incision (swimming pool, bathtub, hot tub) for 2 weeks.    You may have mild to medium pain for 3 to 5 days. Take Acetaminophen (Tylenol) or Ibuprofen (Advil) for the pain. If the pain persists or is severe, call us.    Activity:      You can begin to use your arm as it feels comfortable to you.    No driving for one day & limit to necessary driving for one week.    Bleeding:      If you start bleeding from the incision site, sit down and press firmly on the site for 10 minutes.     Once bleeding stops, call [facility] as soon as you can.       Call 911 right away if you have heavy bleeding or bleeding that does not stop.      Medicines:      Take your medications, including blood thinners, unless your provider tells you not  to.    If you have stopped any other medicines, check with your provider about when to restart them.    Follow Up Appointments:      Follow up with Device Clinic at [facility] of patient preference in 7-10 days.    Call the clinic if:      You have a large or growing hard lump around the site.    The site is red, swollen, hot or tender.    Blood or fluid is draining from the site.    You have chills or a fever greater than 101 F (38 C).    You feel dizzy or light-headed.    Questions or concerns.      Telling others about your device:      Before you leave the hospital, you will receive a temporary ID card. A permanent card will be mailed to you about 6 to 8 weeks later. Always carry the ID card with you. It has important details about your device.    You may also get a Medical Alert bracelet or tag that says you have a pacemaker or a defibrillator (ICD).  Go to [email or web address].     Always tell doctors, dentists and other care providers that you have a device implanted in you.    Let us know before you plan any surgeries. Your care team must take special steps to keep you safe during certain procedures. These steps will depend on the type of device you have. Your provider will need to see your ID card. They may need to call us for instructions.    Device Safety:      Please refer to device  s booklet for further information.        [facility] [place]:    [phone] [facility] (7 days a week)

## 2019-11-22 ENCOUNTER — TELEPHONE (OUTPATIENT)
Dept: CARDIOLOGY | Facility: CLINIC | Age: 74
End: 2019-11-22

## 2019-11-22 DIAGNOSIS — Z95.0 CARDIAC PACEMAKER IN SITU: Primary | ICD-10-CM

## 2019-11-22 DIAGNOSIS — Z95.810 ICD (IMPLANTABLE CARDIOVERTER-DEFIBRILLATOR) IN PLACE: ICD-10-CM

## 2019-11-22 NOTE — TELEPHONE ENCOUNTER
Post device implant discharge phone call.    Reviewed the following:  No raising arm above shoulder on the side of implant for 3 weeks  Remove outer dressing 3 days after implant. May shower after outer dressing removed. Leave steri-strips in place, will be removed at 1 week device check  Limit driving for: N/A  Watch for redness, drainage, warmth, or fever. Call device clinic if any signs of infection.     1 week device check scheduled: Tuesday 12/3/19 at 9:00am    Pt states understanding of all instructions.

## 2019-12-03 ENCOUNTER — ANCILLARY PROCEDURE (OUTPATIENT)
Dept: CARDIOLOGY | Facility: CLINIC | Age: 74
End: 2019-12-03
Attending: INTERNAL MEDICINE
Payer: MEDICARE

## 2019-12-03 DIAGNOSIS — Z95.0 CARDIAC PACEMAKER IN SITU: Primary | ICD-10-CM

## 2019-12-03 DIAGNOSIS — Z95.810 ICD (IMPLANTABLE CARDIOVERTER-DEFIBRILLATOR) IN PLACE: ICD-10-CM

## 2019-12-03 DIAGNOSIS — I25.5 ISCHEMIC CARDIOMYOPATHY: ICD-10-CM

## 2019-12-03 PROCEDURE — 93282 PRGRMG EVAL IMPLANTABLE DFB: CPT | Performed by: INTERNAL MEDICINE

## 2019-12-17 LAB
MDC_IDC_LEAD_IMPLANT_DT: NORMAL
MDC_IDC_LEAD_LOCATION: NORMAL
MDC_IDC_LEAD_LOCATION_DETAIL_1: NORMAL
MDC_IDC_LEAD_MFG: NORMAL
MDC_IDC_LEAD_MODEL: NORMAL
MDC_IDC_LEAD_POLARITY_TYPE: NORMAL
MDC_IDC_LEAD_SERIAL: NORMAL
MDC_IDC_LEAD_SPECIAL_FUNCTION: NORMAL
MDC_IDC_MSMT_BATTERY_STATUS: NORMAL
MDC_IDC_MSMT_BATTERY_VOLTAGE: 3.11 V
MDC_IDC_MSMT_CAP_CHARGE_ENERGY: 40 J
MDC_IDC_MSMT_CAP_CHARGE_TIME: 9 S
MDC_IDC_MSMT_CAP_CHARGE_TYPE: NORMAL
MDC_IDC_MSMT_LEADCHNL_RV_IMPEDANCE_VALUE: 509 OHM
MDC_IDC_MSMT_LEADCHNL_RV_PACING_THRESHOLD_AMPLITUDE: 0.8 V
MDC_IDC_MSMT_LEADCHNL_RV_PACING_THRESHOLD_PULSEWIDTH: 0.4 MS
MDC_IDC_MSMT_LEADCHNL_RV_SENSING_INTR_AMPL: 19.9 MV
MDC_IDC_PG_IMPLANT_DTM: NORMAL
MDC_IDC_PG_MFG: NORMAL
MDC_IDC_PG_MODEL: NORMAL
MDC_IDC_PG_SERIAL: NORMAL
MDC_IDC_PG_TYPE: NORMAL
MDC_IDC_SESS_CLINIC_NAME: NORMAL
MDC_IDC_SESS_DTM: NORMAL
MDC_IDC_SESS_REPROGRAMMED: NORMAL
MDC_IDC_SESS_TYPE: NORMAL
MDC_IDC_SET_BRADY_AT_MODE_SWITCH_MODE: NORMAL
MDC_IDC_SET_BRADY_HYSTRATE: 40 {BEATS}/MIN
MDC_IDC_SET_BRADY_LOWRATE: 40 {BEATS}/MIN
MDC_IDC_SET_BRADY_MODE: NORMAL
MDC_IDC_SET_BRADY_NIGHT_RATE: 40 {BEATS}/MIN
MDC_IDC_SET_CRT_PACED_CHAMBERS: NORMAL
MDC_IDC_SET_LEADCHNL_LV_PACING_ANODE_ELECTRODE_1: NORMAL
MDC_IDC_SET_LEADCHNL_LV_PACING_ANODE_LOCATION_1: NORMAL
MDC_IDC_SET_LEADCHNL_LV_PACING_CATHODE_ELECTRODE_1: NORMAL
MDC_IDC_SET_LEADCHNL_LV_PACING_CATHODE_LOCATION_1: NORMAL
MDC_IDC_SET_LEADCHNL_LV_PACING_POLARITY: NORMAL
MDC_IDC_SET_LEADCHNL_LV_SENSING_ANODE_ELECTRODE_1: NORMAL
MDC_IDC_SET_LEADCHNL_LV_SENSING_ANODE_LOCATION_1: NORMAL
MDC_IDC_SET_LEADCHNL_LV_SENSING_CATHODE_ELECTRODE_1: NORMAL
MDC_IDC_SET_LEADCHNL_LV_SENSING_CATHODE_LOCATION_1: NORMAL
MDC_IDC_SET_LEADCHNL_LV_SENSING_POLARITY: NORMAL
MDC_IDC_SET_LEADCHNL_RA_PACING_ANODE_ELECTRODE_1: NORMAL
MDC_IDC_SET_LEADCHNL_RA_PACING_ANODE_LOCATION_1: NORMAL
MDC_IDC_SET_LEADCHNL_RA_PACING_CATHODE_ELECTRODE_1: NORMAL
MDC_IDC_SET_LEADCHNL_RA_PACING_CATHODE_LOCATION_1: NORMAL
MDC_IDC_SET_LEADCHNL_RA_PACING_POLARITY: NORMAL
MDC_IDC_SET_LEADCHNL_RA_SENSING_ANODE_ELECTRODE_1: NORMAL
MDC_IDC_SET_LEADCHNL_RA_SENSING_ANODE_LOCATION_1: NORMAL
MDC_IDC_SET_LEADCHNL_RA_SENSING_CATHODE_ELECTRODE_1: NORMAL
MDC_IDC_SET_LEADCHNL_RA_SENSING_CATHODE_LOCATION_1: NORMAL
MDC_IDC_SET_LEADCHNL_RA_SENSING_POLARITY: NORMAL
MDC_IDC_SET_LEADCHNL_RV_PACING_AMPLITUDE: 2 V
MDC_IDC_SET_LEADCHNL_RV_PACING_POLARITY: NORMAL
MDC_IDC_SET_LEADCHNL_RV_PACING_PULSEWIDTH: 0.4 MS
MDC_IDC_SET_LEADCHNL_RV_SENSING_POLARITY: NORMAL
MDC_IDC_SET_LEADCHNL_RV_SENSING_SENSITIVITY: 0.8 MV
MDC_IDC_SET_ZONE_DETECTION_INTERVAL: 240 MS
MDC_IDC_SET_ZONE_DETECTION_INTERVAL: 320 MS
MDC_IDC_SET_ZONE_DETECTION_INTERVAL: 460 MS
MDC_IDC_SET_ZONE_TYPE: NORMAL
MDC_IDC_STAT_AT_MODE_SW_COUNT: 0
MDC_IDC_STAT_BRADY_RV_PERCENT_PACED: 0 %
MDC_IDC_STAT_EPISODE_RECENT_COUNT: 0
MDC_IDC_STAT_EPISODE_TYPE: NORMAL
MDC_IDC_STAT_TACHYTHERAPY_SHOCKS_ABORTED_TOTAL: 0
MDC_IDC_STAT_TACHYTHERAPY_SHOCKS_DELIVERED_RECENT: 0
MDC_IDC_STAT_TACHYTHERAPY_SHOCKS_DELIVERED_TOTAL: 0

## 2020-01-31 DIAGNOSIS — K21.9 GASTROESOPHAGEAL REFLUX DISEASE, ESOPHAGITIS PRESENCE NOT SPECIFIED: ICD-10-CM

## 2020-01-31 RX ORDER — PANTOPRAZOLE SODIUM 40 MG/1
TABLET, DELAYED RELEASE ORAL
Qty: 30 TABLET | Refills: 3 | Status: SHIPPED | OUTPATIENT
Start: 2020-01-31 | End: 2020-03-17

## 2020-01-31 NOTE — TELEPHONE ENCOUNTER
Prescription approved per [facility] Refill Protocol.    Danyell FABIANN, RN, PHN

## 2020-01-31 NOTE — TELEPHONE ENCOUNTER
"Requested Prescriptions   Pending Prescriptions Disp Refills     pantoprazole (PROTONIX) 40 MG EC tablet [Pharmacy Med Name: PANTOPRAZOLE 40MG TAB 40 TAB] 30 tablet 3     Sig: TAKE 1 TABLET (40 MG) BY MOUTH EVERY MORNING       PPI Protocol Passed - 1/31/2020 12:26 PM        Passed - Not on Clopidogrel (unless Pantoprazole ordered)        Passed - No diagnosis of osteoporosis on record        Passed - Recent (12 mo) or future (30 days) visit within the authorizing provider's specialty     Patient has had an office visit with the authorizing provider or a provider within the authorizing providers department within the previous 12 mos or has a future within next 30 days. See \"Patient Info\" tab in inbasket, or \"Choose Columns\" in Meds & Orders section of the refill encounter.              Passed - Medication is active on med list        Passed - Patient is age 18 or older        Last Written Prescription Date:  10/1/19  Last Fill Quantity: 30,  # refills: 3   Last office visit: 3/26/2019 with prescribing provider:  3/26/19   Future Office Visit:      "

## 2020-02-25 ENCOUNTER — ANCILLARY PROCEDURE (OUTPATIENT)
Dept: CARDIOLOGY | Facility: CLINIC | Age: 75
End: 2020-02-25
Attending: INTERNAL MEDICINE
Payer: MEDICARE

## 2020-02-25 ENCOUNTER — TELEPHONE (OUTPATIENT)
Dept: CARDIOLOGY | Facility: CLINIC | Age: 75
End: 2020-02-25

## 2020-02-25 DIAGNOSIS — Z95.810 ICD (IMPLANTABLE CARDIOVERTER-DEFIBRILLATOR) IN PLACE: ICD-10-CM

## 2020-02-25 LAB
MDC_IDC_LEAD_IMPLANT_DT: NORMAL
MDC_IDC_LEAD_LOCATION: NORMAL
MDC_IDC_LEAD_LOCATION_DETAIL_1: NORMAL
MDC_IDC_LEAD_MFG: NORMAL
MDC_IDC_LEAD_MODEL: NORMAL
MDC_IDC_LEAD_POLARITY_TYPE: NORMAL
MDC_IDC_LEAD_SERIAL: NORMAL
MDC_IDC_LEAD_SPECIAL_FUNCTION: NORMAL
MDC_IDC_MSMT_BATTERY_REMAINING_PERCENTAGE: 100 %
MDC_IDC_MSMT_BATTERY_RRT_TRIGGER: NORMAL
MDC_IDC_MSMT_BATTERY_STATUS: NORMAL
MDC_IDC_MSMT_BATTERY_VOLTAGE: 3.07 V
MDC_IDC_MSMT_CAP_CHARGE_DTM: NORMAL
MDC_IDC_MSMT_CAP_CHARGE_ENERGY: 40 J
MDC_IDC_MSMT_CAP_CHARGE_TIME: 9.3 S
MDC_IDC_MSMT_CAP_CHARGE_TYPE: NORMAL
MDC_IDC_MSMT_LEADCHNL_RV_IMPEDANCE_VALUE: 461 OHM
MDC_IDC_MSMT_LEADCHNL_RV_LEAD_CHANNEL_STATUS: NORMAL
MDC_IDC_PG_IMPLANT_DTM: NORMAL
MDC_IDC_PG_MFG: NORMAL
MDC_IDC_PG_MODEL: NORMAL
MDC_IDC_PG_SERIAL: NORMAL
MDC_IDC_PG_TYPE: NORMAL
MDC_IDC_SESS_CLINIC_NAME: NORMAL
MDC_IDC_SESS_DTM: NORMAL
MDC_IDC_SESS_REPROGRAMMED: NO
MDC_IDC_SESS_TYPE: NORMAL
MDC_IDC_SET_BRADY_LOWRATE: 40 {BEATS}/MIN
MDC_IDC_SET_BRADY_MODE: NORMAL
MDC_IDC_SET_LEADCHNL_RV_PACING_AMPLITUDE: 2 V
MDC_IDC_SET_LEADCHNL_RV_PACING_POLARITY: NORMAL
MDC_IDC_SET_LEADCHNL_RV_PACING_PULSEWIDTH: 0.4 MS
MDC_IDC_SET_LEADCHNL_RV_SENSING_ADAPTATION_MODE: NORMAL
MDC_IDC_SET_LEADCHNL_RV_SENSING_POLARITY: NORMAL
MDC_IDC_SET_LEADCHNL_RV_SENSING_SENSITIVITY: 0.8 MV
MDC_IDC_SET_ZONE_DETECTION_BEATS_DENOMINATOR: 16 {BEATS}
MDC_IDC_SET_ZONE_DETECTION_BEATS_NUMERATOR: 12 {BEATS}
MDC_IDC_SET_ZONE_DETECTION_INTERVAL: 240 MS
MDC_IDC_SET_ZONE_DETECTION_INTERVAL: 320 MS
MDC_IDC_SET_ZONE_TYPE: NORMAL
MDC_IDC_SET_ZONE_TYPE: NORMAL
MDC_IDC_STAT_AT_DTM_END: NORMAL
MDC_IDC_STAT_AT_DTM_START: NORMAL
MDC_IDC_STAT_BRADY_DTM_END: NORMAL
MDC_IDC_STAT_BRADY_DTM_START: NORMAL
MDC_IDC_STAT_BRADY_RV_PERCENT_PACED: 0 %
MDC_IDC_STAT_CRT_DTM_END: NORMAL
MDC_IDC_STAT_CRT_DTM_START: NORMAL
MDC_IDC_STAT_EPISODE_TOTAL_COUNT: 0
MDC_IDC_STAT_EPISODE_TOTAL_COUNT_DTM_END: NORMAL
MDC_IDC_STAT_EPISODE_TOTAL_COUNT_DTM_START: NORMAL
MDC_IDC_STAT_EPISODE_TYPE: NORMAL
MDC_IDC_STAT_TACHYTHERAPY_ATP_DELIVERED_TOTAL: 0
MDC_IDC_STAT_TACHYTHERAPY_SHOCKS_ABORTED_TOTAL: 0
MDC_IDC_STAT_TACHYTHERAPY_SHOCKS_DELIVERED_TOTAL: 0
MDC_IDC_STAT_TACHYTHERAPY_TOTAL_DTM_END: NORMAL
MDC_IDC_STAT_TACHYTHERAPY_TOTAL_DTM_START: NORMAL

## 2020-02-25 NOTE — TELEPHONE ENCOUNTER
Alert for NSVT episode on 2/24/20 at 11:29 am. EGMS suggest afib. Will review with Dr Mejia.      Previous episodes of NSVT had a differed morphology.

## 2020-02-25 NOTE — TELEPHONE ENCOUNTER
Episode of afib was stored as NSVT and was recorded due to faster rate. This particular episode of faster rate was 15 sec, but there is no way to tell how long the afib lasted. He is programmed VVI 40 so afib is not recoreded. Will review with Dr Mejia.

## 2020-02-26 DIAGNOSIS — I48.91 ATRIAL FIBRILLATION (H): Primary | ICD-10-CM

## 2020-02-26 NOTE — TELEPHONE ENCOUNTER
Called and left message regarding alert for afib on remote monitor. Dr Mejia recommends a 2 week ziopatch to see how much afib he is having. Order placed and instructed pt to call scheduling line to schedule.

## 2020-03-03 ENCOUNTER — ANCILLARY PROCEDURE (OUTPATIENT)
Dept: CARDIOLOGY | Facility: CLINIC | Age: 75
End: 2020-03-03
Attending: INTERNAL MEDICINE
Payer: MEDICARE

## 2020-03-03 ENCOUNTER — HOSPITAL ENCOUNTER (OUTPATIENT)
Dept: CARDIOLOGY | Facility: CLINIC | Age: 75
Discharge: HOME | End: 2020-03-03
Attending: INTERNAL MEDICINE | Admitting: INTERNAL MEDICINE
Payer: MEDICARE

## 2020-03-03 DIAGNOSIS — I25.5 ISCHEMIC CARDIOMYOPATHY: ICD-10-CM

## 2020-03-03 DIAGNOSIS — Z95.810 ICD (IMPLANTABLE CARDIOVERTER-DEFIBRILLATOR) IN PLACE: ICD-10-CM

## 2020-03-03 DIAGNOSIS — I48.91 ATRIAL FIBRILLATION (H): ICD-10-CM

## 2020-03-03 PROCEDURE — 0296T ZIO PATCH HOLTER ADULT PEDIATRIC GREATER THAN 48 HRS: CPT

## 2020-03-03 PROCEDURE — 93295 DEV INTERROG REMOTE 1/2/MLT: CPT | Performed by: INTERNAL MEDICINE

## 2020-03-03 PROCEDURE — 0298T ZIO PATCH HOLTER ADULT PEDIATRIC GREATER THAN 48 HRS: CPT | Performed by: INTERNAL MEDICINE

## 2020-03-03 PROCEDURE — 93296 REM INTERROG EVL PM/IDS: CPT | Performed by: INTERNAL MEDICINE

## 2020-03-03 NOTE — PROGRESS NOTES
Kristyn Veliz (S) ICD Remote Device Check  : *** %    Mode: VVI 40  Presenting Rhythm: VS  Heart Rate: ***    Sensing: ***    Pacing Threshold: Not obtained     Impedance: ***    Battery Status: ***    Ventricular Arrhythmia: ***    ATP: 0   Shocks: 0    Care Plan: Remote device check in 3 months. Due to follow up with Dr. Sen in April 2020.   SHAWNA, RN     I have reviewed and interpreted the device interrogation, settings, programming and nurse's summary. The device is functioning within normal device parameters. I agree with the current findings, assessment and plan.

## 2020-03-04 LAB
MDC_IDC_LEAD_IMPLANT_DT: NORMAL
MDC_IDC_LEAD_LOCATION: NORMAL
MDC_IDC_LEAD_LOCATION_DETAIL_1: NORMAL
MDC_IDC_LEAD_MFG: NORMAL
MDC_IDC_LEAD_MODEL: NORMAL
MDC_IDC_LEAD_POLARITY_TYPE: NORMAL
MDC_IDC_LEAD_SERIAL: NORMAL
MDC_IDC_LEAD_SPECIAL_FUNCTION: NORMAL
MDC_IDC_MSMT_BATTERY_REMAINING_PERCENTAGE: 100 %
MDC_IDC_MSMT_BATTERY_RRT_TRIGGER: NORMAL
MDC_IDC_MSMT_BATTERY_STATUS: NORMAL
MDC_IDC_MSMT_BATTERY_VOLTAGE: 3.11 V
MDC_IDC_MSMT_CAP_CHARGE_DTM: NORMAL
MDC_IDC_MSMT_CAP_CHARGE_ENERGY: 40 J
MDC_IDC_MSMT_CAP_CHARGE_TIME: 9.3 S
MDC_IDC_MSMT_CAP_CHARGE_TYPE: NORMAL
MDC_IDC_MSMT_LEADCHNL_RV_IMPEDANCE_VALUE: 458 OHM
MDC_IDC_MSMT_LEADCHNL_RV_LEAD_CHANNEL_STATUS: NORMAL
MDC_IDC_PG_IMPLANT_DTM: NORMAL
MDC_IDC_PG_MFG: NORMAL
MDC_IDC_PG_MODEL: NORMAL
MDC_IDC_PG_SERIAL: NORMAL
MDC_IDC_PG_TYPE: NORMAL
MDC_IDC_SESS_CLINIC_NAME: NORMAL
MDC_IDC_SESS_DTM: NORMAL
MDC_IDC_SESS_REPROGRAMMED: NO
MDC_IDC_SESS_TYPE: NORMAL
MDC_IDC_SET_BRADY_LOWRATE: 40 {BEATS}/MIN
MDC_IDC_SET_BRADY_MODE: NORMAL
MDC_IDC_SET_LEADCHNL_RV_PACING_AMPLITUDE: 2 V
MDC_IDC_SET_LEADCHNL_RV_PACING_POLARITY: NORMAL
MDC_IDC_SET_LEADCHNL_RV_PACING_PULSEWIDTH: 0.4 MS
MDC_IDC_SET_LEADCHNL_RV_SENSING_ADAPTATION_MODE: NORMAL
MDC_IDC_SET_LEADCHNL_RV_SENSING_POLARITY: NORMAL
MDC_IDC_SET_LEADCHNL_RV_SENSING_SENSITIVITY: 0.8 MV
MDC_IDC_SET_ZONE_DETECTION_BEATS_DENOMINATOR: 16 {BEATS}
MDC_IDC_SET_ZONE_DETECTION_BEATS_NUMERATOR: 12 {BEATS}
MDC_IDC_SET_ZONE_DETECTION_INTERVAL: 240 MS
MDC_IDC_SET_ZONE_DETECTION_INTERVAL: 320 MS
MDC_IDC_SET_ZONE_TYPE: NORMAL
MDC_IDC_SET_ZONE_TYPE: NORMAL
MDC_IDC_STAT_AT_DTM_END: NORMAL
MDC_IDC_STAT_AT_DTM_START: NORMAL
MDC_IDC_STAT_BRADY_DTM_END: NORMAL
MDC_IDC_STAT_BRADY_DTM_START: NORMAL
MDC_IDC_STAT_BRADY_RV_PERCENT_PACED: 0 %
MDC_IDC_STAT_CRT_DTM_END: NORMAL
MDC_IDC_STAT_CRT_DTM_START: NORMAL
MDC_IDC_STAT_EPISODE_TOTAL_COUNT: 0
MDC_IDC_STAT_EPISODE_TOTAL_COUNT_DTM_END: NORMAL
MDC_IDC_STAT_EPISODE_TOTAL_COUNT_DTM_START: NORMAL
MDC_IDC_STAT_EPISODE_TYPE: NORMAL
MDC_IDC_STAT_TACHYTHERAPY_ATP_DELIVERED_TOTAL: 0
MDC_IDC_STAT_TACHYTHERAPY_SHOCKS_ABORTED_TOTAL: 0
MDC_IDC_STAT_TACHYTHERAPY_SHOCKS_DELIVERED_TOTAL: 0
MDC_IDC_STAT_TACHYTHERAPY_TOTAL_DTM_END: NORMAL
MDC_IDC_STAT_TACHYTHERAPY_TOTAL_DTM_START: NORMAL

## 2020-03-09 ENCOUNTER — DOCUMENTATION ONLY (OUTPATIENT)
Dept: CARDIOLOGY | Facility: CLINIC | Age: 75
End: 2020-03-09

## 2020-03-09 NOTE — TELEPHONE ENCOUNTER
Remote alert received for Non-sustained episode on 3/7/20 at 1843.  EGM shows slightly irregular rhythm with sudden onset/offset and change in morphology suggestive of NSVT lasting 14 beats and ramping up to the 180's.  No therapy received.  Will continue to monitor.      Of note: This is a single chamber ICD and patient has had recent episodes suggestive of AF/AFL and is wearing a Ziopatch to further determine AF/AFL.    OLAMIDE Moncada

## 2020-03-15 ENCOUNTER — HEALTH MAINTENANCE LETTER (OUTPATIENT)
Age: 75
End: 2020-03-15

## 2020-03-17 ENCOUNTER — TELEPHONE (OUTPATIENT)
Dept: INTERNAL MEDICINE | Facility: CLINIC | Age: 75
End: 2020-03-17

## 2020-03-17 ENCOUNTER — PATIENT OUTREACH (OUTPATIENT)
Dept: CARE COORDINATION | Facility: CLINIC | Age: 75
End: 2020-03-17

## 2020-03-17 DIAGNOSIS — K21.9 GASTROESOPHAGEAL REFLUX DISEASE, ESOPHAGITIS PRESENCE NOT SPECIFIED: ICD-10-CM

## 2020-03-17 RX ORDER — PANTOPRAZOLE SODIUM 40 MG/1
TABLET, DELAYED RELEASE ORAL
Qty: 30 TABLET | Refills: 3 | Status: SHIPPED | OUTPATIENT
Start: 2020-03-17 | End: 2020-07-29

## 2020-03-17 NOTE — TELEPHONE ENCOUNTER
This should be delayed as patient is high risk and should not come into the clinic until things settle down.    These are routine orders for continued HD.

## 2020-03-17 NOTE — TELEPHONE ENCOUNTER
Reason for call:  Same Day Appointment   Requested Provider: Dr. Kolb    PCP: [unfilled]    Reason for visit: to get a chest x-ray, lab test and ekg    Duration of symptoms: n/a     Have you been treated for this in the past? Yes    Additional comments: Patient is on dialysis       Phone number to reach patient:  Cell number on file:    Telephone Information:   Mobile [phone]       Best Time:  anytime    Can we leave a detailed message on this number?  YES    Travel screening: Not Applicable

## 2020-03-17 NOTE — TELEPHONE ENCOUNTER
Patient informed. He is not currently traveling. He will call back.     Danyell FABIANN, RN, PHN

## 2020-03-17 NOTE — TELEPHONE ENCOUNTER
Prior Authorization Retail Medication Request    Medication/Dose: mexiletine (MEXITIL) 150 MG capsule   ICD code (if different than what is on RX):  R00.0  Previously Tried and Failed:    Rationale:      Insurance Name:  Medicare  Insurance ID:  [id number]  Key: [id number]      Pharmacy Information (if different than what is on RX)  Name:  [facility]  Phone:  [phone]

## 2020-03-17 NOTE — PROGRESS NOTES
Clinic Care Coordination Contact    Follow Up Progress Note      Assessment: Patient called to speak to CPN; Sabrina Hull regarding a medication refill. He currently takes zantac, but is unable to get it anymore as it's been recalled.   Writer offered to message his PCP to get a replacement medication suggestion for patient.   Writer will call patient will mediation change.     Patient also wanted to make an appointment with his PCP, Writer explained that Clinics are currently not making appointments in clinic due to the COVID 19 and was it an urgent need. Patient said due to the fact that he travels to [place] he needs and updated chest x-ray, lab work, and a EKG. Patient stated that the clinic said they'd call him back when they were taking patient appointments.       Intervention/Education provided during outreach: Writer ensured patient that the Clinic will be taking appointments again and due to the appointment not being urgent and the ban on travel he will be all set before he wants to travel to [place].           Plan:   Writer will message Dr. Kolb for prescription or drug comparable to Zantac for patient as he is almost out of Zantac.        Care Coordinator will follow up once Dr. Kolb responds regarding medication.      Ashely Youngblood  Clinical product navigation

## 2020-03-17 NOTE — TELEPHONE ENCOUNTER
Central Prior Authorization Team   Phone: [phone]      PA Initiation    Medication: mexiletine (MEXITIL) 150 MG capsule   Insurance Company: [employer] - Phone [phone] Fax [phone]  Pharmacy Filling the Rx: [facility] - [place], [place] - [address] [place] [address]  Filling Pharmacy Phone: [phone]  Filling Pharmacy Fax:    Start Date: 3/17/2020

## 2020-03-17 NOTE — TELEPHONE ENCOUNTER
Patient requesting refill of Zantac. He has been buying this over the counter. He had ample supply he has been using but only has a few left..    Patient is wondering if he can receive a script for 150mg tablets as he can cut these in half to equal 75mg; [facility] stated they have supply but need a script.    PCP please advise if you are ok with this given recent drug recall. Patient is using the Protonix, which also needs renewal. Should patient continue the 75mg Ranitidine  or switch over to famotidine?      Danyell FABIANN, RN, PHN

## 2020-03-18 NOTE — TELEPHONE ENCOUNTER
Prior Authorization Approval    Authorization Effective Date: 3/16/2020  Authorization Expiration Date:  Until further notice  Medication: mexiletine (MEXITIL) 150 MG capsule   Approved Dose/Quantity:    Reference #:     Insurance Company: [employer] - [employer] [phone] Fax [phone]  Expected CoPay:       CoPay Card Available:      Foundation Assistance Needed:    Which Pharmacy is filling the prescription (Not needed for infusion/clinic administered): [place], [place] - [address] [place] [address]  Pharmacy Notified: Yes  Patient Notified: Yes **Instructed pharmacy to notify patient when script is ready to /ship.**

## 2020-03-26 ENCOUNTER — TELEPHONE (OUTPATIENT)
Dept: CARDIOLOGY | Facility: CLINIC | Age: 75
End: 2020-03-26

## 2020-03-26 DIAGNOSIS — I48.91 ATRIAL FIBRILLATION (H): Primary | ICD-10-CM

## 2020-03-26 NOTE — TELEPHONE ENCOUNTER
Spoke with patient and updated him on results and plan. Patient is also currently on aspirin and plavix. Patient states that when he was on eliquis previously he discontinued his aspirin and remained on eliquis and plavix. Will verify with Dr. Mejia that patient should stop aspirin again. Sent a new prescription for eliquis 2.5mg BID (previous dose) to patient's preferred pharmacy.       ----- Message from Jono Mejia MD sent at 3/25/2020  1:43 PM CDT -----  Monitor showed episodes of PAF.  Pt should resume OAC with Eliquis.  Please update patient on results and plan.    Thank you,    Jono Mejia MD

## 2020-03-30 NOTE — TELEPHONE ENCOUNTER
"Per Dr. Mejia, \"He should stop aspirin and I believe he has not had any intervention in the last year, therefore he could stop Plavix as well.     Jono\"    Called patient. He confirms that he has not had any intervention in the last year. Instructed patient to stop both aspirin and plavix. Patient verbalized understanding. He states that he has started taking his eliquis. His medication list has been updated.   "

## 2020-04-10 DIAGNOSIS — I25.10 CORONARY ARTERY DISEASE INVOLVING NATIVE CORONARY ARTERY OF NATIVE HEART WITHOUT ANGINA PECTORIS: ICD-10-CM

## 2020-04-10 RX ORDER — PRAVASTATIN SODIUM 40 MG
TABLET ORAL
Qty: 90 TABLET | Refills: 0 | Status: SHIPPED | OUTPATIENT
Start: 2020-04-10 | End: 2020-07-07

## 2020-04-10 NOTE — TELEPHONE ENCOUNTER
Medication is being filled for 1 time refill only due to:  Patient needs to be seen because it has been more than one year since last visit. Labs overdue.

## 2020-04-10 NOTE — TELEPHONE ENCOUNTER
"Requested Prescriptions   Pending Prescriptions Disp Refills     pravastatin (PRAVACHOL) 40 MG tablet [Pharmacy Med Name: PRAVASTATIN 40MG TAB 40 TAB] 90 tablet 2     Sig: TAKE 1 TABLET (40 MG) BY MOUTH DAILY   Last Written Prescription Date:  07/0919  Last Fill Quantity: 90,  # refills: 2   Last office visit: 3/26/2019 with prescribing provider:  Cortes   Future Office Visit:        Statins Protocol Failed - 4/10/2020 12:13 PM        Failed - LDL on file in past 12 months     Recent Labs   Lab Test 03/26/19  0847   LDL 53             Failed - Recent (12 mo) or future (30 days) visit within the authorizing provider's specialty     Patient has had an office visit with the authorizing provider or a provider within the authorizing providers department within the previous 12 mos or has a future within next 30 days. See \"Patient Info\" tab in inbasket, or \"Choose Columns\" in Meds & Orders section of the refill encounter.              Passed - No abnormal creatine kinase in past 12 months     No lab results found.             Passed - Medication is active on med list        Passed - Patient is age 18 or older               "

## 2020-05-20 DIAGNOSIS — K21.9 GASTROESOPHAGEAL REFLUX DISEASE, ESOPHAGITIS PRESENCE NOT SPECIFIED: ICD-10-CM

## 2020-05-20 RX ORDER — PANTOPRAZOLE SODIUM 40 MG/1
TABLET, DELAYED RELEASE ORAL
Qty: 30 TABLET | Refills: 3 | Status: CANCELLED | OUTPATIENT
Start: 2020-05-20

## 2020-05-21 ENCOUNTER — VIRTUAL VISIT (OUTPATIENT)
Dept: INTERNAL MEDICINE | Facility: CLINIC | Age: 75
End: 2020-05-21
Payer: MEDICARE

## 2020-05-21 VITALS
BODY MASS INDEX: 28.43 KG/M2 | TEMPERATURE: 97.4 F | DIASTOLIC BLOOD PRESSURE: 69 MMHG | WEIGHT: 187 LBS | OXYGEN SATURATION: 97 % | SYSTOLIC BLOOD PRESSURE: 128 MMHG | HEART RATE: 78 BPM

## 2020-05-21 DIAGNOSIS — E03.9 HYPOTHYROIDISM, UNSPECIFIED TYPE: ICD-10-CM

## 2020-05-21 DIAGNOSIS — E11.22 TYPE 2 DIABETES MELLITUS WITH CHRONIC KIDNEY DISEASE ON CHRONIC DIALYSIS, WITHOUT LONG-TERM CURRENT USE OF INSULIN (H): Primary | ICD-10-CM

## 2020-05-21 DIAGNOSIS — N18.6 ESRD ON HEMODIALYSIS (H): ICD-10-CM

## 2020-05-21 DIAGNOSIS — N18.6 TYPE 2 DIABETES MELLITUS WITH CHRONIC KIDNEY DISEASE ON CHRONIC DIALYSIS, WITHOUT LONG-TERM CURRENT USE OF INSULIN (H): Primary | ICD-10-CM

## 2020-05-21 DIAGNOSIS — I50.22 CHRONIC SYSTOLIC (CONGESTIVE) HEART FAILURE (H): ICD-10-CM

## 2020-05-21 DIAGNOSIS — Z99.2 ESRD ON HEMODIALYSIS (H): ICD-10-CM

## 2020-05-21 DIAGNOSIS — Z99.2 TYPE 2 DIABETES MELLITUS WITH CHRONIC KIDNEY DISEASE ON CHRONIC DIALYSIS, WITHOUT LONG-TERM CURRENT USE OF INSULIN (H): Primary | ICD-10-CM

## 2020-05-21 DIAGNOSIS — I20.9 ANGINA PECTORIS (H): ICD-10-CM

## 2020-05-21 PROCEDURE — 99214 OFFICE O/P EST MOD 30 MIN: CPT | Mod: 95 | Performed by: INTERNAL MEDICINE

## 2020-05-21 RX ORDER — NITROGLYCERIN 0.4 MG/1
0.4 TABLET SUBLINGUAL EVERY 5 MIN PRN
Qty: 20 TABLET | Refills: 0 | Status: SHIPPED | OUTPATIENT
Start: 2020-05-21

## 2020-05-21 NOTE — PROGRESS NOTES
"Westley Ness is a 74 year old male who is being evaluated via a billable video visit.  10 minutes    The patient has been notified of following:     \"This video visit will be conducted via a call between you and your physician/provider. We have found that certain health care needs can be provided without the need for an in-person physical exam.  This service lets us provide the care you need with a video conversation.  If a prescription is necessary we can send it directly to your pharmacy.  If lab work is needed we can place an order for that and you can then stop by our lab to have the test done at a later time.    Video visits are billed at different rates depending on your insurance coverage.  Please reach out to your insurance provider with any questions.    If during the course of the call the physician/provider feels a video visit is not appropriate, you will not be charged for this service.\"    Patient has given verbal consent for Video visit? Yes    How would you like to [facility] your [facility]? [facility]    Patient would like the video invitation sent by: Send to e-mail at: [email or web address]    Will anyone else be joining your video visit? No     VIDEO VISIT                                                      SUBJECTIVE:                                                      HPI: Westley Ness is a pleasant 74 year old male who requested a video visit to review his health history and medications:    PMH, PSH, FH, SH, medications, allergies, immunizations, and preventative health measures reviewed and updated as appropriate.     He is DUE for fasting labs to monitor his diabetes, cholesterol, and thyroid.     Declines tetanus booster and Shingrix series due to non-coverage.     Did not discuss colon cancer screening during today's visit.     Needs refills of Nitrostat. Never uses but old Rx has .     OBJECTIVE:                                                      General: appears well    ASSESSMENT/PLAN:   "                                                    (E11.22,  N18.6,  Z99.2) Type 2 diabetes mellitus with chronic kidney disease on chronic dialysis, without long-term current use of insulin (H)  (primary encounter diagnosis)  Comment: diet-controlled.  Plan: curbside fasting diabetic labs to be scheduled; recommendations to follow.    (E03.9) Hypothyroidism, unspecified type  Comment: on levothyroxine 50mcg daily.   Plan: TSH reflex with above labs.    (I20.9) Angina pectoris (H)  Comment: no recent episodes; old Rx .  Plan: refills of Nitrostat prescribed.     (N18.6,  Z99.2) ESRD on hemodialysis (H)  Comment: stable on HD.     (I50.22) Chronic systolic (congestive) heart failure (H)  Comment: stable/compensated on current regimen; followed by cardiology.    Total time of video call between patient and provider was 10 minutes.     (Chart documentation was completed, in part, with [employer] voice-recognition software. Even though reviewed, some grammatical, spelling, and word errors may remain.)    Josselin Kolb MD   [address] [address]  T: [phone], F: [phone]

## 2020-05-23 ENCOUNTER — TRANSFERRED RECORDS (OUTPATIENT)
Dept: HEALTH INFORMATION MANAGEMENT | Facility: CLINIC | Age: 75
End: 2020-05-23

## 2020-06-02 ENCOUNTER — ANCILLARY PROCEDURE (OUTPATIENT)
Dept: CARDIOLOGY | Facility: CLINIC | Age: 75
End: 2020-06-02
Attending: INTERNAL MEDICINE
Payer: MEDICARE

## 2020-06-02 DIAGNOSIS — I25.5 ISCHEMIC CARDIOMYOPATHY: ICD-10-CM

## 2020-06-02 DIAGNOSIS — Z95.810 ICD (IMPLANTABLE CARDIOVERTER-DEFIBRILLATOR) IN PLACE: ICD-10-CM

## 2020-06-02 DIAGNOSIS — Z95.810 ICD (IMPLANTABLE CARDIOVERTER-DEFIBRILLATOR) IN PLACE: Primary | ICD-10-CM

## 2020-06-02 PROCEDURE — 93295 DEV INTERROG REMOTE 1/2/MLT: CPT | Performed by: INTERNAL MEDICINE

## 2020-06-02 PROCEDURE — 93296 REM INTERROG EVL PM/IDS: CPT | Performed by: INTERNAL MEDICINE

## 2020-06-11 LAB
MDC_IDC_LEAD_IMPLANT_DT: NORMAL
MDC_IDC_LEAD_LOCATION: NORMAL
MDC_IDC_LEAD_LOCATION_DETAIL_1: NORMAL
MDC_IDC_LEAD_MFG: NORMAL
MDC_IDC_LEAD_MODEL: NORMAL
MDC_IDC_LEAD_POLARITY_TYPE: NORMAL
MDC_IDC_LEAD_SERIAL: NORMAL
MDC_IDC_LEAD_SPECIAL_FUNCTION: NORMAL
MDC_IDC_MSMT_BATTERY_REMAINING_PERCENTAGE: 100 %
MDC_IDC_MSMT_BATTERY_RRT_TRIGGER: NORMAL
MDC_IDC_MSMT_BATTERY_STATUS: NORMAL
MDC_IDC_MSMT_BATTERY_VOLTAGE: 3.11 V
MDC_IDC_MSMT_CAP_CHARGE_DTM: NORMAL
MDC_IDC_MSMT_CAP_CHARGE_ENERGY: 40 J
MDC_IDC_MSMT_CAP_CHARGE_TIME: 9.3 S
MDC_IDC_MSMT_CAP_CHARGE_TYPE: NORMAL
MDC_IDC_MSMT_LEADCHNL_RV_IMPEDANCE_VALUE: 428 OHM
MDC_IDC_MSMT_LEADCHNL_RV_LEAD_CHANNEL_STATUS: NORMAL
MDC_IDC_PG_IMPLANT_DTM: NORMAL
MDC_IDC_PG_MFG: NORMAL
MDC_IDC_PG_MODEL: NORMAL
MDC_IDC_PG_SERIAL: NORMAL
MDC_IDC_PG_TYPE: NORMAL
MDC_IDC_SESS_CLINIC_NAME: NORMAL
MDC_IDC_SESS_DTM: NORMAL
MDC_IDC_SESS_REPROGRAMMED: NO
MDC_IDC_SESS_TYPE: NORMAL
MDC_IDC_SET_BRADY_LOWRATE: 40 {BEATS}/MIN
MDC_IDC_SET_BRADY_MODE: NORMAL
MDC_IDC_SET_LEADCHNL_RV_PACING_AMPLITUDE: 2 V
MDC_IDC_SET_LEADCHNL_RV_PACING_POLARITY: NORMAL
MDC_IDC_SET_LEADCHNL_RV_PACING_PULSEWIDTH: 0.4 MS
MDC_IDC_SET_LEADCHNL_RV_SENSING_ADAPTATION_MODE: NORMAL
MDC_IDC_SET_LEADCHNL_RV_SENSING_POLARITY: NORMAL
MDC_IDC_SET_LEADCHNL_RV_SENSING_SENSITIVITY: 0.8 MV
MDC_IDC_SET_ZONE_DETECTION_BEATS_DENOMINATOR: 16 {BEATS}
MDC_IDC_SET_ZONE_DETECTION_BEATS_NUMERATOR: 12 {BEATS}
MDC_IDC_SET_ZONE_DETECTION_INTERVAL: 240 MS
MDC_IDC_SET_ZONE_DETECTION_INTERVAL: 320 MS
MDC_IDC_SET_ZONE_TYPE: NORMAL
MDC_IDC_SET_ZONE_TYPE: NORMAL
MDC_IDC_STAT_AT_DTM_END: NORMAL
MDC_IDC_STAT_AT_DTM_START: NORMAL
MDC_IDC_STAT_BRADY_DTM_END: NORMAL
MDC_IDC_STAT_BRADY_DTM_START: NORMAL
MDC_IDC_STAT_BRADY_RV_PERCENT_PACED: 0 %
MDC_IDC_STAT_CRT_DTM_END: NORMAL
MDC_IDC_STAT_CRT_DTM_START: NORMAL
MDC_IDC_STAT_EPISODE_TOTAL_COUNT: 0
MDC_IDC_STAT_EPISODE_TOTAL_COUNT_DTM_END: NORMAL
MDC_IDC_STAT_EPISODE_TOTAL_COUNT_DTM_START: NORMAL
MDC_IDC_STAT_EPISODE_TYPE: NORMAL
MDC_IDC_STAT_TACHYTHERAPY_ATP_DELIVERED_TOTAL: 0
MDC_IDC_STAT_TACHYTHERAPY_SHOCKS_ABORTED_TOTAL: 0
MDC_IDC_STAT_TACHYTHERAPY_SHOCKS_DELIVERED_TOTAL: 0
MDC_IDC_STAT_TACHYTHERAPY_TOTAL_DTM_END: NORMAL
MDC_IDC_STAT_TACHYTHERAPY_TOTAL_DTM_START: NORMAL

## 2020-06-16 DIAGNOSIS — E03.9 HYPOTHYROIDISM, UNSPECIFIED TYPE: ICD-10-CM

## 2020-06-16 RX ORDER — LEVOTHYROXINE SODIUM 50 UG/1
50 TABLET ORAL DAILY
Qty: 90 TABLET | Refills: 3 | Status: SHIPPED | OUTPATIENT
Start: 2020-06-16 | End: 2021-01-01

## 2020-07-07 DIAGNOSIS — R00.0 TACHYCARDIA: ICD-10-CM

## 2020-07-07 DIAGNOSIS — I25.10 CORONARY ARTERY DISEASE INVOLVING NATIVE CORONARY ARTERY OF NATIVE HEART WITHOUT ANGINA PECTORIS: ICD-10-CM

## 2020-07-07 RX ORDER — MEXILETINE HYDROCHLORIDE 150 MG/1
150 CAPSULE ORAL 2 TIMES DAILY
Qty: 180 CAPSULE | Refills: 3 | Status: SHIPPED | OUTPATIENT
Start: 2020-07-07 | End: 2021-01-01

## 2020-07-07 RX ORDER — PRAVASTATIN SODIUM 40 MG
TABLET ORAL
Qty: 90 TABLET | Refills: 0 | Status: SHIPPED | OUTPATIENT
Start: 2020-07-07 | End: 2020-10-09

## 2020-07-07 NOTE — TELEPHONE ENCOUNTER
Routing refill request to provider for review/approval because:  Drug not on the [facility] refill protocol   Labs not current:  Lipid, alt

## 2020-07-13 ENCOUNTER — APPOINTMENT (OUTPATIENT)
Dept: CT IMAGING | Facility: CLINIC | Age: 75
End: 2020-07-13
Attending: EMERGENCY MEDICINE
Payer: MEDICARE

## 2020-07-13 ENCOUNTER — APPOINTMENT (OUTPATIENT)
Dept: GENERAL RADIOLOGY | Facility: CLINIC | Age: 75
End: 2020-07-13
Attending: EMERGENCY MEDICINE
Payer: MEDICARE

## 2020-07-13 ENCOUNTER — HOSPITAL ENCOUNTER (EMERGENCY)
Facility: CLINIC | Age: 75
Discharge: HOME | End: 2020-07-13
Attending: EMERGENCY MEDICINE | Admitting: EMERGENCY MEDICINE
Payer: MEDICARE

## 2020-07-13 VITALS
WEIGHT: 176.81 LBS | BODY MASS INDEX: 26.8 KG/M2 | DIASTOLIC BLOOD PRESSURE: 70 MMHG | HEIGHT: 68 IN | HEART RATE: 74 BPM | TEMPERATURE: 98.3 F | RESPIRATION RATE: 18 BRPM | OXYGEN SATURATION: 100 % | SYSTOLIC BLOOD PRESSURE: 122 MMHG
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
## 2020-07-13 DIAGNOSIS — S00.83XA TRAUMATIC HEMATOMA OF FOREHEAD, INITIAL ENCOUNTER: ICD-10-CM

## 2020-07-13 DIAGNOSIS — M25.561 ACUTE PAIN OF RIGHT KNEE: ICD-10-CM

## 2020-07-13 LAB — INTERPRETATION ECG - MUSE: NORMAL

## 2020-07-13 PROCEDURE — 73562 X-RAY EXAM OF KNEE 3: CPT | Mod: RT

## 2020-07-13 PROCEDURE — 99285 EMERGENCY DEPT VISIT HI MDM: CPT | Mod: 25

## 2020-07-13 PROCEDURE — 70450 CT HEAD/BRAIN W/O DYE: CPT

## 2020-07-13 PROCEDURE — 93005 ELECTROCARDIOGRAM TRACING: CPT

## 2020-07-13 PROCEDURE — 71046 X-RAY EXAM CHEST 2 VIEWS: CPT

## 2020-07-13 ASSESSMENT — ENCOUNTER SYMPTOMS
SHORTNESS OF BREATH: 0
DIARRHEA: 0
WOUND: 1
VOMITING: 0
NAUSEA: 0
ABDOMINAL PAIN: 0

## 2020-07-13 ASSESSMENT — MIFFLIN-ST. JEOR: SCORE: 1516.5

## 2020-07-13 NOTE — ED AVS SNAPSHOT
Emergency Department  [address] [place] [id number]  Phone:  [phone]  Fax:  [phone]                                    Westley Ness   MRN: [id number]    Department:   Emergency Department   Date of Visit:  7/13/2020           After Visit Summary Signature Page    I have received my discharge instructions, and my questions have been answered. I have discussed any challenges I see with this plan with the nurse or doctor.    ..........................................................................................................................................  Patient/Patient Representative Signature      ..........................................................................................................................................  Patient Representative Print Name and Relationship to Patient    ..................................................               ................................................  Date                                   Time    ..........................................................................................................................................  Reviewed by Signature/Title    ...................................................              ..............................................  Date                                               Time          22EPIC Rev 08/18

## 2020-07-14 NOTE — ED PROVIDER NOTES
History     Chief Complaint:  Fall      HPI   Westley Ness is a 74 year old male with a history of hypertension, coronary artery disease, cardiomyopathy, congestive heart failure and diabetes mellitus who takes Eliquis who presents after a fall. The patient reports that around 7:30 pm this evening he was working at his desk for a couple of hours and got up and felt pretty stiff and he started walking towards the bathroom when he lost his balance. He states this has happened before but not recently and he usually catches himself. He states that when he fell he sort of slid down the door and scraped his arm and hit his head. He states that after the fall he stood up using a railing and then walked up and down the steps. He denies chest pain or shortness of breath. He denies abdominal pain, nausea, vomiting, or any new diarrhea.    Allergies:  Contrast Dye  Topical antibiotic    Medications:    Mexitil  Pravachol   Levothyroxine  Eliquis  Protonix  Lisinopril  Imdur  Imodium    Past Medical History:    Acid reflux disease  Hypertension   Coronary artery disease   ESRD on hemodialysis  Atrial flutter  Hypothyroidism  Ischemic cardiomyopathy  Diabetes mellitus type II  Chronic systolic heart failure    Past Surgical History:    Cholecystectomy  Elbow surgery  EP ICD generator   H ablation atrial flutter  HC left heart catheterization  Implant ventricular device  Repair fistula arteriovenous upper extremity (L)  Tonsillectomy and adenoidectomy   Vascular surgery    Family History:    Mother- cerebrovascular disease  Father- hypertension, bladder cancer    Social History:  Smoking status: Former, quit 1996  Alcohol use: Not currently  Drug use: No  PCP: Josselin Kolb  Presents to the ED by himself  Marital Status:  Single [1]    Review of Systems   Respiratory: Negative for shortness of breath.    Cardiovascular: Negative for chest pain.   Gastrointestinal: Negative for abdominal pain, diarrhea, nausea and  "vomiting.   Skin: Positive for wound.   Neurological:        Hit his head   All other systems reviewed and are negative.    Physical Exam     Patient Vitals for the past 24 hrs:   BP Temp Pulse Resp SpO2 Height Weight   07/13/20 2222 122/70 -- 74 -- 100 % -- --   07/13/20 2026 128/76 98.3  F (36.8  C) 74 18 99 % 1.727 m (5' 8\") 80.2 kg (176 lb 12.9 oz)       Physical Exam  General: Alert, appears well-developed and well-nourished. Cooperative.     In mild distress  HEENT:  Head:  Right forehead hematoma, no laceration.   Ears:  External ears are normal  Mouth/Throat:  Oropharynx is without erythema or exudate and mucous membranes are moist.  No dental trauma.   Eyes:   Conjunctivae normal and EOM are normal. No scleral icterus.    Pupils are equal, round, and reactive to light.   CV:  Normal rate, regular rhythm, normal heart sounds and radial pulses are 2+ and symmetric.   Resp:  Breath sounds are clear bilaterally    Non-labored, no retractions or accessory muscle use  GI:  Abdomen is soft, no distension, no tenderness. No rebound or guarding.  No CVA tenderness bilaterally  MS:  Normal range of motion. No edema.    Normal strength in all 4 extremities.     Back atraumatic.    No midline cervical, thoracic, or lumbar tenderness    Right Knee:    The Quadriceps Tendon is intact    The Patella is in the midline and not clinically fractured    The Patella Tendon is intact    There is no significant joint effusion    The ACL and PCL are not clinically ruptured    There is no MCL pain/tenderness    There is no LCL pain/tenderness    The MM is non-tender on examination    The LM is non-tender on examination    The pre-patellar bursae is without effusion  Skin:  Warm and dry.  Hematoma to right forehead. Skin tears to bilateral forearms.   Neuro: Alert. Normal strength.  GCS: 15  Psych:  Normal mood and affect.    Emergency Department Course   ECG (21:16:40):  Rate 73 bpm. PA interval 204. QRS duration 92. QT/QTc " 426/469. P-R-T axes 25 254 31. Normal sinus rhythm. Low voltage QRS. Possible anterolateral infarct. [place]normal ECG[place]ed [place] [address] by Morgan Dickens MD.    Imaging:  XR Chest 2 Views  Stable single lead right-sided cardiac conduction device.   No definite pneumothorax. Trace right-sided pleural effusion with   minimal adjacent atelectasis/infiltrates. Minimal opacities at the   left lung base likely reflecting atelectasis. Mild cardiomegaly.   Coronary artery calcification/stent. Stable central vascular   prominence.     As read by Radiology.    XR Knee Right 1/2 Views  Demineralization. Narrowing of the medial joint space. No joint effusion. No fracture or dislocation. Chondrocalcinosis. Vascular calcification.     As read by Radiology.    CT Head w/o Contrast  1. No acute intracranial abnormality.   2. Moderate generalized brain parenchymal volume loss and presumed   chronic small vessel ischemic disease as described.   3. Mild to moderate mucosal thickening in the left frontal sinus with   associated small volume aerated secretions. Correlate clinically.   4. Other chronic findings, as described.     As read by Radiology.    Emergency Department Course:  Past medical records, nursing notes, and vitals reviewed.  2055: I performed an exam of the patient and obtained history, as documented above.    The patient was sent for a head CT, chest x-ray, and knee x-ray while in the emergency department, findings above.    2211: I rechecked the patient. Findings and plan explained to the Patient. Patient discharged home with instructions regarding supportive care, medications, and reasons to return. The importance of close follow-up was reviewed.     Impression & Plan      Medical Decision Making:  Patient is a 74-year-old male with a history of CAD, cardiomyopathy, ESRD on hemodialysis, type 2 diabetes, and hypertension on chronic anticoagulation with Eliquis who presents after a mechanical fall at home.  Patient  notes he hit the right side of his forehead and also has pain in the right knee after this fall.  He denies any prodromal events including chest pain, shortness of breath, abdominal pain, lightheadedness or vision changes.  He currently denies chest pain, shortness of breath, headache and vision changes.  He denies fever and infectious symptoms.  Reassuringly CT imaging of the head shows no skull fracture nor acute intracranial hemorrhage.  Patient additionally had x-ray imaging of the chest and right knee which returned unremarkable.  Chest x-ray commented on some mild trace effusions bilaterally likely consistent with his chronic medical issues.  With no active cough or sputum very low concern for lower respiratory infection.  EKG showed no concerning ischemic changes comparison with prior EKG.  Had a long discussion at bedside with the patient in regards to the reason for his fall tonight.  This does seem quite mechanical in nature and there were no concerning symptoms as a precursor to his fall and we, in a shared decision-making model, elected to not obtain blood work this evening.  Patient reassured by his negative imaging work-up here tonight for his traumatic injuries.  Return precautions understood and all questions answered prior to discharge.  Discharged home.      Critical Care time:  none    Diagnosis:    ICD-10-CM   1. Fall, initial encounter  W19.XXXA   2. Traumatic hematoma of forehead, initial encounter  S00.83XA   3. Acute pain of right knee  M25.561       Disposition:  Discharged to home.     Teri Kimble  7/13/2020    EMERGENCY DEPARTMENT    I, Teri Kimble, am serving as a scribe at 10:22 PM on 7/13/2020 to document services personally performed by Morgan Dickens MD based on my observations and the provider's statements to me.          Morgan Dickens MD  07/14/20 0054

## 2020-07-14 NOTE — ED TRIAGE NOTES
"Patient was sitting in a chair, stood up and \"felt stiff.\" He then slid down the wall. Skin tears to BUE, right knee pain but patient was able to walk afterwards. Patient is on blood thinners.   "

## 2020-07-16 DIAGNOSIS — I25.10 CAD (CORONARY ARTERY DISEASE): ICD-10-CM

## 2020-07-16 DIAGNOSIS — I21.4 NSTEMI (NON-ST ELEVATED MYOCARDIAL INFARCTION) (H): ICD-10-CM

## 2020-07-16 RX ORDER — ISOSORBIDE MONONITRATE 30 MG/1
30 TABLET, EXTENDED RELEASE ORAL DAILY
Qty: 90 TABLET | Refills: 0 | Status: SHIPPED | OUTPATIENT
Start: 2020-07-16 | End: 2020-10-15

## 2020-07-28 DIAGNOSIS — K21.9 GASTROESOPHAGEAL REFLUX DISEASE, ESOPHAGITIS PRESENCE NOT SPECIFIED: ICD-10-CM

## 2020-07-28 DIAGNOSIS — I48.91 ATRIAL FIBRILLATION (H): ICD-10-CM

## 2020-07-29 RX ORDER — PANTOPRAZOLE SODIUM 40 MG/1
TABLET, DELAYED RELEASE ORAL
Qty: 30 TABLET | Refills: 9 | Status: SHIPPED | OUTPATIENT
Start: 2020-07-29 | End: 2021-01-01

## 2020-08-24 DIAGNOSIS — I21.4 NSTEMI (NON-ST ELEVATED MYOCARDIAL INFARCTION) (H): ICD-10-CM

## 2020-08-25 RX ORDER — LISINOPRIL 2.5 MG/1
2.5 TABLET ORAL 2 TIMES DAILY
Qty: 180 TABLET | Refills: 3 | Status: SHIPPED | OUTPATIENT
Start: 2020-08-25 | End: 2021-01-01

## 2020-09-02 ENCOUNTER — ANCILLARY PROCEDURE (OUTPATIENT)
Dept: CARDIOLOGY | Facility: CLINIC | Age: 75
End: 2020-09-02
Attending: INTERNAL MEDICINE
Payer: MEDICARE

## 2020-09-02 DIAGNOSIS — I25.5 ISCHEMIC CARDIOMYOPATHY: Primary | ICD-10-CM

## 2020-09-02 DIAGNOSIS — I25.5 ISCHEMIC CARDIOMYOPATHY: ICD-10-CM

## 2020-09-02 DIAGNOSIS — Z95.810 ICD (IMPLANTABLE CARDIOVERTER-DEFIBRILLATOR) IN PLACE: ICD-10-CM

## 2020-09-02 PROCEDURE — 93296 REM INTERROG EVL PM/IDS: CPT | Performed by: INTERNAL MEDICINE

## 2020-09-02 PROCEDURE — 93295 DEV INTERROG REMOTE 1/2/MLT: CPT | Performed by: INTERNAL MEDICINE

## 2020-09-04 LAB
MDC_IDC_LEAD_IMPLANT_DT: NORMAL
MDC_IDC_LEAD_LOCATION: NORMAL
MDC_IDC_LEAD_LOCATION_DETAIL_1: NORMAL
MDC_IDC_LEAD_MFG: NORMAL
MDC_IDC_LEAD_MODEL: NORMAL
MDC_IDC_LEAD_POLARITY_TYPE: NORMAL
MDC_IDC_LEAD_SERIAL: NORMAL
MDC_IDC_LEAD_SPECIAL_FUNCTION: NORMAL
MDC_IDC_MSMT_BATTERY_REMAINING_PERCENTAGE: 100 %
MDC_IDC_MSMT_BATTERY_RRT_TRIGGER: NORMAL
MDC_IDC_MSMT_BATTERY_STATUS: NORMAL
MDC_IDC_MSMT_BATTERY_VOLTAGE: 3.1 V
MDC_IDC_MSMT_CAP_CHARGE_DTM: NORMAL
MDC_IDC_MSMT_CAP_CHARGE_ENERGY: 40 J
MDC_IDC_MSMT_CAP_CHARGE_TIME: 9.4 S
MDC_IDC_MSMT_CAP_CHARGE_TYPE: NORMAL
MDC_IDC_MSMT_LEADCHNL_RV_IMPEDANCE_VALUE: 417 OHM
MDC_IDC_MSMT_LEADCHNL_RV_LEAD_CHANNEL_STATUS: NORMAL
MDC_IDC_PG_IMPLANT_DTM: NORMAL
MDC_IDC_PG_MFG: NORMAL
MDC_IDC_PG_MODEL: NORMAL
MDC_IDC_PG_SERIAL: NORMAL
MDC_IDC_PG_TYPE: NORMAL
MDC_IDC_SESS_CLINIC_NAME: NORMAL
MDC_IDC_SESS_DTM: NORMAL
MDC_IDC_SESS_REPROGRAMMED: NO
MDC_IDC_SESS_TYPE: NORMAL
MDC_IDC_SET_BRADY_LOWRATE: 40 {BEATS}/MIN
MDC_IDC_SET_BRADY_MODE: NORMAL
MDC_IDC_SET_LEADCHNL_RV_PACING_AMPLITUDE: 2 V
MDC_IDC_SET_LEADCHNL_RV_PACING_POLARITY: NORMAL
MDC_IDC_SET_LEADCHNL_RV_PACING_PULSEWIDTH: 0.4 MS
MDC_IDC_SET_LEADCHNL_RV_SENSING_ADAPTATION_MODE: NORMAL
MDC_IDC_SET_LEADCHNL_RV_SENSING_POLARITY: NORMAL
MDC_IDC_SET_LEADCHNL_RV_SENSING_SENSITIVITY: 0.8 MV
MDC_IDC_SET_ZONE_DETECTION_BEATS_DENOMINATOR: 16 {BEATS}
MDC_IDC_SET_ZONE_DETECTION_BEATS_NUMERATOR: 12 {BEATS}
MDC_IDC_SET_ZONE_DETECTION_INTERVAL: 240 MS
MDC_IDC_SET_ZONE_DETECTION_INTERVAL: 320 MS
MDC_IDC_SET_ZONE_TYPE: NORMAL
MDC_IDC_SET_ZONE_TYPE: NORMAL
MDC_IDC_STAT_AT_DTM_END: NORMAL
MDC_IDC_STAT_AT_DTM_START: NORMAL
MDC_IDC_STAT_BRADY_DTM_END: NORMAL
MDC_IDC_STAT_BRADY_DTM_START: NORMAL
MDC_IDC_STAT_BRADY_RV_PERCENT_PACED: 0 %
MDC_IDC_STAT_CRT_DTM_END: NORMAL
MDC_IDC_STAT_CRT_DTM_START: NORMAL
MDC_IDC_STAT_EPISODE_TOTAL_COUNT: 0
MDC_IDC_STAT_EPISODE_TOTAL_COUNT_DTM_END: NORMAL
MDC_IDC_STAT_EPISODE_TOTAL_COUNT_DTM_START: NORMAL
MDC_IDC_STAT_EPISODE_TYPE: NORMAL
MDC_IDC_STAT_TACHYTHERAPY_ATP_DELIVERED_TOTAL: 0
MDC_IDC_STAT_TACHYTHERAPY_SHOCKS_ABORTED_TOTAL: 0
MDC_IDC_STAT_TACHYTHERAPY_SHOCKS_DELIVERED_TOTAL: 0
MDC_IDC_STAT_TACHYTHERAPY_TOTAL_DTM_END: NORMAL
MDC_IDC_STAT_TACHYTHERAPY_TOTAL_DTM_START: NORMAL

## 2020-10-08 DIAGNOSIS — I25.10 CORONARY ARTERY DISEASE INVOLVING NATIVE CORONARY ARTERY OF NATIVE HEART WITHOUT ANGINA PECTORIS: ICD-10-CM

## 2020-10-09 RX ORDER — PRAVASTATIN SODIUM 40 MG
TABLET ORAL
Qty: 90 TABLET | Refills: 0 | Status: SHIPPED | OUTPATIENT
Start: 2020-10-09 | End: 2021-02-22

## 2020-10-09 NOTE — TELEPHONE ENCOUNTER
Pravastatin    Routing refill request to provider for review/approval because:  Labs not current:  Lipids    Danyell FABIANN, RN, PHN

## 2020-10-15 ENCOUNTER — APPOINTMENT (OUTPATIENT)
Dept: GENERAL RADIOLOGY | Facility: CLINIC | Age: 75
End: 2020-10-15
Attending: EMERGENCY MEDICINE
Payer: MEDICARE

## 2020-10-15 ENCOUNTER — APPOINTMENT (OUTPATIENT)
Dept: CT IMAGING | Facility: CLINIC | Age: 75
End: 2020-10-15
Attending: EMERGENCY MEDICINE
Payer: MEDICARE

## 2020-10-15 ENCOUNTER — HOSPITAL ENCOUNTER (EMERGENCY)
Facility: CLINIC | Age: 75
Discharge: ANOTHER HOSPITAL | End: 2020-10-16
Attending: EMERGENCY MEDICINE | Admitting: EMERGENCY MEDICINE
Payer: MEDICARE

## 2020-10-15 DIAGNOSIS — R07.9 CHEST PAIN, UNSPECIFIED TYPE: ICD-10-CM

## 2020-10-15 DIAGNOSIS — R42 DIZZINESS: ICD-10-CM

## 2020-10-15 LAB
ALBUMIN SERPL-MCNC: 3.2 G/DL (ref 3.4–5)
ALP SERPL-CCNC: 234 U/L (ref 40–150)
ALT SERPL W P-5'-P-CCNC: 19 U/L (ref 0–70)
ANION GAP SERPL CALCULATED.3IONS-SCNC: 5 MMOL/L (ref 3–14)
AST SERPL W P-5'-P-CCNC: 18 U/L (ref 0–45)
BASOPHILS # BLD AUTO: 0 10E9/L (ref 0–0.2)
BASOPHILS NFR BLD AUTO: 0.5 %
BILIRUB SERPL-MCNC: 1 MG/DL (ref 0.2–1.3)
BUN SERPL-MCNC: 15 MG/DL (ref 7–30)
CALCIUM SERPL-MCNC: 9.5 MG/DL (ref 8.5–10.1)
CHLORIDE SERPL-SCNC: 104 MMOL/L (ref 94–109)
CO2 SERPL-SCNC: 30 MMOL/L (ref 20–32)
CREAT SERPL-MCNC: 3.23 MG/DL (ref 0.66–1.25)
DIFFERENTIAL METHOD BLD: ABNORMAL
EOSINOPHIL # BLD AUTO: 0.4 10E9/L (ref 0–0.7)
EOSINOPHIL NFR BLD AUTO: 6.2 %
ERYTHROCYTE [DISTWIDTH] IN BLOOD BY AUTOMATED COUNT: 16.4 % (ref 10–15)
GFR SERPL CREATININE-BSD FRML MDRD: 18 ML/MIN/{1.73_M2}
GLUCOSE SERPL-MCNC: 145 MG/DL (ref 70–99)
HCT VFR BLD AUTO: 33.1 % (ref 40–53)
HGB BLD-MCNC: 10.8 G/DL (ref 13.3–17.7)
IMM GRANULOCYTES # BLD: 0 10E9/L (ref 0–0.4)
IMM GRANULOCYTES NFR BLD: 0.2 %
INR PPP: 1.31 (ref 0.86–1.14)
INTERPRETATION ECG - MUSE: NORMAL
LIPASE SERPL-CCNC: 77 U/L (ref 73–393)
LYMPHOCYTES # BLD AUTO: 0.9 10E9/L (ref 0.8–5.3)
LYMPHOCYTES NFR BLD AUTO: 14.1 %
MCH RBC QN AUTO: 33.8 PG (ref 26.5–33)
MCHC RBC AUTO-ENTMCNC: 32.6 G/DL (ref 31.5–36.5)
MCV RBC AUTO: 103 FL (ref 78–100)
MONOCYTES # BLD AUTO: 0.4 10E9/L (ref 0–1.3)
MONOCYTES NFR BLD AUTO: 6.5 %
NEUTROPHILS # BLD AUTO: 4.5 10E9/L (ref 1.6–8.3)
NEUTROPHILS NFR BLD AUTO: 72.5 %
NRBC # BLD AUTO: 0 10*3/UL
NRBC BLD AUTO-RTO: 0 /100
NT-PROBNP SERPL-MCNC: ABNORMAL PG/ML (ref 0–900)
PLATELET # BLD AUTO: 199 10E9/L (ref 150–450)
POTASSIUM SERPL-SCNC: 4.3 MMOL/L (ref 3.4–5.3)
PROT SERPL-MCNC: 6.6 G/DL (ref 6.8–8.8)
RBC # BLD AUTO: 3.2 10E12/L (ref 4.4–5.9)
SODIUM SERPL-SCNC: 139 MMOL/L (ref 133–144)
TROPONIN I SERPL-MCNC: <0.015 UG/L (ref 0–0.04)
WBC # BLD AUTO: 6.2 10E9/L (ref 4–11)

## 2020-10-15 PROCEDURE — 73562 X-RAY EXAM OF KNEE 3: CPT | Mod: RT

## 2020-10-15 PROCEDURE — 99285 EMERGENCY DEPT VISIT HI MDM: CPT | Mod: 25

## 2020-10-15 PROCEDURE — C9803 HOPD COVID-19 SPEC COLLECT: HCPCS

## 2020-10-15 PROCEDURE — 83880 ASSAY OF NATRIURETIC PEPTIDE: CPT | Performed by: EMERGENCY MEDICINE

## 2020-10-15 PROCEDURE — U0003 INFECTIOUS AGENT DETECTION BY NUCLEIC ACID (DNA OR RNA); SEVERE ACUTE RESPIRATORY SYNDROME CORONAVIRUS 2 (SARS-COV-2) (CORONAVIRUS DISEASE [COVID-19]), AMPLIFIED PROBE TECHNIQUE, MAKING USE OF HIGH THROUGHPUT TECHNOLOGIES AS DESCRIBED BY CMS-2020-01-R: HCPCS | Performed by: EMERGENCY MEDICINE

## 2020-10-15 PROCEDURE — 70450 CT HEAD/BRAIN W/O DYE: CPT

## 2020-10-15 PROCEDURE — 71046 X-RAY EXAM CHEST 2 VIEWS: CPT

## 2020-10-15 PROCEDURE — 80053 COMPREHEN METABOLIC PANEL: CPT | Performed by: EMERGENCY MEDICINE

## 2020-10-15 PROCEDURE — 84484 ASSAY OF TROPONIN QUANT: CPT | Performed by: EMERGENCY MEDICINE

## 2020-10-15 PROCEDURE — 85025 COMPLETE CBC W/AUTO DIFF WBC: CPT | Performed by: EMERGENCY MEDICINE

## 2020-10-15 PROCEDURE — 93005 ELECTROCARDIOGRAM TRACING: CPT

## 2020-10-15 PROCEDURE — 85610 PROTHROMBIN TIME: CPT | Performed by: EMERGENCY MEDICINE

## 2020-10-15 PROCEDURE — 83690 ASSAY OF LIPASE: CPT | Performed by: EMERGENCY MEDICINE

## 2020-10-15 RX ORDER — ISOSORBIDE MONONITRATE 30 MG/1
30 TABLET, EXTENDED RELEASE ORAL
COMMUNITY
End: 2020-11-09

## 2020-10-15 RX ORDER — LOPERAMIDE HYDROCHLORIDE 2 MG/1
2 TABLET ORAL
Status: ON HOLD | COMMUNITY
End: 2022-01-01

## 2020-10-15 RX ORDER — ISOSORBIDE MONONITRATE 30 MG/1
15 TABLET, EXTENDED RELEASE ORAL
COMMUNITY
End: 2021-01-01

## 2020-10-15 RX ORDER — MEXILETINE HYDROCHLORIDE 150 MG/1
150 CAPSULE ORAL ONCE
Status: COMPLETED | OUTPATIENT
Start: 2020-10-16 | End: 2020-10-16

## 2020-10-15 RX ORDER — LOPERAMIDE HYDROCHLORIDE 2 MG/1
1 TABLET ORAL
Status: ON HOLD | COMMUNITY
End: 2022-01-01

## 2020-10-15 ASSESSMENT — ENCOUNTER SYMPTOMS
CONSTIPATION: 0
SHORTNESS OF BREATH: 0
CHILLS: 0
COUGH: 0
DIARRHEA: 0
HEADACHES: 1
FEVER: 0
DIZZINESS: 1
TREMORS: 1

## 2020-10-15 NOTE — ED TRIAGE NOTES
Hx of MI and kidney failure. Presents via ambulate with a complain of CP. Patient reports that he felt lightheaded at home and experience chest pain. Reports feeling better. No meds per EMS. In Eloquis

## 2020-10-15 NOTE — ED NOTES
Bed: ED20  Expected date: 10/15/20  Expected time: 5:46 PM  Means of arrival:   Comments:  Asaf 518 chest pain

## 2020-10-15 NOTE — ED PROVIDER NOTES
History     Chief Complaint:  Chest Pain    HPI   Westley Ness is a 74 year old male with a history of coronary artery disease, cardiomyopathy, congestive heart failure, MI, type 2 diabetes mellitus and kidney failure and is anti-coagulated on Eliquis who presents via EMS with chest pain. The patient states chest pain began this afternoon while laying down. After getting up and walking 15-20 feet, he felt a wave of significant dizziness and lightheadedness. He was able to lean onto a door frame and did not fall. He denies radiation to his arm or neck. He currently endorses a headache, general weakness, and tremors. He states his chest pain has mostly subsided. He denies fevers, chills, coughs or shortness of breath. He states he had normal dialysis yesterday.    Of note, he still has right knee pain from a mechanical fall that happened on 07/12/2020. He was evaluated here and was told he did not have any fractures.    Imaging from 07/13/2020:  XR Knee Right 1/2 Views  Demineralization. Narrowing of the medial joint space. No joint effusion. No fracture or dislocation. Chondrocalcinosis. Vascular calcification    Allergies:  Contrast Dye     Medications:    Eliquis  Imdur  Synthroid  Zestril  Imodium  Metoprolol  Mexitil   Nitrostat  Protonix  Pravachol      Past Medical History:    Acid reflux  Hypertension  CAD  ESRD on hemodialysis  Atrial flutter  Hypothyroidism  Ischemic cardiomyopathy  Type 2 diabetes mellitus   Systolic heart failure    Past Surgical History:    Cholecystectomy  Elbow surgery, left  EP ICD generator change  Ablation atrial flutter  Left heart catheterization  IR dialysis fistulogram, left  Tosillectomy and adenoidectomy   Vascular surgery    Family History:    Mother - Cerebrovascular Disease  Father - Hypertension, Bladder Cancer    Social History:  The patient was accompanied to the ED by EMS.  Smoking Status: Former Smoker (1996)  Smokeless tobacco: Never Used  Alcohol Use: Not  Currently  Drug Use: No  PCP:  Josselin Kolb  Marital Status:  Single [1]     Review of Systems   Constitutional: Negative for chills and fever.   Respiratory: Negative for cough and shortness of breath.    Cardiovascular: Positive for chest pain.   Gastrointestinal: Negative for constipation and diarrhea.   Neurological: Positive for dizziness, tremors and headaches.   All other systems reviewed and are negative.    Physical Exam     Patient Vitals for the past 24 hrs:   BP Temp Temp src Pulse Resp SpO2   10/15/20 2030 (!) 135/101 -- -- 90 19 --   10/15/20 2000 135/76 -- -- 80 10 100 %   10/15/20 1810 128/60 -- -- 85 8 100 %   10/15/20 1809 -- 98  F (36.7  C) Oral -- 17 --       Physical Exam  General: Patient is awake, alert and interactive when I enter the room  Head: The scalp, face, and head appear normal  Eyes: The pupils are equal, round, and reactive to light. Conjunctivae and sclerae are normal  Neck: Normal range of motion. No anterior cervical lymphadenopathy noted  CV: Regular rate. S1/S2. No murmurs. Peripheral pulses including radial pulses are symmetric.   Resp: Lungs are clear without wheezes or rales. No respiratory distress.   GI: Abdomen is soft, no rigidity, guarding, or rebound. No distension. No tenderness to palpation in any quadrant.     MS: Chest wall is non tender to palpation. Normal tone. Joints grossly normal without effusions. Bilateral pitting lower extremity edema to the level of knee.    Skin: No rash or lesions noted. Normal capillary refill noted  Neuro: Speech is normal and fluent. Face is symmetric. Moving all extremities.   Psych:  Normal affect.  Appropriate interactions.    Emergency Department Course     ECG:  ECG taken at 1945, ECG read at 2010 by Rafa Garcia  Normal sinus rhythm  Right axis deviation  Low voltage QRS  Cannot rule out anterior infarct, age undetermined  Abnormal ECG  Rate 80 bpm. HI interval 176. QRS duration 90. QT/QTc 392/452. P-R-T axes 58  172 21.      Imaging:  Radiology findings were communicated with the patient who voiced understanding of the findings.    XR Knee Right 3 Views  Diffuse demineralization. No evidence for fracture. No synovial effusion or evidence for lipohemarthrosis. Mild degenerative narrowing of the medial compartment. Chronic enthesitis along the superior pole of the patella at the quadriceps   attachment. Vascular calcifications.  Reading per radiology.    XR Chest 2 Views  Cardiomegaly again identified appearing stable. Vascular  congestion again noted as well. No new focal airspace disease. Small  posterior effusions are slightly larger since 7/13/2020.  Reading per radiology.    CT Head without Contrast  No acute pathology, no bleed, mass, or acute infarcts. No  change.  Reading per radiology.    Laboratory:  Laboratory findings were communicated with the patient who voiced understanding of the findings.    CBC: HGB 10.8 (L), o/w WNL (WBC 6.2, )  CMP: Glucose 145 (H), Creatinine 3.23 (H), GFR 18/21 (L), Albumin 3.2 (L), Protein 6.6 (L), Alkaline Phosphatase 234 (H), o/w WNL  Troponin (Collected 1830): <0.015   INR: 1.31 (H)  Lipase: 77  Nt probnp inpatient: 150,621 (H)    COVID-19 Virus (Coronavirus) by PCR: Pending.     Emergency Department Course:  Past medical records, nursing notes, and vitals reviewed.    (1830)   I performed an exam of the patient as documented above. History obtained from patient.      EKG obtained in the ED, see results above.     The patient was sent for a CT Head without Contrast, XR Knee Right 3 Views and XR Chest 2 Views while in the emergency department, results above.     IV was inserted and blood was drawn for laboratory testing, results above.   A nasal swab was obtained for laboratory testing, findings above.      (2157)   I spoke with Dr. Lloyd of the Hospitalist service from [facility] regarding patient's presentation, findings, and plan of care.     Findings and plan explained  to the Patient. Patient will be transferred to [facility] via EMS. Discussed the case with Dr. Lloyd, who will admit the patient to a monitored bed for further monitoring, evaluation, and treatment.    I personally reviewed the laboratory and imaging results with the Patient and answered all related questions prior to transfer.     Impression & Plan     Medical Decision Making:  Patient is a 74-year-old gentleman with complex past cardiac history with history of coronary artery disease, ischemic cardiomyopathy, congestive heart failure, atrial fibrillation currently on Eliquis, defibrillator/pacemaker ICD and end-stage renal disease on dialysis who presents emergency department today with significant episode of dizziness, lightheadedness and chest pain.  On initial valuation here he is hemodynamically stable with no vital signs.  He is afebrile.  On my initial evaluation his chest pain has resolved but he still feels quite dizzy and weak.  This appears to be more consistent with a presyncopal episode than acute episode of vertigo.  However stroke pathology was considered.  Initial EKG was obtained which does not show any acute signs of ischemia nor dysrhythmia.  This is largely unchanged from his previous EKG.  Initial troponin was negative.  Acute ACS seems less likely from this point however given his significant past cardiac history I believe further cardiac evaluation is necessary.  Unfortunately there are no beds available at [facility] and the patient will be transferred to [facility] for further evaluation and treatment.  There is no infectious etiology found on the patient's work-up here today that would cause his generalized weakness and dizziness.  CT head was performed which does not show any acute signs of intracranial hemorrhage or mass.  If patient continues to have dizziness or it appears to be more vertiginous in nature, MRI may be required.    Covid-19  Westley Ness was evaluated  during a global COVID-19 pandemic, which necessitated consideration that the patient might be at risk for infection with the SARS-CoV-2 virus that causes COVID-19.   Applicable protocols for evaluation were followed during the patient's care.   COVID-19 was considered as part of the patient's evaluation. The plan for testing is:  a test was obtained during this visit.    Diagnosis:    ICD-10-CM    1. Dizziness  R42    2. Chest pain, unspecified type  R07.9        Disposition:  Transferred to [facility].    Scribe Disclosure:  I, Suzanne Burgos, am serving as a scribe at 6:17 PM on 10/15/2020 to document services personally performed by Rafa Garcia MD based on my observations and the provider's statements to me.   10/15/2020   [facility] EMERGENCY DEPT       Rafa Garcia MD  10/16/20 0006

## 2020-10-16 ENCOUNTER — APPOINTMENT (OUTPATIENT)
Dept: CARDIOLOGY | Facility: CLINIC | Age: 75
DRG: 246 | End: 2020-10-16
Attending: INTERNAL MEDICINE
Payer: MEDICARE

## 2020-10-16 ENCOUNTER — APPOINTMENT (OUTPATIENT)
Dept: NUCLEAR MEDICINE | Facility: CLINIC | Age: 75
DRG: 246 | End: 2020-10-16
Attending: INTERNAL MEDICINE
Payer: MEDICARE

## 2020-10-16 ENCOUNTER — HOSPITAL ENCOUNTER (INPATIENT)
Facility: CLINIC | Age: 75
LOS: 4 days | Discharge: HOME | DRG: 246 | End: 2020-10-20
Attending: INTERNAL MEDICINE | Admitting: INTERNAL MEDICINE
Payer: MEDICARE

## 2020-10-16 VITALS
SYSTOLIC BLOOD PRESSURE: 133 MMHG | DIASTOLIC BLOOD PRESSURE: 79 MMHG | OXYGEN SATURATION: 100 % | TEMPERATURE: 98 F | RESPIRATION RATE: 21 BRPM | HEART RATE: 79 BPM

## 2020-10-16 DIAGNOSIS — Z95.5 S/P DRUG ELUTING CORONARY STENT PLACEMENT: ICD-10-CM

## 2020-10-16 DIAGNOSIS — I25.5 ISCHEMIC CARDIOMYOPATHY: ICD-10-CM

## 2020-10-16 DIAGNOSIS — I25.10 CORONARY ARTERY DISEASE INVOLVING NATIVE CORONARY ARTERY, ANGINA PRESENCE UNSPECIFIED, UNSPECIFIED WHETHER NATIVE OR TRANSPLANTED HEART: ICD-10-CM

## 2020-10-16 DIAGNOSIS — R94.39 ABNORMAL CARDIOVASCULAR STRESS TEST: Primary | ICD-10-CM

## 2020-10-16 DIAGNOSIS — R94.39 ABNORMAL CARDIOVASCULAR STRESS TEST: ICD-10-CM

## 2020-10-16 DIAGNOSIS — I50.22 CHRONIC SYSTOLIC (CONGESTIVE) HEART FAILURE (H): ICD-10-CM

## 2020-10-16 PROBLEM — R55 SYNCOPE: Status: ACTIVE | Noted: 2020-10-16

## 2020-10-16 LAB
APTT PPP: 119 SEC (ref 22–37)
CV STRESS MAX HR HE: 86
ERYTHROCYTE [DISTWIDTH] IN BLOOD BY AUTOMATED COUNT: 16.2 % (ref 10–15)
HCT VFR BLD AUTO: 32.3 % (ref 40–53)
HGB BLD-MCNC: 10.2 G/DL (ref 13.3–17.7)
LABORATORY COMMENT REPORT: NORMAL
LMWH PPP CHRO-ACNC: 1.93 IU/ML
MCH RBC QN AUTO: 33.4 PG (ref 26.5–33)
MCHC RBC AUTO-ENTMCNC: 31.6 G/DL (ref 31.5–36.5)
MCV RBC AUTO: 106 FL (ref 78–100)
NUC STRESS EJECTION FRACTION: 22 %
PHOSPHATE SERPL-MCNC: 1.8 MG/DL (ref 2.5–4.5)
PLATELET # BLD AUTO: 146 10E9/L (ref 150–450)
RATE PRESSURE PRODUCT: 9460
RBC # BLD AUTO: 3.05 10E12/L (ref 4.4–5.9)
SARS-COV-2 RNA SPEC QL NAA+PROBE: NEGATIVE
SARS-COV-2 RNA SPEC QL NAA+PROBE: NORMAL
SPECIMEN SOURCE: NORMAL
SPECIMEN SOURCE: NORMAL
STRESS ECHO BASELINE DIASTOLIC HE: 61
STRESS ECHO BASELINE HR: 83
STRESS ECHO BASELINE SYSTOLIC BP: 133
STRESS ECHO CALCULATED PERCENT HR: 59 %
STRESS ECHO LAST STRESS DIASTOLIC BP: 51
STRESS ECHO LAST STRESS SYSTOLIC BP: 110
STRESS ECHO TARGET HR: 146
TROPONIN I SERPL-MCNC: 0.03 UG/L (ref 0–0.04)
WBC # BLD AUTO: 6.9 10E9/L (ref 4–11)

## 2020-10-16 PROCEDURE — 250N000011 HC RX IP 250 OP 636

## 2020-10-16 PROCEDURE — 85027 COMPLETE CBC AUTOMATED: CPT | Performed by: INTERNAL MEDICINE

## 2020-10-16 PROCEDURE — 99222 1ST HOSP IP/OBS MODERATE 55: CPT | Mod: 25 | Performed by: INTERNAL MEDICINE

## 2020-10-16 PROCEDURE — 93017 CV STRESS TEST TRACING ONLY: CPT

## 2020-10-16 PROCEDURE — 36415 COLL VENOUS BLD VENIPUNCTURE: CPT | Performed by: INTERNAL MEDICINE

## 2020-10-16 PROCEDURE — 93306 TTE W/DOPPLER COMPLETE: CPT | Mod: 26 | Performed by: INTERNAL MEDICINE

## 2020-10-16 PROCEDURE — 255N000002 HC RX 255 OP 636: Performed by: INTERNAL MEDICINE

## 2020-10-16 PROCEDURE — A9502 TC99M TETROFOSMIN: HCPCS | Performed by: INTERNAL MEDICINE

## 2020-10-16 PROCEDURE — 93018 CV STRESS TEST I&R ONLY: CPT | Performed by: INTERNAL MEDICINE

## 2020-10-16 PROCEDURE — 343N000001 HC RX 343: Performed by: INTERNAL MEDICINE

## 2020-10-16 PROCEDURE — 78452 HT MUSCLE IMAGE SPECT MULT: CPT | Mod: 26 | Performed by: INTERNAL MEDICINE

## 2020-10-16 PROCEDURE — 250N000013 HC RX MED GY IP 250 OP 250 PS 637: Performed by: INTERNAL MEDICINE

## 2020-10-16 PROCEDURE — 99223 1ST HOSP IP/OBS HIGH 75: CPT | Mod: AI | Performed by: INTERNAL MEDICINE

## 2020-10-16 PROCEDURE — G0378 HOSPITAL OBSERVATION PER HR: HCPCS

## 2020-10-16 PROCEDURE — 99207 PR CDG-CODE CATEGORY CHANGED: CPT | Performed by: INTERNAL MEDICINE

## 2020-10-16 PROCEDURE — 250N000013 HC RX MED GY IP 250 OP 250 PS 637: Mod: GY | Performed by: EMERGENCY MEDICINE

## 2020-10-16 PROCEDURE — 120N000004 HC R&B MS OVERFLOW

## 2020-10-16 PROCEDURE — 250N000013 HC RX MED GY IP 250 OP 250 PS 637: Performed by: PHYSICIAN ASSISTANT

## 2020-10-16 PROCEDURE — 78452 HT MUSCLE IMAGE SPECT MULT: CPT

## 2020-10-16 PROCEDURE — 250N000011 HC RX IP 250 OP 636: Performed by: INTERNAL MEDICINE

## 2020-10-16 PROCEDURE — 84484 ASSAY OF TROPONIN QUANT: CPT | Performed by: INTERNAL MEDICINE

## 2020-10-16 PROCEDURE — 85730 THROMBOPLASTIN TIME PARTIAL: CPT | Performed by: INTERNAL MEDICINE

## 2020-10-16 PROCEDURE — 93016 CV STRESS TEST SUPVJ ONLY: CPT | Performed by: INTERNAL MEDICINE

## 2020-10-16 PROCEDURE — 84100 ASSAY OF PHOSPHORUS: CPT | Performed by: INTERNAL MEDICINE

## 2020-10-16 PROCEDURE — 99207 PR APP CREDIT; MD BILLING SHARED VISIT: CPT | Performed by: PHYSICIAN ASSISTANT

## 2020-10-16 PROCEDURE — 85520 HEPARIN ASSAY: CPT | Performed by: INTERNAL MEDICINE

## 2020-10-16 PROCEDURE — 999N000208 ECHOCARDIOGRAM COMPLETE

## 2020-10-16 RX ORDER — HEPARIN SODIUM 10000 [USP'U]/100ML
900 INJECTION, SOLUTION INTRAVENOUS CONTINUOUS
Status: DISCONTINUED | OUTPATIENT
Start: 2020-10-16 | End: 2020-10-16

## 2020-10-16 RX ORDER — ALBUMIN (HUMAN) 12.5 G/50ML
50 SOLUTION INTRAVENOUS
Status: DISCONTINUED | OUTPATIENT
Start: 2020-10-16 | End: 2020-10-17

## 2020-10-16 RX ORDER — ACETAMINOPHEN 500 MG
500-1000 TABLET ORAL EVERY 6 HOURS PRN
Status: ON HOLD | COMMUNITY
End: 2022-01-01

## 2020-10-16 RX ORDER — LEVOTHYROXINE SODIUM 50 UG/1
50 TABLET ORAL DAILY
Status: DISCONTINUED | OUTPATIENT
Start: 2020-10-16 | End: 2020-10-20 | Stop reason: HOSPADM

## 2020-10-16 RX ORDER — ACETAMINOPHEN 325 MG/1
650 TABLET ORAL EVERY 4 HOURS PRN
Status: DISCONTINUED | OUTPATIENT
Start: 2020-10-16 | End: 2020-10-20 | Stop reason: HOSPADM

## 2020-10-16 RX ORDER — CAFFEINE CITRATE 20 MG/ML
60 SOLUTION INTRAVENOUS
Status: DISCONTINUED | OUTPATIENT
Start: 2020-10-16 | End: 2020-10-19 | Stop reason: HOSPADM

## 2020-10-16 RX ORDER — REGADENOSON 0.08 MG/ML
INJECTION, SOLUTION INTRAVENOUS
Status: COMPLETED
Start: 2020-10-16 | End: 2020-10-16

## 2020-10-16 RX ORDER — ALBUMIN, HUMAN INJ 5% 5 %
250 SOLUTION INTRAVENOUS
Status: DISCONTINUED | OUTPATIENT
Start: 2020-10-16 | End: 2020-10-17

## 2020-10-16 RX ORDER — ACETAMINOPHEN 650 MG/1
650 SUPPOSITORY RECTAL EVERY 4 HOURS PRN
Status: DISCONTINUED | OUTPATIENT
Start: 2020-10-16 | End: 2020-10-20 | Stop reason: HOSPADM

## 2020-10-16 RX ORDER — NALOXONE HYDROCHLORIDE 0.4 MG/ML
.1-.4 INJECTION, SOLUTION INTRAMUSCULAR; INTRAVENOUS; SUBCUTANEOUS
Status: DISCONTINUED | OUTPATIENT
Start: 2020-10-16 | End: 2020-10-20 | Stop reason: HOSPADM

## 2020-10-16 RX ORDER — HEPARIN SODIUM 1000 [USP'U]/ML
500 INJECTION, SOLUTION INTRAVENOUS; SUBCUTANEOUS CONTINUOUS
Status: DISCONTINUED | OUTPATIENT
Start: 2020-10-16 | End: 2020-10-17

## 2020-10-16 RX ORDER — ONDANSETRON 2 MG/ML
4 INJECTION INTRAMUSCULAR; INTRAVENOUS EVERY 6 HOURS PRN
Status: DISCONTINUED | OUTPATIENT
Start: 2020-10-16 | End: 2020-10-20 | Stop reason: HOSPADM

## 2020-10-16 RX ORDER — METOPROLOL SUCCINATE 25 MG/1
12.5 TABLET, EXTENDED RELEASE ORAL DAILY
COMMUNITY
End: 2021-01-05

## 2020-10-16 RX ORDER — ACYCLOVIR 200 MG/1
0-1 CAPSULE ORAL
Status: DISCONTINUED | OUTPATIENT
Start: 2020-10-16 | End: 2020-10-19 | Stop reason: HOSPADM

## 2020-10-16 RX ORDER — NITROGLYCERIN 0.4 MG/1
0.4 TABLET SUBLINGUAL EVERY 5 MIN PRN
Status: DISCONTINUED | OUTPATIENT
Start: 2020-10-16 | End: 2020-10-20 | Stop reason: HOSPADM

## 2020-10-16 RX ORDER — AMINOPHYLLINE 25 MG/ML
INJECTION, SOLUTION INTRAVENOUS
Status: COMPLETED
Start: 2020-10-16 | End: 2020-10-16

## 2020-10-16 RX ORDER — HEPARIN SODIUM 10000 [USP'U]/100ML
600 INJECTION, SOLUTION INTRAVENOUS CONTINUOUS
Status: DISCONTINUED | OUTPATIENT
Start: 2020-10-17 | End: 2020-10-19

## 2020-10-16 RX ORDER — MEXILETINE HYDROCHLORIDE 150 MG/1
150 CAPSULE ORAL 2 TIMES DAILY
Status: DISCONTINUED | OUTPATIENT
Start: 2020-10-16 | End: 2020-10-20 | Stop reason: HOSPADM

## 2020-10-16 RX ORDER — PANTOPRAZOLE SODIUM 40 MG/1
40 TABLET, DELAYED RELEASE ORAL
Status: DISCONTINUED | OUTPATIENT
Start: 2020-10-16 | End: 2020-10-20 | Stop reason: HOSPADM

## 2020-10-16 RX ORDER — LOPERAMIDE HCL 2 MG
2 CAPSULE ORAL
Status: DISCONTINUED | OUTPATIENT
Start: 2020-10-16 | End: 2020-10-17

## 2020-10-16 RX ORDER — REGADENOSON 0.08 MG/ML
0.4 INJECTION, SOLUTION INTRAVENOUS ONCE
Status: COMPLETED | OUTPATIENT
Start: 2020-10-16 | End: 2020-10-16

## 2020-10-16 RX ORDER — ONDANSETRON 4 MG/1
4 TABLET, ORALLY DISINTEGRATING ORAL EVERY 6 HOURS PRN
Status: DISCONTINUED | OUTPATIENT
Start: 2020-10-16 | End: 2020-10-20 | Stop reason: HOSPADM

## 2020-10-16 RX ORDER — LISINOPRIL 2.5 MG/1
2.5 TABLET ORAL 2 TIMES DAILY
Status: DISCONTINUED | OUTPATIENT
Start: 2020-10-16 | End: 2020-10-20 | Stop reason: HOSPADM

## 2020-10-16 RX ORDER — LOPERAMIDE HCL 2 MG
2 CAPSULE ORAL
Status: DISCONTINUED | OUTPATIENT
Start: 2020-10-17 | End: 2020-10-17

## 2020-10-16 RX ORDER — PRAVASTATIN SODIUM 20 MG
40 TABLET ORAL EVERY EVENING
Status: DISCONTINUED | OUTPATIENT
Start: 2020-10-16 | End: 2020-10-20 | Stop reason: HOSPADM

## 2020-10-16 RX ORDER — AMINOPHYLLINE 25 MG/ML
50-100 INJECTION, SOLUTION INTRAVENOUS
Status: COMPLETED | OUTPATIENT
Start: 2020-10-16 | End: 2020-10-16

## 2020-10-16 RX ORDER — HEPARIN SODIUM 1000 [USP'U]/ML
500 INJECTION, SOLUTION INTRAVENOUS; SUBCUTANEOUS
Status: DISCONTINUED | OUTPATIENT
Start: 2020-10-16 | End: 2020-10-17

## 2020-10-16 RX ORDER — ALBUTEROL SULFATE 90 UG/1
2 AEROSOL, METERED RESPIRATORY (INHALATION) EVERY 5 MIN PRN
Status: DISCONTINUED | OUTPATIENT
Start: 2020-10-16 | End: 2020-10-19 | Stop reason: HOSPADM

## 2020-10-16 RX ADMIN — LISINOPRIL 2.5 MG: 2.5 TABLET ORAL at 21:47

## 2020-10-16 RX ADMIN — TETROFOSMIN 27 MCI.: 1.38 INJECTION, POWDER, LYOPHILIZED, FOR SOLUTION INTRAVENOUS at 11:52

## 2020-10-16 RX ADMIN — ACETAMINOPHEN 650 MG: 325 TABLET, FILM COATED ORAL at 09:26

## 2020-10-16 RX ADMIN — MEXILETINE HYDROCHLORIDE 150 MG: 150 CAPSULE ORAL at 20:39

## 2020-10-16 RX ADMIN — AMINOPHYLLINE 50 MG: 25 INJECTION, SOLUTION INTRAVENOUS at 12:09

## 2020-10-16 RX ADMIN — ACETAMINOPHEN 650 MG: 325 TABLET, FILM COATED ORAL at 16:07

## 2020-10-16 RX ADMIN — Medication 2000 UNITS: at 16:30

## 2020-10-16 RX ADMIN — ACETAMINOPHEN 650 MG: 325 TABLET, FILM COATED ORAL at 21:52

## 2020-10-16 RX ADMIN — LISINOPRIL 2.5 MG: 2.5 TABLET ORAL at 16:07

## 2020-10-16 RX ADMIN — PANTOPRAZOLE SODIUM 40 MG: 40 TABLET, DELAYED RELEASE ORAL at 08:38

## 2020-10-16 RX ADMIN — ACETAMINOPHEN 650 MG: 325 TABLET, FILM COATED ORAL at 01:52

## 2020-10-16 RX ADMIN — PRAVASTATIN SODIUM 40 MG: 20 TABLET ORAL at 21:47

## 2020-10-16 RX ADMIN — HUMAN ALBUMIN MICROSPHERES AND PERFLUTREN 9 ML: 10; .22 INJECTION, SOLUTION INTRAVENOUS at 08:30

## 2020-10-16 RX ADMIN — LEVOTHYROXINE SODIUM 50 MCG: 50 TABLET ORAL at 08:38

## 2020-10-16 RX ADMIN — TETROFOSMIN 9 MCI.: 1.38 INJECTION, POWDER, LYOPHILIZED, FOR SOLUTION INTRAVENOUS at 09:50

## 2020-10-16 RX ADMIN — HEPARIN SODIUM 900 UNITS/HR: 10000 INJECTION, SOLUTION INTRAVENOUS at 16:30

## 2020-10-16 RX ADMIN — MEXILETINE HYDROCHLORIDE 150 MG: 150 CAPSULE ORAL at 00:41

## 2020-10-16 RX ADMIN — Medication 15 MG: at 16:07

## 2020-10-16 RX ADMIN — Medication 12.5 MG: at 08:38

## 2020-10-16 RX ADMIN — REGADENOSON 0.4 MG: 0.08 INJECTION, SOLUTION INTRAVENOUS at 11:48

## 2020-10-16 RX ADMIN — LOPERAMIDE HYDROCHLORIDE 2 MG: 2 CAPSULE ORAL at 16:07

## 2020-10-16 RX ADMIN — MEXILETINE HYDROCHLORIDE 150 MG: 150 CAPSULE ORAL at 08:38

## 2020-10-16 RX ADMIN — OMEPRAZOLE 20 MG: 20 CAPSULE, DELAYED RELEASE ORAL at 16:07

## 2020-10-16 RX ADMIN — APIXABAN 2.5 MG: 2.5 TABLET, FILM COATED ORAL at 00:41

## 2020-10-16 RX ADMIN — Medication 1 CAPSULE: at 21:47

## 2020-10-16 ASSESSMENT — MIFFLIN-ST. JEOR: SCORE: 1525.99

## 2020-10-16 NOTE — PROGRESS NOTES
The nuclear stress test shows a moderate area of lateral ischemia.  There is also an area of filippo-infarct ischemia in the inferior wall.  The anterior wall is infarcted which is predictable given that the left anterior descending artery is occluded.  The patient's ejection fraction has dropped he now has an ejection fraction of 20 to 25% on most recent echocardiography.  Given the ischemic burden and the drop in ejection fraction cardiac catheterization is clearly indicated in this patient.  However the patient is on apixaban and will need to be off this for 48 hours before we can do the angiogram which will be probably on Monday now.

## 2020-10-16 NOTE — Clinical Note
The first balloon was inserted into the circumflex and ostium marginal circumflex.Max pressure = 6 nilo. Total duration = 15 seconds.     Max pressure = 6 nilo. Total duration = 8 seconds.    Balloon reinflated a second time: Max pressure = 6 nilo. Total duration = 8 seconds.  Balloon reinflated a third time: Max pressure = 6 nilo. Total duration = 15 seconds.

## 2020-10-16 NOTE — Clinical Note
The first balloon was inserted into the circumflex and ostium marginal circumflex.Max pressure = 5 nilo. Total duration = 45 seconds.

## 2020-10-16 NOTE — PLAN OF CARE
ROOM # 208-2    Living Situation (if not independent, order SW consult): Home alone   Facility name:  : [facility]     Activity level at baseline: Ind with cane and walker   Activity level on admit: Assist x1        Patient registered to observation; given Patient Bill of Rights; given the opportunity to ask questions about observation status and their plan of care.  Patient has been oriented to the observation room, bathroom and call light is in place.    Discussed discharge goals and expectations with patient/family.

## 2020-10-16 NOTE — PROGRESS NOTES
PRIMARY DIAGNOSIS: SYNCOPE  OUTPATIENT/OBSERVATION GOALS TO BE MET BEFORE DISCHARGE:  1. Orthostatic performed: Yes:          Lying Orthostatic BP: 133/65         Sitting Orthostatic BP: 141/66         Standing Orthostatic BP: 132/65      2. Diagnostic testing complete & at baseline neurologic testing: No- echo pending      3. Cleared by consultants (if involved): No-  Cardiology and neph consult      4. Interpretation of cardiac rhythm per telemetry tech: SR hr 75     5. Tolerating adequate PO diet and medications: No- has not had lunch yet but no nausea     6. Return to near baseline physical activity or neurologic status: yes     Discharge Planner Nurse      Safe discharge environment identified: Yes  Barriers to discharge: Yes       Entered by: Ivett Maurice 10/16/2020 10:18 AM        Please review provider order for any additional goals.   Nurse to notify provider when observation goals have been met and patient is ready for discharge.     Patient alert and oriented. Patient ambulated without dizziness in halls.   SL. Denies chest pain and SOB. Patient weaned from 2L to RA and 02 fine without 02. Lives alone with home care. Orthostatics done and neg. Cards and Neph to see patient. On tele- SR hr 75. PT consult. Echo pending. Patient at [facility] currently.  2 gram NA diet, no caffeine diet ordered.

## 2020-10-16 NOTE — ED NOTES
[facility]  ED Nurse Handoff Report    ED Chief complaint: Chest Pain      ED Diagnosis:   Final diagnoses:   None       Code Status: not discussed by ED RN     Allergies:   Allergies   Allergen Reactions     Contrast Dye Hives     Does fine if he uses benadryl prior.     No Clinical Screening - See Comments      Green beans - Diarrhea.    Topical antibiotic - name unknown - caused swelling of the penis.       Patient Story: 74M coming for  Intermittent CP, SOB, and lightheadedness.  Focused Assessment:  Pt feeling better on arrival. Pt was dizzy after he stood today and started walking. pt states this is abnormal for him. Unable to interrogate pacemaker due to brand pt has.   Labs Ordered and Resulted from Time of ED Arrival Up to the Time of Departure from the ED   CBC WITH PLATELETS DIFFERENTIAL - Abnormal; Notable for the following components:       Result Value    RBC Count 3.20 (*)     Hemoglobin 10.8 (*)     Hematocrit 33.1 (*)      (*)     MCH 33.8 (*)     RDW 16.4 (*)     All other components within normal limits   COMPREHENSIVE METABOLIC PANEL - Abnormal; Notable for the following components:    Glucose 145 (*)     Creatinine 3.23 (*)     GFR Estimate 18 (*)     GFR Estimate If Black 21 (*)     Albumin 3.2 (*)     Protein Total 6.6 (*)     Alkaline Phosphatase 234 (*)     All other components within normal limits   NT PROBNP INPATIENT - Abnormal; Notable for the following components:    N-Terminal Pro BNP Inpatient 150,621 (*)     All other components within normal limits   INR - Abnormal; Notable for the following components:    INR 1.31 (*)     All other components within normal limits   TROPONIN I   LIPASE         Treatments and/or interventions provided:   Patient's response to treatments and/or interventions:     To be done/followed up on inpatient unit:      Does this patient have any cognitive concerns?: No    Activity level - Baseline/Home:  Walks with cane or  walker  Activity Level - Current:   Unknown    Patient's Preferred language: English   Needed?: No    Isolation: None  Infection: Not Applicable  Bariatric?: No    Vital Signs:   Vitals:    10/15/20 1809 10/15/20 1810 10/15/20 2000 10/15/20 2030   BP:  128/60 135/76 (!) 135/101   Pulse:  85 80 90   Resp: 17 8 10 19   Temp: 98  F (36.7  C)      TempSrc: Oral      SpO2:  100% 100%        Cardiac Rhythm:     Was the PSS-3 completed:   yes  What interventions are required if any?               Family Comments:   OBS brochure/video discussed/provided to patient/family: N/A              Name of person given brochure if not patient:               Relationship to patient:     For the majority of the shift this patient's behavior was Green.   Behavioral interventions performed were .    ED NURSE PHONE NUMBER: [phone]

## 2020-10-16 NOTE — PROGRESS NOTES
Still waiting for echo to be read (usually read at [facility] by CT reader of the day) that was done this morning. Will discuss with patient once read.

## 2020-10-16 NOTE — CONSULTS
Consult Date:  10/16/2020      REFERRING PHYSICIAN:  Hospitalist Service.      INDICATION FOR CARDIAC CONSULTATION:  Syncope.      HISTORY OF PRESENT ILLNESS:  Dear doctor, it is my pleasure to see your patient, Westley Ness, who is a patient normally followed by my partner, Dr. Po Sen in our clinic.  He has an extensive cardiac history.  This patient has an ischemic cardiomyopathy with ejection fraction 25-30% range and has had an AICD implanted as he fulfilled MADIT-II criteria.  This patient also has mild aortic dilatation.  This patient has a chronically occluded left anterior descending artery.  Nuclear stress testing in the past has not shown much viability in the LAD distribution.  He had a non-ST elevation myocardial infarct in 2016 and he underwent a drug-eluting stent placement in the proximal circumflex and obtuse marginal artery branch in 2016.  Later that year, he had a recurrent event and had angioplasty to the first obtuse marginal artery branch for restenosis.  This patient has end-stage renal disease and is on dialysis.  This patient has occasional episodes of chest discomfort requiring sublingual nitroglycerin.      With that background in mind, the patient yesterday got up from lying down.  He went up to get to his office and then he had an episode of where he felt dizzy.  He describes the dizziness as his surroundings spinning.  He did not feel like he was going to lose consciousness.  He eventually was able to get to a sitting position by going from 1 piece of furniture to the other.  At no stage did he feel that he was going to lose consciousness with this.  He called 911 and came into the emergency room.  He was having some chest discomfort.  He described it as a 0.5/10, which lasted for maybe 2 hours.  This was occurring during the time he was having the vertiginous symptoms.  His first cardiac enzyme was undetectable.  He has had an echocardiogram performed just an hour ago, but the  results are not yet available.  His 12-lead electrocardiogram on arrival to the hospital showed that the patient was in sinus rhythm with no acute ischemic changes.  He does have poor R-wave progression, which would be consistent with a prior anterior myocardial infarct.  Small Q-waves are present in V2-V3.      Last interrogation of his AICD was approximately a month and a half ago, and this showed 34 nonsustained VT episodes  These were varied between 7 and 17 beats with heart rates varying from 140-180.  Some of the episodes were likely atrial fibrillation or atrial flutter.  This patient has had a past history of atrial flutter ablation.  The patient is on chronic Eliquis therapy.      This patient also has hyperlipidemia and taking Pravachol.       The patient did have orthostatic pressures measured here and when I came in to see the patient this morning the nursing staff were just performing orthostatic blood pressures and he does not have any significant orthostatic drop.      PAST MEDICAL HISTORY:   1.  Coronary artery disease, which is extensive, as described above.   2.  Atrial fibrillation, atrial flutter and status post atrial flutter ablation in the past.   3.  Status post AICD implantation.  Status post multiple coronary interventions as described above.   4.  Ischemic cardiomyopathy as described above.   5.  Type 2 diabetes mellitus.   6.  Hypothyroidism.   7.  End-stage renal disease on hemodialysis.      PAST SURGICAL HISTORY:   1.  Cholecystectomy in 2013.   2.  Elbow surgery with plates in situ.   3.  Hemodialysis fistula with repair of fistula.   4.  Tonsillectomy and adenoidectomy.      FAMILY HISTORY:  Father with hypertension.  Mother had cerebrovascular disease later in life.  Paternal grandmother had a myocardial infarct.  There is no family history of diabetes mellitus.      SOCIAL HISTORY:  He stopped smoking about 23 years ago.  He has a 70-pack-year history. Occasionally drinks alcohol,  socially in the past.      MEDICATIONS:   1.  Apixaban 2.5 mg twice a day.   2.  Isosorbide mononitrate 15 mg 3 times weekly.   3.  Levothyroxine 50 mcg per day.   4.  Lisinopril 2.5 mg 2 times a day.   5.  Imodium 2 mg orally 3 times weekly.   6.  Imodium 2 mg 5 times weekly, not on dialysis days.   7.  Metoprolol succinate 12.5 mg per day.   8.  Mexiletine 150 mg 2 times a day.   9.  Multivitamin renal capsule, 1 capsule every evening.   10.  Omeprazole 20 mg per day.   11.  Protonix 40 mg every morning.   12.  Pravastatin 40 mg every evening.      ALLERGIES:  GREEN BEANS CAUSE DIARRHEA AND TOPICAL ANTIBIOTIC CAUSES SWELLING OF THE PENIS.      REVIEW OF SYSTEMS:   CONSTITUTIONAL:  Negative.   EYES:  Negative.   ENT:  Negative.   CARDIOVASCULAR:  As above.   RESPIRATORY:  Nil.   ABDOMEN:  Abdominal discomfort ever since Zantac was stopped and has lost 8 kilos in weight.   GENITOURINARY:  On dialysis.   NEUROLOGICAL:  As above.   PSYCHIATRIC:  Nil.   ENDOCRINE:  Nil.   HEMATOLYMPHATIC:  Anemia.   ALLERGY/IMMUNOLOGY:  As above.      PHYSICAL EXAMINATION:   GENERAL:  He is a very pleasant man in no apparent distress.  He looks and acts older than his years.   VITAL SIGNS:  His blood pressure is 143/65.  As I mentioned above, he did not have any significant orthostatic drop on sitting and standing blood pressures.  His pulse rate is 81 beats per minute, sinus rhythm.  Temperature is 95.7.  O2 sats are 17, 95% O2 sats.   HEENT:  He is wearing spectacles.  His pupils are equal.  He has normal facial symmetry.   NECK:  Carotids are normal with no bruits.  Jugular venous pulse is not raised.   HEART:  Charlotte beat is displaced to the anterior axillary line.  Heart sound:  1 normal heart sound, 2 normal.  Soft systolic ejection murmur heard in the aortic area.   CHEST:  Clear to percussion and auscultation.   ABDOMEN:  Reveals moderate truncal obesity, otherwise no organomegaly or tenderness.   EXTREMITIES:  He has 1+ distal  pulses.  He has 1+ bilateral ankle edema.   SKIN:  Examination of the skin is unremarkable.  He has a fistula in the left arm.   NEUROLOGIC:  He moves all 4 limbs appropriately with no obvious sensory or motor loss.  He is fully oriented to time, place and person with a normal affect.  AICD generator in the left infraclavicular region and it appears to be normal.      LABORATORY INVESTIGATIONS:  Sodium is 139, potassium is 4.3, BUN is 15, creatinine is 3.23, albumin is mildly reduced at 3.2, total protein mildly reduced at 6.6, alkaline phosphatase is raised at 234.  ProBNP is markedly raised at 150,621.  First troponin is undetectable at less than 0.015.  No other troponin has been drawn yet.  White cell count 6.2, hemoglobin 10.8, platelet count 199,000.  COVID negative.      IMPRESSION:   1.  The symptom of dizziness was not a near-syncopal episode.  The symptoms sounded very vertiginous.  He clearly had spinning of his surroundings with these symptoms.  He did not feel like he was going to lose consciousness.  He felt that he was going to lose balance.  No orthostatic changes as I mentioned above were noted on sitting and standing blood pressure checks today.   2.  Chest pain.  This has been an ongoing issue.  Dr. Sen, his own cardiologist did want to have a nuclear stress test performed at some stage.   2.  Short-lived nonsustained ventricular tachycardia on interrogation of his pacemaker in September.   3.  Paroxysmal atrial fibrillation for which he is taking apixaban.  Status post atrial flutter ablation in the past.   4.  Coronary artery disease as described above.  Known chronically occluded left anterior descending artery.   5.  Ischemic cardiomyopathy with an EF in the 25-30%.  We are awaiting this morning's echocardiogram result.   6.  End-stage renal disease on dialysis.   7.  Gastrointestinal issues and weight loss since Zantac was stopped.   8.  Normotensive.      PLAN:   1.  Firstly, we will await  the results of the echocardiogram to see if there has been any drop in ejection fraction or any other changes that could account for some of his symptoms.   2.  We will have his AICD interrogated to see if there has been any new arrhythmias.   3.  We will obtain a Lexiscan study.  I would like to get the study performed today and if we could postpone dialysis until this afternoon or tomorrow morning that would be best since this is Friday and we would like to get this done before the weekend.  I will continue him on all his present medications which seemed to be excellent.     4.  Finally, one want to get a neurological involvement since the main symptom was vertigo, especially if the cardiac investigations turn up negative.      Many thanks for allowing me to be involved in the care of this nice patient.         AYLA HAMMOND MD, [facility]             D: 10/16/2020   T: 10/16/2020   MT: SENTHIL      Name:     SOCORRO LÓPEZ   MRN:      [id number]        Account:       [id number]   :      1945           Consult Date:  10/16/2020      Document: [id number]

## 2020-10-16 NOTE — PROGRESS NOTES
Vitals: wnl  Neuro: alert and oriented  GI:wnl, last bowel movement 10/16  : on dialysis- voids very little    Skin: wnl  Activity: dry, bruised   Diet: Low Na diet, no caffeine diet. Did not eat lunch or breakfast.   Pain: denies, taking tylenol preventatively for leg pain   Plan: Cardiology following, neph following, IV heparin, angio Monday, continue tele monitor

## 2020-10-16 NOTE — CONSULTS
Care Management Initial Consult    General Information  Assessment completed with:: Patient,    Type of CM/SW Visit: Initial Assessment  Primary Care Provider verified: Yes        Reason for Consult: care coordination/care conference, discharge planning  Advance Care Planning: Advance Care Planning Reviewed: no concerns identified          Communication Assessment  Patient's communication style: spoken language (English or Bilingual)  Hearing Difficulty or Deaf: no Wear Glasses or Blind: yes    Cognitive  Cognitive/Neuro/Behavioral: WDL                      Living Environment:   People in home: alone     Current living Arrangements: house          Family/Social Support:  Care provided by: self, other (see comments)(private pay helper)  Provides care for: no one  Marital Status: Single        Description of Support System:     His private pay homemakers have been with him for 12 years                   Current Resources:   Skilled Home Care Services:  none  Community Resources:  Private pay homemaker  Equipment currently used at home: cane, quad  Supplies currently used at home: none     Employment:  Employment Status: retired        Financial/Environmental Concerns:     none       Lifestyle & Psychosocial Needs:        Socioeconomic History     Marital status: Single     Spouse name: Not on file     Number of children: Not on file     Years of education: Not on file     Highest education level: Not on file   Occupational History     Occupation: Retired - CPA     Tobacco Use     Smoking status: Former Smoker     Packs/day: 2.00     Years: 35.00     Pack years: 70.00     Types: Cigarettes     Start date:      Quit date: 1996     Years since quittin.9     Smokeless tobacco: Never Used   Substance and Sexual Activity     Alcohol use: Not Currently     Alcohol/week: 0.0 standard drinks     Frequency: Never     Drug use: No     Sexual activity: Never       Functional Status:  Prior to admission patient  needed assistance:          Mental Health Status:  WDL               Additional Information:  Patient is a delightful man.  ,621. Not noted to have diagnosis of CHF.  He has had multiple heart issues over the years and follows with [facility] cardiology.He has a nephrologist, Dr. Kumar and has dialysis MWF at [facility] in [place].  Patient said he sort of follows both a cardiac and renal diet.  He does follow with nutritionist. He gets weighed at dialysis.  He is down 8 kg since March as his medications changed.  No gaps in care identified.  He declined appointment support.    Amina Pulido RN, BSN, PHN, CTS  Care Coordinator  [facility] [place]  [phone]

## 2020-10-16 NOTE — H&P
[facility]    History and Physical  Hospitalist       Date of Admission:  10/16/2020  Date of Service (when I saw the patient): 10/16/20    Assessment & Plan   Westley Ness is a 74 year old male patient who is very extensive past medical history including congestive heart failure, ischemic cardiomyopathy with EF but 25 to 30% status post AICD, coronary artery disease status post multiple non-STEMI and PCI's, hypertension, end-stage renal disease on hemodialysis on MWF, type 2 diabetes mellitus, GERD, was sent from [facility] emergency room after he presented there with a complaint of dizziness and chest pain.     1.  Dizziness, lightheadedness, chest pain  Likely symptoms are related to his complicated cardiac disease.  He does have ischemic cardiomyopathy with low EF at 25 to 30%, status post AICD.  He denies syncopal episode or fall.    Echocardiogram on 3/7/2019 showed EF at 25 to 30%.  We will do echocardiogram in him.  Will likely need AICD interrogation.  Will consult cardiology given his complex cardiac history and ongoing symptoms with dizziness and chest pain.  We will recheck troponin.  Will resume his cardiac medications.    2.  Acute on chronic systolic congestive heart failure  He does not appear to be on diuretic at home.  Chest x-ray showed pulmonary vascular congestion.  ,000.  Chest x-ray showed pulmonary vascular congestion  His dialysis is on Mondays, Wednesdays and Fridays.  Will consult nephrology for dialysis management.    3.  Coronary artery disease with multiple PCI's and stents past  He stated that he had minimal chest pain.  Currently denies any chest pain.  Will resume his cardiac medications including metoprolol, isosorbide, lisinopril, nitroglycerin, pravastatin once dose reviewed by pharmacy    4.Ischemic cardiomyopathy with EF 25 to 30%, status post AICD  He stated that his AICD was not interrogated today.  Will order ICD  interrogation.  Echocardiogram ordered.  He is on apixaban    5.  Hypothyroidism: We will resume Synthroid    Asymptomatic COVID-19 pending    We will admit patient to observation unit.  DVT Prophylaxis: Pneumatic Compression Devices  Code Status: Full Code    Disposition: Expected discharge in 1-2 days.    Tej Colunga MD    Primary Care Physician   Josselin Kolb    Chief Complaint   Dizziness and chest pain    History is obtained from the patient    History of Present Illness   Westley Ness is a 74 year old male patient who is very extensive past medical history including congestive heart failure, ischemic cardiomyopathy with EF but 25 to 30% status post AICD, coronary artery disease status post multiple non-STEMI and PCI's, hypertension, end-stage renal disease on hemodialysis on MWF, type 2 diabetes mellitus, GERD, was sent from [facility] emergency room after he presented there with a complaint of dizziness and chest pain.  Patient stated that he was laying down this afternoon and had chest pain.  Patient got up and started walking and he felt lightheaded and dizzy.  He denies passing out or fall.  He states that he was feeling weak.  He had no cough, fever, chills.  He also denies significant shortness of breath.  He had his dialysis yesterday.  He has no diarrhea or vomiting.  Reportedly he had a fall on 7/12/2020 for which he was evaluated.  He had no fractures.  His vital signs on arrival to [facility] showed blood pressure 135/101, temperature 98, pulse 90, oxygen saturation 90% on room air.  Laboratory work-up showed sodium 139, potassium 4.3, creatinine 3.23, bilirubin 1, ALT 19, AST 18.  ,000, troponin less than 0.015.  WBC 6.2, hemoglobin 10.8.  Chest x-ray showed cardiomegaly, vascular congestion.  CT of the head without contrast showed no evidence of acute intracranial pathology.  There was no bed available at [facility] and [facility]  Patient was transferred to [facility] for further evaluation and management.  He was admitted to observation unit.      Past Medical History    I have reviewed this patient's medical history and updated it with pertinent information if needed.   Past Medical History:   Diagnosis Date     Acid reflux disease      Benign essential hypertension      CAD (coronary artery disease)     s/p multiple NSTEMIs and PCI's     ESRD on hemodialysis (H)     MWF     History of atrial flutter     s/p ablation     Hypothyroidism      Ischemic cardiomyopathy     EF 25-30%, s/p AICD     Type 2 diabetes mellitus (H)     diet-controlled       Past Surgical History   I have reviewed this patient's surgical history and updated it with pertinent information if needed.  Past Surgical History:   Procedure Laterality Date     CHOLECYSTECTOMY, OPEN  2013     ELBOW SURGERY Left 2008    ORIF - plates still in place     EP ICD GENERATOR CHANGE SINGLE N/A 11/21/2019    Procedure: EP ICD Generator Change Single;  Surgeon: Jono Mejia MD;  Location:  HEART CARDIAC CATH LAB     H ABLATION ATRIAL FLUTTER  06/08/2017, 12/11/17     HC LEFT HEART CATHETERIZATION  7/14/2016     HC LEFT HEART CATHETERIZATION  9/21/2016     IMPLANT VENTRICULAR DEVICE  08/15/2011     IR DIALYSIS FISTULOGRAM LEFT  7/22/2019     REPAIR FISTULA ARTERIOVENOUS UPPER EXTREMITY Left 3/5/2019    Procedure: REPAIR LEFT UPPER ARM ARTERIOVENOUS FISTULA SKIN ULCER;  Surgeon: Westley Griggs MD;  Location:  OR     TONSILLECTOMY & ADENOIDECTOMY       VASCULAR SURGERY  2004, 2010    LUE fistulas (upper and lower); upper one is functional       Prior to Admission Medications   Prior to Admission Medications   Prescriptions Last Dose Informant Patient Reported? Taking?   MULTIVITAMIN RENAL 1 MG capsule   No No   Sig: TAKE 1 CAPSULE BY MOUTH EVERY EVENING   Metoprolol Succinate 25 MG CS24   No No   Sig: Take 12.5 mg by mouth daily   acetaminophen  (TYLENOL) 500 MG tablet  Self Yes No   Sig: Take 1,000 mg by mouth 4 times daily as needed for mild pain (500mg x 2 = 1,000mg)   apixaban ANTICOAGULANT (ELIQUIS ANTICOAGULANT) 2.5 MG tablet   No No   Sig: Take 1 tablet (2.5 mg) by mouth 2 times daily   isosorbide mononitrate (IMDUR) 30 MG 24 hr tablet   Yes No   Sig: Take 30 mg by mouth five times a week On non-dialysis days - T, TH, SA, RUSSO   isosorbide mononitrate (IMDUR) 30 MG 24 hr tablet   Yes No   Sig: Take 15 mg by mouth three times a week On dialysis days [facility]   levothyroxine (SYNTHROID/LEVOTHROID) 50 MCG tablet   No No   Sig: TAKE 1 TABLET (50 MCG) BY MOUTH DAILY   lisinopril (ZESTRIL) 2.5 MG tablet   No No   Sig: TAKE 1 TABLET (2.5 MG) BY MOUTH 2 TIMES DAILY (TAKE ONE TABLET AFTER DIALYSIS. TAKE SECOND TABLET AT BEDTIME.)   loperamide (IMODIUM A-D) 2 MG tablet   Yes No   Sig: Take 2 mg by mouth three times a week [facility] on dialysis days   loperamide (IMODIUM A-D) 2 MG tablet   Yes No   Sig: Take 1 mg by mouth five times a week On non-dialysis days - Tu, Th, Sa, Russo   mexiletine (MEXITIL) 150 MG capsule   No No   Sig: Take 1 capsule (150 mg) by mouth 2 times daily   nitroGLYcerin (NITROSTAT) 0.4 MG sublingual tablet   No No   Sig: Place 1 tablet (0.4 mg) under the tongue every 5 minutes as needed for chest pain UP TO 3 PER EPISODE   omeprazole (PRILOSEC) 20 MG DR capsule   Yes No   Sig: Take 20 mg by mouth daily   pantoprazole (PROTONIX) 40 MG EC tablet   No No   Sig: TAKE 1 TABLET (40 MG) BY MOUTH EVERY MORNING   pravastatin (PRAVACHOL) 40 MG tablet   No No   Sig: TAKE 1 TABLET (40 MG) BY MOUTH DAILY      Facility-Administered Medications: None     Allergies   Allergies   Allergen Reactions     Contrast Dye Hives     Does fine if he uses benadryl prior.     No Clinical Screening - See Comments      Green beans - Diarrhea.    Topical antibiotic - name unknown - caused swelling of the penis.       Social History   I have reviewed this patient's social history and  updated it with pertinent information if needed. Westley Ness  reports that he quit smoking about 23 years ago. His smoking use included cigarettes. He started smoking about 55 years ago. He has a 70.00 pack-year smoking history. He has never used smokeless tobacco. He reports previous alcohol use. He reports that he does not use drugs.    Family History   I have reviewed this patient's family history and updated it with pertinent information if needed.   Family History   Problem Relation Age of Onset     Cerebrovascular Disease Mother         later in life     Hypertension Father      Bladder Cancer Father      Myocardial Infarction Paternal Grandmother      Diabetes No family hx of      Prostate Cancer No family hx of      Colon Cancer No family hx of        Review of Systems   The 10 point Review of Systems is negative other than noted in the HPI or here.  Dizziness, chest pain    Physical Exam   Temp: 98.1  F (36.7  C) Temp src: Oral BP: 134/66 Pulse: 86   Resp: 18 SpO2: 100 % O2 Device: Nasal cannula Oxygen Delivery: 2 LPM  Vital Signs with Ranges  Temp:  [98  F (36.7  C)-98.1  F (36.7  C)] 98.1  F (36.7  C)  Pulse:  [75-90] 86  Resp:  [8-21] 18  BP: (126-138)/() 134/66  SpO2:  [100 %] 100 %  178 lbs 14.4 oz    GEN:  Alert, oriented x 3, appears comfortable, NAD.  HEENT:  Normocephalic/atraumatic, no scleral icterus, no nasal discharge, mouth moist.  CV:  Regular rate and rhythm, no murmur or JVD.  S1 + S2 noted, no S3 or S4.  LUNGS:  Clear to auscultation bilaterally without rales/rhonchi/wheezing/retractions.  Symmetric chest rise on inhalation noted.  ABD:  Active bowel sounds, soft, non-tender/non-distended.  No rebound/guarding/rigidity.  EXT:  No edema or cyanosis.  Hands/feet warm to touch with good signs of peripheral perfusion.  No joint synovitis noted.  SKIN:  Dry to touch, no exanthems noted in the visualized areas.  NEURO:  Symmetric muscle strength, sensation to touch grossly intact.  No  new focal deficits appreciated.    Data   Data reviewed today:  I personally reviewed   Recent Labs   Lab 10/15/20  1830   WBC 6.2   HGB 10.8*   *      INR 1.31*      POTASSIUM 4.3   CHLORIDE 104   CO2 30   BUN 15   CR 3.23*   ANIONGAP 5   CHRISTINE 9.5   *   ALBUMIN 3.2*   PROTTOTAL 6.6*   BILITOTAL 1.0   ALKPHOS 234*   ALT 19   AST 18   LIPASE 77   TROPI <0.015       Recent Results (from the past 24 hour(s))   XR Chest 2 Views    Narrative    CHEST TWO VIEWS    10/15/2020 7:20 PM     HISTORY: Chest pain    COMPARISON: 7/13/2020.      Impression    IMPRESSION: Cardiomegaly again identified appearing stable. Vascular  congestion again noted as well. No new focal airspace disease. Small  posterior effusions are slightly larger since 7/13/2020.    MELISA HEWITT MD   XR Knee Right 3 Views    Narrative    EXAM: XR KNEE RT 3 VW  LOCATION: [facility]  DATE/TIME: 10/15/2020 7:06 PM    INDICATION: Knee pain.  COMPARISON: None.      Impression    IMPRESSION: Diffuse demineralization. No evidence for fracture. No synovial effusion or evidence for lipohemarthrosis. Mild degenerative narrowing of the medial compartment. Chronic enthesitis along the superior pole of the patella at the quadriceps   attachment. Vascular calcifications.   CT Head w/o Contrast    Narrative    CT SCAN OF THE HEAD WITHOUT CONTRAST   10/15/2020 7:33 PM     HISTORY: dizziness, headache    TECHNIQUE:  Axial images of the head and coronal reformations without  IV contrast material. Radiation dose for this scan was reduced using  automated exposure control, adjustment of the mA and/or kV according  to patient size, or iterative reconstruction technique.    COMPARISON: CT dated 7/13/2020    FINDINGS:     Intracranial contents: There is diffuse parenchymal volume loss.   White matter changes are present in the cerebral hemispheres that are  consistent with small vessel ischemic disease in this age patient.  There is no  evidence of intracranial hemorrhage, mass, acute infarct  or anomaly.    Visualized orbits/sinuses/mastoids:  Sclerosis and thickening of the  walls of the left maxillary sinus. This is unchanged. Mucosal  thickening left frontal sinus.    Osseous structures/soft tissues:  There is no evidence of trauma.      Impression    IMPRESSION: No acute pathology, no bleed, mass, or acute infarcts. No  change.      KILLIAN MACKEY MD

## 2020-10-16 NOTE — PROGRESS NOTES
"[facility]    Hospitalist Progress Note      Assessment & Plan   Westley Ness is a 74 year old male with PMhx of ESRD on HD, CAD s/p PCI with known chronically occluded LAD, CHF s/p AICD, paroxysmal a-flutter s/p ablation, DM2, who was admitted to observation after transferring from [facility] ED on 10/16/2020 with complaints of dizziness and chest pain.   Presented with stable VS. Labs notable for ESRD, Tn I of 0.015, INR 1.31, NT ,621, Hgb 10.8. NCHCT was negative. ECG showed SR. CXR with cardiomegaly and vascular congestion with small posterior effusions seen on prior.    Active Hospital Problems      #Dizziness  #Chest Pain  Presented with c/o 30 sec-1min \"room spinning\" after movement from lying position the afternoon of 10/15. No associated neurologic complaints or deficits. Also described chest pain that developed at rest prior to development of these symptoms. No syncope or nystagmus. Orthostatics negative.  No associated DACOSTA.  Symptoms could be concerning for an episodic vestibular mechanism disturbance if not cardiac.  Serial Tn I 0.028 <0 0.015, no recurrence of symptoms or dynamic changes on cardiac telemetry.  He is anticoagulated, NCHCT was negative for acute hemorrhage.   His symptoms were largely resolved by the time of admission but was transferred from [facility] as it appears no beds were available.   -Cardiology was consulted in the patient's care by the admitting provider. Carlita scan has been ordered for this morning, TTE results pending  -If symptoms fail to improve and cardiac work-up negative for cause may pursue treatment trial for vertigo vs. vestibular PT, short taper of steroid therapy, Neurology consultation   -At this time would hold off on MRI for further evaluation of symptoms, as they are improved. He is without neurologic deficits and pacemaker likely not compatible.     Chronic Medical Conditions    #Chronic Systolic Dysfunction with Ischemic " Cardiomyopathy, s/p AICD placement  #CAD   #Mild aortic root dilation  Appears volume appropriate. No c/o decreased ex. Tolerance, weight gain or dyspnea. Episode of nonsustained vtach on telemetry, asymptomatic.   History of known CAD, severe cardiomyopathy ( prior est LVEF est 25-30%), NSTEMI 2016 s/p PCI to prox LCx, obtuse Marginal which re-stenosed and treated with angioplasty of that branch later that year. Chronic occluded LAD.   Follows with Dr. Sen.   -Cardiac work up as noted above, cardiology following   -AICD to be interrogated for recent remote rhythm disturbances      #History of proximal atrial flutter  SR. S/p 2 ablations. Severe LA dilation on prior TTE. Has been resumed on PTA Eliquis.    #ESRD on hemodialysis~15 years (MWF)  -Nephrology consulted  -will need dialysis after Carlita     #Type 2 diabetes mellitus: Diet controlled. A1c not recently updated, last 5.1    #Hypothyroidism: Prior to admission levothyroxine.    DVT Prophylaxis: eliquis  Code Status: Full Code  Expected discharge: Tomorrow, recommended to prior living arrangement once dialyzed, work up completed.    Family updated with plan of care: patient denied my offer to update family or friends.  COVID PCR TESTING Status: negative 10/15.     Yolande Choi PA-C  Text Page (7am - 5pm, M-F)    Interval History   No further dizziness, nausea. No headache. No emesis. Ambulating with walker SBA no complaints.  No Chest pain or shortness of breath. He feels his phosphorous is low.   Discussed his stress test, he does not want dialysis today.    -Data reviewed today: I reviewed all new labs and imaging results over the last 24 hours. I personally reviewed cardiac telemetry, EKG, chest x-ray, head CT as noted above.    Physical Exam     Vitals:    10/16/20 0131   Weight: 81.1 kg (178 lb 14.4 oz)     Vital Signs with Ranges  Temp:  [95.7  F (35.4  C)-98.1  F (36.7  C)] 95.7  F (35.4  C)  Pulse:  [72-90] 81  Resp:  [8-21] 17  BP:  (121-138)/() 133/65  SpO2:  [95 %-100 %] 95 %  No intake/output data recorded.      Constitutional:Awake, alert, no apparent distress  Respiratory:  Normal work of breathing. Lungs clear to auscultation bilaterally, no crackles or wheezing.  Cardiovascular: Regular rate and rhythm, normal S1 and S2, systolic murmur. No JVD.  GI: Normal bowel sounds, soft, non-distended, non-tender.   Skin/Integument: No rashes, no cyanosis, +1 LE edema bilaterally.  Neuro: Moving all extremities with normal strength. Coordination and sensation grossly intact. Speech clear. No focal deficits.   MK: Fistula L arm. L AICD chest wall.  Psych: Appropriate affect.            Medications       apixaban ANTICOAGULANT  2.5 mg Oral BID     isosorbide mononitrate  15 mg Oral Three Times Weekly     levothyroxine  50 mcg Oral Daily     lisinopril  2.5 mg Oral BID     loperamide  2 mg Oral Three Times Weekly     [START ON 10/17/2020] loperamide  2 mg Oral FIVE Times Weekly     metoprolol succinate  12.5 mg Oral Daily     mexiletine  150 mg Oral BID     multivitamin RENAL  1 capsule Oral QPM     omeprazole  20 mg Oral Daily     pantoprazole  40 mg Oral QAM AC     pravastatin  40 mg Oral QPM     technetium Tc 99m tetrofosmin 2UD study  3-42 mCi Intravenous Q2H       Data   Recent Labs   Lab 10/15/20  1830   WBC 6.2   HGB 10.8*   *      INR 1.31*      POTASSIUM 4.3   CHLORIDE 104   CO2 30   BUN 15   CR 3.23*   ANIONGAP 5   CHRISTINE 9.5   *   ALBUMIN 3.2*   PROTTOTAL 6.6*   BILITOTAL 1.0   ALKPHOS 234*   ALT 19   AST 18   LIPASE 77   TROPI <0.015       Recent Results (from the past 24 hour(s))   XR Chest 2 Views    Narrative    CHEST TWO VIEWS    10/15/2020 7:20 PM     HISTORY: Chest pain    COMPARISON: 7/13/2020.      Impression    IMPRESSION: Cardiomegaly again identified appearing stable. Vascular  congestion again noted as well. No new focal airspace disease. Small  posterior effusions are slightly larger since  7/13/2020.    MELISA HEWITT MD   XR Knee Right 3 Views    Narrative    EXAM: XR KNEE RT 3 VW  LOCATION: [facility]  DATE/TIME: 10/15/2020 7:06 PM    INDICATION: Knee pain.  COMPARISON: None.      Impression    IMPRESSION: Diffuse demineralization. No evidence for fracture. No synovial effusion or evidence for lipohemarthrosis. Mild degenerative narrowing of the medial compartment. Chronic enthesitis along the superior pole of the patella at the quadriceps   attachment. Vascular calcifications.   CT Head w/o Contrast    Narrative    CT SCAN OF THE HEAD WITHOUT CONTRAST   10/15/2020 7:33 PM     HISTORY: dizziness, headache    TECHNIQUE:  Axial images of the head and coronal reformations without  IV contrast material. Radiation dose for this scan was reduced using  automated exposure control, adjustment of the mA and/or kV according  to patient size, or iterative reconstruction technique.    COMPARISON: CT dated 7/13/2020    FINDINGS:     Intracranial contents: There is diffuse parenchymal volume loss.   White matter changes are present in the cerebral hemispheres that are  consistent with small vessel ischemic disease in this age patient.  There is no evidence of intracranial hemorrhage, mass, acute infarct  or anomaly.    Visualized orbits/sinuses/mastoids:  Sclerosis and thickening of the  walls of the left maxillary sinus. This is unchanged. Mucosal  thickening left frontal sinus.    Osseous structures/soft tissues:  There is no evidence of trauma.      Impression    IMPRESSION: No acute pathology, no bleed, mass, or acute infarcts. No  change.      KILLIAN MACKEY MD   NM MPI w Lexiscan   Result Value    Target

## 2020-10-16 NOTE — Clinical Note
The first balloon was inserted into the circumflex and marginal circumflex.Max pressure = 6 nilo. Total duration = 30 seconds.

## 2020-10-16 NOTE — PHARMACY-ADMISSION MEDICATION HISTORY
Pharmacy Medication History  Admission medication history interview status for the 10/15/2020  admission is complete. See EPIC admission navigator for prior to admission medications       Medication history sources: Patient  Location of interview: Phone  Medication history source reliability: Good  Adherence assessment: Good    Significant changes made to the medication list:  Added omeprazole in addition to Protonix.  Changed Isosorbide and Loperamide instructions for dialysis days and non-dialysis days      Additional medication history information:   none    Medication reconciliation completed by provider prior to medication history? No    Time spent in this activity: 15 minutes      Prior to Admission medications    Medication Sig Last Dose Taking? Auth Provider   acetaminophen (TYLENOL) 500 MG tablet Take 1,000 mg by mouth 4 times daily as needed for mild pain (500mg x 2 = 1,000mg)  at prn Yes Reported, Patient   apixaban ANTICOAGULANT (ELIQUIS ANTICOAGULANT) 2.5 MG tablet Take 1 tablet (2.5 mg) by mouth 2 times daily 10/15/2020 at x1 Yes Jono Mejia MD   isosorbide mononitrate (IMDUR) 30 MG 24 hr tablet Take 30 mg by mouth five times a week On non-dialysis days - T, TH, SA, CARD 10/13/2020 at am Yes Unknown, Entered By History   isosorbide mononitrate (IMDUR) 30 MG 24 hr tablet Take 15 mg by mouth three times a week On dialysis days MWF 10/14/2020 at am Yes Unknown, Entered By History   levothyroxine (SYNTHROID/LEVOTHROID) 50 MCG tablet TAKE 1 TABLET (50 MCG) BY MOUTH DAILY 10/15/2020 at am Yes Josselin Kolb MD   lisinopril (ZESTRIL) 2.5 MG tablet TAKE 1 TABLET (2.5 MG) BY MOUTH 2 TIMES DAILY (TAKE ONE TABLET AFTER DIALYSIS. TAKE SECOND TABLET AT BEDTIME.) 10/15/2020 at 12p Yes Josselin Kolb MD   loperamide (IMODIUM A-D) 2 MG tablet Take 2 mg by mouth three times a week MWF on dialysis days 10/14/2020 at am Yes Unknown, Entered By History   loperamide (IMODIUM A-D) 2 MG tablet Take 1 mg by  mouth five times a week On non-dialysis days - Tu, Th, Sa, [place] 10/15/2020 at am Yes Unknown, Entered By History   Metoprolol Succinate 25 MG CS24 Take 12.5 mg by mouth daily 10/15/2020 at 12p Yes Lacy Taylor, APRN CNP   mexiletine (MEXITIL) 150 MG capsule Take 1 capsule (150 mg) by mouth 2 times daily 10/15/2020 at am Yes Josselin Kolb MD   MULTIVITAMIN RENAL 1 MG capsule TAKE 1 CAPSULE BY MOUTH EVERY EVENING 10/14/2020 at pm Yes Josselin Kolb MD   nitroGLYcerin (NITROSTAT) 0.4 MG sublingual tablet Place 1 tablet (0.4 mg) under the tongue every 5 minutes as needed for chest pain UP TO 3 PER EPISODE  at prn Yes Josselin Kolb MD   omeprazole (PRILOSEC) 20 MG DR capsule Take 20 mg by mouth daily 10/15/2020 at 12p Yes Unknown, Entered By History   pantoprazole (PROTONIX) 40 MG EC tablet TAKE 1 TABLET (40 MG) BY MOUTH EVERY MORNING 10/15/2020 at am Yes Josselin Kolb MD   pravastatin (PRAVACHOL) 40 MG tablet TAKE 1 TABLET (40 MG) BY MOUTH DAILY 10/14/2020 at pm Yes Josselin Kolb MD Chelsea Mayer, PharmD  Inpatient Clinical Pharmacist  [phone]

## 2020-10-16 NOTE — PROGRESS NOTES
PRIMARY DIAGNOSIS: SYNCOPE  OUTPATIENT/OBSERVATION GOALS TO BE MET BEFORE DISCHARGE:  1. Orthostatic performed: Yes:          Lying Orthostatic BP: 133/65         Sitting Orthostatic BP: 141/66         Standing Orthostatic BP: 132/65     2. Diagnostic testing complete & at baseline neurologic testing: No- echo pending     3. Cleared by consultants (if involved): No-  Cardiology and neph consult     4. Interpretation of cardiac rhythm per telemetry tech: SR hr 75    5. Tolerating adequate PO diet and medications: No- has not had lunch yet but no nausea    6. Return to near baseline physical activity or neurologic status: No- needs to walk yet    Discharge Planner Nurse   Safe discharge environment identified: Yes  Barriers to discharge: Yes       Entered by: Ivett Maurice 10/16/2020 10:18 AM     Please review provider order for any additional goals.   Nurse to notify provider when observation goals have been met and patient is ready for discharge.    Patient alert and oriented. Patient denies dizziness at rest- will ambulate and access. SL. Denies chest pain and SOB. Patient weaned from 2L to RA and 02 fine without 02. Lives alone with home  care. Orthostatics done and neg. Cards and Neph to see patient. On tele- SR hr 75. PT consult. Echo pending. 2 gram NA diet, no caffeine diet ordered.

## 2020-10-16 NOTE — Clinical Note
The DP pulses are 1+ bilaterally. The PT pulses are 1+ bilaterally. The left radial pulse is non-palpable. The right radial pulse is 1+. The femoral pulses are 2+ bilaterally.

## 2020-10-16 NOTE — CONSULTS
RENAL CONSULTATION NOTE    REFERRING MD:  Tej Colunga MD    REASON FOR CONSULTATION:  ESRD    HPI:  74 y.o man with ESRD, CAD, ICM and DM, who was admitted on 10/16 for dizziness. He has an significant CAD s/p MI with FOREST to circumflex and OM in 2016. He had biventricular failure with LV function of 20-25%. He developed chest pain while at rest. He developed dizziness and lightheadedness as he was getting up. He called 911 and EMS brought him to [facility]. Troponin negative x 2. TTE resulting pending. Lexiscan showed moderate ischemia in the lateral left ventricle. Awaiting further recommendations from cardiology. Pt says he is too wipe-out for dialysis today. He would like to get dialysis tomorrow. Denies worsening shortness of breath. He is chest pain free. No N/V.     ROS:  A complete review of systems was performed and is negative except as noted above.    PMH:    Past Medical History:   Diagnosis Date     Acid reflux disease      Benign essential hypertension      CAD (coronary artery disease)     s/p multiple NSTEMIs and PCI's     ESRD on hemodialysis (H)     MWF     History of atrial flutter     s/p ablation     Hypothyroidism      Ischemic cardiomyopathy     EF 25-30%, s/p AICD     Type 2 diabetes mellitus (H)     diet-controlled       PSH:    Past Surgical History:   Procedure Laterality Date     CHOLECYSTECTOMY, OPEN  2013     ELBOW SURGERY Left 2008    ORIF - plates still in place     EP ICD GENERATOR CHANGE SINGLE N/A 11/21/2019    Procedure: EP ICD Generator Change Single;  Surgeon: Jono Mejia MD;  Location:  [facility]     H ABLATION ATRIAL FLUTTER  06/08/2017, 12/11/17     HC LEFT HEART CATHETERIZATION  7/14/2016     HC LEFT HEART CATHETERIZATION  9/21/2016     IMPLANT VENTRICULAR DEVICE  08/15/2011     IR DIALYSIS FISTULOGRAM LEFT  7/22/2019     REPAIR FISTULA ARTERIOVENOUS UPPER EXTREMITY Left 3/5/2019    Procedure: REPAIR LEFT UPPER ARM ARTERIOVENOUS FISTULA  SKIN ULCER;  Surgeon: Westley Griggs MD;  Location: [facility]     TONSILLECTOMY & ADENOIDECTOMY       VASCULAR SURGERY  2004, 2010    LUE fistulas (upper and lower); upper one is functional       MEDICATIONS:      isosorbide mononitrate  15 mg Oral Three Times Weekly     levothyroxine  50 mcg Oral Daily     lisinopril  2.5 mg Oral BID     loperamide  2 mg Oral Three Times Weekly     [START ON 10/17/2020] loperamide  2 mg Oral FIVE Times Weekly     metoprolol succinate  12.5 mg Oral Daily     mexiletine  150 mg Oral BID     multivitamin RENAL  1 capsule Oral QPM     omeprazole  20 mg Oral Daily     pantoprazole  40 mg Oral QAM AC     pravastatin  40 mg Oral QPM     regadenoson  0.4 mg Intravenous Once     technetium Tc 99m tetrofosmin 2UD study  3-42 mCi Intravenous Q2H       ALLERGIES:    Allergies as of 10/15/2020 - Reviewed 10/15/2020   Allergen Reaction Noted     Contrast dye Hives 07/10/2016     No clinical screening - see comments  12/06/2016       FH:    Family History   Problem Relation Age of Onset     Cerebrovascular Disease Mother         later in life     Hypertension Father      Bladder Cancer Father      Myocardial Infarction Paternal Grandmother      Diabetes No family hx of      Prostate Cancer No family hx of      Colon Cancer No family hx of        SH:    Social History     Socioeconomic History     Marital status: Single     Spouse name: Not on file     Number of children: Not on file     Years of education: Not on file     Highest education level: Not on file   Occupational History     Occupation: Retired - CPA   Social Needs     Financial resource strain: Not on file     Food insecurity     Worry: Not on file     Inability: Not on file     Transportation needs     Medical: Not on file     Non-medical: Not on file   Tobacco Use     Smoking status: Former Smoker     Packs/day: 2.00     Years: 35.00     Pack years: 70.00     Types: Cigarettes     Start date: 1965     Quit date: 11/1/1996      "Years since quittin.9     Smokeless tobacco: Never Used   Substance and Sexual Activity     Alcohol use: Not Currently     Alcohol/week: 0.0 standard drinks     Frequency: Never     Drug use: No     Sexual activity: Never   Lifestyle     Physical activity     Days per week: Not on file     Minutes per session: Not on file     Stress: Not on file   Relationships     Social connections     Talks on phone: Not on file     Gets together: Not on file     Attends Advent service: Not on file     Active member of club or organization: Not on file     Attends meetings of clubs or organizations: Not on file     Relationship status: Not on file     Intimate partner violence     Fear of current or ex partner: Not on file     Emotionally abused: Not on file     Physically abused: Not on file     Forced sexual activity: Not on file   Other Topics Concern     Parent/sibling w/ CABG, MI or angioplasty before 65F 55M? No      Service Not Asked     Blood Transfusions Not Asked     Caffeine Concern No     Occupational Exposure Not Asked     Hobby Hazards Not Asked     Sleep Concern No     Stress Concern Not Asked     Weight Concern Not Asked     Special Diet Yes     Back Care Not Asked     Exercise Yes     Comment: Walking     Bike Helmet Not Asked     Seat Belt Yes     Self-Exams Not Asked   Social History Narrative    Single.    No kids.     Maria Dolores Pabon (friend- healthcare POA), Joaquina Nair (friend - healthcare POA)    No formal exercise.        PHYSICAL EXAM:    /65   Pulse 81   Temp 95.7  F (35.4  C) (Oral)   Resp 17   Ht 1.727 m (5' 8\")   Wt 81.1 kg (178 lb 14.4 oz)   SpO2 95%   BMI 27.20 kg/m    GENERAL: pleasant, alert, NAD  HEENT:  Normocephalic. No gross abnormalities.  Pupils equal.  MMM.    CV: RRR, + murmurs, no clicks, gallops, or rubs, 1+edema  RESP: Clear bilaterally with good efforts. No wheezes or crackles.   GI: Abdomen soft,  NT  MUSCULOSKELETAL: extremities nl - no gross deformities " noted, 1+ edema b/l   SKIN: no suspicious lesions or rashes, dry to touch  NEURO: Generalized weakness. Awake and oriented x 3.   PSYCH: mood good, affect appropriate  LYMPH: No palpable ant/post cervical   L UE AVF    LABS:      CBC RESULTS:     Recent Labs   Lab 10/15/20  1830   WBC 6.2   RBC 3.20*   HGB 10.8*   HCT 33.1*          BMP RESULTS:  Recent Labs   Lab 10/15/20  1830      POTASSIUM 4.3   CHLORIDE 104   CO2 30   BUN 15   CR 3.23*   *   CHRISTINE 9.5       INR  Recent Labs   Lab 10/15/20  1830   INR 1.31*        DIAGNOSTICS:  Reviewed    A/P:  74 y.o man with ESRD and CAD, admitted for chest pain and dizziness.     # ESRD:    -3.5 hrs, EDW 79.5 kg ( ~ 0.5-1 liter), Qb 450, LAVF, + heparin   -MWF in [place]  # Anemia: 1600 units of Epogen  # CKD-MBD: 1 mcg of Hectorol  # CAD with BiV failure, EF of 20-25%. Lexiscan showed moderate of the left lateral wall.   # Hypervolemia: 1+ pitting edema  # Hypertension: BP is controlled.     Plan:  # Hemodialysis tomorrow.       Brady Kelsey MD  [facility] - Nephrology  Office Phone: [phone]  Pager: [phone]

## 2020-10-16 NOTE — PLAN OF CARE
PRIMARY DIAGNOSIS: CHEST PAIN  OUTPATIENT/OBSERVATION GOALS TO BE MET BEFORE DISCHARGE:  1. Ruled out acute coronary syndrome (negative or stable Troponin):  Trop Neg x1  2. Pain Status: Pain free.  3. Appropriate provocative testing performed: No  - Stress Test Procedure: Echo  - Interpretation of cardiac rhythm per telemetry tech: SR 60's    4. Cleared by Consultants (if applicable):No  5. Return to near baseline physical activity: No- Assist x2  Discharge Planner Nurse   Safe discharge environment identified: Yes  Barriers to discharge: Yes       Entered by: Vianey Bermudez 10/16/2020 6:57 AM     Direct admit from [facility] ED. Pt A&Ox4, VSS. Pt denies any chest pain/SOB. Assist x2 with cane d/t R knee pain. PRN tylenol given. Echo in AM. On dialysis M-W-F. Fistula L arm. Cards and nephrology consults. Will continue to monitor.   Please review provider order for any additional goals.   Nurse to notify provider when observation goals have been met and patient is ready for discharge.

## 2020-10-16 NOTE — PROGRESS NOTES
Cardiology consult dictated.  74-year-old man with known history of coronary artery disease and severe ischemic cardiomyopathy with an AICD in situ who also has a history of paroxysmal atrial fibrillation and previous atrial flutter ablation.  Presents with dizzy spells which sound distinctly vertiginous.  Feels that he is surroundings were spinning but he did not feel that he was going to lose consciousness.  Also complaining of very mild chest discomfort, half out of 10.  Has this before.  Episodes of nonsustained VT noted on his AICD interrogation in September which were asymptomatic.  Will await the results of the echocardiogram.  Will order Lexiscan and will be best if we could postpone the dialysis through this afternoon until the Lexiscan is done or may be performed tomorrow morning if possible.  We will have his AICD interrogated to see if any new arrhythmias which could explain his symptoms but again the dizziness sounds more vertiginous than presyncopal or syncopal.  We will continue to follow and await the results of the echocardiogram which has just been done this morning.

## 2020-10-16 NOTE — Clinical Note
Stent deployed in the ostium marginal circumflex. Max pressure = 11 nilo. Total duration = 20 seconds. Balloon reinflated a second time: Max pressure = 16 nilo. Total duration = 10 seconds.

## 2020-10-16 NOTE — PHARMACY-ADMISSION MEDICATION HISTORY
Admission medication history interview status for this patient is complete. See [facility] admission navigator for allergy information, prior to admission medications and immunization status.     Medication history interview done via telephone during Covid-19 pandemic, indicate source(s): Patient  Medication history resources (including written lists, pill bottles, clinic record): Medication history note from [facility] 10/15/2020  Pharmacy: [facility] (patient implied for short-course prescriptions on discharge, to fill at [facility], otherwise send to [facility])     Changes made to PTA medication list:  Added: N/A  Deleted: N/A  Changed: cleaned instructions for acetaminophen, changed metoprolol succinate from capsules --> tablets to reflect fill history     Actions taken by pharmacist (provider contacted, etc):None     Additional medication history information: Patient is a reliable historian, and fill history current for all prescriptions. Bill states he takes acetaminophen roughly every 5-6 hours, regularly PTA. Bill also confirms taking omeprazole with lunch daily, and pantoprazole every morning (unsure how long patient has been taking both, but Bill referenced previous use of ranitidine).     Last doses reflect that of medication history completed 10/15/20 at [facility]; added in last dose of acetaminophen after follow-up interview with patient. Doses administered at [facility] not reflected below.     Medication reconciliation/reorder completed by provider prior to medication history?  Y       Prior to Admission medications    Medication Sig Last Dose Taking? Auth Provider   acetaminophen (TYLENOL) 500 MG tablet Take 1,000 mg by mouth 4 times daily as needed for pain 10/15/2020 at 1500 Yes Unknown, Entered By History   apixaban ANTICOAGULANT (ELIQUIS ANTICOAGULANT) 2.5 MG tablet Take 1 tablet (2.5 mg) by mouth 2 times daily 10/15/2020 at X1 Yes Jono Mejia MD   isosorbide mononitrate (IMDUR) 30 MG 24  hr tablet Take 30 mg by mouth five times a week On non-dialysis days - T, TH, SA, CARD 10/13/2020 at AM Yes Unknown, Entered By History   isosorbide mononitrate (IMDUR) 30 MG 24 hr tablet Take 15 mg by mouth three times a week On dialysis days MWF 10/14/2020 at AM Yes Unknown, Entered By History   levothyroxine (SYNTHROID/LEVOTHROID) 50 MCG tablet TAKE 1 TABLET (50 MCG) BY MOUTH DAILY 10/15/2020 at AM Yes Josselin Kolb MD   lisinopril (ZESTRIL) 2.5 MG tablet TAKE 1 TABLET (2.5 MG) BY MOUTH 2 TIMES DAILY (TAKE ONE TABLET AFTER DIALYSIS. TAKE SECOND TABLET AT BEDTIME.) 10/15/2020 at 1200 Yes Josselin Kolb MD   loperamide (IMODIUM A-D) 2 MG tablet Take 2 mg by mouth three times a week [facility] on dialysis days 10/14/2020 at AM Yes Unknown, Entered By History   loperamide (IMODIUM A-D) 2 MG tablet Take 1 mg by mouth five times a week On non-dialysis days - Tu, Th, Sa, Card 10/15/2020 at AM Yes Unknown, Entered By History   metoprolol succinate ER (TOPROL-XL) 25 MG 24 hr tablet Take 12.5 mg by mouth daily 10/15/2020 at 1200 Yes Unknown, Entered By History   mexiletine (MEXITIL) 150 MG capsule Take 1 capsule (150 mg) by mouth 2 times daily 10/15/2020 at AM Yes Josselin Kolb MD   MULTIVITAMIN RENAL 1 MG capsule TAKE 1 CAPSULE BY MOUTH EVERY EVENING 10/14/2020 at PM Yes Josselin Kolb MD   omeprazole (PRILOSEC) 20 MG DR capsule Take 20 mg by mouth daily 10/15/2020 at 1200 Yes Unknown, Entered By History   pantoprazole (PROTONIX) 40 MG EC tablet TAKE 1 TABLET (40 MG) BY MOUTH EVERY MORNING 10/15/2020 at AM Yes Josselin Kolb MD   pravastatin (PRAVACHOL) 40 MG tablet TAKE 1 TABLET (40 MG) BY MOUTH DAILY 10/14/2020 at PM Yes Josselin Kolb MD   nitroGLYcerin (NITROSTAT) 0.4 MG sublingual tablet Place 1 tablet (0.4 mg) under the tongue every 5 minutes as needed for chest pain UP TO 3 PER EPISODE Unknown at Unknown time  Josselin Kolb MD Melanie Breuer  PGY-1 Pharmacy Practice Resident

## 2020-10-17 LAB
ANION GAP SERPL CALCULATED.3IONS-SCNC: 9 MMOL/L (ref 3–14)
APTT PPP: 136 SEC (ref 22–37)
APTT PPP: 48 SEC (ref 22–37)
BASOPHILS # BLD AUTO: 0.1 10E9/L (ref 0–0.2)
BASOPHILS NFR BLD AUTO: 1.3 %
BUN SERPL-MCNC: 20 MG/DL (ref 7–30)
CALCIUM SERPL-MCNC: 9 MG/DL (ref 8.5–10.1)
CHLORIDE SERPL-SCNC: 105 MMOL/L (ref 94–109)
CO2 SERPL-SCNC: 26 MMOL/L (ref 20–32)
CREAT SERPL-MCNC: 4.65 MG/DL (ref 0.66–1.25)
DIFFERENTIAL METHOD BLD: ABNORMAL
EOSINOPHIL # BLD AUTO: 0.4 10E9/L (ref 0–0.7)
EOSINOPHIL NFR BLD AUTO: 7.9 %
ERYTHROCYTE [DISTWIDTH] IN BLOOD BY AUTOMATED COUNT: 16.6 % (ref 10–15)
GFR SERPL CREATININE-BSD FRML MDRD: 11 ML/MIN/{1.73_M2}
GLUCOSE SERPL-MCNC: 79 MG/DL (ref 70–99)
HCT VFR BLD AUTO: 31.1 % (ref 40–53)
HGB BLD-MCNC: 9.8 G/DL (ref 13.3–17.7)
IMM GRANULOCYTES # BLD: 0 10E9/L (ref 0–0.4)
IMM GRANULOCYTES NFR BLD: 0.5 %
LMWH PPP CHRO-ACNC: 1.04 IU/ML
LMWH PPP CHRO-ACNC: 1.55 IU/ML
LYMPHOCYTES # BLD AUTO: 0.8 10E9/L (ref 0.8–5.3)
LYMPHOCYTES NFR BLD AUTO: 14.8 %
MAGNESIUM SERPL-MCNC: 1.4 MG/DL (ref 1.6–2.3)
MAGNESIUM SERPL-MCNC: 1.7 MG/DL (ref 1.6–2.3)
MCH RBC QN AUTO: 33.3 PG (ref 26.5–33)
MCHC RBC AUTO-ENTMCNC: 31.5 G/DL (ref 31.5–36.5)
MCV RBC AUTO: 106 FL (ref 78–100)
MONOCYTES # BLD AUTO: 0.4 10E9/L (ref 0–1.3)
MONOCYTES NFR BLD AUTO: 7.6 %
NEUTROPHILS # BLD AUTO: 3.8 10E9/L (ref 1.6–8.3)
NEUTROPHILS NFR BLD AUTO: 67.9 %
NRBC # BLD AUTO: 0 10*3/UL
NRBC BLD AUTO-RTO: 0 /100
PLATELET # BLD AUTO: 146 10E9/L (ref 150–450)
POTASSIUM SERPL-SCNC: 4.5 MMOL/L (ref 3.4–5.3)
RBC # BLD AUTO: 2.94 10E12/L (ref 4.4–5.9)
SODIUM SERPL-SCNC: 140 MMOL/L (ref 133–144)
WBC # BLD AUTO: 5.5 10E9/L (ref 4–11)

## 2020-10-17 PROCEDURE — 36415 COLL VENOUS BLD VENIPUNCTURE: CPT | Performed by: INTERNAL MEDICINE

## 2020-10-17 PROCEDURE — 250N000013 HC RX MED GY IP 250 OP 250 PS 637: Performed by: INTERNAL MEDICINE

## 2020-10-17 PROCEDURE — 85730 THROMBOPLASTIN TIME PARTIAL: CPT | Performed by: INTERNAL MEDICINE

## 2020-10-17 PROCEDURE — 99233 SBSQ HOSP IP/OBS HIGH 50: CPT | Performed by: INTERNAL MEDICINE

## 2020-10-17 PROCEDURE — 85520 HEPARIN ASSAY: CPT | Performed by: INTERNAL MEDICINE

## 2020-10-17 PROCEDURE — 93005 ELECTROCARDIOGRAM TRACING: CPT

## 2020-10-17 PROCEDURE — 87340 HEPATITIS B SURFACE AG IA: CPT | Performed by: INTERNAL MEDICINE

## 2020-10-17 PROCEDURE — 80048 BASIC METABOLIC PNL TOTAL CA: CPT | Performed by: INTERNAL MEDICINE

## 2020-10-17 PROCEDURE — 83735 ASSAY OF MAGNESIUM: CPT | Performed by: INTERNAL MEDICINE

## 2020-10-17 PROCEDURE — 120N000004 HC R&B MS OVERFLOW

## 2020-10-17 PROCEDURE — 634N000001 HC RX 634: Performed by: INTERNAL MEDICINE

## 2020-10-17 PROCEDURE — 5A1D90Z PERFORMANCE OF URINARY FILTRATION, CONTINUOUS, GREATER THAN 18 HOURS PER DAY: ICD-10-PCS | Performed by: HOSPITALIST

## 2020-10-17 PROCEDURE — 250N000013 HC RX MED GY IP 250 OP 250 PS 637: Performed by: PHYSICIAN ASSISTANT

## 2020-10-17 PROCEDURE — 258N000003 HC RX IP 258 OP 636: Performed by: INTERNAL MEDICINE

## 2020-10-17 PROCEDURE — 86706 HEP B SURFACE ANTIBODY: CPT | Performed by: INTERNAL MEDICINE

## 2020-10-17 PROCEDURE — 250N000011 HC RX IP 250 OP 636: Performed by: INTERNAL MEDICINE

## 2020-10-17 PROCEDURE — 85025 COMPLETE CBC W/AUTO DIFF WBC: CPT | Performed by: INTERNAL MEDICINE

## 2020-10-17 PROCEDURE — 90937 HEMODIALYSIS REPEATED EVAL: CPT

## 2020-10-17 RX ORDER — HEPARIN SODIUM 1000 [USP'U]/ML
500 INJECTION, SOLUTION INTRAVENOUS; SUBCUTANEOUS
Status: DISCONTINUED | OUTPATIENT
Start: 2020-10-17 | End: 2020-10-17

## 2020-10-17 RX ORDER — HEPARIN SODIUM 1000 [USP'U]/ML
500 INJECTION, SOLUTION INTRAVENOUS; SUBCUTANEOUS CONTINUOUS
Status: DISCONTINUED | OUTPATIENT
Start: 2020-10-17 | End: 2020-10-17

## 2020-10-17 RX ORDER — ALBUMIN, HUMAN INJ 5% 5 %
250 SOLUTION INTRAVENOUS
Status: DISCONTINUED | OUTPATIENT
Start: 2020-10-17 | End: 2020-10-17

## 2020-10-17 RX ORDER — MECLIZINE HCL 12.5 MG 12.5 MG/1
12.5 TABLET ORAL 3 TIMES DAILY PRN
Status: DISCONTINUED | OUTPATIENT
Start: 2020-10-17 | End: 2020-10-20 | Stop reason: HOSPADM

## 2020-10-17 RX ORDER — ISOSORBIDE MONONITRATE 30 MG/1
30 TABLET, EXTENDED RELEASE ORAL
Status: DISCONTINUED | OUTPATIENT
Start: 2020-10-19 | End: 2020-10-17

## 2020-10-17 RX ORDER — ALBUMIN (HUMAN) 12.5 G/50ML
50 SOLUTION INTRAVENOUS
Status: DISCONTINUED | OUTPATIENT
Start: 2020-10-17 | End: 2020-10-17

## 2020-10-17 RX ORDER — ISOSORBIDE MONONITRATE 30 MG/1
30 TABLET, EXTENDED RELEASE ORAL
Status: DISCONTINUED | OUTPATIENT
Start: 2020-10-18 | End: 2020-10-17

## 2020-10-17 RX ORDER — LOPERAMIDE HCL 2 MG
2 CAPSULE ORAL DAILY PRN
Status: DISCONTINUED | OUTPATIENT
Start: 2020-10-18 | End: 2020-10-20 | Stop reason: HOSPADM

## 2020-10-17 RX ORDER — MAGNESIUM SULFATE HEPTAHYDRATE 40 MG/ML
4 INJECTION, SOLUTION INTRAVENOUS EVERY 4 HOURS PRN
Status: DISCONTINUED | OUTPATIENT
Start: 2020-10-17 | End: 2020-10-20 | Stop reason: HOSPADM

## 2020-10-17 RX ORDER — ANALGESIC BALM 1.74; 4.06 G/29G; G/29G
OINTMENT TOPICAL EVERY 6 HOURS PRN
Status: DISCONTINUED | OUTPATIENT
Start: 2020-10-17 | End: 2020-10-20 | Stop reason: HOSPADM

## 2020-10-17 RX ORDER — ISOSORBIDE MONONITRATE 30 MG/1
30 TABLET, EXTENDED RELEASE ORAL
Status: DISCONTINUED | OUTPATIENT
Start: 2020-10-17 | End: 2020-10-20 | Stop reason: HOSPADM

## 2020-10-17 RX ADMIN — LEVOTHYROXINE SODIUM 50 MCG: 50 TABLET ORAL at 08:30

## 2020-10-17 RX ADMIN — Medication 12.5 MG: at 18:00

## 2020-10-17 RX ADMIN — SODIUM CHLORIDE 250 ML: 9 INJECTION, SOLUTION INTRAVENOUS at 13:18

## 2020-10-17 RX ADMIN — ACETAMINOPHEN 650 MG: 325 TABLET, FILM COATED ORAL at 08:50

## 2020-10-17 RX ADMIN — OMEPRAZOLE 20 MG: 20 CAPSULE, DELAYED RELEASE ORAL at 18:00

## 2020-10-17 RX ADMIN — ACETAMINOPHEN 650 MG: 325 TABLET, FILM COATED ORAL at 22:18

## 2020-10-17 RX ADMIN — MEXILETINE HYDROCHLORIDE 150 MG: 150 CAPSULE ORAL at 20:14

## 2020-10-17 RX ADMIN — PRAVASTATIN SODIUM 40 MG: 20 TABLET ORAL at 22:18

## 2020-10-17 RX ADMIN — MAGNESIUM SULFATE HEPTAHYDRATE 4 G: 40 INJECTION, SOLUTION INTRAVENOUS at 12:20

## 2020-10-17 RX ADMIN — LISINOPRIL 2.5 MG: 2.5 TABLET ORAL at 18:00

## 2020-10-17 RX ADMIN — Medication 1 CAPSULE: at 22:18

## 2020-10-17 RX ADMIN — PANTOPRAZOLE SODIUM 40 MG: 40 TABLET, DELAYED RELEASE ORAL at 08:30

## 2020-10-17 RX ADMIN — MEXILETINE HYDROCHLORIDE 150 MG: 150 CAPSULE ORAL at 09:54

## 2020-10-17 RX ADMIN — ISOSORBIDE MONONITRATE 30 MG: 30 TABLET, EXTENDED RELEASE ORAL at 20:14

## 2020-10-17 RX ADMIN — SODIUM CHLORIDE 300 ML: 9 INJECTION, SOLUTION INTRAVENOUS at 13:18

## 2020-10-17 RX ADMIN — EPOETIN ALFA-EPBX 2000 UNITS: 2000 INJECTION, SOLUTION INTRAVENOUS; SUBCUTANEOUS at 13:16

## 2020-10-17 RX ADMIN — LOPERAMIDE HYDROCHLORIDE 2 MG: 2 CAPSULE ORAL at 08:50

## 2020-10-17 ASSESSMENT — MIFFLIN-ST. JEOR: SCORE: 1508.29

## 2020-10-17 NOTE — PLAN OF CARE
"[id number]    Inpatient Progress Note:    /58 (BP Location: Right arm)   Pulse 68   Temp 95.3  F (35.2  C) (Oral)   Resp 16   Ht 1.727 m (5' 8\")   Wt 81.1 kg (178 lb 14.4 oz)   SpO2 98%   BMI 27.20 kg/m         Orientation: Alert and oriented x4   Neuro: Intact   Pain status: Generalized bilateral lower extremity pain managed with PRN tylenol with relieve.    Activity: SBA with a walker.   Peripheral edema: Minimal tracing bilateral lower extremities.   Resp: Lung sounds are clear and non-labored.   Cardiac: WNL, Tele monitoring. Interpretation of cardiac rhythm per telemetry tech: SR, with HR 69  GI: Bowels are active x4 quads. Last BM 10/16/20  :  Makes small amt urine not frequent due to hemodialysis   Skin: Intact with some bruising.    LDA: Peripheral Right arm. Fistula left arm   Infusions: Heparin infusing   Pertinent Labs: PTT was 1.93 and hep 10a 119. Heparin adjusted   Diet: Dialysis diet   Consults: Cardiology and Pharmacy for dosing heparin   Discharge Plan: Angio on Monday     Will continue to monitor and provide cares.     Brittanie Triana RN        "

## 2020-10-17 NOTE — UTILIZATION REVIEW
Admission Status; Secondary Review Determination    Under the authority of the Utilization Management Committee, the utilization review process indicated a secondary review on the above patient. The review outcome is based on review of the medical records, discussions with staff, and applying clinical experience noted on the date of the review.    (x) Inpatient Status Appropriate - This patient's medical care is consistent with medical management for inpatient care and reasonable inpatient medical practice.    RATIONALE FOR DETERMINATION: Complex 74-year-old male with history of ischemic cardiomyopathy, AICD implanted, chronically occluded left anterior descending artery with little viability in the LAD distribution, end-stage renal disease on hemodialysis.  Patient now presents with acute dizziness as well as chest discomfort.  Nuclear stress test has revealed worsening ejection fraction with significant ischemic burden requiring further evaluation including coronary angiography.  Anticoagulation will need to be reversed the patient will require greater than 2 nights in the hospital for ongoing management.    At the time of admission with the information available to the attending physician more than 2 nights Hospital complex care was anticipated, based on patient risk of adverse outcome if treated as outpatient and complex care required. Inpatient admission is appropriate based on the Medicare guidelines.    This document was produced using voice recognition software    The information on this document is developed by the utilization review team in order for the business office to ensure compliance. This only denotes the appropriateness of proper admission status and does not reflect the quality of care rendered.    The definitions of Inpatient Status and Observation Status used in making the determination above are those provided in the CMS Coverage Manual, Chapter 1 and Chapter 6, section  70.4.    Sincerely,    Tyrese Naranjo MD  Utilization Review  Physician Advisor  [facility].

## 2020-10-17 NOTE — PROGRESS NOTES
Assessment and Plan:   ESRD: HD today for 3.5 h on 3K bath with AVF on L, 400 BFR. UF of 2L. Pt still somewhat fluid overloaded on exam. He came off at 77.5 kg , so maybe aim for 76.0 kg.      Plan next HD on Monday after his cath.       Interval History:   CHF: Cards is planning angio on Monday. EF  25-30%.                Review of Systems:   C/O weakness. Not SOB. No problems on dialysis today.           Medications:       - MEDICATION INSTRUCTIONS for Dialysis Patients -   Does not apply See Admin Instructions     heparin (porcine)  500 Units Hemodialysis Machine Once in dialysis     isosorbide mononitrate  15 mg Oral Three Times Weekly     levothyroxine  50 mcg Oral Daily     lisinopril  2.5 mg Oral BID     loperamide  2 mg Oral Three Times Weekly     loperamide  2 mg Oral FIVE Times Weekly     metoprolol succinate  12.5 mg Oral Daily     mexiletine  150 mg Oral BID     multivitamin RENAL  1 capsule Oral QPM     omeprazole  20 mg Oral Daily     pantoprazole  40 mg Oral QAM AC     pravastatin  40 mg Oral QPM       heparin (porcine)       HEParin 500 Units/hr (10/17/20 1016)     - MEDICATION INSTRUCTIONS -       - MEDICATION INSTRUCTIONS -       Current active medications and PTA medications reviewed, see medication list for details.            Physical Exam:   Vitals were reviewed  Patient Vitals for the past 24 hrs:   BP Temp Temp src Pulse Resp SpO2 Weight   10/17/20 1738 133/65 -- -- 85 18 97 % --   10/17/20 1644 138/66 97.6  F (36.4  C) Oral 86 18 100 % --   10/17/20 1630 134/63 -- -- 96 -- -- --   10/17/20 1615 133/75 -- -- 82 -- -- --   10/17/20 1600 132/67 -- -- 81 -- -- --   10/17/20 1545 (!) 140/75 -- -- 73 -- -- --   10/17/20 1530 125/66 -- -- 80 -- -- --   10/17/20 1515 133/71 -- -- 80 -- 100 % --   10/17/20 1500 135/70 -- -- 77 -- -- --   10/17/20 1445 132/83 -- -- 80 -- -- --   10/17/20 1430 136/67 -- -- 77 -- -- --   10/17/20 1415 137/68 -- -- 79 -- -- --   10/17/20 1400 123/77 -- -- 75 --  -- --   10/17/20 1345 133/66 -- -- 73 -- -- --   10/17/20 1315 123/77 -- -- 72 -- -- --   10/17/20 1300 135/70 -- -- 74 -- 100 % --   10/17/20 1250 132/81 -- -- 74 -- -- --   10/17/20 1245 131/83 96.8  F (36  C) Oral 76 16 99 % --   10/17/20 1153 135/67 95.6  F (35.3  C) Oral 73 16 96 % 79.4 kg (175 lb)   10/17/20 0749 128/66 95.8  F (35.4  C) Oral 70 17 95 % --   10/17/20 0410 -- 95.8  F (35.4  C) Oral 78 16 94 % --   10/16/20 2342 115/58 95.3  F (35.2  C) Oral 68 16 -- --   10/16/20 1853 114/57 97.4  F (36.3  C) Oral 73 16 98 % --       Temp:  [95.3  F (35.2  C)-97.6  F (36.4  C)] 97.6  F (36.4  C)  Pulse:  [68-96] 85  Resp:  [16-18] 18  BP: (114-140)/(57-83) 133/65  SpO2:  [94 %-100 %] 97 %    Temperatures:  Current - Temp: 97.6  F (36.4  C); Max - Temp  Av.3  F (35.7  C)  Min: 95.3  F (35.2  C)  Max: 97.6  F (36.4  C)  Respiration range: Resp  Av.6  Min: 16  Max: 18  Pulse range: Pulse  Av.5  Min: 68  Max: 96  Blood pressure range: Systolic (24hrs), Av , Min:114 , Max:140   ; Diastolic (24hrs), Av, Min:57, Max:83    Pulse oximetry range: SpO2  Av.7 %  Min: 94 %  Max: 100 %    No intake/output data recorded.      Intake/Output Summary (Last 24 hours) at 10/17/2020 1759  Last data filed at 10/17/2020 1644  Gross per 24 hour   Intake 0 ml   Output 2000 ml   Net -2000 ml     Alert, frail  LE 1+ edema  Lungs bibasilar rales  Cor irreg nl S1 S2 + S3, no M  LAF with good bruit and thrill       Wt Readings from Last 4 Encounters:   10/17/20 79.4 kg (175 lb)   20 80.2 kg (176 lb 12.9 oz)   20 84.8 kg (187 lb)   19 87.5 kg (192 lb 12.8 oz)          Data:          Lab Results   Component Value Date     10/17/2020     10/15/2020     2019    Lab Results   Component Value Date    CHLORIDE 105 10/17/2020    CHLORIDE 104 10/15/2020    CHLORIDE 100 2019    Lab Results   Component Value Date    BUN 20 10/17/2020    BUN 15 10/15/2020    BUN 23 2019       Lab Results   Component Value Date    POTASSIUM 4.5 10/17/2020    POTASSIUM 4.3 10/15/2020    POTASSIUM 4.2 11/21/2019    Lab Results   Component Value Date    CO2 26 10/17/2020    CO2 30 10/15/2020    CO2 28 11/21/2019    Lab Results   Component Value Date    CR 4.65 10/17/2020    CR 3.23 10/15/2020    CR 4.97 11/21/2019        Recent Labs   Lab Test 10/17/20  0709 10/16/20  1527 10/15/20  1830   WBC 5.5 6.9 6.2   HGB 9.8* 10.2* 10.8*   HCT 31.1* 32.3* 33.1*   * 106* 103*   * 146* 199     Recent Labs   Lab Test 10/15/20  1830 03/26/19  0847 04/23/18  0632 12/18/17  0950 12/18/17  0950   AST 18  --  19  --  13   ALT 19 16 15   < > 15   ALKPHOS 234*  --  202*  --  154*   BILITOTAL 1.0  --  2.3*  --  1.8*    < > = values in this interval not displayed.       Recent Labs   Lab Test 10/17/20  1715 10/17/20  0709 06/07/17  0811   MAG 1.7 1.4* 1.9     Recent Labs   Lab Test 10/16/20  0934 12/11/17  0540 12/08/17  0713   PHOS 1.8* 4.1 4.0     Recent Labs   Lab Test 10/17/20  0709 10/15/20  1830 11/21/19  0930   CHRISTINE 9.0 9.5 9.6       Lab Results   Component Value Date    CHRISTINE 9.0 10/17/2020     Lab Results   Component Value Date    WBC 5.5 10/17/2020    HGB 9.8 (L) 10/17/2020    HCT 31.1 (L) 10/17/2020     (H) 10/17/2020     (L) 10/17/2020     Lab Results   Component Value Date     10/17/2020    POTASSIUM 4.5 10/17/2020    CHLORIDE 105 10/17/2020    CO2 26 10/17/2020    GLC 79 10/17/2020     Lab Results   Component Value Date    BUN 20 10/17/2020    CR 4.65 (H) 10/17/2020     Lab Results   Component Value Date    MAG 1.7 10/17/2020     Lab Results   Component Value Date    PHOS 1.8 (L) 10/16/2020       Creatinine   Date Value Ref Range Status   10/17/2020 4.65 (H) 0.66 - 1.25 mg/dL Final   10/15/2020 3.23 (H) 0.66 - 1.25 mg/dL Final   11/21/2019 4.97 (H) 0.66 - 1.25 mg/dL Final   03/26/2019 5.07 (H) 0.66 - 1.25 mg/dL Final     Comment:     Results confirmed by repeat test  Critical  Value called to and read back by  NATE ON IMEAST ON [id number] CRS     09/13/2018 4.72 (H) 0.66 - 1.25 mg/dL Final   07/04/2018 5.00 (H) 0.66 - 1.25 mg/dL Final       Attestation:  I have reviewed today's vital signs, notes, medications, labs and imaging.     Jah Hammond MD

## 2020-10-17 NOTE — PROGRESS NOTES
75 yo male with dizziness and CP.  Complex medical history including ischemic CMP EF 25-30%, ICD, IDDM with end stage renal disease.  Abnormal nuclear scan indicating multivessel CAD and lateral wall ischemia, previous PCI to LCx and known occlusion of LAD  1. Scheduled for angiogram Monday  2. Dialysis today, recommend dialysis following cath  Will follow

## 2020-10-17 NOTE — PROGRESS NOTES
Potassium   Date Value Ref Range Status   10/17/2020 4.5 3.4 - 5.3 mmol/L Final     Hemoglobin   Date Value Ref Range Status   10/17/2020 9.8 (L) 13.3 - 17.7 g/dL Final     Creatinine   Date Value Ref Range Status   10/17/2020 4.65 (H) 0.66 - 1.25 mg/dL Final     Urea Nitrogen   Date Value Ref Range Status   10/17/2020 20 7 - 30 mg/dL Final     Sodium   Date Value Ref Range Status   10/17/2020 140 133 - 144 mmol/L Final     INR   Date Value Ref Range Status   10/15/2020 1.31 (H) 0.86 - 1.14 Final       DIALYSIS PROCEDURE NOTE  Hepatitis status of previous patient on machine log was checked and verified ok to use with this patients hepatitis status.  Patient dialyzed for 3.5 hrs. on a K3 bath with a net fluid removal of  2L.  A BFR of 400 ml/min was obtained via a AVF using 15 gauge needles.    The treatment plan was discussed with Dr. Hammond during the treatment.  Total heparin received during the treatment: none.   Needle cannulation sites held x 20 min.   Incapacitated Nurse education completed yes, Machine test alarm pass at 1139.  Meds  given: epogen Complications: none    Person educated patient, Knowledge base substantial, Barriers to learning none , Educated on procedural, medication mode, patient verbalized understanding. Pt prefers oral education style.   Skin Assessment pre run: scattered bruising, dry skin. Pre run assessment of edema generalized +1, legs +2.  ICEBOAT? Timeout performed pre-treatment  I: Patient was identified using 2 identifiers  C: Consent obtained/verified current before treatment  E: Equipment preventative maintenance is current and dialysis delivery system OK to use  B: Hepatitis B Surface Antigen: negative; Draw Date: 10/05/2020      Hepatitis B Surface Antibody: susceptible; Draw Date: 05/18/2020  O: Dialysis orders present and complete prior to treatment  A: Vascular access verified and assessed prior to treatment  T: Treatment was performed at a clinically appropriate time  ?:  Patient was allowed to ask questions and address concerns prior to treatment  See flowsheet in EPIC for further details and post assessment.  Machine water alarm in place and functioning. Transducer pods intact and checked every 15min.  Skin post assessment: scattered bruising, pale, dry skin. Post Edema trace generalized edema, legs +2. Pt returned via wheelchair.  Report received from: Sultana Chavez RN  Report given to: PAULETTE Chacon RN.  Chlorine/Chloramine water system checked every 4 hours.  Outpatient Dialysis MWF at [facility]

## 2020-10-17 NOTE — PLAN OF CARE
"/68   Pulse 79   Temp 96.8  F (36  C) (Oral)   Resp 16   Ht 1.727 m (5' 8\")   Wt 79.4 kg (175 lb)   SpO2 100%   BMI 26.61 kg/m      Patient is A&Ox4. Denies chest pain or SOB upon rising. Up and ambulatory with standby assist using gait belt & walker. Patient had small formed BM this am prior to dialysis. BP meds and Omeprazole held until after dialysis. Patient left for dialysis at 1230. IV heparin infusing at 500 units/ hr. 4g Magnesium IV administered PRN per Magnesium protocol in orders. Phosphorus 1.8, Dr. Napoles notified. No protocol currently ordered. Received EKG this am following episode of V tach via telemetry monitor, patient denied chest pain, dizziness, nausea, or SOB during and after V tach episode. Dr. Napoles notified. Angiogram scheduled for Monday 10/19.   "

## 2020-10-17 NOTE — PROGRESS NOTES
Lab called with criticals     PTT of 119  Heparin 10a 1.93    Spoke to Pharmacy and they will make changes to heparin dosage.

## 2020-10-17 NOTE — PROGRESS NOTES
"[facility]    Hospitalist Progress Note    Date of Service (when I saw the patient): 10/17/2020    Assessment & Plan   Westley HASSANBravo (\"Bill\") Grover is a pleasant 74 year old gentleman who was admitted 10/16/2020 for evaluation of chest pain and dizziness, after transfer from [facility].  Current problems include:    Dizziness/lightheadedness; some of his Sx have sounded more orthostatic, and others more like vertigo.  - trial of PRN meclizine  - PT vestibular eval.10/18, if possible  - consider Neurology consult in next 1-2 days if symptoms recur    Chest Pain; has Hx of ischemic CMP w/ EF 25-30%, recent abnormal nuclear scan indicating multivessel CAD and lateral wall ischemia, and previous PCI to LCx and known occlusion of LAD.  - scheduled for angiogram Monday; appreciate follow up by Cardiology today  - continue metoprolol succinate 12.5 mg Q day  - at Art's request, have revised current IMDUR orders (he has been taking 30 mg on HD days, and 15 mg on on-HD days); will ask Pharm. To confirm.     Acute on chronic systolic congestive heart failure with Ischemic Cardiomyopathy; previous AICD placement; stable.  - previous notes indicate: AICD to be interrogated for recent/remote rhythm disturbances (will defer timing to Cardiology)     History of paroxysmal atrial flutter; stable.  S/p 2 previous ablations. Severe LA dilation on prior TTE.  - PTA eliquis on hold for upcoming cath., scheduled for 10/19.  - continue telemetry    Brief run of V-tach earlier today; no new Sx.  - continue Mexiletine 150 mg BID  - replace Mg; continue metoprolol dosing  - further review by Cardiology 10/18     ESRD on hemodialysis ~15 years (MWF); had HD this afternoon, 10/17.  - Nephrology following  - next HD 10/19 after cath.     Hypomagnesemia.  - add replacement orders; recheck in a.m.    Hypophosphatemia; intermittent over last several months, per Art.  - have asked Nephrology to review  - added " "modified replacement orders, after review w/ Pharm.D.    Imodium use/possible recent diarrhea; Hx not clear.  - change imodium to Q day PRN  - if diarrhea recurs or has been chronic, should have GI eval.     Type 2 diabetes mellitus; sugars stable so far. Diet controlled, but diet sub-optimal and variable, per Bill.  - continue current diet and monitoring     Hypothyroidism; stable.  - continue prior to admission levothyroxine.    Right knee pain with calf tightness - both improve with walking; likely chronic/subacute DJD.  - continue PRN tylenol; add PRN topical Aris Bullard.      DVT Prophylaxis: Heparin SQ (w/ HD) and ambulate every shift  Code Status: Full Code    Disposition: Expected discharge in 2-3 days.      LAURENT Napoles MD, [facility]     Internal Medicine Hospitalist  Text Page (7am - 6pm)    Interval History   Is doing all right today.  Had brief episode of lightheadedness when up in room earlier today; no Sx yesterday, but on 10/15 had episode of \"room spinning around me\" while walking in his house.  Did not fall, but came close.  No chest pain; no SOB.  No abdominal pain, N/V.  Appetite has been fair, on/off for months.    Has had intermittent Right knee pain and calf tightness since falling in 7/2020.  No knee/leg swelling or redness.  Had HD this afternoon; no problems during run.    Brief run of V-tach this a.m.-> no Sx.    Data reviewed today: I reviewed all new labs and imaging results over the last 24 hours. I personally reviewed all recent lab/imaging results.    Physical Exam   Temp: 95.8  F (35.4  C) Temp src: Oral BP: 128/66 Pulse: 70   Resp: 17 SpO2: 95 % O2 Device: None (Room air)    Vitals:    10/16/20 0131   Weight: 81.1 kg (178 lb 14.4 oz)     Vital Signs with Ranges  Temp:  [95.2  F (35.1  C)-97.6  F (36.4  C)] 95.8  F (35.4  C)  Pulse:  [68-79] 70  Resp:  [16-17] 17  BP: (114-133)/(57-69) 128/66  SpO2:  [94 %-99 %] 95 %  No intake/output data recorded.    Constitutional: awake, no apparent " distress; lying upright, in bed  HEENT: sclerae clear; MM's moist  Respiratory: good a/e bilaterally, but decreased over lower thirds; no wheezing; faint inspiratory rhonchi over bilateral bases  Cardiovascular: Regular rate and rhythm, S1, S2 noted; no m/r/g  GI: abdomen obese, + bowel sounds; soft, non-tender, non-distended  Skin/Integumen: no rashes, no cyanosis, no jaundice; large AV fistula noted Left upper arm  Musculoskeletal: no edema; no focal tenderness or effusion of Right knee or calf.  Neurologic: follows directions well; no focal deficits; sits up in bed without Sx.      Medications     heparin (porcine) Stopped (10/16/20 1633)     HEParin Stopped (10/17/20 0856)     - MEDICATION INSTRUCTIONS -         sodium chloride 0.9%  250 mL Intravenous Once in dialysis     sodium chloride 0.9%  300 mL Hemodialysis Machine Once     epoetin wei-epbx (RETACRIT) inj ESRD  2,000 Units Intravenous Once     heparin (porcine)  500 Units Hemodialysis Machine Once in dialysis     isosorbide mononitrate  15 mg Oral Three Times Weekly     levothyroxine  50 mcg Oral Daily     lisinopril  2.5 mg Oral BID     loperamide  2 mg Oral Three Times Weekly     loperamide  2 mg Oral FIVE Times Weekly     metoprolol succinate  12.5 mg Oral Daily     mexiletine  150 mg Oral BID     multivitamin RENAL  1 capsule Oral QPM     omeprazole  20 mg Oral Daily     pantoprazole  40 mg Oral QAM AC     pravastatin  40 mg Oral QPM       Data   Recent Labs   Lab 10/17/20  0709 10/16/20  1527 10/16/20  0934 10/15/20  1830   WBC 5.5 6.9  --  6.2   HGB 9.8* 10.2*  --  10.8*   * 106*  --  103*   * 146*  --  199   INR  --   --   --  1.31*     --   --  139   POTASSIUM 4.5  --   --  4.3   CHLORIDE 105  --   --  104   CO2 26  --   --  30   BUN 20  --   --  15   CR 4.65*  --   --  3.23*   ANIONGAP 9  --   --  5   CHRISTINE 9.0  --   --  9.5   GLC 79  --   --  145*   ALBUMIN  --   --   --  3.2*   PROTTOTAL  --   --   --  6.6*   BILITOTAL  --    --   --  1.0   ALKPHOS  --   --   --  234*   ALT  --   --   --  19   AST  --   --   --  18   LIPASE  --   --   --  77   TROPI  --   --  0.028 <0.015       Recent Results (from the past 24 hour(s))   NM MPI w Lexiscan   Result Value    Target     Baseline Systolic     Baseline Diastolic BP 61    Last Stress Systolic     Last Stress Diastolic BP 51    Baseline HR 83    Max HR 86    Calculated Percent HR 59    Rate Pressure Product 9,460.0    Left Ventricular EF 22    Narrative       The nuclear stress test is abnormal.     There is moderate ischemia in the lateral segment(s) of the left   ventricle.     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     There is nontransmural infarction in the inferior segment(s) of the   left ventricle associated with a moderate degree of filippo-infarct ischemia.   (bowel activity is overlapping this area, more prominently in resting   images and may be contributing to some reversibility of this defect)     Left ventricular function is severely reduced.     The left ventricular ejection fraction at stress is 22%.     Right ventricular enlargment is noted.     There is no prior study for comparison.

## 2020-10-18 PROBLEM — R94.39 ABNORMAL CARDIOVASCULAR STRESS TEST: Status: ACTIVE | Noted: 2020-10-15

## 2020-10-18 PROBLEM — I25.10 CORONARY ARTERY DISEASE INVOLVING NATIVE CORONARY ARTERY, ANGINA PRESENCE UNSPECIFIED, UNSPECIFIED WHETHER NATIVE OR TRANSPLANTED HEART: Status: ACTIVE | Noted: 2020-10-15

## 2020-10-18 LAB
APTT PPP: 55 SEC (ref 22–37)
HBV SURFACE AB SERPL IA-ACNC: 5.49 M[IU]/ML
HBV SURFACE AB SERPL IA-ACNC: 6.07 M[IU]/ML
HBV SURFACE AG SERPL QL IA: NONREACTIVE
HBV SURFACE AG SERPL QL IA: NONREACTIVE
LMWH PPP CHRO-ACNC: 0.68 IU/ML
PHOSPHATE SERPL-MCNC: 1.8 MG/DL (ref 2.5–4.5)

## 2020-10-18 PROCEDURE — 99233 SBSQ HOSP IP/OBS HIGH 50: CPT | Performed by: PHYSICIAN ASSISTANT

## 2020-10-18 PROCEDURE — 250N000013 HC RX MED GY IP 250 OP 250 PS 637: Performed by: INTERNAL MEDICINE

## 2020-10-18 PROCEDURE — 250N000013 HC RX MED GY IP 250 OP 250 PS 637: Performed by: PHYSICIAN ASSISTANT

## 2020-10-18 PROCEDURE — 82306 VITAMIN D 25 HYDROXY: CPT | Performed by: INTERNAL MEDICINE

## 2020-10-18 PROCEDURE — 120N000004 HC R&B MS OVERFLOW

## 2020-10-18 PROCEDURE — 85520 HEPARIN ASSAY: CPT | Performed by: INTERNAL MEDICINE

## 2020-10-18 PROCEDURE — 85730 THROMBOPLASTIN TIME PARTIAL: CPT | Performed by: INTERNAL MEDICINE

## 2020-10-18 PROCEDURE — 258N000003 HC RX IP 258 OP 636: Performed by: INTERNAL MEDICINE

## 2020-10-18 PROCEDURE — 250N000009 HC RX 250: Performed by: INTERNAL MEDICINE

## 2020-10-18 PROCEDURE — 86706 HEP B SURFACE ANTIBODY: CPT | Performed by: INTERNAL MEDICINE

## 2020-10-18 PROCEDURE — 250N000011 HC RX IP 250 OP 636: Performed by: INTERNAL MEDICINE

## 2020-10-18 PROCEDURE — 99232 SBSQ HOSP IP/OBS MODERATE 35: CPT | Performed by: INTERNAL MEDICINE

## 2020-10-18 PROCEDURE — 84100 ASSAY OF PHOSPHORUS: CPT | Performed by: INTERNAL MEDICINE

## 2020-10-18 PROCEDURE — 87340 HEPATITIS B SURFACE AG IA: CPT | Performed by: INTERNAL MEDICINE

## 2020-10-18 PROCEDURE — 36415 COLL VENOUS BLD VENIPUNCTURE: CPT | Performed by: INTERNAL MEDICINE

## 2020-10-18 RX ORDER — METHYLPREDNISOLONE SODIUM SUCCINATE 125 MG/2ML
125 INJECTION, POWDER, LYOPHILIZED, FOR SOLUTION INTRAMUSCULAR; INTRAVENOUS ONCE
Status: COMPLETED | OUTPATIENT
Start: 2020-10-18 | End: 2020-10-18

## 2020-10-18 RX ORDER — DIPHENHYDRAMINE HYDROCHLORIDE 50 MG/ML
25 INJECTION INTRAMUSCULAR; INTRAVENOUS ONCE
Status: COMPLETED | OUTPATIENT
Start: 2020-10-18 | End: 2020-10-18

## 2020-10-18 RX ADMIN — LISINOPRIL 2.5 MG: 2.5 TABLET ORAL at 22:19

## 2020-10-18 RX ADMIN — PANTOPRAZOLE SODIUM 40 MG: 40 TABLET, DELAYED RELEASE ORAL at 09:33

## 2020-10-18 RX ADMIN — LEVOTHYROXINE SODIUM 50 MCG: 50 TABLET ORAL at 09:33

## 2020-10-18 RX ADMIN — DIPHENHYDRAMINE HYDROCHLORIDE 25 MG: 50 INJECTION, SOLUTION INTRAMUSCULAR; INTRAVENOUS at 15:54

## 2020-10-18 RX ADMIN — Medication 15 MG: at 18:33

## 2020-10-18 RX ADMIN — SODIUM PHOSPHATE, MONOBASIC, MONOHYDRATE 15 MMOL: 276; 142 INJECTION, SOLUTION INTRAVENOUS at 16:11

## 2020-10-18 RX ADMIN — HEPARIN SODIUM 500 UNITS/HR: 10000 INJECTION, SOLUTION INTRAVENOUS at 00:10

## 2020-10-18 RX ADMIN — ACETAMINOPHEN 650 MG: 325 TABLET, FILM COATED ORAL at 09:38

## 2020-10-18 RX ADMIN — ACETAMINOPHEN 650 MG: 325 TABLET, FILM COATED ORAL at 22:24

## 2020-10-18 RX ADMIN — OMEPRAZOLE 20 MG: 20 CAPSULE, DELAYED RELEASE ORAL at 11:56

## 2020-10-18 RX ADMIN — Medication 12.5 MG: at 09:34

## 2020-10-18 RX ADMIN — Medication 1 CAPSULE: at 22:19

## 2020-10-18 RX ADMIN — LISINOPRIL 2.5 MG: 2.5 TABLET ORAL at 11:56

## 2020-10-18 RX ADMIN — MEXILETINE HYDROCHLORIDE 150 MG: 150 CAPSULE ORAL at 20:03

## 2020-10-18 RX ADMIN — METHYLPREDNISOLONE SODIUM SUCCINATE 125 MG: 125 INJECTION, POWDER, FOR SOLUTION INTRAMUSCULAR; INTRAVENOUS at 15:54

## 2020-10-18 RX ADMIN — PRAVASTATIN SODIUM 40 MG: 20 TABLET ORAL at 22:19

## 2020-10-18 RX ADMIN — MEXILETINE HYDROCHLORIDE 150 MG: 150 CAPSULE ORAL at 09:34

## 2020-10-18 NOTE — PLAN OF CARE
"[id number]    Inpatient Progress Note:    /59   Pulse 64   Temp 96.3  F (35.7  C) (Oral)   Resp 18   Ht 1.727 m (5' 8\")   Wt 79.4 kg (175 lb)   SpO2 94%   BMI 26.61 kg/m         Orientation: Alert and oriented x4 and is able to make needs known.   Neuro: Intact   Pain status: Denied any pain with interview during shift.   Activity: SBA with walker.   Peripheral edema: Trace BLE. Compliant with Elevating BLE with rest.   Resp: Lung sounds are clear and Denies any SOB.   Cardiac: Denies any Chest pain with activity and rest. Interpretation of cardiac rhythm per telemetry tech: 8 beats of V-tach at 0515. Patient is resting and denies any chest pain. On-called paged and Notified.  GI: Bowels are active x4 quads and denies any constipation. Reports last BM 10/17/20.  :  No voiding during shift. Pt on Hemodialysis reports still make urine infrequent small amounts.   Skin: Intact, Multiple bruising.   LDA: Peripheral IV Right arm and Fistula Left arm.   Infusions: Heparin infusing at 500 units/hr  Pertinent Labs: None    Diet: Low Salt and no caffeine.   Consults: PT, Cardio and Nephrology. Pharmacy following with heparin dosing.   Discharge Plan: Angio on Monday. Continue monitoring on tele and Heparin Drip.     Will continue to monitor and provide cares.     Brittanie Triana RN        "

## 2020-10-18 NOTE — PROGRESS NOTES
Vitals: wnl  Neuro:alert and oriented  GI: wnl, last bowel movement yest  : patient on dialysis, voids little   Skin: wnl,dry and pale. Patient bruised on upper extremities from lab draws   Activity: x1 with walker, normally ind with cane baseline. Does better with walker than cane.  Diet: 2 gm Na diet, no caffeine   Pain: denies, prn tylenol available if patient wants   Plan: Angio and dialysis Monday. Cardiology and nephrology following. Patient given premedications for angiogram.

## 2020-10-18 NOTE — PROGRESS NOTES
"Vitals: /59 (BP Location: Right arm)   Pulse 76   Temp 96.2  F (35.7  C) (Oral)   Resp 16   Ht 1.727 m (5' 8\")   Wt 79.4 kg (175 lb)   SpO2 95%   BMI 26.61 kg/m       Neuro: alert and oriented x4  Cardiac: on tele running SR/HR in the 60s per tele tech  Lungs: anterior clear. Crackers on posterior lobes   GI: bowel sounds active and present in all quadrants. Last BM today   : on hemodialysis three time a week. Had dialysis today (6242-1230) with total of 2L fluid removed per dialysis nurse. Pt reports they make small amount of urine and able to void.  Pain: Tylenol given for soreness on right knee  IV: on heparin drip 500unit/hr. Last PTT and Heparin 10A lab was done 1500 while pt at dialysis. Critical value of Heparin 10A (1.04) communicated with pharmacist. Per order, pt to continue to be on heparin drip 500units/hr overnight and labs to be rechecked tomorrow at 0600.   Labs/Imaging: Magnesium 1.7 after magnesium replacement per protocol. Phosphorus from yesterday's lab was 1.8. Per communication with the provider and pharmacist, phosphorous level to be rechecked tomorrow with morning labs and will be replaced as needed per protocol. Cr. 4.64. Hgb 9.8   Diet: on 2gm sodium diet, no caffiene  Activity: assist of x1 with a walker and gelt belt. Ambulated in [place] after dialysis. Denies dizziness/lightheadedness.  Consult: cardiology, nephrology, pharmacy (to dose heparin) and PT  Plan: will have angiogram on Monday due to abnormal lexiscan. Continue on heparin drip. Continue monitoring on tele.   Other: tried to interrogate pt's pacemaker using [employer] interrogator and was called to [employer] to see if data was processed. Tech who assisted writer on the phone said, they don't see patient in their system and unable to find any data. Tech told writer to call back after confirming if pt have a [employer] device or if they have a card with the name of their device's company. Pt said they don't know their " device company name and unable to find their card.

## 2020-10-18 NOTE — PLAN OF CARE
"/54 (BP Location: Right arm)   Pulse 70   Temp 97.8  F (36.6  C) (Oral)   Resp 18   Ht 1.727 m (5' 8\")   Wt 79.4 kg (175 lb)   SpO2 96%   BMI 26.61 kg/m      Patient denies chest pain or SOB. Continuous tele monitoring in progress. 6 beat run of V tach reported this afternoon per telemetry tech, patient reports being asymptomatic during and following this event. VSS. IV Heparin infusing at 500 units/ hr. IV site is clean, dry, and intact. Fistula bruit and thrill present in LUE. Magnesium 1.7. Phosphorus 1.8 to be replaced, waiting on placement of second IV since incompatible with Heparin. Angiogram and dialysis scheduled for tomorrow 10/19. Pacemaker successfully interrogated. No BM this shift. Ambulated in hallway with steady gait w/ standby assist and walker. Tolerating low salt/ low caffeine diet.   "

## 2020-10-18 NOTE — PROGRESS NOTES
Tele Called and reported.   Interpretation of cardiac rhythm per telemetry tech: 8 beats of V-tach. Patient is resting and denies any chest pain. On-called paged and Notified.

## 2020-10-18 NOTE — PROGRESS NOTES
Assessment and Plan:   ESRD: plan HD tomorrow after cardiac cath. Orders placed. He is volume up so we will take a little extra fluid by UF.             Interval History:   CHF: ASCVD. Coronary angio tomorrow. Dialysis to follow.                  Review of Systems:   Feels ok. No SOB at rest , no chest pain. Taking po well.           Medications:       - MEDICATION INSTRUCTIONS for Dialysis Patients -   Does not apply See Admin Instructions     isosorbide mononitrate  15 mg Oral Once per day on Sun Tue Thu     isosorbide mononitrate  30 mg Oral Once per day on Mon Wed Fri Sat     levothyroxine  50 mcg Oral Daily     lisinopril  2.5 mg Oral BID     metoprolol succinate  12.5 mg Oral Daily     mexiletine  150 mg Oral BID     multivitamin RENAL  1 capsule Oral QPM     omeprazole  20 mg Oral Daily     pantoprazole  40 mg Oral QAM AC     pravastatin  40 mg Oral QPM       HEParin 500 Units/hr (10/18/20 0010)     Current active medications and PTA medications reviewed, see medication list for details.            Physical Exam:   Vitals were reviewed  Patient Vitals for the past 24 hrs:   BP Temp Temp src Pulse Resp SpO2   10/18/20 1544 129/63 95.5  F (35.3  C) Oral 66 16 96 %   10/18/20 1326 114/54 -- -- -- -- --   10/18/20 1154 116/49 -- -- 70 -- --   10/18/20 1109 115/54 97.8  F (36.6  C) Oral 75 18 96 %   10/18/20 0807 132/64 95.5  F (35.3  C) Oral 69 18 96 %   10/18/20 0513 128/64 -- -- 64 18 94 %   10/18/20 0004 112/59 96.3  F (35.7  C) Oral 64 18 94 %   10/17/20 2221 110/59 -- -- -- -- --   10/17/20 2103 133/64 -- -- 76 -- --   10/17/20 1952 117/56 96.2  F (35.7  C) Oral 75 16 95 %   10/17/20 1738 133/65 -- -- 85 18 97 %       Temp:  [95.5  F (35.3  C)-97.8  F (36.6  C)] 95.5  F (35.3  C)  Pulse:  [64-85] 66  Resp:  [16-18] 16  BP: (110-133)/(49-65) 129/63  SpO2:  [94 %-97 %] 96 %    Temperatures:  Current - Temp: 95.5  F (35.3  C); Max - Temp  Av.3  F (35.7  C)  Min: 95.5  F (35.3  C)  Max: 97.8  F  (36.6  C)  Respiration range: Resp  Av.4  Min: 16  Max: 18  Pulse range: Pulse  Av.6  Min: 64  Max: 85  Blood pressure range: Systolic (24hrs), Av , Min:110 , Max:133   ; Diastolic (24hrs), Av, Min:49, Max:65    Pulse oximetry range: SpO2  Av.4 %  Min: 94 %  Max: 97 %    I/O last 3 completed shifts:  In: 0   Out:  [Other:]    No intake or output data in the 24 hours ending 10/18/20 1717    Alert, responsive  Lungs with clear ant BS  Cor RRR nl S1 S2 no M  LE 1-2+ edema  LAF with good bruit       Wt Readings from Last 4 Encounters:   10/17/20 79.4 kg (175 lb)   20 80.2 kg (176 lb 12.9 oz)   20 84.8 kg (187 lb)   19 87.5 kg (192 lb 12.8 oz)          Data:          Lab Results   Component Value Date     10/17/2020     10/15/2020     2019    Lab Results   Component Value Date    CHLORIDE 105 10/17/2020    CHLORIDE 104 10/15/2020    CHLORIDE 100 2019    Lab Results   Component Value Date    BUN 20 10/17/2020    BUN 15 10/15/2020    BUN 23 2019      Lab Results   Component Value Date    POTASSIUM 4.5 10/17/2020    POTASSIUM 4.3 10/15/2020    POTASSIUM 4.2 2019    Lab Results   Component Value Date    CO2 26 10/17/2020    CO2 30 10/15/2020    CO2 28 2019    Lab Results   Component Value Date    CR 4.65 10/17/2020    CR 3.23 10/15/2020    CR 4.97 2019        Recent Labs   Lab Test 10/17/20  0709 10/16/20  1527 10/15/20  1830   WBC 5.5 6.9 6.2   HGB 9.8* 10.2* 10.8*   HCT 31.1* 32.3* 33.1*   * 106* 103*   * 146* 199     Recent Labs   Lab Test 10/15/20  1830 19  0847 18  0632 17  0950 17  0950   AST 18  --  19  --  13   ALT 19 16 15   < > 15   ALKPHOS 234*  --  202*  --  154*   BILITOTAL 1.0  --  2.3*  --  1.8*    < > = values in this interval not displayed.       Recent Labs   Lab Test 10/17/20  1715 10/17/20  0709 17  0811   MAG 1.7 1.4* 1.9     Recent Labs   Lab Test  10/18/20  0603 10/16/20  0934 12/11/17  0540   PHOS 1.8* 1.8* 4.1     Recent Labs   Lab Test 10/17/20  0709 10/15/20  1830 11/21/19  0930   CHRISTINE 9.0 9.5 9.6       Lab Results   Component Value Date    CHRISTINE 9.0 10/17/2020     Lab Results   Component Value Date    WBC 5.5 10/17/2020    HGB 9.8 (L) 10/17/2020    HCT 31.1 (L) 10/17/2020     (H) 10/17/2020     (L) 10/17/2020     Lab Results   Component Value Date     10/17/2020    POTASSIUM 4.5 10/17/2020    CHLORIDE 105 10/17/2020    CO2 26 10/17/2020    GLC 79 10/17/2020     Lab Results   Component Value Date    BUN 20 10/17/2020    CR 4.65 (H) 10/17/2020     Lab Results   Component Value Date    MAG 1.7 10/17/2020     Lab Results   Component Value Date    PHOS 1.8 (L) 10/18/2020       Creatinine   Date Value Ref Range Status   10/17/2020 4.65 (H) 0.66 - 1.25 mg/dL Final   10/15/2020 3.23 (H) 0.66 - 1.25 mg/dL Final   11/21/2019 4.97 (H) 0.66 - 1.25 mg/dL Final   03/26/2019 5.07 (H) 0.66 - 1.25 mg/dL Final     Comment:     Results confirmed by repeat test  Critical Value called to and read back by  NATE ON [facility] ON [id number] CRS     09/13/2018 4.72 (H) 0.66 - 1.25 mg/dL Final   07/04/2018 5.00 (H) 0.66 - 1.25 mg/dL Final       Attestation:  I have reviewed today's vital signs, notes, medications, labs and imaging.     Jah Hammond MD

## 2020-10-18 NOTE — PROGRESS NOTES
Cardiology Progress Note          Assessment and Plan:   75 yo male with dizziness and CP.  Complex medical history including ischemic CMP EF 25-30%, ICD, IDDM with end stage renal disease.  Abnormal nuclear scan indicating multivessel CAD and lateral wall ischemia, previous PCI to LCx and known occlusion of LAD     1. Pain free, stable. Angiogram in am planned.  Needs pre med for dye allergy  2. Dialysis following cath        Interval History:   no new complaints                Medications:   I have reviewed this patient's current medications         Physical Exam:         Vital Sign Ranges  Temperature Temp  Av.7  F (35.9  C)  Min: 95.5  F (35.3  C)  Max: 97.8  F (36.6  C)   Blood pressure Systolic (24hrs), Av , Min:110 , Max:140        Diastolic (24hrs), Av, Min:49, Max:83      Pulse Pulse  Av  Min: 64  Max: 96   Respirations Resp  Av.7  Min: 16  Max: 18   Pulse oximetry SpO2  Av.5 %  Min: 94 %  Max: 100 %         Intake/Output Summary (Last 24 hours) at 10/18/2020 1328  Last data filed at 10/17/2020 1644  Gross per 24 hour   Intake 0 ml   Output 2000 ml   Net -2000 ml       Constitutional:   pale     Skin:   no jaundice     Neck:   supple, symmetrical, trachea midline     Chest:   Normal Symmetry and no tenderness     Lungs:   diminshed BS     Cardiovascular:   Systolic murmur and normal S1 and S2     Extremities and Back:   No edema     Neurological:   No gross or focal neurologic abnormalities              Data:     Results for orders placed or performed during the hospital encounter of 10/16/20 (from the past 24 hour(s))   Hepatitis B surface antigen   Result Value Ref Range    Hep B Surface Agn Nonreactive NR^Nonreactive   Hepatitis B Surface Antibody   Result Value Ref Range    Hepatitis B Surface Antibody 6.07 <8.00 m[IU]/mL   Heparin 10a Level   Result Value Ref Range    Heparin 10A Level 1.04 (HH) IU/mL   Partial thromboplastin time   Result Value Ref Range    PTT 48 (H) 22 -  37 sec   Magnesium   Result Value Ref Range    Magnesium 1.7 1.6 - 2.3 mg/dL   Heparin 10a Level   Result Value Ref Range    Heparin 10A Level 0.68 IU/mL   Phosphorus   Result Value Ref Range    Phosphorus 1.8 (L) 2.5 - 4.5 mg/dL   Partial thromboplastin time   Result Value Ref Range    PTT 55 (H) 22 - 37 sec

## 2020-10-18 NOTE — PROGRESS NOTES
"[facility]    Hospitalist Progress Note  Name: Westley Ness    MRN: [id number]  Provider:  Ramandeep Duarte PA-C  Date of Service: 10/18/2020    Assessment & Plan   Summary of Stay: Westley Ness is a 74 year old male with PMhx of ESRD on HD, CAD s/p PCI with known chronically occluded LAD, CHF s/p AICD, paroxysmal a-flutter s/p ablation, DM2, who was admitted after transferring from [facility] ED on 10/16/2020 with complaints of dizziness and chest pain.     #Dizziness  #Chest pain: presented with c/o 30 sec-1 minute \"room spinning\" after movement from lying position the afternoon of 10/15. No associated neurologic complaints or deficits. Also described chest pain that developed at rest prior to development of these symptoms. Serial troponin levels negative.   - nuclear stress test on 10/16 showed moderate area of lateral ischemia and filippo-infarct ischemia of inferior wall   - echocardiogram on 10/16 showed EF of 20-25%, severe global hypokinesis, akinesis of inferoseptal and apical   - cardiology consulted and following with plan for coronary angiogram on 10/19  - hold Eliquis (need to be off for 48 hours before angiogram, stopped on 10/16)  - continue Heparin gtt  - monitor on telemetry   - continue PTA Metoprolol and Imdur (dosing adjusted on 10/18, takes 30 mg on HD days and 15 mg on non-HD days)  - would hold off on further vestibular evaluation pending results of angiogram, could consider vestibular PT versus MRI of brain if recurrent/worsening symptoms, no current dizziness     #Chronic systolic dysfunction with ischemic cardiomyopathy, s/p AICD placement  #CAD  #Mild aortic root dilation: appears hypervolemic on exam with 1-2+ bilateral LE pitting edema. Intermittent episodes of nonsustained vtach on telemetry that patient has been asymptomatic. Previous Zio patch in 03/2020 noted 24 runs of Vtach up to 14 beats in length and max heart rate of 190 bpm. H/o CAD, severe cardiomyopathy (prior LVEF of " 25-30%), NSTEMI 2016 s/p PCI to proximal left circumflex, obtuse marginal which re-stenosed and treated with angioplasty of that branch later that year. Known chronically occluded LAD. Follows with Dr. Sen of cardiology.   - cardiology consulted, refer to above  - AICD interrogation, performed on 10/18 showed max of 3 episodes of slow nonsustained Vtach without shock delivered since 12/2019, will ask cardiology to review  - continue PTA Mexiletine   - magnesium WNL    #H/o proximal atrial flutter: currently in SR, s/p 2 ablations. Severe biatrial dilation on echo this admission.   - hold Eliquis due to plans for angiogram  - continue PTA Metoprolol    #ESRD on hemodialysis (~15 years, MWF)  - Nephrology consulted, completed last dialysis on 10/17, plans for dialysis after angiogram on 10/19  - phosphorus of 1.8, will give replacement dose today and recheck in AM    #Recent diarrhea: no increased occurrence here and none today  - imodium PRN     #DM2: diet controlled, most recent HgbA1c of 5.1 in 03/2019    #Hypothyroidism: continue PTA Levothyroxine    COVID testing: negative from 10/15  DVT Prophylaxis: Heparin gtt   Code Status: Full Code  Disposition: Expected discharge in 2-3 days pending results of angiogram as well completion of dialysis following this     Ramandeep Duarte [facility]    Interval History   Patient denies any chest pain or dizziness. He notes lightheadedness with quick position changes which is baseline. He has mild shortness of breath with ambulating but not worse than usual. He notes issues with low phosphorus as outpatient and associated muscle weakness.     -Data reviewed today: I reviewed all new labs and imaging reports over the last 24 hours.    Physical Exam   Temp: 95.5  F (35.3  C) Temp src: Oral BP: 132/64 Pulse: 69   Resp: 18 SpO2: 96 % O2 Device: None (Room air) Oxygen Delivery: 2.5 LPM  Vitals:    10/16/20 0131 10/17/20 1153   Weight: 81.1 kg (178 lb 14.4 oz) 79.4 kg (175  lb)     Vital Signs with Ranges  Temp:  [95.5  F (35.3  C)-97.6  F (36.4  C)] 95.5  F (35.3  C)  Pulse:  [64-96] 69  Resp:  [16-18] 18  BP: (110-140)/(56-83) 132/64  SpO2:  [94 %-100 %] 96 %  I/O last 3 completed shifts:  In: 0   Out: 2000 [Other:2000]    GEN:  Alert, oriented x 3, appears comfortable, NAD.  HEENT:  Normocephalic/atraumatic, no scleral icterus, no nasal discharge, mouth moist.  CV:  Regular rate and rhythm, no murmur or JVD.  S1 + S2 noted, no S3 or S4.  LUNGS:  Clear to auscultation bilaterally without rales/rhonchi/wheezing/retractions.  Symmetric chest rise on inhalation noted.  ABD:  Active bowel sounds, soft, non-tender/non-distended.  No rebound/guarding/rigidity.  EXT: 1-2+ bilateral pitting LE edema.  No cyanosis.  No acute joint synovitis noted.  SKIN:  Dry to touch, no exanthems noted in the visualized areas. Large AV fistula noted to left UE.    Medications     HEParin 500 Units/hr (10/18/20 0010)       - MEDICATION INSTRUCTIONS for Dialysis Patients -   Does not apply See Admin Instructions     isosorbide mononitrate  15 mg Oral Once per day on Sun Tue Thu     isosorbide mononitrate  30 mg Oral Once per day on Mon Wed Fri Sat     levothyroxine  50 mcg Oral Daily     lisinopril  2.5 mg Oral BID     metoprolol succinate  12.5 mg Oral Daily     mexiletine  150 mg Oral BID     multivitamin RENAL  1 capsule Oral QPM     omeprazole  20 mg Oral Daily     pantoprazole  40 mg Oral QAM AC     pravastatin  40 mg Oral QPM     Data   Results for orders placed or performed during the hospital encounter of 10/16/20   Troponin I     Status: None   Result Value Ref Range    Troponin I ES 0.028 0.000 - 0.045 ug/L   Phosphorus     Status: Abnormal   Result Value Ref Range    Phosphorus 1.8 (L) 2.5 - 4.5 mg/dL   CBC with platelets     Status: Abnormal   Result Value Ref Range    WBC 6.9 4.0 - 11.0 10e9/L    RBC Count 3.05 (L) 4.4 - 5.9 10e12/L    Hemoglobin 10.2 (L) 13.3 - 17.7 g/dL    Hematocrit 32.3 (L)  40.0 - 53.0 %     (H) 78 - 100 fl    MCH 33.4 (H) 26.5 - 33.0 pg    MCHC 31.6 31.5 - 36.5 g/dL    RDW 16.2 (H) 10.0 - 15.0 %    Platelet Count 146 (L) 150 - 450 10e9/L   Heparin 10a Level     Status: Abnormal   Result Value Ref Range    Heparin 10A Level 1.93 () IU/mL   Partial thromboplastin time     Status: Abnormal   Result Value Ref Range     (HH) 22 - 37 sec   Basic metabolic panel     Status: Abnormal   Result Value Ref Range    Sodium 140 133 - 144 mmol/L    Potassium 4.5 3.4 - 5.3 mmol/L    Chloride 105 94 - 109 mmol/L    Carbon Dioxide 26 20 - 32 mmol/L    Anion Gap 9 3 - 14 mmol/L    Glucose 79 70 - 99 mg/dL    Urea Nitrogen 20 7 - 30 mg/dL    Creatinine 4.65 (H) 0.66 - 1.25 mg/dL    GFR Estimate 11 (L) >60 mL/min/[1.73_m2]    GFR Estimate If Black 13 (L) >60 mL/min/[1.73_m2]    Calcium 9.0 8.5 - 10.1 mg/dL   CBC with platelets differential     Status: Abnormal   Result Value Ref Range    WBC 5.5 4.0 - 11.0 10e9/L    RBC Count 2.94 (L) 4.4 - 5.9 10e12/L    Hemoglobin 9.8 (L) 13.3 - 17.7 g/dL    Hematocrit 31.1 (L) 40.0 - 53.0 %     (H) 78 - 100 fl    MCH 33.3 (H) 26.5 - 33.0 pg    MCHC 31.5 31.5 - 36.5 g/dL    RDW 16.6 (H) 10.0 - 15.0 %    Platelet Count 146 (L) 150 - 450 10e9/L    Diff Method Automated Method     % Neutrophils 67.9 %    % Lymphocytes 14.8 %    % Monocytes 7.6 %    % Eosinophils 7.9 %    % Basophils 1.3 %    % Immature Granulocytes 0.5 %    Nucleated RBCs 0 0 /100    Absolute Neutrophil 3.8 1.6 - 8.3 10e9/L    Absolute Lymphocytes 0.8 0.8 - 5.3 10e9/L    Absolute Monocytes 0.4 0.0 - 1.3 10e9/L    Absolute Eosinophils 0.4 0.0 - 0.7 10e9/L    Absolute Basophils 0.1 0.0 - 0.2 10e9/L    Abs Immature Granulocytes 0.0 0 - 0.4 10e9/L    Absolute Nucleated RBC 0.0    Heparin 10a Level     Status: Abnormal   Result Value Ref Range    Heparin 10A Level 1.55 (HH) IU/mL   Partial thromboplastin time     Status: Abnormal   Result Value Ref Range     () 22 - 37 sec    Heparin 10a Level     Status: Abnormal   Result Value Ref Range    Heparin 10A Level 1.04 (HH) IU/mL   Magnesium     Status: Abnormal   Result Value Ref Range    Magnesium 1.4 (L) 1.6 - 2.3 mg/dL   Magnesium     Status: None   Result Value Ref Range    Magnesium 1.7 1.6 - 2.3 mg/dL   Partial thromboplastin time     Status: Abnormal   Result Value Ref Range    PTT 48 (H) 22 - 37 sec   Heparin 10a Level     Status: None   Result Value Ref Range    Heparin 10A Level 0.68 IU/mL   Phosphorus     Status: Abnormal   Result Value Ref Range    Phosphorus 1.8 (L) 2.5 - 4.5 mg/dL   Partial thromboplastin time     Status: Abnormal   Result Value Ref Range    PTT 55 (H) 22 - 37 sec   Nephrology IP Consult: Patient to be seen: Routine - within 24 hours; End-stage renal disease on hemodialysis.  Consult for dialysis management; Consultant may enter orders: Yes; Requesting provider? Hospitalist (if different from attending physic...     Status: None ()    Narrative    Brady Kelsey MD     10/16/2020  2:52 PM  RENAL CONSULTATION NOTE    REFERRING MD:  Tej Colunga MD    REASON FOR CONSULTATION:  ESRD    HPI:  74 y.o man with ESRD, CAD, ICM and DM, who was admitted on   10/16 for dizziness. He has an significant CAD s/p MI with FOREST to   circumflex and OM in 2016. He had biventricular failure with LV   function of 20-25%. He developed chest pain while at rest. He   developed dizziness and lightheadedness as he was getting up. He   called 911 and EMS brought him to [facility] ER. Troponin negative   x 2. TTE resulting pending. Lexiscan showed moderate ischemia in   the lateral left ventricle. Awaiting further recommendations from   cardiology. Pt says he is too wipe-out for dialysis today. He   would like to get dialysis tomorrow. Denies worsening shortness   of breath. He is chest pain free. No N/V.     ROS:  A complete review of systems was performed and is negative   except as noted above.    PMH:    Past Medical  History:   Diagnosis Date     Acid reflux disease      Benign essential hypertension      CAD (coronary artery disease)     s/p multiple NSTEMIs and PCI's     ESRD on hemodialysis (H)     MWF     History of atrial flutter     s/p ablation     Hypothyroidism      Ischemic cardiomyopathy     EF 25-30%, s/p AICD     Type 2 diabetes mellitus (H)     diet-controlled       PSH:    Past Surgical History:   Procedure Laterality Date     CHOLECYSTECTOMY, OPEN  2013     ELBOW SURGERY Left 2008    ORIF - plates still in place     EP ICD GENERATOR CHANGE SINGLE N/A 11/21/2019    Procedure: EP ICD Generator Change Single;  Surgeon: Jono Mejia MD;  Location:  HEART CARDIAC CATH LAB     H ABLATION ATRIAL FLUTTER  06/08/2017, 12/11/17     HC LEFT HEART CATHETERIZATION  7/14/2016     HC LEFT HEART CATHETERIZATION  9/21/2016     IMPLANT VENTRICULAR DEVICE  08/15/2011     IR DIALYSIS FISTULOGRAM LEFT  7/22/2019     REPAIR FISTULA ARTERIOVENOUS UPPER EXTREMITY Left 3/5/2019    Procedure: REPAIR LEFT UPPER ARM ARTERIOVENOUS FISTULA SKIN   ULCER;  Surgeon: Westley Griggs MD;  Location:  OR     TONSILLECTOMY & ADENOIDECTOMY       VASCULAR SURGERY  2004, 2010    LUE fistulas (upper and lower); upper one is functional       MEDICATIONS:      isosorbide mononitrate  15 mg Oral Three Times Weekly     levothyroxine  50 mcg Oral Daily     lisinopril  2.5 mg Oral BID     loperamide  2 mg Oral Three Times Weekly     [START ON 10/17/2020] loperamide  2 mg Oral FIVE Times Weekly     metoprolol succinate  12.5 mg Oral Daily     mexiletine  150 mg Oral BID     multivitamin RENAL  1 capsule Oral QPM     omeprazole  20 mg Oral Daily     pantoprazole  40 mg Oral QAM AC     pravastatin  40 mg Oral QPM     regadenoson  0.4 mg Intravenous Once     technetium Tc 99m tetrofosmin 2UD study  3-42 mCi Intravenous   Q2H       ALLERGIES:    Allergies as of 10/15/2020 - Reviewed 10/15/2020   Allergen Reaction Noted     Contrast dye Hives  07/10/2016     No clinical screening - see comments  2016       FH:    Family History   Problem Relation Age of Onset     Cerebrovascular Disease Mother         later in life     Hypertension Father      Bladder Cancer Father      Myocardial Infarction Paternal Grandmother      Diabetes No family hx of      Prostate Cancer No family hx of      Colon Cancer No family hx of        SH:    Social History     Socioeconomic History     Marital status: Single     Spouse name: Not on file     Number of children: Not on file     Years of education: Not on file     Highest education level: Not on file   Occupational History     Occupation: Retired - CPA   Social Needs     Financial resource strain: Not on file     Food insecurity     Worry: Not on file     Inability: Not on file     Transportation needs     Medical: Not on file     Non-medical: Not on file   Tobacco Use     Smoking status: Former Smoker     Packs/day: 2.00     Years: 35.00     Pack years: 70.00     Types: Cigarettes     Start date:      Quit date: 1996     Years since quittin.9     Smokeless tobacco: Never Used   Substance and Sexual Activity     Alcohol use: Not Currently     Alcohol/week: 0.0 standard drinks     Frequency: Never     Drug use: No     Sexual activity: Never   Lifestyle     Physical activity     Days per week: Not on file     Minutes per session: Not on file     Stress: Not on file   Relationships     Social connections     Talks on phone: Not on file     Gets together: Not on file     Attends Evangelical service: Not on file     Active member of club or organization: Not on file     Attends meetings of clubs or organizations: Not on file     Relationship status: Not on file     Intimate partner violence     Fear of current or ex partner: Not on file     Emotionally abused: Not on file     Physically abused: Not on file     Forced sexual activity: Not on file   Other Topics Concern     Parent/sibling w/ CABG, MI or  "angioplasty before 65F 55M? No      Service Not Asked     Blood Transfusions Not Asked     Caffeine Concern No     Occupational Exposure Not Asked     Hobby Hazards Not Asked     Sleep Concern No     Stress Concern Not Asked     Weight Concern Not Asked     Special Diet Yes     Back Care Not Asked     Exercise Yes     Comment: Walking     Bike Helmet Not Asked     Seat Belt Yes     Self-Exams Not Asked   Social History Narrative    Single.    No kids.     Maria Dolores Pabon (friend- healthcare POA), Joaquina Nair (friend -   healthcare POA)    No formal exercise.        PHYSICAL EXAM:    /65   Pulse 81   Temp 95.7  F (35.4  C) (Oral)   Resp 17     Ht 1.727 m (5' 8\")   Wt 81.1 kg (178 lb 14.4 oz)   SpO2 95%     BMI 27.20 kg/m    GENERAL: pleasant, alert, NAD  HEENT:  Normocephalic. No gross abnormalities.  Pupils equal.    MMM.    CV: RRR, + murmurs, no clicks, gallops, or rubs, 1+edema  RESP: Clear bilaterally with good efforts. No wheezes or   crackles.   GI: Abdomen soft,  NT  MUSCULOSKELETAL: extremities nl - no gross deformities noted, 1+   edema b/l   SKIN: no suspicious lesions or rashes, dry to touch  NEURO: Generalized weakness. Awake and oriented x 3.   PSYCH: mood good, affect appropriate  LYMPH: No palpable ant/post cervical   L UE AVF    LABS:      CBC RESULTS:     Recent Labs   Lab 10/15/20  1830   WBC 6.2   RBC 3.20*   HGB 10.8*   HCT 33.1*          BMP RESULTS:  Recent Labs   Lab 10/15/20  1830      POTASSIUM 4.3   CHLORIDE 104   CO2 30   BUN 15   CR 3.23*   *   CHRISTINE 9.5       INR  Recent Labs   Lab 10/15/20  1830   INR 1.31*        DIAGNOSTICS:  Reviewed    A/P:  74 y.o man with ESRD and CAD, admitted for chest pain and   dizziness.     # ESRD:    -3.5 hrs, EDW 79.5 kg ( ~ 0.5-1 liter), Qb 450, LAVF, + heparin   -MWF in [place]  # Anemia: 1600 units of Epogen  # CKD-MBD: 1 mcg of Hectorol  # CAD with BiV failure, EF of 20-25%. Lexiscan showed moderate of   the " left lateral wall.   # Hypervolemia: 1+ pitting edema  # Hypertension: BP is controlled.     Plan:  # Hemodialysis tomorrow.       Brady Kelsey MD  [facility] - Nephrology  Office Phone: [phone]  Pager: [phone]   Care Management / Social Work IP Consult     Status: None ()    Amina Toledo RN     10/16/2020  2:27 PM  Care Management Initial Consult    General Information  Assessment completed with:: Patient,    Type of CM/SW Visit: Initial Assessment  Primary Care Provider verified: Yes        Reason for Consult: care coordination/care conference,   discharge planning  Advance Care Planning: Advance Care Planning Reviewed: no   concerns identified          Communication Assessment  Patient's communication style: spoken language (English or   Bilingual)  Hearing Difficulty or Deaf: no Wear Glasses or Blind:   yes    Cognitive  Cognitive/Neuro/Behavioral: WDL                      Living Environment:   People in home: alone     Current living Arrangements: house          Family/Social Support:  Care provided by: self, other (see comments)(private pay helper)  Provides care for: no one  Marital Status: Single        Description of Support System:     His private pay homemakers   have been with him for 12 years                   Current Resources:   Skilled Home Care Services:  none  Community Resources:  Private pay homemaker  Equipment currently used at home: cane, quad  Supplies currently used at home: none     Employment:  Employment Status: retired        Financial/Environmental Concerns:     none       Lifestyle & Psychosocial Needs:        Socioeconomic History     Marital status: Single     Spouse name: Not on file     Number of children: Not on file     Years of education: Not on file     Highest education level: Not on file   Occupational History     Occupation: Retired - CPA     Tobacco Use     Smoking status: Former Smoker     Packs/day: 2.00     Years: 35.00     Pack  years: 70.00     Types: Cigarettes     Start date:      Quit date: 1996     Years since quittin.9     Smokeless tobacco: Never Used   Substance and Sexual Activity     Alcohol use: Not Currently     Alcohol/week: 0.0 standard drinks     Frequency: Never     Drug use: No     Sexual activity: Never       Functional Status:  Prior to admission patient needed assistance:          Mental Health Status:  WDL               Additional Information:  Patient is a delightful man.  ,89. Not noted to have   diagnosis of CHF.  He has had multiple heart issues over the   years and follows with [facility] cardiology.He has a   nephrologist, Dr. Kumar and has dialysis MWF at [facility] in   [place].  Patient said he sort of follows both a cardiac and   renal diet.  He does follow with nutritionist. He gets weighed at   dialysis.  He is down 8 kg since March as his medications   changed.  No gaps in care identified.  He declined appointment   support.    Amina Pulido RN, BSN, PHN, CTS  Care Coordinator  [facility]  [phone]            Echocardiogram Complete     Status: None    Narrative    [id number]  [id number]  [id number]^ZOE^OLIVER^SOHA           [facility]  Echocardiography Laboratory  [address] [place] [address]        Name: SOCORRO LÓPEZ  MRN: [id number]  : 1945  Study Date: 10/16/2020 07:47 AM  Age: 74 yrs  Gender: Male  Patient Location: [facility]  Reason For Study: Syncope  Ordering Physician: OLIVER ENRIQUEZ  Referring Physician: ROBY HA  Performed By: Tez Baker RDCS     BSA: 1.9 m2  Height: 68 in  Weight: 178 lb  HR: 75  BP: 134/66 mmHg  _____________________________________________________________________________  __        Procedure  Complete Portable Echo Adult. Optison (NDC #[id number]) given intravenously.  _____________________________________________________________________________  __         Interpretation Summary     The left ventricle is mildly dilated.  The visual ejection fraction is estimated at 20-25%.  Diastolic Doppler findings (E/E' ratio and/or other parameters) suggest left  ventricular filling pressures are increased.  There is severe global hypokinesis. Best preserved segments involving  anterolateral wall. Akinesis of inferoseptal, apical.  The right ventricle is mildly dilated.  Severely decreased right ventricular systolic function  There is severe biatrial enlargement.  There is mild (1+) mitral regurgitation.  There is mild to moderate (1-2+) tricuspid regurgitation.  Right ventricular systolic pressure is elevated, consistent with severe  pulmonary hypertension.  Mild aortic root dilatation.  The ascending aorta is Mildly dilated.  Dilation of the inferior vena cava is present with abnormal respiratory  variation in diameter.  There is mild to moderate (1-2+) pulmonic valvular regurgitation.  Compared to prior study, changes are noted.  _____________________________________________________________________________  __        Left Ventricle  The left ventricle is mildly dilated. There is normal left ventricular wall  thickness. The visual ejection fraction is estimated at 20-25%. Diastolic  Doppler findings (E/E' ratio and/or other parameters) suggest left ventricular  filling pressures are increased. There is severe global hypokinesis. Best  preserved segments involving anterolateral wall. Akinesis of inferoseptal,  apical.     Right Ventricle  The right ventricle is mildly dilated. There is a catheter/pacemaker lead seen  in the right ventricle. Severely decreased right ventricular systolic  function.     Atria  There is severe biatrial enlargement. There is no color Doppler evidence of an  atrial shunt.     Mitral Valve  The mitral valve leaflets are mildly thickened. There is mild (1+) mitral  regurgitation.        Tricuspid Valve  The tricuspid valve is not well visualized, but  is grossly normal. There is  mild to moderate (1-2+) tricuspid regurgitation. The right ventricular  systolic pressure is approximated at 51.5 mmHg plus the right atrial pressure.  Right ventricular systolic pressure is elevated, consistent with severe  pulmonary hypertension.     Aortic Valve  There is mild trileaflet aortic sclerosis. No aortic regurgitation is present.     Pulmonic Valve  The pulmonic valve is not well visualized. There is mild to moderate (1-2+)  pulmonic valvular regurgitation.     Vessels  Mild aortic root dilatation. The ascending aorta is Mildly dilated. Dilation  of the inferior vena cava is present with abnormal respiratory variation in  diameter.     Pericardium  There is no pericardial effusion.        Rhythm  The rhythm was paced.  _____________________________________________________________________________  __  MMode/2D Measurements & Calculations  IVSd: 1.7 cm     LVIDd: 6.1 cm  LVIDs: 5.2 cm  LVPWd: 1.0 cm  FS: 15.0 %  LV mass(C)d: 393.7 grams  LV mass(C)dI: 202.4 grams/m2  Ao root diam: 3.9 cm  LA dimension: 5.8 cm  asc Aorta Diam: 3.8 cm  LA/Ao: 1.5  LA Volume (BP): 145.0 ml  LA Volume Index (BP): 74.4 ml/m2  RWT: 0.34           Doppler Measurements & Calculations  MV E max zach: 102.2 cm/sec  MV A max zach: 53.8 cm/sec  MV E/A: 1.9  MV dec time: 0.12 sec  TR max zach: 359.0 cm/sec  TR max P.5 mmHg  E/E' av.3  Lateral E/e': 17.3  Medial E/e': 45.3           _____________________________________________________________________________  __           Report approved by: Mariana Luna 10/16/2020 01:30 PM      NM MPI w Lexiscan     Status: None   Result Value Ref Range    Target      Baseline Systolic      Baseline Diastolic BP 61     Last Stress Systolic      Last Stress Diastolic BP 51     Baseline HR 83     Max HR 86     Calculated Percent HR 59 %    Rate Pressure Product 9,460.0     Left Ventricular EF 22 %    Narrative       The nuclear stress test  is abnormal.     There is moderate ischemia in the lateral segment(s) of the left   ventricle.     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     There is nontransmural infarction in the inferior segment(s) of the   left ventricle associated with a moderate degree of filippo-infarct ischemia.   (bowel activity is overlapping this area, more prominently in resting   images and may be contributing to some reversibility of this defect)     Left ventricular function is severely reduced.     The left ventricular ejection fraction at stress is 22%.     Right ventricular enlargment is noted.     There is no prior study for comparison.

## 2020-10-19 ENCOUNTER — SURGERY (OUTPATIENT)
Age: 75
End: 2020-10-19
Payer: MEDICARE

## 2020-10-19 LAB
ANION GAP SERPL CALCULATED.3IONS-SCNC: 10 MMOL/L (ref 3–14)
APTT PPP: 47 SEC (ref 22–37)
BUN SERPL-MCNC: 21 MG/DL (ref 7–30)
CALCIUM SERPL-MCNC: 8.9 MG/DL (ref 8.5–10.1)
CHLORIDE SERPL-SCNC: 104 MMOL/L (ref 94–109)
CHOLEST SERPL-MCNC: 101 MG/DL
CO2 SERPL-SCNC: 26 MMOL/L (ref 20–32)
CREAT SERPL-MCNC: 4.14 MG/DL (ref 0.66–1.25)
ERYTHROCYTE [DISTWIDTH] IN BLOOD BY AUTOMATED COUNT: 16.2 % (ref 10–15)
GFR SERPL CREATININE-BSD FRML MDRD: 13 ML/MIN/{1.73_M2}
GLUCOSE SERPL-MCNC: 142 MG/DL (ref 70–99)
HCT VFR BLD AUTO: 31.3 % (ref 40–53)
HDLC SERPL-MCNC: 60 MG/DL
HGB BLD-MCNC: 9.6 G/DL (ref 13.3–17.7)
LDLC SERPL CALC-MCNC: 29 MG/DL
LMWH PPP CHRO-ACNC: 0.33 IU/ML
MCH RBC QN AUTO: 33 PG (ref 26.5–33)
MCHC RBC AUTO-ENTMCNC: 30.7 G/DL (ref 31.5–36.5)
MCV RBC AUTO: 108 FL (ref 78–100)
NONHDLC SERPL-MCNC: 41 MG/DL
PHOSPHATE SERPL-MCNC: 3 MG/DL (ref 2.5–4.5)
PLATELET # BLD AUTO: 145 10E9/L (ref 150–450)
POTASSIUM SERPL-SCNC: 4 MMOL/L (ref 3.4–5.3)
RBC # BLD AUTO: 2.91 10E12/L (ref 4.4–5.9)
SODIUM SERPL-SCNC: 140 MMOL/L (ref 133–144)
TRIGL SERPL-MCNC: 60 MG/DL
WBC # BLD AUTO: 2.9 10E9/L (ref 4–11)

## 2020-10-19 PROCEDURE — 99153 MOD SED SAME PHYS/QHP EA: CPT | Performed by: INTERNAL MEDICINE

## 2020-10-19 PROCEDURE — 250N000011 HC RX IP 250 OP 636: Performed by: INTERNAL MEDICINE

## 2020-10-19 PROCEDURE — C9600 PERC DRUG-EL COR STENT SING: HCPCS | Performed by: INTERNAL MEDICINE

## 2020-10-19 PROCEDURE — 027034Z DILATION OF CORONARY ARTERY, ONE ARTERY WITH DRUG-ELUTING INTRALUMINAL DEVICE, PERCUTANEOUS APPROACH: ICD-10-PCS | Performed by: EMERGENCY MEDICINE

## 2020-10-19 PROCEDURE — 120N000004 HC R&B MS OVERFLOW

## 2020-10-19 PROCEDURE — 99232 SBSQ HOSP IP/OBS MODERATE 35: CPT | Mod: 25 | Performed by: INTERNAL MEDICINE

## 2020-10-19 PROCEDURE — C1874 STENT, COATED/COV W/DEL SYS: HCPCS | Performed by: INTERNAL MEDICINE

## 2020-10-19 PROCEDURE — 36415 COLL VENOUS BLD VENIPUNCTURE: CPT | Performed by: INTERNAL MEDICINE

## 2020-10-19 PROCEDURE — 250N000013 HC RX MED GY IP 250 OP 250 PS 637: Performed by: PHYSICIAN ASSISTANT

## 2020-10-19 PROCEDURE — 99152 MOD SED SAME PHYS/QHP 5/>YRS: CPT | Performed by: INTERNAL MEDICINE

## 2020-10-19 PROCEDURE — C1894 INTRO/SHEATH, NON-LASER: HCPCS | Performed by: INTERNAL MEDICINE

## 2020-10-19 PROCEDURE — 80048 BASIC METABOLIC PNL TOTAL CA: CPT | Performed by: INTERNAL MEDICINE

## 2020-10-19 PROCEDURE — 250N000013 HC RX MED GY IP 250 OP 250 PS 637: Performed by: INTERNAL MEDICINE

## 2020-10-19 PROCEDURE — C1725 CATH, TRANSLUMIN NON-LASER: HCPCS | Performed by: INTERNAL MEDICINE

## 2020-10-19 PROCEDURE — 258N000003 HC RX IP 258 OP 636: Performed by: INTERNAL MEDICINE

## 2020-10-19 PROCEDURE — 85730 THROMBOPLASTIN TIME PARTIAL: CPT | Performed by: INTERNAL MEDICINE

## 2020-10-19 PROCEDURE — B41FZZZ FLUOROSCOPY OF RIGHT LOWER EXTREMITY ARTERIES: ICD-10-PCS | Performed by: EMERGENCY MEDICINE

## 2020-10-19 PROCEDURE — 250N000009 HC RX 250: Performed by: INTERNAL MEDICINE

## 2020-10-19 PROCEDURE — C1760 CLOSURE DEV, VASC: HCPCS | Performed by: INTERNAL MEDICINE

## 2020-10-19 PROCEDURE — 85027 COMPLETE CBC AUTOMATED: CPT | Performed by: INTERNAL MEDICINE

## 2020-10-19 PROCEDURE — 90937 HEMODIALYSIS REPEATED EVAL: CPT

## 2020-10-19 PROCEDURE — 4A023N7 MEASUREMENT OF CARDIAC SAMPLING AND PRESSURE, LEFT HEART, PERCUTANEOUS APPROACH: ICD-10-PCS | Performed by: EMERGENCY MEDICINE

## 2020-10-19 PROCEDURE — 250N000011 HC RX IP 250 OP 636: Performed by: PHYSICIAN ASSISTANT

## 2020-10-19 PROCEDURE — 634N000001 HC RX 634: Performed by: INTERNAL MEDICINE

## 2020-10-19 PROCEDURE — 84100 ASSAY OF PHOSPHORUS: CPT | Performed by: INTERNAL MEDICINE

## 2020-10-19 PROCEDURE — 99152 MOD SED SAME PHYS/QHP 5/>YRS: CPT | Mod: 59 | Performed by: INTERNAL MEDICINE

## 2020-10-19 PROCEDURE — 85520 HEPARIN ASSAY: CPT | Performed by: INTERNAL MEDICINE

## 2020-10-19 PROCEDURE — 99233 SBSQ HOSP IP/OBS HIGH 50: CPT | Performed by: HOSPITALIST

## 2020-10-19 PROCEDURE — C1887 CATHETER, GUIDING: HCPCS | Performed by: INTERNAL MEDICINE

## 2020-10-19 PROCEDURE — 93458 L HRT ARTERY/VENTRICLE ANGIO: CPT | Performed by: INTERNAL MEDICINE

## 2020-10-19 PROCEDURE — C1769 GUIDE WIRE: HCPCS | Performed by: INTERNAL MEDICINE

## 2020-10-19 PROCEDURE — 272N000001 HC OR GENERAL SUPPLY STERILE: Performed by: INTERNAL MEDICINE

## 2020-10-19 PROCEDURE — 93458 L HRT ARTERY/VENTRICLE ANGIO: CPT | Mod: 26 | Performed by: INTERNAL MEDICINE

## 2020-10-19 PROCEDURE — 80061 LIPID PANEL: CPT | Performed by: HOSPITALIST

## 2020-10-19 PROCEDURE — 93452 LEFT HRT CATH W/VENTRCLGRPHY: CPT | Performed by: INTERNAL MEDICINE

## 2020-10-19 DEVICE — STENT SYNERGY DRUG ELUTING 2.25X20MM  H7493926020220: Type: IMPLANTABLE DEVICE | Status: FUNCTIONAL

## 2020-10-19 RX ORDER — SODIUM NITROPRUSSIDE 25 MG/ML
INJECTION INTRAVENOUS
Status: DISCONTINUED | OUTPATIENT
Start: 2020-10-19 | End: 2020-10-19 | Stop reason: HOSPADM

## 2020-10-19 RX ORDER — ONDANSETRON 2 MG/ML
4 INJECTION INTRAMUSCULAR; INTRAVENOUS EVERY 6 HOURS PRN
Status: DISCONTINUED | OUTPATIENT
Start: 2020-10-19 | End: 2020-10-20 | Stop reason: HOSPADM

## 2020-10-19 RX ORDER — POTASSIUM CHLORIDE 1500 MG/1
20 TABLET, EXTENDED RELEASE ORAL
Status: DISCONTINUED | OUTPATIENT
Start: 2020-10-19 | End: 2020-10-19 | Stop reason: HOSPADM

## 2020-10-19 RX ORDER — ONDANSETRON 4 MG/1
4 TABLET, ORALLY DISINTEGRATING ORAL EVERY 6 HOURS PRN
Status: DISCONTINUED | OUTPATIENT
Start: 2020-10-19 | End: 2020-10-20 | Stop reason: HOSPADM

## 2020-10-19 RX ORDER — ASPIRIN 81 MG/1
81 TABLET, CHEWABLE ORAL ONCE
Status: DISCONTINUED | OUTPATIENT
Start: 2020-10-19 | End: 2020-10-19

## 2020-10-19 RX ORDER — HEPARIN SODIUM 1000 [USP'U]/ML
500 INJECTION, SOLUTION INTRAVENOUS; SUBCUTANEOUS
Status: DISCONTINUED | OUTPATIENT
Start: 2020-10-19 | End: 2020-10-19

## 2020-10-19 RX ORDER — OXYCODONE HYDROCHLORIDE 5 MG/1
10 TABLET ORAL EVERY 4 HOURS PRN
Status: DISCONTINUED | OUTPATIENT
Start: 2020-10-19 | End: 2020-10-20 | Stop reason: HOSPADM

## 2020-10-19 RX ORDER — FENTANYL CITRATE 50 UG/ML
25-50 INJECTION, SOLUTION INTRAMUSCULAR; INTRAVENOUS
Status: ACTIVE | OUTPATIENT
Start: 2020-10-19 | End: 2020-10-19

## 2020-10-19 RX ORDER — HEPARIN SODIUM 1000 [USP'U]/ML
500 INJECTION, SOLUTION INTRAVENOUS; SUBCUTANEOUS CONTINUOUS
Status: DISCONTINUED | OUTPATIENT
Start: 2020-10-19 | End: 2020-10-19

## 2020-10-19 RX ORDER — VERAPAMIL HYDROCHLORIDE 2.5 MG/ML
INJECTION, SOLUTION INTRAVENOUS
Status: DISCONTINUED
Start: 2020-10-19 | End: 2020-10-19 | Stop reason: HOSPADM

## 2020-10-19 RX ORDER — METOPROLOL TARTRATE 1 MG/ML
5-10 INJECTION, SOLUTION INTRAVENOUS
Status: DISCONTINUED | OUTPATIENT
Start: 2020-10-19 | End: 2020-10-20 | Stop reason: HOSPADM

## 2020-10-19 RX ORDER — CLOPIDOGREL BISULFATE 75 MG/1
TABLET ORAL
Status: DISCONTINUED | OUTPATIENT
Start: 2020-10-19 | End: 2020-10-19 | Stop reason: HOSPADM

## 2020-10-19 RX ORDER — ONDANSETRON 2 MG/ML
INJECTION INTRAMUSCULAR; INTRAVENOUS
Status: DISCONTINUED | OUTPATIENT
Start: 2020-10-19 | End: 2020-10-19 | Stop reason: HOSPADM

## 2020-10-19 RX ORDER — HEPARIN SODIUM 1000 [USP'U]/ML
INJECTION, SOLUTION INTRAVENOUS; SUBCUTANEOUS
Status: DISCONTINUED
Start: 2020-10-19 | End: 2020-10-19 | Stop reason: HOSPADM

## 2020-10-19 RX ORDER — ATROPINE SULFATE 0.1 MG/ML
0.5 INJECTION INTRAVENOUS
Status: ACTIVE | OUTPATIENT
Start: 2020-10-19 | End: 2020-10-19

## 2020-10-19 RX ORDER — SODIUM CHLORIDE 9 MG/ML
INJECTION, SOLUTION INTRAVENOUS CONTINUOUS
Status: DISCONTINUED | OUTPATIENT
Start: 2020-10-19 | End: 2020-10-19 | Stop reason: HOSPADM

## 2020-10-19 RX ORDER — NITROGLYCERIN 5 MG/ML
VIAL (ML) INTRAVENOUS
Status: DISCONTINUED
Start: 2020-10-19 | End: 2020-10-19 | Stop reason: HOSPADM

## 2020-10-19 RX ORDER — NITROGLYCERIN 5 MG/ML
VIAL (ML) INTRAVENOUS
Status: DISCONTINUED | OUTPATIENT
Start: 2020-10-19 | End: 2020-10-19 | Stop reason: HOSPADM

## 2020-10-19 RX ORDER — DOXERCALCIFEROL 4 UG/2ML
4 INJECTION INTRAVENOUS
Status: COMPLETED | OUTPATIENT
Start: 2020-10-19 | End: 2020-10-19

## 2020-10-19 RX ORDER — LORAZEPAM 0.5 MG/1
0.5 TABLET ORAL
Status: DISCONTINUED | OUTPATIENT
Start: 2020-10-19 | End: 2020-10-19 | Stop reason: HOSPADM

## 2020-10-19 RX ORDER — ASPIRIN 81 MG/1
81 TABLET ORAL DAILY
Status: DISCONTINUED | OUTPATIENT
Start: 2020-10-20 | End: 2020-10-20

## 2020-10-19 RX ORDER — OXYCODONE HYDROCHLORIDE 5 MG/1
5 TABLET ORAL EVERY 4 HOURS PRN
Status: DISCONTINUED | OUTPATIENT
Start: 2020-10-19 | End: 2020-10-20 | Stop reason: HOSPADM

## 2020-10-19 RX ORDER — NALOXONE HYDROCHLORIDE 0.4 MG/ML
.2-.4 INJECTION, SOLUTION INTRAMUSCULAR; INTRAVENOUS; SUBCUTANEOUS
Status: ACTIVE | OUTPATIENT
Start: 2020-10-19 | End: 2020-10-19

## 2020-10-19 RX ORDER — FENTANYL CITRATE 50 UG/ML
INJECTION, SOLUTION INTRAMUSCULAR; INTRAVENOUS
Status: DISCONTINUED
Start: 2020-10-19 | End: 2020-10-19 | Stop reason: HOSPADM

## 2020-10-19 RX ORDER — LORAZEPAM 2 MG/ML
0.5 INJECTION INTRAMUSCULAR
Status: DISCONTINUED | OUTPATIENT
Start: 2020-10-19 | End: 2020-10-19 | Stop reason: HOSPADM

## 2020-10-19 RX ORDER — FLUMAZENIL 0.1 MG/ML
0.2 INJECTION, SOLUTION INTRAVENOUS
Status: ACTIVE | OUTPATIENT
Start: 2020-10-19 | End: 2020-10-19

## 2020-10-19 RX ORDER — LIDOCAINE 40 MG/G
CREAM TOPICAL
Status: DISCONTINUED | OUTPATIENT
Start: 2020-10-19 | End: 2020-10-19 | Stop reason: HOSPADM

## 2020-10-19 RX ORDER — LIDOCAINE HYDROCHLORIDE 10 MG/ML
INJECTION, SOLUTION EPIDURAL; INFILTRATION; INTRACAUDAL; PERINEURAL
Status: DISCONTINUED
Start: 2020-10-19 | End: 2020-10-19 | Stop reason: HOSPADM

## 2020-10-19 RX ORDER — FENTANYL CITRATE 50 UG/ML
INJECTION, SOLUTION INTRAMUSCULAR; INTRAVENOUS
Status: DISCONTINUED | OUTPATIENT
Start: 2020-10-19 | End: 2020-10-19 | Stop reason: HOSPADM

## 2020-10-19 RX ORDER — CLOPIDOGREL BISULFATE 75 MG/1
75 TABLET ORAL DAILY
Status: DISCONTINUED | OUTPATIENT
Start: 2020-10-20 | End: 2020-10-20 | Stop reason: HOSPADM

## 2020-10-19 RX ORDER — HEPARIN SODIUM 1000 [USP'U]/ML
INJECTION, SOLUTION INTRAVENOUS; SUBCUTANEOUS
Status: DISCONTINUED | OUTPATIENT
Start: 2020-10-19 | End: 2020-10-19 | Stop reason: HOSPADM

## 2020-10-19 RX ORDER — HYDRALAZINE HYDROCHLORIDE 20 MG/ML
10 INJECTION INTRAMUSCULAR; INTRAVENOUS EVERY 4 HOURS PRN
Status: DISCONTINUED | OUTPATIENT
Start: 2020-10-19 | End: 2020-10-20 | Stop reason: HOSPADM

## 2020-10-19 RX ORDER — ONDANSETRON 2 MG/ML
INJECTION INTRAMUSCULAR; INTRAVENOUS
Status: DISCONTINUED
Start: 2020-10-19 | End: 2020-10-19 | Stop reason: HOSPADM

## 2020-10-19 RX ORDER — IOPAMIDOL 755 MG/ML
INJECTION, SOLUTION INTRAVASCULAR
Status: DISCONTINUED | OUTPATIENT
Start: 2020-10-19 | End: 2020-10-19 | Stop reason: HOSPADM

## 2020-10-19 RX ORDER — CLOPIDOGREL 300 MG/1
TABLET, FILM COATED ORAL
Status: DISCONTINUED
Start: 2020-10-19 | End: 2020-10-19 | Stop reason: HOSPADM

## 2020-10-19 RX ORDER — ACETAMINOPHEN 325 MG/1
650 TABLET ORAL EVERY 4 HOURS PRN
Status: DISCONTINUED | OUTPATIENT
Start: 2020-10-19 | End: 2020-10-19

## 2020-10-19 RX ORDER — NITROGLYCERIN 0.4 MG/1
0.4 TABLET SUBLINGUAL EVERY 5 MIN PRN
Status: DISCONTINUED | OUTPATIENT
Start: 2020-10-19 | End: 2020-10-20 | Stop reason: HOSPADM

## 2020-10-19 RX ORDER — ALBUMIN (HUMAN) 12.5 G/50ML
50 SOLUTION INTRAVENOUS
Status: DISCONTINUED | OUTPATIENT
Start: 2020-10-19 | End: 2020-10-19

## 2020-10-19 RX ORDER — DIPHENHYDRAMINE HYDROCHLORIDE 50 MG/ML
25 INJECTION INTRAMUSCULAR; INTRAVENOUS ONCE
Status: COMPLETED | OUTPATIENT
Start: 2020-10-19 | End: 2020-10-19

## 2020-10-19 RX ORDER — METHYLPREDNISOLONE SODIUM SUCCINATE 125 MG/2ML
125 INJECTION, POWDER, LYOPHILIZED, FOR SOLUTION INTRAMUSCULAR; INTRAVENOUS ONCE
Status: COMPLETED | OUTPATIENT
Start: 2020-10-19 | End: 2020-10-19

## 2020-10-19 RX ORDER — SODIUM CHLORIDE 9 MG/ML
INJECTION, SOLUTION INTRAVENOUS CONTINUOUS
Status: DISCONTINUED | OUTPATIENT
Start: 2020-10-19 | End: 2020-10-20 | Stop reason: HOSPADM

## 2020-10-19 RX ADMIN — ACETAMINOPHEN 650 MG: 325 TABLET, FILM COATED ORAL at 14:13

## 2020-10-19 RX ADMIN — Medication 2000 UNITS: at 10:09

## 2020-10-19 RX ADMIN — ASPIRIN 325 MG: 325 TABLET, COATED ORAL at 08:07

## 2020-10-19 RX ADMIN — FENTANYL CITRATE 50 MCG: 50 INJECTION, SOLUTION INTRAMUSCULAR; INTRAVENOUS at 12:47

## 2020-10-19 RX ADMIN — FENTANYL CITRATE 25 MCG: 50 INJECTION, SOLUTION INTRAMUSCULAR; INTRAVENOUS at 13:30

## 2020-10-19 RX ADMIN — MEXILETINE HYDROCHLORIDE 150 MG: 150 CAPSULE ORAL at 20:46

## 2020-10-19 RX ADMIN — EPOETIN ALFA-EPBX 4000 UNITS: 4000 INJECTION, SOLUTION INTRAVENOUS; SUBCUTANEOUS at 16:32

## 2020-10-19 RX ADMIN — MIDAZOLAM 0.5 MG: 1 INJECTION INTRAMUSCULAR; INTRAVENOUS at 12:54

## 2020-10-19 RX ADMIN — IOPAMIDOL 115 ML: 755 INJECTION, SOLUTION INTRAVENOUS at 13:33

## 2020-10-19 RX ADMIN — ONDANSETRON 4 MG: 4 TABLET, ORALLY DISINTEGRATING ORAL at 14:13

## 2020-10-19 RX ADMIN — SODIUM CHLORIDE 250 ML: 9 INJECTION, SOLUTION INTRAVENOUS at 16:31

## 2020-10-19 RX ADMIN — LEVOTHYROXINE SODIUM 50 MCG: 50 TABLET ORAL at 08:07

## 2020-10-19 RX ADMIN — Medication 1 CAPSULE: at 21:59

## 2020-10-19 RX ADMIN — CLOPIDOGREL BISULFATE 600 MG: 75 TABLET ORAL at 13:02

## 2020-10-19 RX ADMIN — SODIUM NITROPRUSSIDE 200 MCG: 25 INJECTION INTRAVENOUS at 13:18

## 2020-10-19 RX ADMIN — HEPARIN SODIUM 9000 UNITS: 1000 INJECTION INTRAVENOUS; SUBCUTANEOUS at 13:01

## 2020-10-19 RX ADMIN — METHYLPREDNISOLONE SODIUM SUCCINATE 125 MG: 125 INJECTION, POWDER, FOR SOLUTION INTRAMUSCULAR; INTRAVENOUS at 12:10

## 2020-10-19 RX ADMIN — ACETAMINOPHEN 650 MG: 325 TABLET, FILM COATED ORAL at 22:02

## 2020-10-19 RX ADMIN — NITROGLYCERIN 200 MCG: 5 INJECTION, SOLUTION INTRAVENOUS at 12:51

## 2020-10-19 RX ADMIN — ONDANSETRON 4 MG: 2 INJECTION INTRAMUSCULAR; INTRAVENOUS at 13:16

## 2020-10-19 RX ADMIN — LISINOPRIL 2.5 MG: 2.5 TABLET ORAL at 20:47

## 2020-10-19 RX ADMIN — DOXERCALCIFEROL 4 MCG: 4 INJECTION INTRAVENOUS at 16:32

## 2020-10-19 RX ADMIN — SODIUM CHLORIDE 300 ML: 9 INJECTION, SOLUTION INTRAVENOUS at 16:31

## 2020-10-19 RX ADMIN — PRAVASTATIN SODIUM 40 MG: 20 TABLET ORAL at 21:59

## 2020-10-19 RX ADMIN — MIDAZOLAM 0.5 MG: 1 INJECTION INTRAMUSCULAR; INTRAVENOUS at 13:10

## 2020-10-19 RX ADMIN — ISOSORBIDE MONONITRATE 30 MG: 30 TABLET, EXTENDED RELEASE ORAL at 20:46

## 2020-10-19 RX ADMIN — MIDAZOLAM 1 MG: 1 INJECTION INTRAMUSCULAR; INTRAVENOUS at 12:48

## 2020-10-19 RX ADMIN — MEXILETINE HYDROCHLORIDE 150 MG: 150 CAPSULE ORAL at 08:07

## 2020-10-19 RX ADMIN — Medication 12.5 MG: at 20:46

## 2020-10-19 RX ADMIN — DIPHENHYDRAMINE HYDROCHLORIDE 25 MG: 50 INJECTION, SOLUTION INTRAMUSCULAR; INTRAVENOUS at 12:11

## 2020-10-19 RX ADMIN — HEPARIN SODIUM 2000 UNITS: 1000 INJECTION INTRAVENOUS; SUBCUTANEOUS at 13:29

## 2020-10-19 RX ADMIN — OMEPRAZOLE 20 MG: 20 CAPSULE, DELAYED RELEASE ORAL at 08:07

## 2020-10-19 NOTE — PROGRESS NOTES
Assessment and Plan:   ESRD:HD today after cardiac cath. Orders placed and reviewed. He has a LAF that is working well. He is somewhat fluid overloaded and we will aim for 2.5 L UF.     He is hemodynamically stable. Labs are nominal for ESRD.       Interval History:   Cardiac disease: CAD s/p PCI with known chronically occluded LAD, CHF s/p AICD, paroxysmal a-flutter s/p ablation.Adm with dizziness and weakness. Recent echo with 20-25% EF.            Cardiac cath and dialysis to follow.         Review of Systems:   Feeling well. No chest pain or SOB today.           Medications:       - MEDICATION INSTRUCTIONS for Dialysis Patients -   Does not apply See Admin Instructions     aspirin  325 mg Oral Daily     diphenhydrAMINE  25 mg Intravenous Once     isosorbide mononitrate  15 mg Oral Once per day on Sun Tue Thu     isosorbide mononitrate  30 mg Oral Once per day on Mon Wed Fri Sat     levothyroxine  50 mcg Oral Daily     lisinopril  2.5 mg Oral BID     methylPREDNISolone  125 mg Intravenous Once     metoprolol succinate  12.5 mg Oral Daily     mexiletine  150 mg Oral BID     multivitamin RENAL  1 capsule Oral QPM     omeprazole  20 mg Oral Daily     pantoprazole  40 mg Oral QAM AC     pravastatin  40 mg Oral QPM     sodium chloride (PF)  3 mL Intracatheter Q8H       HEParin 600 Units/hr (10/19/20 1019)     sodium chloride       Current active medications and PTA medications reviewed, see medication list for details.            Physical Exam:   Vitals were reviewed  Patient Vitals for the past 24 hrs:   BP Temp Temp src Pulse Resp SpO2   10/19/20 0738 139/74 96.1  F (35.6  C) Oral 79 16 91 %   10/19/20 0451 134/73 95.5  F (35.3  C) Oral 73 16 95 %   10/18/20 2248 139/69 95.5  F (35.3  C) Oral 84 18 94 %   10/18/20 1903 126/65 97.8  F (36.6  C) Oral 79 16 94 %   10/18/20 1544 129/63 95.5  F (35.3  C) Oral 66 16 96 %   10/18/20 1326 114/54 -- -- -- -- --   10/18/20 1154 116/49 -- -- 70 -- --       Temp:   [95.5  F (35.3  C)-97.8  F (36.6  C)] 96.1  F (35.6  C)  Pulse:  [66-84] 79  Resp:  [16-18] 16  BP: (114-139)/(49-74) 139/74  SpO2:  [91 %-96 %] 91 %    Temperatures:  Current - Temp: 96.1  F (35.6  C); Max - Temp  Av.1  F (35.6  C)  Min: 95.5  F (35.3  C)  Max: 97.8  F (36.6  C)  Respiration range: Resp  Av.4  Min: 16  Max: 18  Pulse range: Pulse  Av.2  Min: 66  Max: 84  Blood pressure range: Systolic (24hrs), Av , Min:114 , Max:139   ; Diastolic (24hrs), Av, Min:49, Max:74    Pulse oximetry range: SpO2  Av %  Min: 91 %  Max: 96 %    No intake/output data recorded.    No intake or output data in the 24 hours ending 10/19/20 1114    Up in room.   No distress  Alert and responsive       Wt Readings from Last 4 Encounters:   10/17/20 79.4 kg (175 lb)   20 80.2 kg (176 lb 12.9 oz)   20 84.8 kg (187 lb)   19 87.5 kg (192 lb 12.8 oz)          Data:          Lab Results   Component Value Date     10/19/2020     10/17/2020     10/15/2020    Lab Results   Component Value Date    CHLORIDE 104 10/19/2020    CHLORIDE 105 10/17/2020    CHLORIDE 104 10/15/2020    Lab Results   Component Value Date    BUN 21 10/19/2020    BUN 20 10/17/2020    BUN 15 10/15/2020      Lab Results   Component Value Date    POTASSIUM 4.0 10/19/2020    POTASSIUM 4.5 10/17/2020    POTASSIUM 4.3 10/15/2020    Lab Results   Component Value Date    CO2 26 10/19/2020    CO2 26 10/17/2020    CO2 30 10/15/2020    Lab Results   Component Value Date    CR 4.14 10/19/2020    CR 4.65 10/17/2020    CR 3.23 10/15/2020        Recent Labs   Lab Test 10/19/20  0629 10/17/20  0709 10/16/20  1527   WBC 2.9* 5.5 6.9   HGB 9.6* 9.8* 10.2*   HCT 31.3* 31.1* 32.3*   * 106* 106*   * 146* 146*     Recent Labs   Lab Test 10/15/20  1830 19  0847 18  0632 17  0950 17  0950   AST   --    --  13   ALT 19 16 15   < > 15   ALKPHOS 234*  --  202*  --  154*   BILITOTAL 1.0  --   2.3*  --  1.8*    < > = values in this interval not displayed.       Recent Labs   Lab Test 10/17/20  1715 10/17/20  0709 06/07/17  0811   MAG 1.7 1.4* 1.9     Recent Labs   Lab Test 10/19/20  0629 10/18/20  0603 10/16/20  0934   PHOS 3.0 1.8* 1.8*     Recent Labs   Lab Test 10/19/20  0629 10/17/20  0709 10/15/20  1830   CHRISTINE 8.9 9.0 9.5       Lab Results   Component Value Date    CHRISTINE 8.9 10/19/2020     Lab Results   Component Value Date    WBC 2.9 (L) 10/19/2020    HGB 9.6 (L) 10/19/2020    HCT 31.3 (L) 10/19/2020     (H) 10/19/2020     (L) 10/19/2020     Lab Results   Component Value Date     10/19/2020    POTASSIUM 4.0 10/19/2020    CHLORIDE 104 10/19/2020    CO2 26 10/19/2020     (H) 10/19/2020     Lab Results   Component Value Date    BUN 21 10/19/2020    CR 4.14 (H) 10/19/2020     Lab Results   Component Value Date    MAG 1.7 10/17/2020     Lab Results   Component Value Date    PHOS 3.0 10/19/2020       Creatinine   Date Value Ref Range Status   10/19/2020 4.14 (H) 0.66 - 1.25 mg/dL Final   10/17/2020 4.65 (H) 0.66 - 1.25 mg/dL Final   10/15/2020 3.23 (H) 0.66 - 1.25 mg/dL Final   11/21/2019 4.97 (H) 0.66 - 1.25 mg/dL Final   03/26/2019 5.07 (H) 0.66 - 1.25 mg/dL Final     Comment:     Results confirmed by repeat test  Critical Value called to and read back by  NATE ON [facility]ME[facility] ON [id number] CRS     09/13/2018 4.72 (H) 0.66 - 1.25 mg/dL Final       Attestation:  I have reviewed today's vital signs, notes, medications, labs and imaging.     Jah Hammond MD

## 2020-10-19 NOTE — PROGRESS NOTES
"[facility]    Cardiology Progress Note    Date of Service (when I saw the patient): 10/19/2020     Assessment & Plan   Wsetley Ness is a 74 year old male with known CAD with an occluded LAD and LCx and OM PCI/stenting in 2016, an ischemic CM-EF has been 25-30% s/p ICD, NSVT on chronic mexiletine, PAF, hx of AFL s/p ablation, IDDM and ESRD on dialysis who was admitted on 10/16/2020 with dizziness and some CP.  A nuclear stress test this stay shows a moderate area of lateral ischemia.  There is also an area of filippo-infarct ischemia in the inferior wall.  The anterior wall is infarcted which is predictable given that the left anterior descending artery is occluded.  TTE shows the ejection fraction has dropped he now has an ejection fraction of 20 to 25%. Cor angio recommended today. Indications, risks and benefits of the procedure were discussed with the patient in detail that includes but not limited to the risk of stroke, heart attack, death, cardiac or vascular perforation, emergent stenting or bypass, contrast induced allergic reaction, renal dysfunction (including risks of temporary or permanent dialysis), and pulmonary, sedation, and vascular complications (including bleeding and transfusion). Patient understands the overall risks of the procedure and wishes to proceed. He does have a hx of contrast rxn. He received IV pre-treatment meds yesterday afternoon and they will be repeated on call to the cath lab. Typically is on Eliquis due to PAF (Eliquis was started earlier this year, prior to this he had been on DAPT with ASA and clopidogrel without difficulty), will have to determine best anticoagulation/antiplatelet strategy based on cath findings.     Kerry Kimble PA-C      Interval History   Feeling well. No CP. Has had some \"extra\" swelling in his LE lately. They are going to try to take a bit more fluid off in dialysis today.     Tele: SR with occ NSVT less than 20 " beats.    Physical Exam   Temp: 96.1  F (35.6  C) Temp src: Oral BP: 139/74 Pulse: 79   Resp: 16 SpO2: 91 % O2 Device: None (Room air)    Vitals:    10/16/20 0131 10/17/20 1153   Weight: 81.1 kg (178 lb 14.4 oz) 79.4 kg (175 lb)     Vital Signs with Ranges  Temp:  [95.5  F (35.3  C)-97.8  F (36.6  C)] 96.1  F (35.6  C)  Pulse:  [66-84] 79  Resp:  [16-18] 16  BP: (114-139)/(49-74) 139/74  SpO2:  [91 %-96 %] 91 %  No intake/output data recorded.    Constitutional     alert and oriented, in no acute distress.     Skin     warm and dry to touch    Lungs  clear to auscultation     Cardiac  regular rhythm    Abdomen     abdomen soft, bowel sounds normoactive, no hepatosplenomegaly    Extremities and Back     no clubbing, cyanosis. No edema observed.        Neurological     no gross motor deficits noted, affect appropriate, oriented to time, person and place.      Medications     HEParin 500 Units/hr (10/18/20 2004)     sodium chloride         - MEDICATION INSTRUCTIONS for Dialysis Patients -   Does not apply See Admin Instructions     aspirin  325 mg Oral Daily     heparin ANTICOAGULANT  Loading Dose  2,000 Units Intravenous Once     isosorbide mononitrate  15 mg Oral Once per day on Sun Tue Thu     isosorbide mononitrate  30 mg Oral Once per day on Mon Wed Fri Sat     levothyroxine  50 mcg Oral Daily     lisinopril  2.5 mg Oral BID     metoprolol succinate  12.5 mg Oral Daily     mexiletine  150 mg Oral BID     multivitamin RENAL  1 capsule Oral QPM     omeprazole  20 mg Oral Daily     pantoprazole  40 mg Oral QAM AC     pravastatin  40 mg Oral QPM     sodium chloride (PF)  3 mL Intracatheter Q8H       Data   Most Recent 3 CBC's:  Recent Labs   Lab Test 10/19/20  0629 10/17/20  0709 10/16/20  1527   WBC 2.9* 5.5 6.9   HGB 9.6* 9.8* 10.2*   * 106* 106*   * 146* 146*     Most Recent 3 BMP's:  Recent Labs   Lab Test 10/19/20  0629 10/17/20  0709 10/15/20  1830    140 139   POTASSIUM 4.0 4.5 4.3    CHLORIDE 104 105 104   CO2 26 26 30   BUN 21 20 15   CR 4.14* 4.65* 3.23*   ANIONGAP 10 9 5   CHRISTINE 8.9 9.0 9.5   * 79 145*     Most Recent 3 Troponin's:  Recent Labs   Lab Test 10/16/20  0934 10/15/20  1830 09/13/18  1610   TROPI 0.028 <0.015 0.019     Most Recent Cholesterol Panel:  Recent Labs   Lab Test 03/26/19  0847   CHOL 126   LDL 53   HDL 52   TRIG 107     Most Recent TSH and T4:  Recent Labs   Lab Test 03/26/19  0847   TSH 4.40*     TTE 10/16/2020:   The left ventricle is mildly dilated.  The visual ejection fraction is estimated at 20-25%.  Diastolic Doppler findings (E/E' ratio and/or other parameters) suggest left ventricular filling pressures are increased.  There is severe global hypokinesis. Best preserved segments involving anterolateral wall. Akinesis of inferoseptal, apical.  The right ventricle is mildly dilated. Severely decreased right ventricular systolic function  There is severe biatrial enlargement.  There is mild (1+) mitral regurgitation.  There is mild to moderate (1-2+) tricuspid regurgitation.  Right ventricular systolic pressure is elevated, consistent with severe pulmonary hypertension.  Mild aortic root dilatation. The ascending aorta is Mildly dilated.  Dilation of the inferior vena cava is present with abnormal respiratory variation in diameter.  There is mild to moderate (1-2+) pulmonic valvular regurgitation.

## 2020-10-19 NOTE — PLAN OF CARE
"2568-2020    Inpatient Progress Note:    /69 (BP Location: Right arm)   Pulse 84   Temp 95.5  F (35.3  C) (Oral)   Resp 18   Ht 1.727 m (5' 8\")   Wt 79.4 kg (175 lb)   SpO2 94%   BMI 26.61 kg/m         Orientation: Alert and oriented x4 and is able to make needs known.   Neuro: Intact   Pain status: Denied any pain with interview during shift.   Activity: SBA with walker.   Peripheral edema: Trace BLE.  Resp: Lung sounds are clear and Denies any SOB.   Cardiac: Denies any Chest pain with activity and rest. Interpretation of cardiac rhythm per telemetry tech: SR HR 68.   GI: Bowels are active x4 quads and denies any constipation. Reports last BM 10/18/20.  :  No voiding during shift. Pt on Hemodialysis reports still make urine infrequent small amounts.   Skin: Intact, Multiple bruising.   LDA: Peripheral IV Right arm and Fistula Left arm.   Infusions: Heparin infusing at 500 units/hr  Pertinent Labs: None    Diet: Low Salt and no caffeine.   Consults: PT, Cardio and Nephrology. Pharmacy following with heparin dosing.   Discharge Plan: Angio on Monday. Continue monitoring on tele and Heparin Drip.      Will continue to monitor and provide cares.     Brittanie Triana RN        "

## 2020-10-19 NOTE — PRE-PROCEDURE
GENERAL PRE-PROCEDURE:   Procedure:  Cor angio possible PCI  Date/Time:  10/19/2020 11:58 AM    Written consent obtained?: Yes    Risks and benefits: Risks, benefits and alternatives were discussed    Patient states understanding of procedure being performed: Yes    Patient's understanding of procedure matches consent: Yes    Procedure consent matches procedure scheduled: Yes    Expected level of sedation:  Moderate  Appropriately NPO:  Yes  ASA Class:  Class 4- Severe systemic disease, acute unstable problems  History & Physical reviewed:  History and physical reviewed and no updates needed  Statement of review:  I have reviewed the lab findings, diagnostic data, medications, and the plan for sedation

## 2020-10-19 NOTE — PROGRESS NOTES
Patient declines to watch the angiogram video prior to going to cath lab. Patient has been through procedure multiple times before.

## 2020-10-19 NOTE — PROGRESS NOTES
[facility]    Hospitalist Progress Note  Name: Westley Ness    MRN: [id number]  Provider:  Salty Quintero MD  Date of Service: 10/19/2020    Assessment & Plan   Summary of Stay: Westley Ness is a 74 year old male with PMhx of ESRD on HD, CAD s/p PCI with known chronically occluded LAD, CHF s/p AICD, paroxysmal a-flutter s/p ablation, DM2, who was admitted after transferring from [facility] ED on 10/16/2020 with complaints of dizziness and chest pain. Had abnormal franc on 10/16. Plan for angio today as well as HD after angio.    Dizziness and chest pain    - dizziness has resolved    - no chest pain today    - nuclear stress test on 10/16 showed moderate area of lateral ischemia and filippo-infarct ischemia of inferior wall     - echocardiogram on 10/16 showed EF of 20-25%, severe global hypokinesis, akinesis of inferoseptal and apical     - cardiology consulted and following with plan for coronary angiogram today, I personally spoke with Cardiology    - holding Eliquis (need to be off for 48 hours before angiogram, stopped on 10/16)    - continue Heparin gtt    - monitor on telemetry     - continue PTA Metoprolol and Imdur (dosing adjusted on 10/18, takes 30 mg on HD days and 15 mg on non-HD days)    Chronic systolic dysfunction with ischemic cardiomyopathy, CAD, s/p AICD placement    - plan as above    - cont pta medications    - follows with Dr. Sen    - has had non-sustained v-tach on tele    - had zio patch in 3/2020 which showed NSVT up to 14 bts    - NSTEMI 2016 s/p PCI to proximal left circumflex, obtuse marginal which re-stenosed and treated with angioplasty of that branch later that year     - has known chronically occluded LAD. Follows with Dr. Sen of cardiology.     - AICD interrogation, performed on 10/18 showed max of 3 episodes of slow nonsustained Vtach without shock delivered since 12/2019,    - continue PTA Mexiletine     Parox atrial flutter    - currently in SR, s/p 2 ablations    -  severe biatrial dilation on echo this admission.     - holding Eliquis due to plans for angiogram, but on heparin    - continue PTA Metoprolol    ESRD on hemodialysis (~15 years, MWF)    - Nephrology consulted    - plan for HD after angio today    - phosphorus replaced yesterday, re-check in am    Recent diarrhea    - no increased occurrence here and none today    - imodium PRN     DM2    - diet controlled, most recent HgbA1c of 5.1 in 03/2019    Hypothyroidism    - continue PTA Levothyroxine    Offered to call family. He declined    COVID testing: negative from 10/15  DVT Prophylaxis: Heparin gtt   Code Status: Full Code  Disposition: Expected discharge in 1 day after angio    Salty Quintero MD  Hospital Medicine    Interval History   Patient denies any chest pain or dizziness. No SOB or abdo pain. Has right knee pain which has been present for months     -Data reviewed today: I reviewed all new labs and imaging reports over the last 24 hours.    Physical Exam   Temp: 96.1  F (35.6  C) Temp src: Oral BP: 139/74 Pulse: 79   Resp: 16 SpO2: 91 % O2 Device: None (Room air)    Vitals:    10/16/20 0131 10/17/20 1153   Weight: 81.1 kg (178 lb 14.4 oz) 79.4 kg (175 lb)     Vital Signs with Ranges  Temp:  [95.5  F (35.3  C)-97.8  F (36.6  C)] 96.1  F (35.6  C)  Pulse:  [66-84] 79  Resp:  [16-18] 16  BP: (114-139)/(49-74) 139/74  SpO2:  [91 %-96 %] 91 %  No intake/output data recorded.    GEN:  Alert, oriented x 3, appears comfortable, NAD.  HEENT:  Normocephalic/atraumatic, no scleral icterus, no nasal discharge, mouth moist.  CV:  Regular rate and rhythm, no murmur or JVD.  S1 + S2 noted, no S3 or S4.  LUNGS:  Clear to auscultation bilaterally without rales/rhonchi/wheezing/retractions.  Symmetric chest rise on inhalation noted.  ABD:  Active bowel sounds, soft, non-tender/non-distended.  No rebound/guarding/rigidity.  EXT: 1-2+ bilateral pitting LE edema.  No cyanosis.  Has small amount of r knee swelling laterally  SKIN:   Dry to touch, no exanthems noted in the visualized areas. Large AV fistula noted to left UE.    Medications     HEParin 600 Units/hr (10/19/20 1019)     sodium chloride         - MEDICATION INSTRUCTIONS for Dialysis Patients -   Does not apply See Admin Instructions     aspirin  325 mg Oral Daily     diphenhydrAMINE  25 mg Intravenous Once     isosorbide mononitrate  15 mg Oral Once per day on Sun Tue Thu     isosorbide mononitrate  30 mg Oral Once per day on Mon Wed Fri Sat     levothyroxine  50 mcg Oral Daily     lisinopril  2.5 mg Oral BID     methylPREDNISolone  125 mg Intravenous Once     metoprolol succinate  12.5 mg Oral Daily     mexiletine  150 mg Oral BID     multivitamin RENAL  1 capsule Oral QPM     omeprazole  20 mg Oral Daily     pantoprazole  40 mg Oral QAM AC     pravastatin  40 mg Oral QPM     sodium chloride (PF)  3 mL Intracatheter Q8H     Data   Results for orders placed or performed during the hospital encounter of 10/16/20   Troponin I     Status: None   Result Value Ref Range    Troponin I ES 0.028 0.000 - 0.045 ug/L   Phosphorus     Status: Abnormal   Result Value Ref Range    Phosphorus 1.8 (L) 2.5 - 4.5 mg/dL   CBC with platelets     Status: Abnormal   Result Value Ref Range    WBC 6.9 4.0 - 11.0 10e9/L    RBC Count 3.05 (L) 4.4 - 5.9 10e12/L    Hemoglobin 10.2 (L) 13.3 - 17.7 g/dL    Hematocrit 32.3 (L) 40.0 - 53.0 %     (H) 78 - 100 fl    MCH 33.4 (H) 26.5 - 33.0 pg    MCHC 31.6 31.5 - 36.5 g/dL    RDW 16.2 (H) 10.0 - 15.0 %    Platelet Count 146 (L) 150 - 450 10e9/L   Heparin 10a Level     Status: Abnormal   Result Value Ref Range    Heparin 10A Level 1.93 (HH) IU/mL   Partial thromboplastin time     Status: Abnormal   Result Value Ref Range     (HH) 22 - 37 sec   Basic metabolic panel     Status: Abnormal   Result Value Ref Range    Sodium 140 133 - 144 mmol/L    Potassium 4.5 3.4 - 5.3 mmol/L    Chloride 105 94 - 109 mmol/L    Carbon Dioxide 26 20 - 32 mmol/L    Anion Gap  9 3 - 14 mmol/L    Glucose 79 70 - 99 mg/dL    Urea Nitrogen 20 7 - 30 mg/dL    Creatinine 4.65 (H) 0.66 - 1.25 mg/dL    GFR Estimate 11 (L) >60 mL/min/[1.73_m2]    GFR Estimate If Black 13 (L) >60 mL/min/[1.73_m2]    Calcium 9.0 8.5 - 10.1 mg/dL   CBC with platelets differential     Status: Abnormal   Result Value Ref Range    WBC 5.5 4.0 - 11.0 10e9/L    RBC Count 2.94 (L) 4.4 - 5.9 10e12/L    Hemoglobin 9.8 (L) 13.3 - 17.7 g/dL    Hematocrit 31.1 (L) 40.0 - 53.0 %     (H) 78 - 100 fl    MCH 33.3 (H) 26.5 - 33.0 pg    MCHC 31.5 31.5 - 36.5 g/dL    RDW 16.6 (H) 10.0 - 15.0 %    Platelet Count 146 (L) 150 - 450 10e9/L    Diff Method Automated Method     % Neutrophils 67.9 %    % Lymphocytes 14.8 %    % Monocytes 7.6 %    % Eosinophils 7.9 %    % Basophils 1.3 %    % Immature Granulocytes 0.5 %    Nucleated RBCs 0 0 /100    Absolute Neutrophil 3.8 1.6 - 8.3 10e9/L    Absolute Lymphocytes 0.8 0.8 - 5.3 10e9/L    Absolute Monocytes 0.4 0.0 - 1.3 10e9/L    Absolute Eosinophils 0.4 0.0 - 0.7 10e9/L    Absolute Basophils 0.1 0.0 - 0.2 10e9/L    Abs Immature Granulocytes 0.0 0 - 0.4 10e9/L    Absolute Nucleated RBC 0.0    Heparin 10a Level     Status: Abnormal   Result Value Ref Range    Heparin 10A Level 1.55 (HH) IU/mL   Partial thromboplastin time     Status: Abnormal   Result Value Ref Range     (HH) 22 - 37 sec   Heparin 10a Level     Status: Abnormal   Result Value Ref Range    Heparin 10A Level 1.04 (HH) IU/mL   Magnesium     Status: Abnormal   Result Value Ref Range    Magnesium 1.4 (L) 1.6 - 2.3 mg/dL   Hepatitis B surface antigen     Status: None   Result Value Ref Range    Hep B Surface Agn Nonreactive NR^Nonreactive   Hepatitis B Surface Antibody     Status: None   Result Value Ref Range    Hepatitis B Surface Antibody 6.07 <8.00 m[IU]/mL   Magnesium     Status: None   Result Value Ref Range    Magnesium 1.7 1.6 - 2.3 mg/dL   Partial thromboplastin time     Status: Abnormal   Result Value Ref  Range    PTT 48 (H) 22 - 37 sec   Heparin 10a Level     Status: None   Result Value Ref Range    Heparin 10A Level 0.68 IU/mL   Hepatitis B Surface Antibody     Status: None   Result Value Ref Range    Hepatitis B Surface Antibody 5.49 <8.00 m[IU]/mL   Hepatitis B surface antigen     Status: None   Result Value Ref Range    Hep B Surface Agn Nonreactive NR^Nonreactive   Phosphorus     Status: Abnormal   Result Value Ref Range    Phosphorus 1.8 (L) 2.5 - 4.5 mg/dL   Partial thromboplastin time     Status: Abnormal   Result Value Ref Range    PTT 55 (H) 22 - 37 sec   CBC with platelets     Status: Abnormal   Result Value Ref Range    WBC 2.9 (L) 4.0 - 11.0 10e9/L    RBC Count 2.91 (L) 4.4 - 5.9 10e12/L    Hemoglobin 9.6 (L) 13.3 - 17.7 g/dL    Hematocrit 31.3 (L) 40.0 - 53.0 %     (H) 78 - 100 fl    MCH 33.0 26.5 - 33.0 pg    MCHC 30.7 (L) 31.5 - 36.5 g/dL    RDW 16.2 (H) 10.0 - 15.0 %    Platelet Count 145 (L) 150 - 450 10e9/L   Heparin 10a Level     Status: None   Result Value Ref Range    Heparin 10A Level 0.33 IU/mL   Partial thromboplastin time     Status: Abnormal   Result Value Ref Range    PTT 47 (H) 22 - 37 sec   Basic metabolic panel     Status: Abnormal   Result Value Ref Range    Sodium 140 133 - 144 mmol/L    Potassium 4.0 3.4 - 5.3 mmol/L    Chloride 104 94 - 109 mmol/L    Carbon Dioxide 26 20 - 32 mmol/L    Anion Gap 10 3 - 14 mmol/L    Glucose 142 (H) 70 - 99 mg/dL    Urea Nitrogen 21 7 - 30 mg/dL    Creatinine 4.14 (H) 0.66 - 1.25 mg/dL    GFR Estimate 13 (L) >60 mL/min/[1.73_m2]    GFR Estimate If Black 15 (L) >60 mL/min/[1.73_m2]    Calcium 8.9 8.5 - 10.1 mg/dL   Phosphorus     Status: None   Result Value Ref Range    Phosphorus 3.0 2.5 - 4.5 mg/dL   EKG 12-lead, tracing only     Status: None (Preliminary result)   Result Value Ref Range    Interpretation ECG Click View Image link to view waveform and result    Nephrology IP Consult: Patient to be seen: Routine - within 24 hours; End-stage  renal disease on hemodialysis.  Consult for dialysis management; Consultant may enter orders: Yes; Requesting provider? Hospitalist (if different from attending physic...     Status: None ()    Narrative    Brady Kelsey MD     10/16/2020  2:52 PM  RENAL CONSULTATION NOTE    REFERRING MD:  Tej Colunga MD    REASON FOR CONSULTATION:  ESRD    HPI:  74 y.o man with ESRD, CAD, ICM and DM, who was admitted on   10/16 for dizziness. He has an significant CAD s/p MI with FOREST to   circumflex and OM in 2016. He had biventricular failure with LV   function of 20-25%. He developed chest pain while at rest. He   developed dizziness and lightheadedness as he was getting up. He   called 911 and EMS brought him to [facility] ER. Troponin negative   x 2. TTE resulting pending. Lexiscan showed moderate ischemia in   the lateral left ventricle. Awaiting further recommendations from   cardiology. Pt says he is too wipe-out for dialysis today. He   would like to get dialysis tomorrow. Denies worsening shortness   of breath. He is chest pain free. No N/V.     ROS:  A complete review of systems was performed and is negative   except as noted above.    PMH:    Past Medical History:   Diagnosis Date     Acid reflux disease      Benign essential hypertension      CAD (coronary artery disease)     s/p multiple NSTEMIs and PCI's     ESRD on hemodialysis (H)     MWF     History of atrial flutter     s/p ablation     Hypothyroidism      Ischemic cardiomyopathy     EF 25-30%, s/p AICD     Type 2 diabetes mellitus (H)     diet-controlled       PSH:    Past Surgical History:   Procedure Laterality Date     CHOLECYSTECTOMY, OPEN  2013     ELBOW SURGERY Left 2008    ORIF - plates still in place     EP ICD GENERATOR CHANGE SINGLE N/A 11/21/2019    Procedure: EP ICD Generator Change Single;  Surgeon: Jono Mejia MD;  Location:  HEART CARDIAC CATH LAB     H ABLATION ATRIAL FLUTTER  06/08/2017, 12/11/17     HC LEFT HEART  CATHETERIZATION  7/14/2016     HC LEFT HEART CATHETERIZATION  9/21/2016     IMPLANT VENTRICULAR DEVICE  08/15/2011     IR DIALYSIS FISTULOGRAM LEFT  7/22/2019     REPAIR FISTULA ARTERIOVENOUS UPPER EXTREMITY Left 3/5/2019    Procedure: REPAIR LEFT UPPER ARM ARTERIOVENOUS FISTULA SKIN   ULCER;  Surgeon: Westley Griggs MD;  Location: [facility] OR     TONSILLECTOMY & ADENOIDECTOMY       VASCULAR SURGERY  2004, 2010    LUE fistulas (upper and lower); upper one is functional       MEDICATIONS:      isosorbide mononitrate  15 mg Oral Three Times Weekly     levothyroxine  50 mcg Oral Daily     lisinopril  2.5 mg Oral BID     loperamide  2 mg Oral Three Times Weekly     [START ON 10/17/2020] loperamide  2 mg Oral FIVE Times Weekly     metoprolol succinate  12.5 mg Oral Daily     mexiletine  150 mg Oral BID     multivitamin RENAL  1 capsule Oral QPM     omeprazole  20 mg Oral Daily     pantoprazole  40 mg Oral QAM AC     pravastatin  40 mg Oral QPM     regadenoson  0.4 mg Intravenous Once     technetium Tc 99m tetrofosmin 2UD study  3-42 mCi Intravenous   Q2H       ALLERGIES:    Allergies as of 10/15/2020 - Reviewed 10/15/2020   Allergen Reaction Noted     Contrast dye Hives 07/10/2016     No clinical screening - see comments  12/06/2016       FH:    Family History   Problem Relation Age of Onset     Cerebrovascular Disease Mother         later in life     Hypertension Father      Bladder Cancer Father      Myocardial Infarction Paternal Grandmother      Diabetes No family hx of      Prostate Cancer No family hx of      Colon Cancer No family hx of        SH:    Social History     Socioeconomic History     Marital status: Single     Spouse name: Not on file     Number of children: Not on file     Years of education: Not on file     Highest education level: Not on file   Occupational History     Occupation: Retired - CPA   Social Needs     Financial resource strain: Not on file     Food insecurity     Worry: Not on file      "Inability: Not on file     Transportation needs     Medical: Not on file     Non-medical: Not on file   Tobacco Use     Smoking status: Former Smoker     Packs/day: 2.00     Years: 35.00     Pack years: 70.00     Types: Cigarettes     Start date:      Quit date: 1996     Years since quittin.9     Smokeless tobacco: Never Used   Substance and Sexual Activity     Alcohol use: Not Currently     Alcohol/week: 0.0 standard drinks     Frequency: Never     Drug use: No     Sexual activity: Never   Lifestyle     Physical activity     Days per week: Not on file     Minutes per session: Not on file     Stress: Not on file   Relationships     Social connections     Talks on phone: Not on file     Gets together: Not on file     Attends [employer] service: Not on file     Active member of club or organization: Not on file     Attends meetings of clubs or organizations: Not on file     Relationship status: Not on file     Intimate partner violence     Fear of current or ex partner: Not on file     Emotionally abused: Not on file     Physically abused: Not on file     Forced sexual activity: Not on file   Other Topics Concern     Parent/sibling w/ CABG, MI or angioplasty before 65F 55M? No      Service Not Asked     Blood Transfusions Not Asked     Caffeine Concern No     Occupational Exposure Not Asked     Hobby Hazards Not Asked     Sleep Concern No     Stress Concern Not Asked     Weight Concern Not Asked     Special Diet Yes     Back Care Not Asked     Exercise Yes     Comment: Walking     Bike Helmet Not Asked     Seat Belt Yes     Self-Exams Not Asked   Social History Narrative    Single.    No kids.     Maria Dolores Pabon (friend- healthcare POA), Joaquina Nair (friend -   healthcare POA)    No formal exercise.        PHYSICAL EXAM:    /65   Pulse 81   Temp 95.7  F (35.4  C) (Oral)   Resp 17     Ht 1.727 m (5' 8\")   Wt 81.1 kg (178 lb 14.4 oz)   SpO2 95%     BMI 27.20 kg/m    GENERAL: pleasant, " alert, NAD  HEENT:  Normocephalic. No gross abnormalities.  Pupils equal.    MMM.    CV: RRR, + murmurs, no clicks, gallops, or rubs, 1+edema  RESP: Clear bilaterally with good efforts. No wheezes or   crackles.   GI: Abdomen soft,  NT  MUSCULOSKELETAL: extremities nl - no gross deformities noted, 1+   edema b/l   SKIN: no suspicious lesions or rashes, dry to touch  NEURO: Generalized weakness. Awake and oriented x 3.   PSYCH: mood good, affect appropriate  LYMPH: No palpable ant/post cervical   L UE AVF    LABS:      CBC RESULTS:     Recent Labs   Lab 10/15/20  1830   WBC 6.2   RBC 3.20*   HGB 10.8*   HCT 33.1*          BMP RESULTS:  Recent Labs   Lab 10/15/20  1830      POTASSIUM 4.3   CHLORIDE 104   CO2 30   BUN 15   CR 3.23*   *   CHRISTINE 9.5       INR  Recent Labs   Lab 10/15/20  1830   INR 1.31*        DIAGNOSTICS:  Reviewed    A/P:  74 y.o man with ESRD and CAD, admitted for chest pain and   dizziness.     # ESRD:    -3.5 hrs, EDW 79.5 kg ( ~ 0.5-1 liter), Qb 450, LAVF, + heparin   -MWF in [place]  # Anemia: 1600 units of Epogen  # CKD-MBD: 1 mcg of Hectorol  # CAD with BiV failure, EF of 20-25%. Lexiscan showed moderate of   the left lateral wall.   # Hypervolemia: 1+ pitting edema  # Hypertension: BP is controlled.     Plan:  # Hemodialysis tomorrow.       Brady Kelsey MD  [facility] - Nephrology  Office Phone: [phone]  Pager: [phone]   Care Management / Social Work IP Consult     Status: None ()    Amina Toledo RN     10/16/2020  2:27 PM  Care Management Initial Consult    General Information  Assessment completed with:: Patient,    Type of CM/SW Visit: Initial Assessment  Primary Care Provider verified: Yes        Reason for Consult: care coordination/care conference,   discharge planning  Advance Care Planning: Advance Care Planning Reviewed: no   concerns identified          Communication Assessment  Patient's communication style: spoken  language (English or   Bilingual)  Hearing Difficulty or Deaf: no Wear Glasses or Blind:   yes    Cognitive  Cognitive/Neuro/Behavioral: WDL                      Living Environment:   People in home: alone     Current living Arrangements: house          Family/Social Support:  Care provided by: self, other (see comments)(private pay helper)  Provides care for: no one  Marital Status: Single        Description of Support System:     His private pay homemakers   have been with him for 12 years                   Current Resources:   Skilled Home Care Services:  none  Community Resources:  Private pay homemaker  Equipment currently used at home: cane, quad  Supplies currently used at home: none     Employment:  Employment Status: retired        Financial/Environmental Concerns:     none       Lifestyle & Psychosocial Needs:        Socioeconomic History     Marital status: Single     Spouse name: Not on file     Number of children: Not on file     Years of education: Not on file     Highest education level: Not on file   Occupational History     Occupation: Retired - CPA     Tobacco Use     Smoking status: Former Smoker     Packs/day: 2.00     Years: 35.00     Pack years: 70.00     Types: Cigarettes     Start date:      Quit date: 1996     Years since quittin.9     Smokeless tobacco: Never Used   Substance and Sexual Activity     Alcohol use: Not Currently     Alcohol/week: 0.0 standard drinks     Frequency: Never     Drug use: No     Sexual activity: Never       Functional Status:  Prior to admission patient needed assistance:          Mental Health Status:  WDL               Additional Information:  Patient is a delightful man.  ,89. Not noted to have   diagnosis of CHF.  He has had multiple heart issues over the   years and follows with [facility] cardiology.He has a   nephrologist, Dr. Kumar and has dialysis MWF at [facility] in   [place].  Patient said he sort of follows both a cardiac  and   renal diet.  He does follow with nutritionist. He gets weighed at   dialysis.  He is down 8 kg since March as his medications   changed.  No gaps in care identified.  He declined appointment   support.    Amina Pulido RN, BSN, PHN, CTS  Care Coordinator  [facility]  [phone]            Echocardiogram Complete     Status: None    Narrative    [id number]  [id number]  [id number]  [id number]^ZOE^OLIVER^NEGERI           [facility]  Echocardiography Laboratory  [address] [place] [address]        Name: SOCORRO LÓPEZ  MRN: [id number]  : 1945  Study Date: 10/16/2020 07:47 AM  Age: 74 yrs  Gender: Male  Patient Location: [facility]  Reason For Study: Syncope  Ordering Physician: OLIVER ENRIQUEZ  Referring Physician: ROBY HA  Performed By: Tez Baker RDCS     BSA: 1.9 m2  Height: 68 in  Weight: 178 lb  HR: 75  BP: 134/66 mmHg  _____________________________________________________________________________  __        Procedure  Complete Portable Echo Adult. Optison (NDC #2662-5563) given intravenously.  _____________________________________________________________________________  __        Interpretation Summary     The left ventricle is mildly dilated.  The visual ejection fraction is estimated at 20-25%.  Diastolic Doppler findings (E/E' ratio and/or other parameters) suggest left  ventricular filling pressures are increased.  There is severe global hypokinesis. Best preserved segments involving  anterolateral wall. Akinesis of inferoseptal, apical.  The right ventricle is mildly dilated.  Severely decreased right ventricular systolic function  There is severe biatrial enlargement.  There is mild (1+) mitral regurgitation.  There is mild to moderate (1-2+) tricuspid regurgitation.  Right ventricular systolic pressure is elevated, consistent with severe  pulmonary hypertension.  Mild aortic root dilatation.  The ascending aorta is Mildly  dilated.  Dilation of the inferior vena cava is present with abnormal respiratory  variation in diameter.  There is mild to moderate (1-2+) pulmonic valvular regurgitation.  Compared to prior study, changes are noted.  _____________________________________________________________________________  __        Left Ventricle  The left ventricle is mildly dilated. There is normal left ventricular wall  thickness. The visual ejection fraction is estimated at 20-25%. Diastolic  Doppler findings (E/E' ratio and/or other parameters) suggest left ventricular  filling pressures are increased. There is severe global hypokinesis. Best  preserved segments involving anterolateral wall. Akinesis of inferoseptal,  apical.     Right Ventricle  The right ventricle is mildly dilated. There is a catheter/pacemaker lead seen  in the right ventricle. Severely decreased right ventricular systolic  function.     Atria  There is severe biatrial enlargement. There is no color Doppler evidence of an  atrial shunt.     Mitral Valve  The mitral valve leaflets are mildly thickened. There is mild (1+) mitral  regurgitation.        Tricuspid Valve  The tricuspid valve is not well visualized, but is grossly normal. There is  mild to moderate (1-2+) tricuspid regurgitation. The right ventricular  systolic pressure is approximated at 51.5 mmHg plus the right atrial pressure.  Right ventricular systolic pressure is elevated, consistent with severe  pulmonary hypertension.     Aortic Valve  There is mild trileaflet aortic sclerosis. No aortic regurgitation is present.     Pulmonic Valve  The pulmonic valve is not well visualized. There is mild to moderate (1-2+)  pulmonic valvular regurgitation.     Vessels  Mild aortic root dilatation. The ascending aorta is Mildly dilated. Dilation  of the inferior vena cava is present with abnormal respiratory variation in  diameter.     Pericardium  There is no pericardial effusion.        Rhythm  The rhythm was  paced.  _____________________________________________________________________________  __  MMode/2D Measurements & Calculations  IVSd: 1.7 cm     LVIDd: 6.1 cm  LVIDs: 5.2 cm  LVPWd: 1.0 cm  FS: 15.0 %  LV mass(C)d: 393.7 grams  LV mass(C)dI: 202.4 grams/m2  Ao root diam: 3.9 cm  LA dimension: 5.8 cm  asc Aorta Diam: 3.8 cm  LA/Ao: 1.5  LA Volume (BP): 145.0 ml  LA Volume Index (BP): 74.4 ml/m2  RWT: 0.34           Doppler Measurements & Calculations  MV E max zach: 102.2 cm/sec  MV A max zach: 53.8 cm/sec  MV E/A: 1.9  MV dec time: 0.12 sec  TR max zach: 359.0 cm/sec  TR max P.5 mmHg  E/E' av.3  Lateral E/e': 17.3  Medial E/e': 45.3           _____________________________________________________________________________  __           Report approved by: Mariana Luna 10/16/2020 01:30 PM      NM MPI w Lexiscan     Status: None   Result Value Ref Range    Target      Baseline Systolic      Baseline Diastolic BP 61     Last Stress Systolic      Last Stress Diastolic BP 51     Baseline HR 83     Max HR 86     Calculated Percent HR 59 %    Rate Pressure Product 9,460.0     Left Ventricular EF 22 %    Narrative       The nuclear stress test is abnormal.     There is moderate ischemia in the lateral segment(s) of the left   ventricle.     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     There is nontransmural infarction in the inferior segment(s) of the   left ventricle associated with a moderate degree of filippo-infarct ischemia.   (bowel activity is overlapping this area, more prominently in resting   images and may be contributing to some reversibility of this defect)     Left ventricular function is severely reduced.     The left ventricular ejection fraction at stress is 22%.     Right ventricular enlargment is noted.     There is no prior study for comparison.

## 2020-10-19 NOTE — PROGRESS NOTES
Potassium   Date Value Ref Range Status   10/19/2020 4.0 3.4 - 5.3 mmol/L Final     Hemoglobin   Date Value Ref Range Status   10/19/2020 9.6 (L) 13.3 - 17.7 g/dL Final     Creatinine   Date Value Ref Range Status   10/19/2020 4.14 (H) 0.66 - 1.25 mg/dL Final     Urea Nitrogen   Date Value Ref Range Status   10/19/2020 21 7 - 30 mg/dL Final     Sodium   Date Value Ref Range Status   10/19/2020 140 133 - 144 mmol/L Final     INR   Date Value Ref Range Status   10/15/2020 1.31 (H) 0.86 - 1.14 Final       DIALYSIS PROCEDURE NOTE  Hepatitis status of previous patient on machine log was checked and verified ok to use with this patients hepatitis status.  Patient dialyzed for 3.5 hrs. on a 3 K bath with a net fluid removal of  2.5L.  A BFR of 450 ml/min was obtained via a LAVF using 15 gauge needles.    The treatment plan was discussed with Dr. Hammond during the treatment.  Total heparin received during the treatment: 0 units.   Needle cannulation sites held x 30 min.   Incapacitated Nurse education completed, Machine test alarm pass at 1530.  Meds  given: Epogen and Hectorol Complications: None    Person educated patient, Knowledge base substantial ,Barriers to learning none , Educated on access care mode oral, patient verbalized/demonstrated understanding. Pt prefers oral education style.   Skin Assessment pre run: pale, warm, dry, bruised . Pre run assessment of edema +1 trace generalized.  ICEBOAT? Timeout performed pre-treatment  I: Patient was identified using 2 identifiers  C: Consent obtained/verified current before treatment  E: Equipment preventative maintenance is current and dialysis delivery system OK to use  B: Hepatitis B Surface Antigen: Negative; Draw Date: 10.18.20      Hepatitis B Surface Antibody: Susceptible; Draw Date: 10.18.20  O: Dialysis orders present and complete prior to treatment  A: Vascular access verified and assessed prior to treatment  T: Treatment was performed at a clinically  appropriate time  ?: Patient was allowed to ask questions and address concerns prior to treatment  See flowsheet in EPIC for further details and post assessment.  Machine water alarm in place and functioning. Transducer pods intact and checked every 15min.  Skin post assessment: pale, warm, dry, bruised. Post Edema +1 trace generalized. Pt ran at bedside in 208.  Report received from: COLUMBA Maurice RN  Report given to: COLUMBA Maurice RN  Chlorine/Chloramine water system checked every 4 hours.  Outpatient Dialysis at [facility]

## 2020-10-20 ENCOUNTER — APPOINTMENT (OUTPATIENT)
Dept: PHYSICAL THERAPY | Facility: CLINIC | Age: 75
DRG: 246 | End: 2020-10-20
Attending: INTERNAL MEDICINE
Payer: MEDICARE

## 2020-10-20 VITALS
OXYGEN SATURATION: 94 % | TEMPERATURE: 95.3 F | WEIGHT: 175 LBS | HEIGHT: 68 IN | SYSTOLIC BLOOD PRESSURE: 125 MMHG | DIASTOLIC BLOOD PRESSURE: 62 MMHG | HEART RATE: 77 BPM | BODY MASS INDEX: 26.52 KG/M2 | RESPIRATION RATE: 18 BRPM

## 2020-10-20 LAB
ANION GAP SERPL CALCULATED.3IONS-SCNC: 6 MMOL/L (ref 3–14)
BASOPHILS # BLD AUTO: 0 10E9/L (ref 0–0.2)
BASOPHILS NFR BLD AUTO: 0.2 %
BUN SERPL-MCNC: 16 MG/DL (ref 7–30)
CALCIUM SERPL-MCNC: 8.5 MG/DL (ref 8.5–10.1)
CHLORIDE SERPL-SCNC: 104 MMOL/L (ref 94–109)
CO2 SERPL-SCNC: 31 MMOL/L (ref 20–32)
CREAT SERPL-MCNC: 2.7 MG/DL (ref 0.66–1.25)
DEPRECATED CALCIDIOL+CALCIFEROL SERPL-MC: <13 UG/L (ref 20–75)
DIFFERENTIAL METHOD BLD: ABNORMAL
EOSINOPHIL # BLD AUTO: 0 10E9/L (ref 0–0.7)
EOSINOPHIL NFR BLD AUTO: 0 %
ERYTHROCYTE [DISTWIDTH] IN BLOOD BY AUTOMATED COUNT: 16.5 % (ref 10–15)
GFR SERPL CREATININE-BSD FRML MDRD: 22 ML/MIN/{1.73_M2}
GLUCOSE SERPL-MCNC: 178 MG/DL (ref 70–99)
HCT VFR BLD AUTO: 32.3 % (ref 40–53)
HGB BLD-MCNC: 10 G/DL (ref 13.3–17.7)
IMM GRANULOCYTES # BLD: 0 10E9/L (ref 0–0.4)
IMM GRANULOCYTES NFR BLD: 0.4 %
INTERPRETATION ECG - MUSE: NORMAL
LYMPHOCYTES # BLD AUTO: 0.5 10E9/L (ref 0.8–5.3)
LYMPHOCYTES NFR BLD AUTO: 9.3 %
MCH RBC QN AUTO: 33.3 PG (ref 26.5–33)
MCHC RBC AUTO-ENTMCNC: 31 G/DL (ref 31.5–36.5)
MCV RBC AUTO: 108 FL (ref 78–100)
MONOCYTES # BLD AUTO: 0.5 10E9/L (ref 0–1.3)
MONOCYTES NFR BLD AUTO: 9.1 %
NEUTROPHILS # BLD AUTO: 4.5 10E9/L (ref 1.6–8.3)
NEUTROPHILS NFR BLD AUTO: 81 %
NRBC # BLD AUTO: 0 10*3/UL
NRBC BLD AUTO-RTO: 0 /100
PHOSPHATE SERPL-MCNC: 2.5 MG/DL (ref 2.5–4.5)
PLATELET # BLD AUTO: 162 10E9/L (ref 150–450)
POTASSIUM SERPL-SCNC: 3.7 MMOL/L (ref 3.4–5.3)
RBC # BLD AUTO: 3 10E12/L (ref 4.4–5.9)
SODIUM SERPL-SCNC: 141 MMOL/L (ref 133–144)
VITAMIN D2 SERPL-MCNC: <5 UG/L
VITAMIN D3 SERPL-MCNC: 8 UG/L
WBC # BLD AUTO: 5.5 10E9/L (ref 4–11)

## 2020-10-20 PROCEDURE — 80048 BASIC METABOLIC PNL TOTAL CA: CPT | Performed by: HOSPITALIST

## 2020-10-20 PROCEDURE — 99232 SBSQ HOSP IP/OBS MODERATE 35: CPT | Performed by: INTERNAL MEDICINE

## 2020-10-20 PROCEDURE — 250N000013 HC RX MED GY IP 250 OP 250 PS 637: Performed by: PHYSICIAN ASSISTANT

## 2020-10-20 PROCEDURE — 36415 COLL VENOUS BLD VENIPUNCTURE: CPT | Performed by: HOSPITALIST

## 2020-10-20 PROCEDURE — 250N000013 HC RX MED GY IP 250 OP 250 PS 637: Performed by: HOSPITALIST

## 2020-10-20 PROCEDURE — 250N000013 HC RX MED GY IP 250 OP 250 PS 637: Performed by: INTERNAL MEDICINE

## 2020-10-20 PROCEDURE — 85025 COMPLETE CBC W/AUTO DIFF WBC: CPT | Performed by: HOSPITALIST

## 2020-10-20 PROCEDURE — 97161 PT EVAL LOW COMPLEX 20 MIN: CPT | Mod: GP | Performed by: PHYSICAL THERAPIST

## 2020-10-20 PROCEDURE — 97530 THERAPEUTIC ACTIVITIES: CPT | Mod: GP | Performed by: PHYSICAL THERAPIST

## 2020-10-20 PROCEDURE — 99239 HOSP IP/OBS DSCHRG MGMT >30: CPT | Performed by: HOSPITALIST

## 2020-10-20 PROCEDURE — 84100 ASSAY OF PHOSPHORUS: CPT | Performed by: HOSPITALIST

## 2020-10-20 RX ORDER — CLOPIDOGREL BISULFATE 75 MG/1
75 TABLET ORAL DAILY
Qty: 60 TABLET | Refills: 1 | Status: SHIPPED | OUTPATIENT
Start: 2020-10-21 | End: 2021-01-01

## 2020-10-20 RX ADMIN — LEVOTHYROXINE SODIUM 50 MCG: 50 TABLET ORAL at 08:04

## 2020-10-20 RX ADMIN — APIXABAN 2.5 MG: 2.5 TABLET, FILM COATED ORAL at 11:40

## 2020-10-20 RX ADMIN — ACETAMINOPHEN 650 MG: 325 TABLET, FILM COATED ORAL at 11:40

## 2020-10-20 RX ADMIN — MEXILETINE HYDROCHLORIDE 150 MG: 150 CAPSULE ORAL at 11:40

## 2020-10-20 RX ADMIN — PANTOPRAZOLE SODIUM 40 MG: 40 TABLET, DELAYED RELEASE ORAL at 08:04

## 2020-10-20 RX ADMIN — Medication 12.5 MG: at 08:04

## 2020-10-20 RX ADMIN — LISINOPRIL 2.5 MG: 2.5 TABLET ORAL at 11:40

## 2020-10-20 RX ADMIN — CLOPIDOGREL BISULFATE 75 MG: 75 TABLET ORAL at 08:04

## 2020-10-20 RX ADMIN — ASPIRIN 81 MG: 81 TABLET, COATED ORAL at 08:04

## 2020-10-20 RX ADMIN — OMEPRAZOLE 20 MG: 20 CAPSULE, DELAYED RELEASE ORAL at 08:04

## 2020-10-20 NOTE — PROGRESS NOTES
10/20/20 1100   Quick Adds   Type of Visit Initial PT Evaluation   Living Environment   People in home alone   Current Living Arrangements house   Home Accessibility stairs to enter home   Number of Stairs, Main Entrance 1   Stair Railings, Main Entrance railing on left side (ascending)   Transportation Anticipated family or friend will provide   Self-Care   Usual Activity Tolerance moderate   Current Activity Tolerance moderate   Regular Exercise No   Equipment Currently Used at Home cane, straight   Activity/Exercise/Self-Care Comment Also owns a walker   Disability/Function   Walking or Climbing Stairs Difficulty   (uses a cane)   Fall history within last six months yes   Number of times patient has fallen within last six months 1   Change in Functional Status Since Onset of Current Illness/Injury no   General Information   Onset of Illness/Injury or Date of Surgery 10/19/20   Referring Physician Theodora Salazar MD   Patient/Family Therapy Goals Statement (PT) Discharge home   Pertinent History of Current Problem (include personal factors and/or comorbidities that impact the POC) Pt was admitted due to lightheadedness/dizziness. Found to be cardiac related. Pt is now s/p angio with stent placement.    Heart Disease Risk Factors Lack of physical activity;Overweight;Medical history;Gender;Age   Cognition   Orientation Status (Cognition) oriented x 4   Affect/Mental Status (Cognition) WNL   Follows Commands (Cognition) WNL   Pain Assessment   Patient Currently in Pain No   Integumentary/Edema   Integumentary/Edema no deficits were identifed   Posture    Posture Forward head position;Protracted shoulders;Kyphosis   Range of Motion (ROM)   ROM Comment WFL   Strength   Strength Comments Decreased functional strength as seen by need for AD with mobility   Bed Mobility   Comment (Bed Mobility) sit<>supine SBA   Transfers   Transfer Safety Comments SBA sit<>stand   Gait/Stairs (Locomotion)   Comment (Gait/Stairs) SBA  "with FWW   Balance   Balance Comments Benefits from B UE support for safe dynamic mobiltiy   Sensory Examination   Sensory Perception WNL   Coordination   Coordination no deficits were identified   Muscle Tone   Muscle Tone no deficits were identified   Clinical Impression   Criteria for Skilled Therapeutic Intervention yes, treatment indicated   PT Diagnosis (PT) Impaired functional endurance   Influenced by the following impairments Difficulty with functional activity tolerance   Functional limitations due to impairments Impaired gait tolerance   Clinical Presentation Stable/Uncomplicated   Clinical Presentation Rationale medically stable, clear POC, ready for discharge   Clinical Decision Making (Complexity) low complexity   Therapy Frequency (PT) One time eval and treatment only   Predicted Duration of Therapy Intervention (days/wks) 1 day   Planned Therapy Interventions (PT) home exercise program;patient/family education   Risk & Benefits of therapy have been explained evaluation/treatment results reviewed;care plan/treatment goals reviewed;risks/benefits reviewed;current/potential barriers reviewed;participants voiced agreement with care plan;participants included;patient   PT Discharge Planning    PT Discharge Recommendation (DC Rec) home with outpatient cardiac rehab   PT Rationale for DC Rec Impaired cardiovascular function; OP CR per cardiologist   [facility] [employer] TM \"6 Clicks\"   2016, Trustees of [facility], under license to [employer].  All rights reserved.   6 Clicks Short Forms Basic Mobility Inpatient Short Form   [facility] AM-PAC  \"6 Clicks\" V.2 Basic Mobility Inpatient Short Form   1. Turning from your back to your side while in a flat bed without using bedrails? 4 - None   2. Moving from lying on your back to sitting on the side of a flat bed without using bedrails? 3 - A Little   3. Moving to and from a bed to a chair (including a wheelchair)? 3 - A Little   4. Standing " up from a chair using your arms (e.g., wheelchair, or bedside chair)? 3 - A Little   5. To walk in hospital room? 3 - A Little   6. Climbing 3-5 steps with a railing? 3 - A Little   Basic Mobility Raw Score (Score out of 24.Lower scores equate to lower levels of function) 19   Total Evaluation Time   Total Evaluation Time (Minutes) 5

## 2020-10-20 NOTE — PLAN OF CARE
"Vitals: /50 (BP Location: Right arm)   Pulse 64   Temp 96.5  F (35.8  C) (Oral)   Resp 18   Ht 1.727 m (5' 8\")   Wt 79.4 kg (175 lb)   SpO2 92%   BMI 26.61 kg/m    Neuro: intact  GI: Wnl, last bowel movement 10/18.  : Patient on dialysis, states he voids very little once a day  Skin: wnl,dry and pale. Patient  Has some bruising from lab draws. Bandages to left arm are C/D/I. Rt femoral groin site is intact, no bruising, swelling or drainage.  Activity: x1 with walker, normally ind with cane baseline. Does better with walker.  Has cane from home, stated that he also has a walker  Diet: 2 gm Na diet, no caffeine   Pain: denies, prn tylenol available if patient wants   Plan: possible discharge in am following angiogram. Cardiology and nephrology following.          "

## 2020-10-20 NOTE — DISCHARGE SUMMARY
[facility]  Hospitalist Discharge Summary      Date of Admission:  10/16/2020  Date of Discharge:  10/20/2020  Discharging Provider: Salty Qunitero MD      Discharge Diagnoses   Chest pain, dizziness due to underlying coronary disease. S/p stent to OM1    Follow-ups Needed After Discharge   Follow-up Appointments     Follow-up and recommended labs and tests       Follow up with primary care provider, Josselin Kolb, within 7 days   for hospital follow- up.  No follow up labs or test are needed. Follow-up   with Nephrology for usually scheduled dialysis and labs as needed.   Follow-up with Cardiology (Dr Sen and Lacy Taylor, RULA). Your Cardiology   office should be calling you to set up follow-up.                Unresulted Labs Ordered in the Past 30 Days of this Admission     Date and Time Order Name Status Description    10/18/2020 0000 25 Hydroxyvitamin D2 and D3 In process       These results will be followed up by nephrology    Discharge Disposition   Discharged to home  Condition at discharge: Stable      Hospital Course   Westley Ness is a 74 year old male with PMhx of ESRD on HD, CAD s/p PCI with known chronically occluded LAD, CHF s/p AICD, paroxysmal a-flutter s/p ablation, DM2, who was admitted after transferring from [facility] ED on 10/16/2020 with complaints of dizziness and chest pain. Had abnormal franc on 10/16. Angio yesterday with stent to OM1    Dizziness and chest pain    - dizziness has resolved    - no chest pain today    - nuclear stress test on 10/16 showed moderate area of lateral ischemia and filippo-infarct ischemia of inferior wall     - echocardiogram on 10/16 showed EF of 20-25%, severe global hypokinesis, akinesis of inferoseptal and apical     - had stent to Om1 yetserday    - restarted eliquis with plan for eliquis +Plavix for at least 6 months    - follow-up with cards as outpt    - continue PTA Metoprolol and Imdur (dosing adjusted on 10/18, takes 30 mg on HD  days and 15 mg on non-HD days)     Chronic systolic dysfunction with ischemic cardiomyopathy, CAD, s/p AICD placement    - plan as above    - cont pta medications    - follows with Dr. Sen    - has had non-sustained v-tach on tele    - had zio patch in 3/2020 which showed NSVT up to 14 bts    - NSTEMI 2016 s/p PCI to proximal left circumflex, obtuse marginal which re-stenosed and treated with angioplasty of that branch later that year     - has known chronically occluded LAD. Follows with Dr. Sen of cardiology.     - AICD interrogation, performed on 10/18 showed max of 3 episodes of slow nonsustained Vtach without shock delivered since 12/2019,    - continue PTA Mexiletine      Parox atrial flutter    - currently in SR, s/p 2 ablations    - severe biatrial dilation on echo this admission.     - holding Eliquis due to plans for angiogram, but on heparin    - continue PTA Metoprolol     ESRD on hemodialysis (~15 years, MWF)    - Nephrology consulted    - had HD yesterday     Recent diarrhea    - no increased occurrence here and none today    - imodium PRN      DM2    - diet controlled, most recent HgbA1c of 5.1 in 03/2019     Hypothyroidism    - continue PTA Levothyroxine     Offered to call family. He declined   Patient doing well. No chest pain, sob. Eating well. Will get up in halls to ambulate.     Consultations This Hospital Stay   CARDIOLOGY IP CONSULT  NEPHROLOGY IP CONSULT  CARDIOLOGY IP CONSULT  PHYSICAL THERAPY ADULT IP CONSULT  CARE MANAGEMENT / SOCIAL WORK IP CONSULT  PHARMACY TO DOSE HEPARIN  NEPHROLOGY IP CONSULT  NEPHROLOGY IP CONSULT  PHYSICAL THERAPY ADULT IP CONSULT  NUTRITION SERVICES ADULT IP CONSULT  CARDIAC REHAB IP CONSULT  PHARMACY IP CONSULT  PHARMACY IP CONSULT  SMOKING CESSATION PROGRAM IP CONSULT    Code Status   Full Code    Time Spent on this Encounter   I, Salty Quintero MD, personally saw the patient today and spent greater than 30 minutes discharging this patient.       Salty Sandoval  MD Leon  [facility] OBSERVATION DEPT  [address] [facility] [address]  Phone: [phone]  ______________________________________________________________________    Physical Exam   Vital Signs: Temp: 95.3  F (35.2  C) Temp src: Oral BP: 108/54 Pulse: 66   Resp: 18 SpO2: 96 % O2 Device: Nasal cannula Oxygen Delivery: 2 LPM  Weight: 175 lbs 0 oz  Constitutional: awake, alert, cooperative, no apparent distress, and appears stated age  Eyes: Lids and lashes normal, pupils equal, round and reactive to light, extra ocular muscles intact, sclera clear, conjunctiva normal  ENT: Normocephalic, without obvious abnormality, atraumatic, sinuses nontender on palpation, external ears without lesions, oral pharynx with moist mucous membranes, tonsils without erythema or exudates, gums normal and good dentition.  Respiratory: No increased work of breathing, good air exchange, clear to auscultation bilaterally, no crackles or wheezing  Cardiovascular: Normal apical impulse, regular rate and rhythm, normal S1 and S2, no S3 or S4, and no murmur noted  GI: No scars, normal bowel sounds, soft, non-distended, non-tender, no masses palpated, no hepatosplenomegally  Skin: no bruising or bleeding  Musculoskeletal: no lower extremity pitting edema present       Primary Care Physician   Josselin Kolb    Discharge Orders      CARDIAC REHAB REFERRAL      Discharge Order: F/U with Cardiac  NORMA      Follow-Up with Cardiologist      Reason for your hospital stay    Chest pain, dizziness. S/p angiogram with PCI of OM1 (1st branch of the Obtuse Marginal coronary artery) with 2.25 x 20mm Synergy FOREST (Drug Eluding Stent)     Follow-up and recommended labs and tests     Follow up with primary care provider, Josselin Kolb, within 7 days for hospital follow- up.  No follow up labs or test are needed. Follow-up with Nephrology for usually scheduled dialysis and labs as needed. Follow-up with Cardiology (Dr Sen and  Lacy Taylor NP). Your Cardiology office should be calling you to set up follow-up.        Activity    Your activity upon discharge: activity as tolerated     Diet    Follow this diet upon discharge: Regular       Significant Results and Procedures   Most Recent 3 CBC's:  Recent Labs   Lab Test 10/20/20  0544 10/19/20  0629 10/17/20  0709   WBC 5.5 2.9* 5.5   HGB 10.0* 9.6* 9.8*   * 108* 106*    145* 146*     Most Recent 3 BMP's:  Recent Labs   Lab Test 10/20/20  0544 10/19/20  0629 10/17/20  0709    140 140   POTASSIUM 3.7 4.0 4.5   CHLORIDE 104 104 105   CO2 31 26 26   BUN 16 21 20   CR 2.70* 4.14* 4.65*   ANIONGAP 6 10 9   CHRISTINE 8.5 8.9 9.0   * 142* 79     Most Recent 2 LFT's:  Recent Labs   Lab Test 10/15/20  1830 19  0847 18  0632   AST 18  --  19   ALT 19 16 15   ALKPHOS 234*  --  202*   BILITOTAL 1.0  --  2.3*     Most Recent 3 Troponin's:  Recent Labs   Lab Test 10/16/20  0934 10/15/20  1830 18  1610   TROPI 0.028 <0.015 0.019   ,   Results for orders placed or performed during the hospital encounter of 10/16/20   Echocardiogram Complete    Narrative    [id number]  [id number]  [id number]  [id number]^ZOE^OLIVER^[facility]  Echocardiography Laboratory  [address] [place] [address]        Name: SOCORRO LÓPEZ  MRN: [id number]  : 1945  Study Date: 10/16/2020 07:47 AM  Age: 74 yrs  Gender: Male  Patient Location: [facility]  Reason For Study: Syncope  Ordering Physician: OLIVER ENRIQUEZ  Referring Physician: ROBY HA  Performed By: Tez Baker RDCS     BSA: 1.9 m2  Height: 68 in  Weight: 178 lb  HR: 75  BP: 134/66 mmHg  _____________________________________________________________________________  __        Procedure  Complete Portable Echo Adult. Optison (NDC #7875-0300) given intravenously.  _____________________________________________________________________________  __        Interpretation  Summary     The left ventricle is mildly dilated.  The visual ejection fraction is estimated at 20-25%.  Diastolic Doppler findings (E/E' ratio and/or other parameters) suggest left  ventricular filling pressures are increased.  There is severe global hypokinesis. Best preserved segments involving  anterolateral wall. Akinesis of inferoseptal, apical.  The right ventricle is mildly dilated.  Severely decreased right ventricular systolic function  There is severe biatrial enlargement.  There is mild (1+) mitral regurgitation.  There is mild to moderate (1-2+) tricuspid regurgitation.  Right ventricular systolic pressure is elevated, consistent with severe  pulmonary hypertension.  Mild aortic root dilatation.  The ascending aorta is Mildly dilated.  Dilation of the inferior vena cava is present with abnormal respiratory  variation in diameter.  There is mild to moderate (1-2+) pulmonic valvular regurgitation.  Compared to prior study, changes are noted.  _____________________________________________________________________________  __        Left Ventricle  The left ventricle is mildly dilated. There is normal left ventricular wall  thickness. The visual ejection fraction is estimated at 20-25%. Diastolic  Doppler findings (E/E' ratio and/or other parameters) suggest left ventricular  filling pressures are increased. There is severe global hypokinesis. Best  preserved segments involving anterolateral wall. Akinesis of inferoseptal,  apical.     Right Ventricle  The right ventricle is mildly dilated. There is a catheter/pacemaker lead seen  in the right ventricle. Severely decreased right ventricular systolic  function.     Atria  There is severe biatrial enlargement. There is no color Doppler evidence of an  atrial shunt.     Mitral Valve  The mitral valve leaflets are mildly thickened. There is mild (1+) mitral  regurgitation.        Tricuspid Valve  The tricuspid valve is not well visualized, but is grossly normal.  There is  mild to moderate (1-2+) tricuspid regurgitation. The right ventricular  systolic pressure is approximated at 51.5 mmHg plus the right atrial pressure.  Right ventricular systolic pressure is elevated, consistent with severe  pulmonary hypertension.     Aortic Valve  There is mild trileaflet aortic sclerosis. No aortic regurgitation is present.     Pulmonic Valve  The pulmonic valve is not well visualized. There is mild to moderate (1-2+)  pulmonic valvular regurgitation.     Vessels  Mild aortic root dilatation. The ascending aorta is Mildly dilated. Dilation  of the inferior vena cava is present with abnormal respiratory variation in  diameter.     Pericardium  There is no pericardial effusion.        Rhythm  The rhythm was paced.  _____________________________________________________________________________  __  MMode/2D Measurements & Calculations  IVSd: 1.7 cm     LVIDd: 6.1 cm  LVIDs: 5.2 cm  LVPWd: 1.0 cm  FS: 15.0 %  LV mass(C)d: 393.7 grams  LV mass(C)dI: 202.4 grams/m2  Ao root diam: 3.9 cm  LA dimension: 5.8 cm  asc Aorta Diam: 3.8 cm  LA/Ao: 1.5  LA Volume (BP): 145.0 ml  LA Volume Index (BP): 74.4 ml/m2  RWT: 0.34           Doppler Measurements & Calculations  MV E max zach: 102.2 cm/sec  MV A max zach: 53.8 cm/sec  MV E/A: 1.9  MV dec time: 0.12 sec  TR max zach: 359.0 cm/sec  TR max P.5 mmHg  E/E' av.3  Lateral E/e': 17.3  Medial E/e': 45.3           _____________________________________________________________________________  __           Report approved by: Mariana Luna 10/16/2020 01:30 PM      Cardiac Catheterization    Narrative    1.  Multivessel CAD.   -Total or subtotal instent occlusion of the LAD with known infarction of   the anterior wall.   -Patent proximal LCx and OM stents.  Severe stenosis at the bifurcation   of large OM1 distal to the previously placed stent.   -Small nondominant RCA with no significant stenosis  2.  PCI of OM1 with 2.25 x 20mm Synergy FOREST  with pre-and post-dilation of   the inferior side branch.  3.  Mildly elevated LVEDP 17mmHg.    NM MPI w Lexiscan     Value    Target     Baseline Systolic     Baseline Diastolic BP 61    Last Stress Systolic     Last Stress Diastolic BP 51    Baseline HR 83    Max HR 86    Calculated Percent HR 59    Rate Pressure Product 9,460.0    Left Ventricular EF 22    Narrative       The nuclear stress test is abnormal.     There is moderate ischemia in the lateral segment(s) of the left   ventricle.     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     There is nontransmural infarction in the inferior segment(s) of the   left ventricle associated with a moderate degree of filippo-infarct ischemia.   (bowel activity is overlapping this area, more prominently in resting   images and may be contributing to some reversibility of this defect)     Left ventricular function is severely reduced.     The left ventricular ejection fraction at stress is 22%.     Right ventricular enlargment is noted.     There is no prior study for comparison.           Discharge Medications   Current Discharge Medication List      START taking these medications    Details   clopidogrel (PLAVIX) 75 MG tablet Take 1 tablet (75 mg) by mouth daily  Qty: 60 tablet, Refills: 1    Associated Diagnoses: Coronary artery disease involving native coronary artery, angina presence unspecified, unspecified whether native or transplanted heart; S/P drug eluting coronary stent placement         CONTINUE these medications which have NOT CHANGED    Details   acetaminophen (TYLENOL) 500 MG tablet Take 1,000 mg by mouth 4 times daily as needed for pain      apixaban ANTICOAGULANT (ELIQUIS ANTICOAGULANT) 2.5 MG tablet Take 1 tablet (2.5 mg) by mouth 2 times daily  Qty: 180 tablet, Refills: 0    Associated Diagnoses: Atrial fibrillation (H)      !! isosorbide mononitrate (IMDUR) 30 MG 24 hr  tablet Take 30 mg by mouth Take in the evening on dialysis days and on Saturdays. Take on Monday, Wednesday, Friday and Saturday.      !! isosorbide mononitrate (IMDUR) 30 MG 24 hr tablet Take 15 mg by mouth Take in the evening on the days prior dialysis. Take on Sunday, Tuesday and Thursday      levothyroxine (SYNTHROID/LEVOTHROID) 50 MCG tablet TAKE 1 TABLET (50 MCG) BY MOUTH DAILY  Qty: 90 tablet, Refills: 3    Associated Diagnoses: Hypothyroidism, unspecified type      lisinopril (ZESTRIL) 2.5 MG tablet TAKE 1 TABLET (2.5 MG) BY MOUTH 2 TIMES DAILY (TAKE ONE TABLET AFTER DIALYSIS. TAKE SECOND TABLET AT BEDTIME.)  Qty: 180 tablet, Refills: 3    Associated Diagnoses: NSTEMI (non-ST elevated myocardial infarction) (H)      !! loperamide (IMODIUM A-D) 2 MG tablet Take 2 mg by mouth three times a week MWF on dialysis days      !! loperamide (IMODIUM A-D) 2 MG tablet Take 1 mg by mouth five times a week On non-dialysis days - Tu, Th, Sa, Russo      metoprolol succinate ER (TOPROL-XL) 25 MG 24 hr tablet Take 12.5 mg by mouth daily      mexiletine (MEXITIL) 150 MG capsule Take 1 capsule (150 mg) by mouth 2 times daily  Qty: 180 capsule, Refills: 3    Associated Diagnoses: Tachycardia      MULTIVITAMIN RENAL 1 MG capsule TAKE 1 CAPSULE BY MOUTH EVERY EVENING  Qty: 90 capsule, Refills: 3    Associated Diagnoses: ESRD (end stage renal disease) on dialysis (H)      omeprazole (PRILOSEC) 20 MG DR capsule Take 20 mg by mouth daily      pantoprazole (PROTONIX) 40 MG EC tablet TAKE 1 TABLET (40 MG) BY MOUTH EVERY MORNING  Qty: 30 tablet, Refills: 9    Associated Diagnoses: Gastroesophageal reflux disease, esophagitis presence not specified      pravastatin (PRAVACHOL) 40 MG tablet TAKE 1 TABLET (40 MG) BY MOUTH DAILY  Qty: 90 tablet, Refills: 0    Associated Diagnoses: Coronary artery disease involving native coronary artery of native heart without angina pectoris      nitroGLYcerin (NITROSTAT) 0.4 MG sublingual tablet Place 1  tablet (0.4 mg) under the tongue every 5 minutes as needed for chest pain UP TO 3 PER EPISODE  Qty: 20 tablet, Refills: 0    Associated Diagnoses: Angina pectoris (H)       !! - Potential duplicate medications found. Please discuss with provider.      STOP taking these medications       Metoprolol Succinate 25 MG CS24 Comments:   Reason for Stopping:             Allergies   Allergies   Allergen Reactions     Contrast Dye Hives     Does fine if he uses benadryl prior.     No Clinical Screening - See Comments      Green beans - Diarrhea.    Topical antibiotic - name unknown - caused swelling of the penis.

## 2020-10-20 NOTE — PLAN OF CARE
Cardiac Therapy Discharge Summary     Reason for therapy discharge:    All goals and outcomes met, no further needs identified.     Progress towards therapy goal(s). See goals on Care Plan in [facility] electronic health record for goal details.  Goals met     Therapy recommendation(s):    Continued therapy is recommended.  Rationale/Recommendations:  see above.

## 2020-10-20 NOTE — PROGRESS NOTES
[facility]    Cardiology Progress Note    Date of Service (when I saw the patient): 10/20/2020     Assessment & Plan   Westley Ness is a 74 year old male with known CAD with an occluded LAD and LCx and OM PCI/stenting in 2016, an ischemic CM-EF has been 25-30% s/p ICD, NSVT on chronic mexiletine, PAF, hx of AFL s/p ablation, IDDM and ESRD on dialysis who was admitted on 10/16/2020 with dizziness and some CP.  A nuclear stress test this stay shows a moderate area of lateral ischemia.  There is also an area of filippo-infarct ischemia in the inferior wall.  The anterior wall is infarcted which is predictable given that the left anterior descending artery is occluded.  TTE shows the ejection fraction has dropped he now has an ejection fraction of 20 to 25%.    Cor angio showed 80% native vessel OM1 lesion and known subtotal occ of the LAD. OM was treated with a 2.25 x 20mm Synergy FOREST with pre-and post-dilation of the inferior side branch. Mildly elevated LVEDP 17mmHg.    He received ASA 81mg and clopidogrel 75mg today. He typically has been on Eliquis 2.5mg BID due to PAF. His access site is stable and we will restart the Eliquis today. We will stop the ASA after today due to his higher bleeding risk (he is on dialysis and gets heparin with this as well). Continue with Clopidogrel 75mg daily (for at least 6 months, preferably 12 if he is doing ok) and Eliquis 2.5mg BID at discharge.    Continue his other cardiac meds unchanged.    Nephrology managing his fluid status with dialysis, agree that he was a bit fluid overloaded and to cautiously aim for a slightly lower dry weight.     Will arrange close cardiology follow up. Pt prefers a video visit. His typical cardiology team is Dr Sen and Lacy Taylor NP.    Kerry Kimble PA-C      Interval History   Feels well.     Tele: SR with occ NSVT up to 20 beats.    Physical Exam   Temp: 95.3  F (35.2  C) Temp src: Oral BP: 108/54 Pulse: 66    Resp: 18 SpO2: 96 % O2 Device: Nasal cannula Oxygen Delivery: 2 LPM  Vitals:    10/16/20 0131 10/17/20 1153   Weight: 81.1 kg (178 lb 14.4 oz) 79.4 kg (175 lb)     Vital Signs with Ranges  Temp:  [95.3  F (35.2  C)-96.6  F (35.9  C)] 95.3  F (35.2  C)  Pulse:  [64-96] 66  Resp:  [14-20] 18  BP: (104-147)/(47-77) 108/54  SpO2:  [88 %-100 %] 96 %  I/O last 3 completed shifts:  In: 0   Out: 2500 [Other:2500]    Constitutional     alert and oriented, in no acute distress.     Skin     warm and dry to touch    Lungs  clear to auscultation     Cardiac  regular rhythm    Abdomen     abdomen soft, bowel sounds normoactive, no hepatosplenomegaly    Extremities and Back     No edema observed.  Right femoral artery access site soft, c/d/i.      Neurological     no gross motor deficits noted, affect appropriate, oriented to time, person and place.      Medications     - MEDICATION INSTRUCTIONS -       - MEDICATION INSTRUCTIONS -       Percutaneous Coronary Intervention orders placed (this is information for BPA alerting)       ACE/ARB/ARNI NOT PRESCRIBED       sodium chloride         - MEDICATION INSTRUCTIONS for Dialysis Patients -   Does not apply See Admin Instructions     aspirin  325 mg Oral Daily     heparin ANTICOAGULANT  Loading Dose  2,000 Units Intravenous Once     isosorbide mononitrate  15 mg Oral Once per day on Sun Tue Thu     isosorbide mononitrate  30 mg Oral Once per day on Mon Wed Fri Sat     levothyroxine  50 mcg Oral Daily     lisinopril  2.5 mg Oral BID     metoprolol succinate  12.5 mg Oral Daily     mexiletine  150 mg Oral BID     multivitamin RENAL  1 capsule Oral QPM     omeprazole  20 mg Oral Daily     pantoprazole  40 mg Oral QAM AC     pravastatin  40 mg Oral QPM     sodium chloride (PF)  3 mL Intracatheter Q8H       Data   Most Recent 3 CBC's:  Recent Labs   Lab Test 10/20/20  0544 10/19/20  0629 10/17/20  0709   WBC 5.5 2.9* 5.5   HGB 10.0* 9.6* 9.8*   * 108* 106*    145* 146*      Most Recent 3 BMP's:  Recent Labs   Lab Test 10/20/20  0544 10/19/20  0629 10/17/20  0709    140 140   POTASSIUM 3.7 4.0 4.5   CHLORIDE 104 104 105   CO2 31 26 26   BUN 16 21 20   CR 2.70* 4.14* 4.65*   ANIONGAP 6 10 9   CHRISTINE 8.5 8.9 9.0   * 142* 79     Most Recent 3 Troponin's:  Recent Labs   Lab Test 10/16/20  0934 10/15/20  1830 09/13/18  1610   TROPI 0.028 <0.015 0.019     Most Recent Cholesterol Panel:  Recent Labs   Lab Test 10/19/20  1609   CHOL 101   LDL 29   HDL 60   TRIG 60     Most Recent TSH and T4:  Recent Labs   Lab Test 03/26/19  0847   TSH 4.40*     TTE 10/16/2020:   The left ventricle is mildly dilated.  The visual ejection fraction is estimated at 20-25%.  Diastolic Doppler findings (E/E' ratio and/or other parameters) suggest left ventricular filling pressures are increased.  There is severe global hypokinesis. Best preserved segments involving anterolateral wall. Akinesis of inferoseptal, apical.  The right ventricle is mildly dilated. Severely decreased right ventricular systolic function  There is severe biatrial enlargement.  There is mild (1+) mitral regurgitation.  There is mild to moderate (1-2+) tricuspid regurgitation.  Right ventricular systolic pressure is elevated, consistent with severe pulmonary hypertension.  Mild aortic root dilatation. The ascending aorta is Mildly dilated.  Dilation of the inferior vena cava is present with abnormal respiratory variation in diameter.  There is mild to moderate (1-2+) pulmonic valvular regurgitation.

## 2020-10-20 NOTE — PLAN OF CARE
"Pt. A&Ox4, cooperative and calm. Pt. Received dialysis following stent placement. Right femoral site is clean, dry, and intact. Reports pain 2/10 in R shoulder, but states this is chronic and declines intervention. VSS. Denies chest pain/ SOB. Tele monitoring in effect. Neuros intact. SBA with walker and gait belt. Tolerating low fat/ low sodium diet. Both IV sites to R forearm saline locked.     /63   Pulse 72   Temp 96.6  F (35.9  C) (Oral)   Resp 16   Ht 1.727 m (5' 8\")   Wt 79.4 kg (175 lb)   SpO2 100%   BMI 26.61 kg/m      "

## 2020-10-20 NOTE — PLAN OF CARE
"/62 (BP Location: Right arm)   Pulse 77   Temp 95.3  F (35.2  C) (Oral)   Resp 18   Ht 1.727 m (5' 8\")   Wt 79.4 kg (175 lb)   SpO2 94%   BMI 26.61 kg/m      Neuro: WDL  Cardiac: ex., post amgio with stenting femoral wdl  Patient also has left fistula for dialysis which was run after his stents were placed, 1.5 L were removed. Denies chest pain or SOB. Will restart plavix and Eliquis today.  Plan: Patient will discharge this afternoon.  Will continue to monitor and provide cares.     "

## 2020-10-20 NOTE — PLAN OF CARE
Vitals: Wnl  Neuro:  GI: Wnl, last bowel movement 10/18.  : Patient on dialysis, states he voids very little once a day  Skin: wnl,dry and pale. Patient  Has some bruising from lab draws. Bandages to left arm are C/D/I. Rt femoral groin site is intact, no bruising, swelling or drainage.  Activity: x1 with walker, normally ind with cane baseline. Does better with walker.  Has cane from home, stated that he also has a walker  Diet: 2 gm Na diet, no caffeine   Pain: denies, prn tylenol available if patient wants   Plan: possible discharge in am following angiogram. Cardiology and nephrology following.

## 2020-10-21 ENCOUNTER — TELEPHONE (OUTPATIENT)
Dept: INTERNAL MEDICINE | Facility: CLINIC | Age: 75
End: 2020-10-21

## 2020-10-21 ENCOUNTER — TELEPHONE (OUTPATIENT)
Dept: CARDIOLOGY | Facility: CLINIC | Age: 75
End: 2020-10-21

## 2020-10-21 NOTE — TELEPHONE ENCOUNTER
"Hospital/TCU/ED for chronic condition Discharge Protocol    \"Hi, my name is Lacy Walker RN, a registered nurse, and I am calling from [facility].  I am calling to follow up and see how things are going for you after your recent emergency visit/hospital/TCU stay.\"    Tell me how you are doing now that you are home?\" tired      Discharge Instructions    \"Let's review your discharge instructions.  What is/are the follow-up recommendations?  Pt. Response: has virtual visit with cardiology    \"Has an appointment with your primary care provider been scheduled?\"   No (schedule appointment)    \"When you see the provider, I would recommend that you bring your medications with you.\"    Medications    \"Tell me what changed about your medicines when you discharged?\"    Changes to chronic meds?    0-1    \"What questions do you have about your medications?\"    None     New diagnoses of heart failure, COPD, diabetes, or MI?    No              Post Discharge Medication Reconciliation Status: discharge medications reconciled, continue medications without change.    Was MTM referral placed (*Make sure to put transitions as reason for referral)?   No    Call Summary    \"What questions or concerns do you have about your recent visit and your follow-up care?\"     none    \"If you have questions or things don't continue to improve, we encourage you contact us through the main clinic number (give number).  Even if the clinic is not open, triage nurses are available 24/7 to help you.     We would like you to know that our clinic has extended hours (provide information).  We also have urgent care (provide details on closest location and hours/contact info)\"      \"Thank you for your time and take care!\"             "
ED / Discharge Outreach Protocol    Patient Contact    Attempt # 1    Was call answered?  No.  Left message on voicemail with information to call me back.    
Please call patient for hospital follow up.    
Triage, please call patient for hospital discharge follow-up; pt does not meet initial criteria for CCC. Recent ED/admission were within the same episode of care and pt was transferred to a different hospital.     If any Care Coordination needs are identified, please place Care Coordination referral. Thank you!   
Yes

## 2020-10-21 NOTE — TELEPHONE ENCOUNTER
Patient was evaluated by cardiology while inpatient for dizziness and chest pain. PMH: known CAD with an occluded LAD and LCx and OM PCI/stenting in 2016, an ischemic CM-EF has been 25-30% s/p ICD, NSVT on chronic mexiletine, PAF, hx of AFL s/p ablation, IDDM and ESRD on dialysis. A nuclear stress test this admission showed a moderate area of lateral ischemia and an area of filippo-infarct ischemia in the inferior wall. The anterior wall is infarcted which is predictable given that the left anterior descending artery is occluded. TTE shows the ejection fraction has dropped, and now has an ejection fraction of 20 to 25%. 10/19/20: Coronary angiogram via RFA showed 80% native vessel OM1 lesion and known subtotal occ of the LAD. OM was treated with a 2.25 x 20mm Synergy FOREST with pre-and post-dilation of the inferior side branch. Started on Plavix. Called patient to discuss any post hospital d/c questions, review medication changes, and confirm f/u appts. RN confirmed with patient that he was d/c with the antiplatelet Plavix. Pt has an Rx for PRN SL Nitroglycerin. Patient has questions regarding Plavix, Eliquis and heparin needed for HD. Encouraged pt to discuss with HD RN tomorrow prior to HD. States he has already been in contact with them.  Patient denied any SOB, chest pain, fever or light headedness. RFA cardiac cath site is without bleeding, swelling, redness or tenderness. RN confirmed with patient that he has a video visit appt scheduled on 10/28/20 at 1350 with GILDA Lacy Taylor. Pt is refusing cardiac rehab.. Patient advised to call clinic with any cardiac related questions or concerns prior to this gilda't. Patient verbalized understanding and agreed with plan. LAURENT Obrien RN.

## 2020-10-28 ENCOUNTER — VIRTUAL VISIT (OUTPATIENT)
Dept: CARDIOLOGY | Facility: CLINIC | Age: 75
End: 2020-10-28
Payer: MEDICARE

## 2020-10-28 DIAGNOSIS — I25.10 CORONARY ARTERY DISEASE INVOLVING NATIVE CORONARY ARTERY OF NATIVE HEART WITHOUT ANGINA PECTORIS: ICD-10-CM

## 2020-10-28 DIAGNOSIS — I25.5 ISCHEMIC CARDIOMYOPATHY: Primary | ICD-10-CM

## 2020-10-28 DIAGNOSIS — I47.29 PAROXYSMAL VENTRICULAR TACHYCARDIA (H): ICD-10-CM

## 2020-10-28 DIAGNOSIS — I48.0 PAROXYSMAL ATRIAL FIBRILLATION (H): ICD-10-CM

## 2020-10-28 PROCEDURE — 99214 OFFICE O/P EST MOD 30 MIN: CPT | Mod: 95 | Performed by: NURSE PRACTITIONER

## 2020-10-28 NOTE — PROGRESS NOTES
"Westley Ness is a 74 year old male who is being evaluated via a billable video visit.      The patient has been notified of following:     \"This video visit will be conducted via a call between you and your physician/provider. We have found that certain health care needs can be provided without the need for an in-person physical exam.  This service lets us provide the care you need with a video conversation.  If a prescription is necessary we can send it directly to your pharmacy.  If lab work is needed we can place an order for that and you can then stop by our lab to have the test done at a later time.    Video visits are billed at different rates depending on your insurance coverage.  Please reach out to your insurance provider with any questions.    If during the course of the call the physician/provider feels a video visit is not appropriate, you will not be charged for this service.\"  Vitals - Patient Reported  Systolic (Patient Reported): 106  Diastolic (Patient Reported): 53(after dialysis)  Weight (Patient Reported): 76.3 kg (168 lb 3.4 oz)  Height (Patient Reported): 172.7 cm (5' 8\")  BMI (Based on Pt Reported Ht/Wt): 25.58  Pulse (Patient Reported): 74      Review Of Systems  Skin: bruising  Eyes:Ears/Nose/Throat: nosebleed occ  Respiratory: O2 at dialysis  Cardiovascular: chest pain, occ, lightheadedness positional, mild edema in ankles, energy level varies  Gastrointestinal: NEGATIVE  Genitourinary:NEGATIVE   Musculoskeletal: NEGATIVE  Neurologic: NEGATIVE  Psychiatric: NEGATIVE  Hematologic/Lymphatic/Immunologic: easy bruising  Endocrine:  Thyroid disease    Coco Rothman LPN      Patient has given verbal consent for Video visit? Yes  How would you like to obtain your AVS? MyChart  If you are dropped from the video visit, the video invite should be resent to: [email or web address]  Will anyone else be joining your video visit? No        Video-Visit Details    Type of service:  Video Visit    Video " Start Time: 2:05 PM  Video End Time: 2:33 PM    Originating Location (pt. Location): Home    Distant Location (provider location):  [facility]     Platform used for Video Visit:ROIÂ² NOTE:  This visit was completed via video due to COVID-19 precautions.  The patient provided consent for a video visit.      I had the pleasure evaluating Westley Ness for post hospital follow up.      The patient is a delightful 75 yo male with the following medical issues:  1. Ischemic cardiomyopathy with EF 25-30%  2. ICD implantation for primary prevention with appropriate therapy in the past.  3. VT on Mexiletine 150 mg bid  4. CAD with an occluded LAD and LCx and OM PCI/stenting in 2016. Pt was admitted with chest pain and had moderate area of lateral wall ischemia. Proceeded with an angiogram showed 80% native vessel OM1 lesion and known subtotal occ of the LAD. OM was treated with a 2.25 x 20mm Synergy FOREST.   5. DM II  6. Hypothyroidism   7. ESRD on dialysis recently has had low phosphorus levels (was 1.4 now at 2.5 per pt)  8. Hypothyroidism on levothyroxine. Last TSH 4.4  9. Hx atrial flutter with 2 previous ablation the last was 12/2017.     Art is feeling well since his admission (10/16 through 10/20). Denies chest pain, sob, edema, or lightheadedness. Pt states his femoral access site for his angiogram has healed nicely. A video visit was completed due to the COVID 19 pandemic.      PHYSICAL EXAMINATION:  General:  no apparent distress, normal body habitus, sitting upright.  ENT/Mouth:  no nasal discharge.  Normal head shape, no apparent injury or laceration.  Eyes:  normal conjunctivae.  No observed jaundice.  Neck:  no apparent neck swelling.   Chest/Lungs:  no breathing difficulty while speaking.  No audible wheezing.  No cough during conversation.  Cardiovascular:  reported HR is regular.  No obviously elevated jugular venous pressure.  No reported edema in LE.   Extremities:  no  apparent cyanosis.  Skin:  no xanthelasma or apparent rash.  Neurologic:  normal arm motion, no tremor.    Psychiatric:  alert and oriented x3, calm demeanor.  The rest of the comprehensive physical examination is deferred due to public [facility] emergency video visit restrictions.         DIAGNOSTIC STUDIES:  Coronary artery angiogram: 10/19/2020;  1.  Multivessel CAD.                -Total or subtotal instent occlusion of the LAD with known infarction of the anterior wall.                -Patent proximal LCx and OM stents.  Severe stenosis at the bifurcation of large OM1 distal to the previously placed stent.                -Small nondominant RCA with no significant stenosis  2.  PCI of OM1 with 2.25 x 20mm Synergy FOREST with pre-and post-dilation of the inferior side branch.  3.  Mildly elevated LVEDP 17mmHg.    Echo Interpretation Summary 10/16/2020     The left ventricle is mildly dilated.  The visual ejection fraction is estimated at 20-25%.  Diastolic Doppler findings (E/E' ratio and/or other parameters) suggest left ventricular filling pressures are increased.  There is severe global hypokinesis. Best preserved segments involving anterolateral wall. Akinesis of inferoseptal, apical.  The right ventricle is mildly dilated.  Severely decreased right ventricular systolic function  There is severe biatrial enlargement.  There is mild (1+) mitral regurgitation.  There is mild to moderate (1-2+) tricuspid regurgitation.  Right ventricular systolic pressure is elevated, consistent with severe pulmonary hypertension.  Mild aortic root dilatation.  The ascending aorta is Mildly dilated.  Dilation of the inferior vena cava is present with abnormal respiratory variation in diameter.  There is mild to moderate (1-2+) pulmonic valvular regurgitation.  Compared to prior study, changes are noted.    Nuclear Stress test 10/16/84835:  There is moderate ischemia in the lateral segment(s) of the left ventricle.     There is  transmural infarction in the anterior, apical and anteroseptal segment(s) of the left ventricle.     There is nontransmural infarction in the inferior segment(s) of the left ventricle associated with a moderate degree of filippo-infarct ischemia. (bowel activity is overlapping this area, more prominently in resting images and may be contributing to some reversibility of this defect)     Left ventricular function is severely reduced.     The left ventricular ejection fraction at stress is 22%.    Results for MALDONADO LÓPEZ (MRN [id number]) as of 10/28/2020 15:13   Ref. Range 10/19/2020 16:09 10/20/2020 05:44   Sodium Latest Ref Range: 133 - 144 mmol/L  141   Potassium Latest Ref Range: 3.4 - 5.3 mmol/L  3.7   Urea Nitrogen Latest Ref Range: 7 - 30 mg/dL  16   Creatinine Latest Ref Range: 0.66 - 1.25 mg/dL  2.70 (H)   GFR Estimate Latest Ref Range: >60 mL/min/1.73_m2  22 (L)   Cholesterol Latest Ref Range: <200 mg/dL 101    HDL Cholesterol Latest Ref Range: >39 mg/dL 60    LDL Cholesterol Calculated Latest Ref Range: <100 mg/dL 29    Non HDL Cholesterol Latest Ref Range: <130 mg/dL 41    Triglycerides Latest Ref Range: <150 mg/dL 60        IMPRESSION:  1. CAD with recent angio/FOREST to 80% native vessel OM1 lesion and known subtotal occ of the LAD. OM was treated with a 2.25 x 20mm Synergy FOREST on 10/19/2020. No c/o angina or sob. Doing fine with Plavix and Eliquis combination. Also on metoprolol, isosorbide, and lisinopril.   2. ESRD-Hemodialysis three times a week  3. ICM- 25-30%  4. ICD with hx shocks for VT about 8 years ago  5. DM II     RECOMMENDATIONS:  1. Continue clopidogrel for 1 year, asa stopped r/t Eliquis which is at 2.5 mg bid for his PAF.   2. Pt not doing cardiac rehab because he is on dialysis 3 times a week and does not want to add cardiac rehab to this.    Overall Maldonado is feeling well. He will contact us if he has any concerns. He will have an appointment with  in January. Pt prefers an [facility]  visit if COVID is still an issue.       Lacy Taylor NP, APRN CNP

## 2020-10-28 NOTE — LETTER
"10/28/2020    Josselin Kolb MD  [address] [facility] [address]    RE: Westley Ness       Dear Colleague,    I had the pleasure of seeing Westley Ness in the [facility].    Westley Ness is a 74 year old male who is being evaluated via a billable video visit.      The patient has been notified of following:     \"This video visit will be conducted via a call between you and your physician/provider. We have found that certain health care needs can be provided without the need for an in-person physical exam.  This service lets us provide the care you need with a video conversation.  If a prescription is necessary we can send it directly to your pharmacy.  If lab work is needed we can place an order for that and you can then stop by our lab to have the test done at a later time.    Video visits are billed at different rates depending on your insurance coverage.  Please reach out to your insurance provider with any questions.    If during the course of the call the physician/provider feels a video visit is not appropriate, you will not be charged for this service.\"  Vitals - Patient Reported  Systolic (Patient Reported): 106  Diastolic (Patient Reported): 53(after dialysis)  Weight (Patient Reported): 76.3 kg (168 lb 3.4 oz)  Height (Patient Reported): 172.7 cm (5' 8\")  BMI (Based on Pt Reported Ht/Wt): 25.58  Pulse (Patient Reported): 74      Review Of Systems  Skin: bruising  Eyes:Ears/Nose/Throat: nosebleed occ  Respiratory: O2 at dialysis  Cardiovascular: chest pain, occ, lightheadedness positional, mild edema in ankles, energy level varies  Gastrointestinal: NEGATIVE  Genitourinary:NEGATIVE   Musculoskeletal: NEGATIVE  Neurologic: NEGATIVE  Psychiatric: NEGATIVE  Hematologic/Lymphatic/Immunologic: easy bruising  Endocrine:  Thyroid disease    Coco Rothman LPN      Patient has given verbal consent for Video visit? Yes  How would you like to obtain your AVS? " Seema  If you are dropped from the video visit, the video invite should be resent to: [email or web address]  Will anyone else be joining your video visit? No        Video-Visit Details    Type of service:  Video Visit    Video Start Time: 2:05 PM  Video End Time: 2:33 PM    Originating Location (pt. Location): Home    Distant Location (provider location):  [facility]ART [facility]     Platform used for Video Visit:[email or web address] NOTE:  This visit was completed via video due to COVID-19 precautions.  The patient provided consent for a video visit.      I had the pleasure evaluating Westley Ness for post hospital follow up.      The patient is a delightful 73 yo male with the following medical issues:  1. Ischemic cardiomyopathy with EF 25-30%  2. ICD implantation for primary prevention with appropriate therapy in the past.  3. VT on Mexiletine 150 mg bid  4. CAD with an occluded LAD and LCx and OM PCI/stenting in 2016. Pt was admitted with chest pain and had moderate area of lateral wall ischemia. Proceeded with an angiogram showed 80% native vessel OM1 lesion and known subtotal occ of the LAD. OM was treated with a 2.25 x 20mm Synergy FOREST.   5. DM II  6. Hypothyroidism   7. ESRD on dialysis recently has had low phosphorus levels (was 1.4 now at 2.5 per pt)  8. Hypothyroidism on levothyroxine. Last TSH 4.4  9. Hx atrial flutter with 2 previous ablation the last was 12/2017.     Art is feeling well since his admission (10/16 through 10/20). Denies chest pain, sob, edema, or lightheadedness. Pt states his femoral access site for his angiogram has healed nicely. A video visit was completed due to the COVID 19 pandemic.      PHYSICAL EXAMINATION:  General:  no apparent distress, normal body habitus, sitting upright.  ENT/Mouth:  no nasal discharge.  Normal head shape, no apparent injury or laceration.  Eyes:  normal conjunctivae.  No observed jaundice.  Neck:  no apparent neck swelling.   Chest/Lungs:  no  breathing difficulty while speaking.  No audible wheezing.  No cough during conversation.  Cardiovascular:  reported HR is regular.  No obviously elevated jugular venous pressure.  No reported edema in LE.   Extremities:  no apparent cyanosis.  Skin:  no xanthelasma or apparent rash.  Neurologic:  normal arm motion, no tremor.    Psychiatric:  alert and oriented x3, calm demeanor.  The rest of the comprehensive physical examination is deferred due to public health emergency video visit restrictions.         DIAGNOSTIC STUDIES:  Coronary artery angiogram: 10/19/2020;  1.  Multivessel CAD.                -Total or subtotal instent occlusion of the LAD with known infarction of the anterior wall.                -Patent proximal LCx and OM stents.  Severe stenosis at the bifurcation of large OM1 distal to the previously placed stent.                -Small nondominant RCA with no significant stenosis  2.  PCI of OM1 with 2.25 x 20mm Synergy FOREST with pre-and post-dilation of the inferior side branch.  3.  Mildly elevated LVEDP 17mmHg.    Echo Interpretation Summary 10/16/2020     The left ventricle is mildly dilated.  The visual ejection fraction is estimated at 20-25%.  Diastolic Doppler findings (E/E' ratio and/or other parameters) suggest left ventricular filling pressures are increased.  There is severe global hypokinesis. Best preserved segments involving anterolateral wall. Akinesis of inferoseptal, apical.  The right ventricle is mildly dilated.  Severely decreased right ventricular systolic function  There is severe biatrial enlargement.  There is mild (1+) mitral regurgitation.  There is mild to moderate (1-2+) tricuspid regurgitation.  Right ventricular systolic pressure is elevated, consistent with severe pulmonary hypertension.  Mild aortic root dilatation.  The ascending aorta is Mildly dilated.  Dilation of the inferior vena cava is present with abnormal respiratory variation in diameter.  There is mild to  moderate (1-2+) pulmonic valvular regurgitation.  Compared to prior study, changes are noted.    Nuclear Stress test 10/16/27061:  There is moderate ischemia in the lateral segment(s) of the left ventricle.     There is transmural infarction in the anterior, apical and anteroseptal segment(s) of the left ventricle.     There is nontransmural infarction in the inferior segment(s) of the left ventricle associated with a moderate degree of filippo-infarct ischemia. (bowel activity is overlapping this area, more prominently in resting images and may be contributing to some reversibility of this defect)     Left ventricular function is severely reduced.     The left ventricular ejection fraction at stress is 22%.    Results for MALDONADO ÓLPEZ (MRN [id number]) as of 10/28/2020 15:13   Ref. Range 10/19/2020 16:09 10/20/2020 05:44   Sodium Latest Ref Range: 133 - 144 mmol/L  141   Potassium Latest Ref Range: 3.4 - 5.3 mmol/L  3.7   Urea Nitrogen Latest Ref Range: 7 - 30 mg/dL  16   Creatinine Latest Ref Range: 0.66 - 1.25 mg/dL  2.70 (H)   GFR Estimate Latest Ref Range: >60 mL/min/1.73_m2  22 (L)   Cholesterol Latest Ref Range: <200 mg/dL 101    HDL Cholesterol Latest Ref Range: >39 mg/dL 60    LDL Cholesterol Calculated Latest Ref Range: <100 mg/dL 29    Non HDL Cholesterol Latest Ref Range: <130 mg/dL 41    Triglycerides Latest Ref Range: <150 mg/dL 60        IMPRESSION:  1. CAD with recent angio/FOREST to 80% native vessel OM1 lesion and known subtotal occ of the LAD. OM was treated with a 2.25 x 20mm Synergy FOREST on 10/19/2020. No c/o angina or sob. Doing fine with Plavix and Eliquis combination. Also on metoprolol, isosorbide, and lisinopril.   2. ESRD-Hemodialysis three times a week  3. ICM- 25-30%  4. ICD with hx shocks for VT about 8 years ago  5. DM II     RECOMMENDATIONS:  1. Continue clopidogrel for 1 year, asa stopped r/t Eliquis which is at 2.5 mg bid for his PAF.   2. Pt not doing cardiac rehab because he is on  dialysis 3 times a week and does not want to add cardiac rehab to this.    Overall Art is feeling well. He will contact us if he has any concerns. He will have an appointment with  in January. Pt prefers an [facility] visit if COVID is still an issue.         Thank you for allowing me to participate in the care of your patient.    Sincerely,     Lacy Taylor NP, APRN [facility]

## 2020-10-28 NOTE — PATIENT INSTRUCTIONS
Call my nurse with any questions or concerns:  [phone]  *If you have concerns after hours, please call [phone], option 2 to speak with on call Cardiologist.    No changes  Call if you have concerns

## 2020-10-29 ENCOUNTER — OFFICE VISIT (OUTPATIENT)
Dept: INTERNAL MEDICINE | Facility: CLINIC | Age: 75
End: 2020-10-29
Payer: MEDICARE

## 2020-10-29 VITALS
WEIGHT: 175 LBS | OXYGEN SATURATION: 98 % | RESPIRATION RATE: 20 BRPM | SYSTOLIC BLOOD PRESSURE: 112 MMHG | TEMPERATURE: 97.7 F | BODY MASS INDEX: 26.61 KG/M2 | HEART RATE: 84 BPM | DIASTOLIC BLOOD PRESSURE: 44 MMHG

## 2020-10-29 DIAGNOSIS — R07.9 ACUTE CHEST PAIN: ICD-10-CM

## 2020-10-29 DIAGNOSIS — M25.561 CHRONIC PAIN OF RIGHT KNEE: ICD-10-CM

## 2020-10-29 DIAGNOSIS — Z99.2 ESRD ON HEMODIALYSIS (H): ICD-10-CM

## 2020-10-29 DIAGNOSIS — G89.29 CHRONIC PAIN OF RIGHT KNEE: ICD-10-CM

## 2020-10-29 DIAGNOSIS — R42 DIZZINESS: ICD-10-CM

## 2020-10-29 DIAGNOSIS — Z09 HOSPITAL DISCHARGE FOLLOW-UP: Primary | ICD-10-CM

## 2020-10-29 DIAGNOSIS — N18.6 ESRD ON HEMODIALYSIS (H): ICD-10-CM

## 2020-10-29 DIAGNOSIS — R26.2 DIFFICULTY WALKING: ICD-10-CM

## 2020-10-29 DIAGNOSIS — I25.10 CORONARY ARTERY DISEASE INVOLVING NATIVE CORONARY ARTERY OF NATIVE HEART WITHOUT ANGINA PECTORIS: ICD-10-CM

## 2020-10-29 PROCEDURE — 99495 TRANSJ CARE MGMT MOD F2F 14D: CPT | Performed by: INTERNAL MEDICINE

## 2020-10-29 NOTE — PROGRESS NOTES
SUBJECTIVE     Westley Ness is a very pleasant 74 year old male who presents for hospital follow-up:    Hospital: [facility]  Date of Admission: 10/16/2020  Date of Discharge: 10/20/2020  Reason(s) for Admission: chest pain    PMH significant for CAD s/p multiple NSTEMIs and PCI's    Summary of hospitalization:  - presented with dizziness and chest pain  - cardiology consulted and patient admitted for further care  - echo with decreased EF of 20-25%, severe global hypokinesis, and akinesis of inferoseptal and apical walls  - nuclear stress test demonstrated a moderate area of lateral ischemia and filippo-infarct ischemia of inferior wall   - underwent coronary angiogram with FOREST to Om1   - dizziness and chest pain resolved and patient was discharged home    Medication changes since discharge: none  Adherent to discharge medications: yes  Problems taking discharge medications: no    Follow-up: had a virtual visit with cardiology yesterday    Update since discharge:   - overall, patient is feeling fine  - no recurrent chest pain or dizziness  - no shortness of breath (more than baseline) or cough  - no fevers or chills    His main concern today is his right knee. His right knee pain started after a fall in June. It has been ongoing without significant improvement since. Recent right knee xray generally unremarkable other than arthritis (no fractures). No knee locking or giving out. No knee swelling or redness. The pain, however, has made it difficult for him to ambulate. Even with a walker he has difficulty exiting his home safely due to the pain/maneuvers needed to minimize pain.    He also needs refills of his Nephrocaps.     OBJECTIVE      /44 (BP Location: Right arm, Patient Position: Chair, Cuff Size: Adult Regular)   Pulse 84   Temp 97.7  F (36.5  C) (Temporal)   Resp 20   Wt 79.4 kg (175 lb)   SpO2 98%   BMI 26.61 kg/m    Constitutional: well-appearing  Respiratory: normal respiratory effort; clear to  auscultation bilaterally  Cardiovascular: regular rate and rhythm; no edema  Gastrointestinal: soft, non-tender, and non-distended; no organomegaly or masses   Musculoskeletal: walks gingerly with a walker  Psych: normal judgment and insight; normal mood and affect; recent and remote memory intact    ASSESSMENT/PLAN      (Z09) Hospital discharge follow-up  (primary encounter diagnosis)  (R42) Dizziness  (R07.9) Acute chest pain  (I25.10) Coronary artery disease involving native coronary artery of native heart without angina pectoris  Comment: patient is doing well, clinically; no recurrent symptoms.  Plan: continue present management.    (M25.561,  G89.29) Chronic pain of right knee  (R26.2) Difficulty walking  Plan: home PT ordered - patient will be contacted to schedule.      (N18.6,  Z99.2) ESRD on hemodialysis (H)  Plan: refills of Nephrocaps provided.     Josselin Kolb MD   [address] [address]  T: [phone], F: [phone]  (Note was completed, in part, with [employer] voice-recognition software. Documentation was reviewed, but some grammatical, spelling, and word errors may remain.)

## 2020-11-02 DIAGNOSIS — I48.91 ATRIAL FIBRILLATION (H): ICD-10-CM

## 2020-11-08 NOTE — PHARMACY-ADMISSION MEDICATION HISTORY
Addendum to med rec    Changed patient's regimen for Imdur    Patient takes Imdur 15 mg on the days prior to dialysis (Sun, Tues and Thurs) in the evening  Patient takes Imdur 30 mg on the days of dialysis and on Saturdays (Mon, Wed, Fri, Sat) in the evening     Prior to Admission medications    Medication Sig Last Dose Taking? Auth Provider                 isosorbide mononitrate (IMDUR) 30 MG 24 hr tablet Take 30 mg by mouth Take in the evening on dialysis days and on Saturdays. Take on Monday, Wednesday, Friday and Saturday.  Yes Unknown, Entered By History   isosorbide mononitrate (IMDUR) 30 MG 24 hr tablet Take 15 mg by mouth Take in the evening on the days prior dialysis. Take on Sunday, Tuesday and Thursday  Yes Unknown, Entered By History                                                                                       Report given to Becca BRITO on unit 41

## 2020-11-09 DIAGNOSIS — I25.10 CORONARY ARTERY DISEASE INVOLVING NATIVE CORONARY ARTERY OF NATIVE HEART WITHOUT ANGINA PECTORIS: Primary | ICD-10-CM

## 2020-11-09 RX ORDER — ISOSORBIDE MONONITRATE 30 MG/1
TABLET, EXTENDED RELEASE ORAL
Qty: 48 TABLET | Refills: 3 | Status: SHIPPED | OUTPATIENT
Start: 2020-11-09 | End: 2021-01-01

## 2020-11-19 DIAGNOSIS — Z53.9 DIAGNOSIS NOT YET DEFINED: Primary | ICD-10-CM

## 2020-11-19 PROCEDURE — G0180 MD CERTIFICATION HHA PATIENT: HCPCS | Performed by: INTERNAL MEDICINE

## 2020-12-02 ENCOUNTER — ANCILLARY PROCEDURE (OUTPATIENT)
Dept: CARDIOLOGY | Facility: CLINIC | Age: 75
End: 2020-12-02
Attending: INTERNAL MEDICINE
Payer: MEDICARE

## 2020-12-02 DIAGNOSIS — Z95.810 ICD (IMPLANTABLE CARDIOVERTER-DEFIBRILLATOR) IN PLACE: ICD-10-CM

## 2020-12-02 DIAGNOSIS — I25.5 ISCHEMIC CARDIOMYOPATHY: Primary | ICD-10-CM

## 2020-12-02 DIAGNOSIS — Z45.02 ICD (IMPLANTABLE CARDIOVERTER-DEFIBRILLATOR) BATTERY DEPLETION: ICD-10-CM

## 2020-12-02 PROCEDURE — 93296 REM INTERROG EVL PM/IDS: CPT | Performed by: INTERNAL MEDICINE

## 2020-12-02 PROCEDURE — 93295 DEV INTERROG REMOTE 1/2/MLT: CPT | Performed by: INTERNAL MEDICINE

## 2020-12-06 ENCOUNTER — HOSPITAL ENCOUNTER (EMERGENCY)
Facility: CLINIC | Age: 75
Discharge: HOME | End: 2020-12-06
Attending: EMERGENCY MEDICINE | Admitting: EMERGENCY MEDICINE
Payer: MEDICARE

## 2020-12-06 VITALS
DIASTOLIC BLOOD PRESSURE: 73 MMHG | HEIGHT: 68 IN | OXYGEN SATURATION: 98 % | SYSTOLIC BLOOD PRESSURE: 135 MMHG | BODY MASS INDEX: 25.73 KG/M2 | HEART RATE: 85 BPM | RESPIRATION RATE: 11 BRPM | WEIGHT: 169.75 LBS

## 2020-12-06 DIAGNOSIS — R42 VERTIGO: ICD-10-CM

## 2020-12-06 LAB
ANION GAP SERPL CALCULATED.3IONS-SCNC: 6 MMOL/L (ref 3–14)
BASOPHILS # BLD AUTO: 0 10E9/L (ref 0–0.2)
BASOPHILS NFR BLD AUTO: 0.5 %
BUN SERPL-MCNC: 20 MG/DL (ref 7–30)
CALCIUM SERPL-MCNC: 9.5 MG/DL (ref 8.5–10.1)
CHLORIDE SERPL-SCNC: 105 MMOL/L (ref 94–109)
CO2 SERPL-SCNC: 29 MMOL/L (ref 20–32)
CREAT SERPL-MCNC: 3.91 MG/DL (ref 0.66–1.25)
DIFFERENTIAL METHOD BLD: ABNORMAL
EOSINOPHIL # BLD AUTO: 0.1 10E9/L (ref 0–0.7)
EOSINOPHIL NFR BLD AUTO: 1.3 %
ERYTHROCYTE [DISTWIDTH] IN BLOOD BY AUTOMATED COUNT: 16.1 % (ref 10–15)
GFR SERPL CREATININE-BSD FRML MDRD: 14 ML/MIN/{1.73_M2}
GLUCOSE SERPL-MCNC: 125 MG/DL (ref 70–99)
HCT VFR BLD AUTO: 33.1 % (ref 40–53)
HGB BLD-MCNC: 10.6 G/DL (ref 13.3–17.7)
IMM GRANULOCYTES # BLD: 0 10E9/L (ref 0–0.4)
IMM GRANULOCYTES NFR BLD: 0.2 %
LYMPHOCYTES # BLD AUTO: 0.6 10E9/L (ref 0.8–5.3)
LYMPHOCYTES NFR BLD AUTO: 10.3 %
MCH RBC QN AUTO: 33.5 PG (ref 26.5–33)
MCHC RBC AUTO-ENTMCNC: 32 G/DL (ref 31.5–36.5)
MCV RBC AUTO: 105 FL (ref 78–100)
MONOCYTES # BLD AUTO: 0.4 10E9/L (ref 0–1.3)
MONOCYTES NFR BLD AUTO: 6.6 %
NEUTROPHILS # BLD AUTO: 4.9 10E9/L (ref 1.6–8.3)
NEUTROPHILS NFR BLD AUTO: 81.1 %
NRBC # BLD AUTO: 0 10*3/UL
NRBC BLD AUTO-RTO: 0 /100
PLATELET # BLD AUTO: 188 10E9/L (ref 150–450)
POTASSIUM SERPL-SCNC: 4.5 MMOL/L (ref 3.4–5.3)
RBC # BLD AUTO: 3.16 10E12/L (ref 4.4–5.9)
SODIUM SERPL-SCNC: 140 MMOL/L (ref 133–144)
WBC # BLD AUTO: 6.1 10E9/L (ref 4–11)

## 2020-12-06 PROCEDURE — 85025 COMPLETE CBC W/AUTO DIFF WBC: CPT | Performed by: EMERGENCY MEDICINE

## 2020-12-06 PROCEDURE — 80048 BASIC METABOLIC PNL TOTAL CA: CPT | Performed by: EMERGENCY MEDICINE

## 2020-12-06 PROCEDURE — 99283 EMERGENCY DEPT VISIT LOW MDM: CPT

## 2020-12-06 PROCEDURE — 250N000013 HC RX MED GY IP 250 OP 250 PS 637: Mod: GY | Performed by: EMERGENCY MEDICINE

## 2020-12-06 RX ORDER — MECLIZINE HYDROCHLORIDE 25 MG/1
50 TABLET ORAL ONCE
Status: COMPLETED | OUTPATIENT
Start: 2020-12-06 | End: 2020-12-06

## 2020-12-06 RX ADMIN — MECLIZINE HYDROCHLORIDE 50 MG: 25 TABLET ORAL at 11:24

## 2020-12-06 ASSESSMENT — ENCOUNTER SYMPTOMS
FACIAL ASYMMETRY: 0
CHILLS: 0
DIZZINESS: 1
VOMITING: 0
LIGHT-HEADEDNESS: 1
COUGH: 0
FEVER: 0
NAUSEA: 0
DIARRHEA: 0
DYSURIA: 0
WEAKNESS: 0
SHORTNESS OF BREATH: 0

## 2020-12-06 ASSESSMENT — MIFFLIN-ST. JEOR: SCORE: 1484.5

## 2020-12-06 NOTE — ED NOTES
Bed: ED11  Expected date: 12/6/20  Expected time: 10:00 AM  Means of arrival: Ambulance  Comments:  515 74m weak, cardiac hx, ETA 1001

## 2020-12-06 NOTE — ED AVS SNAPSHOT
[facility] Emergency [facility]  [address] [address]  Phone: [phone]  Fax: [phone]                                    Westley Ness   MRN: [id number]    Department: [facility] Emergency [facility]   Date of Visit: 12/6/2020           After Visit Summary Signature Page    I have received my discharge instructions, and my questions have been answered. I have discussed any challenges I see with this plan with the nurse or doctor.    ..........................................................................................................................................  Patient/Patient Representative Signature      ..........................................................................................................................................  Patient Representative Print Name and Relationship to Patient    ..................................................               ................................................  Date                                   Time    ..........................................................................................................................................  Reviewed by Signature/Title    ...................................................              ..............................................  Date                                               Time          22EPIC Rev 08/18

## 2020-12-06 NOTE — DISCHARGE INSTRUCTIONS
Over-the-counter Dramamine 25 mg every 6 hours as needed for dizziness.  Avoid quick position changes, use your walker always.  Follow-up in the clinic if you develop worsening vertigo over the next few weeks as you might need to see an ear nose and throat doctor.  Drink 2 more glasses of water a day to make sure you are staying hydrated.

## 2020-12-06 NOTE — ED PROVIDER NOTES
History   Chief Complaint:  Dizziness    HPI   Westley Ness is a 74 year old male on Eliquis with a history of chronic dizziness, hypothyroidism, hypertension, type II diabetes mellitus, NSTEMI, and end stage renal disease who presents to the ED for an evaluation of dizziness. The patient reports a history of chronic dizziness for the past 50 years or so. He often has intermittent episodes with the most recent one prior to today's episode in October for which he was seen for. Earlier this morning around 0700, the patient reports getting out of bed and going to the bathroom. When he laid back down, he started experiencing another episode which was more severe than before. Since then, he has had 3-4 spells of dizziness. He describes the dizziness as if the room around him is spinning. He states that simply rolling in bed results in dizziness, which has been happening since October. Here in the ED, he endorses being lightheaded as well. He denies any fever, chills, hearing loss, tinnitus, coughs, shortness of breath, changes in taste/smell, nausea, vomiting, or dysuria. He also denies any facial droop or unilateral weakness. He does have a history of diarrhea which is not new for which he takes Imodium for. He states he is on dialysis weekly on Monday, Wednesday, and Friday. He currently ambulates at home with a walker due to having difficulty standing up.    Allergies:  Topical antibiotic (unknown name)     Medications:    Eliquis  Plavix  Imdur  Levothyroxine  Zestril  Imodium  Toprol-XL  Mexitil  Nitrostat  Prilosec  Protonix  Pravachol    Past Medical History:    Acid reflux  Hypertension  CAD  End stage renal disease  Atrial flutter  Hypothyroidism  Ischemic cardiomyopathy  Type II diabetes mellitus  Chronic dizziness  CHF    Past Surgical History:    Cholecystectomy  CV coronary angiogram  CV left heart cath  CV PCI stent drug eluting  Elbow surgery (ORIF)  EP ICD generator change single  H ablation atrial  "flutter  HC left heart catheterization x2  Ventricular device implant  IR dialysis left fistulogram  Repair left fistula arteriovenous upper extremity  Tonsillectomy and adenoidectomy  LUE fistulas     Family History:    Cerebrovascular disease  Hypertension  Bladder cancer     Social History:  Smoking status: former, quit 1996  Alcohol use: not currently  Drug use: no  PCP: Josselin Kolb  Patient lives alone.  Marital Status:  Single [1]     Review of Systems   Constitutional: Negative for chills and fever.   HENT: Negative for hearing loss and tinnitus.    Respiratory: Negative for cough and shortness of breath.    Gastrointestinal: Negative for diarrhea, nausea and vomiting.   Genitourinary: Negative for dysuria.   Neurological: Positive for dizziness (Chronic ) and light-headedness. Negative for facial asymmetry and weakness.   All other systems reviewed and are negative.    Physical Exam     Patient Vitals for the past 24 hrs:   BP Pulse Resp SpO2 Height Weight   12/06/20 1200 135/73 85 11 98 % -- --   12/06/20 1100 129/61 87 21 98 % -- --   12/06/20 1021 -- -- -- -- 1.727 m (5' 8\") 77 kg (169 lb 12.1 oz)   12/06/20 1019 (!) 140/74 93 -- -- -- --       Physical Exam     Nursing note and vitals reviewed.    Constitutional:  Appears comfortable.    HENT:    Nose normal.  No discharge.      Oral mucosa is moist.  Eyes:    Conjunctivae are normal without injection.     Pupils are equal.  Cardiovascular:  Normal rate, regular rhythm with normal S1 and S2.      Normal heart sounds and peripheral pulses 2+ and equal.       No murmur or lexy.  Pulmonary:  Effort normal and breath sounds clear to auscultation bilaterally.     No respiratory distress.  No stridor.     No wheezes. No rales.     GI:    Soft. No distension and no mass. No tenderness.      No rebound and no guarding. No flank pain.  No HSM.  Musculoskeletal:  Normal range of motion. No extremity deformity.     No edema and no tenderness.  Dialysis " shunt in left arm.  Neurological:   GCS 15. O X 3.  No sensory deficit. Normal strength and sensation. Normal coordination. Normal finger to nose. No hand drift. No leg drift.  EOMs intact. No diplopia. No facial droop. No slurred speech. Mental status and memory normal. Comprehension and expression of speech is normal.   Skin:    Skin is warm and dry. No rash noted. No diaphoresis.      No erythema. No pallor.  No lesions.  Psychiatric:   Behavior is normal. Appropriate mood and affect.     Judgment and thought content normal.     Emergency Department Course   Laboratory:  Laboratory findings were communicated with the patient who voiced understanding of the findings.    CBC: HGB 10.6 (L), o/w WNL. (WBC 6.1, )     BMP:  (H), creatinine 3.91 (H), GFR 14 (L), o/w WNL.     Interventions:  1124: Meclizine 50 mg PO    Emergency Department Course:  Past medical records, nursing notes, and vitals reviewed.    1017 I performed an exam of the patient as documented above.     IV was inserted and blood was drawn for laboratory testing, results above.    1230 I rechecked the patient and discussed the results of his workup thus far.     Findings and plan explained to the Patient. Patient discharged home with instructions regarding supportive care, medications, and reasons to return. The importance of close follow-up was reviewed.     I personally reviewed the laboratory results with the Patient and answered all related questions prior to discharge.     Impression & Plan   Medical Decision Making:  Patient comes in with some worsening vertigo over the last 24 hours.  He has chronic vertigo.  He has not taken any medication for this and has never taken meclizine or Dramamine.  He was given 50 mg of oral Dramamine here and actually he felt better overall.  He drank a glass or 2 of water.  He said he had some lightheaded episodes as well.  His neurologic exam is otherwise normal, no findings to suggest stroke and this  sounds like it chronic pattern.  His blood work came back with a stable hemoglobin at 10.6 but otherwise normal CBC, glucose 125 and chronic renal failure with a GFR 14 but normal electrolytes.  He is due for dialysis tomorrow.  I talked with pharmacy and meclizine is hepatically metabolized and cleared so the dosing will be normal and he can take it before or after dialysis.  The patient will follow up if his vertigo gets worse over the next few weeks as he may need to see ENT.  Encouraged him to drink a glass or 2 extra of water a day as it sounds like he does not drink a lot of water.  I want him to over low but 1 or 2 small glasses of water would be helpful.    Over-the-counter Dramamine 25 mg every 6 hours as needed for dizziness.  Avoid quick position changes, use your walker always.  Follow-up in the clinic if you develop worsening vertigo over the next few weeks as you might need to see an ear nose and throat doctor. Drink 2 more glasses of water a day to make sure you are staying hydrated.    Diagnosis:    ICD-10-CM    1. Vertigo  R42     Chronic       Disposition:  Discharged to home.    Discharge Medications:  Discharge Medication List as of 12/6/2020 12:50 PM          Scribe Disclosure:  I, Erica Shea, am serving as a scribe at 10:27 AM on 12/6/2020 to document services personally performed by Adriane Sweeney MD based on my observations and the provider's statements to me.          Adriane Sweeney MD  12/06/20 5799

## 2020-12-07 ENCOUNTER — PATIENT OUTREACH (OUTPATIENT)
Dept: CARE COORDINATION | Facility: CLINIC | Age: 75
End: 2020-12-07

## 2020-12-07 DIAGNOSIS — Z71.89 OTHER SPECIFIED COUNSELING: ICD-10-CM

## 2020-12-07 NOTE — PROGRESS NOTES
Clinic Care Coordination Contact  [facility]    Referral Source: ED Follow-Up  Clinical Data: Care Coordinator Outreach  [facility] 12/6/20  ED for dizziness  Medication Changes- No  Follow up with PCP if needed    Outreach attempted x 1.  Left message on patient's voicemail with call back information and requested return call.  Plan:  Care Coordinator will try to reach patient again in 1-2 business days.    JO Doyle  Clinic Care Coordination  [facility]: Jess Wolff  Phone: [phone]

## 2020-12-07 NOTE — LETTER
[facility]  [address] [place]  [address]      December 10, 2020      Westley Ness  [address] [facility] [address]      Dear Westley,    I am a clinic community health worker who [facility] to reach you recently to introduce Clinic Care Coordination and to see if there was anything I could assist you with.  Below is a description of clinic care coordination and how I can further assist you.      The clinic care coordination team is made up of a registered nurse,  and community health worker who understand the health care system. The goal of clinic care coordination is to help you manage your health and improve access to the health care system in the most efficient manner. The team can assist you in meeting your health care goals by providing education, coordinating services, strengthening the communication among your providers and supporting you with any resource needs.    Please feel free to contact me at [phone] with any questions or concerns. We are focused on providing you with the highest-quality healthcare experience possible and that all starts with you.     Sincerely,     JO Doyle  Clinic Care Coordination  [facility]: Jess Wolff  Phone: [phone]

## 2020-12-10 NOTE — PROGRESS NOTES
Clinic Care Coordination Contact  [facility]/Voicemail    Referral Source: ED Follow-Up  Clinical Data: Care Coordinator Outreach  [facility]  ED for dizziness  Medication Changes- No  Follow up with PCP if needed    Outreach attempted x 2.  Left message on patient's voicemail with call back information and requested return call.  Plan: Care Coordinator will send care coordination introduction letter with care coordinator contact information and explanation of care coordination services via [employer]. Care Coordinator will do no further outreaches at this time.    JO Doyle  Clinic Care Coordination  [facility]: Jess Wolff

## 2020-12-17 ENCOUNTER — MEDICAL CORRESPONDENCE (OUTPATIENT)
Dept: HEALTH INFORMATION MANAGEMENT | Facility: CLINIC | Age: 75
End: 2020-12-17

## 2020-12-17 LAB
MDC_IDC_LEAD_IMPLANT_DT: NORMAL
MDC_IDC_LEAD_LOCATION: NORMAL
MDC_IDC_LEAD_LOCATION_DETAIL_1: NORMAL
MDC_IDC_LEAD_MFG: NORMAL
MDC_IDC_LEAD_MODEL: NORMAL
MDC_IDC_LEAD_POLARITY_TYPE: NORMAL
MDC_IDC_LEAD_SERIAL: NORMAL
MDC_IDC_LEAD_SPECIAL_FUNCTION: NORMAL
MDC_IDC_MSMT_BATTERY_REMAINING_PERCENTAGE: 100 %
MDC_IDC_MSMT_BATTERY_RRT_TRIGGER: NORMAL
MDC_IDC_MSMT_BATTERY_STATUS: NORMAL
MDC_IDC_MSMT_BATTERY_VOLTAGE: 3.1 V
MDC_IDC_MSMT_CAP_CHARGE_DTM: NORMAL
MDC_IDC_MSMT_CAP_CHARGE_ENERGY: 40 J
MDC_IDC_MSMT_CAP_CHARGE_TIME: 9.4 S
MDC_IDC_MSMT_CAP_CHARGE_TYPE: NORMAL
MDC_IDC_MSMT_LEADCHNL_RV_IMPEDANCE_VALUE: 409 OHM
MDC_IDC_MSMT_LEADCHNL_RV_LEAD_CHANNEL_STATUS: NORMAL
MDC_IDC_PG_IMPLANT_DTM: NORMAL
MDC_IDC_PG_MFG: NORMAL
MDC_IDC_PG_MODEL: NORMAL
MDC_IDC_PG_SERIAL: NORMAL
MDC_IDC_PG_TYPE: NORMAL
MDC_IDC_SESS_CLINIC_NAME: NORMAL
MDC_IDC_SESS_DTM: NORMAL
MDC_IDC_SESS_REPROGRAMMED: NO
MDC_IDC_SESS_TYPE: NORMAL
MDC_IDC_SET_BRADY_LOWRATE: 40 {BEATS}/MIN
MDC_IDC_SET_BRADY_MODE: NORMAL
MDC_IDC_SET_LEADCHNL_RV_PACING_AMPLITUDE: 2 V
MDC_IDC_SET_LEADCHNL_RV_PACING_POLARITY: NORMAL
MDC_IDC_SET_LEADCHNL_RV_PACING_PULSEWIDTH: 0.4 MS
MDC_IDC_SET_LEADCHNL_RV_SENSING_ADAPTATION_MODE: NORMAL
MDC_IDC_SET_LEADCHNL_RV_SENSING_POLARITY: NORMAL
MDC_IDC_SET_LEADCHNL_RV_SENSING_SENSITIVITY: 0.8 MV
MDC_IDC_SET_ZONE_DETECTION_BEATS_DENOMINATOR: 16 {BEATS}
MDC_IDC_SET_ZONE_DETECTION_BEATS_NUMERATOR: 12 {BEATS}
MDC_IDC_SET_ZONE_DETECTION_INTERVAL: 240 MS
MDC_IDC_SET_ZONE_DETECTION_INTERVAL: 320 MS
MDC_IDC_SET_ZONE_TYPE: NORMAL
MDC_IDC_SET_ZONE_TYPE: NORMAL
MDC_IDC_STAT_AT_DTM_END: NORMAL
MDC_IDC_STAT_AT_DTM_START: NORMAL
MDC_IDC_STAT_BRADY_DTM_END: NORMAL
MDC_IDC_STAT_BRADY_DTM_START: NORMAL
MDC_IDC_STAT_BRADY_RV_PERCENT_PACED: 0 %
MDC_IDC_STAT_CRT_DTM_END: NORMAL
MDC_IDC_STAT_CRT_DTM_START: NORMAL
MDC_IDC_STAT_EPISODE_TOTAL_COUNT: 0
MDC_IDC_STAT_EPISODE_TOTAL_COUNT_DTM_END: NORMAL
MDC_IDC_STAT_EPISODE_TOTAL_COUNT_DTM_START: NORMAL
MDC_IDC_STAT_EPISODE_TYPE: NORMAL
MDC_IDC_STAT_TACHYTHERAPY_ATP_DELIVERED_TOTAL: 0
MDC_IDC_STAT_TACHYTHERAPY_SHOCKS_ABORTED_TOTAL: 0
MDC_IDC_STAT_TACHYTHERAPY_SHOCKS_DELIVERED_TOTAL: 0
MDC_IDC_STAT_TACHYTHERAPY_TOTAL_DTM_END: NORMAL
MDC_IDC_STAT_TACHYTHERAPY_TOTAL_DTM_START: NORMAL

## 2020-12-21 ENCOUNTER — TELEPHONE (OUTPATIENT)
Dept: INTERNAL MEDICINE | Facility: CLINIC | Age: 75
End: 2020-12-21

## 2020-12-21 DIAGNOSIS — M79.661 PAIN OF RIGHT LOWER LEG: Primary | ICD-10-CM

## 2020-12-21 NOTE — TELEPHONE ENCOUNTER
Reason for call:  Other   Patient called regarding (reason for call): call back    Additional comments: per patient , need to talk about issues that he is having with his wife . He will like a call back from a nurse or provider.     Phone number to reach patient:  Home number on file [phone] (home)    Best Time:  Anytime     Can we leave a detailed message on this number?  NO    Travel screening: Not Applicable

## 2020-12-22 RX ORDER — PREDNISONE 20 MG/1
40 TABLET ORAL DAILY
Qty: 10 TABLET | Refills: 0 | Status: SHIPPED | OUTPATIENT
Start: 2020-12-22 | End: 2020-12-27

## 2020-12-22 NOTE — TELEPHONE ENCOUNTER
Patient is having a lot of trouble with right leg. Is having knee pain. Having cramping and pain in right calf. Is also having ankle pain also. At dialysis they said it could be gout. Is getting labs done tomorrow to check for uric acid. Patient is wondering if there is a non narcotic medication you can give him to help with the pain. When he first wakes up he is very stiff and can barely walk. Has to walk around some to loosen everything up. Patient is taking tylenol and it helps a tiny bit. But because of his dialysis it limits what he can take.

## 2020-12-23 ENCOUNTER — TRANSFERRED RECORDS (OUTPATIENT)
Dept: HEALTH INFORMATION MANAGEMENT | Facility: CLINIC | Age: 75
End: 2020-12-23

## 2020-12-23 NOTE — TELEPHONE ENCOUNTER
Called patient. No answer. Left VM.    Course of prednisone prescribed.    If symptoms worsen, change, or do not improve, patient to contact MD.

## 2020-12-26 ENCOUNTER — TELEPHONE (OUTPATIENT)
Dept: INTERNAL MEDICINE | Facility: CLINIC | Age: 75
End: 2020-12-26

## 2020-12-26 DIAGNOSIS — M79.661 PAIN OF RIGHT LOWER LEG: Primary | ICD-10-CM

## 2020-12-26 NOTE — TELEPHONE ENCOUNTER
Reason for call:  Results   Name of test or procedure: Uric Acid  Date of test or procedure:  Location of test or procedure: [facility]    Additional comments: The uric acid test result is 3.7. Patient would also like to let Dr. Kolb know that the prescription is working great. Patient would like a call back on Monday or Tuesday.    Phone number to reach patient:  Home number on file [phone] (home)    Best Time:      Can we leave a detailed message on this number?  YES    Travel screening: Not Applicable

## 2020-12-28 ENCOUNTER — TRANSFERRED RECORDS (OUTPATIENT)
Dept: HEALTH INFORMATION MANAGEMENT | Facility: CLINIC | Age: 75
End: 2020-12-28

## 2020-12-29 RX ORDER — PREDNISONE 20 MG/1
TABLET ORAL
Qty: 15 TABLET | Refills: 0 | Status: SHIPPED | OUTPATIENT
Start: 2020-12-29 | End: 2021-01-08

## 2020-12-29 NOTE — TELEPHONE ENCOUNTER
Uric acid level is within normal limits - but this doesn't mean much. People can have gout flares with normal uric acid levels and people can have no symptoms whatsoever with elevated uric acid levels. It's not a terribly helpful test.    BUT I'm glad he's feeling better! That's what's important.

## 2020-12-29 NOTE — TELEPHONE ENCOUNTER
Patient was called with provider's message. On Sunday he finished prednisone 10 mg 2 pills daily for 10 days which helped with pain. The pain in the right knee, ankle, and cramp in calf is starting to come back (not as severe as before). Tylenol is helping with the left knee pain and shoulders pain, but not the right knee and ankle. He used to take gout medication before 2004 when he started dialysis, but was told to stop because dialysis removed excess uric acid. Pt is asking if he can take anything else for pain, he feels like he needs antiinflammatory meds.

## 2020-12-30 NOTE — TELEPHONE ENCOUNTER
Longer steroid taper prescribed.    Steroid/prednisone is an anti-inflammatory.    This is preferred and better acting than ibuprofen, which is also an anti-inflammatory and is one of the only NSAIDs that can be used in dialysis patients.

## 2021-01-01 ENCOUNTER — HOSPITAL ENCOUNTER (EMERGENCY)
Facility: CLINIC | Age: 76
Discharge: HOME | End: 2021-12-03
Attending: EMERGENCY MEDICINE | Admitting: EMERGENCY MEDICINE
Payer: MEDICARE

## 2021-01-01 ENCOUNTER — PATIENT OUTREACH (OUTPATIENT)
Dept: NURSING | Facility: CLINIC | Age: 76
End: 2021-01-01
Payer: MEDICARE

## 2021-01-01 ENCOUNTER — OFFICE VISIT (OUTPATIENT)
Dept: INTERNAL MEDICINE | Facility: CLINIC | Age: 76
End: 2021-01-01
Payer: MEDICARE

## 2021-01-01 ENCOUNTER — TELEPHONE (OUTPATIENT)
Dept: WOUND CARE | Facility: CLINIC | Age: 76
End: 2021-01-01
Payer: MEDICARE

## 2021-01-01 ENCOUNTER — HOSPITAL ENCOUNTER (EMERGENCY)
Facility: CLINIC | Age: 76
Discharge: HOME | End: 2021-07-24
Attending: EMERGENCY MEDICINE | Admitting: EMERGENCY MEDICINE
Payer: MEDICARE

## 2021-01-01 ENCOUNTER — PATIENT OUTREACH (OUTPATIENT)
Dept: CARE COORDINATION | Facility: CLINIC | Age: 76
End: 2021-01-01

## 2021-01-01 ENCOUNTER — OFFICE VISIT (OUTPATIENT)
Dept: URGENT CARE | Facility: URGENT CARE | Age: 76
End: 2021-01-01
Payer: MEDICARE

## 2021-01-01 ENCOUNTER — APPOINTMENT (OUTPATIENT)
Dept: CT IMAGING | Facility: CLINIC | Age: 76
End: 2021-01-01
Attending: EMERGENCY MEDICINE
Payer: MEDICARE

## 2021-01-01 ENCOUNTER — HEALTH MAINTENANCE LETTER (OUTPATIENT)
Age: 76
End: 2021-01-01

## 2021-01-01 ENCOUNTER — HOSPITAL ENCOUNTER (OUTPATIENT)
Dept: WOUND CARE | Facility: CLINIC | Age: 76
Discharge: HOME | End: 2021-12-21
Attending: PHYSICIAN ASSISTANT | Admitting: PHYSICIAN ASSISTANT
Payer: MEDICARE

## 2021-01-01 ENCOUNTER — VIRTUAL VISIT (OUTPATIENT)
Dept: CARDIOLOGY | Facility: CLINIC | Age: 76
End: 2021-01-01
Payer: MEDICARE

## 2021-01-01 ENCOUNTER — HOSPITAL ENCOUNTER (EMERGENCY)
Facility: CLINIC | Age: 76
Discharge: HOME | End: 2021-07-05
Attending: EMERGENCY MEDICINE | Admitting: EMERGENCY MEDICINE
Payer: MEDICARE

## 2021-01-01 ENCOUNTER — MEDICAL CORRESPONDENCE (OUTPATIENT)
Dept: HEALTH INFORMATION MANAGEMENT | Facility: CLINIC | Age: 76
End: 2021-01-01

## 2021-01-01 ENCOUNTER — TELEPHONE (OUTPATIENT)
Dept: INTERNAL MEDICINE | Facility: CLINIC | Age: 76
End: 2021-01-01

## 2021-01-01 ENCOUNTER — ANCILLARY PROCEDURE (OUTPATIENT)
Dept: CARDIOLOGY | Facility: CLINIC | Age: 76
End: 2021-01-01
Attending: INTERNAL MEDICINE
Payer: MEDICARE

## 2021-01-01 VITALS
WEIGHT: 171.96 LBS | DIASTOLIC BLOOD PRESSURE: 62 MMHG | HEART RATE: 78 BPM | RESPIRATION RATE: 16 BRPM | BODY MASS INDEX: 26.15 KG/M2 | SYSTOLIC BLOOD PRESSURE: 100 MMHG | TEMPERATURE: 97.2 F

## 2021-01-01 VITALS
TEMPERATURE: 97.2 F | HEART RATE: 71 BPM | OXYGEN SATURATION: 98 % | DIASTOLIC BLOOD PRESSURE: 70 MMHG | SYSTOLIC BLOOD PRESSURE: 137 MMHG | RESPIRATION RATE: 16 BRPM

## 2021-01-01 VITALS
SYSTOLIC BLOOD PRESSURE: 126 MMHG | WEIGHT: 173.28 LBS | DIASTOLIC BLOOD PRESSURE: 93 MMHG | TEMPERATURE: 97.8 F | HEIGHT: 68 IN | HEART RATE: 87 BPM | RESPIRATION RATE: 14 BRPM | OXYGEN SATURATION: 98 % | BODY MASS INDEX: 26.26 KG/M2

## 2021-01-01 VITALS
RESPIRATION RATE: 14 BRPM | TEMPERATURE: 97.9 F | HEART RATE: 81 BPM | OXYGEN SATURATION: 97 % | DIASTOLIC BLOOD PRESSURE: 59 MMHG | SYSTOLIC BLOOD PRESSURE: 130 MMHG

## 2021-01-01 VITALS
SYSTOLIC BLOOD PRESSURE: 134 MMHG | HEART RATE: 87 BPM | RESPIRATION RATE: 16 BRPM | DIASTOLIC BLOOD PRESSURE: 77 MMHG | TEMPERATURE: 97.1 F

## 2021-01-01 VITALS
HEART RATE: 92 BPM | DIASTOLIC BLOOD PRESSURE: 63 MMHG | SYSTOLIC BLOOD PRESSURE: 110 MMHG | RESPIRATION RATE: 14 BRPM | TEMPERATURE: 97.8 F | BODY MASS INDEX: 26.3 KG/M2 | OXYGEN SATURATION: 96 % | WEIGHT: 173 LBS

## 2021-01-01 VITALS — SYSTOLIC BLOOD PRESSURE: 106 MMHG | DIASTOLIC BLOOD PRESSURE: 66 MMHG

## 2021-01-01 DIAGNOSIS — Z95.5 S/P DRUG ELUTING CORONARY STENT PLACEMENT: ICD-10-CM

## 2021-01-01 DIAGNOSIS — Z95.810 ICD (IMPLANTABLE CARDIOVERTER-DEFIBRILLATOR) IN PLACE: ICD-10-CM

## 2021-01-01 DIAGNOSIS — M79.671 BILATERAL FOOT PAIN: ICD-10-CM

## 2021-01-01 DIAGNOSIS — I25.119 CORONARY ARTERY DISEASE INVOLVING NATIVE CORONARY ARTERY OF NATIVE HEART WITH ANGINA PECTORIS (H): ICD-10-CM

## 2021-01-01 DIAGNOSIS — S51.812A SKIN TEAR OF FOREARM WITHOUT COMPLICATION, LEFT, INITIAL ENCOUNTER: ICD-10-CM

## 2021-01-01 DIAGNOSIS — R00.0 TACHYCARDIA: ICD-10-CM

## 2021-01-01 DIAGNOSIS — N18.6 ESRD ON HEMODIALYSIS (H): ICD-10-CM

## 2021-01-01 DIAGNOSIS — I25.10 CORONARY ARTERY DISEASE INVOLVING NATIVE CORONARY ARTERY: ICD-10-CM

## 2021-01-01 DIAGNOSIS — Z99.2 ESRD ON HEMODIALYSIS (H): ICD-10-CM

## 2021-01-01 DIAGNOSIS — E55.9 VITAMIN D DEFICIENCY: ICD-10-CM

## 2021-01-01 DIAGNOSIS — Z53.9 DIAGNOSIS NOT YET DEFINED: Primary | ICD-10-CM

## 2021-01-01 DIAGNOSIS — Z95.0 CARDIAC PACEMAKER IN SITU: Primary | ICD-10-CM

## 2021-01-01 DIAGNOSIS — I25.10 CORONARY ARTERY DISEASE INVOLVING NATIVE CORONARY ARTERY OF NATIVE HEART WITHOUT ANGINA PECTORIS: ICD-10-CM

## 2021-01-01 DIAGNOSIS — I21.4 NSTEMI (NON-ST ELEVATED MYOCARDIAL INFARCTION) (H): ICD-10-CM

## 2021-01-01 DIAGNOSIS — E03.9 HYPOTHYROIDISM, UNSPECIFIED TYPE: ICD-10-CM

## 2021-01-01 DIAGNOSIS — T82.838A HEMORRHAGE OF ARTERIOVENOUS FISTULA, INITIAL ENCOUNTER (H): ICD-10-CM

## 2021-01-01 DIAGNOSIS — I25.10 CORONARY ARTERY DISEASE INVOLVING NATIVE CORONARY ARTERY, ANGINA PRESENCE UNSPECIFIED, UNSPECIFIED WHETHER NATIVE OR TRANSPLANTED HEART: ICD-10-CM

## 2021-01-01 DIAGNOSIS — K21.9 ACID REFLUX DISEASE: Primary | ICD-10-CM

## 2021-01-01 DIAGNOSIS — I48.19 PERSISTENT ATRIAL FIBRILLATION (H): ICD-10-CM

## 2021-01-01 DIAGNOSIS — I25.5 ISCHEMIC CARDIOMYOPATHY: Primary | ICD-10-CM

## 2021-01-01 DIAGNOSIS — H81.10 BENIGN PAROXYSMAL POSITIONAL VERTIGO, UNSPECIFIED LATERALITY: ICD-10-CM

## 2021-01-01 DIAGNOSIS — I25.10 ATHEROSCLEROTIC HEART DISEASE OF NATIVE CORONARY ARTERY WITHOUT ANGINA PECTORIS: ICD-10-CM

## 2021-01-01 DIAGNOSIS — T14.8XXA MULTIPLE SKIN TEARS: Primary | ICD-10-CM

## 2021-01-01 DIAGNOSIS — T14.8XXA MULTIPLE SKIN TEARS: ICD-10-CM

## 2021-01-01 DIAGNOSIS — R07.89 RIGHT-SIDED CHEST WALL PAIN: ICD-10-CM

## 2021-01-01 DIAGNOSIS — M79.672 BILATERAL FOOT PAIN: ICD-10-CM

## 2021-01-01 DIAGNOSIS — I48.91 ATRIAL FIBRILLATION (H): ICD-10-CM

## 2021-01-01 DIAGNOSIS — Z95.0 CARDIAC PACEMAKER IN SITU: ICD-10-CM

## 2021-01-01 DIAGNOSIS — Z00.00 MEDICARE ANNUAL WELLNESS VISIT, SUBSEQUENT: Primary | ICD-10-CM

## 2021-01-01 LAB
ALBUMIN SERPL-MCNC: 3.3 G/DL (ref 3.4–5)
ALP SERPL-CCNC: 447 U/L (ref 40–150)
ALT SERPL W P-5'-P-CCNC: 21 U/L (ref 0–70)
ANION GAP SERPL CALCULATED.3IONS-SCNC: 5 MMOL/L (ref 3–14)
ANION GAP SERPL CALCULATED.3IONS-SCNC: 9 MMOL/L (ref 3–14)
AST SERPL W P-5'-P-CCNC: 15 U/L (ref 0–45)
ATRIAL RATE - MUSE: 82 BPM
BASOPHILS # BLD AUTO: 0 10E3/UL (ref 0–0.2)
BASOPHILS # BLD AUTO: 0.1 10E9/L (ref 0–0.2)
BASOPHILS NFR BLD AUTO: 0.7 %
BASOPHILS NFR BLD AUTO: 1 %
BILIRUB SERPL-MCNC: 1.2 MG/DL (ref 0.2–1.3)
BUN SERPL-MCNC: 22 MG/DL (ref 7–30)
BUN SERPL-MCNC: 39 MG/DL (ref 7–30)
CALCIUM SERPL-MCNC: 9.3 MG/DL (ref 8.5–10.1)
CALCIUM SERPL-MCNC: 9.6 MG/DL (ref 8.5–10.1)
CHLORIDE BLD-SCNC: 105 MMOL/L (ref 94–109)
CHLORIDE SERPL-SCNC: 106 MMOL/L (ref 94–109)
CO2 SERPL-SCNC: 25 MMOL/L (ref 20–32)
CO2 SERPL-SCNC: 29 MMOL/L (ref 20–32)
CREAT SERPL-MCNC: 3.47 MG/DL (ref 0.66–1.25)
CREAT SERPL-MCNC: 5.03 MG/DL (ref 0.66–1.25)
DIASTOLIC BLOOD PRESSURE - MUSE: NORMAL MMHG
DIFFERENTIAL METHOD BLD: ABNORMAL
EOSINOPHIL # BLD AUTO: 0.1 10E3/UL (ref 0–0.7)
EOSINOPHIL # BLD AUTO: 0.1 10E9/L (ref 0–0.7)
EOSINOPHIL NFR BLD AUTO: 1 %
EOSINOPHIL NFR BLD AUTO: 1.3 %
ERYTHROCYTE [DISTWIDTH] IN BLOOD BY AUTOMATED COUNT: 14.7 % (ref 10–15)
ERYTHROCYTE [DISTWIDTH] IN BLOOD BY AUTOMATED COUNT: 15.3 % (ref 10–15)
GFR SERPL CREATININE-BSD FRML MDRD: 10 ML/MIN/{1.73_M2}
GFR SERPL CREATININE-BSD FRML MDRD: 16 ML/MIN/1.73M2
GLUCOSE BLD-MCNC: 93 MG/DL (ref 70–99)
GLUCOSE SERPL-MCNC: 103 MG/DL (ref 70–99)
HCT VFR BLD AUTO: 35.3 % (ref 40–53)
HCT VFR BLD AUTO: 39.1 % (ref 40–53)
HGB BLD-MCNC: 11 G/DL (ref 13.3–17.7)
HGB BLD-MCNC: 12 G/DL (ref 13.3–17.7)
HOLD SPECIMEN: NORMAL
HOLD SPECIMEN: NORMAL
IMM GRANULOCYTES # BLD: 0 10E3/UL
IMM GRANULOCYTES # BLD: 0 10E9/L (ref 0–0.4)
IMM GRANULOCYTES NFR BLD: 0 %
IMM GRANULOCYTES NFR BLD: 0.4 %
INTERPRETATION ECG - MUSE: NORMAL
INTERPRETATION ECG - MUSE: NORMAL
LYMPHOCYTES # BLD AUTO: 0.8 10E3/UL (ref 0.8–5.3)
LYMPHOCYTES # BLD AUTO: 0.8 10E9/L (ref 0.8–5.3)
LYMPHOCYTES NFR BLD AUTO: 11 %
LYMPHOCYTES NFR BLD AUTO: 11.4 %
MCH RBC QN AUTO: 33.7 PG (ref 26.5–33)
MCH RBC QN AUTO: 34.4 PG (ref 26.5–33)
MCHC RBC AUTO-ENTMCNC: 30.7 G/DL (ref 31.5–36.5)
MCHC RBC AUTO-ENTMCNC: 31.2 G/DL (ref 31.5–36.5)
MCV RBC AUTO: 110 FL (ref 78–100)
MCV RBC AUTO: 110 FL (ref 78–100)
MDC_IDC_LEAD_IMPLANT_DT: NORMAL
MDC_IDC_LEAD_IMPLANT_DT: NORMAL
MDC_IDC_LEAD_LOCATION: NORMAL
MDC_IDC_LEAD_LOCATION: NORMAL
MDC_IDC_LEAD_LOCATION_DETAIL_1: NORMAL
MDC_IDC_LEAD_LOCATION_DETAIL_1: NORMAL
MDC_IDC_LEAD_MFG: NORMAL
MDC_IDC_LEAD_MFG: NORMAL
MDC_IDC_LEAD_MODEL: NORMAL
MDC_IDC_LEAD_MODEL: NORMAL
MDC_IDC_LEAD_POLARITY_TYPE: NORMAL
MDC_IDC_LEAD_POLARITY_TYPE: NORMAL
MDC_IDC_LEAD_SERIAL: NORMAL
MDC_IDC_LEAD_SERIAL: NORMAL
MDC_IDC_LEAD_SPECIAL_FUNCTION: NORMAL
MDC_IDC_LEAD_SPECIAL_FUNCTION: NORMAL
MDC_IDC_MSMT_BATTERY_REMAINING_PERCENTAGE: 100 %
MDC_IDC_MSMT_BATTERY_REMAINING_PERCENTAGE: 100 %
MDC_IDC_MSMT_BATTERY_RRT_TRIGGER: NORMAL
MDC_IDC_MSMT_BATTERY_RRT_TRIGGER: NORMAL
MDC_IDC_MSMT_BATTERY_STATUS: NORMAL
MDC_IDC_MSMT_BATTERY_STATUS: NORMAL
MDC_IDC_MSMT_BATTERY_VOLTAGE: 3.1 V
MDC_IDC_MSMT_BATTERY_VOLTAGE: 3.11 V
MDC_IDC_MSMT_CAP_CHARGE_DTM: NORMAL
MDC_IDC_MSMT_CAP_CHARGE_DTM: NORMAL
MDC_IDC_MSMT_CAP_CHARGE_ENERGY: 40 J
MDC_IDC_MSMT_CAP_CHARGE_ENERGY: 40 J
MDC_IDC_MSMT_CAP_CHARGE_TIME: 9.5 S
MDC_IDC_MSMT_CAP_CHARGE_TIME: 9.5 S
MDC_IDC_MSMT_CAP_CHARGE_TYPE: NORMAL
MDC_IDC_MSMT_CAP_CHARGE_TYPE: NORMAL
MDC_IDC_MSMT_LEADCHNL_RV_IMPEDANCE_VALUE: 381 OHM
MDC_IDC_MSMT_LEADCHNL_RV_IMPEDANCE_VALUE: 383 OHM
MDC_IDC_MSMT_LEADCHNL_RV_LEAD_CHANNEL_STATUS: NORMAL
MDC_IDC_MSMT_LEADCHNL_RV_LEAD_CHANNEL_STATUS: NORMAL
MDC_IDC_PG_IMPLANT_DTM: NORMAL
MDC_IDC_PG_IMPLANT_DTM: NORMAL
MDC_IDC_PG_MFG: NORMAL
MDC_IDC_PG_MFG: NORMAL
MDC_IDC_PG_MODEL: NORMAL
MDC_IDC_PG_MODEL: NORMAL
MDC_IDC_PG_SERIAL: NORMAL
MDC_IDC_PG_SERIAL: NORMAL
MDC_IDC_PG_TYPE: NORMAL
MDC_IDC_PG_TYPE: NORMAL
MDC_IDC_SESS_CLINIC_NAME: NORMAL
MDC_IDC_SESS_CLINIC_NAME: NORMAL
MDC_IDC_SESS_DTM: NORMAL
MDC_IDC_SESS_DTM: NORMAL
MDC_IDC_SESS_REPROGRAMMED: NO
MDC_IDC_SESS_REPROGRAMMED: NO
MDC_IDC_SESS_TYPE: NORMAL
MDC_IDC_SESS_TYPE: NORMAL
MDC_IDC_SET_BRADY_LOWRATE: 40 {BEATS}/MIN
MDC_IDC_SET_BRADY_LOWRATE: 40 {BEATS}/MIN
MDC_IDC_SET_BRADY_MODE: NORMAL
MDC_IDC_SET_BRADY_MODE: NORMAL
MDC_IDC_SET_LEADCHNL_RV_PACING_AMPLITUDE: 2 V
MDC_IDC_SET_LEADCHNL_RV_PACING_AMPLITUDE: 2 V
MDC_IDC_SET_LEADCHNL_RV_PACING_POLARITY: NORMAL
MDC_IDC_SET_LEADCHNL_RV_PACING_POLARITY: NORMAL
MDC_IDC_SET_LEADCHNL_RV_PACING_PULSEWIDTH: 0.4 MS
MDC_IDC_SET_LEADCHNL_RV_PACING_PULSEWIDTH: 0.4 MS
MDC_IDC_SET_LEADCHNL_RV_SENSING_ADAPTATION_MODE: NORMAL
MDC_IDC_SET_LEADCHNL_RV_SENSING_ADAPTATION_MODE: NORMAL
MDC_IDC_SET_LEADCHNL_RV_SENSING_POLARITY: NORMAL
MDC_IDC_SET_LEADCHNL_RV_SENSING_POLARITY: NORMAL
MDC_IDC_SET_LEADCHNL_RV_SENSING_SENSITIVITY: 0.8 MV
MDC_IDC_SET_LEADCHNL_RV_SENSING_SENSITIVITY: 0.8 MV
MDC_IDC_SET_ZONE_DETECTION_BEATS_DENOMINATOR: 16 {BEATS}
MDC_IDC_SET_ZONE_DETECTION_BEATS_DENOMINATOR: 16 {BEATS}
MDC_IDC_SET_ZONE_DETECTION_BEATS_NUMERATOR: 12 {BEATS}
MDC_IDC_SET_ZONE_DETECTION_BEATS_NUMERATOR: 12 {BEATS}
MDC_IDC_SET_ZONE_DETECTION_INTERVAL: 240 MS
MDC_IDC_SET_ZONE_DETECTION_INTERVAL: 240 MS
MDC_IDC_SET_ZONE_DETECTION_INTERVAL: 320 MS
MDC_IDC_SET_ZONE_DETECTION_INTERVAL: 320 MS
MDC_IDC_SET_ZONE_TYPE: NORMAL
MDC_IDC_STAT_AT_DTM_END: NORMAL
MDC_IDC_STAT_AT_DTM_END: NORMAL
MDC_IDC_STAT_AT_DTM_START: NORMAL
MDC_IDC_STAT_AT_DTM_START: NORMAL
MDC_IDC_STAT_BRADY_DTM_END: NORMAL
MDC_IDC_STAT_BRADY_DTM_END: NORMAL
MDC_IDC_STAT_BRADY_DTM_START: NORMAL
MDC_IDC_STAT_BRADY_DTM_START: NORMAL
MDC_IDC_STAT_BRADY_RV_PERCENT_PACED: 0 %
MDC_IDC_STAT_BRADY_RV_PERCENT_PACED: 0 %
MDC_IDC_STAT_CRT_DTM_END: NORMAL
MDC_IDC_STAT_CRT_DTM_END: NORMAL
MDC_IDC_STAT_CRT_DTM_START: NORMAL
MDC_IDC_STAT_CRT_DTM_START: NORMAL
MDC_IDC_STAT_EPISODE_TOTAL_COUNT: 0
MDC_IDC_STAT_EPISODE_TOTAL_COUNT_DTM_END: NORMAL
MDC_IDC_STAT_EPISODE_TOTAL_COUNT_DTM_START: NORMAL
MDC_IDC_STAT_EPISODE_TYPE: NORMAL
MDC_IDC_STAT_TACHYTHERAPY_ATP_DELIVERED_TOTAL: 0
MDC_IDC_STAT_TACHYTHERAPY_ATP_DELIVERED_TOTAL: 0
MDC_IDC_STAT_TACHYTHERAPY_SHOCKS_ABORTED_TOTAL: 0
MDC_IDC_STAT_TACHYTHERAPY_SHOCKS_ABORTED_TOTAL: 0
MDC_IDC_STAT_TACHYTHERAPY_SHOCKS_DELIVERED_TOTAL: 0
MDC_IDC_STAT_TACHYTHERAPY_SHOCKS_DELIVERED_TOTAL: 0
MDC_IDC_STAT_TACHYTHERAPY_TOTAL_DTM_END: NORMAL
MDC_IDC_STAT_TACHYTHERAPY_TOTAL_DTM_END: NORMAL
MDC_IDC_STAT_TACHYTHERAPY_TOTAL_DTM_START: NORMAL
MDC_IDC_STAT_TACHYTHERAPY_TOTAL_DTM_START: NORMAL
MONOCYTES # BLD AUTO: 0.5 10E9/L (ref 0–1.3)
MONOCYTES # BLD AUTO: 0.7 10E3/UL (ref 0–1.3)
MONOCYTES NFR BLD AUTO: 7.1 %
MONOCYTES NFR BLD AUTO: 9 %
NEUTROPHILS # BLD AUTO: 5.3 10E9/L (ref 1.6–8.3)
NEUTROPHILS # BLD AUTO: 6.1 10E3/UL (ref 1.6–8.3)
NEUTROPHILS NFR BLD AUTO: 78 %
NEUTROPHILS NFR BLD AUTO: 79.1 %
NRBC # BLD AUTO: 0 10*3/UL
NRBC # BLD AUTO: 0 10E3/UL
NRBC BLD AUTO-RTO: 0 /100
NRBC BLD AUTO-RTO: 0 /100
P AXIS - MUSE: 57 DEGREES
PLATELET # BLD AUTO: 150 10E3/UL (ref 150–450)
PLATELET # BLD AUTO: 172 10E9/L (ref 150–450)
POTASSIUM BLD-SCNC: 4.6 MMOL/L (ref 3.4–5.3)
POTASSIUM SERPL-SCNC: 5.3 MMOL/L (ref 3.4–5.3)
PR INTERVAL - MUSE: 198 MS
PROT SERPL-MCNC: 6.5 G/DL (ref 6.8–8.8)
QRS DURATION - MUSE: 94 MS
QT - MUSE: 406 MS
QTC - MUSE: 474 MS
R AXIS - MUSE: 158 DEGREES
RBC # BLD AUTO: 3.2 10E6/UL (ref 4.4–5.9)
RBC # BLD AUTO: 3.56 10E12/L (ref 4.4–5.9)
SODIUM SERPL-SCNC: 139 MMOL/L (ref 133–144)
SODIUM SERPL-SCNC: 140 MMOL/L (ref 133–144)
SYSTOLIC BLOOD PRESSURE - MUSE: NORMAL MMHG
T AXIS - MUSE: 3 DEGREES
TROPONIN I SERPL-MCNC: 0.02 UG/L (ref 0–0.04)
TROPONIN I SERPL-MCNC: 0.03 UG/L (ref 0–0.04)
VENTRICULAR RATE- MUSE: 82 BPM
WBC # BLD AUTO: 6.8 10E9/L (ref 4–11)
WBC # BLD AUTO: 7.7 10E3/UL (ref 4–11)

## 2021-01-01 PROCEDURE — 99214 OFFICE O/P EST MOD 30 MIN: CPT | Mod: 95 | Performed by: PHYSICIAN ASSISTANT

## 2021-01-01 PROCEDURE — 99213 OFFICE O/P EST LOW 20 MIN: CPT | Performed by: FAMILY MEDICINE

## 2021-01-01 PROCEDURE — 80048 BASIC METABOLIC PNL TOTAL CA: CPT | Performed by: EMERGENCY MEDICINE

## 2021-01-01 PROCEDURE — 250N000013 HC RX MED GY IP 250 OP 250 PS 637: Performed by: EMERGENCY MEDICINE

## 2021-01-01 PROCEDURE — 99285 EMERGENCY DEPT VISIT HI MDM: CPT | Mod: 25

## 2021-01-01 PROCEDURE — G0438 PPPS, INITIAL VISIT: HCPCS | Performed by: INTERNAL MEDICINE

## 2021-01-01 PROCEDURE — 93005 ELECTROCARDIOGRAM TRACING: CPT

## 2021-01-01 PROCEDURE — G0463 HOSPITAL OUTPT CLINIC VISIT: HCPCS | Mod: 25

## 2021-01-01 PROCEDURE — 99214 OFFICE O/P EST MOD 30 MIN: CPT | Mod: 25 | Performed by: INTERNAL MEDICINE

## 2021-01-01 PROCEDURE — 85025 COMPLETE CBC W/AUTO DIFF WBC: CPT | Performed by: EMERGENCY MEDICINE

## 2021-01-01 PROCEDURE — 99283 EMERGENCY DEPT VISIT LOW MDM: CPT

## 2021-01-01 PROCEDURE — 84484 ASSAY OF TROPONIN QUANT: CPT | Performed by: EMERGENCY MEDICINE

## 2021-01-01 PROCEDURE — 93296 REM INTERROG EVL PM/IDS: CPT | Performed by: INTERNAL MEDICINE

## 2021-01-01 PROCEDURE — 71250 CT THORAX DX C-: CPT | Mod: MG

## 2021-01-01 PROCEDURE — 99284 EMERGENCY DEPT VISIT MOD MDM: CPT

## 2021-01-01 PROCEDURE — 36415 COLL VENOUS BLD VENIPUNCTURE: CPT | Performed by: EMERGENCY MEDICINE

## 2021-01-01 PROCEDURE — G0179 MD RECERTIFICATION HHA PT: HCPCS | Performed by: INTERNAL MEDICINE

## 2021-01-01 PROCEDURE — 93295 DEV INTERROG REMOTE 1/2/MLT: CPT | Performed by: INTERNAL MEDICINE

## 2021-01-01 PROCEDURE — 97602 WOUND(S) CARE NON-SELECTIVE: CPT

## 2021-01-01 PROCEDURE — 82040 ASSAY OF SERUM ALBUMIN: CPT | Performed by: EMERGENCY MEDICINE

## 2021-01-01 PROCEDURE — 99204 OFFICE O/P NEW MOD 45 MIN: CPT | Performed by: PHYSICIAN ASSISTANT

## 2021-01-01 RX ORDER — LISINOPRIL 2.5 MG/1
2.5 TABLET ORAL 2 TIMES DAILY
Qty: 180 TABLET | Refills: 1 | Status: ON HOLD | OUTPATIENT
Start: 2021-01-01 | End: 2022-01-01

## 2021-01-01 RX ORDER — PREDNISONE 5 MG/1
5 TABLET ORAL DAILY
Qty: 90 TABLET | Refills: 3 | Status: SHIPPED | OUTPATIENT
Start: 2021-01-01 | End: 2022-01-01

## 2021-01-01 RX ORDER — OXYCODONE HYDROCHLORIDE 5 MG/1
5 TABLET ORAL ONCE
Status: COMPLETED | OUTPATIENT
Start: 2021-01-01 | End: 2021-01-01

## 2021-01-01 RX ORDER — LEVOTHYROXINE SODIUM 50 UG/1
TABLET ORAL
Qty: 90 TABLET | Refills: 3 | Status: SHIPPED | OUTPATIENT
Start: 2021-01-01

## 2021-01-01 RX ORDER — PRAVASTATIN SODIUM 40 MG
TABLET ORAL
Qty: 90 TABLET | Refills: 3 | Status: SHIPPED | OUTPATIENT
Start: 2021-01-01

## 2021-01-01 RX ORDER — ISOSORBIDE MONONITRATE 30 MG/1
TABLET, EXTENDED RELEASE ORAL
Qty: 48 TABLET | Refills: 2 | Status: SHIPPED | OUTPATIENT
Start: 2021-01-01 | End: 2021-01-01

## 2021-01-01 RX ORDER — MECLIZINE HCL 25MG 25 MG/1
25 TABLET, CHEWABLE ORAL EVERY 6 HOURS PRN
Qty: 30 TABLET | Refills: 0 | Status: ON HOLD | OUTPATIENT
Start: 2021-01-01 | End: 2022-01-01

## 2021-01-01 RX ORDER — MECLIZINE HYDROCHLORIDE 25 MG/1
25 TABLET ORAL ONCE
Status: COMPLETED | OUTPATIENT
Start: 2021-01-01 | End: 2021-01-01

## 2021-01-01 RX ORDER — RENO CAPS 100; 1.5; 1.7; 20; 10; 1; 150; 5; 6 MG/1; MG/1; MG/1; MG/1; MG/1; MG/1; UG/1; MG/1; UG/1
CAPSULE ORAL
Qty: 100 CAPSULE | Refills: 2 | Status: SHIPPED | OUTPATIENT
Start: 2021-01-01

## 2021-01-01 RX ORDER — CHOLECALCIFEROL (VITAMIN D3) 50 MCG
TABLET ORAL
Qty: 30 TABLET | Refills: 5 | Status: SHIPPED | OUTPATIENT
Start: 2021-01-01

## 2021-01-01 RX ORDER — ISOSORBIDE MONONITRATE 30 MG/1
TABLET, EXTENDED RELEASE ORAL
Qty: 48 TABLET | Refills: 0 | Status: ON HOLD | OUTPATIENT
Start: 2021-01-01 | End: 2022-01-01

## 2021-01-01 RX ORDER — METOPROLOL SUCCINATE 25 MG/1
12.5 TABLET, EXTENDED RELEASE ORAL DAILY
Qty: 45 TABLET | Refills: 0 | Status: ON HOLD | OUTPATIENT
Start: 2021-01-01 | End: 2022-01-01

## 2021-01-01 RX ORDER — PREDNISONE 20 MG/1
40 TABLET ORAL DAILY
Qty: 10 TABLET | Refills: 0 | Status: SHIPPED | OUTPATIENT
Start: 2021-01-01 | End: 2021-01-01

## 2021-01-01 RX ORDER — MEXILETINE HYDROCHLORIDE 150 MG/1
150 CAPSULE ORAL 2 TIMES DAILY
Qty: 180 CAPSULE | Refills: 2 | Status: ON HOLD | OUTPATIENT
Start: 2021-01-01 | End: 2022-01-01

## 2021-01-01 RX ORDER — RENO CAPS 100; 1.5; 1.7; 20; 10; 1; 150; 5; 6 MG/1; MG/1; MG/1; MG/1; MG/1; MG/1; UG/1; MG/1; UG/1
CAPSULE ORAL
Qty: 100 CAPSULE | Refills: 2 | OUTPATIENT
Start: 2021-01-01

## 2021-01-01 RX ORDER — CLOPIDOGREL BISULFATE 75 MG/1
TABLET ORAL
Qty: 60 TABLET | Refills: 1 | Status: SHIPPED | OUTPATIENT
Start: 2021-01-01 | End: 2021-01-01

## 2021-01-01 RX ORDER — PANTOPRAZOLE SODIUM 40 MG/1
TABLET, DELAYED RELEASE ORAL
Qty: 30 TABLET | Refills: 8 | Status: SHIPPED | OUTPATIENT
Start: 2021-01-01

## 2021-01-01 RX ORDER — CLOPIDOGREL BISULFATE 75 MG/1
TABLET ORAL
Qty: 60 TABLET | Refills: 1 | Status: ON HOLD | OUTPATIENT
Start: 2021-01-01 | End: 2022-01-01

## 2021-01-01 RX ORDER — OXYCODONE HYDROCHLORIDE 5 MG/1
5 TABLET ORAL EVERY 6 HOURS PRN
Qty: 10 TABLET | Refills: 0 | Status: ON HOLD | OUTPATIENT
Start: 2021-01-01 | End: 2022-01-01

## 2021-01-01 RX ORDER — ISOSORBIDE MONONITRATE 30 MG/1
TABLET, EXTENDED RELEASE ORAL
Qty: 45 TABLET | Refills: 0 | Status: ON HOLD | OUTPATIENT
Start: 2021-01-01 | End: 2022-01-01

## 2021-01-01 RX ORDER — CLOPIDOGREL BISULFATE 75 MG/1
75 TABLET ORAL DAILY
Qty: 60 TABLET | Refills: 1 | Status: SHIPPED | OUTPATIENT
Start: 2021-01-01 | End: 2021-01-01

## 2021-01-01 RX ADMIN — MECLIZINE HYDROCHLORIDE 25 MG: 25 TABLET ORAL at 06:18

## 2021-01-01 RX ADMIN — OXYCODONE HYDROCHLORIDE 5 MG: 5 TABLET ORAL at 12:16

## 2021-01-01 ASSESSMENT — ENCOUNTER SYMPTOMS
DIZZINESS: 1
NECK PAIN: 0
TROUBLE SWALLOWING: 0
SPEECH DIFFICULTY: 0
HEADACHES: 1
BLOOD IN STOOL: 0
SHORTNESS OF BREATH: 0
ABDOMINAL PAIN: 0
VOMITING: 0
BLOOD IN STOOL: 0
NECK STIFFNESS: 0
CHILLS: 0
ARTHRALGIAS: 1
FEVER: 0
COUGH: 1
DIARRHEA: 0

## 2021-01-01 ASSESSMENT — MIFFLIN-ST. JEOR: SCORE: 1495.5

## 2021-01-05 DIAGNOSIS — I25.119 CORONARY ARTERY DISEASE INVOLVING NATIVE CORONARY ARTERY OF NATIVE HEART WITH ANGINA PECTORIS (H): Primary | ICD-10-CM

## 2021-01-05 RX ORDER — METOPROLOL SUCCINATE 25 MG/1
12.5 TABLET, EXTENDED RELEASE ORAL DAILY
Qty: 45 TABLET | Refills: 3 | Status: SHIPPED | OUTPATIENT
Start: 2021-01-05 | End: 2021-01-01

## 2021-01-11 ENCOUNTER — APPOINTMENT (OUTPATIENT)
Dept: GENERAL RADIOLOGY | Facility: CLINIC | Age: 76
End: 2021-01-11
Attending: EMERGENCY MEDICINE
Payer: MEDICARE

## 2021-01-11 ENCOUNTER — TELEPHONE (OUTPATIENT)
Dept: INTERNAL MEDICINE | Facility: CLINIC | Age: 76
End: 2021-01-11

## 2021-01-11 ENCOUNTER — HOSPITAL ENCOUNTER (OUTPATIENT)
Facility: CLINIC | Age: 76
Setting detail: OBSERVATION
Discharge: NURSING, REHAB OR LONG-TERM CARE | End: 2021-01-15
Attending: EMERGENCY MEDICINE | Admitting: INTERNAL MEDICINE
Payer: MEDICARE

## 2021-01-11 DIAGNOSIS — R26.2 DIFFICULTY WALKING: ICD-10-CM

## 2021-01-11 DIAGNOSIS — M62.81 GENERALIZED MUSCLE WEAKNESS: ICD-10-CM

## 2021-01-11 DIAGNOSIS — Z99.2 DIALYSIS PATIENT (H): ICD-10-CM

## 2021-01-11 LAB
ANION GAP SERPL CALCULATED.3IONS-SCNC: 7 MMOL/L (ref 3–14)
BASOPHILS # BLD AUTO: 0 10E9/L (ref 0–0.2)
BASOPHILS NFR BLD AUTO: 0.2 %
BUN SERPL-MCNC: 43 MG/DL (ref 7–30)
CALCIUM SERPL-MCNC: 10.1 MG/DL (ref 8.5–10.1)
CHLORIDE SERPL-SCNC: 105 MMOL/L (ref 94–109)
CO2 SERPL-SCNC: 27 MMOL/L (ref 20–32)
CREAT SERPL-MCNC: 4.75 MG/DL (ref 0.66–1.25)
DIFFERENTIAL METHOD BLD: ABNORMAL
EOSINOPHIL # BLD AUTO: 0.1 10E9/L (ref 0–0.7)
EOSINOPHIL NFR BLD AUTO: 0.9 %
ERYTHROCYTE [DISTWIDTH] IN BLOOD BY AUTOMATED COUNT: 15.8 % (ref 10–15)
GFR SERPL CREATININE-BSD FRML MDRD: 11 ML/MIN/{1.73_M2}
GLUCOSE SERPL-MCNC: 122 MG/DL (ref 70–99)
HCT VFR BLD AUTO: 35.9 % (ref 40–53)
HGB BLD-MCNC: 10.9 G/DL (ref 13.3–17.7)
IMM GRANULOCYTES # BLD: 0.1 10E9/L (ref 0–0.4)
IMM GRANULOCYTES NFR BLD: 0.6 %
INTERPRETATION ECG - MUSE: NORMAL
LABORATORY COMMENT REPORT: NORMAL
LACTATE BLD-SCNC: 0.9 MMOL/L (ref 0.7–2)
LYMPHOCYTES # BLD AUTO: 0.9 10E9/L (ref 0.8–5.3)
LYMPHOCYTES NFR BLD AUTO: 10.3 %
MAGNESIUM SERPL-MCNC: 1.9 MG/DL (ref 1.6–2.3)
MCH RBC QN AUTO: 32.4 PG (ref 26.5–33)
MCHC RBC AUTO-ENTMCNC: 30.4 G/DL (ref 31.5–36.5)
MCV RBC AUTO: 107 FL (ref 78–100)
MONOCYTES # BLD AUTO: 0.5 10E9/L (ref 0–1.3)
MONOCYTES NFR BLD AUTO: 6.2 %
NEUTROPHILS # BLD AUTO: 7.2 10E9/L (ref 1.6–8.3)
NEUTROPHILS NFR BLD AUTO: 81.8 %
NRBC # BLD AUTO: 0 10*3/UL
NRBC BLD AUTO-RTO: 0 /100
PHOSPHATE SERPL-MCNC: 2.6 MG/DL (ref 2.5–4.5)
PLATELET # BLD AUTO: 156 10E9/L (ref 150–450)
POTASSIUM SERPL-SCNC: 5.2 MMOL/L (ref 3.4–5.3)
RBC # BLD AUTO: 3.36 10E12/L (ref 4.4–5.9)
SARS-COV-2 RNA RESP QL NAA+PROBE: NEGATIVE
SODIUM SERPL-SCNC: 139 MMOL/L (ref 133–144)
SPECIMEN SOURCE: NORMAL
WBC # BLD AUTO: 8.8 10E9/L (ref 4–11)

## 2021-01-11 PROCEDURE — 250N000013 HC RX MED GY IP 250 OP 250 PS 637: Mod: GY | Performed by: NURSE PRACTITIONER

## 2021-01-11 PROCEDURE — C9803 HOPD COVID-19 SPEC COLLECT: HCPCS

## 2021-01-11 PROCEDURE — G0378 HOSPITAL OBSERVATION PER HR: HCPCS

## 2021-01-11 PROCEDURE — 80048 BASIC METABOLIC PNL TOTAL CA: CPT | Performed by: EMERGENCY MEDICINE

## 2021-01-11 PROCEDURE — 84100 ASSAY OF PHOSPHORUS: CPT | Performed by: EMERGENCY MEDICINE

## 2021-01-11 PROCEDURE — 99220 PR INITIAL OBSERVATION CARE,LEVEL III: CPT | Performed by: NURSE PRACTITIONER

## 2021-01-11 PROCEDURE — 71046 X-RAY EXAM CHEST 2 VIEWS: CPT

## 2021-01-11 PROCEDURE — 99207 PR NO CHARGE LOS: CPT | Performed by: INTERNAL MEDICINE

## 2021-01-11 PROCEDURE — 83605 ASSAY OF LACTIC ACID: CPT | Performed by: EMERGENCY MEDICINE

## 2021-01-11 PROCEDURE — 93005 ELECTROCARDIOGRAM TRACING: CPT

## 2021-01-11 PROCEDURE — 85025 COMPLETE CBC W/AUTO DIFF WBC: CPT | Performed by: EMERGENCY MEDICINE

## 2021-01-11 PROCEDURE — 99285 EMERGENCY DEPT VISIT HI MDM: CPT | Mod: 25

## 2021-01-11 PROCEDURE — 87635 SARS-COV-2 COVID-19 AMP PRB: CPT | Performed by: EMERGENCY MEDICINE

## 2021-01-11 PROCEDURE — 83735 ASSAY OF MAGNESIUM: CPT | Performed by: EMERGENCY MEDICINE

## 2021-01-11 PROCEDURE — 72100 X-RAY EXAM L-S SPINE 2/3 VWS: CPT

## 2021-01-11 RX ORDER — ISOSORBIDE MONONITRATE 30 MG/1
30 TABLET, EXTENDED RELEASE ORAL
Status: DISCONTINUED | OUTPATIENT
Start: 2021-01-11 | End: 2021-01-15 | Stop reason: HOSPADM

## 2021-01-11 RX ORDER — ONDANSETRON 2 MG/ML
4 INJECTION INTRAMUSCULAR; INTRAVENOUS EVERY 6 HOURS PRN
Status: DISCONTINUED | OUTPATIENT
Start: 2021-01-11 | End: 2021-01-15 | Stop reason: HOSPADM

## 2021-01-11 RX ORDER — PRAVASTATIN SODIUM 40 MG
40 TABLET ORAL DAILY
Status: DISCONTINUED | OUTPATIENT
Start: 2021-01-11 | End: 2021-01-15 | Stop reason: HOSPADM

## 2021-01-11 RX ORDER — LISINOPRIL 2.5 MG/1
2.5 TABLET ORAL 2 TIMES DAILY
Status: DISCONTINUED | OUTPATIENT
Start: 2021-01-11 | End: 2021-01-15 | Stop reason: HOSPADM

## 2021-01-11 RX ORDER — AMOXICILLIN 250 MG
2 CAPSULE ORAL 2 TIMES DAILY
Status: DISCONTINUED | OUTPATIENT
Start: 2021-01-11 | End: 2021-01-11

## 2021-01-11 RX ORDER — ACETAMINOPHEN 325 MG/1
650 TABLET ORAL EVERY 4 HOURS PRN
Status: DISCONTINUED | OUTPATIENT
Start: 2021-01-11 | End: 2021-01-15 | Stop reason: HOSPADM

## 2021-01-11 RX ORDER — ONDANSETRON 4 MG/1
4 TABLET, ORALLY DISINTEGRATING ORAL EVERY 6 HOURS PRN
Status: DISCONTINUED | OUTPATIENT
Start: 2021-01-11 | End: 2021-01-15 | Stop reason: HOSPADM

## 2021-01-11 RX ORDER — POLYETHYLENE GLYCOL 3350 17 G/17G
17 POWDER, FOR SOLUTION ORAL DAILY
Status: DISCONTINUED | OUTPATIENT
Start: 2021-01-11 | End: 2021-01-11

## 2021-01-11 RX ORDER — CLOPIDOGREL BISULFATE 75 MG/1
75 TABLET ORAL DAILY
Status: DISCONTINUED | OUTPATIENT
Start: 2021-01-11 | End: 2021-01-12

## 2021-01-11 RX ORDER — MEXILETINE HYDROCHLORIDE 150 MG/1
150 CAPSULE ORAL 2 TIMES DAILY
Status: DISCONTINUED | OUTPATIENT
Start: 2021-01-11 | End: 2021-01-12

## 2021-01-11 RX ORDER — LOPERAMIDE HCL 2 MG
1 CAPSULE ORAL
Status: DISCONTINUED | OUTPATIENT
Start: 2021-01-12 | End: 2021-01-12

## 2021-01-11 RX ORDER — ACETAMINOPHEN 650 MG/1
650 SUPPOSITORY RECTAL EVERY 4 HOURS PRN
Status: DISCONTINUED | OUTPATIENT
Start: 2021-01-11 | End: 2021-01-15 | Stop reason: HOSPADM

## 2021-01-11 RX ORDER — LOPERAMIDE HCL 2 MG
2 CAPSULE ORAL
Status: DISCONTINUED | OUTPATIENT
Start: 2021-01-11 | End: 2021-01-15 | Stop reason: HOSPADM

## 2021-01-11 RX ORDER — AMOXICILLIN 250 MG
1 CAPSULE ORAL 2 TIMES DAILY
Status: DISCONTINUED | OUTPATIENT
Start: 2021-01-11 | End: 2021-01-11

## 2021-01-11 RX ORDER — LEVOTHYROXINE SODIUM 50 UG/1
50 TABLET ORAL DAILY
Status: DISCONTINUED | OUTPATIENT
Start: 2021-01-12 | End: 2021-01-12

## 2021-01-11 RX ADMIN — MEXILETINE HYDROCHLORIDE 150 MG: 150 CAPSULE ORAL at 19:37

## 2021-01-11 RX ADMIN — ACETAMINOPHEN 650 MG: 325 TABLET, FILM COATED ORAL at 17:18

## 2021-01-11 RX ADMIN — CLOPIDOGREL BISULFATE 75 MG: 75 TABLET ORAL at 15:41

## 2021-01-11 RX ADMIN — LISINOPRIL 2.5 MG: 2.5 TABLET ORAL at 19:36

## 2021-01-11 RX ADMIN — APIXABAN 2.5 MG: 2.5 TABLET, FILM COATED ORAL at 19:37

## 2021-01-11 RX ADMIN — ACETAMINOPHEN 650 MG: 325 TABLET, FILM COATED ORAL at 21:59

## 2021-01-11 RX ADMIN — Medication 1 CAPSULE: at 15:41

## 2021-01-11 RX ADMIN — PRAVASTATIN SODIUM 40 MG: 40 TABLET ORAL at 21:55

## 2021-01-11 RX ADMIN — ISOSORBIDE MONONITRATE 15 MG: 30 TABLET, EXTENDED RELEASE ORAL at 17:18

## 2021-01-11 RX ADMIN — METOPROLOL SUCCINATE 12.5 MG: 25 TABLET, FILM COATED, EXTENDED RELEASE ORAL at 15:41

## 2021-01-11 ASSESSMENT — ENCOUNTER SYMPTOMS
WEAKNESS: 1
SHORTNESS OF BREATH: 0
BACK PAIN: 1
FEVER: 0
COUGH: 0

## 2021-01-11 ASSESSMENT — MIFFLIN-ST. JEOR: SCORE: 1507.83

## 2021-01-11 NOTE — PROGRESS NOTES
Admission for weakness noted.  Missed his usual MWF HD today.    COVID-19 negative.      Labs reviewed.    Component      Latest Ref Rng & Units 1/11/2021   Sodium      133 - 144 mmol/L 139   Potassium      3.4 - 5.3 mmol/L 5.2   Chloride      94 - 109 mmol/L 105   Carbon Dioxide      20 - 32 mmol/L 27   Anion Gap      3 - 14 mmol/L 7   Glucose      70 - 99 mg/dL 122 (H)   Urea Nitrogen      7 - 30 mg/dL 43 (H)   Creatinine      0.66 - 1.25 mg/dL 4.75 (H)   GFR Estimate      >60 mL/min/1.73:m2 11 (L)   GFR Estimate If Black      >60 mL/min/1.73:m2 13 (L)   Calcium      8.5 - 10.1 mg/dL 10.1       He has SaO2 100 % on room air.    We will plan on HD 1/12/21.    Marlo Tabor MD

## 2021-01-11 NOTE — PROVIDER NOTIFICATION
MD Notification    Notified Person: MD    Notified Person Name: PA Lulú Feldman    Notification Date/Time: 1/11/2021      Notification Interaction: Text paged    Purpose of Notification:  Pt asking for 15 mg of Imdur instead of 30 mg today since not having dialysis today. Can we get an order for now?     Orders Received:    Comments:

## 2021-01-11 NOTE — PHARMACY-ADMISSION MEDICATION HISTORY
Pharmacy Medication History  Admission medication history interview status for the 1/11/2021  admission is complete. See EPIC admission navigator for prior to admission medications       Medication history sources: Patient  Location of interview: Phone  Adherence Assessment: Good    Significant changes made to the medication list:  n/a      Additional medication history information:   Pt is on both pantoprazole and prilosec.  After zantac was recalled he consulted with his doctor who put him on this regimen, he is aware they work the same way.      Pt was going to [facility] but now they are out of network as of 1/1 so he is in the process of switching to [facility] unfortunately.     Medication reconciliation completed by provider prior to medication history? No    Time spent in this activity: 15 min      Prior to Admission medications    Medication Sig Last Dose Taking? Auth Provider   apixaban ANTICOAGULANT (ELIQUIS ANTICOAGULANT) 2.5 MG tablet Take 1 tablet (2.5 mg) by mouth 2 times daily 1/10/2021 at Unknown time Yes Lacy Taylor APRN CNP   clopidogrel (PLAVIX) 75 MG tablet Take 1 tablet (75 mg) by mouth daily 1/10/2021 at Unknown time Yes Salty Quintero MD   isosorbide mononitrate (IMDUR) 30 MG 24 hr tablet Take 15 mg by mouth Take in the evening on the days prior dialysis. Take on Sunday, Tuesday and Thursday 1/10/2021 at dinner Yes Unknown, Entered By History   levothyroxine (SYNTHROID/LEVOTHROID) 50 MCG tablet TAKE 1 TABLET (50 MCG) BY MOUTH DAILY 1/11/2021 at Unknown time Yes Josselin Kolb MD   lisinopril (ZESTRIL) 2.5 MG tablet TAKE 1 TABLET (2.5 MG) BY MOUTH 2 TIMES DAILY (TAKE ONE TABLET AFTER DIALYSIS. TAKE SECOND TABLET AT BEDTIME.) 1/10/2021 at Unknown time Yes Josselin Kolb MD   loperamide (IMODIUM A-D) 2 MG tablet Take 1 mg by mouth five times a week On non-dialysis days - Tu, Th, Sa, Russo 1/10/2021 at Unknown time Yes Unknown, Entered By History   metoprolol succinate ER (TOPROL-XL)  25 MG 24 hr tablet Take 0.5 tablets (12.5 mg) by mouth daily 1/10/2021 at noon Yes Lacy Taylor APRN CNP   mexiletine (MEXITIL) 150 MG capsule Take 1 capsule (150 mg) by mouth 2 times daily 1/11/2021 at x1 Yes Josselin Kolb MD   multivitamin RENAL (NEPHROCAPS/TRIPHROCAPS) 1 MG capsule Take 1 capsule by mouth daily 1/10/2021 at pm Yes Josselin Kolb MD   omeprazole (PRILOSEC) 20 MG DR capsule Take 20 mg by mouth daily (before lunch)  1/10/2021 at Unknown time Yes Unknown, Entered By History   pantoprazole (PROTONIX) 40 MG EC tablet TAKE 1 TABLET (40 MG) BY MOUTH EVERY MORNING 1/10/2021 at Unknown time Yes Josselin Kolb MD   pravastatin (PRAVACHOL) 40 MG tablet TAKE 1 TABLET (40 MG) BY MOUTH DAILY 1/10/2021 at HS Yes Josselin Kolb MD   acetaminophen (TYLENOL) 500 MG tablet Take 1,000 mg by mouth 4 times daily as needed for pain prn  Unknown, Entered By History   isosorbide mononitrate (IMDUR) 30 MG 24 hr tablet Take 1 tablet (30 mg) the evening of dialysis days (Mon, Wed, Fri)  and Saturdays. 1/8/2021 at dinner  Lacy Taylor APRN CNP   loperamide (IMODIUM A-D) 2 MG tablet Take 2 mg by mouth three times a week MWF on dialysis days 1/8/2021  Unknown, Entered By History   nitroGLYcerin (NITROSTAT) 0.4 MG sublingual tablet Place 1 tablet (0.4 mg) under the tongue every 5 minutes as needed for chest pain UP TO 3 PER EPISODE prn  Josselin Kolb MD Tiffany M. Reinitz, PharmD     The information provided in this note is only as accurate as the sources available at the time of the update(s).

## 2021-01-11 NOTE — ED TRIAGE NOTES
Increased weakness since a fall in July and a lot worse since Novermber; uses walker or cane and having trouble with that; pt couldn't make it dialysis today

## 2021-01-11 NOTE — ED NOTES
Bed: ED08  Expected date:   Expected time:   Means of arrival:   Comments:  515  75 M weakness/pain in legs/unable to get up  0833

## 2021-01-11 NOTE — PLAN OF CARE
RECEIVING UNIT ED HANDOFF REVIEW    ED Nurse Handoff Report was reviewed by: Tomsá Welch RN on January 11, 2021 at 1:08 PM       Please ensure that obs brochure is given to pt. Thank you!

## 2021-01-11 NOTE — TELEPHONE ENCOUNTER
"Patient states that he lives alone.  States he'd had a fall last July and did something to his right knee.  He states that his legs have been getting weaker and weaker ever since.  States that he'd had many days where he could hardly get around but states that this morning he could barely get up because his legs were so weak; he states the right is worse than the left and blames this on his injury to right knee in July.  He states he'd broken his left leg in 2008 and has had ongoing weakness in that leg as well.  Patient denies shortness of breath or chest pain.  States that he's upstairs in his house in bedroom and can't get down the stairs because his legs are \"so weak\".  He states that he's going to the emergency room.  I asked if there is anyone to help him down the stairs and take him.  He states, \"No.  I live by myself and can't get down the stairs\".  He is going to dial 911 and call for an ambulance.  He states that he is able to do this.  OLAMIDE Perez  "

## 2021-01-11 NOTE — H&P
[facility]    History and Physical - Hospitalist Service       Date of Admission:  1/11/2021    Assessment & Plan   Westley Ness is a 75 year old male admitted on 1/11/2021. He presents to the emergency department with generalized weakness.  Past medical history includes ESRD on hemodialysis MWF, GERD, CAD, hypertension, hypothyroidism, diabetes mellitus, type II, CHF and atrial flutter.    Worsening generalized weakness stiffness, and inability to care for self at home  Patient notes increased weakness when attempting to stand out of bed and gain his balance, endorses more difficulty walking. Denies any dizziness, lightheadedness, fever/chills.  Exam shows tenderness at L3-L5, sacral area, sensation intact. ED XR lumbar is significant osteopenia, deformity at superior L4 endplate which could represent a fracture, subtle deformity at L3 which could represent a fracture, degenerative changes L5-S1.  --MRI lumbar/thoracic before PT/OT  --PT/OT 1/12, may need additional services versus TCU placement  --care coordinator consult     ESRD on hemodialysis, (~15 years, MWF)  Patient missed his dialysis today on 1/11.  --Nephrology consult completed for dialysis today     #Chronic systolic dysfunction with ischemic cardiomyopathy, CAD s/p PCI OM1 10/2020, PCI to proximal left circumflex, obtuse marginal which restenosed treated with angioplasty of the branch in 2016 s/p AICD placement; chronically occluded LAD  #Recent admission for dizziness and chest pain, 10/2020  Underwent NM stress test on 10/16/2020 showing moderate area of lateral ischemia and filippo-infarct ischemia of inferior wall, echocardiogram on 10/16 showed EF of 20-25%, severe global hypokinesis, akinesis of inferoseptal and apical.  Patient underwent coronary angiography and had a stent placed to OM1 on 10/19/2020 (started on DAPT with plan for Plavix/Eliquis x6 months).  While admitted had nonsustained VT, in 3/2020 had Zio patch  "showing NSVT up to 14 beats. Typically follows with Dr. Donnelly, most recently seen as a virtual visit on 10/28/2020 by Lacy Taylor CNP. CXR 1/11 shows mild pul vascular congestion without pul edema.   --continue PTA Metoprolol and Imdur (dosing adjusted on 10/18, takes 30 mg on HD days and 15 mg on non-HD days)  --AICD interrogation, performed on 10/18   --continue PTA Mexiletine (did have VT while on Mexiletine)  --telemetry      Parox atrial flutter s/p ablations x 2, 2017  --continue PTA Metoprolol       Diabetes Mellitus, type II, well controlled   Diet controlled, most recent HgbA1c of 5.1 in 03/2019  --Sliding scale insulin medium coverage while admitted     Hypothyroidism  TSH 4.4   --continue PTA Levothyroxine    Goals of care  Patient is familiar with the discussion of CODE STATUS, he states he typically elects to be full code, and did ultimately choose to continue with that today.  However we did have a long discussion regarding the chances of survival showed a full cardiac cardiopulmonary arrest occur, the patient does not want to be \" a vegetable\", and states he has 2 decision-makers who are noted in his emergency contacts.  --Given his overall fragility, ESRD and EF 20%, I encouraged him to continue thinking about his status and overall goals of care, which he agrees he needs to do.      Diet:  Renal diet  DVT Prophylaxis: DAPT  Velarde Catheter: not present  Code Status:  Full code        Disposition Plan   Expected discharge: 2 - 3 days, recommended to prior living arrangement once safe disposition plan/ TCU bed available.  Entered: MARCIO Posada CNP 01/11/2021, 12:47 PM     The patient's care was discussed with the Attending Physician, Dr. Matthew Bravo, Bedside Nurse and Patient.    MARCIO Posada CNP  [facility]  Contact information available via [facility] Paging/Directory      ______________________________________________________________________    Chief Complaint "   Generalized weakness    History is obtained from the patient    History of Present Illness   Westley Ness is a 75 year old male who presents to the emergency department with generalized weakness.  Past medical history includes ESRD on hemodialysis MWF, GERD, CAD, hypertension, hypothyroidism, diabetes mellitus, type II, CHF and atrial flutter.    Patient states he was at his baseline yesterday, which is that he ambulates with a walker with some difficulty, but is able to navigate up and down stairs.  Today, upon getting out of bed it took him approximately 45 seconds before he was able to even move his legs, and was slowly able to stand and balance but had much more difficulty with walking.  He felt too unstable to get to dialysis safely, he states he can sit for some period of time up to 2.5 hours, but has worse cramping in his legs, but denies any numbness/tingling.  He notes some tenderness in the lumbar and sacral areas of his back, which is worse than usual.  He endorses 1 fall in March, and another near fall into a doorway in December but does not count that one.     I evaluated the patient in the emergency department, where he is appears fragile lying in a gurney.  He states while in the restful position, his pain is tolerable.  He feels his breathing is at baseline, denies any chest pain dizziness, lightheadedness, fever, nausea/vomiting or constipation.  He endorses ongoing diarrhea which she has taken Imodium for years.  Denies any recent Covid exposures.    Review of Systems    The 10 point Review of Systems is negative other than noted in the HPI or here.     Past Medical History    I have reviewed this patient's medical history and updated it with pertinent information if needed.   Past Medical History:   Diagnosis Date     Acid reflux disease      Benign essential hypertension      CAD (coronary artery disease)     s/p multiple NSTEMIs and PCI's     ESRD on hemodialysis (H)     MWF     History of  atrial flutter     s/p ablation     Hypothyroidism      Ischemic cardiomyopathy     EF 25-30%, s/p AICD     Type 2 diabetes mellitus (H)     diet-controlled       Past Surgical History   I have reviewed this patient's surgical history and updated it with pertinent information if needed.  Past Surgical History:   Procedure Laterality Date     CHOLECYSTECTOMY, OPEN  2013     CV CORONARY ANGIOGRAM N/A 10/19/2020    Procedure: Coronary Angiogram;  Surgeon: Theodora Salazar MD;  Location:  HEART CARDIAC CATH LAB     CV LEFT HEART CATH N/A 10/19/2020    Procedure: Left Heart Cath;  Surgeon: Theodora Salazar MD;  Location:  HEART CARDIAC CATH LAB     CV PCI STENT DRUG ELUTING N/A 10/19/2020    Procedure: Percutaneous Coronary Intervention Stent Drug Eluting;  Surgeon: Theodora Salazar MD;  Location:  HEART CARDIAC CATH LAB     ELBOW SURGERY Left 2008    ORIF - plates still in place     EP ICD GENERATOR CHANGE SINGLE N/A 11/21/2019    Procedure: EP ICD Generator Change Single;  Surgeon: Jono Mejia MD;  Location:  HEART CARDIAC CATH LAB     H ABLATION ATRIAL FLUTTER  06/08/2017, 12/11/17     HC LEFT HEART CATHETERIZATION  7/14/2016     HC LEFT HEART CATHETERIZATION  9/21/2016     IMPLANT VENTRICULAR DEVICE  08/15/2011     IR DIALYSIS FISTULOGRAM LEFT  7/22/2019     REPAIR FISTULA ARTERIOVENOUS UPPER EXTREMITY Left 3/5/2019    Procedure: REPAIR LEFT UPPER ARM ARTERIOVENOUS FISTULA SKIN ULCER;  Surgeon: Westley Griggs MD;  Location:  OR     TONSILLECTOMY & ADENOIDECTOMY       VASCULAR SURGERY  2004, 2010    LUE fistulas (upper and lower); upper one is functional       Social History   I have reviewed this patient's social history and updated it with pertinent information if needed.  Social History     Tobacco Use     Smoking status: Former Smoker     Packs/day: 2.00     Years: 35.00     Pack years: 70.00     Types: Cigarettes     Start date: 1965     Quit date: 11/1/1996     Years since  quittin.2     Smokeless tobacco: Never Used   Substance Use Topics     Alcohol use: Not Currently     Alcohol/week: 0.0 standard drinks     Frequency: Never     Drug use: No       Family History   I have reviewed this patient's family history and updated it with pertinent information if needed.  Family History   Problem Relation Age of Onset     Cerebrovascular Disease Mother         later in life     Hypertension Father      Bladder Cancer Father      Myocardial Infarction Paternal Grandmother      Diabetes No family hx of      Prostate Cancer No family hx of      Colon Cancer No family hx of        Prior to Admission Medications   Prior to Admission Medications   Prescriptions Last Dose Informant Patient Reported? Taking?   acetaminophen (TYLENOL) 500 MG tablet prn  Yes No   Sig: Take 1,000 mg by mouth 4 times daily as needed for pain   apixaban ANTICOAGULANT (ELIQUIS ANTICOAGULANT) 2.5 MG tablet 1/10/2021 at Unknown time  No Yes   Sig: Take 1 tablet (2.5 mg) by mouth 2 times daily   clopidogrel (PLAVIX) 75 MG tablet 1/10/2021 at Unknown time  No Yes   Sig: Take 1 tablet (75 mg) by mouth daily   isosorbide mononitrate (IMDUR) 30 MG 24 hr tablet 1/10/2021 at dinner  Yes Yes   Sig: Take 15 mg by mouth Take in the evening on the days prior dialysis. Take on , Tuesday and Thursday   isosorbide mononitrate (IMDUR) 30 MG 24 hr tablet 2021 at dinner  No No   Sig: Take 1 tablet (30 mg) the evening of dialysis days (Mon, Wed, Fri)  and .   levothyroxine (SYNTHROID/LEVOTHROID) 50 MCG tablet 2021 at Unknown time  No Yes   Sig: TAKE 1 TABLET (50 MCG) BY MOUTH DAILY   lisinopril (ZESTRIL) 2.5 MG tablet 1/10/2021 at Unknown time  No Yes   Sig: TAKE 1 TABLET (2.5 MG) BY MOUTH 2 TIMES DAILY (TAKE ONE TABLET AFTER DIALYSIS. TAKE SECOND TABLET AT BEDTIME.)   loperamide (IMODIUM A-D) 2 MG tablet 2021  Yes No   Sig: Take 2 mg by mouth three times a week MWF on dialysis days   loperamide (IMODIUM A-D)  2 MG tablet 1/10/2021 at Unknown time  Yes Yes   Sig: Take 1 mg by mouth five times a week On non-dialysis days - Tu, Th, Sa, Russo   metoprolol succinate ER (TOPROL-XL) 25 MG 24 hr tablet 1/10/2021 at noon  No Yes   Sig: Take 0.5 tablets (12.5 mg) by mouth daily   mexiletine (MEXITIL) 150 MG capsule 1/11/2021 at x1  No Yes   Sig: Take 1 capsule (150 mg) by mouth 2 times daily   multivitamin RENAL (NEPHROCAPS/TRIPHROCAPS) 1 MG capsule 1/10/2021 at pm  No Yes   Sig: Take 1 capsule by mouth daily   nitroGLYcerin (NITROSTAT) 0.4 MG sublingual tablet prn  No No   Sig: Place 1 tablet (0.4 mg) under the tongue every 5 minutes as needed for chest pain UP TO 3 PER EPISODE   omeprazole (PRILOSEC) 20 MG DR capsule 1/10/2021 at Unknown time  Yes Yes   Sig: Take 20 mg by mouth daily (before lunch)    pantoprazole (PROTONIX) 40 MG EC tablet 1/10/2021 at Unknown time  No Yes   Sig: TAKE 1 TABLET (40 MG) BY MOUTH EVERY MORNING   pravastatin (PRAVACHOL) 40 MG tablet 1/10/2021 at HS  No Yes   Sig: TAKE 1 TABLET (40 MG) BY MOUTH DAILY      Facility-Administered Medications: None     Allergies   Allergies   Allergen Reactions     Contrast Dye Hives     Does fine if he uses benadryl prior.     No Clinical Screening - See Comments      Green beans - Diarrhea.    Topical antibiotic - name unknown - caused swelling of the penis.       Physical Exam   Vital Signs: Temp: 97.6  F (36.4  C) Temp src: Oral BP: 132/68 Pulse: 82   Resp: 13 SpO2: 100 %      Weight: 0 lbs 0 oz     Physical Exam  Vitals signs and nursing note reviewed.   Constitutional:       General: He is not in acute distress.     Appearance: Normal appearance. He is ill-appearing. He is not toxic-appearing.   HENT:      Head: Atraumatic.      Mouth/Throat:      Mouth: Mucous membranes are moist.   Eyes:      Extraocular Movements: Extraocular movements intact.      Pupils: Pupils are equal, round, and reactive to light.   Cardiovascular:      Rate and Rhythm: Normal rate and  regular rhythm.      Pulses: Normal pulses.      Heart sounds: Normal heart sounds.   Pulmonary:      Effort: Pulmonary effort is normal. No respiratory distress.      Breath sounds: Normal breath sounds. No wheezing or rales.   Abdominal:      General: Abdomen is flat.      Palpations: Abdomen is soft.   Musculoskeletal:      Lumbar back: He exhibits tenderness and bony tenderness.   Skin:     General: Skin is warm and dry.      Capillary Refill: Capillary refill takes less than 2 seconds.   Neurological:      General: No focal deficit present.      Mental Status: He is alert.   Psychiatric:         Mood and Affect: Mood normal.       Data   Data reviewed today: I reviewed all medications, new labs and imaging results over the last 24 hours. I personally reviewed the EKG tracing showing NSR w/o ischemic changes.    CXR shows mild pul vasc congestion, no acute pul edema.     Recent Labs   Lab 01/11/21  0911   WBC 8.8   HGB 10.9*   *         POTASSIUM 5.2   CHLORIDE 105   CO2 27   BUN 43*   CR 4.75*   ANIONGAP 7   CHRISTINE 10.1   *     Recent Results (from the past 24 hour(s))   Lumbar spine XR, 2-3 views    Narrative    LUMBAR SPINE TWO TO THREE VIEWS  1/11/2021 9:50 AM     HISTORY: Increasing leg weakness, weight loss, intermittent back pain.    COMPARISON: None.       Impression    IMPRESSION:   1. Bones appear markedly osteopenic which limits evaluation for  fractures.  2. Mild deformity at the superior L4 endplate which could represent a  fracture. There is also subtle deformity at the superior L3 endplate  that could represent a fracture.  3. Moderate degenerative endplate changes and loss of disc height in  the lower lumbar spine, particularly at L5-S1. Moderate facet  hypertrophy in the lower lumbar spine.    VINCENT COKER MD   Chest XR,  PA & LAT    Narrative    XR CHEST 2 VW  1/11/2021 9:50 AM       INDICATION: weakness, dialysis patient  COMPARISON: 10/15/2020       Impression     IMPRESSION: Evaluate cardiac pacemaker, coronary artery stents, and  cardiomegaly are stable. Mild pulmonary vascular congestion without  evidence of pulmonary edema. Trace pleural effusions have decreased.  No acute infiltrate.    HOANG NEGRON MD
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
## 2021-01-12 LAB
ANION GAP SERPL CALCULATED.3IONS-SCNC: 7 MMOL/L (ref 3–14)
BASOPHILS # BLD AUTO: 0 10E9/L (ref 0–0.2)
BASOPHILS NFR BLD AUTO: 0.3 %
BUN SERPL-MCNC: 53 MG/DL (ref 7–30)
CALCIUM SERPL-MCNC: 9.5 MG/DL (ref 8.5–10.1)
CHLORIDE SERPL-SCNC: 104 MMOL/L (ref 94–109)
CO2 SERPL-SCNC: 26 MMOL/L (ref 20–32)
CREAT SERPL-MCNC: 5.34 MG/DL (ref 0.66–1.25)
DIFFERENTIAL METHOD BLD: ABNORMAL
EOSINOPHIL # BLD AUTO: 0.2 10E9/L (ref 0–0.7)
EOSINOPHIL NFR BLD AUTO: 2.1 %
ERYTHROCYTE [DISTWIDTH] IN BLOOD BY AUTOMATED COUNT: 15.8 % (ref 10–15)
GFR SERPL CREATININE-BSD FRML MDRD: 10 ML/MIN/{1.73_M2}
GLUCOSE SERPL-MCNC: 98 MG/DL (ref 70–99)
HBV SURFACE AB SERPL IA-ACNC: 3.98 M[IU]/ML
HBV SURFACE AG SERPL QL IA: NONREACTIVE
HCT VFR BLD AUTO: 35.3 % (ref 40–53)
HGB BLD-MCNC: 10.8 G/DL (ref 13.3–17.7)
IMM GRANULOCYTES # BLD: 0 10E9/L (ref 0–0.4)
IMM GRANULOCYTES NFR BLD: 0.3 %
LYMPHOCYTES # BLD AUTO: 1.1 10E9/L (ref 0.8–5.3)
LYMPHOCYTES NFR BLD AUTO: 14.7 %
MCH RBC QN AUTO: 32.8 PG (ref 26.5–33)
MCHC RBC AUTO-ENTMCNC: 30.6 G/DL (ref 31.5–36.5)
MCV RBC AUTO: 107 FL (ref 78–100)
MONOCYTES # BLD AUTO: 0.5 10E9/L (ref 0–1.3)
MONOCYTES NFR BLD AUTO: 6.6 %
NEUTROPHILS # BLD AUTO: 5.4 10E9/L (ref 1.6–8.3)
NEUTROPHILS NFR BLD AUTO: 76 %
NRBC # BLD AUTO: 0 10*3/UL
NRBC BLD AUTO-RTO: 0 /100
PLATELET # BLD AUTO: 131 10E9/L (ref 150–450)
POTASSIUM SERPL-SCNC: 5.7 MMOL/L (ref 3.4–5.3)
RBC # BLD AUTO: 3.29 10E12/L (ref 4.4–5.9)
SODIUM SERPL-SCNC: 137 MMOL/L (ref 133–144)
WBC # BLD AUTO: 7.1 10E9/L (ref 4–11)

## 2021-01-12 PROCEDURE — 90937 HEMODIALYSIS REPEATED EVAL: CPT

## 2021-01-12 PROCEDURE — 87340 HEPATITIS B SURFACE AG IA: CPT | Performed by: INTERNAL MEDICINE

## 2021-01-12 PROCEDURE — 250N000013 HC RX MED GY IP 250 OP 250 PS 637: Mod: GY | Performed by: PHYSICIAN ASSISTANT

## 2021-01-12 PROCEDURE — G0378 HOSPITAL OBSERVATION PER HR: HCPCS

## 2021-01-12 PROCEDURE — 258N000003 HC RX IP 258 OP 636: Performed by: INTERNAL MEDICINE

## 2021-01-12 PROCEDURE — 250N000013 HC RX MED GY IP 250 OP 250 PS 637: Performed by: NURSE PRACTITIONER

## 2021-01-12 PROCEDURE — G0257 UNSCHED DIALYSIS ESRD PT HOS: HCPCS

## 2021-01-12 PROCEDURE — 634N000001 HC RX 634: Performed by: INTERNAL MEDICINE

## 2021-01-12 PROCEDURE — 250N000011 HC RX IP 250 OP 636: Performed by: INTERNAL MEDICINE

## 2021-01-12 PROCEDURE — 99207 PR APP CREDIT; MD BILLING SHARED VISIT: CPT | Performed by: PHYSICIAN ASSISTANT

## 2021-01-12 PROCEDURE — 36415 COLL VENOUS BLD VENIPUNCTURE: CPT | Performed by: NURSE PRACTITIONER

## 2021-01-12 PROCEDURE — 80048 BASIC METABOLIC PNL TOTAL CA: CPT | Performed by: NURSE PRACTITIONER

## 2021-01-12 PROCEDURE — 86706 HEP B SURFACE ANTIBODY: CPT | Performed by: INTERNAL MEDICINE

## 2021-01-12 PROCEDURE — 99225 PR SUBSEQUENT OBSERVATION CARE,LEVEL II: CPT | Performed by: HOSPITALIST

## 2021-01-12 PROCEDURE — 85025 COMPLETE CBC W/AUTO DIFF WBC: CPT | Performed by: PHYSICIAN ASSISTANT

## 2021-01-12 PROCEDURE — 99203 OFFICE O/P NEW LOW 30 MIN: CPT | Performed by: INTERNAL MEDICINE

## 2021-01-12 RX ORDER — DOXERCALCIFEROL 4 UG/2ML
1 INJECTION INTRAVENOUS
Status: COMPLETED | OUTPATIENT
Start: 2021-01-12 | End: 2021-01-12

## 2021-01-12 RX ORDER — LEVOTHYROXINE SODIUM 50 UG/1
50 TABLET ORAL DAILY
Status: DISCONTINUED | OUTPATIENT
Start: 2021-01-12 | End: 2021-01-15 | Stop reason: HOSPADM

## 2021-01-12 RX ORDER — CLOPIDOGREL BISULFATE 75 MG/1
75 TABLET ORAL DAILY
Status: DISCONTINUED | OUTPATIENT
Start: 2021-01-12 | End: 2021-01-15 | Stop reason: HOSPADM

## 2021-01-12 RX ORDER — HEPARIN SODIUM 1000 [USP'U]/ML
500 INJECTION, SOLUTION INTRAVENOUS; SUBCUTANEOUS CONTINUOUS
Status: DISCONTINUED | OUTPATIENT
Start: 2021-01-12 | End: 2021-01-12

## 2021-01-12 RX ORDER — HEPARIN SODIUM 1000 [USP'U]/ML
500 INJECTION, SOLUTION INTRAVENOUS; SUBCUTANEOUS
Status: DISCONTINUED | OUTPATIENT
Start: 2021-01-12 | End: 2021-01-12

## 2021-01-12 RX ORDER — MEXILETINE HYDROCHLORIDE 150 MG/1
150 CAPSULE ORAL 2 TIMES DAILY
Status: DISCONTINUED | OUTPATIENT
Start: 2021-01-12 | End: 2021-01-15 | Stop reason: HOSPADM

## 2021-01-12 RX ORDER — LOPERAMIDE HCL 1 MG/7.5ML
1 SUSPENSION ORAL
Status: DISCONTINUED | OUTPATIENT
Start: 2021-01-12 | End: 2021-01-15 | Stop reason: HOSPADM

## 2021-01-12 RX ADMIN — DOXERCALCIFEROL 1 MCG: 4 INJECTION, SOLUTION INTRAVENOUS at 11:06

## 2021-01-12 RX ADMIN — ACETAMINOPHEN 650 MG: 325 TABLET, FILM COATED ORAL at 19:53

## 2021-01-12 RX ADMIN — APIXABAN 2.5 MG: 2.5 TABLET, FILM COATED ORAL at 19:53

## 2021-01-12 RX ADMIN — ACETAMINOPHEN 650 MG: 325 TABLET, FILM COATED ORAL at 14:54

## 2021-01-12 RX ADMIN — OMEPRAZOLE 20 MG: 20 CAPSULE, DELAYED RELEASE ORAL at 14:55

## 2021-01-12 RX ADMIN — ACETAMINOPHEN 650 MG: 325 TABLET, FILM COATED ORAL at 02:31

## 2021-01-12 RX ADMIN — MEXILETINE HYDROCHLORIDE 150 MG: 150 CAPSULE ORAL at 14:49

## 2021-01-12 RX ADMIN — Medication 1 CAPSULE: at 14:46

## 2021-01-12 RX ADMIN — PRAVASTATIN SODIUM 40 MG: 40 TABLET ORAL at 22:07

## 2021-01-12 RX ADMIN — SODIUM CHLORIDE 250 ML: 9 INJECTION, SOLUTION INTRAVENOUS at 09:15

## 2021-01-12 RX ADMIN — LOPERAMIDE HCL 1 MG: 1 SOLUTION ORAL at 14:54

## 2021-01-12 RX ADMIN — MEXILETINE HYDROCHLORIDE 150 MG: 150 CAPSULE ORAL at 22:07

## 2021-01-12 RX ADMIN — METOPROLOL SUCCINATE 12.5 MG: 25 TABLET, EXTENDED RELEASE ORAL at 14:46

## 2021-01-12 RX ADMIN — APIXABAN 2.5 MG: 2.5 TABLET, FILM COATED ORAL at 14:45

## 2021-01-12 RX ADMIN — CLOPIDOGREL BISULFATE 75 MG: 75 TABLET ORAL at 14:45

## 2021-01-12 RX ADMIN — ACETAMINOPHEN 650 MG: 325 TABLET, FILM COATED ORAL at 08:48

## 2021-01-12 RX ADMIN — SODIUM CHLORIDE 300 ML: 9 INJECTION, SOLUTION INTRAVENOUS at 09:00

## 2021-01-12 RX ADMIN — EPOETIN ALFA-EPBX 1000 UNITS: 2000 INJECTION, SOLUTION INTRAVENOUS; SUBCUTANEOUS at 11:08

## 2021-01-12 RX ADMIN — LISINOPRIL 2.5 MG: 2.5 TABLET ORAL at 19:52

## 2021-01-12 RX ADMIN — LEVOTHYROXINE SODIUM 50 MCG: 50 TABLET ORAL at 14:45

## 2021-01-12 ASSESSMENT — MIFFLIN-ST. JEOR: SCORE: 1517.81

## 2021-01-12 NOTE — PROGRESS NOTES
Observation goals PRIOR TO DISCHARGE     Comments: -diagnostic tests and consults completed and resulted: Not met: MRI, Care Coordinator, PT/OT  -vital signs normal or at patient baseline: met   -safe disposition plan has been identified: partially met   Nurse to notify provider when observation goals have been met and patient is ready for discharge.

## 2021-01-12 NOTE — PLAN OF CARE
Observation goals:  -Diagnostic tests and consults completed and resulted: Not met    -Vital signs normal or at patient baseline: Met   -Safe disposition plan has been identified: Not met     Pt is A&Ox4, VSS on RA, turn and repo. Pt has a permanent pacemaker for history of Vtach. Ax1 w/ GB and walker. Dialysis scheduled for AM with MRI afterwards, followed by PT and OT. Pt did have a BM this shift. Tele was junctional rhythm this shift d/t absent P wave. Pt is on renal diet d/t dialysis. Pt has a left AV fistula, bruit and thrill present. Pain in ankle controlled with Tylenol, last given at 0230 and is available at 0630. Back xray shows possible L3 and L4 fracture.

## 2021-01-12 NOTE — PROGRESS NOTES
Potassium   Date Value Ref Range Status   01/12/2021 5.7 (H) 3.4 - 5.3 mmol/L Final     Hemoglobin   Date Value Ref Range Status   01/11/2021 10.9 (L) 13.3 - 17.7 g/dL Final     Creatinine   Date Value Ref Range Status   01/12/2021 5.34 (H) 0.66 - 1.25 mg/dL Final     Urea Nitrogen   Date Value Ref Range Status   01/12/2021 53 (H) 7 - 30 mg/dL Final     Sodium   Date Value Ref Range Status   01/12/2021 137 133 - 144 mmol/L Final     INR   Date Value Ref Range Status   10/15/2020 1.31 (H) 0.86 - 1.14 Final       DIALYSIS PROCEDURE NOTE  Hepatitis status of previous patient on machine log was checked and verified ok to use with this patients hepatitis status.    Patient dialyzed for 3.5 hrs. on a 2K bath with a net fluid removal of  2L. UF Profile 2 requested by patient and approved by Dr. Tabor.  A BFR of 450 ml/min was obtained via a LAVF using 15gauge needles.      The treatment plan was discussed with Dr. Tabor during the treatment.    Total heparin received during the treatment: 0.5 mL bolus only per patient request.   Needle cannulation sites held x 10 min.     Meds  given: epogen, hectorol   Complications: None      Person educated: patient. Knowledge base substantial. Barriers to learning: None. Educated on procedure via verbal mode. Patient verbalized understanding. Pt prefers oral education style.     ICEBOAT? Timeout performed pre-treatment  I: Patient was identified using 2 identifiers  C:  Consent         Informed Consent: Yes - Date 1/12/21 signed  E: Equipment preventative maintenance is current and dialysis delivery system OK to use  B: Hepatitis B Surface Antigen: Negative; Draw Date: 1/6/21      Hepatitis B Surface Antibody: Unknown; Draw Date: 1/12/21  O: Dialysis orders present and complete prior to treatment  A: Vascular access verified and assessed prior to treatment  T: Treatment was performed at a clinically appropriate time  ?: Patient was allowed to ask questions and address concerns prior  to treatment  See flowsheet in EPIC for further details and post assessment.  Machine water alarm in place and functioning. Transducer pods intact and checked every 15min.   Pt returned via wheelchair.  Chlorine/Chloramine water system checked every 4 hours.  Outpatient Dialysis at [place] on MWF.

## 2021-01-12 NOTE — PROGRESS NOTES
Observation goals PRIOR TO DISCHARGE     Comments: -diagnostic tests and consults completed and resulted: not met: MRI 1/13/21  -vital signs normal or at patient baseline: met  -safe disposition plan has been identified: partially met   Nurse to notify provider when observation goals have been met and patient is ready for discharge.

## 2021-01-12 NOTE — PROGRESS NOTES
Spoke to MRI. Pt must be rescheduled for tomorrow at 1000 due to scheduling conflicts with dialysis.

## 2021-01-12 NOTE — PLAN OF CARE
diagnostic tests and consults completed and resulted  No  -vital signs normal or at patient baseline  Yes  -safe disposition plan has been identified No  Nurse to notify provider when observation goals have been met and patient is ready for discharge    A&OX4, VSS on RA. C/o mild pain in the  back and R LE at rest, tylenol and reposition helping. Has not been OOB, but would anticipate need of AX2 with GB and walker for ambulation Unable to run dialysis today and moved to tomorrow. Turned and repoed Q 2hrs. PIV Sled. Tele has been SR. MRI of the lumbar spine pending, Tentative plan for tomorrow at 11 :00 AM in coordination with device rep, also has dialysis tomorrow. Dialysis diet. Continue to monitor.

## 2021-01-12 NOTE — PROGRESS NOTES
[facility]    Medicine Progress Note - Hospitalist Service       Date of Admission:  1/11/2021    Assessment & Plan       Westley Ness is a 75 year old male admitted on 1/11/2021. He presents to the emergency department with generalized weakness.  Past medical history includes ESRD on hemodialysis MWF, GERD, CAD, hypertension, hypothyroidism, diabetes mellitus, type II, CHF and atrial flutter.     Generalized weakness with worsening lower extremity stiffness  Probable L3 and L4 compression fractures  Patient notes increased weakness when attempting to stand out of bed and gain his balance, endorses more difficulty walking related to stiffness in lower extremities. No recent falls or trauma. Has had low back pain since an accident in the 80s. Denies sensation changes in lower extremities, bilaterally. Denies any dizziness, lightheadedness, fever/chills.  Exam shows tenderness at L3-L5, sacral area, sensation intact. ED XR lumbar is significant osteopenia, deformity at superior L4 endplate which could represent a fracture, subtle deformity at L3 which could represent a fracture, degenerative changes L5-S1. BMP, CBC WNL aside from known CKD and anemia.   --Continue observation status   --MRI lumbar spine ordered, [employer] rep needs to be present--coordinated to be done at 1000 on 1/13 (unfortunately between MRI scheduling and Rep schedule, this was the earliest time MRI could be accomplished)   --PT consulted, anticipate recommendation for TCU based on history  --SW for discharge planning, patient agreeable to TCU and prefers [facility].      ESRD on hemodialysis, (~15 years, MWF)  Patient missed his dialysis on 1/11.  --Nephrology consult completed for dialysis 1/12 with 2 L removed. Plan for next dialysis 1/13     #Chronic systolic dysfunction with ischemic cardiomyopathy s/p AICD placement   #CAD s/p FOREST to proximal OM1 (7/2016) with subsequent in-stent restenosis s/p angioplasty (11/2016),  "FOREST to LAD and OM (2/2017), and PCI of the OM1 (10/2020); chronically occluded LAD  #Recent admission for dizziness and chest pain, 10/2020  Underwent NM stress test on 10/16/2020 showing moderate area of lateral ischemia and filippo-infarct ischemia of inferior wall, echocardiogram on 10/16 showed EF of 20-25%, severe global hypokinesis, akinesis of inferoseptal and apical.  Patient underwent coronary angiography and had a stent placed to OM1 on 10/19/2020 (started on DAPT with plan for Plavix/Eliquis x6 months).  While admitted had nonsustained VT, in 3/2020 had Zio patch showing NSVT up to 14 beats. Typically follows with Dr. Sen, most recently seen as a virtual visit on 10/28/2020 by Lacy Taylor CNP. CXR 1/11 shows mild pul vascular congestion without pul edema.   [Plavix 75 mg po every day, Imdur 15 mg TID on non-HD days and 30 mg on HD days, Lisinopril 2.5 mg BID, LToprol XL 12.5 mg every day, Pravastatin 40 mg qd]  --AICD interrogation, performed on 12/2/2020  --Resume Toprol XL and Mexiletine after dialysis (), will reevaluate BP 1.5 hours after administration of above and add Imdur if BP stable. Note with Imdur dosing adjusted on 10/18, takes 30 mg on HD days and 15 mg on non-HD days. Continue Lisinopril 2.5 mg BID with next dose this evening   --Continue Eliquis and Plavix   --Telemetry      Parox atrial flutter s/p ablations x 2, 2017: Continue PTA Metoprolol, Mexitil, Eliquis        Diabetes Mellitus, type II, well controlled: Diet controlled, most recent HgbA1c of 5.1 in 03/2019  --Sliding scale insulin medium coverage while admitted     Hypothyroidism: TSH 4.4   --Continue PTA Levothyroxine     Goals of care  Discussed by admitting provider Lulú Feldman CNP--\"Patient is familiar with the discussion of CODE STATUS, he states he typically elects to be full code, and did ultimately choose to continue with that today.  However we did have a long discussion regarding the chances of survival showed a " "full cardiac cardiopulmonary arrest occur, the patient does not want to be \" a vegetable\", and states he has 2 decision-makers who are noted in his emergency contacts.  --Given his overall fragility, ESRD and EF 20%, I encouraged him to continue thinking about his status and overall goals of care, which he agrees he needs to do.\"    BASELINE PRIOR TO ADMISSION  1.  Living Situation:  Alone, in a split level home. Gets assistance from  a few times per week  2. Ambulation:  Walker (with some difficulty), able to navigate up and down stairs  3.  Mental status:  alert and oriented X4  4. Diet: regular, compliant with renal recommendations   5.  Family contact:  Extended Emergency Contact Information  Primary Emergency Contact: Maria Dolores Pbaon--POA  Home Phone: [phone]  Mobile Phone: [phone]  Relation: Friend  Secondary Emergency Contact: Joaquina Bush--Second POA   Home Phone: [phone]  Mobile Phone: [phone]  Relation: Friend      Diet: Renal Diet (non-dialysis)    DVT Prophylaxis: Ambulate every shift  Velarde Catheter: not present  Code Status: Full Code         Disposition Plan   Expected discharge: Tomorrow, recommended to pending PT evaluation, suspect TCU once MRI completed and dialysis completed.  Entered: Ramandeep Ray PA-C 01/12/2021, 8:11 AM     The patient's care was discussed with the Attending Physician, Dr. Mckinley, Bedside Nurse and Patient.    Ramandeep Ray PA-C  Hospitalist Service  [facility]  Contact information available via [facility] Paging/Directory  ______________________________________________________________________    Interval History   No acute events overnight. Dialysis today with 2 L fluid removed. Feels fast heart rate and mild SOB after dialysis. Denies CP. BP stable with . No significant bleeding during dialysis. Pt reports with stable BP and no bleeding, takes AM meds after dialysis, requesting " this.     Data reviewed today: I reviewed all medications, new labs and imaging results over the last 24 hours. I personally reviewed all labs and imaging to date.     Physical Exam   Vital Signs: Temp: 96.4  F (35.8  C) Temp src: Oral BP: 123/64 Pulse: 73   Resp: 16 SpO2: 95 % O2 Device: None (Room air)    Weight: 178 lbs 3.2 oz    CONSTITUTIONAL: Pt laying in bed, dressed in hospital garb. Appears comfortable. Cooperative with interview.  HEENT: Normocephalic, atraumatic.   CARDIOVASCULAR: RRR, no murmurs, rubs, or extra heart sounds appreciated. Pulses +2/4 and regular in upper and lower extremities, bilaterally.   RESPIRATORY: No increased work of breathing. CTA, bilat; no wheezes, rales, or rhonchi appreciated.  GASTROINTESTINAL:  Abdomen soft, non-distended. BS auscultated in all four quadrants. Negative for tenderness to palpation.  No masses or organomegaly noted.  MUSCULOSKELETAL: Strength +5/5 in upper and lower extremities, bilaterally, straight leg raise without worsening back pain. No gross deformities noted. Normal muscle tone.   HEMATOLOGIC/LYMPHATIC/IMMUNOLOGIC: 2+ bilateral lower extremity edema.   NEUROLOGIC: Alert and oriented to person, place, and time.  strength intact. Sensation equal and intact in upper and lower extremities, bilat.   SKIN: Warm, dry, intact. No jaundice noted. Negative for suspicious lesions, rashes, bruising, open sores or abrasions.     Data   Recent Labs   Lab 01/12/21  0700 01/11/21  0911   WBC  --  8.8   HGB  --  10.9*   MCV  --  107*   PLT  --  156    139   POTASSIUM 5.7* 5.2   CHLORIDE 104 105   CO2 26 27   BUN 53* 43*   CR 5.34* 4.75*   ANIONGAP 7 7   CHRISTINE 9.5 10.1   GLC 98 122*     No results found for this or any previous visit (from the past 24 hour(s)).  Medications       - MEDICATION INSTRUCTIONS for Dialysis Patients -   Does not apply See Admin Instructions     apixaban ANTICOAGULANT  2.5 mg Oral BID     clopidogrel  75 mg Oral Daily     isosorbide  mononitrate  15 mg Oral Once per day on Sun Tue Thu     isosorbide mononitrate  30 mg Oral Once per day on Mon Wed Fri     levothyroxine  50 mcg Oral Daily     lisinopril  2.5 mg Oral BID     loperamide  2 mg Oral Once per day on Mon Wed Fri     loperamide  1 mg Oral Once per day on Sun Tue Thu Sat     metoprolol succinate ER  12.5 mg Oral Daily     mexiletine  150 mg Oral BID     multivitamin RENAL  1 capsule Oral Daily     omeprazole  20 mg Oral Daily before lunch     pravastatin  40 mg Oral Daily

## 2021-01-12 NOTE — CONSULTS
[facility]    Nephrology Consultation     Date of Admission:  1/11/2021    Assessment & Plan     Westley Ness is a 75 year old male with ESRD who was admitted on 1/11/2021.     1) ESRD due to HTN/DM - He dialyzes at [facility] under direction of Dr. Hammond/Arelis Stahl NP.  He runs 3.5 H via CASSIDY AVF to a target weight of 77 kg on a K3 bath at a GFR of 450 mL/min.  He uses low dose heparin.      2) Anemia:  HGB 10.9,  He receives  units.    3) MBD:  He is on Hectorol 1 mcg.  PTH as outpt 190.      4) BLE weakness: Lumbar spine MRI ordered.  Spinal stenosis ?      5) Hyperkalemia:  This will be treated with dialysis today.      Plan:    HD again tomorrow to get on schedule.      Marlo Tabor MD  [facility] - Nephrology  [phone]    Reason for Consult     I was asked to see the patient for  ESRD.      Primary Care Physician     Josselin Kolb    Chief Complaint     LE weakness.      History is obtained from the patient and chart review.      History of Present Illness     Westley Ness is a 75 year old male with ESRD who presents with LE weakness.    He was admitted 1/11/21 via ED after presenting with LE weakness.    He was due to his usual MWF dialysis appt yesterday, but could not get out of the house due to LE weakness.      He finished two tax forms and an extension, and then called 911.    He has had chronic back troubles for 30 years.     He has had pain in LE as well as lumbar area of back.  He is scheduled for an MRI of his lumbar spine.      He is dialyzing today to make up for a missed run yesterday.      Past Medical History   I have reviewed this patient's medical history and updated it with pertinent information if needed.   Past Medical History:   Diagnosis Date     Acid reflux disease      Benign essential hypertension      CAD (coronary artery disease)     s/p multiple NSTEMIs and PCI's     ESRD on hemodialysis (H)     MWF      History of atrial flutter     s/p ablation     Hypothyroidism      Ischemic cardiomyopathy     EF 25-30%, s/p AICD     Type 2 diabetes mellitus (H)     diet-controlled       Past Surgical History   I have reviewed this patient's surgical history and updated it with pertinent information if needed.  Past Surgical History:   Procedure Laterality Date     CHOLECYSTECTOMY, OPEN  2013     CV CORONARY ANGIOGRAM N/A 10/19/2020    Procedure: Coronary Angiogram;  Surgeon: Theodora Salazar MD;  Location:  HEART CARDIAC CATH LAB     CV LEFT HEART CATH N/A 10/19/2020    Procedure: Left Heart Cath;  Surgeon: Theodora Salazar MD;  Location:  HEART CARDIAC CATH LAB     CV PCI STENT DRUG ELUTING N/A 10/19/2020    Procedure: Percutaneous Coronary Intervention Stent Drug Eluting;  Surgeon: Theodora Salazar MD;  Location:  HEART CARDIAC CATH LAB     ELBOW SURGERY Left 2008    ORIF - plates still in place     EP ICD GENERATOR CHANGE SINGLE N/A 11/21/2019    Procedure: EP ICD Generator Change Single;  Surgeon: Jono Mejia MD;  Location:  HEART CARDIAC CATH LAB     H ABLATION ATRIAL FLUTTER  06/08/2017, 12/11/17     HC LEFT HEART CATHETERIZATION  7/14/2016     HC LEFT HEART CATHETERIZATION  9/21/2016     IMPLANT VENTRICULAR DEVICE  08/15/2011     IR DIALYSIS FISTULOGRAM LEFT  7/22/2019     REPAIR FISTULA ARTERIOVENOUS UPPER EXTREMITY Left 3/5/2019    Procedure: REPAIR LEFT UPPER ARM ARTERIOVENOUS FISTULA SKIN ULCER;  Surgeon: Westley Griggs MD;  Location:  OR     TONSILLECTOMY & ADENOIDECTOMY       VASCULAR SURGERY  2004, 2010    LUE fistulas (upper and lower); upper one is functional       Prior to Admission Medications   Prior to Admission Medications   Prescriptions Last Dose Informant Patient Reported? Taking?   acetaminophen (TYLENOL) 500 MG tablet prn  Yes No   Sig: Take 1,000 mg by mouth 4 times daily as needed for pain   apixaban ANTICOAGULANT (ELIQUIS ANTICOAGULANT) 2.5 MG tablet 1/10/2021 at  Unknown time  No Yes   Sig: Take 1 tablet (2.5 mg) by mouth 2 times daily   clopidogrel (PLAVIX) 75 MG tablet 1/10/2021 at Unknown time  No Yes   Sig: Take 1 tablet (75 mg) by mouth daily   isosorbide mononitrate (IMDUR) 30 MG 24 hr tablet 1/10/2021 at dinner  Yes Yes   Sig: Take 15 mg by mouth Take in the evening on the days prior dialysis. Take on Sunday, Tuesday and Thursday   isosorbide mononitrate (IMDUR) 30 MG 24 hr tablet 1/8/2021 at dinner  No No   Sig: Take 1 tablet (30 mg) the evening of dialysis days (Mon, Wed, Fri)  and Saturdays.   levothyroxine (SYNTHROID/LEVOTHROID) 50 MCG tablet 1/11/2021 at Unknown time  No Yes   Sig: TAKE 1 TABLET (50 MCG) BY MOUTH DAILY   lisinopril (ZESTRIL) 2.5 MG tablet 1/10/2021 at Unknown time  No Yes   Sig: TAKE 1 TABLET (2.5 MG) BY MOUTH 2 TIMES DAILY (TAKE ONE TABLET AFTER DIALYSIS. TAKE SECOND TABLET AT BEDTIME.)   loperamide (IMODIUM A-D) 2 MG tablet 1/8/2021  Yes No   Sig: Take 2 mg by mouth three times a week MWF on dialysis days   loperamide (IMODIUM A-D) 2 MG tablet 1/10/2021 at Unknown time  Yes Yes   Sig: Take 1 mg by mouth five times a week On non-dialysis days - Tu, Th, Sa, Russo   metoprolol succinate ER (TOPROL-XL) 25 MG 24 hr tablet 1/10/2021 at noon  No Yes   Sig: Take 0.5 tablets (12.5 mg) by mouth daily   mexiletine (MEXITIL) 150 MG capsule 1/11/2021 at x1  No Yes   Sig: Take 1 capsule (150 mg) by mouth 2 times daily   multivitamin RENAL (NEPHROCAPS/TRIPHROCAPS) 1 MG capsule 1/10/2021 at pm  No Yes   Sig: Take 1 capsule by mouth daily   nitroGLYcerin (NITROSTAT) 0.4 MG sublingual tablet prn  No No   Sig: Place 1 tablet (0.4 mg) under the tongue every 5 minutes as needed for chest pain UP TO 3 PER EPISODE   omeprazole (PRILOSEC) 20 MG DR capsule 1/10/2021 at Unknown time  Yes Yes   Sig: Take 20 mg by mouth daily (before lunch)    pantoprazole (PROTONIX) 40 MG EC tablet 1/10/2021 at Unknown time  No Yes   Sig: TAKE 1 TABLET (40 MG) BY MOUTH EVERY MORNING    pravastatin (PRAVACHOL) 40 MG tablet 1/10/2021 at HS  No Yes   Sig: TAKE 1 TABLET (40 MG) BY MOUTH DAILY      Facility-Administered Medications: None     Allergies   Allergies   Allergen Reactions     Contrast Dye Hives     Does fine if he uses benadryl prior.     No Clinical Screening - See Comments      Green beans - Diarrhea.    Topical antibiotic - name unknown - caused swelling of the penis.       Social History   I have reviewed this patient's social history and updated it with pertinent information if needed. Westley Ness  reports that he quit smoking about 24 years ago. His smoking use included cigarettes. He started smoking about 56 years ago. He has a 70.00 pack-year smoking history. He has never used smokeless tobacco. He reports previous alcohol use. He reports that he does not use drugs.    Family History   I have reviewed this patient's family history and updated it with pertinent information if needed.   Family History   Problem Relation Age of Onset     Cerebrovascular Disease Mother         later in life     Hypertension Father      Bladder Cancer Father      Myocardial Infarction Paternal Grandmother      Diabetes No family hx of      Prostate Cancer No family hx of      Colon Cancer No family hx of        Review of Systems   The 10 point Review of Systems is negative other than noted in the HPI.     Physical Exam   Temp: 97.6  F (36.4  C) Temp src: Axillary BP: 130/72 Pulse: 86   Resp: 18 SpO2: 100 % O2 Device: Nasal cannula Oxygen Delivery: 2.5 LPM  Vital Signs with Ranges  Temp:  [95.6  F (35.3  C)-97.6  F (36.4  C)] 97.6  F (36.4  C)  Pulse:  [73-86] 86  Resp:  [13-26] 18  BP: (117-145)/(61-85) 130/72  SpO2:  [95 %-100 %] 100 %  178 lbs 3.2 oz    GENERAL: healthy, alert, NAD  HEENT:  Normocephalic. No gross abnormalities.  Pupils equal.  MMM.  Dentition is ok.  CV: RRR, no murmurs, no clicks, gallops, or rubs, no edema, no carotid bruits  RESP: Clear bilaterally with good efforts  GI:  Abdomen soft/nt/nd, BS normal. No masses, organomegaly  MUSCULOSKELETAL: extremities nl - no gross deformities noted  SKIN: no suspicious lesions or rashes, dry to touch  NEURO:  LE strength not tested as he is in bed undergoing HD.    PSYCH: mood good, affect appropriate  LYMPH: No palpable ant/post cervical and supraclavicular adenopathy    Data   BMP  Recent Labs   Lab 01/12/21  0700 01/11/21  0911    139   POTASSIUM 5.7* 5.2   CHLORIDE 104 105   CHRISTINE 9.5 10.1   CO2 26 27   BUN 53* 43*   CR 5.34* 4.75*   GLC 98 122*     [email or web address](phos:[email or web address])  CBC)  Recent Labs   Lab 01/11/21  0911   WBC 8.8   HGB 10.9*   HCT 35.9*   *

## 2021-01-12 NOTE — PLAN OF CARE
PT: Orders received, chart reviewed, attempted to see patient for PT eval and pt out of the room at dialysis.

## 2021-01-12 NOTE — PLAN OF CARE
Covid neg. VSS on RA. Ax2, turn and repo. PT has chronic back pain controlled w/ Tylenol. Denies chest pain, has baseline neuropathy in feet and right shoulder pain. Tele is NS, has permanent ICD. Has left AV fistula, bruit and thrill present. MRI and dialysis scheduled tomorrow. PT/OT consults after MRI. Continue to monitor.      Observation Goals:    -diagnostic tests and consults completed and resulted - not met  -vital signs normal or at patient baseline - met  -safe disposition plan has been identified - not met  Nurse to notify provider when observation goals have been met and patient is ready for discharge.

## 2021-01-13 ENCOUNTER — APPOINTMENT (OUTPATIENT)
Dept: MRI IMAGING | Facility: CLINIC | Age: 76
End: 2021-01-13
Attending: NURSE PRACTITIONER
Payer: MEDICARE

## 2021-01-13 LAB
ALBUMIN SERPL-MCNC: 2.7 G/DL (ref 3.4–5)
ANION GAP SERPL CALCULATED.3IONS-SCNC: 6 MMOL/L (ref 3–14)
BASOPHILS # BLD AUTO: 0 10E9/L (ref 0–0.2)
BASOPHILS NFR BLD AUTO: 0.5 %
BUN SERPL-MCNC: 25 MG/DL (ref 7–30)
CALCIUM SERPL-MCNC: 9 MG/DL (ref 8.5–10.1)
CHLORIDE SERPL-SCNC: 105 MMOL/L (ref 94–109)
CO2 SERPL-SCNC: 29 MMOL/L (ref 20–32)
CREAT SERPL-MCNC: 3.42 MG/DL (ref 0.66–1.25)
DIFFERENTIAL METHOD BLD: ABNORMAL
EOSINOPHIL # BLD AUTO: 0.1 10E9/L (ref 0–0.7)
EOSINOPHIL NFR BLD AUTO: 1.7 %
ERYTHROCYTE [DISTWIDTH] IN BLOOD BY AUTOMATED COUNT: 15.4 % (ref 10–15)
GFR SERPL CREATININE-BSD FRML MDRD: 17 ML/MIN/{1.73_M2}
GLUCOSE SERPL-MCNC: 85 MG/DL (ref 70–99)
HCT VFR BLD AUTO: 32.8 % (ref 40–53)
HGB BLD-MCNC: 10.5 G/DL (ref 13.3–17.7)
IMM GRANULOCYTES # BLD: 0 10E9/L (ref 0–0.4)
IMM GRANULOCYTES NFR BLD: 0.3 %
LYMPHOCYTES # BLD AUTO: 0.6 10E9/L (ref 0.8–5.3)
LYMPHOCYTES NFR BLD AUTO: 9.9 %
MCH RBC QN AUTO: 32.9 PG (ref 26.5–33)
MCHC RBC AUTO-ENTMCNC: 32 G/DL (ref 31.5–36.5)
MCV RBC AUTO: 103 FL (ref 78–100)
MONOCYTES # BLD AUTO: 0.4 10E9/L (ref 0–1.3)
MONOCYTES NFR BLD AUTO: 6.6 %
NEUTROPHILS # BLD AUTO: 4.9 10E9/L (ref 1.6–8.3)
NEUTROPHILS NFR BLD AUTO: 81 %
NRBC # BLD AUTO: 0 10*3/UL
NRBC BLD AUTO-RTO: 0 /100
PHOSPHATE SERPL-MCNC: 2.9 MG/DL (ref 2.5–4.5)
PLATELET # BLD AUTO: 114 10E9/L (ref 150–450)
POTASSIUM SERPL-SCNC: 4.4 MMOL/L (ref 3.4–5.3)
RBC # BLD AUTO: 3.19 10E12/L (ref 4.4–5.9)
SODIUM SERPL-SCNC: 140 MMOL/L (ref 133–144)
WBC # BLD AUTO: 6.1 10E9/L (ref 4–11)

## 2021-01-13 PROCEDURE — G0378 HOSPITAL OBSERVATION PER HR: HCPCS

## 2021-01-13 PROCEDURE — 72148 MRI LUMBAR SPINE W/O DYE: CPT | Mod: MG

## 2021-01-13 PROCEDURE — 250N000013 HC RX MED GY IP 250 OP 250 PS 637: Mod: GY | Performed by: PHYSICIAN ASSISTANT

## 2021-01-13 PROCEDURE — 36415 COLL VENOUS BLD VENIPUNCTURE: CPT | Performed by: INTERNAL MEDICINE

## 2021-01-13 PROCEDURE — G1004 CDSM NDSC: HCPCS

## 2021-01-13 PROCEDURE — 85025 COMPLETE CBC W/AUTO DIFF WBC: CPT | Performed by: INTERNAL MEDICINE

## 2021-01-13 PROCEDURE — 250N000013 HC RX MED GY IP 250 OP 250 PS 637: Mod: GY | Performed by: NURSE PRACTITIONER

## 2021-01-13 PROCEDURE — 250N000013 HC RX MED GY IP 250 OP 250 PS 637: Performed by: PHYSICIAN ASSISTANT

## 2021-01-13 PROCEDURE — 99225 PR SUBSEQUENT OBSERVATION CARE,LEVEL II: CPT | Performed by: PHYSICIAN ASSISTANT

## 2021-01-13 PROCEDURE — 80069 RENAL FUNCTION PANEL: CPT | Performed by: INTERNAL MEDICINE

## 2021-01-13 RX ADMIN — LEVOTHYROXINE SODIUM 50 MCG: 50 TABLET ORAL at 08:59

## 2021-01-13 RX ADMIN — Medication 1 CAPSULE: at 08:58

## 2021-01-13 RX ADMIN — CLOPIDOGREL BISULFATE 75 MG: 75 TABLET ORAL at 08:59

## 2021-01-13 RX ADMIN — LISINOPRIL 2.5 MG: 2.5 TABLET ORAL at 08:59

## 2021-01-13 RX ADMIN — LISINOPRIL 2.5 MG: 2.5 TABLET ORAL at 21:42

## 2021-01-13 RX ADMIN — MEXILETINE HYDROCHLORIDE 150 MG: 150 CAPSULE ORAL at 21:42

## 2021-01-13 RX ADMIN — OMEPRAZOLE 20 MG: 20 CAPSULE, DELAYED RELEASE ORAL at 12:32

## 2021-01-13 RX ADMIN — PRAVASTATIN SODIUM 40 MG: 40 TABLET ORAL at 21:43

## 2021-01-13 RX ADMIN — APIXABAN 2.5 MG: 2.5 TABLET, FILM COATED ORAL at 21:42

## 2021-01-13 RX ADMIN — ISOSORBIDE MONONITRATE 15 MG: 30 TABLET, EXTENDED RELEASE ORAL at 21:43

## 2021-01-13 RX ADMIN — METOPROLOL SUCCINATE 12.5 MG: 25 TABLET, EXTENDED RELEASE ORAL at 08:59

## 2021-01-13 RX ADMIN — MEXILETINE HYDROCHLORIDE 150 MG: 150 CAPSULE ORAL at 08:58

## 2021-01-13 RX ADMIN — APIXABAN 2.5 MG: 2.5 TABLET, FILM COATED ORAL at 08:59

## 2021-01-13 RX ADMIN — ACETAMINOPHEN 650 MG: 325 TABLET, FILM COATED ORAL at 09:10

## 2021-01-13 ASSESSMENT — MIFFLIN-ST. JEOR: SCORE: 1509.65

## 2021-01-13 NOTE — PROGRESS NOTES
[facility]    Medicine Progress Note - Hospitalist Service       Date of Admission:  1/11/2021  Assessment & Plan       Westley Ness is a 75 year old male admitted on 1/11/2021. He presents to the emergency department with generalized weakness.  Past medical history includes ESRD on hemodialysis MWF, GERD, CAD, hypertension, hypothyroidism, diabetes mellitus, type II, CHF and atrial flutter.     Generalized weakness with worsening lower extremity stiffness  Probable L3 and L4 compression fractures  Patient notes increased weakness when attempting to stand out of bed and gain his balance, endorses more difficulty walking related to stiffness in lower extremities. No recent falls or trauma. Has had low back pain since an accident in the 80s. Denies sensation changes in lower extremities, bilaterally. Denies any dizziness, lightheadedness, fever/chills.  Exam shows tenderness at L3-L5, sacral area, sensation intact. ED XR lumbar is significant osteopenia, deformity at superior L4 endplate which could represent a fracture, subtle deformity at L3 which could represent a fracture, degenerative changes L5-S1. BMP, CBC WNL aside from known CKD and anemia.   -- Pain control with tylenol  -- MRI ordered on admission, delayed due to PPM and need for rep. Preliminary results today with recent appearing fractures of L3 with no significant spinal canal stenosis  -- Awaiting PT eval. Discussed with patient that he will likely need TCU. Also discussed long term living arrangements moving forward. Lives alone with 12 steps in his home  -  consult  --Continue observation status        ESRD on hemodialysis, (~15 years, MWF)  Patient missed his dialysis on 1/11.  --Nephrology consult completed for dialysis 1/12 with 2 L removed. Had planned for dialysis today to get back on normal schedule but delayed due to staffing. Dialysis planned for tomorrow     #Chronic systolic dysfunction with ischemic  "cardiomyopathy s/p AICD placement   #CAD s/p FOREST to proximal OM1 (7/2016) with subsequent in-stent restenosis s/p angioplasty (11/2016), FOREST to LAD and OM (2/2017), and PCI of the OM1 (10/2020); chronically occluded LAD  #Recent admission for dizziness and chest pain, 10/2020  Underwent NM stress test on 10/16/2020 showing moderate area of lateral ischemia and filippo-infarct ischemia of inferior wall, echocardiogram on 10/16 showed EF of 20-25%, severe global hypokinesis, akinesis of inferoseptal and apical.  Patient underwent coronary angiography and had a stent placed to OM1 on 10/19/2020 (started on DAPT with plan for Plavix/Eliquis x6 months).  While admitted had nonsustained VT, in 3/2020 had Zio patch showing NSVT up to 14 beats. Typically follows with Dr. eSn, most recently seen as a virtual visit on 10/28/2020 by Lacy Taylor CNP. CXR 1/11 shows mild pul vascular congestion without pul edema.   [Plavix 75 mg po every day, Imdur 15 mg TID on non-HD days and 30 mg on HD days, Lisinopril 2.5 mg BID, Toprol XL 12.5 mg every day, Pravastatin 40 mg qd]  --AICD interrogation, performed on 12/2/2020  --Continues on PTA Metoprolol XL, Lisinopril and Mexitil. Adjusting Imdur as he takes different doses on dialysis vs nondialysis days and he is current off schedule  --Continue Eliquis and Plavix   --Telemetry      Parox atrial flutter s/p ablations x 2, 2017: Continue PTA Metoprolol, Mexitil, Eliquis        Diabetes Mellitus, type II, well controlled: Diet controlled, most recent HgbA1c of 5.1 in 03/2019  --Sliding scale insulin medium coverage while admitted     Hypothyroidism: TSH 4.4   --Continue PTA Levothyroxine     Goals of care  Discussed by admitting provider Lulú Feldman CNP--\"Patient is familiar with the discussion of CODE STATUS, he states he typically elects to be full code, and did ultimately choose to continue with that today.  However we did have a long discussion regarding the chances of survival showed " "a full cardiac cardiopulmonary arrest occur, the patient does not want to be \" a vegetable\", and states he has 2 decision-makers who are noted in his emergency contacts.  --Given his overall fragility, ESRD and EF 20%, I encouraged him to continue thinking about his status and overall goals of care, which he agrees he needs to do.\"       Diet: Regular Diet Adult    DVT Prophylaxis: Eliquis  Velarde Catheter: not present  Code Status: Full Code           Disposition Plan   Expected discharge: MEdically stable. Awaiting TCU placement  Entered: Eve Barroso PA-C 01/13/2021, 2:57 PM       The patient's care was discussed with the Bedside Nurse and Patient.    Eve Barroso PA-C  Hospitalist Service  [facility]  Contact information available via [facility] Paging/Directory    ______________________________________________________________________    Interval History   Minimal pain. Discussed results of MRI and and no clear \"fixable\" etiology for his weakness. Discussed need for TCU and patient in agreement. Also spent some time discussing with Bill his long term prognosis and living plans.     Data reviewed today: I reviewed all medications, new labs and imaging results over the last 24 hours. I personally reviewed no images or EKG's today.    Physical Exam   Vital Signs: Temp: 96.3  F (35.7  C) Temp src: Oral BP: (!) 142/67 Pulse: 76   Resp: 18 SpO2: 93 % O2 Device: None (Room air)    Weight: 176 lbs 6.4 oz  Physical Exam  Vitals signs and nursing note reviewed.   Constitutional:       Appearance: Normal appearance.   HENT:      Head: Normocephalic and atraumatic.   Cardiovascular:      Rate and Rhythm: Normal rate and regular rhythm.      Heart sounds: Murmur present.   Pulmonary:      Effort: Pulmonary effort is normal.      Breath sounds: Normal breath sounds. No rales.   Abdominal:      General: Abdomen is flat.      Tenderness: There is no abdominal tenderness.   Musculoskeletal: " Normal range of motion.      Right lower leg: No edema.      Left lower leg: No edema.   Neurological:      General: No focal deficit present.      Mental Status: He is alert.   Psychiatric:         Mood and Affect: Mood normal.         Thought Content: Thought content normal.           Data   Recent Labs   Lab 01/13/21  0718 01/12/21  0700 01/11/21  0911   WBC 6.1 7.1 8.8   HGB 10.5* 10.8* 10.9*   * 107* 107*   * 131* 156    137 139   POTASSIUM 4.4 5.7* 5.2   CHLORIDE 105 104 105   CO2 29 26 27   BUN 25 53* 43*   CR 3.42* 5.34* 4.75*   ANIONGAP 6 7 7   CHRISTINE 9.0 9.5 10.1   GLC 85 98 122*   ALBUMIN 2.7*  --   --      Recent Results (from the past 24 hour(s))   MR Lumbar Spine w/o Contrast    Narrative    MRI OF THE LUMBAR SPINE WITHOUT CONTRAST 1/13/2021 12:01 PM     COMPARISON: Lumbar spine x-ray series 1/11/2021    HISTORY: Abnormal x-ray, L/S-spine, degenerative joint disease.    TECHNIQUE: Multiplanar, multisequence MRI images of the lumbar spine  were acquired without IV contrast.    FINDINGS: There are five lumbar-type vertebrae for the purposes of  this dictation.     There is normal alignment of the lumbar vertebrae. There is mild  recent-appearing compression fracture deformity of the superior aspect  of the L3 vertebral body. There is a Schmorl's node deformity in the  superior L4 endplate. Vertebral body heights and contours of the  lumbar spine are otherwise normal. Other than bone marrow edema in the  superior half of the L3 vertebral body related to the recent  compression fracture, marrow signal throughout the lumbar vertebrae is  normal.    There is loss of disc height, disc desiccation and posterior disc  bulging to varying degrees at all levels of the lumbar spine with the  exception of the relatively normal-appearing L1-L2 disc.    The tip of the conus medullaris is at the mid L1 level which is within  normal limits. There is no evidence for intrathecal abnormality.     Level by  level:     T12-L1: Normal disc height and signal. No herniation. Normal facets.  No spinal canal stenosis. No foraminal stenosis on either side.     L1-L2: Normal disc height and signal. No herniation. Mild facet  arthropathy bilaterally. No spinal canal stenosis. No foraminal  stenosis on either side.     L2-L3: Loss of disc height, disc desiccation and mild circumferential  disc bulging. No herniation. Mild facet arthropathy bilaterally.  Minimal spinal canal narrowing. Mild bilateral neural foraminal  narrowing.    L3-L4: Loss of disc height, disc desiccation and moderate  circumferential disc bulging. No herniation. Mild facet arthropathy  bilaterally. Mild ligamentum flavum thickening bilaterally. Minimal  spinal canal narrowing. Mild-moderate bilateral neural foraminal  narrowing.    L4-L5: Loss of disc height, disc desiccation and moderate  circumferential disc bulging. No herniation. Moderate facet  arthropathy bilaterally. Mild spinal canal narrowing with moderate  narrowing of the lateral recesses of the spinal canal bilaterally.  Moderate-severe left foraminal stenosis. Moderate right foraminal  stenosis.    L5-S1: Loss of disc height, disc desiccation and mild circumferential  disc bulging. No herniation. Moderate facet arthropathy bilaterally.  Mild spinal canal narrowing. Moderate left foraminal stenosis.  Moderate-severe right foraminal stenosis.      Impression    IMPRESSION:  1. Degenerative change of the lumbar spine as detailed above.  2. Recent-appearing mild compression fracture deformity of the  superior aspect of the L3 vertebral body. No other recent fractures.  3. No significant spinal canal stenosis of the lumbar spine.  4. Moderate narrowing of the lateral recesses of the spinal canal  bilaterally at the L4-L5 level. No other significant lateral recess  narrowing.  5. Moderate-to-severe neural foraminal stenosis bilaterally at L4-L5  and bilaterally at L5-S1.    YULY GONZÁLES MD

## 2021-01-13 NOTE — PROGRESS NOTES
Observation goals PRIOR TO DISCHARGE     Comments: -diagnostic tests and consults completed and resulted: NOT MET: PT/OT CONSULT  -vital signs normal or at patient baseline: MET   -safe disposition plan has been identified: NOT MET: AWAITING TCU PLACEMENT   Nurse to notify provider when observation goals have been met and patient is ready for discharge.

## 2021-01-13 NOTE — PLAN OF CARE
A&Ox4. VSS on RA.  L limb alert. Tylenol x 1 for generalized pain. Tele Afib CVR. Tolerating regular diet. Ax2 gb/w to bsc. Fistula WNL, bruit and thrill present. Makes very little urine. No BM this shift. IVSL. Scab on top of head. Trace generalized edema. Plan is for MRI today at 10 am. PT, OT consults pending. Also, Dialysis again today. Discharge pending.

## 2021-01-13 NOTE — PROGRESS NOTES
Observation goals PRIOR TO DISCHARGE     Comments: -diagnostic tests and consults completed and resulted: not met: PT/OT, MRI  -vital signs normal or at patient baseline: met   -safe disposition plan has been identified: partially met  Nurse to notify provider when observation goals have been met and patient is ready for discharge.

## 2021-01-13 NOTE — PLAN OF CARE
AO. VSS on RA. 1-2A+GB+W w/ pivot to w/c. Tele: Afib, CVR. Pacemaker. Reg diet: minimal appetite. Pt had dialysis today: reported nausea, dizziness, pain in RUE: resolved. +thrill/bruit. C/o pain in BL LE: tylenol x2: effective. +2 edema BL LE, +1 BL UE. MRI scheduled 1000 12/13/2020. PT/OT consult pending. Care coordinator consult in place.

## 2021-01-13 NOTE — UTILIZATION REVIEW
"Admission Status; Secondary Review Determination     Admission Date: 1/11/2021  8:35 AM       Under the authority of the Utilization Management Committee, the utilization review process indicated a secondary review on the above patient.  The review outcome is based on review of the medical records, discussions with staff, and applying clinical experience noted on the date of the review.          (x) Observation Status Appropriate - This patient does not meet hospital inpatient criteria and is placed in observation status. If this patient's primary payer is Medicare and was admitted as an inpatient, Condition Code 44 should be used and patient status changed to \"observation\".       RATIONALE FOR DETERMINATION      Brief clinical presentation, information copied from the chart, abbreviated and edited for relevant content:     Patient is a disposition issue right now with no criteria to advance to IP.       Westley Ness is a 75 year old male admitted on 1/11/2021. He presented to the emergency department with generalized weakness.  Past medical history includes ESRD on hemodialysis MWF, GERD, CAD, hypertension, hypothyroidism, diabetes mellitus, type II, CHF and atrial flutter. Patient with increased weakness when attempting to stand out of bed and gain his balance, endorses more difficulty walking related to stiffness in lower extremities. Has had low back pain since an accident in the 80s. Denies sensation changes in lower extremities, bilaterally.. ED XR lumbar is significant osteopenia, deformity at superior L4 endplate which could represent a fracture, subtle deformity at L3 which could represent a fracture, degenerative changes L5-S1.  Pain control with tylenol. MRI ordered on admission, delayed due to PPM and need for rep. Prilimarnary results today with recent appearing fractures of L3 with no significant spinal canal stenosis. Awaiting PT eval. Discussed with patient that he will likely need TCU. Also " discussed long term living arrangements moving forward. Lives alone with 12 steps in his home.      The information on this document is developed by the utilization review team in order for the business office to ensure compliance.  This only denotes the appropriateness of proper admission status and does not reflect the quality of care rendered.         The definitions of Inpatient Status and Observation Status used in making the determination above are those provided in the CMS Coverage Manual, Chapter 1 and Chapter 6, section 70.4.      Sincerely,     Macey Cabrera MD   Utilization Review/ Case Management  [facility].

## 2021-01-13 NOTE — PROGRESS NOTES
Due to staffing issues, we will plan on hemodialysis tomorrow.    We will get him back on MWF schedule at a later date.      Marlo Tabor MD

## 2021-01-13 NOTE — PROGRESS NOTES
Observation goals PRIOR TO DISCHARGE     Comments: -diagnostic tests and consults completed and resulted: not met: PT/OT  -vital signs normal or at patient baseline: met   -safe disposition plan has been identified: not met   Nurse to notify provider when observation goals have been met and patient is ready for discharge.

## 2021-01-13 NOTE — PROGRESS NOTES
Observation Note      -diagnostic tests and consults completed and resulted. Not met - MRI tomorrow. PT/OT consults pending.   -vital signs normal or at patient baseline. Met.      Nurse to notify provider when observation goals have been met and patient is ready for discharge.

## 2021-01-14 ENCOUNTER — APPOINTMENT (OUTPATIENT)
Dept: PHYSICAL THERAPY | Facility: CLINIC | Age: 76
End: 2021-01-14
Attending: NURSE PRACTITIONER
Payer: MEDICARE

## 2021-01-14 ENCOUNTER — HEALTH MAINTENANCE LETTER (OUTPATIENT)
Age: 76
End: 2021-01-14

## 2021-01-14 PROBLEM — D63.8 ANEMIA OF CHRONIC DISEASE: Status: ACTIVE | Noted: 2021-01-14

## 2021-01-14 PROBLEM — I47.29 PAROXYSMAL VENTRICULAR TACHYCARDIA (H): Status: ACTIVE | Noted: 2021-01-14

## 2021-01-14 LAB
ALBUMIN SERPL-MCNC: 3 G/DL (ref 3.4–5)
ANION GAP SERPL CALCULATED.3IONS-SCNC: 7 MMOL/L (ref 3–14)
BUN SERPL-MCNC: 34 MG/DL (ref 7–30)
CALCIUM SERPL-MCNC: 9.2 MG/DL (ref 8.5–10.1)
CHLORIDE SERPL-SCNC: 104 MMOL/L (ref 94–109)
CO2 SERPL-SCNC: 28 MMOL/L (ref 20–32)
CREAT SERPL-MCNC: 4.55 MG/DL (ref 0.66–1.25)
GFR SERPL CREATININE-BSD FRML MDRD: 12 ML/MIN/{1.73_M2}
GLUCOSE SERPL-MCNC: 101 MG/DL (ref 70–99)
PHOSPHATE SERPL-MCNC: 3 MG/DL (ref 2.5–4.5)
POTASSIUM SERPL-SCNC: 4.5 MMOL/L (ref 3.4–5.3)
SODIUM SERPL-SCNC: 139 MMOL/L (ref 133–144)

## 2021-01-14 PROCEDURE — G0257 UNSCHED DIALYSIS ESRD PT HOS: HCPCS

## 2021-01-14 PROCEDURE — 250N000013 HC RX MED GY IP 250 OP 250 PS 637: Performed by: NURSE PRACTITIONER

## 2021-01-14 PROCEDURE — 97530 THERAPEUTIC ACTIVITIES: CPT | Mod: GP

## 2021-01-14 PROCEDURE — 36415 COLL VENOUS BLD VENIPUNCTURE: CPT | Performed by: INTERNAL MEDICINE

## 2021-01-14 PROCEDURE — 90935 HEMODIALYSIS ONE EVALUATION: CPT | Performed by: INTERNAL MEDICINE

## 2021-01-14 PROCEDURE — 99225 PR SUBSEQUENT OBSERVATION CARE,LEVEL II: CPT | Performed by: PHYSICIAN ASSISTANT

## 2021-01-14 PROCEDURE — 80069 RENAL FUNCTION PANEL: CPT | Performed by: INTERNAL MEDICINE

## 2021-01-14 PROCEDURE — 634N000001 HC RX 634: Performed by: INTERNAL MEDICINE

## 2021-01-14 PROCEDURE — 90937 HEMODIALYSIS REPEATED EVAL: CPT

## 2021-01-14 PROCEDURE — 250N000013 HC RX MED GY IP 250 OP 250 PS 637: Mod: GY | Performed by: PHYSICIAN ASSISTANT

## 2021-01-14 PROCEDURE — G0378 HOSPITAL OBSERVATION PER HR: HCPCS

## 2021-01-14 PROCEDURE — 258N000003 HC RX IP 258 OP 636: Performed by: INTERNAL MEDICINE

## 2021-01-14 PROCEDURE — 97161 PT EVAL LOW COMPLEX 20 MIN: CPT | Mod: GP

## 2021-01-14 PROCEDURE — 250N000011 HC RX IP 250 OP 636: Performed by: INTERNAL MEDICINE

## 2021-01-14 RX ORDER — HEPARIN SODIUM 1000 [USP'U]/ML
500 INJECTION, SOLUTION INTRAVENOUS; SUBCUTANEOUS CONTINUOUS
Status: DISCONTINUED | OUTPATIENT
Start: 2021-01-14 | End: 2021-01-14

## 2021-01-14 RX ORDER — HEPARIN SODIUM 1000 [USP'U]/ML
500 INJECTION, SOLUTION INTRAVENOUS; SUBCUTANEOUS
Status: DISCONTINUED | OUTPATIENT
Start: 2021-01-14 | End: 2021-01-14

## 2021-01-14 RX ORDER — DOXERCALCIFEROL 4 UG/2ML
1 INJECTION INTRAVENOUS
Status: COMPLETED | OUTPATIENT
Start: 2021-01-14 | End: 2021-01-14

## 2021-01-14 RX ADMIN — MEXILETINE HYDROCHLORIDE 150 MG: 150 CAPSULE ORAL at 08:49

## 2021-01-14 RX ADMIN — ACETAMINOPHEN 650 MG: 325 TABLET, FILM COATED ORAL at 07:58

## 2021-01-14 RX ADMIN — ISOSORBIDE MONONITRATE 30 MG: 30 TABLET, EXTENDED RELEASE ORAL at 16:59

## 2021-01-14 RX ADMIN — APIXABAN 2.5 MG: 2.5 TABLET, FILM COATED ORAL at 20:13

## 2021-01-14 RX ADMIN — Medication 1 CAPSULE: at 08:49

## 2021-01-14 RX ADMIN — DOXERCALCIFEROL 1 MCG: 4 INJECTION, SOLUTION INTRAVENOUS at 15:06

## 2021-01-14 RX ADMIN — PRAVASTATIN SODIUM 40 MG: 40 TABLET ORAL at 21:44

## 2021-01-14 RX ADMIN — ACETAMINOPHEN 650 MG: 325 TABLET, FILM COATED ORAL at 20:13

## 2021-01-14 RX ADMIN — CLOPIDOGREL BISULFATE 75 MG: 75 TABLET ORAL at 08:49

## 2021-01-14 RX ADMIN — SODIUM CHLORIDE 250 ML: 9 INJECTION, SOLUTION INTRAVENOUS at 15:04

## 2021-01-14 RX ADMIN — APIXABAN 2.5 MG: 2.5 TABLET, FILM COATED ORAL at 08:49

## 2021-01-14 RX ADMIN — EPOETIN ALFA-EPBX 1000 UNITS: 2000 INJECTION, SOLUTION INTRAVENOUS; SUBCUTANEOUS at 15:18

## 2021-01-14 RX ADMIN — MEXILETINE HYDROCHLORIDE 150 MG: 150 CAPSULE ORAL at 20:13

## 2021-01-14 RX ADMIN — OMEPRAZOLE 20 MG: 20 CAPSULE, DELAYED RELEASE ORAL at 16:55

## 2021-01-14 RX ADMIN — LISINOPRIL 2.5 MG: 2.5 TABLET ORAL at 20:13

## 2021-01-14 RX ADMIN — SODIUM CHLORIDE 300 ML: 9 INJECTION, SOLUTION INTRAVENOUS at 15:03

## 2021-01-14 RX ADMIN — ACETAMINOPHEN 650 MG: 325 TABLET, FILM COATED ORAL at 01:13

## 2021-01-14 RX ADMIN — METOPROLOL SUCCINATE 12.5 MG: 25 TABLET, EXTENDED RELEASE ORAL at 08:49

## 2021-01-14 RX ADMIN — LISINOPRIL 2.5 MG: 2.5 TABLET ORAL at 08:49

## 2021-01-14 RX ADMIN — LEVOTHYROXINE SODIUM 50 MCG: 50 TABLET ORAL at 08:49

## 2021-01-14 ASSESSMENT — MIFFLIN-ST. JEOR: SCORE: 1480.61

## 2021-01-14 NOTE — PROGRESS NOTES
[facility]    Medicine Progress Note - Hospitalist Service       Date of Admission:  1/11/2021  Assessment & Plan             Westley Ness is a 75 year old male admitted on 1/11/2021. He presents to the emergency department with generalized weakness.  Past medical history includes ESRD on hemodialysis MWF, GERD, CAD, hypertension, hypothyroidism, diabetes mellitus, type II, CHF and atrial flutter.     Generalized weakness with worsening lower extremity stiffness  Acute L3 compression fracture  Patient notes increased weakness when attempting to get out of bed and gain his balance, endorses more difficulty walking related to stiffness in lower extremities. No recent falls or trauma. Denies sensation changes in lower extremities, bilaterally. Exam shows tenderness at L3-L5, sacral area, sensation intact. ED XR lumbar is significant osteopenia, deformity at superior L4 endplate which could represent a fracture, subtle deformity at L3 which could represent a fracture, degenerative changes L5-S1. BMP, CBC WNL aside from known CKD and anemia.   -- Pain control with tylenol  -- MRI ordered on admission, delayed due to PPM and need for rep. MRI 1/13/21 with recent appearing fractures of L3 with no significant spinal canal stenosis  -- PT recommending TCU and patient in agreement. SW assisting with placement tomorrow  -- Patient lives alone and overall manages well. Has no local family but a friend, Lynn, that helps him. He has 12-14 steps he needs to navigate to live in his home. Had extensive discussion 1/13 with regards to plans after stay at TCU. Encouraged him to consider moving to single level living environment to decrease risk of falls and maintain independence. Initially in agreement but now thinking of having chair lifts installed in his home while he is at TCU      ESRD on hemodialysis, (~15 years, MWF)  Patient missed his dialysis on 1/11.  --Nephrology consult completed for  "dialysis 1/12 and 1/14. SW assisted in arranging rides from TCU for early chair time and primary dialysis facility. Nephrology planning limited run tomorrow prior to TCU discharge     Chronic systolic dysfunction with ischemic cardiomyopathy s/p AICD placement   CAD s/p FOREST to proximal OM1 (7/2016) with subsequent in-stent restenosis s/p angioplasty (11/2016), FOREST to LAD and OM (2/2017), and PCI of the OM1 (10/2020); chronically occluded LAD  H/o nonsustained ventricular tachycardia  Underwent NM stress test on 10/16/2020 showing moderate area of lateral ischemia and fliippo-infarct ischemia of inferior wall, echocardiogram on 10/16 showed EF of 20-25%, severe global hypokinesis, akinesis of inferoseptal and apical.  Patient underwent coronary angiography and had a stent placed to OM1 on 10/19/2020 (started on DAPT with plan for Plavix/Eliquis x6 months).  While admitted had nonsustained VT, in 3/2020 had Zio patch showing NSVT up to 14 beats.   [Plavix 75 mg po every day, Imdur 15 mg TID on non-HD days and 30 mg on HD days, Lisinopril 2.5 mg BID, Toprol XL 12.5 mg every day, Pravastatin 40 mg qd]  --Continues on PTA Metoprolol XL, Lisinopril and Imdur  -- Continues on Mexitil due to history of VT  --Continue Eliquis and Plavix   --Telemetry      Parox atrial flutter s/p ablations x 2: Continue PTA Metoprolol and Eliquis        Diabetes Mellitus, type II, well controlled: Diet controlled, most recent HgbA1c of 5.1 in 03/2019  --Sliding scale insulin medium coverage while admitted     Hypothyroidism: TSH 4.4   --Continue PTA Levothyroxine     Goals of care  Discussed by admitting provider Lulú Feldman CNP--\"Patient is familiar with the discussion of CODE STATUS, he states he typically elects to be full code, and did ultimately choose to continue with that today.  However we did have a long discussion regarding the chances of survival showed a full cardiac cardiopulmonary arrest occur, the patient does not want to be \" a " "vegetable\", and states he has 2 decision-makers who are noted in his emergency contacts.  --Given his overall fragility, ESRD and EF 20%, I encouraged him to continue thinking about his status and overall goals of care, which he agrees he needs to do.\"         Diet: Regular Diet Adult    DVT Prophylaxis: Eliquis  Velarde Catheter: not present  Code Status: Full Code           Disposition Plan   Expected discharge: Tomorrow to TCU  Entered: Eve Barroso PA-C 01/14/2021, 3:16 PM       The patient's care was discussed with the Bedside Nurse, Care Coordinator/ and Patient.    Eve Barroso PA-C  Hospitalist Service  [facility]  Contact information available via [facility] Paging/Directory    ______________________________________________________________________    Interval History   Doing well. Minimal pain. Just worked with PT. Planning to D/c to TCU. Hoping to have chair lifts installed in his home so he can stay there.     Data reviewed today: I reviewed all medications, new labs and imaging results over the last 24 hours. I personally reviewed no images or EKG's today.    Physical Exam   Vital Signs: Temp: 97.8  F (36.6  C) Temp src: Oral BP: 125/59 Pulse: 78   Resp: 16 SpO2: 100 % O2 Device: None (Room air)    Weight: 170 lbs 0 oz  Physical Exam  Vitals signs and nursing note reviewed.   Constitutional:       Appearance: Normal appearance. He is normal weight.   HENT:      Head: Normocephalic and atraumatic.   Eyes:      General: No scleral icterus.  Cardiovascular:      Rate and Rhythm: Normal rate and regular rhythm.      Heart sounds: No murmur.   Pulmonary:      Effort: Pulmonary effort is normal.      Breath sounds: No wheezing.   Abdominal:      General: Abdomen is flat.      Palpations: Abdomen is soft.      Tenderness: There is no abdominal tenderness.   Musculoskeletal:      Right lower leg: No edema.      Left lower leg: No edema.   Neurological:      General: " No focal deficit present.      Mental Status: He is alert.      Cranial Nerves: No cranial nerve deficit.   Psychiatric:         Mood and Affect: Mood normal.           Data

## 2021-01-14 NOTE — CONSULTS
Care Management Initial Consult    General Information  Assessment completed with: patient    Type of CM/SW Visit: Initial Assessment    Primary Care Provider verified and updated as needed:   Yes, MD Kolb at [facility]  Readmission within the last 30 days:           Advance Care Planning:     HCD on file       Communication Assessment  Patient's communication style: spoken language (English or Bilingual)    Hearing Difficulty or Deaf: no   Wear Glasses or Blind: yes    Cognitive  Cognitive/Neuro/Behavioral: WDL              Best Language: 0 - No aphasia       Living Environment:   People in home:     self  Current living Arrangements: house(3 stories)      Able to return to prior arrangements:  yes       Family/Social Support:  Care provided by:    Provides care for:                  Description of Support System:           Current Resources:   Skilled Home Care Services:    Community Resources:    Equipment currently used at home: walker, rolling  Supplies currently used at home:      Employment/Financial:  Employment Status:          Financial Concerns:             Lifestyle & Psychosocial Needs:        Socioeconomic History     Marital status: Single     Spouse name: Not on file     Number of children: Not on file     Years of education: Not on file     Highest education level: Not on file   Occupational History     Occupation: Retired - CPA     Tobacco Use     Smoking status: Former Smoker     Packs/day: 2.00     Years: 35.00     Pack years: 70.00     Types: Cigarettes     Start date:      Quit date: 1996     Years since quittin.2     Smokeless tobacco: Never Used   Substance and Sexual Activity     Alcohol use: Not Currently     Alcohol/week: 0.0 standard drinks     Frequency: Never     Drug use: No     Sexual activity: Never       Functional Status:  Prior to admission patient needed assistance: Anderson Oakes Diaylsis M/W/F   Patient has had HC services for 12 years to assist with; cleaning,  groceries, transportation (private vs through [facility])          Mental Health Status:          Chemical Dependency Status:                Values/Beliefs:  Spiritual, Cultural Beliefs, Mormonism Practices, Values that affect care:                 Additional Information:  SW consulted to assist with discharge planning, emotional support and transportation arrangements.  SW reviewed patient chart and discussed in hospital rounds. SW met with patient, introduced self/role and discussed discharge plan.  Patient is in agreement with TCU recommendations and names his TCU preference is [facility]. If bed not avail he would also consider [facility] and [facility].    SW sent TCU referrals via DOD and awaiting word regarding discharge readiness.    SW to continue to follow and assist with discharge planning.    ODETTE Pearson  Daytime (8:00am-4:30pm): [phone]  After-Hours SW Pager (4:30pm-11:30pm): [phone]         ODETTE Cuenca

## 2021-01-14 NOTE — PLAN OF CARE
OT: Orders received. Chart reviewed and discussed with care team. Pt admitted under observation status with weakness, unable to care for himself at home. Spoke with PT and recommending TCU, will defer OT to TCU or next level of care.  Will complete OT orders.

## 2021-01-14 NOTE — PROGRESS NOTES
Observation goals PRIOR TO DISCHARGE     Comments: -diagnostic tests and consults completed and resulted: not met: OT and SW consult  -vital signs normal or at patient baseline: met  -safe disposition plan has been identified: not met: TCU placement pending  Nurse to notify provider when observation goals have been met and patient is ready for discharge.

## 2021-01-14 NOTE — PROGRESS NOTES
Potassium   Date Value Ref Range Status   01/14/2021 4.5 3.4 - 5.3 mmol/L Final     Hemoglobin   Date Value Ref Range Status   01/13/2021 10.5 (L) 13.3 - 17.7 g/dL Final     Creatinine   Date Value Ref Range Status   01/14/2021 4.55 (H) 0.66 - 1.25 mg/dL Final     Urea Nitrogen   Date Value Ref Range Status   01/14/2021 34 (H) 7 - 30 mg/dL Final     Sodium   Date Value Ref Range Status   01/14/2021 139 133 - 144 mmol/L Final     INR   Date Value Ref Range Status   10/15/2020 1.31 (H) 0.86 - 1.14 Final     Results for MALDONADO LÓPEZ (MRN [id number]) as of 1/14/2021 13:54   Ref. Range 1/12/2021 09:15   Hep B Surface Agn Latest Ref Range: NR^Nonreactive  Nonreactive   Hepatitis B Surface Antibody Latest Ref Range: <8.00 m[IU]/mL 3.98     DIALYSIS PROCEDURE NOTE  Hepatitis status of previous patient on machine log was checked and verified ok to use with this patients hepatitis status.  Patient dialyzed for 3.5 hrs. on a K3 bath with a net fluid removal of  1.5L.    A BFR of 450 ml/min was obtained via a left upper-arm AV-fistula using 15 gauge needles.      The treatment plan was discussed with Dr. Tabor during the treatment.    Total heparin received during the treatment: 0 units- pt refused heparin.   Needle cannulation sites held x 20 min.   Meds  given: epogen, hectorol   Complications: none      Person educated: patient. Knowledge base: substantial. Barriers to learning: none. Educated on procedure via verbal mode. patient verbalized understanding. Pt prefers verbal education style.     ICEBOAT? Timeout performed pre-treatment  I: Patient was identified using 2 identifiers  C:  Consent         Informed Consent: verified  E: Equipment preventative maintenance is current and dialysis delivery system OK to use  B: see above  O: Dialysis orders present and complete prior to treatment  A: Vascular access verified and assessed prior to treatment  T: Treatment was performed at a clinically appropriate time  ?: Patient  was allowed to ask questions and address concerns prior to treatment  See flowsheet in EPIC for further details and post assessment.  Machine water alarm in place and functioning. Transducer pods intact and checked every 15min.   Pt returned via wheelchair.  Chlorine/Chloramine water system checked every 4 hours.  Outpatient Dialysis at [facility]

## 2021-01-14 NOTE — PLAN OF CARE
AO. VSS on RA ex htn. A1 GB/W. Tele: Afib, CVR. +1 edema in BL LE. Pt on hemodialyses: +bruit/+thrill. Reg diet: tolerating. Discharge pending TCU placement.

## 2021-01-14 NOTE — PLAN OF CARE
Physical Therapy Discharge Summary    Reason for therapy discharge:    Pt under observation status and initial  goals met, defer further therapy needs to TCU setting.    Progress towards therapy goal(s). See goals on Care Plan in [facility] electronic health record for goal details.  Goals met    Therapy recommendation(s):    Continued therapy is recommended.  Rationale/Recommendations:  defer further therapy to TCU setting.

## 2021-01-14 NOTE — PLAN OF CARE
AO. VSS on RA. A1 GB/W. Tele: Afib, CVR. Reg diet: tolerating: good appetite. Voiding. DACOSTA: fine crackles in BL LL: cough and deep breathing encouraged. +2 edema BL LE. MRI completed: mild L3 compression fx. Pain controlled with tylenol. Pt reports weakness in BL LE w/ weight bearing: PT/OT consult pending. Care coordinator consulted: discharge pending TCU placement.

## 2021-01-14 NOTE — PROGRESS NOTES
01/14/21 0853   Quick Adds   Type of Visit Initial PT Evaluation   Living Environment   People in home alone   Current Living Arrangements house  (3 stories)   Home Accessibility stairs to enter home;stairs within home   Number of Stairs, Main Entrance 10   Stair Railings, Main Entrance railings on both sides of stairs   Number of Stairs, Within Home, Primary 5   Stair Railings, Within Home, Primary railings on both sides of stairs   Living Environment Comments Enters from the garage, down to the basement and then up into the home   Self-Care   Usual Activity Tolerance fair   Current Activity Tolerance poor   Regular Exercise No   Equipment Currently Used at Home walker, rolling   Activity/Exercise/Self-Care Comment Has a person who cleans, runs errands, drives to dialysis since Nov.   Disability/Function   Fall history within last six months yes   Number of times patient has fallen within last six months 1   General Information   Onset of Illness/Injury or Date of Surgery 01/11/21   Referring Physician Lulú Feldman PA-C   Patient/Family Therapy Goals Statement (PT) Go to rehab   Pertinent History of Current Problem (include personal factors and/or comorbidities that impact the POC) Pt admitted with weakness, unable to care for himself at home. PMH: ESRD with dialysis, CAD, HTN, DM2, CHF, aflutter, hypothyroid.   Existing Precautions/Restrictions fall   Weight-Bearing Status - LLE full weight-bearing   Weight-Bearing Status - RLE full weight-bearing   Cognition   Orientation Status (Cognition) oriented x 4   Affect/Mental Status (Cognition) WNL   Follows Commands (Cognition) WNL   Pain Assessment   Patient Currently in Pain Yes, see Vital Sign flowsheet  (back, ankles and knees, mild)   Posture    Posture Forward head position;Protracted shoulders;Kyphosis   Range of Motion (ROM)   ROM Quick Adds ROM WFL   Strength   Strength Comments B hip flex 4/5, R knee ext limited by pain, L knee ext 4/5, B DF limited by  pain.   Bed Mobility   Comment (Bed Mobility) Supine to sit with SBA and use of rail   Transfers   Transfer Safety Comments Sit to stand with FWW and CGA with height of bed raised.   Gait/Stairs (Locomotion)   Comment (Gait/Stairs) Pt amb 10 ft with FWW and CGA, slow pace, shuffled gait and complaints of weakness and B ankle pain.   Balance   Balance Comments Balance dec with gait   Clinical Impression   Criteria for Skilled Therapeutic Intervention yes, treatment indicated   PT Diagnosis (PT) Difficulty ambulating   Influenced by the following impairments Pain, dec strength, balance, activity tolearnce   Functional limitations due to impairments Difficulty ambulating and transferring   Clinical Presentation Stable/Uncomplicated   Clinical Presentation Rationale medically improved   Clinical Decision Making (Complexity) low complexity   Therapy Frequency (PT) One time eval and treatment only   Predicted Duration of Therapy Intervention (days/wks) 1 day   Planned Therapy Interventions (PT) bed mobility training;gait training;transfer training   Risk & Benefits of therapy have been explained evaluation/treatment results reviewed   PT Discharge Planning    PT Discharge Recommendation (DC Rec) Transitional Care Facility   PT Rationale for DC Rec Pt is far below his baseline mobiility status and is agreeable to go to TCU. Would benefit from TCU level of care and continued skilled PT services to improve his impairments.   Total Evaluation Time   Total Evaluation Time (Minutes) 15

## 2021-01-14 NOTE — PROGRESS NOTES
Inpatient Dialysis Progress Note            Assessment and Plan:       Westley Ness is a 75 year old male with ESRD who was admitted on 1/11/2021.      1) ESRD due to HTN/DM - He dialyzes at [facility] under direction of Dr. Hammond/Arelis Stahl NP.  He runs 3.5 H via CASSIDY AVF to a target weight of 77 kg on a K3 bath at a GFR of 450 mL/min.  He uses low dose heparin.       2) Anemia:  HGB 10.9,  He receives  units.     3) MBD:  He is on Hectorol 1 mcg.  PTH as outpt 190.       4) L3 compression Fx, L4-L5 and L5-S1 neural foraminal stenoses bilat     Plan:     Next HD TBD  He may have to change his outpt schedule as he will need to arrange a ride.  His on time is 0530 which may be too early for ride services.                 Interval History:     Still LE weakness.  Planning TCU stay for PT etc.    Eating ok.  Denies n/v/f/c.    No dizziness/lightheadedness/cramping.    No abd pain/cp/sob.        Dialysis Parameters:     Wt Readings from Last 4 Encounters:   01/14/21 77.1 kg (170 lb)   12/06/20 77 kg (169 lb 12.1 oz)   10/29/20 79.4 kg (175 lb)   10/17/20 79.4 kg (175 lb)     I/O last 3 completed shifts:  In: 200 [P.O.:200]  Out: 50 [Urine:50]  BP Readings from Last 3 Encounters:   01/14/21 112/54   12/06/20 135/73   10/29/20 112/44       Routine, ONE TIME, Starting today For 1 Occurrences  Weight Loss (kg): 1.5  Dialysis Temp: 36.5  C  Access Device: AVF  Access Site: L arm  Dialyzer: Revaclear  Dialysis Bath: K 3  Blood Flow Rate (mL/min): 400  Total Treatment Time (hrs): 3.5  Heparin: low dose         Medications and Allergies:   Reviewed in EPIC      - MEDICATION INSTRUCTIONS for Dialysis Patients -   Does not apply See Admin Instructions     sodium chloride 0.9%  250 mL Intravenous Once in dialysis     sodium chloride 0.9%  300 mL Hemodialysis Machine Once     apixaban ANTICOAGULANT  2.5 mg Oral BID     clopidogrel  75 mg Oral Daily     doxercalciferol  1 mcg Intravenous Once in dialysis      epoetin wei-epbx (RETACRIT) inj ESRD  1,000 Units Intravenous Once     heparin (porcine)  500 Units Hemodialysis Machine Once in dialysis     [Held by provider] isosorbide mononitrate  15 mg Oral Once per day on Sun Tue Thu     [Held by provider] isosorbide mononitrate  30 mg Oral Once per day on Mon Wed Fri     levothyroxine  50 mcg Oral Daily     lisinopril  2.5 mg Oral BID     loperamide  2 mg Oral Once per day on Mon Wed Fri     loperamide  1 mg Oral Once per day on Sun Tue Thu Sat     metoprolol succinate ER  12.5 mg Oral Daily     mexiletine  150 mg Oral BID     multivitamin RENAL  1 capsule Oral Daily     omeprazole  20 mg Oral Daily before lunch     pravastatin  40 mg Oral Daily     sodium chloride 0.9%, acetaminophen, acetaminophen, lidocaine (buffered or not buffered), lidocaine (buffered or not buffered), melatonin, ondansetron **OR** ondansetron, - MEDICATION INSTRUCTIONS -     Allergies   Allergen Reactions     Contrast Dye Hives     Does fine if he uses benadryl prior.     No Clinical Screening - See Comments      Green beans - Diarrhea.    Topical antibiotic - name unknown - caused swelling of the penis.              Labs:     BMP  Recent Labs   Lab 01/14/21  0710 01/13/21  0718 01/12/21  0700 01/11/21  0911    140 137 139   POTASSIUM 4.5 4.4 5.7* 5.2   CHLORIDE 104 105 104 105   CHRISTINE 9.2 9.0 9.5 10.1   CO2 28 29 26 27   BUN 34* 25 53* 43*   CR 4.55* 3.42* 5.34* 4.75*   * 85 98 122*     CBC  Recent Labs   Lab 01/13/21  0718 01/12/21  0700 01/11/21  0911   WBC 6.1 7.1 8.8   HGB 10.5* 10.8* 10.9*   HCT 32.8* 35.3* 35.9*   * 107* 107*   * 131* 156     Lab Results   Component Value Date    AST 18 10/15/2020    ALT 19 10/15/2020    ALKPHOS 234 (H) 10/15/2020    BILITOTAL 1.0 10/15/2020    BILICONJ 0.0 08/18/2011            Physical Exam:   [facility]    Wt Readings from Last 3 Encounters:   01/14/21 77.1 kg (170 lb)   12/06/20 77 kg (169 lb 12.1 oz)   10/29/20  79.4 kg (175 lb)       Intake/Output Summary (Last 24 hours) at 1/14/2021 1311  Last data filed at 1/13/2021 2200  Gross per 24 hour   Intake 200 ml   Output 50 ml   Net 150 ml       GENERAL APPEARANCE: pleasant, no distress, a & o  HEENT:  Eyes/ears/nose grossly normal, neck supple  RESP: lungs clear to auscultation with good efforts, no crackles, rhonchi or wheezes  CV: regular rate and rhythm, normal S1 S2, no murmur, click or rub   ABDOMEN: soft, nontender, bowel sounds normal  EXTREMITIES/SKIN: no edema, no rashes or lesions       Pt seen on dialysis.  Stable run.  Good BFR.      Attestation:  I have reviewed today's vital signs, notes, medications, labs and imaging.     Marlo Tabor MD  [facility] - Nephrology  [phone]

## 2021-01-14 NOTE — PLAN OF CARE
Pt A&O x4, VSS, afebrile on RA. Some discomfort in harsh LE, prn tylenol. No c/o chest pain, or nausea. LS in lower lobes diminished, encouraged deep breathing. MRI completed, see results. Left arm fistula BOB. Dialysis plan for today 01/14. Plan: PT/OT consult pending, care coordinator/SW consulted for discharge planning, TCU placement. Tele: A-fib.

## 2021-01-14 NOTE — PROGRESS NOTES
Care Management Follow Up    Length of Stay (days): 0    Expected Discharge Date: 01/15/21     Concerns to be Addressed:     discharge planning  Patient plan of care discussed at interdisciplinary rounds: Yes    Anticipated Discharge Disposition:  TCU- [facility]     Anticipated Discharge Services:  rehab  Anticipated Discharge DME:      Patient/family educated on Medicare website which has current facility and service quality ratings:  yes  Education Provided on the Discharge Plan:  yes  Patient/Family in Agreement with the Plan:  yes    Referrals Placed by CM/SW:  TCU  Private pay costs discussed: private room/amenity fees and transportation costs    Additional Information:  -Patient clinically accepted by [facility] for admission on Fri 1/15 after 1200 (but not between 1430-155 during shift change).    -Patient likely to have a short dialysis run Friday morning to get back on M/W/F schedule.    -CLEVE scheduled transport via [employer] ([phone]) per patient choice/request. Scheduled with Meg to begin Monday 1/18 at 0515 (for run time [phone]) from [facility] and [facility]. Round trip cost is $80 of which patient approves, stating he needs a w/c transport for safety.    -CLEVE updated Oanh at [facility] who indicated she will have to make sure that they will have enough staff available at that time of day to get patient up and ready. Oanh to let this writer know.    CLEVE to continue to follow and assist with discharge planning.    ODETTE Pearson  Daytime (8:00am-4:30pm): [phone]  After-Hours SW Pager (4:30pm-11:30pm): [phone]           ODETTE Cuenca

## 2021-01-14 NOTE — PROGRESS NOTES
Observation goals PRIOR TO DISCHARGE     Comments: -diagnostic tests and consults completed and resulted: met  -vital signs normal or at patient baseline: met  -safe disposition plan has been identified: TCU placement pending  Nurse to notify provider when observation goals have been met and patient is ready for discharge.

## 2021-01-15 VITALS
OXYGEN SATURATION: 95 % | RESPIRATION RATE: 16 BRPM | BODY MASS INDEX: 25.36 KG/M2 | TEMPERATURE: 95.7 F | WEIGHT: 167.33 LBS | DIASTOLIC BLOOD PRESSURE: 58 MMHG | HEIGHT: 68 IN | HEART RATE: 85 BPM | SYSTOLIC BLOOD PRESSURE: 119 MMHG

## 2021-01-15 LAB
HGB BLD-MCNC: 10.5 G/DL (ref 13.3–17.7)
POTASSIUM SERPL-SCNC: 4.5 MMOL/L (ref 3.4–5.3)

## 2021-01-15 PROCEDURE — 250N000013 HC RX MED GY IP 250 OP 250 PS 637: Mod: GY | Performed by: HOSPITALIST

## 2021-01-15 PROCEDURE — 99207 PR APP CREDIT; MD BILLING SHARED VISIT: CPT | Performed by: PHYSICIAN ASSISTANT

## 2021-01-15 PROCEDURE — G0257 UNSCHED DIALYSIS ESRD PT HOS: HCPCS

## 2021-01-15 PROCEDURE — 90937 HEMODIALYSIS REPEATED EVAL: CPT

## 2021-01-15 PROCEDURE — 99217 PR OBSERVATION CARE DISCHARGE: CPT | Performed by: HOSPITALIST

## 2021-01-15 PROCEDURE — 84132 ASSAY OF SERUM POTASSIUM: CPT | Performed by: INTERNAL MEDICINE

## 2021-01-15 PROCEDURE — 250N000011 HC RX IP 250 OP 636: Performed by: INTERNAL MEDICINE

## 2021-01-15 PROCEDURE — 85018 HEMOGLOBIN: CPT | Performed by: INTERNAL MEDICINE

## 2021-01-15 PROCEDURE — 250N000013 HC RX MED GY IP 250 OP 250 PS 637: Performed by: PHYSICIAN ASSISTANT

## 2021-01-15 PROCEDURE — 634N000001 HC RX 634: Performed by: INTERNAL MEDICINE

## 2021-01-15 PROCEDURE — 258N000003 HC RX IP 258 OP 636: Performed by: INTERNAL MEDICINE

## 2021-01-15 PROCEDURE — 90935 HEMODIALYSIS ONE EVALUATION: CPT | Performed by: INTERNAL MEDICINE

## 2021-01-15 PROCEDURE — 36415 COLL VENOUS BLD VENIPUNCTURE: CPT | Performed by: INTERNAL MEDICINE

## 2021-01-15 PROCEDURE — 250N000013 HC RX MED GY IP 250 OP 250 PS 637: Performed by: NURSE PRACTITIONER

## 2021-01-15 PROCEDURE — G0378 HOSPITAL OBSERVATION PER HR: HCPCS

## 2021-01-15 RX ORDER — GABAPENTIN 100 MG/1
100 CAPSULE ORAL 3 TIMES DAILY
Qty: 90 CAPSULE | Refills: 0 | Status: SHIPPED | OUTPATIENT
Start: 2021-01-15 | End: 2021-01-15

## 2021-01-15 RX ORDER — GABAPENTIN 100 MG/1
100 CAPSULE ORAL 3 TIMES DAILY
Qty: 90 CAPSULE | Refills: 0 | DISCHARGE
Start: 2021-01-15 | End: 2021-03-12

## 2021-01-15 RX ORDER — GABAPENTIN 100 MG/1
100 CAPSULE ORAL 3 TIMES DAILY
Status: DISCONTINUED | OUTPATIENT
Start: 2021-01-15 | End: 2021-01-15 | Stop reason: HOSPADM

## 2021-01-15 RX ORDER — DOXERCALCIFEROL 4 UG/2ML
1 INJECTION INTRAVENOUS
Status: COMPLETED | OUTPATIENT
Start: 2021-01-15 | End: 2021-01-15

## 2021-01-15 RX ADMIN — SODIUM CHLORIDE 250 ML: 9 INJECTION, SOLUTION INTRAVENOUS at 08:48

## 2021-01-15 RX ADMIN — LISINOPRIL 2.5 MG: 2.5 TABLET ORAL at 12:25

## 2021-01-15 RX ADMIN — DOXERCALCIFEROL 1 MCG: 4 INJECTION, SOLUTION INTRAVENOUS at 10:06

## 2021-01-15 RX ADMIN — LOPERAMIDE HYDROCHLORIDE 2 MG: 2 CAPSULE ORAL at 12:24

## 2021-01-15 RX ADMIN — Medication 1 CAPSULE: at 12:25

## 2021-01-15 RX ADMIN — SODIUM CHLORIDE 300 ML: 9 INJECTION, SOLUTION INTRAVENOUS at 08:30

## 2021-01-15 RX ADMIN — GABAPENTIN 100 MG: 100 CAPSULE ORAL at 13:38

## 2021-01-15 RX ADMIN — LEVOTHYROXINE SODIUM 50 MCG: 50 TABLET ORAL at 12:24

## 2021-01-15 RX ADMIN — MEXILETINE HYDROCHLORIDE 150 MG: 150 CAPSULE ORAL at 12:25

## 2021-01-15 RX ADMIN — OMEPRAZOLE 20 MG: 20 CAPSULE, DELAYED RELEASE ORAL at 12:25

## 2021-01-15 RX ADMIN — EPOETIN ALFA-EPBX 1000 UNITS: 2000 INJECTION, SOLUTION INTRAVENOUS; SUBCUTANEOUS at 10:08

## 2021-01-15 RX ADMIN — METOPROLOL SUCCINATE 12.5 MG: 25 TABLET, EXTENDED RELEASE ORAL at 12:25

## 2021-01-15 RX ADMIN — ACETAMINOPHEN 650 MG: 325 TABLET, FILM COATED ORAL at 00:21

## 2021-01-15 RX ADMIN — ACETAMINOPHEN 650 MG: 325 TABLET, FILM COATED ORAL at 16:41

## 2021-01-15 RX ADMIN — APIXABAN 2.5 MG: 2.5 TABLET, FILM COATED ORAL at 12:24

## 2021-01-15 RX ADMIN — CLOPIDOGREL BISULFATE 75 MG: 75 TABLET ORAL at 12:25

## 2021-01-15 ASSESSMENT — MIFFLIN-ST. JEOR: SCORE: 1468.5

## 2021-01-15 NOTE — PROGRESS NOTES
Observation Goals:     -diagnostic tests and consults completed and resulted - met  -vital signs normal or at patient baseline - met  -safe disposition plan has been identified - met

## 2021-01-15 NOTE — PLAN OF CARE
[id number]. No changes. A+OX4. Patient returned from dialysis around 1700. VSS on RA. Denies pain. Discharge to TCU tomorrow.

## 2021-01-15 NOTE — PROGRESS NOTES
Potassium   Date Value Ref Range Status   01/15/2021 4.5 3.4 - 5.3 mmol/L Final     Hemoglobin   Date Value Ref Range Status   01/15/2021 10.5 (L) 13.3 - 17.7 g/dL Final     Creatinine   Date Value Ref Range Status   01/14/2021 4.55 (H) 0.66 - 1.25 mg/dL Final     Urea Nitrogen   Date Value Ref Range Status   01/14/2021 34 (H) 7 - 30 mg/dL Final     Sodium   Date Value Ref Range Status   01/14/2021 139 133 - 144 mmol/L Final     INR   Date Value Ref Range Status   10/15/2020 1.31 (H) 0.86 - 1.14 Final       DIALYSIS PROCEDURE NOTE  Hepatitis status of previous patient on machine log was checked and verified ok to use with this patients hepatitis status.    Results for MALDONADO LÓPEZ (MRN [id number]) as of 1/15/2021 10:02   Ref. Range 1/12/2021 09:15   Hep B Surface Agn Latest Ref Range: NR^Nonreactive  Nonreactive   Hepatitis B Surface Antibody Latest Ref Range: <8.00 m[IU]/mL 3.98       Patient dialyzed for 2.5 hrs. on a 3K bath with a net fluid removal of  1L.  A BFR of 450 ml/min was obtained via a LAVF using 15gauge needles.      The treatment plan was discussed with Dr. Tabor during the treatment.    Total heparin received during the treatment: 0 units. -pt declined heparin use.  Needle cannulation sites held x 10 min.     Meds  given: hectorol, epogen   Complications: None      Person educated: patient. Knowledge base substantial. Barriers to learning: none. Educated on procedure via varbal mode. Patient verbalized understanding. Pt prefers oral education style.     ICEBOAT? Timeout performed pre-treatment  I: Patient was identified using 2 identifiers  C:  Consent         Informed Consent: verified  E: Equipment preventative maintenance is current and dialysis delivery system OK to use  B: Hepatitis B status: see above  O: Dialysis orders present and complete prior to treatment  A: Vascular access verified and assessed prior to treatment  T: Treatment was performed at a clinically appropriate time  ?:  Patient was allowed to ask questions and address concerns prior to treatment  See flowsheet in EPIC for further details and post assessment.  Machine water alarm in place and functioning. Transducer pods intact and checked every 15min.   Pt returned via wheelchair.  Chlorine/Chloramine water system checked every 4 hours.  Outpatient Dialysis at [facility] on MWF

## 2021-01-15 NOTE — PROGRESS NOTES
Care Management Discharge Note    Discharge Date: 01/15/21(TCU)       Discharge Disposition:  [facility]    Discharge Services:      Discharge DME:      Discharge Transportation:  [facility]    Private pay costs discussed: Yes    PAS Confirmation Code:  [id number]  Patient/family educated on Medicare website which has current facility and service quality ratings:  Yes    Education Provided on the Discharge Plan:    Persons Notified of Discharge Plans: Pt  Patient/Family in Agreement with the Plan:      Handoff Referral Completed:     Additional Information:  Orders sent, transport arranged via wheelchair at 1700.  Pt in dialysis and nurse will update pt        TATO Osman

## 2021-01-15 NOTE — DISCHARGE SUMMARY
[facility]  Hospitalist Discharge Summary      Date of Admission:  1/11/2021  Date of Discharge:  1/15/2021  Discharging Provider: Oanh Mckinley DO      Discharge Diagnoses   Generalized Weakness  Acute L3 compression fracture  ESRD on hemodialysis MWF  Chronic HFrEF s/p ICD  CAD s/p multiple stenting most recently 10/2020  H/o NSVT  Paroxysmal Atrial Fibrillation  Diet Controlled DM, A1C 5.1  Hypothyroidism    Follow-ups Needed After Discharge   Follow-up Appointments     Follow Up and recommended labs and tests      Follow up with Nursing home physician.  No follow up labs or test are   needed.  Continue routine follow up with Cardiology             Unresulted Labs Ordered in the Past 30 Days of this Admission       No orders found from 12/12/2020 to 1/12/2021.            Discharge Disposition   Discharged to short-term care facility  Condition at discharge: Stable      Hospital Course   HPI from H&P per Lulú Feldman CNP  Westley Ness is a 75 year old male who presents to the emergency department with generalized weakness.  Past medical history includes ESRD on hemodialysis MWF, GERD, CAD, hypertension, hypothyroidism, diabetes mellitus, type II, CHF and atrial flutter.     Patient states he was at his baseline yesterday, which is that he ambulates with a walker with some difficulty, but is able to navigate up and down stairs.  Today, upon getting out of bed it took him approximately 45 seconds before he was able to even move his legs, and was slowly able to stand and balance but had much more difficulty with walking.  He felt too unstable to get to dialysis safely, he states he can sit for some period of time up to 2.5 hours, but has worse cramping in his legs, but denies any numbness/tingling.  He notes some tenderness in the lumbar and sacral areas of his back, which is worse than usual.  He endorses 1 fall in March, and another near fall into a doorway in December but does not  count that one.      I evaluated the patient in the emergency department, where he is appears fragile lying in a gurney.  He states while in the restful position, his pain is tolerable.  He feels his breathing is at baseline, denies any chest pain dizziness, lightheadedness, fever, nausea/vomiting or constipation.  He endorses ongoing diarrhea which she has taken Imodium for years.  Denies any recent Covid exposures.    Generalized Weakness  Acute L3 compression fracture  Patient presented with increased weakness when attempting to get out of bed and gain his balance, endorses more difficulty walking related to stiffness in lower extremities. No recent falls or trauma. Denies sensation changes in lower extremities, bilaterally. ED XR lumbar is significant osteopenia, deformity at superior L4 endplate which could represent a fracture, subtle deformity at L3 which could represent a fracture, degenerative changes L5-S1.   -- Pain control with tylenol  -- MRI ordered on admission, delayed due to PPM and need for rep. MRI 1/13/21 with recent appearing fractures of L3 with no significant spinal canal stenosis  -- PT recommending TCU and patient in agreement.  -- Requested resumption of low dose gabapentin to see if it will help his leg discomfort, starting low at 100mg TID--uptitrate as appropriate at TCU  -- Patient lives alone and overall manages well. Has no local family but a friend, Lynn, that helps him. He has 12-14 steps he needs to navigate to live in his home. Had extensive discussion 1/13 with regards to plans after stay at TCU. Encouraged him to consider moving to single level living environment to decrease risk of falls and maintain independence. Initially in agreement but now thinking of having chair lifts installed in his home while he is at TCU     ESRD on hemodialysis MWF  - Nephrology consulted and patient received dialysis while hospitalized. He is back on his MWF scheduled. SW arranged transport from  TCU to dialysis after discharge    Chronic HFrEF s/p ICD  CAD s/p multiple stenting most recently 10/2020  H/o NSVT  Paroxysmal Atrial Fibrillation  Underwent NM stress test on 10/16/2020 showing moderate area of lateral ischemia and filippo-infarct ischemia of inferior wall, echocardiogram on 10/16 showed EF of 20-25%, severe global hypokinesis, akinesis of inferoseptal and apical.  Patient underwent coronary angiography and had a stent placed to OM1 on 10/19/2020 (started on DAPT with plan for Plavix/Eliquis x6 months).  While admitted had nonsustained VT, in 3/2020 had Zio patch showing NSVT up to 14 beats.   [Plavix 75 mg po every day, Imdur 15 mg TID on non-HD days and 30 mg on HD days, Lisinopril 2.5 mg BID, Toprol XL 12.5 mg every day, Pravastatin 40 mg qd]  -- Continues on PTA Metoprolol XL, Lisinopril and Imdur  -- Continues on Mexitil due to history of VT  -- Continue Eliquis and Plavix     Diet Controlled DM, A1C 5.1  -- BG well controlled    Hypothyroidism  - Continue PTA synthroid     Consultations This Hospital Stay   CARE MANAGEMENT / SOCIAL WORK IP CONSULT  PHYSICAL THERAPY ADULT IP CONSULT  OCCUPATIONAL THERAPY ADULT IP CONSULT  NEPHROLOGY IP CONSULT  OCCUPATIONAL THERAPY ADULT IP CONSULT  PHYSICAL THERAPY ADULT IP CONSULT  CARE MANAGEMENT / SOCIAL WORK IP CONSULT  CARE MANAGEMENT / SOCIAL WORK IP CONSULT  CARE MANAGEMENT / SOCIAL WORK IP CONSULT  PHYSICAL THERAPY ADULT IP CONSULT  OCCUPATIONAL THERAPY ADULT IP CONSULT    Code Status   Full Code    Time Spent on this Encounter   Eve CARMONA PA-C, personally saw the patient today and spent greater than 30 minutes discharging this patient.       Oanh Mckinley DO  [facility]  [address] [address]  Phone: [phone]  ______________________________________________________________________    Physical Exam   Vital Signs: Temp: 95.9  F (35.5  C) Temp src: Oral BP: 134/67 Pulse: 79   Resp: 16  SpO2: 94 % O2 Device: None (Room air) Oxygen Delivery: 2.5 LPM  Weight: 167 lbs 5.27 oz  Physical Exam  Constitutional:       Appearance: Normal appearance. He is normal weight.   HENT:      Head: Normocephalic and atraumatic.   Eyes:      General: No scleral icterus.  Cardiovascular:      Rate and Rhythm: Normal rate and regular rhythm.      Heart sounds: No murmur.   Pulmonary:      Effort: Pulmonary effort is normal. No respiratory distress.      Breath sounds: Normal breath sounds.   Abdominal:      General: Abdomen is flat. There is no distension.      Palpations: Abdomen is soft.   Musculoskeletal: Normal range of motion.   Skin:     General: Skin is warm and dry.      Findings: No rash.   Neurological:      General: No focal deficit present.      Mental Status: He is alert. Mental status is at baseline.   Psychiatric:         Mood and Affect: Mood normal.              Primary Care Physician   Josselin Kolb    Discharge Orders      General info for SNF    Length of Stay Estimate: Short Term Care: Estimated # of Days <30  Condition at Discharge: Stable  Level of care:skilled   Rehabilitation Potential: Good  Admission H&P remains valid and up-to-date: Yes  Recent Chemotherapy: N/A  Use Nursing Home Standing Orders: Yes     Mantoux instructions    Give two-step Mantoux (PPD) Per Facility Policy Yes     Daily weights    Call Provider for weight gain of more than 2 pounds per day or 5 pounds per week.     Follow Up and recommended labs and tests    Follow up with Nursing home physician.  No follow up labs or test are needed.  Continue routine follow up with Cardiology     Activity - Up with nursing assistance     Discharge Instructions    1. For your leg pain, start taking 100mg gabapentin three times per day. Increase this dose as needed for improvement of symptoms.     Reason for your hospital stay    Difficulty walking     Physical Therapy Adult Consult    Evaluate and treat as clinically  indicated.    Reason:  LE weakness     Occupational Therapy Adult Consult    Evaluate and treat as clinically indicated.    Reason: LE weakness     Fall precautions     Advance Diet as Tolerated    Follow this diet upon discharge: Orders Placed This Encounter      Regular Diet Adult       Significant Results and Procedures   Most Recent 3 CBC's:  Recent Labs   Lab Test 01/15/21  0730 01/13/21  0718 01/12/21  0700 01/11/21  0911   WBC  --  6.1 7.1 8.8   HGB 10.5* 10.5* 10.8* 10.9*   MCV  --  103* 107* 107*   PLT  --  114* 131* 156     Most Recent 3 BMP's:  Recent Labs   Lab Test 01/15/21  0730 01/14/21  0710 01/13/21  0718 01/12/21  0700   NA  --  139 140 137   POTASSIUM 4.5 4.5 4.4 5.7*   CHLORIDE  --  104 105 104   CO2  --  28 29 26   BUN  --  34* 25 53*   CR  --  4.55* 3.42* 5.34*   ANIONGAP  --  7 6 7   CHRISTINE  --  9.2 9.0 9.5   GLC  --  101* 85 98     Most Recent TSH and T4:  Recent Labs   Lab Test 03/26/19  0847   TSH 4.40*     Most Recent Hemoglobin A1c:  Recent Labs   Lab Test 03/26/19  0847   A1C 5.1   ,   Results for orders placed or performed during the hospital encounter of 01/11/21   Lumbar spine XR, 2-3 views    Narrative    LUMBAR SPINE TWO TO THREE VIEWS  1/11/2021 9:50 AM     HISTORY: Increasing leg weakness, weight loss, intermittent back pain.    COMPARISON: None.       Impression    IMPRESSION:   1. Bones appear markedly osteopenic which limits evaluation for  fractures.  2. Mild deformity at the superior L4 endplate which could represent a  fracture. There is also subtle deformity at the superior L3 endplate  that could represent a fracture.  3. Moderate degenerative endplate changes and loss of disc height in  the lower lumbar spine, particularly at L5-S1. Moderate facet  hypertrophy in the lower lumbar spine.    VINCENT COKER MD   Chest XR,  PA & LAT    Narrative    XR CHEST 2 VW  1/11/2021 9:50 AM       INDICATION: weakness, dialysis patient  COMPARISON: 10/15/2020       Impression    IMPRESSION:  Evaluate cardiac pacemaker, coronary artery stents, and  cardiomegaly are stable. Mild pulmonary vascular congestion without  evidence of pulmonary edema. Trace pleural effusions have decreased.  No acute infiltrate.    HOANG NEGRON MD   MR Lumbar Spine w/o Contrast    Narrative    MRI OF THE LUMBAR SPINE WITHOUT CONTRAST 1/13/2021 12:01 PM     COMPARISON: Lumbar spine x-ray series 1/11/2021    HISTORY: Abnormal x-ray, L/S-spine, degenerative joint disease.    TECHNIQUE: Multiplanar, multisequence MRI images of the lumbar spine  were acquired without IV contrast.    FINDINGS: There are five lumbar-type vertebrae for the purposes of  this dictation.     There is normal alignment of the lumbar vertebrae. There is mild  recent-appearing compression fracture deformity of the superior aspect  of the L3 vertebral body. There is a Schmorl's node deformity in the  superior L4 endplate. Vertebral body heights and contours of the  lumbar spine are otherwise normal. Other than bone marrow edema in the  superior half of the L3 vertebral body related to the recent  compression fracture, marrow signal throughout the lumbar vertebrae is  normal.    There is loss of disc height, disc desiccation and posterior disc  bulging to varying degrees at all levels of the lumbar spine with the  exception of the relatively normal-appearing L1-L2 disc.    The tip of the conus medullaris is at the mid L1 level which is within  normal limits. There is no evidence for intrathecal abnormality.     Level by level:     T12-L1: Normal disc height and signal. No herniation. Normal facets.  No spinal canal stenosis. No foraminal stenosis on either side.     L1-L2: Normal disc height and signal. No herniation. Mild facet  arthropathy bilaterally. No spinal canal stenosis. No foraminal  stenosis on either side.     L2-L3: Loss of disc height, disc desiccation and mild circumferential  disc bulging. No herniation. Mild facet arthropathy  bilaterally.  Minimal spinal canal narrowing. Mild bilateral neural foraminal  narrowing.    L3-L4: Loss of disc height, disc desiccation and moderate  circumferential disc bulging. No herniation. Mild facet arthropathy  bilaterally. Mild ligamentum flavum thickening bilaterally. Minimal  spinal canal narrowing. Mild-moderate bilateral neural foraminal  narrowing.    L4-L5: Loss of disc height, disc desiccation and moderate  circumferential disc bulging. No herniation. Moderate facet  arthropathy bilaterally. Mild spinal canal narrowing with moderate  narrowing of the lateral recesses of the spinal canal bilaterally.  Moderate-severe left foraminal stenosis. Moderate right foraminal  stenosis.    L5-S1: Loss of disc height, disc desiccation and mild circumferential  disc bulging. No herniation. Moderate facet arthropathy bilaterally.  Mild spinal canal narrowing. Moderate left foraminal stenosis.  Moderate-severe right foraminal stenosis.      Impression    IMPRESSION:  1. Degenerative change of the lumbar spine as detailed above.  2. Recent-appearing mild compression fracture deformity of the  superior aspect of the L3 vertebral body. No other recent fractures.  3. No significant spinal canal stenosis of the lumbar spine.  4. Moderate narrowing of the lateral recesses of the spinal canal  bilaterally at the L4-L5 level. No other significant lateral recess  narrowing.  5. Moderate-to-severe neural foraminal stenosis bilaterally at L4-L5  and bilaterally at L5-S1.    YULY GONZÁLES MD       Discharge Medications   Current Discharge Medication List        START taking these medications    Details   gabapentin (NEURONTIN) 100 MG capsule Take 1 capsule (100 mg) by mouth 3 times daily  Qty: 90 capsule, Refills: 0    Associated Diagnoses: Difficulty walking           CONTINUE these medications which have NOT CHANGED    Details   apixaban ANTICOAGULANT (ELIQUIS ANTICOAGULANT) 2.5 MG tablet Take 1 tablet (2.5 mg) by mouth 2  times daily  Qty: 180 tablet, Refills: 3    Associated Diagnoses: Atrial fibrillation (H)      clopidogrel (PLAVIX) 75 MG tablet Take 1 tablet (75 mg) by mouth daily  Qty: 60 tablet, Refills: 1    Associated Diagnoses: Coronary artery disease involving native coronary artery, angina presence unspecified, unspecified whether native or transplanted heart; S/P drug eluting coronary stent placement      !! isosorbide mononitrate (IMDUR) 30 MG 24 hr tablet Take 15 mg by mouth Take in the evening on the days prior dialysis. Take on Sunday, Tuesday and Thursday      levothyroxine (SYNTHROID/LEVOTHROID) 50 MCG tablet TAKE 1 TABLET (50 MCG) BY MOUTH DAILY  Qty: 90 tablet, Refills: 3    Associated Diagnoses: Hypothyroidism, unspecified type      lisinopril (ZESTRIL) 2.5 MG tablet TAKE 1 TABLET (2.5 MG) BY MOUTH 2 TIMES DAILY (TAKE ONE TABLET AFTER DIALYSIS. TAKE SECOND TABLET AT BEDTIME.)  Qty: 180 tablet, Refills: 3    Associated Diagnoses: NSTEMI (non-ST elevated myocardial infarction) (H)      !! loperamide (IMODIUM A-D) 2 MG tablet Take 1 mg by mouth five times a week On non-dialysis days - Tu, Th, Sa, Su      metoprolol succinate ER (TOPROL-XL) 25 MG 24 hr tablet Take 0.5 tablets (12.5 mg) by mouth daily  Qty: 45 tablet, Refills: 3    Associated Diagnoses: Coronary artery disease involving native coronary artery of native heart with angina pectoris (H)      mexiletine (MEXITIL) 150 MG capsule Take 1 capsule (150 mg) by mouth 2 times daily  Qty: 180 capsule, Refills: 3    Associated Diagnoses: Tachycardia      multivitamin RENAL (NEPHROCAPS/TRIPHROCAPS) 1 MG capsule Take 1 capsule by mouth daily  Qty: 90 capsule, Refills: 3    Associated Diagnoses: ESRD on hemodialysis (H)      omeprazole (PRILOSEC) 20 MG DR capsule Take 20 mg by mouth daily (before lunch)       pantoprazole (PROTONIX) 40 MG EC tablet TAKE 1 TABLET (40 MG) BY MOUTH EVERY MORNING  Qty: 30 tablet, Refills: 9    Associated Diagnoses: Gastroesophageal reflux  disease, esophagitis presence not specified      pravastatin (PRAVACHOL) 40 MG tablet TAKE 1 TABLET (40 MG) BY MOUTH DAILY  Qty: 90 tablet, Refills: 0    Associated Diagnoses: Coronary artery disease involving native coronary artery of native heart without angina pectoris      acetaminophen (TYLENOL) 500 MG tablet Take 1,000 mg by mouth 4 times daily as needed for pain      !! isosorbide mononitrate (IMDUR) 30 MG 24 hr tablet Take 1 tablet (30 mg) the evening of dialysis days (Mon, Wed, Fri)  and Saturdays.  Qty: 48 tablet, Refills: 3    Associated Diagnoses: Coronary artery disease involving native coronary artery of native heart without angina pectoris      !! loperamide (IMODIUM A-D) 2 MG tablet Take 2 mg by mouth three times a week MW on dialysis days      nitroGLYcerin (NITROSTAT) 0.4 MG sublingual tablet Place 1 tablet (0.4 mg) under the tongue every 5 minutes as needed for chest pain UP TO 3 PER EPISODE  Qty: 20 tablet, Refills: 0    Associated Diagnoses: Angina pectoris (H)       !! - Potential duplicate medications found. Please discuss with provider.        Allergies   Allergies   Allergen Reactions    Contrast Dye Hives     Does fine if he uses benadryl prior.    No Clinical Screening - See Comments      Green beans - Diarrhea.    Topical antibiotic - name unknown - caused swelling of the penis.

## 2021-01-15 NOTE — PLAN OF CARE
AVSS; tele A-fib with CVR; bilateral leg and lower back pain controlled with tylenol; CMS intact except for baseline numbness/tingling in RUE; lung sounds clear; up with 1-2 assist and a gait belt/walker to bedside commode; still voids occasionally in small amounts; LUE dialysis fistual with + thrill and bruit; plan for dialysis today, and then possible discharge to TCU.

## 2021-01-15 NOTE — PLAN OF CARE
A&Ox4. VSS on RA. C/o back pain, given tylenol prn. Tele; Afib CVR. Ax1 gb/walker. Fistula; WNL bruit/thrill palpable/audible. Plan for dialysis tomorrow and discharge to TCU.

## 2021-01-15 NOTE — PROGRESS NOTES
Care Management Follow Up    Length of Stay (days): 0    Expected Discharge Date: 01/15/21(TCU)     Concerns to be Addressed:       Patient plan of care discussed at interdisciplinary rounds: Yes    Anticipated Discharge Disposition:  TCU     Anticipated Discharge Services:    Anticipated Discharge DME:      Patient/family educated on Medicare website which has current facility and service quality ratings:    Education Provided on the Discharge Plan:    Patient/Family in Agreement with the Plan:      Referrals Placed by CM/SW:    Private pay costs discussed: Not applicable    Additional Information:  [facility] confirms that they can accept pt today and can accommodate his ride times to dialysis going forward.   set up wheelchair ride with [facility] for 1700 today, [facility] notified.       Marcela Story, LICSW

## 2021-01-15 NOTE — PROGRESS NOTES
Observation Goals:     -diagnostic tests and consults completed and resulted - partially met  -vital signs normal or at patient baseline - met  -safe disposition plan has been identified - partially met

## 2021-01-15 NOTE — PROGRESS NOTES
Inpatient Dialysis Progress Note            Assessment and Plan:       Westley Ness is a 75 year old male with ESRD who was admitted on 1/11/2021.      1) ESRD due to HTN/DM - He dialyzes at [facility] under direction of Dr. Hammond/Arelis Stahl NP.  He runs 3.5 H via CASSIDY AVF to a target weight of 77 kg on a K3 bath at a GFR of 450 mL/min.  He uses low dose heparin.       2) Anemia:  HGB 10.9,  He receives  units.     3) MBD:  He is on Hectorol 1 mcg.  PTH as outpt 190.       4) L3 compression Fx, L4-L5 and L5-S1 neural foraminal stenoses bilat     Plan:     To TCU  Next run Monday at [place].                 Interval History:     Feels fine.  He is to discharge to [facility]  He call [facility] to let them know of his return Monday.  Eating ok.  Denies n/v/f/c.    No dizziness/lightheadedness/cramping.    No abd pain/cp/sob.        Dialysis Parameters:     Wt Readings from Last 4 Encounters:   01/15/21 75.9 kg (167 lb 5.3 oz)   12/06/20 77 kg (169 lb 12.1 oz)   10/29/20 79.4 kg (175 lb)   10/17/20 79.4 kg (175 lb)     I/O last 3 completed shifts:  In: -   Out: 1575 [Urine:75; Other:1500]  BP Readings from Last 3 Encounters:   01/15/21 129/65   12/06/20 135/73   10/29/20 112/44       Routine, ONE TIME, Starting today For 1 Occurrences  Weight Loss (kg): 1  Dialysis Temp: 36.5  C  Access Device: AVF  Access Site: L arm  Dialyzer: Revaclear  Dialysis Bath: K 3  Blood Flow Rate (mL/min): 400  Total Treatment Time (hrs): 2.5  Heparin: no         Medications and Allergies:   Reviewed in EPIC      - MEDICATION INSTRUCTIONS for Dialysis Patients -   Does not apply See Admin Instructions     apixaban ANTICOAGULANT  2.5 mg Oral BID     clopidogrel  75 mg Oral Daily     isosorbide mononitrate  15 mg Oral Once per day on Sun Tue Thu     isosorbide mononitrate  30 mg Oral Once per day on Mon Wed Fri     levothyroxine  50 mcg Oral Daily     lisinopril  2.5 mg Oral BID     loperamide  2  mg Oral Once per day on Mon Wed Fri     loperamide  1 mg Oral Once per day on Sun Tue Thu Sat     metoprolol succinate ER  12.5 mg Oral Daily     mexiletine  150 mg Oral BID     multivitamin RENAL  1 capsule Oral Daily     - MEDICATION INSTRUCTIONS -   Does not apply Once     omeprazole  20 mg Oral Daily before lunch     pravastatin  40 mg Oral Daily     sodium chloride 0.9%, acetaminophen, acetaminophen, melatonin, ondansetron **OR** ondansetron     Allergies   Allergen Reactions     Contrast Dye Hives     Does fine if he uses benadryl prior.     No Clinical Screening - See Comments      Green beans - Diarrhea.    Topical antibiotic - name unknown - caused swelling of the penis.              Labs:     BMP  Recent Labs   Lab 01/15/21  0730 01/14/21  0710 01/13/21  0718 01/12/21  0700 01/11/21  0911   NA  --  139 140 137 139   POTASSIUM 4.5 4.5 4.4 5.7* 5.2   CHLORIDE  --  104 105 104 105   CHRISTINE  --  9.2 9.0 9.5 10.1   CO2  --  28 29 26 27   BUN  --  34* 25 53* 43*   CR  --  4.55* 3.42* 5.34* 4.75*   GLC  --  101* 85 98 122*     CBC  Recent Labs   Lab 01/15/21  0730 01/13/21  0718 01/12/21  0700 01/11/21  0911   WBC  --  6.1 7.1 8.8   HGB 10.5* 10.5* 10.8* 10.9*   HCT  --  32.8* 35.3* 35.9*   MCV  --  103* 107* 107*   PLT  --  114* 131* 156     Lab Results   Component Value Date    AST 18 10/15/2020    ALT 19 10/15/2020    ALKPHOS 234 (H) 10/15/2020    BILITOTAL 1.0 10/15/2020    BILICONJ 0.0 08/18/2011            Physical Exam:   Vitals were reviewed in EPIC    Wt Readings from Last 3 Encounters:   01/15/21 75.9 kg (167 lb 5.3 oz)   12/06/20 77 kg (169 lb 12.1 oz)   10/29/20 79.4 kg (175 lb)       Intake/Output Summary (Last 24 hours) at 1/15/2021 1025  Last data filed at 1/15/2021 0430  Gross per 24 hour   Intake --   Output 1575 ml   Net -1575 ml       GENERAL APPEARANCE: pleasant, no distress, a & o  HEENT:  Eyes/ears/nose grossly normal, neck supple  RESP: lungs clear to auscultation with good efforts, no  crackles, rhonchi or wheezes  CV: regular rate and rhythm, normal S1 S2, no murmur, click or rub   ABDOMEN: soft, nontender, bowel sounds normal  EXTREMITIES/SKIN: no edema, no rashes or lesions     Pt seen on dialysis.  Stable run.  Good BFR.      Attestation:  I have reviewed today's vital signs, notes, medications, labs and imaging.     Marlo Tabor MD  [facility] - Nephrology  [phone]

## 2021-01-15 NOTE — PLAN OF CARE
Discharge packet sent with patient. Pt discharging to [facility] ([facility]).

## 2021-01-17 ASSESSMENT — MIFFLIN-ST. JEOR: SCORE: 1481.52

## 2021-01-18 ENCOUNTER — NURSING HOME VISIT (OUTPATIENT)
Dept: GERIATRICS | Facility: CLINIC | Age: 76
End: 2021-01-18
Payer: MEDICARE

## 2021-01-18 ENCOUNTER — PATIENT OUTREACH (OUTPATIENT)
Dept: CARE COORDINATION | Facility: CLINIC | Age: 76
End: 2021-01-18

## 2021-01-18 VITALS
RESPIRATION RATE: 16 BRPM | OXYGEN SATURATION: 94 % | HEART RATE: 75 BPM | SYSTOLIC BLOOD PRESSURE: 129 MMHG | BODY MASS INDEX: 25.79 KG/M2 | WEIGHT: 170.2 LBS | HEIGHT: 68 IN | DIASTOLIC BLOOD PRESSURE: 72 MMHG | TEMPERATURE: 97.8 F

## 2021-01-18 DIAGNOSIS — I48.0 PAF (PAROXYSMAL ATRIAL FIBRILLATION) (H): ICD-10-CM

## 2021-01-18 DIAGNOSIS — Z95.5 PRESENCE OF STENT IN CORONARY ARTERY IN PATIENT WITH CORONARY ARTERY DISEASE: ICD-10-CM

## 2021-01-18 DIAGNOSIS — I47.29 NSVT (NONSUSTAINED VENTRICULAR TACHYCARDIA) (H): ICD-10-CM

## 2021-01-18 DIAGNOSIS — M62.81 GENERALIZED MUSCLE WEAKNESS: Primary | ICD-10-CM

## 2021-01-18 DIAGNOSIS — R53.81 PHYSICAL DECONDITIONING: ICD-10-CM

## 2021-01-18 DIAGNOSIS — I25.10 PRESENCE OF STENT IN CORONARY ARTERY IN PATIENT WITH CORONARY ARTERY DISEASE: ICD-10-CM

## 2021-01-18 DIAGNOSIS — I50.22 CHRONIC HFREF (HEART FAILURE WITH REDUCED EJECTION FRACTION) (H): ICD-10-CM

## 2021-01-18 DIAGNOSIS — Z95.810 S/P ICD (INTERNAL CARDIAC DEFIBRILLATOR) PROCEDURE: ICD-10-CM

## 2021-01-18 DIAGNOSIS — E03.8 OTHER SPECIFIED HYPOTHYROIDISM: ICD-10-CM

## 2021-01-18 DIAGNOSIS — E11.9 TYPE 2 DIABETES MELLITUS WITHOUT COMPLICATION, WITHOUT LONG-TERM CURRENT USE OF INSULIN (H): ICD-10-CM

## 2021-01-18 DIAGNOSIS — Z99.2 ESRD ON HEMODIALYSIS (H): ICD-10-CM

## 2021-01-18 DIAGNOSIS — Z71.89 OTHER SPECIFIED COUNSELING: ICD-10-CM

## 2021-01-18 DIAGNOSIS — S32.030D CLOSED COMPRESSION FRACTURE OF L3 LUMBAR VERTEBRA WITH ROUTINE HEALING, SUBSEQUENT ENCOUNTER: ICD-10-CM

## 2021-01-18 DIAGNOSIS — K21.00 GASTROESOPHAGEAL REFLUX DISEASE WITH ESOPHAGITIS, UNSPECIFIED WHETHER HEMORRHAGE: ICD-10-CM

## 2021-01-18 DIAGNOSIS — N18.6 ESRD ON HEMODIALYSIS (H): ICD-10-CM

## 2021-01-18 PROCEDURE — 99310 SBSQ NF CARE HIGH MDM 45: CPT | Performed by: NURSE PRACTITIONER

## 2021-01-18 NOTE — PROGRESS NOTES
Clinic Care Coordination Contact  Care Coordination Transition Communication    Referral Source: IP Report    Clinical Data: Patient was hospitalized at [facility] from 1/11/21 to 1/15/21 with diagnosis of generalized weakness and L3 compression fracture. Pt discharged to TCU for further rehab and management.      Pt has dialysis M/W/F at [facility]. Pt has home care services to assist with cleaning, groceries, and transportation privately paid through [employer].     Transition to Facility:              Facility Name: [facility]              Contact name and phone number/fax: phone [phone], fax [phone]    [facility] sent fax to facility for discharge planning.     Plan:  Care Coordinator will await notification from facility staff informing  Care Coordinator of patient's discharge plans/needs.  Care Coordinator will review chart and outreach to facility staff every 2 weeks and as needed.     ODETTE Arana   Social Work Clinic Care Coordinator   [facility]  PH: [phone]  [email or web address]

## 2021-01-18 NOTE — LETTER
"    1/18/2021        RE: Westley Ness  [address] [facility] [address]        [facility]  PRIMARY CARE PROVIDER AND CLINIC:  Josselin Kolb MD, [address]  / [facility] [address]  Chief Complaint   Patient presents with     Hospital F/U     [place] Medical Record Number:  [id number]  Place of Service where encounter took place:  [facility] ([facility]) [[id number]]    Westley Ness  is a 75 year old  (1945), admitted to the above facility from  [facility]. Hospital stay 1/11/21 through 1/15/21..  Admitted to this facility for  rehab, medical management and nursing care.    HPI:    HPI information obtained from: facility chart records, facility staff, patient report and [facility] chart review.     Brief Summary of Hospital Course:     This is a 75-year-old male, with a past medical history significant for ESRD on hemodialysis, coronary artery disease s/p multiple stents most recently 10/20, chronic heart failure with reduced EF 20-25% on 10/16/20, AICD placement, nonsustained ventricular tachycardia, paroxysmal atrial flutter s/p ablations x 2, hypertension, hypothyroidism and type 2 diabetes mellitus, who was admitted to [facility] 1/11/21 through 1/15/21 with weakness. A lumbar spine x-ray revealed \".. Mild deformity at the superior L4 endplate which could represent a fracture. There is also a subtle deformity at the superior L3 endplate that could represent a fracture\". A lumbar spine MR revealed \" .. Recent-appearing mild compression fracture deformity of the superior aspect of the L3 vertebral body\". Gabapentin was initiated for pain. Physical Therapy was consulted and a TCU stay was recommended for ongoing physical rehabilitation.     Updates on Status Since Skilled nursing Admission:     Feels the Gabapentin is making him more sleepy. Fell asleep during his dialysis run which is \"unheard of\". Talks about how " he heard Gabapentin can make people sleepy and a nurse at his dialysis center has a cat who uses Gabapentin. Lived at home alone prior to admission and intends to go back upon TCU discharge. Feels he'll be at the TCU for 2-3 weeks. Denies shortness of breath and chest pain.     CODE STATUS/ADVANCE DIRECTIVES DISCUSSION:   CPR/Full code   Patient's living condition: lives alone  ALLERGIES: Contrast dye and No clinical screening - see comments  PAST MEDICAL HISTORY:  has a past medical history of Acid reflux disease, Benign essential hypertension, CAD (coronary artery disease), ESRD on hemodialysis (H), History of atrial flutter, Hypothyroidism, Ischemic cardiomyopathy, and Type 2 diabetes mellitus (H).  PAST SURGICAL HISTORY:   has a past surgical history that includes vascular surgery (2004, 2010); LEFT HEART CATHETERIZATION (7/14/2016); LEFT HEART CATHETERIZATION (9/21/2016); EP Ablation atrial flutter (06/08/2017, 12/11/17); cholecystectomy, open (2013); implant ventricular device (08/15/2011); tonsillectomy & adenoidectomy; Elbow surgery (Left, 2008); Repair fistula arteriovenous upper extremity (Left, 3/5/2019); IR Dialysis Fistulogram Left (7/22/2019); Implantable Cardioverter-Defibrillator (ICD) Generator Change Single (N/A, 11/21/2019); Coronary Angiogram (N/A, 10/19/2020); Left Heart Cath (N/A, 10/19/2020); and Percutaneous Coronary Intervention Stent Drug Eluting (N/A, 10/19/2020).  FAMILY HISTORY: family history includes Bladder Cancer in his father; Cerebrovascular Disease in his mother; Hypertension in his father; Myocardial Infarction in his paternal grandmother.  SOCIAL HISTORY:   reports that he quit smoking about 24 years ago. His smoking use included cigarettes. He started smoking about 56 years ago. He has a 70.00 pack-year smoking history. He has never used smokeless tobacco. He reports previous alcohol use. He reports that he does not use drugs.    Post Discharge Medication Reconciliation Status:  discharge medications reconciled, continue medications without change  Current Outpatient Medications   Medication Sig Dispense Refill     acetaminophen (TYLENOL) 500 MG tablet Take 1,000 mg by mouth 4 times daily as needed for pain       apixaban ANTICOAGULANT (ELIQUIS ANTICOAGULANT) 2.5 MG tablet Take 1 tablet (2.5 mg) by mouth 2 times daily 180 tablet 3     clopidogrel (PLAVIX) 75 MG tablet Take 1 tablet (75 mg) by mouth daily 60 tablet 1     gabapentin (NEURONTIN) 100 MG capsule Take 1 capsule (100 mg) by mouth 3 times daily 90 capsule 0     isosorbide mononitrate (IMDUR) 30 MG 24 hr tablet Take 1 tablet (30 mg) the evening of dialysis days (Mon, Wed, Fri)  and Saturdays. 48 tablet 3     isosorbide mononitrate (IMDUR) 30 MG 24 hr tablet Take 15 mg by mouth Take in the evening on the days prior dialysis. Take on Sunday, Tuesday and Thursday       levothyroxine (SYNTHROID/LEVOTHROID) 50 MCG tablet TAKE 1 TABLET (50 MCG) BY MOUTH DAILY 90 tablet 3     lisinopril (ZESTRIL) 2.5 MG tablet TAKE 1 TABLET (2.5 MG) BY MOUTH 2 TIMES DAILY (TAKE ONE TABLET AFTER DIALYSIS. TAKE SECOND TABLET AT BEDTIME.) 180 tablet 3     loperamide (IMODIUM A-D) 2 MG tablet Take 2 mg by mouth three times a week MWF on dialysis days       loperamide (IMODIUM A-D) 2 MG tablet Take 1 mg by mouth five times a week On non-dialysis days - Tu, Th, Sa, Russo       metoprolol succinate ER (TOPROL-XL) 25 MG 24 hr tablet Take 0.5 tablets (12.5 mg) by mouth daily 45 tablet 3     mexiletine (MEXITIL) 150 MG capsule Take 1 capsule (150 mg) by mouth 2 times daily 180 capsule 3     multivitamin RENAL (NEPHROCAPS/TRIPHROCAPS) 1 MG capsule Take 1 capsule by mouth daily 90 capsule 3     nitroGLYcerin (NITROSTAT) 0.4 MG sublingual tablet Place 1 tablet (0.4 mg) under the tongue every 5 minutes as needed for chest pain UP TO 3 PER EPISODE 20 tablet 0     omeprazole (PRILOSEC) 20 MG DR capsule Take 20 mg by mouth daily (before lunch)        pantoprazole (PROTONIX)  "40 MG EC tablet TAKE 1 TABLET (40 MG) BY MOUTH EVERY MORNING 30 tablet 9     pravastatin (PRAVACHOL) 40 MG tablet TAKE 1 TABLET (40 MG) BY MOUTH DAILY 90 tablet 0     ROS:  10 point ROS of systems including Constitutional, Eyes, Respiratory, Cardiovascular, Gastroenterology, Genitourinary, Integumentary, Musculoskeletal, Psychiatric were all negative except for pertinent positives noted in my HPI.    Vitals:  /72   Pulse 75   Temp 97.8  F (36.6  C)   Resp 16   Ht 1.727 m (5' 8\")   Wt 77.2 kg (170 lb 3.2 oz)   SpO2 94%   BMI 25.88 kg/m    Exam:  GENERAL APPEARANCE:  Alert, in no distress  ENT:  Mouth and posterior oropharynx normal, moist mucous membranes  EYES:  EOM, conjunctivae, lids, pupils and irises normal  RESP:  respiratory effort and palpation of chest normal, lungs clear to auscultation , no respiratory distress  CV:  Palpation and auscultation of heart done , regular rate and rhythm, no murmur, rub, or gallop  ABDOMEN:  normal bowel sounds, soft, nontender, no hepatosplenomegaly or other masses  M/S:   Active movement of bilateral upper and lower extremities.  SKIN:  Inspection of skin and subcutaneous tissue baseline, Palpation of skin and subcutaneous tissue baseline  NEURO:   Cranial nerves 2-12 are normal tested and grossly at patient's baseline  PSYCH:  affect and mood normal    Lab/Diagnostic data:  Labs done in SNF are in [facility]. Please refer to them using [employer]/Care Everywhere.    ASSESSMENT/PLAN:  Acute L3 Compression Fracture with Generalized Weakness. No recent falls or trauma. Gabapentin initiated. Reviewed risks versus benefits of Gabapentin with patient. Given it has improved his pain, would like to continue as ordered. Will also scheduled Acetaminophen. Physical and Occupational Therapy ordered.    End-Stage Renal Disease on Hemodialysis. 1800 ml/day fluid restriction. Dialysis Monday, Wednesday, Friday at [facility]. Returns from dialysis around 1000. " Takes Nephrocaps.     Anemia. Related to above. Baseline Hemoglobin ~ 9-10. Labs to be monitored at dialysis.    Chronic Systolic Dysfunction with Ischemic Cardiomyopathy S/P AICD Placement, Coronary Artery Disease S/P FOREST to Proximal OM1 in 2016, with Subsequent In-Stent Restenosis S/P Angioplasty in 2016, FOREST to LAD and OM in 2017 and PCI of the DEBI in October 2020 with Chronically Occluded LAD, History of Nonsustained Ventricular Tachycardia, and Paroxysmal Atrial Flutter S/P Ablations x 2. Followed by Dr. Sen. Monitor blood pressure, heart rate, and weights daily. Continue Apixaban, Clopidogrel, Isosorbide Mononitrate, Lisinopril, Metoprolol, Nitroglycerin, Pravastatin, and Mexitil as ordered.    Gastroesophageal Reflux Disease. Patient takes Omeprazole and Pantoprazole. Per staff report, does not want to change medications as this the only thing that works for him.     Chronic Diarrhea. Takes Loperamide.    Hypothyroidism. Last TSH 4.40 on 3/26/18 per [facility]. Question if more recent lab on file at dialysis. Continue Levothyroxine as ordered.    Type 2 Diabetes Mellitus. Last A1C 5.1 on 3/26/19. Patient is going to check with dialysis to see if there is a more recent lab value. Diet-controlled. Monitor blood sugars as indicated.     Physical Deconditioning. Secondary to recent hospitalization and co-morbidities. Physical and Occupational Therapy ordered. Goals of care discussed during hospitalization. Remains FULL CODE with goal to return home following TCU stay.  to assist in arranging appropriate discharge disposition.    Orders written by provider at facility  Parameters placed on cardiac medications  Acetaminophen 1000 mg PO TID Dx: Pain  Omeprazole discontinued, but later in the day, verbal order given to nurse to re-initiate per patient request.    Total time spent with patient visit at the skilled nursing facility was 35 min including patient visit and review of past records.  Greater than 50% of total time spent with counseling and coordinating care due to review of past medical history, review of hospital stay, review of weights and blood pressures since TCU admission, discussion with staff regarding GERD medication, discussion with patient regarding Gabapentin and side effects, review of code status, review of anticipated TCU stay and parameters written on cardiac medications.     Electronically signed by:  MARCIO Brown CNP                   Sincerely,        MARCIO Brown CNP

## 2021-01-18 NOTE — PROGRESS NOTES
"[place]  PRIMARY CARE PROVIDER AND CLINIC:  Josselin Kolb MD, [address]  / [facility] [address]  Chief Complaint   Patient presents with     Hospital F/U     [facility] Medical Record Number:  [id number]  Place of Service where encounter took place:  [facility] ([facility]) [[id number]]    Westley Ness  is a 75 year old  (1945), admitted to the above facility from  [facility]. Hospital stay 1/11/21 through 1/15/21..  Admitted to this facility for  rehab, medical management and nursing care.    HPI:    HPI information obtained from: facility chart records, facility staff, patient report and [facility] chart review.     Brief Summary of Hospital Course:     This is a 75-year-old male, with a past medical history significant for ESRD on hemodialysis, coronary artery disease s/p multiple stents most recently 10/20, chronic heart failure with reduced EF 20-25% on 10/16/20, AICD placement, nonsustained ventricular tachycardia, paroxysmal atrial flutter s/p ablations x 2, hypertension, hypothyroidism and type 2 diabetes mellitus, who was admitted to [facility] 1/11/21 through 1/15/21 with weakness. A lumbar spine x-ray revealed \".. Mild deformity at the superior L4 endplate which could represent a fracture. There is also a subtle deformity at the superior L3 endplate that could represent a fracture\". A lumbar spine MR revealed \" .. Recent-appearing mild compression fracture deformity of the superior aspect of the L3 vertebral body\". Gabapentin was initiated for pain. Physical Therapy was consulted and a TCU stay was recommended for ongoing physical rehabilitation.     Updates on Status Since Skilled nursing Admission:     Feels the Gabapentin is making him more sleepy. Fell asleep during his dialysis run which is \"unheard of\". Talks about how he heard Gabapentin can make people sleepy and a nurse at his dialysis center has a " cat who uses Gabapentin. Lived at home alone prior to admission and intends to go back upon TCU discharge. Feels he'll be at the TCU for 2-3 weeks. Denies shortness of breath and chest pain.     CODE STATUS/ADVANCE DIRECTIVES DISCUSSION:   CPR/Full code   Patient's living condition: lives alone  ALLERGIES: Contrast dye and No clinical screening - see comments  PAST MEDICAL HISTORY:  has a past medical history of Acid reflux disease, Benign essential hypertension, CAD (coronary artery disease), ESRD on hemodialysis (H), History of atrial flutter, Hypothyroidism, Ischemic cardiomyopathy, and Type 2 diabetes mellitus (H).  PAST SURGICAL HISTORY:   has a past surgical history that includes vascular surgery (2004, 2010); LEFT HEART CATHETERIZATION (7/14/2016); LEFT HEART CATHETERIZATION (9/21/2016); EP Ablation atrial flutter (06/08/2017, 12/11/17); cholecystectomy, open (2013); implant ventricular device (08/15/2011); tonsillectomy & adenoidectomy; Elbow surgery (Left, 2008); Repair fistula arteriovenous upper extremity (Left, 3/5/2019); IR Dialysis Fistulogram Left (7/22/2019); Implantable Cardioverter-Defibrillator (ICD) Generator Change Single (N/A, 11/21/2019); Coronary Angiogram (N/A, 10/19/2020); Left Heart Cath (N/A, 10/19/2020); and Percutaneous Coronary Intervention Stent Drug Eluting (N/A, 10/19/2020).  FAMILY HISTORY: family history includes Bladder Cancer in his father; Cerebrovascular Disease in his mother; Hypertension in his father; Myocardial Infarction in his paternal grandmother.  SOCIAL HISTORY:   reports that he quit smoking about 24 years ago. His smoking use included cigarettes. He started smoking about 56 years ago. He has a 70.00 pack-year smoking history. He has never used smokeless tobacco. He reports previous alcohol use. He reports that he does not use drugs.    Post Discharge Medication Reconciliation Status: discharge medications reconciled, continue medications without change  Current  Outpatient Medications   Medication Sig Dispense Refill     acetaminophen (TYLENOL) 500 MG tablet Take 1,000 mg by mouth 4 times daily as needed for pain       apixaban ANTICOAGULANT (ELIQUIS ANTICOAGULANT) 2.5 MG tablet Take 1 tablet (2.5 mg) by mouth 2 times daily 180 tablet 3     clopidogrel (PLAVIX) 75 MG tablet Take 1 tablet (75 mg) by mouth daily 60 tablet 1     gabapentin (NEURONTIN) 100 MG capsule Take 1 capsule (100 mg) by mouth 3 times daily 90 capsule 0     isosorbide mononitrate (IMDUR) 30 MG 24 hr tablet Take 1 tablet (30 mg) the evening of dialysis days (Mon, Wed, Fri)  and Saturdays. 48 tablet 3     isosorbide mononitrate (IMDUR) 30 MG 24 hr tablet Take 15 mg by mouth Take in the evening on the days prior dialysis. Take on Sunday, Tuesday and Thursday       levothyroxine (SYNTHROID/LEVOTHROID) 50 MCG tablet TAKE 1 TABLET (50 MCG) BY MOUTH DAILY 90 tablet 3     lisinopril (ZESTRIL) 2.5 MG tablet TAKE 1 TABLET (2.5 MG) BY MOUTH 2 TIMES DAILY (TAKE ONE TABLET AFTER DIALYSIS. TAKE SECOND TABLET AT BEDTIME.) 180 tablet 3     loperamide (IMODIUM A-D) 2 MG tablet Take 2 mg by mouth three times a week MWF on dialysis days       loperamide (IMODIUM A-D) 2 MG tablet Take 1 mg by mouth five times a week On non-dialysis days - Tu, Th, Sa, Russo       metoprolol succinate ER (TOPROL-XL) 25 MG 24 hr tablet Take 0.5 tablets (12.5 mg) by mouth daily 45 tablet 3     mexiletine (MEXITIL) 150 MG capsule Take 1 capsule (150 mg) by mouth 2 times daily 180 capsule 3     multivitamin RENAL (NEPHROCAPS/TRIPHROCAPS) 1 MG capsule Take 1 capsule by mouth daily 90 capsule 3     nitroGLYcerin (NITROSTAT) 0.4 MG sublingual tablet Place 1 tablet (0.4 mg) under the tongue every 5 minutes as needed for chest pain UP TO 3 PER EPISODE 20 tablet 0     omeprazole (PRILOSEC) 20 MG DR capsule Take 20 mg by mouth daily (before lunch)        pantoprazole (PROTONIX) 40 MG EC tablet TAKE 1 TABLET (40 MG) BY MOUTH EVERY MORNING 30 tablet 9      "pravastatin (PRAVACHOL) 40 MG tablet TAKE 1 TABLET (40 MG) BY MOUTH DAILY 90 tablet 0     ROS:  10 point ROS of systems including Constitutional, Eyes, Respiratory, Cardiovascular, Gastroenterology, Genitourinary, Integumentary, Musculoskeletal, Psychiatric were all negative except for pertinent positives noted in my HPI.    Vitals:  /72   Pulse 75   Temp 97.8  F (36.6  C)   Resp 16   Ht 1.727 m (5' 8\")   Wt 77.2 kg (170 lb 3.2 oz)   SpO2 94%   BMI 25.88 kg/m    Exam:  GENERAL APPEARANCE:  Alert, in no distress  ENT:  Mouth and posterior oropharynx normal, moist mucous membranes  EYES:  EOM, conjunctivae, lids, pupils and irises normal  RESP:  respiratory effort and palpation of chest normal, lungs clear to auscultation , no respiratory distress  CV:  Palpation and auscultation of heart done , regular rate and rhythm, no murmur, rub, or gallop  ABDOMEN:  normal bowel sounds, soft, nontender, no hepatosplenomegaly or other masses  M/S:   Active movement of bilateral upper and lower extremities.  SKIN:  Inspection of skin and subcutaneous tissue baseline, Palpation of skin and subcutaneous tissue baseline  NEURO:   Cranial nerves 2-12 are normal tested and grossly at patient's baseline  PSYCH:  affect and mood normal    Lab/Diagnostic data:  Labs done in SNF are in [facility]. Please refer to them using [employer]/Care Everywhere.    ASSESSMENT/PLAN:  Acute L3 Compression Fracture with Generalized Weakness. No recent falls or trauma. Gabapentin initiated. Reviewed risks versus benefits of Gabapentin with patient. Given it has improved his pain, would like to continue as ordered. Will also scheduled Acetaminophen. Physical and Occupational Therapy ordered.    End-Stage Renal Disease on Hemodialysis. 1800 ml/day fluid restriction. Dialysis Monday, Wednesday, Friday at [facility]. Returns from dialysis around 1000. Takes Nephrocaps.     Anemia. Related to above. Baseline Hemoglobin ~ 9-10. Labs " to be monitored at dialysis.    Chronic Systolic Dysfunction with Ischemic Cardiomyopathy S/P AICD Placement, Coronary Artery Disease S/P FOREST to Proximal OM1 in 2016, with Subsequent In-Stent Restenosis S/P Angioplasty in 2016, FOREST to LAD and OM in 2017 and PCI of the DEBI in October 2020 with Chronically Occluded LAD, History of Nonsustained Ventricular Tachycardia, and Paroxysmal Atrial Flutter S/P Ablations x 2. Followed by Dr. Sen. Monitor blood pressure, heart rate, and weights daily. Continue Apixaban, Clopidogrel, Isosorbide Mononitrate, Lisinopril, Metoprolol, Nitroglycerin, Pravastatin, and Mexitil as ordered.    Gastroesophageal Reflux Disease. Patient takes Omeprazole and Pantoprazole. Per staff report, does not want to change medications as this the only thing that works for him.     Chronic Diarrhea. Takes Loperamide.    Hypothyroidism. Last TSH 4.40 on 3/26/18 per [facility]. Question if more recent lab on file at dialysis. Continue Levothyroxine as ordered.    Type 2 Diabetes Mellitus. Last A1C 5.1 on 3/26/19. Patient is going to check with dialysis to see if there is a more recent lab value. Diet-controlled. Monitor blood sugars as indicated.     Physical Deconditioning. Secondary to recent hospitalization and co-morbidities. Physical and Occupational Therapy ordered. Goals of care discussed during hospitalization. Remains FULL CODE with goal to return home following TCU stay.  to assist in arranging appropriate discharge disposition.    Orders written by provider at facility  Parameters placed on cardiac medications  Acetaminophen 1000 mg PO TID Dx: Pain  Omeprazole discontinued, but later in the day, verbal order given to nurse to re-initiate per patient request.    Total time spent with patient visit at the skilled nursing facility was 35 min including patient visit and review of past records. Greater than 50% of total time spent with counseling and coordinating care due to  review of past medical history, review of hospital stay, review of weights and blood pressures since TCU admission, discussion with staff regarding GERD medication, discussion with patient regarding Gabapentin and side effects, review of code status, review of anticipated TCU stay and parameters written on cardiac medications.     Electronically signed by:  MARCIO Brown CNP

## 2021-01-18 NOTE — LETTER
[facility]   To:   [facility]          Please give to facility    From:   ODETTE Arana Care Coordinator [facility]     Patient Name:  Westley Ness YOB: 1945   Admit date: 1/15/21      *Information Needed:  Please contact me when the patient will discharge (or if they will move to long term care)- include the discharge date, disposition, and main diagnosis   - If the patient is discharged with home care services, please provide the name of the agency    Reports indicate pt would benefit from clinic care coordination after discharge.   Also- Please inform me if a care conference is being held.   Phone, Fax or Email with information       Thank You,   ODETTE Arana   Social Work Clinic Care Coordinator   [facility]  PH: [phone]  [email or web address]

## 2021-01-20 RX ORDER — OMEPRAZOLE 20 MG/1
20 TABLET, DELAYED RELEASE ORAL DAILY
COMMUNITY
End: 2021-01-21

## 2021-01-20 NOTE — PROGRESS NOTES
[facility]  INITIAL VISIT NOTE  January 21, 2021    PRIMARY CARE PROVIDER AND CLINIC:  Josselin Kolb [address] [place] [address] / [facility] [id number]    CHIEF COMPLAINT:  Hospital follow-up/Initial visit    HPI:    Westley Ness is a 75 year old  (1945) male who was seen at [facility] in [place] on January 21, 2021 for an initial visit.     Medical history is notable for coronary artery disease, HFrEF, AICD, hypertension, diet-controlled diabetes, ESRD on hemodialysis, atrial flutter, status post ablation x2, hypothyroidism, and GERD.    Summary of hospital course:  Patient was hospitalized at [facility] from January 11 through January 15, 2021 for increased generalized weakness.  BMP was significant for elevated creatinine of 4.75.  Hematology profile revealed hemoglobin of 10.9 and white count of 8.8.  Lactic acid was 0.9.  Test for COVID-19 was negative.  Cardiac rhythm was normal sinus on EKG.  Chest x-ray showed mild pulmonary vascular congestion without evidence of pulmonary edema.  X-ray of the lumbar spine demonstrated marked osteopenia, mild deformity at the superior L4 endplate, subtle deformity at the superior L3 endplate, and moderate degenerative endplate changes particularly at L5-S1.  MRI of lumbar spine demonstrated degenerative changes of the lumbar spine and recent appearing mild compression fracture deformity of the superior aspect of L3 vertebral body, and no significant spinal canal stenosis of lumbar spine.  Pain was controlled with acetaminophen and gabapentin.  TCU was recommended by therapies.    Patient is admitted to this facility for medical management, nursing care, and rehab.     Of note, history obtained from patient, facility RN, and extensive review of the chart necessitated by complex hospitalization.    Today's visit:  Patient was seen in his room while sitting in a chair comfortably.  He reports intermittent low back pain but no  significant discomfort at this juncture.  He continues to have neuropathic pain in his legs/feet.  He denies fever, chills, chest pain, palpitation, dyspnea, nausea, vomiting, or abdominal pain.  He had a BM last night.    CODE STATUS:   CPR/Full code     PAST MEDICAL HISTORY:   Coronary artery disease, s/p FOREST to proximal OM1 in 07/2016, FOREST to pLCx and OM1 in 09/2016, and FOREST to OM1 in 10/2020  Chronic HFrEF; last LVEF 20-25% in 10/2020  History of nonsustained V. tach   AICD placement in 08/2011  Paroxysmal atrial flutter, status post ablation x2 in 2017; on apixaban  Essential hypertension  DM type II, diet controlled  ESRD, on hemodialysis MWF   Chronic anemia, baseline Hgb 10-11  Hypothyroidism  GERD  HCAP in 04/2018  L3 compression fracture  Osteopenia  Chronic diarrhea    Past Medical History:   Diagnosis Date     Acid reflux disease      Benign essential hypertension      CAD (coronary artery disease)     s/p multiple NSTEMIs and PCI's     ESRD on hemodialysis (H)     MWF     History of atrial flutter     s/p ablation     Hypothyroidism      Ischemic cardiomyopathy     EF 25-30%, s/p AICD     Type 2 diabetes mellitus (H)     diet-controlled       PAST SURGICAL HISTORY:   Past Surgical History:   Procedure Laterality Date     CHOLECYSTECTOMY, OPEN  2013     CV CORONARY ANGIOGRAM N/A 10/19/2020    Procedure: Coronary Angiogram;  Surgeon: Theodora Salazar MD;  Location:  HEART CARDIAC CATH LAB     CV LEFT HEART CATH N/A 10/19/2020    Procedure: Left Heart Cath;  Surgeon: Theodora Salazar MD;  Location:  HE[facility]ARD[facility] CATH LAB     CV PCI STENT DRUG ELUTING N/A 10/19/2020    Procedure: Percutaneous Coronary Intervention Stent Drug Eluting;  Surgeon: Theodora Salazar MD;  Location:  HEART CARDIAC CATH LAB     ELBOW SURGERY Left 2008    ORIF - plates still in place     EP ICD GENERATOR CHANGE SINGLE N/A 11/21/2019    Procedure: EP ICD Generator Change Single;  Surgeon: Jono Mejia MD;  Location:   HEART CARDIAC CATH LAB     H ABLATION ATRIAL FLUTTER  2017, 17     HC LEFT HEART CATHETERIZATION  2016     HC LEFT HEART CATHETERIZATION  2016     IMPLANT VENTRICULAR DEVICE  08/15/2011     IR DIALYSIS FISTULOGRAM LEFT  2019     REPAIR FISTULA ARTERIOVENOUS UPPER EXTREMITY Left 3/5/2019    Procedure: REPAIR LEFT UPPER ARM ARTERIOVENOUS FISTULA SKIN ULCER;  Surgeon: Westley Griggs MD;  Location: [facility]     TONSILLECTOMY & ADENOIDECTOMY       VASCULAR SURGERY  ,     LUE fistulas (upper and lower); upper one is functional       FAMILY HISTORY:   Family History   Problem Relation Age of Onset     Cerebrovascular Disease Mother         later in life     Hypertension Father      Bladder Cancer Father      Myocardial Infarction Paternal Grandmother      Diabetes No family hx of      Prostate Cancer No family hx of      Colon Cancer No family hx of        SOCIAL HISTORY:  Patient is single and lives in a house by himself.  He uses either a cane or walker for ambulation.    Social History     Tobacco Use     Smoking status: Former Smoker     Packs/day: 2.00     Years: 35.00     Pack years: 70.00     Types: Cigarettes     Start date:      Quit date: 1996     Years since quittin.2     Smokeless tobacco: Never Used   Substance Use Topics     Alcohol use: Not Currently     Alcohol/week: 0.0 standard drinks     Frequency: Never       MEDICATIONS:  Current Outpatient Medications   Medication Sig Dispense Refill     acetaminophen (TYLENOL) 500 MG tablet Take 1,000 mg by mouth 3 times daily        apixaban ANTICOAGULANT (ELIQUIS ANTICOAGULANT) 2.5 MG tablet Take 1 tablet (2.5 mg) by mouth 2 times daily 180 tablet 3     clopidogrel (PLAVIX) 75 MG tablet Take 1 tablet (75 mg) by mouth daily 60 tablet 1     gabapentin (NEURONTIN) 100 MG capsule Take 1 capsule (100 mg) by mouth 3 times daily 90 capsule 0     isosorbide mononitrate (IMDUR) 30 MG 24 hr tablet Take 1 tablet (30  mg) the evening of dialysis days (Mon, Wed, Fri)  and Saturdays. 48 tablet 3     isosorbide mononitrate (IMDUR) 30 MG 24 hr tablet Take 15 mg by mouth Take in the evening on the days prior dialysis. Take on Sunday, Tuesday and Thursday       levothyroxine (SYNTHROID/LEVOTHROID) 50 MCG tablet TAKE 1 TABLET (50 MCG) BY MOUTH DAILY 90 tablet 3     lisinopril (ZESTRIL) 2.5 MG tablet TAKE 1 TABLET (2.5 MG) BY MOUTH 2 TIMES DAILY (TAKE ONE TABLET AFTER DIALYSIS. TAKE SECOND TABLET AT BEDTIME.) 180 tablet 3     loperamide (IMODIUM A-D) 2 MG tablet Take 2 mg by mouth three times a week MWF on dialysis days       loperamide (IMODIUM A-D) 2 MG tablet Take 1 mg by mouth five times a week On non-dialysis days - Tu, Th, Sa, Russo       metoprolol succinate ER (TOPROL-XL) 25 MG 24 hr tablet Take 0.5 tablets (12.5 mg) by mouth daily 45 tablet 3     mexiletine (MEXITIL) 150 MG capsule Take 1 capsule (150 mg) by mouth 2 times daily 180 capsule 3     multivitamin RENAL (NEPHROCAPS/TRIPHROCAPS) 1 MG capsule Take 1 capsule by mouth daily 90 capsule 3     nitroGLYcerin (NITROSTAT) 0.4 MG sublingual tablet Place 1 tablet (0.4 mg) under the tongue every 5 minutes as needed for chest pain UP TO 3 PER EPISODE 20 tablet 0     omeprazole (PRILOSEC) 20 MG DR capsule Take 20 mg by mouth daily (before lunch)        pantoprazole (PROTONIX) 40 MG EC tablet TAKE 1 TABLET (40 MG) BY MOUTH EVERY MORNING 30 tablet 9     pravastatin (PRAVACHOL) 40 MG tablet TAKE 1 TABLET (40 MG) BY MOUTH DAILY 90 tablet 0       ALLERGIES:  Allergies   Allergen Reactions     Contrast Dye Hives     Does fine if he uses benadryl prior.     No Clinical Screening - See Comments      Green beans - Diarrhea.    Topical antibiotic - name unknown - caused swelling of the penis.       ROS:  10 point ROS were negative other than the symptoms noted above in the HPI.    PHYSICAL EXAM:  Vital signs were reviewed in the chart.  Vital Signs: Blood pressure 120/83, heart rate 60,  respiratory rate 18, temperature 97.6  F, oxygen saturation 90%, weight 169.1 LBS  General: Cooperative, comfortable, and in no acute distress  HEENT: Normocephalic; oropharynx clear; conjunctival pallor  Cardiovascular: Normal S1, S2, RRR  Respiratory: Lungs clear to auscultation bilaterally  GI: Abdomen soft, non-tender, non-distended, +BS  Extremities: Trace to 1+ bilateral LE edema  Neuro: CX II-XII grossly intact; ROM in all four extremities grossly intact  Psych: Alert and oriented x3; normal affect  Skin: No acute rash    LABORATORY/IMAGING DATA:    Most Recent 3 CBC's:  Recent Labs   Lab Test 01/15/21  0730 01/13/21  0718 01/12/21  0700 01/11/21  0911   WBC  --  6.1 7.1 8.8   HGB 10.5* 10.5* 10.8* 10.9*   MCV  --  103* 107* 107*   PLT  --  114* 131* 156     Most Recent 3 BMP's:  Recent Labs   Lab Test 01/15/21  0730 01/14/21  0710 01/13/21  0718 01/12/21  0700   NA  --  139 140 137   POTASSIUM 4.5 4.5 4.4 5.7*   CHLORIDE  --  104 105 104   CO2  --  28 29 26   BUN  --  34* 25 53*   CR  --  4.55* 3.42* 5.34*   ANIONGAP  --  7 6 7   CHRISTINE  --  9.2 9.0 9.5   GLC  --  101* 85 98     Most Recent 2 LFT's:  Recent Labs   Lab Test 10/15/20  1830 03/26/19  0847 04/23/18  0632   AST 18  --  19   ALT 19 16 15   ALKPHOS 234*  --  202*   BILITOTAL 1.0  --  2.3*     Most Recent Cholesterol Panel:  Recent Labs   Lab Test 10/19/20  1609   CHOL 101   LDL 29   HDL 60   TRIG 60     Most Recent 6 Bacteria Isolates From Any Culture (See EPIC Reports for Culture Details):  Recent Labs   Lab Test 08/29/18  1447 08/08/18  1426 07/04/18  0425 05/24/18  1535 05/24/18  1534 04/26/18  0914   CULT 10,000 to 50,000 colonies/mL  Enterococcus faecalis  *  <10,000 colonies/mL  urogenital estefany   10,000 to 50,000 colonies/mL  Enterococcus faecalis  * >100,000 colonies/mL  Enterococcus faecalis  * No growth No growth No growth     Most Recent TSH and T4:  Recent Labs   Lab Test 03/26/19  0847   TSH 4.40*     Most Recent Hemoglobin A1c:  Recent  Labs   Lab Test 03/26/19  0847   A1C 5.1     Most Recent 6 glucoses:  Recent Labs   Lab Test 01/14/21  0710 01/13/21  0718 01/12/21  0700 01/11/21  0911 12/06/20  1028 10/20/20  0544   * 85 98 122* 125* 178*       ASSESSMENT/PLAN:  L3 compression fracture,  Osteopenia,  Generalized muscle weakness,  Physical deconditioning.  Pain is adequately controlled.  Plan:  Pain management with scheduled acetaminophen 1000 mg p.o. 3 times daily and gabapentin 100 mg p.o. 3 times daily  Continue PT/OT evaluation and therapy    ESRD, on hemodialysis MWF.  Stable.  Plan:  Continue renal diet  Continue Nephrocaps  Continue with hemodialysis 3 times a week    Coronary artery disease, s/p FOREST to proximal OM1 in 07/2016, FOREST to pLCx and OM1 in 09/2016, and FOREST to OM1 in 10/2020,  Chronic HFrEF; last LVEF 20-25% in 10/2020,  History of nonsustained V. Tach,  S/P AICD placement in 08/2011,  Paroxysmal atrial flutter, status post ablation x2 in 2017,  Essential hypertension.  Stable.   Lisinopril was held on January 19 by NP due to low normal blood pressures  Plan:  Continue Plavix 75 mg p.o. daily, Imdur 15 mg p.o. on Sunday, Tuesday, and Thursday, Imdur 30 mg p.o. on Monday, Wednesday, Friday, and Saturday, metoprolol ER 12.5 mg p.o. daily, pravastatin 40 mg p.o. daily, mexiletine 150 mg p.o. twice daily, and as needed nitroglycerin 0.4 mg sublingual  Resume Lisinopril 2.5 mg p.o. twice daily for hold parameters  Continue chronic anticoagulation therapy with apixaban 2.5 mg p.o. twice daily  Monitor blood pressure, heart rate, daily weight, cardiac/volume status  Follow up with cardiology as instructed    DM type II, diet controlled.  Last Hgb A1c was 5.1% in March 2019.  Plan:  Monitor blood glucose periodically    Chronic anemia.  Baseline hemoglobin 10-11.  Last hemoglobin was stable at 10.5 on January 15, 2021.  Plan:  Monitor hemoglobin periodically    Hypothyroidism.  Last TSH was 4.4 in March 2019.  Plan:  Continue  levothyroxine 50 mcg p.o. daily    GERD.  Patient is on 2 PPIs, pantoprazole and omeprazole. Per patient's report, this is the only combination that has worked for him to control his symptoms after stopping famotidine in March 2020.  After extensive discussion with the patient, he agreed to discontinuing omeprazole and starting famotidine.  Plan:  Continue pantoprazole 40 mg p.o. daily  Discontinue omeprazole  Start famotidine 10 mg p.o. daily    Chronic diarrhea.  Controlled.  Plan:  Continue loperamide as per orders    Orders written by provider at facility:  Discontinue omeprazole 20 mg p.o. daily  Start famotidine 10 mg p.o. daily      Total unit/floor time of 55 minutes consisted of the following: examination of patient, reviewing the record including pertinent labs and imaging. More than 50% of this time was spent in coordination of care with the patient, nursing staff, and other healthcare providers. This time was spent on discussing the care plan including GERD medication changes, adjusting cardiac medications, discharge/safe placement, and specialty follow up.        Electronically signed by:  Rosita Ferreira MD

## 2021-01-21 ENCOUNTER — NURSING HOME VISIT (OUTPATIENT)
Dept: GERIATRICS | Facility: CLINIC | Age: 76
End: 2021-01-21
Payer: MEDICARE

## 2021-01-21 VITALS
RESPIRATION RATE: 18 BRPM | BODY MASS INDEX: 25.63 KG/M2 | SYSTOLIC BLOOD PRESSURE: 108 MMHG | HEIGHT: 68 IN | DIASTOLIC BLOOD PRESSURE: 54 MMHG | HEART RATE: 54 BPM | OXYGEN SATURATION: 97 % | TEMPERATURE: 97.3 F | WEIGHT: 169.1 LBS

## 2021-01-21 DIAGNOSIS — I25.10 CORONARY ARTERY DISEASE INVOLVING NATIVE CORONARY ARTERY OF NATIVE HEART WITHOUT ANGINA PECTORIS: ICD-10-CM

## 2021-01-21 DIAGNOSIS — K21.9 GASTROESOPHAGEAL REFLUX DISEASE, UNSPECIFIED WHETHER ESOPHAGITIS PRESENT: ICD-10-CM

## 2021-01-21 DIAGNOSIS — I48.92 ATRIAL FLUTTER, UNSPECIFIED TYPE (H): ICD-10-CM

## 2021-01-21 DIAGNOSIS — E11.9 DIET-CONTROLLED DIABETES MELLITUS (H): ICD-10-CM

## 2021-01-21 DIAGNOSIS — Z99.2 ESRD ON HEMODIALYSIS (H): ICD-10-CM

## 2021-01-21 DIAGNOSIS — S32.030D CLOSED COMPRESSION FRACTURE OF L3 LUMBAR VERTEBRA WITH ROUTINE HEALING, SUBSEQUENT ENCOUNTER: Primary | ICD-10-CM

## 2021-01-21 DIAGNOSIS — I50.22 CHRONIC HFREF (HEART FAILURE WITH REDUCED EJECTION FRACTION) (H): ICD-10-CM

## 2021-01-21 DIAGNOSIS — R19.7 DIARRHEA, UNSPECIFIED TYPE: ICD-10-CM

## 2021-01-21 DIAGNOSIS — D64.9 CHRONIC ANEMIA: ICD-10-CM

## 2021-01-21 DIAGNOSIS — E03.9 HYPOTHYROIDISM, UNSPECIFIED TYPE: ICD-10-CM

## 2021-01-21 DIAGNOSIS — M62.81 GENERALIZED MUSCLE WEAKNESS: ICD-10-CM

## 2021-01-21 DIAGNOSIS — M85.88 OSTEOPENIA OF SPINE: ICD-10-CM

## 2021-01-21 DIAGNOSIS — Z95.810 AUTOMATIC IMPLANTABLE CARDIOVERTER-DEFIBRILLATOR IN SITU: ICD-10-CM

## 2021-01-21 DIAGNOSIS — N18.6 ESRD ON HEMODIALYSIS (H): ICD-10-CM

## 2021-01-21 DIAGNOSIS — I10 ESSENTIAL HYPERTENSION: ICD-10-CM

## 2021-01-21 DIAGNOSIS — R53.81 PHYSICAL DECONDITIONING: ICD-10-CM

## 2021-01-21 PROCEDURE — 99306 1ST NF CARE HIGH MDM 50: CPT | Performed by: INTERNAL MEDICINE

## 2021-01-21 RX ORDER — FAMOTIDINE 20 MG/1
20 TABLET, FILM COATED ORAL DAILY
COMMUNITY

## 2021-01-21 ASSESSMENT — MIFFLIN-ST. JEOR: SCORE: 1476.53

## 2021-01-21 NOTE — LETTER
1/21/2021        RE: Westley Ness  [address] [facility] [address]        [place] [facility]  INITIAL VISIT NOTE  January 21, 2021    PRIMARY CARE PROVIDER AND CLINIC:  Josselin Kolb [address]  / [facility] [address]    CHIEF COMPLAINT:  Hospital follow-up/Initial visit    HPI:    Westley Ness is a 75 year old  (1945) male who was seen at [facility] in [place] on January 21, 2021 for an initial visit.     Medical history is notable for coronary artery disease, HFrEF, AICD, hypertension, diet-controlled diabetes, ESRD on hemodialysis, atrial flutter, status post ablation x2, hypothyroidism, and GERD.    Summary of hospital course:  Patient was hospitalized at [facility] from January 11 through January 15, 2021 for increased generalized weakness.  BMP was significant for elevated creatinine of 4.75.  Hematology profile revealed hemoglobin of 10.9 and white count of 8.8.  Lactic acid was 0.9.  Test for COVID-19 was negative.  Cardiac rhythm was normal sinus on EKG.  Chest x-ray showed mild pulmonary vascular congestion without evidence of pulmonary edema.  X-ray of the lumbar spine demonstrated marked osteopenia, mild deformity at the superior L4 endplate, subtle deformity at the superior L3 endplate, and moderate degenerative endplate changes particularly at L5-S1.  MRI of lumbar spine demonstrated degenerative changes of the lumbar spine and recent appearing mild compression fracture deformity of the superior aspect of L3 vertebral body, and no significant spinal canal stenosis of lumbar spine.  Pain was controlled with acetaminophen and gabapentin.  TCU was recommended by therapies.    Patient is admitted to this facility for medical management, nursing care, and rehab.     Of note, history obtained from patient, facility RN, and extensive review of the chart necessitated by complex hospitalization.    Today's visit:  Patient was seen in his  room while sitting in a chair comfortably.  He reports intermittent low back pain but no significant discomfort at this juncture.  He continues to have neuropathic pain in his legs/feet.  He denies fever, chills, chest pain, palpitation, dyspnea, nausea, vomiting, or abdominal pain.  He had a BM last night.    CODE STATUS:   CPR/Full code     PAST MEDICAL HISTORY:   Coronary artery disease, s/p FOREST to proximal OM1 in 07/2016, FOREST to pLCx and OM1 in 09/2016, and FOREST to OM1 in 10/2020  Chronic HFrEF; last LVEF 20-25% in 10/2020  History of nonsustained V. tach   AICD placement in 08/2011  Paroxysmal atrial flutter, status post ablation x2 in 2017; on apixaban  Essential hypertension  DM type II, diet controlled  ESRD, on hemodialysis MWF   Chronic anemia, baseline Hgb 10-11  Hypothyroidism  GERD  HCAP in 04/2018  L3 compression fracture  Osteopenia  Chronic diarrhea    Past Medical History:   Diagnosis Date     Acid reflux disease      Benign essential hypertension      CAD (coronary artery disease)     s/p multiple NSTEMIs and PCI's     ESRD on hemodialysis (H)     MWF     History of atrial flutter     s/p ablation     Hypothyroidism      Ischemic cardiomyopathy     EF 25-30%, s/p AICD     Type 2 diabetes mellitus (H)     diet-controlled       PAST SURGICAL HISTORY:   Past Surgical History:   Procedure Laterality Date     CHOLECYSTECTOMY, OPEN  2013     CV CORONARY ANGIOGRAM N/A 10/19/2020    Procedure: Coronary Angiogram;  Surgeon: Theodora Salazar MD;  Location:  HEART CARDIAC CATH LAB     CV LEFT HEART CATH N/A 10/19/2020    Procedure: Left Heart Cath;  Surgeon: Theodora Salazar MD;  Location:  HEART CARDIAC CATH LAB     CV PCI STENT DRUG ELUTING N/A 10/19/2020    Procedure: Percutaneous Coronary Intervention Stent Drug Eluting;  Surgeon: Theodora Salazar MD;  Location:  HEART CARDIAC CATH LAB     ELBOW SURGERY Left 2008    ORIF - plates still in place     EP ICD GENERATOR CHANGE SINGLE N/A 11/21/2019     Procedure: EP ICD Generator Change Single;  Surgeon: Jono Mejia MD;  Location:  HEART CARDIAC CATH LAB     H ABLATION ATRIAL FLUTTER  2017, 17     HC LEFT HEART CATHETERIZATION  2016     HC LEFT HEART CATHETERIZATION  2016     IMPLANT VENTRICULAR DEVICE  08/15/2011     IR DIALYSIS FISTULOGRAM LEFT  2019     REPAIR FISTULA ARTERIOVENOUS UPPER EXTREMITY Left 3/5/2019    Procedure: REPAIR LEFT UPPER ARM ARTERIOVENOUS FISTULA SKIN ULCER;  Surgeon: Westley Griggs MD;  Location:  OR     TONSILLECTOMY & ADENOIDECTOMY       VASCULAR SURGERY  ,     LUE fistulas (upper and lower); upper one is functional       FAMILY HISTORY:   Family History   Problem Relation Age of Onset     Cerebrovascular Disease Mother         later in life     Hypertension Father      Bladder Cancer Father      Myocardial Infarction Paternal Grandmother      Diabetes No family hx of      Prostate Cancer No family hx of      Colon Cancer No family hx of        SOCIAL HISTORY:  Patient is single and lives in a house by himself.  He uses either a cane or walker for ambulation.    Social History     Tobacco Use     Smoking status: Former Smoker     Packs/day: 2.00     Years: 35.00     Pack years: 70.00     Types: Cigarettes     Start date:      Quit date: 1996     Years since quittin.2     Smokeless tobacco: Never Used   Substance Use Topics     Alcohol use: Not Currently     Alcohol/week: 0.0 standard drinks     Frequency: Never       MEDICATIONS:  Current Outpatient Medications   Medication Sig Dispense Refill     acetaminophen (TYLENOL) 500 MG tablet Take 1,000 mg by mouth 3 times daily        apixaban ANTICOAGULANT (ELIQUIS ANTICOAGULANT) 2.5 MG tablet Take 1 tablet (2.5 mg) by mouth 2 times daily 180 tablet 3     clopidogrel (PLAVIX) 75 MG tablet Take 1 tablet (75 mg) by mouth daily 60 tablet 1     gabapentin (NEURONTIN) 100 MG capsule Take 1 capsule (100 mg) by mouth 3 times  daily 90 capsule 0     isosorbide mononitrate (IMDUR) 30 MG 24 hr tablet Take 1 tablet (30 mg) the evening of dialysis days (Mon, Wed, Fri)  and Saturdays. 48 tablet 3     isosorbide mononitrate (IMDUR) 30 MG 24 hr tablet Take 15 mg by mouth Take in the evening on the days prior dialysis. Take on Sunday, Tuesday and Thursday       levothyroxine (SYNTHROID/LEVOTHROID) 50 MCG tablet TAKE 1 TABLET (50 MCG) BY MOUTH DAILY 90 tablet 3     lisinopril (ZESTRIL) 2.5 MG tablet TAKE 1 TABLET (2.5 MG) BY MOUTH 2 TIMES DAILY (TAKE ONE TABLET AFTER DIALYSIS. TAKE SECOND TABLET AT BEDTIME.) 180 tablet 3     loperamide (IMODIUM A-D) 2 MG tablet Take 2 mg by mouth three times a week MWF on dialysis days       loperamide (IMODIUM A-D) 2 MG tablet Take 1 mg by mouth five times a week On non-dialysis days - Tu, Th, Sa, Russo       metoprolol succinate ER (TOPROL-XL) 25 MG 24 hr tablet Take 0.5 tablets (12.5 mg) by mouth daily 45 tablet 3     mexiletine (MEXITIL) 150 MG capsule Take 1 capsule (150 mg) by mouth 2 times daily 180 capsule 3     multivitamin RENAL (NEPHROCAPS/TRIPHROCAPS) 1 MG capsule Take 1 capsule by mouth daily 90 capsule 3     nitroGLYcerin (NITROSTAT) 0.4 MG sublingual tablet Place 1 tablet (0.4 mg) under the tongue every 5 minutes as needed for chest pain UP TO 3 PER EPISODE 20 tablet 0     omeprazole (PRILOSEC) 20 MG DR capsule Take 20 mg by mouth daily (before lunch)        pantoprazole (PROTONIX) 40 MG EC tablet TAKE 1 TABLET (40 MG) BY MOUTH EVERY MORNING 30 tablet 9     pravastatin (PRAVACHOL) 40 MG tablet TAKE 1 TABLET (40 MG) BY MOUTH DAILY 90 tablet 0       ALLERGIES:  Allergies   Allergen Reactions     Contrast Dye Hives     Does fine if he uses benadryl prior.     No Clinical Screening - See Comments      Green beans - Diarrhea.    Topical antibiotic - name unknown - caused swelling of the penis.       ROS:  10 point ROS were negative other than the symptoms noted above in the HPI.    PHYSICAL EXAM:  Vital  signs were reviewed in the chart.  Vital Signs: Blood pressure 120/83, heart rate 60, respiratory rate 18, temperature 97.6  F, oxygen saturation 90%, weight 169.1 LBS  General: Cooperative, comfortable, and in no acute distress  HEENT: Normocephalic; oropharynx clear; conjunctival pallor  Cardiovascular: Normal S1, S2, RRR  Respiratory: Lungs clear to auscultation bilaterally  GI: Abdomen soft, non-tender, non-distended, +BS  Extremities: Trace to 1+ bilateral LE edema  Neuro: CX II-XII grossly intact; ROM in all four extremities grossly intact  Psych: Alert and oriented x3; normal affect  Skin: No acute rash    LABORATORY/IMAGING DATA:    Most Recent 3 CBC's:  Recent Labs   Lab Test 01/15/21  0730 01/13/21  0718 01/12/21  0700 01/11/21  0911   WBC  --  6.1 7.1 8.8   HGB 10.5* 10.5* 10.8* 10.9*   MCV  --  103* 107* 107*   PLT  --  114* 131* 156     Most Recent 3 BMP's:  Recent Labs   Lab Test 01/15/21  0730 01/14/21  0710 01/13/21  0718 01/12/21  0700   NA  --  139 140 137   POTASSIUM 4.5 4.5 4.4 5.7*   CHLORIDE  --  104 105 104   CO2  --  28 29 26   BUN  --  34* 25 53*   CR  --  4.55* 3.42* 5.34*   ANIONGAP  --  7 6 7   CHRISTINE  --  9.2 9.0 9.5   GLC  --  101* 85 98     Most Recent 2 LFT's:  Recent Labs   Lab Test 10/15/20  1830 03/26/19  0847 04/23/18  0632   AST 18  --  19   ALT 19 16 15   ALKPHOS 234*  --  202*   BILITOTAL 1.0  --  2.3*     Most Recent Cholesterol Panel:  Recent Labs   Lab Test 10/19/20  1609   CHOL 101   LDL 29   HDL 60   TRIG 60     Most Recent 6 Bacteria Isolates From Any Culture (See EPIC Reports for Culture Details):  Recent Labs   Lab Test 08/29/18  1447 08/08/18  1426 07/04/18  0425 05/24/18  1535 05/24/18  1534 04/26/18  0914   CULT 10,000 to 50,000 colonies/mL  Enterococcus faecalis  *  <10,000 colonies/mL  urogenital estefany   10,000 to 50,000 colonies/mL  Enterococcus faecalis  * >100,000 colonies/mL  Enterococcus faecalis  * No growth No growth No growth     Most Recent TSH and  T4:  Recent Labs   Lab Test 03/26/19  0847   TSH 4.40*     Most Recent Hemoglobin A1c:  Recent Labs   Lab Test 03/26/19  0847   A1C 5.1     Most Recent 6 glucoses:  Recent Labs   Lab Test 01/14/21  0710 01/13/21  0718 01/12/21  0700 01/11/21  0911 12/06/20  1028 10/20/20  0544   * 85 98 122* 125* 178*       ASSESSMENT/PLAN:  L3 compression fracture,  Osteopenia,  Generalized muscle weakness,  Physical deconditioning.  Pain is adequately controlled.  Plan:  Pain management with scheduled acetaminophen 1000 mg p.o. 3 times daily and gabapentin 100 mg p.o. 3 times daily  Continue PT/OT evaluation and therapy    ESRD, on hemodialysis MWF.  Stable.  Plan:  Continue renal diet  Continue Nephrocaps  Continue with hemodialysis 3 times a week    Coronary artery disease, s/p FOREST to proximal OM1 in 07/2016, FOREST to pLCx and OM1 in 09/2016, and FOREST to OM1 in 10/2020,  Chronic HFrEF; last LVEF 20-25% in 10/2020,  History of nonsustained V. Tach,  S/P AICD placement in 08/2011,  Paroxysmal atrial flutter, status post ablation x2 in 2017,  Essential hypertension.  Stable.   Lisinopril was held on January 19 by NP due to low normal blood pressures  Plan:  Continue Plavix 75 mg p.o. daily, Imdur 15 mg p.o. on Sunday, Tuesday, and Thursday, Imdur 30 mg p.o. on Monday, Wednesday, Friday, and Saturday, metoprolol ER 12.5 mg p.o. daily, pravastatin 40 mg p.o. daily, mexiletine 150 mg p.o. twice daily, and as needed nitroglycerin 0.4 mg sublingual  Resume Lisinopril 2.5 mg p.o. twice daily for hold parameters  Continue chronic anticoagulation therapy with apixaban 2.5 mg p.o. twice daily  Monitor blood pressure, heart rate, daily weight, cardiac/volume status  Follow up with cardiology as instructed    DM type II, diet controlled.  Last Hgb A1c was 5.1% in March 2019.  Plan:  Monitor blood glucose periodically    Chronic anemia.  Baseline hemoglobin 10-11.  Last hemoglobin was stable at 10.5 on January 15, 2021.  Plan:  Monitor  hemoglobin periodically    Hypothyroidism.  Last TSH was 4.4 in March 2019.  Plan:  Continue levothyroxine 50 mcg p.o. daily    GERD.  Patient is on 2 PPIs, pantoprazole and omeprazole. Per patient's report, this is the only combination that has worked for him to control his symptoms after stopping famotidine in March 2020.  After extensive discussion with the patient, he agreed to discontinuing omeprazole and starting famotidine.  Plan:  Continue pantoprazole 40 mg p.o. daily  Discontinue omeprazole  Start famotidine 10 mg p.o. daily    Chronic diarrhea.  Controlled.  Plan:  Continue loperamide as per orders    Orders written by provider at facility:  Discontinue omeprazole 20 mg p.o. daily  Start famotidine 10 mg p.o. daily      Total unit/floor time of 55 minutes consisted of the following: examination of patient, reviewing the record including pertinent labs and imaging. More than 50% of this time was spent in coordination of care with the patient, nursing staff, and other healthcare providers. This time was spent on discussing the care plan including GERD medication changes, adjusting cardiac medications, discharge/safe placement, and specialty follow up.        Electronically signed by:  Rosita Ferreira MD                          Sincerely,        Rosita Ferreira MD

## 2021-01-22 ENCOUNTER — TRANSFERRED RECORDS (OUTPATIENT)
Dept: HEALTH INFORMATION MANAGEMENT | Facility: CLINIC | Age: 76
End: 2021-01-22

## 2021-01-24 ASSESSMENT — MIFFLIN-ST. JEOR: SCORE: 1490.59

## 2021-01-25 ENCOUNTER — NURSING HOME VISIT (OUTPATIENT)
Dept: GERIATRICS | Facility: CLINIC | Age: 76
End: 2021-01-25
Payer: MEDICARE

## 2021-01-25 VITALS
TEMPERATURE: 96.6 F | RESPIRATION RATE: 16 BRPM | DIASTOLIC BLOOD PRESSURE: 67 MMHG | OXYGEN SATURATION: 92 % | HEIGHT: 68 IN | WEIGHT: 172.2 LBS | SYSTOLIC BLOOD PRESSURE: 116 MMHG | HEART RATE: 73 BPM | BODY MASS INDEX: 26.1 KG/M2

## 2021-01-25 DIAGNOSIS — E11.9 DIET-CONTROLLED DIABETES MELLITUS (H): ICD-10-CM

## 2021-01-25 DIAGNOSIS — S32.030D CLOSED COMPRESSION FRACTURE OF L3 LUMBAR VERTEBRA WITH ROUTINE HEALING, SUBSEQUENT ENCOUNTER: Primary | ICD-10-CM

## 2021-01-25 DIAGNOSIS — Z99.2 ESRD ON HEMODIALYSIS (H): ICD-10-CM

## 2021-01-25 DIAGNOSIS — N18.6 ESRD ON HEMODIALYSIS (H): ICD-10-CM

## 2021-01-25 DIAGNOSIS — I48.0 PAF (PAROXYSMAL ATRIAL FIBRILLATION) (H): ICD-10-CM

## 2021-01-25 DIAGNOSIS — M62.81 GENERALIZED MUSCLE WEAKNESS: ICD-10-CM

## 2021-01-25 DIAGNOSIS — D64.9 CHRONIC ANEMIA: ICD-10-CM

## 2021-01-25 DIAGNOSIS — I50.22 CHRONIC HFREF (HEART FAILURE WITH REDUCED EJECTION FRACTION) (H): ICD-10-CM

## 2021-01-25 DIAGNOSIS — R53.81 PHYSICAL DECONDITIONING: ICD-10-CM

## 2021-01-25 DIAGNOSIS — I25.10 CORONARY ARTERY DISEASE INVOLVING NATIVE CORONARY ARTERY OF NATIVE HEART WITHOUT ANGINA PECTORIS: ICD-10-CM

## 2021-01-25 DIAGNOSIS — I10 ESSENTIAL HYPERTENSION: ICD-10-CM

## 2021-01-25 PROBLEM — M85.88 OSTEOPENIA OF SPINE: Status: ACTIVE | Noted: 2021-01-25

## 2021-01-25 PROCEDURE — 99309 SBSQ NF CARE MODERATE MDM 30: CPT | Performed by: NURSE PRACTITIONER

## 2021-01-25 NOTE — PROGRESS NOTES
[place] [facility]  [place] Medical Record Number:  [id number]  Place of Service where encounter took place:  [facility] [[id number]]  Chief Complaint   Patient presents with     Nursing Home Acute       HPI:    Westley Ness  is a 75 year old (1945), who is being seen today for an episodic care visit.  HPI information obtained from: facility chart records, facility staff, patient report and [facility] chart review. Today's concern is:     Closed compression fracture of L3 lumbar vertebra with routine healing, subsequent encounter  Generalized muscle weakness  Physical deconditioning  ESRD on hemodialysis (H)  Chronic anemia  Coronary artery disease involving native coronary artery of native heart without angina pectoris  Chronic HFrEF (heart failure with reduced ejection fraction) (H)  PAF (paroxysmal atrial fibrillation) (H)  Essential hypertension  Diet-controlled diabetes mellitus (H)      Past Medical and Surgical History reviewed in Epic today.    MEDICATIONS:     Current Outpatient Medications   Medication Sig Dispense Refill     acetaminophen (TYLENOL) 500 MG tablet Take 1,000 mg by mouth 3 times daily        apixaban ANTICOAGULANT (ELIQUIS ANTICOAGULANT) 2.5 MG tablet Take 1 tablet (2.5 mg) by mouth 2 times daily 180 tablet 3     clopidogrel (PLAVIX) 75 MG tablet Take 1 tablet (75 mg) by mouth daily 60 tablet 1     famotidine (PEPCID) 10 MG tablet Take 10 mg by mouth daily       gabapentin (NEURONTIN) 100 MG capsule Take 1 capsule (100 mg) by mouth 3 times daily 90 capsule 0     isosorbide mononitrate (IMDUR) 30 MG 24 hr tablet Take 1 tablet (30 mg) the evening of dialysis days (Mon, Wed, Fri)  and Saturdays. 48 tablet 3     isosorbide mononitrate (IMDUR) 30 MG 24 hr tablet Take 15 mg by mouth Take in the evening on the days prior dialysis. Take on Sunday, Tuesday and Thursday       levothyroxine (SYNTHROID/LEVOTHROID) 50 MCG tablet TAKE 1 TABLET  "(50 MCG) BY MOUTH DAILY 90 tablet 3     lisinopril (ZESTRIL) 2.5 MG tablet TAKE 1 TABLET (2.5 MG) BY MOUTH 2 TIMES DAILY (TAKE ONE TABLET AFTER DIALYSIS. TAKE SECOND TABLET AT BEDTIME.) 180 tablet 3     loperamide (IMODIUM A-D) 2 MG tablet Take 2 mg by mouth three times a week MWF on dialysis days       loperamide (IMODIUM A-D) 2 MG tablet Take 1 mg by mouth five times a week On non-dialysis days - Tu, Th, Sa, Russo       metoprolol succinate ER (TOPROL-XL) 25 MG 24 hr tablet Take 0.5 tablets (12.5 mg) by mouth daily 45 tablet 3     mexiletine (MEXITIL) 150 MG capsule Take 1 capsule (150 mg) by mouth 2 times daily 180 capsule 3     multivitamin RENAL (NEPHROCAPS/TRIPHROCAPS) 1 MG capsule Take 1 capsule by mouth daily 90 capsule 3     nitroGLYcerin (NITROSTAT) 0.4 MG sublingual tablet Place 1 tablet (0.4 mg) under the tongue every 5 minutes as needed for chest pain UP TO 3 PER EPISODE 20 tablet 0     pantoprazole (PROTONIX) 40 MG EC tablet TAKE 1 TABLET (40 MG) BY MOUTH EVERY MORNING 30 tablet 9     pravastatin (PRAVACHOL) 40 MG tablet TAKE 1 TABLET (40 MG) BY MOUTH DAILY 90 tablet 0     TODAY DURING EXAM/ROS:  No CP, SOB, Cough, dizziness, fevers, chills, HA, N/V, dysuria or Bowel Abnormalities. Appetite is good.  No pain except a little back soreness--comes and goes and chronic feet pains        Objective:  /67   Pulse 73   Temp 96.6  F (35.9  C)   Resp 16   Ht 1.727 m (5' 8\")   Wt 78.1 kg (172 lb 3.2 oz)   SpO2 92%   BMI 26.18 kg/m    Exam:  GENERAL APPEARANCE:  Alert, in no distress, appears healthy, oriented, cooperative  ENT:  Mouth and posterior oropharynx normal, moist mucous membranes, normal hearing acuity  EYES:  Conjunctiva and lids normal  RESP:  respiratory effort and palpation of chest normal, lungs clear to auscultation , no respiratory distress  CV:  Palpation and auscultation of heart done , regular rate and rhythm, no murmur, rub, or gallop, +2 edema Rt LE and +1 LE edema.   +bruit and " thrill left upper arm AV fistula--has drsg d&i in place.  ABDOMEN:  normal bowel sounds, soft, nontender, no hepatosplenomegaly or other masses  M/S:   HUANG equally, up with assist.  SKIN:  Inspection of skin and subcutaneous tissue baseline  NEURO:   Cranial nerves 2-12 are normal tested and grossly at patient's baseline  PSYCH:  oriented X 3, normal insight, judgement and memory, affect and mood normal    Labs:   Recent Labs   Lab Test 01/15/21  0730 01/14/21  0710 01/13/21  0718   NA  --  139 140   POTASSIUM 4.5 4.5 4.4   CHLORIDE  --  104 105   CO2  --  28 29   ANIONGAP  --  7 6   GLC  --  101* 85   BUN  --  34* 25   CR  --  4.55* 3.42*   CHRISTINE  --  9.2 9.0     CBC RESULTS:   Recent Labs   Lab Test 01/15/21  0730 01/13/21  0718   WBC  --  6.1   RBC  --  3.19*   HGB 10.5* 10.5*   HCT  --  32.8*   MCV  --  103*   MCH  --  32.9   MCHC  --  32.0   RDW  --  15.4*   PLT  --  114*     ASSESSMENT / PLAN:  (S32.030D) Closed compression fracture of L3 lumbar vertebra with routine healing, subsequent encounter  (primary encounter diagnosis)   (M62.81) Generalized muscle weakness   (R53.81) Physical deconditioning  Comment: feels stronger, scant pain  Plan: PT OT goal is to go home    (N18.6,  Z99.2) ESRD on hemodialysis (H)  (D64.9) Chronic anemia  Comment/Plan: steady Hgb, gets FE and epo on dialysis runs.   Cont same POC, check Vit D--unclear of replaced recently when low but pt thinks may have been at [facility]    CV ISSUES:  (I25.10) Coronary artery disease involving native coronary artery of native heart without angina pectoris   (I50.22) Chronic HFrEF (heart failure with reduced ejection fraction) (H)   (I48.0) PAF (paroxysmal atrial fibrillation) (H)   (I10) Essential hypertension  Comment/Plan: cont with meds, statin,  Eliquis, ACE, BB, Imdur--hold parameters are <100 not < 110    (E11.9) Diet-controlled diabetes mellitus (H)  Comment/Plan: no recent A7O--hlic check in am with Vit D level--pt agrees.      Electronically  signed by:  MARCIO Morrison CNP

## 2021-01-26 ENCOUNTER — RECORDS - HEALTHEAST (OUTPATIENT)
Dept: LAB | Facility: CLINIC | Age: 76
End: 2021-01-26

## 2021-01-26 ENCOUNTER — TRANSFERRED RECORDS (OUTPATIENT)
Dept: HEALTH INFORMATION MANAGEMENT | Facility: CLINIC | Age: 76
End: 2021-01-26

## 2021-01-26 LAB
25(OH)D3 SERPL-MCNC: 8.6 NG/ML (ref 30–80)
HBA1C MFR BLD: 5.5 %
HBA1C MFR BLD: 5.5 % (ref 0–5.7)

## 2021-01-28 ENCOUNTER — NURSING HOME VISIT (OUTPATIENT)
Dept: GERIATRICS | Facility: CLINIC | Age: 76
End: 2021-01-28
Payer: MEDICARE

## 2021-01-28 VITALS
DIASTOLIC BLOOD PRESSURE: 63 MMHG | SYSTOLIC BLOOD PRESSURE: 111 MMHG | HEART RATE: 72 BPM | TEMPERATURE: 97.9 F | RESPIRATION RATE: 17 BRPM | OXYGEN SATURATION: 99 % | WEIGHT: 169.8 LBS | BODY MASS INDEX: 25.82 KG/M2

## 2021-01-28 DIAGNOSIS — E11.22 TYPE 2 DIABETES MELLITUS WITH CHRONIC KIDNEY DISEASE ON CHRONIC DIALYSIS, WITHOUT LONG-TERM CURRENT USE OF INSULIN (H): ICD-10-CM

## 2021-01-28 DIAGNOSIS — N18.6 TYPE 2 DIABETES MELLITUS WITH CHRONIC KIDNEY DISEASE ON CHRONIC DIALYSIS, WITHOUT LONG-TERM CURRENT USE OF INSULIN (H): ICD-10-CM

## 2021-01-28 DIAGNOSIS — E55.9 VITAMIN D DEFICIENCY: Primary | ICD-10-CM

## 2021-01-28 DIAGNOSIS — Z99.2 TYPE 2 DIABETES MELLITUS WITH CHRONIC KIDNEY DISEASE ON CHRONIC DIALYSIS, WITHOUT LONG-TERM CURRENT USE OF INSULIN (H): ICD-10-CM

## 2021-01-28 PROCEDURE — 99308 SBSQ NF CARE LOW MDM 20: CPT | Performed by: NURSE PRACTITIONER

## 2021-01-28 NOTE — PROGRESS NOTES
"CC: Lab Recheck     HPI:    Westley Ness is a 75 year old  (1945), who is being seen today for an episodic care visit at [facility]. Today's concern: lab recheck of Vitamin D and A1C..        OBJECTIVE: dozing in bed, NAD.      /63   Pulse 72   Temp 97.9  F (36.6  C)   Resp 17   Wt 77 kg (169 lb 12.8 oz)   SpO2 99%   BMI 25.82 kg/m      LABS: 1/28/2021  Lab Results   Component Value Date    A1C 5.5 01/26/2021    A1C 5.1 03/26/2019    A1C 6.2 04/23/2018    A1C 6.0 06/09/2017    A1C 5.8 11/14/2016     Vitamin D:  8.6     ASSESSMENT / PLAN:  (E55.9) Vitamin D deficiency  (primary encounter diagnosis)  Comment: very low  Plan: will add 2000 units per day and check with dialysis if they are wishing to replace also--will defer to them if so.  Pt thinks he gets \"some\" vitamin D on some runs when there.    (E11.22,  N18.6,  Z99.2) Type 2 diabetes mellitus with chronic kidney disease on chronic dialysis, without long-term current use of insulin (H)  Comment/Plan: diet controlled, good number.      Electronically Signed by:    Thao Stephen RN, CNP      "

## 2021-01-29 RX ORDER — CHOLECALCIFEROL (VITAMIN D3) 50 MCG
1 TABLET ORAL DAILY
COMMUNITY
Start: 2021-01-29 | End: 2021-02-08

## 2021-01-31 ASSESSMENT — MIFFLIN-ST. JEOR: SCORE: 1485.15

## 2021-02-01 ENCOUNTER — PATIENT OUTREACH (OUTPATIENT)
Dept: CARE COORDINATION | Facility: CLINIC | Age: 76
End: 2021-02-01

## 2021-02-01 ENCOUNTER — NURSING HOME VISIT (OUTPATIENT)
Dept: GERIATRICS | Facility: CLINIC | Age: 76
End: 2021-02-01
Payer: MEDICARE

## 2021-02-01 VITALS
RESPIRATION RATE: 16 BRPM | HEART RATE: 76 BPM | SYSTOLIC BLOOD PRESSURE: 106 MMHG | TEMPERATURE: 97.3 F | OXYGEN SATURATION: 94 % | DIASTOLIC BLOOD PRESSURE: 54 MMHG | WEIGHT: 171 LBS | HEIGHT: 68 IN | BODY MASS INDEX: 25.91 KG/M2

## 2021-02-01 DIAGNOSIS — I25.5 ISCHEMIC CARDIOMYOPATHY: ICD-10-CM

## 2021-02-01 DIAGNOSIS — I48.0 PAF (PAROXYSMAL ATRIAL FIBRILLATION) (H): ICD-10-CM

## 2021-02-01 DIAGNOSIS — S32.030D CLOSED COMPRESSION FRACTURE OF L3 LUMBAR VERTEBRA WITH ROUTINE HEALING, SUBSEQUENT ENCOUNTER: ICD-10-CM

## 2021-02-01 DIAGNOSIS — N18.6 TYPE 2 DIABETES MELLITUS WITH CHRONIC KIDNEY DISEASE ON CHRONIC DIALYSIS, WITHOUT LONG-TERM CURRENT USE OF INSULIN (H): ICD-10-CM

## 2021-02-01 DIAGNOSIS — E55.9 VITAMIN D DEFICIENCY: Primary | ICD-10-CM

## 2021-02-01 DIAGNOSIS — Z99.2 ESRD ON HEMODIALYSIS (H): ICD-10-CM

## 2021-02-01 DIAGNOSIS — N18.6 ESRD ON HEMODIALYSIS (H): ICD-10-CM

## 2021-02-01 DIAGNOSIS — E11.22 TYPE 2 DIABETES MELLITUS WITH CHRONIC KIDNEY DISEASE ON CHRONIC DIALYSIS, WITHOUT LONG-TERM CURRENT USE OF INSULIN (H): ICD-10-CM

## 2021-02-01 DIAGNOSIS — Z99.2 DIALYSIS PATIENT (H): ICD-10-CM

## 2021-02-01 DIAGNOSIS — Z99.2 TYPE 2 DIABETES MELLITUS WITH CHRONIC KIDNEY DISEASE ON CHRONIC DIALYSIS, WITHOUT LONG-TERM CURRENT USE OF INSULIN (H): ICD-10-CM

## 2021-02-01 DIAGNOSIS — R53.81 PHYSICAL DECONDITIONING: ICD-10-CM

## 2021-02-01 DIAGNOSIS — I50.22 CHRONIC SYSTOLIC (CONGESTIVE) HEART FAILURE (H): ICD-10-CM

## 2021-02-01 DIAGNOSIS — I25.10 CORONARY ARTERY DISEASE INVOLVING NATIVE CORONARY ARTERY OF NATIVE HEART WITHOUT ANGINA PECTORIS: ICD-10-CM

## 2021-02-01 PROCEDURE — 99309 SBSQ NF CARE MODERATE MDM 30: CPT | Performed by: NURSE PRACTITIONER

## 2021-02-01 RX ORDER — ERGOCALCIFEROL 1.25 MG/1
50000 CAPSULE ORAL WEEKLY
COMMUNITY
Start: 2021-02-01 | End: 2021-03-01

## 2021-02-01 NOTE — PROGRESS NOTES
Clinic Care Coordination Contact    Situation: Patient chart reviewed by care coordinator.    Background: Patient was hospitalized at [facility] from 1/11/21 to 1/15/21 with diagnosis of generalized weakness and L3 compression fracture. Pt discharged to TCU for further rehab and management.       Assessment: Pt still admitted to TCU per chart review. Goal is to return home. Working with therapies.     Plan/Recommendations:  Care Coordinator will await notification from facility staff informing  Care Coordinator of patient's discharge plans/needs.  Care Coordinator will review chart and outreach to facility staff every 2 weeks and as needed.      ODETTE Arana   Social Work Clinic Care Coordinator   [facility]  PH: [phone]  [email or web address]

## 2021-02-01 NOTE — PROGRESS NOTES
[place] [facility]  [place] Medical Record Number:  [id number]  Place of Service where encounter took place:  [facility] ([facility]) [[id number]]  Chief Complaint   Patient presents with     Nursing Home Acute       HPI:    Westley Ness  is a 75 year old (1945), who is being seen today for an episodic care visit.  HPI information obtained from: facility chart records, facility staff, patient report and [facility] chart review. Today's concern is: says no pain, feels stronger     Vitamin D deficiency  Type 2 diabetes mellitus with chronic kidney disease on chronic dialysis, without long-term current use of insulin (H)  ESRD on hemodialysis (H)  Dialysis patient (H)  Closed compression fracture of L3 lumbar vertebra with routine healing, subsequent encounter  Physical deconditioning  Ischemic cardiomyopathy  Coronary artery disease involving native coronary artery of native heart without angina pectoris  Chronic systolic (congestive) heart failure (H)  PAF (paroxysmal atrial fibrillation) (H)      Past Medical and Surgical History reviewed in Epic today.    MEDICATIONS:     Current Outpatient Medications   Medication Sig Dispense Refill     ergocalciferol (ERGOCALCIFEROL) 1.25 MG (29767 UT) capsule Take 50,000 Units by mouth once a week X 4 weeks. Then check Vitamin D level and update NP.       acetaminophen (TYLENOL) 500 MG tablet Take 1,000 mg by mouth 3 times daily        apixaban ANTICOAGULANT (ELIQUIS ANTICOAGULANT) 2.5 MG tablet Take 1 tablet (2.5 mg) by mouth 2 times daily 180 tablet 3     clopidogrel (PLAVIX) 75 MG tablet Take 1 tablet (75 mg) by mouth daily 60 tablet 1     famotidine (PEPCID) 10 MG tablet Take 10 mg by mouth daily       gabapentin (NEURONTIN) 100 MG capsule Take 1 capsule (100 mg) by mouth 3 times daily 90 capsule 0     isosorbide mononitrate (IMDUR) 30 MG 24 hr tablet Take 1 tablet (30 mg) the evening of dialysis days (Mon, Wed, Fri)  and  "Saturdays. 48 tablet 3     isosorbide mononitrate (IMDUR) 30 MG 24 hr tablet Take 15 mg by mouth Take in the evening on the days prior dialysis. Take on Sunday, Tuesday and Thursday       levothyroxine (SYNTHROID/LEVOTHROID) 50 MCG tablet TAKE 1 TABLET (50 MCG) BY MOUTH DAILY 90 tablet 3     lisinopril (ZESTRIL) 2.5 MG tablet TAKE 1 TABLET (2.5 MG) BY MOUTH 2 TIMES DAILY (TAKE ONE TABLET AFTER DIALYSIS. TAKE SECOND TABLET AT BEDTIME.) 180 tablet 3     loperamide (IMODIUM A-D) 2 MG tablet Take 2 mg by mouth three times a week MWF on dialysis days       loperamide (IMODIUM A-D) 2 MG tablet Take 1 mg by mouth five times a week On non-dialysis days - Tu, Th, Sa, Russo       metoprolol succinate ER (TOPROL-XL) 25 MG 24 hr tablet Take 0.5 tablets (12.5 mg) by mouth daily 45 tablet 3     mexiletine (MEXITIL) 150 MG capsule Take 1 capsule (150 mg) by mouth 2 times daily 180 capsule 3     multivitamin RENAL (NEPHROCAPS/TRIPHROCAPS) 1 MG capsule Take 1 capsule by mouth daily 90 capsule 3     nitroGLYcerin (NITROSTAT) 0.4 MG sublingual tablet Place 1 tablet (0.4 mg) under the tongue every 5 minutes as needed for chest pain UP TO 3 PER EPISODE 20 tablet 0     pantoprazole (PROTONIX) 40 MG EC tablet TAKE 1 TABLET (40 MG) BY MOUTH EVERY MORNING 30 tablet 9     pravastatin (PRAVACHOL) 40 MG tablet TAKE 1 TABLET (40 MG) BY MOUTH DAILY 90 tablet 0     vitamin D3 (CHOLECALCIFEROL) 50 mcg (2000 units) tablet Take 1 tablet by mouth daily VITAMIN D LEVEL IS===8.6 ON 1/28/21       TODAY DURING EXAM/ROS:  No CP, SOB, Cough, dizziness, fevers, chills, HA, N/V, dysuria or Bowel Abnormalities. Appetite is good.  No pain today    Objective:  /54   Pulse 76   Temp 97.3  F (36.3  C)   Resp 16   Ht 1.727 m (5' 8\")   Wt 77.6 kg (171 lb)   SpO2 94%   BMI 26.00 kg/m    Exam:  GENERAL APPEARANCE:  Alert, in no distress, appears healthy, oriented, cooperative  ENT:  Mouth with moist mucous membranes, normal hearing acuity  RESP:  " respiratory effort of chest normal, lungs clear to auscultation , NAD  CV: Auscultation of heart done , RRR, no murmur, rub, or gallop, +1 bilat LE edema.   Has +bruit and thrill left upper arm AV fistula--drsg d&i in place.  ABDOMEN:  normal bowel sounds, soft, nontender.  M/S:   HUANG equally, up with assist and PT  SKIN:  Inspection of skin and subcutaneous tissue baseline  NEURO:   Cranial nerves 2-12 are normal tested and grossly at patient's baseline  PSYCH:  oriented X 3, normal insight, judgement and memory, affect and mood normal    Labs:      Recent Labs   Lab Test 01/15/21  0730 01/14/21  0710 01/13/21  0718   NA  --  139 140   POTASSIUM 4.5 4.5 4.4   CHLORIDE  --  104 105   CO2  --  28 29   ANIONGAP  --  7 6   GLC  --  101* 85   BUN  --  34* 25   CR  --  4.55* 3.42*   CHRISTINE  --  9.2 9.0     CBC RESULTS:   Recent Labs   Lab Test 01/15/21  0730 01/13/21  0718   WBC  --  6.1   RBC  --  3.19*   HGB 10.5* 10.5*   HCT  --  32.8*   MCV  --  103*   MCH  --  32.9   MCHC  --  32.0   RDW  --  15.4*   PLT  --  114*       ASSESSMENT / PLAN:  (E55.9) Vitamin D deficiency  (primary encounter diagnosis)  Comment/Plan: add 50K units Vit D weekly for 4 weeks, cont the daily Vit D also.  Check level 2 week after done with the 50K doses. Pt agrees    (E11.22,  N18.6,  Z99.2) Type 2 diabetes mellitus with chronic kidney disease on chronic dialysis, without long-term current use of insulin (H)   (N18.6,  Z99.2) ESRD on hemodialysis (H)   (Z99.2) Dialysis patient (H)  Comment/Plan:  HA1C was 5.5, doing well, diet controlled. Conts with dialysis, monitor.    (S32.030D) Closed compression fracture of L3 lumbar vertebra with routine healing, subsequent encounter   (R53.81) Physical deconditioning  Comment/Plan: getting stronger, cont PT OT, no LCD yet    (I25.10) Coronary artery disease involving native coronary artery of native heart without angina pectoris  (I25.5) Ischemic cardiomyopathy  (I50.22) Chronic HFrEF (heart failure with  reduced ejection fraction) (H)  Comment/Plan: no acute issues, cont with meds, statin,  Eliquis, ACE, BB, Imdur. SBP running mostly low 100's to mid teens and HR 60-80 mostly, no changes, controlled and compensated       Electronically signed by:  MARCIO Morrison CNP

## 2021-02-02 PROBLEM — I47.29 PAROXYSMAL VENTRICULAR TACHYCARDIA (H): Status: RESOLVED | Noted: 2021-01-14 | Resolved: 2021-02-02

## 2021-02-02 PROBLEM — I48.0 PAF (PAROXYSMAL ATRIAL FIBRILLATION) (H): Status: ACTIVE | Noted: 2021-02-02

## 2021-02-08 ENCOUNTER — VIRTUAL VISIT (OUTPATIENT)
Dept: CARDIOLOGY | Facility: CLINIC | Age: 76
End: 2021-02-08
Attending: PHYSICIAN ASSISTANT
Payer: MEDICARE

## 2021-02-08 DIAGNOSIS — I48.3 TYPICAL ATRIAL FLUTTER (H): ICD-10-CM

## 2021-02-08 DIAGNOSIS — I25.5 ISCHEMIC CARDIOMYOPATHY: ICD-10-CM

## 2021-02-08 DIAGNOSIS — N18.6 ESRD (END STAGE RENAL DISEASE) ON DIALYSIS (H): ICD-10-CM

## 2021-02-08 DIAGNOSIS — I50.22 CHRONIC SYSTOLIC (CONGESTIVE) HEART FAILURE (H): Primary | ICD-10-CM

## 2021-02-08 DIAGNOSIS — Z99.2 ESRD (END STAGE RENAL DISEASE) ON DIALYSIS (H): ICD-10-CM

## 2021-02-08 DIAGNOSIS — I48.0 PAF (PAROXYSMAL ATRIAL FIBRILLATION) (H): ICD-10-CM

## 2021-02-08 DIAGNOSIS — I25.10 CORONARY ARTERY DISEASE INVOLVING NATIVE CORONARY ARTERY OF NATIVE HEART WITHOUT ANGINA PECTORIS: ICD-10-CM

## 2021-02-08 DIAGNOSIS — Z95.5 S/P DRUG ELUTING CORONARY STENT PLACEMENT: ICD-10-CM

## 2021-02-08 PROCEDURE — 99214 OFFICE O/P EST MOD 30 MIN: CPT | Mod: 95 | Performed by: INTERNAL MEDICINE

## 2021-02-08 RX ORDER — CHOLECALCIFEROL (VITAMIN D3) 50 MCG
1 TABLET ORAL DAILY
COMMUNITY
End: 2021-03-23

## 2021-02-08 ASSESSMENT — MIFFLIN-ST. JEOR: SCORE: 1474.72

## 2021-02-08 NOTE — PROGRESS NOTES
"Art is a 75 year old who is being evaluated via a billable video visit.      How would you like to obtain your AVS? MyChart  If the video visit is dropped, the invitation should be resent by: Send to e-mail at: [email or web address]  Will anyone else be joining your video visit? No     Vitals - Patient Reported  Systolic (Patient Reported): 110  Diastolic (Patient Reported): 50  Weight (Patient Reported): 76.7 kg (169 lb 1.5 oz)  Height (Patient Reported): 172.7 cm (5' 8\")  BMI (Based on Pt Reported Ht/Wt): 25.71    Review Of Systems  Skin: NEGATIVE  Eyes:Ears/Nose/Throat: NEGATIVE  Respiratory: SOB with exertion  Cardiovascular: chest pain- rare, dizziness with positional changes, edema, fatigue  Gastrointestinal: NEGATIVE  Genitourinary:NEGATIVE   Musculoskeletal: Joint pain in ankles/kness  Neurologic: NEGATIVE  Psychiatric: NEGATIVE  Hematologic/Lymphatic/Immunologic: NEGATIVE  Endocrine:  Thyroid disorder     Telephone number of patient: [phone]    Toyin Grider LPN      Video-Visit Details    Type of service:  Video Visit  Video Start Time: 2:03PM  Video End Time:2:15 PM    Originating Location (pt. Location): TCU    Distant Location (provider location):  [facility]     Platform used for Video Visit: Alex SILVERMAN Note    I had the pleasure of seeing Westley Ness in Cardiology Clinic today as a virtual video visit.       He is a 75-year-old male with a past medical history of coronary artery disease, ischemic cardiomyopathy with ejection fraction 25%-30% and previous AICD placement, end-stage renal disease and mild aortic root dilatation who returns for followup.  He had a non-ST elevation MI in July 2016.  He underwent drug-eluting stent placed in the proximal left circumflex and obtuse marginal branch in 2016.  In November, he had recurrent event and had angioplasty of the first obtuse marginal branch for restenosis.  October 2020 he had Lexiscan stress nuclear study which revealed " moderate lateral wall ischemia underwent coronary angiography which revealed OM lesion that was stented.  His LAD is chronically occluded.    He has remained free of chest pain.  He has severe cardiomyopathy with EF around 25%.  He has end-stage renal disease and on dialysis for last 15 years.      In January was admitted to hospital with generalized weakness and failure to thrive.  He was evaluated in the hospital and eventually was sent to [facility] for transitional care unit rehab.  He has been there and the video visit was performed at that place today.  He has been slowly getting stronger and more energetic.  He is continuing his dialysis at [facility].      He is on Plavix and Xarelto and tolerating reasonably well except for some ecchymosis.  Plan is to continue Plavix till October of this year and then switch to baby aspirin.  Continue Xarelto long-term.      He has an AICD present has had no shocks.  He does have some nonsustained ventricular tachycardia episodes on the device check but is asymptomatic.      Because of his dialysis sometimes he has low blood pressures and therefore they have to occasionally hold his lisinopril Coreg or Imdur because of blood pressure less than 90.  I told him to hold the Imdur first before the other medications.      He has prior history of atrial flutter ablation.    Physical     General: overweight.  ENT/Mouth:   Normal head shape, no apparent injury or laceration.  Eyes: No observed jaundice.  Neck:  no apparent neck swelling.   Chest/Lungs:  No breathing difficulty while speaking.  No audible wheezing.  No cough during conversation.  Cardiovascular:  No obviously elevated jugular venous pressure.    Extremities:  no apparent cyanosis.  Skin:  no xanthelasma.  No facial lacerations.  Psychiatric:  Alert and oriented x3, calm demeanor    The rest of the comprehensive physical examination is deferred due to public health emergency video visit  restrictions.       IMPRESSION:   1.  Coronary artery disease, stable   Stress nuclear study in October was abnormal and underwent stent placement to the OM branch.  He has chronically occluded LAD.  He is on Plavix now for a year since the stent placement and will switch to baby aspirin.    2.  Chronic systolic heart failure   EF is stable at 20 to 25% last echo in October.  We will repeat an echocardiogram in 6 months.  Appears to be euvolemic.  He undergoes dialysis.  Is on lisinopril and Coreg but occasionally need to be held because of low blood pressures.  He uses low-dose on the days between dialysis.  I told him that if blood pressure remains an issue, we can reduce the dose of Imdur and perhaps discontinue it if he has no angina.       3.  End-stage renal disease on hemodialysis     4.  Hyperlipidemia, continue Pravachol, last LDL 53 and HDL 52     5.  History of proximal atrial flutter, status post successful ablation.         Thank you for allowing us to participate in the care of this nice patient.  I will have him see Lacy MCLEOD in 6 months as she has seen him before along with an echocardiogram.        Sincerely,     DERICK WEST MD    cc:   Josselin Kolb MD   [facility]   [address] [place] [address]  [id number]

## 2021-02-08 NOTE — LETTER
"2/8/2021    Josselin Kolb MD  [address] [facility] [address]    RE: Westley Ness       Dear Colleague,    I had the pleasure of seeing Westley Ness in the [facility].    Art is a 75 year old who is being evaluated via a billable video visit.      How would you like to obtain your AVS? MyChart  If the video visit is dropped, the invitation should be resent by: Send to e-mail at: [email or web address]  Will anyone else be joining your video visit? No     Vitals - Patient Reported  Systolic (Patient Reported): 110  Diastolic (Patient Reported): 50  Weight (Patient Reported): 76.7 kg (169 lb 1.5 oz)  Height (Patient Reported): 172.7 cm (5' 8\")  BMI (Based on Pt Reported Ht/Wt): 25.71    Review Of Systems  Skin: NEGATIVE  Eyes:Ears/Nose/Throat: NEGATIVE  Respiratory: SOB with exertion  Cardiovascular: chest pain- rare, dizziness with positional changes, edema, fatigue  Gastrointestinal: NEGATIVE  Genitourinary:NEGATIVE   Musculoskeletal: Joint pain in ankles/kness  Neurologic: NEGATIVE  Psychiatric: NEGATIVE  Hematologic/Lymphatic/Immunologic: NEGATIVE  Endocrine:  Thyroid disorder     Telephone number of patient: [phone]    Toyin Grider LPN      Video-Visit Details    Type of service:  Video Visit  Video Start Time: 2:03PM  Video End Time:2:15 PM    Originating Location (pt. Location): [facility]    Distant Location (provider location):  [facility] [place]     Platform used for Video Visit: Alex SILVERMAN Note    I had the pleasure of seeing Westley Ness in Cardiology Clinic today as a virtual video visit.       He is a 75-year-old male with a past medical history of coronary artery disease, ischemic cardiomyopathy with ejection fraction 25%-30% and previous AICD placement, end-stage renal disease and mild aortic root dilatation who returns for followup.  He had a non-ST elevation MI in July 2016.  He underwent drug-eluting stent " placed in the proximal left circumflex and obtuse marginal branch in 2016.  In November, he had recurrent event and had angioplasty of the first obtuse marginal branch for restenosis.  October 2020 he had Lexiscan stress nuclear study which revealed moderate lateral wall ischemia underwent coronary angiography which revealed OM lesion that was stented.  His LAD is chronically occluded.    He has remained free of chest pain.  He has severe cardiomyopathy with EF around 25%.  He has end-stage renal disease and on dialysis for last 15 years.      In January was admitted to hospital with generalized weakness and failure to thrive.  He was evaluated in the hospital and eventually was sent to [facility] for transitional care unit rehab.  He has been there and the video visit was performed at that place today.  He has been slowly getting stronger and more energetic.  He is continuing his dialysis at [facility] [place].      He is on Plavix and Xarelto and tolerating reasonably well except for some ecchymosis.  Plan is to continue Plavix till October of this year and then switch to baby aspirin.  Continue Xarelto long-term.      He has an AICD present has had no shocks.  He does have some nonsustained ventricular tachycardia episodes on the device check but is asymptomatic.      Because of his dialysis sometimes he has low blood pressures and therefore they have to occasionally hold his lisinopril Coreg or Imdur because of blood pressure less than 90.  I told him to hold the Imdur first before the other medications.      He has prior history of atrial flutter ablation.    Physical     General: overweight.  ENT/Mouth:   Normal head shape, no apparent injury or laceration.  Eyes: No observed jaundice.  Neck:  no apparent neck swelling.   Chest/Lungs:  No breathing difficulty while speaking.  No audible wheezing.  No cough during conversation.  Cardiovascular:  No obviously elevated jugular venous pressure.     Extremities:  no apparent cyanosis.  Skin:  no xanthelasma.  No facial lacerations.  Psychiatric:  Alert and oriented x3, calm demeanor    The rest of the comprehensive physical examination is deferred due to public health emergency video visit restrictions.       IMPRESSION:   1.  Coronary artery disease, stable   Stress nuclear study in October was abnormal and underwent stent placement to the OM branch.  He has chronically occluded LAD.  He is on Plavix now for a year since the stent placement and will switch to baby aspirin.    2.  Chronic systolic heart failure   EF is stable at 20 to 25% last echo in October.  We will repeat an echocardiogram in 6 months.  Appears to be euvolemic.  He undergoes dialysis.  Is on lisinopril and Coreg but occasionally need to be held because of low blood pressures.  He uses low-dose on the days between dialysis.  I told him that if blood pressure remains an issue, we can reduce the dose of Imdur and perhaps discontinue it if he has no angina.       3.  End-stage renal disease on hemodialysis     4.  Hyperlipidemia, continue Pravachol, last LDL 53 and HDL 52     5.  History of proximal atrial flutter, status post successful ablation.         Thank you for allowing us to participate in the care of this nice patient.  I will have him see Lacy MCLEOD in 6 months as she has seen him before along with an echocardiogram.    Sincerely,     DERICK WEST MD

## 2021-02-09 ENCOUNTER — NURSING HOME VISIT (OUTPATIENT)
Dept: GERIATRICS | Facility: CLINIC | Age: 76
End: 2021-02-09
Payer: MEDICARE

## 2021-02-09 VITALS
BODY MASS INDEX: 25.57 KG/M2 | HEART RATE: 79 BPM | RESPIRATION RATE: 20 BRPM | SYSTOLIC BLOOD PRESSURE: 109 MMHG | WEIGHT: 168.7 LBS | DIASTOLIC BLOOD PRESSURE: 52 MMHG | OXYGEN SATURATION: 92 % | HEIGHT: 68 IN | TEMPERATURE: 97.1 F

## 2021-02-09 DIAGNOSIS — N18.6 ESRD ON HEMODIALYSIS (H): ICD-10-CM

## 2021-02-09 DIAGNOSIS — I25.5 ISCHEMIC CARDIOMYOPATHY: ICD-10-CM

## 2021-02-09 DIAGNOSIS — E55.9 VITAMIN D DEFICIENCY: ICD-10-CM

## 2021-02-09 DIAGNOSIS — N18.6 TYPE 2 DIABETES MELLITUS WITH CHRONIC KIDNEY DISEASE ON CHRONIC DIALYSIS, WITHOUT LONG-TERM CURRENT USE OF INSULIN (H): Primary | ICD-10-CM

## 2021-02-09 DIAGNOSIS — I50.22 CHRONIC SYSTOLIC (CONGESTIVE) HEART FAILURE (H): ICD-10-CM

## 2021-02-09 DIAGNOSIS — I48.0 PAF (PAROXYSMAL ATRIAL FIBRILLATION) (H): ICD-10-CM

## 2021-02-09 DIAGNOSIS — E11.22 TYPE 2 DIABETES MELLITUS WITH CHRONIC KIDNEY DISEASE ON CHRONIC DIALYSIS, WITHOUT LONG-TERM CURRENT USE OF INSULIN (H): Primary | ICD-10-CM

## 2021-02-09 DIAGNOSIS — R53.81 PHYSICAL DECONDITIONING: ICD-10-CM

## 2021-02-09 DIAGNOSIS — Z99.2 TYPE 2 DIABETES MELLITUS WITH CHRONIC KIDNEY DISEASE ON CHRONIC DIALYSIS, WITHOUT LONG-TERM CURRENT USE OF INSULIN (H): Primary | ICD-10-CM

## 2021-02-09 DIAGNOSIS — S32.030D CLOSED COMPRESSION FRACTURE OF L3 LUMBAR VERTEBRA WITH ROUTINE HEALING, SUBSEQUENT ENCOUNTER: ICD-10-CM

## 2021-02-09 DIAGNOSIS — Z99.2 ESRD ON HEMODIALYSIS (H): ICD-10-CM

## 2021-02-09 PROCEDURE — 99309 SBSQ NF CARE MODERATE MDM 30: CPT | Performed by: NURSE PRACTITIONER

## 2021-02-09 NOTE — PROGRESS NOTES
[place] [facility]  [place] Medical Record Number:  [id number]  Place of Service where encounter took place:  [facility] ([facility]) [[id number]]  Chief Complaint   Patient presents with     Nursing Home Acute       HPI:    Westley Ness  is a 75 year old (1945), who is being seen today for an episodic care visit.  HPI information obtained from: facility chart records, facility staff, patient report and [facility] chart review. Today's concern is: says no pain except feet sore-tylenol helps.  Overall feels stronger     Type 2 diabetes mellitus with chronic kidney disease on chronic dialysis, without long-term current use of insulin (H)  ESRD on hemodialysis (H)  Closed compression fracture of L3 lumbar vertebra with routine healing, subsequent encounter  Physical deconditioning  Ischemic cardiomyopathy  Chronic systolic (congestive) heart failure (H)  PAF (paroxysmal atrial fibrillation) (H)  Vitamin D deficiency      Past Medical and Surgical History reviewed in Epic today.    MEDICATIONS:     Current Outpatient Medications   Medication Sig Dispense Refill     acetaminophen (TYLENOL) 500 MG tablet Take 1,000 mg by mouth 3 times daily        apixaban ANTICOAGULANT (ELIQUIS ANTICOAGULANT) 2.5 MG tablet Take 1 tablet (2.5 mg) by mouth 2 times daily 180 tablet 3     clopidogrel (PLAVIX) 75 MG tablet Take 1 tablet (75 mg) by mouth daily 60 tablet 1     ergocalciferol (ERGOCALCIFEROL) 1.25 MG (69356 UT) capsule Take 50,000 Units by mouth once a week X 4 weeks. Then check Vitamin D level and update NP.       famotidine (PEPCID) 10 MG tablet Take 10 mg by mouth daily       gabapentin (NEURONTIN) 100 MG capsule Take 1 capsule (100 mg) by mouth 3 times daily 90 capsule 0     isosorbide mononitrate (IMDUR) 30 MG 24 hr tablet Take 1 tablet (30 mg) the evening of dialysis days (Mon, Wed, Fri)  and Saturdays. 48 tablet 3     isosorbide mononitrate (IMDUR) 30 MG 24 hr tablet Take 15  "mg by mouth Take in the evening on the days prior dialysis. Take on Sunday, Tuesday and Thursday       levothyroxine (SYNTHROID/LEVOTHROID) 50 MCG tablet TAKE 1 TABLET (50 MCG) BY MOUTH DAILY 90 tablet 3     lisinopril (ZESTRIL) 2.5 MG tablet TAKE 1 TABLET (2.5 MG) BY MOUTH 2 TIMES DAILY (TAKE ONE TABLET AFTER DIALYSIS. TAKE SECOND TABLET AT BEDTIME.) 180 tablet 3     loperamide (IMODIUM A-D) 2 MG tablet Take 2 mg by mouth three times a week MWF on dialysis days       loperamide (IMODIUM A-D) 2 MG tablet Take 1 mg by mouth five times a week On non-dialysis days - Tu, Th, Sa, Russo       metoprolol succinate ER (TOPROL-XL) 25 MG 24 hr tablet Take 0.5 tablets (12.5 mg) by mouth daily 45 tablet 3     mexiletine (MEXITIL) 150 MG capsule Take 1 capsule (150 mg) by mouth 2 times daily 180 capsule 3     multivitamin RENAL (NEPHROCAPS/TRIPHROCAPS) 1 MG capsule Take 1 capsule by mouth daily 90 capsule 3     nitroGLYcerin (NITROSTAT) 0.4 MG sublingual tablet Place 1 tablet (0.4 mg) under the tongue every 5 minutes as needed for chest pain UP TO 3 PER EPISODE 20 tablet 0     pantoprazole (PROTONIX) 40 MG EC tablet TAKE 1 TABLET (40 MG) BY MOUTH EVERY MORNING 30 tablet 9     pravastatin (PRAVACHOL) 40 MG tablet TAKE 1 TABLET (40 MG) BY MOUTH DAILY 90 tablet 0     vitamin D3 (CHOLECALCIFEROL) 50 mcg (2000 units) tablet Take 1 tablet by mouth daily       TODAY DURING EXAM/ROS:  No CP, SOB, Cough, dizziness, fevers, chills, HA, N/V, dysuria or Bowel Abnormalities. Appetite is good.       Objective:  /52   Pulse 79   Temp 97.1  F (36.2  C)   Resp 20   Ht 1.727 m (5' 8\")   Wt 76.5 kg (168 lb 11.2 oz)   SpO2 92%   BMI 25.65 kg/m    Exam:  GENERAL APPEARANCE:  Alert, in no distress, appears healthy, oriented, cooperative  ENT:  Mouth with moist mucous membranes, normal hearing acuity  RESP:  respiratory effort of chest normal, lungs  CTA , NAD  CV: Auscultation of heart done , RRR, No M,G,R, +1 bilat feet to above ankles' " edema.  +bruit/thrill left upper arm AV fistula--drsg d&i in place.  ABDOMEN:  normal bowel sounds, soft, nontender.  M/S:   HUANG equally, up with  Walker and PT  SKIN:  Inspection of skin and subcutaneous tissue baseline  NEURO:   Cranial nerves 2-12 are grossly intact and  at patient's baseline  PSYCH:  oriented X 3, normal insight, judgement and memory, affect and mood normal    Labs:      Recent Labs   Lab Test 01/15/21  0730 01/14/21  0710 01/13/21 0718   NA  --  139 140   POTASSIUM 4.5 4.5 4.4   CHLORIDE  --  104 105   CO2  --  28 29   ANIONGAP  --  7 6   GLC  --  101* 85   BUN  --  34* 25   CR  --  4.55* 3.42*   CHRISTINE  --  9.2 9.0     CBC RESULTS:   Recent Labs   Lab Test 01/15/21  0730 01/13/21  0718   WBC  --  6.1   RBC  --  3.19*   HGB 10.5* 10.5*   HCT  --  32.8*   MCV  --  103*   MCH  --  32.9   MCHC  --  32.0   RDW  --  15.4*   PLT  --  114*          ASSESSMENT / PLAN:  (E11.22,  N18.6,  Z99.2) Type 2 diabetes mellitus with chronic kidney disease on chronic dialysis, without long-term current use of insulin (H)   (N18.6,  Z99.2) ESRD on hemodialysis (H)   (Z99.2) Dialysis patient (H)  Comment/Plan:  HA1C was 5.5,  diet controlled.Thrice weekly dialysis, monitor.    (S32.030D) Closed compression fracture of L3 lumbar vertebra with routine healing, subsequent encounter   (R53.81) Physical deconditioning  Comment/Plan: getting stronger, cont PT OT, LCD 2/17     (I25.5) Ischemic cardiomyopathy  (I50.22) Chronic HFrEF (heart failure with reduced ejection fraction) (H)  (I48.0) PAF (paroxysmal atrial fibrillation) (H)  Comment/Plan: no acute issues--compensated. Cont with statin, Eliquis, ACE, BB, Imdur. SBP mostly 110-120's and HR 60-80 mainly, no changes.    (E55.9) Vitamin D deficiency  (primary encounter diagnosis)  Comment/Plan: on 50K units Vit D weekly for 4 weeks, cont the daily Vit D also.  Check level 2 week after done with the weekly doses     Electronically signed by:  MARCIO Morrison CNP

## 2021-02-14 ASSESSMENT — MIFFLIN-ST. JEOR: SCORE: 1474.26

## 2021-02-15 ENCOUNTER — DISCHARGE SUMMARY NURSING HOME (OUTPATIENT)
Dept: GERIATRICS | Facility: CLINIC | Age: 76
End: 2021-02-15
Payer: MEDICARE

## 2021-02-15 VITALS
BODY MASS INDEX: 25.55 KG/M2 | SYSTOLIC BLOOD PRESSURE: 104 MMHG | OXYGEN SATURATION: 98 % | HEART RATE: 62 BPM | HEIGHT: 68 IN | RESPIRATION RATE: 18 BRPM | TEMPERATURE: 96.9 F | DIASTOLIC BLOOD PRESSURE: 56 MMHG | WEIGHT: 168.6 LBS

## 2021-02-15 DIAGNOSIS — Z99.2 ESRD ON HEMODIALYSIS (H): ICD-10-CM

## 2021-02-15 DIAGNOSIS — R19.7 DIARRHEA, UNSPECIFIED TYPE: ICD-10-CM

## 2021-02-15 DIAGNOSIS — D63.1 ANEMIA DUE TO CHRONIC KIDNEY DISEASE, ON CHRONIC DIALYSIS (H): ICD-10-CM

## 2021-02-15 DIAGNOSIS — I10 ESSENTIAL HYPERTENSION: ICD-10-CM

## 2021-02-15 DIAGNOSIS — S32.030D CLOSED COMPRESSION FRACTURE OF L3 LUMBAR VERTEBRA WITH ROUTINE HEALING, SUBSEQUENT ENCOUNTER: Primary | ICD-10-CM

## 2021-02-15 DIAGNOSIS — Z95.810 S/P ICD (INTERNAL CARDIAC DEFIBRILLATOR) PROCEDURE: ICD-10-CM

## 2021-02-15 DIAGNOSIS — N18.6 ANEMIA DUE TO CHRONIC KIDNEY DISEASE, ON CHRONIC DIALYSIS (H): ICD-10-CM

## 2021-02-15 DIAGNOSIS — K21.9 GASTROESOPHAGEAL REFLUX DISEASE, UNSPECIFIED WHETHER ESOPHAGITIS PRESENT: ICD-10-CM

## 2021-02-15 DIAGNOSIS — E11.22 TYPE 2 DIABETES MELLITUS WITH CHRONIC KIDNEY DISEASE ON CHRONIC DIALYSIS, WITHOUT LONG-TERM CURRENT USE OF INSULIN (H): ICD-10-CM

## 2021-02-15 DIAGNOSIS — I25.5 ISCHEMIC CARDIOMYOPATHY: ICD-10-CM

## 2021-02-15 DIAGNOSIS — M62.81 GENERALIZED MUSCLE WEAKNESS: ICD-10-CM

## 2021-02-15 DIAGNOSIS — Z99.2 TYPE 2 DIABETES MELLITUS WITH CHRONIC KIDNEY DISEASE ON CHRONIC DIALYSIS, WITHOUT LONG-TERM CURRENT USE OF INSULIN (H): ICD-10-CM

## 2021-02-15 DIAGNOSIS — I50.22 CHRONIC SYSTOLIC (CONGESTIVE) HEART FAILURE (H): ICD-10-CM

## 2021-02-15 DIAGNOSIS — N18.6 TYPE 2 DIABETES MELLITUS WITH CHRONIC KIDNEY DISEASE ON CHRONIC DIALYSIS, WITHOUT LONG-TERM CURRENT USE OF INSULIN (H): ICD-10-CM

## 2021-02-15 DIAGNOSIS — E03.9 HYPOTHYROIDISM, UNSPECIFIED TYPE: ICD-10-CM

## 2021-02-15 DIAGNOSIS — D64.9 CHRONIC ANEMIA: ICD-10-CM

## 2021-02-15 DIAGNOSIS — N18.6 ESRD ON HEMODIALYSIS (H): ICD-10-CM

## 2021-02-15 DIAGNOSIS — Z99.2 ANEMIA DUE TO CHRONIC KIDNEY DISEASE, ON CHRONIC DIALYSIS (H): ICD-10-CM

## 2021-02-15 DIAGNOSIS — I48.0 PAF (PAROXYSMAL ATRIAL FIBRILLATION) (H): ICD-10-CM

## 2021-02-15 PROCEDURE — 99316 NF DSCHRG MGMT 30 MIN+: CPT | Performed by: NURSE PRACTITIONER

## 2021-02-15 NOTE — PROGRESS NOTES
"[place] [facility] DISCHARGE SUMMARY  PATIENT'S NAME: Westley Ness  YOB: 1945  MEDICAL RECORD NUMBER:  [id number]  Place of Service where encounter took place:  [facility] ([facility]) [[id number]]    PRIMARY CARE PROVIDER AND CLINIC RESPONSIBLE AFTER TRANSFER:   Josselin Kolb MD, [address]  / [facility] [address]    G Provider     Transferring providers: MARCIO Brown CNP, Rosita Ferreira MD  Recent Hospitalization/ED:  [facility] stay 1/11/21 to 1/15/21.  Date of SNF Admission: January 15, 2021  Date of SNF (anticipated) Discharge: February 18, 2021  Discharged to: previous independent home  Cognitive Scores: SLUMS Score 29/30  Physical Function: Modified assistance with toilet hygiene. Minimal assistance with shower and bath. Independent with bed mobility, upper and lower body dressing. Supervision for transfers. Ambulating 140 feet with FWW and supervision.   DME: Walker    CODE STATUS/ADVANCE DIRECTIVES DISCUSSION:  Full Code   ALLERGIES: Contrast dye and No clinical screening - see comments    DISCHARGE DIAGNOSIS/NURSING FACILITY COURSE:   This is a 75-year-old male, with a past medical history significant for ESRD on hemodialysis, coronary artery disease s/p multiple stents most recently 10/20, chronic heart failure with reduced EF 20-25% on 10/16/20, AICD placement, nonsustained ventricular tachycardia, paroxysmal atrial flutter s/p ablations x 2, hypertension, hypothyroidism and type 2 diabetes mellitus, who was admitted to [facility] 1/11/21 through 1/15/21 with weakness. A lumbar spine x-ray revealed \".. Mild deformity at the superior L4 endplate which could represent a fracture. There is also a subtle deformity at the superior L3 endplate that could represent a fracture\". A lumbar spine MR revealed \" .. Recent-appearing mild compression fracture deformity of the superior aspect of " "the L3 vertebral body\". Gabapentin was initiated for pain. Physical Therapy was consulted and a TCU stay was recommended for ongoing physical rehabilitation.     Acute L3 Compression Fracture with Generalized Weakness. No recent falls or trauma. Gabapentin initiated during hospitalization. Continue Acetaminophen as ordered. Home Physical and Occupational Therapy ordered.     End-Stage Renal Disease on Hemodialysis. Nephrologist Dr. Yap. Dialysis Monday, Wednesday, Friday at [facility]. Returns from dialysis around 1030. Takes Nephrocaps.      Anemia. Related to above. Baseline Hemoglobin ~ 9-10. Labs to be monitored at dialysis.     Chronic Systolic Dysfunction with Ischemic Cardiomyopathy S/P AICD Placement, Coronary Artery Disease S/P FOREST to Proximal OM1 in 2016, with Subsequent In-Stent Restenosis S/P Angioplasty in 2016, FOREST to LAD and OM in 2017 and PCI of the DEBI in October 2020 with Chronically Occluded LAD, History of Nonsustained Ventricular Tachycardia, and Paroxysmal Atrial Flutter S/P Ablations x 2. Followed by Dr. Sen. Upon review of blood pressures over the past 5 days, systolic range from . Diastolic range from 47-97. Continue Apixaban, Clopidogrel, Isosorbide Mononitrate, Lisinopril, Metoprolol, Nitroglycerin, Pravastatin, and Mexitil as ordered.     Gastroesophageal Reflux Disease. Omeprazole discontinued during TCU stay and changed to Famotidine. Continue Pantoprazole as ordered.     Chronic Diarrhea. Takes Loperamide.     Hypothyroidism. Last TSH 4.40 on 3/26/18 per [facility]. Question if more recent lab on file at dialysis. Continue Levothyroxine as ordered.     Type 2 Diabetes Mellitus. Last A1C 5.5 on 1/26/21. Diet-controlled. Blood sugars not routinely monitor in TCU.    Vitamin D Deficiency. Vitamin D 8.6 on 1/26/21. Started on Vitamin D 50,000 U weekly x 4 weeks until 2/23/21. Recheck Vitamin D level and adjust Vitamin D supplementation accordingly.     Physical " Deconditioning. Secondary to recent hospitalization and co-morbidities. Home Physical and Occupational Therapy ordered.      Past Medical History:  has a past medical history of Acid reflux disease, Benign essential hypertension, CAD (coronary artery disease), ESRD on hemodialysis (H), History of atrial flutter, Hypothyroidism, Ischemic cardiomyopathy, and Type 2 diabetes mellitus (H).    Discharge Medications:  Current Outpatient Medications   Medication Sig Dispense Refill     acetaminophen (TYLENOL) 500 MG tablet Take 1,000 mg by mouth 3 times daily        apixaban ANTICOAGULANT (ELIQUIS ANTICOAGULANT) 2.5 MG tablet Take 1 tablet (2.5 mg) by mouth 2 times daily 180 tablet 3     clopidogrel (PLAVIX) 75 MG tablet Take 1 tablet (75 mg) by mouth daily 60 tablet 1     ergocalciferol (ERGOCALCIFEROL) 1.25 MG (16667 UT) capsule Take 50,000 Units by mouth once a week X 4 weeks. Then check Vitamin D level and update NP.       famotidine (PEPCID) 10 MG tablet Take 10 mg by mouth daily       gabapentin (NEURONTIN) 100 MG capsule Take 1 capsule (100 mg) by mouth 3 times daily 90 capsule 0     isosorbide mononitrate (IMDUR) 30 MG 24 hr tablet Take 1 tablet (30 mg) the evening of dialysis days (Mon, Wed, Fri)  and Saturdays. 48 tablet 3     isosorbide mononitrate (IMDUR) 30 MG 24 hr tablet Take 15 mg by mouth Take in the evening on the days prior dialysis. Take on Sunday, Tuesday and Thursday       levothyroxine (SYNTHROID/LEVOTHROID) 50 MCG tablet TAKE 1 TABLET (50 MCG) BY MOUTH DAILY 90 tablet 3     lisinopril (ZESTRIL) 2.5 MG tablet TAKE 1 TABLET (2.5 MG) BY MOUTH 2 TIMES DAILY (TAKE ONE TABLET AFTER DIALYSIS. TAKE SECOND TABLET AT BEDTIME.) 180 tablet 3     loperamide (IMODIUM A-D) 2 MG tablet Take 2 mg by mouth three times a week MWF on dialysis days       loperamide (IMODIUM A-D) 2 MG tablet Take 1 mg by mouth five times a week On non-dialysis days - Tu, Th, Sa, Russo       metoprolol succinate ER (TOPROL-XL) 25 MG 24 hr  "tablet Take 0.5 tablets (12.5 mg) by mouth daily 45 tablet 3     mexiletine (MEXITIL) 150 MG capsule Take 1 capsule (150 mg) by mouth 2 times daily 180 capsule 3     multivitamin RENAL (NEPHROCAPS/TRIPHROCAPS) 1 MG capsule Take 1 capsule by mouth daily 90 capsule 3     nitroGLYcerin (NITROSTAT) 0.4 MG sublingual tablet Place 1 tablet (0.4 mg) under the tongue every 5 minutes as needed for chest pain UP TO 3 PER EPISODE 20 tablet 0     pantoprazole (PROTONIX) 40 MG EC tablet TAKE 1 TABLET (40 MG) BY MOUTH EVERY MORNING 30 tablet 9     pravastatin (PRAVACHOL) 40 MG tablet TAKE 1 TABLET (40 MG) BY MOUTH DAILY 90 tablet 0     vitamin D3 (CHOLECALCIFEROL) 50 mcg (2000 units) tablet Take 1 tablet by mouth daily       Medication Changes/Rationale:     See above    Controlled medications sent with patient:   not applicable/none     ROS:   10 point ROS of systems including Constitutional, Eyes, Respiratory, Cardiovascular, Gastroenterology, Genitourinary, Integumentary, Musculoskeletal, Psychiatric were all negative except for pertinent positives noted in my HPI.    Physical Exam:   Vitals: /56   Pulse 62   Temp 96.9  F (36.1  C)   Resp 18   Ht 1.727 m (5' 8\")   Wt 76.5 kg (168 lb 9.6 oz)   SpO2 98%   BMI 25.64 kg/m    BMI= Body mass index is 25.64 kg/m .  GENERAL APPEARANCE:  Alert, in no distress  ENT:  Mouth and posterior oropharynx normal, moist mucous membranes  EYES:  EOM, conjunctivae, lids, pupils and irises normal  RESP:  respiratory effort and palpation of chest normal, lungs clear to auscultation , no respiratory distress  CV:  Palpation and auscultation of heart done , regular rate and rhythm, no murmur, rub, or gallop  ABDOMEN:  normal bowel sounds, soft, nontender, no hepatosplenomegaly or other masses  M/S:   Active movement of bilateral upper and lower extremities. No edema.  SKIN:  Inspection of skin and subcutaneous tissue baseline, Palpation of skin and subcutaneous tissue baseline  NEURO:   " Cranial nerves 2-12 are normal tested and grossly at patient's baseline  PSYCH:  affect and mood normal     SNF labs: Labs obtained at dialysis    DISCHARGE PLAN:    Follow up labs: No labs orders/due    Medical Follow Up:      Follow up with primary care provider in 1 weeks    MTM referral needed and placed by this provider: No      Discharge Services: Home Care:  Occupational Therapy, Physical Therapy, Home Health Aide and From:  [facility]    Discharge Instructions to Patient at Discharge:     Daily weights    Regular diet, Regular Texture texture, Thin Liquids consistency     TOTAL DISCHARGE TIME:   Greater than 30 minutes  Electronically signed by:  MARCIO Brown CNP         Documentation of Face-to-Face and Certification for Home Health Services     Patient: Westley Ness   YOB: 1945  MR Number: [id number]  Today's Date: 2/16/2021    I certify that patient: Westley Ness is under my care and that I, or a nurse practitioner or physician's assistant working with me, had a face-to-face encounter that meets the physician face-to-face encounter requirements with this patient on: 2/15/21.    This encounter with the patient was in whole, or in part, for the following medical condition, which is the primary reason for home health care:     I certify that, based on my findings, the following services are medically necessary home health services: Nursing, Occupational Therapy and Physical Therapy.    My clinical findings support the need for the above services because: Nurse is needed: To assess blood pressure after changes in medications or other medical regimen.., Occupational Therapy Services are needed to assess and treat cognitive ability and address ADL safety due to impairment in ADLs given recent hospitalization for Acute L3 Compression Fracture with Generalized Weakness. and Physical Therapy Services are needed to assess and treat the following functional impairments: Acute  L3 Compression Fracture with Generalized Weakness.    Further, I certify that my clinical findings support that this patient is homebound (i.e. absences from home require considerable and taxing effort and are for medical reasons or [employer] services or infrequently or of short duration when for other reasons) because: Requires assistance of another person or specialized equipment to access medical services because patient: Is unable to walk greater than 200 feet without rest...    Based on the above findings. I certify that this patient is confined to the home and needs intermittent skilled nursing care, physical therapy and/or speech therapy.  The patient is under my care, and I have initiated the establishment of the plan of care.  This patient will be followed by a physician who will periodically review the plan of care.  Physician/Provider to provide follow up care: Josselin Kolb    Responsible Medicare certified PEC Physician: Dr. Rosita Ferreira  Physician Signature: See electronic signature associated with these discharge orders.  Date: 2/16/2021

## 2021-02-15 NOTE — LETTER
"    2/15/2021        RE: Westley Ness  [address] [facility] [place] [address]        [place] [facility] DISCHARGE SUMMARY  PATIENT'S NAME: Westley Ness  YOB: 1945  MEDICAL RECORD NUMBER:  [id number]  Place of Service where encounter took place:  [facility] ([facility]) [[id number]]    PRIMARY CARE PROVIDER AND CLINIC RESPONSIBLE AFTER TRANSFER:   Josselin Kolb MD, [address] / [place] [address]    [facility]     Transferring providers: MARCIO Brown CNP, Rosita Ferreira MD  Recent Hospitalization/ED:  [facility] stay 1/11/21 to 1/15/21.  Date of SNF Admission: January 15, 2021  Date of SNF (anticipated) Discharge: February 18, 2021  Discharged to: previous independent home  Cognitive Scores: SLUMS Score 29/30  Physical Function: Modified assistance with toilet hygiene. Minimal assistance with shower and bath. Independent with bed mobility, upper and lower body dressing. Supervision for transfers. Ambulating 140 feet with FWW and supervision.   DME: Walker    CODE STATUS/ADVANCE DIRECTIVES DISCUSSION:  Full Code   ALLERGIES: Contrast dye and No clinical screening - see comments    DISCHARGE DIAGNOSIS/NURSING FACILITY COURSE:   This is a 75-year-old male, with a past medical history significant for ESRD on hemodialysis, coronary artery disease s/p multiple stents most recently 10/20, chronic heart failure with reduced EF 20-25% on 10/16/20, AICD placement, nonsustained ventricular tachycardia, paroxysmal atrial flutter s/p ablations x 2, hypertension, hypothyroidism and type 2 diabetes mellitus, who was admitted to [facility] 1/11/21 through 1/15/21 with weakness. A lumbar spine x-ray revealed \".. Mild deformity at the superior L4 endplate which could represent a fracture. There is also a subtle deformity at the superior L3 endplate that could represent a fracture\". A lumbar spine MR revealed " "\" .. Recent-appearing mild compression fracture deformity of the superior aspect of the L3 vertebral body\". Gabapentin was initiated for pain. Physical Therapy was consulted and a TCU stay was recommended for ongoing physical rehabilitation.     Acute L3 Compression Fracture with Generalized Weakness. No recent falls or trauma. Gabapentin initiated during hospitalization. Continue Acetaminophen as ordered. Home Physical and Occupational Therapy ordered.     End-Stage Renal Disease on Hemodialysis. Nephrologist Dr. Yap. Dialysis Monday, Wednesday, Friday at [facility]. Returns from dialysis around 1030. Takes Nephrocaps.      Anemia. Related to above. Baseline Hemoglobin ~ 9-10. Labs to be monitored at dialysis.     Chronic Systolic Dysfunction with Ischemic Cardiomyopathy S/P AICD Placement, Coronary Artery Disease S/P FOREST to Proximal OM1 in 2016, with Subsequent In-Stent Restenosis S/P Angioplasty in 2016, FOREST to LAD and OM in 2017 and PCI of the DEBI in October 2020 with Chronically Occluded LAD, History of Nonsustained Ventricular Tachycardia, and Paroxysmal Atrial Flutter S/P Ablations x 2. Followed by Dr. Sen. Upon review of blood pressures over the past 5 days, systolic range from . Diastolic range from 47-97. Continue Apixaban, Clopidogrel, Isosorbide Mononitrate, Lisinopril, Metoprolol, Nitroglycerin, Pravastatin, and Mexitil as ordered.     Gastroesophageal Reflux Disease. Omeprazole discontinued during TCU stay and changed to Famotidine. Continue Pantoprazole as ordered.     Chronic Diarrhea. Takes Loperamide.     Hypothyroidism. Last TSH 4.40 on 3/26/18 per [facility]. Question if more recent lab on file at dialysis. Continue Levothyroxine as ordered.     Type 2 Diabetes Mellitus. Last A1C 5.5 on 1/26/21. Diet-controlled. Blood sugars not routinely monitor in TCU.    Vitamin D Deficiency. Vitamin D 8.6 on 1/26/21. Started on Vitamin D 50,000 U weekly x 4 weeks until 2/23/21. " Recheck Vitamin D level and adjust Vitamin D supplementation accordingly.     Physical Deconditioning. Secondary to recent hospitalization and co-morbidities. Home Physical and Occupational Therapy ordered.      Past Medical History:  has a past medical history of Acid reflux disease, Benign essential hypertension, CAD (coronary artery disease), ESRD on hemodialysis (H), History of atrial flutter, Hypothyroidism, Ischemic cardiomyopathy, and Type 2 diabetes mellitus (H).    Discharge Medications:  Current Outpatient Medications   Medication Sig Dispense Refill     acetaminophen (TYLENOL) 500 MG tablet Take 1,000 mg by mouth 3 times daily        apixaban ANTICOAGULANT (ELIQUIS ANTICOAGULANT) 2.5 MG tablet Take 1 tablet (2.5 mg) by mouth 2 times daily 180 tablet 3     clopidogrel (PLAVIX) 75 MG tablet Take 1 tablet (75 mg) by mouth daily 60 tablet 1     ergocalciferol (ERGOCALCIFEROL) 1.25 MG (20341 UT) capsule Take 50,000 Units by mouth once a week X 4 weeks. Then check Vitamin D level and update NP.       famotidine (PEPCID) 10 MG tablet Take 10 mg by mouth daily       gabapentin (NEURONTIN) 100 MG capsule Take 1 capsule (100 mg) by mouth 3 times daily 90 capsule 0     isosorbide mononitrate (IMDUR) 30 MG 24 hr tablet Take 1 tablet (30 mg) the evening of dialysis days (Mon, Wed, Fri)  and Saturdays. 48 tablet 3     isosorbide mononitrate (IMDUR) 30 MG 24 hr tablet Take 15 mg by mouth Take in the evening on the days prior dialysis. Take on Sunday, Tuesday and Thursday       levothyroxine (SYNTHROID/LEVOTHROID) 50 MCG tablet TAKE 1 TABLET (50 MCG) BY MOUTH DAILY 90 tablet 3     lisinopril (ZESTRIL) 2.5 MG tablet TAKE 1 TABLET (2.5 MG) BY MOUTH 2 TIMES DAILY (TAKE ONE TABLET AFTER DIALYSIS. TAKE SECOND TABLET AT BEDTIME.) 180 tablet 3     loperamide (IMODIUM A-D) 2 MG tablet Take 2 mg by mouth three times a week MWF on dialysis days       loperamide (IMODIUM A-D) 2 MG tablet Take 1 mg by mouth five times a week On  "non-dialysis days - Tu, Th, Sa, Russo       metoprolol succinate ER (TOPROL-XL) 25 MG 24 hr tablet Take 0.5 tablets (12.5 mg) by mouth daily 45 tablet 3     mexiletine (MEXITIL) 150 MG capsule Take 1 capsule (150 mg) by mouth 2 times daily 180 capsule 3     multivitamin RENAL (NEPHROCAPS/TRIPHROCAPS) 1 MG capsule Take 1 capsule by mouth daily 90 capsule 3     nitroGLYcerin (NITROSTAT) 0.4 MG sublingual tablet Place 1 tablet (0.4 mg) under the tongue every 5 minutes as needed for chest pain UP TO 3 PER EPISODE 20 tablet 0     pantoprazole (PROTONIX) 40 MG EC tablet TAKE 1 TABLET (40 MG) BY MOUTH EVERY MORNING 30 tablet 9     pravastatin (PRAVACHOL) 40 MG tablet TAKE 1 TABLET (40 MG) BY MOUTH DAILY 90 tablet 0     vitamin D3 (CHOLECALCIFEROL) 50 mcg (2000 units) tablet Take 1 tablet by mouth daily       Medication Changes/Rationale:     See above    Controlled medications sent with patient:   not applicable/none     ROS:   10 point ROS of systems including Constitutional, Eyes, Respiratory, Cardiovascular, Gastroenterology, Genitourinary, Integumentary, Musculoskeletal, Psychiatric were all negative except for pertinent positives noted in my HPI.    Physical Exam:   Vitals: /56   Pulse 62   Temp 96.9  F (36.1  C)   Resp 18   Ht 1.727 m (5' 8\")   Wt 76.5 kg (168 lb 9.6 oz)   SpO2 98%   BMI 25.64 kg/m    BMI= Body mass index is 25.64 kg/m .  GENERAL APPEARANCE:  Alert, in no distress  ENT:  Mouth and posterior oropharynx normal, moist mucous membranes  EYES:  EOM, conjunctivae, lids, pupils and irises normal  RESP:  respiratory effort and palpation of chest normal, lungs clear to auscultation , no respiratory distress  CV:  Palpation and auscultation of heart done , regular rate and rhythm, no murmur, rub, or gallop  ABDOMEN:  normal bowel sounds, soft, nontender, no hepatosplenomegaly or other masses  M/S:   Active movement of bilateral upper and lower extremities. No edema.  SKIN:  Inspection of skin and " subcutaneous tissue baseline, Palpation of skin and subcutaneous tissue baseline  NEURO:   Cranial nerves 2-12 are normal tested and grossly at patient's baseline  PSYCH:  affect and mood normal     SNF labs: Labs obtained at dialysis    DISCHARGE PLAN:    Follow up labs: No labs orders/due    Medical Follow Up:      Follow up with primary care provider in 1 weeks    MTM referral needed and placed by this provider: No      Discharge Services: Home Care:  Occupational Therapy, Physical Therapy, Home Health Aide and From:  [facility]    Discharge Instructions to Patient at Discharge:     Daily weights    Regular diet, Regular Texture texture, Thin Liquids consistency     TOTAL DISCHARGE TIME:   Greater than 30 minutes  Electronically signed by:  MARCIO Brown CNP         Documentation of Face-to-Face and Certification for Home Health Services     Patient: Westley Ness   YOB: 1945  MR Number: [id number]  Today's Date: 2/16/2021    I certify that patient: Westley Ness is under my care and that I, or a nurse practitioner or physician's assistant working with me, had a face-to-face encounter that meets the physician face-to-face encounter requirements with this patient on: 2/15/21.    This encounter with the patient was in whole, or in part, for the following medical condition, which is the primary reason for home health care:     I certify that, based on my findings, the following services are medically necessary home health services: Nursing, Occupational Therapy and Physical Therapy.    My clinical findings support the need for the above services because: Nurse is needed: To assess blood pressure after changes in medications or other medical regimen.., Occupational Therapy Services are needed to assess and treat cognitive ability and address ADL safety due to impairment in ADLs given recent hospitalization for Acute L3 Compression Fracture with Generalized Weakness. and Physical  Therapy Services are needed to assess and treat the following functional impairments: Acute L3 Compression Fracture with Generalized Weakness.    Further, I certify that my clinical findings support that this patient is homebound (i.e. absences from home require considerable and taxing effort and are for medical reasons or [employer] services or infrequently or of short duration when for other reasons) because: Requires assistance of another person or specialized equipment to access medical services because patient: Is unable to walk greater than 200 feet without rest...    Based on the above findings. I certify that this patient is confined to the home and needs intermittent skilled nursing care, physical therapy and/or speech therapy.  The patient is under my care, and I have initiated the establishment of the plan of care.  This patient will be followed by a physician who will periodically review the plan of care.  Physician/Provider to provide follow up care: Josselin Kolb    Responsible Medicare certified PECOS Physician: Dr. Rosita Ferreira  Physician Signature: See electronic signature associated with these discharge orders.  Date: 2/16/2021        Sincerely,        MARCIO Brown CNP

## 2021-02-16 NOTE — PATIENT INSTRUCTIONS
[facility] Discharge Orders    Name: Westley Ness  : 1945  Planned Discharge Date: 21  Discharged to: previous independent home    MEDICAL FOLLOW UP  Follow up with PCP in 1-2 weeks       FUTURE LABS: Vitamin D level due around 21 with results to PCP    ORDER CHANGES:  Start Famotidine 10 mg by mouth every day. Discontinue Omeprazole.     DISCHARGE MEDICATIONS:  The patient s pharmacy is authorized to dispense a 30-day supply of medications. Refill requests should be directed to the primary provider, Josselin Kolb   No narcotics are prescribed at time of discharge.   Current Outpatient Medications   Medication Sig Dispense Refill     acetaminophen (TYLENOL) 500 MG tablet Take 1,000 mg by mouth 3 times daily        apixaban ANTICOAGULANT (ELIQUIS ANTICOAGULANT) 2.5 MG tablet Take 1 tablet (2.5 mg) by mouth 2 times daily 180 tablet 3     clopidogrel (PLAVIX) 75 MG tablet Take 1 tablet (75 mg) by mouth daily 60 tablet 1     ergocalciferol (ERGOCALCIFEROL) 1.25 MG (27792 UT) capsule Take 50,000 Units by mouth once a week X 4 weeks until 21. Then check Vitamin D level and update NP.       famotidine (PEPCID) 10 MG tablet Take 10 mg by mouth daily       gabapentin (NEURONTIN) 100 MG capsule Take 1 capsule (100 mg) by mouth 3 times daily 90 capsule 0     isosorbide mononitrate (IMDUR) 30 MG 24 hr tablet Take 1 tablet (30 mg) the evening of dialysis days (Mon, Wed, Fri)  and . 48 tablet 3     isosorbide mononitrate (IMDUR) 30 MG 24 hr tablet Take 15 mg by mouth Take in the evening on the days prior dialysis. Take on , Tuesday and Thursday       levothyroxine (SYNTHROID/LEVOTHROID) 50 MCG tablet TAKE 1 TABLET (50 MCG) BY MOUTH DAILY 90 tablet 3     lisinopril (ZESTRIL) 2.5 MG tablet TAKE 1 TABLET (2.5 MG) BY MOUTH 2 TIMES DAILY (TAKE ONE TABLET AFTER DIALYSIS. TAKE SECOND TABLET AT BEDTIME.) 180 tablet 3     loperamide (IMODIUM A-D) 2 MG tablet Take 2 mg by  mouth three times a week MWF on dialysis days       loperamide (IMODIUM A-D) 2 MG tablet Take 1 mg by mouth five times a week On non-dialysis days - Tu, Th, Sa, Russo       metoprolol succinate ER (TOPROL-XL) 25 MG 24 hr tablet Take 0.5 tablets (12.5 mg) by mouth daily 45 tablet 3     mexiletine (MEXITIL) 150 MG capsule Take 1 capsule (150 mg) by mouth 2 times daily 180 capsule 3     multivitamin RENAL (NEPHROCAPS/TRIPHROCAPS) 1 MG capsule Take 1 capsule by mouth daily 90 capsule 3     nitroGLYcerin (NITROSTAT) 0.4 MG sublingual tablet Place 1 tablet (0.4 mg) under the tongue every 5 minutes as needed for chest pain UP TO 3 PER EPISODE 20 tablet 0     pantoprazole (PROTONIX) 40 MG EC tablet TAKE 1 TABLET (40 MG) BY MOUTH EVERY MORNING 30 tablet 9     pravastatin (PRAVACHOL) 40 MG tablet TAKE 1 TABLET (40 MG) BY MOUTH DAILY 90 tablet 0     vitamin D3 (CHOLECALCIFEROL) 50 mcg (2000 units) tablet Take 1 tablet by mouth daily       SERVICES:  Home Care:  Occupational Therapy, Physical Therapy, Registered Nurse, Home Health Aide and From:  [facility]    ADDITIONAL INSTRUCTIONS:    Daily weights    Regular diet, Regular Texture texture, Thin Liquids consistency     MARCIO Brown CNP  This document was electronically signed on February 16, 2021

## 2021-02-21 ENCOUNTER — NURSE TRIAGE (OUTPATIENT)
Dept: NURSING | Facility: CLINIC | Age: 76
End: 2021-02-21

## 2021-02-21 NOTE — TELEPHONE ENCOUNTER
Clinic Action Needed?  No. FYI.     [facility] Triage Call   Presenting Problem:   Discharged from TCU on 2/15. PCP Dr Kolb.     Nurse from home care request orders for P.T. eval. and O.T. eval at pt's request. Gave orders for PT and OT eval per FNA standing orders.     Home care wants to inform Dr Kolb that pt declined skilled nursing visits.       Routed to:  Dr. Kolb

## 2021-02-22 ENCOUNTER — PATIENT OUTREACH (OUTPATIENT)
Dept: NURSING | Facility: CLINIC | Age: 76
End: 2021-02-22
Payer: MEDICARE

## 2021-02-22 ENCOUNTER — VIRTUAL VISIT (OUTPATIENT)
Dept: INTERNAL MEDICINE | Facility: CLINIC | Age: 76
End: 2021-02-22
Payer: MEDICARE

## 2021-02-22 DIAGNOSIS — M79.672 BILATERAL FOOT PAIN: ICD-10-CM

## 2021-02-22 DIAGNOSIS — M25.571 ACUTE BILATERAL ANKLE PAIN: Primary | ICD-10-CM

## 2021-02-22 DIAGNOSIS — M25.572 ACUTE BILATERAL ANKLE PAIN: Primary | ICD-10-CM

## 2021-02-22 DIAGNOSIS — I25.10 CORONARY ARTERY DISEASE INVOLVING NATIVE CORONARY ARTERY OF NATIVE HEART WITHOUT ANGINA PECTORIS: ICD-10-CM

## 2021-02-22 DIAGNOSIS — M79.671 BILATERAL FOOT PAIN: ICD-10-CM

## 2021-02-22 PROCEDURE — 99441 PR PHYSICIAN TELEPHONE EVALUATION 5-10 MIN: CPT | Mod: 95 | Performed by: INTERNAL MEDICINE

## 2021-02-22 RX ORDER — PREDNISONE 20 MG/1
TABLET ORAL
Qty: 15 TABLET | Refills: 0 | Status: SHIPPED | OUTPATIENT
Start: 2021-02-22 | End: 2021-03-04

## 2021-02-22 RX ORDER — PRAVASTATIN SODIUM 40 MG
40 TABLET ORAL DAILY
Qty: 90 TABLET | Refills: 3 | Status: SHIPPED | OUTPATIENT
Start: 2021-02-22 | End: 2021-03-17

## 2021-02-22 NOTE — PROGRESS NOTES
TELEPHONE VISIT                                                      ASSESSMENT/PLAN                                                      (M25.571,  M25.572) Acute bilateral ankle pain  (primary encounter diagnosis)  (M79.671,  M79.672) Bilateral foot pain  Comment: Etiology unclear.  Plan: TRIAL of prednisone taper; if symptoms recur or do not improve, patient to contact MD - next up would be podiatry referral.    Total time of call between patient and provider was 6 minutes.    Josselin Kolb MD   [address] [address]  T: [phone], F: [phone]    SUBJECTIVE                                                      Westley Ness is a very pleasant 75 year old male who requested a telephone visit to discuss ankle and foot pain:    Ongoing for the last couple of weeks. No precipitating trauma, injury, or unusual exertion.  Involves both ankles and feet. Pain is worse with weightbearing and resolves when lying down (after 30 minutes). Associated swelling, but this has been chronic and stable over the last 6 to 8 months. No redness or skin changes.    No significant relief with Tylenol and gabapentin 100 mg 3 times daily (max dose while on HD).     ---    (Note was completed, in part, with [employer] voice-recognition software. Documentation reviewed, but some grammatical, spelling, and word errors may remain.)

## 2021-02-22 NOTE — LETTER
[facility] CARE COORDINATION  [facility]  [address] [address]  Tel. [phone]  Fax [phone]    February 22, 2021    Westley Ness  [address] [facility] [address]      Dear Westley,    I am a clinic care coordinator who works with Josselin Kolb MD at [facility]. I wanted to thank you for spending the time to talk with me.  Below is a description of clinic care coordination and how I can further assist you.      The clinic care coordination team is made up of a registered nurse,  and community health worker who understand the health care system. The goal of clinic care coordination is to help you manage your health and improve access to the health care system in the most efficient manner. The team can assist you in meeting your health care goals by providing education, coordinating services, strengthening the communication among your providers and supporting you with any resource needs.    Please feel free to contact the Community Health Worker at [phone] with any questions or concerns. We are focused on providing you with the highest-quality healthcare experience possible and that all starts with you.     Sincerely,     ODETTE Arana   Social Work Clinic Care Coordinator   [facility]  PH: [phone]  [email or web address]

## 2021-02-22 NOTE — PROGRESS NOTES
Clinic Care Coordination Contact  Care Coordination Communication    Referral Source: SNF/TCU    Clinical Data: Patient was hospitalized at [facility] from 1/11/21 to 1/15/21 with diagnosis of generalized weakness and L3 compression fracture. Pt discharged to TCU for further rehab and management.       Pt has dialysis M/W/F at [facility]. Pt has home care services to assist with cleaning, groceries, and transportation privately paid through [facility].       Pt discharged home with home care.     DISCHARGE PLAN:  Follow up labs: No labs orders/due  Medical Follow Up:                 Follow up with primary care provider in 1 weeks    Discharge Services: Home Care:  Occupational Therapy, Physical Therapy, Home Health Aide and From:  [facility]     Discharge Instructions to Patient at Discharge:     Daily weights    Regular diet, Regular Texture texture, Thin Liquids consistency      Home Care Contact:              Home Care Agency: [facility]              Contact name () and phone number: [phone]              Care Coordination contacted home care: No              Anticipated start of care date: 2/21/21 per chart notes     Patient Contact:               Introduced self and role of care coordination.               Discharge instructions were reviewed with patient/caregiver.               Do you have any questions about your medications? No.              Follow up appointment is scheduled for today, attended virtually.              Provided 24 Hour Nurse Line and/or 24 Hour Appointment Scheduling: Yes              Home care has contacted patient: Yes              Patient questions/concerns: Reviewed PCP appt from this morning. Pt confirmed he has been in contact with home care.     Plan: RN/SW Care Coordinator will await notification from home care staff informing RN/SW Care Coordinator of patients discharge plans/needs.     No further outreaches will be  made at this time unless a new referral is made or a change in the pt's status occurs.     Patient was provided with this writer's contact information and encouraged to call with any questions or concerns.     [facility] will send care coordination introduction letter and 24 hr access plan to patient via ANPI.     ODETTE Arana   Social Work Clinic Care Coordinator   [facility]  PH: [phone]  [email or web address]

## 2021-02-22 NOTE — LETTER
Health Care Home - Access Care Plan    About Me:    Patient Name:  Westley Ness    YOB: 1945  Age:                      75 year old   Arelis MRN:     [id number] Telephone Information:   Home Phone [phone]   Mobile [phone]       Address:  [address] [facility] [address] Email address:  [email or web address]      Emergency Contact(s)   Name Relationship Lgl Grd Work Phone Home Phone Mobile Phone   1. LEELEE HUGHES Friend   [phone]   2. EMIGDIO VILLAREAL Friend   [phone]   3. ZAHRA ROJO Friend   [phone]             Health Maintenance: Routine Health maintenance Reviewed: Due/Overdue   Health Maintenance Due   Topic Date Due     HF ACTION PLAN  1945     MICROALBUMIN  1945     EYE EXAM  1945     COLORECTAL CANCER SCREENING  12/19/1955     DTAP/TDAP/TD IMMUNIZATION (1 - Tdap) 12/19/1970     ZOSTER IMMUNIZATION (1 of 2) 12/19/1995     MEDICARE ANNUAL WELLNESS VISIT  12/19/2010     FALL RISK ASSESSMENT  02/28/2020     DIABETIC FOOT EXAM  03/26/2020     PHQ-2  01/01/2021     COVID-19 Vaccine (2 of 2 - Moderna series) 02/24/2021     My Access Plan  Medical Emergency 911   Questions or concerns during clinic hours Primary Clinic Line, I will call the clinic directly:   -     24 Hour Appointment Line [phone] or  [phone] [facility] [phone]) (toll free)   24 Hour Nurse Line [phone] (toll free)   Questions or concerns outside clinic hours 24 Hour Appointment Line, I will call the after-hours on-call line:   [facility] [phone] or [phone] ([phone]) (toll-free)   Preferred Urgent Care [facility] [phone]   Preferred Hospital [facility]  [phone]   Preferred Pharmacy [facility] [place], [place] - [address]     Behavioral Health Crisis Line The National Suicide Prevention Lifeline at 1-832.436.5652 or 911     My Care  Team Members  Patient Care Team       Relationship Specialty Notifications Start End    Josselin Klob MD PCP - General Internal Medicine  9/17/16     Phone: [phone] Fax: [phone] [address] [place] [address]    Josselin Kolb MD Assigned PCP   9/2/18     Phone: [phone] Fax: [phone] [address] [place] [address]    [facility] ([facility] ([place])  10/30/20     Phone: [phone]                My Medical and Care Information  Problem List   Patient Active Problem List   Diagnosis     CAD (coronary artery disease)     ICD (implantable cardioverter-defibrillator) in place     Ischemic cardiomyopathy     ESRD (end stage renal disease) on dialysis (H)     Typical atrial flutter (H)     ESRD on hemodialysis (H)     Hypothyroidism     Type 2 diabetes mellitus (H)     Acid reflux disease     Atrial flutter (H)     Neck pain     History of atrial flutter     Benign essential hypertension     Chronic systolic (congestive) heart failure (H)     Syncope     Dizziness     Abnormal cardiovascular stress test     Coronary artery disease involving native coronary artery, angina presence unspecified, unspecified whether native or transplanted heart     Difficulty walking     Generalized muscle weakness     Dialysis patient (H)     Anemia of chronic disease     Osteopenia of spine     Closed compression fracture of L3 lumbar vertebra with routine healing, subsequent encounter     Physical deconditioning     PAF (paroxysmal atrial fibrillation) (H)      Current Medications and Allergies:   Current Outpatient Medications   Medication     acetaminophen (TYLENOL) 500 MG tablet     apixaban ANTICOAGULANT (ELIQUIS ANTICOAGULANT) 2.5 MG tablet     clopidogrel (PLAVIX) 75 MG tablet     ergocalciferol (ERGOCALCIFEROL) 1.25 MG (55139 UT) capsule     famotidine (PEPCID) 10 MG tablet     gabapentin (NEURONTIN) 100 MG capsule     isosorbide mononitrate (IMDUR) 30  MG 24 hr tablet     isosorbide mononitrate (IMDUR) 30 MG 24 hr tablet     levothyroxine (SYNTHROID/LEVOTHROID) 50 MCG tablet     lisinopril (ZESTRIL) 2.5 MG tablet     loperamide (IMODIUM A-D) 2 MG tablet     loperamide (IMODIUM A-D) 2 MG tablet     metoprolol succinate ER (TOPROL-XL) 25 MG 24 hr tablet     mexiletine (MEXITIL) 150 MG capsule     multivitamin RENAL (NEPHROCAPS/TRIPHROCAPS) 1 MG capsule     nitroGLYcerin (NITROSTAT) 0.4 MG sublingual tablet     pantoprazole (PROTONIX) 40 MG EC tablet     pravastatin (PRAVACHOL) 40 MG tablet     predniSONE (DELTASONE) 20 MG tablet     vitamin D3 (CHOLECALCIFEROL) 50 mcg (2000 units) tablet     No current facility-administered medications for this visit.

## 2021-02-23 DIAGNOSIS — I25.10 CORONARY ARTERY DISEASE INVOLVING NATIVE CORONARY ARTERY OF NATIVE HEART WITHOUT ANGINA PECTORIS: ICD-10-CM

## 2021-02-24 RX ORDER — PRAVASTATIN SODIUM 40 MG
40 TABLET ORAL DAILY
Qty: 90 TABLET | Refills: 3 | OUTPATIENT
Start: 2021-02-24

## 2021-03-01 ENCOUNTER — MEDICAL CORRESPONDENCE (OUTPATIENT)
Dept: HEALTH INFORMATION MANAGEMENT | Facility: CLINIC | Age: 76
End: 2021-03-01

## 2021-03-01 DIAGNOSIS — Z53.9 DIAGNOSIS NOT YET DEFINED: Primary | ICD-10-CM

## 2021-03-01 PROCEDURE — G0180 MD CERTIFICATION HHA PATIENT: HCPCS | Performed by: INTERNAL MEDICINE

## 2021-03-03 ENCOUNTER — ANCILLARY PROCEDURE (OUTPATIENT)
Dept: CARDIOLOGY | Facility: CLINIC | Age: 76
End: 2021-03-03
Attending: INTERNAL MEDICINE
Payer: MEDICARE

## 2021-03-03 DIAGNOSIS — Z95.810 ICD (IMPLANTABLE CARDIOVERTER-DEFIBRILLATOR) IN PLACE: ICD-10-CM

## 2021-03-03 PROCEDURE — 93296 REM INTERROG EVL PM/IDS: CPT | Performed by: INTERNAL MEDICINE

## 2021-03-03 PROCEDURE — 93295 DEV INTERROG REMOTE 1/2/MLT: CPT | Performed by: INTERNAL MEDICINE

## 2021-03-09 LAB
MDC_IDC_LEAD_IMPLANT_DT: NORMAL
MDC_IDC_LEAD_LOCATION: NORMAL
MDC_IDC_LEAD_LOCATION_DETAIL_1: NORMAL
MDC_IDC_LEAD_MFG: NORMAL
MDC_IDC_LEAD_MODEL: NORMAL
MDC_IDC_LEAD_POLARITY_TYPE: NORMAL
MDC_IDC_LEAD_SERIAL: NORMAL
MDC_IDC_LEAD_SPECIAL_FUNCTION: NORMAL
MDC_IDC_MSMT_BATTERY_REMAINING_PERCENTAGE: 100 %
MDC_IDC_MSMT_BATTERY_RRT_TRIGGER: NORMAL
MDC_IDC_MSMT_BATTERY_STATUS: NORMAL
MDC_IDC_MSMT_BATTERY_VOLTAGE: 3.1 V
MDC_IDC_MSMT_CAP_CHARGE_DTM: NORMAL
MDC_IDC_MSMT_CAP_CHARGE_ENERGY: 40 J
MDC_IDC_MSMT_CAP_CHARGE_TIME: 9.4 S
MDC_IDC_MSMT_CAP_CHARGE_TYPE: NORMAL
MDC_IDC_MSMT_LEADCHNL_RV_IMPEDANCE_VALUE: 383 OHM
MDC_IDC_MSMT_LEADCHNL_RV_LEAD_CHANNEL_STATUS: NORMAL
MDC_IDC_PG_IMPLANT_DTM: NORMAL
MDC_IDC_PG_MFG: NORMAL
MDC_IDC_PG_MODEL: NORMAL
MDC_IDC_PG_SERIAL: NORMAL
MDC_IDC_PG_TYPE: NORMAL
MDC_IDC_SESS_CLINIC_NAME: NORMAL
MDC_IDC_SESS_DTM: NORMAL
MDC_IDC_SESS_REPROGRAMMED: NO
MDC_IDC_SESS_TYPE: NORMAL
MDC_IDC_SET_BRADY_LOWRATE: 40 {BEATS}/MIN
MDC_IDC_SET_BRADY_MODE: NORMAL
MDC_IDC_SET_LEADCHNL_RV_PACING_AMPLITUDE: 2 V
MDC_IDC_SET_LEADCHNL_RV_PACING_POLARITY: NORMAL
MDC_IDC_SET_LEADCHNL_RV_PACING_PULSEWIDTH: 0.4 MS
MDC_IDC_SET_LEADCHNL_RV_SENSING_ADAPTATION_MODE: NORMAL
MDC_IDC_SET_LEADCHNL_RV_SENSING_POLARITY: NORMAL
MDC_IDC_SET_LEADCHNL_RV_SENSING_SENSITIVITY: 0.8 MV
MDC_IDC_SET_ZONE_DETECTION_BEATS_DENOMINATOR: 16 {BEATS}
MDC_IDC_SET_ZONE_DETECTION_BEATS_NUMERATOR: 12 {BEATS}
MDC_IDC_SET_ZONE_DETECTION_INTERVAL: 240 MS
MDC_IDC_SET_ZONE_DETECTION_INTERVAL: 320 MS
MDC_IDC_SET_ZONE_TYPE: NORMAL
MDC_IDC_SET_ZONE_TYPE: NORMAL
MDC_IDC_STAT_AT_DTM_END: NORMAL
MDC_IDC_STAT_AT_DTM_START: NORMAL
MDC_IDC_STAT_BRADY_DTM_END: NORMAL
MDC_IDC_STAT_BRADY_DTM_START: NORMAL
MDC_IDC_STAT_BRADY_RV_PERCENT_PACED: 0 %
MDC_IDC_STAT_CRT_DTM_END: NORMAL
MDC_IDC_STAT_CRT_DTM_START: NORMAL
MDC_IDC_STAT_EPISODE_TOTAL_COUNT: 0
MDC_IDC_STAT_EPISODE_TOTAL_COUNT_DTM_END: NORMAL
MDC_IDC_STAT_EPISODE_TOTAL_COUNT_DTM_START: NORMAL
MDC_IDC_STAT_EPISODE_TYPE: NORMAL
MDC_IDC_STAT_TACHYTHERAPY_ATP_DELIVERED_TOTAL: 0
MDC_IDC_STAT_TACHYTHERAPY_SHOCKS_ABORTED_TOTAL: 0
MDC_IDC_STAT_TACHYTHERAPY_SHOCKS_DELIVERED_TOTAL: 0
MDC_IDC_STAT_TACHYTHERAPY_TOTAL_DTM_END: NORMAL
MDC_IDC_STAT_TACHYTHERAPY_TOTAL_DTM_START: NORMAL

## 2021-03-12 ENCOUNTER — OFFICE VISIT (OUTPATIENT)
Dept: INTERNAL MEDICINE | Facility: CLINIC | Age: 76
End: 2021-03-12
Payer: MEDICARE

## 2021-03-12 VITALS
SYSTOLIC BLOOD PRESSURE: 98 MMHG | DIASTOLIC BLOOD PRESSURE: 54 MMHG | OXYGEN SATURATION: 100 % | HEART RATE: 81 BPM | TEMPERATURE: 98 F

## 2021-03-12 DIAGNOSIS — M79.18 RIGHT BUTTOCK PAIN: ICD-10-CM

## 2021-03-12 DIAGNOSIS — M62.838 MUSCLE SPASM: Primary | ICD-10-CM

## 2021-03-12 DIAGNOSIS — R26.2 DIFFICULTY WALKING: ICD-10-CM

## 2021-03-12 PROCEDURE — 99213 OFFICE O/P EST LOW 20 MIN: CPT | Performed by: PHYSICIAN ASSISTANT

## 2021-03-12 RX ORDER — CYCLOBENZAPRINE HCL 5 MG
5 TABLET ORAL 3 TIMES DAILY PRN
Qty: 15 TABLET | Refills: 1 | Status: ON HOLD | OUTPATIENT
Start: 2021-03-12 | End: 2021-03-19

## 2021-03-12 RX ORDER — GABAPENTIN 100 MG/1
100 CAPSULE ORAL 3 TIMES DAILY
Qty: 270 CAPSULE | Refills: 3 | Status: SHIPPED | OUTPATIENT
Start: 2021-03-12

## 2021-03-12 NOTE — PROGRESS NOTES
"    Assessment & Plan     Muscle spasm    - cyclobenzaprine (FLEXERIL) 5 MG tablet; Take 1 tablet (5 mg) by mouth 3 times daily as needed for muscle spasms    Right buttock pain    - cyclobenzaprine (FLEXERIL) 5 MG tablet; Take 1 tablet (5 mg) by mouth 3 times daily as needed for muscle spasms    Difficulty walking    - gabapentin (NEURONTIN) 100 MG capsule; Take 1 capsule (100 mg) by mouth 3 times daily             BMI:   Estimated body mass index is 25.64 kg/m  as calculated from the following:    Height as of 2/14/21: 1.727 m (5' 8\").    Weight as of 2/14/21: 76.5 kg (168 lb 9.6 oz).       Reviewed icing heat and topical creams to massage to the area   No NSAID  Tylenol prn   Refill done by PCP on gabapentin for patient       No follow-ups on file.    KALANI Berry [facility] KIKAFlorence Community HealthcareCAROL Cordova is a 75 year old who presents for the following health issues     HPI       Musculoskeletal problem/pain  Onset/Duration: Couple of weeks  Description  Location: Right knee and down to the ankles  And hip   Right knee better  Bilateral ankle better  Now with left hip pain  Seen by PT yesterday, - they evaluated and stated muscle spasm in the buttock   Joint Swelling: no  Redness: no  Pain: YES  Warmth: no  Intensity:  moderate  Progression of Symptoms:  worsening  Accompanying signs and symptoms:   Fevers: no  Numbness/tingling/weakness: no  History  Trauma to the area: no  Recent illness:  no  Previous similar problem: YES  Previous evaluation:  YES  Precipitating or alleviating factors:  Aggravating factors include: walking and overuse  Therapies tried and outcome: acetaminophen and Gabapentin        Review of Systems   Constitutional, HEENT, cardiovascular, pulmonary, gi and gu systems are negative, except as otherwise noted.      Objective    BP 98/54   Pulse 81   Temp 98  F (36.7  C) (Tympanic)   SpO2 100%   There is no height or weight on file to calculate " BMI.  Physical Exam   GENERAL: healthy, alert and no distress  RESP: lungs clear to auscultation - no rales, rhonchi or wheezes  CV: irregularly irregular rhythm and normal S1 S2, no S3 or S4  SKIN: no suspicious lesions or rashes

## 2021-03-15 ENCOUNTER — TELEPHONE (OUTPATIENT)
Dept: CARDIOLOGY | Facility: CLINIC | Age: 76
End: 2021-03-15

## 2021-03-15 DIAGNOSIS — I48.0 PAF (PAROXYSMAL ATRIAL FIBRILLATION) (H): Primary | ICD-10-CM

## 2021-03-15 NOTE — TELEPHONE ENCOUNTER
Called Pt he said his HR is now in typical range the 90's. Asked wht elevated HR he should be concerned about  Said 110-130, but if out of his normal range may be in afib and should have EKG done. Informed Pt order is system and he can get done at PMD or heart clinic if he feels he needs done. TAYO Bauman RN  
If elevated HR persists, he will need ekg tos ee if there is any recurrence of atrial arrhythmia. Can do with PMD or here with any NORMA.  
Pt called in he did not feel well this am and canceled dialysis. Pt said HR's when awake 105-115, and BP . Discussed with Pt he sounds a bit dry and does do dialysis .  Pt said after took morning med felt fine and doing well now.  Pt has not ate or drank anything yet today asked that he do this he may be dehydrated to much. Pt will reschedule dialysis for tomorrow, and spoke with Keshav at dialysis and they will do labs on him tomorrow. Pt has this nurses phone to call her if further issues. TAYO nunez RN  
negative...

## 2021-03-17 ENCOUNTER — APPOINTMENT (OUTPATIENT)
Dept: GENERAL RADIOLOGY | Facility: CLINIC | Age: 76
DRG: 308 | End: 2021-03-17
Attending: NURSE PRACTITIONER
Payer: MEDICARE

## 2021-03-17 ENCOUNTER — HOSPITAL ENCOUNTER (INPATIENT)
Facility: CLINIC | Age: 76
LOS: 1 days | Discharge: HOME HEALTH | DRG: 308 | End: 2021-03-19
Attending: NURSE PRACTITIONER | Admitting: INTERNAL MEDICINE
Payer: MEDICARE

## 2021-03-17 ENCOUNTER — APPOINTMENT (OUTPATIENT)
Dept: ULTRASOUND IMAGING | Facility: CLINIC | Age: 76
DRG: 308 | End: 2021-03-17
Attending: INTERNAL MEDICINE
Payer: MEDICARE

## 2021-03-17 ENCOUNTER — NURSE TRIAGE (OUTPATIENT)
Dept: INTERNAL MEDICINE | Facility: CLINIC | Age: 76
End: 2021-03-17

## 2021-03-17 DIAGNOSIS — R53.81 PHYSICAL DECONDITIONING: ICD-10-CM

## 2021-03-17 DIAGNOSIS — I25.5 ISCHEMIC CARDIOMYOPATHY: ICD-10-CM

## 2021-03-17 DIAGNOSIS — I25.10 CORONARY ARTERY DISEASE INVOLVING NATIVE CORONARY ARTERY OF NATIVE HEART WITHOUT ANGINA PECTORIS: ICD-10-CM

## 2021-03-17 DIAGNOSIS — R42 DIZZINESS: ICD-10-CM

## 2021-03-17 DIAGNOSIS — I48.19 PERSISTENT ATRIAL FIBRILLATION (H): Primary | ICD-10-CM

## 2021-03-17 DIAGNOSIS — R53.1 WEAKNESS: ICD-10-CM

## 2021-03-17 LAB
ALBUMIN SERPL-MCNC: 3.4 G/DL (ref 3.4–5)
ALP SERPL-CCNC: 399 U/L (ref 40–150)
ALT SERPL W P-5'-P-CCNC: 23 U/L (ref 0–70)
ANION GAP SERPL CALCULATED.3IONS-SCNC: 9 MMOL/L (ref 3–14)
AST SERPL W P-5'-P-CCNC: 20 U/L (ref 0–45)
BASOPHILS # BLD AUTO: 0.1 10E9/L (ref 0–0.2)
BASOPHILS NFR BLD AUTO: 1.2 %
BILIRUB SERPL-MCNC: 1.4 MG/DL (ref 0.2–1.3)
BUN SERPL-MCNC: 22 MG/DL (ref 7–30)
CALCIUM SERPL-MCNC: 9 MG/DL (ref 8.5–10.1)
CHLORIDE SERPL-SCNC: 104 MMOL/L (ref 94–109)
CO2 SERPL-SCNC: 29 MMOL/L (ref 20–32)
CREAT SERPL-MCNC: 3.99 MG/DL (ref 0.66–1.25)
DIFFERENTIAL METHOD BLD: ABNORMAL
EOSINOPHIL # BLD AUTO: 0.2 10E9/L (ref 0–0.7)
EOSINOPHIL NFR BLD AUTO: 4.5 %
ERYTHROCYTE [DISTWIDTH] IN BLOOD BY AUTOMATED COUNT: 15.9 % (ref 10–15)
GFR SERPL CREATININE-BSD FRML MDRD: 14 ML/MIN/{1.73_M2}
GLUCOSE SERPL-MCNC: 131 MG/DL (ref 70–99)
HCT VFR BLD AUTO: 35.7 % (ref 40–53)
HGB BLD-MCNC: 10.8 G/DL (ref 13.3–17.7)
IMM GRANULOCYTES # BLD: 0 10E9/L (ref 0–0.4)
IMM GRANULOCYTES NFR BLD: 0.2 %
INTERPRETATION ECG - MUSE: NORMAL
LABORATORY COMMENT REPORT: NORMAL
LYMPHOCYTES # BLD AUTO: 0.6 10E9/L (ref 0.8–5.3)
LYMPHOCYTES NFR BLD AUTO: 12 %
MCH RBC QN AUTO: 31.6 PG (ref 26.5–33)
MCHC RBC AUTO-ENTMCNC: 30.3 G/DL (ref 31.5–36.5)
MCV RBC AUTO: 104 FL (ref 78–100)
MONOCYTES # BLD AUTO: 0.4 10E9/L (ref 0–1.3)
MONOCYTES NFR BLD AUTO: 8.3 %
NEUTROPHILS # BLD AUTO: 3.8 10E9/L (ref 1.6–8.3)
NEUTROPHILS NFR BLD AUTO: 73.8 %
NRBC # BLD AUTO: 0 10*3/UL
NRBC BLD AUTO-RTO: 0 /100
PLATELET # BLD AUTO: 140 10E9/L (ref 150–450)
POTASSIUM SERPL-SCNC: 3.5 MMOL/L (ref 3.4–5.3)
PROT SERPL-MCNC: 6.4 G/DL (ref 6.8–8.8)
RBC # BLD AUTO: 3.42 10E12/L (ref 4.4–5.9)
SARS-COV-2 RNA RESP QL NAA+PROBE: NEGATIVE
SODIUM SERPL-SCNC: 142 MMOL/L (ref 133–144)
SPECIMEN SOURCE: NORMAL
TROPONIN I SERPL-MCNC: 0.03 UG/L (ref 0–0.04)
WBC # BLD AUTO: 5.2 10E9/L (ref 4–11)

## 2021-03-17 PROCEDURE — G0378 HOSPITAL OBSERVATION PER HR: HCPCS

## 2021-03-17 PROCEDURE — C9803 HOPD COVID-19 SPEC COLLECT: HCPCS

## 2021-03-17 PROCEDURE — 87635 SARS-COV-2 COVID-19 AMP PRB: CPT | Performed by: NURSE PRACTITIONER

## 2021-03-17 PROCEDURE — 84484 ASSAY OF TROPONIN QUANT: CPT | Performed by: EMERGENCY MEDICINE

## 2021-03-17 PROCEDURE — 93005 ELECTROCARDIOGRAM TRACING: CPT

## 2021-03-17 PROCEDURE — 71046 X-RAY EXAM CHEST 2 VIEWS: CPT

## 2021-03-17 PROCEDURE — 250N000013 HC RX MED GY IP 250 OP 250 PS 637: Performed by: STUDENT IN AN ORGANIZED HEALTH CARE EDUCATION/TRAINING PROGRAM

## 2021-03-17 PROCEDURE — 99220 PR INITIAL OBSERVATION CARE,LEVEL III: CPT | Performed by: INTERNAL MEDICINE

## 2021-03-17 PROCEDURE — 250N000013 HC RX MED GY IP 250 OP 250 PS 637: Performed by: INTERNAL MEDICINE

## 2021-03-17 PROCEDURE — 76705 ECHO EXAM OF ABDOMEN: CPT

## 2021-03-17 PROCEDURE — 84484 ASSAY OF TROPONIN QUANT: CPT | Performed by: INTERNAL MEDICINE

## 2021-03-17 PROCEDURE — 36415 COLL VENOUS BLD VENIPUNCTURE: CPT | Performed by: INTERNAL MEDICINE

## 2021-03-17 PROCEDURE — 80053 COMPREHEN METABOLIC PANEL: CPT | Performed by: EMERGENCY MEDICINE

## 2021-03-17 PROCEDURE — 85025 COMPLETE CBC W/AUTO DIFF WBC: CPT | Performed by: EMERGENCY MEDICINE

## 2021-03-17 PROCEDURE — 99285 EMERGENCY DEPT VISIT HI MDM: CPT | Mod: 25

## 2021-03-17 RX ORDER — PRAVASTATIN SODIUM 40 MG
40 TABLET ORAL AT BEDTIME
COMMUNITY
End: 2021-01-01

## 2021-03-17 RX ORDER — NALOXONE HYDROCHLORIDE 0.4 MG/ML
0.2 INJECTION, SOLUTION INTRAMUSCULAR; INTRAVENOUS; SUBCUTANEOUS
Status: DISCONTINUED | OUTPATIENT
Start: 2021-03-17 | End: 2021-03-19 | Stop reason: HOSPADM

## 2021-03-17 RX ORDER — PRAVASTATIN SODIUM 40 MG
40 TABLET ORAL DAILY
Status: DISCONTINUED | OUTPATIENT
Start: 2021-03-17 | End: 2021-03-18

## 2021-03-17 RX ORDER — NALOXONE HYDROCHLORIDE 0.4 MG/ML
0.4 INJECTION, SOLUTION INTRAMUSCULAR; INTRAVENOUS; SUBCUTANEOUS
Status: DISCONTINUED | OUTPATIENT
Start: 2021-03-17 | End: 2021-03-19 | Stop reason: HOSPADM

## 2021-03-17 RX ORDER — ONDANSETRON 4 MG/1
4 TABLET, ORALLY DISINTEGRATING ORAL EVERY 6 HOURS PRN
Status: DISCONTINUED | OUTPATIENT
Start: 2021-03-17 | End: 2021-03-19 | Stop reason: HOSPADM

## 2021-03-17 RX ORDER — CLOPIDOGREL BISULFATE 75 MG/1
75 TABLET ORAL DAILY
Status: DISCONTINUED | OUTPATIENT
Start: 2021-03-18 | End: 2021-03-19 | Stop reason: HOSPADM

## 2021-03-17 RX ORDER — FAMOTIDINE 10 MG
10 TABLET ORAL DAILY
Status: DISCONTINUED | OUTPATIENT
Start: 2021-03-18 | End: 2021-03-19 | Stop reason: HOSPADM

## 2021-03-17 RX ORDER — LEVOTHYROXINE SODIUM 50 UG/1
50 TABLET ORAL DAILY
Status: DISCONTINUED | OUTPATIENT
Start: 2021-03-18 | End: 2021-03-19 | Stop reason: HOSPADM

## 2021-03-17 RX ORDER — ONDANSETRON 2 MG/ML
4 INJECTION INTRAMUSCULAR; INTRAVENOUS EVERY 6 HOURS PRN
Status: DISCONTINUED | OUTPATIENT
Start: 2021-03-17 | End: 2021-03-19 | Stop reason: HOSPADM

## 2021-03-17 RX ORDER — MEXILETINE HYDROCHLORIDE 150 MG/1
150 CAPSULE ORAL 2 TIMES DAILY
Status: DISCONTINUED | OUTPATIENT
Start: 2021-03-17 | End: 2021-03-19 | Stop reason: HOSPADM

## 2021-03-17 RX ORDER — POLYETHYLENE GLYCOL 3350 17 G/17G
17 POWDER, FOR SOLUTION ORAL DAILY PRN
Status: DISCONTINUED | OUTPATIENT
Start: 2021-03-17 | End: 2021-03-19 | Stop reason: HOSPADM

## 2021-03-17 RX ORDER — GABAPENTIN 100 MG/1
100 CAPSULE ORAL 3 TIMES DAILY
Status: DISCONTINUED | OUTPATIENT
Start: 2021-03-17 | End: 2021-03-19 | Stop reason: HOSPADM

## 2021-03-17 RX ORDER — TRAMADOL HYDROCHLORIDE 50 MG/1
50 TABLET ORAL EVERY 6 HOURS PRN
Status: DISCONTINUED | OUTPATIENT
Start: 2021-03-17 | End: 2021-03-19 | Stop reason: HOSPADM

## 2021-03-17 RX ORDER — LISINOPRIL 2.5 MG/1
2.5 TABLET ORAL 2 TIMES DAILY
Status: DISCONTINUED | OUTPATIENT
Start: 2021-03-17 | End: 2021-03-19 | Stop reason: HOSPADM

## 2021-03-17 RX ORDER — ACETAMINOPHEN 325 MG/1
650 TABLET ORAL EVERY 4 HOURS PRN
Status: DISCONTINUED | OUTPATIENT
Start: 2021-03-17 | End: 2021-03-19 | Stop reason: HOSPADM

## 2021-03-17 RX ORDER — PANTOPRAZOLE SODIUM 40 MG/1
40 TABLET, DELAYED RELEASE ORAL
Status: DISCONTINUED | OUTPATIENT
Start: 2021-03-18 | End: 2021-03-19 | Stop reason: HOSPADM

## 2021-03-17 RX ORDER — NITROGLYCERIN 0.4 MG/1
0.4 TABLET SUBLINGUAL EVERY 5 MIN PRN
Status: DISCONTINUED | OUTPATIENT
Start: 2021-03-17 | End: 2021-03-19 | Stop reason: HOSPADM

## 2021-03-17 RX ORDER — ACETAMINOPHEN 650 MG/1
650 SUPPOSITORY RECTAL EVERY 4 HOURS PRN
Status: DISCONTINUED | OUTPATIENT
Start: 2021-03-17 | End: 2021-03-19 | Stop reason: HOSPADM

## 2021-03-17 RX ORDER — CHOLECALCIFEROL (VITAMIN D3) 50 MCG
50 TABLET ORAL DAILY
Status: DISCONTINUED | OUTPATIENT
Start: 2021-03-18 | End: 2021-03-19 | Stop reason: HOSPADM

## 2021-03-17 RX ORDER — ACETAMINOPHEN 500 MG
1000 TABLET ORAL 3 TIMES DAILY
Status: DISCONTINUED | OUTPATIENT
Start: 2021-03-17 | End: 2021-03-19 | Stop reason: HOSPADM

## 2021-03-17 RX ADMIN — PRAVASTATIN SODIUM 40 MG: 40 TABLET ORAL at 18:55

## 2021-03-17 RX ADMIN — ACETAMINOPHEN 1000 MG: 500 TABLET, FILM COATED ORAL at 18:55

## 2021-03-17 RX ADMIN — MEXILETINE HYDROCHLORIDE 150 MG: 150 CAPSULE ORAL at 22:58

## 2021-03-17 RX ADMIN — GABAPENTIN 100 MG: 100 CAPSULE ORAL at 22:59

## 2021-03-17 RX ADMIN — APIXABAN 2.5 MG: 2.5 TABLET, FILM COATED ORAL at 22:59

## 2021-03-17 RX ADMIN — ACETAMINOPHEN 1000 MG: 500 TABLET, FILM COATED ORAL at 22:58

## 2021-03-17 RX ADMIN — GABAPENTIN 100 MG: 100 CAPSULE ORAL at 18:56

## 2021-03-17 RX ADMIN — LISINOPRIL 2.5 MG: 2.5 TABLET ORAL at 22:59

## 2021-03-17 ASSESSMENT — ENCOUNTER SYMPTOMS
WEAKNESS: 1
ARTHRALGIAS: 1
MYALGIAS: 1

## 2021-03-17 ASSESSMENT — MIFFLIN-ST. JEOR: SCORE: 1479.5

## 2021-03-17 NOTE — PROGRESS NOTES
RECEIVING UNIT ED HANDOFF REVIEW    Just need 10-15 minutes to get room ready. Thank you!       ED Nurse Handoff Report was reviewed by: Jill Whitman RN on March 17, 2021 at 4:51 PM

## 2021-03-17 NOTE — PHARMACY-ADMISSION MEDICATION HISTORY
Pharmacy Medication History  Admission medication history interview status for the 3/17/2021  admission is complete. See EPIC admission navigator for prior to admission medications     Location of Interview: Patient room  Medication history sources: Patient and Med list , Sure Scripts     Significant changes made to the medication list:      In the past week, patient estimated taking medication this percent of the time: greater than 90%    Additional medication history information:       Medication reconciliation completed by provider prior to medication history? No    Time spent in this activity: 20min     Prior to Admission medications    Medication Sig Last Dose Taking? Auth Provider   acetaminophen (TYLENOL) 500 MG tablet Take 1,000 mg by mouth 3 times daily  3/17/2021 at Unknown time Yes Unknown, Entered By History   apixaban ANTICOAGULANT (ELIQUIS ANTICOAGULANT) 2.5 MG tablet Take 1 tablet (2.5 mg) by mouth 2 times daily 3/16/2021 at Unknown time Yes Lacy Taylor APRN CNP   clopidogrel (PLAVIX) 75 MG tablet Take 1 tablet (75 mg) by mouth daily 3/17/2021 at Unknown time Yes Salty Quintero MD   cyclobenzaprine (FLEXERIL) 5 MG tablet Take 1 tablet (5 mg) by mouth 3 times daily as needed for muscle spasms Past Week at Unknown time Yes Kerry Araujo PA-C   famotidine (PEPCID) 20 MG tablet Take 20 mg by mouth daily  3/17/2021 at Unknown time Yes Reported, Patient   gabapentin (NEURONTIN) 100 MG capsule Take 1 capsule (100 mg) by mouth 3 times daily 3/17/2021 at Unknown time Yes Josselin Kolb MD   isosorbide mononitrate (IMDUR) 30 MG 24 hr tablet Take 1 tablet (30 mg) the evening of dialysis days (Mon, Wed, Fri)  and Saturdays. Past Week at Unknown time Yes Lacy Taylor APRN CNP   isosorbide mononitrate (IMDUR) 30 MG 24 hr tablet Take 15 mg by mouth Take in the evening on the days prior dialysis. Take on Sunday, Tuesday and Thursday 3/16/2021 at Unknown time Yes Unknown, Entered By History    levothyroxine (SYNTHROID/LEVOTHROID) 50 MCG tablet TAKE 1 TABLET (50 MCG) BY MOUTH DAILY 3/17/2021 at Unknown time Yes Josselin Kolb MD   lisinopril (ZESTRIL) 2.5 MG tablet TAKE 1 TABLET (2.5 MG) BY MOUTH 2 TIMES DAILY (TAKE ONE TABLET AFTER DIALYSIS. TAKE SECOND TABLET AT BEDTIME.) 3/17/2021 at Unknown time Yes Josselin Kolb MD   loperamide (IMODIUM A-D) 2 MG tablet Take 2 mg by mouth three times a week MWF on dialysis days 3/17/2021 at Unknown time Yes Unknown, Entered By History   loperamide (IMODIUM A-D) 2 MG tablet Take 1 mg by mouth five times a week On non-dialysis days - Tu, Th, Sa, Russo 3/16/2021 at Unknown time Yes Unknown, Entered By History   metoprolol succinate ER (TOPROL-XL) 25 MG 24 hr tablet Take 0.5 tablets (12.5 mg) by mouth daily 3/17/2021 at Unknown time Yes Lacy Taylor, MARCIO CNP   mexiletine (MEXITIL) 150 MG capsule Take 1 capsule (150 mg) by mouth 2 times daily 3/17/2021 at Unknown time Yes Josselin Kolb MD   multivitamin RENAL (NEPHROCAPS/TRIPHROCAPS) 1 MG capsule Take 1 capsule by mouth daily 3/16/2021 at Unknown time Yes Josselin Kolb MD   pantoprazole (PROTONIX) 40 MG EC tablet TAKE 1 TABLET (40 MG) BY MOUTH EVERY MORNING 3/17/2021 at Unknown time Yes Josselin Kolb MD   pravastatin (PRAVACHOL) 40 MG tablet Take 40 mg by mouth At Bedtime 3/16/2021 at Unknown time Yes Unknown, Entered By History   vitamin D3 (CHOLECALCIFEROL) 50 mcg (2000 units) tablet Take 1 tablet by mouth daily 3/17/2021 at Unknown time Yes Reported, Patient   nitroGLYcerin (NITROSTAT) 0.4 MG sublingual tablet Place 1 tablet (0.4 mg) under the tongue every 5 minutes as needed for chest pain UP TO 3 PER EPISODE More than a month at Unknown time  Josselin Kolb MD       The information provided in this note is only as accurate as the sources available at the time of update(s)   Hilda MooreD

## 2021-03-17 NOTE — TELEPHONE ENCOUNTER
"Pt called reporting increased weakness in bilateral legs and increased fatigue. Pt reports starting new medication Flexeril a few days ago and feels it is making him weak and tired. Pt taking med as prescribed, last dose 3-16-21 4pm.     Pt states he had to miss HD today because he felt so weak. Pt also stated he had to end his HD run on Monday because his HR \"was very high\". Writer noted that pt has history of Afib/Aflutter on eliquis. Pt current HR in the 60's, O2 sat 97%, pt does not have BP machine. Pt unable to state if he is having palpitations only states his HR is \"high sometimes\".     Per protocol, instructed pt to get to the ER ASAP. Pt states he doesn't know of he has a ride, I instructed him to call 911 for an ambulance ride. Pt agreed to plan. Will call pt back to make sure he called 911.     Called pt back and his caretaker is on the way to take him to the ER. Ride should be there in 15 minutes.     Reason for Disposition    Heart beating < 50 beats per minute OR > 140 beats per minute     Pt states at times his HR \"gets very fast\" but unable to state rate. Pt with history of Afib/Aflutter.    Extra heartbeats OR irregular heart beating (i.e., 'palpitations')    Additional Information    Negative: Difficulty breathing    Negative: Follows bleeding (e.g., from vomiting, rectum, vagina)    Negative: Bloody, black, or tarry bowel movements    Negative: Severe difficulty breathing (e.g., struggling for each breath, speaks in single words)    Negative: Shock suspected (e.g., cold/pale/clammy skin, too weak to stand, low BP, rapid pulse)    Negative: Difficult to awaken or acting confused (e.g., disoriented, slurred speech)    Negative: Fainted > 15 minutes ago and still feels too weak or dizzy to stand    Negative: SEVERE weakness (i.e., unable to walk or barely able to walk, requires support) and new onset or worsening    Negative: Sounds like a life-threatening emergency to the triager    Negative: " "Weakness of the face, arm or leg on one side of the body    Negative: Has diabetes and weakness from low blood sugar (i.e., < 60 mg/dL or 3.5 mmol/L)    Negative: Recent heat exposure, suspected cause of weakness    Negative: Vomiting is the main symptom    Negative: Diarrhea is the main symptom    Answer Assessment - Initial Assessment Questions  1. DESCRIPTION: \"Describe how you are feeling.\"      Legs are weaker than normal. Have always had slightly weak legs. In PT for leg weakness. Also reports pain in legs, describes the pain as muscle cramps. Pt states he missed HD today because he felt so weak.   2. SEVERITY: \"How bad is it?\"  \"Can you stand and walk?\"    - MILD - Feels weak or tired, but does not interfere with work, school or normal activities    - MODERATE - Able to stand and walk; weakness interferes with work, school, or normal activities    - SEVERE - Unable to stand or walk      MODERATE-Able to walk short distances with walker; has always used walker but isn't able to walk as far. Feels that the muscle cramps keep him from walking as far as he should be able to.  3. ONSET:  \"When did the weakness begin?\"      Ongoing 6-7 months. Noticed it was getting worse in the last 3 days. Very bad today.   4. CAUSE: \"What do you think is causing the weakness?\"      Pt thinks starting new medication Flexeril is what is making him weak. Last dose taken was yesterday 3-17-21 at 4pm. Pt taking as prescribed.   5. MEDICINES: \"Have you recently started a new medicine or had a change in the amount of a medicine?\"      Flexeril, see above.   6. OTHER SYMPTOMS: \"Do you have any other symptoms?\" (e.g., chest pain, fever, cough, SOB, vomiting, diarrhea, bleeding, other areas of pain)      Pt always has SOB, nothing has worsened. Pt states that his HR has been intermittantly higher than normal. Pt states he had to end his HD run early on Monday because his HR  \"was very high.\"  7. PREGNANCY: \"Is there any chance you are " "pregnant?\" \"When was your last menstrual period?\"      NA    Protocols used: WEAKNESS (GENERALIZED) AND FATIGUE-A-VINNY Oquendo RN      "

## 2021-03-17 NOTE — H&P
[facility]    History and Physical  Hospitalist       Date of Admission:  3/17/2021    Assessment & Plan      This is a 75-year-old male with history of coronary artery disease, ischemic cardiomyopathy with EF of 20-25% on his last echo in 10/2020.  End-stage renal disease on hemodialysis Monday/Wednesday/Friday, diabetes mellitus type 2, GERD fibrillation, atrial flutter on chronic anticoagulation with apixaban, status post ICD placement, and hypertension, who comes to the ER with complaint of generalized weakness, tiredness, low blood pressure and increased heart rate.      ASSESSMENT AND PLAN:   1.  Atrial fibrillation with rapid ventricular response and hypotension:  The patient has intermittent atrial fibrillation with rapid ventricular response.  He did not take his metoprolol today as he usually take it after the dialysis.  His low blood pressure was transient and now resolved at this time, most likely because of dialysis done yesterday and has been on medications.  Now, the blood pressures are stable.  Heart rate is controlled between 90 and 100.  He is on metoprolol 12.5 mg daily, long-acting.  We will continue with that.  He is on mexiletine as well for arrhythmia and atrial fibrillation.  We will continue with that at this time.  We will consult Cardiology to evaluate the patient given his low blood pressure and intermittent atrial fibrillation with rapid ventricular response to add anything for their recommendations.  We will do serial troponins and echocardiogram repeat.  He does not have any chest pain currently.   2.  Coronary artery disease/ischemic cardiomyopathy:  The patient has coronary artery disease.  His last angiogram was in 10/2020, which shows subtotal occlusion of left anterior descending (LAD) with known infarct area.  There was patent proximal left circumflex at that time.  There was severe stenosis of the large OM1 distal to the stent, which was stented, obtuse  marginal #1 with drug-eluting stent at that time.  The patient is on Plavix and apixaban.  We will continue with that.  Continue the metoprolol and Pravachol at this time.  He is asymptomatic.  He has no active chest pain, dizziness or lightheadedness at this time.  Will check serial troponin, repeat the echo and have Cardiology evaluate him as per patient request.   3.  End-stage renal disease on hemodialysis Monday, Wednesday, and Friday.  He missed this Monday.  He had 1 run on Tuesday.  We will consult Nephrology to evaluate the patient to continue dialysis while he is in the hospital.     4.  Paroxysmal atrial fibrillation/atrial flutter:  Management as above.  Continue metoprolol and mexiletine at this time and continue apixaban for chronic anticoagulation.   5.  Hypothyroidism, on levothyroxine.  We will continue with that.     6.  Ischemic cardiomyopathy, on multiple medications.  We will continue with that as mentioned above including lisinopril, metoprolol, and mexiletine.  Given he continued to have low blood pressure, he is already on a very low dose of the medication at this time.  We will keep a close eye on it.  We will have PT, OT evaluate the patient.   7.  Generalized weakness:  Multifactorial with age, chronic comorbidities, atrial fibrillation with rapid ventricular response, CHF, end-stage renal disease and leg pain.  We will have PT, OT evaluate the patient.  Check orthostatic blood pressure as well.  We will see how he does.   8.  Left hip and left knee pain:  I think this is mostly because of osteoarthritis.  He is already taking Tylenol, Flexeril, causing his symptoms.  So we will discontinue Flexeril and start him on low-dose tramadol and see if that can help his pain.   9.  Deep venous thrombosis prophylaxis with apixaban.      CODE STATUS:  Full code as per the patient wishes.      The case discussed with ER physician and the nursing staff taking care of the patient.         KAELA ROME,  MD           DVT Prophylaxis: Apixaban  Code Status: Full Code    Disposition: Expected discharge in 1-2 days once stable.    Nghia Cavanaugh MD    Primary Care Physician   Josselin Kolb    Chief Complaint   Weakness, low blood pressure and increase HR     History is obtained from the patient    History of Present Illness   Admitted:     03/17/2021      HISTORY OF PRESENT ILLNESS:  This is a 75-year-old male with history of coronary artery disease, ischemic cardiomyopathy with EF of 20-25% on his last echo in 10/2020.  End-stage renal disease on hemodialysis Monday/Wednesday/Friday, diabetes mellitus type 2, GERD fibrillation, atrial flutter on chronic anticoagulation with apixaban, status post ICD placement, and hypertension, who comes to the ER with complaint of generalized weakness, tiredness, low blood pressure and increased heart rate.      According to the patient, on Monday, he was lying on his bed, and his heart rate increased to 113-118 range, lasted for half an hour, did not feel well at that time, so he did not go for his dialysis.  He went for his hemodialysis on Tuesday and had a full dialysis done with about 2.5 liters of ultrafiltrate.  He came home and was doing okay.  He went down the stairs to the [facility], and his nurse aide was with him to go for dialysis for his regular dialysis today, as he goes usually Monday, Wednesday, and Friday.  All of a sudden, he felt generally weak.  He did check his blood pressure, and blood pressure was 80/50, and heart rate was elevated to 110s again.  So he does not feel well to go for dialysis and comes to the ER.  The patient denies any chest pain, shortness of breath, orthopnea, PND, or palpitation.  No headache, dizziness, lightheadedness.  No fever, chills or cough.  No abdominal pain, back pain, dysuria, hematuria, constipation, or diarrhea.  The patient has been struggling with leg pain, knee pain and hip pain for a while and has been using Tylenol without  much benefit.  He told me that he used Flexeril muscle relaxant last night.  He started using it for the last 1 week and is not feeling well after taking those medications.      He came to the ER.  His blood pressure has been running okay.  His heart rate is in good control but sometimes goes up to the 110s and comes down to 80s and 90s mostly.  Blood pressure initially was 97/54, now 115/68.  He is feeling well at this time.  Rest of the review of system is negative at this time.     Past Medical History    I have reviewed this patient's medical history and updated it with pertinent information if needed.   Past Medical History:   Diagnosis Date     Acid reflux disease      Benign essential hypertension      CAD (coronary artery disease)     s/p multiple NSTEMIs and PCI's     ESRD on hemodialysis (H)     MWF     History of atrial flutter     s/p ablation     Hypothyroidism      Ischemic cardiomyopathy     EF 25-30%, s/p AICD     Type 2 diabetes mellitus (H)     diet-controlled       Past Surgical History   I have reviewed this patient's surgical history and updated it with pertinent information if needed.  Past Surgical History:   Procedure Laterality Date     CHOLECYSTECTOMY, OPEN  2013     CV CORONARY ANGIOGRAM N/A 10/19/2020    Procedure: Coronary Angiogram;  Surgeon: Theodora Salazar MD;  Location:  HEART CARDIAC CATH LAB     CV LEFT HEART CATH N/A 10/19/2020    Procedure: Left Heart Cath;  Surgeon: Theodora Salazar MD;  Location: [facility] CARDIAC CATH LAB     CV PCI STENT DRUG ELUTING N/A 10/19/2020    Procedure: Percutaneous Coronary Intervention Stent Drug Eluting;  Surgeon: Theodora Salazar MD;  Location:  HEART CARDIAC CATH LAB     ELBOW SURGERY Left 2008    ORIF - plates still in place     EP ICD GENERATOR CHANGE SINGLE N/A 11/21/2019    Procedure: EP ICD Generator Change Single;  Surgeon: Jono Mejia MD;  Location:  HEART CARDIAC CATH LAB     H ABLATION ATRIAL FLUTTER  06/08/2017,  12/11/17     HC LEFT HEART CATHETERIZATION  7/14/2016     HC LEFT HEART CATHETERIZATION  9/21/2016     IMPLANT VENTRICULAR DEVICE  08/15/2011     IR DIALYSIS FISTULOGRAM LEFT  7/22/2019     REPAIR FISTULA ARTERIOVENOUS UPPER EXTREMITY Left 3/5/2019    Procedure: REPAIR LEFT UPPER ARM ARTERIOVENOUS FISTULA SKIN ULCER;  Surgeon: Westley Griggs MD;  Location: [facility] OR     TONSILLECTOMY & ADENOIDECTOMY       VASCULAR SURGERY  2004, 2010    LUE fistulas (upper and lower); upper one is functional       Prior to Admission Medications   Prior to Admission Medications   Prescriptions Last Dose Informant Patient Reported? Taking?   acetaminophen (TYLENOL) 500 MG tablet   Yes No   Sig: Take 1,000 mg by mouth 3 times daily    apixaban ANTICOAGULANT (ELIQUIS ANTICOAGULANT) 2.5 MG tablet   No No   Sig: Take 1 tablet (2.5 mg) by mouth 2 times daily   clopidogrel (PLAVIX) 75 MG tablet   No No   Sig: Take 1 tablet (75 mg) by mouth daily   cyclobenzaprine (FLEXERIL) 5 MG tablet   No No   Sig: Take 1 tablet (5 mg) by mouth 3 times daily as needed for muscle spasms   famotidine (PEPCID) 10 MG tablet   Yes No   Sig: Take 10 mg by mouth daily   gabapentin (NEURONTIN) 100 MG capsule   No No   Sig: Take 1 capsule (100 mg) by mouth 3 times daily   isosorbide mononitrate (IMDUR) 30 MG 24 hr tablet   Yes No   Sig: Take 15 mg by mouth Take in the evening on the days prior dialysis. Take on Sunday, Tuesday and Thursday   isosorbide mononitrate (IMDUR) 30 MG 24 hr tablet   No No   Sig: Take 1 tablet (30 mg) the evening of dialysis days (Mon, Wed, Fri)  and Saturdays.   levothyroxine (SYNTHROID/LEVOTHROID) 50 MCG tablet   No No   Sig: TAKE 1 TABLET (50 MCG) BY MOUTH DAILY   lisinopril (ZESTRIL) 2.5 MG tablet   No No   Sig: TAKE 1 TABLET (2.5 MG) BY MOUTH 2 TIMES DAILY (TAKE ONE TABLET AFTER DIALYSIS. TAKE SECOND TABLET AT BEDTIME.)   loperamide (IMODIUM A-D) 2 MG tablet   Yes No   Sig: Take 2 mg by mouth three times a week MWF on dialysis  days   loperamide (IMODIUM A-D) 2 MG tablet   Yes No   Sig: Take 1 mg by mouth five times a week On non-dialysis days - Tu, Th, Sa, Russo   metoprolol succinate ER (TOPROL-XL) 25 MG 24 hr tablet   No No   Sig: Take 0.5 tablets (12.5 mg) by mouth daily   mexiletine (MEXITIL) 150 MG capsule   No No   Sig: Take 1 capsule (150 mg) by mouth 2 times daily   multivitamin RENAL (NEPHROCAPS/TRIPHROCAPS) 1 MG capsule   No No   Sig: Take 1 capsule by mouth daily   nitroGLYcerin (NITROSTAT) 0.4 MG sublingual tablet   No No   Sig: Place 1 tablet (0.4 mg) under the tongue every 5 minutes as needed for chest pain UP TO 3 PER EPISODE   pantoprazole (PROTONIX) 40 MG EC tablet   No No   Sig: TAKE 1 TABLET (40 MG) BY MOUTH EVERY MORNING   pravastatin (PRAVACHOL) 40 MG tablet   No No   Sig: Take 1 tablet (40 mg) by mouth daily   vitamin D3 (CHOLECALCIFEROL) 50 mcg (2000 units) tablet   Yes No   Sig: Take 1 tablet by mouth daily      Facility-Administered Medications: None     Allergies   Allergies   Allergen Reactions     Contrast Dye Hives     Does fine if he uses benadryl prior.     No Clinical Screening - See Comments      Green beans - Diarrhea.    Topical antibiotic - name unknown - caused swelling of the penis.       Social History   I have reviewed this patient's social history and updated it with pertinent information if needed. Westley Ness  reports that he quit smoking about 24 years ago. His smoking use included cigarettes. He started smoking about 56 years ago. He has a 70.00 pack-year smoking history. He has never used smokeless tobacco. He reports previous alcohol use. He reports that he does not use drugs.    Family History   I have reviewed this patient's family history and updated it with pertinent information if needed.   Family History   Problem Relation Age of Onset     Cerebrovascular Disease Mother         later in life     Hypertension Father      Bladder Cancer Father      Myocardial Infarction Paternal  Grandmother      Diabetes No family hx of      Prostate Cancer No family hx of      Colon Cancer No family hx of        Review of Systems   CONSTITUTIONAL:  positive for  fatigue and malaise  EYES:  negative  HEENT:  negative  RESPIRATORY:  negative  CARDIOVASCULAR:  negative  GASTROINTESTINAL:  negative  GENITOURINARY:  negative  INTEGUMENT/BREAST:  negative  HEMATOLOGIC/LYMPHATIC:  negative  ALLERGIC/IMMUNOLOGIC:  negative  ENDOCRINE:  negative  MUSCULOSKELETAL:  positive for  arthralgias  NEUROLOGICAL:  negative  BEHAVIOR/PSYCH:  negative    Physical Exam   Temp: 97.8  F (36.6  C) Temp src: Temporal BP: 115/68 Pulse: 80   Resp: 10 SpO2: 99 % O2 Device: None (Room air)    Vital Signs with Ranges  Temp:  [97.8  F (36.6  C)] 97.8  F (36.6  C)  Pulse:  [] 80  Resp:  [10-18] 10  BP: ()/(54-72) 115/68  SpO2:  [98 %-99 %] 99 %  169 lbs 12.07 oz    Constitutional: Awake, alert, cooperative, no apparent distress.  Eyes: Conjunctiva and pupils examined and normal.  HEENT: Moist mucous membranes, normal dentition.  Respiratory: Clear to auscultation bilaterally, no crackles or wheezing.  Cardiovascular: Irregularly irregular rhythm, normal S1 and S2, and no murmur noted.  GI: Soft, non-distended, non-tender, normal bowel sounds.  Lymph/Hematologic: No anterior cervical or supraclavicular adenopathy.  Skin: No rashes, no cyanosis, no edema.  Musculoskeletal: No joint swelling, erythema or tenderness.   Neurologic: Cranial nerves 2-12 intact, normal strength and sensation.  Psychiatric: Alert, oriented to person, place and time, no obvious anxiety or depression.    Data   Data reviewed today:  I personally reviewed the EKG tracing showing Afib HR 98, RAD, no acute ischemic changes.   Recent Labs   Lab 03/17/21  1257   WBC 5.2   HGB 10.8*   *   *      POTASSIUM 3.5   CHLORIDE 104   CO2 29   BUN 22   CR 3.99*   ANIONGAP 9   CHRISTINE 9.0   *   ALBUMIN 3.4   PROTTOTAL 6.4*   BILITOTAL 1.4*    ALKPHOS 399*   ALT 23   AST 20   TROPI 0.027       Recent Results (from the past 24 hour(s))   Chest XR,  PA & LAT    Narrative    CHEST TWO VIEWS March 17, 2021 2:52 PM     HISTORY: Atrial fibrillation, evaluate for heart failure.     COMPARISON: Chest x-ray on 1/11/2021.      Impression    IMPRESSION: AP and lateral views of the chest were obtained. Right  chest wall cardiac pacer and leads are in stable position. Stable  enlargement of the cardiac silhouette and mild pulmonary vascular  congestion. Small bilateral pleural effusions and associated basilar  atelectasis/consolidation. No significant pneumothorax.    CROW HARTMANN MD

## 2021-03-17 NOTE — H&P
Admitted:     03/17/2021      HISTORY OF PRESENT ILLNESS:  This is a 75-year-old male with history of coronary artery disease, ischemic cardiomyopathy with EF of 20-25% on his last echo in 10/2020.  End-stage renal disease on hemodialysis Monday/Wednesday/Friday, diabetes mellitus type 2, GERD fibrillation, atrial flutter on chronic anticoagulation with apixaban, status post ICD placement, and hypertension, who comes to the ER with complaint of generalized weakness, tiredness, low blood pressure and increased heart rate.      According to the patient, on Monday, he was lying on his bed, and his heart rate increased to 113-118 range, lasted for half an hour, did not feel well at that time, so he did not go for his dialysis.  He went for his hemodialysis on Tuesday and had a full dialysis done with about 2.5 liters of ultrafiltrate.  He came home and was doing okay.  He went down the stairs to the [facility], and his nurse aide was with him to go for dialysis for his regular dialysis today, as he goes usually Monday, Wednesday, and Friday.  All of a sudden, he felt generally weak.  He did check his blood pressure, and blood pressure was 80/50, and heart rate was elevated to 110s again.  So he does not feel well to go for dialysis and comes to the ER.  The patient denies any chest pain, shortness of breath, orthopnea, PND, or palpitation.  No headache, dizziness, lightheadedness.  No fever, chills or cough.  No abdominal pain, back pain, dysuria, hematuria, constipation, or diarrhea.  The patient has been struggling with leg pain, knee pain and hip pain for a while and has been using Tylenol without much benefit.  He told me that he used Flexeril muscle relaxant last night.  He started using it for the last 1 week and is not feeling well after taking those medications.      He came to the ER.  His blood pressure has been running okay.  His heart rate is in good control but sometimes goes up to the 110s and comes down to  80s and 90s mostly.  Blood pressure initially was 97/54, now 115/68.  He is feeling well at this time.  Rest of the review of system is negative at this time.      ASSESSMENT AND PLAN:   1.  Atrial fibrillation with rapid ventricular response and hypotension:  The patient has intermittent atrial fibrillation with rapid ventricular response.  He did not take his metoprolol today as he usually take it after the dialysis.  His low blood pressure was transient and now resolved at this time, most likely because of dialysis done yesterday and has been on medications.  Now, the blood pressures are stable.  Heart rate is controlled between 90 and 100.  He is on metoprolol 12.5 mg daily, long-acting.  We will continue with that.  He is on mexiletine as well for arrhythmia and atrial fibrillation.  We will continue with that at this time.  We will consult Cardiology to evaluate the patient given his low blood pressure and intermittent atrial fibrillation with rapid ventricular response to add anything for their recommendations.  We will do serial troponins and echocardiogram repeat.  He does not have any chest pain currently.   2.  Coronary artery disease/ischemic cardiomyopathy:  The patient has coronary artery disease.  His last angiogram was in 10/2020, which shows subtotal occlusion of left anterior descending (LAD) with known infarct area.  There was patent proximal left circumflex at that time.  There was severe stenosis of the large OM1 distal to the stent, which was stented, obtuse marginal #1 with drug-eluting stent at that time.  The patient is on Plavix and apixaban.  We will continue with that.  Continue the metoprolol and Pravachol at this time.  He is asymptomatic.  He has no active chest pain, dizziness or lightheadedness at this time.  Will check serial troponin, repeat the echo and have Cardiology evaluate him as per patient request.   3.  End-stage renal disease on hemodialysis Monday, Wednesday, and Friday.   He missed this Monday.  He had 1 run on Tuesday.  We will consult Nephrology to evaluate the patient to continue dialysis while he is in the hospital.     4.  Paroxysmal atrial fibrillation/atrial flutter:  Management as above.  Continue metoprolol and mexiletine at this time and continue apixaban for chronic anticoagulation.   5.  Hypothyroidism, on levothyroxine.  We will continue with that.     6.  Ischemic cardiomyopathy, on multiple medications.  We will continue with that as mentioned above including lisinopril, metoprolol, and mexiletine.  Given he continued to have low blood pressure, he is already on a very low dose of the medication at this time.  We will keep a close eye on it.  We will have PT, OT evaluate the patient.   7.  Generalized weakness:  Multifactorial with age, chronic comorbidities, atrial fibrillation with rapid ventricular response, CHF, end-stage renal disease and leg pain.  We will have PT, OT evaluate the patient.  Check orthostatic blood pressure as well.  We will see how he does.   8.  Left hip and left knee pain:  I think this is mostly because of osteoarthritis.  He is already taking Tylenol, Flexeril, causing his symptoms.  So we will discontinue Flexeril and start him on low-dose tramadol and see if that can help his pain.   9.  Deep venous thrombosis prophylaxis with apixaban.      CODE STATUS:  Full code as per the patient wishes.      The case discussed with ER physician and the nursing staff taking care of the patient.         KAELA ROME MD             D: 2021   T: 2021   MT:       Name:     SOCORRO LÓPEZ   MRN:      [id number]        Account:      [id number]   :      1945        Admitted:     2021                   Document: [id number]       cc: Josselin Kolb MD

## 2021-03-17 NOTE — ED PROVIDER NOTES
History   Chief Complaint:  Hip Pain and Hypotension       HPI   Westley Ness is a 75 year old male with complex medical history including diabetes, CAD, ESRD and paroxysmal atrial fibrillation on Eliquis and Plavix, and MWF dialysis who presents with right hip pain and hypotension. The patient says that he normally has MWF dialysis. He says that he was feeling unwell on Monday so he skipped his dialysis and went on Tuesday. The patient says that he was on his way to dialysis today, but he started to feel unwell again. He chest his blood pressure and found it to be 80/50 and his heart rate to be in the 110s. The patient says that he has been having leg pain and cramping, but notes that this has been chronic. The patient also notes that he has been having right hip pain for a couple weeks and also endorses some right hip pain. The patient says that he has also been having some weakness this morning, but it resolved with food and walking around.      Review of Systems   Musculoskeletal: Positive for arthralgias and myalgias.   Neurological: Positive for weakness.   All other systems reviewed and are negative.        Allergies:  Contrast Dye    Medications:  Pravachol  Protonix  Nitrostat  Nephrocaps  Mexitil  Toprol  Imodium  Lisinopril   Synthroid  Imdur  Gabapentin  Pepcid  Flexeril  Plavix  Eliquis     Past Medical History:    Acid reflux disease  Hypertension   CAD  ESRD on dialysis  Atrial flutter  Hypothyroidism  Ischemic cardiomyopathy  Diabetes    Paroxysmal atrial fibrillation  ICD in place  Anemia   Osteopenia of spine      Past Surgical History:    Vascular surgery  Tonsillectomy and adenoidectomy  Repair fistula arteriovenous upper extremity  IR dialysis fistulogram  Implant ventricular device  HC left heart cath x2  Ablation atrial flutter  EP ICD generator change single   ORIF elbow   PCI sent drug eluting  CV left heart cath   CV coronary angiogram  Cholecystectomy      Family History:   "  Cerebrovascular disease  Hypertension   Bladder cancer  Myocardial infarction     Social History:  Patient presents to the ED alone.     Physical Exam     Patient Vitals for the past 24 hrs:   BP Temp Temp src Pulse Resp SpO2 Height Weight   03/17/21 1700 108/71 -- -- 89 11 97 % -- --   03/17/21 1600 115/68 -- -- 80 10 99 % -- --   03/17/21 1500 107/65 -- -- 86 13 98 % -- --   03/17/21 1430 99/57 -- -- 95 13 99 % -- --   03/17/21 1415 103/72 -- -- 91 11 98 % -- --   03/17/21 1245 97/54 97.8  F (36.6  C) Temporal 103 18 98 % 1.727 m (5' 8\") 77 kg (169 lb 12.1 oz)       Physical Exam  Physical Exam   Constitutional:  Sitting up in bed, non-toxic appearing.   Head: Head moves freely with normal range of motion.   ENT: Oropharynx is clear and moist.   Eyes: Conjunctivae pink. EOMs intact.   Neck: Normal range of motion.   Cardiovascular: Regular rate and rhythm. Normal heart sounds. No concerning murmur. Intact distal pulses: radial pulses 2+ on the right, 2+ on the left.   Pulmonary/Chest: No respiratory distress. No decreased breath sounds. No wheezes. No rhonchi. No rales. Lungs clear throughout.   Abdominal: Soft. Non-tender. No rebound, no guarding.   Musculoskeletal: No peripheral edema. Distal capillary refill and sensation intact.   Neurological: Oriented to person, place, and time. No focal deficits.   Skin: Skin is warm and pale in color. No rash noted.          Emergency Department Course     ECG  ECG taken at 1259, ECG read at 1300  Atrial fibrillation  Right axis deviation  Pulmonary disease patten  Possible right ventricular hypertrophy  Abnormal QRS-T angle, consider primary T wave abnormality    Abnormal  No significant change as compared to prior, dated 1/22/21.  Rate 98 bpm. WV interval * ms. QRS duration 94 ms. QT/QTc 368/469 ms. P-R-T axes * 167 -37.          Imaging:  Chest XR,  PA & LAT   AP and lateral views of the chest were obtained. Right  chest wall cardiac pacer and leads are in stable " position. Stable  enlargement of the cardiac silhouette and mild pulmonary vascular  congestion. Small bilateral pleural effusions and associated basilar  atelectasis/consolidation. No significant pneumothorax.  CROW HARTMANN MD  Reading per radiology     Laboratory:    Asymptomatic COVID19 Virus PCR by nasopharyngeal swab pending     CBC: WBC 5.2, HGB 10.8 (L),  (H)  CMP:  (H), creatinine 3.99 (H), GFR 14 (L), bilirubin total 1.4 (H), protein total 6.4 (L), ALKPHOS 399 (H) o/w WNL   Troponin (Collected 1257): 0.027     Procedures    Emergency Department Course:    Reviewed:  1410 I reviewed the patient's nursing notes, vitals, past medical records, Care Everywhere.        Assessments:  1415 I performed an exam of the patient as documented above.   1548 Patient rechecked and updated.      Consults:   1558 I spoke with Dr. Cavanaugh of the hospitalist service regarding patient's presentation, findings, and plan of care.     Disposition:  The patient was admitted to the hospital under the care of Dr. Cavanaugh.       Impression & Plan     Medical Decision Making:  Westley Ness is a 75 year old male in on dialysis typically M,W,F but missed Monday due to feeling unwell and dialyzed yesterday. He had planned to go to dialysis today but was feeling unwell and noted to be hypotensive at home. He is on Eliquis due to paroxysmal a-fib. Recent cardiac stent placed in Mid October on plavix. He notes feeling dizzy, weak today. CXR here shows findings consistent with his known ischemic cardiomyopathy. EKG notable for a-fib, he is rate controlled. BP has been soft but systolic above 90 here in the ER. Hgb stable with his known anemia. No infectious findings on todays work-up. He does have a detectable troponin at 0.027. In October he had similar symptoms as he has today (weka and dizzy) and an initial troponin that was initially not detectable (<0.015) then barely detectable (0.028) and had a stent placed to OM1  during that admission. For these reasons I feel admission is warranted as he is high risk for cardiovascular event especially given his atypical presentation in the past. Patient is amenable to plan.       Diagnosis:    ICD-10-CM    1. Weakness  R53.1 Asymptomatic SARS-CoV-2 COVID-19 Virus (Coronavirus) by PCR   2. Dizziness  R42          Scribe Disclosure:  I, Miguel Angel Wharton, am serving as a scribe at 2:14 PM on 3/17/2021 to document services personally performed by Anette Lee based on my observations and the provider's statements to me.          Anette Lee, MARCIO CNP  03/17/21 1931

## 2021-03-17 NOTE — PROVIDER NOTIFICATION
Admission    Patient arrives to room 622-02 via cart from ED.  Care plan note: A & O x 4. VSS on RA except BP soft. Reports 2-3/10 pain in legs bilaterally. Skin pale, intact.     Inpatient nursing criteria listed below were met:    PCD's Documented: Yes  Skin issues/needs documented :Yes  Isolation education started/completed NA  Patient allergies verified with patient: Yes  Verified completion of [place] Risk Assessment Tool:  No  Verified completion of Guardianship screening tool: No  Fall Prevention: Care plan updated, Education given and documented Yes  Care Plan initiated: Yes  Home medications documented in belongings flowsheet: Yes  Patient belongings documented in belongings flowsheet: Yes  Reminder note (belongings/ medications) placed in discharge instructions:Yes  Admission profile/ required documentation complete: Yes  Bedside Report Letter given and explained to patient Yes  Visitor Designated? Yes  If patient is a 72 hour hold/Commitment are belongings removed from room and locked up? ROB Whitman RN on 3/17/2021 at 6:05 PM

## 2021-03-17 NOTE — ED TRIAGE NOTES
Typically m-w-f diaysis. Hasn't felt good since Monday, skipped and Had dialysis yesterday. Was on his way to dialysis today when he felt not well, BP checked by home aid, 80/50 and HR in 110s. Also c/o right hip pain for a couple weeks. Hx of a fib concerned he maybe be in a fib again

## 2021-03-17 NOTE — ED NOTES
[facility]  ED Nurse Handoff Report    ED Chief complaint: Hip Pain and Hypotension      ED Diagnosis:   Final diagnoses:   Weakness   Dizziness       Code Status: not discussed by ED RN     Allergies:   Allergies   Allergen Reactions     Contrast Dye Hives     Does fine if he uses benadryl prior.     No Clinical Screening - See Comments      Green beans - Diarrhea.    Topical antibiotic - name unknown - caused swelling of the penis.       Patient Story: 75M not feeling well, low BP and high HR at home today.  Focused Assessment:  Pt not feeling well since Monday. Skipped his dialysis on Monday. Today had episode of not feeling well, checked his BP which was low and HR was 110. Pt in afib here, but HR controlled and BP good.     Treatments and/or interventions provided: none  Patient's response to treatments and/or interventions:     To be done/followed up on inpatient unit:      Does this patient have any cognitive concerns?: N/A    Activity level - Baseline/Home:  Walker and Unknown  Activity Level - Current:   Unknown    Patient's Preferred language: English   Needed?: No    Isolation: None  Infection: Not Applicable  Patient tested for COVID 19 prior to admission: YES  Bariatric?: No    Vital Signs:   Vitals:    03/17/21 1415 03/17/21 1430 03/17/21 1500 03/17/21 1600   BP: 103/72 99/57 107/65 115/68   Pulse: 91 95 86 80   Resp: 11 13 13 10   Temp:       TempSrc:       SpO2: 98% 99% 98% 99%   Weight:       Height:           Cardiac Rhythm:     Was the PSS-3 completed:   Yes  What interventions are required if any?               Family Comments:   OBS brochure/video discussed/provided to patient/family: Yes              Name of person given brochure if not patient:               Relationship to patient:     For the majority of the shift this patient's behavior was Green.   Behavioral interventions performed were .    ED NURSE PHONE NUMBER: [phone]

## 2021-03-18 ENCOUNTER — APPOINTMENT (OUTPATIENT)
Dept: PHYSICAL THERAPY | Facility: CLINIC | Age: 76
DRG: 308 | End: 2021-03-18
Attending: INTERNAL MEDICINE
Payer: MEDICARE

## 2021-03-18 ENCOUNTER — APPOINTMENT (OUTPATIENT)
Dept: CARDIOLOGY | Facility: CLINIC | Age: 76
DRG: 308 | End: 2021-03-18
Attending: INTERNAL MEDICINE
Payer: MEDICARE

## 2021-03-18 PROBLEM — I48.91 ATRIAL FIBRILLATION (H): Status: ACTIVE | Noted: 2021-03-18

## 2021-03-18 LAB
ANION GAP SERPL CALCULATED.3IONS-SCNC: 8 MMOL/L (ref 3–14)
BASOPHILS # BLD AUTO: 0.1 10E9/L (ref 0–0.2)
BASOPHILS NFR BLD AUTO: 1.3 %
BUN SERPL-MCNC: 31 MG/DL (ref 7–30)
CALCIUM SERPL-MCNC: 9 MG/DL (ref 8.5–10.1)
CHLORIDE SERPL-SCNC: 106 MMOL/L (ref 94–109)
CO2 SERPL-SCNC: 27 MMOL/L (ref 20–32)
CREAT SERPL-MCNC: 4.9 MG/DL (ref 0.66–1.25)
DIFFERENTIAL METHOD BLD: ABNORMAL
EOSINOPHIL # BLD AUTO: 0.3 10E9/L (ref 0–0.7)
EOSINOPHIL NFR BLD AUTO: 5.6 %
ERYTHROCYTE [DISTWIDTH] IN BLOOD BY AUTOMATED COUNT: 16.1 % (ref 10–15)
GFR SERPL CREATININE-BSD FRML MDRD: 11 ML/MIN/{1.73_M2}
GLUCOSE SERPL-MCNC: 126 MG/DL (ref 70–99)
HBV SURFACE AB SERPL IA-ACNC: 3.08 M[IU]/ML
HBV SURFACE AG SERPL QL IA: NONREACTIVE
HCT VFR BLD AUTO: 33.9 % (ref 40–53)
HGB BLD-MCNC: 10.5 G/DL (ref 13.3–17.7)
IMM GRANULOCYTES # BLD: 0 10E9/L (ref 0–0.4)
IMM GRANULOCYTES NFR BLD: 0.2 %
LYMPHOCYTES # BLD AUTO: 0.7 10E9/L (ref 0.8–5.3)
LYMPHOCYTES NFR BLD AUTO: 15.8 %
MCH RBC QN AUTO: 32.6 PG (ref 26.5–33)
MCHC RBC AUTO-ENTMCNC: 31 G/DL (ref 31.5–36.5)
MCV RBC AUTO: 105 FL (ref 78–100)
MONOCYTES # BLD AUTO: 0.5 10E9/L (ref 0–1.3)
MONOCYTES NFR BLD AUTO: 9.8 %
NEUTROPHILS # BLD AUTO: 3.2 10E9/L (ref 1.6–8.3)
NEUTROPHILS NFR BLD AUTO: 67.3 %
NRBC # BLD AUTO: 0 10*3/UL
NRBC BLD AUTO-RTO: 0 /100
PLATELET # BLD AUTO: 123 10E9/L (ref 150–450)
POTASSIUM SERPL-SCNC: 3.5 MMOL/L (ref 3.4–5.3)
RBC # BLD AUTO: 3.22 10E12/L (ref 4.4–5.9)
SODIUM SERPL-SCNC: 141 MMOL/L (ref 133–144)
WBC # BLD AUTO: 4.7 10E9/L (ref 4–11)

## 2021-03-18 PROCEDURE — 99233 SBSQ HOSP IP/OBS HIGH 50: CPT | Performed by: HOSPITALIST

## 2021-03-18 PROCEDURE — 99222 1ST HOSP IP/OBS MODERATE 55: CPT | Performed by: INTERNAL MEDICINE

## 2021-03-18 PROCEDURE — 93306 TTE W/DOPPLER COMPLETE: CPT | Mod: 26 | Performed by: INTERNAL MEDICINE

## 2021-03-18 PROCEDURE — 99222 1ST HOSP IP/OBS MODERATE 55: CPT | Mod: 25 | Performed by: INTERNAL MEDICINE

## 2021-03-18 PROCEDURE — 250N000013 HC RX MED GY IP 250 OP 250 PS 637: Performed by: INTERNAL MEDICINE

## 2021-03-18 PROCEDURE — 36415 COLL VENOUS BLD VENIPUNCTURE: CPT | Performed by: INTERNAL MEDICINE

## 2021-03-18 PROCEDURE — 255N000002 HC RX 255 OP 636: Performed by: INTERNAL MEDICINE

## 2021-03-18 PROCEDURE — 250N000013 HC RX MED GY IP 250 OP 250 PS 637: Performed by: STUDENT IN AN ORGANIZED HEALTH CARE EDUCATION/TRAINING PROGRAM

## 2021-03-18 PROCEDURE — 999N000208 ECHOCARDIOGRAM COMPLETE

## 2021-03-18 PROCEDURE — 87340 HEPATITIS B SURFACE AG IA: CPT | Performed by: INTERNAL MEDICINE

## 2021-03-18 PROCEDURE — G0378 HOSPITAL OBSERVATION PER HR: HCPCS

## 2021-03-18 PROCEDURE — 97161 PT EVAL LOW COMPLEX 20 MIN: CPT | Mod: GP

## 2021-03-18 PROCEDURE — 5A1D70Z PERFORMANCE OF URINARY FILTRATION, INTERMITTENT, LESS THAN 6 HOURS PER DAY: ICD-10-PCS | Performed by: INTERNAL MEDICINE

## 2021-03-18 PROCEDURE — 85025 COMPLETE CBC W/AUTO DIFF WBC: CPT | Performed by: INTERNAL MEDICINE

## 2021-03-18 PROCEDURE — 93010 ELECTROCARDIOGRAM REPORT: CPT | Performed by: INTERNAL MEDICINE

## 2021-03-18 PROCEDURE — 210N000002 HC R&B HEART CARE

## 2021-03-18 PROCEDURE — 86706 HEP B SURFACE ANTIBODY: CPT | Performed by: INTERNAL MEDICINE

## 2021-03-18 PROCEDURE — 97530 THERAPEUTIC ACTIVITIES: CPT | Mod: GP

## 2021-03-18 PROCEDURE — 90937 HEMODIALYSIS REPEATED EVAL: CPT

## 2021-03-18 PROCEDURE — 80048 BASIC METABOLIC PNL TOTAL CA: CPT | Performed by: INTERNAL MEDICINE

## 2021-03-18 PROCEDURE — 93005 ELECTROCARDIOGRAM TRACING: CPT

## 2021-03-18 RX ORDER — PRAVASTATIN SODIUM 40 MG
40 TABLET ORAL AT BEDTIME
Status: DISCONTINUED | OUTPATIENT
Start: 2021-03-19 | End: 2021-03-19 | Stop reason: HOSPADM

## 2021-03-18 RX ADMIN — ACETAMINOPHEN 1000 MG: 500 TABLET, FILM COATED ORAL at 21:00

## 2021-03-18 RX ADMIN — LISINOPRIL 2.5 MG: 2.5 TABLET ORAL at 08:06

## 2021-03-18 RX ADMIN — CLOPIDOGREL BISULFATE 75 MG: 75 TABLET ORAL at 08:06

## 2021-03-18 RX ADMIN — LEVOTHYROXINE SODIUM 50 MCG: 50 TABLET ORAL at 08:06

## 2021-03-18 RX ADMIN — PANTOPRAZOLE SODIUM 40 MG: 40 TABLET, DELAYED RELEASE ORAL at 08:07

## 2021-03-18 RX ADMIN — GABAPENTIN 100 MG: 100 CAPSULE ORAL at 21:00

## 2021-03-18 RX ADMIN — METOPROLOL SUCCINATE 12.5 MG: 25 TABLET, EXTENDED RELEASE ORAL at 08:06

## 2021-03-18 RX ADMIN — APIXABAN 2.5 MG: 2.5 TABLET, FILM COATED ORAL at 21:00

## 2021-03-18 RX ADMIN — Medication 50 MCG: at 08:06

## 2021-03-18 RX ADMIN — APIXABAN 2.5 MG: 2.5 TABLET, FILM COATED ORAL at 08:06

## 2021-03-18 RX ADMIN — ISOSORBIDE MONONITRATE 15 MG: 30 TABLET, EXTENDED RELEASE ORAL at 21:00

## 2021-03-18 RX ADMIN — ACETAMINOPHEN 1000 MG: 500 TABLET, FILM COATED ORAL at 16:24

## 2021-03-18 RX ADMIN — GABAPENTIN 100 MG: 100 CAPSULE ORAL at 16:24

## 2021-03-18 RX ADMIN — MEXILETINE HYDROCHLORIDE 150 MG: 150 CAPSULE ORAL at 21:10

## 2021-03-18 RX ADMIN — MEXILETINE HYDROCHLORIDE 150 MG: 150 CAPSULE ORAL at 08:06

## 2021-03-18 RX ADMIN — HUMAN ALBUMIN MICROSPHERES AND PERFLUTREN 3 ML: 10; .22 INJECTION, SOLUTION INTRAVENOUS at 11:34

## 2021-03-18 RX ADMIN — GABAPENTIN 100 MG: 100 CAPSULE ORAL at 08:06

## 2021-03-18 RX ADMIN — PRAVASTATIN SODIUM 40 MG: 40 TABLET ORAL at 08:06

## 2021-03-18 RX ADMIN — ACETAMINOPHEN 1000 MG: 500 TABLET, FILM COATED ORAL at 08:07

## 2021-03-18 RX ADMIN — FAMOTIDINE 10 MG: 10 TABLET, FILM COATED ORAL at 08:06

## 2021-03-18 RX ADMIN — LISINOPRIL 2.5 MG: 2.5 TABLET ORAL at 21:00

## 2021-03-18 ASSESSMENT — ACTIVITIES OF DAILY LIVING (ADL)
WHICH_OF_THE_ABOVE_FUNCTIONAL_RISKS_HAD_A_RECENT_ONSET_OR_CHANGE?: AMBULATION
WALKING_OR_CLIMBING_STAIRS: AMBULATION DIFFICULTY, REQUIRES EQUIPMENT
DIFFICULTY_EATING/SWALLOWING: NO
CONCENTRATING,_REMEMBERING_OR_MAKING_DECISIONS_DIFFICULTY: NO
WALKING_OR_CLIMBING_STAIRS_DIFFICULTY: NO
DIFFICULTY_COMMUNICATING: NO
EQUIPMENT_CURRENTLY_USED_AT_HOME: CANE, QUAD;WALKER, STANDARD
WEAR_GLASSES_OR_BLIND: YES
TOILETING_ISSUES: NO
FALL_HISTORY_WITHIN_LAST_SIX_MONTHS: NO
ADLS_ACUITY_SCORE: 17
DRESSING/BATHING_DIFFICULTY: NO
DOING_ERRANDS_INDEPENDENTLY_DIFFICULTY: YES
DEPENDENT_IADLS:: CLEANING;COOKING;LAUNDRY;MEAL PREPARATION

## 2021-03-18 ASSESSMENT — MIFFLIN-ST. JEOR
SCORE: 1467.91
SCORE: 1479.5

## 2021-03-18 NOTE — PROGRESS NOTES
[unfilled]                                                                              [facility]      OUTPATIENT PHYSICAL THERAPY EVALUATION  PLAN OF TREATMENT FOR OUTPATIENT REHABILITATION  (COMPLETE FOR INITIAL CLAIMS ONLY)  Patient's Last Name, First Name, M.I.  YOB: 1945  [place]                        Provider's Name  [facility] Medical Record No.  [id number]                               Onset Date:  03/17/21   Start of Care Date:  03/18/21      Type:     _X_PT   ___OT   ___SLP Medical Diagnosis:  Dizziness, weakness                        PT Diagnosis:  decreased functional mobility   Visits from SOC:  1   _________________________________________________________________________________  Plan of Treatment/Functional Goals    Planned Interventions: balance training, gait training, home exercise program, patient/family education, stair training, strengthening, transfer training     Goals: See Physical Therapy Goals on Care Plan in [facility] electronic health record.    Therapy Frequency: 3x/week  Predicted Duration of Therapy Intervention: 3 days  _________________________________________________________________________________    I CERTIFY THE NEED FOR THESE SERVICES FURNISHED UNDER        THIS PLAN OF TREATMENT AND WHILE UNDER MY CARE     (Physician co-signature of this document indicates review and certification of the therapy plan).                Certification date from: 03/18/21, Certification date to: 03/20/21    Referring Physician: Nghia Cavanaugh MD            Initial Assessment        See Physical Therapy evaluation dated 03/18/21 in Epic electronic health record.

## 2021-03-18 NOTE — PROGRESS NOTES
Obs Goals    -diagnostic tests and consults completed and resulted: NOT MET--Nephrology, cardiology, PT, OT, SW to see today. Echo this AM.  -vital signs normal or at patient baseline: PARTIALLY MET --BP's soft, AOVSS on RA.  -tolerating oral intake to maintain hydration: MET-- Tolerating PO.   -adequate pain control on oral analgesics: MET--Denies pain.  -returns to baseline functional status: NOT MET--Requires min assist for ambulation  -safe disposition plan has been identified: NOT MET--TBD    Pt A&O. BP's soft, but stable. AOVSS on RA. Denies pain, SOB. Some DACOSTA noted. LUE fistula CDI, good bruit and thrill. No acute changes overnight. Plan for multiple consults And echo today

## 2021-03-18 NOTE — PROGRESS NOTES
[facility]  Hospitalist Progress Note   03/18/2021          Assessment and Plan:       Westley Ness is a 75 year old male with history of coronary artery disease, ischemic cardiomyopathy with EF of 20-25% on his last echo in 10/2020.  End-stage renal disease on hemodialysis Monday/Wednesday/Friday, diabetes mellitus type 2, GERD, atrial flutter on chronic anticoagulation with apixaban, status post ICD placement, and hypertension admitted on 3/17/2021 with increased heart rate and low blood pressure, and generalized weakness.    Recurrent episodes of A. fib/flutter with RVR.  History of atrial fibrillation on anticoagulation.  Status post ablation x2.  2017.  History of nonsustained V. tach status post AICD placement in the 8/2011.    Patient reports did not take his metoprolol on the day of admission as he usually takes it after dialysis as his blood pressure was low.  Telemetry monitoring.  Troponin x3 stable around 0.025.  He is euvolemic.  Echocardiogram for evaluation of heart function pending.  Continue PTA metoprolol 12.5 mg daily.  Continue PTA apixaban  Continue PTA mexiletine.  Cardiology consult requested.  Appreciate comanagement.    Coronary artery disease status post stenting.  Ischemic cardiomyopathy with previous EF of 20 to 25% in October 2020.  Hypertension, hyperlipidemia.  Last angiogram 10/2020, showed subtotal occlusion of left anterior descending (LAD) with known infarct area.  There was patent proximal left circumflex at that time.  There was severe stenosis of the large OM1 distal to the stent, which was stented, obtuse marginal #1 with drug-eluting stent at that time.   Continue PTA Plavix and apixaban.  Continue PTA metoprolol and Pravachol.  Cardiology evaluation pending as above.     End-stage renal disease on hemodialysis Monday, Wednesday, and Friday.  Missed dialysis on Monday, nephrology evaluation requested.  US with Atrophic right kidney with echogenic renal  cortex consistent with chronic medical renal disease. Multiple right renal cortical cysts.  Plan for possible dialysis today. Appreciate comanagement.    Incidental liver findings.  Ultrasound abdomen on admission with Coarsened echotexture of the liver with nodular surface contour consistent with a component of cirrhosis and/or fibrosis. Small amount of perihepatic ascites. No definite biliary ductal dilatation by ultrasound. If concern persists consider MRCP.  Elevated alkaline phosphatase of 399, bilirubin 1.4.  Right upper quadrant tenderness.  No nausea vomiting.  No abdominal pain.  Check alkaline phosphatase, GGT, bilirubin levels in AM.  Hypothyroidism.  Continue PTA levothyroxine.    History of diet-controlled diabetes mellitus.  PTA not on meds.    Gastroesophageal reflux disease.  Continue PTA Protonix.    Chronic anemia.  Baseline hemoglobin between 10-11.  Monitor hemoglobin level periodically.    Physical deconditioning in the setting of medical illness, senile fragility.  PT, OT evaluation ongoing.    History of left hip pain left knee pain likely due to osteoarthritis.  Continue PTA Tylenol.  Discontinued PTA Flexeril and started on low-dose tramadol as needed.    Orders Placed This Encounter      Combination Diet 2 gm NA Diet; No Caffeine Diet      DVT Prophylaxis: SCDs, on apixaban.  Code Status: Full Code  Disposition: Expected discharge in 1 to 2 days pending clinical improvement.  Transfer to heart center, switch to inpatient per utilization review.    Discussed with patient, bedside RN  More than 60% of time spent in direct patient care, care coordination, patient counseling, and formalizing plan of care.     Art Castorena MD        Interval History:      Patient lying in bed.  Continues to complain of generalized weakness  Denies any chest pain at this time.  Denies any nausea vomiting.  Denies any headache or dizziness.  Denies any new tingling or numbness.  Denies any new shortness of  breath.       Physical Exam:        Physical Exam   Temp:  [97.5  F (36.4  C)-97.9  F (36.6  C)] 97.9  F (36.6  C)  Pulse:  [] 84  Resp:  [10-16] 16  BP: ()/(46-71) 93/47  SpO2:  [97 %-100 %] 98 %    Intake/Output Summary (Last 24 hours) at 3/18/2021 1456  Last data filed at 3/18/2021 1134  Gross per 24 hour   Intake 360 ml   Output --   Net 360 ml       Admission Weight: 77 kg (169 lb 12.1 oz)  Current Weight: 77 kg (169 lb 12.1 oz)    PHYSICAL EXAM  GENERAL: Patient is in no distress. Alert and oriented.  HEART: irregular rate and rhythm. S1S2.   LUNGS: Bilateral slightly decreased breath sounds.  Respirations unlabored  ABDOMEN: Soft, no abdominal tenderness, bowel sounds heard   NEURO moving all extremities.  EXTREMITIES: No pedal edema.   SKIN: Warm, dry. No rash  PSYCHIATRY Cooperative       Medications:          - MEDICATION INSTRUCTIONS for Dialysis Patients -   Does not apply See Admin Instructions     sodium chloride 0.9%  250 mL Intravenous Once in dialysis     sodium chloride 0.9%  300 mL Hemodialysis Machine Once     acetaminophen  1,000 mg Oral TID     apixaban ANTICOAGULANT  2.5 mg Oral BID     clopidogrel  75 mg Oral Daily     epoetin wei-epbx (RETACRIT) inj ESRD  2,000 Units Intravenous Once in dialysis     famotidine  10 mg Oral Daily     gabapentin  100 mg Oral TID     isosorbide mononitrate  15 mg Oral QPM     levothyroxine  50 mcg Oral Daily     lisinopril  2.5 mg Oral BID     metoprolol succinate ER  12.5 mg Oral Daily     mexiletine  150 mg Oral BID     - MEDICATION INSTRUCTIONS -   Does not apply Once     pantoprazole  40 mg Oral QAM AC     pravastatin  40 mg Oral Daily     vitamin D3  50 mcg Oral Daily     sodium chloride 0.9%, acetaminophen, acetaminophen, melatonin, naloxone **OR** naloxone **OR** naloxone **OR** naloxone, nitroGLYcerin, ondansetron **OR** ondansetron, polyethylene glycol, traMADol         Data:      All new lab and imaging data was reviewed.

## 2021-03-18 NOTE — PROVIDER NOTIFICATION
MD Notification    Notified Person: MD    Notified Person Name: Dr. Munozh     Notification Date/Time: 3/17/2021; 2135     Notification Interaction: web-based paging     Purpose of Notification: Hello, BP soft. Just need parameters for lisinopril. Thank you!    Orders Received: parameters added     Comments:

## 2021-03-18 NOTE — PROGRESS NOTES
Obs Goals    -diagnostic tests and consults completed and resulted: NOT MET--Nephrology, cardiology, PT, OT, SW to see today.  -vital signs normal or at patient baseline: PARTIALLY MET --BP's soft, AOVSS on RA.  -tolerating oral intake to maintain hydration: MET-- Tolerating PO.   -adequate pain control on oral analgesics: MET--Denies pain.  -returns to baseline functional status: NOT MET--Requires min assist for ambulation  -safe disposition plan has been identified: NOT MET--TBD

## 2021-03-18 NOTE — PLAN OF CARE
"Nursing note  Summary:  dizziness   DATE & TIME: 3/18/21 6145-7297  Cognitive Concerns/ Orientation : A & O x 4; calm and cooperative   BEHAVIOR & AGGRESSION TOOL COLOR: Green   CIWA SCORE: N/A   ABNL VS/O2: VSS on RA except BP soft ./62 (BP Location: Right arm)   Pulse 97   Temp 97.6  F (36.4  C) (Oral)   Resp 16   Ht 1.727 m (5' 8\")   Wt 77 kg (169 lb 12.1 oz)   SpO2 97%   BMI 25.81 kg/m  patient is a dialysis pt, dialyzes M W F, but missed his dialysis last Monday and Wednesday d/t not feeling well.   L. Fistula/limb alert.  MOBILITY: x 1 assist Gb/walker; generalized weakness.  PAIN MANAGMENT: Denies but on schedule tylenol 1000 mg.  DIET: 2 g Na diet, no caffeine; tolerating well  BOWEL/BLADDER: BS active x 4; continent   ABNL LAB/BG: Troponins  negative. Hgb 10.5,Plt count 123.  DRAIN/DEVICES: PIV/SL.  TELEMETRY RHYTHM: N/A  SKIN: Pale, Scattered bruising and scabs.  TESTS/PROCEDURES: Abdominal US completed on 3/17/21., ECHO still pending   D/C DAY/GOALS/PLACE: pending discharge   OTHER IMPORTANT INFO: Pt Denies SOB/DACOSTA/CP. Left. Fistula strong thrill/bruit; nephrology consult pending. Pt was seen by  cardiologist this morning and the plan is to transfer pt to [facility], see card note for details on their plan. PT, SW, OT consulted. Pt went down to dialysis around 12:25 pm.  "

## 2021-03-18 NOTE — PROGRESS NOTES
03/18/21 0845   Quick Adds   Type of Visit Initial PT Evaluation   Living Environment   People in home alone   Current Living Arrangements house   Home Accessibility stairs to enter home;stairs within home   Number of Stairs, Main Entrance 4  (4 interior steps down from garage)   Stair Railings, Main Entrance railings on both sides of stairs   Number of Stairs, Within Home, Primary 1;4   Stair Railings, Within Home, Primary railing on left side (ascending)   Living Environment Comments 4 steps down from garage to enter (interior steps) then chair lift to main level, with 1 step up after chair lift. Then up 4 more steps to bedroom level (B rails). Bathroom includes tub shower with 1 grab bar inside, toilet with commode with handles.    Self-Care   Usual Activity Tolerance moderate   Current Activity Tolerance fair   Equipment Currently Used at Home walker, rolling   Activity/Exercise/Self-Care Comment Patient has walker for each floor of home. Pt reports has had home PT 1x/week. Pt has caregiver that assists with laundry, shopping/errands & transportation to/from dialysis. Caregiver is there Mon, Tues. Weds, Fri a couple hours each time.    Disability/Function   Walking or Climbing Stairs Difficulty yes   Walking or Climbing Stairs ambulation difficulty, requires equipment;stair climbing difficulty, requires equipment   Fall history within last six months no  (Did have fall in July 2020)   General Information   Onset of Illness/Injury or Date of Surgery 03/17/21   Referring Physician Nghia Cavanaugh MD   Patient/Family Therapy Goals Statement (PT) to go home   Pertinent History of Current Problem (include personal factors and/or comorbidities that impact the POC) Patient admitted to observation 3/18 after presenting to ED with complaint of generalized weakness, tiredness, low blood pressure and increased heart rate. Pt noted to have intermittent afib with RVR and hypotension. PMH significant for history of  coronary artery disease, ischemic cardiomyopathy with EF of 20-25% on his last echo in 10/2020.  End-stage renal disease on hemodialysis Monday/Wednesday/Friday, diabetes mellitus type 2, GERD fibrillation, atrial flutter on chronic anticoagulation with apixaban, status post ICD placement, and hypertension.   Existing Precautions/Restrictions fall   General Observations Patient is pleasant & agreeable to PT.   Cognition   Orientation Status (Cognition) oriented x 4   Pain Assessment   Patient Currently in Pain Yes, see Vital Sign flowsheet  (denies pain at rest, reports 2/10 leg pain post mobiliy)   Posture    Posture Forward head position;Protracted shoulders   Range of Motion (ROM)   ROM Comment ROM appears WFL with bed mobility and transfers   Strength   Strength Comments Pt demos some functional weakness with bed mobility and transfers but able to complete without physical assist   Bed Mobility   Comment (Bed Mobility) Supine<>sit with SBA-CGA from flat bed without rail; uses momentum to propel into sitting & needs extra time to accomplish. close SBA-CGA   Transfers   Transfer Safety Comments Sit>stand from EOB with FWW and SBA, with extra time. Pt slow to rise but does not need physical assist, does rely on UE push-off from EOB  (states his bed at home is higher/easier to stand from )   Gait/Stairs (Locomotion)   Comment (Gait/Stairs) Paitent ambulated in room and [place]gin[place]am with FWW and S-SBA, steady with walker but slow; short steps   Balance   Balance Comments Needs B UE support on walker for safe dynamic mobility, does use walker at baseline   Sensory Examination   Sensory Perception Comments Denies numbness/tingling   Clinical Impression   Criteria for Skilled Therapeutic Intervention yes, treatment indicated   PT Diagnosis (PT) decreased functional mobility   Influenced by the following impairments decreased activity tolerance, decreased strength, pain   Functional limitations due to impairments decreased  independence with bed mobility, transfers, ambulation, stairs   Clinical Presentation Stable/Uncomplicated   Clinical Presentation Rationale clinical judgement   Clinical Decision Making (Complexity) low complexity   Therapy Frequency (PT) 3x/week   Predicted Duration of Therapy Intervention (days/wks) 3 days   Planned Therapy Interventions (PT) balance training;gait training;home exercise program;patient/family education;stair training;strengthening;transfer training   Anticipated Equipment Needs at Discharge (PT)   (may benefit from shower chair)   Risk & Benefits of therapy have been explained evaluation/treatment results reviewed;care plan/treatment goals reviewed;risks/benefits reviewed;current/potential barriers reviewed;participants voiced agreement with care plan;participants included;patient   Clinical Impression Comments Patient appears near or slightly below baseline mobility. Currently mobilizing with SBA-CGA for all mobility, including stairs with B rails. Pt appears to have somewhat decreased activity tolerance. Pt will benefit from continued home PT to progress strength & activity tolerance.   PT Discharge Planning    PT Discharge Recommendation (DC Rec) home;home with home care physical therapy   PT Rationale for DC Rec Patient appears near or slightly below baseline mobility. Recommend resumed home PT, resumed assist for houshold tasks/transportation. Pt will need home PT due to significant taxing effort to leave home.   PT Brief overview of current status  SBA-CGA bed mobility, transfers, gait with walker & stairs with B rails.   Total Evaluation Time   Total Evaluation Time (Minutes) 15

## 2021-03-18 NOTE — PROGRESS NOTES
Nursing note  Pt transferred to dialysis room at 12:25 via bed. The writer Updated the dialysis nurse.

## 2021-03-18 NOTE — CONSULTS
RENAL CONSULTATION NOTE      REFERRING MD:  Mao    REASON FOR CONSULTATION:  ESRD Management      A/P:     1.  ESRD   -MWF schedule   [place] [place]   -Dr. Hammond   -AVF   -target 77 kg  2.  Anemia   -OSBALDO   -weekly Fe  3.  Mineral Bone Disease   -Hectorol 2.5  4.  Afib/flutter    Currently off MWF schedule  Ran last 03/16/21    Plan short run today  Full run tomorrow (back on schedule)      HPI:     Misses Monday 03/15  Ran Tuesday 03/16  Off at 75.8 kg    AVF without recent issues    Admitted with Afib/hypotension    Feels better now    Reviewed dialysis orders with patient       ROS:  A complete 10 point review of systems was performed and is negative except as noted above.    PMH:    Past Medical History:   Diagnosis Date     Acid reflux disease      Benign essential hypertension      CAD (coronary artery disease)     s/p multiple NSTEMIs and PCI's     ESRD on hemodialysis (H)     MWF     History of atrial flutter     s/p ablation     Hypothyroidism      Ischemic cardiomyopathy     EF 25-30%, s/p AICD     Type 2 diabetes mellitus (H)     diet-controlled       PSH:    Past Surgical History:   Procedure Laterality Date     CHOLECYSTECTOMY, OPEN  2013     CV CORONARY ANGIOGRAM N/A 10/19/2020    Procedure: Coronary Angiogram;  Surgeon: Theodora Salazar MD;  Location:  [facility]     CV LEFT HEART CATH N/A 10/19/2020    Procedure: Left Heart Cath;  Surgeon: Theodora Slaazar MD;  Location:  [facility]     CV PCI STENT DRUG ELUTING N/A 10/19/2020    Procedure: Percutaneous Coronary Intervention Stent Drug Eluting;  Surgeon: Theodora Salazar MD;  Location:  [facility]     ELBOW SURGERY Left 2008    ORIF - plates still in place     EP ICD GENERATOR CHANGE SINGLE N/A 11/21/2019    Procedure: EP ICD Generator Change Single;  Surgeon: Jono Mejia MD;  Location:  [facility]     H ABLATION ATRIAL FLUTTER  06/08/2017, 12/11/17      LEFT HEART  CATHETERIZATION  2016     HC LEFT HEART CATHETERIZATION  2016     IMPLANT VENTRICULAR DEVICE  08/15/2011     IR DIALYSIS FISTULOGRAM LEFT  2019     REPAIR FISTULA ARTERIOVENOUS UPPER EXTREMITY Left 3/5/2019    Procedure: REPAIR LEFT UPPER ARM ARTERIOVENOUS FISTULA SKIN ULCER;  Surgeon: Westley Griggs MD;  Location: [facility]     TONSILLECTOMY & ADENOIDECTOMY       VASCULAR SURGERY  ,     LUE fistulas (upper and lower); upper one is functional       MEDICATIONS:    No current outpatient medications on file.       ALLERGIES:    Allergies as of 2021 - Reviewed 2021   Allergen Reaction Noted     Contrast dye Hives 07/10/2016     No clinical screening - see comments  2016       FH:    Family History   Problem Relation Age of Onset     Cerebrovascular Disease Mother         later in life     Hypertension Father      Bladder Cancer Father      Myocardial Infarction Paternal Grandmother      Diabetes No family hx of      Prostate Cancer No family hx of      Colon Cancer No family hx of        SH:    Social History     Socioeconomic History     Marital status: Single     Spouse name: Not on file     Number of children: Not on file     Years of education: Not on file     Highest education level: Not on file   Occupational History     Occupation: Retired - CPA   Social Needs     Financial resource strain: Not on file     Food insecurity     Worry: Not on file     Inability: Not on file     Transportation needs     Medical: Not on file     Non-medical: Not on file   Tobacco Use     Smoking status: Former Smoker     Packs/day: 2.00     Years: 35.00     Pack years: 70.00     Types: Cigarettes     Start date:      Quit date: 1996     Years since quittin.3     Smokeless tobacco: Never Used   Substance and Sexual Activity     Alcohol use: Not Currently     Alcohol/week: 0.0 standard drinks     Frequency: Never     Drug use: No     Sexual activity: Never   Lifestyle      Physical activity     Days per week: Not on file     Minutes per session: Not on file     Stress: Not on file   Relationships     Social connections     Talks on phone: Not on file     Gets together: Not on file     Attends Episcopal service: Not on file     Active member of club or organization: Not on file     Attends meetings of clubs or organizations: Not on file     Relationship status: Not on file     Intimate partner violence     Fear of current or ex partner: Not on file     Emotionally abused: Not on file     Physically abused: Not on file     Forced sexual activity: Not on file   Other Topics Concern     Parent/sibling w/ CABG, MI or angioplasty before 65F 55M? No      Service Not Asked     Blood Transfusions Not Asked     Caffeine Concern No     Occupational Exposure Not Asked     Hobby Hazards Not Asked     Sleep Concern No     Stress Concern Not Asked     Weight Concern Not Asked     Special Diet Yes     Back Care Not Asked     Exercise Yes     Comment: Walking     Bike Helmet Not Asked     Seat Belt Yes     Self-Exams Not Asked   Social History Narrative    Single.    No kids.     Maria Dolores Pabon (friend- healthcare POA), Joaquina Nair (friend - healthcare POA)    No formal exercise.        PHYSICAL EXAM:      Vitals were reviewed  Patient Vitals for the past 8 hrs:   BP Temp Temp src Pulse Resp SpO2 Weight   21 1225 -- -- -- -- -- -- 77 kg (169 lb 12.1 oz)   21 0859 103/62 -- -- 97 -- -- --   21 0715 101/63 97.6  F (36.4  C) Oral 86 16 97 % --     Blood pressure range: Systolic (24hrs), Av , Min:93 , Max:115       Vitals:    21 1245 21 1225   Weight: 77 kg (169 lb 12.1 oz) 77 kg (169 lb 12.1 oz)         GENERAL: awake, alert, follows  HEENT: NC/AT, PERRLA, EOMI, non icteric, pharynx moist without lesion  NECK: supple, no masses or adenopathy  RESP:  clear anteriorly  CV: RRR, normal S1 S2  ABDOMEN: soft, nontender, no HSM or masses and bowel sounds normal  MS: no  clubbing, cyanosis   SKIN: clear without significant rashes or lesions  EXT: warm, no edema      LABS:        Recent Labs   Lab 03/18/21  1005      POTASSIUM 3.5   CHLORIDE 106   CO2 27   ANIONGAP 8   *   BUN 31*   CR 4.90*   GFRESTIMATED 11*   GFRESTBLACK 12*   CHRISTINE 9.0     Recent Labs   Lab 03/17/21  1257   ALBUMIN 3.4     Recent Labs   Lab 03/18/21  1005 03/17/21  1257   HGB 10.5* 10.8*       DIAGNOSTICS:  Reviewed      JUAREZ Granger    [facility]  [phone]

## 2021-03-18 NOTE — UTILIZATION REVIEW
Admission Status; Secondary Review Determination     Under the authority of the Utilization Management Commitee, the utilization review process indicated a secondary review on the above patient. The review outcome is based on review of the medical records, discussions with staff, and applying clinical experience noted on the date of the review.     (x) Inpatient Status Appropriate - This patient's medical care is consistent with medical management for inpatient care and reasonable inpatient medical practice.     RATIONALE FOR DETERMINATION:  75-year-old gentleman with a history of hypertension, hyperlipidemia, diabetes, end-stage renal disease on hemodialysis, heart failure secondary to ischemic cardiomyopathy (EF around 25%-30%, previous ICD implantation; on mexiletine for prevention of VT) and paroxysmal Afib/a flutter (atrial flutter ablation done in 06/2017 and redo ablation in 12/2017), who was admitted with A. fib/flutter with RVR.  Course complicated by soft blood pressures (intermittent SBP<100) in the setting of AV kyler medications required for heart rate control.  The patient was seen by cardiology and cardioversion vs ablation is being considered.  TTE and transfer to a telemetry unit has been recommended.  At least another night in the hospital is anticipated.  Dr. Cordova notified via text page of this recommendation    At the time of admission with the information available to the attending physician more than 2 nights Hospital complex care was anticipated, based on patient risk of adverse outcome if treated as outpatient and complex care required. Inpatient admission is appropriate based on the Medicare guidelines.    The information on this document is developed by the utilization review team in order for the business office to ensure compliance. This only denotes the appropriateness of proper admission status and does not reflect the quality of care rendered.   The definitions of Inpatient Status  and Observation Status used in making the determination above are those provided in the CMS Coverage Manual, Chapter 1 and Chapter 6, section 70.4.     Sincerely,     Morgan Sellers MD  Utilization Review   Physician Advisor   [facility]

## 2021-03-18 NOTE — CONSULTS
Care Management Initial Consult  Discussed plan of care and discharge plans with patient.Patient stating he lives alone but  Pt has caregiver that assists with laundry, shopping/errands & transportation to/from dialysis. Caregiver is there Mon, Tues. Weds, Fri a couple hours each time. Per therapy notes patient  has 4 steps down from garage to enter (interior steps) then chair lift to main level, with 1 step up after chair lift. Then up 4 more steps to bedroom level (B rails). Bathroom includes tub shower with 1 grab bar inside, toilet with commode with handles. Patient has walker for each floor of home. Pt reports has had home PT 1x/week.with [facility] services  Therapy recommends may be able to return home with previous services.    General Information  Assessment completed with: Patient,    Type of CM/SW Visit: Offer D/C Planning    Primary Care Provider verified and updated as needed: Yes   Readmission within the last 30 days: no previous admission in last 30 days      Reason for Consult: care coordination/care conference, discharge planning, transportation  Advance Care Planning: Advance Care Planning Reviewed: present on chart          Communication Assessment  Patient's communication style: spoken language (English or Bilingual)    Hearing Difficulty or Deaf: no   Wear Glasses or Blind: yes    Cognitive  Cognitive/Neuro/Behavioral: WDL                      Living Environment:   People in home: alone     Current living Arrangements: house      Able to return to prior arrangements: yes       Family/Social Support:  Care provided by: self, homecare agency  Provides care for: no one, unable/limited ability to care for self  Marital Status: Single  Other (specify)(Friends)          Description of Support System: Supportive         Current Resources:   Patient receiving home care services: Yes  Skilled Home Care Services: Home Health Aid, Physicial Therapy  Community Resources:    Equipment currently used  at home: walker, rolling  Supplies currently used at home:      Employment/Financial:  Employment Status: retired        Financial Concerns: No concerns identified            Socioeconomic History     Marital status: Single     Spouse name: Not on file     Number of children: Not on file     Years of education: Not on file     Highest education level: Not on file   Occupational History     Occupation: Retired - CPA     Tobacco Use     Smoking status: Former Smoker     Packs/day: 2.00     Years: 35.00     Pack years: 70.00     Types: Cigarettes     Start date:      Quit date: 1996     Years since quittin.3     Smokeless tobacco: Never Used   Substance and Sexual Activity     Alcohol use: Not Currently     Alcohol/week: 0.0 standard drinks     Frequency: Never     Drug use: No     Sexual activity: Never       Functional Status:  Prior to admission patient needed assistance:   Dependent ADLs:: Ambulation-cane, Bathing, Transfers  Dependent IADLs:: Cleaning, Cooking, Laundry, Meal Preparation  Assesssment of Functional Status: Not at baseline with ADL Functioning    Mental Health Status:  Mental Health Status: No Current Concerns       Chemical Dependency Status: NA                Values/Beliefs:  Spiritual, Cultural Beliefs, Yarsanism Practices, Values that affect care: no             Jl Varghese RN  Inpatient Care Coordinator  [facility] [place]  #[phone]

## 2021-03-18 NOTE — PLAN OF CARE
Took over patient care at 1810. Patient resting comfortably in bed. Stable. Tele afib cvr. No new issues.

## 2021-03-18 NOTE — PROGRESS NOTES
Potassium   Date Value Ref Range Status   03/18/2021 3.5 3.4 - 5.3 mmol/L Final     Hemoglobin   Date Value Ref Range Status   03/18/2021 10.5 (L) 13.3 - 17.7 g/dL Final     Creatinine   Date Value Ref Range Status   03/18/2021 4.90 (H) 0.66 - 1.25 mg/dL Final     Urea Nitrogen   Date Value Ref Range Status   03/18/2021 31 (H) 7 - 30 mg/dL Final     Sodium   Date Value Ref Range Status   03/18/2021 141 133 - 144 mmol/L Final     INR   Date Value Ref Range Status   10/15/2020 1.31 (H) 0.86 - 1.14 Final       DIALYSIS PROCEDURE NOTE  Hepatitis status of previous patient on machine log was checked and verified ok to use with this patients hepatitis status.  Patient dialyzed for 2 hrs. on a 4 K bath with a net fluid removal of  1L.  A BFR of 400 ml/min was obtained via a LAVF using 15gauge needles which was cannulated without difficulty      The treatment plan was discussed with Dr. CAROL Crocker  during the treatment.    Total heparin received during the treatment: 0 units.   Needle cannulation sites held x 20 min.   Meds  given: Epo    Complications: None       Person educated: Pt . Knowledge base substantial . Barriers to learning: none. Educated on access  via oral  mode. Patient verbalized understanding. Pt prefers Oral  education style.     ICEBOAT? Timeout performed pre-treatment  I: Patient was identified using 2 identifiers  C:  Consent Signed Yes  E: Equipment preventative maintenance is current and dialysis delivery system OK to use  B: Hepatitis B Surface Antigen: Neg ; Draw Date: 3.1.21      Hepatitis B Surface Antibody: susceptible ; Draw Date: 1.6.20 New HEP B sent today   O: Dialysis orders present and complete prior to treatment  A: Vascular access verified and assessed prior to treatment  T: Treatment was performed at a clinically appropriate time  ?: Patient was allowed to ask questions and address concerns prior to treatment  See flowsheet in EPIC for further details and post assessment.  Machine water  alarm in place and functioning. Transducer pods intact and checked every 15min.   Pt returned via bed .  Chlorine/Chloramine water system checked every 4 hours.  Outpatient Dialysis at [facility]

## 2021-03-18 NOTE — PROGRESS NOTES
"Renal Medicine Inpatient Dialysis Note                                Westley Ness MRN# [id number]   Age: 75 year old YOB: 1945   Date of Admission: 3/17/2021 Hospital LOS: 0          Assessment/Plan:     1.  ESRD              -MWF schedule              -Anderson Oakes              -Dr. Hammond              -AVF              -target 77 kg  2.  Anemia              -OSBALDO              -weekly Fe  3.  Mineral Bone Disease              -Hectorol 2.5  4.  Afib/flutter    After today  MWF schedule      Interval History:     Dialysis run parameters reviewed with dialysis RN at patient bedside    2 hour  4K  1.5 liter UF  #2 UF profile  OSBALDO    Stable initial portion of run     ROS     GENERAL: NAD, No fever,chills  R: NEGATIVE for significant cough or SOB  CV: NEGATIVE for chest pain, palpitations  EXT: no change edema  ROS otherwise negative    Dialysis Parameters:     Vitals were reviewed  Patient Vitals for the past 8 hrs:   BP Temp Temp src Pulse Resp SpO2 Weight   03/18/21 1225 -- -- -- -- -- -- 77 kg (169 lb 12.1 oz)   03/18/21 0859 103/62 -- -- 97 -- -- --   03/18/21 0715 101/63 97.6  F (36.4  C) Oral 86 16 97 % --     I/O last 3 completed shifts:  In: 120 [P.O.:120]  Out: -     Vitals:    03/17/21 1245 03/18/21 1225   Weight: 77 kg (169 lb 12.1 oz) 77 kg (169 lb 12.1 oz)       Current Weight: 77  Dry Weight: 77 outpatient  Dialysis Temp: 36.5  C  Access Device: AVF  Access Site: left  Dialyzer: Revaclear  Dialysis Bath: 4  Sodium Profile: n  UF Goal: 1.5  Blood Flow Rate (mL/min): 400  Total Treatment Time (hrs): 2  Heparin: Low dose as required      EPO dose: y  Zemplar: n  IV Fe: n      Medications and Allergies:     Reviewed      Physical Exam:     Seen and examined during course of dialysis run    /62 (BP Location: Right arm)   Pulse 97   Temp 97.6  F (36.4  C) (Oral)   Resp 16   Ht 1.727 m (5' 8\")   Wt 77 kg (169 lb 12.1 oz)   SpO2 97%   BMI 25.81 kg/m      GENERAL: awake, " alert, follows  HEENT: NC/AT, PERRLA, EOMI, non icteric, pharynx moist without lesion  RESP: clear  CV: RRR, normal S1 S2  ABDOMEN: S/NT, BS present  MS: no edema  EXT: access canulated without issue    Data:       Recent Labs   Lab 03/18/21  1005      POTASSIUM 3.5   CHLORIDE 106   CO2 27   ANIONGAP 8   *   BUN 31*   CR 4.90*   GFRESTIMATED 11*   GFRESTBLACK 12*   CHRISTINE 9.0         G Estevan Granger MD    [facility] - Nephrology  [phone]

## 2021-03-18 NOTE — CONSULTS
Electrophysiology/ Cardiology- Consult Note         H&P and Plan:     Reason for consult: Afib/ flutter.    History of present illness: Mr. Ness is a pleasant 75-year-old gentleman with a history of hypertension, hyperlipidemia, diabetes, end-stage renal disease on hemodialysis, heart failure secondary to ischemic cardiomyopathy (EF around 25%-30%, previous ICD implantation; on mexiletine for prevention of VT) and paroxysmal Afib/a flutter (atrial flutter ablation done in 06/2017 and redo ablation in 12/2017), who was admitted with A. fib/flutter with RVR.      Patient noticed elevated heart rates during this week.  He also noticed that his blood pressure was low (SBP around 80 Mr. Mercury).  He was tired/fatigued and also developed some hip pain.  He contacted his PCP and due to complaints, he was referred to the ED for evaluation.    In the ED, EKG shows coarse atrial fibrillation.  Blood pressure is stable ranged from 97/54 to 115/68 mmHg.  He was admitted for observation.    At the moment, he feels better.  He denies any symptoms of chest pain, palpitations, lightheadedness, near-syncope or syncope.  He seems to be in regular/tachycardic suggestive of atrial flutter.  He is not want telemetry    Previous studies:  Cath (10/2020):  Multivessel CAD. Total or subtotal occlusion of the LAD. Patent proximal LCx and OM stents.  Severe stenosis at the bifurcation of large OM1 distal to the previously placed stent. Small nondominant RCA with no significant stenosis.  Successful PCI of OM1.     Plan:  1.    Recurrent episodes of A. fib/flutter.  Initial EKG is suggestive of coarse atrial fibrillation however physical exam is not suggestive of flutter.  Patient is on chronic therapy with metoprolol, mexiletine and Eliquis.  Metoprolol dose was decreased due to borderline low blood pressure levels.    Today, we discussed possibility of cardioversion or redo ablation (in case of flutter).  However, he is not enthusiastic  "to have any procedure at this time and also he believes he may have skipped a couple doses of Eliquis.  I recommend the following:  -Repeat EKG.  -Transfer to a telemetry unit.  -Continue therapy with metoprolol, mexiletine and Eliquis.  -Echocardiogram.    We will evaluate heart rates and decide whether or not he needs any further intervention.      2.  Embolic prevention.  Plan as detailed above.     3.  Hypertension.  Blood pressure well controlled.     4.  Heart failure secondary to ischemic cardiomyopathy.  He is euvolemic on physical exam.  Will continue pravastatin, Imdur, metoprolol, lisinopril and Plavix.  5.  Previous VT.  He is taking mexiletine.  Will continue current medical therapy.      Jono Mejia MD    Physical Exam:  Vitals: /62 (BP Location: Right arm)   Pulse 97   Temp 97.6  F (36.4  C) (Oral)   Resp 16   Ht 1.727 m (5' 8\")   Wt 77 kg (169 lb 12.1 oz)   SpO2 97%   BMI 25.81 kg/m        Intake/Output Summary (Last 24 hours) at 3/18/2021 0950  Last data filed at 3/18/2021 0832  Gross per 24 hour   Intake 360 ml   Output --   Net 360 ml     Vitals:    03/17/21 1245   Weight: 77 kg (169 lb 12.1 oz)       Constitutional:  AAO x3.  Pt is in NAD.  HEAD: Normocephalic.  SKIN: Skin normal color, texture and turgor with no lesions or eruptions.  Eyes: PERRL, EOMI.  ENT:  Supple, normal JVP. No lymphadenopathy or thyroid enlargement.  Chest:  CTAB.  Cardiac:  RRR, normal  S1 and S2.  No murmurs rubs or gallop.  S  Abdomen:  Normal BS.  Soft, non-tender and non-distended.  No rebound or guarding.    Extremities:  Pedious pulses palpable B/L.  No LE edema noticed.   Neurological: Strength and sensation grossly symmetric and intact throughout.         Review of Systems:  Complete review of system is otherwise negative with the exception of what was described above.     CURRENT MEDICATIONS:    - MEDICATION INSTRUCTIONS for Dialysis Patients -   Does not apply See Admin Instructions     sodium " chloride 0.9%  250 mL Intravenous Once in dialysis     sodium chloride 0.9%  300 mL Hemodialysis Machine Once     acetaminophen  1,000 mg Oral TID     apixaban ANTICOAGULANT  2.5 mg Oral BID     clopidogrel  75 mg Oral Daily     epoetin wei-epbx (RETACRIT) inj ESRD  2,000 Units Intravenous Once in dialysis     famotidine  10 mg Oral Daily     gabapentin  100 mg Oral TID     isosorbide mononitrate  15 mg Oral QPM     levothyroxine  50 mcg Oral Daily     lisinopril  2.5 mg Oral BID     metoprolol succinate ER  12.5 mg Oral Daily     mexiletine  150 mg Oral BID     - MEDICATION INSTRUCTIONS -   Does not apply Once     pantoprazole  40 mg Oral QAM AC     pravastatin  40 mg Oral Daily     vitamin D3  50 mcg Oral Daily     PRN Meds: sodium chloride 0.9%, acetaminophen, acetaminophen, melatonin, naloxone **OR** naloxone **OR** naloxone **OR** naloxone, nitroGLYcerin, ondansetron **OR** ondansetron, polyethylene glycol, traMADol    ALLERGIES     Allergies   Allergen Reactions     Contrast Dye Hives     Does fine if he uses benadryl prior.     No Clinical Screening - See Comments      Green beans - Diarrhea.    Topical antibiotic - name unknown - caused swelling of the penis.       PAST MEDICAL HISTORY:  Past Medical History:   Diagnosis Date     Acid reflux disease      Benign essential hypertension      CAD (coronary artery disease)     s/p multiple NSTEMIs and PCI's     ESRD on hemodialysis (H)     MWF     History of atrial flutter     s/p ablation     Hypothyroidism      Ischemic cardiomyopathy     EF 25-30%, s/p AICD     Type 2 diabetes mellitus (H)     diet-controlled       PAST SURGICAL HISTORY:  Past Surgical History:   Procedure Laterality Date     CHOLECYSTECTOMY, OPEN  2013     CV CORONARY ANGIOGRAM N/A 10/19/2020    Procedure: Coronary Angiogram;  Surgeon: Theodora Salazar MD;  Location:  HEART CARDIAC CATH LAB     CV LEFT HEART CATH N/A 10/19/2020    Procedure: Left Heart Cath;  Surgeon: Theodora Salazar,  MD;  Location:  HEART CARDIAC CATH LAB     CV PCI STENT DRUG ELUTING N/A 10/19/2020    Procedure: Percutaneous Coronary Intervention Stent Drug Eluting;  Surgeon: Theodora Salazar MD;  Location:  HEART CARDIAC CATH LAB     ELBOW SURGERY Left 2008    ORIF - plates still in place     EP ICD GENERATOR CHANGE SINGLE N/A 11/21/2019    Procedure: EP ICD Generator Change Single;  Surgeon: Jono Mejia MD;  Location:  HEART CARDIAC CATH LAB     H ABLATION ATRIAL FLUTTER  06/08/2017, 12/11/17     HC LEFT HEART CATHETERIZATION  7/14/2016     HC LEFT HEART CATHETERIZATION  9/21/2016     IMPLANT VENTRICULAR DEVICE  08/15/2011     IR DIALYSIS FISTULOGRAM LEFT  7/22/2019     REPAIR FISTULA ARTERIOVENOUS UPPER EXTREMITY Left 3/5/2019    Procedure: REPAIR LEFT UPPER ARM ARTERIOVENOUS FISTULA SKIN ULCER;  Surgeon: Westley Griggs MD;  Location:  OR     TONSILLECTOMY & ADENOIDECTOMY       VASCULAR SURGERY  2004, 2010    LUE fistulas (upper and lower); upper one is functional       FAMILY HISTORY:  Family History   Problem Relation Age of Onset     Cerebrovascular Disease Mother         later in life     Hypertension Father      Bladder Cancer Father      Myocardial Infarction Paternal Grandmother      Diabetes No family hx of      Prostate Cancer No family hx of      Colon Cancer No family hx of        SOCIAL HISTORY:  Social History     Socioeconomic History     Marital status: Single     Spouse name: None     Number of children: None     Years of education: None     Highest education level: None   Occupational History     Occupation: Retired - CPA   Social Needs     Financial resource strain: None     Food insecurity     Worry: None     Inability: None     Transportation needs     Medical: None     Non-medical: None   Tobacco Use     Smoking status: Former Smoker     Packs/day: 2.00     Years: 35.00     Pack years: 70.00     Types: Cigarettes     Start date: 1965     Quit date: 11/1/1996     Years since  quittin.3     Smokeless tobacco: Never Used   Substance and Sexual Activity     Alcohol use: Not Currently     Alcohol/week: 0.0 standard drinks     Frequency: Never     Drug use: No     Sexual activity: Never   Lifestyle     Physical activity     Days per week: None     Minutes per session: None     Stress: None   Relationships     Social connections     Talks on phone: None     Gets together: None     Attends Evangelical service: None     Active member of club or organization: None     Attends meetings of clubs or organizations: None     Relationship status: None     Intimate partner violence     Fear of current or ex partner: None     Emotionally abused: None     Physically abused: None     Forced sexual activity: None   Other Topics Concern     Parent/sibling w/ CABG, MI or angioplasty before 65F 55M? No      Service Not Asked     Blood Transfusions Not Asked     Caffeine Concern No     Occupational Exposure Not Asked     Hobby Hazards Not Asked     Sleep Concern No     Stress Concern Not Asked     Weight Concern Not Asked     Special Diet Yes     Back Care Not Asked     Exercise Yes     Comment: Walking     Bike Helmet Not Asked     Seat Belt Yes     Self-Exams Not Asked   Social History Narrative    Single.    No kids.     Maria Dolores Pabon (friend- healthcare POA), Joaquina Nair (friend - healthcare POA)    No formal exercise.          Recent Lab Results:  Recent Labs   Lab 21  2150 21  1859 21  1257   WBC  --   --  5.2   HGB  --   --  10.8*   MCV  --   --  104*   PLT  --   --  140*   NA  --   --  142   POTASSIUM  --   --  3.5   CHLORIDE  --   --  104   CO2  --   --  29   BUN  --   --  22   CR  --   --  3.99*   ANIONGAP  --   --  9   CHRISTINE  --   --  9.0   GLC  --   --  131*   ALBUMIN  --   --  3.4   PROTTOTAL  --   --  6.4*   BILITOTAL  --   --  1.4*   ALKPHOS  --   --  399*   ALT  --   --  23   AST  --   --  20   TROPI 0.025 0.029 0.027

## 2021-03-18 NOTE — PLAN OF CARE
OT orders received, chart reviewed and planned to initiate however patient out of room at dialysis. Will reschedule.

## 2021-03-18 NOTE — PROGRESS NOTES
Nursing note  [place] [facility] [place] 1815 [place] w/chair safely with all his belongings. Report called to receiving RN(Alex).

## 2021-03-18 NOTE — PLAN OF CARE
Summary:  dizziness   DATE & TIME: 3/17/2021; [id number]   Cognitive Concerns/ Orientation : A & O x 4; calm and cooperative   BEHAVIOR & AGGRESSION TOOL COLOR: Green   CIWA SCORE: N/A   ABNL VS/O2: VSS on RA except BP soft and tachy (low 100s); vitals q4h, orthostatics qshift (Obs), orthostatics (-), L. Fistula/limb alert   MOBILITY: x 1 assist Gb/walker; generalized weakness in LE bilaterally  PAIN MANAGMENT: reported mild pain in LE, managed well with scheduled tylenol and rest  DIET: 2 g Na diet, no caffeine; tolerating well  BOWEL/BLADDER: BS active x 4; continent, no urine output (dialysis patient)  ABNL LAB/BG: Troponins=0.029, 0.025; Creatinine=3.99; hgb=10.8; Covid(-); alkaline phosphatase= 399   DRAIN/DEVICES: PIV/SL  TELEMETRY RHYTHM: N/A  SKIN: Pale, Scattered bruising and scabs; intact   TESTS/PROCEDURES: US AB completed this evening, ECHO scheduled  D/C DAY/GOALS/PLACE: pending discharge   OTHER IMPORTANT INFO: Denies SOB or chest pain; L. Fistula; nephrology, cardiology following, PT, SW, OT consulted     Observation goals PRIOR TO DISCHARGE     Comments:   -diagnostic tests and consults completed and resulted: NOT MET   -vital signs normal or at patient baseline: NOT MET   -tolerating oral intake to maintain hydration: MET   -adequate pain control on oral analgesics: MET   -returns to baseline functional status: NOT MET   -safe disposition plan has been identified: NOT MET     Nurse to notify provider when observation goals have been met and patient is ready for discharge.

## 2021-03-19 ENCOUNTER — TELEPHONE (OUTPATIENT)
Dept: INTERNAL MEDICINE | Facility: CLINIC | Age: 76
End: 2021-03-19

## 2021-03-19 ENCOUNTER — APPOINTMENT (OUTPATIENT)
Dept: OCCUPATIONAL THERAPY | Facility: CLINIC | Age: 76
DRG: 308 | End: 2021-03-19
Attending: INTERNAL MEDICINE
Payer: MEDICARE

## 2021-03-19 VITALS
DIASTOLIC BLOOD PRESSURE: 63 MMHG | HEIGHT: 68 IN | RESPIRATION RATE: 18 BRPM | OXYGEN SATURATION: 99 % | SYSTOLIC BLOOD PRESSURE: 101 MMHG | TEMPERATURE: 96 F | BODY MASS INDEX: 25.34 KG/M2 | HEART RATE: 94 BPM | WEIGHT: 167.2 LBS

## 2021-03-19 LAB
ALBUMIN SERPL-MCNC: 2.9 G/DL (ref 3.4–5)
ALP SERPL-CCNC: 341 U/L (ref 40–150)
ALT SERPL W P-5'-P-CCNC: 21 U/L (ref 0–70)
ANION GAP SERPL CALCULATED.3IONS-SCNC: 7 MMOL/L (ref 3–14)
AST SERPL W P-5'-P-CCNC: 13 U/L (ref 0–45)
BILIRUB SERPL-MCNC: 1.2 MG/DL (ref 0.2–1.3)
BUN SERPL-MCNC: 23 MG/DL (ref 7–30)
CALCIUM SERPL-MCNC: 8.7 MG/DL (ref 8.5–10.1)
CHLORIDE SERPL-SCNC: 105 MMOL/L (ref 94–109)
CO2 SERPL-SCNC: 28 MMOL/L (ref 20–32)
CREAT SERPL-MCNC: 4.03 MG/DL (ref 0.66–1.25)
ERYTHROCYTE [DISTWIDTH] IN BLOOD BY AUTOMATED COUNT: 16.2 % (ref 10–15)
GFR SERPL CREATININE-BSD FRML MDRD: 14 ML/MIN/{1.73_M2}
GGT SERPL-CCNC: 88 U/L (ref 0–75)
GLUCOSE SERPL-MCNC: 132 MG/DL (ref 70–99)
HBA1C MFR BLD: 5.1 % (ref 0–5.6)
HCT VFR BLD AUTO: 32.1 % (ref 40–53)
HGB BLD-MCNC: 9.9 G/DL (ref 13.3–17.7)
LACTATE BLD-SCNC: 1.9 MMOL/L (ref 0.7–2)
MCH RBC QN AUTO: 32.5 PG (ref 26.5–33)
MCHC RBC AUTO-ENTMCNC: 30.8 G/DL (ref 31.5–36.5)
MCV RBC AUTO: 105 FL (ref 78–100)
PLATELET # BLD AUTO: 104 10E9/L (ref 150–450)
POTASSIUM SERPL-SCNC: 3.8 MMOL/L (ref 3.4–5.3)
PROT SERPL-MCNC: 5.4 G/DL (ref 6.8–8.8)
RBC # BLD AUTO: 3.05 10E12/L (ref 4.4–5.9)
SODIUM SERPL-SCNC: 140 MMOL/L (ref 133–144)
WBC # BLD AUTO: 4.1 10E9/L (ref 4–11)

## 2021-03-19 PROCEDURE — 97530 THERAPEUTIC ACTIVITIES: CPT | Mod: GO | Performed by: OCCUPATIONAL THERAPIST

## 2021-03-19 PROCEDURE — 83036 HEMOGLOBIN GLYCOSYLATED A1C: CPT | Performed by: HOSPITALIST

## 2021-03-19 PROCEDURE — 99239 HOSP IP/OBS DSCHRG MGMT >30: CPT | Performed by: HOSPITALIST

## 2021-03-19 PROCEDURE — 258N000003 HC RX IP 258 OP 636: Performed by: INTERNAL MEDICINE

## 2021-03-19 PROCEDURE — 90935 HEMODIALYSIS ONE EVALUATION: CPT | Performed by: INTERNAL MEDICINE

## 2021-03-19 PROCEDURE — 80053 COMPREHEN METABOLIC PANEL: CPT | Performed by: HOSPITALIST

## 2021-03-19 PROCEDURE — 90937 HEMODIALYSIS REPEATED EVAL: CPT

## 2021-03-19 PROCEDURE — 85027 COMPLETE CBC AUTOMATED: CPT | Performed by: HOSPITALIST

## 2021-03-19 PROCEDURE — 250N000011 HC RX IP 250 OP 636: Performed by: INTERNAL MEDICINE

## 2021-03-19 PROCEDURE — 82977 ASSAY OF GGT: CPT | Performed by: HOSPITALIST

## 2021-03-19 PROCEDURE — 99232 SBSQ HOSP IP/OBS MODERATE 35: CPT | Performed by: INTERNAL MEDICINE

## 2021-03-19 PROCEDURE — 83605 ASSAY OF LACTIC ACID: CPT | Performed by: HOSPITALIST

## 2021-03-19 PROCEDURE — 36415 COLL VENOUS BLD VENIPUNCTURE: CPT | Performed by: HOSPITALIST

## 2021-03-19 PROCEDURE — 97165 OT EVAL LOW COMPLEX 30 MIN: CPT | Mod: GO | Performed by: OCCUPATIONAL THERAPIST

## 2021-03-19 PROCEDURE — 250N000013 HC RX MED GY IP 250 OP 250 PS 637: Performed by: INTERNAL MEDICINE

## 2021-03-19 RX ORDER — DOXERCALCIFEROL 4 UG/2ML
4 INJECTION INTRAVENOUS
Status: COMPLETED | OUTPATIENT
Start: 2021-03-19 | End: 2021-03-19

## 2021-03-19 RX ORDER — HEPARIN SODIUM 1000 [USP'U]/ML
500 INJECTION, SOLUTION INTRAVENOUS; SUBCUTANEOUS
Status: DISCONTINUED | OUTPATIENT
Start: 2021-03-19 | End: 2021-03-19

## 2021-03-19 RX ORDER — HEPARIN SODIUM 1000 [USP'U]/ML
500 INJECTION, SOLUTION INTRAVENOUS; SUBCUTANEOUS CONTINUOUS
Status: DISCONTINUED | OUTPATIENT
Start: 2021-03-19 | End: 2021-03-19

## 2021-03-19 RX ADMIN — GABAPENTIN 100 MG: 100 CAPSULE ORAL at 09:19

## 2021-03-19 RX ADMIN — DOXERCALCIFEROL 4 MCG: 4 INJECTION, SOLUTION INTRAVENOUS at 14:37

## 2021-03-19 RX ADMIN — SODIUM CHLORIDE 250 ML: 9 INJECTION, SOLUTION INTRAVENOUS at 14:36

## 2021-03-19 RX ADMIN — LEVOTHYROXINE SODIUM 50 MCG: 50 TABLET ORAL at 09:20

## 2021-03-19 RX ADMIN — PANTOPRAZOLE SODIUM 40 MG: 40 TABLET, DELAYED RELEASE ORAL at 07:50

## 2021-03-19 RX ADMIN — GABAPENTIN 100 MG: 100 CAPSULE ORAL at 17:51

## 2021-03-19 RX ADMIN — ACETAMINOPHEN 1000 MG: 500 TABLET, FILM COATED ORAL at 17:51

## 2021-03-19 RX ADMIN — MEXILETINE HYDROCHLORIDE 150 MG: 150 CAPSULE ORAL at 09:19

## 2021-03-19 RX ADMIN — ACETAMINOPHEN 1000 MG: 500 TABLET, FILM COATED ORAL at 09:18

## 2021-03-19 RX ADMIN — SODIUM CHLORIDE 300 ML: 9 INJECTION, SOLUTION INTRAVENOUS at 14:36

## 2021-03-19 RX ADMIN — FAMOTIDINE 10 MG: 10 TABLET, FILM COATED ORAL at 09:20

## 2021-03-19 ASSESSMENT — ACTIVITIES OF DAILY LIVING (ADL)
ADLS_ACUITY_SCORE: 17
PREVIOUS_RESPONSIBILITIES: MEAL PREP;MEDICATION MANAGEMENT;FINANCES
ADLS_ACUITY_SCORE: 17

## 2021-03-19 NOTE — PROGRESS NOTES
03/19/21 0828   Quick Adds   Type of Visit Initial Occupational Therapy Evaluation   Living Environment   People in home alone   Current Living Arrangements house   Home Accessibility stairs to enter home;stairs within home   Number of Stairs, Main Entrance 4   Transportation Anticipated family or friend will provide   Self-Care   Regular Exercise Yes   Activity/Exercise Type   (UE and LE ex that OT and PT gave to pt)   Exercise Amount/Frequency daily   Equipment Currently Used at Home cane, quad;walker, rolling;grab bar, tub/shower;raised toilet seat  (tub/shower with grab bars, tub chair but doesnt use, RTS arm)   Activity/Exercise/Self-Care Comment uses ww on main floor, cane is used in the BR. HHA to A with shopping, cleaning, laundry on Mon, Tues shopping, and A with driving to dialysis.    Disability/Function   Wear Glasses or Blind yes   General Information   Onset of Illness/Injury or Date of Surgery 03/17/21   Referring Physician Nghia Cavanaugh MD   Patient/Family Therapy Goal Statement (OT) home   Additional Occupational Profile Info/Pertinent History of Current Problem Westley Ness is a 75 year old male with history of coronary artery disease, ischemic cardiomyopathy with EF of 20-25% on his last echo in 10/2020.  End-stage renal disease on hemodialysis Monday/Wednesday/Friday, diabetes mellitus type 2, GERD, atrial flutter on chronic anticoagulation with apixaban, status post ICD placement, and hypertension admitted on 3/17/2021 with increased heart rate and low blood pressure, and generalized weakness. Ischemic cardiomyopathy with previous EF of 20 to 25% in October 2020. End-stage renal disease on hemodialysis Monday, Wednesday, and Friday.  A. fib/flutter with RVR.  cards possible cardoversion, ablation or med mgmt. pt wanting to know the plan.    Existing Precautions/Restrictions cardiac;fall   Cognitive Status Examination   Orientation Status orientation to person, place and time    Affect/Mental Status (Cognitive) WNL   Follows Commands WNL   Visual Perception   Visual Impairment/Limitations corrective lenses full-time   Sensory   Sensory Quick Adds No deficits were identified   Pain Assessment   Patient Currently in Pain No  (sometimes pain in B LE's comes and goes. )   Range of Motion Comprehensive   Comment, General Range of Motion L shoulder flex limited due to past shoulder flex. R shoulder WFL, B UE distal AROM WFL   Strength Comprehensive (MMT)   Comment, General Manual Muscle Testing (MMT) Assessment B UE strength 4/5   Coordination   Upper Extremity Coordination No deficits were identified   Bed Mobility   Scooting/Bridging [place] (Bed Mobility) supervision   Supine-Sit [place] (Bed Mobility) minimum assist (75% patient effort)   Transfer Skill: Bed to Chair/Chair to Bed   Bed-Chair [place] (Transfers) supervision   Assistive Device (Bed-Chair Transfers) rolling walker   Sit-Stand Transfer   Sit-Stand [place] (Transfers) supervision   Assistive Device (Sit-Stand Transfers) walker, front-wheeled   Toilet Transfer   [place] Level (Toilet Transfer) supervision   Assistive Device (Toilet Transfer) commode chair;walker, front-wheeled   Lower Body Dressing Assessment/Training   [place] Level (Lower Body Dressing) supervision   Instrumental Activities of Daily Living (IADL)   Previous Responsibilities meal prep;medication management;finances  (hasn't driven for a month)   IADL Comments HHA 5x/wk to bring pt to dialysis, shopping, cleaning and laundry.    Clinical Impression   Criteria for Skilled Therapeutic Interventions Met (OT) yes   OT Diagnosis impaired ADL's and functional mobility   OT Problem List-Impairments impacting ADL problems related to;activity tolerance impaired;balance;strength;pain  (Occasional LE pain limiting walking, etc)   Assessment of Occupational Performance 1-3 Performance Deficits   Identified Performance Deficits impaired I with  tub/shower transfer, meal prep, etc   Planned Therapy Interventions (OT) ADL retraining;transfer training;home program guidelines;progressive activity/exercise   Clinical Decision Making Complexity (OT) low complexity   Therapy Frequency (OT) 1x eval and treat   Risk & Benefits of therapy have been explained evaluation/treatment results reviewed;care plan/treatment goals reviewed;risks/benefits reviewed;current/potential barriers reviewed;participants voiced agreement with care plan;participants included;patient   OT Discharge Planning    OT Discharge Recommendation (DC Rec) Home with assist;home with home care occupational therapy   OT Rationale for DC Rec REcommend cont'd A with IADL's and HHA to a [facility] shopping, laundry, cleaning, etc 4x/wk and cont'd home OT and PT. No further inpt OT needs warranted as pt mod I/S with dressing, toilet transfer, etc and has all needed DME, will defer ambulation and strengthening to PT.    OT Brief overview of current status  Pt ambulated household distances pt reports B LE pain this is baseline 3/10 pain, denies any dizziness, etc and BP dropped to 78/32, after rest BP rebounded. nursing notified, see vs flow sheet for details.    Total Evaluation Time (Minutes)   Total Evaluation Time (Minutes) 10

## 2021-03-19 NOTE — PLAN OF CARE
Occupational Therapy Discharge Summary    Reason for therapy discharge:    All goals and outcomes met, no further needs identified.    Progress towards therapy goal(s). See goals on Care Plan in [facility] electronic health record for goal details.  Goals met    Therapy recommendation(s):    Continued therapy is recommended.  Rationale/Recommendations:  REcommend cont'd A with IADL's and HHA to a [facility] shopping, laundry, cleaning, etc 4x/wk and cont'd home OT and PT. No further inpt OT needs warranted as pt mod I/S with dressing, toilet transfer, etc and has all needed DME, will defer ambulation and strengthening to PT while inpt. .

## 2021-03-19 NOTE — PROGRESS NOTES
Potassium   Date Value Ref Range Status   03/19/2021 3.8 3.4 - 5.3 mmol/L Final     Hemoglobin   Date Value Ref Range Status   03/19/2021 9.9 (L) 13.3 - 17.7 g/dL Final     Creatinine   Date Value Ref Range Status   03/19/2021 4.03 (H) 0.66 - 1.25 mg/dL Final     Urea Nitrogen   Date Value Ref Range Status   03/19/2021 23 7 - 30 mg/dL Final     Sodium   Date Value Ref Range Status   03/19/2021 140 133 - 144 mmol/L Final     INR   Date Value Ref Range Status   10/15/2020 1.31 (H) 0.86 - 1.14 Final       DIALYSIS PROCEDURE NOTE  Hepatitis status of previous patient on machine log was checked and verified ok to use with this patients hepatitis status.  Patient dialyzed for 2 hrs. (per MD verbal order) on a 4 K bath (per MD verbal order) with a net fluid removal of  0.8L.  A BFR of 450 ml/min was obtained via a upper LAVF using 15gauge needles.      The treatment plan was discussed with Dr. Granger during the treatment.    Total heparin received during the treatment: 0 units.   Needle cannulation sites held x 10 min.     Meds  given: hecterol 4mcg   Complications: none      Person educated: patient. Knowledge base substantia;. Barriers to learning: none. Educated on access care verbally. Patient verbalized understanding. Pt prefers verbal education style.     ICEBOAT? Timeout performed pre-treatment  I: Patient was identified using 2 identifiers  C:  Consent Signed Yes  E: Equipment preventative maintenance is current and dialysis delivery system OK to use  B: Hepatitis B Surface Antigen: Negative; Draw Date: 3/18/2021      Hepatitis B Surface Antibody: Susceptible; Draw Date: 3/18/2021  O: Dialysis orders present and complete prior to treatment  A: Vascular access verified and assessed prior to treatment  T: Treatment was performed at a clinically appropriate time  ?: Patient was allowed to ask questions and address concerns prior to treatment  See flowsheet in EPIC for further details and post assessment.  Machine  water alarm in place and functioning. Transducer pods intact and checked every 15min.   Pt returned via bed.  Chlorine/Chloramine water system checked every 4 hours.  Outpatient Dialysis MWF

## 2021-03-19 NOTE — PLAN OF CARE
Pt returned fm HD ready to discharge home. Given instructions et med list. Pt very familiar with his medications and schedule, particularly around his dialysis days. IV dc'd. Belongings with pt. Lynn, friend, coming to take pt home. Will resume normal PT/OT schedule as had prior to admit.

## 2021-03-19 NOTE — PROGRESS NOTES
[facility]    EP  Progress Note    Date of Service (when I saw the patient): 03/19/2021     Assessment & Plan   Westley Ness is a 75 year old male who was admitted on 3/17/2021 for elevated HR and low BP at home.  In ER, noted to have coarse AFib in 90s. Dr. Mejia did EP consult on 3/18.    1. H/o paroxysmal AFib and atrial flutter -   - S/p AFlutter ablation 6/2018 and redo 12/2017  - Noted to have coarse AFib but PE more suggestive of flutter.   - Med titration difficult d/t borderline BPs  - Dr. Mejia noted EP Study with possible DCCV vs AFlutter ablation but pt declined, especially as he'd missed a few doses of Eliquis and would need VIET    - Dr. Mejia rec'd rate control for now, with plans for OP follow-up to determine if any intervention required.    - Currently on metoprolol Xl 12.5 mg daily, Mexiletine 150 mg BID (for VT) and tele continues to show coarse AFib in 70-90s  - Last device check 3/4 showed what appeared to be brief episodes of PAT ,10s     PLAN:   1. At this point will continue rate control with BB and AC with low dose Eliquis 2.5 mg BID (on HD). OK for discharge from EP perspective.     2. Close follow-up to ensure doing well. EF 21% so want to ensure no fluid overload.    3. Will have a virtual visit with him 4/2.  At that time, will review remote device check to assess HR control and long term plan.  If Eliquis not on board x 1 m, will need VIET before any interventions.    2. CAD with ischemic CM 25% -   - H/o chronically occluded LAD. In 7/2016, had NSTEMI with pCx and OM1 branch FOREST.   - Most recently, had FOREST to OM1 10/2020 and remains on Plavix until 10/19/2021. Dr. Sen plans to switch to ASA 81 mg daily at that time  - PTA on Plavix 75, Imdur 15 mg Russo, T, Th, lisinopril; 2.5 mg BID, metoprolol Xl 12.5 mg daily, pravastatin 40 mg daily and mexiletine 150 mg BID    - Saw Dr. Sen 2/2021 (virtual) No changes made at that time, with plans for 6 m follow-up with  "echo.      PLAN:   1. Continue routine follow-up       Brooklynn Anthonyjan Griffith PA-C    Interval History   Feeling \"better\"  Notes that when BP was low and HR was high (what prompted him to present to ER), he felt \"rotten\" but that only lasted x 30-40 seconds.  Now, he feels \"back to nl.\"    Physical Exam   Temp: 97.7  F (36.5  C) Temp src: Axillary BP: 113/65 Pulse: 101   Resp: 16 SpO2: 98 % O2 Device: None (Room air)    Vitals:    03/17/21 1245 03/18/21 1225 03/18/21 1818   Weight: 77 kg (169 lb 12.1 oz) 77 kg (169 lb 12.1 oz) 75.8 kg (167 lb 3.2 oz)     Vital Signs with Ranges  Temp:  [96.5  F (35.8  C)-98.2  F (36.8  C)] 97.7  F (36.5  C)  Pulse:  [] 101  Resp:  [16-18] 16  BP: ()/(32-70) 113/65  SpO2:  [94 %-98 %] 98 %  I/O last 3 completed shifts:  In: 240 [P.O.:240]  Out: 1000 [Other:1000]    Telemetry: AFib 80-100s (hasn't gotten metoprolol yet)    Constitutional: awake, alert, cooperative, no apparent distress, and appears stated age  Respiratory: Decreased BS bilatearlly  Cardiovascular: Irregular. HR 80s. Soft SM noted (has known TR)  Musculoskeletal: No sig edema    Medications       - MEDICATION INSTRUCTIONS for Dialysis Patients -   Does not apply See Admin Instructions     acetaminophen  1,000 mg Oral TID     apixaban ANTICOAGULANT  2.5 mg Oral BID     clopidogrel  75 mg Oral Daily     epoetin wei-epbx (RETACRIT) inj ESRD  2,000 Units Intravenous Once in dialysis     famotidine  10 mg Oral Daily     gabapentin  100 mg Oral TID     isosorbide mononitrate  15 mg Oral QPM     levothyroxine  50 mcg Oral Daily     lisinopril  2.5 mg Oral BID     metoprolol succinate ER  12.5 mg Oral Daily     mexiletine  150 mg Oral BID     pantoprazole  40 mg Oral QAM AC     pravastatin  40 mg Oral At Bedtime     vitamin D3  50 mcg Oral Daily       Data   I personally reviewed the EKG tracing showing AFib 82 bpm (3/18).  Results for orders placed or performed during the hospital encounter of 03/17/21 (from " the past 24 hour(s))   Echocardiogram Complete    Narrative    [id number]  [id number]  [id number]  [id number]^WILD^KAELA^[facility]        [facility]  [facility]  Echocardiography Laboratory  [address]  [place] [address]  Phone [phone]  Fax [phone]        Name: SOCORRO LÓPEZ  MRN: [id number]  : 1945  Study Date: 2021 10:24 AM  Age: 75 yrs  Gender: Male  Patient Location: [facility]  Reason For Study: Arrhythmia, CHF  Ordering Physician: KAELA ROME  Referring Physician: KAELA ROME  Performed By: ARSENIO Huffman     BSA: 1.9 m2  Height: 68 in  Weight: 169 lb  HR: 81  BP: 101/63 mmHg  _____________________________________________________________________________  __     Procedure  Complete Portable Echo Adult. [employer] (NDC #[id number]) given intravenously.     _____________________________________________________________________________  __        Interpretation Summary     Severely decreased left ventricular systolic function  LVEF 21% based on biplane 2D tracing.  Moderately decreased right ventricular systolic function  There is moderate (2+) tricuspid regurgitation.  Right ventricular systolic pressure is elevated, consistent with severe  pulmonary hypertension.  The study was technically difficult. Compared to the prior study dated 10/20,  there have been no changes.  _____________________________________________________________________________  __        Left Ventricle  The left ventricle is normal in size. There is normal left ventricular wall  thickness. Severely decreased left ventricular systolic function. LVEF 21%  based on biplane 2D tracing. Diastolic function not assessed due to atrial  fibrillation. There is severe global hypokinesia of the left ventricle. There  is no thrombus seen in the left ventricle.     Right Ventricle  The right ventricle is mildly dilated. Moderately decreased right ventricular  systolic function. There is  a pacemaker lead in the right ventricle.     Atria  The left atrium is severely dilated. The right atrium is mild to moderately  dilated.     Mitral Valve  The mitral valve leaflets are mildly thickened. There is mild to moderate  mitral annular calcification. There is trace to mild mitral regurgitation.     Tricuspid Valve  There is moderate (2+) tricuspid regurgitation. The right ventricular systolic  pressure is approximated at 65.9 mmHg plus the right atrial pressure. Right  ventricular systolic pressure is elevated, consistent with severe pulmonary  hypertension. IVC diameter >2.1 cm collapsing <50% with sniff suggests a high  RA pressure estimated at 15 mmHg or greater.        Aortic Valve  There is mild trileaflet aortic sclerosis. No aortic stenosis is present.     Pulmonic Valve  The pulmonic valve is not well seen, but is grossly normal. There is mild (1+)  pulmonic valvular regurgitation.     Vessels  Mild aortic root dilatation. The ascending aorta is Borderline dilated.     Pericardium  There is no pericardial effusion.     Rhythm  The rhythm was atrial fibrillation with paced rhythm.     _____________________________________________________________________________  __  MMode/2D Measurements & Calculations  IVSd: 0.96 cm  LVIDd: 5.3 cm  LVIDs: 5.0 cm  LVPWd: 0.98 cm  FS: 6.6 %  LV mass(C)d: 194.9 grams  LV mass(C)dI: 102.4 grams/m2  Ao root diam: 4.0 cm     asc Aorta Diam: 3.8 cm  LVOT diam: 2.5 cm  LVOT area: 4.8 cm2  LA Volume (BP): 108.0 ml  LA Volume Index (BP): 56.8 ml/m2  RWT: 0.37        Doppler Measurements & Calculations  MV E max zach: 99.0 cm/sec  MV max P.5 mmHg  MV mean P.7 mmHg  MV V2 VTI: 30.9 cm  MVA(VTI): 1.6 cm2  MV dec time: 0.17 sec     LV V1 max P.9 mmHg  LV V1 max: 63.4 cm/sec  LV V1 VTI: 10.6 cm  SV(LVOT): 50.8 ml  SI(LVOT): 26.7 ml/m2  PA acc time: 0.07 sec  TR max zach: 406.0 cm/sec  TR max P.9 mmHg  E/E' av.0  Lateral E/e': 10.8  Medial E/e': 23.2            _____________________________________________________________________________  __           Report approved by: Mariana Jaimes 03/18/2021 01:59 PM      EKG 12-lead, tracing only   Result Value Ref Range    Interpretation ECG Click View Image link to view waveform and result    Hepatitis B surface antigen   Result Value Ref Range    Hep B Surface Agn Nonreactive NR^Nonreactive   Hepatitis B Surface Antibody   Result Value Ref Range    Hepatitis B Surface Antibody 3.08 <8.00 m[IU]/mL   Comprehensive metabolic panel   Result Value Ref Range    Sodium 140 133 - 144 mmol/L    Potassium 3.8 3.4 - 5.3 mmol/L    Chloride 105 94 - 109 mmol/L    Carbon Dioxide 28 20 - 32 mmol/L    Anion Gap 7 3 - 14 mmol/L    Glucose 132 (H) 70 - 99 mg/dL    Urea Nitrogen 23 7 - 30 mg/dL    Creatinine 4.03 (H) 0.66 - 1.25 mg/dL    GFR Estimate 14 (L) >60 mL/min/[1.73_m2]    GFR Estimate If Black 16 (L) >60 mL/min/[1.73_m2]    Calcium 8.7 8.5 - 10.1 mg/dL    Bilirubin Total 1.2 0.2 - 1.3 mg/dL    Albumin 2.9 (L) 3.4 - 5.0 g/dL    Protein Total 5.4 (L) 6.8 - 8.8 g/dL    Alkaline Phosphatase 341 (H) 40 - 150 U/L    ALT 21 0 - 70 U/L    AST 13 0 - 45 U/L   GGT   Result Value Ref Range    GGT 88 (H) 0 - 75 U/L   CBC with platelets   Result Value Ref Range    WBC 4.1 4.0 - 11.0 10e9/L    RBC Count 3.05 (L) 4.4 - 5.9 10e12/L    Hemoglobin 9.9 (L) 13.3 - 17.7 g/dL    Hematocrit 32.1 (L) 40.0 - 53.0 %     (H) 78 - 100 fl    MCH 32.5 26.5 - 33.0 pg    MCHC 30.8 (L) 31.5 - 36.5 g/dL    RDW 16.2 (H) 10.0 - 15.0 %    Platelet Count 104 (L) 150 - 450 10e9/L   Hemoglobin A1c   Result Value Ref Range    Hemoglobin A1C 5.1 0 - 5.6 %   Lactic acid level STAT   Result Value Ref Range    Lactate for Sepsis Protocol 1.9 0.7 - 2.0 mmol/L

## 2021-03-19 NOTE — PLAN OF CARE
"Pt A&Ox4, VSS on RA. Pain managed with scheduled tylenol. PRN tramadol available. Patient reports pain is transient stating \"it feels like a mouse running around.\" Tele Afib CVR. Up SBA with walker. Continent B&B. Continue to monitor.  "

## 2021-03-19 NOTE — PROGRESS NOTES
Care Management Discharge Note    Discharge Date: 03/19/21(vs 3/20 home w/ resumption of HHC)       Discharge Disposition:      Discharge Services:      Discharge DME:      Discharge Transportation: family or friend will provide    Private pay costs discussed: Not applicable    PAS Confirmation Code:    Patient/family educated on Medicare website which has current facility and service quality ratings:      Patient/Family in Agreement with the Plan:  yes  Handoff Referral Completed: Yes    Additional Information:  Patient to discharge home with resumption of homecare services thru [employer].  [phone] and spoke with Theresa to alert them of patient's discharge and resumption of RN/PT/OT services.  Orders faxed to [employer] at: [phone].  Message also left with Mendy ([phone]) who is the  for [employer].   Dr Kolb's (PCP) team to contact patient to schedule hospital follow up. Cardiology follow up scheduled and added to AVS.    Carley Guan RN  Care Coordinator  [facility]  [phone] (text or call)

## 2021-03-19 NOTE — DISCHARGE SUMMARY
Discharge Summary  Hospitalist    Date of Admission:  3/17/2021  Date of Discharge:  3/19/2021  Discharging Provider: Art Castorena MD    Primary Care Physician   Josselin Kolb  Primary Care Provider Phone Number: [phone]  Primary Care Provider Fax Number: [phone]    PRINCIPAL DIAGNOSIS  Recurrent episodes of A. fib/flutter with RVR.  Ischemic cardiomyopathy with previous EF of 21%.  Moderate TR, severe pulmonary hypertension  End-stage renal disease on hemodialysis Monday, Wednesday, and Friday.  Incidental liver findings.  Physical deconditioning in the setting of medical illness, senile fragility.    Past Medical History:   Diagnosis Date     Acid reflux disease      Benign essential hypertension      CAD (coronary artery disease)     s/p multiple NSTEMIs and PCI's     ESRD on hemodialysis (H)     MWF     History of atrial flutter     s/p ablation     Hypothyroidism      Ischemic cardiomyopathy     EF 25-30%, s/p AICD     Type 2 diabetes mellitus (H)     diet-controlled       History of Present Illness   Westley Ness is an 75 year old male who presented with weakness and low bp     Hospital Course     Westley Ness is a 75 year old male with history of coronary artery disease, ischemic cardiomyopathy with EF of 20-25% on his last echo in 10/2020.  End-stage renal disease on hemodialysis Monday/Wednesday/Friday, diabetes mellitus type 2, GERD, atrial flutter on chronic anticoagulation with apixaban, status post ICD placement, and hypertension admitted on 3/17/2021 with increased heart rate and low blood pressure, and generalized weakness.     Recurrent episodes of A. fib/flutter with RVR.  History of atrial fibrillation on anticoagulation.  Status post ablation x2.  2017.  History of nonsustained V. tach status post AICD placement in the 8/2011.    Patient reports did not take his metoprolol on the day of admission as he usually takes it after dialysis as his blood pressure was low.  Was  admitted to observation unit and subsequently switched to inpatient.  Telemetry during hospitalization with MAXX motley with RVR.  Systolic blood pressures fluctuating between   Troponin x3 stable around 0.025.  Echocardiogram with severe LV dysfunction.  Was evaluated by EP cardiology, recommend to continue rate control strategy with metoprolol.  Continue PTA apixaban  Continue PTA mexiletine.  Follow-up in cardiology clinic per schedule.  Check TSH on PCP visit.     Coronary artery disease status post stenting.  Ischemic cardiomyopathy with previous EF of 21%.  Moderate TR, severe pulmonary hypertension  Hypertension, hyperlipidemia.  Last angiogram 10/2020, showed subtotal occlusion of left anterior descending (LAD) with known infarct area.  There was patent proximal left circumflex at that time.  There was severe stenosis of the large OM1 distal to the stent, which was stented, obtuse marginal #1 with drug-eluting stent at that time.   Echocardiogram with estimated EF of 21%, moderate TR, severe pulmonary hypertension  Patient euvolemic, not on diuresis.  Evaluated by EP cardiology, recommend to continue PTA regimen.  Continue PTA Imdur  Continue PTA Plavix and apixaban.  Continue PTA metoprolol and Pravachol.  Monitor blood pressure, heart rate at home, daily weights and review on provider visit.  Risk factor modification as able to, renal dialysis diet.  Fluid restriction to 2000 mL/day.     End-stage renal disease on hemodialysis Monday, Wednesday, and Friday.  Missed dialysis on Monday  US with Atrophic right kidney with echogenic renal cortex consistent with chronic medical renal disease. Multiple right renal cortical cysts.  Followed by nephrology during hospitalization, had dialysis on 3/18, 3/19.  Continue dialysis per schedule as outpatient.     Incidental liver findings, pulmonary hypertension on echocardiogram.  Ultrasound abdomen on admission with Coarsened echotexture of the liver with nodular  surface contour consistent with a component of cirrhosis and/or fibrosis. Small amount of perihepatic ascites. No definite biliary ductal dilatation by ultrasound. If concern persists consider MRCP.  Echocardiogram with moderate TR, severe pulmonary hypertension.  Elevated alkaline phosphatase of 399, bilirubin 1.4.  GGT 88.  Right upper quadrant tenderness.  No nausea vomiting.  No abdominal pain.  Follow-up ultrasound in 12 months or earlier if symptomatic.  Check liver function tests in 6 to 8 weeks.    Hypothyroidism.  Continue PTA levothyroxine.  Check TSH on PCP visit.     History of diet-controlled diabetes mellitus.  PTA not on meds.  Age-appropriate health maintenance on PCP visit.     Gastroesophageal reflux disease.  Continue PTA Protonix.     Anemia of chronic disease.  Baseline hemoglobin between 10-11.  Received iron infusion with dialysis.  Monitor hemoglobin level periodically.     Physical deconditioning in the setting of medical illness, senile fragility.  OT - recommend home OT, will request for resumption of home care services.     History of left hip pain left knee pain likely due to osteoarthritis.  Continue PTA Tylenol.  Discontinued PTA Flexeril     Art Castorena MD, MD    Pending Results   Unresulted Labs Ordered in the Past 30 Days of this Admission     No orders found from 2/15/2021 to 3/18/2021.             Physical Exam   Vitals:    03/17/21 1245 03/18/21 1225 03/18/21 1818   Weight: 77 kg (169 lb 12.1 oz) 77 kg (169 lb 12.1 oz) 75.8 kg (167 lb 3.2 oz)     Vital Signs with Ranges  Temp:  [96.2  F (35.7  C)-97.7  F (36.5  C)] 96.2  F (35.7  C)  Pulse:  [] 82  Resp:  [16-18] 16  BP: ()/(32-68) 91/32  SpO2:  [94 %-99 %] 99 %  I/O last 3 completed shifts:  In: -   Out: 1000 [Other:1000]  PHYSICAL EXAM  GENERAL: Patient is in no distress. Alert and oriented.  HEART: irregular rate and rhythm. S1S2.   LUNGS: Bilateral slightly decreased breath sounds.  Respirations  unlabored  ABDOMEN: Soft, no abdominal tenderness, bowel sounds heard   NEURO moving all extremities.  EXTREMITIES: No pedal edema.   SKIN: Warm, dry. No rash  PSYCHIATRY Cooperative    )Consultations This Hospital Stay   CARE MANAGEMENT / SOCIAL WORK IP CONSULT  PHYSICAL THERAPY ADULT IP CONSULT  OCCUPATIONAL THERAPY ADULT IP CONSULT  NEPHROLOGY IP CONSULT  CARDIOLOGY IP CONSULT  ELECTROPHYSIOLOGY IP CONSULT    Time Spent on this Encounter   Art CARMONA MD, personally saw the patient today and spent greater than 30 minutes discharging this patient.    Discharge Orders      Discharge Order: F/U with Cardiac  [facility]      Home Care OT Referral for Hospital Discharge      Reason for your hospital stay    You were admitted to the hospital with increased heart rate and low blood pressure.  Evaluated by EP cardiology, recommended continued medical management.     Monitor and record    Monitor weights periodically if able to and review on provider visit.  Monitor daily blood pressure, heart rate and review on provider visit for optimization of regimen.     Activity    Your activity upon discharge: activity as tolerated and no driving for today     Discharge Instructions    Fall precautions     Follow-up and recommended labs and tests     Follow up with primary care provider, Josselin Kolb, within 7-14 days for hospital follow- up. Check TSH on PCP visit.  Follow-up with cardiology per schedule.  Follow-up with nephrology per schedule for dialysis.   Age-appropriate health maintenance on PCP visit.       Noted incidentally coarsened echotexture of the liver on echocardiogram.  Follow-up ultrasound in 12 months or earlier if symptomatic.  Follow liver function tests in 6 weeks.     Diet    Renal dialysis diet, sodium restriction to 2 g       Discharge Medications   Current Discharge Medication List      CONTINUE these medications which have NOT CHANGED    Details   acetaminophen (TYLENOL) 500 MG tablet Take 1,000  mg by mouth 3 times daily       apixaban ANTICOAGULANT (ELIQUIS ANTICOAGULANT) 2.5 MG tablet Take 1 tablet (2.5 mg) by mouth 2 times daily  Qty: 180 tablet, Refills: 3    Associated Diagnoses: Atrial fibrillation (H)      clopidogrel (PLAVIX) 75 MG tablet Take 1 tablet (75 mg) by mouth daily  Qty: 60 tablet, Refills: 1    Associated Diagnoses: Coronary artery disease involving native coronary artery, angina presence unspecified, unspecified whether native or transplanted heart; S/P drug eluting coronary stent placement      famotidine (PEPCID) 20 MG tablet Take 20 mg by mouth daily       gabapentin (NEURONTIN) 100 MG capsule Take 1 capsule (100 mg) by mouth 3 times daily  Qty: 270 capsule, Refills: 3    Associated Diagnoses: Difficulty walking      !! isosorbide mononitrate (IMDUR) 30 MG 24 hr tablet Take 1 tablet (30 mg) the evening of dialysis days (Mon, Wed, Fri)  and Saturdays.  Qty: 48 tablet, Refills: 3    Associated Diagnoses: Coronary artery disease involving native coronary artery of native heart without angina pectoris      !! isosorbide mononitrate (IMDUR) 30 MG 24 hr tablet Take 15 mg by mouth Take in the evening on the days prior dialysis. Take on Sunday, Tuesday and Thursday      levothyroxine (SYNTHROID/LEVOTHROID) 50 MCG tablet TAKE 1 TABLET (50 MCG) BY MOUTH DAILY  Qty: 90 tablet, Refills: 3    Associated Diagnoses: Hypothyroidism, unspecified type      lisinopril (ZESTRIL) 2.5 MG tablet TAKE 1 TABLET (2.5 MG) BY MOUTH 2 TIMES DAILY (TAKE ONE TABLET AFTER DIALYSIS. TAKE SECOND TABLET AT BEDTIME.)  Qty: 180 tablet, Refills: 3    Associated Diagnoses: NSTEMI (non-ST elevated myocardial infarction) (H)      !! loperamide (IMODIUM A-D) 2 MG tablet Take 2 mg by mouth three times a week MWF on dialysis days      !! loperamide (IMODIUM A-D) 2 MG tablet Take 1 mg by mouth five times a week On non-dialysis days - Tu, Th, Sa, Russo      metoprolol succinate ER (TOPROL-XL) 25 MG 24 hr tablet Take 0.5 tablets  (12.5 mg) by mouth daily  Qty: 45 tablet, Refills: 3    Associated Diagnoses: Coronary artery disease involving native coronary artery of native heart with angina pectoris (H)      mexiletine (MEXITIL) 150 MG capsule Take 1 capsule (150 mg) by mouth 2 times daily  Qty: 180 capsule, Refills: 3    Associated Diagnoses: Tachycardia      multivitamin RENAL (NEPHROCAPS/TRIPHROCAPS) 1 MG capsule Take 1 capsule by mouth daily  Qty: 90 capsule, Refills: 3    Associated Diagnoses: ESRD on hemodialysis (H)      pantoprazole (PROTONIX) 40 MG EC tablet TAKE 1 TABLET (40 MG) BY MOUTH EVERY MORNING  Qty: 30 tablet, Refills: 9    Associated Diagnoses: Gastroesophageal reflux disease, esophagitis presence not specified      pravastatin (PRAVACHOL) 40 MG tablet Take 40 mg by mouth At Bedtime      vitamin D3 (CHOLECALCIFEROL) 50 mcg (2000 units) tablet Take 1 tablet by mouth daily      nitroGLYcerin (NITROSTAT) 0.4 MG sublingual tablet Place 1 tablet (0.4 mg) under the tongue every 5 minutes as needed for chest pain UP TO 3 PER EPISODE  Qty: 20 tablet, Refills: 0    Associated Diagnoses: Angina pectoris (H)       !! - Potential duplicate medications found. Please discuss with provider.      STOP taking these medications       cyclobenzaprine (FLEXERIL) 5 MG tablet Comments:   Reason for Stopping:             Allergies   Allergies   Allergen Reactions     Contrast Dye Hives     Does fine if he uses benadryl prior.     No Clinical Screening - See Comments      Green beans - Diarrhea.    Topical antibiotic - name unknown - caused swelling of the penis.       Discharge Disposition   Discharged to home  Condition at discharge: Good    DATA  Most Recent 3 CBC's:  Recent Labs   Lab Test 03/19/21  0601 03/18/21  1005 03/17/21  1257   WBC 4.1 4.7 5.2   HGB 9.9* 10.5* 10.8*   * 105* 104*   * 123* 140*      Most Recent 3 BMP's:  Recent Labs   Lab Test 03/19/21  0601 03/18/21  1005 03/17/21  1257    141 142   POTASSIUM 3.8  3.5 3.5   CHLORIDE 105 106 104   CO2 28 27 29   BUN 23 31* 22   CR 4.03* 4.90* 3.99*   ANIONGAP 7 8 9   CHRISTINE 8.7 9.0 9.0   * 126* 131*     Most Recent 2 LFT's:  Recent Labs   Lab Test 03/19/21  0601 03/17/21  1257   AST 13 20   ALT 21 23   ALKPHOS 341* 399*   BILITOTAL 1.2 1.4*       Most Recent 3 Troponin's:  Recent Labs   Lab Test 03/17/21  2150 03/17/21  1859 03/17/21  1257   TROPI 0.025 0.029 0.027     Most Recent TSH, T4 and A1c Labs:  Recent Labs   Lab Test 03/19/21  0601 03/26/19  0847 03/26/19  0847   TSH  --   --  4.40*   A1C 5.1   < > 5.1    < > = values in this interval not displayed.     Results for orders placed or performed during the hospital encounter of 03/17/21   Chest XR,  PA & LAT    Narrative    CHEST TWO VIEWS March 17, 2021 2:52 PM     HISTORY: Atrial fibrillation, evaluate for heart failure.     COMPARISON: Chest x-ray on 1/11/2021.      Impression    IMPRESSION: AP and lateral views of the chest were obtained. Right  chest wall cardiac pacer and leads are in stable position. Stable  enlargement of the cardiac silhouette and mild pulmonary vascular  congestion. Small bilateral pleural effusions and associated basilar  atelectasis/consolidation. No significant pneumothorax.    CROW HARTMANN MD   US Abdomen Limited    Narrative    EXAM: US ABDOMEN LIMITED  LOCATION: [facility]  DATE/TIME: 3/17/2021 10:27 PM    INDICATION: Elevated liver function tests.  COMPARISON: None.  TECHNIQUE: Limited abdominal ultrasound.    FINDINGS:    GALLBLADDER: Absent.    BILE DUCTS: No definite intrahepatic ductal dilatation. The common duct measures 7 mm.    LIVER: Coarsened echotexture, suggesting underlying fibrosis. Nodular contour. No focal mass demonstrated. There is a small amount of perihepatic ascites.    RIGHT KIDNEY: Diffuse cortical thinning and increased cortical echogenicity. There are multiple right renal cortical cysts measuring up to 3.7 cm.    PANCREAS: The visualized  portions are normal.    Small amount of ascites.      Impression    IMPRESSION:  1.  Coarsened echotexture of the liver with nodular surface contour consistent with a component of cirrhosis and/or fibrosis. Small amount of perihepatic ascites.    2.  No definite biliary ductal dilatation by ultrasound. If concern persists consider MRCP.    3.  Atrophic right kidney with echogenic renal cortex consistent with chronic medical renal disease. Multiple right renal cortical cysts.       Echocardiogram Complete    Narrative    [id number]  [id number]  [id number]  [id number]^WILD^KAELA^[facility]        [facility]  Echocardiography Laboratory  [address]  [address]  [place] [address]  Phone [phone]  Fax [phone]        Name: SOCORRO LÓPEZ  MRN: [id number]  : 1945  Study Date: 2021 10:24 AM  Age: 75 yrs  Gender: Male  Patient Location: [facility]  Reason For Study: Arrhythmia, CHF  Ordering Physician: KAELA ROME  Referring Physician: KAELA ROME  Performed By: ARSENIO Huffman     BSA: 1.9 m2  Height: 68 in  Weight: 169 lb  HR: 81  BP: 101/63 mmHg  _____________________________________________________________________________  __     Procedure  Complete Portable Echo Adult. Optison (NDC #8321-6741) given intravenously.     _____________________________________________________________________________  __        Interpretation Summary     Severely decreased left ventricular systolic function  LVEF 21% based on biplane 2D tracing.  Moderately decreased right ventricular systolic function  There is moderate (2+) tricuspid regurgitation.  Right ventricular systolic pressure is elevated, consistent with severe  pulmonary hypertension.  The study was technically difficult. Compared to the prior study dated 10/20,  there have been no changes.  _____________________________________________________________________________  __        Left Ventricle  The left  ventricle is normal in size. There is normal left ventricular wall  thickness. Severely decreased left ventricular systolic function. LVEF 21%  based on biplane 2D tracing. Diastolic function not assessed due to atrial  fibrillation. There is severe global hypokinesia of the left ventricle. There  is no thrombus seen in the left ventricle.     Right Ventricle  The right ventricle is mildly dilated. Moderately decreased right ventricular  systolic function. There is a pacemaker lead in the right ventricle.     Atria  The left atrium is severely dilated. The right atrium is mild to moderately  dilated.     Mitral Valve  The mitral valve leaflets are mildly thickened. There is mild to moderate  mitral annular calcification. There is trace to mild mitral regurgitation.     Tricuspid Valve  There is moderate (2+) tricuspid regurgitation. The right ventricular systolic  pressure is approximated at 65.9 mmHg plus the right atrial pressure. Right  ventricular systolic pressure is elevated, consistent with severe pulmonary  hypertension. IVC diameter >2.1 cm collapsing <50% with sniff suggests a high  RA pressure estimated at 15 mmHg or greater.        Aortic Valve  There is mild trileaflet aortic sclerosis. No aortic stenosis is present.     Pulmonic Valve  The pulmonic valve is not well seen, but is grossly normal. There is mild (1+)  pulmonic valvular regurgitation.     Vessels  Mild aortic root dilatation. The ascending aorta is Borderline dilated.     Pericardium  There is no pericardial effusion.     Rhythm  The rhythm was atrial fibrillation with paced rhythm.     _____________________________________________________________________________  __  MMode/2D Measurements & Calculations  IVSd: 0.96 cm  LVIDd: 5.3 cm  LVIDs: 5.0 cm  LVPWd: 0.98 cm  FS: 6.6 %  LV mass(C)d: 194.9 grams  LV mass(C)dI: 102.4 grams/m2  Ao root diam: 4.0 cm     asc Aorta Diam: 3.8 cm  LVOT diam: 2.5 cm  LVOT area: 4.8 cm2  LA Volume (BP): 108.0  ml  LA Volume Index (BP): 56.8 ml/m2  RWT: 0.37        Doppler Measurements & Calculations  MV E max zach: 99.0 cm/sec  MV max P.5 mmHg  MV mean P.7 mmHg  MV V2 VTI: 30.9 cm  MVA(VTI): 1.6 cm2  MV dec time: 0.17 sec     LV V1 max P.9 mmHg  LV V1 max: 63.4 cm/sec  LV V1 VTI: 10.6 cm  SV(LVOT): 50.8 ml  SI(LVOT): 26.7 ml/m2  PA acc time: 0.07 sec  TR max zach: 406.0 cm/sec  TR max P.9 mmHg  E/E' av.0  Lateral E/e': 10.8  Medial E/e': 23.2           _____________________________________________________________________________  __           Report approved by: Mariana Jaimes 2021 01:59 PM

## 2021-03-19 NOTE — PROGRESS NOTES
Renal Medicine Inpatient Dialysis Note                                Westley Ness MRN# [id number]   Age: 75 year old YOB: 1945   Date of Admission: 3/17/2021 Hospital LOS: 1          Assessment/Plan:     1.  ESRD              -MWF schedule              -Anderson Oakes              -Dr. Hammond              -AVF              -target 77 kg  2.  Anemia              -OSBALDO              -weekly Fe  3.  Mineral Bone Disease              -Hectorol 2.5  4.  Afib/flutter      MWF schedule      Interval History:     Dialysis run parameters reviewed with dialysis RN at patient bedside    2 hour  4K  0.8 liter UF  #2 UF profile  Hectorol     Stable initial portion of run     ROS     GENERAL: NAD, No fever,chills  R: NEGATIVE for significant cough or SOB  CV: NEGATIVE for chest pain, palpitations  EXT: no change edema  ROS otherwise negative    Dialysis Parameters:     Vitals were reviewed  Patient Vitals for the past 8 hrs:   BP Temp Temp src Pulse Resp SpO2   03/19/21 1405 92/68 96.2  F (35.7  C) Axillary 94 16 99 %   03/19/21 0930 113/65 97.7  F (36.5  C) Axillary 101 -- 98 %   03/19/21 0912 92/40 -- -- 117 -- --   03/19/21 0911 (!) 78/32 -- -- 106 -- 95 %   03/19/21 0835 108/65 -- -- 92 -- 97 %   03/19/21 0739 96/58 97.7  F (36.5  C) Axillary 79 16 94 %     I/O last 3 completed shifts:  In: 240 [P.O.:240]  Out: 1000 [Other:1000]    Vitals:    03/17/21 1245 03/18/21 1225 03/18/21 1818   Weight: 77 kg (169 lb 12.1 oz) 77 kg (169 lb 12.1 oz) 75.8 kg (167 lb 3.2 oz)       Current Weight: 77  Dry Weight: 77 outpatient  Dialysis Temp: 36.5  C  Access Device: AVF  Access Site: left  Dialyzer: Revaclear  Dialysis Bath: 4  Sodium Profile: n  UF Goal: 0.8  Blood Flow Rate (mL/min): 400  Total Treatment Time (hrs): 2  Heparin: Low dose as required      EPO dose: y  Zemplar: n  IV Fe: n      Medications and Allergies:     Reviewed      Physical Exam:     Seen and examined during course of dialysis run    BP 92/68   " Pulse 94   Temp 96.2  F (35.7  C) (Axillary)   Resp 16   Ht 1.727 m (5' 8\")   Wt 75.8 kg (167 lb 3.2 oz)   SpO2 99%   BMI 25.42 kg/m      GENERAL: awake, alert, follows  HEENT: NC/AT, PERRLA, EOMI, non icteric, pharynx moist without lesion  RESP: clear  CV: RRR, normal S1 S2  ABDOMEN: S/NT, BS present  MS: no edema  EXT: access canulated without issue    Data:       Recent Labs   Lab 03/19/21  0601      POTASSIUM 3.8   CHLORIDE 105   CO2 28   ANIONGAP 7   *   BUN 23   CR 4.03*   GFRESTIMATED 14*   GFRESTBLACK 16*   CHRISTINE 8.7         G Estevan Granger MD    [facility] - Nephrology  [phone]          "

## 2021-03-19 NOTE — PLAN OF CARE
Pt alert, A1 walker and gait belt, able to move self in bed. Pt denies any symptoms. BP  90's systolic. Afib controlled . Sat Mid 90's on RA. Left AV fistula, another dialysis run today. Possible cardioversion vs ablation redo.

## 2021-03-19 NOTE — PLAN OF CARE
A/O x4. AVSS ex soft BPs at times, on RA. Tele a-fib w/CVR. Denies pain. LS diminished, celar. +1 edema ankle and feet, numbing/tingling BLE - baseline. Assist x1, GB/walker, voiding using urinal. L fistula, WDL. R PIV, SL. Sent to dialysis at 1345, plan to discharge later on today.

## 2021-03-20 NOTE — PLAN OF CARE
Physical Therapy Discharge Summary    Reason for therapy discharge:    Discharged to home with home therapy.    Progress towards therapy goal(s). [facility] for goal details.  Goals not met.  Barriers to achieving goals:   discharge from facility.    Therapy recommendation(s):    Continued therapy is recommended.  Rationale/Recommendations:  Patient will benefit from continued Home PT to progress strength, activity tolerance & optimize functional mobility.

## 2021-03-21 LAB — INTERPRETATION ECG - MUSE: NORMAL

## 2021-03-21 NOTE — PROGRESS NOTES
"Westley Ness is a 75 year old male who is being evaluated via a billable video visit.      How would you like to obtain your AVS? MyChart  If the video visit is dropped, the invitation should be resent by: Send to e-mail at: [email or web address]."[email or web address]  Will anyone else be joining your video visit? No      CC:  Recent hospitalization    VITALS:  106/66  Weight 167# on 3/18    BRIEF HPI:  Art is a 75 year old yo M who sees Dr. Sen and Lacy Taylor NP for h/o:    1. Atrial Arrhythmias - s/p CTI ablation 6/2017. Re-do CTI ablation 12/2017. Admission 3/2021 for AFib.  2. CAD - Known LAD occlusion. NSTEMI 7/2016 with pCx FOREST and OM1 branch FOREST 10/2020 after stress showed lateral wall ischemia.  3. Ischemic CM - EF 25% 10/2020. S/p single chamber Biotronik ICD 2011. Gen change 11/2019. Appropriate therapies for VT in the past  4. H/o VT - on mexiletine. ICD in place with appropriate therapies in past.  5. ESRD - on HD    Dr. Sen had a virtual visit with Art 2/2021 at which time he was stable, noting he had to occasionally hold lisinopril and metoprolol (incorrectly dictated as carvedilol) d/t low BPs. Routine follow-up with Lacy made.    He noted rapid HR starting 3/15 and ended up cancelling HD. Unfortunately, admitted 3/17-19/2021 after feeling \"lousy\" and noting rapid HR and hypotension. He felt better after ~40 seconds, but presented as he still noted rapid HR/low BP. Dr. Mejia was consulted as noted to be in AFib vs AFlutter with RVR.  Initially, EPS with possible DCCV vs ablation considered as rate was difficult to control for hypotension, but pt had missed a few doses of Eliquis.  Rate became controlled on his normal BB therapy, and therefore, rate control strategy rec'd, with close follow-up.    He was DCd home on 3/19 on continued metoprolol XL 12.5 mg daily and mexiletine 150 mg BID; HR on discharge in AFib was 70-90s.    INTERVAL HISTORY:  Overall feeling \"OK\" from a cardiac standpoint on the continued metoprolol " "and mexiletine.  Still with low energy levels.    O2 sats are still good. No c/o \"racing HRs\" like what brought him to the ER.     BPs at home 100-120s. He's adjusted the timing of medications, especially on HD days which she thinks has improved the number of low blood pressures he is been getting.     Still c/o bilateral ankle discomfort and takes Tylenol and gabapentin for this. He follows with Dr. Kolb and sees her later this month.  He feels that is his biggest complaint    DIAGNOSTICS:  Device interrogation 3/3/2021 - 0%  in VVI 40. Episodes of brief PAT <10s noted. 6-15 beats of NSVT @ 150-160 bpm.No therapies.  Echocardiogram 3/2021 - EF 21%. Mildly dilated RV and moderately decreased RVSF. MALI. Mild-mod MAC with trace-mild MR. 2+ TR with RVSP 66+RAP, c/w severe pHTN. High RA pressure. Mild aortic sclerosis. 1+ PI. Borderline dilated Asc Ao and Mild AoRoot dilated.   Angiogram 10/19/2020 - Chronically occluded LAD. Patent pCx and OM stents. Sev stenosis at bifurcation of large OM1 d to previously placed stent txd with OM1 2.25x20 mm FOREST. Mildly elevated LVEDP 17 mmHg.   Stress Test 10/2020 - Mod iscehmia in the lateral segment in the LV. Transmural infarct in ant, apical and anteroseptal LV segments.       REVIEW OF SYSTEMS:  Negative with the exception of that noted above    PHYSICAL EXAM:  /66   There is no height or weight on file to calculate BMI.  Physical Exam  GENERAL: Healthy, alert and no distress  EYES: Eyes grossly normal to inspection.  No discharge or erythema, or obvious scleral/conjunctival abnormalities.  RESP: No audible wheeze, cough, or visible cyanosis.  No visible retractions or increased work of breathing.    SKIN: Visible skin clear. No significant rash, abnormal pigmentation or lesions.  NEURO: Cranial nerves grossly intact.  Mentation and speech appropriate for age.  PSYCH: Mentation appears normal, affect normal/bright, judgement and insight intact, normal speech and " appearance well-groomed.      PLAN:  1. No changes to medication  2. See Lacy (virtually) summer 2021    ASSESSMENT/PLAN:    1. Persistent AFib    Device interrogation 3/4 showed no AFib; called in 3/15 with rapid HR and admitted 3/17 and noted to be in AFib vs Atypical AFlutter with RVR    Biotronik device so cannot send courtesy remotes. LUCY Bean RN has gathered all 24 hour reports to assess rate control, and HRs have been 70-80s at rest and high 80s with activity.  This matches what he gets at home, which is typically 70-100s.      Remains on AC with low dose Eliquis 2.5 mg BID      PLAN:    Continue routine device interrogations    Contact us with any concerns regarding recurrent rapid heart rates    See Lacy as previously ordered.  He prefers virtual visits      2. CAD; EF 21%    OM1 stent placed most recently 10/2020. NSTEMI in 2016 treated with FOREST to Cx and OM    EF 25% chronically    Remains on low-dose beta-blocker (limited due to hypotension), isosorbide 30 (15 mg on dialysis days), Plavix 75 mg daily, lisinopril 2.5 mg twice daily     PLAN:    Per Dr. Sen, Plavix until October 2021, then switch to ASA    Continue current meds      CURRENT MEDICATIONS:  Current Outpatient Medications   Medication Sig Dispense Refill     acetaminophen (TYLENOL) 500 MG tablet Take 1,000 mg by mouth 3 times daily        apixaban ANTICOAGULANT (ELIQUIS ANTICOAGULANT) 2.5 MG tablet Take 1 tablet (2.5 mg) by mouth 2 times daily 180 tablet 3     Cholecalciferol (VITAMIN D3) 50 MCG (2000 UT) TABS TAKE 1 TABLET BY MOUTH EVERY DAY 90 tablet 3     clopidogrel (PLAVIX) 75 MG tablet Take 1 tablet (75 mg) by mouth daily 60 tablet 1     famotidine (PEPCID) 20 MG tablet Take 20 mg by mouth daily        gabapentin (NEURONTIN) 100 MG capsule Take 1 capsule (100 mg) by mouth 3 times daily 270 capsule 3     isosorbide mononitrate (IMDUR) 30 MG 24 hr tablet Take 1 tablet (30 mg) the evening of dialysis days (Mon, Wed, Fri)  and Saturdays. 48  tablet 3     isosorbide mononitrate (IMDUR) 30 MG 24 hr tablet Take 15 mg by mouth Take in the evening on the days prior dialysis. Take on Sunday, Tuesday and Thursday       levothyroxine (SYNTHROID/LEVOTHROID) 50 MCG tablet TAKE 1 TABLET (50 MCG) BY MOUTH DAILY 90 tablet 3     lisinopril (ZESTRIL) 2.5 MG tablet TAKE 1 TABLET (2.5 MG) BY MOUTH 2 TIMES DAILY (TAKE ONE TABLET AFTER DIALYSIS. TAKE SECOND TABLET AT BEDTIME.) 180 tablet 3     loperamide (IMODIUM A-D) 2 MG tablet Take 2 mg by mouth three times a week MWF on dialysis days       loperamide (IMODIUM A-D) 2 MG tablet Take 1 mg by mouth five times a week On non-dialysis days - Tu, Th, Sa, Russo       metoprolol succinate ER (TOPROL-XL) 25 MG 24 hr tablet Take 0.5 tablets (12.5 mg) by mouth daily 45 tablet 3     mexiletine (MEXITIL) 150 MG capsule Take 1 capsule (150 mg) by mouth 2 times daily 180 capsule 3     multivitamin RENAL (NEPHROCAPS/TRIPHROCAPS) 1 MG capsule Take 1 capsule by mouth daily 90 capsule 3     nitroGLYcerin (NITROSTAT) 0.4 MG sublingual tablet Place 1 tablet (0.4 mg) under the tongue every 5 minutes as needed for chest pain UP TO 3 PER EPISODE 20 tablet 0     pantoprazole (PROTONIX) 40 MG EC tablet TAKE 1 TABLET (40 MG) BY MOUTH EVERY MORNING 30 tablet 9     pravastatin (PRAVACHOL) 40 MG tablet Take 40 mg by mouth At Bedtime           ORDERS PLACED:  No orders of the defined types were placed in this encounter.    No orders of the defined types were placed in this encounter.    There are no discontinued medications.      Encounter Diagnosis   Name Primary?     Persistent atrial fibrillation (H)          ALLERGIES     Allergies   Allergen Reactions     Contrast Dye Hives     Does fine if he uses benadryl prior.     No Clinical Screening - See Comments      Green beans - Diarrhea.    Topical antibiotic - name unknown - caused swelling of the penis.       PAST MEDICAL HISTORY:  Past Medical History:   Diagnosis Date     Acid reflux disease       Benign essential hypertension      CAD (coronary artery disease)     s/p multiple NSTEMIs and PCI's     ESRD on hemodialysis (H)     MWF     History of atrial flutter     s/p ablation     Hypothyroidism      Ischemic cardiomyopathy     EF 25-30%, s/p AICD     Type 2 diabetes mellitus (H)     diet-controlled       PAST SURGICAL HISTORY:  Past Surgical History:   Procedure Laterality Date     CHOLECYSTECTOMY, OPEN  2013     CV CORONARY ANGIOGRAM N/A 10/19/2020    Procedure: Coronary Angiogram;  Surgeon: Theodora Salazar MD;  Location:  HE[facility]ARD[facility] CATH LAB     CV LEFT HEART CATH N/A 10/19/2020    Procedure: Left Heart Cath;  Surgeon: Theodora Salazar MD;  Location:  [facility]     CV PCI STENT DRUG ELUTING N/A 10/19/2020    Procedure: Percutaneous Coronary Intervention Stent Drug Eluting;  Surgeon: Theodora Salazar MD;  Location:  HE[facility] CATH [facility]     ELBOW SURGERY Left 2008    ORIF - plates still in place     EP ICD GENERATOR CHANGE SINGLE N/A 11/21/2019    Procedure: EP ICD Generator Change Single;  Surgeon: Jono Mejia MD;  Location:  HEART CARDIAC CATH LAB     H ABLATION ATRIAL FLUTTER  06/08/2017, 12/11/17     HC LEFT HEART CATHETERIZATION  7/14/2016     HC LEFT HEART CATHETERIZATION  9/21/2016     IMPLANT VENTRICULAR DEVICE  08/15/2011     IR DIALYSIS FISTULOGRAM LEFT  7/22/2019     REPAIR FISTULA ARTERIOVENOUS UPPER EXTREMITY Left 3/5/2019    Procedure: REPAIR LEFT UPPER ARM ARTERIOVENOUS FISTULA SKIN ULCER;  Surgeon: Westley Griggs MD;  Location:  OR     TONSILLECTOMY & ADENOIDECTOMY       VASCULAR SURGERY  2004, 2010    LUE fistulas (upper and lower); upper one is functional       FAMILY HISTORY:  Family History   Problem Relation Age of Onset     Cerebrovascular Disease Mother         later in life     Hypertension Father      Bladder Cancer Father      Myocardial Infarction Paternal Grandmother      Diabetes No family hx of      Prostate Cancer No family  hx of      Colon Cancer No family hx of        SOCIAL HISTORY:  Social History     Socioeconomic History     Marital status: Single     Spouse name: None     Number of children: None     Years of education: None     Highest education level: None   Occupational History     Occupation: Retired - CPA   Social Needs     Financial resource strain: Not hard at all     Food insecurity     Worry: Never true     Inability: Never true     Transportation needs     Medical: No     Non-medical: No   Tobacco Use     Smoking status: Former Smoker     Packs/day: 2.00     Years: 35.00     Pack years: 70.00     Types: Cigarettes     Start date:      Quit date: 1996     Years since quittin.4     Smokeless tobacco: Never Used   Substance and Sexual Activity     Alcohol use: Not Currently     Alcohol/week: 0.0 standard drinks     Frequency: Never     Drug use: No     Sexual activity: Never   Lifestyle     Physical activity     Days per week: None     Minutes per session: None     Stress: None   Relationships     Social connections     Talks on phone: None     Gets together: None     Attends [employer] service: None     Active member of club or organization: None     Attends meetings of clubs or organizations: None     Relationship status: None     Intimate partner violence     Fear of current or ex partner: None     Emotionally abused: None     Physically abused: None     Forced sexual activity: None   Other Topics Concern     Parent/sibling w/ CABG, MI or angioplasty before 65F 55M? No      Service Not Asked     Blood Transfusions Not Asked     Caffeine Concern No     Occupational Exposure Not Asked     Hobby Hazards Not Asked     Sleep Concern No     Stress Concern Not Asked     Weight Concern Not Asked     Special Diet Yes     Back Care Not Asked     Exercise Yes     Comment: Walking     Bike Helmet Not Asked     Seat Belt Yes     Self-Exams Not Asked   Social History Narrative    Single.    No kids.     Maria Dolores  Pepper (friend- healthcare POA), Joaquina Nair (friend - healthcare POA)    No formal exercise.        Video-Visit Details    Type of service:  Video Visit    Video Start Time: 1316  Video End Time:  1336  Duration: 20 minutes    Originating Location (pt. Location): Home    Distant Location (provider location):  Home Office    Platform used for Video Visit: Alex Griffith PA-C [employer]

## 2021-03-22 ENCOUNTER — PATIENT OUTREACH (OUTPATIENT)
Dept: NURSING | Facility: CLINIC | Age: 76
End: 2021-03-22
Payer: MEDICARE

## 2021-03-22 ENCOUNTER — PATIENT OUTREACH (OUTPATIENT)
Dept: CARE COORDINATION | Facility: CLINIC | Age: 76
End: 2021-03-22

## 2021-03-22 SDOH — ECONOMIC STABILITY: INCOME INSECURITY: HOW HARD IS IT FOR YOU TO PAY FOR THE VERY BASICS LIKE FOOD, HOUSING, MEDICAL CARE, AND HEATING?: NOT HARD AT ALL

## 2021-03-22 SDOH — ECONOMIC STABILITY: FOOD INSECURITY: WITHIN THE PAST 12 MONTHS, YOU WORRIED THAT YOUR FOOD WOULD RUN OUT BEFORE YOU GOT MONEY TO BUY MORE.: NEVER TRUE

## 2021-03-22 SDOH — ECONOMIC STABILITY: TRANSPORTATION INSECURITY
IN THE PAST 12 MONTHS, HAS LACK OF TRANSPORTATION KEPT YOU FROM MEETINGS, WORK, OR FROM GETTING THINGS NEEDED FOR DAILY LIVING?: NO

## 2021-03-22 SDOH — ECONOMIC STABILITY: FOOD INSECURITY: WITHIN THE PAST 12 MONTHS, THE FOOD YOU BOUGHT JUST DIDN'T LAST AND YOU DIDN'T HAVE MONEY TO GET MORE.: NEVER TRUE

## 2021-03-22 SDOH — ECONOMIC STABILITY: TRANSPORTATION INSECURITY
IN THE PAST 12 MONTHS, HAS THE LACK OF TRANSPORTATION KEPT YOU FROM MEDICAL APPOINTMENTS OR FROM GETTING MEDICATIONS?: NO

## 2021-03-22 ASSESSMENT — ACTIVITIES OF DAILY LIVING (ADL): DEPENDENT_IADLS:: TRANSPORTATION;SHOPPING;LAUNDRY;CLEANING

## 2021-03-22 NOTE — PROGRESS NOTES
Clinic Care Coordination Contact    Clinic Care Coordination Contact  OUTREACH    Referral Information:  Referral Source: IP Handoff    Primary Diagnosis: Cardiovascular - other    Chief Complaint   Patient presents with     Clinic Care Coordination - Initial     Clinic Care Coordination - Post Hospital        [place] Utilization: recent hospitalization for low blood pressure and weakness  Clinic Utilization  Difficulty keeping appointments:: No  Compliance Concerns: No  No-Show Concerns: No  No PCP office visit in Past Year: No  Utilization    Last refreshed: 3/22/2021  1:19 PM: Hospital Admissions 3           Last refreshed: 3/22/2021  1:19 PM: ED Visits 5           Last refreshed: 3/22/2021  1:19 PM: No Show Count (past year) 1              Current as of: 3/22/2021  1:19 PM            Clinical Concerns:  CC SW spoke with pt regarding his recent hospital stay. Pt shared that his main concern is the pain/cramping that is occurring in his legs, ankles, and feet. He explained this pain moves and is inconsistent. This is limiting his ability to walk of stand for any length of time. He shared that he used to be able to be on this feet for 30 minutes before he would get tired and need to sit, now he can only be on his feet for 5-7 minutes before the pain is too much. Pt was previously provided a muscle relaxor and it was too powerful for him, also his Cardiologist didn't like him taking this and it was discontinued. He has edema in both legs, more so in the right.     Patient continues to work with Home Care services thru [employer]. [facility] assists pt with in-home services (laundry, cleaning), errands, and provides transportation to provider appointments.     Pt shared that he is having blood pressure problems at dialysis and this is affecting how much fluid is being pulled from him. They mentioned a medication that could increase his blood pressure but he is not taking this. He will discuss this with PCP at next  appointment.    Pt will make a sooner appointment in clinic to discuss pain issues, as he doesn't think he can wait until PCP appointment.    Current Medical Concerns:  Coronary artery disease, end-stage renal disease    Current Behavioral Concerns: none    Education Provided to patient: CC role   Pain  Pain (GOAL):: No  Health Maintenance Reviewed: Due/Overdue 4/13  Clinical Pathway: None    Medication Management:  Post-discharge medication reconciliation status: Discharge medications reviewed and reconciled.  Continue medications without change.     Functional Status:  Dependent ADLs:: Ambulation-walker, Ambulation-cane  Dependent IADLs:: Transportation, Shopping, Laundry, Cleaning  Bed or wheelchair confined:: No  Mobility Status: Independent w/Device  Fallen 2 or more times in the past year?: No  Any fall with injury in the past year?: No    Living Situation:  Current living arrangement:: I live alone    Lifestyle & Psychosocial Needs:     Social Needs     Financial resource strain: Not hard at all     Food insecurity     Worry: Never true     Inability: Never true     Transportation needs     Medical: No     Non-medical: No     Diet:: Regular  Inadequate nutrition (GOAL):: No  Tube Feeding: No  Inadequate activity/exercise (GOAL):: No  Significant changes in sleep pattern (GOAL): No  Transportation means:: Other(HHA)     Roman Catholic or spiritual beliefs that impact treatment:: No  Mental health DX:: No  Mental health management concern (GOAL):: No  Chemical Dependency Status: No Current Concerns  Informal Support system:: Friends   Socioeconomic History     Marital status: Single     Spouse name: Not on file     Number of children: Not on file     Years of education: Not on file     Highest education level: Not on file   Occupational History     Occupation: Retired - CPA     Tobacco Use     Smoking status: Former Smoker     Packs/day: 2.00     Years: 35.00     Pack years: 70.00     Types: Cigarettes     Start  date:      Quit date: 1996     Years since quittin.4     Smokeless tobacco: Never Used   Substance and Sexual Activity     Alcohol use: Not Currently     Alcohol/week: 0.0 standard drinks     Frequency: Never     Drug use: No     Sexual activity: Never        Resources and Interventions:  Current Resources:   Skilled Home Care Services: Skilled Nursing, Physicial Therapy, Occupational Therapy, Home Health Aid  Community Resources: Dialysis Services  Supplies used at home:: None  Equipment Currently Used at Home: cane, quad, walker, rolling, grab bar, tub/shower, raised toilet seat(tub/shower with grab bars, tub chair but doesnt use, RTS arm)  Employment Status: retired)   )    Advance Care Plan/Directive  Advanced Care Plans/Directives on file:: Yes  Type Advanced Care Plans/Directives: Advanced Directive - On File  Advanced Care Plan/Directive Status: Not Applicable    Referrals Placed: None     Goals:   Goals        General    Medical (pt-stated)     Notes - Note created  2018  2:32 PM by Mariel Santos RN    Goal Statement: I will prevent re-occurence of Pneumonia  Measure of Success: decreased hospital visits   Supportive Steps to Achieve: I will cough deep breathe and drink water within his restrictions due to dialysis   Barriers: No barriers identified   Strengths: Continues antibiotic   Date to Achieve By: After course of antibiotic             Patient/Caregiver understanding: Pt reports understanding and denies any additional questions or concerns at this times. CLEVE CC engaged in AIDET communication during encounter.       Future Appointments              In 1 week Brooklynn Griffith PA-C [facility] PSA CLIN    In 3 weeks Josselin Kolb MD [facility]    In 2 months CARD DCR2 [facility] PSA CLIN        Plan: At this time, pt denies outstanding need for connection or  referral to resources or assistance navigating recommended follow up care. No further outreaches will be made at this time unless a new referral is made or a change in the pt's status occurs. Patient was provided with [facility] contact information and encouraged to call with any questions or concerns.    Sandy Clancy, [facility]  Clinic Care Coordinator  [facility] Joanna Vanderburgh  [facility]  [phone]  [email or web address]

## 2021-03-22 NOTE — LETTER
[facility] CARE COORDINATION  [address] [address]    March 22, 2021    Westley Ness  [address] [facility] [address]      Dear Westley,    I am a clinic care coordinator who works with Josselin Kolb MD at [facility]. I wanted to thank you for spending the time to talk with me.  Below is a description of clinic care coordination and how I can further assist you.      The clinic care coordination team is made up of a registered nurse,  and community health worker who understand the health care system. The goal of clinic care coordination is to help you manage your health and improve access to the health care system in the most efficient manner. The team can assist you in meeting your health care goals by providing education, coordinating services, strengthening the communication among your providers and supporting you with any resource needs.    Please feel free to contact me at [phone] with any questions or concerns. We are focused on providing you with the highest-quality healthcare experience possible and that all starts with you.     Sincerely,     TATO Cash

## 2021-03-22 NOTE — PROGRESS NOTES
Clinic Care Coordination Contact  [facility]/Voicemail    Referral Source: IP Handoff  Clinical Data: Care Coordinator Outreach    Outreach attempted x 1.  Left message on patient's voicemail with call back information and requested return call.    Plan: Care Coordinator will try to reach patient again in 1-2 business days.    Sandy Clancy [facility]  Clinic Care Coordinator  [facility] Joanna [place]  [facility]  [phone]  [email or web address]

## 2021-03-23 ENCOUNTER — TELEPHONE (OUTPATIENT)
Dept: CARDIOLOGY | Facility: CLINIC | Age: 76
End: 2021-03-23

## 2021-03-23 DIAGNOSIS — E55.9 VITAMIN D DEFICIENCY: Primary | ICD-10-CM

## 2021-03-23 RX ORDER — CHOLECALCIFEROL (VITAMIN D3) 50 MCG
TABLET ORAL
Qty: 90 TABLET | Refills: 3 | Status: SHIPPED | OUTPATIENT
Start: 2021-03-23 | End: 2021-01-01

## 2021-03-23 NOTE — TELEPHONE ENCOUNTER
Patient was evaluated by cardiology while inpatient for elevated HR and hypotension at home. Noted to have coarse A. Fib/Flutter with rates in the 90's in the ED. PMH: HTN, HL, DM, ESRD-HD, HFrEF sec to I-CMP ((EF around 25%-30%, ICD implantation; on mexiletine for VT prevention),7/2016: NSTEMI with pCx and OM1 branch FOREST. Most recently, had FOREST to OM1 10/2020 and remains on Plavix until 10/19/2021 with chronically occluded LAD, and PAF/A flutter (A-flutter ablation in 06/2017 and redo ablation in 12/2017). Dr. Mejia noted EP Study with possible DCCV vs AFlutter ablation but pt declined, and rate control pursued. Continue on PTA BB-up titration limited secondary to soft BP. Called patient to discuss any post hospital d/c questions, review discharge medication, and confirm f/u appts. Patient denied any questions regarding new or changes to PTA medications. Patient denied any SOB, chest pain, edema, or light headedness. RN confirmed with patient that he has a VV at 1315 scheduled with GILDA Renetta Griffith. Continue with PTA HHC services. Patient advised to call clinic with any cardiac related questions or concerns prior to this gilda't. Patient verbalized understanding and agreed with plan. LAURENT Obrien RN.  .

## 2021-04-02 NOTE — LETTER
"4/2/2021    Josselin Kolb MD  [address] [facility] [address]    RE: Westley Ness       Dear Colleague,    I had the pleasure of seeing Westley Ness in the [facility].    Westley Ness is a 75 year old male who is being evaluated via a billable video visit.      How would you like to obtain your AVS? MyChart  If the video visit is dropped, the invitation should be resent by: Send to e-mail at: [email or web address]  Will anyone else be joining your video visit? No      CC:  Recent hospitalization    VITALS:  106/66  Weight 167# on 3/18    BRIEF HPI:  Art is a 75 year old yo M who sees Dr. Sen and Lacy Taylor NP for h/o:    1. Atrial Arrhythmias - s/p CTI ablation 6/2017. Re-do CTI ablation 12/2017. Admission 3/2021 for AFib.  2. CAD - Known LAD occlusion. NSTEMI 7/2016 with pCx FOREST and OM1 branch FOREST 10/2020 after stress showed lateral wall ischemia.  3. Ischemic CM - EF 25% 10/2020. S/p single chamber Biotronik ICD 2011. Gen change 11/2019. Appropriate therapies for VT in the past  4. H/o VT - on mexiletine. ICD in place with appropriate therapies in past.  5. ESRD - on HD    Dr. Sen had a virtual visit with Art 2/2021 at which time he was stable, noting he had to occasionally hold lisinopril and metoprolol (incorrectly dictated as carvedilol) d/t low BPs. Routine follow-up with Lacy shi.    He noted rapid HR starting 3/15 and ended up cancelling HD. Unfortunately, admitted 3/17-19/2021 after feeling \"lousy\" and noting rapid HR and hypotension. He felt better after ~40 seconds, but presented as he still noted rapid HR/low BP. Dr. Mejia was consulted as noted to be in AFib vs AFlutter with RVR.  Initially, EPS with possible DCCV vs ablation considered as rate was difficult to control for hypotension, but pt had missed a few doses of Eliquis.  Rate became controlled on his normal BB therapy, and therefore, rate control strategy rec'd, with " "close follow-up.    He was DCd home on 3/19 on continued metoprolol XL 12.5 mg daily and mexiletine 150 mg BID; HR on discharge in AFib was 70-90s.    INTERVAL HISTORY:  Overall feeling \"OK\" from a cardiac standpoint on the continued metoprolol and mexiletine.  Still with low energy levels.    O2 sats are still good. No c/o \"racing HRs\" like what brought him to the ER.     BPs at home 100-120s. He's adjusted the timing of medications, especially on HD days which she thinks has improved the number of low blood pressures he is been getting.     Still c/o bilateral ankle discomfort and takes Tylenol and gabapentin for this. He follows with Dr. Kolb and sees her later this month.  He feels that is his biggest complaint    DIAGNOSTICS:  Device interrogation 3/3/2021 - 0%  in VVI 40. Episodes of brief PAT <10s noted. 6-15 beats of NSVT @ 150-160 bpm.No therapies.  Echocardiogram 3/2021 - EF 21%. Mildly dilated RV and moderately decreased RVSF. MALI. Mild-mod MAC with trace-mild MR. 2+ TR with RVSP 66+RAP, c/w severe pHTN. High RA pressure. Mild aortic sclerosis. 1+ PI. Borderline dilated Asc Ao and Mild AoRoot dilated.   Angiogram 10/19/2020 - Chronically occluded LAD. Patent pCx and OM stents. Sev stenosis at bifurcation of large OM1 d to previously placed stent txd with OM1 2.25x20 mm FOREST. Mildly elevated LVEDP 17 mmHg.   Stress Test 10/2020 - Mod iscehmia in the lateral segment in the LV. Transmural infarct in ant, apical and anteroseptal LV segments.       REVIEW OF SYSTEMS:  Negative with the exception of that noted above    PHYSICAL EXAM:  /66   There is no height or weight on file to calculate BMI.  Physical Exam  GENERAL: Healthy, alert and no distress  EYES: Eyes grossly normal to inspection.  No discharge or erythema, or obvious scleral/conjunctival abnormalities.  RESP: No audible wheeze, cough, or visible cyanosis.  No visible retractions or increased work of breathing.    SKIN: Visible skin clear. No " significant rash, abnormal pigmentation or lesions.  NEURO: Cranial nerves grossly intact.  Mentation and speech appropriate for age.  PSYCH: Mentation appears normal, affect normal/bright, judgement and insight intact, normal speech and appearance well-groomed.      PLAN:  1. No changes to medication  2. See Lacy (virtually) summer 2021    ASSESSMENT/PLAN:    1. Persistent AFib    Device interrogation 3/4 showed no AFib; called in 3/15 with rapid HR and admitted 3/17 and noted to be in AFib vs Atypical AFlutter with RVR    Biotronik device so cannot send courtesy remotes. LUCY Bean RN has gathered all 24 hour reports to assess rate control, and HRs have been 70-80s at rest and high 80s with activity.  This matches what he gets at home, which is typically 70-100s.      Remains on AC with low dose Eliquis 2.5 mg BID      PLAN:    Continue routine device interrogations    Contact us with any concerns regarding recurrent rapid heart rates    See Lacy as previously ordered.  He prefers virtual visits      2. CAD; EF 21%    OM1 stent placed most recently 10/2020. NSTEMI in 2016 treated with FOREST to Cx and OM    EF 25% chronically    Remains on low-dose beta-blocker (limited due to hypotension), isosorbide 30 (15 mg on dialysis days), Plavix 75 mg daily, lisinopril 2.5 mg twice daily     PLAN:    Per Dr. Sen, Plavix until October 2021, then switch to ASA    Continue current meds      CURRENT MEDICATIONS:  Current Outpatient Medications   Medication Sig Dispense Refill     acetaminophen (TYLENOL) 500 MG tablet Take 1,000 mg by mouth 3 times daily        apixaban ANTICOAGULANT (ELIQUIS ANTICOAGULANT) 2.5 MG tablet Take 1 tablet (2.5 mg) by mouth 2 times daily 180 tablet 3     Cholecalciferol (VITAMIN D3) 50 MCG (2000 UT) TABS TAKE 1 TABLET BY MOUTH EVERY DAY 90 tablet 3     clopidogrel (PLAVIX) 75 MG tablet Take 1 tablet (75 mg) by mouth daily 60 tablet 1     famotidine (PEPCID) 20 MG tablet Take 20 mg by mouth daily         gabapentin (NEURONTIN) 100 MG capsule Take 1 capsule (100 mg) by mouth 3 times daily 270 capsule 3     isosorbide mononitrate (IMDUR) 30 MG 24 hr tablet Take 1 tablet (30 mg) the evening of dialysis days (Mon, Wed, Fri)  and Saturdays. 48 tablet 3     isosorbide mononitrate (IMDUR) 30 MG 24 hr tablet Take 15 mg by mouth Take in the evening on the days prior dialysis. Take on Sunday, Tuesday and Thursday       levothyroxine (SYNTHROID/LEVOTHROID) 50 MCG tablet TAKE 1 TABLET (50 MCG) BY MOUTH DAILY 90 tablet 3     lisinopril (ZESTRIL) 2.5 MG tablet TAKE 1 TABLET (2.5 MG) BY MOUTH 2 TIMES DAILY (TAKE ONE TABLET AFTER DIALYSIS. TAKE SECOND TABLET AT BEDTIME.) 180 tablet 3     loperamide (IMODIUM A-D) 2 MG tablet Take 2 mg by mouth three times a week MWF on dialysis days       loperamide (IMODIUM A-D) 2 MG tablet Take 1 mg by mouth five times a week On non-dialysis days - Tu, Th, Sa, Russo       metoprolol succinate ER (TOPROL-XL) 25 MG 24 hr tablet Take 0.5 tablets (12.5 mg) by mouth daily 45 tablet 3     mexiletine (MEXITIL) 150 MG capsule Take 1 capsule (150 mg) by mouth 2 times daily 180 capsule 3     multivitamin RENAL (NEPHROCAPS/TRIPHROCAPS) 1 MG capsule Take 1 capsule by mouth daily 90 capsule 3     nitroGLYcerin (NITROSTAT) 0.4 MG sublingual tablet Place 1 tablet (0.4 mg) under the tongue every 5 minutes as needed for chest pain UP TO 3 PER EPISODE 20 tablet 0     pantoprazole (PROTONIX) 40 MG EC tablet TAKE 1 TABLET (40 MG) BY MOUTH EVERY MORNING 30 tablet 9     pravastatin (PRAVACHOL) 40 MG tablet Take 40 mg by mouth At Bedtime           ORDERS PLACED:  No orders of the defined types were placed in this encounter.    No orders of the defined types were placed in this encounter.    There are no discontinued medications.      Encounter Diagnosis   Name Primary?     Persistent atrial fibrillation (H)          ALLERGIES     Allergies   Allergen Reactions     Contrast Dye Hives     Does fine if he uses benadryl prior.      No Clinical Screening - See Comments      Green beans - Diarrhea.    Topical antibiotic - name unknown - caused swelling of the penis.       PAST MEDICAL HISTORY:  Past Medical History:   Diagnosis Date     Acid reflux disease      Benign essential hypertension      CAD (coronary artery disease)     s/p multiple NSTEMIs and PCI's     ESRD on hemodialysis (H)     MWF     History of atrial flutter     s/p ablation     Hypothyroidism      Ischemic cardiomyopathy     EF 25-30%, s/p AICD     Type 2 diabetes mellitus (H)     diet-controlled       PAST SURGICAL HISTORY:  Past Surgical History:   Procedure Laterality Date     CHOLECYSTECTOMY, OPEN  2013     CV CORONARY ANGIOGRAM N/A 10/19/2020    Procedure: Coronary Angiogram;  Surgeon: Theodora Salazar MD;  Location:  HEART CARDIAC CATH LAB     CV LEFT HEART CATH N/A 10/19/2020    Procedure: Left Heart Cath;  Surgeon: Theodora Salazar MD;  Location:  HEART [facility]ARDI[facility] CATH LAB     CV PCI STENT DRUG ELUTING N/A 10/19/2020    Procedure: Percutaneous Coronary Intervention Stent Drug Eluting;  Surgeon: Theodora Salazar MD;  Location:  HEART CARDI[facility] CATH LAB     ELBOW SURGERY Left 2008    ORIF - plates still in place     EP ICD GENERATOR CHANGE SINGLE N/A 11/21/2019    Procedure: EP ICD Generator Change Single;  Surgeon: Jono Mejia MD;  Location:  HEART CARDIAC CATH LAB     H ABLATION ATRIAL FLUTTER  06/08/2017, 12/11/17     HC LEFT HEART CATHETERIZATION  7/14/2016     HC LEFT HEART CATHETERIZATION  9/21/2016     IMPLANT VENTRICULAR DEVICE  08/15/2011     IR DIALYSIS FISTULOGRAM LEFT  7/22/2019     REPAIR FISTULA ARTERIOVENOUS UPPER EXTREMITY Left 3/5/2019    Procedure: REPAIR LEFT UPPER ARM ARTERIOVENOUS FISTULA SKIN ULCER;  Surgeon: Westley Griggs MD;  Location:  OR     TONSILLECTOMY & ADENOIDECTOMY       VASCULAR SURGERY  2004, 2010    LUE fistulas (upper and lower); upper one is functional       FAMILY HISTORY:  Family History   Problem  Relation Age of Onset     Cerebrovascular Disease Mother         later in life     Hypertension Father      Bladder Cancer Father      Myocardial Infarction Paternal Grandmother      Diabetes No family hx of      Prostate Cancer No family hx of      Colon Cancer No family hx of        SOCIAL HISTORY:  Social History     Socioeconomic History     Marital status: Single     Spouse name: None     Number of children: None     Years of education: None     Highest education level: None   Occupational History     Occupation: Retired - CPA   Social Needs     Financial resource strain: Not hard at all     Food insecurity     Worry: Never true     Inability: Never true     Transportation needs     Medical: No     Non-medical: No   Tobacco Use     Smoking status: Former Smoker     Packs/day: 2.00     Years: 35.00     Pack years: 70.00     Types: Cigarettes     Start date:      Quit date: 1996     Years since quittin.4     Smokeless tobacco: Never Used   Substance and Sexual Activity     Alcohol use: Not Currently     Alcohol/week: 0.0 standard drinks     Frequency: Never     Drug use: No     Sexual activity: Never   Lifestyle     Physical activity     Days per week: None     Minutes per session: None     Stress: None   Relationships     Social connections     Talks on phone: None     Gets together: None     Attends Baptism service: None     Active member of club or organization: None     Attends meetings of clubs or organizations: None     Relationship status: None     Intimate partner violence     Fear of current or ex partner: None     Emotionally abused: None     Physically abused: None     Forced sexual activity: None   Other Topics Concern     Parent/sibling w/ CABG, MI or angioplasty before 65F 55M? No      Service Not Asked     Blood Transfusions Not Asked     Caffeine Concern No     Occupational Exposure Not Asked     Hobby Hazards Not Asked     Sleep Concern No     Stress Concern Not Asked      Weight Concern Not Asked     Special Diet Yes     Back Care Not Asked     Exercise Yes     Comment: Walking     Bike Helmet Not Asked     Seat Belt Yes     Self-Exams Not Asked   Social History Narrative    Single.    No kids.     Maria Doloresevon Pabon (friend- healthcare POA), Joaquina Nair (friend - healthcare POA)    No formal exercise.        Video-Visit Details    Type of service:  Video Visit    Video Start Time: 1316  Video End Time:  1336  Duration: 20 minutes    Originating Location (pt. Location): Home    Distant Location (provider location):  Home Office    Platform used for Video Visit: Alex Griffith PA-C \[facility] [place] [facility]\""    CC:  Brooklynn Griffith PA-C  [address] [facility] [address]  [place] [address]

## 2021-04-02 NOTE — PATIENT INSTRUCTIONS
Art - it was nice to speak with you today!    1.  I am pleased that your heart rates appear to be under such good control based on what you are finding as well as are device interrogations every night  2.  Reviewed that your biggest issue is the ankle/leg discomfort for which you see Dr. Kolb    PLAN:  1.  Continue routine device checks  Next one is set up for 6/2021  2.  No medication changes today as your heart rate appears to be under good control and blood pressures have sometimes been a robust 120!  3.  See Lacy ~August 2021 as Dr. Sen requested.  I will asked that a virtual appointment be set up per your request    Please contact us with any concerns or questions in the interim!  Device RNs: [phone]

## 2021-04-08 NOTE — TELEPHONE ENCOUNTER
[employer] OT Candice . OT 1 x a week for 2 weeks for strength , endurance , kitchen , safety .  Approve . .Venita Bob RN

## 2021-04-13 PROBLEM — D63.8 ANEMIA OF CHRONIC DISEASE: Status: RESOLVED | Noted: 2021-01-14 | Resolved: 2021-01-01

## 2021-04-13 PROBLEM — I48.91 ATRIAL FIBRILLATION (H): Status: RESOLVED | Noted: 2021-03-18 | Resolved: 2021-01-01

## 2021-04-13 PROBLEM — M85.88 OSTEOPENIA OF SPINE: Status: RESOLVED | Noted: 2021-01-25 | Resolved: 2021-01-01

## 2021-04-13 PROBLEM — I25.10 CORONARY ARTERY DISEASE INVOLVING NATIVE CORONARY ARTERY, ANGINA PRESENCE UNSPECIFIED, UNSPECIFIED WHETHER NATIVE OR TRANSPLANTED HEART: Status: RESOLVED | Noted: 2020-10-15 | Resolved: 2021-01-01

## 2021-04-13 PROBLEM — R26.2 DIFFICULTY WALKING: Status: RESOLVED | Noted: 2021-01-11 | Resolved: 2021-01-01

## 2021-04-13 PROBLEM — R53.81 PHYSICAL DECONDITIONING: Status: RESOLVED | Noted: 2021-01-25 | Resolved: 2021-01-01

## 2021-04-13 PROBLEM — Z99.2 DIALYSIS PATIENT (H): Status: RESOLVED | Noted: 2021-01-11 | Resolved: 2021-01-01

## 2021-04-13 PROBLEM — I50.22 CHRONIC SYSTOLIC (CONGESTIVE) HEART FAILURE (H): Status: RESOLVED | Noted: 2020-05-21 | Resolved: 2021-01-01

## 2021-04-13 PROBLEM — R53.1 WEAKNESS: Status: RESOLVED | Noted: 2021-03-17 | Resolved: 2021-01-01

## 2021-04-13 PROBLEM — R94.39 ABNORMAL CARDIOVASCULAR STRESS TEST: Status: RESOLVED | Noted: 2020-10-15 | Resolved: 2021-01-01

## 2021-04-13 PROBLEM — Z99.2 ESRD (END STAGE RENAL DISEASE) ON DIALYSIS (H): Status: RESOLVED | Noted: 2017-01-17 | Resolved: 2021-01-01

## 2021-04-13 PROBLEM — M54.2 NECK PAIN: Status: RESOLVED | Noted: 2019-03-12 | Resolved: 2021-01-01

## 2021-04-13 PROBLEM — S32.030D CLOSED COMPRESSION FRACTURE OF L3 LUMBAR VERTEBRA WITH ROUTINE HEALING, SUBSEQUENT ENCOUNTER: Status: RESOLVED | Noted: 2021-01-25 | Resolved: 2021-01-01

## 2021-04-13 PROBLEM — I48.0 PAF (PAROXYSMAL ATRIAL FIBRILLATION) (H): Status: RESOLVED | Noted: 2021-02-02 | Resolved: 2021-01-01

## 2021-04-13 PROBLEM — N18.6 ESRD (END STAGE RENAL DISEASE) ON DIALYSIS (H): Status: RESOLVED | Noted: 2017-01-17 | Resolved: 2021-01-01

## 2021-04-13 PROBLEM — R42 DIZZINESS: Status: RESOLVED | Noted: 2020-10-16 | Resolved: 2021-01-01

## 2021-04-13 PROBLEM — R55 SYNCOPE: Status: RESOLVED | Noted: 2020-10-16 | Resolved: 2021-01-01

## 2021-04-13 PROBLEM — M62.81 GENERALIZED MUSCLE WEAKNESS: Status: RESOLVED | Noted: 2021-01-11 | Resolved: 2021-01-01

## 2021-04-13 NOTE — PROGRESS NOTES
ASSESSMENT/PLAN                                                       (Z00.00) Medicare annual wellness visit, subsequent  (primary encounter diagnosis)  Comment: PMH, PSH, FH, SH, medications, allergies, immunizations, and preventative health measures reviewed and updated as appropriate.  Plan: see below for plans.      (M79.671,  M79.672) Bilateral foot pain  Comment: etiology unclear; does not respond to Tylenol; avoiding NSAIDs due to chronic AC; no improvement with PT; has historically responded well to prednisone suggesting arthritis/inflammatory arthropathy; podiatry referral suggested, but declined at this time.  Plan: prednisone 40 mg daily x 5 days then low-dose prednisone (5 mg daily) chronically.    Josselin Kolb MD   [address] [address]  T: [phone], F: [phone]    SUBJECTIVE                                                      Westley Ness is a very pleasant 75 year old male who presents for his AWV:    PMH, PSH, FH, SH, medications, allergies, immunizations, preventative health, and health risk assessment reviewed.     Past Medical History:   Diagnosis Date     Acid reflux disease      Benign essential hypertension      CAD (coronary artery disease)     s/p multiple NSTEMIs and PCI's     ESRD on hemodialysis (H)     MWF     History of atrial flutter     s/p ablation     Hypothyroidism      Ischemic cardiomyopathy     EF 25-30%, s/p AICD     Past Surgical History:   Procedure Laterality Date     CHOLECYSTECTOMY, OPEN  2013     CV CORONARY ANGIOGRAM N/A 10/19/2020    Procedure: Coronary Angiogram;  Surgeon: Theodora Salazar MD;  Location:  HEART CARDIAC CATH LAB     CV LEFT HEART CATH N/A 10/19/2020    Procedure: Left Heart Cath;  Surgeon: Theodora Salazar MD;  Location:  HEART CARDIAC CATH LAB     CV PCI STENT DRUG ELUTING N/A 10/19/2020    Procedure: Percutaneous Coronary Intervention Stent Drug Eluting;  Surgeon:  Theodora Salazar MD;  Location:  HEART CARDIAC CATH LAB     ELBOW SURGERY Left     ORIF - plates still in place     EP ICD GENERATOR CHANGE SINGLE N/A 2019    Procedure: EP ICD Generator Change Single;  Surgeon: Jono Mejia MD;  Location:  HEART CARDIAC CATH LAB     H ABLATION ATRIAL FLUTTER  2017, 17     HC LEFT HEART CATHETERIZATION  2016     HC LEFT HEART CATHETERIZATION  2016     IMPLANT VENTRICULAR DEVICE  08/15/2011     IR DIALYSIS FISTULOGRAM LEFT  2019     REPAIR FISTULA ARTERIOVENOUS UPPER EXTREMITY Left 3/5/2019    Procedure: REPAIR LEFT UPPER ARM ARTERIOVENOUS FISTULA SKIN ULCER;  Surgeon: Westley Griggs MD;  Location:  OR     TONSILLECTOMY & ADENOIDECTOMY       VASCULAR SURGERY  ,     LUE fistulas (upper and lower); upper one is functional     Family History   Problem Relation Age of Onset     Cerebrovascular Disease Mother         later in life     Hypertension Father      Bladder Cancer Father      Myocardial Infarction Paternal Grandmother      Diabetes No family hx of      Prostate Cancer No family hx of      Colon Cancer No family hx of      Social History     Occupational History     Occupation: Retired - CPA   Tobacco Use     Smoking status: Former Smoker     Packs/day: 2.00     Years: 35.00     Pack years: 70.00     Types: Cigarettes     Start date:      Quit date: 1996     Years since quittin.4     Smokeless tobacco: Never Used   Substance and Sexual Activity     Alcohol use: Not Currently     Frequency: Never     Drug use: No     Sexual activity: Never   Social History Narrative    Single.    No kids.     Maria Dolores Pabon (friend- healthcare POA), Joaquina Nair (friend - healthcare POA)    No formal exercise.      Allergies   Allergen Reactions     Contrast Dye Hives     Does fine if he uses benadryl prior.     Current Outpatient Medications   Medication Sig     acetaminophen (TYLENOL) 500 MG tablet Take 1,000 mg by  mouth 3 times daily      apixaban ANTICOAGULANT (ELIQUIS ANTICOAGULANT) 2.5 MG tablet Take 1 tablet (2.5 mg) by mouth 2 times daily     Cholecalciferol (VITAMIN D3) 50 MCG (2000 UT) TABS TAKE 1 TABLET BY MOUTH EVERY DAY     clopidogrel (PLAVIX) 75 MG tablet Take 1 tablet (75 mg) by mouth daily     famotidine (PEPCID) 20 MG tablet Take 20 mg by mouth daily      gabapentin (NEURONTIN) 100 MG capsule Take 1 capsule (100 mg) by mouth 3 times daily     isosorbide mononitrate (IMDUR) 30 MG 24 hr tablet Take 1 tablet (30 mg) the evening of dialysis days (Mon, Wed, Fri)  and Saturdays.     isosorbide mononitrate (IMDUR) 30 MG 24 hr tablet Take 15 mg by mouth Take in the evening on the days prior dialysis. Take on Sunday, Tuesday and Thursday     levothyroxine (SYNTHROID/LEVOTHROID) 50 MCG tablet TAKE 1 TABLET (50 MCG) BY MOUTH DAILY     lisinopril (ZESTRIL) 2.5 MG tablet TAKE 1 TABLET (2.5 MG) BY MOUTH 2 TIMES DAILY (TAKE ONE TABLET AFTER DIALYSIS. TAKE SECOND TABLET AT BEDTIME.)     loperamide (IMODIUM A-D) 2 MG tablet Take 2 mg by mouth three times a week MWF on dialysis days     loperamide (IMODIUM A-D) 2 MG tablet Take 1 mg by mouth five times a week On non-dialysis days - Tu, Th, Sa, Russo     metoprolol succinate ER (TOPROL-XL) 25 MG 24 hr tablet Take 0.5 tablets (12.5 mg) by mouth daily     mexiletine (MEXITIL) 150 MG capsule Take 1 capsule (150 mg) by mouth 2 times daily     multivitamin RENAL (NEPHROCAPS/TRIPHROCAPS) 1 MG capsule Take 1 capsule by mouth daily     nitroGLYcerin (NITROSTAT) 0.4 MG sublingual tablet Place 1 tablet (0.4 mg) under the tongue every 5 minutes as needed for chest pain UP TO 3 PER EPISODE     pantoprazole (PROTONIX) 40 MG EC tablet TAKE 1 TABLET (40 MG) BY MOUTH EVERY MORNING     pravastatin (PRAVACHOL) 40 MG tablet Take 40 mg by mouth At Bedtime     predniSONE (DELTASONE) 20 MG tablet Take 2 tablets (40 mg) by mouth daily for 5 days     predniSONE (DELTASONE) 5 MG tablet Take 1 tablet (5 mg)  by mouth daily     No current facility-administered medications for this visit.      Immunization History   Administered Date(s) Administered     COVID-19,PF,Moderna 01/27/2021, 02/24/2021     Flu, Unspecified 10/10/2016     HEPA 08/06/2004, 09/10/2004, 10/20/2004, 02/25/2005, 03/16/2012, 07/10/2013     HepA-Adult 10/11/2002     HepB-Dialysis 07/10/2013, 04/04/2020, 08/05/2020     Influenza (H1N1) 01/08/2010     Influenza (High Dose) 3 valent vaccine 10/16/2015, 10/16/2016, 10/17/2017, 09/30/2018     Influenza Vaccine IM > 6 months Valent IIV4 09/14/2009, 09/22/2010, 09/21/2011, 09/24/2012, 09/13/2013, 09/10/2014     Influenza Vaccine Im 4yrs+ 4 Valent [id number] 09/24/2012, 09/13/2013, 09/10/2014, 10/16/2015, 09/15/2017     Influenza Vaccine, 6+MO IM (QUADRIVALENT W/PRESERVATIVES) 11/20/2004, 10/24/2005, 11/13/2006, 10/03/2007, 10/26/2007, 10/29/2008     Influenza, Quad, High Dose, Pf, 65yr + 09/28/2018, 10/11/2019, 09/18/2020     Mantoux Tuberculin Skin Test 01/25/2012, 11/07/2012, 01/28/2013, 06/10/2013, 11/06/2013, 06/02/2014, 02/11/2015, 02/12/2015, 11/11/2015, 12/09/2015, 03/16/2016, 06/28/2016, 12/05/2016, 02/06/2017, 03/27/2017, 09/25/2017, 11/08/2017, 12/04/2017, 04/18/2018, 12/12/2018, 01/09/2019, 05/13/2019, 01/13/2020, 12/28/2020     Pneumo Conj 13-V (2010&after) 09/22/2016, 03/24/2021     Pneumococcal 23 valent 10/15/2009, 12/29/2017     Pneumococcal, Unspecified 10/14/2009, 09/22/2016     Td (Adult), Adsorbed 10/11/2002     Yellow Fever 10/11/2002     PREVENTATIVE HEALTH                                                      BMI: within normal limits   Blood pressure: well-controlled on current regimen   Prostate CA Screening: screening no longer indicated  Colon CA screening: screening no longer indicated  Lung CA screening: patient does not meet screening criteria  AAA screening: completed  Screening HCV: completed  Screening HIV: n/a   Screening cholesterol: up to date   Screening diabetes: up to date  "  Alcohol misuse screening: alcohol use reviewed - no intervention indicated at this time  Immunizations: reviewed; up to date     HEALTH RISK ASSESSMENT                                                      In general, how would you rate your overall physical health? fair  Outside of work, how many days during the week do you exercise? 4-5 days/week  Outside of work, approximately how many minutes a day do you exercise? less than 15 minutes    If you drink alcohol do you typically have >3 drinks per day or >7 drinks per week? No  Do you usually eat at least 4 servings of fruit and vegetables a day, include whole grains & fiber and avoid regularly eating high fat or \"junk\" foods? Yes     Do you have any problems taking medications regularly? No  Do you have any side effects from medications? No    Assistance with daily activities: YES - Transportation requires assistance, housework requires assistance, laundry requires assistance, shopping requires assistance and preparing meals requires assistance    Safety concerns: No    Hearing concerns: No    In the past 6 months, have you been bothered by leaking of urine: No    In general, how would you rate your overall mental or emotional health: excellent    Additional concerns today: YES - Recurrent, bilateral foot pain refractory to Tylenol; did not tolerate muscle relaxers; no improvement with PT    OBJECTIVE                                                      /62 (BP Location: Right arm, Patient Position: Chair, Cuff Size: Adult Regular)   Pulse 78   Temp 97.2  F (36.2  C) (Temporal)   Resp 16   Wt 78 kg (171 lb 15.3 oz)   BMI 26.15 kg/m    Constitutional: chronically ill-appearing  Respiratory: normal respiratory effort; clear to auscultation bilaterally  Cardiovascular: regular rate and rhythm; no edema  Musculoskeletal: in wheelchair  Psych: normal mood and affect    Cognitive Impairment noted: No    ---    (Note was completed, in part, with Martin " voice-recognition software. Documentation was reviewed, but some grammatical, spelling, and word errors may remain.)

## 2021-04-22 NOTE — LETTER
04/22/21      Westley Ness  1945  [address] [facility] [address]        To whom it may concern,    Mr. Ness would significantly benefit from the use of a bed whose head and feet can be raised.     He suffers from ischemic cardiomyopathy (heart failure) and end-stage renal disease (he is unable to eliminate fluid from his body without dialysis). These conditions can result in leg swelling and fluid build-up in his lungs which could be at least partially addressed with leg and head elevation, respectively.     Please contact me with any question or concerns.        Josselin Kolb MD   [facility]  [address] [place] [place] [address]  T: [phone], F: [phone]

## 2021-04-22 NOTE — TELEPHONE ENCOUNTER
Pt called, he is ordering an bed that raises at head and feet. He can get it tax free if he has a letter from his doctor states it would help with breathing, heart condition, and leg swelling. Reference name of patient.    Send to:  [facility]  [address] [place] [address]    Pt [phone], ok to lv detailed message

## 2021-04-23 NOTE — TELEPHONE ENCOUNTER
Letter composed.     Please mail to:    Send to:  [facility]  [address] [place] [address]    Please let patient know that letter has been composed and will be mailed out.     Thank you.

## 2021-07-05 NOTE — ED TRIAGE NOTES
Pt usually dialyzes on Mondays at 0530, pt reported to his ride that since 3pm yesterday he has been experiencing worsening dizziness. Denies CP/SOB/no cough

## 2021-07-05 NOTE — ED PROVIDER NOTES
History   Chief Complaint:  Dizziness       The history is provided by the patient.      Westley Ness is a 75 year old male on Eliquis with history of ESRD on dialysis Mon Wed Fri who presents with dizziness. Patient usually has dialysis on Mondays at 0530. He reports that he has been experiencing room spinning dizziness since yesterday 1500. He was trying to take a nap at the time. He states he has had similar symptoms before and they were thought to be related to his heart problems. Here in the ED, patient states laying back exacerbates the dizziness. Patient also notes mild headache. He denies double vision, difficulty with speech or swallowing, neck pain or neck stiffness, chest pain, shortness of breath, or black stool. He reports this morning his sitting blood pressure was 131/72. His hemoglobin is usually around 10-11. Patient uses a walker and ambulates fine.       Review of Systems   HENT: Negative for trouble swallowing.    Eyes: Negative for visual disturbance.   Respiratory: Negative for shortness of breath.    Cardiovascular: Negative for chest pain.   Gastrointestinal: Negative for blood in stool.   Musculoskeletal: Negative for neck pain and neck stiffness.   Neurological: Positive for dizziness and headaches. Negative for speech difficulty.   All other systems reviewed and are negative.        Allergies:  Contrast Dye    Medications:  Eliquis  Plavix  Neurontin   Imdur  Levothyroxine  Lisinopril  Toprol  Mexiletine  Nitrostat  Protonix  Prednisone  Pepcid  Loperamide  Pravastatin    Past Medical History:    Hypertension   CAD  ESRD on hemodialysis  Hypothyroidism   Ischemic cardiomyopathy  Hypertension   CAD    Past Surgical History:    ICD in place  Cholecystectomy  Coronary angiogram   Left heart cath  PCI stent  Elbow surgery  Atrial flutter ablation   Left dialysis fasciogram  Tonsillectomy and adenoidectomy    Family History:    Father: hypertension, bladder cancer  Mother: cerebrovascular  disease    Social History:  Patient presents to the ED with a friend.     Physical Exam     Patient Vitals for the past 24 hrs:   BP Temp Temp src Pulse Resp SpO2   07/05/21 0616 -- 97.2  F (36.2  C) Tympanic -- -- --   07/05/21 0614 137/70 -- -- 66 16 98 %       Physical Exam  GENERAL: well developed, pleasant  HEAD: atraumatic  EYES: pupils reactive, extraocular muscles intact, conjunctivae normal  ENT:  mucus membranes moist  NECK:  trachea midline, normal range of motion  RESPIRATORY: no tachypnea, breath sounds clear to auscultation   CVS: normal S1/S2, no murmurs, intact distal pulses  ABDOMEN: soft, nontender, nondistention  MUSCULOSKELETAL: no deformities  SKIN: warm and dry, no acute rashes or ulceration  NEURO: GCS 15, cranial nerves intact, alert and oriented x3  PSYCH:  Mood/affect normal      Emergency Department Course     ECG:  ECG taken at 0605, ECG read at 0615  Undetermined rhythm. Right ventricular hypertrophy. Possible Lateral infarct, age undetermined. Abnormal ECG.   Rate 68 bpm. OH interval 192 ms. QRS duration 90 ms. QT/QTc 428/455 ms. P-R-T axes 41 221 4.     Laboratory:   CBC: WBC: 6.8, HGB: 12.0 (L) , PLT: 172    BMP: Glucose 103 (H) , Urea Nitrogen: 39 (H) , GFR: 10 (L) , Creatinine: 5.03 (H) o/w WNL     Troponin (Collected 0607): 0.025        Emergency Department Course:    Reviewed:  I reviewed nursing notes, vitals, past medical history and care everywhere    Assessments:  0605 I obtained history and examined the patient as noted above.   0712 I rechecked the patient and explained findings.     Interventions:  0618 Antivert 25 mg PO    Disposition:  The patient was discharged to home.       Impression & Plan     CMS Diagnoses: None    Medical Decision Making:    Patient presents with vertigo.  Notes that it is worse when I lay him supine.  He has no red flags in terms of stroke terms of diplopia dysarthria or dysphagia and has normal finger-to-nose.  Reading through his charts and  talking to him he has had intermittent dizziness is longstanding.  He is anticoagulated.  Do not suspect intracranial hemorrhage.  Labs are at their baseline.  Patient was given meclizine with improvement of symptoms.  He notes this been mentioned to go to the dizzy clinic for possible physical therapy or treatment.  Given referral for this.        Diagnosis:    ICD-10-CM    1. Benign paroxysmal positional vertigo, unspecified laterality  H81.10        Discharge Medications:  New Prescriptions    MECLIZINE 25 MG CHEW    Take 1 tablet (25 mg) by mouth every 6 hours as needed for dizziness       Scribe Disclosure:  IAlma, am serving as a scribe at 6:01 AM on 7/5/2021 to document services personally performed by Martin Monreal MD based on my observations and the provider's statements to me.            Martin Monreal MD  07/05/21 1726

## 2021-07-24 NOTE — DISCHARGE INSTRUCTIONS
Continue Tylenol as needed for pain.    Opioid Medication Information    You have been given a prescription for an opioid (narcotic) pain medicine and/or have received a pain medicine while here in the Emergency Department. These medicines can make you drowsy or impaired. You must not drive, operate dangerous equipment, or engage in any other dangerous activities while taking these medications. If you drive while taking these medications, you could be arrested for DUI, or driving under the influence. Do not drink any alcohol while you are taking these medications.   Opioid pain medications can cause addiction. If you have a history of chemical dependency of any type, you are at a higher risk of becoming addicted to pain medications.  Only take these prescribed medications to treat your pain when all other options have been tried. Take it for as short a time and as few doses as possible. Store your pain pills in a secure place, as they are frequently stolen and provide a dangerous opportunity for children or visitors in your house to start abusing these powerful medications. We will not replace any lost or stolen medicine.  As soon as your pain is better, you should flush all your remaining medication.   Many prescription pain medications contain Tylenol  (acetaminophen), including Vicodin , Tylenol #3 , Norco , Lortab , and Percocet .  You should not take any extra pills of Tylenol  if you are using these prescription medications or you can get very sick.  Do not ever take more than 4000 mg of acetaminophen in any 24 hour period.  All opioids tend to cause constipation. Drink plenty of water and eat foods that have a lot of fiber, such as fruits, vegetables, prune juice, apple juice and high fiber cereal.  Take a laxative if you don t move your bowels at least every other day. Miralax , Milk of Magnesia, Colace , or Senna  can be used to keep you regular.

## 2021-07-24 NOTE — ED PROVIDER NOTES
History   Chief Complaint:  Chest Pain and Rib Pain       HPI   Westley Ness is a 75 year old male on Eliquis and Plavix with history of coronary artery disease, hypertension, ESRD, and ischemic cardiomyopathy who presents with chest and rib pain. The patient states that over the last couple of days he has had right sided pain, particularly in his ribs, which radiates up his chest and side. The patient denies history of breaking a rib and reports no recent trauma to that area. The patient has a chronic cough due to smoking, and states that coughing exacerbates his pain. He has taken 2 tylenol and 100 mg gabapentin to manage his pain. The patient does have a history of a compression fracture in his spine. He normally ambulates via a walker around his home. He denies sick contacts and received two doses of the Moderna COVID-19 vaccine last winter. He denies vomiting, diarrhea, abdominal pain, and blood in stool. He also denies fever, chills, and leg swelling.      Review of Systems   Constitutional: Negative for chills and fever.   Respiratory: Positive for cough (baseline).    Cardiovascular: Positive for chest pain. Negative for leg swelling.   Gastrointestinal: Negative for abdominal pain, blood in stool, diarrhea and vomiting.   Musculoskeletal: Positive for arthralgias (right ribs).   All other systems reviewed and are negative.      Allergies:  Contrast Dye    Medications:  Apixaban  Clopidogrel  Gabapentin  Isosorbide mononitrate  Levothyroxine  Lisinopril  Meclizine  Metoprolol succinate  Mexiletine  Nitroglycerin  Pantoprazole  Prednisone  Famotidine  Loperamide  Pravastatin    Past Medical History:    Acid reflux disease  Hypertension  CAD  ESRD  Atrial flutter  Hypothyroidism  Ischemic cardiomyopathy  ICD     Past Surgical History:    Cholecystectomy  Coronary angiogram  CV left heart catheter  CV PCI stent drug eluting  Elbow surgery  ICD generator change single  Ablation atrial flutter  HC left  heart catheterization  Implant ventricular device  IR dialysis fistulogram left  Repair fistula arteriovenous upper extremity  Tonsillectomy and adenoidectomy  Vascular surgery     Family History:    Mother: cerebrovascular disease  Father: hypertension, bladder cancer    Social History:  The patient presents to the ED alone  Arrived via EMS    Physical Exam     Patient Vitals for the past 24 hrs:   BP Temp Temp src Pulse Resp SpO2   07/24/21 1330 -- -- -- 81 14 97 %   07/24/21 1315 130/59 -- -- 82 15 97 %   07/24/21 1150 138/70 97.9  F (36.6  C) Oral 73 16 97 %       Physical Exam  Nursing note and vitals reviewed.  Constitutional:  Appears well-developed and well-nourished.   HENT:   Head:    Atraumatic.   Mouth/Throat:   Oropharynx is clear and moist. No oropharyngeal exudate.   Eyes:    Pupils are equal, round, and reactive to light.   Neck:    Normal range of motion. Neck supple.      No tracheal deviation present. No thyromegaly present.   Cardiovascular:  Normal rate, regular rhythm, no murmur   Pulmonary/Chest: Tenderness to the right lateral lower ribs without rash.  Breath sounds are clear and equal without wheezes or crackles.  Abdominal:   Soft. Bowel sounds are normal. Exhibits no distension and      no mass. There is no tenderness.      There is no rebound and no guarding.   Musculoskeletal:  Exhibits no edema. Tenderness to right lateral ribs with reproduction of his pain.  Lymphadenopathy:  No cervical adenopathy.   Neurological:   Alert and oriented to person, place, and time.   Skin:    Skin is warm and dry. No rash noted. No pallor. No rash to chest, back, or abdomen.      Emergency Department Course   ECG  ECG taken at 1149, ECG read at 1154  Normal sinus rhythm  Right axis deviation  Pulmonary disease pattern  Incomplete right bundle branch block  Possible right ventricular hypertrophy  Abnormal ECG   No significant change as compared to prior, dated 07/05/21.  Rate 82 bpm. AR interval 198 ms.  QRS duration 94 ms. QT/QTc 406/474 ms. P-R-T axes 57 158 3.     Imaging:  Chest CT w/o contrast  1.  Small right pleural effusion and trace left pleural effusion.  2.  Healing, nondisplaced left rib fractures.    JUDY CUETO MD      As per Radiology    Laboratory:    Troponin (Collected 1217): 0.022    CBC: WBC 7.7, HGB 11.0 (L),     CMP: Alkaline Phosphatase: 447 (H), Protein: 6.5 (L), Albumin: 3.3 (L), GFR: 16 (L) o/w WNL (Creatinine 3.47 (H))       Emergency Department Course:    Reviewed:  I reviewed nursing notes, vitals, past medical history and care everywhere    Assessments:  1202 I obtained history and examined the patient as noted above.    1354 I rechecked the patient and explained findings. We discussed discharge and the patient is comfortable going home.    Interventions:  1216 Oxycodone 5 mg PO    Disposition:  The patient was discharged to home.       Impression & Plan     Medical Decision Making:    This patient has left rib pain with tenderness of the ribs and is concerned about rib fracture but also concerned about possibility of pneumonia or chest wall strain. CT test showed small bilateral pleural effusions but no sign of pneumonia, rib fracture, or pneumothorax. So he was oxygenated without fever or vomiting I did not fear there was any concern for kidney infection or UTI either, and there was no sign of shingles. I discussed with him to return if he develops a rash. I did not fear there was any concern for pulmonary embolisms since his pain was not pleuritic. He has no shortness of breath or tachycardia and is already anticoagulated with both Eliquis and Plavix. He was written for oxycodone for pain and instructed not to drive a car or drink alcohol for 12 hours after taking it. He was told to continue tylenol for baseline pain and can be safely discharged to home, but was told to return for SOB, fevers, vomiting, or any other worsening pain or concerning symptoms. There was also no  sign of liver problem and he has already had his gallbladder taken out. He was told to follow-up with his doctor this week in clinic.      Diagnosis:    ICD-10-CM    1. Right-sided chest wall pain  R07.89        Discharge Medications:  New Prescriptions    OXYCODONE (ROXICODONE) 5 MG TABLET    Take 1 tablet (5 mg) by mouth every 6 hours as needed for pain No driving a car or drinking alcohol for12 hours after taking this medication.       Scribe Disclosure:  I, Israel Cerda, am serving as a scribe at 11:51 AM on 7/24/2021 to document services personally performed by Gabbi Lujan MD based on my observations and the provider's statements to me.            Gabbi Lujan MD  07/25/21 8364

## 2021-07-24 NOTE — ED TRIAGE NOTES
Comes from home.  Right sided chest pain/rib pain radiates to right arm that developed 2 days ago. No trauma

## 2021-07-24 NOTE — ED NOTES
Bed: ED11  Expected date: 7/24/21  Expected time: 11:30 AM  Means of arrival: Ambulance  Comments:   513 75m rib pain ETA 1140

## 2021-07-26 NOTE — TELEPHONE ENCOUNTER
Routing refill request to provider for review/approval because:  Labs out of range:    TSH   Date Value Ref Range Status   03/26/2019 4.40 (H) 0.40 - 4.00 mU/L Final         Leela Gonzales RN  [facility]

## 2021-07-26 NOTE — ED NOTES
Bed: ED19  Expected date: 7/31/17  Expected time:   Means of arrival:   Comments:  512 71m/cp  
[facility]  ED Nurse Handoff Report    ED Chief complaint: Chest Pain (has been having heaviness with moving around and sob for the past few days, coughing for 7 days )      ED Diagnosis:   Final diagnoses:   None       Code Status: Full Code    Allergies:   Allergies   Allergen Reactions     Contrast Dye Hives     Does fine if he uses benadryl prior.     No Clinical Screening - See Comments      Green beans - Diarrhea.    Topical antibiotic - name unknown - caused swelling of the penis.       Activity level - Baseline/Home:  Independent    Activity Level - Current:   Independent     Needed?: No    Isolation: No  Infection: Not Applicable    Bariatric?: No    Vital Signs:   Vitals:    07/31/17 0715 07/31/17 0830 07/31/17 0845 07/31/17 0900   BP:  135/67     Pulse:  70     Resp: 13 20     Temp:       TempSrc:       SpO2: 98% 98% 94% 94%   Weight:       Height:           Cardiac Rhythm: ,   Cardiac  Cardiac Rhythm: Normal sinus rhythm    Pain level: 0-10 Pain Scale: 1    Is this patient confused?: No    Patient Report: Initial Complaint: chest pain    Focused Assessment: pt has had some chest pain on and off for the past few days. Pt has dialysis and was supposed to run today but came to the ER due to chest pain. Pt has had a cough for the past week, loose congested cough. Alert and oriented. Dialysis shunt to lt arm.   Tests Performed: labs, cxray  Abnormal Results: see results  Treatments provided: 324 ASA via ems    Family Comments: lives with friend, no one here    OBS brochure/video discussed/provided to patient: N/A    ED Medications: Medications - No data to display    Drips infusing?:  No      ED NURSE PHONE NUMBER: [phone]       
Pt declined watching obs video, states he has seen it, no questions  
Subjective: Santa Mccloud  is a 55 y.o. female referred by Raul Duran for evaluation of RIGHT breast mass. Pt first noticed this lump about 3 weeks ago while showering. Pt reports area is unchanged since she first noticed it. Pt's most recent mammogram on 21 was negative. Past Medical History:   Diagnosis Date    Anxiety     Depression     Diabetes ([address]     Lipoma of anterior chest wall 2021    Nontoxic multinodular goiter     Followed with [facility]6 months starting 2015 0 Dr. Sriram Brenner         Past Surgical History:   Procedure Laterality Date    HX OTHER SURGICAL  7/15/15    Biopsy thyroid       Social History     Tobacco Use    Smoking status: Former Smoker     Packs/day: 1.50     Years: 24.00     Pack years: 36.00     Types: Cigarettes     Quit date: 2015     Years since quittin.9    Smokeless tobacco: Never Used    Tobacco comment: Has tried hypnotherapy, ecig, Wellbutrin. Chantix worked! Substance Use Topics    Alcohol use: Yes     Alcohol/week: 0.0 standard drinks     Comment: 1-2 glass with dinner 3x/week, 2 glasses Friday/Saturday       Family History   Problem Relation Age of Onset    Diabetes Mother     Thyroid Disease Mother         hypothyroidism    Hypertension Father     High Cholesterol Father     Thyroid Disease Father         goiter, hyperthyroidism    Heart Attack Father         in 29's    Diabetes Maternal Grandmother     Breast Cancer Maternal Grandmother         not sure of age    Rober Mook Diabetes Maternal Grandfather     Diabetes Paternal Grandmother     Diabetes Paternal Grandfather        Current Outpatient Medications on File Prior to Visit   Medication Sig Dispense Refill    cholecalciferol (Vitamin D3) 25 mcg (1,000 unit) cap Take  by mouth daily.  metFORMIN (GLUCOPHAGE) 500 mg tablet TAKE 1 TABLET BY MOUTH TWICE DAILY WITH MEALS 60 Tab 2    esomeprazole (NexIUM) 20 mg capsule Take 20 mg by mouth daily.      
aspirin delayed-release 81 mg tablet Take 81 mg by mouth daily.  Trintellix 10 mg tablet       fexofenadine-pseudoephedrine (ALLEGRA-D 24 HOUR) 180-240 mg per tablet Take 1 Tab by mouth daily.  REXULTI 1 mg tab tablet       multivitamin (ONE A DAY) tablet Take 1 Tab by mouth daily.  albuterol (PROVENTIL HFA, VENTOLIN HFA, PROAIR HFA) 90 mcg/actuation inhaler   99     No current facility-administered medications on file prior to visit. No Known Allergies      Review of Systems:  Constitutional: No fever or chills  Neurologic: No headache  Eyes: No scleral icterus or irritated eyes  Nose: No nasal pain or drainage  Mouth: No oral lesions or sore throat  Cardiac: No palpations or chest pain  Pulmonary: No cough or shortness or breath  Gastrointestinal: No nausea, emesis, diarrhea, or constipation  Genitourinary: No dysuria  Musculoskeletal: No muscle or joint tenderness  Skin: No rashes or lesions  Psychiatric: No anxiety or depressed mood    Objective:     Visit Vitals  /85   Pulse 85   Temp 98.8 °F (37.1 °C) (Oral)   Resp 18   Ht 5' 4\" (1.626 m)   Wt 218 lb (98.9 kg)   SpO2 97%   BMI 37.42 kg/m²        Physical Exam:  General: No acute distress, conversant  Eyes: PERRLA, no scleral icterus  HENT: Normocephalic without oral lesions  Neck: Trachea midline without LAD  Cardiac: Normal pulse rate and rhythm  Pulmonary: Symmetric chest rise with normal effort  GI: Soft, NT, ND, no hernia, no splenomegaly  Skin: Warm without rash  Breast: 1.5 cm lipoma in the RIGHT inframammary fold. Extremities: No edema or joint stiffness  Psych: Appropriate mood and affect    Labs: No results found for this or any previous visit (from the past 24 hour(s)). Data Review: All previous imaging, testing and lab work was reviewed and interpreted.      ODIN screening mammogram 03/01/21  FINDINGS: Bilateral digital screening mammography was performed and is  interpreted in conjunction with a computer assisted
detection (CAD) system. No  suspicious masses or calcifications are identified. There has been no  significant change.     IMPRESSION  BI-RADS 1: Negative. No mammographic evidence of malignancy.       Assessment and Plan:       ICD-10-CM ICD-9-CM    1. Lipoma of anterior chest wall  D17.1 214.8        Recommend observation unless or until area enlarges or becomes symptomatic. F/U PRN. All questions were answered and pt is in agreement with this plan. Total face to face time with patient: 15 minutes. Greater than 50% of the time was spent in counseling. This document was scribed by Enrique Panchal as dictated by Dr. Williams Delgado.      Signed By: Radha Rivera MD     07/26/21
Capillary refill less/equal to 2 seconds/Strong peripheral pulses

## 2021-08-12 NOTE — TELEPHONE ENCOUNTER
Routing refill request to provider for review/approval because:  Labs out of range:    Hemoglobin   Date Value Ref Range Status   07/24/2021 11.0 (L) 13.3 - 17.7 g/dL Final   07/05/2021 12.0 (L) 13.3 - 17.7 g/dL Final   ]      Ed Le RN  [facility] Triage Nurse

## 2021-08-17 NOTE — TELEPHONE ENCOUNTER
Patient is out of Prantoprazole and is very low on Mexitil, patient states pharmacy informed he that they already sent the request to us last week.  Camille De Dios   [facility]  Central Scheduler

## 2021-08-17 NOTE — TELEPHONE ENCOUNTER
Routing refill request to provider for review/approval because:  Failed protocol due to:  Medication needs approval from authorizing provider    Ed Le RN  [facility] Triage Nurse

## 2021-10-01 NOTE — PROGRESS NOTES
Clinic Care Coordination Contact    Follow Up Progress Note      Assessment: KAROLINA POON received a call from pt stating that he has spoken with his pharmacy about refill for Renal multivitamin. He was told that the pharmacy has contacted us but it still has not been refilled.    KAROLINA CLEVE will send this request to triage for review.    Plan: KAROLINA CLEVE will send pt's request for a refill of Renal multivitamin to triage.    Sandy Clancy [facility]  Clinic Care Coordinator  [facility] Joanna Assumption  [facility]  [phone]  [email or web address]

## 2021-10-21 NOTE — PROGRESS NOTES
Clinic Care Coordination Contact    Pt left message for CC CLEVE sharing that he continues to be unable to  his Vitamin D3 and Lisinopril from the pharmacy. Per Chart Review, Vitamin D3 was just sent to pharmacy yesterday.    Plan: KAROLINA POON will send refill request to Care Team for Lisinopril.    Sandy Clancy [facility]  Clinic Care Coordinator  [facility] Joanna Daggett  [facility]  [phone]  [email or web address]

## 2021-10-21 NOTE — PROGRESS NOTES
Clinic Care Coordination Contact    Follow Up Progress Note      Assessment: KAROLINA POON received call from pt regarding medications. KAROLINA POON shared that Vitamin D3 was sent to [facility] yesterday. KAROLINA POON informed that message was send to Care Team for refill of Lisinopril.     Plan: KAROLINA POON requested pt follow up with KAROLINA POON tomorrow if medication is still not filled at [facility].    Sandy Clancy [facility]  Clinic Care Coordinator  [facility] Joanna Day  [facility]  [phone]  [email or web address]

## 2021-11-10 NOTE — TELEPHONE ENCOUNTER
Routing refill request to provider for review/approval because:  Failed protocol due to:  Normal HGB on file in past 12 months    Ed Le RN  [facility] Triage Nurse
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
## 2021-11-19 NOTE — TELEPHONE ENCOUNTER
Routing refill request to provider for review/approval because:  Medication is reported/historical    Ed Le RN  [facility] Triage Nurse

## 2021-12-03 NOTE — ED PROVIDER NOTES
History   Chief Complaint:  Coagulation Disorder       The history is provided by the patient.      Westley Ness is a 75 year old male with history of acid reflux disease, hypertension, CAD, atrial flutter on apixaban, hypothyroidism, and cardiomyopathy who presents via ambulance with a coagulation disorder. The patient typically receives dialysis on Mondays, Wednesdays, and Fridays. Today while at dialysis after completing his run they were unable to get is upper fistula to stop bleeding. They began to apply pressure at 0930 on the upper area and have yet to remove the pressure. This has happened in the past after dialysis, but has been more frequent in the last couple months. When this has occurred he typically needs a stitch in the area in order to stop the bleeding.     Review of Systems   All other systems reviewed and are negative.      Allergies:  Contrast Dye    Medications:  apixaban  clopidogrel   famotidine   gabapentin   isosorbide mononitrate   levothyroxine   lisinopril   loperamide   meclizine   metoprolol succinate   mexiletine   nitroglycerin   oxycodone   pantoprazole   pravastatin   prednisone   vitamin D3     Past Medical History:     Acid reflux disease  Benign essential hypertension  CAD (coronary artery disease)  ESRD on hemodialysis  atrial flutter  Hypothyroidism  Ischemic cardiomyopathy  ICD (implantable cardioverter-defibrillator) in place      Past Surgical History:    Cholecystectomy, open  CV coronary angiogram  CV left heart cath  CV PCI stent drug eluting  Elbow surgery  EP ICD generator change single  Ablation atrial flutter  HC left heart catheterization  Implant ventricular device  IR dialysis fistulogram left  Repair fistula arteriovenous upper extremity  Tonsillectomy & adenoidectomy   Vascular surgery      Family History:    Cerebrovascular Disease  Hypertension  Bladder cancer  Myocardial Infarction    Social History:  Presents unaccompanied.   PCP: Josselin Kolb  "    Physical Exam     Patient Vitals for the past 24 hrs:   BP Temp Temp src Pulse Resp SpO2 Height Weight   12/03/21 1336 (!) 126/93 97.8  F (36.6  C) Oral 87 14 98 % 1.727 m (5' 8\") 78.6 kg (173 lb 4.5 oz)     Physical Exam  SKIN: Distal left upper limb with normal tactile temperature to palpation.  C-clamp applied to left upper extremity fistula.  Puncture wound not bleeding after removal of compressive device.  HEMATOLOGIC/IMMUNOLOGIC/LYMPHATIC:  No pallor of the left upper extremity distal to the fistula.  EYES:  Conjunctivae normal.  CARDIOVASCULAR:  Regular rate and rhythm.  Left upper limb fistula with palpable thrill..  RESPIRATORY:  No respiratory distress, breath sounds equal and normal.  GASTROINTESTINAL:  Soft, nontender abdomen.  MUSCULOSKELETAL: Well-tolerated active range of motion of the left upper limb.  NEUROLOGIC:  Alert, conversant.  No gross motor or tactile sensory deficit of left upper limb.  PSYCHIATRIC:  Normal mood.    Emergency Department Course     Emergency Department Course:    Reviewed:  I reviewed nursing notes, vitals, past medical history and Care Everywhere    Assessments:  1354 I obtained history and examined the patient as noted above.    I rechecked the patient and explained findings.     Disposition:  The patient was discharged to home.     Impression & Plan     Medical Decision Making:  This patient presents with bleeding from his AV fistula puncture site after dialysis.  The superior site continued to bleed so he arrived with a C-clamp on for continuous hemostasis/pressure.  By the time he arrived this device had been in place for a couple hours.  I removed at the bedside and there was no active bleeding from the puncture site.  He was watched for close to an hour with no subsequent bleeding.  So a bandage was applied and I advised return if any concerns regarding bleeding otherwise to continue dialysis as scheduled.      Diagnosis:    ICD-10-CM    1. Hemorrhage of " arteriovenous fistula, initial encounter (H)  T82.838A        Scribe Disclosure:  I, July Dorseyicson, am serving as a scribe at 1:51 PM on 12/3/2021 to document services personally performed by Reji Lyle MD based on my observations and the provider's statements to me.            Reji Lyle MD  12/03/21 5268

## 2021-12-03 NOTE — ED NOTES
Bed: ED02  Expected date:   Expected time:   Means of arrival:   Comments:  511  75 M bleeding from dialysis port  1320

## 2021-12-06 NOTE — TELEPHONE ENCOUNTER
Routing refill request to provider for review/approval because:  Drug not on the [facility] refill protocol   Leela Gonzales RN  [facility]=

## 2021-12-16 NOTE — PROGRESS NOTES
"  Assessment & Plan     Multiple skin tears  No sign of infection. Recommend he stop using betadine to clean, as this is too harsh and likely impeding healing. I also suspect that the repeated removal of multiple large adhesive bandages is contributing to new skin tears. Tegaderm applied by support staff to cover wounds, and referral to wound clinic placed. If he is not able to get in right away and needs to change dressing, he can clean with a small amount of gentle soap and water, and then cover.   - Wound Care Referral               BMI:   Estimated body mass index is 26.3 kg/m  as calculated from the following:    Height as of 12/3/21: 1.727 m (5' 8\").    Weight as of this encounter: 78.5 kg (173 lb).           No follow-ups on file.    July Hummel MD  [facility] URGENT CARE HARRY Cordova is a 75 year old who presents for the following health issues     HPI   About a month ago, skin tear on left forearm, no real injury/trauma.or cut. Cant remember what initially injured skin - possibly dialysis tape. Since then, has gotten bigger, is not healing. No redness, no drainage, is not concerned about infection. Cleaning on a regular basis with full-strength betadine. Feels fine/at baseline. No fevers/chills/malaise. On eliquis and plavix, so arm wound is oozing blood often, but he is able to control is with pressure or bandages. Keeping covered with multiple adhesive bandages.        Review of Systems         Objective    /63   Pulse 92   Temp 97.8  F (36.6  C)   Resp 14   Wt 78.5 kg (173 lb)   SpO2 96%   BMI 26.30 kg/m    Body mass index is 26.3 kg/m .  Physical Exam     Left arm with multiple skin tears on forearm, a couple are just narrow linear skin tears with minimal open wound, but the largest is at least 2x3 inches. No surrounding erythema, no purulent drainage. Slight oozing of blood from open areas after removing bandages, easily controlled with gentle pressure.           "

## 2021-12-20 NOTE — TELEPHONE ENCOUNTER
Consult received via phone (fax/workque/phone) from urgent care provider for ulcer of the left forearm.  Please schedule with providers Indu Castillo or Kirstin at [facility] [place] at [facility].  Routing to   wound healing scheduling.

## 2021-12-20 NOTE — TELEPHONE ENCOUNTER
[facility] Wound    Who is the name of the provider?:  New      What is the location you see this provider at?: [place]    Reason for call:  Seen in [facility] 12/16 referred to [facility] for open skin tears on left arm.  No infection.     Can we leave a detailed message on this number?  YES

## 2021-12-21 PROBLEM — N18.6 END-STAGE RENAL DISEASE (H): Status: ACTIVE | Noted: 2021-01-01

## 2021-12-21 PROBLEM — S51.812A SKIN TEAR OF FOREARM WITHOUT COMPLICATION, LEFT, INITIAL ENCOUNTER: Status: ACTIVE | Noted: 2021-01-01

## 2021-12-21 NOTE — PROGRESS NOTES
"[facility]    ASSESSMENT:   1. (W06.625O) Skin tear of forearm without complication, left, initial encounter    PLAN/DISCUSSION:   1. Wound care plan: xeroform, abd, spandage, change on shower days  2. See bottom of note for detailed wound care and patient instructions    HISTORY OF PRESENT ILLNESS:   Westley Ness is a 76 year old male with ESRD on HD who presents today for poor healing of an ulcer on his left forearm. Presents along with caregiver, Lynn. States that this has been present for a month, but thinks it might be \"moving around.\" It appears consistent with skin tear. Likely re-traumatizing the area with bandage removal. Was previously cleaning it with betadine before the UC instructed him to stop this. Showers every 2-3 days.     VITALS: /77 (BP Location: Right arm)   Pulse 87   Temp 97.1  F (36.2  C) (Temporal)   Resp 16      PHYSICAL EXAM:  GENERAL: Patient is alert and oriented and in no acute distress  CV: multiphasic signal heard on doppler   INTEGUMENTARY:   Wound (used by OP [facility] only) 12/21/21 1051 Left (Active)   Thickness/Stage full thickness 12/21/21 1000   Dressing Appearance moist drainage 12/21/21 1000   Base granulating 12/21/21 1000   Periwound ecchymotic 12/21/21 1000   Periwound Temperature warm 12/21/21 1000   Periwound Skin Turgor soft 12/21/21 1000   Edges open 12/21/21 1000   Length (cm) 7 12/21/21 1000   Width (cm) 3 12/21/21 1000   Depth (cm) 0 12/21/21 1000   Wound (cm^2) 21 cm^2 12/21/21 1000   Wound Volume (cm^3) 0 cm^3 12/21/21 1000   Drainage Characteristics/Odor serosanguineous 12/21/21 1000   Drainage Amount moderate 12/21/21 1000   Care, Wound non-select wound debridement performed 12/21/21 1000             MDM: 45-59 minutes were spent on the date of the visit reviewing previous chart notes, evaluating patient and developing the treatment plan, this excludes any time spent on procedures.     PATIENT INSTRUCTIONS      Further instructions " from your care team       Westley Ness      1945    Wound Dressing Change: left arm  Cleanse wound and surrounding skin with: mild soap and water in shower  Cover wound with Xeroform, then ABD pad, then Spandage or roll gauze & tape  Change dressing 3 times weekly    To prevent future trauma:  - Avoid adhesives  - Use hypoallergenic, high quality moisturizer on intact skin daily (CeraVe, Eucerin, Vanicream)        Electronically signed by Keiko Castillo PA-C on December 21, 2021

## 2021-12-21 NOTE — DISCHARGE INSTRUCTIONS
Westley Ness      1945    Wound Dressing Change: left arm  Cleanse wound and surrounding skin with: mild soap and water in shower  Cover wound with Xeroform, then ABD pad, then Spandage or roll gauze & tape  Change dressing 3 times weekly    To prevent future trauma:  - Avoid adhesives  - Use hypoallergenic, high quality moisturizer on intact skin daily (CeraVe, Eucerin, Vanicream)     Keiko Castillo PA-C. December 21, 2021    Call us at [phone] if you have any questions about your wounds, have redness or swelling around your wound, have a fever of 101 or greater or if you have any other problems or concerns. We answer the phone Monday through Friday 8 am to 4 pm, please leave a message as we check the voicemail frequently throughout the day.     If you had a positive experience please indicate on your patient satisfaction survey form that [facility] will be sending you.    If you have any billing related questions please call the [facility] Business office at [phone]. The clinic staff does not handle billing related matters.

## 2021-12-21 NOTE — ADDENDUM NOTE
Encounter addended by: Felicitas Mccray RN on: 12/21/2021 12:59 PM   Actions taken: Charge Capture section accepted

## 2021-12-21 NOTE — PROGRESS NOTES
Patient arrived for wound care visit. Certified Wound Care Nurse time spent evaluating patient record, completed a full evaluation and documented wound(s) & filippo-wound skin; provided recommendation based on treatment plan. Applied dressing, reviewed discharge instructions, patient education, and discussed plan of care with appropriate medical team staff members and patient and/or family members.

## 2021-12-27 NOTE — ADDENDUM NOTE
Encounter addended by: Carlita Goss RN on: 12/27/2021 3:03 PM   Actions taken: Order list changed, Diagnosis association updated

## 2021-12-30 NOTE — ADDENDUM NOTE
Encounter addended by: Carlita Goss RN on: 12/30/2021 8:45 AM   Actions taken: Order list changed, Diagnosis association updated

## 2022-01-01 ENCOUNTER — HOSPITAL ENCOUNTER (INPATIENT)
Facility: CLINIC | Age: 77
LOS: 4 days | Discharge: HOME | DRG: 853 | End: 2022-02-15
Attending: EMERGENCY MEDICINE | Admitting: INTERNAL MEDICINE
Payer: MEDICARE

## 2022-01-01 ENCOUNTER — HOSPITAL ENCOUNTER (INPATIENT)
Facility: CLINIC | Age: 77
LOS: 19 days | Discharge: NURSING, REHAB OR LONG-TERM CARE | DRG: 177 | End: 2022-03-08
Attending: EMERGENCY MEDICINE | Admitting: INTERNAL MEDICINE
Payer: MEDICARE

## 2022-01-01 ENCOUNTER — TRANSITIONAL CARE UNIT VISIT (OUTPATIENT)
Dept: GERIATRICS | Facility: CLINIC | Age: 77
End: 2022-01-01
Payer: MEDICARE

## 2022-01-01 ENCOUNTER — APPOINTMENT (OUTPATIENT)
Dept: GENERAL RADIOLOGY | Facility: CLINIC | Age: 77
DRG: 177 | End: 2022-01-01
Attending: EMERGENCY MEDICINE
Payer: MEDICARE

## 2022-01-01 ENCOUNTER — APPOINTMENT (OUTPATIENT)
Dept: PHYSICAL THERAPY | Facility: CLINIC | Age: 77
DRG: 177 | End: 2022-01-01
Payer: MEDICARE

## 2022-01-01 ENCOUNTER — APPOINTMENT (OUTPATIENT)
Dept: PHYSICAL THERAPY | Facility: CLINIC | Age: 77
DRG: 853 | End: 2022-01-01
Attending: INTERNAL MEDICINE
Payer: MEDICARE

## 2022-01-01 ENCOUNTER — HOSPITAL ENCOUNTER (INPATIENT)
Facility: CLINIC | Age: 77
LOS: 2 days | End: 2022-03-27
Attending: INTERNAL MEDICINE | Admitting: INTERNAL MEDICINE
Payer: MEDICARE

## 2022-01-01 ENCOUNTER — APPOINTMENT (OUTPATIENT)
Dept: PHYSICAL THERAPY | Facility: CLINIC | Age: 77
DRG: 177 | End: 2022-01-01
Attending: INTERNAL MEDICINE
Payer: MEDICARE

## 2022-01-01 ENCOUNTER — APPOINTMENT (OUTPATIENT)
Dept: PHYSICAL THERAPY | Facility: CLINIC | Age: 77
DRG: 853 | End: 2022-01-01
Payer: MEDICARE

## 2022-01-01 ENCOUNTER — APPOINTMENT (OUTPATIENT)
Dept: CT IMAGING | Facility: CLINIC | Age: 77
DRG: 280 | End: 2022-01-01
Attending: PHYSICIAN ASSISTANT
Payer: MEDICARE

## 2022-01-01 ENCOUNTER — NURSE TRIAGE (OUTPATIENT)
Dept: NURSING | Facility: CLINIC | Age: 77
End: 2022-01-01
Payer: MEDICARE

## 2022-01-01 ENCOUNTER — PATIENT OUTREACH (OUTPATIENT)
Dept: CARE COORDINATION | Facility: CLINIC | Age: 77
End: 2022-01-01
Payer: MEDICARE

## 2022-01-01 ENCOUNTER — HOSPITAL ENCOUNTER (EMERGENCY)
Facility: CLINIC | Age: 77
Discharge: HOME | End: 2022-03-09
Attending: EMERGENCY MEDICINE | Admitting: EMERGENCY MEDICINE
Payer: MEDICARE

## 2022-01-01 ENCOUNTER — LAB REQUISITION (OUTPATIENT)
Dept: LAB | Facility: CLINIC | Age: 77
End: 2022-01-01
Payer: MEDICARE

## 2022-01-01 ENCOUNTER — APPOINTMENT (OUTPATIENT)
Dept: OCCUPATIONAL THERAPY | Facility: CLINIC | Age: 77
DRG: 853 | End: 2022-01-01
Payer: MEDICARE

## 2022-01-01 ENCOUNTER — APPOINTMENT (OUTPATIENT)
Dept: GENERAL RADIOLOGY | Facility: CLINIC | Age: 77
DRG: 280 | End: 2022-01-01
Attending: PHYSICIAN ASSISTANT
Payer: MEDICARE

## 2022-01-01 ENCOUNTER — DOCUMENTATION ONLY (OUTPATIENT)
Dept: OTHER | Facility: CLINIC | Age: 77
End: 2022-01-01

## 2022-01-01 ENCOUNTER — VIRTUAL VISIT (OUTPATIENT)
Dept: FAMILY MEDICINE | Facility: CLINIC | Age: 77
End: 2022-01-01
Payer: MEDICARE

## 2022-01-01 ENCOUNTER — TRANSFERRED RECORDS (OUTPATIENT)
Dept: HEALTH INFORMATION MANAGEMENT | Facility: CLINIC | Age: 77
End: 2022-01-01

## 2022-01-01 ENCOUNTER — APPOINTMENT (OUTPATIENT)
Dept: ULTRASOUND IMAGING | Facility: CLINIC | Age: 77
DRG: 177 | End: 2022-01-01
Attending: INTERNAL MEDICINE
Payer: MEDICARE

## 2022-01-01 ENCOUNTER — PATIENT OUTREACH (OUTPATIENT)
Dept: CARE COORDINATION | Facility: CLINIC | Age: 77
End: 2022-01-01
Payer: OTHER MISCELLANEOUS

## 2022-01-01 ENCOUNTER — APPOINTMENT (OUTPATIENT)
Dept: GENERAL RADIOLOGY | Facility: CLINIC | Age: 77
End: 2022-01-01
Attending: EMERGENCY MEDICINE
Payer: MEDICARE

## 2022-01-01 ENCOUNTER — APPOINTMENT (OUTPATIENT)
Dept: CARDIOLOGY | Facility: CLINIC | Age: 77
DRG: 177 | End: 2022-01-01
Attending: INTERNAL MEDICINE
Payer: MEDICARE

## 2022-01-01 ENCOUNTER — MEDICAL CORRESPONDENCE (OUTPATIENT)
Dept: HEALTH INFORMATION MANAGEMENT | Facility: CLINIC | Age: 77
End: 2022-01-01

## 2022-01-01 ENCOUNTER — APPOINTMENT (OUTPATIENT)
Dept: OCCUPATIONAL THERAPY | Facility: CLINIC | Age: 77
DRG: 853 | End: 2022-01-01
Attending: INTERNAL MEDICINE
Payer: MEDICARE

## 2022-01-01 ENCOUNTER — HOSPITAL ENCOUNTER (INPATIENT)
Facility: CLINIC | Age: 77
LOS: 9 days | Discharge: HOSPICE | DRG: 280 | End: 2022-03-25
Attending: PHYSICIAN ASSISTANT | Admitting: INTERNAL MEDICINE
Payer: MEDICARE

## 2022-01-01 ENCOUNTER — PATIENT OUTREACH (OUTPATIENT)
Dept: CARE COORDINATION | Facility: CLINIC | Age: 77
End: 2022-01-01

## 2022-01-01 ENCOUNTER — APPOINTMENT (OUTPATIENT)
Dept: GENERAL RADIOLOGY | Facility: CLINIC | Age: 77
DRG: 177 | End: 2022-01-01
Attending: INTERNAL MEDICINE
Payer: MEDICARE

## 2022-01-01 ENCOUNTER — TELEPHONE (OUTPATIENT)
Dept: OTHER | Facility: CLINIC | Age: 77
End: 2022-01-01
Payer: MEDICARE

## 2022-01-01 ENCOUNTER — APPOINTMENT (OUTPATIENT)
Dept: CT IMAGING | Facility: CLINIC | Age: 77
DRG: 853 | End: 2022-01-01
Attending: EMERGENCY MEDICINE
Payer: MEDICARE

## 2022-01-01 ENCOUNTER — HOSPITAL ENCOUNTER (OUTPATIENT)
Dept: WOUND CARE | Facility: CLINIC | Age: 77
Discharge: HOME | End: 2022-01-18
Attending: PHYSICIAN ASSISTANT | Admitting: PHYSICIAN ASSISTANT
Payer: MEDICARE

## 2022-01-01 VITALS
WEIGHT: 154 LBS | HEIGHT: 68 IN | DIASTOLIC BLOOD PRESSURE: 65 MMHG | BODY MASS INDEX: 23.34 KG/M2 | RESPIRATION RATE: 19 BRPM | TEMPERATURE: 98 F | OXYGEN SATURATION: 97 % | HEART RATE: 106 BPM | SYSTOLIC BLOOD PRESSURE: 98 MMHG

## 2022-01-01 VITALS
TEMPERATURE: 97.6 F | SYSTOLIC BLOOD PRESSURE: 100 MMHG | HEART RATE: 96 BPM | DIASTOLIC BLOOD PRESSURE: 64 MMHG | OXYGEN SATURATION: 98 % | RESPIRATION RATE: 18 BRPM

## 2022-01-01 VITALS
BODY MASS INDEX: 25.36 KG/M2 | OXYGEN SATURATION: 96 % | HEART RATE: 95 BPM | HEIGHT: 68 IN | SYSTOLIC BLOOD PRESSURE: 101 MMHG | WEIGHT: 167.33 LBS | TEMPERATURE: 98.3 F | DIASTOLIC BLOOD PRESSURE: 58 MMHG | RESPIRATION RATE: 18 BRPM

## 2022-01-01 VITALS
WEIGHT: 154.6 LBS | TEMPERATURE: 98.7 F | SYSTOLIC BLOOD PRESSURE: 89 MMHG | HEIGHT: 68 IN | OXYGEN SATURATION: 94 % | BODY MASS INDEX: 23.43 KG/M2 | HEART RATE: 95 BPM | RESPIRATION RATE: 16 BRPM | DIASTOLIC BLOOD PRESSURE: 61 MMHG

## 2022-01-01 VITALS
TEMPERATURE: 97.5 F | SYSTOLIC BLOOD PRESSURE: 120 MMHG | OXYGEN SATURATION: 100 % | HEART RATE: 88 BPM | BODY MASS INDEX: 23.46 KG/M2 | RESPIRATION RATE: 18 BRPM | WEIGHT: 154.32 LBS | DIASTOLIC BLOOD PRESSURE: 34 MMHG

## 2022-01-01 VITALS
SYSTOLIC BLOOD PRESSURE: 81 MMHG | HEIGHT: 68 IN | WEIGHT: 154 LBS | TEMPERATURE: 99.1 F | DIASTOLIC BLOOD PRESSURE: 59 MMHG | BODY MASS INDEX: 23.34 KG/M2 | HEART RATE: 97 BPM | RESPIRATION RATE: 17 BRPM | OXYGEN SATURATION: 99 %

## 2022-01-01 VITALS
WEIGHT: 163.2 LBS | BODY MASS INDEX: 24.81 KG/M2 | DIASTOLIC BLOOD PRESSURE: 64 MMHG | SYSTOLIC BLOOD PRESSURE: 112 MMHG | HEART RATE: 100 BPM | RESPIRATION RATE: 16 BRPM | OXYGEN SATURATION: 97 % | TEMPERATURE: 97.8 F

## 2022-01-01 VITALS
RESPIRATION RATE: 14 BRPM | SYSTOLIC BLOOD PRESSURE: 100 MMHG | DIASTOLIC BLOOD PRESSURE: 76 MMHG | TEMPERATURE: 97.4 F | HEART RATE: 89 BPM | OXYGEN SATURATION: 99 %

## 2022-01-01 VITALS
SYSTOLIC BLOOD PRESSURE: 116 MMHG | HEART RATE: 76 BPM | RESPIRATION RATE: 18 BRPM | TEMPERATURE: 97.1 F | DIASTOLIC BLOOD PRESSURE: 60 MMHG

## 2022-01-01 DIAGNOSIS — R09.02 HYPOXIA: ICD-10-CM

## 2022-01-01 DIAGNOSIS — R53.81 PHYSICAL DECONDITIONING: ICD-10-CM

## 2022-01-01 DIAGNOSIS — E86.9 VOLUME DEPLETION: ICD-10-CM

## 2022-01-01 DIAGNOSIS — N18.6 TYPE 2 DIABETES MELLITUS WITH CHRONIC KIDNEY DISEASE ON CHRONIC DIALYSIS, WITHOUT LONG-TERM CURRENT USE OF INSULIN (H): ICD-10-CM

## 2022-01-01 DIAGNOSIS — I25.5 ISCHEMIC CARDIOMYOPATHY: ICD-10-CM

## 2022-01-01 DIAGNOSIS — R79.89 ELEVATED TROPONIN: ICD-10-CM

## 2022-01-01 DIAGNOSIS — Z71.89 OTHER SPECIFIED COUNSELING: ICD-10-CM

## 2022-01-01 DIAGNOSIS — Z95.810 S/P ICD (INTERNAL CARDIAC DEFIBRILLATOR) PROCEDURE: ICD-10-CM

## 2022-01-01 DIAGNOSIS — I95.9 HYPOTENSION, UNSPECIFIED HYPOTENSION TYPE: ICD-10-CM

## 2022-01-01 DIAGNOSIS — I95.9 HYPOTENSION, UNSPECIFIED HYPOTENSION TYPE: Primary | ICD-10-CM

## 2022-01-01 DIAGNOSIS — R73.9 STEROID-INDUCED HYPERGLYCEMIA: ICD-10-CM

## 2022-01-01 DIAGNOSIS — Z99.2 END STAGE RENAL FAILURE ON DIALYSIS (H): ICD-10-CM

## 2022-01-01 DIAGNOSIS — K21.00 GASTROESOPHAGEAL REFLUX DISEASE WITH ESOPHAGITIS, UNSPECIFIED WHETHER HEMORRHAGE: ICD-10-CM

## 2022-01-01 DIAGNOSIS — J96.01 ACUTE RESPIRATORY FAILURE WITH HYPOXIA (H): ICD-10-CM

## 2022-01-01 DIAGNOSIS — M19.90 OSTEOARTHRITIS, UNSPECIFIED OSTEOARTHRITIS TYPE, UNSPECIFIED SITE: ICD-10-CM

## 2022-01-01 DIAGNOSIS — Z86.16 PERSONAL HISTORY OF COVID-19: ICD-10-CM

## 2022-01-01 DIAGNOSIS — M62.81 GENERALIZED MUSCLE WEAKNESS: ICD-10-CM

## 2022-01-01 DIAGNOSIS — J96.01 ACUTE RESPIRATORY FAILURE WITH HYPOXIA (H): Primary | ICD-10-CM

## 2022-01-01 DIAGNOSIS — N18.6 END-STAGE RENAL DISEASE (H): ICD-10-CM

## 2022-01-01 DIAGNOSIS — S51.812A SKIN TEAR OF FOREARM WITHOUT COMPLICATION, LEFT, INITIAL ENCOUNTER: ICD-10-CM

## 2022-01-01 DIAGNOSIS — G93.41 METABOLIC ENCEPHALOPATHY: ICD-10-CM

## 2022-01-01 DIAGNOSIS — E03.9 HYPOTHYROIDISM, UNSPECIFIED TYPE: ICD-10-CM

## 2022-01-01 DIAGNOSIS — M79.672 BILATERAL FOOT PAIN: ICD-10-CM

## 2022-01-01 DIAGNOSIS — A41.89 SEPSIS DUE TO OTHER ETIOLOGY (H): ICD-10-CM

## 2022-01-01 DIAGNOSIS — Z99.2 ESRD (END STAGE RENAL DISEASE) ON DIALYSIS (H): ICD-10-CM

## 2022-01-01 DIAGNOSIS — I48.0 PAF (PAROXYSMAL ATRIAL FIBRILLATION) (H): ICD-10-CM

## 2022-01-01 DIAGNOSIS — U07.1 INFECTION DUE TO 2019 NOVEL CORONAVIRUS: Primary | ICD-10-CM

## 2022-01-01 DIAGNOSIS — I25.10 CORONARY ARTERY DISEASE INVOLVING NATIVE CORONARY ARTERY OF NATIVE HEART WITHOUT ANGINA PECTORIS: ICD-10-CM

## 2022-01-01 DIAGNOSIS — U07.1 INFECTION DUE TO 2019 NOVEL CORONAVIRUS: ICD-10-CM

## 2022-01-01 DIAGNOSIS — M79.671 BILATERAL FOOT PAIN: ICD-10-CM

## 2022-01-01 DIAGNOSIS — Z99.2 ESRD ON HEMODIALYSIS (H): ICD-10-CM

## 2022-01-01 DIAGNOSIS — Z95.810 ICD (IMPLANTABLE CARDIOVERTER-DEFIBRILLATOR) IN PLACE: ICD-10-CM

## 2022-01-01 DIAGNOSIS — I48.92 PAROXYSMAL ATRIAL FLUTTER (H): ICD-10-CM

## 2022-01-01 DIAGNOSIS — J96.22 ACUTE AND CHRONIC RESPIRATORY FAILURE WITH HYPERCAPNIA (H): ICD-10-CM

## 2022-01-01 DIAGNOSIS — E03.9 HYPOTHYROIDISM, UNSPECIFIED: ICD-10-CM

## 2022-01-01 DIAGNOSIS — Z99.2 TYPE 2 DIABETES MELLITUS WITH CHRONIC KIDNEY DISEASE ON CHRONIC DIALYSIS, WITHOUT LONG-TERM CURRENT USE OF INSULIN (H): ICD-10-CM

## 2022-01-01 DIAGNOSIS — T38.0X5A STEROID-INDUCED HYPERGLYCEMIA: ICD-10-CM

## 2022-01-01 DIAGNOSIS — N18.6 END STAGE RENAL FAILURE ON DIALYSIS (H): ICD-10-CM

## 2022-01-01 DIAGNOSIS — E11.22 TYPE 2 DIABETES MELLITUS WITH CHRONIC KIDNEY DISEASE ON CHRONIC DIALYSIS, WITHOUT LONG-TERM CURRENT USE OF INSULIN (H): ICD-10-CM

## 2022-01-01 DIAGNOSIS — E43 SEVERE MALNUTRITION (H): ICD-10-CM

## 2022-01-01 DIAGNOSIS — T82.9XXD: Primary | ICD-10-CM

## 2022-01-01 DIAGNOSIS — I48.91 NEW ONSET ATRIAL FIBRILLATION (H): ICD-10-CM

## 2022-01-01 DIAGNOSIS — M15.0 PRIMARY OSTEOARTHRITIS INVOLVING MULTIPLE JOINTS: ICD-10-CM

## 2022-01-01 DIAGNOSIS — S22.49XS CLOSED FRACTURE OF MULTIPLE RIBS, UNSPECIFIED LATERALITY, SEQUELA: ICD-10-CM

## 2022-01-01 DIAGNOSIS — J96.21 ACUTE AND CHRONIC RESPIRATORY FAILURE WITH HYPOXIA (H): ICD-10-CM

## 2022-01-01 DIAGNOSIS — Z11.1 ENCOUNTER FOR SCREENING FOR RESPIRATORY TUBERCULOSIS: ICD-10-CM

## 2022-01-01 DIAGNOSIS — I50.22 CHRONIC SYSTOLIC (CONGESTIVE) HEART FAILURE (H): ICD-10-CM

## 2022-01-01 DIAGNOSIS — J96.21 ACUTE AND CHRONIC RESPIRATORY FAILURE WITH HYPOXIA (H): Primary | ICD-10-CM

## 2022-01-01 DIAGNOSIS — N18.6 ESRD (END STAGE RENAL DISEASE) ON DIALYSIS (H): ICD-10-CM

## 2022-01-01 DIAGNOSIS — I50.9 HEART FAILURE, UNSPECIFIED HF CHRONICITY, UNSPECIFIED HEART FAILURE TYPE (H): ICD-10-CM

## 2022-01-01 DIAGNOSIS — Z79.52 ON PREDNISONE THERAPY: ICD-10-CM

## 2022-01-01 DIAGNOSIS — R07.89 CHEST WALL PAIN: Primary | ICD-10-CM

## 2022-01-01 DIAGNOSIS — N18.6 ESRD ON HEMODIALYSIS (H): ICD-10-CM

## 2022-01-01 DIAGNOSIS — R54 FRAILTY: Primary | ICD-10-CM

## 2022-01-01 DIAGNOSIS — T82.898D OTHER SPECIFIED COMPLICATION OF VASCULAR PROSTHETIC DEVICES, IMPLANTS AND GRAFTS, SUBSEQUENT ENCOUNTER: ICD-10-CM

## 2022-01-01 LAB
ALBUMIN SERPL-MCNC: 2.3 G/DL (ref 3.4–5)
ALBUMIN SERPL-MCNC: 2.4 G/DL (ref 3.4–5)
ALBUMIN SERPL-MCNC: 2.4 G/DL (ref 3.4–5)
ALBUMIN SERPL-MCNC: 2.7 G/DL (ref 3.4–5)
ALBUMIN SERPL-MCNC: 2.7 G/DL (ref 3.4–5)
ALBUMIN SERPL-MCNC: 3 G/DL (ref 3.4–5)
ALBUMIN SERPL-MCNC: 3.1 G/DL (ref 3.4–5)
ALBUMIN SERPL-MCNC: 3.2 G/DL (ref 3.4–5)
ALBUMIN SERPL-MCNC: 3.2 G/DL (ref 3.4–5)
ALBUMIN SERPL-MCNC: 3.4 G/DL (ref 3.4–5)
ALP SERPL-CCNC: 149 U/L (ref 40–150)
ALP SERPL-CCNC: 152 U/L (ref 40–150)
ALP SERPL-CCNC: 157 U/L (ref 40–150)
ALP SERPL-CCNC: 162 U/L (ref 40–150)
ALP SERPL-CCNC: 166 U/L (ref 40–150)
ALP SERPL-CCNC: 181 U/L (ref 40–150)
ALP SERPL-CCNC: 184 U/L (ref 40–150)
ALP SERPL-CCNC: 209 U/L (ref 40–150)
ALP SERPL-CCNC: 214 U/L (ref 40–150)
ALT SERPL W P-5'-P-CCNC: 22 U/L (ref 0–70)
ALT SERPL W P-5'-P-CCNC: 24 U/L (ref 0–70)
ALT SERPL W P-5'-P-CCNC: 24 U/L (ref 0–70)
ALT SERPL W P-5'-P-CCNC: 25 U/L (ref 0–70)
ALT SERPL W P-5'-P-CCNC: 26 U/L (ref 0–70)
ALT SERPL W P-5'-P-CCNC: 27 U/L (ref 0–70)
ALT SERPL W P-5'-P-CCNC: 30 U/L (ref 0–70)
ALT SERPL W P-5'-P-CCNC: 42 U/L (ref 0–70)
ALT SERPL W P-5'-P-CCNC: 69 U/L (ref 0–70)
ANION GAP SERPL CALCULATED.3IONS-SCNC: 10 MMOL/L (ref 3–14)
ANION GAP SERPL CALCULATED.3IONS-SCNC: 12 MMOL/L (ref 3–14)
ANION GAP SERPL CALCULATED.3IONS-SCNC: 5 MMOL/L (ref 3–14)
ANION GAP SERPL CALCULATED.3IONS-SCNC: 6 MMOL/L (ref 3–14)
ANION GAP SERPL CALCULATED.3IONS-SCNC: 7 MMOL/L (ref 3–14)
ANION GAP SERPL CALCULATED.3IONS-SCNC: 8 MMOL/L (ref 3–14)
ANION GAP SERPL CALCULATED.3IONS-SCNC: 9 MMOL/L (ref 3–14)
APTT PPP: 33 SECONDS (ref 22–38)
APTT PPP: 35 SECONDS (ref 22–38)
APTT PPP: 59 SECONDS (ref 22–38)
APTT PPP: 89 SECONDS (ref 22–38)
AST SERPL W P-5'-P-CCNC: 16 U/L (ref 0–45)
AST SERPL W P-5'-P-CCNC: 17 U/L (ref 0–45)
AST SERPL W P-5'-P-CCNC: 17 U/L (ref 0–45)
AST SERPL W P-5'-P-CCNC: 18 U/L (ref 0–45)
AST SERPL W P-5'-P-CCNC: 19 U/L (ref 0–45)
AST SERPL W P-5'-P-CCNC: 25 U/L (ref 0–45)
AST SERPL W P-5'-P-CCNC: 48 U/L (ref 0–45)
ATRIAL RATE - MUSE: 102 BPM
ATRIAL RATE - MUSE: 105 BPM
ATRIAL RATE - MUSE: 234 BPM
ATRIAL RATE - MUSE: 82 BPM
ATRIAL RATE - MUSE: 83 BPM
BACTERIA BLD CULT: NO GROWTH
BASE EXCESS BLDV CALC-SCNC: 0.9 MMOL/L (ref -7.7–1.9)
BASE EXCESS BLDV CALC-SCNC: 1.8 MMOL/L (ref -7.7–1.9)
BASE EXCESS BLDV CALC-SCNC: 2.5 MMOL/L (ref -7.7–1.9)
BASE EXCESS BLDV CALC-SCNC: 4.1 MMOL/L (ref -7.7–1.9)
BASE EXCESS BLDV CALC-SCNC: 4.4 MMOL/L (ref -7.7–1.9)
BASE EXCESS BLDV CALC-SCNC: 4.5 MMOL/L (ref -7.7–1.9)
BASOPHILS # BLD AUTO: 0 10E3/UL (ref 0–0.2)
BASOPHILS # BLD AUTO: 0.1 10E3/UL (ref 0–0.2)
BASOPHILS NFR BLD AUTO: 0 %
BASOPHILS NFR BLD AUTO: 1 %
BILIRUB DIRECT SERPL-MCNC: 1 MG/DL (ref 0–0.2)
BILIRUB SERPL-MCNC: 1.2 MG/DL (ref 0.2–1.3)
BILIRUB SERPL-MCNC: 1.4 MG/DL (ref 0.2–1.3)
BILIRUB SERPL-MCNC: 1.5 MG/DL (ref 0.2–1.3)
BILIRUB SERPL-MCNC: 1.6 MG/DL (ref 0.2–1.3)
BILIRUB SERPL-MCNC: 1.7 MG/DL (ref 0.2–1.3)
BILIRUB SERPL-MCNC: 1.8 MG/DL (ref 0.2–1.3)
BILIRUB SERPL-MCNC: 1.9 MG/DL (ref 0.2–1.3)
BUN SERPL-MCNC: 13 MG/DL (ref 7–30)
BUN SERPL-MCNC: 14 MG/DL (ref 7–30)
BUN SERPL-MCNC: 16 MG/DL (ref 7–30)
BUN SERPL-MCNC: 16 MG/DL (ref 7–30)
BUN SERPL-MCNC: 21 MG/DL (ref 7–30)
BUN SERPL-MCNC: 22 MG/DL (ref 7–30)
BUN SERPL-MCNC: 23 MG/DL (ref 7–30)
BUN SERPL-MCNC: 23 MG/DL (ref 7–30)
BUN SERPL-MCNC: 24 MG/DL (ref 7–30)
BUN SERPL-MCNC: 25 MG/DL (ref 7–30)
BUN SERPL-MCNC: 26 MG/DL (ref 7–30)
BUN SERPL-MCNC: 26 MG/DL (ref 7–30)
BUN SERPL-MCNC: 27 MG/DL (ref 7–30)
BUN SERPL-MCNC: 30 MG/DL (ref 7–30)
BUN SERPL-MCNC: 30 MG/DL (ref 7–30)
BUN SERPL-MCNC: 33 MG/DL (ref 7–30)
BUN SERPL-MCNC: 36 MG/DL (ref 7–30)
BUN SERPL-MCNC: 44 MG/DL (ref 7–30)
BUN SERPL-MCNC: 44 MG/DL (ref 7–30)
BUN SERPL-MCNC: 50 MG/DL (ref 7–30)
BUN SERPL-MCNC: 52 MG/DL (ref 7–30)
BUN SERPL-MCNC: 54 MG/DL (ref 7–30)
BUN SERPL-MCNC: 58 MG/DL (ref 7–30)
BUN SERPL-MCNC: 67 MG/DL (ref 7–30)
BUN SERPL-MCNC: 78 MG/DL (ref 7–30)
CALCIUM SERPL-MCNC: 8 MG/DL (ref 8.5–10.1)
CALCIUM SERPL-MCNC: 8.2 MG/DL (ref 8.5–10.1)
CALCIUM SERPL-MCNC: 8.4 MG/DL (ref 8.5–10.1)
CALCIUM SERPL-MCNC: 8.5 MG/DL (ref 8.5–10.1)
CALCIUM SERPL-MCNC: 8.5 MG/DL (ref 8.5–10.1)
CALCIUM SERPL-MCNC: 8.6 MG/DL (ref 8.5–10.1)
CALCIUM SERPL-MCNC: 8.7 MG/DL (ref 8.5–10.1)
CALCIUM SERPL-MCNC: 8.8 MG/DL (ref 8.5–10.1)
CALCIUM SERPL-MCNC: 8.9 MG/DL (ref 8.5–10.1)
CALCIUM SERPL-MCNC: 9.1 MG/DL (ref 8.5–10.1)
CALCIUM SERPL-MCNC: 9.2 MG/DL (ref 8.5–10.1)
CHLORIDE BLD-SCNC: 100 MMOL/L (ref 94–109)
CHLORIDE BLD-SCNC: 101 MMOL/L (ref 94–109)
CHLORIDE BLD-SCNC: 102 MMOL/L (ref 94–109)
CHLORIDE BLD-SCNC: 103 MMOL/L (ref 94–109)
CHLORIDE BLD-SCNC: 103 MMOL/L (ref 94–109)
CHLORIDE BLD-SCNC: 105 MMOL/L (ref 94–109)
CHLORIDE BLD-SCNC: 106 MMOL/L (ref 94–109)
CHLORIDE BLD-SCNC: 108 MMOL/L (ref 94–109)
CHLORIDE BLD-SCNC: 97 MMOL/L (ref 94–109)
CHLORIDE BLD-SCNC: 98 MMOL/L (ref 94–109)
CHLORIDE BLD-SCNC: 98 MMOL/L (ref 94–109)
CHLORIDE BLD-SCNC: 99 MMOL/L (ref 94–109)
CHLORIDE BLD-SCNC: 99 MMOL/L (ref 94–109)
CO2 SERPL-SCNC: 20 MMOL/L (ref 20–32)
CO2 SERPL-SCNC: 22 MMOL/L (ref 20–32)
CO2 SERPL-SCNC: 24 MMOL/L (ref 20–32)
CO2 SERPL-SCNC: 25 MMOL/L (ref 20–32)
CO2 SERPL-SCNC: 25 MMOL/L (ref 20–32)
CO2 SERPL-SCNC: 26 MMOL/L (ref 20–32)
CO2 SERPL-SCNC: 26 MMOL/L (ref 20–32)
CO2 SERPL-SCNC: 27 MMOL/L (ref 20–32)
CO2 SERPL-SCNC: 28 MMOL/L (ref 20–32)
CO2 SERPL-SCNC: 29 MMOL/L (ref 20–32)
CO2 SERPL-SCNC: 30 MMOL/L (ref 20–32)
CO2 SERPL-SCNC: 30 MMOL/L (ref 20–32)
CO2 SERPL-SCNC: 31 MMOL/L (ref 20–32)
CO2 SERPL-SCNC: 32 MMOL/L (ref 20–32)
CORTIS SERPL-MCNC: 22.1 UG/DL (ref 4–22)
CREAT SERPL-MCNC: 2.47 MG/DL (ref 0.66–1.25)
CREAT SERPL-MCNC: 2.72 MG/DL (ref 0.66–1.25)
CREAT SERPL-MCNC: 2.82 MG/DL (ref 0.66–1.25)
CREAT SERPL-MCNC: 3.27 MG/DL (ref 0.66–1.25)
CREAT SERPL-MCNC: 3.4 MG/DL (ref 0.66–1.25)
CREAT SERPL-MCNC: 3.45 MG/DL (ref 0.66–1.25)
CREAT SERPL-MCNC: 3.51 MG/DL (ref 0.66–1.25)
CREAT SERPL-MCNC: 3.72 MG/DL (ref 0.66–1.25)
CREAT SERPL-MCNC: 4 MG/DL (ref 0.66–1.25)
CREAT SERPL-MCNC: 4.17 MG/DL (ref 0.66–1.25)
CREAT SERPL-MCNC: 4.31 MG/DL (ref 0.66–1.25)
CREAT SERPL-MCNC: 4.34 MG/DL (ref 0.66–1.25)
CREAT SERPL-MCNC: 4.41 MG/DL (ref 0.66–1.25)
CREAT SERPL-MCNC: 4.55 MG/DL (ref 0.66–1.25)
CREAT SERPL-MCNC: 4.65 MG/DL (ref 0.66–1.25)
CREAT SERPL-MCNC: 4.73 MG/DL (ref 0.66–1.25)
CREAT SERPL-MCNC: 4.95 MG/DL (ref 0.66–1.25)
CREAT SERPL-MCNC: 5.17 MG/DL (ref 0.66–1.25)
CREAT SERPL-MCNC: 5.22 MG/DL (ref 0.66–1.25)
CREAT SERPL-MCNC: 5.23 MG/DL (ref 0.66–1.25)
CREAT SERPL-MCNC: 5.26 MG/DL (ref 0.66–1.25)
CREAT SERPL-MCNC: 5.57 MG/DL (ref 0.66–1.25)
CREAT SERPL-MCNC: 5.73 MG/DL (ref 0.66–1.25)
CREAT SERPL-MCNC: 6.03 MG/DL (ref 0.66–1.25)
CREAT SERPL-MCNC: 6.33 MG/DL (ref 0.66–1.25)
CRP SERPL-MCNC: 11 MG/L (ref 0–8)
CRP SERPL-MCNC: 124 MG/L (ref 0–8)
CRP SERPL-MCNC: 17.9 MG/L (ref 0–8)
CRP SERPL-MCNC: 18.6 MG/L (ref 0–8)
CRP SERPL-MCNC: 25.2 MG/L (ref 0–8)
CRP SERPL-MCNC: 30.8 MG/L (ref 0–8)
CRP SERPL-MCNC: 32.9 MG/L (ref 0–8)
CRP SERPL-MCNC: 40.3 MG/L (ref 0–8)
CRP SERPL-MCNC: 52.7 MG/L (ref 0–8)
CRP SERPL-MCNC: 67.5 MG/L (ref 0–8)
CRP SERPL-MCNC: 78.3 MG/L (ref 0–8)
CRP SERPL-MCNC: 96 MG/L (ref 0–8)
D DIMER PPP FEU-MCNC: 0.71 UG/ML FEU (ref 0–0.5)
D DIMER PPP FEU-MCNC: 1.75 UG/ML FEU (ref 0–0.5)
DIASTOLIC BLOOD PRESSURE - MUSE: NORMAL MMHG
EOSINOPHIL # BLD AUTO: 0 10E3/UL (ref 0–0.7)
EOSINOPHIL # BLD AUTO: 0 10E3/UL (ref 0–0.7)
EOSINOPHIL # BLD AUTO: 0.1 10E3/UL (ref 0–0.7)
EOSINOPHIL # BLD AUTO: 0.1 10E3/UL (ref 0–0.7)
EOSINOPHIL # BLD AUTO: 0.3 10E3/UL (ref 0–0.7)
EOSINOPHIL # BLD AUTO: 0.4 10E3/UL (ref 0–0.7)
EOSINOPHIL # BLD AUTO: 0.6 10E3/UL (ref 0–0.7)
EOSINOPHIL NFR BLD AUTO: 0 %
EOSINOPHIL NFR BLD AUTO: 0 %
EOSINOPHIL NFR BLD AUTO: 1 %
EOSINOPHIL NFR BLD AUTO: 1 %
EOSINOPHIL NFR BLD AUTO: 3 %
EOSINOPHIL NFR BLD AUTO: 4 %
EOSINOPHIL NFR BLD AUTO: 5 %
ERYTHROCYTE [DISTWIDTH] IN BLOOD BY AUTOMATED COUNT: 13.5 % (ref 10–15)
ERYTHROCYTE [DISTWIDTH] IN BLOOD BY AUTOMATED COUNT: 13.6 % (ref 10–15)
ERYTHROCYTE [DISTWIDTH] IN BLOOD BY AUTOMATED COUNT: 13.6 % (ref 10–15)
ERYTHROCYTE [DISTWIDTH] IN BLOOD BY AUTOMATED COUNT: 13.7 % (ref 10–15)
ERYTHROCYTE [DISTWIDTH] IN BLOOD BY AUTOMATED COUNT: 13.7 % (ref 10–15)
ERYTHROCYTE [DISTWIDTH] IN BLOOD BY AUTOMATED COUNT: 13.9 % (ref 10–15)
ERYTHROCYTE [DISTWIDTH] IN BLOOD BY AUTOMATED COUNT: 13.9 % (ref 10–15)
ERYTHROCYTE [DISTWIDTH] IN BLOOD BY AUTOMATED COUNT: 14 % (ref 10–15)
ERYTHROCYTE [DISTWIDTH] IN BLOOD BY AUTOMATED COUNT: 14.1 % (ref 10–15)
ERYTHROCYTE [DISTWIDTH] IN BLOOD BY AUTOMATED COUNT: 14.1 % (ref 10–15)
ERYTHROCYTE [DISTWIDTH] IN BLOOD BY AUTOMATED COUNT: 14.2 % (ref 10–15)
ERYTHROCYTE [DISTWIDTH] IN BLOOD BY AUTOMATED COUNT: 14.2 % (ref 10–15)
ERYTHROCYTE [DISTWIDTH] IN BLOOD BY AUTOMATED COUNT: 14.3 % (ref 10–15)
ERYTHROCYTE [DISTWIDTH] IN BLOOD BY AUTOMATED COUNT: 14.3 % (ref 10–15)
ERYTHROCYTE [DISTWIDTH] IN BLOOD BY AUTOMATED COUNT: 14.4 % (ref 10–15)
ERYTHROCYTE [DISTWIDTH] IN BLOOD BY AUTOMATED COUNT: 14.5 % (ref 10–15)
ERYTHROCYTE [DISTWIDTH] IN BLOOD BY AUTOMATED COUNT: 14.6 % (ref 10–15)
ERYTHROCYTE [DISTWIDTH] IN BLOOD BY AUTOMATED COUNT: 14.7 % (ref 10–15)
ERYTHROCYTE [DISTWIDTH] IN BLOOD BY AUTOMATED COUNT: 14.7 % (ref 10–15)
ERYTHROCYTE [DISTWIDTH] IN BLOOD BY AUTOMATED COUNT: 15 % (ref 10–15)
FLUAV RNA SPEC QL NAA+PROBE: NEGATIVE
FLUBV RNA RESP QL NAA+PROBE: NEGATIVE
GAMMA INTERFERON BACKGROUND BLD IA-ACNC: 0.06 IU/ML
GFR SERPL CREATININE-BSD FRML MDRD: 10 ML/MIN/1.73M2
GFR SERPL CREATININE-BSD FRML MDRD: 10 ML/MIN/1.73M2
GFR SERPL CREATININE-BSD FRML MDRD: 11 ML/MIN/1.73M2
GFR SERPL CREATININE-BSD FRML MDRD: 12 ML/MIN/1.73M2
GFR SERPL CREATININE-BSD FRML MDRD: 12 ML/MIN/1.73M2
GFR SERPL CREATININE-BSD FRML MDRD: 13 ML/MIN/1.73M2
GFR SERPL CREATININE-BSD FRML MDRD: 14 ML/MIN/1.73M2
GFR SERPL CREATININE-BSD FRML MDRD: 14 ML/MIN/1.73M2
GFR SERPL CREATININE-BSD FRML MDRD: 15 ML/MIN/1.73M2
GFR SERPL CREATININE-BSD FRML MDRD: 16 ML/MIN/1.73M2
GFR SERPL CREATININE-BSD FRML MDRD: 17 ML/MIN/1.73M2
GFR SERPL CREATININE-BSD FRML MDRD: 18 ML/MIN/1.73M2
GFR SERPL CREATININE-BSD FRML MDRD: 18 ML/MIN/1.73M2
GFR SERPL CREATININE-BSD FRML MDRD: 19 ML/MIN/1.73M2
GFR SERPL CREATININE-BSD FRML MDRD: 22 ML/MIN/1.73M2
GFR SERPL CREATININE-BSD FRML MDRD: 23 ML/MIN/1.73M2
GFR SERPL CREATININE-BSD FRML MDRD: 26 ML/MIN/1.73M2
GFR SERPL CREATININE-BSD FRML MDRD: 9 ML/MIN/1.73M2
GFR SERPL CREATININE-BSD FRML MDRD: 9 ML/MIN/1.73M2
GLUCOSE BLD-MCNC: 107 MG/DL (ref 70–99)
GLUCOSE BLD-MCNC: 108 MG/DL (ref 70–99)
GLUCOSE BLD-MCNC: 111 MG/DL (ref 70–99)
GLUCOSE BLD-MCNC: 113 MG/DL (ref 70–99)
GLUCOSE BLD-MCNC: 118 MG/DL (ref 70–99)
GLUCOSE BLD-MCNC: 118 MG/DL (ref 70–99)
GLUCOSE BLD-MCNC: 122 MG/DL (ref 70–99)
GLUCOSE BLD-MCNC: 123 MG/DL (ref 70–99)
GLUCOSE BLD-MCNC: 131 MG/DL (ref 70–99)
GLUCOSE BLD-MCNC: 140 MG/DL (ref 70–99)
GLUCOSE BLD-MCNC: 146 MG/DL (ref 70–99)
GLUCOSE BLD-MCNC: 146 MG/DL (ref 70–99)
GLUCOSE BLD-MCNC: 149 MG/DL (ref 70–99)
GLUCOSE BLD-MCNC: 160 MG/DL (ref 70–99)
GLUCOSE BLD-MCNC: 177 MG/DL (ref 70–99)
GLUCOSE BLD-MCNC: 180 MG/DL (ref 70–99)
GLUCOSE BLD-MCNC: 192 MG/DL (ref 70–99)
GLUCOSE BLD-MCNC: 197 MG/DL (ref 70–99)
GLUCOSE BLD-MCNC: 201 MG/DL (ref 70–99)
GLUCOSE BLD-MCNC: 207 MG/DL (ref 70–99)
GLUCOSE BLD-MCNC: 83 MG/DL (ref 70–99)
GLUCOSE BLD-MCNC: 86 MG/DL (ref 70–99)
GLUCOSE BLD-MCNC: 95 MG/DL (ref 70–99)
GLUCOSE BLD-MCNC: 97 MG/DL (ref 70–99)
GLUCOSE BLD-MCNC: 98 MG/DL (ref 70–99)
GLUCOSE BLDC GLUCOMTR-MCNC: 102 MG/DL (ref 70–99)
GLUCOSE BLDC GLUCOMTR-MCNC: 103 MG/DL (ref 70–99)
GLUCOSE BLDC GLUCOMTR-MCNC: 104 MG/DL (ref 70–99)
GLUCOSE BLDC GLUCOMTR-MCNC: 104 MG/DL (ref 70–99)
GLUCOSE BLDC GLUCOMTR-MCNC: 107 MG/DL (ref 70–99)
GLUCOSE BLDC GLUCOMTR-MCNC: 108 MG/DL (ref 70–99)
GLUCOSE BLDC GLUCOMTR-MCNC: 108 MG/DL (ref 70–99)
GLUCOSE BLDC GLUCOMTR-MCNC: 109 MG/DL (ref 70–99)
GLUCOSE BLDC GLUCOMTR-MCNC: 111 MG/DL (ref 70–99)
GLUCOSE BLDC GLUCOMTR-MCNC: 114 MG/DL (ref 70–99)
GLUCOSE BLDC GLUCOMTR-MCNC: 117 MG/DL (ref 70–99)
GLUCOSE BLDC GLUCOMTR-MCNC: 118 MG/DL (ref 70–99)
GLUCOSE BLDC GLUCOMTR-MCNC: 119 MG/DL (ref 70–99)
GLUCOSE BLDC GLUCOMTR-MCNC: 120 MG/DL (ref 70–99)
GLUCOSE BLDC GLUCOMTR-MCNC: 124 MG/DL (ref 70–99)
GLUCOSE BLDC GLUCOMTR-MCNC: 126 MG/DL (ref 70–99)
GLUCOSE BLDC GLUCOMTR-MCNC: 126 MG/DL (ref 70–99)
GLUCOSE BLDC GLUCOMTR-MCNC: 127 MG/DL (ref 70–99)
GLUCOSE BLDC GLUCOMTR-MCNC: 128 MG/DL (ref 70–99)
GLUCOSE BLDC GLUCOMTR-MCNC: 129 MG/DL (ref 70–99)
GLUCOSE BLDC GLUCOMTR-MCNC: 129 MG/DL (ref 70–99)
GLUCOSE BLDC GLUCOMTR-MCNC: 130 MG/DL (ref 70–99)
GLUCOSE BLDC GLUCOMTR-MCNC: 131 MG/DL (ref 70–99)
GLUCOSE BLDC GLUCOMTR-MCNC: 132 MG/DL (ref 70–99)
GLUCOSE BLDC GLUCOMTR-MCNC: 132 MG/DL (ref 70–99)
GLUCOSE BLDC GLUCOMTR-MCNC: 133 MG/DL (ref 70–99)
GLUCOSE BLDC GLUCOMTR-MCNC: 136 MG/DL (ref 70–99)
GLUCOSE BLDC GLUCOMTR-MCNC: 137 MG/DL (ref 70–99)
GLUCOSE BLDC GLUCOMTR-MCNC: 138 MG/DL (ref 70–99)
GLUCOSE BLDC GLUCOMTR-MCNC: 139 MG/DL (ref 70–99)
GLUCOSE BLDC GLUCOMTR-MCNC: 140 MG/DL (ref 70–99)
GLUCOSE BLDC GLUCOMTR-MCNC: 141 MG/DL (ref 70–99)
GLUCOSE BLDC GLUCOMTR-MCNC: 142 MG/DL (ref 70–99)
GLUCOSE BLDC GLUCOMTR-MCNC: 142 MG/DL (ref 70–99)
GLUCOSE BLDC GLUCOMTR-MCNC: 144 MG/DL (ref 70–99)
GLUCOSE BLDC GLUCOMTR-MCNC: 144 MG/DL (ref 70–99)
GLUCOSE BLDC GLUCOMTR-MCNC: 147 MG/DL (ref 70–99)
GLUCOSE BLDC GLUCOMTR-MCNC: 148 MG/DL (ref 70–99)
GLUCOSE BLDC GLUCOMTR-MCNC: 149 MG/DL (ref 70–99)
GLUCOSE BLDC GLUCOMTR-MCNC: 150 MG/DL (ref 70–99)
GLUCOSE BLDC GLUCOMTR-MCNC: 151 MG/DL (ref 70–99)
GLUCOSE BLDC GLUCOMTR-MCNC: 151 MG/DL (ref 70–99)
GLUCOSE BLDC GLUCOMTR-MCNC: 152 MG/DL (ref 70–99)
GLUCOSE BLDC GLUCOMTR-MCNC: 153 MG/DL (ref 70–99)
GLUCOSE BLDC GLUCOMTR-MCNC: 154 MG/DL (ref 70–99)
GLUCOSE BLDC GLUCOMTR-MCNC: 155 MG/DL (ref 70–99)
GLUCOSE BLDC GLUCOMTR-MCNC: 157 MG/DL (ref 70–99)
GLUCOSE BLDC GLUCOMTR-MCNC: 158 MG/DL (ref 70–99)
GLUCOSE BLDC GLUCOMTR-MCNC: 158 MG/DL (ref 70–99)
GLUCOSE BLDC GLUCOMTR-MCNC: 160 MG/DL (ref 70–99)
GLUCOSE BLDC GLUCOMTR-MCNC: 161 MG/DL (ref 70–99)
GLUCOSE BLDC GLUCOMTR-MCNC: 162 MG/DL (ref 70–99)
GLUCOSE BLDC GLUCOMTR-MCNC: 163 MG/DL (ref 70–99)
GLUCOSE BLDC GLUCOMTR-MCNC: 163 MG/DL (ref 70–99)
GLUCOSE BLDC GLUCOMTR-MCNC: 164 MG/DL (ref 70–99)
GLUCOSE BLDC GLUCOMTR-MCNC: 165 MG/DL (ref 70–99)
GLUCOSE BLDC GLUCOMTR-MCNC: 165 MG/DL (ref 70–99)
GLUCOSE BLDC GLUCOMTR-MCNC: 166 MG/DL (ref 70–99)
GLUCOSE BLDC GLUCOMTR-MCNC: 169 MG/DL (ref 70–99)
GLUCOSE BLDC GLUCOMTR-MCNC: 170 MG/DL (ref 70–99)
GLUCOSE BLDC GLUCOMTR-MCNC: 171 MG/DL (ref 70–99)
GLUCOSE BLDC GLUCOMTR-MCNC: 172 MG/DL (ref 70–99)
GLUCOSE BLDC GLUCOMTR-MCNC: 173 MG/DL (ref 70–99)
GLUCOSE BLDC GLUCOMTR-MCNC: 173 MG/DL (ref 70–99)
GLUCOSE BLDC GLUCOMTR-MCNC: 175 MG/DL (ref 70–99)
GLUCOSE BLDC GLUCOMTR-MCNC: 177 MG/DL (ref 70–99)
GLUCOSE BLDC GLUCOMTR-MCNC: 178 MG/DL (ref 70–99)
GLUCOSE BLDC GLUCOMTR-MCNC: 179 MG/DL (ref 70–99)
GLUCOSE BLDC GLUCOMTR-MCNC: 181 MG/DL (ref 70–99)
GLUCOSE BLDC GLUCOMTR-MCNC: 182 MG/DL (ref 70–99)
GLUCOSE BLDC GLUCOMTR-MCNC: 184 MG/DL (ref 70–99)
GLUCOSE BLDC GLUCOMTR-MCNC: 186 MG/DL (ref 70–99)
GLUCOSE BLDC GLUCOMTR-MCNC: 187 MG/DL (ref 70–99)
GLUCOSE BLDC GLUCOMTR-MCNC: 190 MG/DL (ref 70–99)
GLUCOSE BLDC GLUCOMTR-MCNC: 192 MG/DL (ref 70–99)
GLUCOSE BLDC GLUCOMTR-MCNC: 193 MG/DL (ref 70–99)
GLUCOSE BLDC GLUCOMTR-MCNC: 194 MG/DL (ref 70–99)
GLUCOSE BLDC GLUCOMTR-MCNC: 198 MG/DL (ref 70–99)
GLUCOSE BLDC GLUCOMTR-MCNC: 198 MG/DL (ref 70–99)
GLUCOSE BLDC GLUCOMTR-MCNC: 202 MG/DL (ref 70–99)
GLUCOSE BLDC GLUCOMTR-MCNC: 206 MG/DL (ref 70–99)
GLUCOSE BLDC GLUCOMTR-MCNC: 206 MG/DL (ref 70–99)
GLUCOSE BLDC GLUCOMTR-MCNC: 207 MG/DL (ref 70–99)
GLUCOSE BLDC GLUCOMTR-MCNC: 207 MG/DL (ref 70–99)
GLUCOSE BLDC GLUCOMTR-MCNC: 209 MG/DL (ref 70–99)
GLUCOSE BLDC GLUCOMTR-MCNC: 211 MG/DL (ref 70–99)
GLUCOSE BLDC GLUCOMTR-MCNC: 212 MG/DL (ref 70–99)
GLUCOSE BLDC GLUCOMTR-MCNC: 216 MG/DL (ref 70–99)
GLUCOSE BLDC GLUCOMTR-MCNC: 218 MG/DL (ref 70–99)
GLUCOSE BLDC GLUCOMTR-MCNC: 225 MG/DL (ref 70–99)
GLUCOSE BLDC GLUCOMTR-MCNC: 233 MG/DL (ref 70–99)
GLUCOSE BLDC GLUCOMTR-MCNC: 239 MG/DL (ref 70–99)
GLUCOSE BLDC GLUCOMTR-MCNC: 243 MG/DL (ref 70–99)
GLUCOSE BLDC GLUCOMTR-MCNC: 247 MG/DL (ref 70–99)
GLUCOSE BLDC GLUCOMTR-MCNC: 252 MG/DL (ref 70–99)
GLUCOSE BLDC GLUCOMTR-MCNC: 254 MG/DL (ref 70–99)
GLUCOSE BLDC GLUCOMTR-MCNC: 28 MG/DL (ref 70–99)
GLUCOSE BLDC GLUCOMTR-MCNC: 315 MG/DL (ref 70–99)
GLUCOSE BLDC GLUCOMTR-MCNC: 336 MG/DL (ref 70–99)
GLUCOSE BLDC GLUCOMTR-MCNC: 342 MG/DL (ref 70–99)
GLUCOSE BLDC GLUCOMTR-MCNC: 63 MG/DL (ref 70–99)
GLUCOSE BLDC GLUCOMTR-MCNC: 65 MG/DL (ref 70–99)
GLUCOSE BLDC GLUCOMTR-MCNC: 67 MG/DL (ref 70–99)
GLUCOSE BLDC GLUCOMTR-MCNC: 72 MG/DL (ref 70–99)
GLUCOSE BLDC GLUCOMTR-MCNC: 72 MG/DL (ref 70–99)
GLUCOSE BLDC GLUCOMTR-MCNC: 73 MG/DL (ref 70–99)
GLUCOSE BLDC GLUCOMTR-MCNC: 73 MG/DL (ref 70–99)
GLUCOSE BLDC GLUCOMTR-MCNC: 78 MG/DL (ref 70–99)
GLUCOSE BLDC GLUCOMTR-MCNC: 81 MG/DL (ref 70–99)
GLUCOSE BLDC GLUCOMTR-MCNC: 82 MG/DL (ref 70–99)
GLUCOSE BLDC GLUCOMTR-MCNC: 84 MG/DL (ref 70–99)
GLUCOSE BLDC GLUCOMTR-MCNC: 85 MG/DL (ref 70–99)
GLUCOSE BLDC GLUCOMTR-MCNC: 86 MG/DL (ref 70–99)
GLUCOSE BLDC GLUCOMTR-MCNC: 89 MG/DL (ref 70–99)
GLUCOSE BLDC GLUCOMTR-MCNC: 91 MG/DL (ref 70–99)
GLUCOSE BLDC GLUCOMTR-MCNC: 93 MG/DL (ref 70–99)
GLUCOSE BLDC GLUCOMTR-MCNC: 96 MG/DL (ref 70–99)
GLUCOSE BLDC GLUCOMTR-MCNC: 96 MG/DL (ref 70–99)
GLUCOSE BLDC GLUCOMTR-MCNC: 97 MG/DL (ref 70–99)
GLUCOSE BLDC GLUCOMTR-MCNC: 98 MG/DL (ref 70–99)
GLUCOSE BLDC GLUCOMTR-MCNC: 99 MG/DL (ref 70–99)
GLUCOSE BLDC GLUCOMTR-MCNC: 99 MG/DL (ref 70–99)
HBA1C MFR BLD: 5.1 % (ref 0–5.6)
HBV SURFACE AB SERPL IA-ACNC: 0.74 M[IU]/ML
HBV SURFACE AB SERPL IA-ACNC: 1.14 M[IU]/ML
HBV SURFACE AG SERPL QL IA: NONREACTIVE
HBV SURFACE AG SERPL QL IA: NONREACTIVE
HCO3 BLDV-SCNC: 28 MMOL/L (ref 21–28)
HCO3 BLDV-SCNC: 30 MMOL/L (ref 21–28)
HCO3 BLDV-SCNC: 31 MMOL/L (ref 21–28)
HCO3 BLDV-SCNC: 32 MMOL/L (ref 21–28)
HCO3 BLDV-SCNC: 32 MMOL/L (ref 21–28)
HCO3 BLDV-SCNC: 35 MMOL/L (ref 21–28)
HCT VFR BLD AUTO: 32.9 % (ref 40–53)
HCT VFR BLD AUTO: 34 % (ref 40–53)
HCT VFR BLD AUTO: 35.1 % (ref 40–53)
HCT VFR BLD AUTO: 35.5 % (ref 40–53)
HCT VFR BLD AUTO: 35.6 % (ref 40–53)
HCT VFR BLD AUTO: 35.7 % (ref 40–53)
HCT VFR BLD AUTO: 36.8 % (ref 40–53)
HCT VFR BLD AUTO: 39 % (ref 40–53)
HCT VFR BLD AUTO: 40.3 % (ref 40–53)
HCT VFR BLD AUTO: 40.4 % (ref 40–53)
HCT VFR BLD AUTO: 40.5 % (ref 40–53)
HCT VFR BLD AUTO: 40.8 % (ref 40–53)
HCT VFR BLD AUTO: 41.3 % (ref 40–53)
HCT VFR BLD AUTO: 41.4 % (ref 40–53)
HCT VFR BLD AUTO: 42.3 % (ref 40–53)
HCT VFR BLD AUTO: 42.6 % (ref 40–53)
HCT VFR BLD AUTO: 42.7 % (ref 40–53)
HCT VFR BLD AUTO: 43.1 % (ref 40–53)
HCT VFR BLD AUTO: 43.6 % (ref 40–53)
HCT VFR BLD AUTO: 44.7 % (ref 40–53)
HCT VFR BLD AUTO: 45.1 % (ref 40–53)
HCT VFR BLD AUTO: 45.3 % (ref 40–53)
HCT VFR BLD AUTO: 45.4 % (ref 40–53)
HCT VFR BLD AUTO: 46.1 % (ref 40–53)
HCT VFR BLD AUTO: 47.4 % (ref 40–53)
HGB BLD-MCNC: 10.5 G/DL (ref 13.3–17.7)
HGB BLD-MCNC: 10.6 G/DL (ref 13.3–17.7)
HGB BLD-MCNC: 10.8 G/DL (ref 13.3–17.7)
HGB BLD-MCNC: 10.8 G/DL (ref 13.3–17.7)
HGB BLD-MCNC: 10.9 G/DL (ref 13.3–17.7)
HGB BLD-MCNC: 11 G/DL (ref 13.3–17.7)
HGB BLD-MCNC: 11 G/DL (ref 13.3–17.7)
HGB BLD-MCNC: 12.3 G/DL (ref 13.3–17.7)
HGB BLD-MCNC: 12.5 G/DL (ref 13.3–17.7)
HGB BLD-MCNC: 12.6 G/DL (ref 13.3–17.7)
HGB BLD-MCNC: 12.6 G/DL (ref 13.3–17.7)
HGB BLD-MCNC: 12.7 G/DL (ref 13.3–17.7)
HGB BLD-MCNC: 12.8 G/DL (ref 13.3–17.7)
HGB BLD-MCNC: 12.8 G/DL (ref 13.3–17.7)
HGB BLD-MCNC: 13.3 G/DL (ref 13.3–17.7)
HGB BLD-MCNC: 13.4 G/DL (ref 13.3–17.7)
HGB BLD-MCNC: 13.7 G/DL (ref 13.3–17.7)
HGB BLD-MCNC: 13.8 G/DL (ref 13.3–17.7)
HGB BLD-MCNC: 14.1 G/DL (ref 13.3–17.7)
HGB BLD-MCNC: 14.1 G/DL (ref 13.3–17.7)
HGB BLD-MCNC: 14.2 G/DL (ref 13.3–17.7)
HGB BLD-MCNC: 14.3 G/DL (ref 13.3–17.7)
HGB BLD-MCNC: 14.4 G/DL (ref 13.3–17.7)
HOLD SPECIMEN: NORMAL
IMM GRANULOCYTES # BLD: 0 10E3/UL
IMM GRANULOCYTES # BLD: 0.1 10E3/UL
IMM GRANULOCYTES NFR BLD: 0 %
IMM GRANULOCYTES NFR BLD: 1 %
INR PPP: 1.23 (ref 0.85–1.15)
INTERPRETATION ECG - MUSE: NORMAL
LACTATE SERPL-SCNC: 0.7 MMOL/L (ref 0.7–2)
LACTATE SERPL-SCNC: 0.8 MMOL/L (ref 0.7–2)
LACTATE SERPL-SCNC: 0.9 MMOL/L (ref 0.7–2)
LACTATE SERPL-SCNC: 1 MMOL/L (ref 0.7–2)
LACTATE SERPL-SCNC: 1.6 MMOL/L (ref 0.7–2)
LVEF ECHO: NORMAL
LYMPHOCYTES # BLD AUTO: 0.2 10E3/UL (ref 0.8–5.3)
LYMPHOCYTES # BLD AUTO: 0.2 10E3/UL (ref 0.8–5.3)
LYMPHOCYTES # BLD AUTO: 0.5 10E3/UL (ref 0.8–5.3)
LYMPHOCYTES # BLD AUTO: 0.6 10E3/UL (ref 0.8–5.3)
LYMPHOCYTES # BLD AUTO: 0.7 10E3/UL (ref 0.8–5.3)
LYMPHOCYTES # BLD AUTO: 0.8 10E3/UL (ref 0.8–5.3)
LYMPHOCYTES # BLD AUTO: 0.9 10E3/UL (ref 0.8–5.3)
LYMPHOCYTES NFR BLD AUTO: 2 %
LYMPHOCYTES NFR BLD AUTO: 3 %
LYMPHOCYTES NFR BLD AUTO: 5 %
LYMPHOCYTES NFR BLD AUTO: 5 %
LYMPHOCYTES NFR BLD AUTO: 7 %
LYMPHOCYTES NFR BLD AUTO: 8 %
M TB IFN-G BLD-IMP: NEGATIVE
M TB IFN-G CD4+ BCKGRND COR BLD-ACNC: 2.54 IU/ML
MAGNESIUM SERPL-MCNC: 1.6 MG/DL (ref 1.6–2.3)
MAGNESIUM SERPL-MCNC: 1.7 MG/DL (ref 1.6–2.3)
MCH RBC QN AUTO: 33.2 PG (ref 26.5–33)
MCH RBC QN AUTO: 33.2 PG (ref 26.5–33)
MCH RBC QN AUTO: 33.3 PG (ref 26.5–33)
MCH RBC QN AUTO: 33.4 PG (ref 26.5–33)
MCH RBC QN AUTO: 33.5 PG (ref 26.5–33)
MCH RBC QN AUTO: 33.5 PG (ref 26.5–33)
MCH RBC QN AUTO: 33.6 PG (ref 26.5–33)
MCH RBC QN AUTO: 33.7 PG (ref 26.5–33)
MCH RBC QN AUTO: 33.7 PG (ref 26.5–33)
MCH RBC QN AUTO: 33.8 PG (ref 26.5–33)
MCH RBC QN AUTO: 33.8 PG (ref 26.5–33)
MCH RBC QN AUTO: 33.9 PG (ref 26.5–33)
MCH RBC QN AUTO: 34 PG (ref 26.5–33)
MCH RBC QN AUTO: 34.2 PG (ref 26.5–33)
MCH RBC QN AUTO: 34.5 PG (ref 26.5–33)
MCH RBC QN AUTO: 34.6 PG (ref 26.5–33)
MCHC RBC AUTO-ENTMCNC: 29.8 G/DL (ref 31.5–36.5)
MCHC RBC AUTO-ENTMCNC: 29.9 G/DL (ref 31.5–36.5)
MCHC RBC AUTO-ENTMCNC: 30.3 G/DL (ref 31.5–36.5)
MCHC RBC AUTO-ENTMCNC: 30.3 G/DL (ref 31.5–36.5)
MCHC RBC AUTO-ENTMCNC: 30.4 G/DL (ref 31.5–36.5)
MCHC RBC AUTO-ENTMCNC: 30.4 G/DL (ref 31.5–36.5)
MCHC RBC AUTO-ENTMCNC: 30.6 G/DL (ref 31.5–36.5)
MCHC RBC AUTO-ENTMCNC: 30.7 G/DL (ref 31.5–36.5)
MCHC RBC AUTO-ENTMCNC: 30.8 G/DL (ref 31.5–36.5)
MCHC RBC AUTO-ENTMCNC: 30.8 G/DL (ref 31.5–36.5)
MCHC RBC AUTO-ENTMCNC: 30.9 G/DL (ref 31.5–36.5)
MCHC RBC AUTO-ENTMCNC: 31.1 G/DL (ref 31.5–36.5)
MCHC RBC AUTO-ENTMCNC: 31.3 G/DL (ref 31.5–36.5)
MCHC RBC AUTO-ENTMCNC: 31.4 G/DL (ref 31.5–36.5)
MCHC RBC AUTO-ENTMCNC: 31.4 G/DL (ref 31.5–36.5)
MCHC RBC AUTO-ENTMCNC: 31.5 G/DL (ref 31.5–36.5)
MCHC RBC AUTO-ENTMCNC: 31.5 G/DL (ref 31.5–36.5)
MCHC RBC AUTO-ENTMCNC: 31.7 G/DL (ref 31.5–36.5)
MCHC RBC AUTO-ENTMCNC: 32.2 G/DL (ref 31.5–36.5)
MCV RBC AUTO: 105 FL (ref 78–100)
MCV RBC AUTO: 107 FL (ref 78–100)
MCV RBC AUTO: 107 FL (ref 78–100)
MCV RBC AUTO: 108 FL (ref 78–100)
MCV RBC AUTO: 109 FL (ref 78–100)
MCV RBC AUTO: 110 FL (ref 78–100)
MCV RBC AUTO: 110 FL (ref 78–100)
MCV RBC AUTO: 111 FL (ref 78–100)
MCV RBC AUTO: 112 FL (ref 78–100)
MCV RBC AUTO: 113 FL (ref 78–100)
MITOGEN IGNF BCKGRD COR BLD-ACNC: 0.02 IU/ML
MITOGEN IGNF BCKGRD COR BLD-ACNC: 0.1 IU/ML
MONOCYTES # BLD AUTO: 0.2 10E3/UL (ref 0–1.3)
MONOCYTES # BLD AUTO: 0.6 10E3/UL (ref 0–1.3)
MONOCYTES # BLD AUTO: 0.7 10E3/UL (ref 0–1.3)
MONOCYTES # BLD AUTO: 0.8 10E3/UL (ref 0–1.3)
MONOCYTES NFR BLD AUTO: 11 %
MONOCYTES NFR BLD AUTO: 3 %
MONOCYTES NFR BLD AUTO: 7 %
MONOCYTES NFR BLD AUTO: 8 %
MRSA DNA SPEC QL NAA+PROBE: NEGATIVE
NEUTROPHILS # BLD AUTO: 10.5 10E3/UL (ref 1.6–8.3)
NEUTROPHILS # BLD AUTO: 5.8 10E3/UL (ref 1.6–8.3)
NEUTROPHILS # BLD AUTO: 6.8 10E3/UL (ref 1.6–8.3)
NEUTROPHILS # BLD AUTO: 7 10E3/UL (ref 1.6–8.3)
NEUTROPHILS # BLD AUTO: 7.7 10E3/UL (ref 1.6–8.3)
NEUTROPHILS # BLD AUTO: 8 10E3/UL (ref 1.6–8.3)
NEUTROPHILS # BLD AUTO: 8.4 10E3/UL (ref 1.6–8.3)
NEUTROPHILS # BLD AUTO: 8.7 10E3/UL (ref 1.6–8.3)
NEUTROPHILS # BLD AUTO: 9.1 10E3/UL (ref 1.6–8.3)
NEUTROPHILS NFR BLD AUTO: 75 %
NEUTROPHILS NFR BLD AUTO: 79 %
NEUTROPHILS NFR BLD AUTO: 81 %
NEUTROPHILS NFR BLD AUTO: 82 %
NEUTROPHILS NFR BLD AUTO: 82 %
NEUTROPHILS NFR BLD AUTO: 83 %
NEUTROPHILS NFR BLD AUTO: 85 %
NEUTROPHILS NFR BLD AUTO: 87 %
NEUTROPHILS NFR BLD AUTO: 93 %
NRBC # BLD AUTO: 0 10E3/UL
NRBC BLD AUTO-RTO: 0 /100
NT-PROBNP SERPL-MCNC: ABNORMAL PG/ML (ref 0–1800)
NT-PROBNP SERPL-MCNC: ABNORMAL PG/ML (ref 0–1800)
O2/TOTAL GAS SETTING VFR VENT: 28 %
O2/TOTAL GAS SETTING VFR VENT: 40 %
O2/TOTAL GAS SETTING VFR VENT: 50 %
O2/TOTAL GAS SETTING VFR VENT: 99 %
OXYHGB MFR BLDV: 39 % (ref 70–75)
OXYHGB MFR BLDV: 51 % (ref 70–75)
P AXIS - MUSE: 31 DEGREES
P AXIS - MUSE: NORMAL DEGREES
PCO2 BLDV: 58 MM HG (ref 40–50)
PCO2 BLDV: 63 MM HG (ref 40–50)
PCO2 BLDV: 64 MM HG (ref 40–50)
PCO2 BLDV: 66 MM HG (ref 40–50)
PCO2 BLDV: 66 MM HG (ref 40–50)
PCO2 BLDV: 80 MM HG (ref 40–50)
PH BLDV: 7.24 [PH] (ref 7.32–7.43)
PH BLDV: 7.28 [PH] (ref 7.32–7.43)
PH BLDV: 7.29 [PH] (ref 7.32–7.43)
PH BLDV: 7.29 [PH] (ref 7.32–7.43)
PH BLDV: 7.3 [PH] (ref 7.32–7.43)
PH BLDV: 7.3 [PH] (ref 7.32–7.43)
PHOSPHATE SERPL-MCNC: 2.8 MG/DL (ref 2.5–4.5)
PHOSPHATE SERPL-MCNC: 3.9 MG/DL (ref 2.5–4.5)
PHOSPHATE SERPL-MCNC: 3.9 MG/DL (ref 2.5–4.5)
PHOSPHATE SERPL-MCNC: 4 MG/DL (ref 2.5–4.5)
PHOSPHATE SERPL-MCNC: 5.2 MG/DL (ref 2.5–4.5)
PHOSPHATE SERPL-MCNC: 5.2 MG/DL (ref 2.5–4.5)
PLAT MORPH BLD: NORMAL
PLATELET # BLD AUTO: 100 10E3/UL (ref 150–450)
PLATELET # BLD AUTO: 101 10E3/UL (ref 150–450)
PLATELET # BLD AUTO: 101 10E3/UL (ref 150–450)
PLATELET # BLD AUTO: 106 10E3/UL (ref 150–450)
PLATELET # BLD AUTO: 107 10E3/UL (ref 150–450)
PLATELET # BLD AUTO: 108 10E3/UL (ref 150–450)
PLATELET # BLD AUTO: 111 10E3/UL (ref 150–450)
PLATELET # BLD AUTO: 112 10E3/UL (ref 150–450)
PLATELET # BLD AUTO: 113 10E3/UL (ref 150–450)
PLATELET # BLD AUTO: 117 10E3/UL (ref 150–450)
PLATELET # BLD AUTO: 117 10E3/UL (ref 150–450)
PLATELET # BLD AUTO: 119 10E3/UL (ref 150–450)
PLATELET # BLD AUTO: 120 10E3/UL (ref 150–450)
PLATELET # BLD AUTO: 123 10E3/UL (ref 150–450)
PLATELET # BLD AUTO: 125 10E3/UL (ref 150–450)
PLATELET # BLD AUTO: 128 10E3/UL (ref 150–450)
PLATELET # BLD AUTO: 129 10E3/UL (ref 150–450)
PLATELET # BLD AUTO: 130 10E3/UL (ref 150–450)
PLATELET # BLD AUTO: 132 10E3/UL (ref 150–450)
PLATELET # BLD AUTO: 145 10E3/UL (ref 150–450)
PLATELET # BLD AUTO: 148 10E3/UL (ref 150–450)
PLATELET # BLD AUTO: 148 10E3/UL (ref 150–450)
PLATELET # BLD AUTO: 150 10E3/UL (ref 150–450)
PO2 BLDV: 22 MM HG (ref 25–47)
PO2 BLDV: 25 MM HG (ref 25–47)
PO2 BLDV: 30 MM HG (ref 25–47)
PO2 BLDV: 30 MM HG (ref 25–47)
PO2 BLDV: 40 MM HG (ref 25–47)
PO2 BLDV: 41 MM HG (ref 25–47)
POTASSIUM BLD-SCNC: 3.3 MMOL/L (ref 3.4–5.3)
POTASSIUM BLD-SCNC: 3.4 MMOL/L (ref 3.4–5.3)
POTASSIUM BLD-SCNC: 3.4 MMOL/L (ref 3.4–5.3)
POTASSIUM BLD-SCNC: 3.5 MMOL/L (ref 3.4–5.3)
POTASSIUM BLD-SCNC: 3.6 MMOL/L (ref 3.4–5.3)
POTASSIUM BLD-SCNC: 3.7 MMOL/L (ref 3.4–5.3)
POTASSIUM BLD-SCNC: 3.7 MMOL/L (ref 3.4–5.3)
POTASSIUM BLD-SCNC: 3.9 MMOL/L (ref 3.4–5.3)
POTASSIUM BLD-SCNC: 3.9 MMOL/L (ref 3.4–5.3)
POTASSIUM BLD-SCNC: 4 MMOL/L (ref 3.4–5.3)
POTASSIUM BLD-SCNC: 4.1 MMOL/L (ref 3.4–5.3)
POTASSIUM BLD-SCNC: 4.2 MMOL/L (ref 3.4–5.3)
POTASSIUM BLD-SCNC: 4.4 MMOL/L (ref 3.4–5.3)
POTASSIUM BLD-SCNC: 4.5 MMOL/L (ref 3.4–5.3)
POTASSIUM BLD-SCNC: 4.8 MMOL/L (ref 3.4–5.3)
POTASSIUM BLD-SCNC: 4.9 MMOL/L (ref 3.4–5.3)
POTASSIUM BLD-SCNC: 5 MMOL/L (ref 3.4–5.3)
POTASSIUM BLD-SCNC: 5.2 MMOL/L (ref 3.4–5.3)
POTASSIUM BLD-SCNC: 5.7 MMOL/L (ref 3.4–5.3)
PR INTERVAL - MUSE: 146 MS
PR INTERVAL - MUSE: NORMAL MS
PROT SERPL-MCNC: 4.9 G/DL (ref 6.8–8.8)
PROT SERPL-MCNC: 4.9 G/DL (ref 6.8–8.8)
PROT SERPL-MCNC: 5.5 G/DL (ref 6.8–8.8)
PROT SERPL-MCNC: 5.9 G/DL (ref 6.8–8.8)
PROT SERPL-MCNC: 6 G/DL (ref 6.8–8.8)
PROT SERPL-MCNC: 6.4 G/DL (ref 6.8–8.8)
QRS DURATION - MUSE: 106 MS
QRS DURATION - MUSE: 122 MS
QRS DURATION - MUSE: 96 MS
QRS DURATION - MUSE: 98 MS
QRS DURATION - MUSE: 98 MS
QT - MUSE: 318 MS
QT - MUSE: 322 MS
QT - MUSE: 362 MS
QT - MUSE: 408 MS
QT - MUSE: 488 MS
QTC - MUSE: 401 MS
QTC - MUSE: 429 MS
QTC - MUSE: 466 MS
QTC - MUSE: 507 MS
QTC - MUSE: 644 MS
QUANTIFERON MITOGEN: 2.6 IU/ML
QUANTIFERON NIL TUBE: 0.06 IU/ML
QUANTIFERON TB1 TUBE: 0.16 IU/ML
QUANTIFERON TB2 TUBE: 0.08
R AXIS - MUSE: 156 DEGREES
R AXIS - MUSE: 158 DEGREES
R AXIS - MUSE: 187 DEGREES
R AXIS - MUSE: 221 DEGREES
R AXIS - MUSE: 86 DEGREES
RBC # BLD AUTO: 3.12 10E6/UL (ref 4.4–5.9)
RBC # BLD AUTO: 3.14 10E6/UL (ref 4.4–5.9)
RBC # BLD AUTO: 3.21 10E6/UL (ref 4.4–5.9)
RBC # BLD AUTO: 3.25 10E6/UL (ref 4.4–5.9)
RBC # BLD AUTO: 3.27 10E6/UL (ref 4.4–5.9)
RBC # BLD AUTO: 3.27 10E6/UL (ref 4.4–5.9)
RBC # BLD AUTO: 3.31 10E6/UL (ref 4.4–5.9)
RBC # BLD AUTO: 3.55 10E6/UL (ref 4.4–5.9)
RBC # BLD AUTO: 3.65 10E6/UL (ref 4.4–5.9)
RBC # BLD AUTO: 3.65 10E6/UL (ref 4.4–5.9)
RBC # BLD AUTO: 3.73 10E6/UL (ref 4.4–5.9)
RBC # BLD AUTO: 3.74 10E6/UL (ref 4.4–5.9)
RBC # BLD AUTO: 3.74 10E6/UL (ref 4.4–5.9)
RBC # BLD AUTO: 3.75 10E6/UL (ref 4.4–5.9)
RBC # BLD AUTO: 3.8 10E6/UL (ref 4.4–5.9)
RBC # BLD AUTO: 3.82 10E6/UL (ref 4.4–5.9)
RBC # BLD AUTO: 3.93 10E6/UL (ref 4.4–5.9)
RBC # BLD AUTO: 3.95 10E6/UL (ref 4.4–5.9)
RBC # BLD AUTO: 4.08 10E6/UL (ref 4.4–5.9)
RBC # BLD AUTO: 4.12 10E6/UL (ref 4.4–5.9)
RBC # BLD AUTO: 4.16 10E6/UL (ref 4.4–5.9)
RBC # BLD AUTO: 4.18 10E6/UL (ref 4.4–5.9)
RBC # BLD AUTO: 4.2 10E6/UL (ref 4.4–5.9)
RBC # BLD AUTO: 4.22 10E6/UL (ref 4.4–5.9)
RBC # BLD AUTO: 4.28 10E6/UL (ref 4.4–5.9)
RBC MORPH BLD: NORMAL
RBC MORPH BLD: NORMAL
SA TARGET DNA: NEGATIVE
SARS-COV-2 RNA RESP QL NAA+PROBE: POSITIVE
SODIUM SERPL-SCNC: 133 MMOL/L (ref 133–144)
SODIUM SERPL-SCNC: 134 MMOL/L (ref 133–144)
SODIUM SERPL-SCNC: 135 MMOL/L (ref 133–144)
SODIUM SERPL-SCNC: 136 MMOL/L (ref 133–144)
SODIUM SERPL-SCNC: 137 MMOL/L (ref 133–144)
SODIUM SERPL-SCNC: 138 MMOL/L (ref 133–144)
SODIUM SERPL-SCNC: 139 MMOL/L (ref 133–144)
SODIUM SERPL-SCNC: 140 MMOL/L (ref 133–144)
SODIUM SERPL-SCNC: 140 MMOL/L (ref 133–144)
SYSTOLIC BLOOD PRESSURE - MUSE: NORMAL MMHG
T AXIS - MUSE: -1 DEGREES
T AXIS - MUSE: -13 DEGREES
T AXIS - MUSE: -21 DEGREES
T AXIS - MUSE: 25 DEGREES
T AXIS - MUSE: 35 DEGREES
T4 FREE SERPL-MCNC: 1.01 NG/DL (ref 0.76–1.46)
TROPONIN I SERPL HS-MCNC: 1233 NG/L
TROPONIN I SERPL HS-MCNC: 1324 NG/L
TROPONIN I SERPL HS-MCNC: 1528 NG/L
TROPONIN I SERPL HS-MCNC: 1683 NG/L
TROPONIN I SERPL HS-MCNC: 32 NG/L
TROPONIN I SERPL HS-MCNC: 63 NG/L
TROPONIN I SERPL HS-MCNC: 77 NG/L
TROPONIN I SERPL HS-MCNC: 77 NG/L
TROPONIN I SERPL HS-MCNC: 93 NG/L
TSH SERPL DL<=0.005 MIU/L-ACNC: 4.6 MU/L (ref 0.4–4)
VENTRICULAR RATE- MUSE: 100 BPM
VENTRICULAR RATE- MUSE: 105 BPM
VENTRICULAR RATE- MUSE: 107 BPM
VENTRICULAR RATE- MUSE: 93 BPM
VENTRICULAR RATE- MUSE: 96 BPM
WBC # BLD AUTO: 10.3 10E3/UL (ref 4–11)
WBC # BLD AUTO: 10.4 10E3/UL (ref 4–11)
WBC # BLD AUTO: 10.5 10E3/UL (ref 4–11)
WBC # BLD AUTO: 10.9 10E3/UL (ref 4–11)
WBC # BLD AUTO: 11.5 10E3/UL (ref 4–11)
WBC # BLD AUTO: 12.6 10E3/UL (ref 4–11)
WBC # BLD AUTO: 6.8 10E3/UL (ref 4–11)
WBC # BLD AUTO: 7 10E3/UL (ref 4–11)
WBC # BLD AUTO: 7.1 10E3/UL (ref 4–11)
WBC # BLD AUTO: 7.3 10E3/UL (ref 4–11)
WBC # BLD AUTO: 7.3 10E3/UL (ref 4–11)
WBC # BLD AUTO: 7.4 10E3/UL (ref 4–11)
WBC # BLD AUTO: 7.5 10E3/UL (ref 4–11)
WBC # BLD AUTO: 7.6 10E3/UL (ref 4–11)
WBC # BLD AUTO: 8 10E3/UL (ref 4–11)
WBC # BLD AUTO: 8.6 10E3/UL (ref 4–11)
WBC # BLD AUTO: 8.9 10E3/UL (ref 4–11)
WBC # BLD AUTO: 9.1 10E3/UL (ref 4–11)
WBC # BLD AUTO: 9.1 10E3/UL (ref 4–11)
WBC # BLD AUTO: 9.2 10E3/UL (ref 4–11)
WBC # BLD AUTO: 9.5 10E3/UL (ref 4–11)
WBC # BLD AUTO: 9.5 10E3/UL (ref 4–11)
WBC # BLD AUTO: 9.6 10E3/UL (ref 4–11)

## 2022-01-01 PROCEDURE — P9041 ALBUMIN (HUMAN),5%, 50ML: HCPCS | Performed by: INTERNAL MEDICINE

## 2022-01-01 PROCEDURE — 36415 COLL VENOUS BLD VENIPUNCTURE: CPT | Performed by: INTERNAL MEDICINE

## 2022-01-01 PROCEDURE — 87340 HEPATITIS B SURFACE AG IA: CPT | Performed by: INTERNAL MEDICINE

## 2022-01-01 PROCEDURE — 87040 BLOOD CULTURE FOR BACTERIA: CPT | Performed by: INTERNAL MEDICINE

## 2022-01-01 PROCEDURE — 90937 HEMODIALYSIS REPEATED EVAL: CPT

## 2022-01-01 PROCEDURE — 99232 SBSQ HOSP IP/OBS MODERATE 35: CPT | Performed by: INTERNAL MEDICINE

## 2022-01-01 PROCEDURE — 250N000013 HC RX MED GY IP 250 OP 250 PS 637: Performed by: INTERNAL MEDICINE

## 2022-01-01 PROCEDURE — 85025 COMPLETE CBC W/AUTO DIFF WBC: CPT | Performed by: EMERGENCY MEDICINE

## 2022-01-01 PROCEDURE — 80069 RENAL FUNCTION PANEL: CPT | Performed by: HOSPITALIST

## 2022-01-01 PROCEDURE — 999N000157 HC STATISTIC RCP TIME EA 10 MIN

## 2022-01-01 PROCEDURE — 250N000011 HC RX IP 250 OP 636: Performed by: INTERNAL MEDICINE

## 2022-01-01 PROCEDURE — 36415 COLL VENOUS BLD VENIPUNCTURE: CPT | Performed by: EMERGENCY MEDICINE

## 2022-01-01 PROCEDURE — 258N000001 HC RX 258: Performed by: INTERNAL MEDICINE

## 2022-01-01 PROCEDURE — 99207 PR NO CHARGE LOS: CPT | Performed by: NURSE PRACTITIONER

## 2022-01-01 PROCEDURE — 90935 HEMODIALYSIS ONE EVALUATION: CPT | Performed by: INTERNAL MEDICINE

## 2022-01-01 PROCEDURE — 999N000190 HC STATISTIC VAT ROUNDS

## 2022-01-01 PROCEDURE — 85014 HEMATOCRIT: CPT | Performed by: PHYSICIAN ASSISTANT

## 2022-01-01 PROCEDURE — 93990 DOPPLER FLOW TESTING: CPT

## 2022-01-01 PROCEDURE — 80053 COMPREHEN METABOLIC PANEL: CPT | Performed by: EMERGENCY MEDICINE

## 2022-01-01 PROCEDURE — 86140 C-REACTIVE PROTEIN: CPT | Performed by: INTERNAL MEDICINE

## 2022-01-01 PROCEDURE — 93005 ELECTROCARDIOGRAM TRACING: CPT

## 2022-01-01 PROCEDURE — 250N000011 HC RX IP 250 OP 636: Performed by: PHYSICIAN ASSISTANT

## 2022-01-01 PROCEDURE — 36569 INSJ PICC 5 YR+ W/O IMAGING: CPT

## 2022-01-01 PROCEDURE — 5A09357 ASSISTANCE WITH RESPIRATORY VENTILATION, LESS THAN 24 CONSECUTIVE HOURS, CONTINUOUS POSITIVE AIRWAY PRESSURE: ICD-10-PCS | Performed by: INTERNAL MEDICINE

## 2022-01-01 PROCEDURE — 85027 COMPLETE CBC AUTOMATED: CPT | Performed by: PHYSICIAN ASSISTANT

## 2022-01-01 PROCEDURE — 250N000011 HC RX IP 250 OP 636

## 2022-01-01 PROCEDURE — 99214 OFFICE O/P EST MOD 30 MIN: CPT | Mod: 95 | Performed by: PHYSICIAN ASSISTANT

## 2022-01-01 PROCEDURE — 120N000001 HC R&B MED SURG/OB

## 2022-01-01 PROCEDURE — 258N000003 HC RX IP 258 OP 636: Performed by: INTERNAL MEDICINE

## 2022-01-01 PROCEDURE — 258N000001 HC RX 258: Performed by: HOSPITALIST

## 2022-01-01 PROCEDURE — 99233 SBSQ HOSP IP/OBS HIGH 50: CPT | Performed by: INTERNAL MEDICINE

## 2022-01-01 PROCEDURE — 97535 SELF CARE MNGMENT TRAINING: CPT | Mod: GO | Performed by: OCCUPATIONAL THERAPIST

## 2022-01-01 PROCEDURE — 80069 RENAL FUNCTION PANEL: CPT | Performed by: INTERNAL MEDICINE

## 2022-01-01 PROCEDURE — 85379 FIBRIN DEGRADATION QUANT: CPT | Performed by: EMERGENCY MEDICINE

## 2022-01-01 PROCEDURE — 80053 COMPREHEN METABOLIC PANEL: CPT | Performed by: HOSPITALIST

## 2022-01-01 PROCEDURE — 82040 ASSAY OF SERUM ALBUMIN: CPT | Performed by: PHYSICIAN ASSISTANT

## 2022-01-01 PROCEDURE — 84484 ASSAY OF TROPONIN QUANT: CPT | Mod: 91 | Performed by: EMERGENCY MEDICINE

## 2022-01-01 PROCEDURE — 97110 THERAPEUTIC EXERCISES: CPT | Mod: GP

## 2022-01-01 PROCEDURE — 85027 COMPLETE CBC AUTOMATED: CPT | Performed by: INTERNAL MEDICINE

## 2022-01-01 PROCEDURE — 36415 COLL VENOUS BLD VENIPUNCTURE: CPT | Performed by: HOSPITALIST

## 2022-01-01 PROCEDURE — 82805 BLOOD GASES W/O2 SATURATION: CPT | Performed by: PHYSICIAN ASSISTANT

## 2022-01-01 PROCEDURE — 250N000013 HC RX MED GY IP 250 OP 250 PS 637: Performed by: HOSPITALIST

## 2022-01-01 PROCEDURE — 86706 HEP B SURFACE ANTIBODY: CPT | Performed by: INTERNAL MEDICINE

## 2022-01-01 PROCEDURE — 82040 ASSAY OF SERUM ALBUMIN: CPT | Performed by: HOSPITALIST

## 2022-01-01 PROCEDURE — 85004 AUTOMATED DIFF WBC COUNT: CPT | Performed by: INTERNAL MEDICINE

## 2022-01-01 PROCEDURE — 94660 CPAP INITIATION&MGMT: CPT

## 2022-01-01 PROCEDURE — 120N000013 HC R&B IMCU

## 2022-01-01 PROCEDURE — 83735 ASSAY OF MAGNESIUM: CPT | Performed by: HOSPITALIST

## 2022-01-01 PROCEDURE — 82803 BLOOD GASES ANY COMBINATION: CPT | Performed by: NURSE PRACTITIONER

## 2022-01-01 PROCEDURE — 99233 SBSQ HOSP IP/OBS HIGH 50: CPT | Performed by: REGISTERED NURSE

## 2022-01-01 PROCEDURE — 85379 FIBRIN DEGRADATION QUANT: CPT | Performed by: INTERNAL MEDICINE

## 2022-01-01 PROCEDURE — 97530 THERAPEUTIC ACTIVITIES: CPT | Mod: GP

## 2022-01-01 PROCEDURE — 99221 1ST HOSP IP/OBS SF/LOW 40: CPT | Performed by: SURGERY

## 2022-01-01 PROCEDURE — P9047 ALBUMIN (HUMAN), 25%, 50ML: HCPCS | Performed by: INTERNAL MEDICINE

## 2022-01-01 PROCEDURE — 99309 SBSQ NF CARE MODERATE MDM 30: CPT | Performed by: PHYSICIAN ASSISTANT

## 2022-01-01 PROCEDURE — 84155 ASSAY OF PROTEIN SERUM: CPT | Performed by: INTERNAL MEDICINE

## 2022-01-01 PROCEDURE — 250N000009 HC RX 250: Performed by: INTERNAL MEDICINE

## 2022-01-01 PROCEDURE — 96366 THER/PROPH/DIAG IV INF ADDON: CPT

## 2022-01-01 PROCEDURE — 99305 1ST NF CARE MODERATE MDM 35: CPT | Performed by: INTERNAL MEDICINE

## 2022-01-01 PROCEDURE — 83605 ASSAY OF LACTIC ACID: CPT | Performed by: PHYSICIAN ASSISTANT

## 2022-01-01 PROCEDURE — 96361 HYDRATE IV INFUSION ADD-ON: CPT

## 2022-01-01 PROCEDURE — 80053 COMPREHEN METABOLIC PANEL: CPT | Performed by: INTERNAL MEDICINE

## 2022-01-01 PROCEDURE — 84132 ASSAY OF SERUM POTASSIUM: CPT | Performed by: HOSPITALIST

## 2022-01-01 PROCEDURE — 258N000003 HC RX IP 258 OP 636: Performed by: PHYSICIAN ASSISTANT

## 2022-01-01 PROCEDURE — 87040 BLOOD CULTURE FOR BACTERIA: CPT | Performed by: EMERGENCY MEDICINE

## 2022-01-01 PROCEDURE — 82533 TOTAL CORTISOL: CPT | Performed by: INTERNAL MEDICINE

## 2022-01-01 PROCEDURE — 96374 THER/PROPH/DIAG INJ IV PUSH: CPT

## 2022-01-01 PROCEDURE — 82803 BLOOD GASES ANY COMBINATION: CPT | Performed by: HOSPITALIST

## 2022-01-01 PROCEDURE — 97162 PT EVAL MOD COMPLEX 30 MIN: CPT | Mod: GP

## 2022-01-01 PROCEDURE — 99223 1ST HOSP IP/OBS HIGH 75: CPT | Mod: AI | Performed by: INTERNAL MEDICINE

## 2022-01-01 PROCEDURE — 84484 ASSAY OF TROPONIN QUANT: CPT | Performed by: PHYSICIAN ASSISTANT

## 2022-01-01 PROCEDURE — 99285 EMERGENCY DEPT VISIT HI MDM: CPT | Mod: 25

## 2022-01-01 PROCEDURE — 85014 HEMATOCRIT: CPT | Performed by: HOSPITALIST

## 2022-01-01 PROCEDURE — 250N000013 HC RX MED GY IP 250 OP 250 PS 637: Performed by: REGISTERED NURSE

## 2022-01-01 PROCEDURE — 83605 ASSAY OF LACTIC ACID: CPT | Performed by: EMERGENCY MEDICINE

## 2022-01-01 PROCEDURE — 80048 BASIC METABOLIC PNL TOTAL CA: CPT | Performed by: INTERNAL MEDICINE

## 2022-01-01 PROCEDURE — 999N000128 HC STATISTIC PERIPHERAL IV START W/O US GUIDANCE

## 2022-01-01 PROCEDURE — 99233 SBSQ HOSP IP/OBS HIGH 50: CPT | Performed by: HOSPITALIST

## 2022-01-01 PROCEDURE — 99222 1ST HOSP IP/OBS MODERATE 55: CPT | Performed by: INTERNAL MEDICINE

## 2022-01-01 PROCEDURE — 250N000011 HC RX IP 250 OP 636: Performed by: HOSPITALIST

## 2022-01-01 PROCEDURE — 99221 1ST HOSP IP/OBS SF/LOW 40: CPT | Performed by: INTERNAL MEDICINE

## 2022-01-01 PROCEDURE — 99231 SBSQ HOSP IP/OBS SF/LOW 25: CPT | Performed by: INTERNAL MEDICINE

## 2022-01-01 PROCEDURE — 99223 1ST HOSP IP/OBS HIGH 75: CPT | Performed by: INTERNAL MEDICINE

## 2022-01-01 PROCEDURE — 85025 COMPLETE CBC W/AUTO DIFF WBC: CPT | Performed by: INTERNAL MEDICINE

## 2022-01-01 PROCEDURE — 250N000009 HC RX 250

## 2022-01-01 PROCEDURE — 99214 OFFICE O/P EST MOD 30 MIN: CPT | Performed by: PHYSICIAN ASSISTANT

## 2022-01-01 PROCEDURE — 250N000011 HC RX IP 250 OP 636: Performed by: EMERGENCY MEDICINE

## 2022-01-01 PROCEDURE — 250N000011 HC RX IP 250 OP 636: Performed by: NURSE PRACTITIONER

## 2022-01-01 PROCEDURE — 83036 HEMOGLOBIN GLYCOSYLATED A1C: CPT | Performed by: INTERNAL MEDICINE

## 2022-01-01 PROCEDURE — 99231 SBSQ HOSP IP/OBS SF/LOW 25: CPT | Mod: GV | Performed by: STUDENT IN AN ORGANIZED HEALTH CARE EDUCATION/TRAINING PROGRAM

## 2022-01-01 PROCEDURE — 210N000001 HC R&B IMCU HEART CARE

## 2022-01-01 PROCEDURE — 250N000012 HC RX MED GY IP 250 OP 636 PS 637: Performed by: HOSPITALIST

## 2022-01-01 PROCEDURE — 99207 PR NO BILLABLE SERVICE THIS VISIT: CPT | Mod: GV | Performed by: STUDENT IN AN ORGANIZED HEALTH CARE EDUCATION/TRAINING PROGRAM

## 2022-01-01 PROCEDURE — 82803 BLOOD GASES ANY COMBINATION: CPT | Performed by: INTERNAL MEDICINE

## 2022-01-01 PROCEDURE — 99291 CRITICAL CARE FIRST HOUR: CPT | Performed by: INTERNAL MEDICINE

## 2022-01-01 PROCEDURE — 82310 ASSAY OF CALCIUM: CPT | Performed by: EMERGENCY MEDICINE

## 2022-01-01 PROCEDURE — 71045 X-RAY EXAM CHEST 1 VIEW: CPT

## 2022-01-01 PROCEDURE — 36415 COLL VENOUS BLD VENIPUNCTURE: CPT | Performed by: PHYSICIAN ASSISTANT

## 2022-01-01 PROCEDURE — 83735 ASSAY OF MAGNESIUM: CPT | Performed by: NURSE PRACTITIONER

## 2022-01-01 PROCEDURE — 99232 SBSQ HOSP IP/OBS MODERATE 35: CPT | Performed by: HOSPITALIST

## 2022-01-01 PROCEDURE — 250N000012 HC RX MED GY IP 250 OP 636 PS 637: Performed by: INTERNAL MEDICINE

## 2022-01-01 PROCEDURE — 99221 1ST HOSP IP/OBS SF/LOW 40: CPT | Performed by: REGISTERED NURSE

## 2022-01-01 PROCEDURE — 71046 X-RAY EXAM CHEST 2 VIEWS: CPT

## 2022-01-01 PROCEDURE — 999N000208 ECHOCARDIOGRAM COMPLETE

## 2022-01-01 PROCEDURE — 999N000040 HC STATISTIC CONSULT NO CHARGE VASC ACCESS

## 2022-01-01 PROCEDURE — C9803 HOPD COVID-19 SPEC COLLECT: HCPCS

## 2022-01-01 PROCEDURE — 84443 ASSAY THYROID STIM HORMONE: CPT | Performed by: PHYSICIAN ASSISTANT

## 2022-01-01 PROCEDURE — 110N000005 HC R&B HOSPICE, ACCENT

## 2022-01-01 PROCEDURE — 80048 BASIC METABOLIC PNL TOTAL CA: CPT | Performed by: HOSPITALIST

## 2022-01-01 PROCEDURE — 99356 PR PROLONGED SERV,INPATIENT,1ST HR: CPT | Performed by: REGISTERED NURSE

## 2022-01-01 PROCEDURE — 87636 SARSCOV2 & INF A&B AMP PRB: CPT | Performed by: EMERGENCY MEDICINE

## 2022-01-01 PROCEDURE — 99239 HOSP IP/OBS DSCHRG MGMT >30: CPT | Performed by: INTERNAL MEDICINE

## 2022-01-01 PROCEDURE — 99232 SBSQ HOSP IP/OBS MODERATE 35: CPT | Performed by: REGISTERED NURSE

## 2022-01-01 PROCEDURE — 96374 THER/PROPH/DIAG INJ IV PUSH: CPT | Mod: 59

## 2022-01-01 PROCEDURE — 03QC3ZZ REPAIR LEFT RADIAL ARTERY, PERCUTANEOUS APPROACH: ICD-10-PCS | Performed by: INTERNAL MEDICINE

## 2022-01-01 PROCEDURE — 255N000002 HC RX 255 OP 636: Performed by: INTERNAL MEDICINE

## 2022-01-01 PROCEDURE — 99233 SBSQ HOSP IP/OBS HIGH 50: CPT | Performed by: NURSE PRACTITIONER

## 2022-01-01 PROCEDURE — 84484 ASSAY OF TROPONIN QUANT: CPT | Performed by: INTERNAL MEDICINE

## 2022-01-01 PROCEDURE — 258N000003 HC RX IP 258 OP 636: Performed by: EMERGENCY MEDICINE

## 2022-01-01 PROCEDURE — 5A1D70Z PERFORMANCE OF URINARY FILTRATION, INTERMITTENT, LESS THAN 6 HOURS PER DAY: ICD-10-PCS | Performed by: INTERNAL MEDICINE

## 2022-01-01 PROCEDURE — 93306 TTE W/DOPPLER COMPLETE: CPT | Mod: 26 | Performed by: INTERNAL MEDICINE

## 2022-01-01 PROCEDURE — 93308 TTE F-UP OR LMTD: CPT

## 2022-01-01 PROCEDURE — 86481 TB AG RESPONSE T-CELL SUSP: CPT | Mod: ORL | Performed by: INTERNAL MEDICINE

## 2022-01-01 PROCEDURE — P9041 ALBUMIN (HUMAN),5%, 50ML: HCPCS | Performed by: NURSE PRACTITIONER

## 2022-01-01 PROCEDURE — 99207 PR NO CHARGE LOS: CPT | Performed by: INTERNAL MEDICINE

## 2022-01-01 PROCEDURE — 83605 ASSAY OF LACTIC ACID: CPT | Performed by: HOSPITALIST

## 2022-01-01 PROCEDURE — 97602 WOUND(S) CARE NON-SELECTIVE: CPT

## 2022-01-01 PROCEDURE — 84484 ASSAY OF TROPONIN QUANT: CPT | Performed by: EMERGENCY MEDICINE

## 2022-01-01 PROCEDURE — 84484 ASSAY OF TROPONIN QUANT: CPT | Mod: 91 | Performed by: INTERNAL MEDICINE

## 2022-01-01 PROCEDURE — 82248 BILIRUBIN DIRECT: CPT | Performed by: EMERGENCY MEDICINE

## 2022-01-01 PROCEDURE — 85730 THROMBOPLASTIN TIME PARTIAL: CPT | Performed by: HOSPITALIST

## 2022-01-01 PROCEDURE — 634N000001 HC RX 634: Performed by: INTERNAL MEDICINE

## 2022-01-01 PROCEDURE — 87641 MR-STAPH DNA AMP PROBE: CPT | Performed by: HOSPITALIST

## 2022-01-01 PROCEDURE — 85025 COMPLETE CBC W/AUTO DIFF WBC: CPT | Performed by: PHYSICIAN ASSISTANT

## 2022-01-01 PROCEDURE — P9603 ONE-WAY ALLOW PRORATED MILES: HCPCS | Mod: ORL | Performed by: INTERNAL MEDICINE

## 2022-01-01 PROCEDURE — 272N000433 HC KIT CATH IV 18 OR 20G CM, POWERGLIDE W MAX BARRIER

## 2022-01-01 PROCEDURE — G1004 CDSM NDSC: HCPCS

## 2022-01-01 PROCEDURE — 86140 C-REACTIVE PROTEIN: CPT | Performed by: EMERGENCY MEDICINE

## 2022-01-01 PROCEDURE — 99207 PR NO BILLABLE SERVICE THIS VISIT: CPT | Performed by: INTERNAL MEDICINE

## 2022-01-01 PROCEDURE — 258N000001 HC RX 258: Performed by: STUDENT IN AN ORGANIZED HEALTH CARE EDUCATION/TRAINING PROGRAM

## 2022-01-01 PROCEDURE — 82040 ASSAY OF SERUM ALBUMIN: CPT | Performed by: INTERNAL MEDICINE

## 2022-01-01 PROCEDURE — 97116 GAIT TRAINING THERAPY: CPT | Mod: GP | Performed by: PHYSICAL THERAPIST

## 2022-01-01 PROCEDURE — 84155 ASSAY OF PROTEIN SERUM: CPT | Performed by: EMERGENCY MEDICINE

## 2022-01-01 PROCEDURE — 85730 THROMBOPLASTIN TIME PARTIAL: CPT | Performed by: PHYSICIAN ASSISTANT

## 2022-01-01 PROCEDURE — 99238 HOSP IP/OBS DSCHRG MGMT 30/<: CPT | Performed by: NURSE PRACTITIONER

## 2022-01-01 PROCEDURE — 210N000002 HC R&B HEART CARE

## 2022-01-01 PROCEDURE — 80053 COMPREHEN METABOLIC PANEL: CPT | Performed by: PHYSICIAN ASSISTANT

## 2022-01-01 PROCEDURE — 85730 THROMBOPLASTIN TIME PARTIAL: CPT | Performed by: INTERNAL MEDICINE

## 2022-01-01 PROCEDURE — 85610 PROTHROMBIN TIME: CPT | Performed by: INTERNAL MEDICINE

## 2022-01-01 PROCEDURE — 85027 COMPLETE CBC AUTOMATED: CPT | Performed by: HOSPITALIST

## 2022-01-01 PROCEDURE — 97162 PT EVAL MOD COMPLEX 30 MIN: CPT | Mod: GP | Performed by: PHYSICAL THERAPIST

## 2022-01-01 PROCEDURE — 84450 TRANSFERASE (AST) (SGOT): CPT | Performed by: INTERNAL MEDICINE

## 2022-01-01 PROCEDURE — 80051 ELECTROLYTE PANEL: CPT | Performed by: INTERNAL MEDICINE

## 2022-01-01 PROCEDURE — 84439 ASSAY OF FREE THYROXINE: CPT | Performed by: PHYSICIAN ASSISTANT

## 2022-01-01 PROCEDURE — 83880 ASSAY OF NATRIURETIC PEPTIDE: CPT | Performed by: EMERGENCY MEDICINE

## 2022-01-01 PROCEDURE — 96365 THER/PROPH/DIAG IV INF INIT: CPT

## 2022-01-01 PROCEDURE — 99221 1ST HOSP IP/OBS SF/LOW 40: CPT | Mod: 25 | Performed by: INTERNAL MEDICINE

## 2022-01-01 PROCEDURE — 97530 THERAPEUTIC ACTIVITIES: CPT | Mod: GP | Performed by: PHYSICAL THERAPIST

## 2022-01-01 PROCEDURE — 99223 1ST HOSP IP/OBS HIGH 75: CPT | Performed by: REGISTERED NURSE

## 2022-01-01 PROCEDURE — 83880 ASSAY OF NATRIURETIC PEPTIDE: CPT | Performed by: PHYSICIAN ASSISTANT

## 2022-01-01 PROCEDURE — 250N000013 HC RX MED GY IP 250 OP 250 PS 637: Performed by: EMERGENCY MEDICINE

## 2022-01-01 PROCEDURE — 36415 COLL VENOUS BLD VENIPUNCTURE: CPT | Mod: ORL | Performed by: INTERNAL MEDICINE

## 2022-01-01 PROCEDURE — 71250 CT THORAX DX C-: CPT | Mod: MG

## 2022-01-01 PROCEDURE — 84484 ASSAY OF TROPONIN QUANT: CPT | Performed by: NURSE PRACTITIONER

## 2022-01-01 PROCEDURE — 83605 ASSAY OF LACTIC ACID: CPT | Performed by: STUDENT IN AN ORGANIZED HEALTH CARE EDUCATION/TRAINING PROGRAM

## 2022-01-01 PROCEDURE — 97165 OT EVAL LOW COMPLEX 30 MIN: CPT | Mod: GO | Performed by: OCCUPATIONAL THERAPIST

## 2022-01-01 RX ORDER — HEPARIN SODIUM 10000 [USP'U]/100ML
0-5000 INJECTION, SOLUTION INTRAVENOUS CONTINUOUS
Status: DISCONTINUED | OUTPATIENT
Start: 2022-01-01 | End: 2022-01-01

## 2022-01-01 RX ORDER — MIDODRINE HYDROCHLORIDE 5 MG/1
10 TABLET ORAL ONCE
Status: COMPLETED | OUTPATIENT
Start: 2022-01-01 | End: 2022-01-01

## 2022-01-01 RX ORDER — NALOXONE HYDROCHLORIDE 0.4 MG/ML
0.2 INJECTION, SOLUTION INTRAMUSCULAR; INTRAVENOUS; SUBCUTANEOUS
Status: DISCONTINUED | OUTPATIENT
Start: 2022-01-01 | End: 2022-01-01 | Stop reason: HOSPADM

## 2022-01-01 RX ORDER — MIDODRINE HYDROCHLORIDE 2.5 MG/1
2.5 TABLET ORAL
Status: DISCONTINUED | OUTPATIENT
Start: 2022-01-01 | End: 2022-01-01

## 2022-01-01 RX ORDER — PRAVASTATIN SODIUM 40 MG
40 TABLET ORAL DAILY
Status: DISCONTINUED | OUTPATIENT
Start: 2022-01-01 | End: 2022-01-01 | Stop reason: HOSPADM

## 2022-01-01 RX ORDER — DOXERCALCIFEROL 4 UG/2ML
4 INJECTION INTRAVENOUS
Status: COMPLETED | OUTPATIENT
Start: 2022-01-01 | End: 2022-01-01

## 2022-01-01 RX ORDER — DEXAMETHASONE SODIUM PHOSPHATE 4 MG/ML
6 INJECTION, SOLUTION INTRA-ARTICULAR; INTRALESIONAL; INTRAMUSCULAR; INTRAVENOUS; SOFT TISSUE EVERY 24 HOURS
Status: DISCONTINUED | OUTPATIENT
Start: 2022-01-01 | End: 2022-01-01

## 2022-01-01 RX ORDER — BISACODYL 10 MG
10 SUPPOSITORY, RECTAL RECTAL
Status: DISCONTINUED | OUTPATIENT
Start: 2022-03-28 | End: 2022-01-01 | Stop reason: HOSPADM

## 2022-01-01 RX ORDER — LIDOCAINE 40 MG/G
CREAM TOPICAL
Status: DISCONTINUED | OUTPATIENT
Start: 2022-01-01 | End: 2022-01-01

## 2022-01-01 RX ORDER — PROCHLORPERAZINE MALEATE 5 MG
5 TABLET ORAL EVERY 6 HOURS PRN
Status: DISCONTINUED | OUTPATIENT
Start: 2022-01-01 | End: 2022-01-01 | Stop reason: HOSPADM

## 2022-01-01 RX ORDER — PANTOPRAZOLE SODIUM 40 MG/1
40 TABLET, DELAYED RELEASE ORAL
Status: DISCONTINUED | OUTPATIENT
Start: 2022-01-01 | End: 2022-01-01 | Stop reason: HOSPADM

## 2022-01-01 RX ORDER — ISOSORBIDE MONONITRATE 30 MG/1
30 TABLET, EXTENDED RELEASE ORAL
Status: DISCONTINUED | OUTPATIENT
Start: 2022-01-01 | End: 2022-01-01 | Stop reason: HOSPADM

## 2022-01-01 RX ORDER — MIDODRINE HYDROCHLORIDE 5 MG/1
10 TABLET ORAL
Status: COMPLETED | OUTPATIENT
Start: 2022-01-01 | End: 2022-01-01

## 2022-01-01 RX ORDER — BISACODYL 10 MG
10 SUPPOSITORY, RECTAL RECTAL DAILY PRN
Status: DISCONTINUED | OUTPATIENT
Start: 2022-01-01 | End: 2022-01-01 | Stop reason: HOSPADM

## 2022-01-01 RX ORDER — LORAZEPAM 0.5 MG/1
0.5 TABLET ORAL EVERY 4 HOURS PRN
Status: DISCONTINUED | OUTPATIENT
Start: 2022-01-01 | End: 2022-01-01

## 2022-01-01 RX ORDER — DOXERCALCIFEROL 4 UG/2ML
12 INJECTION INTRAVENOUS
Status: COMPLETED | OUTPATIENT
Start: 2022-01-01 | End: 2022-01-01

## 2022-01-01 RX ORDER — PROCHLORPERAZINE MALEATE 5 MG
5 TABLET ORAL EVERY 6 HOURS PRN
Status: CANCELLED | OUTPATIENT
Start: 2022-01-01

## 2022-01-01 RX ORDER — MIDODRINE HYDROCHLORIDE 2.5 MG/1
2.5 TABLET ORAL 3 TIMES DAILY PRN
Status: DISCONTINUED | OUTPATIENT
Start: 2022-01-01 | End: 2022-01-01 | Stop reason: HOSPADM

## 2022-01-01 RX ORDER — FAMOTIDINE 20 MG/1
20 TABLET, FILM COATED ORAL DAILY
Status: DISCONTINUED | OUTPATIENT
Start: 2022-01-01 | End: 2022-01-01

## 2022-01-01 RX ORDER — ACETAMINOPHEN 650 MG/1
650 SUPPOSITORY RECTAL EVERY 6 HOURS PRN
Status: DISCONTINUED | OUTPATIENT
Start: 2022-01-01 | End: 2022-01-01

## 2022-01-01 RX ORDER — PIPERACILLIN SODIUM, TAZOBACTAM SODIUM 2; .25 G/10ML; G/10ML
2.25 INJECTION, POWDER, LYOPHILIZED, FOR SOLUTION INTRAVENOUS EVERY 6 HOURS
Status: DISCONTINUED | OUTPATIENT
Start: 2022-01-01 | End: 2022-01-01

## 2022-01-01 RX ORDER — MORPHINE SULFATE 2 MG/ML
1-2 INJECTION, SOLUTION INTRAMUSCULAR; INTRAVENOUS
Status: CANCELLED | OUTPATIENT
Start: 2022-01-01

## 2022-01-01 RX ORDER — LORAZEPAM 2 MG/ML
0.5 CONCENTRATE ORAL EVERY 4 HOURS
Status: DISCONTINUED | OUTPATIENT
Start: 2022-01-01 | End: 2022-01-01 | Stop reason: HOSPADM

## 2022-01-01 RX ORDER — AMOXICILLIN 250 MG
1 CAPSULE ORAL 2 TIMES DAILY PRN
Status: CANCELLED | OUTPATIENT
Start: 2022-01-01

## 2022-01-01 RX ORDER — GABAPENTIN 100 MG/1
100 CAPSULE ORAL 3 TIMES DAILY
Status: DISCONTINUED | OUTPATIENT
Start: 2022-01-01 | End: 2022-01-01 | Stop reason: HOSPADM

## 2022-01-01 RX ORDER — PROCHLORPERAZINE 25 MG
12.5 SUPPOSITORY, RECTAL RECTAL EVERY 12 HOURS PRN
Status: DISCONTINUED | OUTPATIENT
Start: 2022-01-01 | End: 2022-01-01 | Stop reason: HOSPADM

## 2022-01-01 RX ORDER — NALOXONE HYDROCHLORIDE 0.4 MG/ML
0.4 INJECTION, SOLUTION INTRAMUSCULAR; INTRAVENOUS; SUBCUTANEOUS
Status: DISCONTINUED | OUTPATIENT
Start: 2022-01-01 | End: 2022-01-01 | Stop reason: HOSPADM

## 2022-01-01 RX ORDER — NALOXONE HYDROCHLORIDE 0.4 MG/ML
0.1 INJECTION, SOLUTION INTRAMUSCULAR; INTRAVENOUS; SUBCUTANEOUS
Status: CANCELLED | OUTPATIENT
Start: 2022-01-01

## 2022-01-01 RX ORDER — ALBUMIN, HUMAN INJ 5% 5 %
250 SOLUTION INTRAVENOUS
Status: COMPLETED | OUTPATIENT
Start: 2022-01-01 | End: 2022-01-01

## 2022-01-01 RX ORDER — ALBUMIN, HUMAN INJ 5% 5 %
50-250 SOLUTION INTRAVENOUS
Status: DISCONTINUED | OUTPATIENT
Start: 2022-01-01 | End: 2022-01-01

## 2022-01-01 RX ORDER — MORPHINE SULFATE 2 MG/ML
1-2 INJECTION, SOLUTION INTRAMUSCULAR; INTRAVENOUS
Status: DISCONTINUED | OUTPATIENT
Start: 2022-01-01 | End: 2022-01-01 | Stop reason: HOSPADM

## 2022-01-01 RX ORDER — MIDODRINE HYDROCHLORIDE 10 MG/1
10 TABLET ORAL
Status: ON HOLD | COMMUNITY
End: 2022-01-01

## 2022-01-01 RX ORDER — ONDANSETRON 2 MG/ML
4 INJECTION INTRAMUSCULAR; INTRAVENOUS EVERY 6 HOURS PRN
Status: CANCELLED | OUTPATIENT
Start: 2022-01-01

## 2022-01-01 RX ORDER — POLYETHYLENE GLYCOL 3350 17 G/17G
8.5 POWDER, FOR SOLUTION ORAL DAILY
Status: DISPENSED | OUTPATIENT
Start: 2022-01-01 | End: 2022-01-01

## 2022-01-01 RX ORDER — HEPARIN SODIUM 1000 [USP'U]/ML
500 INJECTION, SOLUTION INTRAVENOUS; SUBCUTANEOUS CONTINUOUS
Status: DISCONTINUED | OUTPATIENT
Start: 2022-01-01 | End: 2022-01-01

## 2022-01-01 RX ORDER — MIDODRINE HYDROCHLORIDE 5 MG/1
10 TABLET ORAL
Status: DISCONTINUED | OUTPATIENT
Start: 2022-01-01 | End: 2022-01-01 | Stop reason: HOSPADM

## 2022-01-01 RX ORDER — ONDANSETRON 2 MG/ML
4 INJECTION INTRAMUSCULAR; INTRAVENOUS EVERY 6 HOURS PRN
Status: DISCONTINUED | OUTPATIENT
Start: 2022-01-01 | End: 2022-01-01 | Stop reason: HOSPADM

## 2022-01-01 RX ORDER — BISACODYL 10 MG
10 SUPPOSITORY, RECTAL RECTAL
Status: CANCELLED | OUTPATIENT
Start: 2022-03-28

## 2022-01-01 RX ORDER — ONDANSETRON 4 MG/1
4 TABLET, ORALLY DISINTEGRATING ORAL EVERY 6 HOURS PRN
Status: DISCONTINUED | OUTPATIENT
Start: 2022-01-01 | End: 2022-01-01

## 2022-01-01 RX ORDER — WATER 10 ML/10ML
INJECTION INTRAMUSCULAR; INTRAVENOUS; SUBCUTANEOUS
Status: COMPLETED
Start: 2022-01-01 | End: 2022-01-01

## 2022-01-01 RX ORDER — NICOTINE POLACRILEX 4 MG
15-30 LOZENGE BUCCAL
Status: DISCONTINUED | OUTPATIENT
Start: 2022-01-01 | End: 2022-01-01

## 2022-01-01 RX ORDER — MORPHINE SULFATE 20 MG/ML
5 SOLUTION ORAL EVERY 4 HOURS
Status: DISCONTINUED | OUTPATIENT
Start: 2022-01-01 | End: 2022-01-01 | Stop reason: HOSPADM

## 2022-01-01 RX ORDER — LORAZEPAM 2 MG/ML
.5-1 CONCENTRATE ORAL
Status: DISCONTINUED | OUTPATIENT
Start: 2022-01-01 | End: 2022-01-01 | Stop reason: HOSPADM

## 2022-01-01 RX ORDER — PREDNISONE 20 MG/1
20 TABLET ORAL DAILY
Status: DISCONTINUED | OUTPATIENT
Start: 2022-01-01 | End: 2022-01-01 | Stop reason: HOSPADM

## 2022-01-01 RX ORDER — MIDODRINE HYDROCHLORIDE 10 MG/1
10 TABLET ORAL
DISCHARGE
Start: 2022-01-01

## 2022-01-01 RX ORDER — MORPHINE SULFATE 10 MG/5ML
5-10 SOLUTION ORAL
Status: DISCONTINUED | OUTPATIENT
Start: 2022-01-01 | End: 2022-01-01 | Stop reason: HOSPADM

## 2022-01-01 RX ORDER — NALOXONE HYDROCHLORIDE 0.4 MG/ML
0.1 INJECTION, SOLUTION INTRAMUSCULAR; INTRAVENOUS; SUBCUTANEOUS
Status: DISCONTINUED | OUTPATIENT
Start: 2022-01-01 | End: 2022-01-01 | Stop reason: HOSPADM

## 2022-01-01 RX ORDER — ACETAMINOPHEN 650 MG/1
650 SUPPOSITORY RECTAL EVERY 6 HOURS PRN
Status: CANCELLED | OUTPATIENT
Start: 2022-01-01

## 2022-01-01 RX ORDER — NITROGLYCERIN 0.4 MG/1
0.4 TABLET SUBLINGUAL ONCE
Status: DISCONTINUED | OUTPATIENT
Start: 2022-01-01 | End: 2022-01-01 | Stop reason: HOSPADM

## 2022-01-01 RX ORDER — GLYCOPYRROLATE 0.2 MG/ML
0.2 INJECTION, SOLUTION INTRAMUSCULAR; INTRAVENOUS EVERY 4 HOURS PRN
Status: DISCONTINUED | OUTPATIENT
Start: 2022-01-01 | End: 2022-01-01 | Stop reason: HOSPADM

## 2022-01-01 RX ORDER — AMOXICILLIN 250 MG
2 CAPSULE ORAL 2 TIMES DAILY PRN
Status: DISCONTINUED | OUTPATIENT
Start: 2022-01-01 | End: 2022-01-01

## 2022-01-01 RX ORDER — ALBUMIN, HUMAN INJ 5% 5 %
SOLUTION INTRAVENOUS
Status: DISCONTINUED
Start: 2022-01-01 | End: 2022-01-01 | Stop reason: HOSPADM

## 2022-01-01 RX ORDER — ALBUMIN, HUMAN INJ 5% 5 %
12.5 SOLUTION INTRAVENOUS ONCE
Status: COMPLETED | OUTPATIENT
Start: 2022-01-01 | End: 2022-01-01

## 2022-01-01 RX ORDER — AMOXICILLIN 250 MG
2 CAPSULE ORAL 2 TIMES DAILY PRN
Status: DISCONTINUED | OUTPATIENT
Start: 2022-01-01 | End: 2022-01-01 | Stop reason: HOSPADM

## 2022-01-01 RX ORDER — ALBUMIN (HUMAN) 12.5 G/50ML
50 SOLUTION INTRAVENOUS
Status: DISCONTINUED | OUTPATIENT
Start: 2022-01-01 | End: 2022-01-01

## 2022-01-01 RX ORDER — DEXTROSE MONOHYDRATE 25 G/50ML
25-50 INJECTION, SOLUTION INTRAVENOUS
Status: DISCONTINUED | OUTPATIENT
Start: 2022-01-01 | End: 2022-01-01 | Stop reason: HOSPADM

## 2022-01-01 RX ORDER — LIDOCAINE 4 G/G
2 PATCH TOPICAL
Status: DISCONTINUED | OUTPATIENT
Start: 2022-01-01 | End: 2022-01-01 | Stop reason: HOSPADM

## 2022-01-01 RX ORDER — MIDODRINE HYDROCHLORIDE 5 MG/1
5 TABLET ORAL ONCE
Status: COMPLETED | OUTPATIENT
Start: 2022-01-01 | End: 2022-01-01

## 2022-01-01 RX ORDER — ONDANSETRON 4 MG/1
4 TABLET, ORALLY DISINTEGRATING ORAL EVERY 6 HOURS PRN
Status: DISCONTINUED | OUTPATIENT
Start: 2022-01-01 | End: 2022-01-01 | Stop reason: HOSPADM

## 2022-01-01 RX ORDER — AMOXICILLIN 250 MG
1 CAPSULE ORAL 2 TIMES DAILY PRN
Status: DISCONTINUED | OUTPATIENT
Start: 2022-01-01 | End: 2022-01-01 | Stop reason: HOSPADM

## 2022-01-01 RX ORDER — FAMOTIDINE 20 MG/1
20 TABLET, FILM COATED ORAL DAILY
Status: DISCONTINUED | OUTPATIENT
Start: 2022-01-01 | End: 2022-01-01 | Stop reason: HOSPADM

## 2022-01-01 RX ORDER — LORAZEPAM 2 MG/ML
1 CONCENTRATE ORAL
Status: DISCONTINUED | OUTPATIENT
Start: 2022-01-01 | End: 2022-01-01

## 2022-01-01 RX ORDER — PREDNISONE 10 MG/1
TABLET ORAL
DISCHARGE
Start: 2022-01-01 | End: 2022-01-01

## 2022-01-01 RX ORDER — MECLIZINE HYDROCHLORIDE 25 MG/1
25 TABLET ORAL EVERY 6 HOURS PRN
Status: DISCONTINUED | OUTPATIENT
Start: 2022-01-01 | End: 2022-01-01 | Stop reason: HOSPADM

## 2022-01-01 RX ORDER — ACETAMINOPHEN 325 MG/1
650 TABLET ORAL EVERY 4 HOURS PRN
Status: DISCONTINUED | OUTPATIENT
Start: 2022-01-01 | End: 2022-01-01 | Stop reason: HOSPADM

## 2022-01-01 RX ORDER — MAGNESIUM SULFATE HEPTAHYDRATE 40 MG/ML
2 INJECTION, SOLUTION INTRAVENOUS ONCE
Status: COMPLETED | OUTPATIENT
Start: 2022-01-01 | End: 2022-01-01

## 2022-01-01 RX ORDER — LEVOTHYROXINE SODIUM 50 UG/1
50 TABLET ORAL
Status: DISCONTINUED | OUTPATIENT
Start: 2022-01-01 | End: 2022-01-01 | Stop reason: HOSPADM

## 2022-01-01 RX ORDER — LEVOTHYROXINE SODIUM 50 UG/1
50 TABLET ORAL DAILY
Status: DISCONTINUED | OUTPATIENT
Start: 2022-01-01 | End: 2022-01-01 | Stop reason: HOSPADM

## 2022-01-01 RX ORDER — GUAIFENESIN/DEXTROMETHORPHAN 100-10MG/5
10 SYRUP ORAL EVERY 4 HOURS PRN
Status: DISCONTINUED | OUTPATIENT
Start: 2022-01-01 | End: 2022-01-01 | Stop reason: HOSPADM

## 2022-01-01 RX ORDER — ATROPINE SULFATE 10 MG/ML
2 SOLUTION/ DROPS OPHTHALMIC EVERY 4 HOURS PRN
Status: DISCONTINUED | OUTPATIENT
Start: 2022-01-01 | End: 2022-01-01 | Stop reason: HOSPADM

## 2022-01-01 RX ORDER — ACETAMINOPHEN 325 MG/1
650 TABLET ORAL EVERY 4 HOURS PRN
DISCHARGE
Start: 2022-01-01

## 2022-01-01 RX ORDER — MEXILETINE HYDROCHLORIDE 150 MG/1
150 CAPSULE ORAL 2 TIMES DAILY
Status: DISCONTINUED | OUTPATIENT
Start: 2022-01-01 | End: 2022-01-01 | Stop reason: HOSPADM

## 2022-01-01 RX ORDER — PREDNISONE 5 MG/1
5 TABLET ORAL DAILY
Status: DISCONTINUED | OUTPATIENT
Start: 2022-01-01 | End: 2022-01-01

## 2022-01-01 RX ORDER — ACETAMINOPHEN 650 MG/1
650 SUPPOSITORY RECTAL EVERY 6 HOURS PRN
Status: DISCONTINUED | OUTPATIENT
Start: 2022-01-01 | End: 2022-01-01 | Stop reason: HOSPADM

## 2022-01-01 RX ORDER — FAMOTIDINE 10 MG
10 TABLET ORAL DAILY
Status: DISCONTINUED | OUTPATIENT
Start: 2022-01-01 | End: 2022-01-01 | Stop reason: HOSPADM

## 2022-01-01 RX ORDER — LOPERAMIDE HCL 1 MG/7.5ML
2 SUSPENSION ORAL 4 TIMES DAILY PRN
Status: DISCONTINUED | OUTPATIENT
Start: 2022-01-01 | End: 2022-01-01 | Stop reason: HOSPADM

## 2022-01-01 RX ORDER — DEXTROSE MONOHYDRATE 100 MG/ML
INJECTION, SOLUTION INTRAVENOUS CONTINUOUS
Status: DISCONTINUED | OUTPATIENT
Start: 2022-01-01 | End: 2022-01-01

## 2022-01-01 RX ORDER — CARBOXYMETHYLCELLULOSE SODIUM 5 MG/ML
1-2 SOLUTION/ DROPS OPHTHALMIC
Status: CANCELLED | OUTPATIENT
Start: 2022-01-01

## 2022-01-01 RX ORDER — PREDNISONE 5 MG/1
TABLET ORAL
Qty: 90 TABLET | Refills: 3 | Status: ON HOLD | OUTPATIENT
Start: 2022-01-01 | End: 2022-01-01

## 2022-01-01 RX ORDER — PROCHLORPERAZINE 25 MG
12.5 SUPPOSITORY, RECTAL RECTAL EVERY 12 HOURS PRN
Status: DISCONTINUED | OUTPATIENT
Start: 2022-01-01 | End: 2022-01-01

## 2022-01-01 RX ORDER — CALCIUM CARBONATE 500 MG/1
500 TABLET, CHEWABLE ORAL ONCE
Status: COMPLETED | OUTPATIENT
Start: 2022-01-01 | End: 2022-01-01

## 2022-01-01 RX ORDER — MORPHINE SULFATE 20 MG/ML
5-10 SOLUTION ORAL
Status: CANCELLED | OUTPATIENT
Start: 2022-01-01

## 2022-01-01 RX ORDER — DEXAMETHASONE SODIUM PHOSPHATE 4 MG/ML
6 INJECTION, SOLUTION INTRA-ARTICULAR; INTRALESIONAL; INTRAMUSCULAR; INTRAVENOUS; SOFT TISSUE ONCE
Status: COMPLETED | OUTPATIENT
Start: 2022-01-01 | End: 2022-01-01

## 2022-01-01 RX ORDER — CLOPIDOGREL BISULFATE 75 MG/1
75 TABLET ORAL DAILY
Status: DISCONTINUED | OUTPATIENT
Start: 2022-01-01 | End: 2022-01-01 | Stop reason: HOSPADM

## 2022-01-01 RX ORDER — MORPHINE SULFATE 20 MG/ML
5-10 SOLUTION ORAL
Status: DISCONTINUED | OUTPATIENT
Start: 2022-01-01 | End: 2022-01-01 | Stop reason: HOSPADM

## 2022-01-01 RX ORDER — ACETAMINOPHEN 500 MG
500-1000 TABLET ORAL EVERY 6 HOURS PRN
Status: DISCONTINUED | OUTPATIENT
Start: 2022-01-01 | End: 2022-01-01

## 2022-01-01 RX ORDER — CARBOXYMETHYLCELLULOSE SODIUM 5 MG/ML
1-2 SOLUTION/ DROPS OPHTHALMIC
Status: DISCONTINUED | OUTPATIENT
Start: 2022-01-01 | End: 2022-01-01 | Stop reason: HOSPADM

## 2022-01-01 RX ORDER — GUAIFENESIN/DEXTROMETHORPHAN 100-10MG/5
10 SYRUP ORAL EVERY 4 HOURS PRN
DISCHARGE
Start: 2022-01-01

## 2022-01-01 RX ORDER — CALCIUM GLUCONATE 20 MG/ML
1 INJECTION, SOLUTION INTRAVENOUS ONCE
Status: DISCONTINUED | OUTPATIENT
Start: 2022-01-01 | End: 2022-01-01

## 2022-01-01 RX ORDER — MORPHINE SULFATE 10 MG/5ML
5-10 SOLUTION ORAL
Status: CANCELLED | OUTPATIENT
Start: 2022-01-01

## 2022-01-01 RX ORDER — PIPERACILLIN SODIUM, TAZOBACTAM SODIUM 3; .375 G/15ML; G/15ML
3.38 INJECTION, POWDER, LYOPHILIZED, FOR SOLUTION INTRAVENOUS EVERY 6 HOURS
Status: DISCONTINUED | OUTPATIENT
Start: 2022-01-01 | End: 2022-01-01

## 2022-01-01 RX ORDER — MIDODRINE HYDROCHLORIDE 2.5 MG/1
2.5 TABLET ORAL 3 TIMES DAILY
Qty: 90 TABLET | Refills: 0 | Status: SHIPPED | OUTPATIENT
Start: 2022-01-01 | End: 2022-01-01

## 2022-01-01 RX ORDER — ACETAMINOPHEN 500 MG
1000 TABLET ORAL 3 TIMES DAILY
Status: DISCONTINUED | OUTPATIENT
Start: 2022-01-01 | End: 2022-01-01 | Stop reason: HOSPADM

## 2022-01-01 RX ORDER — MIDODRINE HYDROCHLORIDE 5 MG/1
10 TABLET ORAL ONCE
Status: DISCONTINUED | OUTPATIENT
Start: 2022-01-01 | End: 2022-01-01

## 2022-01-01 RX ORDER — GUAIFENESIN 600 MG/1
600 TABLET, EXTENDED RELEASE ORAL 2 TIMES DAILY
Status: DISCONTINUED | OUTPATIENT
Start: 2022-01-01 | End: 2022-01-01 | Stop reason: HOSPADM

## 2022-01-01 RX ORDER — LIDOCAINE 50 MG/G
1 PATCH TOPICAL EVERY 24 HOURS
Qty: 14 PATCH | Refills: 0 | Status: SHIPPED | OUTPATIENT
Start: 2022-01-01

## 2022-01-01 RX ORDER — DEXAMETHASONE SODIUM PHOSPHATE 10 MG/ML
6 INJECTION, SOLUTION INTRAMUSCULAR; INTRAVENOUS ONCE
Status: COMPLETED | OUTPATIENT
Start: 2022-01-01 | End: 2022-01-01

## 2022-01-01 RX ORDER — LOPERAMIDE HCL 2 MG
2 CAPSULE ORAL
Status: DISCONTINUED | OUTPATIENT
Start: 2022-01-01 | End: 2022-01-01 | Stop reason: HOSPADM

## 2022-01-01 RX ORDER — ALBUMIN, HUMAN INJ 5% 5 %
250 SOLUTION INTRAVENOUS
Status: DISCONTINUED | OUTPATIENT
Start: 2022-01-01 | End: 2022-01-01

## 2022-01-01 RX ORDER — NITROGLYCERIN 0.4 MG/1
0.4 TABLET SUBLINGUAL EVERY 5 MIN PRN
Status: DISCONTINUED | OUTPATIENT
Start: 2022-01-01 | End: 2022-01-01

## 2022-01-01 RX ORDER — NALOXONE HYDROCHLORIDE 0.4 MG/ML
0.2 INJECTION, SOLUTION INTRAMUSCULAR; INTRAVENOUS; SUBCUTANEOUS
Status: CANCELLED | OUTPATIENT
Start: 2022-01-01

## 2022-01-01 RX ORDER — ACETAMINOPHEN 650 MG/1
650 SUPPOSITORY RECTAL EVERY 4 HOURS PRN
Status: DISCONTINUED | OUTPATIENT
Start: 2022-01-01 | End: 2022-01-01 | Stop reason: HOSPADM

## 2022-01-01 RX ORDER — NITROGLYCERIN 0.4 MG/1
0.4 TABLET SUBLINGUAL EVERY 5 MIN PRN
Status: DISCONTINUED | OUTPATIENT
Start: 2022-01-01 | End: 2022-01-01 | Stop reason: HOSPADM

## 2022-01-01 RX ORDER — MIDODRINE HYDROCHLORIDE 2.5 MG/1
TABLET ORAL
Start: 2022-01-01 | End: 2022-01-01

## 2022-01-01 RX ORDER — HEPARIN SODIUM 1000 [USP'U]/ML
500 INJECTION, SOLUTION INTRAVENOUS; SUBCUTANEOUS CONTINUOUS
Status: CANCELLED | OUTPATIENT
Start: 2022-01-01

## 2022-01-01 RX ORDER — PREDNISONE 20 MG/1
40 TABLET ORAL DAILY
Status: DISCONTINUED | OUTPATIENT
Start: 2022-01-01 | End: 2022-01-01

## 2022-01-01 RX ORDER — MIDODRINE HYDROCHLORIDE 2.5 MG/1
5 TABLET ORAL 3 TIMES DAILY
Status: ON HOLD | COMMUNITY
End: 2022-01-01

## 2022-01-01 RX ORDER — NICOTINE POLACRILEX 4 MG
15-30 LOZENGE BUCCAL
Status: DISCONTINUED | OUTPATIENT
Start: 2022-01-01 | End: 2022-01-01 | Stop reason: HOSPADM

## 2022-01-01 RX ORDER — ACETAMINOPHEN 325 MG/1
650 TABLET ORAL EVERY 6 HOURS PRN
Status: DISCONTINUED | OUTPATIENT
Start: 2022-01-01 | End: 2022-01-01

## 2022-01-01 RX ORDER — LIDOCAINE 4 G/G
1-2 PATCH TOPICAL
Status: DISCONTINUED | OUTPATIENT
Start: 2022-01-01 | End: 2022-01-01 | Stop reason: HOSPADM

## 2022-01-01 RX ORDER — HYDROXYZINE HYDROCHLORIDE 10 MG/1
10 TABLET, FILM COATED ORAL EVERY 6 HOURS PRN
Status: DISCONTINUED | OUTPATIENT
Start: 2022-01-01 | End: 2022-01-01 | Stop reason: HOSPADM

## 2022-01-01 RX ORDER — PREDNISONE 5 MG/1
5 TABLET ORAL DAILY
Qty: 90 TABLET | Refills: 3 | Status: SHIPPED | OUTPATIENT
Start: 2022-01-01

## 2022-01-01 RX ORDER — LISINOPRIL 2.5 MG/1
2.5 TABLET ORAL 2 TIMES DAILY
Status: DISCONTINUED | OUTPATIENT
Start: 2022-01-01 | End: 2022-01-01 | Stop reason: HOSPADM

## 2022-01-01 RX ORDER — CARBOXYMETHYLCELLULOSE SODIUM 5 MG/ML
1 SOLUTION/ DROPS OPHTHALMIC
Status: DISCONTINUED | OUTPATIENT
Start: 2022-01-01 | End: 2022-01-01

## 2022-01-01 RX ORDER — PRAVASTATIN SODIUM 40 MG
40 TABLET ORAL AT BEDTIME
Status: DISCONTINUED | OUTPATIENT
Start: 2022-01-01 | End: 2022-01-01 | Stop reason: HOSPADM

## 2022-01-01 RX ORDER — CALCIUM GLUCONATE 94 MG/ML
1 INJECTION, SOLUTION INTRAVENOUS ONCE
Status: DISCONTINUED | OUTPATIENT
Start: 2022-01-01 | End: 2022-01-01

## 2022-01-01 RX ORDER — MIDODRINE HYDROCHLORIDE 5 MG/1
5 TABLET ORAL
Status: COMPLETED | OUTPATIENT
Start: 2022-01-01 | End: 2022-01-01

## 2022-01-01 RX ORDER — CLOPIDOGREL BISULFATE 75 MG/1
75 TABLET ORAL DAILY
Status: DISCONTINUED | OUTPATIENT
Start: 2022-01-01 | End: 2022-01-01

## 2022-01-01 RX ORDER — LOPERAMIDE HCL 1 MG/7.5ML
1 SUSPENSION ORAL
Status: DISCONTINUED | OUTPATIENT
Start: 2022-01-01 | End: 2022-01-01 | Stop reason: HOSPADM

## 2022-01-01 RX ORDER — PROCHLORPERAZINE 25 MG
12.5 SUPPOSITORY, RECTAL RECTAL EVERY 12 HOURS PRN
Status: CANCELLED | OUTPATIENT
Start: 2022-01-01

## 2022-01-01 RX ORDER — ONDANSETRON 4 MG/1
4 TABLET, ORALLY DISINTEGRATING ORAL EVERY 6 HOURS PRN
Status: CANCELLED | OUTPATIENT
Start: 2022-01-01

## 2022-01-01 RX ORDER — DOXERCALCIFEROL 4 UG/2ML
2 INJECTION INTRAVENOUS
Status: COMPLETED | OUTPATIENT
Start: 2022-01-01 | End: 2022-01-01

## 2022-01-01 RX ORDER — ALBUMIN, HUMAN INJ 5% 5 %
250 SOLUTION INTRAVENOUS ONCE
Status: COMPLETED | OUTPATIENT
Start: 2022-01-01 | End: 2022-01-01

## 2022-01-01 RX ORDER — MIDODRINE HYDROCHLORIDE 5 MG/1
10 TABLET ORAL
Status: DISCONTINUED | OUTPATIENT
Start: 2022-01-01 | End: 2022-01-01

## 2022-01-01 RX ORDER — METOPROLOL TARTRATE 1 MG/ML
2.5 INJECTION, SOLUTION INTRAVENOUS EVERY 4 HOURS PRN
Status: DISCONTINUED | OUTPATIENT
Start: 2022-01-01 | End: 2022-01-01 | Stop reason: HOSPADM

## 2022-01-01 RX ORDER — DOXERCALCIFEROL 4 UG/2ML
12 INJECTION INTRAVENOUS ONCE
Status: COMPLETED | OUTPATIENT
Start: 2022-01-01 | End: 2022-01-01

## 2022-01-01 RX ORDER — OLANZAPINE 10 MG/2ML
2.5 INJECTION, POWDER, FOR SOLUTION INTRAMUSCULAR ONCE
Status: COMPLETED | OUTPATIENT
Start: 2022-01-01 | End: 2022-01-01

## 2022-01-01 RX ORDER — LIDOCAINE 40 MG/G
CREAM TOPICAL
Status: DISCONTINUED | OUTPATIENT
Start: 2022-01-01 | End: 2022-01-01 | Stop reason: HOSPADM

## 2022-01-01 RX ORDER — PROCHLORPERAZINE MALEATE 5 MG
5 TABLET ORAL EVERY 6 HOURS PRN
Status: DISCONTINUED | OUTPATIENT
Start: 2022-01-01 | End: 2022-01-01

## 2022-01-01 RX ORDER — MIDODRINE HYDROCHLORIDE 10 MG/1
10 TABLET ORAL
DISCHARGE
Start: 2022-01-01 | End: 2022-01-01

## 2022-01-01 RX ORDER — METHYLPREDNISOLONE SODIUM SUCCINATE 40 MG/ML
4 INJECTION, POWDER, LYOPHILIZED, FOR SOLUTION INTRAMUSCULAR; INTRAVENOUS DAILY
Status: DISCONTINUED | OUTPATIENT
Start: 2022-01-01 | End: 2022-01-01

## 2022-01-01 RX ORDER — PREDNISONE 10 MG/1
30 TABLET ORAL DAILY
Status: DISCONTINUED | OUTPATIENT
Start: 2022-01-01 | End: 2022-01-01

## 2022-01-01 RX ORDER — DEXTROSE MONOHYDRATE 25 G/50ML
25-50 INJECTION, SOLUTION INTRAVENOUS
Status: DISCONTINUED | OUTPATIENT
Start: 2022-01-01 | End: 2022-01-01

## 2022-01-01 RX ORDER — ONDANSETRON 2 MG/ML
4 INJECTION INTRAMUSCULAR; INTRAVENOUS EVERY 6 HOURS PRN
Status: DISCONTINUED | OUTPATIENT
Start: 2022-01-01 | End: 2022-01-01

## 2022-01-01 RX ORDER — LORAZEPAM 2 MG/ML
.5-1 CONCENTRATE ORAL
Status: CANCELLED | OUTPATIENT
Start: 2022-01-01

## 2022-01-01 RX ORDER — ATROPINE SULFATE 10 MG/ML
2 SOLUTION/ DROPS OPHTHALMIC EVERY 4 HOURS PRN
Status: CANCELLED | OUTPATIENT
Start: 2022-01-01

## 2022-01-01 RX ORDER — GLYCOPYRROLATE 0.2 MG/ML
0.2 INJECTION, SOLUTION INTRAMUSCULAR; INTRAVENOUS EVERY 4 HOURS PRN
Status: CANCELLED | OUTPATIENT
Start: 2022-01-01

## 2022-01-01 RX ORDER — LORAZEPAM 0.5 MG/1
0.5 TABLET ORAL
Status: COMPLETED | OUTPATIENT
Start: 2022-01-01 | End: 2022-01-01

## 2022-01-01 RX ADMIN — MIDODRINE HYDROCHLORIDE 10 MG: 5 TABLET ORAL at 15:31

## 2022-01-01 RX ADMIN — MIDODRINE HYDROCHLORIDE 10 MG: 5 TABLET ORAL at 13:03

## 2022-01-01 RX ADMIN — CARBIDOPA AND LEVODOPA 2.5 MG: 50; 200 TABLET, EXTENDED RELEASE ORAL at 17:58

## 2022-01-01 RX ADMIN — GABAPENTIN 100 MG: 100 CAPSULE ORAL at 09:43

## 2022-01-01 RX ADMIN — LORAZEPAM 0.5 MG: 2 LIQUID ORAL at 13:14

## 2022-01-01 RX ADMIN — APIXABAN 2.5 MG: 2.5 TABLET, FILM COATED ORAL at 22:03

## 2022-01-01 RX ADMIN — GABAPENTIN 100 MG: 100 CAPSULE ORAL at 09:52

## 2022-01-01 RX ADMIN — LEVOTHYROXINE SODIUM 50 MCG: 50 TABLET ORAL at 09:58

## 2022-01-01 RX ADMIN — INSULIN ASPART 1 UNITS: 100 INJECTION, SOLUTION INTRAVENOUS; SUBCUTANEOUS at 20:45

## 2022-01-01 RX ADMIN — SODIUM CHLORIDE 300 ML: 9 INJECTION, SOLUTION INTRAVENOUS at 08:27

## 2022-01-01 RX ADMIN — PANTOPRAZOLE SODIUM 40 MG: 40 TABLET, DELAYED RELEASE ORAL at 08:25

## 2022-01-01 RX ADMIN — APIXABAN 2.5 MG: 2.5 TABLET, FILM COATED ORAL at 09:22

## 2022-01-01 RX ADMIN — GABAPENTIN 100 MG: 100 CAPSULE ORAL at 10:40

## 2022-01-01 RX ADMIN — BISACODYL 10 MG: 10 SUPPOSITORY RECTAL at 16:41

## 2022-01-01 RX ADMIN — PRAVASTATIN SODIUM 40 MG: 40 TABLET ORAL at 21:26

## 2022-01-01 RX ADMIN — PRAVASTATIN SODIUM 40 MG: 40 TABLET ORAL at 19:57

## 2022-01-01 RX ADMIN — Medication 1 CAPSULE: at 20:16

## 2022-01-01 RX ADMIN — DEXTROSE MONOHYDRATE: 100 INJECTION, SOLUTION INTRAVENOUS at 10:54

## 2022-01-01 RX ADMIN — PANTOPRAZOLE SODIUM 40 MG: 40 TABLET, DELAYED RELEASE ORAL at 20:40

## 2022-01-01 RX ADMIN — FAMOTIDINE 10 MG: 10 TABLET ORAL at 20:16

## 2022-01-01 RX ADMIN — GABAPENTIN 100 MG: 100 CAPSULE ORAL at 16:04

## 2022-01-01 RX ADMIN — PRAVASTATIN SODIUM 40 MG: 40 TABLET ORAL at 09:00

## 2022-01-01 RX ADMIN — ONDANSETRON 4 MG: 2 INJECTION INTRAMUSCULAR; INTRAVENOUS at 10:19

## 2022-01-01 RX ADMIN — SODIUM CHLORIDE 300 ML: 9 INJECTION, SOLUTION INTRAVENOUS at 12:32

## 2022-01-01 RX ADMIN — APIXABAN 2.5 MG: 2.5 TABLET, FILM COATED ORAL at 21:52

## 2022-01-01 RX ADMIN — FAMOTIDINE 10 MG: 10 TABLET ORAL at 20:36

## 2022-01-01 RX ADMIN — CARBIDOPA AND LEVODOPA 2.5 MG: 50; 200 TABLET, EXTENDED RELEASE ORAL at 11:29

## 2022-01-01 RX ADMIN — APIXABAN 2.5 MG: 2.5 TABLET, FILM COATED ORAL at 20:37

## 2022-01-01 RX ADMIN — CARBIDOPA AND LEVODOPA 2.5 MG: 50; 200 TABLET, EXTENDED RELEASE ORAL at 23:17

## 2022-01-01 RX ADMIN — APIXABAN 2.5 MG: 2.5 TABLET, FILM COATED ORAL at 08:18

## 2022-01-01 RX ADMIN — OXYCODONE HYDROCHLORIDE 2.5 MG: 5 TABLET ORAL at 22:33

## 2022-01-01 RX ADMIN — LEVOTHYROXINE SODIUM 50 MCG: 50 TABLET ORAL at 06:59

## 2022-01-01 RX ADMIN — LEVOTHYROXINE SODIUM 50 MCG: 50 TABLET ORAL at 10:04

## 2022-01-01 RX ADMIN — APIXABAN 2.5 MG: 2.5 TABLET, FILM COATED ORAL at 20:06

## 2022-01-01 RX ADMIN — GUAIFENESIN 600 MG: 600 TABLET, EXTENDED RELEASE ORAL at 21:01

## 2022-01-01 RX ADMIN — DEXAMETHASONE SODIUM PHOSPHATE 6 MG: 4 INJECTION, SOLUTION INTRAMUSCULAR; INTRAVENOUS at 09:45

## 2022-01-01 RX ADMIN — DEXTROSE MONOHYDRATE 25 ML: 25 INJECTION, SOLUTION INTRAVENOUS at 22:30

## 2022-01-01 RX ADMIN — SODIUM CHLORIDE 300 ML: 9 INJECTION, SOLUTION INTRAVENOUS at 17:23

## 2022-01-01 RX ADMIN — LISINOPRIL 2.5 MG: 2.5 TABLET ORAL at 20:48

## 2022-01-01 RX ADMIN — PANTOPRAZOLE SODIUM 40 MG: 40 TABLET, DELAYED RELEASE ORAL at 09:00

## 2022-01-01 RX ADMIN — DEXTROSE MONOHYDRATE: 100 INJECTION, SOLUTION INTRAVENOUS at 08:33

## 2022-01-01 RX ADMIN — ONDANSETRON 4 MG: 2 INJECTION INTRAMUSCULAR; INTRAVENOUS at 01:57

## 2022-01-01 RX ADMIN — PRAVASTATIN SODIUM 40 MG: 40 TABLET ORAL at 22:07

## 2022-01-01 RX ADMIN — APIXABAN 2.5 MG: 2.5 TABLET, FILM COATED ORAL at 21:06

## 2022-01-01 RX ADMIN — PREDNISONE 5 MG: 5 TABLET ORAL at 14:10

## 2022-01-01 RX ADMIN — INSULIN ASPART 2 UNITS: 100 INJECTION, SOLUTION INTRAVENOUS; SUBCUTANEOUS at 17:29

## 2022-01-01 RX ADMIN — INSULIN ASPART 1 UNITS: 100 INJECTION, SOLUTION INTRAVENOUS; SUBCUTANEOUS at 18:10

## 2022-01-01 RX ADMIN — GABAPENTIN 100 MG: 100 CAPSULE ORAL at 21:56

## 2022-01-01 RX ADMIN — GABAPENTIN 100 MG: 100 CAPSULE ORAL at 21:26

## 2022-01-01 RX ADMIN — ACETAMINOPHEN 650 MG: 325 TABLET, FILM COATED ORAL at 20:21

## 2022-01-01 RX ADMIN — INSULIN ASPART 1 UNITS: 100 INJECTION, SOLUTION INTRAVENOUS; SUBCUTANEOUS at 09:58

## 2022-01-01 RX ADMIN — ACETAMINOPHEN 1000 MG: 500 TABLET ORAL at 15:52

## 2022-01-01 RX ADMIN — DOXERCALCIFEROL 2 MCG: 4 INJECTION, SOLUTION INTRAVENOUS at 17:36

## 2022-01-01 RX ADMIN — LIDOCAINE 2 PATCH: 246 PATCH TOPICAL at 20:16

## 2022-01-01 RX ADMIN — GUAIFENESIN 600 MG: 600 TABLET, EXTENDED RELEASE ORAL at 09:22

## 2022-01-01 RX ADMIN — INSULIN ASPART 1 UNITS: 100 INJECTION, SOLUTION INTRAVENOUS; SUBCUTANEOUS at 17:17

## 2022-01-01 RX ADMIN — MIDODRINE HYDROCHLORIDE 5 MG: 5 TABLET ORAL at 17:11

## 2022-01-01 RX ADMIN — PRAVASTATIN SODIUM 40 MG: 40 TABLET ORAL at 22:13

## 2022-01-01 RX ADMIN — LISINOPRIL 2.5 MG: 2.5 TABLET ORAL at 20:44

## 2022-01-01 RX ADMIN — LEVOTHYROXINE SODIUM 50 MCG: 50 TABLET ORAL at 09:10

## 2022-01-01 RX ADMIN — LORAZEPAM 0.5 MG: 2 LIQUID ORAL at 09:36

## 2022-01-01 RX ADMIN — GUAIFENESIN 600 MG: 600 TABLET, EXTENDED RELEASE ORAL at 08:25

## 2022-01-01 RX ADMIN — GABAPENTIN 100 MG: 100 CAPSULE ORAL at 21:59

## 2022-01-01 RX ADMIN — GABAPENTIN 100 MG: 100 CAPSULE ORAL at 08:02

## 2022-01-01 RX ADMIN — FAMOTIDINE 20 MG: 20 TABLET ORAL at 09:12

## 2022-01-01 RX ADMIN — SODIUM CHLORIDE 300 ML: 9 INJECTION, SOLUTION INTRAVENOUS at 17:34

## 2022-01-01 RX ADMIN — GABAPENTIN 100 MG: 100 CAPSULE ORAL at 17:32

## 2022-01-01 RX ADMIN — DOXERCALCIFEROL 4 MCG: 4 INJECTION, SOLUTION INTRAVENOUS at 17:24

## 2022-01-01 RX ADMIN — LORAZEPAM 0.5 MG: 2 LIQUID ORAL at 17:00

## 2022-01-01 RX ADMIN — PIPERACILLIN SODIUM AND TAZOBACTAM SODIUM 2.25 G: 2; .25 INJECTION, POWDER, LYOPHILIZED, FOR SOLUTION INTRAVENOUS at 11:33

## 2022-01-01 RX ADMIN — GABAPENTIN 100 MG: 100 CAPSULE ORAL at 17:27

## 2022-01-01 RX ADMIN — ISOSORBIDE MONONITRATE 30 MG: 30 TABLET, EXTENDED RELEASE ORAL at 22:07

## 2022-01-01 RX ADMIN — Medication 1 CAPSULE: at 20:28

## 2022-01-01 RX ADMIN — DEXTROSE MONOHYDRATE 50 ML: 25 INJECTION, SOLUTION INTRAVENOUS at 06:06

## 2022-01-01 RX ADMIN — OXYCODONE HYDROCHLORIDE 2.5 MG: 5 TABLET ORAL at 17:09

## 2022-01-01 RX ADMIN — MEXILETINE HYDROCHLORIDE 150 MG: 150 CAPSULE ORAL at 20:44

## 2022-01-01 RX ADMIN — ALBUMIN HUMAN 250 ML: 0.05 INJECTION, SOLUTION INTRAVENOUS at 12:33

## 2022-01-01 RX ADMIN — INSULIN ASPART 1 UNITS: 100 INJECTION, SOLUTION INTRAVENOUS; SUBCUTANEOUS at 16:05

## 2022-01-01 RX ADMIN — SODIUM CHLORIDE 250 ML: 9 INJECTION, SOLUTION INTRAVENOUS at 01:36

## 2022-01-01 RX ADMIN — GABAPENTIN 100 MG: 100 CAPSULE ORAL at 18:00

## 2022-01-01 RX ADMIN — LIDOCAINE 2 PATCH: 246 PATCH TOPICAL at 21:11

## 2022-01-01 RX ADMIN — CARBIDOPA AND LEVODOPA 2.5 MG: 50; 200 TABLET, EXTENDED RELEASE ORAL at 13:45

## 2022-01-01 RX ADMIN — GUAIFENESIN 600 MG: 600 TABLET, EXTENDED RELEASE ORAL at 12:21

## 2022-01-01 RX ADMIN — Medication 1 CAPSULE: at 20:36

## 2022-01-01 RX ADMIN — GUAIFENESIN 600 MG: 600 TABLET, EXTENDED RELEASE ORAL at 21:41

## 2022-01-01 RX ADMIN — ALBUMIN (HUMAN) 250 ML: 12.5 INJECTION, SOLUTION INTRAVENOUS at 17:35

## 2022-01-01 RX ADMIN — LIDOCAINE 1 PATCH: 246 PATCH TOPICAL at 10:31

## 2022-01-01 RX ADMIN — APIXABAN 2.5 MG: 2.5 TABLET, FILM COATED ORAL at 09:20

## 2022-01-01 RX ADMIN — Medication 1 CAPSULE: at 20:30

## 2022-01-01 RX ADMIN — FAMOTIDINE 10 MG: 10 TABLET ORAL at 20:24

## 2022-01-01 RX ADMIN — LIDOCAINE 1 PATCH: 246 PATCH TOPICAL at 15:07

## 2022-01-01 RX ADMIN — GABAPENTIN 100 MG: 100 CAPSULE ORAL at 08:32

## 2022-01-01 RX ADMIN — MIDODRINE HYDROCHLORIDE 10 MG: 5 TABLET ORAL at 09:20

## 2022-01-01 RX ADMIN — MIDODRINE HYDROCHLORIDE 10 MG: 5 TABLET ORAL at 13:07

## 2022-01-01 RX ADMIN — MORPHINE SULFATE 5 MG: 10 SOLUTION ORAL at 21:01

## 2022-01-01 RX ADMIN — Medication 1 CAPSULE: at 20:58

## 2022-01-01 RX ADMIN — FAMOTIDINE 20 MG: 20 TABLET ORAL at 10:37

## 2022-01-01 RX ADMIN — APIXABAN 2.5 MG: 2.5 TABLET, FILM COATED ORAL at 20:24

## 2022-01-01 RX ADMIN — DEXAMETHASONE SODIUM PHOSPHATE 6 MG: 4 INJECTION, SOLUTION INTRAMUSCULAR; INTRAVENOUS at 10:49

## 2022-01-01 RX ADMIN — PRAVASTATIN SODIUM 40 MG: 40 TABLET ORAL at 21:15

## 2022-01-01 RX ADMIN — MIDODRINE HYDROCHLORIDE 10 MG: 5 TABLET ORAL at 09:57

## 2022-01-01 RX ADMIN — DOXERCALCIFEROL 12 MCG: 4 INJECTION, SOLUTION INTRAVENOUS at 08:28

## 2022-01-01 RX ADMIN — MIDODRINE HYDROCHLORIDE 10 MG: 5 TABLET ORAL at 18:01

## 2022-01-01 RX ADMIN — INSULIN ASPART 1 UNITS: 100 INJECTION, SOLUTION INTRAVENOUS; SUBCUTANEOUS at 13:30

## 2022-01-01 RX ADMIN — PIPERACILLIN SODIUM AND TAZOBACTAM SODIUM 2.25 G: 2; .25 INJECTION, POWDER, LYOPHILIZED, FOR SOLUTION INTRAVENOUS at 09:50

## 2022-01-01 RX ADMIN — LEVOTHYROXINE SODIUM 50 MCG: 50 TABLET ORAL at 09:43

## 2022-01-01 RX ADMIN — ACETAMINOPHEN 1000 MG: 500 TABLET ORAL at 21:50

## 2022-01-01 RX ADMIN — Medication: at 08:19

## 2022-01-01 RX ADMIN — SODIUM CHLORIDE 250 ML: 9 INJECTION, SOLUTION INTRAVENOUS at 08:19

## 2022-01-01 RX ADMIN — APIXABAN 2.5 MG: 2.5 TABLET, FILM COATED ORAL at 20:04

## 2022-01-01 RX ADMIN — PANTOPRAZOLE SODIUM 40 MG: 40 TABLET, DELAYED RELEASE ORAL at 06:50

## 2022-01-01 RX ADMIN — GABAPENTIN 100 MG: 100 CAPSULE ORAL at 12:21

## 2022-01-01 RX ADMIN — APIXABAN 2.5 MG: 2.5 TABLET, FILM COATED ORAL at 08:02

## 2022-01-01 RX ADMIN — FAMOTIDINE 10 MG: 10 TABLET ORAL at 20:18

## 2022-01-01 RX ADMIN — GUAIFENESIN 600 MG: 600 TABLET, EXTENDED RELEASE ORAL at 20:16

## 2022-01-01 RX ADMIN — INSULIN ASPART 2 UNITS: 100 INJECTION, SOLUTION INTRAVENOUS; SUBCUTANEOUS at 13:46

## 2022-01-01 RX ADMIN — ACETAMINOPHEN 650 MG: 325 TABLET, FILM COATED ORAL at 11:18

## 2022-01-01 RX ADMIN — GABAPENTIN 100 MG: 100 CAPSULE ORAL at 08:25

## 2022-01-01 RX ADMIN — INSULIN ASPART 1 UNITS: 100 INJECTION, SOLUTION INTRAVENOUS; SUBCUTANEOUS at 09:27

## 2022-01-01 RX ADMIN — CLOPIDOGREL BISULFATE 75 MG: 75 TABLET ORAL at 09:12

## 2022-01-01 RX ADMIN — PANTOPRAZOLE SODIUM 40 MG: 40 TABLET, DELAYED RELEASE ORAL at 06:51

## 2022-01-01 RX ADMIN — PIPERACILLIN SODIUM AND TAZOBACTAM SODIUM 2.25 G: 2; .25 INJECTION, POWDER, LYOPHILIZED, FOR SOLUTION INTRAVENOUS at 12:33

## 2022-01-01 RX ADMIN — GABAPENTIN 100 MG: 100 CAPSULE ORAL at 15:59

## 2022-01-01 RX ADMIN — LORAZEPAM 0.5 MG: 2 LIQUID ORAL at 09:21

## 2022-01-01 RX ADMIN — OXYCODONE HYDROCHLORIDE 2.5 MG: 5 TABLET ORAL at 04:34

## 2022-01-01 RX ADMIN — MEXILETINE HYDROCHLORIDE 150 MG: 150 CAPSULE ORAL at 10:28

## 2022-01-01 RX ADMIN — GUAIFENESIN 600 MG: 600 TABLET, EXTENDED RELEASE ORAL at 09:59

## 2022-01-01 RX ADMIN — Medication 1 CAPSULE: at 20:52

## 2022-01-01 RX ADMIN — GUAIFENESIN 600 MG: 600 TABLET, EXTENDED RELEASE ORAL at 20:40

## 2022-01-01 RX ADMIN — MIDODRINE HYDROCHLORIDE 10 MG: 5 TABLET ORAL at 09:51

## 2022-01-01 RX ADMIN — GUAIFENESIN 600 MG: 600 TABLET, EXTENDED RELEASE ORAL at 09:58

## 2022-01-01 RX ADMIN — GUAIFENESIN 600 MG: 600 TABLET, EXTENDED RELEASE ORAL at 20:17

## 2022-01-01 RX ADMIN — DOXERCALCIFEROL 4 MCG: 4 INJECTION, SOLUTION INTRAVENOUS at 09:09

## 2022-01-01 RX ADMIN — GABAPENTIN 100 MG: 100 CAPSULE ORAL at 21:15

## 2022-01-01 RX ADMIN — DEXTROSE MONOHYDRATE: 100 INJECTION, SOLUTION INTRAVENOUS at 14:59

## 2022-01-01 RX ADMIN — GABAPENTIN 100 MG: 100 CAPSULE ORAL at 08:08

## 2022-01-01 RX ADMIN — ACETAMINOPHEN 1000 MG: 500 TABLET ORAL at 10:27

## 2022-01-01 RX ADMIN — LEVOTHYROXINE SODIUM 50 MCG: 50 TABLET ORAL at 08:02

## 2022-01-01 RX ADMIN — MEXILETINE HYDROCHLORIDE 150 MG: 150 CAPSULE ORAL at 09:12

## 2022-01-01 RX ADMIN — ACETAMINOPHEN 1000 MG: 500 TABLET ORAL at 22:03

## 2022-01-01 RX ADMIN — FAMOTIDINE 20 MG: 20 TABLET ORAL at 08:32

## 2022-01-01 RX ADMIN — APIXABAN 2.5 MG: 2.5 TABLET, FILM COATED ORAL at 09:59

## 2022-01-01 RX ADMIN — Medication 1 CAPSULE: at 21:25

## 2022-01-01 RX ADMIN — GUAIFENESIN 600 MG: 600 TABLET, EXTENDED RELEASE ORAL at 14:19

## 2022-01-01 RX ADMIN — GABAPENTIN 100 MG: 100 CAPSULE ORAL at 09:09

## 2022-01-01 RX ADMIN — INSULIN ASPART 3 UNITS: 100 INJECTION, SOLUTION INTRAVENOUS; SUBCUTANEOUS at 16:41

## 2022-01-01 RX ADMIN — DEXTROSE MONOHYDRATE 25 ML: 25 INJECTION, SOLUTION INTRAVENOUS at 21:56

## 2022-01-01 RX ADMIN — GUAIFENESIN 600 MG: 600 TABLET, EXTENDED RELEASE ORAL at 20:30

## 2022-01-01 RX ADMIN — SODIUM CHLORIDE 250 ML: 9 INJECTION, SOLUTION INTRAVENOUS at 17:23

## 2022-01-01 RX ADMIN — MIDODRINE HYDROCHLORIDE 5 MG: 5 TABLET ORAL at 12:36

## 2022-01-01 RX ADMIN — MIDODRINE HYDROCHLORIDE 10 MG: 5 TABLET ORAL at 08:51

## 2022-01-01 RX ADMIN — MORPHINE SULFATE 5 MG: 10 SOLUTION ORAL at 14:18

## 2022-01-01 RX ADMIN — MIDODRINE HYDROCHLORIDE 10 MG: 5 TABLET ORAL at 08:04

## 2022-01-01 RX ADMIN — PRAVASTATIN SODIUM 40 MG: 40 TABLET ORAL at 10:36

## 2022-01-01 RX ADMIN — LEVOTHYROXINE SODIUM 50 MCG: 50 TABLET ORAL at 09:52

## 2022-01-01 RX ADMIN — PANTOPRAZOLE SODIUM 40 MG: 40 TABLET, DELAYED RELEASE ORAL at 07:03

## 2022-01-01 RX ADMIN — PANTOPRAZOLE SODIUM 40 MG: 40 TABLET, DELAYED RELEASE ORAL at 06:05

## 2022-01-01 RX ADMIN — HEPARIN SODIUM 850 UNITS/HR: 10000 INJECTION, SOLUTION INTRAVENOUS at 16:04

## 2022-01-01 RX ADMIN — APIXABAN 2.5 MG: 2.5 TABLET, FILM COATED ORAL at 08:31

## 2022-01-01 RX ADMIN — MIDODRINE HYDROCHLORIDE 10 MG: 5 TABLET ORAL at 11:16

## 2022-01-01 RX ADMIN — SODIUM CHLORIDE 300 ML: 9 INJECTION, SOLUTION INTRAVENOUS at 19:12

## 2022-01-01 RX ADMIN — GUAIFENESIN 600 MG: 600 TABLET, EXTENDED RELEASE ORAL at 09:26

## 2022-01-01 RX ADMIN — FAMOTIDINE 10 MG: 10 TABLET ORAL at 21:25

## 2022-01-01 RX ADMIN — ACETAMINOPHEN 1000 MG: 500 TABLET ORAL at 21:52

## 2022-01-01 RX ADMIN — DEXAMETHASONE 6 MG: 2 TABLET ORAL at 12:59

## 2022-01-01 RX ADMIN — PANTOPRAZOLE SODIUM 40 MG: 40 TABLET, DELAYED RELEASE ORAL at 08:18

## 2022-01-01 RX ADMIN — DEXTROSE MONOHYDRATE: 100 INJECTION, SOLUTION INTRAVENOUS at 00:44

## 2022-01-01 RX ADMIN — LIDOCAINE HYDROCHLORIDE 0.5 ML: 10 INJECTION, SOLUTION INFILTRATION; PERINEURAL at 22:45

## 2022-01-01 RX ADMIN — SODIUM CHLORIDE 300 ML: 9 INJECTION, SOLUTION INTRAVENOUS at 10:01

## 2022-01-01 RX ADMIN — LIDOCAINE 2 PATCH: 246 PATCH TOPICAL at 20:57

## 2022-01-01 RX ADMIN — MORPHINE SULFATE 5 MG: 10 SOLUTION ORAL at 21:13

## 2022-01-01 RX ADMIN — DOXERCALCIFEROL 12 MCG: 4 INJECTION, SOLUTION INTRAVENOUS at 16:37

## 2022-01-01 RX ADMIN — MAGNESIUM SULFATE HEPTAHYDRATE 2 G: 40 INJECTION, SOLUTION INTRAVENOUS at 23:47

## 2022-01-01 RX ADMIN — CARBIDOPA AND LEVODOPA 2.5 MG: 50; 200 TABLET, EXTENDED RELEASE ORAL at 18:00

## 2022-01-01 RX ADMIN — MEXILETINE HYDROCHLORIDE 150 MG: 150 CAPSULE ORAL at 20:48

## 2022-01-01 RX ADMIN — ACETAMINOPHEN 1000 MG: 500 TABLET ORAL at 08:32

## 2022-01-01 RX ADMIN — GABAPENTIN 100 MG: 100 CAPSULE ORAL at 16:14

## 2022-01-01 RX ADMIN — POLYETHYLENE GLYCOL 3350 8.5 G: 17 POWDER, FOR SOLUTION ORAL at 16:44

## 2022-01-01 RX ADMIN — ACETAMINOPHEN 650 MG: 325 TABLET, FILM COATED ORAL at 07:57

## 2022-01-01 RX ADMIN — APIXABAN 2.5 MG: 2.5 TABLET, FILM COATED ORAL at 12:21

## 2022-01-01 RX ADMIN — GABAPENTIN 100 MG: 100 CAPSULE ORAL at 22:05

## 2022-01-01 RX ADMIN — MIDODRINE HYDROCHLORIDE 10 MG: 5 TABLET ORAL at 13:30

## 2022-01-01 RX ADMIN — LOPERAMIDE HCL 1 MG: 1 SOLUTION ORAL at 10:29

## 2022-01-01 RX ADMIN — CARBIDOPA AND LEVODOPA 2.5 MG: 50; 200 TABLET, EXTENDED RELEASE ORAL at 17:00

## 2022-01-01 RX ADMIN — LEVOTHYROXINE SODIUM 50 MCG: 50 TABLET ORAL at 09:00

## 2022-01-01 RX ADMIN — DEXAMETHASONE 6 MG: 2 TABLET ORAL at 17:54

## 2022-01-01 RX ADMIN — APIXABAN 2.5 MG: 2.5 TABLET, FILM COATED ORAL at 09:58

## 2022-01-01 RX ADMIN — OLANZAPINE 2.5 MG: 10 INJECTION, POWDER, FOR SOLUTION INTRAMUSCULAR at 13:33

## 2022-01-01 RX ADMIN — ONDANSETRON 4 MG: 2 INJECTION INTRAMUSCULAR; INTRAVENOUS at 16:27

## 2022-01-01 RX ADMIN — HUMAN ALBUMIN MICROSPHERES AND PERFLUTREN 9 ML: 10; .22 INJECTION, SOLUTION INTRAVENOUS at 11:25

## 2022-01-01 RX ADMIN — PRAVASTATIN SODIUM 40 MG: 40 TABLET ORAL at 20:40

## 2022-01-01 RX ADMIN — PRAVASTATIN SODIUM 40 MG: 40 TABLET ORAL at 19:31

## 2022-01-01 RX ADMIN — ACETAMINOPHEN 650 MG: 325 TABLET, FILM COATED ORAL at 04:47

## 2022-01-01 RX ADMIN — ACETAMINOPHEN 1000 MG: 500 TABLET ORAL at 09:11

## 2022-01-01 RX ADMIN — SODIUM CHLORIDE 250 ML: 9 INJECTION, SOLUTION INTRAVENOUS at 17:09

## 2022-01-01 RX ADMIN — ACETAMINOPHEN 1000 MG: 500 TABLET ORAL at 22:06

## 2022-01-01 RX ADMIN — INSULIN ASPART 2 UNITS: 100 INJECTION, SOLUTION INTRAVENOUS; SUBCUTANEOUS at 09:10

## 2022-01-01 RX ADMIN — GABAPENTIN 100 MG: 100 CAPSULE ORAL at 15:31

## 2022-01-01 RX ADMIN — DEXAMETHASONE 6 MG: 2 TABLET ORAL at 14:13

## 2022-01-01 RX ADMIN — METOPROLOL SUCCINATE 12.5 MG: 25 TABLET, EXTENDED RELEASE ORAL at 22:03

## 2022-01-01 RX ADMIN — MIDODRINE HYDROCHLORIDE 10 MG: 5 TABLET ORAL at 14:10

## 2022-01-01 RX ADMIN — PIPERACILLIN SODIUM AND TAZOBACTAM SODIUM 2.25 G: 2; .25 INJECTION, POWDER, LYOPHILIZED, FOR SOLUTION INTRAVENOUS at 14:08

## 2022-01-01 RX ADMIN — HYDROXYZINE HYDROCHLORIDE 10 MG: 10 TABLET ORAL at 04:58

## 2022-01-01 RX ADMIN — FAMOTIDINE 20 MG: 20 TABLET ORAL at 09:00

## 2022-01-01 RX ADMIN — FAMOTIDINE 10 MG: 10 TABLET ORAL at 20:17

## 2022-01-01 RX ADMIN — APIXABAN 2.5 MG: 2.5 TABLET, FILM COATED ORAL at 20:18

## 2022-01-01 RX ADMIN — MEXILETINE HYDROCHLORIDE 150 MG: 150 CAPSULE ORAL at 19:31

## 2022-01-01 RX ADMIN — DEXAMETHASONE 6 MG: 2 TABLET ORAL at 12:21

## 2022-01-01 RX ADMIN — APIXABAN 2.5 MG: 2.5 TABLET, FILM COATED ORAL at 10:28

## 2022-01-01 RX ADMIN — INSULIN ASPART 2 UNITS: 100 INJECTION, SOLUTION INTRAVENOUS; SUBCUTANEOUS at 16:40

## 2022-01-01 RX ADMIN — GABAPENTIN 100 MG: 100 CAPSULE ORAL at 09:22

## 2022-01-01 RX ADMIN — LEVOTHYROXINE SODIUM 50 MCG: 50 TABLET ORAL at 07:03

## 2022-01-01 RX ADMIN — MIDODRINE HYDROCHLORIDE 5 MG: 5 TABLET ORAL at 12:44

## 2022-01-01 RX ADMIN — LORAZEPAM 0.5 MG: 2 LIQUID ORAL at 21:20

## 2022-01-01 RX ADMIN — CLOPIDOGREL BISULFATE 75 MG: 75 TABLET ORAL at 08:33

## 2022-01-01 RX ADMIN — HEPARIN SODIUM 500 UNITS/HR: 1000 INJECTION INTRAVENOUS; SUBCUTANEOUS at 19:12

## 2022-01-01 RX ADMIN — PIPERACILLIN SODIUM AND TAZOBACTAM SODIUM 2.25 G: 2; .25 INJECTION, POWDER, LYOPHILIZED, FOR SOLUTION INTRAVENOUS at 06:56

## 2022-01-01 RX ADMIN — ALBUMIN (HUMAN) 250 ML: 12.5 INJECTION, SOLUTION INTRAVENOUS at 15:36

## 2022-01-01 RX ADMIN — GABAPENTIN 100 MG: 100 CAPSULE ORAL at 09:59

## 2022-01-01 RX ADMIN — APIXABAN 2.5 MG: 2.5 TABLET, FILM COATED ORAL at 09:00

## 2022-01-01 RX ADMIN — DOXERCALCIFEROL 4 MCG: 4 INJECTION, SOLUTION INTRAVENOUS at 13:12

## 2022-01-01 RX ADMIN — APIXABAN 2.5 MG: 2.5 TABLET, FILM COATED ORAL at 20:21

## 2022-01-01 RX ADMIN — GUAIFENESIN 600 MG: 600 TABLET, EXTENDED RELEASE ORAL at 20:58

## 2022-01-01 RX ADMIN — PANTOPRAZOLE SODIUM 40 MG: 40 TABLET, DELAYED RELEASE ORAL at 06:58

## 2022-01-01 RX ADMIN — CARBIDOPA AND LEVODOPA 2.5 MG: 50; 200 TABLET, EXTENDED RELEASE ORAL at 09:09

## 2022-01-01 RX ADMIN — PREDNISONE 5 MG: 5 TABLET ORAL at 08:15

## 2022-01-01 RX ADMIN — GUAIFENESIN 600 MG: 600 TABLET, EXTENDED RELEASE ORAL at 21:56

## 2022-01-01 RX ADMIN — MEXILETINE HYDROCHLORIDE 150 MG: 150 CAPSULE ORAL at 22:02

## 2022-01-01 RX ADMIN — LISINOPRIL 2.5 MG: 2.5 TABLET ORAL at 08:33

## 2022-01-01 RX ADMIN — METHYLPREDNISOLONE SODIUM SUCCINATE 4 MG: 40 INJECTION, POWDER, FOR SOLUTION INTRAMUSCULAR; INTRAVENOUS at 08:27

## 2022-01-01 RX ADMIN — LEVOTHYROXINE SODIUM 50 MCG: 50 TABLET ORAL at 06:43

## 2022-01-01 RX ADMIN — APIXABAN 2.5 MG: 2.5 TABLET, FILM COATED ORAL at 21:26

## 2022-01-01 RX ADMIN — APIXABAN 2.5 MG: 2.5 TABLET, FILM COATED ORAL at 09:51

## 2022-01-01 RX ADMIN — DEXAMETHASONE SODIUM PHOSPHATE 6 MG: 4 INJECTION, SOLUTION INTRAMUSCULAR; INTRAVENOUS at 08:51

## 2022-01-01 RX ADMIN — GABAPENTIN 100 MG: 100 CAPSULE ORAL at 10:04

## 2022-01-01 RX ADMIN — GABAPENTIN 100 MG: 100 CAPSULE ORAL at 16:31

## 2022-01-01 RX ADMIN — PRAVASTATIN SODIUM 40 MG: 40 TABLET ORAL at 20:58

## 2022-01-01 RX ADMIN — METOPROLOL SUCCINATE 12.5 MG: 25 TABLET, EXTENDED RELEASE ORAL at 13:45

## 2022-01-01 RX ADMIN — FAMOTIDINE 10 MG: 10 TABLET ORAL at 20:40

## 2022-01-01 RX ADMIN — MIDODRINE HYDROCHLORIDE 10 MG: 5 TABLET ORAL at 13:06

## 2022-01-01 RX ADMIN — DOXERCALCIFEROL 12 MCG: 4 INJECTION, SOLUTION INTRAVENOUS at 12:33

## 2022-01-01 RX ADMIN — APIXABAN 2.5 MG: 2.5 TABLET, FILM COATED ORAL at 20:07

## 2022-01-01 RX ADMIN — GABAPENTIN 100 MG: 100 CAPSULE ORAL at 20:44

## 2022-01-01 RX ADMIN — MIDODRINE HYDROCHLORIDE 10 MG: 5 TABLET ORAL at 09:29

## 2022-01-01 RX ADMIN — MIDODRINE HYDROCHLORIDE 10 MG: 5 TABLET ORAL at 13:48

## 2022-01-01 RX ADMIN — ACETAMINOPHEN 1000 MG: 500 TABLET ORAL at 17:21

## 2022-01-01 RX ADMIN — GABAPENTIN 100 MG: 100 CAPSULE ORAL at 15:08

## 2022-01-01 RX ADMIN — GABAPENTIN 100 MG: 100 CAPSULE ORAL at 22:13

## 2022-01-01 RX ADMIN — METHYLPREDNISOLONE SODIUM SUCCINATE 4 MG: 40 INJECTION, POWDER, FOR SOLUTION INTRAMUSCULAR; INTRAVENOUS at 09:24

## 2022-01-01 RX ADMIN — SODIUM CHLORIDE 250 ML: 9 INJECTION, SOLUTION INTRAVENOUS at 16:06

## 2022-01-01 RX ADMIN — PRAVASTATIN SODIUM 40 MG: 40 TABLET ORAL at 20:17

## 2022-01-01 RX ADMIN — Medication 1 CAPSULE: at 21:41

## 2022-01-01 RX ADMIN — GABAPENTIN 100 MG: 100 CAPSULE ORAL at 14:19

## 2022-01-01 RX ADMIN — GABAPENTIN 100 MG: 100 CAPSULE ORAL at 22:09

## 2022-01-01 RX ADMIN — APIXABAN 2.5 MG: 2.5 TABLET, FILM COATED ORAL at 09:26

## 2022-01-01 RX ADMIN — CLOPIDOGREL BISULFATE 75 MG: 75 TABLET ORAL at 22:33

## 2022-01-01 RX ADMIN — INSULIN ASPART 1 UNITS: 100 INJECTION, SOLUTION INTRAVENOUS; SUBCUTANEOUS at 18:47

## 2022-01-01 RX ADMIN — GABAPENTIN 100 MG: 100 CAPSULE ORAL at 20:24

## 2022-01-01 RX ADMIN — PRAVASTATIN SODIUM 40 MG: 40 TABLET ORAL at 21:41

## 2022-01-01 RX ADMIN — MIDODRINE HYDROCHLORIDE 10 MG: 5 TABLET ORAL at 14:08

## 2022-01-01 RX ADMIN — GABAPENTIN 100 MG: 100 CAPSULE ORAL at 08:18

## 2022-01-01 RX ADMIN — GUAIFENESIN 600 MG: 600 TABLET, EXTENDED RELEASE ORAL at 09:51

## 2022-01-01 RX ADMIN — MIDODRINE HYDROCHLORIDE 10 MG: 5 TABLET ORAL at 13:16

## 2022-01-01 RX ADMIN — APIXABAN 2.5 MG: 2.5 TABLET, FILM COATED ORAL at 10:04

## 2022-01-01 RX ADMIN — LEVOTHYROXINE SODIUM 50 MCG: 50 TABLET ORAL at 07:57

## 2022-01-01 RX ADMIN — SODIUM CHLORIDE 300 ML: 9 INJECTION, SOLUTION INTRAVENOUS at 17:09

## 2022-01-01 RX ADMIN — PREDNISONE 30 MG: 10 TABLET ORAL at 14:23

## 2022-01-01 RX ADMIN — PRAVASTATIN SODIUM 40 MG: 40 TABLET ORAL at 21:50

## 2022-01-01 RX ADMIN — GUAIFENESIN 600 MG: 600 TABLET, EXTENDED RELEASE ORAL at 22:13

## 2022-01-01 RX ADMIN — FAMOTIDINE 20 MG: 20 TABLET ORAL at 10:28

## 2022-01-01 RX ADMIN — MIDODRINE HYDROCHLORIDE 10 MG: 5 TABLET ORAL at 18:09

## 2022-01-01 RX ADMIN — LORAZEPAM 0.5 MG: 2 LIQUID ORAL at 14:18

## 2022-01-01 RX ADMIN — LEVOTHYROXINE SODIUM 50 MCG: 50 TABLET ORAL at 08:09

## 2022-01-01 RX ADMIN — GABAPENTIN 100 MG: 100 CAPSULE ORAL at 21:01

## 2022-01-01 RX ADMIN — FAMOTIDINE 10 MG: 10 TABLET ORAL at 19:31

## 2022-01-01 RX ADMIN — SODIUM CHLORIDE 300 ML: 9 INJECTION, SOLUTION INTRAVENOUS at 15:00

## 2022-01-01 RX ADMIN — CLOPIDOGREL BISULFATE 75 MG: 75 TABLET ORAL at 10:29

## 2022-01-01 RX ADMIN — MORPHINE SULFATE 5 MG: 10 SOLUTION ORAL at 04:55

## 2022-01-01 RX ADMIN — Medication 1 CAPSULE: at 20:40

## 2022-01-01 RX ADMIN — MORPHINE SULFATE 5 MG: 10 SOLUTION ORAL at 13:14

## 2022-01-01 RX ADMIN — FAMOTIDINE 10 MG: 10 TABLET ORAL at 20:52

## 2022-01-01 RX ADMIN — PRAVASTATIN SODIUM 40 MG: 40 TABLET ORAL at 20:18

## 2022-01-01 RX ADMIN — ALBUMIN (HUMAN) 250 ML: 12.5 INJECTION, SOLUTION INTRAVENOUS at 08:28

## 2022-01-01 RX ADMIN — CLOPIDOGREL BISULFATE 75 MG: 75 TABLET ORAL at 08:09

## 2022-01-01 RX ADMIN — GABAPENTIN 100 MG: 100 CAPSULE ORAL at 22:38

## 2022-01-01 RX ADMIN — CARBIDOPA AND LEVODOPA 2.5 MG: 50; 200 TABLET, EXTENDED RELEASE ORAL at 09:58

## 2022-01-01 RX ADMIN — PRAVASTATIN SODIUM 40 MG: 40 TABLET ORAL at 21:52

## 2022-01-01 RX ADMIN — MEXILETINE HYDROCHLORIDE 150 MG: 150 CAPSULE ORAL at 08:32

## 2022-01-01 RX ADMIN — DEXAMETHASONE 6 MG: 2 TABLET ORAL at 09:12

## 2022-01-01 RX ADMIN — DOXERCALCIFEROL 12 MCG: 4 INJECTION, SOLUTION INTRAVENOUS at 18:16

## 2022-01-01 RX ADMIN — Medication 1 CAPSULE: at 20:17

## 2022-01-01 RX ADMIN — GUAIFENESIN 600 MG: 600 TABLET, EXTENDED RELEASE ORAL at 21:15

## 2022-01-01 RX ADMIN — LORAZEPAM 0.5 MG: 0.5 TABLET ORAL at 11:16

## 2022-01-01 RX ADMIN — GABAPENTIN 100 MG: 100 CAPSULE ORAL at 17:54

## 2022-01-01 RX ADMIN — PANTOPRAZOLE SODIUM 40 MG: 40 TABLET, DELAYED RELEASE ORAL at 06:41

## 2022-01-01 RX ADMIN — LORAZEPAM 0.5 MG: 2 LIQUID ORAL at 01:14

## 2022-01-01 RX ADMIN — METOPROLOL SUCCINATE 12.5 MG: 25 TABLET, EXTENDED RELEASE ORAL at 12:14

## 2022-01-01 RX ADMIN — HEPARIN SODIUM 500 UNITS: 1000 INJECTION INTRAVENOUS; SUBCUTANEOUS at 17:35

## 2022-01-01 RX ADMIN — GABAPENTIN 100 MG: 100 CAPSULE ORAL at 13:45

## 2022-01-01 RX ADMIN — APIXABAN 2.5 MG: 2.5 TABLET, FILM COATED ORAL at 21:12

## 2022-01-01 RX ADMIN — GABAPENTIN 100 MG: 100 CAPSULE ORAL at 16:28

## 2022-01-01 RX ADMIN — ACETAMINOPHEN 650 MG: 325 TABLET, FILM COATED ORAL at 02:53

## 2022-01-01 RX ADMIN — GUAIFENESIN 600 MG: 600 TABLET, EXTENDED RELEASE ORAL at 09:00

## 2022-01-01 RX ADMIN — MORPHINE SULFATE 5 MG: 10 SOLUTION ORAL at 16:56

## 2022-01-01 RX ADMIN — FAMOTIDINE 10 MG: 10 TABLET ORAL at 21:15

## 2022-01-01 RX ADMIN — ISOSORBIDE MONONITRATE 30 MG: 30 TABLET, EXTENDED RELEASE ORAL at 20:47

## 2022-01-01 RX ADMIN — PIPERACILLIN SODIUM AND TAZOBACTAM SODIUM 2.25 G: 2; .25 INJECTION, POWDER, LYOPHILIZED, FOR SOLUTION INTRAVENOUS at 20:04

## 2022-01-01 RX ADMIN — FAMOTIDINE 10 MG: 10 TABLET ORAL at 20:31

## 2022-01-01 RX ADMIN — PANTOPRAZOLE SODIUM 40 MG: 40 TABLET, DELAYED RELEASE ORAL at 09:43

## 2022-01-01 RX ADMIN — DEXTROSE MONOHYDRATE: 100 INJECTION, SOLUTION INTRAVENOUS at 22:30

## 2022-01-01 RX ADMIN — ALBUMIN HUMAN 50 ML: 0.25 SOLUTION INTRAVENOUS at 11:43

## 2022-01-01 RX ADMIN — GUAIFENESIN 600 MG: 600 TABLET, EXTENDED RELEASE ORAL at 08:31

## 2022-01-01 RX ADMIN — CARBIDOPA AND LEVODOPA 2.5 MG: 50; 200 TABLET, EXTENDED RELEASE ORAL at 09:26

## 2022-01-01 RX ADMIN — Medication 1 CAPSULE: at 20:06

## 2022-01-01 RX ADMIN — CARBIDOPA AND LEVODOPA 2.5 MG: 50; 200 TABLET, EXTENDED RELEASE ORAL at 12:46

## 2022-01-01 RX ADMIN — CLOPIDOGREL BISULFATE 75 MG: 75 TABLET ORAL at 09:00

## 2022-01-01 RX ADMIN — DEXTROSE MONOHYDRATE: 100 INJECTION, SOLUTION INTRAVENOUS at 05:15

## 2022-01-01 RX ADMIN — PRAVASTATIN SODIUM 40 MG: 40 TABLET ORAL at 10:04

## 2022-01-01 RX ADMIN — GABAPENTIN 100 MG: 100 CAPSULE ORAL at 09:12

## 2022-01-01 RX ADMIN — LEVOTHYROXINE SODIUM 50 MCG: 50 TABLET ORAL at 13:34

## 2022-01-01 RX ADMIN — CARBIDOPA AND LEVODOPA 2.5 MG: 50; 200 TABLET, EXTENDED RELEASE ORAL at 17:27

## 2022-01-01 RX ADMIN — INSULIN ASPART 1 UNITS: 100 INJECTION, SOLUTION INTRAVENOUS; SUBCUTANEOUS at 14:37

## 2022-01-01 RX ADMIN — GUAIFENESIN 600 MG: 600 TABLET, EXTENDED RELEASE ORAL at 12:41

## 2022-01-01 RX ADMIN — INSULIN ASPART 1 UNITS: 100 INJECTION, SOLUTION INTRAVENOUS; SUBCUTANEOUS at 18:51

## 2022-01-01 RX ADMIN — FAMOTIDINE 10 MG: 10 TABLET ORAL at 22:13

## 2022-01-01 RX ADMIN — GABAPENTIN 100 MG: 100 CAPSULE ORAL at 09:00

## 2022-01-01 RX ADMIN — APIXABAN 2.5 MG: 2.5 TABLET, FILM COATED ORAL at 20:52

## 2022-01-01 RX ADMIN — GABAPENTIN 100 MG: 100 CAPSULE ORAL at 21:53

## 2022-01-01 RX ADMIN — GABAPENTIN 100 MG: 100 CAPSULE ORAL at 22:41

## 2022-01-01 RX ADMIN — Medication 1 CAPSULE: at 19:31

## 2022-01-01 RX ADMIN — MIDODRINE HYDROCHLORIDE 10 MG: 5 TABLET ORAL at 08:28

## 2022-01-01 RX ADMIN — APIXABAN 2.5 MG: 2.5 TABLET, FILM COATED ORAL at 09:09

## 2022-01-01 RX ADMIN — MORPHINE SULFATE 5 MG: 10 SOLUTION ORAL at 01:09

## 2022-01-01 RX ADMIN — DEXAMETHASONE 6 MG: 2 TABLET ORAL at 13:41

## 2022-01-01 RX ADMIN — METHYLPREDNISOLONE SODIUM SUCCINATE 4 MG: 40 INJECTION, POWDER, FOR SOLUTION INTRAMUSCULAR; INTRAVENOUS at 09:51

## 2022-01-01 RX ADMIN — PIPERACILLIN SODIUM AND TAZOBACTAM SODIUM 2.25 G: 2; .25 INJECTION, POWDER, LYOPHILIZED, FOR SOLUTION INTRAVENOUS at 18:44

## 2022-01-01 RX ADMIN — ONDANSETRON 4 MG: 2 INJECTION INTRAMUSCULAR; INTRAVENOUS at 14:37

## 2022-01-01 RX ADMIN — GABAPENTIN 100 MG: 100 CAPSULE ORAL at 13:20

## 2022-01-01 RX ADMIN — METOPROLOL SUCCINATE 12.5 MG: 25 TABLET, EXTENDED RELEASE ORAL at 13:20

## 2022-01-01 RX ADMIN — FAMOTIDINE 10 MG: 10 TABLET ORAL at 19:57

## 2022-01-01 RX ADMIN — ALBUMIN (HUMAN) 250 ML: 12.5 INJECTION, SOLUTION INTRAVENOUS at 19:09

## 2022-01-01 RX ADMIN — SODIUM CHLORIDE 300 ML: 9 INJECTION, SOLUTION INTRAVENOUS at 13:11

## 2022-01-01 RX ADMIN — GUAIFENESIN 600 MG: 600 TABLET, EXTENDED RELEASE ORAL at 20:52

## 2022-01-01 RX ADMIN — ALBUMIN HUMAN 50 ML: 0.25 SOLUTION INTRAVENOUS at 10:54

## 2022-01-01 RX ADMIN — LEVOTHYROXINE SODIUM 50 MCG: 50 TABLET ORAL at 12:21

## 2022-01-01 RX ADMIN — APIXABAN 2.5 MG: 2.5 TABLET, FILM COATED ORAL at 09:43

## 2022-01-01 RX ADMIN — APIXABAN 2.5 MG: 2.5 TABLET, FILM COATED ORAL at 22:13

## 2022-01-01 RX ADMIN — CLOPIDOGREL BISULFATE 75 MG: 75 TABLET ORAL at 20:20

## 2022-01-01 RX ADMIN — PREDNISONE 30 MG: 10 TABLET ORAL at 09:42

## 2022-01-01 RX ADMIN — CARBIDOPA AND LEVODOPA 2.5 MG: 50; 200 TABLET, EXTENDED RELEASE ORAL at 10:53

## 2022-01-01 RX ADMIN — Medication 1 CAPSULE: at 20:24

## 2022-01-01 RX ADMIN — FAMOTIDINE 10 MG: 10 TABLET ORAL at 20:06

## 2022-01-01 RX ADMIN — LISINOPRIL 2.5 MG: 2.5 TABLET ORAL at 19:30

## 2022-01-01 RX ADMIN — ALBUMIN HUMAN 250 ML: 0.05 INJECTION, SOLUTION INTRAVENOUS at 17:09

## 2022-01-01 RX ADMIN — INSULIN ASPART 2 UNITS: 100 INJECTION, SOLUTION INTRAVENOUS; SUBCUTANEOUS at 17:59

## 2022-01-01 RX ADMIN — LORAZEPAM 0.5 MG: 2 LIQUID ORAL at 21:01

## 2022-01-01 RX ADMIN — DEXAMETHASONE 6 MG: 2 TABLET ORAL at 08:33

## 2022-01-01 RX ADMIN — INSULIN ASPART 1 UNITS: 100 INJECTION, SOLUTION INTRAVENOUS; SUBCUTANEOUS at 18:37

## 2022-01-01 RX ADMIN — LEVOTHYROXINE SODIUM 50 MCG: 50 TABLET ORAL at 06:51

## 2022-01-01 RX ADMIN — LEVOTHYROXINE SODIUM 50 MCG: 50 TABLET ORAL at 12:42

## 2022-01-01 RX ADMIN — FAMOTIDINE 10 MG: 10 TABLET ORAL at 21:12

## 2022-01-01 RX ADMIN — HEPARIN SODIUM 500 UNITS/HR: 1000 INJECTION INTRAVENOUS; SUBCUTANEOUS at 17:35

## 2022-01-01 RX ADMIN — GABAPENTIN 100 MG: 100 CAPSULE ORAL at 12:42

## 2022-01-01 RX ADMIN — Medication 1 CAPSULE: at 22:14

## 2022-01-01 RX ADMIN — METOPROLOL SUCCINATE 12.5 MG: 25 TABLET, EXTENDED RELEASE ORAL at 15:08

## 2022-01-01 RX ADMIN — ONDANSETRON 4 MG: 4 TABLET, ORALLY DISINTEGRATING ORAL at 13:06

## 2022-01-01 RX ADMIN — ACETAMINOPHEN 1000 MG: 500 TABLET ORAL at 16:04

## 2022-01-01 RX ADMIN — CLOPIDOGREL BISULFATE 75 MG: 75 TABLET ORAL at 15:54

## 2022-01-01 RX ADMIN — SODIUM CHLORIDE 250 ML: 9 INJECTION, SOLUTION INTRAVENOUS at 11:22

## 2022-01-01 RX ADMIN — LISINOPRIL 2.5 MG: 2.5 TABLET ORAL at 10:29

## 2022-01-01 RX ADMIN — METHYLPREDNISOLONE SODIUM SUCCINATE 4 MG: 40 INJECTION, POWDER, FOR SOLUTION INTRAMUSCULAR; INTRAVENOUS at 15:16

## 2022-01-01 RX ADMIN — CALCIUM CARBONATE (ANTACID) CHEW TAB 500 MG 500 MG: 500 CHEW TAB at 22:45

## 2022-01-01 RX ADMIN — Medication 1 CAPSULE: at 21:12

## 2022-01-01 RX ADMIN — PREDNISONE 30 MG: 10 TABLET ORAL at 08:24

## 2022-01-01 RX ADMIN — APIXABAN 2.5 MG: 2.5 TABLET, FILM COATED ORAL at 08:33

## 2022-01-01 RX ADMIN — PIPERACILLIN SODIUM AND TAZOBACTAM SODIUM 2.25 G: 2; .25 INJECTION, POWDER, LYOPHILIZED, FOR SOLUTION INTRAVENOUS at 09:24

## 2022-01-01 RX ADMIN — PIPERACILLIN SODIUM AND TAZOBACTAM SODIUM 2.25 G: 2; .25 INJECTION, POWDER, LYOPHILIZED, FOR SOLUTION INTRAVENOUS at 02:07

## 2022-01-01 RX ADMIN — LEVOTHYROXINE SODIUM 50 MCG: 50 TABLET ORAL at 14:19

## 2022-01-01 RX ADMIN — LISINOPRIL 2.5 MG: 2.5 TABLET ORAL at 09:11

## 2022-01-01 RX ADMIN — PIPERACILLIN SODIUM AND TAZOBACTAM SODIUM 2.25 G: 2; .25 INJECTION, POWDER, LYOPHILIZED, FOR SOLUTION INTRAVENOUS at 20:48

## 2022-01-01 RX ADMIN — ACETAMINOPHEN 650 MG: 325 TABLET, FILM COATED ORAL at 22:31

## 2022-01-01 RX ADMIN — GUAIFENESIN 600 MG: 600 TABLET, EXTENDED RELEASE ORAL at 08:18

## 2022-01-01 RX ADMIN — GABAPENTIN 100 MG: 100 CAPSULE ORAL at 21:25

## 2022-01-01 RX ADMIN — GABAPENTIN 100 MG: 100 CAPSULE ORAL at 10:27

## 2022-01-01 RX ADMIN — APIXABAN 2.5 MG: 2.5 TABLET, FILM COATED ORAL at 20:48

## 2022-01-01 RX ADMIN — ATROPINE SULFATE 2 DROP: 10 SOLUTION OPHTHALMIC at 02:14

## 2022-01-01 RX ADMIN — PANTOPRAZOLE SODIUM 40 MG: 40 TABLET, DELAYED RELEASE ORAL at 06:33

## 2022-01-01 RX ADMIN — MIDODRINE HYDROCHLORIDE 10 MG: 5 TABLET ORAL at 09:18

## 2022-01-01 RX ADMIN — MIDODRINE HYDROCHLORIDE 10 MG: 5 TABLET ORAL at 18:10

## 2022-01-01 RX ADMIN — LISINOPRIL 2.5 MG: 2.5 TABLET ORAL at 22:04

## 2022-01-01 RX ADMIN — CARBIDOPA AND LEVODOPA 2.5 MG: 50; 200 TABLET, EXTENDED RELEASE ORAL at 10:11

## 2022-01-01 RX ADMIN — GABAPENTIN 100 MG: 100 CAPSULE ORAL at 17:52

## 2022-01-01 RX ADMIN — DOXERCALCIFEROL 12 MCG: 4 INJECTION, SOLUTION INTRAVENOUS at 10:02

## 2022-01-01 RX ADMIN — GABAPENTIN 100 MG: 100 CAPSULE ORAL at 15:41

## 2022-01-01 RX ADMIN — INSULIN ASPART 1 UNITS: 100 INJECTION, SOLUTION INTRAVENOUS; SUBCUTANEOUS at 11:14

## 2022-01-01 RX ADMIN — INSULIN ASPART 2 UNITS: 100 INJECTION, SOLUTION INTRAVENOUS; SUBCUTANEOUS at 18:02

## 2022-01-01 RX ADMIN — PANTOPRAZOLE SODIUM 40 MG: 40 TABLET, DELAYED RELEASE ORAL at 06:37

## 2022-01-01 RX ADMIN — LEVOTHYROXINE SODIUM 50 MCG: 50 TABLET ORAL at 06:50

## 2022-01-01 RX ADMIN — PANTOPRAZOLE SODIUM 40 MG: 40 TABLET, DELAYED RELEASE ORAL at 09:26

## 2022-01-01 RX ADMIN — APIXABAN 2.5 MG: 2.5 TABLET, FILM COATED ORAL at 20:44

## 2022-01-01 RX ADMIN — PREDNISONE 20 MG: 20 TABLET ORAL at 09:22

## 2022-01-01 RX ADMIN — APIXABAN 2.5 MG: 2.5 TABLET, FILM COATED ORAL at 08:28

## 2022-01-01 RX ADMIN — LOPERAMIDE HYDROCHLORIDE 2 MG: 2 CAPSULE ORAL at 15:54

## 2022-01-01 RX ADMIN — APIXABAN 2.5 MG: 2.5 TABLET, FILM COATED ORAL at 20:12

## 2022-01-01 RX ADMIN — PANTOPRAZOLE SODIUM 40 MG: 40 TABLET, DELAYED RELEASE ORAL at 06:45

## 2022-01-01 RX ADMIN — HYDROCORTISONE SODIUM SUCCINATE 100 MG: 100 INJECTION, POWDER, FOR SOLUTION INTRAMUSCULAR; INTRAVENOUS at 16:25

## 2022-01-01 RX ADMIN — MIDODRINE HYDROCHLORIDE 10 MG: 5 TABLET ORAL at 17:12

## 2022-01-01 RX ADMIN — GABAPENTIN 100 MG: 100 CAPSULE ORAL at 21:41

## 2022-01-01 RX ADMIN — GABAPENTIN 100 MG: 100 CAPSULE ORAL at 09:26

## 2022-01-01 RX ADMIN — APIXABAN 2.5 MG: 2.5 TABLET, FILM COATED ORAL at 14:10

## 2022-01-01 RX ADMIN — WATER 0.5 ML: 1 INJECTION INTRAMUSCULAR; INTRAVENOUS; SUBCUTANEOUS at 13:34

## 2022-01-01 RX ADMIN — LEVOTHYROXINE SODIUM 50 MCG: 50 TABLET ORAL at 08:25

## 2022-01-01 RX ADMIN — INSULIN ASPART 1 UNITS: 100 INJECTION, SOLUTION INTRAVENOUS; SUBCUTANEOUS at 08:25

## 2022-01-01 RX ADMIN — INSULIN ASPART 1 UNITS: 100 INJECTION, SOLUTION INTRAVENOUS; SUBCUTANEOUS at 17:29

## 2022-01-01 RX ADMIN — PRAVASTATIN SODIUM 40 MG: 40 TABLET ORAL at 22:04

## 2022-01-01 RX ADMIN — ACETAMINOPHEN 650 MG: 325 TABLET, FILM COATED ORAL at 09:11

## 2022-01-01 RX ADMIN — Medication 1 CAPSULE: at 19:57

## 2022-01-01 RX ADMIN — PANTOPRAZOLE SODIUM 40 MG: 40 TABLET, DELAYED RELEASE ORAL at 06:27

## 2022-01-01 RX ADMIN — FAMOTIDINE 20 MG: 20 TABLET ORAL at 15:54

## 2022-01-01 RX ADMIN — PREDNISONE 40 MG: 20 TABLET ORAL at 09:09

## 2022-01-01 RX ADMIN — LORAZEPAM 0.5 MG: 2 LIQUID ORAL at 01:09

## 2022-01-01 RX ADMIN — HEPARIN SODIUM 500 UNITS: 1000 INJECTION INTRAVENOUS; SUBCUTANEOUS at 19:10

## 2022-01-01 RX ADMIN — GABAPENTIN 100 MG: 100 CAPSULE ORAL at 13:47

## 2022-01-01 RX ADMIN — ALBUMIN (HUMAN) 250 ML: 12.5 INJECTION, SOLUTION INTRAVENOUS at 09:02

## 2022-01-01 RX ADMIN — PRAVASTATIN SODIUM 40 MG: 40 TABLET ORAL at 20:06

## 2022-01-01 RX ADMIN — LEVOTHYROXINE SODIUM 50 MCG: 50 TABLET ORAL at 08:18

## 2022-01-01 RX ADMIN — APIXABAN 2.5 MG: 2.5 TABLET, FILM COATED ORAL at 21:56

## 2022-01-01 RX ADMIN — APIXABAN 2.5 MG: 2.5 TABLET, FILM COATED ORAL at 09:12

## 2022-01-01 RX ADMIN — OXYCODONE HYDROCHLORIDE 2.5 MG: 5 TABLET ORAL at 15:07

## 2022-01-01 RX ADMIN — ALBUMIN (HUMAN) 250 ML: 12.5 INJECTION, SOLUTION INTRAVENOUS at 14:00

## 2022-01-01 RX ADMIN — LORAZEPAM 0.5 MG: 2 LIQUID ORAL at 17:44

## 2022-01-01 RX ADMIN — FAMOTIDINE 10 MG: 10 TABLET ORAL at 21:41

## 2022-01-01 RX ADMIN — PRAVASTATIN SODIUM 40 MG: 40 TABLET ORAL at 20:30

## 2022-01-01 RX ADMIN — Medication 1 CAPSULE: at 20:18

## 2022-01-01 RX ADMIN — VANCOMYCIN HYDROCHLORIDE 1500 MG: 5 INJECTION, POWDER, LYOPHILIZED, FOR SOLUTION INTRAVENOUS at 02:33

## 2022-01-01 RX ADMIN — DEXTROSE MONOHYDRATE 50 ML: 25 INJECTION, SOLUTION INTRAVENOUS at 21:55

## 2022-01-01 RX ADMIN — GABAPENTIN 100 MG: 100 CAPSULE ORAL at 08:31

## 2022-01-01 RX ADMIN — ALBUMIN (HUMAN) 250 ML: 12.5 INJECTION, SOLUTION INTRAVENOUS at 13:11

## 2022-01-01 RX ADMIN — LEVOTHYROXINE SODIUM 50 MCG: 50 TABLET ORAL at 09:26

## 2022-01-01 RX ADMIN — MORPHINE SULFATE 5 MG: 10 SOLUTION ORAL at 09:37

## 2022-01-01 RX ADMIN — SODIUM CHLORIDE 300 ML: 9 INJECTION, SOLUTION INTRAVENOUS at 11:22

## 2022-01-01 RX ADMIN — APIXABAN 2.5 MG: 2.5 TABLET, FILM COATED ORAL at 08:09

## 2022-01-01 RX ADMIN — SODIUM CHLORIDE 300 ML: 9 INJECTION, SOLUTION INTRAVENOUS at 10:29

## 2022-01-01 RX ADMIN — EPOETIN ALFA-EPBX 2000 UNITS: 2000 INJECTION, SOLUTION INTRAVENOUS; SUBCUTANEOUS at 10:00

## 2022-01-01 RX ADMIN — INSULIN ASPART 4 UNITS: 100 INJECTION, SOLUTION INTRAVENOUS; SUBCUTANEOUS at 05:14

## 2022-01-01 RX ADMIN — PRAVASTATIN SODIUM 40 MG: 40 TABLET ORAL at 20:52

## 2022-01-01 RX ADMIN — PIPERACILLIN SODIUM AND TAZOBACTAM SODIUM 2.25 G: 2; .25 INJECTION, POWDER, LYOPHILIZED, FOR SOLUTION INTRAVENOUS at 01:47

## 2022-01-01 RX ADMIN — GABAPENTIN 100 MG: 100 CAPSULE ORAL at 21:12

## 2022-01-01 RX ADMIN — SODIUM CHLORIDE 250 ML: 9 INJECTION, SOLUTION INTRAVENOUS at 10:02

## 2022-01-01 RX ADMIN — PANTOPRAZOLE SODIUM 40 MG: 40 TABLET, DELAYED RELEASE ORAL at 06:43

## 2022-01-01 RX ADMIN — OXYCODONE HYDROCHLORIDE 2.5 MG: 5 TABLET ORAL at 01:36

## 2022-01-01 RX ADMIN — APIXABAN 2.5 MG: 2.5 TABLET, FILM COATED ORAL at 20:16

## 2022-01-01 RX ADMIN — GABAPENTIN 100 MG: 100 CAPSULE ORAL at 21:05

## 2022-01-01 RX ADMIN — SODIUM CHLORIDE 300 ML: 9 INJECTION, SOLUTION INTRAVENOUS at 14:20

## 2022-01-01 RX ADMIN — INSULIN ASPART 1 UNITS: 100 INJECTION, SOLUTION INTRAVENOUS; SUBCUTANEOUS at 19:04

## 2022-01-01 RX ADMIN — APIXABAN 2.5 MG: 2.5 TABLET, FILM COATED ORAL at 20:58

## 2022-01-01 RX ADMIN — DEXAMETHASONE 6 MG: 2 TABLET ORAL at 12:32

## 2022-01-01 RX ADMIN — APIXABAN 2.5 MG: 2.5 TABLET, FILM COATED ORAL at 20:40

## 2022-01-01 RX ADMIN — DEXAMETHASONE SODIUM PHOSPHATE 6 MG: 4 INJECTION, SOLUTION INTRAMUSCULAR; INTRAVENOUS at 10:05

## 2022-01-01 RX ADMIN — DEXAMETHASONE 6 MG: 2 TABLET ORAL at 10:29

## 2022-01-01 RX ADMIN — GUAIFENESIN 600 MG: 600 TABLET, EXTENDED RELEASE ORAL at 09:43

## 2022-01-01 RX ADMIN — SODIUM CHLORIDE 1000 ML: 9 INJECTION, SOLUTION INTRAVENOUS at 13:36

## 2022-01-01 RX ADMIN — MIDODRINE HYDROCHLORIDE 10 MG: 5 TABLET ORAL at 18:58

## 2022-01-01 RX ADMIN — PRAVASTATIN SODIUM 40 MG: 40 TABLET ORAL at 20:16

## 2022-01-01 RX ADMIN — LORAZEPAM 0.5 MG: 0.5 TABLET ORAL at 14:23

## 2022-01-01 RX ADMIN — PRAVASTATIN SODIUM 40 MG: 40 TABLET ORAL at 08:08

## 2022-01-01 RX ADMIN — LEVOTHYROXINE SODIUM 50 MCG: 50 TABLET ORAL at 09:59

## 2022-01-01 RX ADMIN — ISOSORBIDE MONONITRATE 30 MG: 30 TABLET, EXTENDED RELEASE ORAL at 22:21

## 2022-01-01 RX ADMIN — DEXTROSE MONOHYDRATE 25 ML: 25 INJECTION, SOLUTION INTRAVENOUS at 02:29

## 2022-01-01 RX ADMIN — MORPHINE SULFATE 5 MG: 10 SOLUTION ORAL at 17:44

## 2022-01-01 RX ADMIN — SODIUM CHLORIDE 300 ML: 9 INJECTION, SOLUTION INTRAVENOUS at 08:45

## 2022-01-01 RX ADMIN — LEVOTHYROXINE SODIUM 50 MCG: 50 TABLET ORAL at 08:31

## 2022-01-01 RX ADMIN — GABAPENTIN 100 MG: 100 CAPSULE ORAL at 19:30

## 2022-01-01 RX ADMIN — MORPHINE SULFATE 5 MG: 10 SOLUTION ORAL at 09:21

## 2022-01-01 RX ADMIN — DOXERCALCIFEROL 2 MCG: 4 INJECTION, SOLUTION INTRAVENOUS at 10:31

## 2022-01-01 RX ADMIN — ALBUMIN (HUMAN) 12.5 G: 12.5 INJECTION, SOLUTION INTRAVENOUS at 02:48

## 2022-01-01 RX ADMIN — GUAIFENESIN 600 MG: 600 TABLET, EXTENDED RELEASE ORAL at 09:09

## 2022-01-01 RX ADMIN — APIXABAN 2.5 MG: 2.5 TABLET, FILM COATED ORAL at 21:41

## 2022-01-01 RX ADMIN — APIXABAN 2.5 MG: 2.5 TABLET, FILM COATED ORAL at 21:15

## 2022-01-01 RX ADMIN — MIDODRINE HYDROCHLORIDE 10 MG: 5 TABLET ORAL at 15:02

## 2022-01-01 RX ADMIN — MEXILETINE HYDROCHLORIDE 150 MG: 150 CAPSULE ORAL at 15:53

## 2022-01-01 RX ADMIN — APIXABAN 2.5 MG: 2.5 TABLET, FILM COATED ORAL at 20:20

## 2022-01-01 RX ADMIN — GUAIFENESIN 600 MG: 600 TABLET, EXTENDED RELEASE ORAL at 10:41

## 2022-01-01 RX ADMIN — GABAPENTIN 100 MG: 100 CAPSULE ORAL at 22:15

## 2022-01-01 RX ADMIN — AMIODARONE HYDROCHLORIDE 1 MG/MIN: 50 INJECTION, SOLUTION INTRAVENOUS at 16:20

## 2022-01-01 RX ADMIN — GABAPENTIN 100 MG: 100 CAPSULE ORAL at 20:48

## 2022-01-01 RX ADMIN — DOXERCALCIFEROL 12 MCG: 4 INJECTION, SOLUTION INTRAVENOUS at 08:19

## 2022-01-01 RX ADMIN — MIDODRINE HYDROCHLORIDE 10 MG: 5 TABLET ORAL at 08:25

## 2022-01-01 RX ADMIN — APIXABAN 2.5 MG: 2.5 TABLET, FILM COATED ORAL at 08:15

## 2022-01-01 RX ADMIN — LORAZEPAM 0.5 MG: 2 LIQUID ORAL at 04:55

## 2022-01-01 RX ADMIN — Medication 1 CAPSULE: at 21:15

## 2022-01-01 RX ADMIN — LIDOCAINE 2 PATCH: 246 PATCH TOPICAL at 20:21

## 2022-01-01 RX ADMIN — DEXTROSE MONOHYDRATE: 100 INJECTION, SOLUTION INTRAVENOUS at 06:21

## 2022-01-01 RX ADMIN — GUAIFENESIN 600 MG: 600 TABLET, EXTENDED RELEASE ORAL at 21:26

## 2022-01-01 RX ADMIN — MIDODRINE HYDROCHLORIDE 10 MG: 5 TABLET ORAL at 18:27

## 2022-01-01 RX ADMIN — METOPROLOL SUCCINATE 12.5 MG: 25 TABLET, EXTENDED RELEASE ORAL at 11:02

## 2022-01-01 RX ADMIN — GUAIFENESIN 600 MG: 600 TABLET, EXTENDED RELEASE ORAL at 20:18

## 2022-01-01 RX ADMIN — MIDODRINE HYDROCHLORIDE 10 MG: 5 TABLET ORAL at 09:05

## 2022-01-01 RX ADMIN — PIPERACILLIN SODIUM AND TAZOBACTAM SODIUM 2.25 G: 2; .25 INJECTION, POWDER, LYOPHILIZED, FOR SOLUTION INTRAVENOUS at 00:26

## 2022-01-01 RX ADMIN — MIDODRINE HYDROCHLORIDE 10 MG: 5 TABLET ORAL at 08:15

## 2022-01-01 RX ADMIN — FAMOTIDINE 10 MG: 10 TABLET ORAL at 20:57

## 2022-01-01 RX ADMIN — PREDNISONE 40 MG: 20 TABLET ORAL at 09:58

## 2022-01-01 RX ADMIN — DEXAMETHASONE 6 MG: 2 TABLET ORAL at 15:53

## 2022-01-01 RX ADMIN — INSULIN ASPART 1 UNITS: 100 INJECTION, SOLUTION INTRAVENOUS; SUBCUTANEOUS at 17:11

## 2022-01-01 RX ADMIN — GABAPENTIN 100 MG: 100 CAPSULE ORAL at 17:59

## 2022-01-01 RX ADMIN — DEXAMETHASONE SODIUM PHOSPHATE 6 MG: 10 INJECTION, SOLUTION INTRAMUSCULAR; INTRAVENOUS at 03:07

## 2022-01-01 RX ADMIN — PANTOPRAZOLE SODIUM 40 MG: 40 TABLET, DELAYED RELEASE ORAL at 07:57

## 2022-01-01 RX ADMIN — GUAIFENESIN 600 MG: 600 TABLET, EXTENDED RELEASE ORAL at 20:06

## 2022-01-01 RX ADMIN — APIXABAN 2.5 MG: 2.5 TABLET, FILM COATED ORAL at 08:25

## 2022-01-01 RX ADMIN — PIPERACILLIN SODIUM AND TAZOBACTAM SODIUM 2.25 G: 2; .25 INJECTION, POWDER, LYOPHILIZED, FOR SOLUTION INTRAVENOUS at 23:59

## 2022-01-01 RX ADMIN — SODIUM CHLORIDE 300 ML: 9 INJECTION, SOLUTION INTRAVENOUS at 08:18

## 2022-01-01 ASSESSMENT — ACTIVITIES OF DAILY LIVING (ADL)
ADLS_ACUITY_SCORE: 17
ADLS_ACUITY_SCORE: 24
ADLS_ACUITY_SCORE: 15
ADLS_ACUITY_SCORE: 24
ADLS_ACUITY_SCORE: 24
ADLS_ACUITY_SCORE: 13
ADLS_ACUITY_SCORE: 13
ADLS_ACUITY_SCORE: 17
ADLS_ACUITY_SCORE: 17
ADLS_ACUITY_SCORE: 15
ADLS_ACUITY_SCORE: 24
ADLS_ACUITY_SCORE: 13
ADLS_ACUITY_SCORE: 13
ADLS_ACUITY_SCORE: 24
ADLS_ACUITY_SCORE: 15
ADLS_ACUITY_SCORE: 26
ADLS_ACUITY_SCORE: 13
ADLS_ACUITY_SCORE: 24
ADLS_ACUITY_SCORE: 17
ADLS_ACUITY_SCORE: 24
ADLS_ACUITY_SCORE: 17
ADLS_ACUITY_SCORE: 26
ADLS_ACUITY_SCORE: 13
ADLS_ACUITY_SCORE: 17
ADLS_ACUITY_SCORE: 10
ADLS_ACUITY_SCORE: 17
ADLS_ACUITY_SCORE: 24
ADLS_ACUITY_SCORE: 15
ADLS_ACUITY_SCORE: 13
ADLS_ACUITY_SCORE: 17
EQUIPMENT_CURRENTLY_USED_AT_HOME: GRAB BAR, TOILET;GRAB BAR, TUB/SHOWER
ADLS_ACUITY_SCORE: 15
ADLS_ACUITY_SCORE: 17
ADLS_ACUITY_SCORE: 17
ADLS_ACUITY_SCORE: 15
ADLS_ACUITY_SCORE: 27
ADLS_ACUITY_SCORE: 13
ADLS_ACUITY_SCORE: 15
ADLS_ACUITY_SCORE: 15
ADLS_ACUITY_SCORE: 13
ADLS_ACUITY_SCORE: 10
ADLS_ACUITY_SCORE: 13
ADLS_ACUITY_SCORE: 24
ADLS_ACUITY_SCORE: 18
ADLS_ACUITY_SCORE: 17
ADLS_ACUITY_SCORE: 13
ADLS_ACUITY_SCORE: 14
ADLS_ACUITY_SCORE: 13
ADLS_ACUITY_SCORE: 15
ADLS_ACUITY_SCORE: 24
ADLS_ACUITY_SCORE: 13
ADLS_ACUITY_SCORE: 18
ADLS_ACUITY_SCORE: 15
ADLS_ACUITY_SCORE: 27
ADLS_ACUITY_SCORE: 24
FALL_HISTORY_WITHIN_LAST_SIX_MONTHS: NO
ADLS_ACUITY_SCORE: 13
ADLS_ACUITY_SCORE: 27
ADLS_ACUITY_SCORE: 24
ADLS_ACUITY_SCORE: 17
ADLS_ACUITY_SCORE: 24
ADLS_ACUITY_SCORE: 24
ADLS_ACUITY_SCORE: 10
ADLS_ACUITY_SCORE: 13
ADLS_ACUITY_SCORE: 14
ADLS_ACUITY_SCORE: 26
ADLS_ACUITY_SCORE: 14
ADLS_ACUITY_SCORE: 15
ADLS_ACUITY_SCORE: 10
ADLS_ACUITY_SCORE: 13
ADLS_ACUITY_SCORE: 15
ADLS_ACUITY_SCORE: 13
ADLS_ACUITY_SCORE: 17
ADLS_ACUITY_SCORE: 27
EQUIPMENT_CURRENTLY_USED_AT_HOME: GRAB BAR, TOILET;GRAB BAR, TUB/SHOWER
ADLS_ACUITY_SCORE: 14
ADLS_ACUITY_SCORE: 15
ADLS_ACUITY_SCORE: 15
ADLS_ACUITY_SCORE: 10
ADLS_ACUITY_SCORE: 22
ADLS_ACUITY_SCORE: 17
ADLS_ACUITY_SCORE: 22
ADLS_ACUITY_SCORE: 17
ADLS_ACUITY_SCORE: 22
ADLS_ACUITY_SCORE: 14
ADLS_ACUITY_SCORE: 24
ADLS_ACUITY_SCORE: 17
ADLS_ACUITY_SCORE: 13
ADLS_ACUITY_SCORE: 24
ADLS_ACUITY_SCORE: 13
ADLS_ACUITY_SCORE: 12
ADLS_ACUITY_SCORE: 24
ADLS_ACUITY_SCORE: 13
ADLS_ACUITY_SCORE: 15
DRESSING/BATHING_DIFFICULTY: YES
ADLS_ACUITY_SCORE: 24
ADLS_ACUITY_SCORE: 14
ADLS_ACUITY_SCORE: 17
ADLS_ACUITY_SCORE: 12
ADLS_ACUITY_SCORE: 15
ADLS_ACUITY_SCORE: 27
ADLS_ACUITY_SCORE: 27
ADLS_ACUITY_SCORE: 24
ADLS_ACUITY_SCORE: 24
ADLS_ACUITY_SCORE: 27
ADLS_ACUITY_SCORE: 24
ADLS_ACUITY_SCORE: 13
ADLS_ACUITY_SCORE: 27
ADLS_ACUITY_SCORE: 13
ADLS_ACUITY_SCORE: 27
ADLS_ACUITY_SCORE: 17
ADLS_ACUITY_SCORE: 17
ADLS_ACUITY_SCORE: 24
ADLS_ACUITY_SCORE: 15
ADLS_ACUITY_SCORE: 15
ADLS_ACUITY_SCORE: 13
ADLS_ACUITY_SCORE: 14
ADLS_ACUITY_SCORE: 15
ADLS_ACUITY_SCORE: 17
ADLS_ACUITY_SCORE: 15
WEAR_GLASSES_OR_BLIND: YES
ADLS_ACUITY_SCORE: 12
ADLS_ACUITY_SCORE: 13
ADLS_ACUITY_SCORE: 17
ADLS_ACUITY_SCORE: 14
TOILETING: 1-->ASSISTANCE (EQUIPMENT/PERSON) NEEDED
ADLS_ACUITY_SCORE: 13
ADLS_ACUITY_SCORE: 10
ADLS_ACUITY_SCORE: 24
ADLS_ACUITY_SCORE: 24
ADLS_ACUITY_SCORE: 15
ADLS_ACUITY_SCORE: 10
ADLS_ACUITY_SCORE: 18
ADLS_ACUITY_SCORE: 15
ADLS_ACUITY_SCORE: 27
ADLS_ACUITY_SCORE: 14
ADLS_ACUITY_SCORE: 17
ADLS_ACUITY_SCORE: 15
ADLS_ACUITY_SCORE: 24
ADLS_ACUITY_SCORE: 25
ADLS_ACUITY_SCORE: 15
ADLS_ACUITY_SCORE: 17
ADLS_ACUITY_SCORE: 12
ADLS_ACUITY_SCORE: 13
ADLS_ACUITY_SCORE: 24
ADLS_ACUITY_SCORE: 12
ADLS_ACUITY_SCORE: 13
ADLS_ACUITY_SCORE: 17
ADLS_ACUITY_SCORE: 15
DOING_ERRANDS_INDEPENDENTLY_DIFFICULTY: YES
ADLS_ACUITY_SCORE: 27
ADLS_ACUITY_SCORE: 27
ADLS_ACUITY_SCORE: 24
ADLS_ACUITY_SCORE: 17
ADLS_ACUITY_SCORE: 13
ADLS_ACUITY_SCORE: 8
ADLS_ACUITY_SCORE: 26
ADLS_ACUITY_SCORE: 15
ADLS_ACUITY_SCORE: 24
ADLS_ACUITY_SCORE: 15
ADLS_ACUITY_SCORE: 24
ADLS_ACUITY_SCORE: 24
ADLS_ACUITY_SCORE: 17
ADLS_ACUITY_SCORE: 13
ADLS_ACUITY_SCORE: 12
ADLS_ACUITY_SCORE: 17
ADLS_ACUITY_SCORE: 24
ADLS_ACUITY_SCORE: 17
ADLS_ACUITY_SCORE: 13
ADLS_ACUITY_SCORE: 13
ADLS_ACUITY_SCORE: 24
ADLS_ACUITY_SCORE: 10
ADLS_ACUITY_SCORE: 17
ADLS_ACUITY_SCORE: 22
ADLS_ACUITY_SCORE: 17
ADLS_ACUITY_SCORE: 24
ADLS_ACUITY_SCORE: 24
ADLS_ACUITY_SCORE: 26
ADLS_ACUITY_SCORE: 26
ADLS_ACUITY_SCORE: 13
ADLS_ACUITY_SCORE: 27
ADLS_ACUITY_SCORE: 24
ADLS_ACUITY_SCORE: 24
TOILETING_ASSISTANCE: TOILETING DIFFICULTY, ASSISTANCE 1 PERSON
ADLS_ACUITY_SCORE: 26
ADLS_ACUITY_SCORE: 27
ADLS_ACUITY_SCORE: 24
ADLS_ACUITY_SCORE: 24
ADLS_ACUITY_SCORE: 13
ADLS_ACUITY_SCORE: 19
ADLS_ACUITY_SCORE: 26
ADLS_ACUITY_SCORE: 17
ADLS_ACUITY_SCORE: 13
ADLS_ACUITY_SCORE: 15
ADLS_ACUITY_SCORE: 17
ADLS_ACUITY_SCORE: 10
ADLS_ACUITY_SCORE: 15
ADLS_ACUITY_SCORE: 8
ADLS_ACUITY_SCORE: 17
ADLS_ACUITY_SCORE: 15
ADLS_ACUITY_SCORE: 24
ADLS_ACUITY_SCORE: 24
ADLS_ACUITY_SCORE: 15
ADLS_ACUITY_SCORE: 18
ADLS_ACUITY_SCORE: 13
ADLS_ACUITY_SCORE: 13
ADLS_ACUITY_SCORE: 15
ADLS_ACUITY_SCORE: 15
ADLS_ACUITY_SCORE: 10
ADLS_ACUITY_SCORE: 24
ADLS_ACUITY_SCORE: 18
ADLS_ACUITY_SCORE: 27
ADLS_ACUITY_SCORE: 13
ADLS_ACUITY_SCORE: 17
ADLS_ACUITY_SCORE: 17
ADLS_ACUITY_SCORE: 15
ADLS_ACUITY_SCORE: 15
ADLS_ACUITY_SCORE: 13
ADLS_ACUITY_SCORE: 17
ADLS_ACUITY_SCORE: 17
ADLS_ACUITY_SCORE: 15
ADLS_ACUITY_SCORE: 24
ADLS_ACUITY_SCORE: 24
ADLS_ACUITY_SCORE: 10
ADLS_ACUITY_SCORE: 24
ADLS_ACUITY_SCORE: 15
DRESS: 1-->ASSISTANCE (EQUIPMENT/PERSON) NEEDED (NOT DEVELOPMENTALLY APPROPRIATE)
ADLS_ACUITY_SCORE: 13
ADLS_ACUITY_SCORE: 17
ADLS_ACUITY_SCORE: 13
ADLS_ACUITY_SCORE: 17
ADLS_ACUITY_SCORE: 17
ADLS_ACUITY_SCORE: 18
ADLS_ACUITY_SCORE: 17
ADLS_ACUITY_SCORE: 15
ADLS_ACUITY_SCORE: 19
ADLS_ACUITY_SCORE: 13
ADLS_ACUITY_SCORE: 13
ADLS_ACUITY_SCORE: 12
ADLS_ACUITY_SCORE: 27
ADLS_ACUITY_SCORE: 24
ADLS_ACUITY_SCORE: 13
ADLS_ACUITY_SCORE: 24
ADLS_ACUITY_SCORE: 26
ADLS_ACUITY_SCORE: 12
ADLS_ACUITY_SCORE: 27
ADLS_ACUITY_SCORE: 24
ADLS_ACUITY_SCORE: 13
ADLS_ACUITY_SCORE: 24
ADLS_ACUITY_SCORE: 15
ADLS_ACUITY_SCORE: 27
ADLS_ACUITY_SCORE: 26
ADLS_ACUITY_SCORE: 15
ADLS_ACUITY_SCORE: 15
ADLS_ACUITY_SCORE: 26
ADLS_ACUITY_SCORE: 15
ADLS_ACUITY_SCORE: 13
ADLS_ACUITY_SCORE: 26
ADLS_ACUITY_SCORE: 15
ADLS_ACUITY_SCORE: 8
ADLS_ACUITY_SCORE: 12
ADLS_ACUITY_SCORE: 13
ADLS_ACUITY_SCORE: 12
ADLS_ACUITY_SCORE: 13
ADLS_ACUITY_SCORE: 26
ADLS_ACUITY_SCORE: 17
ADLS_ACUITY_SCORE: 13
ADLS_ACUITY_SCORE: 13
ADLS_ACUITY_SCORE: 17
ADLS_ACUITY_SCORE: 14
ADLS_ACUITY_SCORE: 15
ADLS_ACUITY_SCORE: 22
ADLS_ACUITY_SCORE: 15
ADLS_ACUITY_SCORE: 17
ADLS_ACUITY_SCORE: 24
ADLS_ACUITY_SCORE: 17
ADLS_ACUITY_SCORE: 13
ADLS_ACUITY_SCORE: 24
ADLS_ACUITY_SCORE: 26
ADLS_ACUITY_SCORE: 13
ADLS_ACUITY_SCORE: 15
ADLS_ACUITY_SCORE: 26
ADLS_ACUITY_SCORE: 26
ADLS_ACUITY_SCORE: 13
ADLS_ACUITY_SCORE: 15
ADLS_ACUITY_SCORE: 13
ADLS_ACUITY_SCORE: 17
ADLS_ACUITY_SCORE: 13
ADLS_ACUITY_SCORE: 24
ADLS_ACUITY_SCORE: 15
ADLS_ACUITY_SCORE: 15
ADLS_ACUITY_SCORE: 17
ADLS_ACUITY_SCORE: 17
ADLS_ACUITY_SCORE: 15
ADLS_ACUITY_SCORE: 10
ADLS_ACUITY_SCORE: 13
ADLS_ACUITY_SCORE: 17
ADLS_ACUITY_SCORE: 15
ADLS_ACUITY_SCORE: 24
ADLS_ACUITY_SCORE: 14
ADLS_ACUITY_SCORE: 26
ADLS_ACUITY_SCORE: 17
ADLS_ACUITY_SCORE: 12
DIFFICULTY_COMMUNICATING: NO
ADLS_ACUITY_SCORE: 26
ADLS_ACUITY_SCORE: 17
ADLS_ACUITY_SCORE: 24
ADLS_ACUITY_SCORE: 13
ADLS_ACUITY_SCORE: 15
ADLS_ACUITY_SCORE: 13
ADLS_ACUITY_SCORE: 13
ADLS_ACUITY_SCORE: 12
ADLS_ACUITY_SCORE: 24
ADLS_ACUITY_SCORE: 27
ADLS_ACUITY_SCORE: 13
ADLS_ACUITY_SCORE: 24
ADLS_ACUITY_SCORE: 15
ADLS_ACUITY_SCORE: 15
ADLS_ACUITY_SCORE: 24
ADLS_ACUITY_SCORE: 17
ADLS_ACUITY_SCORE: 26
ADLS_ACUITY_SCORE: 13
ADLS_ACUITY_SCORE: 13
ADLS_ACUITY_SCORE: 24
ADLS_ACUITY_SCORE: 24
ADLS_ACUITY_SCORE: 13
ADLS_ACUITY_SCORE: 17
ADLS_ACUITY_SCORE: 17
ADLS_ACUITY_SCORE: 27
ADLS_ACUITY_SCORE: 26
ADLS_ACUITY_SCORE: 24
ADLS_ACUITY_SCORE: 24
ADLS_ACUITY_SCORE: 26
ADLS_ACUITY_SCORE: 24
ADLS_ACUITY_SCORE: 13
ADLS_ACUITY_SCORE: 13
ADLS_ACUITY_SCORE: 24
ADLS_ACUITY_SCORE: 17
ADLS_ACUITY_SCORE: 24
ADLS_ACUITY_SCORE: 24
ADLS_ACUITY_SCORE: 14
ADLS_ACUITY_SCORE: 15
ADLS_ACUITY_SCORE: 12
ADLS_ACUITY_SCORE: 17
ADLS_ACUITY_SCORE: 13
ADLS_ACUITY_SCORE: 15
ADLS_ACUITY_SCORE: 17
ADLS_ACUITY_SCORE: 14
ADLS_ACUITY_SCORE: 17
ADLS_ACUITY_SCORE: 17
ADLS_ACUITY_SCORE: 26
DEPENDENT_IADLS:: CLEANING;SHOPPING;TRANSPORTATION
ADLS_ACUITY_SCORE: 27
ADLS_ACUITY_SCORE: 17
ADLS_ACUITY_SCORE: 17
ADLS_ACUITY_SCORE: 15
ADLS_ACUITY_SCORE: 17
ADLS_ACUITY_SCORE: 14
DRESSING/BATHING_DIFFICULTY: NO
ADLS_ACUITY_SCORE: 10
ADLS_ACUITY_SCORE: 17
ADLS_ACUITY_SCORE: 24
ADLS_ACUITY_SCORE: 13
ADLS_ACUITY_SCORE: 24
ADLS_ACUITY_SCORE: 17
ADLS_ACUITY_SCORE: 15
ADLS_ACUITY_SCORE: 24
ADLS_ACUITY_SCORE: 24
ADLS_ACUITY_SCORE: 10
ADLS_ACUITY_SCORE: 15
ADLS_ACUITY_SCORE: 13
ADLS_ACUITY_SCORE: 26
ADLS_ACUITY_SCORE: 13
ADLS_ACUITY_SCORE: 18
ADLS_ACUITY_SCORE: 17
ADLS_ACUITY_SCORE: 24
ADLS_ACUITY_SCORE: 13
ADLS_ACUITY_SCORE: 15
CHANGE_IN_FUNCTIONAL_STATUS_SINCE_ONSET_OF_CURRENT_ILLNESS/INJURY: YES
ADLS_ACUITY_SCORE: 18
ADLS_ACUITY_SCORE: 17
ADLS_ACUITY_SCORE: 15
ADLS_ACUITY_SCORE: 17
ADLS_ACUITY_SCORE: 17
ADLS_ACUITY_SCORE: 26
ADLS_ACUITY_SCORE: 13
ADLS_ACUITY_SCORE: 17
ADLS_ACUITY_SCORE: 15
ADLS_ACUITY_SCORE: 26
ADLS_ACUITY_SCORE: 12
ADLS_ACUITY_SCORE: 24
ADLS_ACUITY_SCORE: 13
ADLS_ACUITY_SCORE: 27
ADLS_ACUITY_SCORE: 17
ADLS_ACUITY_SCORE: 13
ADLS_ACUITY_SCORE: 24
ADLS_ACUITY_SCORE: 15
ADLS_ACUITY_SCORE: 17
ADLS_ACUITY_SCORE: 13
ADLS_ACUITY_SCORE: 17
ADLS_ACUITY_SCORE: 13
ADLS_ACUITY_SCORE: 15
ADLS_ACUITY_SCORE: 10
ADLS_ACUITY_SCORE: 13
TOILETING_ISSUES: YES
ADLS_ACUITY_SCORE: 17
ADLS_ACUITY_SCORE: 26
ADLS_ACUITY_SCORE: 24
ADLS_ACUITY_SCORE: 14
ADLS_ACUITY_SCORE: 15
ADLS_ACUITY_SCORE: 15
ADLS_ACUITY_SCORE: 19
ADLS_ACUITY_SCORE: 13
ADLS_ACUITY_SCORE: 12
ADLS_ACUITY_SCORE: 10
ADLS_ACUITY_SCORE: 17
ADLS_ACUITY_SCORE: 12
ADLS_ACUITY_SCORE: 13
ADLS_ACUITY_SCORE: 12
ADLS_ACUITY_SCORE: 24
ADLS_ACUITY_SCORE: 26
ADLS_ACUITY_SCORE: 15
ADLS_ACUITY_SCORE: 26
WALKING_OR_CLIMBING_STAIRS_DIFFICULTY: YES
ADLS_ACUITY_SCORE: 13
ADLS_ACUITY_SCORE: 17
ADLS_ACUITY_SCORE: 13
ADLS_ACUITY_SCORE: 27
ADLS_ACUITY_SCORE: 24
ADLS_ACUITY_SCORE: 24
ADLS_ACUITY_SCORE: 26
ADLS_ACUITY_SCORE: 15
DIFFICULTY_EATING/SWALLOWING: NO
ADLS_ACUITY_SCORE: 24
ADLS_ACUITY_SCORE: 13
ADLS_ACUITY_SCORE: 17
ADLS_ACUITY_SCORE: 24
ADLS_ACUITY_SCORE: 14
ADLS_ACUITY_SCORE: 17
ADLS_ACUITY_SCORE: 14
ADLS_ACUITY_SCORE: 15
ADLS_ACUITY_SCORE: 13
ADLS_ACUITY_SCORE: 26
ADLS_ACUITY_SCORE: 13
ADLS_ACUITY_SCORE: 17
ADLS_ACUITY_SCORE: 24
ADLS_ACUITY_SCORE: 17
ADLS_ACUITY_SCORE: 24
ADLS_ACUITY_SCORE: 24
ADLS_ACUITY_SCORE: 18
ADLS_ACUITY_SCORE: 13
DRESS: 1-->ASSISTANCE (EQUIPMENT/PERSON) NEEDED
ADLS_ACUITY_SCORE: 13
ADLS_ACUITY_SCORE: 17
ADLS_ACUITY_SCORE: 26
ADLS_ACUITY_SCORE: 13
ADLS_ACUITY_SCORE: 15
ADLS_ACUITY_SCORE: 13
ADLS_ACUITY_SCORE: 10
ADLS_ACUITY_SCORE: 15
ADLS_ACUITY_SCORE: 13
ADLS_ACUITY_SCORE: 19
ADLS_ACUITY_SCORE: 26
ADLS_ACUITY_SCORE: 17
ADLS_ACUITY_SCORE: 13
ADLS_ACUITY_SCORE: 13
ADLS_ACUITY_SCORE: 15
ADLS_ACUITY_SCORE: 15
ADLS_ACUITY_SCORE: 26
ADLS_ACUITY_SCORE: 12
ADLS_ACUITY_SCORE: 13
ADLS_ACUITY_SCORE: 13
ADLS_ACUITY_SCORE: 17
ADLS_ACUITY_SCORE: 15
ADLS_ACUITY_SCORE: 24
ADLS_ACUITY_SCORE: 17
ADLS_ACUITY_SCORE: 13
ADLS_ACUITY_SCORE: 17
ADLS_ACUITY_SCORE: 17
ADLS_ACUITY_SCORE: 24
ADLS_ACUITY_SCORE: 17
ADLS_ACUITY_SCORE: 6
ADLS_ACUITY_SCORE: 17
ADLS_ACUITY_SCORE: 15
ADLS_ACUITY_SCORE: 24
ADLS_ACUITY_SCORE: 26
ADLS_ACUITY_SCORE: 13
ADLS_ACUITY_SCORE: 15
ADLS_ACUITY_SCORE: 12
ADLS_ACUITY_SCORE: 24
ADLS_ACUITY_SCORE: 15
ADLS_ACUITY_SCORE: 24
ADLS_ACUITY_SCORE: 15
ADLS_ACUITY_SCORE: 24
ADLS_ACUITY_SCORE: 15
ADLS_ACUITY_SCORE: 17
ADLS_ACUITY_SCORE: 14
ADLS_ACUITY_SCORE: 17
ADLS_ACUITY_SCORE: 26
ADLS_ACUITY_SCORE: 24
ADLS_ACUITY_SCORE: 15
ADLS_ACUITY_SCORE: 8
PATIENT_/_FAMILY_COMMUNICATION_STYLE: SPOKEN LANGUAGE (ENGLISH OR BILINGUAL)
ADLS_ACUITY_SCORE: 12
ADLS_ACUITY_SCORE: 15
ADLS_ACUITY_SCORE: 17
ADLS_ACUITY_SCORE: 26
ADLS_ACUITY_SCORE: 17
ADLS_ACUITY_SCORE: 17
ADLS_ACUITY_SCORE: 24
DRESSING/BATHING: BATHING DIFFICULTY, ASSISTANCE 1 PERSON;DRESSING DIFFICULTY, ASSISTANCE 1 PERSON
TOILETING: 0-->NOT TOILET TRAINED OR ASSISTANCE NEEDED (DEVELOPMENTALLY APPROPRIATE)
ADLS_ACUITY_SCORE: 17
ADLS_ACUITY_SCORE: 24
ADLS_ACUITY_SCORE: 15
ADLS_ACUITY_SCORE: 13
ADLS_ACUITY_SCORE: 15
ADLS_ACUITY_SCORE: 15
ADLS_ACUITY_SCORE: 17
ADLS_ACUITY_SCORE: 15
ADLS_ACUITY_SCORE: 14
ADLS_ACUITY_SCORE: 13
ADLS_ACUITY_SCORE: 15
ADLS_ACUITY_SCORE: 17
ADLS_ACUITY_SCORE: 15
ADLS_ACUITY_SCORE: 13
ADLS_ACUITY_SCORE: 15
ADLS_ACUITY_SCORE: 24
ADLS_ACUITY_SCORE: 13
ADLS_ACUITY_SCORE: 17
ADLS_ACUITY_SCORE: 26
ADLS_ACUITY_SCORE: 15
ADLS_ACUITY_SCORE: 15
ADLS_ACUITY_SCORE: 10
ADLS_ACUITY_SCORE: 12
ADLS_ACUITY_SCORE: 15
ADLS_ACUITY_SCORE: 8
ADLS_ACUITY_SCORE: 13
ADLS_ACUITY_SCORE: 17
ADLS_ACUITY_SCORE: 15
ADLS_ACUITY_SCORE: 24
ADLS_ACUITY_SCORE: 26
ADLS_ACUITY_SCORE: 13
ADLS_ACUITY_SCORE: 15
ADLS_ACUITY_SCORE: 15
ADLS_ACUITY_SCORE: 24
ADLS_ACUITY_SCORE: 17
ADLS_ACUITY_SCORE: 17
ADLS_ACUITY_SCORE: 13
ADLS_ACUITY_SCORE: 22
ADLS_ACUITY_SCORE: 14
ADLS_ACUITY_SCORE: 15
ADLS_ACUITY_SCORE: 24
ADLS_ACUITY_SCORE: 24
ADLS_ACUITY_SCORE: 26
ADLS_ACUITY_SCORE: 24
ADLS_ACUITY_SCORE: 13
ADLS_ACUITY_SCORE: 24
ADLS_ACUITY_SCORE: 24
ADLS_ACUITY_SCORE: 15
ADLS_ACUITY_SCORE: 15
ADLS_ACUITY_SCORE: 13
ADLS_ACUITY_SCORE: 24
ADLS_ACUITY_SCORE: 13
ADLS_ACUITY_SCORE: 13
ADLS_ACUITY_SCORE: 24
ADLS_ACUITY_SCORE: 26
ADLS_ACUITY_SCORE: 15
ADLS_ACUITY_SCORE: 14
ADLS_ACUITY_SCORE: 13
ADLS_ACUITY_SCORE: 24
ADLS_ACUITY_SCORE: 15
ADLS_ACUITY_SCORE: 24
ADLS_ACUITY_SCORE: 24
ADLS_ACUITY_SCORE: 15
ADLS_ACUITY_SCORE: 10
ADLS_ACUITY_SCORE: 26
ADLS_ACUITY_SCORE: 13
ADLS_ACUITY_SCORE: 26
ADLS_ACUITY_SCORE: 12
ADLS_ACUITY_SCORE: 13
ADLS_ACUITY_SCORE: 17
ADLS_ACUITY_SCORE: 13
TRANSFERRING: 1-->ASSISTANCE (EQUIPMENT/PERSON) NEEDED
ADLS_ACUITY_SCORE: 14
ADLS_ACUITY_SCORE: 24
ADLS_ACUITY_SCORE: 13
ADLS_ACUITY_SCORE: 26
ADLS_ACUITY_SCORE: 24
ADLS_ACUITY_SCORE: 17
ADLS_ACUITY_SCORE: 17
ADLS_ACUITY_SCORE: 15
ADLS_ACUITY_SCORE: 22
ADLS_ACUITY_SCORE: 13
ADLS_ACUITY_SCORE: 15
ADLS_ACUITY_SCORE: 27
ADLS_ACUITY_SCORE: 17
ADLS_ACUITY_SCORE: 12
ADLS_ACUITY_SCORE: 24
ADLS_ACUITY_SCORE: 27
ADLS_ACUITY_SCORE: 13
ADLS_ACUITY_SCORE: 13
ADLS_ACUITY_SCORE: 27
ADLS_ACUITY_SCORE: 15
ADLS_ACUITY_SCORE: 17
ADLS_ACUITY_SCORE: 26
ADLS_ACUITY_SCORE: 13
ADLS_ACUITY_SCORE: 17
ADLS_ACUITY_SCORE: 22
ADLS_ACUITY_SCORE: 13
ADLS_ACUITY_SCORE: 24
ADLS_ACUITY_SCORE: 14
ADLS_ACUITY_SCORE: 26
ADLS_ACUITY_SCORE: 13
ADLS_ACUITY_SCORE: 15
ADLS_ACUITY_SCORE: 17
ADLS_ACUITY_SCORE: 13
ADLS_ACUITY_SCORE: 17
ADLS_ACUITY_SCORE: 17
ADLS_ACUITY_SCORE: 10
ADLS_ACUITY_SCORE: 13
ADLS_ACUITY_SCORE: 24
ADLS_ACUITY_SCORE: 24
ADLS_ACUITY_SCORE: 26
ADLS_ACUITY_SCORE: 27
ADLS_ACUITY_SCORE: 15
ADLS_ACUITY_SCORE: 17
ADLS_ACUITY_SCORE: 8
ADLS_ACUITY_SCORE: 13
ADLS_ACUITY_SCORE: 17
ADLS_ACUITY_SCORE: 13
ADLS_ACUITY_SCORE: 26
ADLS_ACUITY_SCORE: 14
ADLS_ACUITY_SCORE: 26
ADLS_ACUITY_SCORE: 17
ADLS_ACUITY_SCORE: 26
ADLS_ACUITY_SCORE: 13
ADLS_ACUITY_SCORE: 10
ADLS_ACUITY_SCORE: 13
ADLS_ACUITY_SCORE: 13
ADLS_ACUITY_SCORE: 26
ADLS_ACUITY_SCORE: 24
ADLS_ACUITY_SCORE: 27
ADLS_ACUITY_SCORE: 14
ADLS_ACUITY_SCORE: 10
ADLS_ACUITY_SCORE: 17
ADLS_ACUITY_SCORE: 15
ADLS_ACUITY_SCORE: 17
ADLS_ACUITY_SCORE: 13
ADLS_ACUITY_SCORE: 27
ADLS_ACUITY_SCORE: 13
ADLS_ACUITY_SCORE: 13
ADLS_ACUITY_SCORE: 27
ADLS_ACUITY_SCORE: 13
ADLS_ACUITY_SCORE: 15
ADLS_ACUITY_SCORE: 13
ADLS_ACUITY_SCORE: 13
TOILETING_ISSUES: NO
ADLS_ACUITY_SCORE: 26
ADLS_ACUITY_SCORE: 13
ADLS_ACUITY_SCORE: 15
ADLS_ACUITY_SCORE: 24
ADLS_ACUITY_SCORE: 13
DEPENDENT_IADLS:: CLEANING;SHOPPING;TRANSPORTATION
ADLS_ACUITY_SCORE: 24
TRANSFERRING: 1-->ASSISTANCE (EQUIPMENT/PERSON) NEEDED (NOT DEVELOPMENTALLY APPROPRIATE)
ADLS_ACUITY_SCORE: 24
ADLS_ACUITY_SCORE: 15
ADLS_ACUITY_SCORE: 24
ADLS_ACUITY_SCORE: 17
ADLS_ACUITY_SCORE: 13
ADLS_ACUITY_SCORE: 17
ADLS_ACUITY_SCORE: 24
ADLS_ACUITY_SCORE: 15
ADLS_ACUITY_SCORE: 13
ADLS_ACUITY_SCORE: 13
ADLS_ACUITY_SCORE: 27
ADLS_ACUITY_SCORE: 24
ADLS_ACUITY_SCORE: 17
ADLS_ACUITY_SCORE: 26
ADLS_ACUITY_SCORE: 18
ADLS_ACUITY_SCORE: 24
ADLS_ACUITY_SCORE: 13
ADLS_ACUITY_SCORE: 15
ADLS_ACUITY_SCORE: 13
ADLS_ACUITY_SCORE: 17
ADLS_ACUITY_SCORE: 24
ADLS_ACUITY_SCORE: 18
ADLS_ACUITY_SCORE: 13
ADLS_ACUITY_SCORE: 17
ADLS_ACUITY_SCORE: 14
ADLS_ACUITY_SCORE: 17
ADLS_ACUITY_SCORE: 24
ADLS_ACUITY_SCORE: 17
ADLS_ACUITY_SCORE: 13
ADLS_ACUITY_SCORE: 15
ADLS_ACUITY_SCORE: 15
ADLS_ACUITY_SCORE: 17
ADLS_ACUITY_SCORE: 13
ADLS_ACUITY_SCORE: 24
ADLS_ACUITY_SCORE: 24
ADLS_ACUITY_SCORE: 26
ADLS_ACUITY_SCORE: 17
ADLS_ACUITY_SCORE: 17
ADLS_ACUITY_SCORE: 18
ADLS_ACUITY_SCORE: 17
ADLS_ACUITY_SCORE: 24
ADLS_ACUITY_SCORE: 13
ADLS_ACUITY_SCORE: 13
DIFFICULTY_EATING/SWALLOWING: NO
ADLS_ACUITY_SCORE: 15
ADLS_ACUITY_SCORE: 13
ADLS_ACUITY_SCORE: 15
ADLS_ACUITY_SCORE: 17
ADLS_ACUITY_SCORE: 15
ADLS_ACUITY_SCORE: 24
ADLS_ACUITY_SCORE: 24
ADLS_ACUITY_SCORE: 15
ADLS_ACUITY_SCORE: 17
ADLS_ACUITY_SCORE: 14
ADLS_ACUITY_SCORE: 26
ADLS_ACUITY_SCORE: 10
ADLS_ACUITY_SCORE: 24
ADLS_ACUITY_SCORE: 24
ADLS_ACUITY_SCORE: 17
ADLS_ACUITY_SCORE: 27
ADLS_ACUITY_SCORE: 10
ADLS_ACUITY_SCORE: 15
ADLS_ACUITY_SCORE: 14
ADLS_ACUITY_SCORE: 10
ADLS_ACUITY_SCORE: 10
ADLS_ACUITY_SCORE: 27
ADLS_ACUITY_SCORE: 15
ADLS_ACUITY_SCORE: 27
ADLS_ACUITY_SCORE: 27
ADLS_ACUITY_SCORE: 13
ADLS_ACUITY_SCORE: 17
ADLS_ACUITY_SCORE: 15
ADLS_ACUITY_SCORE: 13
ADLS_ACUITY_SCORE: 17
ADLS_ACUITY_SCORE: 24
ADLS_ACUITY_SCORE: 12
ADLS_ACUITY_SCORE: 13
ADLS_ACUITY_SCORE: 15
ADLS_ACUITY_SCORE: 12
ADLS_ACUITY_SCORE: 15
ADLS_ACUITY_SCORE: 17
ADLS_ACUITY_SCORE: 26
ADLS_ACUITY_SCORE: 17
ADLS_ACUITY_SCORE: 10
ADLS_ACUITY_SCORE: 26
ADLS_ACUITY_SCORE: 15
ADLS_ACUITY_SCORE: 24
ADLS_ACUITY_SCORE: 15
ADLS_ACUITY_SCORE: 17
ADLS_ACUITY_SCORE: 13
ADLS_ACUITY_SCORE: 27
ADLS_ACUITY_SCORE: 24
ADLS_ACUITY_SCORE: 14
ADLS_ACUITY_SCORE: 17
ADLS_ACUITY_SCORE: 17
ADLS_ACUITY_SCORE: 13
ADLS_ACUITY_SCORE: 17
ADLS_ACUITY_SCORE: 13
ADLS_ACUITY_SCORE: 24
ADLS_ACUITY_SCORE: 17
ADLS_ACUITY_SCORE: 24
ADLS_ACUITY_SCORE: 12
ADLS_ACUITY_SCORE: 17
ADLS_ACUITY_SCORE: 26
ADLS_ACUITY_SCORE: 14
ADLS_ACUITY_SCORE: 13
ADLS_ACUITY_SCORE: 13
ADLS_ACUITY_SCORE: 24
ADLS_ACUITY_SCORE: 27
ADLS_ACUITY_SCORE: 13
ADLS_ACUITY_SCORE: 26
ADLS_ACUITY_SCORE: 12
DEPENDENT_IADLS:: CLEANING;SHOPPING;TRANSPORTATION
ADLS_ACUITY_SCORE: 15
CONCENTRATING,_REMEMBERING_OR_MAKING_DECISIONS_DIFFICULTY: NO
ADLS_ACUITY_SCORE: 17
ADLS_ACUITY_SCORE: 26
ADLS_ACUITY_SCORE: 24
ADLS_ACUITY_SCORE: 15
ADLS_ACUITY_SCORE: 13
ADLS_ACUITY_SCORE: 13
ADLS_ACUITY_SCORE: 15
ADLS_ACUITY_SCORE: 14
ADLS_ACUITY_SCORE: 26
ADLS_ACUITY_SCORE: 27
ADLS_ACUITY_SCORE: 26
ADLS_ACUITY_SCORE: 15
ADLS_ACUITY_SCORE: 26
ADLS_ACUITY_SCORE: 15
ADLS_ACUITY_SCORE: 13
ADLS_ACUITY_SCORE: 15
ADLS_ACUITY_SCORE: 24
BATHING: 1-->ASSISTANCE NEEDED
ADLS_ACUITY_SCORE: 13
ADLS_ACUITY_SCORE: 18
ADLS_ACUITY_SCORE: 26
ADLS_ACUITY_SCORE: 27
ADLS_ACUITY_SCORE: 24
ADLS_ACUITY_SCORE: 13
ADLS_ACUITY_SCORE: 26
ADLS_ACUITY_SCORE: 17
ADLS_ACUITY_SCORE: 15
ADLS_ACUITY_SCORE: 14
ADLS_ACUITY_SCORE: 15
ADLS_ACUITY_SCORE: 24
ADLS_ACUITY_SCORE: 26
ADLS_ACUITY_SCORE: 17
ADLS_ACUITY_SCORE: 17
ADLS_ACUITY_SCORE: 13

## 2022-01-01 ASSESSMENT — ENCOUNTER SYMPTOMS
SHORTNESS OF BREATH: 1
NAUSEA: 0
ARTHRALGIAS: 0
FEVER: 0
PALPITATIONS: 0
DIARRHEA: 0
WEAKNESS: 1
VOMITING: 0
MYALGIAS: 0
FEVER: 0
MYALGIAS: 0
WEAKNESS: 1
SHORTNESS OF BREATH: 1
CHILLS: 0
APPETITE CHANGE: 1
COUGH: 0
VOMITING: 0
DIFFICULTY URINATING: 1
ABDOMINAL PAIN: 0
SHORTNESS OF BREATH: 1

## 2022-01-01 ASSESSMENT — MIFFLIN-ST. JEOR
SCORE: 1459.5
SCORE: 1463.5
SCORE: 1466.5

## 2022-01-14 NOTE — TELEPHONE ENCOUNTER
Routing refill request to provider for review/approval because:  Drug not on the [facility] refill protocol   Darlyn Doss RN

## 2022-01-18 NOTE — PROGRESS NOTES
Patient arrived for wound care visit. Certified Wound Care Nurse time spent evaluating patient record, completed a full evaluation and documented wound(s) & filippo-wound skin; provided recommendation based on treatment plan. Applied dressing, reviewed discharge instructions, patient education, and discussed plan of care with appropriate medical team staff members and patient and/or family members.       Old wound healed but pt injuried this morning; education provided to pt and family regarding future care and prevention. Pt will folow up as needed

## 2022-01-18 NOTE — PROGRESS NOTES
[facility]    ASSESSMENT:   1. (G20.434G) Skin tear of forearm without complication, left, initial encounter    PLAN/DISCUSSION:   1. PRN skin tear care plan: xeroform, abd, spandage, change on shower days  2. Discussed using skin prep/barrier film prior to adhesives at dialysis, using adhesive removers such as unisolve to remove the tape  3. Can continue to use tegaderm under areas that tape will be used  4. See bottom of note for detailed wound care and patient instructions  5. Follow-up prn    HISTORY OF PRESENT ILLNESS:   Westley Ness is a 76 year old male with ESRD on HD who returns today for a skin tear on left forearm. Presents along with caregiver, Lynn.     Returns for follow-up. Original skin tear is all healed up now. Had been using xeroform, ABD and spandage. Unfortunately still having issues with skin tears from adhesive that is used during dialysis.    VITALS: /60 (BP Location: Right arm, Patient Position: Sitting)   Pulse 76   Temp 97.1  F (36.2  C) (Temporal)   Resp 18      PHYSICAL EXAM:  GENERAL: Patient is alert and oriented and in no acute distress  CV: multiphasic signal heard on doppler   INTEGUMENTARY:   Wound (used by OP [facility] only) 01/18/22 0854 Left (Active)   Thickness/Stage full thickness 01/18/22 0800   Dressing Appearance moist drainage 01/18/22 0800   Base moist;red;bleeding 01/18/22 0800   Periwound excoriated 01/18/22 0800   Periwound Temperature warm 01/18/22 0800   Periwound Skin Turgor soft 01/18/22 0800   Edges open;jagged 01/18/22 0800   Length (cm) 0.4 01/18/22 0800   Width (cm) 1.1 01/18/22 0800   Depth (cm) 0 01/18/22 0800   Wound (cm^2) 0.44 cm^2 01/18/22 0800   Wound Volume (cm^3) 0 cm^3 01/18/22 0800   Drainage Characteristics/Odor serosanguineous 01/18/22 0800   Drainage Amount moderate 01/18/22 0800   Care, Wound non-select wound debridement performed 01/18/22 0800       Wound (used by OP [facility] only) 01/18/22 0855 Left (Active)    Thickness/Stage full thickness 01/18/22 0800   Dressing Appearance moist drainage 01/18/22 0800   Base bleeding;moist;red 01/18/22 0800   Periwound intact 01/18/22 0800   Periwound Temperature warm 01/18/22 0800   Periwound Skin Turgor soft 01/18/22 0800   Edges open;jagged 01/18/22 0800   Length (cm) 1.1 01/18/22 0800   Width (cm) 2 01/18/22 0800   Depth (cm) 0 01/18/22 0800   Wound (cm^2) 2.2 cm^2 01/18/22 0800   Wound Volume (cm^3) 0 cm^3 01/18/22 0800   Drainage Characteristics/Odor serosanguineous 01/18/22 0800   Drainage Amount moderate 01/18/22 0800   Care, Wound non-select wound debridement performed 01/18/22 0800           MDM: 30-39 minutes were spent on the date of the visit reviewing previous chart notes, evaluating patient and developing the treatment plan, this excludes any time spent on procedures.         Further instructions from your care team       Westley Ness      1945    Wound Dressing Change: left arm  Cleanse wound and surrounding skin with: mild soap and water in shower  Cover wound with Xeroform, then ABD pad, then Spandage or roll gauze & tape  Change dressing 3 times weekly    To prevent future trauma:  - Avoid adhesives  - use adhesive remover- Unisol; No Sting Film barrier/ Spray or barrier swabs  - Use hypoallergenic, high quality moisturizer on intact skin daily (CeraVe, Eucerin, Vanicream)      Keiko Castillo PA-C. January 18, 2022    Call us at [phone] if you have any questions about your wounds, have redness or swelling around your wound, have a fever of 101 or greater or if you have any other problems or concerns. We answer the phone Monday through Friday 8 am to 4 pm, please leave a message as we check the voicemail frequently throughout the day.     If you had a positive experience please indicate on your patient satisfaction survey form that [employer] [facility] will be sending you.    If you have any billing related questions please call the [facility]  office at [phone]. The clinic staff does not handle billing related matters.

## 2022-02-11 PROBLEM — N18.6 END STAGE RENAL FAILURE ON DIALYSIS (H): Status: ACTIVE | Noted: 2022-01-01

## 2022-02-11 PROBLEM — U07.1 INFECTION DUE TO 2019 NOVEL CORONAVIRUS: Status: ACTIVE | Noted: 2022-01-01

## 2022-02-11 PROBLEM — Z99.2 END STAGE RENAL FAILURE ON DIALYSIS (H): Status: ACTIVE | Noted: 2022-01-01

## 2022-02-11 NOTE — ED PROVIDER NOTES
"  History   Chief Complaint:  Shortness of Breath    The history is provided by the patient.      Westley Ness is a 76 year old male with a pacemaker on Eliquis and Plavix, who receives dialysis MWF, with history of myocardial infarctions, CAD, ESRD, atrial flutter, hypertension, and ischemic cardiomyopathy who presents with progressively worsening chest pain and shortness of breath. The patient reports that he has been experiencing \"wandering\" pain for the last 1.5 weeks beginning at the ankles progressing upwards to the knees, hips, and eventually chest. At presentation, the chest pain is localized to the right side near the armpit back to the shoulder blades. The patient notes that the pain is the most severe this morning and made it difficult for him to take a shower and get dressed. He also notes progressively worsening shortness of breath on exertion beginning about 2 weeks ago. He also endorses little production of urine and intermittent esophageal pain that began a \"few weeks ago.\" Of note, the patient has a history of multiple myocardial infarctions, the first being about 26 years ago. His previous myocardial infarctions presented with right arm pain in all but one instance, but the patient states that his current pain is dissimilar to that. He denies any history of cancer or any recent trauma/injury. He denies fever, chills, myalgias, nausea, or vomiting. No at-home oxygen.     Review of Systems   Constitutional: Negative for chills and fever.   HENT:        + esophageal pain   Respiratory: Positive for shortness of breath.    Cardiovascular: Positive for chest pain.   Gastrointestinal: Negative for nausea and vomiting.   Genitourinary: Positive for difficulty urinating.   Musculoskeletal: Negative for myalgias.   All other systems reviewed and are negative.     Allergies:  Contrast Dye    Medications:  Eliquis  Plavix  Neurotonin   Imdur  Levothyroxine  Zestril  Toprol " XL  Mexitil  Nitrostat  Oxycodone  Protonix  Pravachol  Deltasone    Past Medical History:     Acid reflux disease  Atrial flutter  CAD  COVID-19  ESRD on hemodialysis  Hypertension  Hypothyroidisim  Ischemic cardiomyopthy  Myocardial infarction      Past Surgical History:    Cholecystectomy  CV coronary angiogram, right   CV left heart catheter  CV PCI stent  Open reduction and internal fixation elbow surgery  EP ICD generator change, single  H ablation atrial flutter  HC left heart catheterization x2  Ventricular device implant  IR dialysis fistulogram, left  Upper extremity arteriovenous fistula repair, left  Tonsillectomy and adenoidectomy  Vascular surgery, LUE fistulas (upper and lower)     Family History:    Mother: Cerebrovascular disease  Father: Hypertension, bladder cancer    Social History:  The patient presents to the ED alone.   History of tobacco use.     Physical Exam     Patient Vitals for the past 24 hrs:   BP Temp Temp src Pulse Resp SpO2   02/11/22 0846 -- 98.2  F (36.8  C) Oral -- -- --   02/11/22 0830 94/77 -- -- 89 15 95 %   02/11/22 0720 -- -- -- 96 18 96 %   02/11/22 0717 -- -- -- 93 15 (!) 85 %   02/11/22 0700 -- -- -- 106 18 94 %   02/11/22 0650 100/62 -- -- 98 -- --       Physical Exam  GENERAL: well developed, pleasant  HEAD: atraumatic  EYES: pupils reactive, extraocular muscles intact, conjunctivae normal  ENT:  mucus membranes moist  NECK:  trachea midline, normal range of motion  RESPIRATORY: no tachypnea, breath sounds clear to auscultation. Diminished lung sounds. Looks dyspneic.Mild peripheral edema.   CVS: normal S1/S2, no murmurs, intact distal pulses  ABDOMEN: soft, nontender, nondistention  MUSCULOSKELETAL: no deformities  SKIN: warm and dry, no acute rashes or ulceration  NEURO: GCS 15, cranial nerves intact, alert and oriented x3  PSYCH:  Mood/affect normal    Emergency Department Course   ECG  ECG obtained at 0653, ECG read at 0709  Sinus tachycardia with occasional and  consecutive premature ventricular complexes and fusion complexes with junctional escape complexes. Right bundle branch block.  Rate 105 bpm. NY interval 146 ms. QRS duration 122 ms. QT/QTc 488/644 ms. P-R-T axes 31 86 25.     Imaging:  Chest CT w/o contrast   Final Result   IMPRESSION: Small bilateral pleural effusions.      JUDY CUETO MD            SYSTEM ID:  [id number]        Report per radiology    Laboratory:  Labs Ordered and Resulted from Time of ED Arrival to Time of ED Departure   BASIC METABOLIC PANEL - Abnormal       Result Value    Sodium 136      Potassium 4.1      Chloride 100      Carbon Dioxide (CO2) 28      Anion Gap 8      Urea Nitrogen 23      Creatinine 4.34 (*)     Calcium 8.4 (*)     Glucose 107 (*)     GFR Estimate 13 (*)    CBC WITH PLATELETS AND DIFFERENTIAL - Abnormal    WBC Count 7.6      RBC Count 3.82 (*)     Hemoglobin 12.8 (*)     Hematocrit 42.3       (*)     MCH 33.5 (*)     MCHC 30.3 (*)     RDW 14.6      Platelet Count 150      % Neutrophils 75      % Lymphocytes 8      % Monocytes 11      % Eosinophils 5      % Basophils 1      % Immature Granulocytes 0      NRBCs per 100 WBC 0      Absolute Neutrophils 5.8      Absolute Lymphocytes 0.6 (*)     Absolute Monocytes 0.8      Absolute Eosinophils 0.4      Absolute Basophils 0.1      Absolute Immature Granulocytes 0.0      Absolute NRBCs 0.0     INFLUENZA A/B & SARS-COV2 PCR MULTIPLEX - Abnormal    Influenza A PCR Negative      Influenza B PCR Negative      SARS CoV2 PCR Positive (*)    CRP INFLAMMATION - Abnormal    CRP Inflammation 40.3 (*)    HEPATIC FUNCTION PANEL - Abnormal    Bilirubin Total 1.7 (*)     Bilirubin Direct 1.0 (*)     Protein Total 6.4 (*)     Albumin 3.4      Alkaline Phosphatase 214 (*)     AST 19      ALT 24     D DIMER QUANTITATIVE - Abnormal    D-Dimer Quantitative 0.71 (*)    TROPONIN I - Normal    Troponin I High Sensitivity 32          Emergency Department Course:       Reviewed:  I reviewed  nursing notes, vitals, past medical history and Care Everywhere    Assessments:  0710 I obtained history and examined the patient as noted above.     Consults:  3297 I spoke with Dr. Serrano, hospitalist, who agreed to admit the patient.    Interventions:  0851 Decadron 6 mg IV    Disposition:  The patient was admitted to the hospital under the care of Dr. Serrano.     Impression & Plan     Medical Decision Making:  Patient presents with right-sided chest pain as well as shortness of breath and hypoxia per EMS into the 60s.  Patient notes he has had progressive symptoms over the last 1 week.  He is a dialysis patient.  He has been going to dialysis as scheduled.  He notes the pain started in his ankles and knees and then went into his chest.  Work-up shows Covid 19 and CT chest shows trace pleural effusion but no significant findings.  Looks to have old rib fractures more on the right than left.  Patient's comfortable on nasal cannula.  Spoke with the hospitalist regarding admission.  Ordered up Decadron and remdesivir although remdesivir needs to be reviewed per ID based on his kidney function.    Critical Care Time: none    Diagnosis:    ICD-10-CM    1. Infection due to 2019 novel coronavirus  U07.1    2. End stage renal failure on dialysis (H)  N18.6     Z99.2      Scribe Disclosure:  I, Sachin Hay, am serving as a scribe at 7:10 AM on 2/11/2022 to document services personally performed by Martin Monreal MD based on my observations and the provider's statements to me.              Martin Monreal MD  02/11/22 6223

## 2022-02-11 NOTE — ED NOTES
Nephrologist called and stated to hold BP meds until after dialysis- pt needs his dialysis run  Today and we need to coordinate with getting him a room placed upstairs so we can get it done

## 2022-02-11 NOTE — CONSULTS
Assessment and Plan:   ESRD: orders place for dialysis today. Discussed with dialysis nurse. Plan to run 3.5 h for 1.5 L UF, Using LAF with 400 BFR and 15 ga needles. He will get hectorol on the run.  2K, 33 HCO3 138 Na. No heparin.                  Interval History:   COVID-19 with resp distress and hypoxia. Adm to IM  CHF: EF 20-25%, ICD. He is on apixaban and plavix.                  Review of Systems:   Intermittent diarrhea, knee and hip pains that are chronic but mostly controlled with scheduled Tylenol, prednisone and gabapentin and he has never had to take his oxycodone.          Medications:   (Not in a hospital admission)    Current active medications and PTA medications reviewed, see medication list for details.            Physical Exam:   Vitals were reviewed  Patient Vitals for the past 24 hrs:   BP Temp Temp src Pulse Resp SpO2   22 0846 -- 98.2  F (36.8  C) Oral -- -- --   22 0830 94/77 -- -- 89 15 95 %   22 0720 -- -- -- 96 18 96 %   22 0717 -- -- -- 93 15 (!) 85 %   22 0700 -- -- -- 106 18 94 %   22 0650 100/62 -- -- 98 -- --       Temp:  [98.2  F (36.8  C)] 98.2  F (36.8  C)  Pulse:  [] 89  Resp:  [15-18] 15  BP: ()/(62-77) 94/77  SpO2:  [85 %-96 %] 95 %    Temperatures:  Current - Temp: 98.2  F (36.8  C); Max - Temp  Av.2  F (36.8  C)  Min: 98.2  F (36.8  C)  Max: 98.2  F (36.8  C)  Respiration range: Resp  Av.5  Min: 15  Max: 18  Pulse range: Pulse  Av.4  Min: 89  Max: 106  Blood pressure range: Systolic (24hrs), Av , Min:94 , Max:100   ; Diastolic (24hrs), Av, Min:62, Max:77    Pulse oximetry range: SpO2  Av.5 %  Min: 85 %  Max: 96 %    No intake/output data recorded.    No intake or output data in the 24 hours ending 22 1442    Exam deferred as in COVID -ISO.     Exam by IM:  Constitutional: Awake, alert, cooperative, no apparent distress.  Eyes: Conjunctiva and pupils examined and normal.  HEENT: Moist  mucous membranes, normal dentition.  Respiratory: Diffuse decreased breath sounds, crackles bilaterally at the bases worse on the right, no wheezing.  Cardiovascular: Tachycardic, regular rhythm, normal S1 and S2, and no murmur noted.  GI: Soft, non-distended, non-tender, normal bowel sounds.  Lymph/Hematologic: No anterior cervical or supraclavicular adenopathy.  Skin: No rashes, no cyanosis, 1+ pitting edema bilaterally in both legs.  Musculoskeletal: No joint swelling, erythema or tenderness, some tenderness in the right chest worse with deep inspiration.  Neurologic: Cranial nerves 2-12 intact, normal strength and sensation.  Psychiatric: Alert, oriented to person, place and time, no obvious anxiety or depression.          Wt Readings from Last 4 Encounters:   12/16/21 78.5 kg (173 lb)   12/03/21 78.6 kg (173 lb 4.5 oz)   04/13/21 78 kg (171 lb 15.3 oz)   03/18/21 75.8 kg (167 lb 3.2 oz)          Data:          Lab Results   Component Value Date     02/11/2022     07/24/2021     07/05/2021     03/19/2021     03/18/2021    Lab Results   Component Value Date    CHLORIDE 100 02/11/2022    CHLORIDE 105 07/24/2021    CHLORIDE 106 07/05/2021    CHLORIDE 105 03/19/2021    CHLORIDE 106 03/18/2021    Lab Results   Component Value Date    BUN 23 02/11/2022    BUN 22 07/24/2021    BUN 39 07/05/2021    BUN 23 03/19/2021    BUN 31 03/18/2021      Lab Results   Component Value Date    POTASSIUM 4.1 02/11/2022    POTASSIUM 4.6 07/24/2021    POTASSIUM 5.3 07/05/2021    POTASSIUM 3.8 03/19/2021    POTASSIUM 3.5 03/18/2021    Lab Results   Component Value Date    CO2 28 02/11/2022    CO2 29 07/24/2021    CO2 25 07/05/2021    CO2 28 03/19/2021    CO2 27 03/18/2021    Lab Results   Component Value Date    CR 4.34 02/11/2022    CR 3.47 07/24/2021    CR 5.03 07/05/2021    CR 4.03 03/19/2021    CR 4.90 03/18/2021        Recent Labs   Lab Test 02/11/22  0653 07/24/21  1217 07/05/21  0607   WBC 7.6 7.7 6.8    HGB 12.8* 11.0* 12.0*   HCT 42.3 35.3* 39.1*   * 110* 110*    150 172     Recent Labs   Lab Test 02/11/22  0653 07/24/21  1217 03/19/21  0601   AST 19 15 13   ALT 24 21 21   GGT  --   --  88*   ALKPHOS 214* 447* 341*   BILITOTAL 1.7* 1.2 1.2       Recent Labs   Lab Test 01/11/21  0911 10/17/20  1715 10/17/20  0709   MAG 1.9 1.7 1.4*     Recent Labs   Lab Test 01/14/21  0710 01/13/21  0718 01/11/21  0911   PHOS 3.0 2.9 2.6     Recent Labs   Lab Test 02/11/22  0653 07/24/21  1217 07/05/21  0607   CHRISTINE 8.4* 9.3 9.6       Lab Results   Component Value Date    CHRISTINE 8.4 (L) 02/11/2022     Lab Results   Component Value Date    WBC 7.6 02/11/2022    HGB 12.8 (L) 02/11/2022    HCT 42.3 02/11/2022     (H) 02/11/2022     02/11/2022     Lab Results   Component Value Date     02/11/2022    POTASSIUM 4.1 02/11/2022    CHLORIDE 100 02/11/2022    CO2 28 02/11/2022     (H) 02/11/2022     Lab Results   Component Value Date    BUN 23 02/11/2022    CR 4.34 (H) 02/11/2022     Lab Results   Component Value Date    MAG 1.9 01/11/2021     Lab Results   Component Value Date    PHOS 3.0 01/14/2021       Creatinine   Date Value Ref Range Status   02/11/2022 4.34 (H) 0.66 - 1.25 mg/dL Final   07/24/2021 3.47 (H) 0.66 - 1.25 mg/dL Final   07/05/2021 5.03 (H) 0.66 - 1.25 mg/dL Final   03/19/2021 4.03 (H) 0.66 - 1.25 mg/dL Final   03/18/2021 4.90 (H) 0.66 - 1.25 mg/dL Final   03/17/2021 3.99 (H) 0.66 - 1.25 mg/dL Final   01/14/2021 4.55 (H) 0.66 - 1.25 mg/dL Final   01/13/2021 3.42 (H) 0.66 - 1.25 mg/dL Final       Attestation:  I have reviewed today's vital signs, notes, medications, labs and imaging.  Dialysis orders obtained from [place] dialysis unit.      Jah Hammond MD

## 2022-02-11 NOTE — PROGRESS NOTES
RECEIVING UNIT ED HANDOFF REVIEW    ED Nurse Handoff Report was reviewed by: David Lozada RN on February 11, 2022 at 3:12 PM

## 2022-02-11 NOTE — PHARMACY-ADMISSION MEDICATION HISTORY
Pharmacy Medication History  Admission medication history interview status for the 2/11/2022  admission is complete. See EPIC admission navigator for prior to admission medications     Location of Interview: Phone  Medication history sources: Patient, Surescripts and Care Everywhere    Significant changes made to the medication list:  none    In the past week, patient estimated taking medication this percent of the time: greater than 90%      Medication reconciliation completed by provider prior to medication history? Yes    Time spent in this activity: 20 minutes    Prior to Admission medications    Medication Sig Last Dose Taking? Auth Provider   acetaminophen (TYLENOL) 500 MG tablet Take 500-1,000 mg by mouth every 6 hours as needed for pain  2/11/2022 at am Yes Unknown, Entered By History   apixaban ANTICOAGULANT (ELIQUIS ANTICOAGULANT) 2.5 MG tablet Take 1 tablet (2.5 mg) by mouth 2 times daily 2/10/2022 at hs Yes Brooklynn Griffith PA-C   B Complex-C-Folic Acid (DOROTEO CAPS) 1 MG CAPS TAKE 1 CAPSULE BY MOUTH EVERY DAY 2/10/2022 at hs Yes Josselin Kolb MD   clopidogrel (PLAVIX) 75 MG tablet TAKE 1 TABLET BY MOUTH EVERY DAY 2/10/2022 at 1200 Yes Josselin Kolb MD   famotidine (PEPCID) 20 MG tablet Take 20 mg by mouth daily  2/11/2022 at am Yes Reported, Patient   gabapentin (NEURONTIN) 100 MG capsule Take 1 capsule (100 mg) by mouth 3 times daily 2/11/2022 at am Yes Josselin Kolb MD   isosorbide mononitrate (IMDUR) 30 MG 24 hr tablet Take in the evening on the days prior dialysis. Take on Sunday, Tuesday and Thursday  Patient taking differently: Take 15 mg by mouth three times a week Take in the evening on the days prior dialysis. Take on Sunday, Tuesday and Thursday 2/10/2022 at pm Yes Brooklynn Griffith PA-C   isosorbide mononitrate (IMDUR) 30 MG 24 hr tablet Take 1 tablet (30 mg) the evening of dialysis days (Mon, Wed, Fri)  and Saturdays.  Patient taking differently: Take 1 tablet  (30 mg) 4 days per week on the evening of dialysis days (Mon, Wed, Fri)  and Saturdays. 2/10/2022 at pm Yes Brooklynn Griffith PA-C   levothyroxine (SYNTHROID/LEVOTHROID) 50 MCG tablet TAKE 1 TABLET BY MOUTH EVERY DAY 2/11/2022 at am Yes Josselin Kolb MD   lisinopril (ZESTRIL) 2.5 MG tablet Take 1 tablet (2.5 mg) by mouth 2 times daily (Take one tablet after dialysis. Take second tablet at bedtime.) 2/10/2022 at hs Yes Josselin Kolb MD   loperamide (IMODIUM A-D) 2 MG tablet Take 2 mg by mouth three times a week MWF on dialysis days 2/11/2022 at Unknown time Yes Unknown, Entered By History   loperamide (IMODIUM A-D) 2 MG tablet Take 1 mg by mouth four times a week On non-dialysis days - Tu, Th, Sa, [place]  2/10/2022 at Unknown time Yes Unknown, Entered By History   metoprolol succinate ER (TOPROL-XL) 25 MG 24 hr tablet Take 0.5 tablets (12.5 mg) by mouth daily Please call for an appointment for further refills. [phone] 2/10/2022 at noon Yes Brooklynn Griffith PA-C   mexiletine (MEXITIL) 150 MG capsule Take 1 capsule (150 mg) by mouth 2 times daily 2/11/2022 at am Yes Josselin Kolb MD   oxyCODONE (ROXICODONE) 5 MG tablet Take 1 tablet (5 mg) by mouth every 6 hours as needed for pain No driving a car or drinking alcohol for12 hours after taking this medication. More than a month at Unknown time Yes Gabbi Lujan MD   pantoprazole (PROTONIX) 40 MG EC tablet TAKE 1 TABLET (40 MG) BY MOUTH EVERY MORNING 2/10/2022 at noon Yes Josselin Kolb MD   pravastatin (PRAVACHOL) 40 MG tablet TAKE 1 TABLET BY MOUTH EVERY DAY 2/10/2022 at hs Yes Josselin Kolb MD   predniSONE (DELTASONE) 5 MG tablet TAKE 1 TABLET BY MOUTH EVERY DAY 2/10/2022 at 1200 Yes Josselin Kolb MD   vitamin D3 (CHOLECALCIFEROL) 50 mcg (2000 units) tablet TAKE 1 TABLET BY MOUTH EVERY DAY 2/10/2022 at 1200 Yes Josselin Kolb MD   meclizine 25 MG CHEW Take 1 tablet (25 mg) by mouth every 6 hours as needed for  dizziness  at prn  Martin Monreal MD   nitroGLYcerin (NITROSTAT) 0.4 MG sublingual tablet Place 1 tablet (0.4 mg) under the tongue every 5 minutes as needed for chest pain UP TO 3 PER EPISODE  at prn  Josselin Kolb MD       The information provided in this note is only as accurate as the sources available at the time of update(s)

## 2022-02-11 NOTE — ED TRIAGE NOTES
Patient arrives to ED via EMS for evaluation of R. Rib pain and worsening shortness of breath with exertion. EMS reports that patient desats to mid 60's on room air with exertion. Currently on 3L. O2 in ED with sats high 90's. Pt. Was supposed to have dialysis today. EMS reports pt. Is a chronic everyday smoker.

## 2022-02-11 NOTE — ED NOTES
Bed: ED06  Expected date:   Expected time:   Means of arrival:   Comments:  [id number] male R Rib pain SOB eta 6

## 2022-02-11 NOTE — ED NOTES
[facility]  ED Nurse Handoff Report    ED Chief complaint: Shortness of Breath      ED Diagnosis:   Final diagnoses:   Infection due to 2019 novel coronavirus   End stage renal failure on dialysis (H)       Code Status: to be addressed by admitting MD    Allergies:   Allergies   Allergen Reactions     Contrast Dye Hives     Does fine if he uses benadryl prior.       Patient Story: pt presents from home for increased SOB and rib  pain  Focused Assessment:  Pt is A & O X 4, uses call light appropriately, does have o2 sats from to mid 80's with activity- on 3 L NC, HR and BP stable, afebrile    Treatments and/or interventions provided:   Patient's response to treatments and/or interventions:     To be done/followed up on inpatient unit:      Does this patient have any cognitive concerns?: none    Activity level - Baseline/Home:  Independent  Activity Level - Current:   Independent    Patient's Preferred language: English   Needed?: No    Isolation: None and COVID r/o and special precautions  Infection: Not Applicable  COVID r/o and special precautions  Patient tested for COVID 19 prior to admission: YES  Bariatric?: No    Vital Signs:   Vitals:    02/11/22 0700 02/11/22 0717 02/11/22 0720   Pulse: 106 93 96   Resp: 18 15 18   SpO2: 94% (!) 85% 96%       Cardiac Rhythm:     Was the PSS-3 completed:   Yes  What interventions are required if any?               Family Comments:   OBS brochure/video discussed/provided to patient/family: N/A              Name of person given brochure if not patient:               Relationship to patient:     For the majority of the shift this patient's behavior was Green.   Behavioral interventions performed were .    ED NURSE PHONE NUMBER: [id number]

## 2022-02-11 NOTE — H&P
[facility]    History and Physical - Hospitalist Service       Date of Admission:  2/11/2022    Assessment & Plan      Westley Ness is a 75-year-old male with history of coronary artery disease, ischemic cardiomyopathy with EF of 20-25%, end-stage renal disease on hemodialysis Monday/Wednesday/Friday followed by Dr. Hammond, diabetes mellitus type 2, GERD, atrial flutter/fibrillation on chronic anticoagulation with apixaban, status post ICD placement, and hypertension who comes to the ER with complaint of shortness of breath on exertion for the last 7 days and chest pain with pulse oxymetry of 60% at home who was found to be positive for Covid-19 but is not exactly sure of symptom onset relative to Covid that he likely got from his home health aid or dialysis center.     # Confirmed COVID-19 infection    # Acute Hypoxic Respiratory Failure secondary to COVID-19 infection  # Parapneumonic effusion  # Sepsis due to Covid-19     Symptom Onset Unknown, patient has exposure from home health aid but was not able to link symptom onset to Covid because of his chronic symptoms so would do 20 days from positive test for isolation   Date of 1st Positive Test 2/11/2022   Vaccination Status Fully Vaccinated       - COVID-19 special precautions, continuous pulse-ox  - Oxygen: continue current support with nasal cannula at 2 L/min; titrate to keep SpO2 between 90-96%  - Labs: Standard COVID admission labs ordered (CBC with diff, CMP, INR, D-dimer, CRP).   - Imaging: no additional imaging needed at this time  - Breathing treatments: no inhalers needed; avoid nebulizers in favor of MDIs   - IV fluids: not indicated at this time  - Antibiotics: not indicated   - COVID-Focused Medications: Dexamethasone 6 mg x 10 days or until hospital discharge, started on 2/11; Not a candidate for Remdesivir because of ESRD, monitor CRP levels and oxygen levels to see if he would qualify for other treatments if worsens    - DVT Prophylaxis:         - At high risk of thrombotic complications due to COVID-19 (DDimer = 0.71 ug/mL FEU (Ref range: 0.00 - 0.50 ug/mL FEU) )         - Already on therapeutic anticoagulation with Eliquis    ESRD.   Hypocalcemia. Gets dialysis M/W/F and nephrologist is Dr. Hammond.  -consult nephrology to continue dialysis while in the hospital  -BMP in AM    Atrial fibrillation/flutter with rapid ventricular response. The patient has intermittent atrial fibrillation with rapid ventricular response with rates likely exacerbated by covid-19 infection and sepsis.   -continue home metoprolol  -metoprolol 12.5 mg IV q4h PRN HR > 120  -continue home eliquis    Coronary artery disease/ischemic cardiomyopathy.  The patient has coronary artery disease and a cardiomyopathy with EF 20-25%.  His last angiogram was in 10/2020, which shows subtotal occlusion of left anterior descending (LAD) with known infarct area.  There was patent proximal left circumflex at that time.  There was severe stenosis of the large OM1 distal to the stent, which was stented, obtuse marginal #1 with drug-eluting stent at that time.  The patient is on Plavix and apixaban.    -with negative high sensitive troponin more than 6 hours after symptom onset chest pain not thought to be cardiac in nature  -Continue home metoprolol, lisinopril, mexiletine, imdur and Pravachol  -Patient noted that his Imdur prescription was wrong and will need to be clarified at discharge.  Inpatient orders were corrected to be 15 mg of Imdur three times weekly on Sunday, Tuesday, and Thursday the nights prior to dialysis and 30 mg of Imdur four times weekly on Monday, Wednesday, Friday, and Saturday night.  He would like an updated prescription sent at discharge because he runs out of pills.    Hypothyroidism.   -continue home levothyroxine       Physical Deconditioning.  Multifactorial with age, chronic comorbidities, atrial fibrillation with rapid ventricular response,  CHF, end-stage renal disease and leg pain.    -We will have PT, OT evaluate the patient and SW for discharge planning. May require TCU.    Chronic Left hip and left knee pain from osteoarthritis.   -continue scheduled Tylenol and gabapentin  -Hold home prednisone 5 mg daily and plan to restart on 2/21 once he completes his Decadron    Chronic diarrhea.  This is controlled with Imodium.  -He also noted that his Imodium outpatient prescriptions were not correct.  -Prescribed Imodium 2 mg three times weekly on dialysis days and 1 mg four times weekly on nondialysis days and will need this updated on his outpatient prescriptions at discharge.     Diet: Moderate Consistent Carb (60 g CHO per Meal) Diet Moderate Carb Diabetic diet  DVT Prophylaxis: DOAC and Pneumatic Compression Devices  Velarde Catheter: Not present  Central Lines: None  Cardiac Monitoring: None  Code Status: Full Code Full code    Clinically Significant Risk Factors Present on Admission          # Hypocalcemia: Ca = 8.4 mg/dL (Ref range: 8.5 - 10.1 mg/dL) and/or iCa = N/A on admission, will replace as needed     # Coagulation Defect: home medication list includes an anticoagulant medication  # Platelet Defect: home medication list includes an antiplatelet medication       Disposition Plan   Expected Discharge: 02/15/2022  Anticipated discharge location:  Awaiting care coordination huddle and PT/OT eval, may require TCU     The patient's care was discussed with the Patient.    Obi Serrano MD  Hospitalist Service  [facility]  Securely message with the Vocera Web Console (learn more here)  Text page via [employer] Paging/Directory     ______________________________________________________________________    Chief Complaint   Shortness of breath    History is obtained from the patient, electronic health record and emergency department physician    History of Present Illness   Westley Ness is a 75-year-old male with history of  "coronary artery disease, ischemic cardiomyopathy with EF of 20-25%, end-stage renal disease on hemodialysis Monday/Wednesday/Friday followed by Dr. Hammond, diabetes mellitus type 2, GERD, atrial flutter/fibrillation on chronic anticoagulation with apixaban, status post ICD placement, and hypertension who comes to the ER with complaint of shortness of breath on exertion for the last 7 days and chest pain with pulse oxymetry of 60% at home who was found to be positive for Covid-19. The patient reports that he has been experiencing dyspnea on exertion and right-sided chest pain over the last 1 to 2 weeks but is not exactly sure how that correlates to his new positive Covid test. At presentation, the chest pain is localized to the right side near the armpit back to the shoulder blades. The patient notes that the pain is the most severe this morning and made it difficult for him to take a shower and get dressed. He also endorses little production of urine and intermittent esophageal pain that began a \"few weeks ago\" after he burped.  Of note, the patient has a history of multiple myocardial infarctions, the first being about 26 years ago with ischemic cardiomyopathy. His previous myocardial infarctions presented with right arm pain in all but one instance, but the patient states that his current pain is dissimilar to that.  He notes that his home RN's  has Covid and the home RN is currently in the hospital but he did not know her Covid status.  He thinks he likely got Covid from her or possibly at dialysis.  He denies any history of cancer or any recent trauma/injury. He denies fever, chills, myalgias, nausea, or vomiting. He has chronic diarrhea that he controls with Imodium.  No at-home oxygen.     Evaluation in the ED showed hypoxia requiring oxygen and positive Covid-19 test. CT chest showed bilateral small pleural effusions and right lung infiltrate versus atelectatic changes. WBC was normal at 7.6. High " sensitivity Troponin I was negative. CRP was elevated at 40.3. D-dimer was elevated at 0.71. Patient is on chronic Eliquis so PE very unlikely. Bilirubin elevated at 1.7 with alkaline phosphatase elevated at 214 but this is down from previous alk phos of 447 on 7/24/2021 and has had high bilirubin levels before as well.    Review of Systems    The 10 point Review of Systems is negative other than noted in the HPI or here.  Intermittent diarrhea, knee and hip pains that are chronic but mostly controlled with scheduled Tylenol, prednisone and gabapentin and he has never had to take his oxycodone.    Past Medical History    I have reviewed this patient's medical history and updated it with pertinent information if needed.   Past Medical History:   Diagnosis Date     Acid reflux disease      Benign essential hypertension      CAD (coronary artery disease)     s/p multiple NSTEMIs and PCI's     ESRD on hemodialysis (H)     MWF     History of atrial flutter     s/p ablation     Hypothyroidism      Ischemic cardiomyopathy     EF 25-30%, s/p AICD     Past Surgical History   I have reviewed this patient's surgical history and updated it with pertinent information if needed.  Past Surgical History:   Procedure Laterality Date     CHOLECYSTECTOMY, OPEN  2013     CV CORONARY ANGIOGRAM N/A 10/19/2020    Procedure: Coronary Angiogram;  Surgeon: Theodora Salazar MD;  Location:  HEART CARDIAC CATH LAB     CV LEFT HEART CATH N/A 10/19/2020    Procedure: Left Heart Cath;  Surgeon: Theodora Salazar MD;  Location: [facility] CARDIAC CATH LAB     CV PCI STENT DRUG ELUTING N/A 10/19/2020    Procedure: Percutaneous Coronary Intervention Stent Drug Eluting;  Surgeon: Theodora Salazar MD;  Location: [facility] CARDIAC CATH LAB     ELBOW SURGERY Left 2008    ORIF - plates still in place     EP ICD GENERATOR CHANGE SINGLE N/A 11/21/2019    Procedure: EP ICD Generator Change Single;  Surgeon: Jono Mejia MD;  Location: [facility]  CARDIAC CATH LAB     H ABLATION ATRIAL FLUTTER  2017, 17     HC LEFT HEART CATHETERIZATION  2016     HC LEFT HEART CATHETERIZATION  2016     IMPLANT VENTRICULAR DEVICE  08/15/2011     IR DIALYSIS FISTULOGRAM LEFT  2019     REPAIR FISTULA ARTERIOVENOUS UPPER EXTREMITY Left 3/5/2019    Procedure: REPAIR LEFT UPPER ARM ARTERIOVENOUS FISTULA SKIN ULCER;  Surgeon: Westley Griggs MD;  Location: [facility] OR     TONSILLECTOMY & ADENOIDECTOMY       VASCULAR SURGERY  ,     LUE fistulas (upper and lower); upper one is functional     Social History   I have reviewed this patient's social history and updated it with pertinent information if needed.  Social History     Tobacco Use     Smoking status: Former Smoker     Packs/day: 2.00     Years: 35.00     Pack years: 70.00     Types: Cigarettes     Start date:      Quit date: 1996     Years since quittin.2     Smokeless tobacco: Never Used   Substance Use Topics     Alcohol use: Not Currently     Alcohol/week: 0.0 standard drinks     Drug use: No       Family History   I have reviewed this patient's family history and updated it with pertinent information if needed.  Family History   Problem Relation Age of Onset     Cerebrovascular Disease Mother         later in life     Hypertension Father      Bladder Cancer Father      Myocardial Infarction Paternal Grandmother      Diabetes No family hx of      Prostate Cancer No family hx of      Colon Cancer No family hx of        Prior to Admission Medications   Prior to Admission Medications   Prescriptions Last Dose Informant Patient Reported? Taking?   B Complex-C-Folic Acid (DOROTEO CAPS) 1 MG CAPS   No No   Sig: TAKE 1 CAPSULE BY MOUTH EVERY DAY   acetaminophen (TYLENOL) 500 MG tablet  Self Yes No   Sig: Take 1,000 mg by mouth 3 times daily    apixaban ANTICOAGULANT (ELIQUIS ANTICOAGULANT) 2.5 MG tablet   No No   Sig: Take 1 tablet (2.5 mg) by mouth 2 times daily   clopidogrel (PLAVIX)  75 MG tablet   No No   Sig: TAKE 1 TABLET BY MOUTH EVERY DAY   famotidine (PEPCID) 20 MG tablet  Self Yes No   Sig: Take 20 mg by mouth daily    gabapentin (NEURONTIN) 100 MG capsule  Self No No   Sig: Take 1 capsule (100 mg) by mouth 3 times daily   isosorbide mononitrate (IMDUR) 30 MG 24 hr tablet   No No   Sig: Take in the evening on the days prior dialysis. Take on Sunday, Tuesday and Thursday   Patient taking differently: Take 15 mg by mouth three times a week Take in the evening on the days prior dialysis. Take on Sunday, Tuesday and Thursday   isosorbide mononitrate (IMDUR) 30 MG 24 hr tablet   No No   Sig: Take 1 tablet (30 mg) the evening of dialysis days (Mon, Wed, Fri)  and Saturdays.   Patient taking differently: Take 1 tablet (30 mg) 4 days per week on the evening of dialysis days (Mon, Wed, Fri)  and Saturdays.   levothyroxine (SYNTHROID/LEVOTHROID) 50 MCG tablet   No No   Sig: TAKE 1 TABLET BY MOUTH EVERY DAY   lisinopril (ZESTRIL) 2.5 MG tablet   No No   Sig: Take 1 tablet (2.5 mg) by mouth 2 times daily (Take one tablet after dialysis. Take second tablet at bedtime.)   loperamide (IMODIUM A-D) 2 MG tablet  Self Yes No   Sig: Take 2 mg by mouth three times a week MWF on dialysis days   loperamide (IMODIUM A-D) 2 MG tablet  Self Yes No   Sig: Take 1 mg by mouth four times a week On non-dialysis days - Tu, Th, Sa, Russo    meclizine 25 MG CHEW   No No   Sig: Take 1 tablet (25 mg) by mouth every 6 hours as needed for dizziness   metoprolol succinate ER (TOPROL-XL) 25 MG 24 hr tablet   No No   Sig: Take 0.5 tablets (12.5 mg) by mouth daily Please call for an appointment for further refills. [phone]   mexiletine (MEXITIL) 150 MG capsule   No No   Sig: Take 1 capsule (150 mg) by mouth 2 times daily   nitroGLYcerin (NITROSTAT) 0.4 MG sublingual tablet  Self No No   Sig: Place 1 tablet (0.4 mg) under the tongue every 5 minutes as needed for chest pain UP TO 3 PER EPISODE   oxyCODONE (ROXICODONE) 5 MG  tablet   No No   Sig: Take 1 tablet (5 mg) by mouth every 6 hours as needed for pain No driving a car or drinking alcohol for12 hours after taking this medication.   pantoprazole (PROTONIX) 40 MG EC tablet   No No   Sig: TAKE 1 TABLET (40 MG) BY MOUTH EVERY MORNING   pravastatin (PRAVACHOL) 40 MG tablet   No No   Sig: TAKE 1 TABLET BY MOUTH EVERY DAY   predniSONE (DELTASONE) 5 MG tablet   No No   Sig: TAKE 1 TABLET BY MOUTH EVERY DAY   vitamin D3 (CHOLECALCIFEROL) 50 mcg (2000 units) tablet   No No   Sig: TAKE 1 TABLET BY MOUTH EVERY DAY      Facility-Administered Medications: None     Allergies   Allergies   Allergen Reactions     Contrast Dye Hives     Does fine if he uses benadryl prior.       Physical Exam   Vital Signs: Temp: 98.2  F (36.8  C) Temp src: Oral BP: 94/77 Pulse: 89   Resp: 15 SpO2: 95 % O2 Device: Nasal cannula Oxygen Delivery: 3 LPM  Weight: 0 lbs 0 oz    Constitutional: Awake, alert, cooperative, no apparent distress.  Eyes: Conjunctiva and pupils examined and normal.  HEENT: Moist mucous membranes, normal dentition.  Respiratory: Diffuse decreased breath sounds, crackles bilaterally at the bases worse on the right, no wheezing.  Cardiovascular: Tachycardic, regular rhythm, normal S1 and S2, and no murmur noted.  GI: Soft, non-distended, non-tender, normal bowel sounds.  Lymph/Hematologic: No anterior cervical or supraclavicular adenopathy.  Skin: No rashes, no cyanosis, 1+ pitting edema bilaterally in both legs.  Musculoskeletal: No joint swelling, erythema or tenderness, some tenderness in the right chest worse with deep inspiration.  Neurologic: Cranial nerves 2-12 intact, normal strength and sensation.  Psychiatric: Alert, oriented to person, place and time, no obvious anxiety or depression.    Data   Data reviewed today: I reviewed all medications, new labs and imaging results over the last 24 hours. I personally reviewed the EKG tracing showing Right bundle branch block, poor technical  baseline, no signs of significant ischemia.    Recent Labs   Lab 02/11/22  0653   WBC 7.6   HGB 12.8*   *         POTASSIUM 4.1   CHLORIDE 100   CO2 28   BUN 23   CR 4.34*   ANIONGAP 8   CHRITSINE 8.4*   *   ALBUMIN 3.4   PROTTOTAL 6.4*   BILITOTAL 1.7*   ALKPHOS 214*   ALT 24   AST 19     7.6    \    12.8 (L)    /    150   N 75    L N/A    136    100    23 /   ------------------------------------ 107 (H)   ALT 24   AST 19    (H)   ALB 3.4   Ca 8.4 (L)  4.1    28    4.34 (H) \    % RETIC N/A    LDH N/A  Troponin N/A    BNP N/A    CK N/A  INR N/A   PTT N/A    D-dimer 0.71 (H)    Fibrinogen N/A    Antithrombin N/A  Ferritin N/A  CRP 40.3 (H)    IL-6 N/A  Recent Results (from the past 24 hour(s))   Chest CT w/o contrast    Narrative    CT CHEST WITHOUT CONTRAST  2/11/2022 8:01 AM    CLINICAL HISTORY: Chest pain or shortness of breath, pleurisy or  effusion suspected. Dialysis patient, one week progressive right-sided  chest pain and shortness of breath on exertion, hypoxia with exertion,  on anticoagulation.    TECHNIQUE: CT chest without IV contrast. Multiplanar reformats were  obtained. Dose reduction techniques were used.  CONTRAST: None.    COMPARISON: 7/24/2021    FINDINGS:   LUNGS AND PLEURA: There are small bilateral pleural effusions. Minor  dependent atelectatic changes in the right lung. No pulmonary nodule  or mass.    MEDIASTINUM/AXILLAE: Right-sided pacer device. Normal caliber thoracic  aorta. Unremarkable thyroid. No thoracic or axillary adenopathy.    CORONARY ARTERY CALCIFICATION: Previous intervention (stents or CABG).    UPPER ABDOMEN: Atrophic native kidneys. Cirrhotic-appearing liver.    MUSCULOSKELETAL: Bilateral healing rib fractures, right greater than  left. The bones appear osteopenic.      Impression    IMPRESSION: Small bilateral pleural effusions.    JUDY CUETO MD         SYSTEM ID:  [id number]

## 2022-02-12 NOTE — PLAN OF CARE
Summary: SOB, Chest Pain, radiating to L armpit, COVID positive  DATE & TIME: 2/12/22, 9340-3908   Cognitive Concerns/ Orientation : A&Ox4, calm and cooperative, pleasant  BEHAVIOR & AGGRESSION TOOL COLOR: Green  CIWA SCORE: NA   ABNL VS/O2: VSS except tachy at times and soft BP, O2 RA-> 2L (O2 on per pt. Request, sats maintained mid to upper 90s at rest) , DACOSTA (Vitals Q4h, limb alert L. Arm)  MOBILITY: SBA +gb/walker, slow with ambulation, generalized weakness, FALL RISK  PAIN MANAGMENT: Reporting generalized R. Side flank pain more with movement- Scheduled Tylenol provided, PRN oxycodone available, refused heat pack, encouraged to shift weight  DIET: Mod CHO, tolerating, fair appetite (eating 1/2 of meals)  BOWEL/BLADDER: HD patient with low urine output; no BM this shift  ABNL LAB/BG: BG 65->91->107, 91, Cr 2.82  DRAIN/DEVICES: R PIV SL  TELEMETRY RHYTHM: NA  SKIN: Bruised, nurys, BUE, L. Arm fistula  TESTS/PROCEDURES: HD dialysis completed last evening, 1.3 L taken off.  HD MWF.   D/C DAY/GOALS/PLACE: TBD  OTHER IMPORTANT INFO: Lungs sounds diminished with fine crackles. Special precautions maintained for COVID +. PT/OT/SW consulted

## 2022-02-12 NOTE — PROGRESS NOTES
Assessment and Plan:   ESRD: HD last night. Patient dialyzed for 3.5 hrs. on a K3 bath with a net fluid removal of 1.3L.  A BFR of 450 ml/min was obtained via a LUE AVF using 15 gauge needles.   Labs today show Na 136, K 4.0, HCO3 29, Cr 2.82. Hgb 12.7.       Next dialysis planned for Monday.                Interval History:   COVID-19 with resp distress and hypoxia. On O2 and decadron.   CHF: EF 20-25%, ICD. He is on apixaban and plavix.                    Review of Systems:   Had leg cramps after dialysis in am. On O2 per NC with good sats. Some left arm pain.           Medications:       - MEDICATION INSTRUCTIONS for Dialysis Patients -   Does not apply See Admin Instructions     acetaminophen  1,000 mg Oral TID     apixaban ANTICOAGULANT  2.5 mg Oral BID     clopidogrel  75 mg Oral Daily     dexamethasone  6 mg Oral Daily     famotidine  20 mg Oral Daily     gabapentin  100 mg Oral TID     insulin aspart  1-7 Units Subcutaneous TID AC     insulin aspart  1-5 Units Subcutaneous At Bedtime     [START ON 2/13/2022] isosorbide mononitrate  15 mg Oral Once per day on Sun Tue Thu     isosorbide mononitrate  30 mg Oral Once per day on Mon Wed Fri Sat     levothyroxine  50 mcg Oral QAM AC     lisinopril  2.5 mg Oral BID     loperamide  2 mg Oral Once per day on Mon Wed Fri     loperamide  1 mg Oral Once per day on Sun Tue Thu Sat     metoprolol succinate ER  12.5 mg Oral Daily     mexiletine  150 mg Oral BID     pantoprazole  40 mg Oral QAM AC     pravastatin  40 mg Oral At Bedtime     sodium chloride (PF)  3 mL Intracatheter Q8H       - MEDICATION INSTRUCTIONS -       Current active medications and PTA medications reviewed, see medication list for details.            Physical Exam:   Vitals were reviewed  Patient Vitals for the past 24 hrs:   BP Temp Temp src Pulse Resp SpO2 Height Weight   02/12/22 0948 -- -- -- -- -- 99 % -- --   02/12/22 0847 -- -- -- -- -- 99 % -- --   02/12/22 0833 137/80 -- -- 98 -- --  "-- --   22 0810 -- -- -- -- 18 -- -- --   22 0743 96/56 97.3  F (36.3  C) Axillary 89 18 98 % -- --   22 0645 -- -- -- 82 18 95 % -- --   22 0215 -- -- -- -- 18 -- -- --   22 0209 104/59 97.6  F (36.4  C) Oral -- -- 92 % -- --   22 0208 (!) 86/56 -- -- 84 -- 93 % -- --   22 0152 92/51 -- Axillary 96 18 93 % -- --   22 0144 -- -- -- -- -- -- -- 76.2 kg (167 lb 15.9 oz)   22 012 (!) 87/55 -- -- 90 18 93 % -- --   22 012 -- -- -- -- -- -- 1.727 m (5' 8\") 75.5 kg (166 lb 7.2 oz)   22 113/57 97.8  F (36.6  C) Oral 100 18 97 % -- --   22 112/68 98  F (36.7  C) Axillary 97 18 98 % -- --   22 112/58 -- -- 89 -- -- -- --   22 117/61 -- -- 97 -- -- -- --   22 111/61 -- -- 92 -- -- -- --   22 118/65 -- -- 104 -- 97 % -- --   22 -- -- -- -- -- 99 % -- --   22 114/64 -- -- 98 -- -- -- --   22 108/66 -- -- 100 -- -- -- --   22 121/64 -- -- 93 -- -- -- --   22 1915 110/65 -- -- 93 -- -- -- --   22 1900 112/58 -- -- 89 -- -- -- --   22 1845 108/56 -- -- 90 -- -- -- --   22 1830 108/64 -- -- 105 -- -- -- --   22 1815 105/62 -- -- 106 -- -- -- --   22 1800 113/66 -- -- 102 -- -- -- --   22 1745 98/58 -- -- 102 -- -- -- --   22 1730 110/50 -- -- 93 -- -- -- --   22 1721 107/56 -- -- 97 -- -- -- --   22 1709 107/61 98  F (36.7  C) Oral 92 17 -- -- --   22 1600 117/46 98.5  F (36.9  C) Oral 105 16 100 % -- --       Temp:  [97.3  F (36.3  C)-98.5  F (36.9  C)] 97.3  F (36.3  C)  Pulse:  [] 98  Resp:  [16-18] 18  BP: ()/(46-80) 137/80  FiO2 (%):  [99 %] 99 %  SpO2:  [92 %-100 %] 99 %    Temperatures:  Current - Temp: 97.3  F (36.3  C); Max - Temp  Av.9  F (36.6  C)  Min: 97.3  F (36.3  C)  Max: 98.5  F (36.9  C)  Respiration range: Resp  Av.7  Min: 16  Max: 18  Pulse range: Pulse  " Av.5  Min: 82  Max: 106  Blood pressure range: Systolic (24hrs), Av , Min:86 , Max:137   ; Diastolic (24hrs), Av, Min:46, Max:80    Pulse oximetry range: SpO2  Av.4 %  Min: 92 %  Max: 100 %    I/O last 3 completed shifts:  In: 120 [P.O.:120]  Out: 1300 [Other:1300]      Intake/Output Summary (Last 24 hours) at 2022 09  Last data filed at 2022  Gross per 24 hour   Intake 120 ml   Output 1300 ml   Net -1180 ml       COVID ISO  Observed through glass door  Discussed with [facility] Nurse.        Wt Readings from Last 4 Encounters:   22 76.2 kg (167 lb 15.9 oz)   21 78.5 kg (173 lb)   21 78.6 kg (173 lb 4.5 oz)   21 78 kg (171 lb 15.3 oz)          Data:          Lab Results   Component Value Date     2022     2022     2022     2021     2021     2021    Lab Results   Component Value Date    CHLORIDE 101 2022    CHLORIDE 102 2022    CHLORIDE 100 2022    CHLORIDE 106 2021    CHLORIDE 105 2021    CHLORIDE 106 2021    Lab Results   Component Value Date    BUN 14 2022    BUN 25 2022    BUN 23 2022    BUN 39 2021    BUN 23 2021    BUN 31 2021      Lab Results   Component Value Date    POTASSIUM 4.0 2022    POTASSIUM 4.4 2022    POTASSIUM 4.1 2022    POTASSIUM 5.3 2021    POTASSIUM 3.8 2021    POTASSIUM 3.5 2021    Lab Results   Component Value Date    CO2 29 2022    CO2 27 2022    CO2 28 2022    CO2 25 2021    CO2 28 2021    CO2 27 2021    Lab Results   Component Value Date    CR 2.82 2022    CR 4.73 2022    CR 4.34 2022    CR 5.03 2021    CR 4.03 2021    CR 4.90 2021        Recent Labs   Lab Test 22  0737 22  1647 22  0653   WBC 7.1 7.5 7.6   HGB 12.7* 12.5* 12.8*   HCT 40.3 40.4 42.3   * 111* 111*    * 112* 150     Recent Labs   Lab Test 02/11/22  1647 02/11/22  0653 07/24/21  1217 03/19/21  0601   AST 16 19 15 13   ALT 22 24 21 21   GGT  --   --   --  88*   ALKPHOS 209* 214* 447* 341*   BILITOTAL 1.5* 1.7* 1.2 1.2       Recent Labs   Lab Test 01/11/21  0911 10/17/20  1715 10/17/20  0709   MAG 1.9 1.7 1.4*     Recent Labs   Lab Test 01/14/21  0710 01/13/21  0718 01/11/21  0911   PHOS 3.0 2.9 2.6     Recent Labs   Lab Test 02/12/22  0737 02/11/22  1647 02/11/22  0653   CHRISTINE 8.0* 8.0* 8.4*       Lab Results   Component Value Date    CHRISTINE 8.0 (L) 02/12/2022     Lab Results   Component Value Date    WBC 7.1 02/12/2022    HGB 12.7 (L) 02/12/2022    HCT 40.3 02/12/2022     (H) 02/12/2022     (L) 02/12/2022     Lab Results   Component Value Date     02/12/2022    POTASSIUM 4.0 02/12/2022    CHLORIDE 101 02/12/2022    CO2 29 02/12/2022     (H) 02/12/2022     Lab Results   Component Value Date    BUN 14 02/12/2022    CR 2.82 (H) 02/12/2022     Lab Results   Component Value Date    MAG 1.9 01/11/2021     Lab Results   Component Value Date    PHOS 3.0 01/14/2021       Creatinine   Date Value Ref Range Status   02/12/2022 2.82 (H) 0.66 - 1.25 mg/dL Final   02/11/2022 4.73 (H) 0.66 - 1.25 mg/dL Final   02/11/2022 4.34 (H) 0.66 - 1.25 mg/dL Final   07/24/2021 3.47 (H) 0.66 - 1.25 mg/dL Final   07/05/2021 5.03 (H) 0.66 - 1.25 mg/dL Final   03/19/2021 4.03 (H) 0.66 - 1.25 mg/dL Final   03/18/2021 4.90 (H) 0.66 - 1.25 mg/dL Final   03/17/2021 3.99 (H) 0.66 - 1.25 mg/dL Final   01/14/2021 4.55 (H) 0.66 - 1.25 mg/dL Final   01/13/2021 3.42 (H) 0.66 - 1.25 mg/dL Final       Attestation:  I have reviewed today's vital signs, notes, medications, labs and imaging.     Jah Hammond MD

## 2022-02-12 NOTE — PROGRESS NOTES
[facility]    Medicine Progress Note - Hospitalist Service       Date of Admission:  2/11/2022  Assessment & Plan   Westley Ness is a 75-year-old male with history of coronary artery disease, ischemic cardiomyopathy with EF of 20-25%, end-stage renal disease on HD M-W-F, diabetes mellitus type 2, GERD, atrial flutter/fibrillation on chronic anticoagulation with apixaban, status post ICD placement, and hypertension who presents with dyspnea on exertion with home pulse oxymetry of 60%, found to be COVID19 positive. Admitted 2/11/2022.      # Confirmed COVID-19 infection    # Acute Hypoxic Respiratory Failure secondary to COVID-19 infection  # Parapneumonic effusion  # Sepsis due to Covid-19     Symptom Onset Unknown, patient has exposure from home health aid but was not able to link symptom onset to Covid because of his chronic symptoms so would do 20 days from positive test for isolation   Date of 1st Positive Test 2/11/2022   Vaccination Status Fully Vaccinated         - COVID-19 special precautions, continuous pulse-ox  - Oxygen: continue current support with nasal cannula at 2 L/min; titrate to keep SpO2 between 90-96%  - Labs: Monitor COVID labs. CRP trending down.   - Imaging: no additional imaging needed at this time  - Breathing treatments: no inhalers needed; avoid nebulizers in favor of MDIs   - IV fluids: not indicated at this time  - Antibiotics: not indicated   - COVID-Focused Medications: Dexamethasone 6 mg x 10 days or until hospital discharge, started on 2/11; Not a candidate for Remdesivir because of ESRD, monitor CRP levels and oxygen levels to see if he would qualify for other treatments if worsens   - DVT Prophylaxis:         - At high risk of thrombotic complications due to COVID-19 (DDimer = 0.71 ug/mL FEU (Ref range: 0.00 - 0.50 ug/mL FEU) )         - Continue therapeutic anticoagulation with apixaban      ESRD  Hypocalcemia  Gets dialysis M/W/F and nephrologist is  "Dr. Hammond.  - Nephrology consulted, HD per nephrology   - Next HD planned 2/14     Atrial fibrillation/flutter with rapid ventricular response.   Patient has intermittent atrial fibrillation with rapid ventricular response with rates likely exacerbated by COVID19 infection and sepsis.   - Continue home metoprolol  - Metoprolol 2.5 mg IV q4h PRN HR > 120  - Continue prior to admission apixaban      Coronary artery disease  Ischemic cardiomyopathy  The patient has coronary artery disease and a cardiomyopathy with EF 20-25%.  His last angiogram was in 10/2020, which shows subtotal occlusion of left anterior descending (LAD) with known infarct area, patent proximal left circumflex, severe stenosis of the large OM1 distal to the stent, which was stented with FOREST at that time. Maintained on clopidogrel and apixaban  - Continue home metoprolol, lisinopril, mexiletine, Imdur and pravastatin  - Note patient will need an updated Imdur prescription (see H&P) on discharge      Hypothyroidism  - Continue prior to admission levothyroxine      Physical deconditioning  Multifactorial with age, chronic comorbidities, atrial fibrillation with rapid ventricular response, CHF, end-stage renal disease and leg pain.    - PT/OT/SW consulted      Chronic left hip and left knee pain from osteoarthritis  - Continue scheduled acetaminophen, gabapentin Tylenol and gabapentin  - Hold home prednisone 5 mg daily and plan to restart once he completes his dexamethasone     Chronic diarrhea  - Continue prior to admission loperamide       Clinically Significant Risk Factors Present on Admission             # Overweight: Estimated body mass index is 25.54 kg/m  as calculated from the following:    Height as of this encounter: 1.727 m (5' 8\").    Weight as of this encounter: 76.2 kg (167 lb 15.9 oz).         Diet: Moderate Consistent Carb (60 g CHO per Meal) Diet    DVT Prophylaxis: Apixaban   Velarde Catheter: Not present  Code Status: Full Code  "     Disposition Plan   Expected Discharge: 02/15/2022     Anticipated discharge location: home    Delays:        Entered: Bryant Ozuna MD 02/12/2022, 4:23 PM       The patient's care was discussed with the patient    Bryant Ozuna MD  Hospitalist Service  [facility]    ______________________________________________________________________    Interval History   No acute events overnight. Denies any shortness of breath at rest. Denies any chest pain. Reports an intermittent cough. Denies any abdominal pain, n/v.     Data reviewed today: I reviewed all medications, new labs and imaging results over the last 24 hours. I personally reviewed no images or EKG's today.    Physical Exam   Vital Signs: Temp: 97.6  F (36.4  C) Temp src: Oral BP: 103/63 Pulse: 97   Resp: 16 SpO2: 100 % O2 Device: Nasal cannula Oxygen Delivery: 2 LPM  Weight: 167 lbs 15.85 oz    Constitutional:  NAD  Respiratory: Normal respiratory effort at rest, non-labored breathing   Cardiovascular: RRR   GI: Soft, non-tender, non-distended   Skin/Integumen: Warm, dry  Other:      Data   Recent Labs   Lab 02/12/22  1059 02/12/22  0850 02/12/22  0814 02/12/22  0741 02/12/22  0737 02/11/22  1657 02/11/22  1647 02/11/22  1439 02/11/22  0653   WBC  --   --   --   --  7.1  --  7.5  --  7.6   HGB  --   --   --   --  12.7*  --  12.5*  --  12.8*   MCV  --   --   --   --  108*  --  111*  --  111*   PLT  --   --   --   --  111*  --  112*  --  150   INR  --   --   --   --   --   --  1.23*  --   --    NA  --   --   --   --  136  --  136  --  136   POTASSIUM  --   --   --   --  4.0  --  4.4  --  4.1   CHLORIDE  --   --   --   --  101  --  102  --  100   CO2  --   --   --   --  29  --  27  --  28   BUN  --   --   --   --  14  --  25  --  23   CR  --   --   --   --  2.82*  --  4.73*  --  4.34*   ANIONGAP  --   --   --   --  6  --  7  --  8   CHRISTINE  --   --   --   --  8.0*  --  8.0*  --  8.4*   GLC 91 107* 91   < > 83   < > 180*   < > 107*    ALBUMIN  --   --   --   --   --   --  3.1*  --  3.4   PROTTOTAL  --   --   --   --   --   --  6.0*  --  6.4*   BILITOTAL  --   --   --   --   --   --  1.5*  --  1.7*   ALKPHOS  --   --   --   --   --   --  209*  --  214*   ALT  --   --   --   --   --   --  22  --  24   AST  --   --   --   --   --   --  16  --  19    < > = values in this interval not displayed.       No results found for this or any previous visit (from the past 24 hour(s)).  Medications     - MEDICATION INSTRUCTIONS -         - MEDICATION INSTRUCTIONS for Dialysis Patients -   Does not apply See Admin Instructions     acetaminophen  1,000 mg Oral TID     apixaban ANTICOAGULANT  2.5 mg Oral BID     clopidogrel  75 mg Oral Daily     dexamethasone  6 mg Oral Daily     famotidine  20 mg Oral Daily     gabapentin  100 mg Oral TID     insulin aspart  1-7 Units Subcutaneous TID AC     insulin aspart  1-5 Units Subcutaneous At Bedtime     [START ON 2/13/2022] isosorbide mononitrate  15 mg Oral Once per day on Sun Tue Thu     isosorbide mononitrate  30 mg Oral Once per day on Mon Wed Fri Sat     levothyroxine  50 mcg Oral QAM AC     lisinopril  2.5 mg Oral BID     loperamide  2 mg Oral Once per day on Mon Wed Fri     loperamide  1 mg Oral Once per day on Sun Tue Thu Sat     metoprolol succinate ER  12.5 mg Oral Daily     mexiletine  150 mg Oral BID     pantoprazole  40 mg Oral QAM AC     pravastatin  40 mg Oral At Bedtime     sodium chloride (PF)  3 mL Intracatheter Q8H

## 2022-02-12 NOTE — PLAN OF CARE
Summary:   SOB, Chest, R chest  Pain radiating to armpit, COVID positive  DATE & TIME: 2/11/22, 3 p to 7 p   Cognitive Concerns/ Orientation : A&Ox4   BEHAVIOR & AGGRESSION TOOL COLOR: Green  CIWA SCORE: NA   ABNL VS/O2: , VSS on 2 L o2 at rest, patient reported 3 L with ambulation  MOBILITY: Walker baseline  PAIN MANAGMENT: Scheduled Tylenol, denies pain at rest  DIET: Renal  BOWEL/BLADDER: Hemo dialysis patient, reported that he can control his BM  ABNL LAB/BG: [place] WNL,    DRAIN/DEVICES: R PIV SL  TELEMETRY RHYTHM: NA  SKIN: Bruised, nurys, BUE  TESTS/PROCEDURES: Dialysis MWF, on going  D/C DAY/GOALS/PLACE: TBD  OTHER IMPORTANT INFO: some medications in [place], Insulin pen in room, meds not given as advised by Dialysis RN

## 2022-02-12 NOTE — PROGRESS NOTES
Potassium   Date Value Ref Range Status   02/11/2022 4.4 3.4 - 5.3 mmol/L Final   07/05/2021 5.3 3.4 - 5.3 mmol/L Final     Hemoglobin   Date Value Ref Range Status   02/11/2022 12.5 (L) 13.3 - 17.7 g/dL Final   07/05/2021 12.0 (L) 13.3 - 17.7 g/dL Final     Creatinine   Date Value Ref Range Status   02/11/2022 4.73 (H) 0.66 - 1.25 mg/dL Final   07/05/2021 5.03 (H) 0.66 - 1.25 mg/dL Final     Urea Nitrogen   Date Value Ref Range Status   02/11/2022 25 7 - 30 mg/dL Final   07/05/2021 39 (H) 7 - 30 mg/dL Final     Sodium   Date Value Ref Range Status   02/11/2022 136 133 - 144 mmol/L Final   07/05/2021 140 133 - 144 mmol/L Final     INR   Date Value Ref Range Status   02/11/2022 1.23 (H) 0.85 - 1.15 Final   10/15/2020 1.31 (H) 0.86 - 1.14 Final       DIALYSIS PROCEDURE NOTE  Hepatitis status of previous patient on machine log was checked and verified ok to use with this patients hepatitis status.  Patient dialyzed for 3.5 hrs. on a K3 bath with a net fluid removal of  1.3L.  A BFR of 450 ml/min was obtained via a LUE AVF using 15 gauge needles.      The treatment plan was discussed with Dr. Hammond during the treatment.    Total heparin received during the treatment: 0 units.   Needle cannulation sites held x 45 min.   Meds  given: Hectorol, see MAR   Complications: patient began not feeling well with 15 minutes remaining of treatment. Symptoms resolved after stopping fluid removal      Person educated: patient. Knowledge base substantial. Barriers to learning: none. Educated on procedure via verbal mode. Patient verbalized understanding. Pt prefers verbal education style.     ICEBOAT? Timeout performed pre-treatment  I: Patient was identified using 2 identifiers  C:  Consent Signed Yes  E: Equipment preventative maintenance is current and dialysis delivery system OK to use  B: Hepatitis B Surface Antigen: NEGATIVE; Draw Date: 2/7/2022      Hepatitis B Surface Antibody: susceptible; Draw Date: 1/12/2022  O: Dialysis  orders present and complete prior to treatment  A: Vascular access verified and assessed prior to treatment  T: Treatment was performed at a clinically appropriate time  ?: Patient was allowed to ask questions and address concerns prior to treatment  See flowsheet in EPIC for further details and post assessment.  Machine water alarm in place and functioning. Transducer pods intact and checked every 15min.   Chlorine/Chloramine water system checked every 4 hours.  Outpatient Dialysis at [facility]    Please remove patient dressing on AVF and AVG needle sites 24 hours after dialysis. If leaking occurs please apply a Band-Aid.

## 2022-02-12 NOTE — PLAN OF CARE
Summary:   SOB, Chest Pain, radiating to L armpit, COVID positive  DATE & TIME: 2/11/22 1900- 2/12/22 0730  Cognitive Concerns/ Orientation : A&Ox4   BEHAVIOR & AGGRESSION TOOL COLOR: Green  CIWA SCORE: NA   ABNL VS/O2: VSS except tachy at times and soft BP on 1L NC  MOBILITY: SBA +gb/walker  PAIN MANAGMENT: Scheduled Tylenol; PRN Oxycodone given x1 for leg cramps after HD, effective for relief.  DIET: Mod CHO  BOWEL/BLADDER: HD patient with low urine output; no BM this shift  ABNL LAB/BG:  and 131  DRAIN/DEVICES: R PIV SL  TELEMETRY RHYTHM: NA  SKIN: Bruised, nurys, BUE  TESTS/PROCEDURES: HD dialysis completed last evening, 1.3 L taken off.  HD MWF.  D/C DAY/GOALS/PLACE: TBD  OTHER IMPORTANT INFO: Insulin pen in room.  Special precautions maintained for COVID +.

## 2022-02-13 NOTE — PROVIDER NOTIFICATION
MD Notification    Notified Person: MD    Notified Person Name: Sulma     Notification Date/Time: 2/13/2022, 1425     Notification Interaction: web-based paging     Purpose of Notification: Hi, update. Patient got up with OT. Per OT, sats dropped briefly to 85% with activity, but patient recovered within 1 minute to low 90s. Now currently sustaining 90-92% while sitting in the chair. Thanks!    Orders Received:  -keep off O2 at rest if possible   -use O2 with activity if needed    Comments:

## 2022-02-13 NOTE — PLAN OF CARE
Summary:  COVID positive  DATE & TIME: 2/13/22 [id number]  Cognitive Concerns/ Orientation : A&Ox4, calm and cooperative, pleasant  BEHAVIOR & AGGRESSION TOOL COLOR: Green  CIWA SCORE: NA   ABNL VS/O2: VSS except tachy at times and soft BP on 1 L NC (sats 94-97%).  MOBILITY: SBA +gb/walker, slow with ambulation, generalized weakness, FALL RISK  PAIN MANAGMENT: Denies pain this shift. Receiving scheduled Tylenol.  DIET: Mod carb  BOWEL/BLADDER: HD patient but still producing small amount of urine; continent, no BM this shift.  ABNL LAB/BG:   DRAIN/DEVICES: R PIV SL  TELEMETRY RHYTHM: NA  SKIN: Bruised, L arm fistula site CDI, bruit/thrill present  TESTS/PROCEDURES:  HD MWF.   D/C DAY/GOALS/PLACE: Discharge home possibly on 2/15/22  OTHER IMPORTANT INFO: Lung sounds diminished. Infrequent congested, nonproductive cough. Special precautions maintained for COVID +. DACOSTA. PT/OT/SW consulted. PT recommends home with home PT. Care management/ consult.

## 2022-02-13 NOTE — PROGRESS NOTES
02/13/22 1500   Quick Adds   Type of Visit Initial Occupational Therapy Evaluation   Living Environment   People in home alone;other (see comments)  (Pt has intermittent assist for IADLs from hired help)   Current Living Arrangements house   Home Accessibility stairs to enter home;stairs within home   Number of Stairs, Main Entrance 4   Stair Railings, Main Entrance railings safe and in good condition   Number of Stairs, Within Home, Primary greater than 10 stairs   Stair Railings, Within Home, Primary other (see comments)  (chair lift to basement)   Transportation Anticipated agency   Living Environment Comments 4 steps down from garage to enter (interior steps) then chair lift to main level, with 1 step up after chair lift. Then up 4 more steps to bedroom level (B rails). Bathroom includes tub shower with 1 grab bar inside, toilet with commode with handles. Has shower chair but doesn't use it.   Self-Care   Usual Activity Tolerance fair   Current Activity Tolerance fair   Regular Exercise No   Equipment Currently Used at Home grab bar, toilet;grab bar, tub/shower   Activity/Exercise/Self-Care Comment Patient has walker for each floor of home. Pt reports has had home PT 1x/week. Pt has caregiver that assists with laundry, shopping/errands & transportation to/from dialysis. Caregiver is there Mon, Tues. Weds, Fri a couple hours each time. Pt is otherwise independent with ADLs at baseline   Instrumental Activities of Daily Living (IADL)   IADL Comments Has assist from caregiver with IADLs   Disability/Function   Fall history within last six months no   Change in Functional Status Since Onset of Current Illness/Injury yes   General Information   Onset of Illness/Injury or Date of Surgery 02/11/22   Referring Physician Obi Serrano MD   Patient/Family Therapy Goal Statement (OT) Pt would like to return home, improve independence   Additional Occupational Profile Info/Pertinent History of Current Problem  76-year-old male with history of coronary artery disease, ischemic cardiomyopathy with EF of 20-25%, end-stage renal disease on HD M-W-F, diabetes mellitus type 2, GERD, atrial flutter/fibrillation on chronic anticoagulation with apixaban, status post ICD placement, and hypertension who presents with dyspnea on exertion with home pulse oxymetry of 60%, found to be COVID19 positive. Admitted 2/11/2022.    Existing Precautions/Restrictions fall;cardiac;oxygen therapy device and L/min   Cognitive Status Examination   Orientation Status orientation to person, place and time   Affect/Mental Status (Cognitive) WNL   Follows Commands WNL   Cognitive Status Comments Pt has intact safety awareness and good insight into deficits.    Sensory   Sensory Comments Intact per pt report   Pain Assessment   Patient Currently in Pain Yes, see Vital Sign flowsheet   Posture   Posture forward head position;protracted shoulders;kyphosis   Range of Motion Comprehensive   Comment, General Range of Motion B UE shoulder flexion deficits when nearing end range, pt reports still functional during activities, B LE WFL   Strength Comprehensive (MMT)   Comment, General Manual Muscle Testing (MMT) Assessment Impaired activity tolerance    Coordination   Coordination Comments Intact   Bed Mobility   Comment (Bed Mobility) SBA with extra time   Transfers   Transfer Comments CGA with extra time and walker   Balance   Balance Comments Intact with walker during in room ambulation   Activities of Daily Living   BADL Assessment bathing;lower body dressing;toileting   Bathing Assessment   Comment (Bathing) Decreased tolerance for bathing   Lower Body Dressing Assessment   Comment (Lower Body Dressing) SBA for shoes   Toileting   Comment (Toileting) Decreased tolerance for toilet transfer   Clinical Impression   Criteria for Skilled Therapeutic Interventions Met (OT) yes;meets criteria;skilled treatment is necessary   OT Diagnosis Decline in aDL tolerance     OT Problem List-Impairments impacting ADL problems related to;activity tolerance impaired;strength   Assessment of Occupational Performance 5 or more Performance Deficits   Identified Performance Deficits Impaired activity tolerance needed for toileting and bathing independence   Planned Therapy Interventions (OT) ADL retraining;progressive activity/exercise   Clinical Decision Making Complexity (OT) low complexity   Therapy Frequency (OT) Daily   Predicted Duration of Therapy 4 days   Anticipated Equipment Needs Upon Discharge (OT)   (Pt owns all necessary AE)   Risk & Benefits of therapy have been explained evaluation/treatment results reviewed;care plan/treatment goals reviewed;risks/benefits reviewed;current/potential barriers reviewed;participants voiced agreement with care plan;participants included;patient   OT Discharge Planning    OT Discharge Recommendation (DC Rec) home with home care occupational therapy;Home with assist   OT Rationale for DC Rec Pt appears near baseline with deficits in activity tolerance needed for aDL independence. Pt would benefit from continued HHOT to progress ADL tolerance, pt additionally would benefit from assist with higher level IADLs (pt has assist with these tasks at baseline).    OT Brief overview of current status  CGA to transfer   Total Evaluation Time (Minutes)   Total Evaluation Time (Minutes) 50

## 2022-02-13 NOTE — PROGRESS NOTES
02/12/22 1730   Quick Adds   Type of Visit Initial PT Evaluation       Present no   Language English   Living Environment   People in home alone   Current Living Arrangements house   Home Accessibility stairs to enter home;stairs within home   Number of Stairs, Main Entrance 4  ((4 interior steps down from garage))   Stair Railings, Main Entrance other (see comments)   Number of Stairs, Within Home, Primary 1;4   Stair Railings, Within Home, Primary railing on left side (ascending)   Living Environment Comments 4 steps down from garage to enter (interior steps) then chair lift to main level, with 1 step up after chair lift. Then up 4 more steps to bedroom level (B rails). Bathroom includes tub shower with 1 grab bar inside, toilet with commode with handles. Has shower chair but doesn't use it.   Self-Care   Usual Activity Tolerance fair   Current Activity Tolerance fair   Activity/Exercise/Self-Care Comment Patient has walker for each floor of home. Pt reports has had home PT 1x/week. Pt has caregiver that assists with laundry, shopping/errands & transportation to/from dialysis. Caregiver is there Mon, Tues. Weds, Fri a couple hours each time.    Disability/Function   Fall history within last six months no  (Not since July 2020)   General Information   Onset of Illness/Injury or Date of Surgery 02/11/22   Pertinent History of Current Problem (include personal factors and/or comorbidities that impact the POC) Westley Ness is a 75-year-old male with history of coronary artery disease, ischemic cardiomyopathy with EF of 20-25%, end-stage renal disease on HD M-W-F, diabetes mellitus type 2, GERD, atrial flutter/fibrillation on chronic anticoagulation with apixaban, status post ICD placement, and hypertension who presents with dyspnea on exertion with home pulse oxymetry of 60%, found to be COVID19 positive. Admitted 2/11/2022.    Existing Precautions/Restrictions fall   General Observations  Reports he is not on O2 at home but needs to be, reports he measures his O2 and it dips below 80 with walking 50' to car for dialysis   Cognition   Orientation Status (Cognition) oriented x 4   Affect/Mental Status (Cognition) WFL   Pain Assessment   Patient Currently in Pain No   Posture    Posture Forward head position;Protracted shoulders;Kyphosis   Range of Motion (ROM)   ROM Quick Adds ROM WFL   Strength   Strength Comments Pt demos some functional weakness with bed mobility and transfers but able to complete without physical assist   Bed Mobility   Comment (Bed Mobility) Supine<>sit with SBA-CGA from flat bed without rail; uses momentum to propel into sitting & needs extra time to accomplish. close SBA-CGA   Transfers   Transfer Safety Comments Sit>stand from EOB with FWW and SBA, with extra time. Pt slow to rise but does not need physical assist, does rely on UE push-off from EOB   Gait/Stairs (Locomotion)   Comment (Gait/Stairs) Patient ambulated in room with FWW and S-SBA, steady with walker but slow; short steps   Balance   Balance Comments Needs B UE support on walker for safe dynamic mobility, does use walker at baseline   Sensory Examination   Sensory Perception patient reports no sensory changes   Clinical Impression   Criteria for Skilled Therapeutic Intervention yes, treatment indicated   PT Diagnosis (PT) Decreased fn mobility   Influenced by the following impairments ecreased activity tolerance, decreased strength, pain   Functional limitations due to impairments decreased independence with bed mobility, transfers, ambulation, stairs   Clinical Presentation Evolving/Changing   Clinical Presentation Rationale clinical judgement   Clinical Decision Making (Complexity) moderate complexity   Therapy Frequency (PT) 5x/week   Predicted Duration of Therapy Intervention (days/wks) 3days   Planned Therapy Interventions (PT) balance training;gait training;home exercise program;patient/family education;stair  training;strengthening;transfer training   PT Discharge Planning    PT Discharge Recommendation (DC Rec) home with home care physical therapy   PT Rationale for DC Rec Patient appears near or slightly below baseline mobility with transfers and ambulation, does endorse he has a few steps to enter which we need to asess at future session. Recommend Home PT to progress strength, endurance, reduce fallsrisk. Recommend resumed assist for houshold tasks/transportation. Pt will need home PT due to significant taxing effort to leave home.   PT Brief overview of current status  SBA bed mob, transfers, amb with 2WW; need to asees stairs with B railing   Total Evaluation Time   Total Evaluation Time (Minutes) 10

## 2022-02-13 NOTE — PLAN OF CARE
Summary: DACOSTA, home pulse ox reading of 60%, found to be COVID positive on 2/11  DATE & TIME: 2/13/22, 2389-9790   Cognitive Concerns/ Orientation : A&Ox4, calm and cooperative, pleasant  BEHAVIOR & AGGRESSION TOOL COLOR: Green  CIWA SCORE: NA   ABNL VS/O2: VSS except soft BP (90s systolic-MD aware) on RA (sats sustained in low to mid 90s, does desat with exertion to 85%/recovers within a minute with rest). MD wanting to patient to remain on RA at rest if possible and use O2 only with activity if needed.   MOBILITY: SBA +gb/walker, slow with ambulation, generalized weakness, FALL RISK, encouraged to shift weight in bed, worked with OT today, encouraged to sit in chair this afternoon  PAIN MANAGMENT: mild pain to R. Flank w/ movement. Receiving scheduled Tylenol.  DIET: Mod carb, tolerating   BOWEL/BLADDER: BS active x 4, HD patient but still producing small amount of urine; continent, no BM this shift.  ABNL LAB/BG: BG 81, 91; Cr 4.0, CRP=18.6  DRAIN/DEVICES: R PIV /SL  TELEMETRY RHYTHM: NA  SKIN: Bruised, L arm fistula site CDI, (+) thrill/bruit  TESTS/PROCEDURES: HD MWF.   D/C DAY/GOALS/PLACE: Discharge home 2/14/2022 after dialysis. Plan is home with home care   OTHER IMPORTANT INFO: Lung sounds diminished. Infrequent congested, nonproductive cough (pt. Reports this as baseline from smoking). Special precautions maintained for COVID +. DCAOSTA. PT/OT/SW consulted. PT recommends home with home PT. Care management/ consult.

## 2022-02-13 NOTE — PLAN OF CARE
Summary:  COVID positive  DATE & TIME: 2/12/22, 3-11pm  Cognitive Concerns/ Orientation : A&Ox4, calm and cooperative, pleasant  BEHAVIOR & AGGRESSION TOOL COLOR: Green  CIWA SCORE: NA   ABNL VS/O2: VSS except tachy at times and soft BP, O2 weaned from 2 L to 1 L (O2 on per pt. Request, sats maintained mid to upper 90s at rest) , DACOSTA (Vitals Q4h, limb alert L. Arm)  MOBILITY: SBA +gb/walker, slow with ambulation, generalized weakness, FALL RISK  PAIN MANAGMENT: C/o left hip pain. Gave schedule tylenol  DIET: Mod carb  BOWEL/BLADDER: HD patient but still producing urine; had large BM this shift  ABNL LAB/BG: /108, Cr 2.82, CRP 32.9  DRAIN/DEVICES: R PIV SL  TELEMETRY RHYTHM: NA  SKIN: Bruised, L. Arm fistula site CDI with thrills and bruits present.  TESTS/PROCEDURES: HD dialysis completed last evening, 1.3 L taken off.  HD MWF.   D/C DAY/GOALS/PLACE: Discharge home possibly on 2/15/22  OTHER IMPORTANT INFO: Lungs sounds diminished. Infrequent congested, nonproductive cough. Special precautions maintained for COVID +. PT/OT/SW consulted. Seen by PT this shift. PT recommends home with home PT. Care management/ consult.

## 2022-02-14 NOTE — PLAN OF CARE
Summary: DACOSTA, home pulse ox reading of 60%, found to be COVID positive on 2/11  DATE & TIME: 2/13/22, [id number]  Cognitive Concerns/ Orientation : A&Ox4, calm and cooperative, pleasant  BEHAVIOR & AGGRESSION TOOL COLOR: Green  CIWA SCORE: NA   ABNL VS/O2: VSS except soft BP(83/56). On RA (sats sustained in low to mid 90s, does slightly desat with exertion/recovers fast with rest). Tachy at times.  MOBILITY: SBA +gb/walker, slow with ambulation, generalized weakness, FALL RISK. Up in chair for meals   PAIN MANAGMENT: Had prn Oxycodone for R. Flank/back pain w/ movement, back pain and right leg cramps with effect. R leg cramp is not new but he wants MD to see him regarding back pain. Also receives scheduled Tylenol. Ice pack applied on back. Heat pack applied on right leg.  DIET: Mod carb, tolerating   BOWEL/BLADDER: HD patient but still producing small amount of urine; continent, last BM 12/12.  ABNL LAB/BG:  at 0200; Cr 2.22, CRP=18.6  DRAIN/DEVICES: R PIV /SL-Kerlix wrapped  TELEMETRY RHYTHM: NA  SKIN: Bruised, L arm fistula site CDI, (+) thrill/bruit  TESTS/PROCEDURES: HD MWF.   D/C DAY/GOALS/PLACE: Discharge home 2/14/2022 after dialysis. Plan is home with home care   OTHER IMPORTANT INFO: Lung sounds diminished. Infrequent congested, productive cough. Refused IS use. Special precautions maintained for COVID +. DACOSTA. Mostly concerned about R flank, back pain. Uncomfortable discharging until he talks with MD about his back pain. Left sticky note. PT/OT/SW following.

## 2022-02-14 NOTE — PROVIDER NOTIFICATION
Patient is on Imdur which he takes evening before dialysis day. BP 91/50. No parameter when to hold med. Paged Dr Bautista regarding this and asking if to hold med. Dr Bautista called back and said to hold med.

## 2022-02-14 NOTE — PROGRESS NOTES
Potassium   Date Value Ref Range Status   02/14/2022 4.4 3.4 - 5.3 mmol/L Final   07/05/2021 5.3 3.4 - 5.3 mmol/L Final   ]  Lab Results   Component Value Date    HGB 12.3 02/14/2022    HGB 12.0 07/05/2021     Weight: 75.9 kg (167 lb 5.3 oz)  POST WT  DIALYSIS PROCEDURE NOTE  Hepatitis status of previous patient on machine log was checked and ok to use with this patient hepatitis status.  Patient dialyzed for 3.5 hrs on a 3 K bath with a  net fluid removal of 1L.  A BFR of 400ml/min was obtained via L AVF.  Patient was seen by  during treatment.  Total heparin received during treatment:: 0units.  Heparin instilled in both ports post run.  Meds given:Hect Complications:hypotension tx with Albumin 25% 100ml   Procedure and ESRD teaching provided .  See flowsheet in EPIC for further details and post assessment. Transducer pods intact and checked every 15 mins.  Machine water alarm in place and functioning.  HEPATITIS B SURFACE ANTIGEN neg Date 2/7/22 HEPATITIS B SURFACE ANTIBODY maria dolores DATE 1/12/22  CHLORINE AND CHLORAMINES CHECK on WATER SYSTEM EVERY 4 hours.     Arterial Dialysis needle site with persistent bleeding post procedure. Pressure held for 90minutes without hemostasis. Thrombix foam attempted x2 without relief. Vascular surgeon called who placed sutures.

## 2022-02-14 NOTE — PLAN OF CARE
Summary: DACOSTA, home pulse ox reading of 60%, found to be COVID positive on   DATE & TIME: 22, 5940-4274  Cognitive Concerns/ Orientation : A&Ox4, calm and cooperative, pleasant  BEHAVIOR & AGGRESSION TOOL COLOR: Green  CIWA SCORE: NA   ABNL VS/O2: VSS except soft BP/hypotension(83/56)  Albumin given in dialysis.. On RA (sats sustained in low to mid 90s, does slightly desat with exertion/recovers fast with rest). Tachy at times.  MOBILITY: SBA +gb/walker, slow with ambulation, generalized weakness, FALL RISK. Up in chair for meals   PAIN MANAGMENT: Flank/back pain w/ movement, back pain and right leg cramps with effect.  Also receives scheduled Tylenol. Declined pain medication during dialysis run.   DIET: Mod carb, tolerating, did not eat during dialysis run  BOWEL/BLADDER: HD patient but still producing small amount of urine; continent, last BM .  ABNL LAB/B/  DRAIN/DEVICES: R PIV /SL-Kerlix wrapped  TELEMETRY RHYTHM: NA  SKIN: Bruised, L arm fistula site CDI, (+) thrill/bruit  TESTS/PROCEDURES: HD MWF.   D/C DAY/GOALS/PLACE: Discharge home 2022 after dialysis. Plan is home with home care   OTHER IMPORTANT INFO: Lung sounds diminished. Infrequent congested, productive cough. Refused IS use. Special precautions maintained for COVID +. DACOSTA. Mostly concerned about R flank, back pain.  PT/OT/SW following.      ----Vascular sug called to place Stitch on fistula site d/t excessive bleeding. See note for details      ---unable to due full head to toe d/t pt being in dialysis/procedure for shift

## 2022-02-14 NOTE — PLAN OF CARE
Summary: DACOSTA, home pulse ox reading of 60%, found to be COVID positive on 2/11  DATE & TIME: 2/13/22, 3-11pm  Cognitive Concerns/ Orientation : A&Ox4, calm and cooperative, pleasant  BEHAVIOR & AGGRESSION TOOL COLOR: Green  CIWA SCORE: NA   ABNL VS/O2: VSS except soft BP(91/50). On RA (sats sustained in low to mid 90s, does slightly desat with exertion/recovers fast with rest).  MOBILITY: SBA +gb/walker, slow with ambulation, generalized weakness, FALL RISK. Up in chair for dinner.   PAIN MANAGMENT: Gave prn Oxycodone for R. Flank pain w/ movement, back pain and right leg cramps. Received scheduled Tylenol. Ice pack applied on back. Heat pack applied on right leg.  DIET: Mod carb, tolerating   BOWEL/BLADDER: BS active x 4, HD patient but still producing small amount of urine; continent, no BM this shift.  ABNL LAB/BG: /131; Cr 4.0, CRP=18.6  DRAIN/DEVICES: R PIV /SL  TELEMETRY RHYTHM: NA  SKIN: Bruised, L arm fistula site CDI, (+) thrill/bruit  TESTS/PROCEDURES: HD MWF.   D/C DAY/GOALS/PLACE: Discharge home 2/14/2022 after dialysis. Plan is home with home care   OTHER IMPORTANT INFO: Lung sounds diminished. Infrequent congested, nonproductive cough. Refused IS use. Special precautions maintained for COVID +. DACOSTA. PT/OT/SW following.

## 2022-02-14 NOTE — PROGRESS NOTES
[facility]    Medicine Progress Note - Hospitalist Service       Date of Admission:  2/11/2022  Assessment & Plan   Westley Ness is a 75-year-old male with history of coronary artery disease, ischemic cardiomyopathy with EF of 20-25%, end-stage renal disease on HD M-W-F, diabetes mellitus type 2, GERD, atrial flutter/fibrillation on chronic anticoagulation with apixaban, status post ICD placement, and hypertension who presents with dyspnea on exertion with home pulse oxymetry of 60%, found to be COVID19 positive. Admitted 2/11/2022.      COVID-19 infection    Acute Hypoxic Respiratory Failure secondary to COVID-19 infection  Parapneumonic effusion  Sepsis due to COVID-19     Symptom Onset Unknown, patient has exposure from home health aid but was not able to link symptom onset to Covid because of his chronic symptoms so would do 10 days from positive test for isolation   Date of 1st Positive Test 2/11/2022   Vaccination Status Fully Vaccinated         - COVID-19 special precautions, continuous pulse-ox  - Oxygen: stable on room air at rest, desaturation to activity noted 2/13/22 per RN, though recovered with rest ; titrate to keep SpO2 between 90-96%  - Labs: Monitor COVID labs. CRP consistently trending down.   - Imaging: no additional imaging needed at this time  - Breathing treatments: no inhalers needed; avoid nebulizers in favor of MDIs   - IV fluids: not indicated at this time  - Antibiotics: not indicated   - COVID-Focused Medications: Dexamethasone 6 mg x 10 days or until hospital discharge, started on 2/11; Not a candidate for Remdesivir because of ESRD; given improvement in oxygenation and CRP, would not use baricitinib or tociluzimab   - DVT Prophylaxis:         - At high risk of thrombotic complications due to COVID-19 (DDimer = 0.71 ug/mL FEU (Ref range: 0.00 - 0.50 ug/mL FEU) )         - Continue therapeutic anticoagulation with apixaban      Bleeding at fistula site  -  Noted after HD  - Did not resolve with prolonged pressure by HD RN  - STAT vascular surgery consult, placed stitch  - Hemoglobin in AM  - Monitor for recurrence  - Continue clopidogrel, apixaban for now     Right chest wall pain  *Reports present for 1-2 weeks  - Reproducible on exam   - CT chest from admission demonstrated bilateral healing rib fractures right greater than left, possibly accounting for pain  - Continue scheduled acetaminophen, gabapentin; PRN oxycodone  - Add lidocaine patch     ESRD  Hypocalcemia  Gets dialysis M/W/F and nephrologist is Dr. Hammond.  - Nephrology consulted, HD per nephrology, underwent 2/14  - Plan to change to T-Th-Sa scheduled at [facility] due to COVID + status, patient declined HD 2/15/22 so will run next 2/17/22      Atrial fibrillation/flutter with rapid ventricular response.   Patient has intermittent atrial fibrillation with rapid ventricular response with rates likely exacerbated by COVID19 infection and sepsis.   - Continue home metoprolol  - Metoprolol 2.5 mg IV q4h PRN HR > 120  - Continue prior to admission apixaban     Coronary artery disease  Ischemic cardiomyopathy  The patient has coronary artery disease and a cardiomyopathy with EF 20-25%.  His last angiogram was in 10/2020, which shows subtotal occlusion of left anterior descending (LAD) with known infarct area, patent proximal left circumflex, severe stenosis of the large OM1 distal to the stent, which was stented with FOREST at that time. Maintained on clopidogrel and apixaban  - Continue home metoprolol, lisinopril, mexiletine, Imdur and pravastatin  - Added hold parameters to Imdur   - Note patient will need an updated Imdur prescription (see H&P) on discharge      Hypothyroidism  - Continue prior to admission levothyroxine      Physical deconditioning  Multifactorial with age, chronic comorbidities, atrial fibrillation with rapid ventricular response, CHF, end-stage renal disease and leg pain.    - PT/OT/SW  "consulted  - PT recommending home with home PT     Chronic left hip and left knee pain from osteoarthritis  - Continue scheduled acetaminophen, gabapentin   - Hold home prednisone 5 mg daily and plan to restart once he completes his dexamethasone     Chronic diarrhea  - Continue prior to admission loperamide       Clinically Significant Risk Factors Present on Admission             # Overweight: Estimated body mass index is 25.44 kg/m  as calculated from the following:    Height as of this encounter: 1.727 m (5' 8\").    Weight as of this encounter: 75.9 kg (167 lb 5.3 oz).         Diet: Moderate Consistent Carb (60 g CHO per Meal) Diet    DVT Prophylaxis: Apixaban   Velarde Catheter: Not present  Code Status: Full Code      Disposition Plan   Expected Discharge: 02/15/2022 if no recurrent bleeding, pain controlled  Anticipated discharge location: home    Delays:        Entered: Bryant Ozuna MD 02/14/2022, 2:27 PM       The patient's care was discussed with the patient, patient's RN, vascular surgery team    Bryant Ozuna MD  Hospitalist Service  [facility]    ______________________________________________________________________    Interval History   No acute events overnight. Reports some sharp pain on the right side of his chest, worse with movement. Improved with oxycodone. Had HD and once needle was removed had bleeding that did not resolve despite 90 minutes of pressure per HD nurse. He reports his breathing is feeling improved, has been stable on room air. Tolerating diet, denies n/v.     Data reviewed today: I reviewed all medications, new labs and imaging results over the last 24 hours. I personally reviewed no images or EKG's today.    Physical Exam   Vital Signs: Temp: 97.3  F (36.3  C) Temp src: Oral BP: 100/63 Pulse: 100   Resp: 16 SpO2: 97 % O2 Device: Nasal cannula Oxygen Delivery: 3 LPM  Weight: 167 lbs 5.27 oz    Constitutional:  NAD  Respiratory: Normal respiratory " effort at rest, non-labored breathing   Cardiovascular: RRR   GI: Soft, non-tender, non-distended   Skin/Integumen: Warm, dry  MSK:  Tenderness to palpation right lateral chest wall     Data   Recent Labs   Lab 02/14/22  0845 02/14/22  0120 02/13/22  2108 02/13/22  1057 02/13/22  0849 02/12/22  0741 02/12/22  0737 02/11/22  1657 02/11/22  1647 02/11/22  1439 02/11/22  0653   WBC 7.0  --   --   --  8.9  --  7.1  --  7.5  --  7.6   HGB 12.3*  --   --   --  12.6*  --  12.7*  --  12.5*  --  12.8*   *  --   --   --  113*  --  108*  --  111*  --  111*   *  --   --   --  132*  --  111*  --  112*  --  150   INR  --   --   --   --   --   --   --   --  1.23*  --   --      --   --   --  134  --  136  --  136  --  136   POTASSIUM 4.4  --   --   --  4.4  --  4.0  --  4.4  --  4.1   CHLORIDE 101  --   --   --  102  --  101  --  102  --  100   CO2 26  --   --   --  24  --  29  --  27  --  28   BUN 36*  --   --   --  26  --  14  --  25  --  23   CR 4.65*  --   --   --  4.00*  --  2.82*  --  4.73*  --  4.34*   ANIONGAP 8  --   --   --  8  --  6  --  7  --  8   CHRISTINE 8.2*  --   --   --  8.2*  --  8.0*  --  8.0*  --  8.4*   * 147* 131*   < > 97   < > 83   < > 180*   < > 107*   ALBUMIN  --   --   --   --   --   --   --   --  3.1*  --  3.4   PROTTOTAL  --   --   --   --   --   --   --   --  6.0*  --  6.4*   BILITOTAL  --   --   --   --   --   --   --   --  1.5*  --  1.7*   ALKPHOS  --   --   --   --   --   --   --   --  209*  --  214*   ALT  --   --   --   --   --   --   --   --  22  --  24   AST  --   --   --   --   --   --   --   --  16  --  19    < > = values in this interval not displayed.       No results found for this or any previous visit (from the past 24 hour(s)).  Medications     - MEDICATION INSTRUCTIONS -       - MEDICATION INSTRUCTIONS -         - MEDICATION INSTRUCTIONS for Dialysis Patients -   Does not apply See Admin Instructions     sodium chloride 0.9%  250 mL Intravenous Once in  dialysis/CRRT     acetaminophen  1,000 mg Oral TID     [Held by provider] apixaban ANTICOAGULANT  2.5 mg Oral BID     clopidogrel  75 mg Oral Daily     dexamethasone  6 mg Oral Daily     famotidine  20 mg Oral Daily     gabapentin  100 mg Oral TID     insulin aspart  1-7 Units Subcutaneous TID AC     insulin aspart  1-5 Units Subcutaneous At Bedtime     isosorbide mononitrate  15 mg Oral Once per day on Sun Tue Thu     isosorbide mononitrate  30 mg Oral Once per day on Mon Wed Fri Sat     levothyroxine  50 mcg Oral QAM AC     lidocaine  1-2 patch Transdermal Q24H     lidocaine   Transdermal Q8H     lisinopril  2.5 mg Oral BID     loperamide  2 mg Oral Once per day on Mon Wed Fri     loperamide  1 mg Oral Once per day on Sun Tue Thu Sat     metoprolol succinate ER  12.5 mg Oral Daily     mexiletine  150 mg Oral BID     - MEDICATION INSTRUCTIONS -   Does not apply Once     pantoprazole  40 mg Oral QAM AC     pravastatin  40 mg Oral At Bedtime     sodium chloride (PF)  3 mL Intracatheter Q8H

## 2022-02-14 NOTE — CONSULTS
VASCULAR SURGERY INPATIENT CONSULTATION     LOCATION: [facility]    Westley Ness  Medical Record #:  [id number]  YOB: 1945  Age:  76 year old     Date of Service: 2/11/2022    PRIMARY CARE PROVIDER: Josselin Kolb      Reason for consult: bleeding from left upper extremity radiocephalic fistula    IMPRESSION:  Mr. Ness is a 75yo male with a bleeding left upper extremity radiocephalic fistula. Two figure of eight stitches placed proximally and distally to the visible needle hole with hemostasis. Concern for outflow stenosis given bleeding and previous venoplasty.    RECOMMENDATION/RISKS:    - keep dressing in place until 2/15  - will likely need fistulagram to evaluate for recurrent cephalic vein stenosis  - if bleeding recurs, hold pressure and page Vascular Surgery    HPI:  Westley Ness is a 76 year old male with a bleeding needle site in his left radiocephalic fistula, controlled with stitches. Pressure was held for over an hour prior to vascular surgery consult. He is anticoagulated on Eliquis and is also on Plavix. He is chronically anemic with baseline hemoglobin around 12. He remains hemodynamically stable.    He underwent excision of an ulcer overlying the fistula in March 2019. He continued to have some bleeding after dialysis and underwent cephalic venoplasty in July 2019 with good results. He has not had any prolonged bleeding or ulceration since that time.      PHH:    Past Medical History:   Diagnosis Date     Acid reflux disease      Benign essential hypertension      CAD (coronary artery disease)     s/p multiple NSTEMIs and PCI's     ESRD on hemodialysis (H)     MWF     History of atrial flutter     s/p ablation     Hypothyroidism      Ischemic cardiomyopathy     EF 25-30%, s/p AICD          Past Surgical History:   Procedure Laterality Date     CHOLECYSTECTOMY, OPEN  2013     CV CORONARY ANGIOGRAM N/A 10/19/2020    Procedure: Coronary Angiogram;   Surgeon: Theodora Salazar MD;  Location:  HEART CARDIAC CATH LAB     CV LEFT HEART CATH N/A 10/19/2020    Procedure: Left Heart Cath;  Surgeon: Theodora Salazar MD;  Location:  HEART CARDIAC CATH LAB     CV PCI STENT DRUG ELUTING N/A 10/19/2020    Procedure: Percutaneous Coronary Intervention Stent Drug Eluting;  Surgeon: Theodora Salazar MD;  Location:  HEART CARDIAC CATH LAB     ELBOW SURGERY Left 2008    ORIF - plates still in place     EP ICD GENERATOR CHANGE SINGLE N/A 11/21/2019    Procedure: EP ICD Generator Change Single;  Surgeon: Jono Mejia MD;  Location:  HEART CARDIAC CATH LAB     H ABLATION ATRIAL FLUTTER  06/08/2017, 12/11/17     HC LEFT HEART CATHETERIZATION  7/14/2016     HC LEFT HEART CATHETERIZATION  9/21/2016     IMPLANT VENTRICULAR DEVICE  08/15/2011     IR DIALYSIS FISTULOGRAM LEFT  7/22/2019     REPAIR FISTULA ARTERIOVENOUS UPPER EXTREMITY Left 3/5/2019    Procedure: REPAIR LEFT UPPER ARM ARTERIOVENOUS FISTULA SKIN ULCER;  Surgeon: Westley Griggs MD;  Location:  OR     TONSILLECTOMY & ADENOIDECTOMY       VASCULAR SURGERY  2004, 2010    LUE fistulas (upper and lower); upper one is functional       ALLERGIES:  Contrast dye    MEDS:    Current Facility-Administered Medications:      - MEDICATION INSTRUCTIONS for Dialysis Patients -, , Does not apply, See Admin Instructions, Obi Serrano MD     0.9% sodium chloride BOLUS, 250 mL, Intravenous, Once in dialysis/CRRT, Jah Hammond MD     0.9% sodium chloride BOLUS, 100-150 mL, Intravenous, Q15 Min PRN, Jah Hammond MD     acetaminophen (TYLENOL) tablet 1,000 mg, 1,000 mg, Oral, TID, Obi Serrano MD, 1,000 mg at 02/13/22 2152     albumin human 25 % injection 50 mL, 50 mL, Intravenous, Q1H PRN, Jah Hammond MD, 50 mL at 02/14/22 1143     [Held by provider] apixaban ANTICOAGULANT (ELIQUIS) tablet 2.5 mg, 2.5 mg, Oral, BID, Obi Serrano MD, 2.5 mg at 02/13/22 2152      clopidogrel (PLAVIX) tablet 75 mg, 75 mg, Oral, Daily, Obi Serrano MD, 75 mg at 02/13/22 0912     dexamethasone (DECADRON) tablet 6 mg, 6 mg, Oral, Daily, Obi Serrano MD, 6 mg at 02/13/22 0912     glucose gel 15-30 g, 15-30 g, Oral, Q15 Min PRN **OR** dextrose 50 % injection 25-50 mL, 25-50 mL, Intravenous, Q15 Min PRN **OR** glucagon injection 1 mg, 1 mg, Subcutaneous, Q15 Min PRN, Obi Serrano MD     famotidine (PEPCID) tablet 20 mg, 20 mg, Oral, Daily, Obi Serrano MD, 20 mg at 02/13/22 0912     gabapentin (NEURONTIN) capsule 100 mg, 100 mg, Oral, TID, Obi Serrano MD, 100 mg at 02/13/22 1930     insulin aspart (NovoLOG) injection (RAPID ACTING), 1-7 Units, Subcutaneous, TID AC, Obi Serrano MD, 1 Units at 02/13/22 1711     insulin aspart (NovoLOG) injection (RAPID ACTING), 1-5 Units, Subcutaneous, At Bedtime, Obi Serrano MD     isosorbide mononitrate (IMDUR) 24 hr half-tab 15 mg, 15 mg, Oral, Once per day on Sun Tue Thu, Obi Serrano MD     isosorbide mononitrate (IMDUR) 24 hr tablet 30 mg, 30 mg, Oral, Once per day on Mon Wed Fri Sat, Obi Serrano MD, 30 mg at 02/12/22 2207     levothyroxine (SYNTHROID/LEVOTHROID) tablet 50 mcg, 50 mcg, Oral, QAM AC, Obi Serrano MD, 50 mcg at 02/14/22 0651     Lidocaine (LIDOCARE) 4 % Patch 1-2 patch, 1-2 patch, Transdermal, Q24H, Bryant Ozuna MD     lidocaine (LMX4) cream, , Topical, Q1H PRN, Obi Serrano MD     lidocaine 1 % 0.1-1 mL, 0.1-1 mL, Other, Q1H PRN, Obi Serrano MD     lidocaine 1 % 0.5 mL, 0.5 mL, Intradermal, Once PRN, Jah Hammond MD     lidocaine 1 % 0.5 mL, 0.5 mL, Intradermal, Once PRN, Jah Hammond MD     lidocaine patch in PLACE, , Transdermal, Q8H, Bryant Ozuna MD     lisinopril (ZESTRIL) tablet 2.5 mg, 2.5 mg, Oral, BID, Obi Serrano MD, 2.5 mg at 02/13/22 1930     loperamide (IMODIUM) capsule 2 mg, 2 mg, Oral, Once per day on Mon Wed Fri,  Obi Serrano MD     loperamide (IMODIUM) liquid 1 mg, 1 mg, Oral, Once per day on Sun Tue Thu Sat, Obi Serrano MD, 1 mg at 02/13/22 1029     meclizine (ANTIVERT) tablet 25 mg, 25 mg, Oral, Q6H PRN, Obi Serrano MD     Medication instructions: Do NOT use nebulized medications, , Does not apply, Continuous PRN, Obi Serrano MD     metoprolol succinate ER (TOPROL-XL) 24 hr half-tab 12.5 mg, 12.5 mg, Oral, Daily, Obi Serrano MD, 12.5 mg at 02/13/22 1320     mexiletine (MEXITIL) capsule 150 mg, 150 mg, Oral, BID, Obi Serrano MD, 150 mg at 02/13/22 1931     naloxone (NARCAN) injection 0.2 mg, 0.2 mg, Intravenous, Q2 Min PRN **OR** naloxone (NARCAN) injection 0.4 mg, 0.4 mg, Intravenous, Q2 Min PRN **OR** naloxone (NARCAN) injection 0.2 mg, 0.2 mg, Intramuscular, Q2 Min PRN **OR** naloxone (NARCAN) injection 0.4 mg, 0.4 mg, Intramuscular, Q2 Min PRN, Obi Serrano MD     nitroGLYcerin (NITROSTAT) sublingual tablet 0.4 mg, 0.4 mg, Sublingual, Q5 Min PRN, Obi Serrano MD     No heparin via hemodialysis machine, , Does not apply, Once, Jah Hammond MD     ondansetron (ZOFRAN-ODT) ODT tab 4 mg, 4 mg, Oral, Q6H PRN **OR** ondansetron (ZOFRAN) injection 4 mg, 4 mg, Intravenous, Q6H PRN, Obi Serrano MD     oxyCODONE IR (ROXICODONE) half-tab 2.5 mg, 2.5 mg, Oral, Q4H PRN, Obi Serrano MD, 2.5 mg at 02/14/22 0434     pantoprazole (PROTONIX) EC tablet 40 mg, 40 mg, Oral, QAM AC, Obi Serrano MD, 40 mg at 02/14/22 0651     pravastatin (PRAVACHOL) tablet 40 mg, 40 mg, Oral, At Bedtime, Obi Serrano MD, 40 mg at 02/13/22 2152     sodium chloride (PF) 0.9% PF flush 3 mL, 3 mL, Intracatheter, Q8H, Obi Serrano MD, 3 mL at 02/14/22 0434     sodium chloride (PF) 0.9% PF flush 3 mL, 3 mL, Intracatheter, q1 min prn, Obi Serrano MD     Stop Heparin 60 minutes before end of treatment, , Does not apply, Continuous PRN, Jah Hammond MD    SOCIAL  "HABITS:    History   Smoking Status     Former Smoker     Packs/day: 2.00     Years: 35.00     Types: Cigarettes     Start date: 1965     Quit date: 11/1/1996   Smokeless Tobacco     Never Used     Social History    Substance and Sexual Activity      Alcohol use: Not Currently        Alcohol/week: 0.0 standard drinks      History   Drug Use No       FAMILY HISTORY:    Family History   Problem Relation Age of Onset     Cerebrovascular Disease Mother         later in life     Hypertension Father      Bladder Cancer Father      Myocardial Infarction Paternal Grandmother      Diabetes No family hx of      Prostate Cancer No family hx of      Colon Cancer No family hx of        PE:  /63   Pulse 100   Temp 97.3  F (36.3  C) (Oral)   Resp 16   Ht 1.727 m (5' 8\")   Wt 75.9 kg (167 lb 5.3 oz)   SpO2 97%   BMI 25.44 kg/m    Wt Readings from Last 1 Encounters:   02/14/22 75.9 kg (167 lb 5.3 oz)     Body mass index is 25.44 kg/m .    EXAM:  GENERAL: acutely ill and frail man who appears his states age  EYES: Grossly normal.  MOUTH: Buccal mucosa normal   CARDIAC: pulses regular  CHEST/LUNG: increased work of breathing on supplemental oxygen  MUSCULOSKELETAL: Grossly normal and both lower extremities are intact.  INTEGUMENT: left upper extremity fistula without ulceration, visible needle hole at the site of nonpulsatile but brisk bleeding      DIAGNOSTIC STUDIES:     Images:  Reviewed images from angiogram in 2019 - focal stenosis of cephalic arch successfully treated with balloon venoplasty    Chest CT w/o contrast    Result Date: 2/11/2022  CT CHEST WITHOUT CONTRAST  2/11/2022 8:01 AM CLINICAL HISTORY: Chest pain or shortness of breath, pleurisy or effusion suspected. Dialysis patient, one week progressive right-sided chest pain and shortness of breath on exertion, hypoxia with exertion, on anticoagulation. TECHNIQUE: CT chest without IV contrast. Multiplanar reformats were obtained. Dose reduction techniques were " used. CONTRAST: None. COMPARISON: 7/24/2021 FINDINGS: LUNGS AND PLEURA: There are small bilateral pleural effusions. Minor dependent atelectatic changes in the right lung. No pulmonary nodule or mass. MEDIASTINUM/AXILLAE: Right-sided pacer device. Normal caliber thoracic aorta. Unremarkable thyroid. No thoracic or axillary adenopathy. CORONARY ARTERY CALCIFICATION: Previous intervention (stents or CABG). UPPER ABDOMEN: Atrophic native kidneys. Cirrhotic-appearing liver. MUSCULOSKELETAL: Bilateral healing rib fractures, right greater than left. The bones appear osteopenic.     IMPRESSION: Small bilateral pleural effusions. JUDY CUETO MD   SYSTEM ID:  [id number]      LABS:      Sodium   Date Value Ref Range Status   02/14/2022 135 133 - 144 mmol/L Final   02/13/2022 134 133 - 144 mmol/L Final   02/12/2022 136 133 - 144 mmol/L Final   07/05/2021 140 133 - 144 mmol/L Final   03/19/2021 140 133 - 144 mmol/L Final   03/18/2021 141 133 - 144 mmol/L Final     Urea Nitrogen   Date Value Ref Range Status   02/14/2022 36 (H) 7 - 30 mg/dL Final   02/13/2022 26 7 - 30 mg/dL Final   02/12/2022 14 7 - 30 mg/dL Final   07/05/2021 39 (H) 7 - 30 mg/dL Final   03/19/2021 23 7 - 30 mg/dL Final   03/18/2021 31 (H) 7 - 30 mg/dL Final     Hemoglobin   Date Value Ref Range Status   02/14/2022 12.3 (L) 13.3 - 17.7 g/dL Final   02/13/2022 12.6 (L) 13.3 - 17.7 g/dL Final   02/12/2022 12.7 (L) 13.3 - 17.7 g/dL Final   07/05/2021 12.0 (L) 13.3 - 17.7 g/dL Final   03/19/2021 9.9 (L) 13.3 - 17.7 g/dL Final   03/18/2021 10.5 (L) 13.3 - 17.7 g/dL Final     Platelet Count   Date Value Ref Range Status   02/14/2022 130 (L) 150 - 450 10e3/uL Final   02/13/2022 132 (L) 150 - 450 10e3/uL Final   02/12/2022 111 (L) 150 - 450 10e3/uL Final   07/05/2021 172 150 - 450 10e9/L Final   03/19/2021 104 (L) 150 - 450 10e9/L Final   03/18/2021 123 (L) 150 - 450 10e9/L Final     INR   Date Value Ref Range Status   02/11/2022 1.23 (H) 0.85 - 1.15 Final    10/15/2020 1.31 (H) 0.86 - 1.14 Final   07/22/2019 0.97 0.86 - 1.14 Final   04/23/2018 1.20 (H) 0.86 - 1.14 Final         Zo Quarles MD  VASCULAR SURGERY FELLOW  [facility] [phone]    Vascular surgery attending staff note: I have seen and examined the patient.  I agree with the documentation by our fellow, Dr. Quarles.  Patient is a 76-year-old male who presented with bleeding from left upper extremity radiocephalic arteriovenous fistula.  This was easily controlled with suturing.  Patient will need follow-up with sonography to assess flow, aneurysmal degeneration and the need for future venoplasty.    Denis Yoder M.D.

## 2022-02-14 NOTE — PROGRESS NOTES
Renal Medicine Inpatient Dialysis Note                                Westley Ness MRN# [id number]   Age: 76 year old YOB: 1945   Date of Admission: 2/11/2022 Hospital LOS: 3          Assessment/Plan:     75-year-old male with history of CAD, ICM with EF of 20-25%, ESRD, DM2, GERD, atrial flutter/fibrillation on chronic anticoagulation with apixaban, status post ICD placement, and hypertension who presents with dyspnea on exertion with home pulse oxymetry of 60%, found to be COVID19 positive.     Following for ESRD management     1.  ESRD    -MWF [facility]    -AVF  2.  Anemia  3.  Metabolic Bone Disease   -Hectorol  4.  COVID 19      Will need to move to [place] unit (TT) given COVID  Offered additional run tomorrow (patient refused)    Next 02/16 or 02/17 pending disposition        Interval History:     Dialysis run parameters reviewed with dialysis RN at patient bedside.    3.5 hours  AVF  3K  UF limited by BP    Has received 25% albumin and NS on run    Complaining of chest wall pain     ROS     GENERAL: NAD, No fever,chills  R: NEGATIVE for significant cough or SOB  EXT: no change edema  MUSCULOSKELETAL: chest wall apin  ROS otherwise negative    Dialysis Parameters:     Vitals were reviewed  Patient Vitals for the past 8 hrs:   BP Temp Temp src Pulse Resp SpO2 Weight   02/14/22 1030 101/60 -- -- 88 -- -- --   02/14/22 1015 100/58 -- -- 95 -- -- --   02/14/22 1000 (!) 81/53 -- -- 88 -- -- --   02/14/22 0945 94/61 -- -- 102 -- -- --   02/14/22 0930 (!) 85/59 -- -- 95 -- -- --   02/14/22 0915 (!) 84/58 -- -- 95 -- -- --   02/14/22 0900 98/50 -- -- 104 -- -- --   02/14/22 0845 99/64 -- -- 96 -- -- --   02/14/22 0830 101/68 97.3  F (36.3  C) Oral 94 16 97 % --   02/14/22 0515 -- -- -- -- 16 94 % --   02/14/22 0459 -- -- -- -- -- -- 75.9 kg (167 lb 5.3 oz)   02/14/22 0429 92/55 97.3  F (36.3  C) Oral 92 16 94 % --     I/O last 3 completed shifts:  In: 480 [P.O.:480]  Out: -     Vitals:     "02/12/22 0121 02/12/22 0144 02/14/22 0459   Weight: 75.5 kg (166 lb 7.2 oz) 76.2 kg (167 lb 15.9 oz) 75.9 kg (167 lb 5.3 oz)       Current Weight: 75.9  Dry Weight: 75  Dialysis Temp: 36.5  C  Access Device: AVF  Access Site: left  Dialyzer: Revaclear  Dialysis Bath: 3  Sodium Profile: n  UF Goal: 0.5  Blood Flow Rate (mL/min): 400  Total Treatment Time (hrs): 3.5  Heparin: Low dose as required      EPO dose: n  Zemplar: y  IV Fe: n      Medications and Allergies:     Reviewed      Physical Exam:     Seen and examined during course of dialysis run    /58   Pulse 95   Temp 97.3  F (36.3  C) (Oral)   Resp 16   Ht 1.727 m (5' 8\")   Wt 75.9 kg (167 lb 5.3 oz)   SpO2 97%   BMI 25.44 kg/m      GENERAL: awake, alert, follows    Full examine not performed given COVID status     Data:       Recent Labs   Lab 02/14/22  0845      POTASSIUM 4.4   CHLORIDE 101   CO2 26   ANIONGAP 8   *   BUN 36*   CR 4.65*   GFRESTIMATED 12*   CHRISTINE 8.2*     Recent Labs   Lab 02/11/22  1647 02/11/22  0653   ALBUMIN 3.1* 3.4     Recent Labs   Lab 02/14/22  0845 02/13/22  0849 02/12/22  0737 02/11/22  1647   HGB 12.3* 12.6* 12.7* 12.5*         G Estevan Granger MD    [facility] - Nephrology  [phone]          "

## 2022-02-15 NOTE — TELEPHONE ENCOUNTER
----- Message -----  From: Zo Quarles MD  Sent: 2/15/2022  11:50 AM CST  To: [facility] Rn Surgical Triage        Would you please schedule this patient with Dr. Yoder in two weeks with a preclinic LUE fistula ultrasound? Thank you.    Zo

## 2022-02-15 NOTE — PLAN OF CARE
Physical Therapy Discharge Summary    Reason for therapy discharge:    Discharged to home with home therapy.    Progress towards therapy goal(s). See goals on Care Plan in [facility] electronic health record for goal details.  Goals partially met.  Barriers to achieving goals:   discharge from facility.    Therapy recommendation(s):    Continued therapy is recommended.  Rationale/Recommendations:  To further increase independence with mobility.

## 2022-02-15 NOTE — DISCHARGE INSTRUCTIONS
You will be discharge home with [facility].  [facility] will contact you directly to arrange the first visit on Saturday    [facility]'s phone number is [phone].

## 2022-02-15 NOTE — PLAN OF CARE
Discharge    Patient discharged to home via wheelchair ride to door 2 with Caregiver.     Care plan note  DATE & TIME: 02/15/22, 1385-9103           Cognitive Concerns/ Orientation : A & O x 4   BEHAVIOR & AGGRESSION TOOL COLOR: Green     ABNL VS/O2: VSS on RA except tachycardia at times/soft BPs.   MOBILITY: Up SBA with GB and walker  PAIN MANAGMENT: Denied when askeeed. C/O of 3/4-10 pain in ribs when moving or coughing. Scheduled Tylenol given, and Lidocaine patch applied.   DIET: Mod CHO, poor appetite.   BOWEL/BLADDER: Hemodialysis patient but still passes urine. Continent. No BM. Refused Imodium.   ABNL LAB/BG: Cr 3.72, CRP 52.7, WBC 11.5, Platelet 132, and BGM 99/136.   DRAIN/DEVICES: PIV SL and removed for discharge. Left fistula, WDL today. No bleeding, stitch intact.   TELEMETRY RHYTHM: NA  SKIN: Pale/dusky/blotchy skin color. Significant Bruising to arms.   TESTS/PROCEDURES: None.   D/C DATE: Today back home, home care ordered to come Saturday. [facility] personal caregiver picked py up.   OTHER IMPORTANT INFO: Infrequent congested cough. LS diminished. Apical irregular. Special precautions maintained. No changes. Stable. No questions after explaining AVS.     Listed belongings gathered and given to patient (including from security/pharmacy). Yes  Care Plan and Patient education resolved: Yes  Prescriptions if needed, hard copies sent with patient  NA  Medication Bin checked and emptied on discharge Yes  SW/care coordinator/charge RN aware of discharge: Yes

## 2022-02-15 NOTE — DISCHARGE SUMMARY
[facility]  Hospitalist Discharge Summary       Date of Admission:  2/11/2022  Date of Discharge:  2/15/2022  Discharging Provider: Bryant Ozuna MD      Discharge Diagnoses   COVID-19 infection    Acute Hypoxic Respiratory Failure secondary to COVID-19 infection  Parapneumonic effusion  Sepsis due to COVID-19  Bleeding at fistula site  Right chest wall pain  History of bilateral rib fractures   ESRD  Hypocalcemia  Atrial fibrillation/flutter with rapid ventricular response.   Coronary artery disease  Ischemic cardiomyopathy  Hypothyroidism  Physical deconditioning  Chronic left hip and left knee pain from osteoarthritis  Chronic diarrhea    Follow-ups Needed After Discharge   Follow-up Appointments     Follow-up and recommended labs and tests       Follow up with primary care provider, Josselin Kolb, within 7 days   for hospital follow- up.  No follow up labs or test are needed.               Hospital Course   Westley Ness is a 75-year-old male with history of coronary artery disease, ischemic cardiomyopathy with EF of 20-25%, end-stage renal disease on HD M-W-F, diabetes mellitus type 2, GERD, atrial flutter/fibrillation on chronic anticoagulation with apixaban, status post ICD placement, and hypertension who presents with dyspnea on exertion with home pulse oxymetry of 60%, found to be COVID19 positive. Admitted 2/11/2022.      COVID-19 infection    Acute Hypoxic Respiratory Failure secondary to COVID-19 infection  Parapneumonic effusion  Sepsis due to COVID-19     Symptom Onset Unknown    Date of 1st Positive Test 2/11/2022   Vaccination Status Fully Vaccinated           - Recommended isolation for 10 days from positive test as unclear exactly when symptoms started  - Oxygen: stable on room air prior to discharge   - Labs:  CRP consistently trending down.   - Imaging: no additional imaging needed at this time  - Breathing treatments: no inhalers needed; avoid nebulizers in  favor of MDIs   - IV fluids: not indicated at this time  - Antibiotics: not indicated   - COVID-Focused Medications: Dexamethasone 6 mg started on 2/11 through hospital discharge; Not a candidate for Remdesivir because of ESRD; given improvement in oxygenation and CRP, did not use baricitinib or tociluzimab   - DVT Prophylaxis:         - At high risk of thrombotic complications due to COVID-19 (DDimer = 0.71 ug/mL FEU (Ref range: 0.00 - 0.50 ug/mL FEU) )         - Continue therapeutic anticoagulation with apixaban      Bleeding at fistula site  - Noted after HD  - Did not resolve with prolonged pressure by HD RN  - STAT vascular surgery consult, placed stitch  - Hemoglobin stable  - No recurrence   - Vascular surgery considered fistulogram to evaluate for stenosis given recurrent issue, ultimately elected not to pursue  - Continue clopidogrel, apixaban     Right chest wall pain  History of bilateral rib fractures   *Reports present for 1-2 weeks  - Reproducible on exam   - CT chest from admission demonstrated bilateral healing rib fractures right greater than left, possibly accounting for pain  - Continue scheduled acetaminophen, gabapentin; PRN oxycodone at home  - Continue lidocaine patch     ESRD  Hypocalcemia  Gets dialysis M/W/F and nephrologist is Dr. Hammond.  - Nephrology consulted, HD per nephrology, underwent last 2/14  - Changed to T-Th- scheduled at [facility] due to COVID + status, patient declined HD 2/15/22 so will run next 2/17/22      Atrial fibrillation/flutter with rapid ventricular response.   Patient has intermittent atrial fibrillation with rapid ventricular response with rates likely exacerbated by COVID19 infection and sepsis.   - Continue home metoprolol, apixaban     Coronary artery disease  Ischemic cardiomyopathy  The patient has coronary artery disease and a cardiomyopathy with EF 20-25%.  His last angiogram was in 10/2020, which shows subtotal occlusion of left anterior descending  (LAD) with known infarct area, patent proximal left circumflex, severe stenosis of the large OM1 distal to the stent, which was stented with FOREST at that time. Maintained on clopidogrel and apixaban  - Continue home metoprolol, lisinopril, mexiletine, Imdur, pravastatin      Hypothyroidism  - Continue prior to admission levothyroxine      Physical deconditioning  Multifactorial with age, chronic comorbidities, atrial fibrillation with rapid ventricular response, CHF, end-stage renal disease and leg pain.    - PT/OT/SW consulted  - PT recommending home with home PT, ordered for discharge      Chronic left hip and left knee pain from osteoarthritis  - Continue scheduled acetaminophen, gabapentin   - Held home prednisone 5 mg daily while on dexamethasone, restarted on discharge     Chronic diarrhea  - Continue prior to admission loperamide     Consultations This Hospital Stay   NEPHROLOGY IP CONSULT  PHYSICAL THERAPY ADULT IP CONSULT  OCCUPATIONAL THERAPY ADULT IP CONSULT  CARE MANAGEMENT / SOCIAL WORK IP CONSULT  VASCULAR SURGERY IP CONSULT    Code Status   Full Code    Time Spent on this Encounter   I, Bryant Ozuna MD, personally saw the patient today and spent greater than 30 minutes discharging this patient.       Bryant Ozuna MD  [facility]  ______________________________________________________________________    Physical Exam   Vital Signs: Temp: 97.7  F (36.5  C) Temp src: Oral BP: 108/70 Pulse: 98   Resp: 18 SpO2: 97 % O2 Device: None (Room air)    Weight: 167 lbs 5.27 oz    Constitutional:  NAD  Respiratory:     Normal respiratory effort at rest, non-labored breathing   Cardiovascular: RRR   GI: Soft, non-tender, non-distended   Skin/Integumen: Warm, dry  MSK:  Tenderness to palpation right lateral chest wall        Primary Care Physician   Josselin Kolb    Discharge Disposition   Discharged to home  Condition at discharge: Stable      Discharge Orders      Medication  Therapy Management Referral      Home care nursing referral      Home Care OT Referral for Hospital Discharge      Home Care PT Referral for Hospital Discharge      COVID-19 GetWell Loop Referral      Reason for your hospital stay    You were hospitalized for COVID19 infection.     Follow-up and recommended labs and tests     Follow up with primary care provider, Josselin Kolb, within 7 days for hospital follow- up.  No follow up labs or test are needed.     Activity    Your activity upon discharge: activity as tolerated     MD face to face encounter    Documentation of Face to Face and Certification for Home Health Services    I certify that patient: Westley Ness is under my care and that I, or a nurse practitioner or physician's assistant working with me, had a face-to-face encounter that meets the physician face-to-face encounter requirements with this patient on: 2/15/2022.    This encounter with the patient was in whole, or in part, for the following medical condition, which is the primary reason for home health care: COVID19 infection .    I certify that, based on my findings, the following services are medically necessary home health services: Nursing, Occupational Therapy, and Physical Therapy.    My clinical findings support the need for the above services because: Nurse is needed: To assess vital signs and weight, respiratory and cardiac status, and home safety after changes in medications or other medical regimen.., Occupational Therapy Services are needed to assess and treat cognitive ability and address ADL safety due to impairment in functional immobility, and Physical Therapy Services are needed to assess and treat the following functional impairments: physical deconditioning.    Further, I certify that my clinical findings support that this patient is homebound (i.e. absences from home require considerable and taxing effort and are for medical reasons or [facility] services or infrequently or  of short duration when for other reasons) because: Requires assistance of another person or specialized equipment to access medical services because patient: Requires supervision of another for safe transfer...    Based on the above findings. I certify that this patient is confined to the home and needs intermittent skilled nursing care, physical therapy and/or speech therapy.  The patient is under my care, and I have initiated the establishment of the plan of care.  This patient will be followed by a physician who will periodically review the plan of care.  Physician/Provider to provide follow up care: Josselin Kolb    Attending hospital physician (the Medicare certified [place] provider): Bryant Ozuna MD  Physician Signature: See electronic signature associated with these discharge orders.  Date: 2/15/2022     Contact provider    Contact your primary care provider if If increased trouble breathing, new arm/leg swelling, dizziness/passing out, falls, bleeding that doesn't stop, or uncontrolled pain.     Discharge - Quarantine/Isolation Instruction    Date of symptom onset: Unknown, positive test 2/11/22   If you tested positive COVID-19 and show symptoms (fever, cough, body aches or trouble breathing):        Stay home and away from others (self-isolate) until:        At least 10 days have passed since your symptoms started. AND...        You've had no fever-and no medicine that reduces fever for 1 full day (24 hours). AND...         Your other symptoms have resolved (gotten better).  If you tested positive for COVID-19 but don't show symptoms:       Stay home and away from others (self-isolate) until at least 10 days have passed since the date of your first positive COVID-19 test.     Diet    Follow this diet upon discharge: Dialysis diet     Discharge Medications   Current Discharge Medication List      START taking these medications    Details   lidocaine (LIDODERM) 5 % patch Place 1 patch onto the skin  every 24 hours To prevent lidocaine toxicity, patient should be patch free for 12 hrs daily.  Qty: 14 patch, Refills: 0    Associated Diagnoses: Chest wall pain         CONTINUE these medications which have NOT CHANGED    Details   acetaminophen (TYLENOL) 500 MG tablet Take 500-1,000 mg by mouth every 6 hours as needed for pain       apixaban ANTICOAGULANT (ELIQUIS ANTICOAGULANT) 2.5 MG tablet Take 1 tablet (2.5 mg) by mouth 2 times daily  Qty: 180 tablet, Refills: 0    Comments: Overdue for a follow up visit. Please make an appointment  Associated Diagnoses: Atrial fibrillation (H)      B Complex-C-Folic Acid (DOROTEO CAPS) 1 MG CAPS TAKE 1 CAPSULE BY MOUTH EVERY DAY  Qty: 100 capsule, Refills: 2    Associated Diagnoses: ESRD on hemodialysis (H)      clopidogrel (PLAVIX) 75 MG tablet TAKE 1 TABLET BY MOUTH EVERY DAY  Qty: 60 tablet, Refills: 1    Associated Diagnoses: Coronary artery disease involving native coronary artery; S/P drug eluting coronary stent placement      famotidine (PEPCID) 20 MG tablet Take 20 mg by mouth daily       gabapentin (NEURONTIN) 100 MG capsule Take 1 capsule (100 mg) by mouth 3 times daily  Qty: 270 capsule, Refills: 3    Associated Diagnoses: Difficulty walking      !! isosorbide mononitrate (IMDUR) 30 MG 24 hr tablet Take in the evening on the days prior dialysis. Take on Sunday, Tuesday and Thursday  Qty: 45 tablet, Refills: 0    Comments: Appt is due now. Please call [phone] to schedule your appt.  Associated Diagnoses: Coronary artery disease involving native coronary artery of native heart without angina pectoris; Cardiac pacemaker in situ      !! isosorbide mononitrate (IMDUR) 30 MG 24 hr tablet Take 1 tablet (30 mg) the evening of dialysis days (Mon, Wed, Fri)  and Saturdays.  Qty: 48 tablet, Refills: 0    Comments: Appt is due now. Please call [phone] to schedule your appt.  Associated Diagnoses: Coronary artery disease involving native coronary artery of native heart  without angina pectoris      levothyroxine (SYNTHROID/LEVOTHROID) 50 MCG tablet TAKE 1 TABLET BY MOUTH EVERY DAY  Qty: 90 tablet, Refills: 3    Associated Diagnoses: Hypothyroidism, unspecified type      lisinopril (ZESTRIL) 2.5 MG tablet Take 1 tablet (2.5 mg) by mouth 2 times daily (Take one tablet after dialysis. Take second tablet at bedtime.)  Qty: 180 tablet, Refills: 1    Associated Diagnoses: NSTEMI (non-ST elevated myocardial infarction) (H)      !! loperamide (IMODIUM A-D) 2 MG tablet Take 2 mg by mouth three times a week MWF on dialysis days      !! loperamide (IMODIUM A-D) 2 MG tablet Take 1 mg by mouth four times a week On non-dialysis days - Tu, Th, Sa, Su       metoprolol succinate ER (TOPROL-XL) 25 MG 24 hr tablet Take 0.5 tablets (12.5 mg) by mouth daily Please call for an appointment for further refills. [phone]  Qty: 45 tablet, Refills: 0    Associated Diagnoses: Coronary artery disease involving native coronary artery of native heart with angina pectoris (H)      mexiletine (MEXITIL) 150 MG capsule Take 1 capsule (150 mg) by mouth 2 times daily  Qty: 180 capsule, Refills: 2    Associated Diagnoses: Tachycardia      oxyCODONE (ROXICODONE) 5 MG tablet Take 1 tablet (5 mg) by mouth every 6 hours as needed for pain No driving a car or drinking alcohol for12 hours after taking this medication.  Qty: 10 tablet, Refills: 0      pantoprazole (PROTONIX) 40 MG EC tablet TAKE 1 TABLET (40 MG) BY MOUTH EVERY MORNING  Qty: 30 tablet, Refills: 8    Associated Diagnoses: Acid reflux disease      pravastatin (PRAVACHOL) 40 MG tablet TAKE 1 TABLET BY MOUTH EVERY DAY  Qty: 90 tablet, Refills: 3    Associated Diagnoses: Atherosclerotic heart disease of native coronary artery without angina pectoris      predniSONE (DELTASONE) 5 MG tablet TAKE 1 TABLET BY MOUTH EVERY DAY  Qty: 90 tablet, Refills: 3    Associated Diagnoses: Bilateral foot pain      vitamin D3 (CHOLECALCIFEROL) 50 mcg (2000 units) tablet TAKE 1  TABLET BY MOUTH EVERY DAY  Qty: 30 tablet, Refills: 5    Associated Diagnoses: Vitamin D deficiency      meclizine 25 MG CHEW Take 1 tablet (25 mg) by mouth every 6 hours as needed for dizziness  Qty: 30 tablet, Refills: 0      nitroGLYcerin (NITROSTAT) 0.4 MG sublingual tablet Place 1 tablet (0.4 mg) under the tongue every 5 minutes as needed for chest pain UP TO 3 PER EPISODE  Qty: 20 tablet, Refills: 0    Associated Diagnoses: Angina pectoris (H)       !! - Potential duplicate medications found. Please discuss with provider.          Significant Results and Procedures   Most Recent 3 CBC's:  Recent Labs   Lab Test 02/14/22  0845 02/13/22  0849 02/12/22  0737   WBC 7.0 8.9 7.1   HGB 12.3* 12.6* 12.7*   * 113* 108*   * 132* 111*     Most Recent 3 BMP's:  Recent Labs   Lab Test 02/15/22  0136 02/14/22  2115 02/14/22  1625 02/14/22  0845 02/13/22  1057 02/13/22  0849 02/12/22  0741 02/12/22  0737   NA  --   --   --  135  --  134  --  136   POTASSIUM  --   --   --  4.4  --  4.4  --  4.0   CHLORIDE  --   --   --  101  --  102  --  101   CO2  --   --   --  26  --  24  --  29   BUN  --   --   --  36*  --  26  --  14   CR  --   --   --  4.65*  --  4.00*  --  2.82*   ANIONGAP  --   --   --  8  --  8  --  6   CHRISTINE  --   --   --  8.2*  --  8.2*  --  8.0*   * 142* 84 113*   < > 97   < > 83    < > = values in this interval not displayed.     Most Recent 2 LFT's:  Recent Labs   Lab Test 02/11/22  1647 02/11/22  0653   AST 16 19   ALT 22 24   ALKPHOS 209* 214*   BILITOTAL 1.5* 1.7*     Most Recent 3 INR's:  Recent Labs   Lab Test 02/11/22  1647 10/15/20  1830 07/22/19  0757   INR 1.23* 1.31* 0.97     Most Recent 3 Troponin's:  Recent Labs   Lab Test 07/24/21  1217 07/05/21  0607 03/17/21  2150 03/17/21  1859   TROPI  --  0.025 0.025 0.029   TROPONIN 0.022  --   --   --      Most Recent 3 BNP's:  Recent Labs   Lab Test 10/15/20  1830 12/18/17  0950 12/08/17  0713   NTBNPI 150,621* >175,000* >175,000*     Most  Recent Cholesterol Panel:  Recent Labs   Lab Test 10/19/20  1609   CHOL 101   LDL 29   HDL 60   TRIG 60     Most Recent 6 Bacteria Isolates From Any Culture (See EPIC Reports for Culture Details):  Recent Labs   Lab Test 08/29/18  1447 08/08/18  1426 07/04/18  0425 05/24/18  1535 05/24/18  1534 04/26/18  0914   CULT 10,000 to 50,000 colonies/mL  Enterococcus faecalis  *  <10,000 colonies/mL  urogenital estefany   10,000 to 50,000 colonies/mL  Enterococcus faecalis  * >100,000 colonies/mL  Enterococcus faecalis  * No growth No growth No growth     Most Recent TSH and T4:  Recent Labs   Lab Test 03/26/19  0847   TSH 4.40*     Most Recent Hemoglobin A1c:  Recent Labs   Lab Test 02/11/22  0653   A1C 5.1     Most Recent Urinalysis:  Recent Labs   Lab Test 08/29/18  1447   COLOR Yellow   APPEARANCE Cloudy   URINEGLC Negative   URINEBILI Small*   URINEKETONE Negative   SG 1.020   UBLD Large*   URINEPH 8.5*   PROTEIN >=300*   UROBILINOGEN 1.0   NITRITE Negative   LEUKEST Moderate*   RBCU 10-25*   WBCU >100*     Most Recent ABG:No lab results found.  Most Recent ESR & CRP:  Recent Labs   Lab Test 02/14/22  0845   CRP 17.9*   ,   Results for orders placed or performed during the hospital encounter of 02/11/22   Chest CT w/o contrast    Narrative    CT CHEST WITHOUT CONTRAST  2/11/2022 8:01 AM    CLINICAL HISTORY: Chest pain or shortness of breath, pleurisy or  effusion suspected. Dialysis patient, one week progressive right-sided  chest pain and shortness of breath on exertion, hypoxia with exertion,  on anticoagulation.    TECHNIQUE: CT chest without IV contrast. Multiplanar reformats were  obtained. Dose reduction techniques were used.  CONTRAST: None.    COMPARISON: 7/24/2021    FINDINGS:   LUNGS AND PLEURA: There are small bilateral pleural effusions. Minor  dependent atelectatic changes in the right lung. No pulmonary nodule  or mass.    MEDIASTINUM/AXILLAE: Right-sided pacer device. Normal caliber thoracic  aorta.  Unremarkable thyroid. No thoracic or axillary adenopathy.    CORONARY ARTERY CALCIFICATION: Previous intervention (stents or CABG).    UPPER ABDOMEN: Atrophic native kidneys. Cirrhotic-appearing liver.    MUSCULOSKELETAL: Bilateral healing rib fractures, right greater than  left. The bones appear osteopenic.      Impression    IMPRESSION: Small bilateral pleural effusions.    JUDY CUETO MD         SYSTEM ID:  [id number]       Allergies   Allergies   Allergen Reactions     Contrast Dye Hives     Does fine if he uses benadryl prior.

## 2022-02-15 NOTE — PLAN OF CARE
Summary: DACOSTA, home pulse ox reading of 60%, found to be COVID positive on 2/11  DATE & TIME: 2/14/22, 3-11pm  Cognitive Concerns/ Orientation : A&Ox4, calm and cooperative, pleasant  BEHAVIOR & AGGRESSION TOOL COLOR: Green  CIWA SCORE: NA   ABNL VS/O2: VSS except soft BP(99/60). On RA (sats sustained in low to mid 90s, does slightly desat with exertion/recovers fast with rest). Tachy at times.  MOBILITY: SBA +gb/walker, slow with ambulation, generalized weakness, FALL RISK.    PAIN MANAGMENT:  R. Flank/back pain mostly w/ movement. Lidocaine patch in place. On schedule Tylenol  DIET: Mod carb, tolerating   BOWEL/BLADDER: HD patient but still producing small amount of urine; continent.  ABNL LAB/BG: BG 84 chh437; Cr 4.65, CRP=17.9  DRAIN/DEVICES: R PIV /SL  TELEMETRY RHYTHM: NA  SKIN: Bruised, L arm fistula site dressing changed due to falling off, dries scant blood noted, stitches still intact. No further bleeding noted, (+) thrill/bruit  TESTS/PROCEDURES: HD done today  D/C DAY/GOALS/PLACE: Discharge home with home care 2/15/2022 if no recurrent bleeding.  OTHER IMPORTANT INFO: Lung sounds diminished. Infrequent congested, productive cough. Refused IS use. Special precautions maintained for COVID +. DACOSTA. PT/OT/SW following.

## 2022-02-15 NOTE — CONSULTS
Care Management Initial Consult  Patient discharging home today. Patient states he is fairly independent at baseline . He has caregiver Lynn  [phone] that he hired privately who helps with transportation for dialysis,house chores,cleaning. Patient states he manages his medications and personal cares. Discussed home care services and patient would prefer [facility] if available. Info sent to [facility] who will be able to accept patient and start of care will be Saturday    General Information  Assessment completed with: Patient,    Type of CM/SW Visit: Offer D/C Planning    Primary Care Provider verified and updated as needed: Yes   Readmission within the last 30 days: no previous admission in last 30 days      Reason for Consult: discharge planning  Advance Care Planning:            Communication Assessment  Patient's communication style: spoken language (English or Bilingual)    Hearing Difficulty or Deaf: no   Wear Glasses or Blind: yes    Cognitive  Cognitive/Neuro/Behavioral: WDL                      Living Environment:   People in home: alone     Current living Arrangements: house      Able to return to prior arrangements: yes       Family/Social Support:  Care provided by: other (see comments) (PCA Lynn)  Provides care for: no one, unable/limited ability to care for self,no one  Marital Status: Single  PCA,Neighbor          Description of Support System: Supportive,Involved    Support Assessment: Lacks adequate physical care    Current Resources:   Patient receiving home care services: Yes     Community Resources: OP Dialysis  Equipment currently used at home: grab bar, toilet,grab bar, tub/shower  Supplies currently used at home: None    Employment/Financial:  Employment Status: retired        Financial Concerns: No concerns identified           Lifestyle & Psychosocial Needs:  Social Determinants of Health     Tobacco Use: Medium Risk     Smoking Tobacco Use: Former Smoker     Smokeless Tobacco Use:  Never Used   Alcohol Use: Not on file   Financial Resource Strain: Low Risk      Difficulty of Paying Living Expenses: Not hard at all   Food Insecurity: No Food Insecurity     Worried About Running Out of Food in the Last Year: Never true     Ran Out of Food in the Last Year: Never true   Transportation Needs: No Transportation Needs     Lack of Transportation (Medical): No     Lack of Transportation (Non-Medical): No   Physical Activity: Not on file   Stress: Not on file   Social Connections: Not on file   Intimate Partner Violence: Not on file   Depression: Not at risk     PHQ-2 Score: 0   Housing Stability: Not on file       Functional Status:  Prior to admission patient needed assistance:   Dependent ADLs:: Independent  Dependent IADLs:: Cleaning,Shopping,Transportation       Mental Health Status:  Mental Health Status: No Current Concerns       Chemical Dependency Status:  Chemical Dependency Status: No Current Concerns             Values/Beliefs:  Spiritual, Cultural Beliefs, Christian Practices, Values that affect care: no           Care Management Discharge Note    Discharge Date: 02/15/2022       Discharge Disposition:  home    Discharge Services:PCA and Home care      Discharge DME: None     Discharge Transportation: agency    Private pay costs discussed: Not applicable    PAS Confirmation Code:    Patient/family educated on Medicare website which has current facility and service quality ratings:      Education Provided on the Discharge Plan:  yes  Persons Notified of Discharge Plans: Friend raymond  Patient/Family in Agreement with the Plan:  Yes    Handoff Referral Completed: Yes      Jl Varghese RN  Inpatient Care Coordinator  [facility]  # [phone]

## 2022-02-15 NOTE — PLAN OF CARE
DATE & TIME: 2/14/22, 1100 - 5744   Cognitive Concerns/ Orientation : A&O x 4   BEHAVIOR & AGGRESSION TOOL COLOR: Green   ABNL VS/O2: VSS on room air  MOBILITY: Up SBA with GB and walker  PAIN MANAGMENT: Denied  DIET: Mod CHO  BOWEL/BLADDER: Hemodialysis patient but still passes urine. Continent  ABNL LAB/BG:   DRAIN/DEVICES: PIV SL. Left fistula   TELEMETRY RHYTHM: NA  SKIN: Bruised. Dressing to left fistula in place. No active bleeding  TESTS/PROCEDURES: Hemodialysis yesterday  D/C DATE: For discharge home today 2/15/22 if no recurrent bleeding, pain controlled  OTHER IMPORTANT INFO: Infrequent cough persist. LS diminished. Special precautions maintained

## 2022-02-15 NOTE — TELEPHONE ENCOUNTER
Please contact patient to arrange for:      KALEB arteriovenous fistula Ultrasound    In clinic visit with Dr. Yoder following.    Lulú Salas RN BSN  [facility]  [phone]

## 2022-02-15 NOTE — PROGRESS NOTES
Renal Medicine       Events of yesterday post run noted  Outpatient fistulogram per dialysis unit    Dialyzed yesterday    TT schedule at [place] ([facility])    Offered additional dialysis today to get on schedule  Patient refused    Next 02/17/22   Arrive 0700.        Recent Labs   Lab 02/15/22  0136 02/14/22  1625 02/14/22  0845   NA  --   --  135   POTASSIUM  --   --  4.4   CHLORIDE  --   --  101   CO2  --   --  26   ANIONGAP  --   --  8   *   < > 113*   BUN  --   --  36*   CR  --   --  4.65*   GFRESTIMATED  --   --  12*   CHRISTINE  --   --  8.2*    < > = values in this interval not displayed.           JUAREZ Granger    [facility]  [phone]

## 2022-02-16 NOTE — TELEPHONE ENCOUNTER
Pt has seen Dr. Griggs in the past and requersted to continue his care with him.   US and appt set up for Mar 10th.

## 2022-02-16 NOTE — PLAN OF CARE
Occupational Therapy Discharge Summary    Reason for therapy discharge:    Discharged to home with home therapy.    Progress towards therapy goal(s). See goals on Care Plan in [facility] electronic health record for goal details.  Goals partially met.  Barriers to achieving goals:   discharge from facility.    Therapy recommendation(s):    Continued therapy is recommended.  Rationale/Recommendations:  pt would benefit from home care until pt is back to baseline.

## 2022-02-16 NOTE — PROGRESS NOTES
"Clinic Care Coordination Contact  [facility]: Post-Discharge Note  SITUATION                                                      Admission:    Admission Date: 02/11/22   Reason for Admission: COVID19 infection  Discharge:   Discharge Date: 02/15/22  Discharge Diagnosis: COVID19 infection    BACKGROUND                                                      Per hospital discharge summary and inpatient provider notes:    Westley Ness is a 75-year-old male with history of coronary artery disease, ischemic cardiomyopathy with EF of 20-25%, end-stage renal disease on hemodialysis Monday/Wednesday/Friday followed by Dr. Hammond, diabetes mellitus type 2, GERD, atrial flutter/fibrillation on chronic anticoagulation with apixaban, status post ICD placement, and hypertension who comes to the ER with complaint of shortness of breath on exertion for the last 7 days and chest pain with pulse oxymetry of 60% at home who was found to be positive for Covid-19. The patient reports that he has been experiencing dyspnea on exertion and right-sided chest pain over the last 1 to 2 weeks but is not exactly sure how that correlates to his new positive Covid test. At presentation, the chest pain is localized to the right side near the armpit back to the shoulder blades. The patient notes that the pain is the most severe this morning and made it difficult for him to take a shower and get dressed. He also endorses little production of urine and intermittent esophageal pain that began a \"few weeks ago\" after he burped.  Of note, the patient has a history of multiple myocardial infarctions, the first being about 26 years ago with ischemic cardiomyopathy. His previous myocardial infarctions presented with right arm pain in all but one instance, but the patient states that his current pain is dissimilar to that.  He notes that his home RN's  has Covid and the home RN is currently in the hospital but he did not know her Covid " "status.  He thinks he likely got Covid from her or possibly at dialysis.  He denies any history of cancer or any recent trauma/injury. He denies fever, chills, myalgias, nausea, or vomiting. He has chronic diarrhea that he controls with Imodium.  No at-home oxygen.     ASSESSMENT      Enrollment  Primary Care Care Coordination Status: Declined    Discharge Assessment  How are you doing now that you are home?: \"I'm feeling worse\"  How are your symptoms? (Red Flag symptoms escalate to triage hotline per guidelines): Worsening                  PLAN                                                      Outpatient Plan:  Follow up with primary care provider, Josselin Kolb, within 7 days for  hospital follow- up. No follow up labs or test are needed.    Future Appointments   Date Time Provider Department Center   3/8/2022  3:15 PM CARD [facility]         For any urgent concerns, please contact our 24 hour nurse triage line: [phone] ([phone])       Patient stated on the phone he felt terrible, worse than he was before and was not getting any better. Asked if patient would like to speak with nurse and he said yes. Connected patient with nurse triage. Unable to complete discharge questions.    Rekha Pineda  Community Health Worker  [facility]  [phone]                  "

## 2022-02-16 NOTE — TELEPHONE ENCOUNTER
Pt is calling in about persistent Covid symptoms since 2/2/2022. Pt has had symptoms of cough and congestion, nasal congestion, shortness of breath, fatigue. Pt denies fever, body aches, sore throat, or diarrhea. Pt was admitted to the hospital on 2/11/2022 until 2/15/2022. Pt had tested positive for Covid on 2/11/2022. Pt reports his O2 saturation is 86-87%. But have been below 90% all morning. Pt is questioning need for oxygen at home.   Care advice given, increase fluid intake as allowed per Dialysis, use of humidified air.  Per protocol pt should be evaluated in the ER. Pt does not want to go to the ER, pt should do an e-visit due to having worsening Covid symptoms, to determine if he should be evaluated in the ER. Pt agrees with plan, and was advised to call back if symptoms worsen, or if he develops difficulty breathing, or shortness of breath. Pt verbalized understanding.     Sivakumar Siegel RN on 2/16/2022 at 10:51 AM      COVID 19 Nurse Triage Plan/Patient Instructions    Please be aware that novel coronavirus (COVID-19) may be circulating in the community. If you develop symptoms such as fever, cough, or SOB or if you have concerns about the presence of another infection including coronavirus (COVID-19), please contact your health care provider or visit [email or web address].     Disposition/Instructions    Virtual Visit with provider recommended. Reference Visit Selection Guide.    Thank you for taking steps to prevent the spread of this virus.  o Limit your contact with others.  o Wear a simple mask to cover your cough.  o Wash your hands well and often.    Resources    [facility]: About COVID-19: [email or web address]    CDC: What to Do If You're Sick: www.cdc.gov/coronavirus/2019-ncov/about/steps-when-sick.html    CDC: Ending Home Isolation: www.cdc.gov/coronavirus/2019-ncov/hcp/disposition-in-home-patients.html     CDC: Caring for Someone:  www.cdc.gov/coronavirus/2019-ncov/if-you-are-sick/care-for-someone.html     [facility]: Interim Guidance for Hospital Discharge to Home: www.health.[facility].[place]/diseases/coronavirus/hcp/hospdischarge.pdf    [facility] clinical trials (COVID-19 research studies): [email or web address].[facility]-clinical-trials     Below are the COVID-19 hotlines at the [place] Department of Health ([facility]). Interpreters are available.   o For health questions: Call 652-065-2303 or 1-240.820.4767 (7 a.m. to 7 p.m.)  o For questions about schools and childcare: Call 140-584-3013 or 1-806.770.5727 (7 a.m. to 7 p.m.)       Reason for Disposition    Oxygen level (e.g., pulse oximetry) 90 percent or lower    [1] MODERATE difficulty breathing AND [2] oxygen level (e.g., pulse oximetry) 91 to 94 percent    Additional Information    Negative: SEVERE difficulty breathing (e.g., struggling for each breath, speaks in single words)    Negative: [1] SEVERE weakness (e.g., can't stand or can barely walk) AND [2] new-onset or WORSE    Negative: Difficult to awaken or acting confused (e.g., disoriented, slurred speech)    Negative: Bluish (or gray) lips or face now    Negative: Sounds like a life-threatening emergency to the triager    Negative: [1] Typical COVID-19 symptoms AND [2] lasting less than 3 weeks    Negative: [1] Chest pain, pressure, or tightness AND [2] new-onset or worsening    Negative: [1] Fever AND [2] new-onset or worsening    Negative: [1] MODERATE difficulty breathing (e.g., speaks in phrases, SOB even at rest, pulse 100-120) AND [2] new-onset or WORSE    Protocols used: CORONAVIRUS (COVID-19) PERSISTING SYMPTOMS FOLLOW-UP CALL-A- 8.25.2021

## 2022-02-16 NOTE — NURSING NOTE
Oxygen order has been faxed to [facility] in [place]  [phone].    Haja YEUNG, CMA

## 2022-02-16 NOTE — PROGRESS NOTES
Art is a 76 year old who is being evaluated via a billable video visit.      How would you like to obtain your AVS? Joomehart  If the video visit is dropped, the invitation should be resent by: Text to cell phone: [phone]  Will anyone else be joining your video visit? No      Video Start Time: 1329    Assessment & Plan       ICD-10-CM    1. Infection due to 2019 novel coronavirus  U07.1    2. Acute respiratory failure with hypoxia (H)  J96.01 Oxygen Order   3. Sepsis due to other etiology (H)  A41.89    4. ESRD on hemodialysis (H)  N18.6     Z99.2    5. Coronary artery disease involving native coronary artery of native heart without angina pectoris  I25.10    6. Ischemic cardiomyopathy  I25.5 Oxygen Order   7. Paroxysmal atrial flutter (H)  I48.92    8. Closed fracture of multiple ribs, unspecified laterality, sequela  S22.49XS      - Patient improved from respiratory failure and sepsis 2/2 COVID-19. Isolation period ends 2/22/2022. Patient compliant with Eliquis, did get both doses yesterday. At increased risk for clots given PMH and recent COVID infection, stressed importance of anticoagulation. Patient without anginal or anginal equivalent symptoms. Able to walk >20 feet without needing to rest, but unable to walk >50 feet without stopping to rest. No orthopnea or peripheral edema, euvolemic per report, has dialysis tomorrow. Agree that hypoxia likely related to over-exertion and rib pain. Continue pain medications as needed.  - Reviewed he does not meet Medicare guidelines for home O2, but patient willing to pay OOP. Rx for O2 sent to [facility] as requested.  - continue follow-up with cardiology, vascular, and dialysis as scheduled.  - Patient does not know how he got rib fractures, evidence of osteopenia on exam, at high risk for poor bone health and fragility fractures, will defer further w/u regarding this to PCP.    Return in about 2 weeks (around 3/2/2022) for Recheck.    Francisca Sabillon,  KALANI  [facility] LUIS Cordova is a 76 year old who presents for the following health issues  accompanied by his caregiver, Lynn.    [facility]       Hospital Follow-up Visit:    Hospital/Nursing Home/IP Rehab Facility: [facility]  Date of Admission: 2/11/2022  Date of Discharge: 2/15/2022  Reason(s) for Admission: decrease in pulse oxymetry, Covid +      Was your hospitalization related to COVID-19? YES   How are you feeling today? Better  In the past 24 hours have you had shortness of breath when speaking, walking, or climbing stairs? My breathing issues have improved  Do you have a cough? Yes, I have a cough but it's not worse  When is the last time you had a fever greater than 100? Unsure   Are you having any other symptoms? Pain in the back ribs    Do you have any other stressors you would like to discuss with your provider? No     Was the patient in the ICU or did the patient experience delirium during hospitalization?  No      Problems taking medications regularly:  Missed evening medications last night  Medication changes since discharge: None  Problems adhering to non-medication therapy:  None    Summary of hospitalization:  [facility] discharge summary reviewed  Diagnostic Tests/Treatments reviewed.    Follow up needed: YES  Other Healthcare Providers Involved in Patient s Care:         Specialist appointment - 3/10/2022 - vascular  Update since discharge: improved.     Post Discharge Medication Reconciliation: discharge medications reconciled, continue medications without change.  Plan of care communicated with patient and caregiver          - Patient discharged yesterday from [facility], hospitalized with acute respiratory failure with hypoxia and sepsis 2/2 COVID-19 infection. Patient ESRD on dialysis, new schedule of T-Th-Sat started tomorrow, adjusted due to COVID dx. Last run was Monday 2/14/2022.  - Patient was contacted by triage RN  "this morning. Reported hypoxia at home (O2 sat 87%) and worsening chest pain, though patient clarifies to me this is consistent with \"rib pain\". Patient with healing rib fractures, unknown etiology. Reports he was up in a chair when they called, which aggravates rib pain. Also feels he over-exerted himself this morning, as he laid down after speaking to triage and was able to fall asleep. Patient reports O2 96% currently while at rest.  - Of note, patient does use supplemental O2 during dialysis to keep his BP within normal range. Needs 2-3L on average. Prior to COVID, was able to walk 1-2 city block before needing to stop and rest due to DACOSTA. Patient requests rx for portable supplement O2, willing to pay OOP.    Review of Systems   Constitutional, HEENT, cardiovascular, pulmonary, GI, , musculoskeletal, neuro, skin, endocrine and psych systems are negative, except as otherwise noted.      Objective    Vitals - Patient Reported  SpO2 (Patient Reported): 96  Pulse (Patient Reported): 63      Vitals:  No vitals were obtained today due to virtual visit.    Physical Exam   GENERAL: alert and no acute distress  EYES: Eyes grossly normal to inspection.  No discharge or erythema, or obvious scleral/conjunctival abnormalities.  HENT: Normal cephalic/atraumatic.  External ears, nose and mouth without ulcers or lesions.  No nasal drainage visible.  NECK: No asymmetry, visible masses or scars  RESP: No audible wheeze, cough, or visible cyanosis.  No visible retractions or increased work of breathing. Able to speak in complete sentences.  MS: No gross musculoskeletal defects noted.  Normal range of motion.  No visible edema.  SKIN: Visible skin clear, pale. No significant rash, abnormal pigmentation or lesions.  PSYCH: Mentation appears normal, affect normal/bright, judgement and insight intact, normal speech and appearance well-groomed.                Video-Visit Details    Type of service:  Video Visit    Video End " Time:1359    Originating Location (pt. Location): Home    Distant Location (provider location):  [facility]     Platform used for Video Visit: [employer]  DME (Durable Medical Equipment) Orders and Documentation  Orders Placed This Encounter   Procedures     Oxygen Order      The patient was assessed and it was determined the patient is in need of the following listed DME Supplies/Equipment. Please complete supporting documentation below to demonstrate medical necessity.

## 2022-02-17 PROBLEM — R09.02 HYPOXIA: Status: ACTIVE | Noted: 2022-01-01

## 2022-02-17 PROBLEM — R79.89 ELEVATED TROPONIN: Status: ACTIVE | Noted: 2022-01-01

## 2022-02-17 PROBLEM — I50.9 HEART FAILURE, UNSPECIFIED HF CHRONICITY, UNSPECIFIED HEART FAILURE TYPE (H): Status: ACTIVE | Noted: 2022-01-01

## 2022-02-17 NOTE — ED PROVIDER NOTES
History   Chief Complaint:  Covid Concern     The history is provided by the patient.      Westley Ness is a 76 year old male on eliqius with history of hypertension, CAD, ESRD, atrial flutter, and hypothyroidism who presents with a Covid concern. The patient reports testing positive for Covid-19 last week, and experiencing worsening shortness of breath over the past couple of days. The patient reports even worse shortness of breath tonight that made it difficult to walk. He then measured his oxygen levels to be in the 70s, so decided to come to the ED. He states that he has been trying to get home O2, but it has not arrived yet. He even notes that he was going to pay out of pocket to get the home O2 sooner. He reports that his last dialysis was Monday, and he was due to have it again this morning.    Review of Systems   Respiratory: Positive for shortness of breath.    All other systems reviewed and are negative.    Allergies:  Contrast dye    Medications:  Eliquis  Plavix  Pepcid  Neurontin  Imdur  Levothyroxine  Zestril   Meclizine   Mexitil   Toprol - xl  Nitrostat  Roxicodone   Protonix  Pravachol   Deltasone     Past Medical History:     Acid reflux disease  Hypertension   CAD   ESRD on hemodialysis   Atrial flutter   Hypothyroidism   Ischemic cardiomyopathy  ICD in place  Skin tear of forearm without complication   Covid-19    Past Surgical History:    Cholecystectomy, open  CV coronary angiogram  CV left heart cath  CV PCI stent drug eluting  Elbow surgery  EP ICD generator change single  H ablation atrial flutter  HC left heart catheterization x 2  Implant ventricular device  IR dialysis fistulogram left  Repair fistula arteriovenous upper extremity  Tonsillectomy and adenoidectomy   Vascular surgery    Family History:    Mother: cerebrovascular disease  Father: hypertension, bladder cancer    Social History:  Presents unaccompanied  PCP: Josselin Kolb    Physical Exam     Patient Vitals for the  past 24 hrs:   BP Temp Temp src Pulse Resp SpO2 Weight   02/17/22 0500 (!) 82/53 -- -- 90 16 96 % --   02/17/22 0430 (!) 71/49 -- -- 92 15 -- --   02/17/22 0400 (!) 78/52 -- -- 86 12 94 % --   02/17/22 0339 -- -- -- -- -- -- 70.2 kg (154 lb 12.8 oz)   02/17/22 0330 (!) 77/61 -- -- 82 13 97 % --   02/17/22 0300 (!) 74/51 -- -- 73 20 95 % --   02/17/22 0230 (!) 70/51 -- -- 89 20 96 % --   02/17/22 0130 (!) 78/53 -- -- 101 14 96 % --   02/17/22 0125 -- 97.6  F (36.4  C) Oral -- -- -- --   02/17/22 0040 -- -- -- -- 22 -- --   02/17/22 0039 97/45 -- -- 53 -- (!) 88 % 74.8 kg (165 lb)       Physical Exam  General: Sitting up in bed  Eyes:  The pupils are equal and round    Conjunctivae and sclerae are normal  ENT:    Wearing a mask  Neck:  Normal range of motion  CV:  Irregular rate and rhythm     Skin warm and well perfused   Resp:  Mild tachpynea    On oxygen via nasal cannula    No cough heard    Lungs diminished bilaterally  GI:  Abdomen is soft, there is no rigidity    No distension    No rebound tenderness     No abdominal tenderness  MS:  No significant lower extremity edema  Skin:  No rash or acute skin lesions noted  Neuro:   Awake, alert.      Speech is normal and fluent.    Face is symmetric.     Moves all extremities equally  Psych: Normal affect.  Appropriate interactions.    Emergency Department Course   ECG  ECG obtained at 0110, ECG read at 0110  Atrial fibrillation with rapid ventricular response. Incomplete right bundle branch block. Possible right ventricular hypertrophy. Possible inferior infarct, age undetermined. Abnormal ECG.    Now in atrial fibrillation as compared to prior, dated 07/24/21.  Rate 107 bpm. NC interval * ms. QRS duration 106 ms. QT/QTc 322/429 ms. P-R-T axes * 158 -21.     Imaging:  XR Chest Port 1 View   Final Result   IMPRESSION: Stable cardiomegaly. Normal pulmonary vascularity. There may be mild atelectasis of the left base. Lungs otherwise clear. No visible pneumothorax. Right  subclavian pacemaker with single lead at the right ventricle. Old rib fractures on the    left.        Report per radiology    Laboratory:  Labs Ordered and Resulted from Time of ED Arrival to Time of ED Departure   COMPREHENSIVE METABOLIC PANEL - Abnormal       Result Value    Sodium 136      Potassium 4.9      Chloride 100      Carbon Dioxide (CO2) 28      Anion Gap 8      Urea Nitrogen 44 (*)     Creatinine 5.23 (*)     Calcium 8.5      Glucose 192 (*)     Alkaline Phosphatase 162 (*)     AST 18      ALT 24      Protein Total 6.0 (*)     Albumin 3.2 (*)     Bilirubin Total 1.8 (*)     GFR Estimate 11 (*)    TROPONIN I - Abnormal    Troponin I High Sensitivity 93 (*)    NT PROBNP INPATIENT - Abnormal    N terminal Pro BNP Inpatient >175,000 (*)    CRP INFLAMMATION - Abnormal    CRP Inflammation 78.3 (*)    CBC WITH PLATELETS AND DIFFERENTIAL - Abnormal    WBC Count 9.1      RBC Count 3.73 (*)     Hemoglobin 12.7 (*)     Hematocrit 41.3       (*)     MCH 34.0 (*)     MCHC 30.8 (*)     RDW 14.3      Platelet Count 120 (*)     % Neutrophils 85      % Lymphocytes 2      % Monocytes 7      % Eosinophils 4      % Basophils 1      % Immature Granulocytes 1      NRBCs per 100 WBC 0      Absolute Neutrophils 7.7      Absolute Lymphocytes 0.2 (*)     Absolute Monocytes 0.6      Absolute Eosinophils 0.4      Absolute Basophils 0.1      Absolute Immature Granulocytes 0.1      Absolute NRBCs 0.0     LACTIC ACID WHOLE BLOOD - Normal    Lactic Acid 1.6     BLOOD CULTURE   BLOOD CULTURE        Emergency Department Course:    Reviewed:  I reviewed nursing notes, vitals and past medical history    Assessments:  0055 I obtained history and examined the patient as noted above.    I rechecked the patient    Consults:   I spoke with Dr. Edwards of the hospitalist services who is in agreement to accept the patient for admission.     Interventions:  0136  mL IV  0307 Decadron 6 mg IV    Disposition:  The patient was admitted to  the hospital under the care of Dr. Edwards.     Impression & Plan     Medical Decision Making:  Westley Ness is a 76-year-old male who presented to the emergency department with COVID concern.  He was hypoxic at home.  He is requiring oxygen here in the emergency department.  He was initially mildly tachypneic but this improved during his emergency department stay.  Recently hospitalized for Covid.  Given the hypoxia, did start Decadron again.  Has chronic kidney disease.  BUN is slightly elevated.  He reports poor oral intake.  His blood pressure was mildly low though he reports it has been low for the last several days.  I suspect he may be slightly dry and so gave 250 cc of fluid.  His lactate was normal and septic shock/sepsis seems unlikely.  No evidence of bacterial infection.  Troponin mildly elevated likely from demand ischemia.  He denies any chest pain.  His BNP is very elevated and usually elevated.  He is on anticoagulation and so PE seems unlikely.  Discussed with hospitalist for admission.    Diagnosis:    ICD-10-CM    1. Infection due to 2019 novel coronavirus  U07.1    2. Hypoxia  R09.02    3. Elevated troponin  R77.8    4. Heart failure, unspecified HF chronicity, unspecified heart failure type (H)  I50.9      Scribe Disclosure:  I, Maritza Barrientos, am serving as a scribe at 1:17 AM on 2/17/2022 to document services personally performed by Lisbet Romo MD based on my observations and the provider's statements to me.              Lisbet Romo MD  02/17/22 0605

## 2022-02-17 NOTE — ED NOTES
Assumed care at this time.    Denisa Cabrera RN,.......................................... 2/17/2022   3:15 AM

## 2022-02-17 NOTE — ED TRIAGE NOTES
worsening shortness of breath over past couple days - tested positive for covid last week. was prescribed for home O2, but has not received it yet

## 2022-02-17 NOTE — ED NOTES
Bed: ED12  Expected date:   Expected time:   Means of arrival:   Comments:  Asaf 532 76M low sp02 eta 0037

## 2022-02-17 NOTE — ED NOTES
[facility]  ED Nurse Handoff Report    ED Chief complaint: Covid Concern (worsening shortness of breath over past couple days - tested positive for covid last week. was prescribed for home O2, but has not received it yet)      ED Diagnosis:   Final diagnoses:   None       Code Status: Full Code    Allergies:   Allergies   Allergen Reactions     Contrast Dye Hives     Does fine if he uses benadryl prior.       Patient Story: pt with hx of ESRD and CAD presents to ED with shortness of breath. Covid positive last week - reports increasing shortness of breath for past couple days. Pt was prescribed home O2 but has not received it yet. Noted O2 to be in the 70s at home. Last dialysis on monday  Focused Assessment:  Pt alert, oriented x3. tachypneic with use of accessory muscles which improved with nasal cannula 5 liters. Denies chest pain. No swelling noted. Denies fevers.     Treatments and/or interventions provided: 250 ml bolus - pt is hypotensive with systolic in the 70s - pt reports its has been that way for a couple days. Denies dizziness or lightheadedness. Labs, CXR  Patient's response to treatments and/or interventions: fair    To be done/followed up on inpatient unit:  monitor for respiratory distress    Does this patient have any cognitive concerns?: none    Activity level - Baseline/Home:  Walker  Activity Level - Current:   Total Care    Patient's Preferred language: English   Needed?: No    Isolation: COVID r/o and special precautions  Infection: Not Applicable  COVID r/o and special precautions  Patient tested for COVID 19 prior to admission: No  Bariatric?: No    Vital Signs:   Vitals:    02/17/22 0039 02/17/22 0040 02/17/22 0125 02/17/22 0130   BP: 97/45   (!) 78/53   Pulse: 53   101   Resp:  22  14   Temp:   97.6  F (36.4  C)    TempSrc:   Oral    SpO2: (!) 88%   96%   Weight: 74.8 kg (165 lb)          Cardiac Rhythm:     Was the PSS-3 completed:   Yes  What interventions are  required if any?               Family Comments: n/a  OBS brochure/video discussed/provided to patient/family: N/A              Name of person given brochure if not patient: n/a              Relationship to patient: n/a    For the majority of the shift this patient's behavior was Green.   Behavioral interventions performed were reassurance and information.    ED NURSE PHONE NUMBER: *[phone]          verbal instruction/individual instruction/written material

## 2022-02-17 NOTE — PROGRESS NOTES
Renal Medicine Progress Note                                Westley Ness MRN# [id number]   Age: 76 year old YOB: 1945   Date of Admission: 2/17/2022 Hospital LOS: 0                  Assessment/Plan:        75-year-old male with history of CAD, ICM with EF of 20-25%, ESRD, DM2, GERD, atrial flutter/fibrillation on chronic anticoagulation with apixaban, status post ICD placement, and hypertension re admitted with SOB    Recent discharge 02/15/22  Last dialyzed 02/14/22     Following for ESRD management      1.  ESRD               -MWF [facility]               -AVF   -transitioning to [place] COVID unit (TT)    -was due to present today   2.  Anemia  3.  Metabolic Bone Disease              -Hectorol  4.  COVID 19  5.  Acute on chronic systolic heart failure exacerbation  6.  Hypotension        Continue to hold BP meds  Dialysis today  UF as tolerated   (5% albumin prime/midodrine)      Interval History:     Re admitted with SOB  CXR suggest CHF  Last dialyzed 02/16/22    Well below previous target weight     ROS:     GENERAL: NAD, No fever,chills  R: NEGATIVE for significant cough or SOB  CV: NEGATIVE for chest pain, palpitations  EXT: no change edema  ROS otherwise negative    Medications and Allergies:     Reviewed    Physical Exam:     Vitals were reviewed  Patient Vitals for the past 8 hrs:   BP Pulse Resp SpO2 Weight   02/17/22 0800 (!) 88/59 102 12 100 % --   02/17/22 0745 (!) 89/64 103 16 -- --   02/17/22 0722 (!) 85/59 100 18 98 % --   02/17/22 0700 (!) 85/59 100 (!) 52 98 % --   02/17/22 0630 (!) 83/58 107 23 97 % --   02/17/22 0600 (!) 77/63 87 (!) 56 97 % --   02/17/22 0530 (!) 85/70 97 20 95 % --   02/17/22 0500 (!) 82/53 90 16 96 % --   02/17/22 0430 (!) 71/49 92 15 -- --   02/17/22 0400 (!) 78/52 86 12 94 % --   02/17/22 0339 -- -- -- -- 70.2 kg (154 lb 12.8 oz)   02/17/22 0330 (!) 77/61 82 13 97 % --   02/17/22 0300 (!) 74/51 73 20 95 % --   02/17/22 0230 (!) 70/51 89 20 96 %  --     I/O last 3 completed shifts:  In: 250 [IV Piggyback:250]  Out: -     Vitals:    02/17/22 0039 02/17/22 0339   Weight: 74.8 kg (165 lb) 70.2 kg (154 lb 12.8 oz)         GENERAL: awake, alert, follows    Exam deferred given COVID status    Hospitalist and ER exams reviewed     Data:     Recent Labs   Lab 02/17/22  0935 02/17/22  0125 02/15/22  1243 02/15/22  1139 02/14/22  1625 02/14/22  0845 02/13/22  1057 02/13/22  0849   NA  --  136  --  135  --  135  --  134   POTASSIUM  --  4.9  --  4.9  --  4.4  --  4.4   CHLORIDE  --  100  --  99  --  101  --  102   CO2  --  28  --  26  --  26  --  24   ANIONGAP  --  8  --  10  --  8  --  8   * 192* 136* 160*   < > 113*   < > 97   BUN  --  44*  --  27  --  36*  --  26   CR  --  5.23*  --  3.72*  --  4.65*  --  4.00*   GFRESTIMATED  --  11*  --  16*  --  12*  --  15*   CHRISTINE  --  8.5  --  8.6  --  8.2*  --  8.2*    < > = values in this interval not displayed.         Recent Labs   Lab 02/17/22  0935 02/17/22  0125   NA  --  136   POTASSIUM  --  4.9   CHLORIDE  --  100   CO2  --  28   ANIONGAP  --  8   * 192*   BUN  --  44*   CR  --  5.23*   GFRESTIMATED  --  11*   CHRISTINE  --  8.5     Recent Labs   Lab 02/17/22  0125 02/11/22  1647 02/11/22  0653   ALBUMIN 3.2* 3.1* 3.4     Recent Labs   Lab 02/17/22  0125 02/15/22  1139 02/14/22  0845 02/13/22  0849   HGB 12.7* 12.8* 12.3* 12.6*         G Estevan Granger MD    [facility] - Nephrology  [phone]

## 2022-02-17 NOTE — PHARMACY-ADMISSION MEDICATION HISTORY
Pharmacy Medication History  Admission medication history interview status for the 2/17/2022  admission is complete. See EPIC admission navigator for prior to admission medications     Location of Interview: Outside patient room but on unit  Medication history sources: AVS recent discharge [facility] 2/15/22    Significant changes made to the medication list:  none    In the past week, patient estimated taking medication this percent of the time: unknown    Additional medication history information:   none    Medication reconciliation completed by provider prior to medication history? Yes    Time spent in this activity: 20 min    Prior to Admission medications    Medication Sig Last Dose Taking? Auth Provider   acetaminophen (TYLENOL) 500 MG tablet Take 500-1,000 mg by mouth every 6 hours as needed for pain   Yes Unknown, Entered By History   apixaban ANTICOAGULANT (ELIQUIS ANTICOAGULANT) 2.5 MG tablet Take 1 tablet (2.5 mg) by mouth 2 times daily  Yes Brooklynn Griffith PA-C   B Complex-C-Folic Acid (DOROTEO CAPS) 1 MG CAPS TAKE 1 CAPSULE BY MOUTH EVERY DAY  Yes Josselin Kolb MD   clopidogrel (PLAVIX) 75 MG tablet TAKE 1 TABLET BY MOUTH EVERY DAY  Yes Josselin Kolb MD   famotidine (PEPCID) 20 MG tablet Take 20 mg by mouth daily   Yes Reported, Patient   gabapentin (NEURONTIN) 100 MG capsule Take 1 capsule (100 mg) by mouth 3 times daily  Yes Josselin Kolb MD   isosorbide mononitrate (IMDUR) 30 MG 24 hr tablet Take in the evening on the days prior dialysis. Take on Sunday, Tuesday and Thursday  Patient taking differently: Take 15 mg by mouth three times a week Take in the evening on the days prior dialysis. Take on Sunday, Tuesday and Thursday  Yes Brooklynn Griffith PA-C   isosorbide mononitrate (IMDUR) 30 MG 24 hr tablet Take 1 tablet (30 mg) the evening of dialysis days (Mon, Wed, Fri)  and Saturdays.  Patient taking differently: Take 1 tablet (30 mg) 4 days per week on the evening of dialysis  days (Mon, Wed, Fri)  and Saturdays.  Yes Brooklynn Griffith PA-C   levothyroxine (SYNTHROID/LEVOTHROID) 50 MCG tablet TAKE 1 TABLET BY MOUTH EVERY DAY  Yes Josselin Kolb MD   lidocaine (LIDODERM) 5 % patch Place 1 patch onto the skin every 24 hours To prevent lidocaine toxicity, patient should be patch free for 12 hrs daily.  Yes Bryant Ozuna MD   lisinopril (ZESTRIL) 2.5 MG tablet Take 1 tablet (2.5 mg) by mouth 2 times daily (Take one tablet after dialysis. Take second tablet at bedtime.)  Yes Josselin Kolb MD   loperamide (IMODIUM A-D) 2 MG tablet Take 2 mg by mouth three times a week MWF on dialysis days  Yes Unknown, Entered By History   loperamide (IMODIUM A-D) 2 MG tablet Take 1 mg by mouth four times a week On non-dialysis days - Tu, Th, Sa, [place]   Yes Unknown, Entered By History   meclizine 25 MG CHEW Take 1 tablet (25 mg) by mouth every 6 hours as needed for dizziness  Yes Martin Monreal MD   metoprolol succinate ER (TOPROL-XL) 25 MG 24 hr tablet Take 0.5 tablets (12.5 mg) by mouth daily Please call for an appointment for further refills. [phone]  Yes Brooklynn Griffith PA-C   mexiletine (MEXITIL) 150 MG capsule Take 1 capsule (150 mg) by mouth 2 times daily  Yes Josselin Kolb MD   nitroGLYcerin (NITROSTAT) 0.4 MG sublingual tablet Place 1 tablet (0.4 mg) under the tongue every 5 minutes as needed for chest pain UP TO 3 PER EPISODE  Yes Josselin Kolb MD   oxyCODONE (ROXICODONE) 5 MG tablet Take 1 tablet (5 mg) by mouth every 6 hours as needed for pain No driving a car or drinking alcohol for12 hours after taking this medication.  Yes Gabbi Lujan MD   pantoprazole (PROTONIX) 40 MG EC tablet TAKE 1 TABLET (40 MG) BY MOUTH EVERY MORNING  Yes Josselin Kolb MD   pravastatin (PRAVACHOL) 40 MG tablet TAKE 1 TABLET BY MOUTH EVERY DAY  Yes Josselin Kolb MD   predniSONE (DELTASONE) 5 MG tablet TAKE 1 TABLET BY MOUTH EVERY DAY  Yes Josselin Kolb,  MD   vitamin D3 (CHOLECALCIFEROL) 50 mcg (2000 units) tablet TAKE 1 TABLET BY MOUTH EVERY DAY  Yes Josselin Kolb MD       The information provided in this note is only as accurate as the sources available at the time of update(s)

## 2022-02-17 NOTE — PROGRESS NOTES
[facility]    Medicine Progress Note - Hospitalist Service       Date of Admission: 2022      PROGRESS NOTE    SUBJECTIVE  Pt new to me. Chart rev in detail.   Doing OK, still has SOB.   Discused PTA HPI in detail.   Recent dx of Covid, left the hospital a couple of days ago.   Long h/o ESRD/ HFrEF.   Overall worsening in the last years where pt has gone from using cane to walker dependent.     Remaining ROS: Neg    OBJECTIVE  Vital Signs with Ranges  Temp:  [97.6  F (36.4  C)] 97.6  F (36.4  C)  Pulse:  [] (P) 103  Resp:  [12-56] (P) 16  BP: (70-97)/(45-70) (P) 88/59  SpO2:  [88 %-98 %] 98 %  I/O last 3 completed shifts:  In: 250 [IV Piggyback:250]  Out: -   BP (!) (P) 88/59 (BP Location: Right arm)   Pulse (P) 103   Temp 97.6  F (36.4  C) (Oral)   Resp (P) 16   Wt 70.2 kg (154 lb 12.8 oz)   SpO2 98%   BMI 23.54 kg/m    Wt Readings from Last 2 Encounters:   22 70.2 kg (154 lb 12.8 oz)   22 75.9 kg (167 lb 5.3 oz)   Temp (24hrs), Av.6  F (36.4  C), Min:97.6  F (36.4  C), Max:97.6  F (36.4  C)  Body mass index is 23.54 kg/m .    PHYSICAL EXAM:  GA: Pt is alert and oriented x3  NAD, pleasant and conversive, speaking in full sentances  HEENT: AT/NC, EOMI, PERRLA, Sclera non-icteric, MMM   NECK: supple, No LAD, no thyromegaly  RS: coarse BS with crackles.   CVS: RRR, Nml S1/S2, no g/r, pulses 2+ B symmetrical, no edema,   GI: ND/NT, soft, BS + throughout, no masses or HSM  MSL: without deformity, normal range of motion  SKIN: Warm, dry, no rashes  CNS: AO X 3, CNII-XII intact, motor normal strength 5/5 and tone, without any focal deficits,   EXTR: Moves all extremities, no clubbing, cyanosis, or edema      PROCEDURES:    IMAGING:  CXR   Stable cardiomegaly. Normal pulmonary vascularity. There may be mild atelectasis of the left base. Lungs otherwise clear. No visible pneumothorax. Right subclavian pacemaker with single lead at the right ventricle. Old rib  fractures on the Left.    CT Chest 2/11  IMPRESSION: Small bilateral pleural effusions.    EKG:    ASSESSMENT: Westley Ness is a 76 year old male with h/o ESRD, HFrEF (EF20-25%), recent Covid infection dx, Afib, CAD, Hypothyroidism, recent admission for Covid (2/11-2/15), came back to the ED with hypoxia / SOB,     PLAN:    1. Acute on chronic hypoxic respiratory failure: DDx. worsening Covid infection (biggest component) +/- recent L rib fractures with L atelectasis +/- fluid overload. Treat Covid + HD.   2. Covid infection:  a. Dx on 2/11, fully vaccinated  b. resp failure: O2 only so far.   c. Covid meds:   i. Restart decadron.   ii. No Remdesivir with ESRD.   iii. No indication for anti-bacterial abx so far.   d. CRP worsening, upto 78.3 (2/17), LFTs stable, D-dimer irrelevant with full AC.   3. Chronic HFrEF: etiology: ischemic / arrhythmia. PTA on metoprolol/ lisinopril / imdur. Baseline BP low in the 100s. Holding anti HTN due to low BP. Will restart after HD. Last echo 03/21, EF 20-25%. ICD in place. Wt is below his baseline wt. BNP elevated (Completely unreliable with his ESRD/ acute inflammation)  4. Afib, ICD/PPM in place: PTA Metoprolol / Eliquis. Holding BB due to low BP.   5. ESRD: Dialyzing for 18 yrs, L UE AVF with some bleeding issues. Renal consulted. Baseline Dry wt 77.6, currently at 70.2. Defer to renal about 1. HD, 2. HTN, 3. Access issues, 4. Anemia of renal failure, 5. Secondary Hyperparathyroidism / MBD.   6. Chronic Thrombocytopenia:   7. Chronic conditions:   a. Hypothyroidism: Cont PTA synthroid.   b. HLD: Resume PTA Pravastatin 40mg.   8. Code status: Full Code. Had a very long discussion with pt about his goals of care / QOL> Pt is very clear that if he cannot live independently / be able to do the activities he does outside the house or if he was needing LTC / NH placement, he would consider that suffering and not want to prolong his care. D/w pt about poor prognosis even if he  survived CPR. He is now leaning towards DNR/DNI. Will give him the day to think about it/ reconfirm his wishes again tomorrow and then make a change. Will be important to get this clarified in case he progresses to needing intubation for his Covid.   9. FEN:   a. Renal diet  b. HD with renal to manage fluid / electrolytes.   10. Access:PIV  11. Routine cares: GI: diet, DVT: full dose Eliquis.   12. PTA meds: reconcile.d   13. Family communication: none so far.       Disposition Plan   Keep in IMC today. Transfer out of IMC tomorrow. Hospital course based on resp failure.      The patient's care was discussed with the Bedside Nurse.    Total Time: Spent > 45 min taking care of patient with more than 50% of time spent coordinating care.     Finn Gonzalez MD  Hospitalist Service  [facility]  Securely message with the Vocera Web Console (learn more here)  Text page via [employer] Paging/Directory

## 2022-02-17 NOTE — ED NOTES
Report called to [facility].  Denisa Cabrera RN,.......................................... 2/17/2022   6:52 AM

## 2022-02-17 NOTE — CONSULTS
[facility]    Cardiology Consultation     Date of Admission:  2/17/2022    Assessment & Plan   Westley Ness is a 76 year old male who was admitted on 2/17/2022.    There is consult was done as a virtual video visit due to ongoing coronavirus pandemic and patient actively infected.  Permission was obtained any consented for it.  [employer] software was used.    1.  Shortness of breath/respiratory distress - ESRD on HD    Patient presented with significant weakness and shortness of breath.  He had missed his dialysis as he was transitioning from Monday Wednesday Friday to Tuesday Thursday and Saturday.  He may have an element of fluid overload related to end-stage renal disease and missed dialysis and recent COVID-19 infection requiring Decadron.  I agree with proceeding with dialysis today.  His blood pressures soft and he may need some albumin support on midodrine.  Agree with holding blood pressure medications as of now.    2.  Ischemic cardiomyopathy known coronary artery disease    EF has been 25 to 30%.  We will repeat echocardiogram.  Low blood pressure may be a manifestation of poor cardiac output as well as recent COVID-19 infection and may be some volume depletion.  We will repeat echocardiogram.  Will need to hold cardiac meds that lower blood pressure for now    3.  Persistent atrial fibrillation.  Rates are reasonably controlled but will need to start beta-blockers once blood pressure improves.  On anticoagulation    4.  COVID-19 infection  Covid positive on the 11th.  Is vaccinated including a booster dose.  Has chronic cough.  Chest x-ray revealed mild atelectasis.  His CRP is going up.  I wonder if he is developing an element of Covid pneumonia.  Will defer to hospitalist if he needs a CT chest to assess this.  No fevers.        Po Sen MD    Primary Care Physician   Josselin Kolb    Reason for Consult   Reason for consult: I was asked by hospitalist to  evaluate this patient for heart failure.    History of Present Illness   Westley Ness is a 76 year old male who presents with fluid overload.    Patient was recently diagnosed with COVID-19 on 2/11/2022.  He was given Decadron for 3 days.  He was able to go home.  Yesterday, he was at home and felt really weak and tired in the evening.  He tried to walk up the stairs to get his medicines from his bedroom and felt severe fatigue and dyspnea and was not able to get back down.  Called his home health care nurse and then paramedics were called.  He was receiving some oxygen therapy at home.  The paramedics brought him to the emergency room and was noted to have blood pressure in the 70s range.  On Monday, during his dialysis, his blood pressure was low.  He was also bleeding around his fistula and required sutures.  Since his Covid infection, his blood pressure is running a bit soft.  He usually gets dialysis for his end-stage renal disease on Monday Wednesday and Friday at [facility] but because [facility] Covid, [facility] and [facility] timing was changed to Tuesday Thursday and Saturday.  Therefore he missed his dialysis yesterday.    While he was being transferred emergency room he had a brief episode of chest pain that he suspects was mild angina which he has had occasionally but it resolved in less than 5 minutes.    He does not elevated creatinine N-terminal proBNP consistent with end-stage renal disease.  His troponin I is very borderline at 93.    He does have a cough with mild expectoration but has not seen the color of it.  It is always present.    He has a past medical history of coronary disease, cardiomyopathy EF of 25 to 30% previous AICD placement.  Also with mild aortic root dilatation.  His angiography in July 2016 revealed circumflex lesion which was intervened along with the intervention of the OM branch.  His last stress test in 2020 revealed lateral wall ischemia and  angiography revealed another OM lesion that was stented.  His LAD is chronically occluded.    He does have chronic fatigue.  He walks with a walker.        Patient Active Problem List   Diagnosis     CAD (coronary artery disease)     ICD (implantable cardioverter-defibrillator) in place     Ischemic cardiomyopathy     Typical atrial flutter (H)     ESRD on hemodialysis (H)     Hypothyroidism     Acid reflux disease     History of atrial flutter     Benign essential hypertension     Skin tear of forearm without complication, left, initial encounter     End-stage renal disease (H)     End stage renal failure on dialysis (H)     Infection due to 2019 novel coronavirus     Hypoxia     Elevated troponin     Heart failure, unspecified HF chronicity, unspecified heart failure type (H)   Persistent atrial fibrillation    Past Medical History   I have reviewed this patient's medical history and updated it with pertinent information if needed.   Past Medical History:   Diagnosis Date     Acid reflux disease      Benign essential hypertension      CAD (coronary artery disease)     s/p multiple NSTEMIs and PCI's     ESRD on hemodialysis (H)     MWF     History of atrial flutter     s/p ablation     Hypothyroidism      Ischemic cardiomyopathy     EF 25-30%, s/p AICD       Past Surgical History   I have reviewed this patient's surgical history and updated it with pertinent information if needed.  Past Surgical History:   Procedure Laterality Date     CHOLECYSTECTOMY, OPEN  2013     CV CORONARY ANGIOGRAM N/A 10/19/2020    Procedure: Coronary Angiogram;  Surgeon: Theodora Salazar MD;  Location:  HEART CARDIAC CATH LAB     CV LEFT HEART CATH N/A 10/19/2020    Procedure: Left Heart Cath;  Surgeon: Theodora Salazar MD;  Location: [facility] CARDIAC CATH LAB     CV PCI STENT DRUG ELUTING N/A 10/19/2020    Procedure: Percutaneous Coronary Intervention Stent Drug Eluting;  Surgeon: Theodora Salazar MD;  Location:  HEART CARDIAC CATH  LAB     ELBOW SURGERY Left 2008    ORIF - plates still in place     EP ICD GENERATOR CHANGE SINGLE N/A 11/21/2019    Procedure: EP ICD Generator Change Single;  Surgeon: Jono Mejia MD;  Location:  HEART CARDIAC CATH LAB     H ABLATION ATRIAL FLUTTER  06/08/2017, 12/11/17     HC LEFT HEART CATHETERIZATION  7/14/2016     HC LEFT HEART CATHETERIZATION  9/21/2016     IMPLANT VENTRICULAR DEVICE  08/15/2011     IR DIALYSIS FISTULOGRAM LEFT  7/22/2019     REPAIR FISTULA ARTERIOVENOUS UPPER EXTREMITY Left 3/5/2019    Procedure: REPAIR LEFT UPPER ARM ARTERIOVENOUS FISTULA SKIN ULCER;  Surgeon: Westley Griggs MD;  Location:  OR     TONSILLECTOMY & ADENOIDECTOMY       VASCULAR SURGERY  2004, 2010    LUE fistulas (upper and lower); upper one is functional       Prior to Admission Medications   Prior to Admission Medications   Prescriptions Last Dose Informant Patient Reported? Taking?   B Complex-C-Folic Acid (DOROTEO CAPS) 1 MG CAPS  Other No Yes   Sig: TAKE 1 CAPSULE BY MOUTH EVERY DAY   acetaminophen (TYLENOL) 500 MG tablet  Other Yes Yes   Sig: Take 500-1,000 mg by mouth every 6 hours as needed for pain    apixaban ANTICOAGULANT (ELIQUIS ANTICOAGULANT) 2.5 MG tablet  Other No Yes   Sig: Take 1 tablet (2.5 mg) by mouth 2 times daily   clopidogrel (PLAVIX) 75 MG tablet  Other No Yes   Sig: TAKE 1 TABLET BY MOUTH EVERY DAY   famotidine (PEPCID) 20 MG tablet  Other Yes Yes   Sig: Take 20 mg by mouth daily    gabapentin (NEURONTIN) 100 MG capsule  Other No Yes   Sig: Take 1 capsule (100 mg) by mouth 3 times daily   isosorbide mononitrate (IMDUR) 30 MG 24 hr tablet  Other No Yes   Sig: Take in the evening on the days prior dialysis. Take on Sunday, Tuesday and Thursday   Patient taking differently: Take 15 mg by mouth three times a week Take in the evening on the days prior dialysis. Take on Sunday, Tuesday and Thursday   isosorbide mononitrate (IMDUR) 30 MG 24 hr tablet  Other No Yes   Sig: Take 1 tablet (30  mg) the evening of dialysis days (Mon, Wed, Fri)  and Saturdays.   Patient taking differently: Take 1 tablet (30 mg) 4 days per week on the evening of dialysis days (Mon, Wed, Fri)  and Saturdays.   levothyroxine (SYNTHROID/LEVOTHROID) 50 MCG tablet  Other No Yes   Sig: TAKE 1 TABLET BY MOUTH EVERY DAY   lidocaine (LIDODERM) 5 % patch  Other No Yes   Sig: Place 1 patch onto the skin every 24 hours To prevent lidocaine toxicity, patient should be patch free for 12 hrs daily.   lisinopril (ZESTRIL) 2.5 MG tablet  Other No Yes   Sig: Take 1 tablet (2.5 mg) by mouth 2 times daily (Take one tablet after dialysis. Take second tablet at bedtime.)   loperamide (IMODIUM A-D) 2 MG tablet  Other Yes Yes   Sig: Take 2 mg by mouth three times a week MWF on dialysis days   loperamide (IMODIUM A-D) 2 MG tablet  Other Yes Yes   Sig: Take 1 mg by mouth four times a week On non-dialysis days - Tu, Th, Sa, Su    meclizine 25 MG CHEW  Other No Yes   Sig: Take 1 tablet (25 mg) by mouth every 6 hours as needed for dizziness   metoprolol succinate ER (TOPROL-XL) 25 MG 24 hr tablet  Other No Yes   Sig: Take 0.5 tablets (12.5 mg) by mouth daily Please call for an appointment for further refills. [phone]   mexiletine (MEXITIL) 150 MG capsule  Other No Yes   Sig: Take 1 capsule (150 mg) by mouth 2 times daily   nitroGLYcerin (NITROSTAT) 0.4 MG sublingual tablet  Other No Yes   Sig: Place 1 tablet (0.4 mg) under the tongue every 5 minutes as needed for chest pain UP TO 3 PER EPISODE   oxyCODONE (ROXICODONE) 5 MG tablet  Other No Yes   Sig: Take 1 tablet (5 mg) by mouth every 6 hours as needed for pain No driving a car or drinking alcohol for12 hours after taking this medication.   pantoprazole (PROTONIX) 40 MG EC tablet  Other No Yes   Sig: TAKE 1 TABLET (40 MG) BY MOUTH EVERY MORNING   pravastatin (PRAVACHOL) 40 MG tablet  Other No Yes   Sig: TAKE 1 TABLET BY MOUTH EVERY DAY   predniSONE (DELTASONE) 5 MG tablet  Other No Yes   Sig: TAKE  1 TABLET BY MOUTH EVERY DAY   vitamin D3 (CHOLECALCIFEROL) 50 mcg (2000 units) tablet  Other No Yes   Sig: TAKE 1 TABLET BY MOUTH EVERY DAY      Facility-Administered Medications: None     Current Facility-Administered Medications   Medication Dose Route Frequency     - MEDICATION INSTRUCTIONS for Dialysis Patients -   Does not apply See Admin Instructions     apixaban ANTICOAGULANT  2.5 mg Oral BID     clopidogrel  75 mg Oral Daily     [START ON 2/18/2022] dexamethasone  6 mg Oral Daily     famotidine  20 mg Oral Daily     lidocaine  2 patch Transdermal Q24H     lidocaine   Transdermal Q8H     midodrine  5 mg Oral Once in dialysis/CRRT     multivitamin RENAL  1 capsule Oral Daily     pravastatin  40 mg Oral Daily     sodium chloride (PF)  3 mL Intracatheter Q8H     sodium chloride (PF)  3 mL Intracatheter Q8H     Current Facility-Administered Medications   Medication Last Rate     - MEDICATION INSTRUCTIONS -       Allergies   Allergies   Allergen Reactions     Contrast Dye Hives     Does fine if he uses benadryl prior.       Social History    reports that he quit smoking about 25 years ago. His smoking use included cigarettes. He started smoking about 57 years ago. He has a 70.00 pack-year smoking history. He has never used smokeless tobacco. He reports previous alcohol use. He reports that he does not use drugs.    Family History   Family History   Problem Relation Age of Onset     Cerebrovascular Disease Mother         later in life     Hypertension Father      Bladder Cancer Father      Myocardial Infarction Paternal Grandmother      Diabetes No family hx of      Prostate Cancer No family hx of      Colon Cancer No family hx of        Review of Systems   The comprehensive 10 point Review of Systems is negative other than noted in the HPI or here.   No fever or chills.  Chronic cough.  Occasional angina.  No palpitations.  Some bleeding around AV fistula recently.  Chronic fatigue.  Physical Exam   Vital Signs  with Ranges  Temp:  [97.6  F (36.4  C)] 97.6  F (36.4  C)  Pulse:  [] 102  Resp:  [12-56] 12  BP: (70-97)/(45-70) 88/59  SpO2:  [88 %-100 %] 100 %  Wt Readings from Last 4 Encounters:   02/17/22 70.2 kg (154 lb 12.8 oz)   02/14/22 75.9 kg (167 lb 5.3 oz)   12/16/21 78.5 kg (173 lb)   12/03/21 78.6 kg (173 lb 4.5 oz)     I/O last 3 completed shifts:  In: 250 [IV Piggyback:250]  Out: -       Vitals: BP (!) 88/59 (BP Location: Right arm)   Pulse 102   Temp 97.6  F (36.4  C) (Oral)   Resp 12   Wt 70.2 kg (154 lb 12.8 oz)   SpO2 100%   BMI 23.54 kg/m      General:  no apparent distress, overweight  ENT/Mouth:    Normal head shape, no apparent injury or laceration.  Neck:  no apparent neck swelling.  JVP not well seen  Chest/Lungs:  No breathing difficulty while speaking.  No audible wheezing.  No cough during conversation.  Cardiovascular: JVP not well seen  Extremities:  no apparent cyanosis.  Skin:  no xanthelasma.  No facial lacerations.  Neurologic:  Normal arm motion bilateral, no tremors.    Psychiatric:  Alert and oriented x3, calm demeanor    The rest of the comprehensive physical examination is deferred due to public health emergency video visit restrictions.      No lab results found in last 7 days.    Invalid input(s): TROPONINIES    Recent Labs   Lab 02/17/22  0935 02/17/22  0125 02/15/22  1243 02/15/22  1139 02/14/22  1625 02/14/22  0845 02/11/22  1657 02/11/22  1647   WBC  --  9.1  --  11.5*  --  7.0   < > 7.5   HGB  --  12.7*  --  12.8*  --  12.3*   < > 12.5*   MCV  --  111*  --  109*  --  110*   < > 111*   PLT  --  120*  --  132*  --  130*   < > 112*   INR  --   --   --   --   --   --   --  1.23*   NA  --  136  --  135  --  135   < > 136   POTASSIUM  --  4.9  --  4.9  --  4.4   < > 4.4   CHLORIDE  --  100  --  99  --  101   < > 102   CO2  --  28  --  26  --  26   < > 27   BUN  --  44*  --  27  --  36*   < > 25   CR  --  5.23*  --  3.72*  --  4.65*   < > 4.73*   GFRESTIMATED  --  11*  --  16*   --  12*   < > 12*   ANIONGAP  --  8  --  10  --  8   < > 7   CHRISTINE  --  8.5  --  8.6  --  8.2*   < > 8.0*   * 192* 136* 160*   < > 113*   < > 180*   ALBUMIN  --  3.2*  --   --   --   --   --  3.1*   PROTTOTAL  --  6.0*  --   --   --   --   --  6.0*   BILITOTAL  --  1.8*  --   --   --   --   --  1.5*   ALKPHOS  --  162*  --   --   --   --   --  209*   ALT  --  24  --   --   --   --   --  22   AST  --  18  --   --   --   --   --  16    < > = values in this interval not displayed.     Recent Labs   Lab Test 10/19/20  1609 03/26/19  0847   CHOL 101 126   HDL 60 52   LDL 29 53   TRIG 60 107     Recent Labs   Lab 02/17/22  0125 02/15/22  1139 02/14/22  0845   WBC 9.1 11.5* 7.0   HGB 12.7* 12.8* 12.3*   HCT 41.3 40.8 39.0*   * 109* 110*   * 132* 130*     No results for input(s): PH, PHV, PO2, PO2V, SAT, PCO2, PCO2V, HCO3, HCO3V in the last 168 hours.  Recent Labs   Lab 02/17/22 0125   NTBNPI >175,000*     Recent Labs   Lab 02/17/22  0636   DD 1.75*     Recent Labs   Lab 02/17/22  0125 02/15/22  1139 02/14/22  0845   CRP 78.3* 52.7* 17.9*     Recent Labs   Lab 02/17/22  0125 02/15/22  1139 02/14/22  0845   * 132* 130*     No results for input(s): TSH in the last 168 hours.  No results for input(s): COLOR, APPEARANCE, URINEGLC, URINEBILI, URINEKETONE, SG, UBLD, URINEPH, PROTEIN, UROBILINOGEN, NITRITE, LEUKEST, RBCU, WBCU in the last 168 hours.    Imaging:  Recent Results (from the past 48 hour(s))   XR Chest Port 1 View    Narrative    EXAM: XR CHEST PORT 1 VIEW  LOCATION: [facility]  DATE/TIME: 2/17/2022 2:05 AM    INDICATION: shortness of breath  COMPARISON: CT chest 02/11/2022. Chest radiographs 03/17/2021.      Impression    IMPRESSION: Stable cardiomegaly. Normal pulmonary vascularity. There may be mild atelectasis of the left base. Lungs otherwise clear. No visible pneumothorax. Right subclavian pacemaker with single lead at the right ventricle. Old rib fractures on  the   left.       Echo:  No results found for this or any previous visit (from the past 4320 hour(s)).    Clinically Significant Risk Factors Present on Admission             # Coagulation Defect: home medication list includes an anticoagulant medication  # Platelet Defect: home medication list includes an antiplatelet medication       Cardiovascular: Cardiac Arrhythmia: Atrial fibrillation: Persistent  Possible element of cardiogenic shock with low output contributing to low BP plus recent dialysis    Nephrology: CKD POA List: ESRD on dialysis    Pulmonology: Pulmonary Heart Disease (Pulmonary hypertension or Cor pulmonale): Pulmonary hypertension, unspecified    Systemic: Chronic Fatigue and Other Debilities: Chronic fatigue, unspecified

## 2022-02-17 NOTE — PLAN OF CARE
Pt VSS on weaned to 2L NC. 1.5 L removed over 3.5 hours of dialysis.Tele afib CVR. A&Ox4. Special precautions maintained. Up with A 1, pt makes very little urine. Skin tear on left arm, fistula left arm. Denies pain. HD done today. Continue to monitor.

## 2022-02-17 NOTE — PROGRESS NOTES
Potassium   Date Value Ref Range Status   02/17/2022 4.9 3.4 - 5.3 mmol/L Final   07/05/2021 5.3 3.4 - 5.3 mmol/L Final     Hemoglobin   Date Value Ref Range Status   02/17/2022 12.7 (L) 13.3 - 17.7 g/dL Final   07/05/2021 12.0 (L) 13.3 - 17.7 g/dL Final     Creatinine   Date Value Ref Range Status   02/17/2022 5.23 (H) 0.66 - 1.25 mg/dL Final   07/05/2021 5.03 (H) 0.66 - 1.25 mg/dL Final     Urea Nitrogen   Date Value Ref Range Status   02/17/2022 44 (H) 7 - 30 mg/dL Final   07/05/2021 39 (H) 7 - 30 mg/dL Final     Sodium   Date Value Ref Range Status   02/17/2022 136 133 - 144 mmol/L Final   07/05/2021 140 133 - 144 mmol/L Final     INR   Date Value Ref Range Status   02/11/2022 1.23 (H) 0.85 - 1.15 Final   10/15/2020 1.31 (H) 0.86 - 1.14 Final       DIALYSIS PROCEDURE NOTE  Hepatitis status of previous patient on machine log was checked and verified ok to use with this patients hepatitis status.  Patient dialyzed for 3.5 hrs. on a K2 bath with a net fluid removal of  1.5 L.  A BFR of 450 ml/min was obtained via a LAVF using 15  gauge needles.      The treatment plan was discussed with Dr. Granger during the treatment.    Total heparin received during the treatment: 0 units. Held due to previous tx bleeding >90 minutes requiring sutures.   Needle cannulation sites held x 5-12 min.     Meds  given: Albumin 5% 250 mL, Hectorol 4 mcg IV   Complications: Pt had asymptomatic hypotension during a routine check. UF goal decreased. MD Notified and aware. No further complications    Person educated: Patient. Knowledge base substantial. Barriers to learning: none. Educated on procedure via verbal mode. Patient verbalized understanding. Pt prefers verbal education style.     ICEBOAT? Timeout performed pre-treatment  I: Patient was identified using 2 identifiers  C:  Consent Signed Yes  E: Equipment preventative maintenance is current and dialysis delivery system OK to use  B: Hepatitis B Surface Antigen: Negative; Draw Date:  2/14/22      Hepatitis B Surface Antibody: Susceptible; Draw Date: 2/14/22  O: Dialysis orders present and complete prior to treatment  A: Vascular access verified and assessed prior to treatment  T: Treatment was performed at a clinically appropriate time  ?: Patient was allowed to ask questions and address concerns prior to treatment  See flowsheet in EPIC for further details and post assessment.  Machine water alarm in place and functioning. Transducer pods intact and checked every 15min.   Pt returned dialyzed at bedside due to covid infection.  Chlorine/Chloramine water system checked every 4 hours.  Outpatient Dialysis TTS at [facility]    Please remove patient dressing on AVF and AVG needle sites 24 hours after dialysis. If leaking occurs please apply a Band-Aid.

## 2022-02-17 NOTE — ED NOTES
Bed: ED14  Expected date:   Expected time:   Means of arrival:   Comments:  Mar 2 75F hip pain after fall eta 0031

## 2022-02-18 NOTE — PROGRESS NOTES
[facility]    Medicine Progress Note - Hospitalist Service       Date of Admission: 2022      PROGRESS NOTE    SUBJECTIVE  Pt doing OK, breathing improved. Very tired today. No new complaints.     Remaining ROS: Neg    OBJECTIVE  Vital Signs with Ranges  Temp:  [97.4  F (36.3  C)-98.4  F (36.9  C)] 98.4  F (36.9  C)  Pulse:  [] 120  Resp:  [9-27] 17  BP: ()/(55-83) 98/71  SpO2:  [51 %-100 %] 60 %  I/O last 3 completed shifts:  In: 240 [P.O.:240]  Out: 1500 [Other:1500]  BP 98/71   Pulse 120   Temp 98.4  F (36.9  C)   Resp 17   Wt 70.2 kg (154 lb 12.8 oz)   SpO2 (!) 60%   BMI 23.54 kg/m    Wt Readings from Last 2 Encounters:   22 70.2 kg (154 lb 12.8 oz)   22 75.9 kg (167 lb 5.3 oz)   Temp (24hrs), Av.6  F (36.4  C), Min:97.6  F (36.4  C), Max:97.6  F (36.4  C)  Body mass index is 23.54 kg/m .    PHYSICAL EXAM:  GA: Pt is alert and oriented x3  NAD, pleasant and conversive, speaking in full sentances  HEENT: AT/NC, EOMI, PERRLA, Sclera non-icteric, MMM   NECK: supple, No LAD, no thyromegaly  RS: coarse BS with crackles.   CVS: RRR, Nml S1/S2, no g/r, pulses 2+ B symmetrical, no edema,   GI: ND/NT, soft, BS + throughout, no masses or HSM  MSL: without deformity, normal range of motion  SKIN: Warm, dry, no rashes  CNS: AO X 3, CNII-XII intact, motor normal strength 5/5 and tone, without any focal deficits,   EXTR: Moves all extremities, no clubbing, cyanosis, or edema      PROCEDURES:    IMAGING:  CXR   Stable cardiomegaly. Normal pulmonary vascularity. There may be mild atelectasis of the left base. Lungs otherwise clear. No visible pneumothorax. Right subclavian pacemaker with single lead at the right ventricle. Old rib fractures on the Left.    CT Chest   IMPRESSION: Small bilateral pleural effusions.    EKG:    ASSESSMENT: Westley Ness is a 76 year old male with h/o ESRD, HFrEF (EF20-25%), recent Covid infection dx, Afib, CAD,  Hypothyroidism, recent admission for Covid (2/11-2/15), came back to the ED with hypoxia / SOB,     PLAN:    1. Acute on chronic hypoxic respiratory failure: DDx. worsening Covid infection (biggest component) +/- recent L rib fractures with L atelectasis +/- fluid overload. Treat Covid + HD.   2. Covid infection:  a. Dx on 2/11, fully vaccinated  b. resp failure: O2 only so far.   c. Covid meds:   i. Restart decadron.   ii. No Remdesivir with ESRD.   iii. No indication for anti-bacterial abx so far.   d. CRP worsening, upto 78.3 (2/17), LFTs stable, D-dimer irrelevant with full AC.   3. Chronic HFrEF: etiology: ischemic / arrhythmia. PTA on metoprolol/ lisinopril / imdur. Baseline BP low in the 100s. Holding anti HTN due to low BP. Will restart after HD. Last echo 03/21, EF 20-25%. ICD in place. Wt is below his baseline wt. BNP elevated (Completely unreliable with his ESRD/ acute inflammation)  4. Afib, ICD/PPM in place: PTA Metoprolol / Eliquis. Holding BB due to low BP.   5. ESRD: Dialyzing for 18 yrs, L UE AVF with some bleeding issues. Renal consulted. Baseline Dry wt 77.6, currently at 70.2. Defer to renal about 1. HD, 2. HTN, 3. Access issues, 4. Anemia of renal failure, 5. Secondary Hyperparathyroidism / MBD.   6. Chronic Thrombocytopenia:   7. Chronic conditions:   a. Hypothyroidism: Cont PTA synthroid.   b. HLD: Resume PTA Pravastatin 40mg.   8. Code status: Full Code.   a. 2/17: Had a very long discussion with pt about his goals of care / QOL> Pt is very clear that if he cannot live independently / be able to do the activities he does outside the house or if he was needing LTC / NH placement, he would consider that suffering and not want to prolong his care. D/w pt about poor prognosis even if he survived CPR. He is now leaning towards DNR/DNI. Will give him the day to think about it/ reconfirm his wishes again tomorrow and then make a change. Will be important to get this clarified in case he progresses  to needing intubation for his Covid.   b. 2/18: Pt too tired to discuss code status / finalize decision.   9. FEN:   a. Renal diet  b. HD with renal to manage fluid / electrolytes.   10. Access:PIV  11. Routine cares: GI: diet, DVT: full dose Eliquis.   12. PTA meds: reconcile.d   13. Family communication: none so far.       Disposition Plan   Keep in IMC today.  Hospital course based on resp failure.      The patient's care was discussed with the Bedside Nurse.    Total Time: Spent min taking care of patient with more than 50% of time spent coordinating care.     Finn Gonzalez MD  Hospitalist Service  [facility]  Securely message with the [employer] Web Console (learn more here)  Text page via [employer] Paging/Directory

## 2022-02-18 NOTE — PROGRESS NOTES
Renal Medicine Progress Note                                Westley Ness MRN# [id number]   Age: 76 year old YOB: 1945   Date of Admission: 2/17/2022 Hospital LOS: 1                  Assessment/Plan:        75-year-old male with history of CAD, ICM with EF of 20-25%, ESRD, DM2, GERD, atrial flutter/fibrillation on chronic anticoagulation with apixaban, status post ICD placement, and hypertension re admitted with SOB    Recent discharge 02/15/22  Last dialyzed 02/14/22     Following for ESRD management      1.  ESRD               -MWF [facility]               -AVF   -transitioning to [place] COVID unit (TT)    -was due to present today   2.  Anemia  3.  Metabolic Bone Disease              -Hectorol  4.  COVID 19  5.  Acute on chronic systolic heart failure exacerbation  6.  Hypotension       Holding BP meds  UF yesterday 1.5 liter    Dialysis 02/19/22      Interval History:     Re admitted with SOB  CXR suggest CHF    Dialyzed 02/17/22  Midodrine pre run  Albumin prime  UF 1.5 liter    Weak and tired today      ROS:     GENERAL: NAD, No fever,chills  R: NEGATIVE for significant cough or SOB  CV: NEGATIVE for chest pain, palpitations  EXT: no change edema  ROS otherwise negative    Medications and Allergies:     Reviewed    Physical Exam:     Vitals were reviewed  Patient Vitals for the past 8 hrs:   BP Temp Pulse Resp SpO2   02/18/22 1010 -- -- 117 22 95 %   02/18/22 0945 -- -- (!) 128 12 (!) 69 %   02/18/22 0940 -- -- (!) 122 16 (!) 73 %   02/18/22 0930 116/69 -- (!) 129 17 (!) 74 %   02/18/22 0925 109/83 -- (!) 121 18 (!) 51 %   02/18/22 0915 (!) 89/56 -- (!) 126 10 --   02/18/22 0800 -- -- 95 -- --   02/18/22 0408 90/57 -- 102 14 97 %   02/18/22 0246 101/66 98.2  F (36.8  C) 103 19 96 %     I/O last 3 completed shifts:  In: 240 [P.O.:240]  Out: 1500 [Other:1500]    Vitals:    02/17/22 0039 02/17/22 0339   Weight: 74.8 kg (165 lb) 70.2 kg (154 lb 12.8 oz)         GENERAL: awake, alert,  follows    Exam deferred given COVID status    Hospitalist and ER exams reviewed     Data:     Recent Labs   Lab 02/18/22  0556 02/17/22  0935 02/17/22  0125 02/15/22  1243 02/15/22  1139 02/14/22  1625 02/14/22  0845     --  136  --  135  --  135   POTASSIUM 4.5  --  4.9  --  4.9  --  4.4   CHLORIDE 102  --  100  --  99  --  101   CO2 25  --  28  --  26  --  26   ANIONGAP 7  --  8  --  10  --  8   GLC 86 129* 192* 136* 160*   < > 113*   BUN 30  --  44*  --  27  --  36*   CR 3.45*  --  5.23*  --  3.72*  --  4.65*   GFRESTIMATED 18*  --  11*  --  16*  --  12*   CHRISTINE 8.5  --  8.5  --  8.6  --  8.2*    < > = values in this interval not displayed.         Recent Labs   Lab 02/18/22  0556      POTASSIUM 4.5   CHLORIDE 102   CO2 25   ANIONGAP 7   GLC 86   BUN 30   CR 3.45*   GFRESTIMATED 18*   CHRISTINE 8.5     Recent Labs   Lab 02/18/22  0556 02/17/22  0125 02/11/22  1647   ALBUMIN 3.0* 3.2* 3.1*     Recent Labs   Lab 02/18/22  0556 02/17/22  0125 02/15/22  1139 02/14/22  0845   HGB 12.7* 12.7* 12.8* 12.3*         G Estevan Granger MD    [facility] - Nephrology  [phone]

## 2022-02-18 NOTE — PROGRESS NOTES
MD Notification    Notified Person: MD    Notified Person Name: Dr. Dickens    Notification Date/Time: 2/18/22 0312    Notification Interaction: amcom    Purpose of Notification: Patient stated they have anxiety and requests to have some antianxiety medication    Orders Received: 10 mg hydroxyzine PO

## 2022-02-18 NOTE — PROVIDER NOTIFICATION
MD Notification    Notified Person: MD    Notified Person Name: Lisa    Notification Date/Time: 2/18/22 1610    Notification Interaction: text page    Purpose of Notification: Pt's BP 88/49. Ok per cards not to give amio bolus. Gtt started and BP in 60s. Gtt stopped Pt's not eating or drinking. BG 96 but previously low. Do you want gentle fluids?    Orders Received: Provider did not order any fluids. Restart gtt as able.     Comments:

## 2022-02-18 NOTE — PROGRESS NOTES
I was notified by the nurse that patient is continuing to have atrial fibrillation with rapid rate.  Heart rates in the 140s.  Blood pressures are soft.  I will start him on IV amiodarone.  He is also not eating much.  He is very weak.  He appears to be failing.  His prognosis is poor.  I would suggest to the hospitalist consider discussion of goals of care as well as court with the patient and perhaps getting a palliative care consult.

## 2022-02-18 NOTE — PROGRESS NOTES
Cardiology Progress Note  Po Sen MD  This was done virtually using a [employer] software due to patient Covid positive.  Patient comfortable with this approach.       Assessment and Plan:      1.  Shortness of breath/respiratory distress - ESRD on HD  Patient underwent dialysis yesterday with -1.5L removed.  Felt very weak last night and felt like he was having a stroke as he was somewhat confused.  He is feeling better today but still weakness present.  Heart rates are elevated his metoprolol is being held.  His arm blood pressures are low but when we checked her thigh blood pressure did 110 systolic.  I have asked nursing to resume oral metoprolol so we can control the rate better.  He still continues to have some cough.  EF stable as noted below.     2.  Ischemic cardiomyopathy known coronary artery disease     EF stable at 25 to 30%.  Some of the cardiac meds held because of low blood pressure although we should take blood pressure in the lower extremities.     3.  Persistent atrial fibrillation.  Beta-blockers resumed     4.  COVID-19 infection  Covid positive on the 11th.  Is vaccinated including a booster dose.  Has chronic cough.  Chest x-ray revealed mild atelectasis.  His CRP is going up.  I wonder if he is developing an element of Covid pneumonia.  Will defer to hospitalist if he needs a repeat CT chest to assess this.  No fevers.                   Interval History:   Weak          Review of Systems:   The 5 point Review of Systems is negative other than noted in the HPI          Physical Exam:        Blood pressure 98/71, pulse 120, temperature 98.4  F (36.9  C), resp. rate 17, weight 70.2 kg (154 lb 12.8 oz), SpO2 (!) 60 %.  Vitals:    02/17/22 0039 02/17/22 0339   Weight: 74.8 kg (165 lb) 70.2 kg (154 lb 12.8 oz)     Weights since admission  Vitals:    02/17/22 0039 02/17/22 0339   Weight: 74.8 kg (165 lb) 70.2 kg (154 lb 12.8 oz)     Vital Signs with Ranges  Temp:  [97.4  F (36.3  C)-98.4  F  (36.9  C)] 98.4  F (36.9  C)  Pulse:  [] 120  Resp:  [9-27] 17  BP: ()/(55-83) 98/71  SpO2:  [51 %-100 %] 60 %  I/O's Last 24 hours  I/O last 3 completed shifts:  In: 240 [P.O.:240]  Out: 1500 [Other:1500]  Net I/O since admission  02/13 0700 - 02/18 0659  In: 490 [P.O.:240]  Out: 1500   Net: -1010    Patient sitting upright in his bed.  Appears somewhat pale.  No facial rash.  No accessory muscles use.  He is has mild cough with conversation.  Does not need to interrupt sentences while talking.  JVP not well seen in sitting position.  Can move upper limbs equally.    The rest of the comprehensive physical examination is deferred due to [facility] emergency video visit restrictions.             Medications:          - MEDICATION INSTRUCTIONS for Dialysis Patients -   Does not apply See Admin Instructions     apixaban ANTICOAGULANT  2.5 mg Oral BID     clopidogrel  75 mg Oral Daily     dexamethasone  6 mg Oral Daily     famotidine  10 mg Oral Daily     lidocaine  2 patch Transdermal Q24H     lidocaine   Transdermal Q8H     metoprolol succinate ER  12.5 mg Oral Daily     [START ON 2/19/2022] midodrine  10 mg Oral During Dialysis/CRRT (from stock)     multivitamin RENAL  1 capsule Oral Daily     pravastatin  40 mg Oral Daily     sodium chloride (PF)  3 mL Intracatheter Q8H     sodium chloride (PF)  3 mL Intracatheter Q8H            Data:      All new lab and imaging data was reviewed.   Recent Labs   Lab Test 02/18/22  0556 02/17/22  0125 02/15/22  1139 02/12/22  0737 02/11/22  1647 10/16/20  1527 10/15/20  1830 11/21/19  0930 07/22/19  0757   WBC 9.1 9.1 11.5*   < > 7.5   < > 6.2   < >  --    HGB 12.7* 12.7* 12.8*   < > 12.5*   < > 10.8*   < >  --    * 111* 109*   < > 111*   < > 103*   < >  --    * 120* 132*   < > 112*   < > 199   < >  --    INR  --   --   --   --  1.23*  --  1.31*  --  0.97    < > = values in this interval not displayed.      Recent Labs   Lab Test 02/18/22  1211  02/18/22  0556 02/17/22  0935 02/17/22  0125 02/15/22  1243 02/15/22  1139   NA  --  134  --  136  --  135   POTASSIUM  --  4.5  --  4.9  --  4.9   CHLORIDE  --  102  --  100  --  99   CO2  --  25  --  28  --  26   BUN  --  30  --  44*  --  27   CR  --  3.45*  --  5.23*  --  3.72*   ANIONGAP  --  7  --  8  --  10   CHRISTINE  --  8.5  --  8.5  --  8.6   GLC 67* 86 129* 192*   < > 160*    < > = values in this interval not displayed.     Recent Labs   Lab Test 07/24/21  1217 07/05/21  0607 03/17/21  2150 03/17/21  1859   TROPI  --  0.025 0.025 0.029   TROPONIN 0.022  --   --   --               Imaging:   No results found for this or any previous visit (from the past 24 hour(s)).

## 2022-02-18 NOTE — CONSULTS
Care Management Initial Consult    General Information  Assessment completed with: Patient, Bill  Type of CM/SW Visit: Initial Assessment    Primary Care Provider verified and updated as needed:     Readmission within the last 30 days: no previous admission in last 30 days   Return Category: Medically unsuccessful treatment plan  Reason for Consult: discharge planning  Advance Care Planning: Advance Care Planning Reviewed: present on chart          Communication Assessment  Patient's communication style: spoken language (English or Bilingual)    Hearing Difficulty or Deaf: no   Wear Glasses or Blind: yes    Cognitive  Cognitive/Neuro/Behavioral: WDL, all  Level of Consciousness: alert  Arousal Level: opens eyes spontaneously  Orientation: oriented x 4  Mood/Behavior: anxious  Best Language: 0 - No aphasia  Speech: clear, spontaneous, logical    Living Environment:   People in home: alone     Current living Arrangements: house      Able to return to prior arrangements: yes       Family/Social Support:  Care provided by: other (see comments) (KEENAN Bailey)  Provides care for: no one, unable/limited ability to care for self  Marital Status: Single             Description of Support System:           Current Resources:   Patient receiving home care services: Yes  Skilled Home Care Services: Skilled Nursing  Community Resources: OP Dialysis  Equipment currently used at home: grab bar, toilet, grab bar, tub/shower  Supplies currently used at home: None    Employment/Financial:  Employment Status: retired        Financial Concerns: No concerns identified           Lifestyle & Psychosocial Needs:  Social Determinants of Health     Tobacco Use: Medium Risk     Smoking Tobacco Use: Former Smoker     Smokeless Tobacco Use: Never Used   Alcohol Use: Not on file   Financial Resource Strain: Low Risk      Difficulty of Paying Living Expenses: Not hard at all   Food Insecurity: No Food Insecurity     Worried About Running Out of Food  in the Last Year: Never true     Ran Out of Food in the Last Year: Never true   Transportation Needs: No Transportation Needs     Lack of Transportation (Medical): No     Lack of Transportation (Non-Medical): No   Physical Activity: Not on file   Stress: Not on file   Social Connections: Not on file   Intimate Partner Violence: Not on file   Depression: Not at risk     PHQ-2 Score: 0   Housing Stability: Not on file       Functional Status:  Prior to admission patient needed assistance:   Dependent ADLs:: Independent  Dependent IADLs:: Cleaning, Shopping, Transportation       Mental Health Status:  Mental Health Status: No Current Concerns       Chemical Dependency Status:                Values/Beliefs:  Spiritual, Cultural Beliefs, Restorationism Practices, Values that affect care: no               Additional Information:  Patient is a readmit from 2/15. Patient is very weak and would be unable to return home. Patient's HR is elevated which is being addressed. Will follow for discharge planning.    Eunice Singh RN

## 2022-02-18 NOTE — PROGRESS NOTES
02/18/22 1007   Quick Adds   Type of Visit Initial PT Evaluation   Living Environment   People in Home alone   Current Living Arrangements house   Home Accessibility stairs to enter home;stairs within home   Number of Stairs, Main Entrance 4   Stair Railings, Main Entrance railings safe and in good condition   Number of Stairs, Within Home, Primary   (1;4)   Stair Railings, Within Home, Primary railings safe and in good condition   Transportation Anticipated agency   Living Environment Comments Pt has 4 stairs to etner from garage, has stair lift to main level with one step up after chair lift and 4 more stairs with B rails to bedroom level.   Self-Care   Usual Activity Tolerance fair   Current Activity Tolerance poor   Regular Exercise No   Equipment Currently Used at Home walker, rolling   Fall history within last six months no   Activity/Exercise/Self-Care Comment Pt has walker for each level of home, PT 1x/week, caregiver assists with laundry, shopping/errands and transportation to/from dialysis. Caregiver comes Mon, Tues, Wed, Fri for a couple hours at a time.   General Information   Onset of Illness/Injury or Date of Surgery 02/17/22   Referring Physician Edwards, Eric Mora MD   Patient/Family Therapy Goals Statement (PT) To get better   Pertinent History of Current Problem (include personal factors and/or comorbidities that impact the POC) Pt is 76 year old male adm on 2/17/22 with known COVID, worsening SOB. Pt was recently hospitalized for COVID 2/11-2/15/22, was discharged to home. Pt missed dialysis treatment as he was transitioning schedules, last dialysis was 2/14/22, underwent dialysis on 2/17/22.   Existing Precautions/Restrictions fall;oxygen therapy device and L/min  (2 LPM nasal cannula)   Cognition   Affect/Mental Status (Cognition) WFL   Orientation Status (Cognition) oriented x 3   Posture    Posture Forward head position;Protracted shoulders   Range of Motion (ROM)   ROM Comment B LE ROM WFL    Strength (Manual Muscle Testing)   Strength Comments Pt generally deconditioned, strength grossly 3/5   Bed Mobility   Comment, (Bed Mobility) Min assist of 1   Transfers   Comment, (Transfers) Min assist of 1   Gait/Stairs (Locomotion)   Comment, (Gait/Stairs) Bed to chair min assist of 1   Balance   Balance Comments Uses walker at baseline, needs UE support with standing   Clinical Impression   Criteria for Skilled Therapeutic Intervention Yes, treatment indicated   PT Diagnosis (PT) Impaired mobility   Influenced by the following impairments Decreased activity tolerance, decreased strength, decreased balance   Functional limitations due to impairments Decreased ability to participate in daily tasks   Clinical Presentation (PT Evaluation Complexity) Evolving/Changing   Clinical Presentation Rationale Current presentation, [facility]   Clinical Decision Making (Complexity) moderate complexity   Planned Therapy Interventions (PT) balance training;bed mobility training;gait training;home exercise program;patient/family education;strengthening;transfer training   Risk & Benefits of therapy have been explained evaluation/treatment results reviewed;care plan/treatment goals reviewed;risks/benefits reviewed;current/potential barriers reviewed;participants voiced agreement with care plan;participants included;patient   PT Discharge Planning   PT Discharge Recommendation (DC Rec) Transitional Care Facility   PT Rationale for DC Rec Pt is significantly below baseline level of function, currently having difficulty tolerating minimal activity, anticipate need for continued inpatient rehab when medically stable for discharge. Will continue to follow during hospital stay and update discharge recommendations as needed.   PT Brief overview of current status Min assist, significantly decreased activity tolerance   Total Evaluation Time   Total Evaluation Time (Minutes) 10   Physical Therapy Goals   PT Frequency 5x/week   PT Predicated  Duration/Target Date for Goal Attainment 02/25/22   PT Goals Bed Mobility;Transfers;Gait;Stairs   PT: Bed Mobility Independent;Supine to/from sit;Rolling   PT: Transfers Modified independent;Sit to/from stand;Bed to/from chair;Assistive device   PT: Gait Modified independent;100 feet;Rolling walker   PT: Stairs 4 stairs;Rail on both sides;Minimal assist   PT: Perform aerobic activity with stable cardiovascular response 5 minutes;continuous activity;ambulation

## 2022-02-18 NOTE — PLAN OF CARE
Care plan note:      Recent Vitals:  Temp: 98.2  F (36.8  C) Temp src: Oral BP: 90/57 Pulse: 102   Resp: 14 SpO2: 97 % O2 Device: Nasal cannula      Orientation/Neuro: Alert and Oriented x 4  Pain: Pain is controlled with current analgesics.  Medication(s) being used: acetaminophen, lidocaine patch   Tele: Atrial fibrillation - controlled; HR 90's-100's   IV medications: None   Mobility: Assist of 1, Gait belt, and Walker   Skin:  skin tear on the left arm; Left AV fistula  dressings clean, dry and intact.   GI: WDL  : little urine output, hemodialysis patient     Diet: Tolerating diet:   Well  Orders Placed This Encounter      Combination Diet Regular Diet; Renal Diet (dialysis)      Safety/Concerns:  Fall Risk and Kermit Risk  Aggression Color: Green    Plan: BP's tonight have been running soft, VSS besides BP, on 1L NC. Plavix and eliquis given tonight. Patient complained of pain in their ribs from broken ribs, pain 4/10, Lidocaine patch placed and Tylenol given; pain was relieved. Patient called out at 0300 with anxiety, hospitalist was paged and PRN atarax was ordered for this patient. Patient is now resting comfortably. Blood cultures came back negative for patient. Patient on dialysis T, Th, S schedule. Plan for today is to wean patient from NC, start PO decadron, and PT.   Continue to monitor.      Gloria Lua RN

## 2022-02-18 NOTE — PLAN OF CARE
Goal Outcome Evaluation: pt was anxious earlier this am then very lethargic sleeping most of the time, able to follow directions, oriented x4. Declined to eat breakfast and lunch. BS 67 and 87 after 1 cup of apple juice. Teli A-fib RVR, -170's with minimal activities. Po metoprolol was re-started but HR remained 130-140's. Cardiology and Hospitlaist updated and amio gtt ordered.

## 2022-02-19 NOTE — PROGRESS NOTES
[facility]    Medicine Progress Note - Hospitalist Service       Date of Admission: 2022      PROGRESS NOTE    SUBJECTIVE  Pt doing a lot better today. More awake. No trouble breathing. Still very tired.   We had a long discussion about his goals of care. He trusts Dr. Alvarado the most. He would like his opinion before making any decision about his code status, etc.     Remaining ROS: Neg    OBJECTIVE  Vital Signs with Ranges  Temp:  [97.1  F (36.2  C)-98.4  F (36.9  C)] 97.4  F (36.3  C)  Pulse:  [] 96  Resp:  [5-27] 10  BP: ()/(22-95) 80/22  SpO2:  [35 %-100 %] 95 %  No intake/output data recorded.  BP (!) 80/   Pulse 96   Temp 97.4  F (36.3  C) (Axillary)   Resp 10   Wt 72.3 kg (159 lb 6.3 oz)   SpO2 95%   BMI 24.24 kg/m    Wt Readings from Last 2 Encounters:   22 72.3 kg (159 lb 6.3 oz)   22 75.9 kg (167 lb 5.3 oz)   Temp (24hrs), Av.6  F (36.4  C), Min:97.6  F (36.4  C), Max:97.6  F (36.4  C)  Body mass index is 24.24 kg/m .    PHYSICAL EXAM:  GA: Pt is alert and oriented x3  NAD, pleasant and conversive, speaking in full sentances  HEENT: AT/NC, EOMI, PERRLA, Sclera non-icteric, MMM   NECK: supple, No LAD, no thyromegaly  RS: coarse BS with crackles.   CVS: irreg irreg, Nml S1/S2, no g/r, pulses 2+ B symmetrical, no edema,   GI: ND/NT, soft, BS + throughout, no masses or HSM  MSL: without deformity, normal range of motion  SKIN: Warm, dry, no rashes  CNS: AO X 3, CNII-XII intact, motor normal strength 5/5 and tone, without any focal deficits,   EXTR: Moves all extremities, no clubbing, cyanosis, or edema      PROCEDURES:    IMAGING:  CXR   Stable cardiomegaly. Normal pulmonary vascularity. There may be mild atelectasis of the left base. Lungs otherwise clear. No visible pneumothorax. Right subclavian pacemaker with single lead at the right ventricle. Old rib fractures on the Left.    CT Chest   IMPRESSION: Small bilateral pleural  effusions.    EKG:    ASSESSMENT: Westley Ness is a 76 year old male with h/o ESRD, HFrEF (EF20-25%), recent Covid infection dx, Afib, CAD, Hypothyroidism, recent admission for Covid (2/11-2/15), came back to the ED with hypoxia / SOB,     PLAN:    1. Acute on chronic hypoxic respiratory failure: DDx. worsening Covid infection (biggest component) +/- recent L rib fractures with L atelectasis +/- fluid overload. Treat Covid + HD.   2. Covid infection:  a. Dx on 2/11, fully vaccinated  b. resp failure: O2 only so far.   c. Covid meds:   i. Restart decadron.   ii. No Remdesivir with ESRD.   iii. No indication for anti-bacterial abx so far.   d. CRP worsening, upto 78.3 (2/17), LFTs stable, D-dimer irrelevant with full AC. Repeat CXR/ CRP to assess disesase severity.   3. Chronic HFrEF: etiology: ischemic / arrhythmia. PTA on metoprolol/ lisinopril / imdur. Baseline BP low in the 100s. Holding anti HTN due to low BP. Will restart after HD. Last echo 03/21, EF 20-25%. ICD in place. Wt is below his baseline wt. BNP elevated (Completely unreliable with his ESRD/ acute inflammation). May benefit from Ivabradine (defer to cards).   4. Afib, ICD/PPM in place: PTA Metoprolol / Eliquis. Holding BB due to low BP. New Afib with RVR. No dig due to ESRD. Holding metoprolol due to very low BP. Will need to use amiodarone +/- ECV if RVR becomes a problem.   5. ESRD: Dialyzing for 18 yrs, L UE AVF with some bleeding issues. Renal consulted. Baseline Dry wt 77.6, currently at 70.2. Defer to renal about 1. HD, 2. HTN, 3. Access issues, 4. Anemia of renal failure, 5. Secondary Hyperparathyroidism / MBD.   6. Chronic Thrombocytopenia:   7. Chronic conditions:   a. Hypothyroidism: Cont PTA synthroid.   b. HLD: Resume PTA Pravastatin 40mg.   8. Code status: Full Code.   a. 2/17: Had a very long discussion with pt about his goals of care / QOL> Pt is very clear that if he cannot live independently / be able to do the activities he does  outside the house or if he was needing LTC / NH placement, he would consider that suffering and not want to prolong his care. D/w pt about poor prognosis even if he survived CPR. He is now leaning towards DNR/DNI. Will give him the day to think about it/ reconfirm his wishes again tomorrow and then make a change. Will be important to get this clarified in case he progresses to needing intubation for his Covid.   b. 2/18: Pt too tired to discuss code status / finalize decision.   c. 2/19: Pt acknowledges that he has been doing terrible, but would like to d/w Dr. Alvarado (primary nephrologist or his team about his overall prognosis). He trusts him the most. Asked about cardiology, but he has no close relationship with any 1 cardiologist. Also wanted me to call and d/w his friend Maria Dolores Pabon.   9. FEN:   a. Renal diet  b. HD with renal to manage fluid / electrolytes.   10. Access:PIV  11. Routine cares: GI: diet, DVT: full dose Eliquis (renal dosing)  12. PTA meds: reconcile.d   13. Family communication: none so far.       Disposition Plan   Keep in IMC today.  Hospital course based on resp failure.      The patient's care was discussed with the Bedside Nurse.    Total Time: Spent 45 min taking care of patient with more than 50% of time spent coordinating care.     Finn Gonzalez MD  Hospitalist Service  [facility]  Securely message with the Vocera Web Console (learn more here)  Text page via [employer] Paging/Directory

## 2022-02-19 NOTE — PROGRESS NOTES
Neuro- A&OX4, drowsy, neuros intact   Tele/Cardiac- Afib RVR, 100-130s, amio drip started but stopped d/t hypotension, BP 70s/40s   Resp- 4L NC  Activity- bedrest, repostioned q2h  Pain- Denies   Drips- amio drip started, but stopped at 1700 d/t hypotension, pt asymptomatic  Skin- foam dressing on coccyx, CDI  GI/- ESRD on HD  Endocrine- diet controlled diabetic, BG recheck 96  COVID status- Positive  Plan- Pt declining palliative consult currently, poor appetite, encourage intake, med manage Afib RVR as tolerated    Melinda Perez, Student Nurse

## 2022-02-19 NOTE — PLAN OF CARE
Goal Outcome Evaluation:  Neuro- has been more alert and responsive today oriented x4, forgetful. Eating and drinking well.  Most Recent Vitals- Temp: 97.3  F (36.3  C) Temp src: Axillary BP: (!) 85/36 Pulse: 90   Resp: 13 SpO2: 100 % O2 Device: Nasal cannula   Tele/Cardiac-  HR  at rest, SBP Mostely 70's and 80's. Occasionally in high 60's with no change in pt's symptoms. Cardiology and Hospitalism aware of low BP'S.   Resp- 2L NSC   Activity- Bed rest   Pain- C/o generalized aches with turning and repositioning.   Drips- none   Drains/Tubes- Fistula on left arm   Skin- blanchable redness on coccyx   GI/- pt on hemodialysis   Aggression Color- Green  COVID status- Positive 2/11  Plan- Midodrine given before dialysis. Con't to monitor closely.   Senait-    Clover Diane RN

## 2022-02-19 NOTE — PROGRESS NOTES
Cardiology Progress Note          Assessment and Plan:   76 year-old white male, presented for SOB on 2-. Found to have positive COVID and new onset AF with mildly fast HR.  Suspected COVID pneumonia. Currently on 2L O2 with good saturation.  HR remains slightly fast at rest.  Recently was on amiodarone, discontinued.  Known ischemic cardiomyopathy with a single chamber ICD on the right side. EF 25% on 2-.    Aware of low BP.  Ok to withhold metoprolol.  Severe renal insufficiency, not a candidate for digoxin.  Will accept a slightly fast resting HR for now.  May consider cardioversion+amiodarone if HR control remains a problem.  Stop Plavix. Continue Eliquis.  Leslie Meier MD  Cardiology   [phone]                  Physical Exam:   Blood pressure 111/65, pulse 103, temperature 97.4  F (36.3  C), temperature source Oral, resp. rate 11, weight 72.3 kg (159 lb 6.3 oz), SpO2 98 %.  Wt Readings from Last 4 Encounters:   02/19/22 72.3 kg (159 lb 6.3 oz)   02/14/22 75.9 kg (167 lb 5.3 oz)   12/16/21 78.5 kg (173 lb)   12/03/21 78.6 kg (173 lb 4.5 oz)      No intake/output data recorded.    Constitutional: Alert, no apparent distress,      Lungs: No crackles or wheezing,    Cardiovascular: irregular rhythm, normal S1 and S2, and no murmur,    Lymphm node  Neck  ENT  Neurologic  Abdomen: No enlargement  No jugular vein extension or carotid bruit  No apparent abnormality  No focal deficit  Normal bowel sounds, soft, no distension, no tender   Skin: No rashes, no cyanosis   Extremity: No edema          Medications:     Current Facility-Administered Medications:      - MEDICATION INSTRUCTIONS for Dialysis Patients -, , Does not apply, See Admin Instructions, Edwards, Eric Mora MD     0.9% sodium chloride BOLUS, 300 mL, Hemodialysis Machine, Once, СЕРГЕЙ Granger MD     0.9% sodium chloride BOLUS, 100-150 mL, Intravenous, Q15 Min PRN, СЕРГЕЙ Granger MD     acetaminophen (TYLENOL) tablet 650 mg, 650 mg,  Oral, Q4H PRN, 650 mg at 02/18/22 0911 **OR** acetaminophen (TYLENOL) Suppository 650 mg, 650 mg, Rectal, Q4H PRN, Marlo Napoles MD     albumin human 5 % injection 250 mL, 250 mL, Intravenous, Once in dialysis/CRRT, СЕРГЕЙ Granger MD     [Held by provider] amiodarone in NS adult drip std conc 1.8 mg/mL infusion, 0.5 mg/min, Intravenous, Continuous, Po Sen MD     apixaban ANTICOAGULANT (ELIQUIS) tablet 2.5 mg, 2.5 mg, Oral, BID, Eric Edwards MD, 2.5 mg at 02/19/22 0809     dexamethasone (DECADRON) tablet 6 mg, 6 mg, Oral, Daily, Eric Edwards MD, 6 mg at 02/18/22 1259     famotidine (PEPCID) tablet 10 mg, 10 mg, Oral, Daily, Eric Edwards MD, 10 mg at 02/18/22 2112     gabapentin (NEURONTIN) capsule 100 mg, 100 mg, Oral, TID, Finn Gonzalez MD, 100 mg at 02/19/22 0808     heparin (porcine) injection, 500 Units, Hemodialysis Machine OR IV Push, Once in dialysis/CRRT, СЕРГЕЙ Granger MD     heparin 10,000 units/10 mL infusion (DIALYSIS USE), 500 Units/hr, Hemodialysis Machine, Continuous, СЕРГЕЙ Granger MD     hydrOXYzine (ATARAX) tablet 10 mg, 10 mg, Oral, Q6H PRN, Zulema Dickens MD, 10 mg at 02/18/22 0458     levothyroxine (SYNTHROID/LEVOTHROID) tablet 50 mcg, 50 mcg, Oral, Daily, Finn Goznalez MD, 50 mcg at 02/19/22 0809     Lidocaine (LIDOCARE) 4 % Patch 2 patch, 2 patch, Transdermal, Q24H, Eric Edwards MD, 2 patch at 02/18/22 2111     lidocaine (LMX4) cream, , Topical, Q1H PRN, Eric Edwards MD     lidocaine 1 % 0.1-1 mL, 0.1-1 mL, Other, Q1H PRN, Eric Edwards MD     lidocaine patch in PLACE, , Transdermal, Q8H, Eric Edwards MD     Medication instructions: Do NOT use nebulized medications, , Does not apply, Continuous PRN, Eric Edwards MD     metoprolol (LOPRESSOR) injection 2.5 mg, 2.5 mg, Intravenous, Q4H PRN, Eric Edwards MD     [Held by provider] metoprolol succinate ER (TOPROL-XL) 24 hr half-tab 12.5  mg, 12.5 mg, Oral, Daily, Po eSn MD, 12.5 mg at 02/18/22 1102     midodrine (PROAMATINE) tablet 10 mg, 10 mg, Oral, During Dialysis/CRRT (from stock), СЕРГЕЙ Granger MD     multivitamin RENAL (NEPHROCAPS/TRIPHROCAPS) capsule 1 capsule, 1 capsule, Oral, Daily, Eric Edwards MD, 1 capsule at 02/18/22 2112     naloxone (NARCAN) injection 0.2 mg, 0.2 mg, Intravenous, Q2 Min PRN **OR** naloxone (NARCAN) injection 0.4 mg, 0.4 mg, Intravenous, Q2 Min PRN **OR** naloxone (NARCAN) injection 0.2 mg, 0.2 mg, Intramuscular, Q2 Min PRN **OR** naloxone (NARCAN) injection 0.4 mg, 0.4 mg, Intramuscular, Q2 Min PRN, Eric Edwards MD     ondansetron (ZOFRAN-ODT) ODT tab 4 mg, 4 mg, Oral, Q6H PRN **OR** ondansetron (ZOFRAN) injection 4 mg, 4 mg, Intravenous, Q6H PRN, Eric Edwards MD     oxyCODONE IR (ROXICODONE) half-tab 2.5 mg, 2.5 mg, Oral, Q4H PRN, Eric Edwards MD     pravastatin (PRAVACHOL) tablet 40 mg, 40 mg, Oral, Daily, Eric Edwards MD, 40 mg at 02/19/22 0808     prochlorperazine (COMPAZINE) injection 5 mg, 5 mg, Intravenous, Q6H PRN **OR** prochlorperazine (COMPAZINE) tablet 5 mg, 5 mg, Oral, Q6H PRN **OR** prochlorperazine (COMPAZINE) suppository 12.5 mg, 12.5 mg, Rectal, Q12H PRN, Eric Edwards MD     senna-docusate (SENOKOT-S/PERICOLACE) 8.6-50 MG per tablet 1 tablet, 1 tablet, Oral, BID PRN **OR** senna-docusate (SENOKOT-S/PERICOLACE) 8.6-50 MG per tablet 2 tablet, 2 tablet, Oral, BID PRN, Eric Edwards MD     sodium chloride (PF) 0.9% PF flush 3 mL, 3 mL, Intracatheter, Q8H, Eric Edwards MD     sodium chloride (PF) 0.9% PF flush 3 mL, 3 mL, Intracatheter, q1 min prn, Eric Edwards MD           Lab results:        Recent Labs   Lab Test 02/18/22  2124 02/18/22  1608 02/18/22  1236 02/18/22  1211 02/18/22  0556 02/17/22  0935 02/17/22  0125 02/15/22  1243 02/15/22  1139   NA  --   --   --   --  134  --  136  --  135   POTASSIUM  --   --   --   --  4.5  --   4.9  --  4.9   CHLORIDE  --   --   --   --  102  --  100  --  99   CHRISTINE  --   --   --   --  8.5  --  8.5  --  8.6   CO2  --   --   --   --  25  --  28  --  26   BUN  --   --   --   --  30  --  44*  --  27   CR  --   --   --   --  3.45*  --  5.23*  --  3.72*   * 96 86   < > 86   < > 192*   < > 160*    < > = values in this interval not displayed.     Recent Labs   Lab Test 02/19/22  0555 02/18/22  0556 02/17/22  0125   HGB 13.4 12.7* 12.7*   WBC 9.6 9.1 9.1   * 117* 120*     Recent Labs   Lab Test 02/11/22  1647 10/15/20  1830 07/22/19  0757   INR 1.23* 1.31* 0.97     Recent Labs   Lab Test 07/24/21  1217 07/05/21  0607 03/17/21  2150 03/17/21  1859   TROPONIN 0.022  --   --   --    TROPI  --  0.025 0.025 0.029

## 2022-02-19 NOTE — PROGRESS NOTES
Renal Medicine Progress Note            Assessment/Plan:     75-year-old male with history of CAD, ICM with EF of 20-25%, ESRD, DM2, GERD, atrial flutter/fibrillation on chronic anticoagulation with apixaban, status post ICD placement, and hypertension re admitted with SOB    Following for ESRD management      1.  ESRD               -M[facility]               -AVF              -transitioning to [facility] unit (TT)  2.  Anemia  3.  Metabolic Bone Disease              -Hectorol  4.  COVID 19  5.  Acute on chronic systolic heart failure exacerbation  6.  Hypotension      Plan:  # Hemodialysis later today with albumin prime.  # Midodrine as needed with HD    Plan discussed with dialysis RN.         Interval History:     Afebrile. BP is low. Afib with RVR. Patient is laying in bed and looks comfortable. No new complaints.          Medications and Allergies:       - MEDICATION INSTRUCTIONS for Dialysis Patients -   Does not apply See Admin Instructions     sodium chloride 0.9%  300 mL Hemodialysis Machine Once     albumin human  250 mL Intravenous Once in dialysis/CRRT     apixaban ANTICOAGULANT  2.5 mg Oral BID     dexamethasone  6 mg Oral Daily     famotidine  10 mg Oral Daily     gabapentin  100 mg Oral TID     heparin  500 Units Hemodialysis Machine OR IV Push Once in dialysis/CRRT     levothyroxine  50 mcg Oral Daily     lidocaine  2 patch Transdermal Q24H     lidocaine   Transdermal Q8H     [Held by provider] metoprolol succinate ER  12.5 mg Oral Daily     midodrine  10 mg Oral During Dialysis/CRRT (from stock)     multivitamin RENAL  1 capsule Oral Daily     pravastatin  40 mg Oral Daily     sodium chloride (PF)  3 mL Intracatheter Q8H        Allergies   Allergen Reactions     Contrast Dye Hives     Does fine if he uses benadryl prior.            Physical Exam:   Vitals were reviewed   , Blood pressure (!) 80/22, pulse 96, temperature 97.4  F (36.3  C), temperature source Axillary, resp. rate 10,  weight 72.3 kg (159 lb 6.3 oz), SpO2 95 %.    Wt Readings from Last 3 Encounters:   02/19/22 72.3 kg (159 lb 6.3 oz)   02/14/22 75.9 kg (167 lb 5.3 oz)   12/16/21 78.5 kg (173 lb)       Intake/Output Summary (Last 24 hours) at 2/19/2022 1202  Last data filed at 2/19/2022 1000  Gross per 24 hour   Intake 240 ml   Output --   Net 240 ml       GENERAL: NAD. Laying comfortably in bed  CV: Afib with RVR  PULM: Breathing is not labored  ABD: Obese           Data:     CBC RESULTS:     Recent Labs   Lab 02/19/22  0555 02/18/22  0556 02/17/22  0125 02/15/22  1139 02/14/22  0845 02/13/22  0849   WBC 9.6 9.1 9.1 11.5* 7.0 8.9   RBC 3.95* 3.80* 3.73* 3.74* 3.55* 3.65*   HGB 13.4 12.7* 12.7* 12.8* 12.3* 12.6*   HCT 43.1 42.6 41.3 40.8 39.0* 41.4   * 117* 120* 132* 130* 132*       Basic Metabolic Panel:  Recent Labs   Lab 02/18/22  2124 02/18/22  1608 02/18/22  1236 02/18/22  1211 02/18/22  0556 02/17/22  0935 02/17/22  0125 02/15/22  1243 02/15/22  1139 02/14/22  1625 02/14/22  0845 02/13/22  1057 02/13/22  0849   NA  --   --   --   --  134  --  136  --  135  --  135  --  134   POTASSIUM  --   --   --   --  4.5  --  4.9  --  4.9  --  4.4  --  4.4   CHLORIDE  --   --   --   --  102  --  100  --  99  --  101  --  102   CO2  --   --   --   --  25  --  28  --  26  --  26  --  24   BUN  --   --   --   --  30  --  44*  --  27  --  36*  --  26   CR  --   --   --   --  3.45*  --  5.23*  --  3.72*  --  4.65*  --  4.00*   * 96 86 67* 86 129* 192*   < > 160*   < > 113*   < > 97   CHRISTINE  --   --   --   --  8.5  --  8.5  --  8.6  --  8.2*  --  8.2*    < > = values in this interval not displayed.       INRNo lab results found in last 7 days.   Attestation:   I have reviewed today's relevant vital signs, notes, medications, labs and imaging.    Brady Kelsey MD  [facility] - Nephrology  Office phone :[phone]  Pager: [phone]

## 2022-02-19 NOTE — PROGRESS NOTES
"Potassium   Date Value Ref Range Status   02/19/2022 4.8 3.4 - 5.3 mmol/L Final   07/05/2021 5.3 3.4 - 5.3 mmol/L Final     Hemoglobin   Date Value Ref Range Status   02/19/2022 13.4 13.3 - 17.7 g/dL Final   07/05/2021 12.0 (L) 13.3 - 17.7 g/dL Final     Creatinine   Date Value Ref Range Status   02/19/2022 4.95 (H) 0.66 - 1.25 mg/dL Final   07/05/2021 5.03 (H) 0.66 - 1.25 mg/dL Final     Urea Nitrogen   Date Value Ref Range Status   02/19/2022 50 (H) 7 - 30 mg/dL Final   07/05/2021 39 (H) 7 - 30 mg/dL Final     Sodium   Date Value Ref Range Status   02/19/2022 136 133 - 144 mmol/L Final   07/05/2021 140 133 - 144 mmol/L Final     INR   Date Value Ref Range Status   02/11/2022 1.23 (H) 0.85 - 1.15 Final   10/15/2020 1.31 (H) 0.86 - 1.14 Final       DIALYSIS PROCEDURE NOTE  Hepatitis status of previous patient on machine log was checked and verified ok to use with this patients hepatitis status.  Patient dialyzed for 3 hrs. on a K2 bath with a net fluid removal of  .5L.  A BFR of 300-400 ml/min was obtained via a LAVF  using 15 gauge needles.      The treatment plan was discussed with Dr. Kelsey during the treatment.    Total heparin received during the treatment: 0  units.   Needle cannulation sites held x 30 min.     Meds  given:  Midodrine before run  and mid run  Albumin prime  Complications: hypotensive    meds and albumin given Pt thru out run \"feel OK\"     Person educated: Pt . Knowledge base substantail . Barriers to learning: lethargic . Educated on procedure  via oral  mode. Patient/ verbalized understanding.     ICEBOAT? Timeout performed pre-treatment  I: Patient was identified using 2 identifiers  C:  Consent Signed Yes  E: Equipment preventative maintenance is current and dialysis delivery system OK to use  B: Hepatitis B Surface Antigen: Neg  Draw Date: 2.14.22      Hepatitis B Surface Antibody: Susceptible ; Draw Date: 2.14.22  O: Dialysis orders present and complete prior to treatment  A: Vascular " access verified and assessed prior to treatment  T: Treatment was performed at a clinically appropriate time  ?: Patient was allowed to ask questions and address concerns prior to treatment  See flowsheet in EPIC for further details and post assessment.  Machine water alarm in place and functioning. Transducer pods intact and checked every 15min.   Pt dialyzed at bedside .  Chlorine/Chloramine water system checked every 4 hours.  Outpatient Dialysis at [facility]  PLEASE CHECK DRESSING FOR BLEEDING  Please remove patient dressing on AVF and AVG needle sites 24 hours after dialysis. If leaking occurs please apply dressing or band aide.

## 2022-02-19 NOTE — PLAN OF CARE
Goal Outcome Evaluation:    [id number]: Pt A/O x 4, forgetful, intermittently lethargic. No complaints of pain. Dialysis this afternoon, 400cc off. Hypotension continues despite midodrine received. Repo Q 2. Nutrition encouraged.

## 2022-02-19 NOTE — CODE/RAPID RESPONSE
[facility] brief RRT note:    RRT paged at 2005 for asymptomatic hypotension per cardiology recommendations.  Upon arrival, pt lying in bed, resting, in no overt distress, SBP 70s, MAPs 50s, from R thigh with HR 90s-low 100s, RR 10s, O2 sats 100% on 4L O2 via NC.  Per nursing, cardiology was contacted that pt's amiodarone infusion was held due to pt's SBP 70s; at that time, cardiology requested for RRT to be called.  Pt's amiodarone has been stopped since 2000.  At time of arrival to pt's bedside, pt warm, pink, dry, mentating, reporting no chest pain, SOB, nausea, diaphoresis.  No mottling or peripheral edema noted.      Noted pt had HD 2/17 with 1.5L removed, received midodrine and 250 ml 5% albumin at that time.  Pt received 12.5 toprol XL at 1100 today.  Pt afebrile, blood cultures NGTD.  Pt's Hgb stable, 12.7, no signs of bleeding.  Noted lactic acid 1.6 on 2/17, transaminases WNL.      Interventions:  - While present on unit, after 45 min of amiodarone being held, pt's SBP 89, MAP 70; remains asymtomatic  - Will continue to hold amiodarone at this time; will place hold on Toprol XL  - Could consider additional midodrine vs albumin if becomes symptomatic or has further decline in BP; will defer to cardiology at this time (Dr. Rea previously paged by nursing as noted above)  - Remains IMC at this time    MARCIO Forte, CNP  [facility]    No charge.

## 2022-02-19 NOTE — PROGRESS NOTES
Pt remains AOx4 BP remains labile no acute respiratory distress noted. Pt being followed by Cardiology no new orders given. Will continue with plan of care.

## 2022-02-19 NOTE — PROGRESS NOTES
Assumed care of pt, pt was noted to be hypotensive and AOx4, pt had a history of low blood pressure. Pt had a Amiodarone drip going at 1mg, so the intensivist was called and ordered to hold Amiodarone and call Cardiology. Cardiology was called and they wanted the RRT called. Pt was hypotensive and not symptomatic and was evaluated by the RRT. After being evaluated order to continue to hold Amiodarone. Pt last dialysis was 2/17 and he received Albumin 250mg, pt will stay on the unit and continue with plan of care.

## 2022-02-20 NOTE — PLAN OF CARE
Goal Outcome Evaluation:  Pt A&Ox4, forgetful. VSS on 3L O2 NC. Up A2 GB/W, T/R q2h. Diet dialysis. IVSL. Tele afib RVR. Denies Pain. Continent of BM & anuric. Consults: PT. Discharge TBD.

## 2022-02-20 NOTE — CONSULTS
See H&P where COVID symptom onset is noted to be unknown and provider recommends patient fall into category of 20 days of isolation.  Thus day zero of the 20 is 2/11/22 (day of positive test) making 3/4/22 the first day the patient can be off of Special Precautions and made COVID Recovered.  Deepika Benoit, Infection Prevention     Strong peripheral pulses

## 2022-02-20 NOTE — PROGRESS NOTES
[facility]    Medicine Progress Note - Hospitalist Service       Date of Admission: 2022      PROGRESS NOTE    SUBJECTIVE  Pt doing OK. Tolerating low BP well. SOB stable.     Remaining ROS: Neg    OBJECTIVE  Vital Signs with Ranges  Temp:  [97.3  F (36.3  C)-98.9  F (37.2  C)] 98.3  F (36.8  C)  Pulse:  [] 63  Resp:  [10-26] 21  BP: ()/(29-66) 95/64  SpO2:  [88 %-100 %] 98 %  I/O last 3 completed shifts:  In: 290 [P.O.:290]  Out: 500 [Other:500]  BP 95/64 (BP Location: Right arm)   Pulse 63   Temp 98.3  F (36.8  C) (Oral)   Resp 21   Wt 71.3 kg (157 lb 3 oz)   SpO2 98%   BMI 23.90 kg/m    Wt Readings from Last 2 Encounters:   22 71.3 kg (157 lb 3 oz)   22 75.9 kg (167 lb 5.3 oz)   Temp (24hrs), Av.6  F (36.4  C), Min:97.6  F (36.4  C), Max:97.6  F (36.4  C)  Body mass index is 23.9 kg/m .    PHYSICAL EXAM:  GA: Pt is alert and oriented x3  NAD, pleasant and conversive, speaking in full sentances  HEENT: AT/NC, EOMI, PERRLA, Sclera non-icteric, MMM   NECK: supple, No LAD, no thyromegaly  RS: coarse BS with crackles.   CVS: irreg irreg, Nml S1/S2, no g/r, pulses 2+ B symmetrical, no edema,   GI: ND/NT, soft, BS + throughout, no masses or HSM  MSL: without deformity, normal range of motion  SKIN: Warm, dry, no rashes  CNS: AO X 3, CNII-XII intact, motor normal strength 5/5 and tone, without any focal deficits,   EXTR: Moves all extremities, no clubbing, cyanosis, or edema      PROCEDURES:    IMAGING:  CXR   Stable cardiomegaly. Normal pulmonary vascularity. There may be mild atelectasis of the left base. Lungs otherwise clear. No visible pneumothorax. Right subclavian pacemaker with single lead at the right ventricle. Old rib fractures on the Left.    CT Chest   IMPRESSION: Small bilateral pleural effusions.    EKG:    ASSESSMENT: Westley Ness is a 76 year old male with h/o ESRD, HFrEF (EF20-25%), recent Covid infection dx, Afib, CAD,  Hypothyroidism, recent admission for Covid (2/11-2/15), came back to the ED with hypoxia / SOB,     PLAN:    1. Acute on chronic hypoxic respiratory failure: DDx. worsening Covid infection (biggest component) +/- recent L rib fractures with L atelectasis +/- fluid overload. Treat Covid + HD.   2. Covid infection:  a. Dx on 2/11, fully vaccinated  b. resp failure: O2 only so far.   c. Covid meds:   i. Restart decadron.   ii. No Remdesivir with ESRD.   iii. No indication for anti-bacterial abx so far.   d. CRP worsening, upto 78.3 (2/17), LFTs stable, D-dimer irrelevant with full AC. Repeat CXR/ CRP to assess disesase severity.   3. Chronic HFrEF: etiology: ischemic / arrhythmia. PTA on metoprolol/ lisinopril / imdur. Baseline BP low in the 100s. Holding anti HTN due to low BP. Will restart after HD. Last echo 03/21, EF 20-25%. ICD in place. Wt is below his baseline wt. BNP elevated (Completely unreliable with his ESRD/ acute inflammation). May benefit from Ivabradine (defer to cards). Stop plavix while on Eliquis.   4. Afib, ICD/PPM in place: PTA Metoprolol / Eliquis. Holding BB due to low BP. New Afib with RVR. No dig due to ESRD. Holding metoprolol due to very low BP. Will need to use amiodarone +/- ECV if RVR becomes a problem.   5. ESRD: Dialyzing for 18 yrs, L UE AVF with some bleeding issues. Renal consulted. Baseline Dry wt 77.6, currently at 70.2. Defer to renal about 1. HD, 2. HTN, 3. Access issues, 4. Anemia of renal failure, 5. Secondary Hyperparathyroidism / MBD.   6. Chronic Thrombocytopenia:   7. Chronic conditions:   a. Hypothyroidism: Cont PTA synthroid.   b. HLD: Resume PTA Pravastatin 40mg.   8. Code status: Full Code.   a. 2/17: Had a very long discussion with pt about his goals of care / QOL> Pt is very clear that if he cannot live independently / be able to do the activities he does outside the house or if he was needing LTC / NH placement, he would consider that suffering and not want to prolong  his care. D/w pt about poor prognosis even if he survived CPR. He is now leaning towards DNR/DNI. Will give him the day to think about it/ reconfirm his wishes again tomorrow and then make a change. Will be important to get this clarified in case he progresses to needing intubation for his Covid.   b. 2/18: Pt too tired to discuss code status / finalize decision.   c. 2/19: Pt acknowledges that he has been doing terrible, but would like to d/w Dr. Alvarado (primary nephrologist or his team about his overall prognosis). He trusts him the most. Asked about cardiology, but he has no close relationship with any 1 cardiologist. Also wanted me to call and d/w his friend Maria Dolores Pabon.   d. 2/20: d/w RAFY Whitten in detail. She agrees with our discussion from 2/17 that Art should be DNR/DNI. She will continue discussions about QOL with Art. She has also seen a decline in his functional status over the years / months and feels like he is close to the end of his life. Bill in agreement to let Maria Dolores make decisions.   9. FEN:   a. Renal diet  b. HD with renal to manage fluid / electrolytes.   10. Access:PIV  11. Routine cares: GI: diet, DVT: full dose Eliquis (renal dosing)  12. PTA meds: reconcile.d   13. Family communication: none so far.       Disposition Plan   Clinically stable to transfer out of [facility].  Hospital course based on resp failure.      The patient's care was discussed with the Bedside Nurse.    Total Time: Spent 45 min taking care of patient with more than 50% of time spent coordinating care.     Finn Gonzalez MD  Hospitalist Service  [facility]  Securely message with the Vocera Web Console (learn more here)  Text page via [employer] Paging/Directory

## 2022-02-21 NOTE — PROGRESS NOTES
[facility]    Hospitalist Progress Note      Assessment & Plan   Westley Ness is a 76 year old male who was admitted on 2/17/2022 with hypoxia and worsened dyspnea.    Acute on chronic hypoxic respiratory failure  COVID-19 infection  Worsening Covid infection (biggest component) +/- recent L rib fractures with L atelectasis +/- fluid overload. Dx on 2/11, fully vaccinated. CRP worsening, up to 78.3 (2/17), LFTs stable, D-dimer irrelevant with full AC. CXR 2/19 with left basilar/retrocardiac pulmonary opacities, could be small pleural effusion and associated basilar atelectasis/consolidation. Right basilar pulmonary opacities.  - only requiring supplemental O2 ~3LPM  - decadron (last dose on 2/23), no remdesivir due to ESRD  - 3/4/22 is COVID recovered  - mucinex BID for wet cough    Steroid induced hyperglycemia  -medium resistance sliding scale insulin while on decadron    Chronic HFrEF  ischemic / arrhythmia. PTA on metoprolol/ lisinopril / imdur. Baseline BP low in the 100s. Last echo 03/21, EF 20-25%. ICD in place.   - hold metoprolol and mexitil  - plavix discontinued  - follow up cardiology [facility] in 1 month    Afib, ICD/PPM in place  PTA Metoprolol / Eliquis. Holding BB due to low BP. New Afib with RVR. No dig due to ESRD. Holding metoprolol due to very low BP. Will need to use amiodarone +/- ECV if RVR becomes a problem.     ESRD  Hypertension  Anemia of renal failure  Secondary hyperparathyroidism  Dialyzing for 18 yrs, L UE AVF with some bleeding issues. Renal consulted. Baseline Dry wt 77.6, currently at 70.2.   - nephrology following    Chronic Thrombocytopenia:   Hypothyroidism: Cont PTA synthroid.   HLD: Resume PTA Pravastatin 40mg.     Goals of care  a. 2/17: Had a very long discussion with pt about his goals of care / QOL> Pt is very clear that if he cannot live independently / be able to do the activities he does outside the house or if he was needing LTC / NH placement,  he would consider that suffering and not want to prolong his care. D/w pt about poor prognosis even if he survived CPR. He is now leaning towards DNR/DNI. Will give him the day to think about it/ reconfirm his wishes again tomorrow and then make a change. Will be important to get this clarified in case he progresses to needing intubation for his Covid.   b. 2/18: Pt too tired to discuss code status / finalize decision.   c. 2/19: Pt acknowledges that he has been doing terrible, but would like to d/w Dr. Alvarado (primary nephrologist or his team about his overall prognosis). He trusts him the most. Asked about cardiology, but he has no close relationship with any 1 cardiologist. Also wanted me to call and d/w his friend Maria Dolores Pabon.   d. 2/20: d/w RAFY Whitten in detail. She agrees with our discussion from 2/17 that Bill should be DNR/DNI. She will continue discussions about QOL with Art. She has also seen a decline in his functional status over the years / months and feels like he is close to the end of his life. Bill in agreement to let Maria Dolores make decisions.       Clinically Significant Risk Factors Present on Admission                      DVT Prophylaxis: DOAC  Code Status: No CPR- Do NOT Intubate  Expected Discharge: 02/22/2022     Anticipated discharge location: TCU  Delays:    placement. Needs dialysis unit and covid precautions.      Oanh Mckinley, DO  Hospitalist Service  [facility]  Securely message with the Vocera Web Console (learn more here)  Text Page (7am - 6pm) via [employer] Paging/Directory      Interval History   Patient seen and examined. He is doing okay today. His blood pressures are still lower even without HR medications. Discussed these medications with patient and explained why they are being held. His breathing feels okay, but he is still requiring supplemental o2. He isn't getting out of bed much.  Spent 35 minutes with >50% time spent reviewing chart since  hospitalization, evaluating patient, reviewing PTA medications and reasons for not being resumed here in hospital, discussing care plan with RN    -Data reviewed today: I reviewed all new labs and imaging results over the last 24 hours. I personally reviewed no images or EKG's today.    Physical Exam   Temp: 98.3  F (36.8  C) Temp src: Oral BP: 94/61 Pulse: 90   Resp: 16 SpO2: 97 % O2 Device: Nasal cannula Oxygen Delivery: 3 LPM  Vitals:    02/19/22 0615 02/19/22 1337 02/20/22 0632   Weight: 72.3 kg (159 lb 6.3 oz) 72.3 kg (159 lb 6.3 oz) 71.3 kg (157 lb 3 oz)     Vital Signs with Ranges  Temp:  [96.8  F (36  C)-98.6  F (37  C)] 98.3  F (36.8  C)  Pulse:  [] 90  Resp:  [13-22] 16  BP: ()/(55-69) 94/61  SpO2:  [87 %-99 %] 97 %  I/O last 3 completed shifts:  In: 460 [P.O.:460]  Out: 25 [Urine:25]    Constitutional: Awake, alert, cooperative, no apparent distress  Respiratory: generally clear, some crackles, wet cough  Cardiovascular: Regular rate and rhythm, normal S1 and S2, and no murmur noted  GI: Normal bowel sounds, soft, non-distended, non-tender  Skin/Integumen: No rashes, no cyanosis, no edema  Other:     Medications     - MEDICATION INSTRUCTIONS -         - MEDICATION INSTRUCTIONS for Dialysis Patients -   Does not apply See Admin Instructions     apixaban ANTICOAGULANT  2.5 mg Oral BID     dexamethasone  6 mg Oral Daily     famotidine  10 mg Oral Daily     gabapentin  100 mg Oral TID     guaiFENesin  600 mg Oral BID     insulin aspart  1-7 Units Subcutaneous TID AC     insulin aspart  1-5 Units Subcutaneous At Bedtime     levothyroxine  50 mcg Oral Daily     lidocaine  2 patch Transdermal Q24H     lidocaine   Transdermal Q8H     [Held by provider] metoprolol succinate ER  12.5 mg Oral Daily     multivitamin RENAL  1 capsule Oral Daily     pravastatin  40 mg Oral Daily     sodium chloride (PF)  3 mL Intracatheter Q8H       Data   Recent Labs   Lab 02/21/22  1324 02/21/22  1009 02/21/22  0747  02/21/22  0202 02/20/22  0859 02/20/22  0555 02/19/22  1205 02/19/22  1148 02/19/22  0555 02/18/22  1211 02/18/22  0556 02/17/22  0935 02/17/22  0125   WBC  --  10.9  --   --   --  9.2  --   --  9.6  --  9.1  --  9.1   HGB  --  14.3  --   --   --  13.3  --   --  13.4  --  12.7*  --  12.7*   MCV  --  108*  --   --   --  109*  --   --  109*  --  112*  --  111*   PLT  --  148*  --   --   --  123*  --   --  100*  --  117*  --  120*   NA  --   --   --   --   --   --   --  136  --   --  134  --  136   POTASSIUM  --   --   --   --   --   --   --  4.8  --   --  4.5  --  4.9   CHLORIDE  --   --   --   --   --   --   --  103  --   --  102  --  100   CO2  --   --   --   --   --   --   --  27  --   --  25  --  28   BUN  --   --   --   --   --   --   --  50*  --   --  30  --  44*   CR  --   --   --   --   --   --   --  4.95*  --   --  3.45*  --  5.23*   ANIONGAP  --   --   --   --   --   --   --  6  --   --  7  --  8   CHRISTINE  --   --   --   --   --   --   --  8.2*  --   --  8.5  --  8.5   *  --  184* 225*   < >  --    < > 207*  --    < > 86   < > 192*   ALBUMIN  --   --   --   --   --   --   --   --   --   --  3.0*  --  3.2*   PROTTOTAL  --   --   --   --   --   --   --   --   --   --  6.0*  --  6.0*   BILITOTAL  --   --   --   --   --   --   --   --   --   --  1.9*  --  1.8*   ALKPHOS  --   --   --   --   --   --   --   --   --   --  157*  --  162*   ALT  --   --   --   --   --   --   --   --   --   --  27  --  24   AST  --   --   --   --   --   --   --   --   --   --  17  --  18    < > = values in this interval not displayed.       No results found for this or any previous visit (from the past 24 hour(s)).

## 2022-02-21 NOTE — PLAN OF CARE
Goal Outcome Evaluation:    Plan of Care Reviewed With: patient   BP soft and asymptomatic, tele a-fib 90-110s, HR did go to 140s when up to commode but decreased after back in bed and MD aware, DACOSTA and cont on NC at 3LPM, pt denies pain.  Repositioned frequently in bed, sacral dressing on coccyx for blanchable redness.  Insulin started for elevated blood glucose.  Ok to transfer to medical floor per MD.  Cont to monitor.

## 2022-02-21 NOTE — PLAN OF CARE
Goal Outcome Evaluation: ongoing    Plan of Care Reviewed With: patient     [facility] Nursing Note    Patient Information  Name: Westley Ness  Age: 76 year old    Assessment  Orientation/Neuro: Alert and Oriented x4  Cardiac/Tele: Afib  RVR low 100s-110s  Resp: WDL except DACOSTA   GI/: WDL - except no bowel movement, declines PRNs, bowel sounds active. No nausea, vomiting, abdominal pain,  - ESRD pt on HD - does not make urine  Mobility: A1, repositioning in bed every 2 hours, declined getting OOB this shift  Pain: Denies   Diet: Orders Placed This Encounter      Combination Diet Regular Diet; Renal Diet (dialysis)    Vital Signs  B/P: 106/57, T: 98.3, P: 104, R: 15, O2: 94 on 3L NC, weaned down to 2L, but needed to turn back up while sleeping    Plan  Plan/Other: Pt lives alone, potential need for TCU at discharge, but covid precautions needed until 3/4    Yessenia Verde RN

## 2022-02-21 NOTE — PLAN OF CARE
Goal Outcome Evaluation: A&Ox4. BP 94/55 (BP Location: Right arm)   Pulse 91   Temp 97.7  F (36.5  C) (Axillary)   Resp 18   Wt 71.3 kg (157 lb 3 oz)   SpO2 99%   BMI 23.90 kg/m   Tele afib 100-110's. Denies pain. On 3 L Oxymask. Plan for palliative consult.     Plan of Care Reviewed With: patient

## 2022-02-21 NOTE — PLAN OF CARE
Dr. Weber into see pt, pain controlled with oral pain meds and fentanyl, tolerating PO   Goal Outcome Evaluation:    Plan of Care Reviewed With: patient      No changes, continues on 3 Lnc, denies pain, off IMC status. Cont special precautions til 3/4/22, (20 days).   Afib RVR slightly over 100, cards ok with fast resting hr, see note from Dr. Meier.   On steroids but Remdesivir due to CKD.   Bp stable, no new concerns. Cards s/off,patient to follow outpt.

## 2022-02-22 NOTE — PLAN OF CARE
Care plan note:      Recent Vitals:  Temp: 98.3  F (36.8  C) Temp src: Oral BP: 105/73 Pulse: 98   Resp: 16 SpO2: 96 % O2 Device: Nasal cannula      Orientation/Neuro: Alert and Oriented x 4, forgetful  Pain: The patient is not having any pain. Transdermal lidocaine patch in use   Tele: Atrial fibrillation - controlled, HR    IV medications: None   Mobility: Assist of 2, Gait belt, and Walker   Skin: Bruises: scattered and scab on left hand; L AV fistula   GI: WDL and no complaints  : Patient on hemodialysis, low urine output      Diet: Tolerating diet:  Needs encouragement to eat  Orders Placed This Encounter      Combination Diet Regular Diet; Renal Diet (dialysis)      Safety/Concerns:  Fall Risk, Kermit Risk, and Isolation precautions (special precautions)  Aggression Color: Green    Plan: Patient very tired tonight. Soft Bps and other VSS on 1.5L NC. Patient in afib CVR/RVR, rate 90's-110. Patient has been refusing turns, despite education. We are continuing to hold the beta blocker due to soft Bps. Patient woke up confused during the night and needed to be reoriented to time. Potassium came back this morning as 5.7. Plan for today is PT, possible palliative care consult, and dialysis.    Continue to monitor.      Gloria Lua RN

## 2022-02-22 NOTE — CARE PLAN
[id number]: Alert and oriented x4. VSS. Denies pain. Up with two. Dialysis today. Blood glucose 114. Tele A fib CVR. Palliative consult today, note still pending. Plan to continue to monitor for now.

## 2022-02-22 NOTE — PROGRESS NOTES
[facility]    Hospitalist Progress Note      Assessment & Plan   Westley Ness is a 76 year old male who was admitted on 2/17/2022 with hypoxia and worsened dyspnea.    Acute on chronic hypoxic respiratory failure  COVID-19 infection  Worsening Covid infection (biggest component) +/- recent L rib fractures with L atelectasis +/- fluid overload. Dx on 2/11, fully vaccinated. CRP worsening, up to 78.3 (2/17), LFTs stable, D-dimer irrelevant with full AC. CXR 2/19 with left basilar/retrocardiac pulmonary opacities, could be small pleural effusion and associated basilar atelectasis/consolidation. Right basilar pulmonary opacities.  - only requiring supplemental O2 ~1.5LPM  - decadron (last dose on 2/23), no remdesivir due to ESRD  - 3/4/22 is COVID recovered  - mucinex BID for wet cough has helped some    Steroid induced hyperglycemia  -medium resistance sliding scale insulin while on decadron    Chronic HFrEF  ischemic / arrhythmia. PTA on metoprolol/ lisinopril / imdur. Baseline BP low in the 100s. Last echo 03/21, EF 20-25%. ICD in place.   - hold metoprolol and mexitil, unable to tolerate due to low BPs  - plavix discontinued  - follow up cardiology [facility] in 1 month    Afib, ICD/PPM in place  Nonsustained V-tach  PTA Metoprolol / Eliquis. Holding BB due to low BP. New Afib with RVR. No dig due to ESRD. Holding metoprolol due to very low BP. Will need to use amiodarone +/- ECV if RVR becomes a problem.   2/22 14 beat v-tach  - check mag/k stat    ESRD  Hypertension  Anemia of renal failure  Secondary hyperparathyroidism  Dialyzing for 18 yrs, L UE AVF with some bleeding issues. Renal consulted. Baseline Dry wt 77.6, currently at 70.2.   - nephrology following    Chronic Thrombocytopenia:   Hypothyroidism: Cont PTA synthroid.   HLD: Resume PTA Pravastatin 40mg.     Goals of care  a. 2/17: Had a very long discussion with pt about his goals of care / QOL> Pt is very clear that if he cannot  live independently / be able to do the activities he does outside the house or if he was needing LTC / NH placement, he would consider that suffering and not want to prolong his care. D/w pt about poor prognosis even if he survived CPR. He is now leaning towards DNR/DNI. Will give him the day to think about it/ reconfirm his wishes again tomorrow and then make a change. Will be important to get this clarified in case he progresses to needing intubation for his Covid.   b. 2/18: Pt too tired to discuss code status / finalize decision.   c. 2/19: Pt acknowledges that he has been doing terrible, but would like to d/w Dr. Alvarado (primary nephrologist or his team about his overall prognosis). He trusts him the most. Asked about cardiology, but he has no close relationship with any 1 cardiologist. Also wanted me to call and d/w his friend Maria Dolores Pabon.   d. 2/20: d/w RAFY Whitten in detail. She agrees with our discussion from 2/17 that Bill should be DNR/DNI. She will continue discussions about QOL with Art. She has also seen a decline in his functional status over the years / months and feels like he is close to the end of his life. Bill in agreement to let Maria Dolores make decisions.   e. 2/22: palliative care evaluated.      Clinically Significant Risk Factors Present on Admission                      DVT Prophylaxis: DOAC  Code Status: No CPR- Do NOT Intubate  Expected Discharge: 02/23/2022     Anticipated discharge location: TCU  Delays:    placement. Needs dialysis unit and covid precautions.      Oanh Mckinley DO  Hospitalist Service  [facility]  Securely message with the Vocera Web Console (learn more here)  Text Page (7am - 6pm) via [employer] Paging/Directory      Interval History   Patient seen and examined. He is doing okay. I overlapped for portion of his discussion with palliative care today and it seems he is not quite ready to stop dialysis, but understands that that is an option.  Discussed that there is low likelihood that he could resume his blood pressure medications. He might get to a point that he cannot tolerate dialysis due to his blood pressures. He is tolerating diet. Still feels like he has some fluid on his lungs, cough slightly less wet.    -Data reviewed today: I reviewed all new labs and imaging results over the last 24 hours. I personally reviewed no images or EKG's today.    Physical Exam   Temp: 96.9  F (36.1  C) Temp src: Axillary BP: 106/65 Pulse: 101   Resp: 19 SpO2: 92 % O2 Device: Nasal cannula Oxygen Delivery: 1.5 LPM  Vitals:    02/19/22 1337 02/20/22 0632 02/22/22 0200   Weight: 72.3 kg (159 lb 6.3 oz) 71.3 kg (157 lb 3 oz) 72.3 kg (159 lb 4.8 oz)     Vital Signs with Ranges  Temp:  [96.6  F (35.9  C)-98.3  F (36.8  C)] 96.9  F (36.1  C)  Pulse:  [] 101  Resp:  [8-22] 19  BP: ()/(54-82) 106/65  SpO2:  [92 %-98 %] 92 %  I/O last 3 completed shifts:  In: 448 [P.O.:445; I.V.:3]  Out: 0     Constitutional: Awake, alert, cooperative, no apparent distress  Respiratory: generally clear, some crackles, no cough today  Cardiovascular: Regular rate and rhythm, normal S1 and S2, and no murmur noted  GI: Normal bowel sounds, soft, non-distended, non-tender  Skin/Integumen: No rashes, no cyanosis, no edema  Other:     Medications     - MEDICATION INSTRUCTIONS -         - MEDICATION INSTRUCTIONS for Dialysis Patients -   Does not apply See Admin Instructions     apixaban ANTICOAGULANT  2.5 mg Oral BID     dexamethasone  6 mg Oral Daily     famotidine  10 mg Oral Daily     gabapentin  100 mg Oral TID     guaiFENesin  600 mg Oral BID     insulin aspart  1-7 Units Subcutaneous TID AC     insulin aspart  1-5 Units Subcutaneous At Bedtime     levothyroxine  50 mcg Oral Daily     lidocaine  2 patch Transdermal Q24H     lidocaine   Transdermal Q8H     [Held by provider] metoprolol succinate ER  12.5 mg Oral Daily     multivitamin RENAL  1 capsule Oral Daily     pantoprazole   40 mg Oral QAM AC     pravastatin  40 mg Oral Daily     sodium chloride (PF)  3 mL Intracatheter Q8H       Data   Recent Labs   Lab 02/22/22  1322 02/22/22  1219 02/22/22  0904 02/22/22  0537 02/21/22  1324 02/21/22  1009 02/20/22  0859 02/20/22  0555 02/19/22  1205 02/19/22  1148 02/19/22  0555 02/18/22  1211 02/18/22  0556 02/17/22  0935 02/17/22  0125   WBC  --   --   --   --   --  10.9  --  9.2  --   --  9.6  --  9.1  --  9.1   HGB  --   --   --   --   --  14.3  --  13.3  --   --  13.4  --  12.7*  --  12.7*   MCV  --   --   --   --   --  108*  --  109*  --   --  109*  --  112*  --  111*   PLT  --   --   --   --   --  148*  --  123*  --   --  100*  --  117*  --  120*   NA  --   --   --  135  --   --   --   --   --  136  --   --  134  --  136   POTASSIUM  --   --   --  5.7*  --   --   --   --   --  4.8  --   --  4.5  --  4.9   CHLORIDE  --   --   --  105  --   --   --   --   --  103  --   --  102  --  100   CO2  --   --   --  25  --   --   --   --   --  27  --   --  25  --  28   BUN  --   --   --  78*  --   --   --   --   --  50*  --   --  30  --  44*   CR  --   --   --  6.33*  --   --   --   --   --  4.95*  --   --  3.45*  --  5.23*   ANIONGAP  --   --   --  5  --   --   --   --   --  6  --   --  7  --  8   CHRISTINE  --   --   --  8.7  --   --   --   --   --  8.2*  --   --  8.5  --  8.5   * 114* 157* 197*   < >  --    < >  --    < > 207*  --    < > 86   < > 192*   ALBUMIN  --   --   --  2.7*  --   --   --   --   --   --   --   --  3.0*  --  3.2*   PROTTOTAL  --   --   --   --   --   --   --   --   --   --   --   --  6.0*  --  6.0*   BILITOTAL  --   --   --   --   --   --   --   --   --   --   --   --  1.9*  --  1.8*   ALKPHOS  --   --   --   --   --   --   --   --   --   --   --   --  157*  --  162*   ALT  --   --   --   --   --   --   --   --   --   --   --   --  27  --  24   AST  --   --   --   --   --   --   --   --   --   --   --   --  17  --  18    < > = values in this interval not displayed.       No  results found for this or any previous visit (from the past 24 hour(s)).

## 2022-02-22 NOTE — PROGRESS NOTES
"Potassium   Date Value Ref Range Status   02/22/2022 5.7 (H) 3.4 - 5.3 mmol/L Final   07/05/2021 5.3 3.4 - 5.3 mmol/L Final     Hemoglobin   Date Value Ref Range Status   02/21/2022 14.3 13.3 - 17.7 g/dL Final   07/05/2021 12.0 (L) 13.3 - 17.7 g/dL Final     Creatinine   Date Value Ref Range Status   02/22/2022 6.33 (H) 0.66 - 1.25 mg/dL Final   07/05/2021 5.03 (H) 0.66 - 1.25 mg/dL Final     Urea Nitrogen   Date Value Ref Range Status   02/22/2022 78 (H) 7 - 30 mg/dL Final   07/05/2021 39 (H) 7 - 30 mg/dL Final     Sodium   Date Value Ref Range Status   02/22/2022 135 133 - 144 mmol/L Final   07/05/2021 140 133 - 144 mmol/L Final     INR   Date Value Ref Range Status   02/11/2022 1.23 (H) 0.85 - 1.15 Final   10/15/2020 1.31 (H) 0.86 - 1.14 Final       DIALYSIS PROCEDURE NOTE  Hepatitis status of previous patient on machine log was checked and verified ok to use with this patients hepatitis status.  Patient dialyzed for 3.5 hrs. on a K2 bath with a net fluid removal of  0 L.  A BFR of 450 ml/min was obtained via a LAVF using 15 gauge needles.      The treatment plan was discussed with Dr. Tabor and Dr. Mccoy during the treatment.    Total heparin received during the treatment: 0 units.   Needle cannulation sites held x 7 min.    Meds given: Hectorol 12 mcg   Complications: Arrived at bedside to Pt more lethargic than when previously worked with. Frequently asking Pt to repeat self as speech not as clear. Pt asked what \"yellow camel\" was- referring to gown. Informed bedside RN of changes. Pt BP on a routine check was hypotensive. UF off and MD notified. Midodrine ordered, bedside nurse informed and order complete. No further complications.    Person educated: Patient. Knowledge base substantial. Barriers to learning: lethargy/confusion. Educated on procedure via verbal mode. Patient verbalized understanding. Pt prefers verbal education style.     ICEBOAT? Timeout performed pre-treatment  I: Patient was identified " using 2 identifiers  C:  Consent Signed Yes  E: Equipment preventative maintenance is current and dialysis delivery system OK to use  B: Hepatitis B Surface Antigen: Negative; Draw Date: 2/14/22      Hepatitis B Surface Antibody: Susceptible; Draw Date: 2/14/22  O: Dialysis orders present and complete prior to treatment  A: Vascular access verified and assessed prior to treatment  T: Treatment was performed at a clinically appropriate time  ?: Patient was allowed to ask questions and address concerns prior to treatment  See flowsheet in EPIC for further details and post assessment.  Machine water alarm in place and functioning. Transducer pods intact and checked every 15min.   Pt dialyzed at bedside in 263.  Chlorine/Chloramine water system checked every 4 hours.  Outpatient Dialysis at [facility]    PLEASE CHECK DRESSING FOR BLEEDING    Please remove patient dressing on AVF and AVG needle sites 24 hours after dialysis. If leaking occurs please apply a Band-Aid.

## 2022-02-22 NOTE — PROGRESS NOTES
[facility]    Nephrology Progress Note     Assessment & Plan     75-year-old male with history of CAD, ICM with EF of 20-25%, ESRD, DM2, GERD, atrial flutter/fibrillation on chronic anticoagulation with apixaban, status post ICD placement, and hypertension re admitted with SOB     Following for ESRD management      1.  ESRD Some difficulty maintaining blood pressure on dialysis. Does get midodrine.              -MWF [place] [facility]               -AVF              -transitioning to [place] COVID unit (TT)  2.  Anemia  3.  Metabolic Bone Disease              -Hectorol  4.  COVID 19  5.  Acute on chronic systolic heart failure exacerbation  6.  Hypotension     -Midodrine and albumin prime.      7. Goals of care. Primary team notes reviewed. Is No CPR. Discussions regarding palliative care.     -Very reasonable to include cessation of dialysis therapy in the context of  his goals of care.       Plan discussed with dialysis RN Annel.            Brice Mccoy MD  [facility] - Nephrology  M:[phone]  P:[phone]    __________________________________________________________________    Interval History     Very confused. Depressed sensorium. Drifting off when replying to questions. Unable to do ROS due to mental status.       Dialysis Orders:    Duration:3hours  Access:LUAG  Blood flow: 350-450  Target weight: Assess inpt  Jean:15  Heparin:500/500  EPO:-  BMD: hectoral 12 mcg  Other: Alb prime    Temp: (!) 96.7  F (35.9  C) Temp src: Axillary BP: 109/68 Pulse: 96   Resp: 9 SpO2: 96 % O2 Device: Nasal cannula Oxygen Delivery: 1.5 LPM    Vitals:    02/19/22 1337 02/20/22 0632 02/22/22 0200   Weight: 72.3 kg (159 lb 6.3 oz) 71.3 kg (157 lb 3 oz) 72.3 kg (159 lb 4.8 oz)       Vital Signs with Ranges  Temp:  [96.6  F (35.9  C)-98.6  F (37  C)] 96.7  F (35.9  C)  Pulse:  [] 96  Resp:  [9-22] 9  BP: ()/(54-77) 109/68  SpO2:  [95 %-99 %] 96 %    I/O last 3 completed  shifts:  In: 668 [P.O.:665; I.V.:3]  Out: -     Physical Exam      GENERAL: Very drowsy. Drifting. Not answering questionclearly.   HEENT:  Normocephalic. No gross abnormalities.    The mouth is  dry.    Neck veins are  Not visible.  CV: RRR   RESP: Decreased breath sounds bilaterally with no audible crackles .   GI: Abdomen soft/nt/nd,  MUSCULOSKELETAL:  No edema  SKIN: no suspicious lesions or rashes, dry to touch.   NEURO:  Depressed sensorium. Not answering question appropriately.   PSYCH: Depressed MS  Medications       - MEDICATION INSTRUCTIONS -           - MEDICATION INSTRUCTIONS for Dialysis Patients -   Does not apply See Admin Instructions     apixaban ANTICOAGULANT  2.5 mg Oral BID     dexamethasone  6 mg Oral Daily     famotidine  10 mg Oral Daily     gabapentin  100 mg Oral TID     guaiFENesin  600 mg Oral BID     insulin aspart  1-7 Units Subcutaneous TID AC     insulin aspart  1-5 Units Subcutaneous At Bedtime     levothyroxine  50 mcg Oral Daily     lidocaine  2 patch Transdermal Q24H     lidocaine   Transdermal Q8H     [Held by provider] metoprolol succinate ER  12.5 mg Oral Daily     multivitamin RENAL  1 capsule Oral Daily     pantoprazole  40 mg Oral QAM AC     pravastatin  40 mg Oral Daily     sodium chloride (PF)  3 mL Intracatheter Q8H       Data     UA RESULTS:  Recent Labs   Lab Test 08/29/18  1447   COLOR Yellow   APPEARANCE Cloudy   URINEGLC Negative   URINEBILI Small*   URINEKETONE Negative   SG 1.020   UBLD Large*   URINEPH 8.5*   PROTEIN >=300*   UROBILINOGEN 1.0   NITRITE Negative   LEUKEST Moderate*   RBCU 10-25*   WBCU >100*      BMP  Recent Labs   Lab 02/22/22  0904 02/22/22  0537 02/22/22  0208 02/21/22  2158 02/19/22  1205 02/19/22  1148 02/18/22  1211 02/18/22  0556 02/17/22  0935 02/17/22  0125   NA  --  135  --   --   --  136  --  134  --  136   POTASSIUM  --  5.7*  --   --   --  4.8  --  4.5  --  4.9   CHLORIDE  --  105  --   --   --  103  --  102  --  100   CHRISTINE  --  8.7  --    --   --  8.2*  --  8.5  --  8.5   CO2  --  25  --   --   --  27  --  25  --  28   BUN  --  78*  --   --   --  50*  --  30  --  44*   CR  --  6.33*  --   --   --  4.95*  --  3.45*  --  5.23*   * 197* 179* 198*   < > 207*   < > 86   < > 192*    < > = values in this interval not displayed.     [email or web address](phos:4)  CBC)  Recent Labs   Lab 02/21/22  1009 02/20/22  0555 02/19/22  0555 02/18/22  0556   WBC 10.9 9.2 9.6 9.1   HGB 14.3 13.3 13.4 12.7*   HCT 45.1 42.7 43.1 42.6   * 109* 109* 112*   * 123* 100* 117*       Recent Labs   Lab 02/21/22  1009   HGB 14.3   HCT 45.1   *       Recent Labs   Lab 02/18/22  0556   AST 17   ALT 27   ALKPHOS 157*   BILITOTAL 1.9*     No lab results found in last 7 days.  25 OH Vit D2   Date Value Ref Range Status   10/18/2020 <5 ug/L Final     25 OH Vit D3   Date Value Ref Range Status   10/18/2020 8 ug/L Final     25 OH Vit D total   Date Value Ref Range Status   10/18/2020 <13 (L) 20 - 75 ug/L Final     Comment:     Season, race, dietary intake, and treatment affect the concentration of   25-hydroxy-Vitamin D. Values may decrease during winter months and increase   during summer months. Values 20-29 ug/L may indicate Vitamin D insufficiency   and values <20 ug/L may indicate Vitamin D deficiency.  This test was developed and its performance characteristics determined by the   [facility].   It has not been cleared or approved by the FDA. The laboratory is regulated   under CLIA as qualified to perform high-complexity testing. This test is used   for clinical purposes. It should not be regarded as investigational or for   research.       No results for input(s): PTHI in the last 168 hours.    Attestation:   I have reviewed today's relevant vital signs, notes, medications, labs and imaging.

## 2022-02-22 NOTE — PROGRESS NOTES
Tele: Afib, HRs 90s-low 100s. BP softer but stable (high 90s systolic). Currently on 1.5L NC. Not OOB shift. No new complaints or acute changes. Plan for dialysis tomorrow.

## 2022-02-22 NOTE — PROGRESS NOTES
Cross Cover Note    Called due to acid reflux. PTA on pepcid and prilosec. Requested omeprazole.    TK MachadoC

## 2022-02-22 NOTE — CONSULTS
"[facility]  Palliative Care Consultation Note    Patient: Westley Ness  Date of Admission:  2/17/2022    Requesting Clinician / Team: Dr Gonzalez/Hospitalist  Reason for consult: Goals of care    Recommendations:    Goals of Care (see in depth discussion below):    Discussed goals of care amidst very fragile condition, dialysis, weakness. Approached subject of hospice. He understands he \"may be at the end\" but would like to continue current cares and dialysis to see if he can regain his strength and improve over the next few days.    Art gave me permission to speak with HCA Maria Dolores who understands the situation and severity of Art's condition. She welcomes the support from palliative care if she should need to assist with decision making for Art.    Palliative care will continue to follow and offer ongoing goals of care discussions and support.     Code status: No CPR / No Intubation    Advanced Care Planning:    Patient has a completed Health Care Directive: Yes, and on file.         HCA: Maria Dolores Pabon, Relationship:friend, Phone(s): [phone].    First Alternate: Marsha Bush, friend:  Phone(s): [phone].    Patient case was reviewed with Dr Mckinley and RN.    Lou Correia, [facility]  Contact information available via [facility] Paging/Directory        Thank you for the opportunity to participate in the care of this patient and family. Our team: will continue to follow.     During regular M-F work hours ([phone]) -- if you are not sure who specifically to contact -- please contact us on [facility] Smart Web.     After regular work hours and on weekends/holidays, you can call our answering service at [phone].     Attestation:  Total time on the floor involved in the patient's care: 80 minutes  Total time spent in counseling/care coordination: >50% discussing patient condition, assessment of symptoms, reviewing current status with MD and RN. "     Assessments:  Westley Ness is a 76 year old male with PMH significant for ischemic cardiomyopathy- EF 25%, s/p ICD, ESRD on HD MWF, DM2, GERD, atrial flutter/fibrillation on chronic anticoagulation with apixaban, and HTN. Recent admission to hospital on 22 with COVID-19 and discharged 2/15. He returned to hospital on  due to worsening SOB and O2 sats in the 70's (has been awaiting home O2 arrival but delivery had been delayed). He was found to have hypotension and CHF exacerbation and was admitted for continued cares. Palliative care was consulted for ongoing goals of care discussion due to very fragile medical condition.    Today, the patient was seen for:   Goals of care  Decisional support    I introduced palliative care services and our multidisciplinary team. Explained that our service offers an extra layer of support for individuals who are experiencing serious, life threatening illnesses, which can include assistance with symptom management, emotional and spiritual support as well as complex medical decision making. Reviewed past medical history and patient understanding of his current medical situation. Art explains that he has had dialysis for 18 years and feels that it has become more difficult to tolerate over the past month due to Covid 19, hypotension, fluid balance issues. He feels fatigued and exhausted. He has had discussions regarding code status with Dr Gonzalez  and he had decided to change to NO CPR and NO INTUBATION status.     He states that he has his affairs in order with a [facility], HCAs, and  plans made and paid for and tombstone already engraved. Upon discussion regarding his current condition and quality of life he states he has purchased LTC insurance in the past and would be willing to live in a facility if necessary. He hopes he can someday return home, though he knows he may be too weak and unstable to do so again. The question of stopping dialysis was addressed and  "at what point would he consider this. The subject of hospice was reviewed. Art is not ready to stop dialysis at this time-  He still hopes that he can regain his strength over the next few days, but he understands that his health is fragile and \"I may be at the end.\" We discussed his friend Maria Dolores who is his HCA; she has been upset over Art being so ill; he gave me permission to check in with her to introduce our palliative service and offer support.    I called Maria Dolores on the phone to introduce palliative care. she has been speaking with Art on the phone and cannot come in person as she lives in [facility].  She has been friends with Art for 40 years and states that he is a brilliant man and has traveled the world for many years.  He has recently had some decline in cognitive function which he finds difficult as his intellect is very important to him.  She understands that ginny medical condition is very fragile and he may not survive.  I explained how palliative care can offer extra support to Art and herself, as she is his HCA.  She understands that if Art becomes incapacitated and cannot make decisions that she will need to step in to assist.  She would welcome the support from palliative care if this should happen. Palliative care will continue to follow and offer ongoing goals of care discussions and support.     Prognosis, Goals, & Planning:        Prognosis, Goals, and/or Advance Care Planning were addressed today: Yes      Functional Status just prior to hospitalization: 2 (Ambulatory and capable of all selfcare but unable to carry out any work activities; may need help with IADLs up and about > 50% of waking hours)          Patient has decision-making capacity today for complex decisions: Yes      Patient's decision making preferences: with input from medical clinicians and loved ones          I have concerns about the patient/family's health literacy today: No           Coping, Meaning, & " Spirituality:   Mood, coping, and/or meaning in the context of serious illness were addressed today: Yes  Summary/Comments: He is [employer], denies any spiritual needs at this time.    Social:   Social history: never , no children. Worked for [employer] in [place] and traveled the [place]. Was very involved with theatre internationally. He has been friends with Maria Dolores his HCA for 40 years and they have traveled together and sat on committees together.     History of Present Illness:   History gathered today from: patient, family/loved ones, medical chart, medical team members, unit team members  Adopted from H&P:  Westley Ness is a 76 year old male with PMH significant for ischemic cardiomyopathy- EF 25%, s/p ICD, ESRD on HD MWF, DM2, GERD, atrial flutter/fibrillation on chronic anticoagulation with apixaban, and HTN. Recent admission to hospital on 2/11/22 with COVID-19 and discharged 2/15. He returned to hospital on 2/17 due to worsening SOB and O2 sats in the 70's (has been awaiting home O2 arrival but delivery had been delayed). He was found to have hypotension and CHF exacerbation and was admitted for continued cares. Palliative care was consulted for ongoing goals of care discussion due to very fragile medical condition.    Key Palliative Symptom Data:  # Pain severity the last 12 hours: low  # Dyspnea severity the last 12 hours: low  # Nausea severity the last 12 hours: none  # Anxiety severity the last 12 hours: none    ROS:  Comprehensive ROS is reviewed and is negative except as here & per HPI: N/A     Past Medical History:  Past Medical History:   Diagnosis Date     Acid reflux disease      Benign essential hypertension      CAD (coronary artery disease)     s/p multiple NSTEMIs and PCI's     ESRD on hemodialysis (H)     MWF     History of atrial flutter     s/p ablation     Hypothyroidism      Ischemic cardiomyopathy     EF 25-30%, s/p AICD        Past Surgical History:  Past Surgical  History:   Procedure Laterality Date     CHOLECYSTECTOMY, OPEN  2013     CV CORONARY ANGIOGRAM N/A 10/19/2020    Procedure: Coronary Angiogram;  Surgeon: Theodora Salazar MD;  Location:  HEART CARDIAC CATH LAB     CV LEFT HEART CATH N/A 10/19/2020    Procedure: Left Heart Cath;  Surgeon: Theodora Salazar MD;  Location:  HEART CARDIAC CATH LAB     CV PCI STENT DRUG ELUTING N/A 10/19/2020    Procedure: Percutaneous Coronary Intervention Stent Drug Eluting;  Surgeon: Theodora Salazar MD;  Location:  HEART CARDIAC CATH LAB     ELBOW SURGERY Left 2008    ORIF - plates still in place     EP ICD GENERATOR CHANGE SINGLE N/A 11/21/2019    Procedure: EP ICD Generator Change Single;  Surgeon: Jono Mejia MD;  Location:  HEART CARDIAC CATH LAB     H ABLATION ATRIAL FLUTTER  06/08/2017, 12/11/17     HC LEFT HEART CATHETERIZATION  7/14/2016     HC LEFT HEART CATHETERIZATION  9/21/2016     IMPLANT VENTRICULAR DEVICE  08/15/2011     IR DIALYSIS FISTULOGRAM LEFT  7/22/2019     REPAIR FISTULA ARTERIOVENOUS UPPER EXTREMITY Left 3/5/2019    Procedure: REPAIR LEFT UPPER ARM ARTERIOVENOUS FISTULA SKIN ULCER;  Surgeon: Westley Griggs MD;  Location:  OR     TONSILLECTOMY & ADENOIDECTOMY       VASCULAR SURGERY  2004, 2010    LUE fistulas (upper and lower); upper one is functional         Family History:  Family History   Problem Relation Age of Onset     Cerebrovascular Disease Mother         later in life     Hypertension Father      Bladder Cancer Father      Myocardial Infarction Paternal Grandmother      Diabetes No family hx of      Prostate Cancer No family hx of      Colon Cancer No family hx of          Allergies:  Allergies   Allergen Reactions     Contrast Dye Hives     Does fine if he uses benadryl prior.        Medications:  I have reviewed this patient's medication profile and medications from this hospitalization.     Noted scheduled meds are:    - MEDICATION INSTRUCTIONS for Dialysis Patients -    Does not apply See Admin Instructions     apixaban ANTICOAGULANT  2.5 mg Oral BID     dexamethasone  6 mg Oral Daily     famotidine  10 mg Oral Daily     gabapentin  100 mg Oral TID     guaiFENesin  600 mg Oral BID     insulin aspart  1-7 Units Subcutaneous TID AC     insulin aspart  1-5 Units Subcutaneous At Bedtime     levothyroxine  50 mcg Oral Daily     lidocaine  2 patch Transdermal Q24H     lidocaine   Transdermal Q8H     [Held by provider] metoprolol succinate ER  12.5 mg Oral Daily     midodrine  10 mg Oral Once in dialysis/CRRT     multivitamin RENAL  1 capsule Oral Daily     pantoprazole  40 mg Oral QAM AC     pravastatin  40 mg Oral Daily     sodium chloride (PF)  3 mL Intracatheter Q8H       Noted PRN meds are:  sodium chloride 0.9%, acetaminophen **OR** acetaminophen, glucose **OR** dextrose **OR** glucagon, hydrOXYzine, lidocaine 4%, lidocaine (buffered or not buffered), - MEDICATION INSTRUCTIONS -, metoprolol, naloxone **OR** naloxone **OR** naloxone **OR** naloxone, ondansetron **OR** ondansetron, oxyCODONE, prochlorperazine **OR** prochlorperazine **OR** prochlorperazine, senna-docusate **OR** senna-docusate, sodium chloride (PF)    Physical Exam:  Vital Signs: Temp: 97.4  F (36.3  C) Temp src: Oral BP: (!) 81/62 Pulse: 85   Resp: 10 SpO2: 97 % O2 Device: Nasal cannula Oxygen Delivery: 1.5 LPM  Weight: 159 lbs 4.8 oz    Physical Exam  GENERAL:  Alert, fatigued, no distress on HD.  HEAD: Normocephalic atraumatic  SCLERA: Anicteric  EXTREMITIES: Warm; no edema  ABDOMEN:  Soft, nontender  RESPIRATORY: Breathing non labored  NEUROLOGIC: Alert  PSYCH: Calm, cooperative, interactive.    Data reviewed:  Recent imaging reviewed.  Results for orders placed or performed during the hospital encounter of 02/17/22   XR Chest Port 1 View    Impression    IMPRESSION: Stable cardiomegaly. Normal pulmonary vascularity. There may be mild atelectasis of the left base. Lungs otherwise clear. No visible pneumothorax.  Right subclavian pacemaker with single lead at the right ventricle. Old rib fractures on the   left.   US Ext Arterial Venous Dialys Acs Graft    Impression    IMPRESSION:   1. Left radiocephalic fistula appears largely patent with recurrent  cephalic arch stenosis. This stenosis likely accounts for prolonged  bleeding after dialysis. Recommend diagnostic cystogram with potential  intervention.    MELISA MANLEY MD         SYSTEM ID:  [id number]   XR Chest Port 1 View    Impression    IMPRESSION: Single AP view of the chest was obtained. Left chest wall cardiac pacer lead is partially visualized. Stable enlargement of the cardiac silhouette and mild pulmonary vascular congestion. Left basilar/retrocardiac pulmonary opacities, could   represent small pleural effusion and associated basilar atelectasis/consolidation. No significant right pleural effusion. Right basilar pulmonary opacities, likely atelectasis. No significant pneumothorax.     Echocardiogram Complete   Result Value Ref Range    LVEF  25-30%           Lab Results   Component Value Date    WBC 10.9 02/21/2022    WBC 9.2 02/20/2022    WBC 9.6 02/19/2022    HGB 14.3 02/21/2022    HGB 13.3 02/20/2022    HGB 13.4 02/19/2022    HCT 45.1 02/21/2022    HCT 42.7 02/20/2022    HCT 43.1 02/19/2022     (L) 02/21/2022     (L) 02/20/2022     (L) 02/19/2022     02/22/2022     02/19/2022     02/18/2022    POTASSIUM 5.7 (H) 02/22/2022    POTASSIUM 4.8 02/19/2022    POTASSIUM 4.5 02/18/2022    CHLORIDE 105 02/22/2022    CHLORIDE 103 02/19/2022    CHLORIDE 102 02/18/2022    CO2 25 02/22/2022    CO2 27 02/19/2022    CO2 25 02/18/2022    BUN 78 (H) 02/22/2022    BUN 50 (H) 02/19/2022    BUN 30 02/18/2022    CR 6.33 (H) 02/22/2022    CR 4.95 (H) 02/19/2022    CR 3.45 (H) 02/18/2022     (H) 02/22/2022     (H) 02/22/2022     (H) 02/21/2022    DD 1.75 (H) 02/17/2022    DD 0.71 (H) 02/11/2022    DD 0.9 (H) 04/07/2017     NTBNPI >175,000 (H) 02/17/2022    NTBNPI 150,621 (H) 10/15/2020    NTBNPI >175,000 (H) 12/18/2017    TROPONIN 0.022 07/24/2021    TROPI 0.025 07/05/2021    TROPI 0.025 03/17/2021    TROPI 0.029 03/17/2021    AST 17 02/18/2022    AST 18 02/17/2022    AST 16 02/11/2022    ALT 27 02/18/2022    ALT 24 02/17/2022    ALT 22 02/11/2022    GGT 88 (H) 03/19/2021    ALKPHOS 157 (H) 02/18/2022    ALKPHOS 162 (H) 02/17/2022    ALKPHOS 209 (H) 02/11/2022    BILITOTAL 1.9 (H) 02/18/2022    BILITOTAL 1.8 (H) 02/17/2022    BILITOTAL 1.5 (H) 02/11/2022    INR 1.23 (H) 02/11/2022    INR 1.31 (H) 10/15/2020    INR 0.97 07/22/2019      Lab Results   Component Value Date    ALBUMIN 2.7 02/22/2022    ALBUMIN 2.9 03/19/2021

## 2022-02-23 NOTE — PROGRESS NOTES
[facility]    Hospitalist Progress Note      Assessment & Plan   Westley Ness is a 76 year old male who was admitted on 2/17/2022 with hypoxia and worsened dyspnea.    Acute on chronic hypoxic respiratory failure  COVID-19 infection  Worsening Covid infection (biggest component) +/- recent L rib fractures with L atelectasis +/- fluid overload. Dx on 2/11, fully vaccinated. CRP worsening, up to 78.3 (2/17), LFTs stable, D-dimer irrelevant with full AC. CXR 2/19 with left basilar/retrocardiac pulmonary opacities, could be small pleural effusion and associated basilar atelectasis/consolidation. Right basilar pulmonary opacities.  - only requiring supplemental O2 ~1LPM  - completed course of decadron, no remdesivir due to ESRD  - 3/4/22 is COVID recovered  - mucinex BID for wet cough has helped some    Steroid induced hyperglycemia  -medium resistance sliding scale insulin while on decadron--likely discontinue 2/24 if stable not requiring coverage    Chronic HFrEF  ischemic / arrhythmia. PTA on metoprolol/ lisinopril / imdur. Baseline BP low in the 100s. Last echo 03/21, EF 20-25%. ICD in place.   - hold metoprolol and mexitil, unable to tolerate due to low BPs  - plavix discontinued  - follow up cardiology NORMA in 1 month    Afib, ICD/PPM in place  Nonsustained V-tach  PTA Metoprolol / Eliquis. Holding BB due to low BP. New Afib with RVR. No dig due to ESRD. Holding metoprolol due to very low BP. Will need to use amiodarone +/- ECV if RVR becomes a problem.   2/22 14 beat v-tach. Mag/K was fine.  - continue telemetry monitoring    Hematuria  Noted on 2/23. He makes very little urine at baseline. If ongoing, may need to consider stopping apixaban  - monitor    ESRD  Hypertension  Anemia of renal failure  Secondary hyperparathyroidism  Dialyzing for 18 yrs, L UE AVF with some bleeding issues. Renal consulted. Baseline Dry wt 77.6, currently at 70.2.   - nephrology following  - now getting  midodrine on dialysis days, not     Chronic Thrombocytopenia:   Hypothyroidism: Cont PTA synthroid.   HLD: Resume PTA Pravastatin 40mg.     Goals of care  a. 2/17: Had a very long discussion with pt about his goals of care / QOL> Pt is very clear that if he cannot live independently / be able to do the activities he does outside the house or if he was needing LTC / NH placement, he would consider that suffering and not want to prolong his care. D/w pt about poor prognosis even if he survived CPR. He is now leaning towards DNR/DNI. Will give him the day to think about it/ reconfirm his wishes again tomorrow and then make a change. Will be important to get this clarified in case he progresses to needing intubation for his Covid.   b. 2/18: Pt too tired to discuss code status / finalize decision.   c. 2/19: Pt acknowledges that he has been doing terrible, but would like to d/w Dr. Alvarado (primary nephrologist or his team about his overall prognosis). He trusts him the most. Asked about cardiology, but he has no close relationship with any 1 cardiologist. Also wanted me to call and d/w his friend Maria Dolores Pabon.   d. 2/20: d/w RAFY Whitten in detail. She agrees with our discussion from 2/17 that Bill should be DNR/DNI. She will continue discussions about QOL with Art. She has also seen a decline in his functional status over the years / months and feels like he is close to the end of his life. Bill in agreement to let Maria Dolores make decisions.   e. 2/22: palliative care evaluated.  f. 2/23: discussed further that eventually his blood pressures will not allow him to continue with dialysis, he expresses understanding- he is not ready to stop yet.      Clinically Significant Risk Factors Present on Admission                      DVT Prophylaxis: DOAC  Code Status: No CPR- Do NOT Intubate  Expected Discharge: 02/28/2022     Anticipated discharge location: TCU  Delays:    placement. Needs dialysis unit and covid  precautions.      Oanh Mckinley DO  Hospitalist Service  [facility]  Securely message with the [employer] Web Console (learn more here)  Text Page (7am - 6pm) via [employer] Paging/Directory      Interval History   Patient seen and examined. He is tired appearing today. He frequently is putting his head down on his table throughout our conversation. He is not ready to stop dialysis. He knows his blood pressures have been lower, but is hopeful they will rise just enough for him to have fluid taken off during dialysis. He did have some blood in his urine on 2/23 per RN. Had some chest pain earlier after being moved around, he reports due to angina because of physical activity, it has since resolved.  Discussed care plan with Nephrology, Dr Tabor    -Data reviewed today: I reviewed all new labs and imaging results over the last 24 hours. I personally reviewed no images or EKG's today.    Physical Exam   Temp: 97.4  F (36.3  C) Temp src: Oral BP: (!) 82/47 Pulse: 92   Resp: 18 SpO2: 96 % O2 Device: Nasal cannula Oxygen Delivery: 1 LPM  Vitals:    02/19/22 1337 02/20/22 0632 02/22/22 0200   Weight: 72.3 kg (159 lb 6.3 oz) 71.3 kg (157 lb 3 oz) 72.3 kg (159 lb 4.8 oz)     Vital Signs with Ranges  Temp:  [97.4  F (36.3  C)-98  F (36.7  C)] 97.4  F (36.3  C)  Pulse:  [86-99] 92  Resp:  [13-18] 18  BP: ()/(47-64) 82/47  SpO2:  [87 %-96 %] 96 %  I/O last 3 completed shifts:  In: -   Out: 50 [Urine:50]    Constitutional: Awake, alert, cooperative, no apparent distress, tired appearing  Respiratory: crackles bilaterally, no cough  Cardiovascular: Regular rate and rhythm, normal S1 and S2, and no murmur noted  GI: Normal bowel sounds, soft, non-distended, non-tender  Skin/Integumen: No rashes, no cyanosis, no edema  Other:     Medications     - MEDICATION INSTRUCTIONS -         - MEDICATION INSTRUCTIONS for Dialysis Patients -   Does not apply See Admin Instructions     apixaban ANTICOAGULANT  2.5 mg  Oral BID     famotidine  10 mg Oral Daily     gabapentin  100 mg Oral TID     guaiFENesin  600 mg Oral BID     insulin aspart  1-7 Units Subcutaneous TID AC     insulin aspart  1-5 Units Subcutaneous At Bedtime     levothyroxine  50 mcg Oral Daily     lidocaine  2 patch Transdermal Q24H     lidocaine   Transdermal Q8H     [START ON 2/24/2022] midodrine  10 mg Oral Once     multivitamin RENAL  1 capsule Oral Daily     pantoprazole  40 mg Oral QAM AC     pravastatin  40 mg Oral Daily     sodium chloride (PF)  3 mL Intracatheter Q8H       Data   Recent Labs   Lab 02/23/22  1229 02/23/22  0853 02/23/22  0234 02/22/22  2232 02/22/22  1744 02/22/22  0904 02/22/22  0537 02/21/22  1324 02/21/22  1009 02/20/22  0859 02/20/22  0555 02/19/22  1205 02/19/22  1148 02/19/22  0555 02/18/22  1211 02/18/22  0556 02/17/22  0935 02/17/22  0125   WBC  --   --   --   --   --   --   --   --  10.9  --  9.2  --   --  9.6  --  9.1  --  9.1   HGB  --   --   --   --   --   --   --   --  14.3  --  13.3  --   --  13.4  --  12.7*  --  12.7*   MCV  --   --   --   --   --   --   --   --  108*  --  109*  --   --  109*  --  112*  --  111*   PLT  --   --   --   --   --   --   --   --  148*  --  123*  --   --  100*  --  117*  --  120*   NA  --   --   --   --   --   --  135  --   --   --   --   --  136  --   --  134  --  136   POTASSIUM  --   --   --   --  4.9  --  5.7*  --   --   --   --   --  4.8  --   --  4.5  --  4.9   CHLORIDE  --   --   --   --   --   --  105  --   --   --   --   --  103  --   --  102  --  100   CO2  --   --   --   --   --   --  25  --   --   --   --   --  27  --   --  25  --  28   BUN  --   --   --   --   --   --  78*  --   --   --   --   --  50*  --   --  30  --  44*   CR  --   --   --   --   --   --  6.33*  --   --   --   --   --  4.95*  --   --  3.45*  --  5.23*   ANIONGAP  --   --   --   --   --   --  5  --   --   --   --   --  6  --   --  7  --  8   CHRISTINE  --   --   --   --   --   --  8.7  --   --   --   --   --  8.2*  --    --  8.5  --  8.5   * 136* 166*   < >  --    < > 197*   < >  --    < >  --    < > 207*  --    < > 86   < > 192*   ALBUMIN  --   --   --   --   --   --  2.7*  --   --   --   --   --   --   --   --  3.0*  --  3.2*   PROTTOTAL  --   --   --   --   --   --   --   --   --   --   --   --   --   --   --  6.0*  --  6.0*   BILITOTAL  --   --   --   --   --   --   --   --   --   --   --   --   --   --   --  1.9*  --  1.8*   ALKPHOS  --   --   --   --   --   --   --   --   --   --   --   --   --   --   --  157*  --  162*   ALT  --   --   --   --   --   --   --   --   --   --   --   --   --   --   --  27  --  24   AST  --   --   --   --   --   --   --   --   --   --   --   --   --   --   --  17  --  18    < > = values in this interval not displayed.       No results found for this or any previous visit (from the past 24 hour(s)).

## 2022-02-23 NOTE — PROGRESS NOTES
"SPIRITUAL HEALTH SERVICES  SPIRITUAL ASSESSMENT Progress Note (Palliative Focus)  [facility] 66    PRIMARY FOCUS:   Goals of care  Emotional/spiritual/Evangelical distress  Support for coping    REFERRAL SOURCE: palliative/consult for emotional support    ILLNESS CIRCUMSTANCES:   Reviewed documentation. Reflective conversation shared with Art which integrated elements of illness and family narratives.   Context of Serious Illness/Symptom(s) - Art shared how his already complicated medical needs are being impacted by his COVID diagnosis.  Resources for Support - Art has several close friends who have been reaching out to him--and to hospital staff--while he has been in the hospital.    DISTRESS:   Emotional/Spiritual/Existential Distress - Art acknowledged feeling some sense of hopelessness regarding what the future may hold. He said, \"If the doctors tell me continuing with dialysis will do no good, then I may have only a few days left.\" Together, we considered Art's hopes for this time. He shared that he is continuing to hope that his health will improve as his COVID symptoms brennen. Art said he always thought he'd die from a heart attack during dialysis. \"But I'm still here,\" he shared.    SPIRITUAL/Caodaism COPING:   Episcopal/Liseth - Art identifies as Presbyterian and still considers his [facility] home. He grew up Jain.  Spiritual Practices - Art shared that Holy Communion is meaningful to him and asked if I might be able to offer that to him. Holy Communion was provided at bedside.    GOALS OF CARE:  Goals of Care - Art stated that, at this point, he's not ready to stop dialysis.  Meaning/Sense-Making - Together, Art and I engaged in life review. He shared stories of travelling the globe with friends, for work, and for theatre projects. He reflected on the loss he experienced when dialysis prevented him from continuing his travels. 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 He shared memories of these times and all that he learned in that environment.    PLAN: Will continue to follow. Spiritual Health remains available, as needed.    Theresa Cope  Associate   Phone: [phone]

## 2022-02-23 NOTE — PROVIDER NOTIFICATION
MD Notification    Notified Person: NP    Notified Person Name: Zechariah    Notification Date/Time: 2/22/22 2245    Notification Interaction: Webpaged    Purpose of Notification:  JUANJO kaufman from CCU. HD pt. BP's 83/54 and 89/56. HR 93. Known to be hypotensive, lowest of the day. Metoprolol already on hold.     Orders Received:    Comments:

## 2022-02-23 NOTE — PROGRESS NOTES
Hospitalist [facility] cross cover note:    Paged by nursing as pt's SBP 80s.  After discussion with nursing, nursing notes pt remains asymptomatic, warm, pink, dry, mentating.  Noted palliative care consult earlier today. If pt continues to decline, will need to have further goals of care discussion with pt and [facility].  Continues on Q4H VS.      MARCIO Forte, CNP  Hospitalist [facility]    No charge.

## 2022-02-23 NOTE — PROGRESS NOTES
[facility]    Nephrology Progress Note     Assessment & Plan     75-year-old male with history of CAD, ICM with EF of 20-25%, ESRD, DM2, GERD, atrial flutter/fibrillation on chronic anticoagulation with apixaban, status post ICD placement, and hypertension re admitted with SOB     Following for ESRD management      1.  ESRD Some difficulty maintaining blood pressure on dialysis. Does get midodrine.              -MWF [facility]               -AVF              -transitioning to [facility] unit (TT)  2.  Anemia  3.  Metabolic Bone Disease              -Hectorol  4.  COVID 19  5.  Acute on chronic systolic heart failure exacerbation  6.  Hypotension               -Midodrine and albumin prime.       7. Goals of care. Primary team notes reviewed. Is No CPR. Discussions regarding palliative care.               -Very reasonable to include cessation of dialysis therapy in the context of      his goals of care.      Planning HD tomorrow.          Marlo Tabor MD  [facility] - Nephrology  [phone]    Interval History     Bill is still wrestling with his severe illness and how much further he can proceed.    He is not DNR/DNI.      Physical Exam   Temp: 97.4  F (36.3  C) Temp src: Oral BP: (!) 82/47 Pulse: 92   Resp: 18 SpO2: 96 % O2 Device: Nasal cannula Oxygen Delivery: 1 LPM  Vitals:    02/19/22 1337 02/20/22 0632 02/22/22 0200   Weight: 72.3 kg (159 lb 6.3 oz) 71.3 kg (157 lb 3 oz) 72.3 kg (159 lb 4.8 oz)     Vital Signs with Ranges  Temp:  [97.4  F (36.3  C)-98  F (36.7  C)] 97.4  F (36.3  C)  Pulse:  [86-99] 92  Resp:  [13-18] 18  BP: ()/(47-64) 82/47  SpO2:  [87 %-96 %] 96 %  I/O last 3 completed shifts:  In: -   Out: 50 [Urine:50]    In COVID iso    Medications     - MEDICATION INSTRUCTIONS -         - MEDICATION INSTRUCTIONS for Dialysis Patients -   Does not apply See Admin Instructions     apixaban ANTICOAGULANT  2.5 mg Oral BID     famotidine  10 mg Oral  Daily     gabapentin  100 mg Oral TID     guaiFENesin  600 mg Oral BID     insulin aspart  1-7 Units Subcutaneous TID AC     insulin aspart  1-5 Units Subcutaneous At Bedtime     levothyroxine  50 mcg Oral Daily     lidocaine  2 patch Transdermal Q24H     lidocaine   Transdermal Q8H     [START ON 2/24/2022] midodrine  10 mg Oral Once     multivitamin RENAL  1 capsule Oral Daily     pantoprazole  40 mg Oral QAM AC     pravastatin  40 mg Oral Daily     sodium chloride (PF)  3 mL Intracatheter Q8H       Data   BMP  Recent Labs   Lab 02/23/22  1229 02/23/22  0853 02/23/22  0234 02/22/22  2232 02/22/22  1744 02/22/22  0904 02/22/22  0537 02/19/22  1205 02/19/22  1148 02/18/22  1211 02/18/22  0556 02/17/22  0935 02/17/22  0125   NA  --   --   --   --   --   --  135  --  136  --  134  --  136   POTASSIUM  --   --   --   --  4.9  --  5.7*  --  4.8  --  4.5  --  4.9   CHLORIDE  --   --   --   --   --   --  105  --  103  --  102  --  100   CHRISTINE  --   --   --   --   --   --  8.7  --  8.2*  --  8.5  --  8.5   CO2  --   --   --   --   --   --  25  --  27  --  25  --  28   BUN  --   --   --   --   --   --  78*  --  50*  --  30  --  44*   CR  --   --   --   --   --   --  6.33*  --  4.95*  --  3.45*  --  5.23*   * 136* 166* 218*  --    < > 197*   < > 207*   < > 86   < > 192*    < > = values in this interval not displayed.     Phos@LABRCNTIPR(phos:4)  CBC)  Recent Labs   Lab 02/21/22  1009 02/20/22  0555 02/19/22  0555 02/18/22  0556   WBC 10.9 9.2 9.6 9.1   HGB 14.3 13.3 13.4 12.7*   HCT 45.1 42.7 43.1 42.6   * 109* 109* 112*   * 123* 100* 117*     Recent Labs   Lab 02/18/22  0556   AST 17   ALT 27   ALKPHOS 157*   BILITOTAL 1.9*       Attestation:   I have reviewed today's relevant vital signs, notes, medications, labs and imaging.

## 2022-02-23 NOTE — PROVIDER NOTIFICATION
MD Notification    Notified Person: MD    Notified Person Name: Oanh Mckinley MD    Notification Date/Time: 17:06 2/22/22    Notification Interaction:  [id number] WM     Pt had bout of V tach at 1700. 14 beats. Please advise, thanks! Arlene RN *[id number]       Purpose of Notification: V tach run    Orders Received: Check stat mag and potassium    Comments: potassium (4.9) and magnesium (1.7) are WNL.  Previously hyperkalemic at 5.7 this morning

## 2022-02-23 NOTE — PLAN OF CARE
"Goal Outcome Evaluation:    Plan of Care Reviewed With: patient     Summary:  ESRD, COVID pneu, Fall risk   DATE & TIME: 22 07193  Cognitive Concerns/ Orientation : A&O x 4, forgetful, calm.   BEHAVIOR & AGGRESSION TOOL COLOR: GREEN  CIWA SCORE: NA   ABNL VS/O2: Q4h VSS x SBP 70-90's (MD aware).  O2 at 1L per NC, no need for cont pulse ox.   MOBILITY: Strong Ax2 with suzette steady to chair for breakfast, prefers to stay in bed rest of shift. Turn/repos q2h and PRN.    PAIN MANAGMENT: C/O sl chest pain with activity, resolved with rest. Declines lidoderm patch.   DIET: Renal diet, poor appetite, eating a few bites of meals (ate 1/4 of bagel, 1/2 of burger).  BOWEL/BLADDER: Continent, voided bloody/dark red urine x1 in urinal. No BM.   ABNL LAB: NA  B,153, 160  DRAIN/DEVICES: R PIV SL, L Fistula  TELEMETRY RHYTHM: Afib CVR  SKIN: Skin tear R wrist with steri strips on and covered,  scattered bruising/scabs.  TESTS/PROCEDURES: Dialysis (T-TH-Sat) hopeful tomorrow.   D/C DAY/GOALS/PLACE: Pend goals of care, pt wishes to get thru \"COVID' to see if his ESRD improves, not ready for comfort care.   OTHER IMPORTANT INFO: LS dim, chroninc NP cough per pt, receiving sched mucinex.  following, received holy communion. Resting between cares. COVID/Special isolation continued, receiving decadron.                       "

## 2022-02-23 NOTE — PLAN OF CARE
Goal Outcome Evaluation:    Plan of Care Reviewed With: patient     Summary:  COVID pneumonia    DATE & TIME: 22 2108-1943  Cognitive Concerns/ Orientation : A&O x 4, forgetful  BEHAVIOR & AGGRESSION TOOL COLOR: GREEN  CIWA SCORE: NA   ABNL VS/O2: BP hypotensive (MD aware) 89/48, 89/59. 1L/ NC @ 94% O2  MOBILITY: Ax2 with gait belt/ walker?   PAIN MANAGMENT: Complained of RLE ache, PRN tylenol given at 0253  DIET: Renal diet  BOWEL/BLADDER: Incontinent   ABNL LAB/B, 166  DRAIN/DEVICES: R PIV SL, L Fistula  TELEMETRY RHYTHM: Afib CVR  SKIN: Skin tear R wrist, scattered bruising/scabs   TESTS/PROCEDURES: Dialysis (TTHS) - did not get done  due to low BP  D/C DAY/GOALS/PLACE: Discharge pending  OTHER IMPORTANT INFO: Palliative care met with patient- plan to continue to follow and offer ongoing goals of care/ support. Requested for / spiritual consult. SW notified to be seen in the morning.

## 2022-02-23 NOTE — PLAN OF CARE
Pt here with COVID 19 pneumonia. A&O x4. VSS, SBP in low 100's on shift, on 1.5L O2 via NC.  Some SOB/dsypnea with exertion noted. Tele afib CVR; had 1 run of 15 SVT beats, MD paged; K+ and Mg checked, K+ now WNL and Mg WNL.  Denies pain.      Pt asked for /spiritual consult as well as to be seen by SW, as he feels depressed and expressed some hopelessness with his outcomes; order placed and SW notified to see him in the morning.      Pt scoring green on the Aggression Stop Light Tool. Palliative care saw pt today, plan to continue to follow and offer ongoing goals of care/support. Discharge pending.      Patient transferring to station [facility], report given to OLAMIDE Lauren.

## 2022-02-24 NOTE — PROGRESS NOTES
"  [facility]    Nephrology Progress Note     Assessment & Plan       75-year-old male with history of CAD, ICM with EF of 20-25%, ESRD, DM2, GERD, atrial flutter/fibrillation on chronic anticoagulation with apixaban, status post ICD placement, and hypertension re admitted with SOB    Patient seen on dialysis twice due to hypotension and complex management (midodrine, calcium gluconate)     Following for ESRD management     GOALS OF CARE/CONSIDERATION OF STOPPING DIALYSIS: Art clearly has been very contemplative about his care.  He has had thorough consultation with palliative care and is aware of the different approaches available.    He said to me, \"I need somebody to tell me what would happen if I stopped\".  I went over with him what typically happens with patient who stops receiving dialysis treatments.  There has to be very careful attention to fluids, because fluid overload can be quite uncomfortable, although dyspnea can be treated easily with medicines like morphine.  Some people do not wish to be so sedated, however.  I explained to him that it is very difficult to predict how long someone will live after they stop doing dialysis.  The typical timeframe is between 7 to 14 days depending on residual kidney function.  I told her there is wide variability, however.  I described that the typical patient who dies of kidney failure has gradually decreasing sensorium and eventually falls asleep and goes into a coma.  He showed understanding this by saying, \" so sounds like  it is like fall asleep and then you go\"    Art still feels he is not ready to discontinue treatments.  He would really like to talk with his nephrologist of 18 years, Dr. Hammond.  I told him I would let Dr. Hammond now and I am sure he would visit with him if he is able to.     -Dialysis today (having issues with hypotension. Willl try for 1kg if possible. He is very volume overloaded.)    ACCESS BLEEDING: has had " issues with prolonged bleeding post run. The fistula is very firm and does not collapse with raising the arm. Likely venous stenosis.     -16 gauge needles. May sacrifice a little blood flow,but that could be beneficial.     2.  Anemia  3.  Metabolic Bone Disease              -Hectorol  4.  COVID 19  5.  Acute on chronic systolic heart failure exacerbation  6.  Hypotension     -Midodrine and albumin prime.      7. Goals of care. See above. Primary team notes and palliative care consult reviewed.   Is No CPR.         Plan discussed with dialysis RN Jami.            Brice Mccoy MD  [facility] - Nephrology  M:[phone]  P:[phone]  __________________________________________________________________    Interval History     No major changes. Continue so to have severe intolerance of exertion. Got angina just trying to be transferred to chair. Unable to give NTG due to low blood pressure.         Dialysis Orders:    Duration: 3.5 hours.   Access:LUAF  Blood flow:350-450  Target weight:-1kg  Needles: Change to 16g  Heparin:No  SBP: >90  EPO:No  BMD:Hectorol    Temp: 97.5  F (36.4  C) Temp src: Oral BP: (!) 87/51 Pulse: 104   Resp: 18 SpO2: 98 % O2 Device: Nasal cannula Oxygen Delivery: 2.5 LPM    Vitals:    02/20/22 0632 02/22/22 0200 02/24/22 0615   Weight: 71.3 kg (157 lb 3 oz) 72.3 kg (159 lb 4.8 oz) 72.5 kg (159 lb 13.3 oz)       10 point ROS performed and negative except as noted above.    Vital Signs with Ranges    Temp:  [97.3  F (36.3  C)-97.8  F (36.6  C)] 97.5  F (36.4  C)  Pulse:  [] 104  Resp:  [15-18] 18  BP: ()/(42-76) 87/51  SpO2:  [91 %-98 %] 98 %    I/O last 3 completed shifts:  In: 240 [P.O.:240]  Out: -     Physical Exam      GENERAL: Frail and chronically ill appearing. Very fluent and alert when I saw him.  HEENT:  Normocephalic. No gross abnormalities.  Pupils equal.  The mouth moist.    Neck The jugular venous pressure is markedly elevated past earlobe.  CV: Soft heart  tones. S1 S2 soft systolic  Murmur. No rub detected.  RESP: Decreased breath sounds bilaterally with few crackles.   GI: Abdomen soft/nt/nd  MUSCULOSKELETAL: extremities nl - no gross deformities noted. ++ edema  SKIN: no new lesions or rashes, dry to touch.  NEURO:  No overt deficit.  PSYCH: somber good, affect appropriate    Medications       - MEDICATION INSTRUCTIONS -       - MEDICATION INSTRUCTIONS -           - MEDICATION INSTRUCTIONS for Dialysis Patients -   Does not apply See Admin Instructions     sodium chloride 0.9%  250 mL Intravenous Once in dialysis/CRRT     sodium chloride 0.9%  300 mL Hemodialysis Machine Once     albumin human         apixaban ANTICOAGULANT  2.5 mg Oral BID     calcium gluconate  1 g Intravenous Once     doxercalciferol  12 mcg Intravenous Once in dialysis/CRRT     famotidine  10 mg Oral Daily     gabapentin  100 mg Oral TID     guaiFENesin  600 mg Oral BID     insulin aspart  1-7 Units Subcutaneous TID AC     insulin aspart  1-5 Units Subcutaneous At Bedtime     levothyroxine  50 mcg Oral Daily     lidocaine  2 patch Transdermal Q24H     lidocaine   Transdermal Q8H     multivitamin RENAL  1 capsule Oral Daily     - MEDICATION INSTRUCTIONS -   Does not apply Once     pantoprazole  40 mg Oral QAM AC     pravastatin  40 mg Oral Daily     sodium chloride (PF)  3 mL Intracatheter Q8H       Data     UA RESULTS:  Recent Labs   Lab Test 08/29/18  1447   COLOR Yellow   APPEARANCE Cloudy   URINEGLC Negative   URINEBILI Small*   URINEKETONE Negative   SG 1.020   UBLD Large*   URINEPH 8.5*   PROTEIN >=300*   UROBILINOGEN 1.0   NITRITE Negative   LEUKEST Moderate*   RBCU 10-25*   WBCU >100*      BMP  Recent Labs   Lab 02/24/22  1318 02/24/22  0842 02/24/22  0758 02/24/22  0205 02/22/22  2232 02/22/22  1744 02/22/22  0904 02/22/22  0537 02/19/22  1205 02/19/22  1148 02/18/22  1211 02/18/22  0556   NA  --   --  138  --   --   --   --  135  --  136  --  134   POTASSIUM  --   --  5.0  --   --  4.9   --  5.7*  --  4.8  --  4.5   CHLORIDE  --   --  103  --   --   --   --  105  --  103  --  102   CHRISTINE  --   --  8.8  --   --   --   --  8.7  --  8.2*  --  8.5   CO2  --   --  27  --   --   --   --  25  --  27  --  25   BUN  --   --  67*  --   --   --   --  78*  --  50*  --  30   CR  --   --  5.57*  --   --   --   --  6.33*  --  4.95*  --  3.45*   * 154* 177* 254*   < >  --    < > 197*   < > 207*   < > 86    < > = values in this interval not displayed.     Phos@LABRCNTIPR(phos:4)  CBC)  Recent Labs   Lab 02/24/22  0758 02/21/22  1009 02/20/22  0555 02/19/22  0555   WBC 9.6 10.9 9.2 9.6   HGB 14.1 14.3 13.3 13.4   HCT 45.4 45.1 42.7 43.1   * 108* 109* 109*   * 148* 123* 100*       Recent Labs   Lab 02/24/22  0758   HGB 14.1   HCT 45.4   *       Recent Labs   Lab 02/18/22  0556   AST 17   ALT 27   ALKPHOS 157*   BILITOTAL 1.9*     No lab results found in last 7 days.  25 OH Vit D2   Date Value Ref Range Status   10/18/2020 <5 ug/L Final     25 OH Vit D3   Date Value Ref Range Status   10/18/2020 8 ug/L Final     25 OH Vit D total   Date Value Ref Range Status   10/18/2020 <13 (L) 20 - 75 ug/L Final     Comment:     Season, race, dietary intake, and treatment affect the concentration of   25-hydroxy-Vitamin D. Values may decrease during winter months and increase   during summer months. Values 20-29 ug/L may indicate Vitamin D insufficiency   and values <20 ug/L may indicate Vitamin D deficiency.  This test was developed and its performance characteristics determined by the   [facility].   It has not been cleared or approved by the FDA. The laboratory is regulated   under CLIA as qualified to perform high-complexity testing. This test is used   for clinical purposes. It should not be regarded as investigational or for   research.       No results for input(s): PTHI in the last 168 hours.    Attestation:   I have reviewed  today's relevant vital signs, notes, medications, labs and imaging.

## 2022-02-24 NOTE — PROGRESS NOTES
[facility] Progress Note     Kendrick Schultz MD  02/24/2022         Interval History:      Patient with known ischemic cardiomyopathy, chronic atrial fibrillation s/p ICD, end-stage renal disease on hemodialysis recent Covid infection, seen by cardiology this hospitalization.  Cardiology is now reconsulted as patient had some questions.  Patient has been off most cardiomyopathy medication as is not able to tolerate them because of low blood pressure.  In fact he is getting midodrine to help him get dialysis.  Appropriately palliative has been consulted.  They also been plans about maybe discontinuing the dialysis if this is consistent with patient's goals of care.  Talk at length with patient about her inability to use cardiomyopathy medication because of low blood pressure.  He appears quite frail.  Denies any chest discomfort or any worsening of shortness of breath.  16 beats run of NSVT noted today.       Assessment and Plan:      1.  Ischemic cardiomyopathy with severely decreased biventricular systolic function, LVEF of 25 to 30%, coronary angiogram in 2020 he underwent circumflex PCI, known occluded LAD with infarct in the territory.  Could not tolerate cardiomyopathy medication due to low blood pressure.  S/p ICD, does not appear to be pacemaker dependent.  NSVT noted today.  Denies any chest discomfort.  2.  Persistent atrial fibrillation.  On apixaban, off beta-blocker due to low blood pressure, ventricular rate is reasonably controlled in 90s.  Asymptomatic denies any palpitation.  3.  End-stage renal disease on hemodialysis, requiring midodrine to support blood pressure to get dialysis  4.  Recent Covid infection with pneumonia.    Recommendations  overall quite guarded prognosis, patient appears frail and not able to tolerate cardiomyopathy medications.  Agree with involving palliative as you have already.  Patient tells me that he is not sure whether he wants to discontinue dialysis.  Overall ventricular  rates appear reasonably controlled even in the absence of beta-blocker therapy at this time which we cannot use due to low blood pressure.  Not a candidate for digoxin due to end-stage renal disease.  No new cardiology recommendations at this time.    Discussed at length with patient about overall guarded prognosis.    Cardiology will sign off.  Please feel free to call with any questions.       Physical Exam:       , Blood pressure 102/58, pulse 96, temperature 97.7  F (36.5  C), temperature source Oral, resp. rate 16, weight 72.5 kg (159 lb 13.3 oz), SpO2 96 %.  Vitals:    02/20/22 0632 02/22/22 0200 02/24/22 0615   Weight: 71.3 kg (157 lb 3 oz) 72.3 kg (159 lb 4.8 oz) 72.5 kg (159 lb 13.3 oz)     Vital Signs with Ranges  Temp:  [97.3  F (36.3  C)-97.8  F (36.6  C)] 97.7  F (36.5  C)  Pulse:  [] 96  Resp:  [15-18] 16  BP: ()/(42-76) 102/58  SpO2:  [91 %-96 %] 96 %  I/O's Last 24 hours  I/O last 3 completed shifts:  In: -   Out: 25 [Urine:25]  General patient appears frail but otherwise comfortable  cardiovascular system irregular, normal rate, no murmur rub or gallop next respiratory system no crackles  extremities no pitting pedal edema             Medications:          - MEDICATION INSTRUCTIONS for Dialysis Patients -   Does not apply See Admin Instructions     sodium chloride 0.9%  250 mL Intravenous Once in dialysis/CRRT     sodium chloride 0.9%  300 mL Hemodialysis Machine Once     apixaban ANTICOAGULANT  2.5 mg Oral BID     doxercalciferol  12 mcg Intravenous Once in dialysis/CRRT     famotidine  10 mg Oral Daily     gabapentin  100 mg Oral TID     guaiFENesin  600 mg Oral BID     insulin aspart  1-7 Units Subcutaneous TID AC     insulin aspart  1-5 Units Subcutaneous At Bedtime     levothyroxine  50 mcg Oral Daily     lidocaine  2 patch Transdermal Q24H     lidocaine   Transdermal Q8H     midodrine  10 mg Oral Once     multivitamin RENAL  1 capsule Oral Daily     - MEDICATION INSTRUCTIONS -    Does not apply Once     pantoprazole  40 mg Oral QAM AC     pravastatin  40 mg Oral Daily     sodium chloride (PF)  3 mL Intracatheter Q8H     PRN Meds: sodium chloride 0.9%, acetaminophen **OR** acetaminophen, glucose **OR** dextrose **OR** glucagon, hydrOXYzine, lidocaine 4%, lidocaine (buffered or not buffered), lidocaine (buffered or not buffered), lidocaine (buffered or not buffered), - MEDICATION INSTRUCTIONS -, metoprolol, naloxone **OR** naloxone **OR** naloxone **OR** naloxone, ondansetron **OR** ondansetron, oxyCODONE, prochlorperazine **OR** prochlorperazine **OR** prochlorperazine, senna-docusate **OR** senna-docusate, sodium chloride (PF), - MEDICATION INSTRUCTIONS -         Data:      All new lab and imaging data was reviewed.   Recent Labs   Lab Test 02/24/22  0758 02/21/22  1009 02/20/22  0555 02/12/22  0737 02/11/22  1647 10/16/20  1527 10/15/20  1830 11/21/19  0930 07/22/19  0757   WBC 9.6 10.9 9.2   < > 7.5   < > 6.2   < >  --    HGB 14.1 14.3 13.3   < > 12.5*   < > 10.8*   < >  --    * 108* 109*   < > 111*   < > 103*   < >  --    * 148* 123*   < > 112*   < > 199   < >  --    INR  --   --   --   --  1.23*  --  1.31*  --  0.97    < > = values in this interval not displayed.      Recent Labs   Lab Test 02/24/22  1318 02/24/22  0842 02/24/22  0758 02/22/22  2232 02/22/22  1744 02/22/22  0904 02/22/22  0537 02/19/22  1205 02/19/22  1148   NA  --   --  138  --   --   --  135  --  136   POTASSIUM  --   --  5.0  --  4.9  --  5.7*  --  4.8   CHLORIDE  --   --  103  --   --   --  105  --  103   CO2  --   --  27  --   --   --  25  --  27   BUN  --   --  67*  --   --   --  78*  --  50*   CR  --   --  5.57*  --   --   --  6.33*  --  4.95*   ANIONGAP  --   --  8  --   --   --  5  --  6   CHRISTINE  --   --  8.8  --   --   --  8.7  --  8.2*   * 154* 177*   < >  --    < > 197*   < > 207*    < > = values in this interval not displayed.     Recent Labs   Lab Test 07/24/21  1217 07/05/21  0665  03/17/21 2150 03/17/21  1859   TROPI  --  0.025 0.025 0.029   TROPONIN 0.022  --   --   --         Kendrick Schultz MD  2/24/2022  Pager:  [phone]

## 2022-02-24 NOTE — PROGRESS NOTES
Potassium   Date Value Ref Range Status   02/24/2022 5.0 3.4 - 5.3 mmol/L Final   07/05/2021 5.3 3.4 - 5.3 mmol/L Final     Hemoglobin   Date Value Ref Range Status   02/24/2022 14.1 13.3 - 17.7 g/dL Final   07/05/2021 12.0 (L) 13.3 - 17.7 g/dL Final     Creatinine   Date Value Ref Range Status   02/24/2022 5.57 (H) 0.66 - 1.25 mg/dL Final   07/05/2021 5.03 (H) 0.66 - 1.25 mg/dL Final     Urea Nitrogen   Date Value Ref Range Status   02/24/2022 67 (H) 7 - 30 mg/dL Final   07/05/2021 39 (H) 7 - 30 mg/dL Final     Sodium   Date Value Ref Range Status   02/24/2022 138 133 - 144 mmol/L Final   07/05/2021 140 133 - 144 mmol/L Final     INR   Date Value Ref Range Status   02/11/2022 1.23 (H) 0.85 - 1.15 Final   10/15/2020 1.31 (H) 0.86 - 1.14 Final       DIALYSIS PROCEDURE NOTE  Hepatitis status of previous patient on machine log was checked and verified ok to use with this patients hepatitis status.  Patient dialyzed for 3.5 hrs. on a K2 bath with a net fluid removal of  L.  A BFR of 325 ml/min was obtained via a LAVF using 16 gauge needles.      The treatment plan was discussed with Dr. Mccoy.   Total heparin received during the treatment: 0 units.   Needle cannulation sites held Venous 30 min, Arterial 45 min.    Meds  given: Hectoral, Albumin prime, midodrine    Complications: SBP drop to mid 80, MD aware and gave  VO to use 16 guage needles, Albumin prime, midodrine, sodium modeling 144-140 and UF goal to remain at zero.    Person educated: Patient. Knowledge base Substantial. Barriers to learning: none. Educated on procedure via verbal mode. Patient verbalized understanding. Pt prefers oral education style.     ICEBOAT? Timeout performed pre-treatment  I: Patient was identified using 2 identifiers  C:  Consent Signed Yes  E: Equipment preventative maintenance is current and dialysis delivery system OK to use    Results for MALDONADO LÓPEZ (MRN [id number]) as of 2/24/2022 17:30   Ref. Range 2/14/2022 08:45   Hep B  Surface Agn Latest Ref Range: Nonreactive  Nonreactive   Hepatitis B Surface Antibody Latest Ref Range: >=12.00 m[IU]/mL 1.14 (L)     O: Dialysis orders present and complete prior to treatment  A: Vascular access verified and assessed prior to treatment  T: Treatment was performed at a clinically appropriate time  ?: Patient was allowed to ask questions and address concerns prior to treatment  See flowsheet in EPIC for further details and post assessment.  Machine water alarm in place and functioning. Transducer pods intact and checked every 15min.   .  Chlorine/Chloramine water system checked every 4 hours.  Outpatient Dialysis at [facility]    Please remove patient dressing on AVF and AVG needle sites 24 hours after dialysis. If leaking occurs please apply a Band-Aid.

## 2022-02-24 NOTE — PLAN OF CARE
"Goal Outcome Evaluation:    Plan of Care Reviewed With: patient        Summary:  ESRD, COVID pneu, Fall risk   DATE & TIME: 22 9087-9958  Cognitive Concerns/ Orientation : A&O x 4, forgetful, calm.   BEHAVIOR & AGGRESSION TOOL COLOR: GREEN  CIWA SCORE: NA   ABNL VS/O2: Q4h VSS x SBP :s (MD aware).  O2 at 1L per NC, no need for cont pulse ox.   MOBILITY: Ax2. Turn/repos q2h and PRN. Refused at times.    PAIN MANAGMENT: Declined lidocaine patch & gabapentin.   DIET: Renal diet.   BOWEL/BLADDER: Continent, No BM, Voided dark red urine x1.    ABNL LAB: NA  B, 254  DRAIN/DEVICES: R PIV SL, L Fistula  TELEMETRY RHYTHM: Afib CVR  SKIN: Skin tear R wrist with steri strips on and covered,  scattered bruising/scabs.  TESTS/PROCEDURES: Dialysis (T-TH-Sat) hopeful tomorrow.   D/C DAY/GOALS/PLACE: Pend goals of care, pt wishes to get thru \"COVID' to see if his ESRD improves, not ready for comfort care.   OTHER IMPORTANT INFO: LS dim, chroninc NP cough per pt, receiving sched mucinex.  following, received holy communion. Resting between cares. COVID/Special isolation continued, decadron completed. Midodrine to be given 30 minutes before hemodialysis.              "

## 2022-02-24 NOTE — PLAN OF CARE
"Goal Outcome Evaluation:    Plan of Care Reviewed With: patient     Summary:  Acute on chronic hypoxic respiratory failure; COVID-19 infection. ESRD; Fall risk  DATE & TIME: 02/24/22 1356-0247  Cognitive Concerns/ Orientation : A&O x 4, forgetful, calm   BEHAVIOR & AGGRESSION TOOL COLOR: GREEN  CIWA SCORE: NA   ABNL VS/O2: Q4h VSS except for soft BP. SBP 80s-100s . On O2 at 1L per NC, no need for cont pulse ox.   MOBILITY: Ax2. Turn/repos q2h but pt refuses.   PAIN MANAGMENT: denies. Declined lidocaine patch & gabapentin.   DIET: Renal diet.   BOWEL/BLADDER: Continent, No BM; no urinary void this shift.   ABNL LAB: Creat 5.57. , 177  DRAIN/DEVICES: R PIV SL, L Fistula WNL  TELEMETRY RHYTHM: Afib CVR/RVR; episode of run of SVT/self limiting x1; cardiology aware.   SKIN: Skin tear R wrist with steri strips on and covered,  scattered bruising/scabs.  TESTS/PROCEDURES: Dialysis today. (T-TH-Sat)   D/C DAY/GOALS/PLACE: Pend goals of care, pt wishes to get thru \"COVID' to see if his ESRD improves, not ready for comfort care.   OTHER IMPORTANT INFO: denies sob. chroninc nonproductive cough per pt, receiving scheduled mucinex.  following, received holy communion (not this shift). Resting between cares. COVID/Special isolation continued, decadron completed. Midodrine to be given 30 minutes before hemodialysis                 "

## 2022-02-25 NOTE — PROGRESS NOTES
"CLINICAL NUTRITION SERVICES  -  ASSESSMENT NOTE      RECOMMENDATIONS FOR MD/PROVIDER TO ORDER:   Patient declining protein supplements and nutrition interventions at this time. Has had minimal PO intakes x 8 day admission. If GOC are restorative and patient wishes to continue dialysis need to consider role of nutrition support.    Future/Additional Recommendations:   Monitor PO intakes and patient receptiveness to nutrition education and intervention   Monitor GOC   Continue Nephrocaps for micronutrient needs    Malnutrition:   % Weight Loss:  > 7.5% in 3 months (severe malnutrition)  % Intake:  </= 50% for >/= 5 days (severe malnutrition)  Subcutaneous Fat Loss:  Deferred with COVID-19 precautions   Muscle Loss:  Deferred with COVID-19 precautions   Fluid Retention:  Mild to moderate per provider notes     Malnutrition Diagnosis: Severe malnutrition  In Context of:  Acute illness or injury with underlying chronic illness/disease        REASON FOR ASSESSMENT  Westley Ness is a 76 year old male seen by Registered Dietitian for LOS    Per chart review, PMH significant for ESRD on dialysis for the past 18 years, CAD, ischemic cardiomyopathy, GERD and hypothyroidism.  Admitted 2/2 CHF exacerbation and COVID-19 infection.      NUTRITION HISTORY  - Spoke with patient over the phone this afternoon though minimal information obtained as patient only providing one word answers to questions and not willing to elaborate when prompted. Notes he typically eats 2.5 meals/day (assuming 2 meals and a snack?). Noted pt has followed with an outpatient RD at dialysis and was prescribed Nepro shakes in the past which he \"hates\". Occasionally will have a protein bar.   - No known food allergies noted.       CURRENT NUTRITION ORDERS  Diet Order:     Dialysis     Current Intake/Tolerance:  Ordering 2-3 meals/day but only takes bites of off trays. Poor appetite per RN notes. Pt abruptly declined nutrition interventions.     Discussed " "need for adequate PO intakes, especially protein, with ongoing dialysis. Encouraged a source of protein on all meal trays and offered protein supplements which patient declined and then stated \"I am not up for talking with you\".       NUTRITION FOCUSED PHYSICAL ASSESSMENT FOR DIAGNOSING MALNUTRITION)  No:  Deferred at this time with COVID-19 precautions           Obtained from Chart/Interdisciplinary Team:  - Palliative care following for GOC discussions as providers have been suggesting comfort care transition due to increasing need for blood pressure support for dialysis.   - GI WDL, last BM on 2/22    Last HD run yesterday, attempting to pull fluid due to patient being very volume overloaded per nephrology but difficult with hypotension (midodrine and albumin being given).     ANTHROPOMETRICS  Height: 5' 8\"  Weight: 159 lbs 13.34 oz  Body mass index is 24.3 kg/m .  Weight Status:  Normal BMI  Weight History: Over the past 2 months patient appears to have lost 14# (8% wt loss). Wt relatively stable over admission (2/17) 154# --> (2/24) 159#. Pt noted to be very volume overloaded per nephrology so suspect current weight masking true weight trends.   Wt Readings from Last 10 Encounters:   02/24/22 72.5 kg (159 lb 13.3 oz)   02/14/22 75.9 kg (167 lb 5.3 oz)   12/16/21 78.5 kg (173 lb)   12/03/21 78.6 kg (173 lb 4.5 oz)   04/13/21 78 kg (171 lb 15.3 oz)   03/18/21 75.8 kg (167 lb 3.2 oz)   02/14/21 76.5 kg (168 lb 9.6 oz)   02/08/21 76.5 kg (168 lb 11.2 oz)   02/07/21 76.6 kg (168 lb 12.8 oz)   01/31/21 77.6 kg (171 lb)       LABS  Labs reviewed (last BMP on 2/24)  - K+ 5 (WNL - borderline high), Phos 3.9 (WNL)  - BUN 67 (H), Cr 5.57 (H)  - BGM     MEDICATIONS  Medications reviewed  - medium sliding scale insulin   - levothyroxine   - Nephrocaps   - Protonix       ASSESSED NUTRITION NEEDS PER APPROVED PRACTICE GUIDELINES:    Dosing Weight 72.5 kg (2/24)  Estimated Energy Needs: 2313-3704 kcals (25-30 " Kcal/Kg)  Justification: maintenance  Estimated Protein Needs:  grams protein (1.2-1.5 g pro/Kg)  Justification: dialysis  Estimated Fluid Needs: per provider pending fluid status       NUTRITION DIAGNOSIS:  Inadequate oral intake related to suspect decreased appetite vs lack of motivation/interestin food as evidenced by only taking bites of food over the past 8 days of admission and 8% wt loss over the past 2 months       NUTRITION INTERVENTIONS  Recommendations / Nutrition Prescription  See above       Implementation  Nutrition education: Patient declined  Patient declined nutrition interventions despite encouragement       Nutrition Goals  Patient will consume % of nutritionally adequate meals TID vs start protein supplement or nutrition support       MONITORING AND EVALUATION:  Progress towards goals will be monitored and evaluated per protocol and Practice Guidelines      Angelica Briceno RD, LD

## 2022-02-25 NOTE — PROGRESS NOTES
[facility]    Medicine Progress Note - Hospitalist Service        Date of Admission:  2/17/2022 12:33 AM    Assessment & Plan:   Westley Ness is a 76 year old male who was admitted on 2/17/2022 with hypoxia and worsened dyspnea.     Acute on chronic hypoxic respiratory failure  COVID-19 pneumonia  -Respiratory failure likely secondary to Covid infection+/- recent L rib fractures with L atelectasis +/- fluid overload.  -Diagnosed on on 2/11, fully vaccinated.  -PTA on Eliquis so less likely PE.  -CXR 2/19 with left basilar/retrocardiac pulmonary opacities, could be small pleural effusion and associated basilar atelectasis/consolidation. Right basilar pulmonary opacities.  -Oxygen requirement 1-3 L  -completed course of decadron, no remdesivir due to ESRD  -Patient will be considered COVID recovered on 3/4/22 as per infection control, continue precaution for now  -mucinex BID for wet cough      Chronic HFrEF  History of A. fib, ICD/PPM in place  Nonsustained V. tach  -PTA on metoprolol/ lisinopril / imdur. Baseline BP low in the 100s. Last echo 03/21, EF 20-25%. ICD in place.   -Blood pressure has been soft/low during this hospital stay and patient not tolerating dialysis  -hold metoprolol, lisinopril and isosorbide mononitrate, unable to tolerate due to low BPs  -continue telemetry monitoring  -Continue Eliquis  -Cardiology has followed this hospital stay, per their assessment overall prognosis is quite guarded.  He has not been able to tolerate cardiomyopathy medication.  They have now signed off.  -Outpatient follow-up with cardiology in 1 month if goals of care remains restorative     Hematuria  Noted on 2/23. He makes very little urine at baseline. If ongoing, may need to consider stopping apixaban  -monitor     ESRD  Hypertension  Anemia of renal failure  Secondary hyperparathyroidism  -Dialyzing for 18 yrs, L UE AVF with some bleeding issues. Baseline Dry wt 77.6, currently at 70.2.    -nephrology following  -now getting midodrine on dialysis days  -Patient wants to continue dialysis but unsure if he will tolerate this.  Await further opinion from nephrology.    Steroid induced hyperglycemia  -Hemoglobin A1c on 2/11 was 5.1  -Currently on medium resistance sliding scale insulin while on decadron  -Continue to monitor blood sugar for now, discontinue if blood sugar persistently less than 150      Chronic Thrombocytopenia:   Hypothyroidism: Cont PTA synthroid.   HLD: Resume PTA Pravastatin 40mg.     Goals of care  -Previous provider had extensive discussion about goals of care with patient, CODE STATUS changed to DNR/DNI. Patient however still wants to continue with dialysis.  His blood pressure currently is an issue to continue dialysis as he may not tolerate.  Palliative care also following.       Diet: Combination Diet Regular Diet; Renal Diet (dialysis)     DVT Prophylaxis: DOAC   Velarde Catheter: Not present  Code Status: No CPR- Do NOT Intubate     Disposition Plan    Expected Discharge: 03/04/2022     Anticipated discharge location: home    Delays:           Entered: Deondre Bautista MD 02/25/2022, 11:51 AM        Clinically Significant Risk Factors Present on Admission                      The patient's care was discussed with the Bedside Nurse and Patient.    Deondre Bautista MD  Hospitalist Service  [facility]  Text Page 7AM-6PM  Securely message with the Vocera Web Console (learn more here)  Text page via [employer] Paging/Directory    ______________________________________________________________________    Interval History   Patient endorses of ongoing cough and chest pain.  Feels poorly today.  Blood pressure still very borderline.  Denies nausea or vomiting however does not have much appetite.    Data reviewed today: I reviewed all medications, new labs and imaging results over the last 24 hours. I personally reviewed no images or EKG's today.    Physical Exam  "  Vital signs:  Temp: 97.4  F (36.3  C) Temp src: Axillary BP: 99/57 Pulse: 95   Resp: 20 SpO2: 95 % O2 Device: Nasal cannula Oxygen Delivery: 3 LPM   Weight: 72.5 kg (159 lb 13.3 oz)  Estimated body mass index is 24.3 kg/m  as calculated from the following:    Height as of 2/12/22: 1.727 m (5' 8\").    Weight as of this encounter: 72.5 kg (159 lb 13.3 oz).      Wt Readings from Last 2 Encounters:   02/24/22 72.5 kg (159 lb 13.3 oz)   02/14/22 75.9 kg (167 lb 5.3 oz)       Gen: AAOX3, NAD  Resp: Coarse breath sounds bilaterally, normal effort of breathing  CVS: RRR, no murmur  Abd/GI: Soft, non-tender. BS- normoactive.   Skin: Warm, dry no rashes  MSK: Trace pedal edema  Neuro- CN- intact. No focal deficits.      Data   Recent Labs   Lab 02/25/22  0808 02/25/22  0330 02/24/22  2207 02/24/22  0842 02/24/22  0758 02/22/22  2232 02/22/22  1744 02/22/22  0904 02/22/22  0537 02/21/22  1324 02/21/22  1009 02/20/22  0859 02/20/22  0555 02/19/22  1205 02/19/22  1148   WBC  --   --   --   --  9.6  --   --   --   --   --  10.9  --  9.2  --   --    HGB  --   --   --   --  14.1  --   --   --   --   --  14.3  --  13.3  --   --    MCV  --   --   --   --  109*  --   --   --   --   --  108*  --  109*  --   --    PLT  --   --   --   --  125*  --   --   --   --   --  148*  --  123*  --   --    NA  --   --   --   --  138  --   --   --  135  --   --   --   --   --  136   POTASSIUM  --   --   --   --  5.0  --  4.9  --  5.7*  --   --   --   --   --  4.8   CHLORIDE  --   --   --   --  103  --   --   --  105  --   --   --   --   --  103   CO2  --   --   --   --  27  --   --   --  25  --   --   --   --   --  27   BUN  --   --   --   --  67*  --   --   --  78*  --   --   --   --   --  50*   CR  --   --   --   --  5.57*  --   --   --  6.33*  --   --   --   --   --  4.95*   ANIONGAP  --   --   --   --  8  --   --   --  5  --   --   --   --   --  6   CHRISTINE  --   --   --   --  8.8  --   --   --  8.7  --   --   --   --   --  8.2*   GLC 91 336* 169*  "  < > 177*   < >  --    < > 197*   < >  --    < >  --    < > 207*   ALBUMIN  --   --   --   --  3.0*  --   --   --  2.7*  --   --   --   --   --   --     < > = values in this interval not displayed.       No results found for this or any previous visit (from the past 24 hour(s)).  Medications     - MEDICATION INSTRUCTIONS -       - MEDICATION INSTRUCTIONS -         - MEDICATION INSTRUCTIONS for Dialysis Patients -   Does not apply See Admin Instructions     sodium chloride 0.9%  250 mL Intravenous Once in dialysis/CRRT     apixaban ANTICOAGULANT  2.5 mg Oral BID     famotidine  10 mg Oral Daily     gabapentin  100 mg Oral TID     guaiFENesin  600 mg Oral BID     insulin aspart  1-7 Units Subcutaneous TID AC     insulin aspart  1-5 Units Subcutaneous At Bedtime     levothyroxine  50 mcg Oral Daily     lidocaine  2 patch Transdermal Q24H     lidocaine   Transdermal Q8H     multivitamin RENAL  1 capsule Oral Daily     - MEDICATION INSTRUCTIONS -   Does not apply Once     pantoprazole  40 mg Oral QAM AC     pravastatin  40 mg Oral Daily     sodium chloride (PF)  3 mL Intracatheter Q8H               1}

## 2022-02-25 NOTE — PROGRESS NOTES
"[facility]  Palliative Care Daily Progress Note       Recommendations & Counseling       Goals of Care (see in depth discussion below):    Discussed goals of care amidst very fragile condition, dialysis, weakness. Understands he \"may be at the end\" but would like to continue current cares and dialysis to see if he can regain some strength and improve. States that he will stay on dialysis until it is no longer safe or offered by nephrology.    Spoke with [facility] Maria Dolores. She understands that Art is declining and is nearing end of life. Discussed comfort care as an option if Art continues to decline and he can no longer tolerate HD. Also discussed [facility] hospice as a likely choice if he should go on comfort care, where he could stay in hospital during dying process.     Palliative care will continue to follow and offer ongoing goals of care discussions and support.     Code status: No CPR / No Intubation    Advanced Care Planning:  ? Patient has a completed Health Care Directive: Yes, and on file.   ? [facility]: Maria Dolores Pabon, Relationship:friend, Phone(s): [phone].  ? First Alternate: Marsha Bush, friend:  Phone(s): [phone].       Case was reviewed with Dr Bautista and RN    Lou Correia, [facility]  Contact information available via [facility] Paging/Directory        Thank you for the opportunity to participate in the care of this patient and family. Our team: will continue to follow.     During regular M-F work hours ([id number]) -- if you are not sure who specifically to contact -- please contact us in [facility] Smart Web.     After regular work hours and on weekends/holidays, you can call our answering service at [phone].     Attestation:  Total time on the floor involved in the patient's care: 35 minutes  Total time spent in counseling/care coordination: >50%     Assessments          Westley Ness is a 76 year old male with PMH significant for ischemic " cardiomyopathy- EF 25%, s/p ICD, ESRD on HD MWF, DM2, GERD, atrial flutter/fibrillation on chronic anticoagulation with apixaban, and HTN. Recent admission to hospital on 2/11/22 with COVID-19 and discharged 2/15. He returned to hospital on 2/17 due to worsening SOB and O2 sats in the 70's (has been awaiting home O2 arrival but delivery had been delayed). He was found to have hypotension and CHF exacerbation and was admitted for continued cares. Palliative care was consulted for ongoing goals of care discussion due to very fragile medical condition.    Today, the patient was seen for:  Goals of care    Prognosis, Goals, or Advance Care Planning was addressed today with: Yes.  Mood, coping, and/or meaning in the context of serious illness were addressed today: Yes.  Summary/Comments:            Interval History:     Chart review/discussion with unit or clinical team members:   Spoke with RN who stated patient condition is worsened today with increased shortness of breath and weakness.    Per patient or family/caregivers today:  With patient in room today.  He knows he feels his condition is worsening and that he may be dying.  When asked at what point he would want to stop the dialysis he replies that when the nephrologist will no longer offer it.  He states that it would be difficult for him to make a decision to stop dialysis.  We discussed how stopping hemodialysis is not a painful process and he would not be in pain or discomfort if this was his worry.  He denies this was his concern.  It is just difficult to know when to stop as he is still hoping that he could somehow improve.  He realizing that it may not be possible.  Healthcare agent Maria Dolores, who I spoke with on the phone also realizes that Art is likely dying.  She knows the next few days will be telling as to if he has further decline or improves.  She states that Art also knows that he is likely at the end of his life as they have spoken about it  frequently.     Palliative Symptoms:  # Pain severity the last 12 hours: low  # Dyspnea severity the last 12 hours: moderate  # Nausea severity the last 12 hours: none  # Anxiety severity the last 12 hours: none             Review of Systems:     Besides above, an additional  system ROS was reviewed and is unremarkable          Medications:     I have reviewed this patient's medication profile and medications during this hospitalization.    Noted meds:      - MEDICATION INSTRUCTIONS for Dialysis Patients -   Does not apply See Admin Instructions     sodium chloride 0.9%  250 mL Intravenous Once in dialysis/CRRT     apixaban ANTICOAGULANT  2.5 mg Oral BID     famotidine  10 mg Oral Daily     gabapentin  100 mg Oral TID     guaiFENesin  600 mg Oral BID     insulin aspart  1-7 Units Subcutaneous TID AC     insulin aspart  1-5 Units Subcutaneous At Bedtime     levothyroxine  50 mcg Oral Daily     lidocaine  2 patch Transdermal Q24H     lidocaine   Transdermal Q8H     multivitamin RENAL  1 capsule Oral Daily     - MEDICATION INSTRUCTIONS -   Does not apply Once     pantoprazole  40 mg Oral QAM AC     pravastatin  40 mg Oral Daily     sodium chloride (PF)  3 mL Intracatheter Q8H   sodium chloride 0.9%, acetaminophen **OR** acetaminophen, glucose **OR** dextrose **OR** glucagon, hydrOXYzine, lidocaine (buffered or not buffered), - MEDICATION INSTRUCTIONS -, metoprolol, naloxone **OR** naloxone **OR** naloxone **OR** naloxone, ondansetron **OR** ondansetron, oxyCODONE, prochlorperazine **OR** prochlorperazine **OR** prochlorperazine, senna-docusate **OR** senna-docusate, sodium chloride (PF), - MEDICATION INSTRUCTIONS -             Physical Exam:   Temp: 97.4  F (36.3  C) Temp src: Axillary BP: 99/57 Pulse: 95   Resp: 20 SpO2: 95 % O2 Device: Nasal cannula Oxygen Delivery: 3 LPM  GENERAL:  Alert, fatigued, no distress, weak.  HEAD: Normocephalic atraumatic  SCLERA: Anicteric  EXTREMITIES: Warm; no edema  ABDOMEN:  Soft,  nontender  RESPIRATORY: Breathing appears labored with accessory muscle use.  CARDIOVASCULAR: RRR  NEUROLOGIC: Alert, answers questions, gives eye contact on occassion.  PSYCH: Calm, cooperative.           Data Reviewed:     Recent imaging reviewed, my comments on pertinents:   Results for orders placed or performed during the hospital encounter of 02/17/22   XR Chest Port 1 View    Impression    IMPRESSION: Stable cardiomegaly. Normal pulmonary vascularity. There may be mild atelectasis of the left base. Lungs otherwise clear. No visible pneumothorax. Right subclavian pacemaker with single lead at the right ventricle. Old rib fractures on the   left.   US Ext Arterial Venous Dialys Acs Graft    Impression    IMPRESSION:   1. Left radiocephalic fistula appears largely patent with recurrent  cephalic arch stenosis. This stenosis likely accounts for prolonged  bleeding after dialysis. Recommend diagnostic cystogram with potential  intervention.        MELISA MANLEY MD         SYSTEM ID:  [id number]   XR Chest Port 1 View    Impression    IMPRESSION: Single AP view of the chest was obtained. Left chest wall cardiac pacer lead is partially visualized. Stable enlargement of the cardiac silhouette and mild pulmonary vascular congestion. Left basilar/retrocardiac pulmonary opacities, could   represent small pleural effusion and associated basilar atelectasis/consolidation. No significant right pleural effusion. Right basilar pulmonary opacities, likely atelectasis. No significant pneumothorax.     Echocardiogram Complete   Result Value Ref Range    LVEF  25-30%      Lab Results   Component Value Date    WBC 9.6 02/24/2022    HGB 14.1 02/24/2022    HCT 45.4 02/24/2022     (L) 02/24/2022     02/24/2022    POTASSIUM 5.0 02/24/2022    CHLORIDE 103 02/24/2022    CO2 27 02/24/2022    BUN 67 (H) 02/24/2022    CR 5.57 (H) 02/24/2022    GLC 91 02/25/2022    DD 1.75 (H) 02/17/2022    NTBNPI >175,000 (H) 02/17/2022     TROPONIN 0.022 07/24/2021    TROPI 0.025 07/05/2021    AST 17 02/18/2022    ALT 27 02/18/2022    GGT 88 (H) 03/19/2021    ALKPHOS 157 (H) 02/18/2022    BILITOTAL 1.9 (H) 02/18/2022    INR 1.23 (H) 02/11/2022     Lab Results   Component Value Date    ALBUMIN 3.0 02/24/2022    ALBUMIN 2.9 03/19/2021

## 2022-02-25 NOTE — PLAN OF CARE
"ummary:  Acute on chronic hypoxic respiratory failure  COVID-19 infection. ESRD; Fall risk   DATE & TIME: 02/24/22 3911-8472  Cognitive Concerns/ Orientation : A&O x 4, forgetful, calm   BEHAVIOR & AGGRESSION TOOL COLOR: GREEN  CIWA SCORE: NA   ABNL VS/O2: VSS blood pressures soft On O2 at 1L NC, no need for pulse ox  MOBILITY: Ax2. Turn/repos q2h but pt refuses.   PAIN MANAGMENT: denies. Declined lidocaine patch  DIET: Renal diet. Carb count- ate minimal amount of dinner  BOWEL/BLADDER: Continent, No BM; no void this shift.   ABNL LAB: Creat 5.57. BG not checked for dinner due to dialysis,HS   DRAIN/DEVICES: R PIV SL, L Fistula WNL  TELEMETRY RHYTHM: Afib RVR; episode of run of SVT beat of 7; cardiology aware of earlier SVT, continuing to monitor  SKIN: Skin tear R wrist with steri strips on and covered,  scattered bruising/scabs.  TESTS/PROCEDURES: Dialysis today. (T-TH-Sat)   D/C DAY/GOALS/PLACE: Pend goals of care, pt wishes to get thru \"COVID' to see if his ESRD improves, not ready for comfort care.   OTHER IMPORTANT INFO: denies sob. chroninc NP cough per pt, receiving sched mucinex.  following, received holy communion (not this shift). Resting between cares. COVID/Special isolation continued, decadron completed. Midodrine to be given 30 minutes before hemodialysis    "

## 2022-02-25 NOTE — PROGRESS NOTES
Will plan on HD tomorrow.  Albumin prime and midodrine to support BP and maximize UF.     Marlo Tabor MD

## 2022-02-25 NOTE — PROGRESS NOTES
"02/25  3830 - 1344  Summary:  Acute on chronic hypoxic respiratory failure  COVID-19 infection. ESRD; Fall risk   Cognitive Concerns/ Orientation : A&O x 4, good sleep all night  BEHAVIOR & AGGRESSION TOOL COLOR: GREEN  CIWA SCORE: NA   ABNL VS/O2: VSS , BP 85/51 om R arm and 106/46 R leg  MOBILITY: Ax2. Refuse to be turn q2h  PAIN MANAGMENT: denies pain  DIET: Renal diet. Carb count  BOWEL/BLADDER: Continent, No BM, void overnigh. Urine red  ABNL LAB: , received 4 U rapid insuline  DRAIN/DEVICES: R PIV SL, L Fistula WNL  TELEMETRY RHYTHM: NSR  TESTS/PROCEDURES: NA  DDAY/GOALS/PLACE: Pend goals of care, pt wishes to get thru \"COVID' to see if his ESRD improves, not ready for comfort care.   OTHER IMPORTANT INFO: had L arm fistula.   COVID/Special isolation continued, decadron completed. Midodrine to be given 30 minutes before hemodialysis    "

## 2022-02-25 NOTE — PROGRESS NOTES
[facility]    Medicine Progress Note - Hospitalist Service    Date of Admission:  2/17/2022    Assessment & Plan            Westley Ness is a 76 year old male who was admitted on 2/17/2022 with hypoxia and worsened dyspnea.     Acute on chronic hypoxic respiratory failure  COVID-19 infection with pneumonia  -Respiratory failure likely secondary to Covid infection+/- recent L rib fractures with L atelectasis +/- fluid overload.  Diagnosed on on 2/11, fully vaccinated.  PTA on Eliquis so less likely PE.   CXR 2/19 with left basilar/retrocardiac pulmonary opacities, could be small pleural effusion and associated basilar atelectasis/consolidation. Right basilar pulmonary opacities.  -Oxygen requirement 1-3 L  - completed course of decadron, no remdesivir due to ESRD  -Patient will be considered COVID recovered on 3/4/22 as per infection control, continue precaution for now  - mucinex BID for wet cough     Goals of care  -Previous provider had extensive discussion about goals of care with patient, CODE STATUS changed to DNR/DNI.  Patient however still wants to continue with dialysis.  His blood pressure currently is an issue to continue dialysis as he may not tolerate.  Palliative care had also evaluated the patient 2/22.   -Cardiology reconsulted 2/24 due to patient's request.  Reiterated palliative care     Steroid induced hyperglycemia  -Hemoglobin A1c on 2/11 was 5.1  -Currently on medium resistance sliding scale insulin while on decadron  -Blood sugar over the last 24 hours 154-254  -Continue to monitor blood sugar for now, discontinue if blood sugar persistently less than 150      Chronic HFrEF  History of A. fib, ICD/PPM in place  Nonsustained V. tach  ischemic / arrhythmia. PTA on metoprolol/ lisinopril / imdur. Baseline BP low in the 100s. Last echo 03/21, EF 20-25%. ICD in place.   -Blood pressure has been soft/low during this hospital stay and patient not tolerating dialysis  -  hold metoprolol and mexitil, unable to tolerate due to low BPs  - New Afib with RVR. No dig due to ESRD. Holding metoprolol due to very low BP.  -Patient requested 2/22 14 beat v-tach. Mag/K was fine.  - continue telemetry monitoring  -Continue Eliquis  -Outpatient follow-up with cardiology in 1 month if goals of care remains restorative    Hematuria  Noted on 2/23. He makes very little urine at baseline. If ongoing, may need to consider stopping apixaban  - monitor     ESRD  Hypertension  Anemia of renal failure  Secondary hyperparathyroidism  Dialyzing for 18 yrs, L UE AVF with some bleeding issues. Baseline Dry wt 77.6, currently at 70.2.   - nephrology following  - now getting midodrine on dialysis days     Chronic Thrombocytopenia:   Hypothyroidism: Cont PTA synthroid.   HLD: Resume PTA Pravastatin 40mg.         Diet: Combination Diet Regular Diet; Renal Diet (dialysis)    DVT Prophylaxis: DOAC  Velarde Catheter: Not present  Central Lines: None  Cardiac Monitoring: ACTIVE order. Indication: Tachyarrhythmias, acute (48 hours)  Code Status: No CPR- Do NOT Intubate      Disposition Plan   Expected Discharge: 03/03/2022     Anticipated discharge location: home    Delays:            The patient's care was discussed with the Bedside Nurse and Patient.    Kat Da Silva MD  Hospitalist Service  [facility]  Securely message with the Vocera Web Console (learn more here)  Text page via [employer] Paging/Directory         Clinically Significant Risk Factors Present on Admission                    ______________________________________________________________________    Interval History   Patient reports feeling better today.  His blood pressure has been better.  Was waiting for his dialysis when I went to see him this morning.  He denies any new complaints.  He reiterated that he wanted to continue dialysis and also wanted to discuss with cardiologist about his medications.    Data reviewed today: I  reviewed all medications, new labs and imaging results over the last 24 hours.    Physical Exam   Vital Signs: Temp: 97.5  F (36.4  C) Temp src: Oral BP: 90/60 Pulse: 94   Resp: 18 SpO2: 100 % O2 Device: Nasal cannula Oxygen Delivery: 2.5 LPM  Weight: 159 lbs 13.34 oz  Exam:  Constitutional: Awake, alert and no distress. Appears comfortable  Head: Normocephalic. No masses, lesions, tenderness or abnormalities  ENT: ENT exam normal, no neck nodes or sinus tenderness  Cardiovascular: RRR.  no murmurs, no rubs or JVD  Respiratory:normal WOB,b/l equal air entry, no wheezes or crackles   Gastrointestinal: Abdomen soft, non-tender. BS normal. No masses, organomegaly  : Deferred  Extremities :no edema , no clubbing or cyanosis        Data   Recent Labs   Lab 02/24/22  1318 02/24/22  0842 02/24/22  0758 02/22/22  2232 02/22/22  1744 02/22/22  0904 02/22/22  0537 02/21/22  1324 02/21/22  1009 02/20/22  0859 02/20/22  0555 02/19/22  1205 02/19/22  1148 02/18/22  1211 02/18/22  0556   WBC  --   --  9.6  --   --   --   --   --  10.9  --  9.2  --   --    < > 9.1   HGB  --   --  14.1  --   --   --   --   --  14.3  --  13.3  --   --    < > 12.7*   MCV  --   --  109*  --   --   --   --   --  108*  --  109*  --   --    < > 112*   PLT  --   --  125*  --   --   --   --   --  148*  --  123*  --   --    < > 117*   NA  --   --  138  --   --   --  135  --   --   --   --   --  136  --  134   POTASSIUM  --   --  5.0  --  4.9  --  5.7*  --   --   --   --   --  4.8  --  4.5   CHLORIDE  --   --  103  --   --   --  105  --   --   --   --   --  103  --  102   CO2  --   --  27  --   --   --  25  --   --   --   --   --  27  --  25   BUN  --   --  67*  --   --   --  78*  --   --   --   --   --  50*  --  30   CR  --   --  5.57*  --   --   --  6.33*  --   --   --   --   --  4.95*  --  3.45*   ANIONGAP  --   --  8  --   --   --  5  --   --   --   --   --  6  --  7   CHRISTINE  --   --  8.8  --   --   --  8.7  --   --   --   --   --  8.2*  --  8.5   GLC  177* 154* 177*   < >  --    < > 197*   < >  --    < >  --    < > 207*   < > 86   ALBUMIN  --   --  3.0*  --   --   --  2.7*  --   --   --   --   --   --   --  3.0*   PROTTOTAL  --   --   --   --   --   --   --   --   --   --   --   --   --   --  6.0*   BILITOTAL  --   --   --   --   --   --   --   --   --   --   --   --   --   --  1.9*   ALKPHOS  --   --   --   --   --   --   --   --   --   --   --   --   --   --  157*   ALT  --   --   --   --   --   --   --   --   --   --   --   --   --   --  27   AST  --   --   --   --   --   --   --   --   --   --   --   --   --   --  17    < > = values in this interval not displayed.     No results found for this or any previous visit (from the past 24 hour(s)).  Medications     - MEDICATION INSTRUCTIONS -       - MEDICATION INSTRUCTIONS -         - MEDICATION INSTRUCTIONS for Dialysis Patients -   Does not apply See Admin Instructions     sodium chloride 0.9%  250 mL Intravenous Once in dialysis/CRRT     albumin human         apixaban ANTICOAGULANT  2.5 mg Oral BID     calcium gluconate  1 g Intravenous Once     famotidine  10 mg Oral Daily     gabapentin  100 mg Oral TID     guaiFENesin  600 mg Oral BID     insulin aspart  1-7 Units Subcutaneous TID AC     insulin aspart  1-5 Units Subcutaneous At Bedtime     levothyroxine  50 mcg Oral Daily     lidocaine  2 patch Transdermal Q24H     lidocaine   Transdermal Q8H     multivitamin RENAL  1 capsule Oral Daily     - MEDICATION INSTRUCTIONS -   Does not apply Once     pantoprazole  40 mg Oral QAM AC     pravastatin  40 mg Oral Daily     sodium chloride (PF)  3 mL Intracatheter Q8H

## 2022-02-25 NOTE — PROGRESS NOTES
Care Management Follow Up    Length of Stay (days): 8    Expected Discharge Date: 03/04/2022     Concerns to be Addressed: adjustment to diagnosis/illness     Patient plan of care discussed at interdisciplinary rounds: Yes    Anticipated Discharge Disposition: Skilled Nursing Facilty     Anticipated Discharge Services:    Anticipated Discharge DME:      Patient/family educated on Medicare website which has current facility and service quality ratings:    Education Provided on the Discharge Plan:    Patient/Family in Agreement with the Plan: yes    Referrals Placed by CM/SW:    Private pay costs discussed: Not applicable    Additional Information:  Phone call from pt's friend, Lynn, [phone]. She has documents from pt's insurance company that she would like to help him get notarized. Discussed possible ways to complete this, as pt is in isolation for Covid. Provided her with the phone number for a mobile notary and she decided to make arrangements for Monday.       CHRISTOPHER Welch, LGSW  [phone]  [facility]

## 2022-02-26 NOTE — PROGRESS NOTES
[facility]    Nephrology Progress Note     Assessment & Plan       75-year-old male with history of CAD, ICM with EF of 20-25%, ESRD, DM2, GERD, atrial flutter/fibrillation on chronic anticoagulation with apixaban, status post ICD placement, and hypertension re admitted with SOB    Following for ESRD management     GOALS OF CARE/CONSIDERATION OF STOPPING DIALYSIS: See my note 24Feb    CONSTIPATION: Will give dulcolax supp with 1/2 usual dose of miralax to limit fluid.    ACCESS BLEEDING: has had issues with prolonged bleeding post run. The fistula is very firm and does not collapse with raising the arm. Likely venous stenosis.     -16 gauge needles. May sacrifice a little blood flow,but that could be            beneficial.     2.  Anemia  3.  Metabolic Bone Disease              -Hectorol  4.  COVID 19  5.  Acute on chronic systolic heart failure exacerbation  6.  Hypotension     -Midodrine and albumin prime.      7. Goals of care. See above. Primary team notes and palliative care consult reviewed.   Is No CPR.           Brice Mccoy MD  [facility] - Nephrology  M:[phone]  P:[phone]  __________________________________________________________________        Interval History     Tolerated dialysis well. Actually achieved UF of 2kg. Other details of run reviewed. Is having constipation and is would like to try a medication/suppository.    Breathing comfortable right now. He is not having chest pain or need for NTG.        Date: 2/26/2022  Time: 3:59 PM    Data:  Pre Wt: 72.5 kg (159 lb 13.3 oz)   Desired Wt: 70.5 kg   Post Wt: 72.2 kg (159 lb 2.8 oz)  Weight change: 0.3 kg  Ultrafiltration - Post Run Net Total Removed (mL): 2000 mL  Vascular Access Status: patent  Dialyzer Rinse: Streaked, Light  Total Blood Volume Processed: 62 L Liters  Total Dialysis (Treatment) Time: 3.5 Hours          Temp: 97.7  F (36.5  C) Temp src: Oral BP: 98/47 Pulse: 102   Resp: 16 SpO2: 97  % O2 Device: Nasal cannula Oxygen Delivery: 3 LPM    Vitals:    02/20/22 0632 02/22/22 0200 02/24/22 0615   Weight: 71.3 kg (157 lb 3 oz) 72.3 kg (159 lb 4.8 oz) 72.5 kg (159 lb 13.3 oz)       10 point ROS performed and negative except as noted above.    Vital Signs with Ranges    Temp:  [97.4  F (36.3  C)-98.2  F (36.8  C)] 97.7  F (36.5  C)  Pulse:  [] 102  Resp:  [16-20] 16  BP: ()/(42-61) 98/47  SpO2:  [93 %-97 %] 97 %    I/O last 3 completed shifts:  In: 0   Out: 2000 [Other:2000]    Physical Exam    BP Readings from Last 10 Encounters:   02/26/22 98/47   02/15/22 101/58   01/18/22 116/60   12/21/21 134/77   12/16/21 110/63   12/03/21 (!) 126/93   07/24/21 130/59   07/05/21 137/70   04/13/21 100/62   04/02/21 106/66      GENERAL: Frail and chronically ill appearing.   HEENT:  Normocephalic. No gross abnormalities.  Pupils equal.  The mouth moist.    Neck The jugular venous pressure is not elevated.  CV: S1 S2 Soft heart tones.  RESP: Decreased breath sounds bilaterally with no audible crackle.   GI: Abdomen soft/nt/nd  MUSCULOSKELETAL: in legs. No edema  SKIN: no new lesions or rashes, dry to touch.  NEURO:  No overt deficit.  PSYCH: mood good, affect appropriate. Somewhat slow in responses.    Medications       - MEDICATION INSTRUCTIONS -           - MEDICATION INSTRUCTIONS for Dialysis Patients -   Does not apply See Admin Instructions     [Held by provider] apixaban ANTICOAGULANT  2.5 mg Oral BID     famotidine  10 mg Oral Daily     gabapentin  100 mg Oral TID     guaiFENesin  600 mg Oral BID     insulin aspart  1-7 Units Subcutaneous TID AC     insulin aspart  1-5 Units Subcutaneous At Bedtime     levothyroxine  50 mcg Oral Daily     lidocaine  2 patch Transdermal Q24H     lidocaine   Transdermal Q8H     midodrine  10 mg Oral Once per day on Tue Thu Sat     multivitamin RENAL  1 capsule Oral Daily     pantoprazole  40 mg Oral QAM AC     polyethylene glycol  8.5 g Oral Daily     pravastatin  40  mg Oral Daily     sodium chloride (PF)  3 mL Intracatheter Q8H       Data     UA RESULTS:  Recent Labs   Lab Test 08/29/18  1447   COLOR Yellow   APPEARANCE Cloudy   URINEGLC Negative   URINEBILI Small*   URINEKETONE Negative   SG 1.020   UBLD Large*   URINEPH 8.5*   PROTEIN >=300*   UROBILINOGEN 1.0   NITRITE Negative   LEUKEST Moderate*   RBCU 10-25*   WBCU >100*      BMP  Recent Labs   Lab 02/26/22  1245 02/26/22  0755 02/26/22  0210 02/25/22  2152 02/24/22  0842 02/24/22  0758 02/22/22  2232 02/22/22  1744 02/22/22  0904 02/22/22  0537   NA  --   --   --   --   --  138  --   --   --  135   POTASSIUM  --   --   --   --   --  5.0  --  4.9  --  5.7*   CHLORIDE  --   --   --   --   --  103  --   --   --  105   CHRISTINE  --   --   --   --   --  8.8  --   --   --  8.7   CO2  --   --   --   --   --  27  --   --   --  25   BUN  --   --   --   --   --  67*  --   --   --  78*   CR  --   --   --   --   --  5.57*  --   --   --  6.33*   GLC 85 102* 104* 114*   < > 177*   < >  --    < > 197*    < > = values in this interval not displayed.     [email or web address](phos:4)  CBC)  Recent Labs   Lab 02/24/22  0758 02/21/22  1009 02/20/22  0555   WBC 9.6 10.9 9.2   HGB 14.1 14.3 13.3   HCT 45.4 45.1 42.7   * 108* 109*   * 148* 123*     Lab Results   Component Value Date    ALBUMIN 3.0 02/24/2022    ALBUMIN 2.7 02/22/2022    ALBUMIN 3.0 02/18/2022    ALBUMIN 2.9 03/19/2021    ALBUMIN 3.4 03/17/2021    ALBUMIN 3.0 01/14/2021        Recent Labs   Lab 02/24/22  0758   HGB 14.1   HCT 45.4   *       No lab results found in last 7 days.    Invalid input(s): BILIRUBININDIRECT  No lab results found in last 7 days.  25 OH Vit D2   Date Value Ref Range Status   10/18/2020 <5 ug/L Final     25 OH Vit D3   Date Value Ref Range Status   10/18/2020 8 ug/L Final     25 OH Vit D total   Date Value Ref Range Status   10/18/2020 <13 (L) 20 - 75 ug/L Final     Comment:     Season, race, dietary intake, and treatment affect the  concentration of   25-hydroxy-Vitamin D. Values may decrease during winter months and increase   during summer months. Values 20-29 ug/L may indicate Vitamin D insufficiency   and values <20 ug/L may indicate Vitamin D deficiency.  This test was developed and its performance characteristics determined by the   [facility], [facility].   It has not been cleared or approved by the FDA. The laboratory is regulated   under CLIA as qualified to perform high-complexity testing. This test is used   for clinical purposes. It should not be regarded as investigational or for   research.       No results for input(s): PTHI in the last 168 hours.    Attestation:   I have reviewed today's relevant vital signs, notes, medications, labs and imaging.

## 2022-02-26 NOTE — PLAN OF CARE
"Goal Outcome Evaluation:    02/25  [id number]  Summary:  Acute on chronic hypoxic respiratory failure  COVID-19 infection. ESRD; Fall risk   Cognitive Concerns/ Orientation : A&O x 4,forgetful and lethargic at times  BEHAVIOR & AGGRESSION TOOL COLOR: GREEN  CIWA SCORE: NA   ABNL VS/O2: Tachy in low 100's at times.  SBP high 90's-low 100's.  Increased to 3L later this morning due to shortness of breath and 4L this afternoon to maintain >90%  MOBILITY: Ax2 and  Steady but pt refused to get up this shift.  Assist of 2 with positioning-refused to be turned at times  PAIN MANAGMENT: Tylenol given for generalized aches  DIET: Renal diet. Carb count-poor appetite  BOWEL/BLADDER: Continent, No BM, no urine output this shift, pt on Hemodialysis   ABNL LAB: BS 91 and 128 and 102  DRAIN/DEVICES: R PIV SL, L Fistula with dried drainage on dressing  TELEMETRY RHYTHM: Afib with CVR  TESTS/PROCEDURES: NA  DDAY/GOALS/PLACE: Pend goals of care, pt wishes to get thru \"COVID' to see if his ESRD improves, not ready for comfort care.   OTHER IMPORTANT INFO: has L arm fistula.  More weak today per pt and therapy.  Declined to get out of bed.  Shortness of breath at times with talking, dyspnea with exertion.  dialysis tomorrow      Plan of Care Reviewed With: patient                     "

## 2022-02-26 NOTE — PROGRESS NOTES
[facility]    Medicine Progress Note - Hospitalist Service        Date of Admission:  2/17/2022 12:33 AM    Assessment & Plan:   Westley Ness is a 76 year old male who was admitted on 2/17/2022 with hypoxia and worsened dyspnea.     Acute on chronic hypoxic respiratory failure  COVID-19 pneumonia  -Respiratory failure likely secondary to Covid infection+/- recent L rib fractures with L atelectasis +/- fluid overload.  -Diagnosed on on 2/11, fully vaccinated.  -PTA on Eliquis so less likely PE.  -CXR 2/19 with left basilar/retrocardiac pulmonary opacities, could be small pleural effusion and associated basilar atelectasis/consolidation.   -Patient will be considered COVID recovered on 3/4/22 as per infection control, continue precaution for now  -Oxygen requirement marginally up today at 4 L from 1-3 L yesterday  -completed course of decadron, no remdesivir due to ESRD  -Suspect some element of volume overload as he has not been able to tolerate dialysis well.  Hopefully dialysis today is going to help.      Chronic HFrEF  History of A. fib, ICD/PPM in place  Nonsustained V. tach  -PTA on metoprolol/ lisinopril / imdur. Baseline BP low in the 100s. Last echo 03/21, EF 20-25%. ICD in place.   -Blood pressure has been soft/low during this hospital stay and patient not tolerating dialysis  -hold metoprolol, lisinopril and isosorbide mononitrate, unable to tolerate due to low BPs  -continue telemetry monitoring  -Patient had recurrence of hematuria again, will hold Eliquis at least for another 24 hours and reassess.  -Cardiology has followed this hospital stay, per their assessment overall prognosis is quite guarded.  He has not been able to tolerate cardiomyopathy medication.  They have now signed off.  -Outpatient follow-up with cardiology in 1 month if goals of care remains restorative     Hematuria  Noted on 2/23. He makes very little urine at baseline.  -Recurrence overnight.  Hold Eliquis for  at least 24 hours     ESRD  Hypertension  Anemia of renal failure  Secondary hyperparathyroidism  -Dialyzing for 18 yrs, L UE AVF with some bleeding issues. Baseline Dry wt 77.6, currently at 70.2.   -nephrology following  -now getting midodrine on dialysis days  -Patient getting dialyzed this morning, hopefully can remove decent amount of fluid today as there is clinical concerns for volume overload.  Patient wants to continue dialysis as long as he can.    Steroid induced hyperglycemia  -Hemoglobin A1c on 2/11 was 5.1  -Currently on medium resistance sliding scale insulin while on decadron  -Continue to monitor blood sugar for now, discontinue if blood sugar persistently less than 150      Chronic Thrombocytopenia   Hypothyroidism: Cont PTA synthroid.   HLD: Resume PTA Pravastatin 40mg.     Goals of care  -Previous provider had extensive discussion about goals of care with patient, CODE STATUS changed to DNR/DNI. Patient however still wants to continue with dialysis.  His blood pressure currently is an issue to continue dialysis as he may not tolerate.  Palliative care also following.       Diet: Combination Diet Regular Diet; Renal Diet (dialysis)     DVT Prophylaxis: DOAC   Velarde Catheter: Not present  Code Status: No CPR- Do NOT Intubate     Disposition Plan    Expected Discharge: 03/04/2022     Anticipated discharge location: home    Delays:     Placement - TCU  Other (Add Comment)         Entered: Deondre Bautista MD 02/26/2022, 12:37 PM        Clinically Significant Risk Factors Present on Admission                      The patient's care was discussed with the Bedside Nurse and Patient.    Deondre Bautista MD  Hospitalist Service  [facility]  Text Page 7AM-6PM  Securely message with the Vocera Web Console (learn more here)  Text page via [email or web address] Paging/Directory    ______________________________________________________________________    Interval History   Oxygenation marginally  "worse at 4 L.  Patient getting dialyzed this morning.  Denies nausea or vomiting.  No chest pain.  Afebrile.    Data reviewed today: I reviewed all medications, new labs and imaging results over the last 24 hours. I personally reviewed no images or EKG's today.    Physical Exam   Vital signs:  Temp: 98.1  F (36.7  C) Temp src: Oral BP: 90/55 Pulse: 95   Resp: 18 SpO2: 94 % O2 Device: Nasal cannula Oxygen Delivery: 4 LPM   Weight: 72.5 kg (159 lb 13.3 oz)  Estimated body mass index is 24.3 kg/m  as calculated from the following:    Height as of 2/12/22: 1.727 m (5' 8\").    Weight as of this encounter: 72.5 kg (159 lb 13.3 oz).      Wt Readings from Last 2 Encounters:   02/24/22 72.5 kg (159 lb 13.3 oz)   02/14/22 75.9 kg (167 lb 5.3 oz)       Gen: AAOX3, NAD  Resp: Coarse breath sounds bilaterally, normal effort of breathing  CVS: RRR, no murmur  Abd/GI: Soft, non-tender. BS- normoactive.   Skin: Warm, dry no rashes  MSK: Trace pedal edema  Neuro- CN- intact. No focal deficits.      Data   Recent Labs   Lab 02/26/22  0755 02/26/22  0210 02/25/22  2152 02/24/22  0842 02/24/22  0758 02/22/22  2232 02/22/22  1744 02/22/22  0904 02/22/22  0537 02/21/22  1324 02/21/22  1009 02/20/22  0859 02/20/22  0555   WBC  --   --   --   --  9.6  --   --   --   --   --  10.9  --  9.2   HGB  --   --   --   --  14.1  --   --   --   --   --  14.3  --  13.3   MCV  --   --   --   --  109*  --   --   --   --   --  108*  --  109*   PLT  --   --   --   --  125*  --   --   --   --   --  148*  --  123*   NA  --   --   --   --  138  --   --   --  135  --   --   --   --    POTASSIUM  --   --   --   --  5.0  --  4.9  --  5.7*  --   --   --   --    CHLORIDE  --   --   --   --  103  --   --   --  105  --   --   --   --    CO2  --   --   --   --  27  --   --   --  25  --   --   --   --    BUN  --   --   --   --  67*  --   --   --  78*  --   --   --   --    CR  --   --   --   --  5.57*  --   --   --  6.33*  --   --   --   --    ANIONGAP  --   --   --  "  --  8  --   --   --  5  --   --   --   --    CHRISTINE  --   --   --   --  8.8  --   --   --  8.7  --   --   --   --    * 104* 114*   < > 177*   < >  --    < > 197*   < >  --    < >  --    ALBUMIN  --   --   --   --  3.0*  --   --   --  2.7*  --   --   --   --     < > = values in this interval not displayed.       No results found for this or any previous visit (from the past 24 hour(s)).  Medications     - MEDICATION INSTRUCTIONS -       - MEDICATION INSTRUCTIONS -         - MEDICATION INSTRUCTIONS for Dialysis Patients -   Does not apply See Admin Instructions     sodium chloride 0.9%  250 mL Intravenous Once in dialysis/CRRT     apixaban ANTICOAGULANT  2.5 mg Oral BID     famotidine  10 mg Oral Daily     gabapentin  100 mg Oral TID     guaiFENesin  600 mg Oral BID     insulin aspart  1-7 Units Subcutaneous TID AC     insulin aspart  1-5 Units Subcutaneous At Bedtime     levothyroxine  50 mcg Oral Daily     lidocaine  2 patch Transdermal Q24H     lidocaine   Transdermal Q8H     midodrine  10 mg Oral Once per day on Tue Thu Sat     multivitamin RENAL  1 capsule Oral Daily     - MEDICATION INSTRUCTIONS -   Does not apply Once     - MEDICATION INSTRUCTIONS -   Does not apply Once     pantoprazole  40 mg Oral QAM AC     pravastatin  40 mg Oral Daily     sodium chloride (PF)  3 mL Intracatheter Q8H               1}

## 2022-02-26 NOTE — PLAN OF CARE
"Goal Outcome Evaluation:    Plan of Care Reviewed With: patient           02/25/22-2/26/22 [id number]  Summary:  Acute on chronic hypoxic respiratory failure  COVID-19 infection. ESRD; Fall risk   Cognitive Concerns/ Orientation : A&O x 4,forgetful and lethargic at times  BEHAVIOR & AGGRESSION TOOL COLOR: GREEN  CIWA SCORE: NA   ABNL VS/O2: Tachy in low 100's at times.  SBP high 90's-low 100's. 4L O2 sats mid 90s  MOBILITY: Ax2 and  Steady but pt refused to get up this shift. Assist of 2 with positioning-refused to be turned at times  PAIN MANAGMENT: Denies  DIET: Renal diet. Carb count-poor appetite  BOWEL/BLADDER: Continent, No BM, no urine output this shift, pt on Hemodialysis   ABNL LAB: /104  DRAIN/DEVICES: R PIV SL, L Fistula with dried drainage on dressing  TELEMETRY RHYTHM: Afib with CVR  TESTS/PROCEDURES: NA  DDAY/GOALS/PLACE: Pend goals of care, pt wishes to get thru \"COVID' to see if his ESRD improves, not ready for comfort care.   OTHER IMPORTANT INFO: has L arm fistula.  More weak today per pt and therapy.  Declined to get out of bed.  Shortness of breath at times with talking, dyspnea with exertion. Dialysis today            "

## 2022-02-26 NOTE — PROGRESS NOTES
Potassium   Date Value Ref Range Status   02/24/2022 5.0 3.4 - 5.3 mmol/L Final   07/05/2021 5.3 3.4 - 5.3 mmol/L Final     Hemoglobin   Date Value Ref Range Status   02/24/2022 14.1 13.3 - 17.7 g/dL Final   07/05/2021 12.0 (L) 13.3 - 17.7 g/dL Final     Creatinine   Date Value Ref Range Status   02/24/2022 5.57 (H) 0.66 - 1.25 mg/dL Final   07/05/2021 5.03 (H) 0.66 - 1.25 mg/dL Final     Urea Nitrogen   Date Value Ref Range Status   02/24/2022 67 (H) 7 - 30 mg/dL Final   07/05/2021 39 (H) 7 - 30 mg/dL Final     Sodium   Date Value Ref Range Status   02/24/2022 138 133 - 144 mmol/L Final   07/05/2021 140 133 - 144 mmol/L Final     INR   Date Value Ref Range Status   02/11/2022 1.23 (H) 0.85 - 1.15 Final   10/15/2020 1.31 (H) 0.86 - 1.14 Final       DIALYSIS PROCEDURE NOTE  Hepatitis status of previous patient on machine log was checked and verified ok to use with this patients hepatitis status.  Patient dialyzed for 3.5 hrs. on a K2 bath with a net fluid removal of  2L.  A BFR of 300-350 ml/min was obtained via a upper LAVF using 16 gauge needles.      The treatment plan was discussed with Dr. Tabor during the treatment.    Total heparin received during the treatment: 0 units.   Needle cannulation sites held x 10 min.     Meds  given: hecterol 12mcg, 250ml 5% human albumin prime, midodrine 10mg PO  Complications: none      Person educated: patient. Knowledge base: substiantial. Barriers to learning: none. Educated on purpose/use of albuminprime via verbal mode. patient verbalized understanding. Pt prefers verbal education style.     ICEBOAT? Timeout performed pre-treatment  I: Patient was identified using 2 identifiers  C:  Consent Signed Yes  E: Equipment preventative maintenance is current and dialysis delivery system OK to use  B: Hepatitis B Surface Antigen: Negative; Draw Date: 2/14/2022      Hepatitis B Surface Antibody: Susceptible; Draw Date: 2/14/2022  O: Dialysis orders present and complete prior to  treatment  A: Vascular access verified and assessed prior to treatment  T: Treatment was performed at a clinically appropriate time  ?: Patient was allowed to ask questions and address concerns prior to treatment  See flowsheet in EPIC for further details and post assessment.  Machine water alarm in place and functioning. Transducer pods intact and checked every 15min.   Pt ran bedside rm 615.  Chlorine/Chloramine water system checked every 4 hours.  Outpatient Dialysis at [facility]    Please remove patient dressing on AVF and AVG needle sites 24 hours after dialysis. If leaking occurs please apply a Band-Aid.

## 2022-02-27 NOTE — PLAN OF CARE
"Goal Outcome Evaluation:    Plan of Care Reviewed With: patient          2/26/22 [id number]  Summary:  Acute on chronic hypoxic respiratory failure  COVID-19 infection. ESRD; Fall risk   Cognitive Concerns/ Orientation : A&O x 4,forgetful at times  BEHAVIOR & AGGRESSION TOOL COLOR: GREEN  CIWA SCORE: NA   ABNL VS/O2: Tachy in low 100's at times.  SBP high 90's-low 100's. 4L O2 this morning, decreased to 3L after dialysis with sats mid 90s  MOBILITY: Ax2 and  Steady but pt refused to get up this shift. Assist of 2 with positioning-refused to be turned at times  PAIN MANAGMENT: Denies  DIET: Renal diet. Carb count-fair appetite today  BOWEL/BLADDER: , Small smear BM-supp given due to constipation. , pt on Hemodialysis.  Small incont dark urine in brief-Had some hematuria last 2 nights-eliquis on hold today   ABNL LAB:  and 85 and 135  DRAIN/DEVICES: R PIV SL, L Fistula   TELEMETRY RHYTHM: Afib with CVR  TESTS/PROCEDURES: NA  DDAY/GOALS/PLACE: Pend goals of care, pt wishes to get thru \"COVID' to see if his ESRD improves, not ready for comfort care.   OTHER IMPORTANT INFO: has L arm fistula.  More awake today after dialysis, appetite improved.  2L removed at dialysis and tolerated well.  No results from suppository yet, also given Miralax                 "

## 2022-02-27 NOTE — PROGRESS NOTES
Pt stated chest pain and tightness decreased from 2/10 to <1/10 after Tums admin. Nitroglycerin not given due to order parameters not being met (BP high 80s-low 90s). Will continue to monitor.

## 2022-02-27 NOTE — PROGRESS NOTES
[facility]    Nephrology Progress Note     Assessment & Plan       75-year-old male with history of CAD, ICM with EF of 20-25%, ESRD, DM2, GERD, atrial flutter/fibrillation on chronic anticoagulation with apixaban, status post ICD placement, and hypertension re admitted with SOB    Following for ESRD management     GOALS OF CARE/CONSIDERATION OF STOPPING DIALYSIS: See below in Interval History. In summary, Bill is leaning towards hospice and stopping dialysis. We are getting to the point where it will be not of benefit and not safe to do proper treatments.                -Appreciate ongoing palliative care consultation, and await their              assessment and recs tomorrow.    HYPOTENSION: Remain in a situation where administration of fluids for BP not tolerated due to cardiac function. Is mostly not symptomatic in the high 70s and low 80s               -Would hold of treating hypotension with fluids at this point.             -Can consider midodrine on off dialysis days, but               If tolerating the low blood pressures and is alert would tolerate.    ACCESS BLEEDING: Less of a problem with smaller needles. The fistula is very firm and does not collapse with raising the arm. Likely venous stenosis.     -16 gauge needles.      2.  Anemia-controlled without OSBALDO.  3.  Metabolic Bone Disease              -Hectorol  4.  COVID 19  5.  Acute on chronic systolic heart failure exacerbation  6.  Hypotension     -Midodrine and albumin prime.      7. Goals of care. See above. Primary team notes and palliative care consult reviewed.   Is No CPR.           Brice Mccoy MD  [facility] - Nephrology  M:[phone]  P:[phone]  __________________________________________________________________       Interval History     Today has had blood pressure in the 70-80s systolic. Has not had symptoms above his baseline weakness and DACOSTA.     Dialysis did achieve 2 kg off yesterday.  "    Bill asked more about what it is like to stop doing dialysis again. I reviewed that the mental status becomes more and more depressed  Until the person lapses into a coma. We treat symptoms like nause and itching and try to avoid fluid overload. I did tell him that symptoms like dyspnea may be treated with medications like morphine and said that may depress the alertness further. He said, \"I'm OK with that.\"    He said to me, \"I see it like I'm at the point where either I'll just go, or you and your colleagues will come to me and say, 'We're not able to do it anymore.'\"    He continues to lean towards stopping dialysis and hospice. He says he would have to have hospice in a facility.     Stooled yesterday several times.    Temp: 97.9  F (36.6  C) Temp src: Axillary BP: 107/66 Pulse: 89   Resp: 16 SpO2: 97 % O2 Device: Nasal cannula Oxygen Delivery: 4 LPM    Vitals:    02/20/22 0632 02/22/22 0200 02/24/22 0615   Weight: 71.3 kg (157 lb 3 oz) 72.3 kg (159 lb 4.8 oz) 72.5 kg (159 lb 13.3 oz)       10 point ROS performed and negative except as noted above.    Vital Signs with Ranges    Temp:  [97.6  F (36.4  C)-97.9  F (36.6  C)] 97.9  F (36.6  C)  Pulse:  [] 89  Resp:  [16-20] 16  BP: ()/(42-66) 107/66  SpO2:  [93 %-97 %] 97 %    No intake/output data recorded.    Physical Exam    BP Readings from Last 10 Encounters:   02/27/22 107/66   02/15/22 101/58   01/18/22 116/60   12/21/21 134/77   12/16/21 110/63   12/03/21 (!) 126/93   07/24/21 130/59   07/05/21 137/70   04/13/21 100/62   04/02/21 106/66      GENERAL: Frail and chronically ill appearing. Fatigued but fairly alert and conversant. Attends to the conversation.  HEENT:  Normocephalic. No gross abnormalities. Pale  Neck The jugular venous pressure is mildly elevated.  CV: S1 S2 soft heart tones.  RESP: Decreased breath sounds bilaterally with no audible crackles today.   GI: Abdomen soft/nt/nd,   MUSCULOSKELETAL: Weak.  SKIN: Some increased turgor. " No edema. Skin wrinkled now on legs rather than edema.   NEURO:  No overt deficit.  PSYCH: mood somber.    Medications       - MEDICATION INSTRUCTIONS -           - MEDICATION INSTRUCTIONS for Dialysis Patients -   Does not apply See Admin Instructions     [Held by provider] apixaban ANTICOAGULANT  2.5 mg Oral BID     famotidine  10 mg Oral Daily     gabapentin  100 mg Oral TID     guaiFENesin  600 mg Oral BID     insulin aspart  1-7 Units Subcutaneous TID AC     insulin aspart  1-5 Units Subcutaneous At Bedtime     levothyroxine  50 mcg Oral Daily     lidocaine  2 patch Transdermal Q24H     lidocaine   Transdermal Q8H     midodrine  10 mg Oral Once per day on Tue Thu Sat     multivitamin RENAL  1 capsule Oral Daily     nitroGLYcerin  0.4 mg Sublingual Once     pantoprazole  40 mg Oral QAM AC     polyethylene glycol  8.5 g Oral Daily     pravastatin  40 mg Oral Daily     sodium chloride (PF)  3 mL Intracatheter Q8H       Data     UA RESULTS:  Recent Labs   Lab Test 08/29/18  1447   COLOR Yellow   APPEARANCE Cloudy   URINEGLC Negative   URINEBILI Small*   URINEKETONE Negative   SG 1.020   UBLD Large*   URINEPH 8.5*   PROTEIN >=300*   UROBILINOGEN 1.0   NITRITE Negative   LEUKEST Moderate*   RBCU 10-25*   WBCU >100*      BMP  Recent Labs   Lab 02/27/22  1112 02/27/22  0807 02/27/22  0321 02/26/22  2129 02/24/22  0842 02/24/22  0758 02/22/22  2232 02/22/22  1744 02/22/22  0904 02/22/22  0537   NA  --   --   --   --   --  138  --   --   --  135   POTASSIUM  --   --   --   --   --  5.0  --  4.9  --  5.7*   CHLORIDE  --   --   --   --   --  103  --   --   --  105   CHRISTINE  --   --   --   --   --  8.8  --   --   --  8.7   CO2  --   --   --   --   --  27  --   --   --  25   BUN  --   --   --   --   --  67*  --   --   --  78*   CR  --   --   --   --   --  5.57*  --   --   --  6.33*   * 117* 144* 144*   < > 177*   < >  --    < > 197*    < > = values in this interval not displayed.     [email or web address](phos:4)  CBC)  Recent  Labs   Lab 02/24/22  0758 02/21/22  1009   WBC 9.6 10.9   HGB 14.1 14.3   HCT 45.4 45.1   * 108*   * 148*     Lab Results   Component Value Date    ALBUMIN 3.0 02/24/2022    ALBUMIN 2.7 02/22/2022    ALBUMIN 3.0 02/18/2022    ALBUMIN 2.9 03/19/2021    ALBUMIN 3.4 03/17/2021    ALBUMIN 3.0 01/14/2021        Recent Labs   Lab 02/24/22  0758   HGB 14.1   HCT 45.4   *       No lab results found in last 7 days.    Invalid input(s): BILIRUBININDIRECT  No lab results found in last 7 days.  25 OH Vit D2   Date Value Ref Range Status   10/18/2020 <5 ug/L Final     25 OH Vit D3   Date Value Ref Range Status   10/18/2020 8 ug/L Final     25 OH Vit D total   Date Value Ref Range Status   10/18/2020 <13 (L) 20 - 75 ug/L Final     Comment:     Season, race, dietary intake, and treatment affect the concentration of   25-hydroxy-Vitamin D. Values may decrease during winter months and increase   during summer months. Values 20-29 ug/L may indicate Vitamin D insufficiency   and values <20 ug/L may indicate Vitamin D deficiency.  This test was developed and its performance characteristics determined by the   [facility].   It has not been cleared or approved by the FDA. The laboratory is regulated   under CLIA as qualified to perform high-complexity testing. This test is used   for clinical purposes. It should not be regarded as investigational or for   research.       No results for input(s): PTHI in the last 168 hours.    Attestation:   I have reviewed today's relevant vital signs, notes, medications, labs and imaging.

## 2022-02-27 NOTE — PROGRESS NOTES
hospitalist cross cover  Called by RN for substernal chest pressure 2/10.   Has been having coughing. Had suppository earlier with some diarrhea  Charge reviewed, followed by cardiology  Borderline sbp /-   On imdur  covid pneumonia  No clear change in pulmonary status.   Plan   ekg, troponin series to ensure no significant rise (has ckd), tums X 1. ntg X 1 now with hold parameters. Recheck bp.   Call MD if persists.   Jose R Torres MD

## 2022-02-27 NOTE — PROGRESS NOTES
[facility]    Medicine Progress Note - Hospitalist Service        Date of Admission:  2/17/2022 12:33 AM    Assessment & Plan:   Westley Ness is a 76 year old male who was admitted on 2/17/2022 with hypoxia and worsened dyspnea.     Acute on chronic hypoxic respiratory failure  COVID-19 pneumonia  -Respiratory failure likely secondary to Covid infection+/- recent L rib fractures with L atelectasis +/- fluid overload.  -Diagnosed on on 2/11, fully vaccinated.  -CXR 2/19 with left basilar/retrocardiac pulmonary opacities, could be small pleural effusion and associated basilar atelectasis/consolidation.   -Patient will be considered COVID recovered on 3/4/22 as per infection control, continue precaution for now  -Has been up and down between 2-4 L, partly due to Covid and partly due to volume given his issues with dialysis.  Please see below  -completed course of decadron, no remdesivir due to ESRD     Chronic HFrEF  History of A. fib, ICD/PPM in place  Nonsustained V. tach  -PTA on metoprolol/ lisinopril / imdur. Baseline BP low in the 100s. Last echo 03/21, EF 20-25%. ICD in place.   -Blood pressure has been soft/low during this hospital stay and patient not tolerating dialysis or any cardiac medication  -hold metoprolol, lisinopril and isosorbide mononitrate, unable to tolerate due to low BPs  -continue telemetry monitoring  -Hold Eliquis given hematuria on 2/26.  If no further issues can resume Eliquis in the next 24 hours.  Please see discussion below  -Cardiology has followed this hospital stay, per their assessment overall prognosis is quite guarded.  He has not been able to tolerate cardiomyopathy medication.  They have now signed off.  -Outpatient follow-up with cardiology in 1 month if goals of care remains restorative     Hematuria  -Noted on 2/23 and subsequently on 2/26  -Eliquis on hold for the last 24 hours.  If no further hematuria can resume tomorrow.  Patient still makes scant  urine.    ESRD  Hypertension  Anemia of renal failure  Secondary hyperparathyroidism  -Dialyzing for 18 yrs, L UE AVF with some bleeding issues. Baseline Dry wt 77.6, currently at 70.2.   -nephrology following  -Patient got dialyzed on 2/26 with removal of 2.5 L fluid.  However now having further issues with hypotension.  Please see below    Hypotension  -Patient has had ongoing problems with hypotension, therefore dialysis has been a challenge.  He has been getting midodrine on dialysis days.  2.5 L fluid was pulled out on dialysis on 2/26 now blood pressure has been in the 70s to 80s today.    -Largely asymptomatic from hypotension  -We will give midodrine 5 mg p.o. x1 now  -Avoid volume if possible as we removed 2-1/2 L with quite difficulty yesterday  -Challenging situation as options appear limited.  Will need to have ongoing discussion about goals of care.    Steroid induced hyperglycemia  -Hemoglobin A1c on 2/11 was 5.1  -Currently on medium resistance sliding scale insulin while on decadron  -Continue to monitor blood sugar for now, discontinue if blood sugar persistently less than 150      Chronic Thrombocytopenia   Hypothyroidism: Cont PTA synthroid.   HLD: Resume PTA Pravastatin 40mg.     Goals of care  -Previous provider had extensive discussion about goals of care with patient, CODE STATUS changed to DNR/DNI.  I had further discussion with him today about goals of care and expressed my concern that there are limited intervention that can be done for his hypotension.  Patient however still wants to continue with dialysis and current level of care.  If he is persistently hypotensive that will probably preclude dialysis moving forward.  Will await further inputs from nephrology.  We will also await further input from palliative care tomorrow.       Diet: Combination Diet Regular Diet; Renal Diet (dialysis)     DVT Prophylaxis: DOAC   Velarde Catheter: Not present  Code Status: No CPR- Do NOT Intubate    "  Disposition Plan    Expected Discharge: 03/04/2022     Anticipated discharge location: home    Delays:     Placement - TCU  Other (Add Comment)         Entered: Deondre Bautista MD 02/27/2022, 12:28 PM        Clinically Significant Risk Factors Present on Admission                      The patient's care was discussed with the Bedside Nurse and Patient.    Deondre Bautista MD  Hospitalist Service  [facility]  Text Page 7AM-6PM  Securely message with the Vocera Web Console (learn more here)  Text page via [employer] Paging/Directory    ______________________________________________________________________    Interval History   Patient hypotensive to the 70s.  Endorsed chest pain.  Received 1 dose of midodrine and apparently felt better.  Denies worsening dyspnea.  Hemodialysis completed yesterday with removal of 2.5 L fluid.      Data reviewed today: I reviewed all medications, new labs and imaging results over the last 24 hours. I personally reviewed no images or EKG's today.    Physical Exam   Vital signs:  Temp: 97.7  F (36.5  C) Temp src: Oral BP: (!) 73/45 Pulse: 85   Resp: 18 SpO2: 94 % O2 Device: Nasal cannula Oxygen Delivery: 4 LPM   Weight: 72.5 kg (159 lb 13.3 oz)  Estimated body mass index is 24.3 kg/m  as calculated from the following:    Height as of 2/12/22: 1.727 m (5' 8\").    Weight as of this encounter: 72.5 kg (159 lb 13.3 oz).      Wt Readings from Last 2 Encounters:   02/24/22 72.5 kg (159 lb 13.3 oz)   02/14/22 75.9 kg (167 lb 5.3 oz)       Gen: AAOX3, NAD  Resp: Coarse breath sounds bilaterally, normal effort of breathing  CVS: RRR, no murmur  Abd/GI: Soft, non-tender. BS- normoactive.   Skin: Warm, dry no rashes  MSK: Trace pedal edema  Neuro- CN- intact. No focal deficits.      Data   Recent Labs   Lab 02/27/22  1112 02/27/22  0807 02/27/22  0321 02/24/22  0842 02/24/22  0758 02/22/22  2232 02/22/22  1744 02/22/22  0904 02/22/22  0537 02/21/22  1324 02/21/22  1009 "   WBC  --   --   --   --  9.6  --   --   --   --   --  10.9   HGB  --   --   --   --  14.1  --   --   --   --   --  14.3   MCV  --   --   --   --  109*  --   --   --   --   --  108*   PLT  --   --   --   --  125*  --   --   --   --   --  148*   NA  --   --   --   --  138  --   --   --  135  --   --    POTASSIUM  --   --   --   --  5.0  --  4.9  --  5.7*  --   --    CHLORIDE  --   --   --   --  103  --   --   --  105  --   --    CO2  --   --   --   --  27  --   --   --  25  --   --    BUN  --   --   --   --  67*  --   --   --  78*  --   --    CR  --   --   --   --  5.57*  --   --   --  6.33*  --   --    ANIONGAP  --   --   --   --  8  --   --   --  5  --   --    CHRISTINE  --   --   --   --  8.8  --   --   --  8.7  --   --    * 117* 144*   < > 177*   < >  --    < > 197*   < >  --    ALBUMIN  --   --   --   --  3.0*  --   --   --  2.7*  --   --     < > = values in this interval not displayed.       No results found for this or any previous visit (from the past 24 hour(s)).  Medications     - MEDICATION INSTRUCTIONS -         - MEDICATION INSTRUCTIONS for Dialysis Patients -   Does not apply See Admin Instructions     [Held by provider] apixaban ANTICOAGULANT  2.5 mg Oral BID     famotidine  10 mg Oral Daily     gabapentin  100 mg Oral TID     guaiFENesin  600 mg Oral BID     insulin aspart  1-7 Units Subcutaneous TID AC     insulin aspart  1-5 Units Subcutaneous At Bedtime     levothyroxine  50 mcg Oral Daily     lidocaine  2 patch Transdermal Q24H     lidocaine   Transdermal Q8H     midodrine  10 mg Oral Once per day on Tue Thu Sat     multivitamin RENAL  1 capsule Oral Daily     nitroGLYcerin  0.4 mg Sublingual Once     pantoprazole  40 mg Oral QAM AC     polyethylene glycol  8.5 g Oral Daily     pravastatin  40 mg Oral Daily     sodium chloride (PF)  3 mL Intracatheter Q8H               1}

## 2022-02-27 NOTE — PROGRESS NOTES
MD Notification    Notified Person: MD    Notified Person Name: Brian    Notification Date/Time: 2/26/22 2148    Notification Interaction: Am[employer]    Purpose of Notification: Pt c/o of chest pain and tightness     Orders Received: Pending    Comments:

## 2022-02-27 NOTE — PROGRESS NOTES
MD Notification    Notified Person: MD    Notified Person Name:Dr Mccoy    Notification Date/Time:2/27/22  1300    Notification Interaction:Spoke with MD via phone    Purpose of Notification:Blood pressures mid to high 70-80 systolic.  Midodrine given.  Hospitalist suggested asking about giving any IVF for blood pressure.    Orders Received:No new orders.  No fluids as difficult due to dialysis.  Will be coming to see patient this afternoon.    Comments:

## 2022-02-27 NOTE — PLAN OF CARE
"Goal Outcome Evaluation:    Plan of Care Reviewed With: patient        Date & time: 2/26/22-2/27/22 9963-8082  Summary:  Acute on chronic hypoxic respiratory failure/ COVID  COVID-19 infection. ESRD; Fall risk   Cognitive Concerns/ Orientation : A&O x 4,forgetful at times  BEHAVIOR & AGGRESSION TOOL COLOR: GREEN  CIWA SCORE: NA   ABNL VS/O2: Tachy in low 100's at times.  SBP high 90's-low 100's. Increased O2 to 4L on oxymask  per pt request.  MOBILITY: Ax2 and  Steady but pt refused to get up this shift. Assist of 2 with positioning-refused to be turned at times  PAIN MANAGMENT: Denies  DIET: Renal diet. Carb count-fair appetite today  BOWEL/BLADDER: Multiple loose small BM this shift, no urine output this shift, pt on Hemodialysis. Pt had small amount of hematuria.  ABNL LAB: /144  DRAIN/DEVICES: R PIV SL, L Fistula   TELEMETRY RHYTHM: Afib with CVR  TESTS/PROCEDURES: NA  DDAY/GOALS/PLACE: Pend goals of care, pt wishes to get thru \"COVID' to see if his ESRD improves, not ready for comfort care.   OTHER IMPORTANT INFO: has L arm fistula.  More awake today after dialysis, appetite improved.  2L removed at dialysis and tolerated well.                 "

## 2022-02-28 NOTE — PLAN OF CARE
"Goal Outcome Evaluation:    Plan of Care Reviewed With: patient                   Date & time: 2/27/22-2/28/22 [id number]  Summary:  Acute on chronic hypoxic respiratory failure/ COVID  COVID-19 infection. ESRD; Fall risk   Cognitive Concerns/ Orientation : A&O x 4,forgetful at times  BEHAVIOR & AGGRESSION TOOL COLOR: GREEN  CIWA SCORE: NA   ABNL VS/O2: Tachy in low 100's at times.  Hypotensive with SBP 90's with denies chest heaviness. Increased O2 from 4L to 5L with sats in mid 90's.  MOBILITY: Total, Assist of 2 with positioning-refused to be turned at times  PAIN MANAGMENT: Denies  DIET: Renal diet. Carb count-fair appetite today  BOWEL/BLADDER: Scant incontinent urine output this shift x2 -bloody appearing on brief, pt on Hemodialysis. Eliquis currently on hold for hematuria.   ABNL LAB: /169  DRAIN/DEVICES: R PIV SL, L Fistula   TELEMETRY RHYTHM: Afib with CVR/RVR  TESTS/PROCEDURES: NA  DDAY/GOALS/PLACE: Pend goals of care, pt wishes to get thru \"COVID' to see if his ESRD improves, not ready for comfort care. Discussed again today with Dr. Marin involved and will see pt tomorrow.    OTHER IMPORTANT INFO: DACOSTA/Short of breath today, chest pain/heaviness off and on.  Weak. Per nephrology-ok for SBP>75 if pt asymptomatic.  Palliative following with ongoing goals of care.    "

## 2022-02-28 NOTE — PROGRESS NOTES
"SPIRITUAL HEALTH SERVICES  SPIRITUAL ASSESSMENT Progress Note  [facility]    REFERRAL SOURCE: palliative/follow-up    Brief follow-up phone visit in which Art asked to be visited in-person.    This afternoon, spent time with Art at his bedside.    Art considered his current goals of care, naming that he \"knows the end is near,\" but that he wants to try to dialysis \"one more time.\" He said that when he's told dialysis is no longer working, he'll know \"it's time.\" Art discussed his understanding of comfort care, sharing that he'd like to \"just drift off to sleep.\" In the space between now and when he makes that decision, he's taking comfort in \"being productive\" and getting his affairs in order. He said it's also meaningful for him to be able to speak to his friends on the phone, especially as he helps to prepare them for his death.    Art reflected on his wonderings about life after death and then we engaged in life review. Art named that it \"feels good to talk and share\" his stories. He recalled significant events and friendships in his life as he looks back on them now. I offered emotional and spiritual support through reflective listening, validation of his experience and emotions, and encouraging meaning-making both as he reflected on his life and considered his time ahead.    PLAN: Will continue to follow. Spiritual Health remains available, as needed.    Theresa Cope  Associate   Phone: [phone]   "

## 2022-02-28 NOTE — PROGRESS NOTES
Renal Medicine Chart Check      Last dialyzed 02/26/22  Comfortable in bed    Wants to continue dialysis at this point    Dialysis orders placed for am        Recent Labs   Lab 02/28/22  1218 02/28/22  1151   NA  --  140   POTASSIUM  --  4.4   CHLORIDE  --  106   CO2  --  22   ANIONGAP  --  12   * 140*   BUN  --  44*   CR  --  5.26*   GFRESTIMATED  --  11*   CHRISTINE  --  8.8           GBravo Granger    [facility]  [phone]

## 2022-02-28 NOTE — PROGRESS NOTES
[facility]    Medicine Progress Note - Hospitalist Service        Date of Admission:  2/17/2022 12:33 AM    Assessment & Plan:   Westley Ness is a 76 year old male with history of coronary artery disease, ischemic cardiomyopathy with EF of 20% on his last echo in 03/2021.  End-stage renal disease on hemodialysis Monday/Wednesday/Friday, diabetes mellitus type 2, GERD fibrillation, atrial flutter on chronic anticoagulation with apixaban, status post ICD placement, and hypertensionwho was admitted on 2/17/2022 with hypoxia and worsened dyspnea.     Acute hypoxic respiratory failure  COVID-19 pneumonia  -Respiratory failure likely secondary to Covid infection+/- recent L rib fractures with L atelectasis +/- fluid overload.  -Diagnosed on on 2/11, fully vaccinated, was admitted between 2/11 to 2/15 and get better and subsequently discharge, received Dexamethasone during that admission, re-admitted 2 days later with worsening hypoxia.   -CXR 2/19 with left basilar/retrocardiac pulmonary opacities, could be small pleural effusion and associated basilar atelectasis/consolidation.   He is still hypoxic, last dose of got 6 days of Dexamethasone during this stay and discontinue on 2/23, continue to have SOB, hypoxia and hypotensive, currently on 4-5 LPM of oxygen.   I will resume his Dexamethasone 6 mg IV today, CRP elevated and hypoxic.   IS, flutter and try to wean his oxygen down.  He does need dialysis with UF to improve volume status.     COVID precautions can be discontinue on 3/4/22 as per infection control, continue precaution for now, or if any worsening.    resume Dexamethasone for now, no remdesivir due to ESRD, IS< flutter.      Chronic HFrEF  History of A. fib, ICD/PPM in place  Nonsustained V. tach  -PTA on metoprolol/ lisinopril / imdur. Baseline BP low in the 100s. Last echo 03/21, EF 20-25%. ICD in place.   -Blood pressure has been soft/low during this hospital stay and patient not  tolerating dialysis or any cardiac medication  -hold metoprolol, lisinopril and isosorbide mononitrate, unable to tolerate due to low BPs  -continue telemetry monitoring  -Hold Eliquis given hematuria on 2/26, no significant Hematuria at this time, will resume his Eliquis from today, 2/28.   -Cardiology has followed this hospital stay, per their assessment overall prognosis is quite guarded.  He has not been able to tolerate cardiomyopathy medication.  They have now signed off.  -Outpatient follow-up with cardiology in 1 month if goals of care remains restorative    Hypotension:  Patient was on Prednisone 5 mg daily, was not resume after stopping the Dexamethasone,  Although is blood pressure were low before as well. Check Cortisol added to lab drawn this morning.   Resume Dexamethasone.   He is getting Midodrine 10 mg prior to dialysis will continue with that.   I will start him on Midodrine 2.5 mg TID for now, IV Dexamethasone and once off Dexamethasone need to  Start on oral Prednisone as he was taking at home.   Avoid fluid boluses.      Hematuria  -Noted on 2/23 and subsequently on 2/26  -no significant hematuria, hardly make any urine, resume Eliquis now.     ESRD  Hypertension  Anemia of renal failure  Secondary hyperparathyroidism  -On Dialysis for 18 yrs, Left UE AVF with some bleeding issues. Baseline Dry wt 77.6, currently at 70.2.   -nephrology following  -Patient got dialyzed on 2/26 with removal of 2.5 L fluid.    Patient need dialysis, Nephrology is following, patient has no plan to stop Dialysis at this time, confirm with the patient.       Steroid induced hyperglycemia  -Hemoglobin A1c on 2/11 was 5.1  -Currently on medium resistance sliding scale insulin while on decadron  -Continue to monitor blood sugar for now,     Chronic Thrombocytopenia   Hypothyroidism: Cont PTA synthroid.   HLD: continue PTA Pravastatin 40mg.     Goals of care  -Previous provider had extensive discussion about goals of care  "with patient, CODE STATUS changed to DNR/DNI.    Goals of care remain restorative at this time, he wants to continue with the dialysis and other treatment as well.        Diet: Combination Diet Regular Diet; Renal Diet (dialysis)     DVT Prophylaxis: DOAC   Velarde Catheter: Not present  Code Status: No CPR- Do NOT Intubate     Disposition Plan    Expected Discharge: 03/04/2022     Anticipated discharge location: home    Delays:     Placement - TCU  Other (Add Comment)         Entered: Nghia Cavanaugh MD 02/28/2022, 1:26 PM        Clinically Significant Risk Factors Present on Admission                      The patient's care was discussed with the Bedside Nurse and Patient.    Nghia Cavanaugh MD  [facility]  Contact information available via [facility] Paging/Directory    ______________________________________________________________________    Interval History   Patient seen and examine in his room, complaints of some SOB and pain all over, no fever, chills, chest pain, nausea, vomiting, headache, complaints of no appetite, feeling weak, fatigued and tired.     Overnight events reviewed. No other significant event.       Data reviewed today: I reviewed all medications, new labs and imaging results over the last 24 hours. I personally reviewed no images or EKG's today.    Physical Exam   Vital signs:  Temp: 97.8  F (36.6  C) Temp src: Axillary BP: 91/57 Pulse: 101   Resp: 18 SpO2: 98 % O2 Device: Nasal cannula Oxygen Delivery: 4 LPM   Weight: 72.5 kg (159 lb 13.3 oz)  Estimated body mass index is 24.3 kg/m  as calculated from the following:    Height as of 2/12/22: 1.727 m (5' 8\").    Weight as of this encounter: 72.5 kg (159 lb 13.3 oz).      Wt Readings from Last 2 Encounters:   02/24/22 72.5 kg (159 lb 13.3 oz)   02/14/22 75.9 kg (167 lb 5.3 oz)       Gen: Fatigued, lethargic, very pale, sleepy but able to woke up   Resp: Coarse breath sounds bilaterally, normal effort of breathing  CVS: RRR, no " murmur  Abd/GI: Soft, non-tender. BS- normoactive.   Skin: Warm, dry no rashes  MSK: Trace pedal edema  Neuro- CN- intact. No focal deficits.      Data   Recent Labs   Lab 02/28/22  1218 02/28/22  1151 02/28/22  0831 02/24/22  0842 02/24/22  0758 02/22/22  2232 02/22/22  1744 02/22/22  0904 02/22/22  0537   WBC  --  12.6*  --   --  9.6  --   --   --   --    HGB  --  13.8  --   --  14.1  --   --   --   --    MCV  --  109*  --   --  109*  --   --   --   --    PLT  --   --   --   --  125*  --   --   --   --    NA  --  140  --   --  138  --   --   --  135   POTASSIUM  --  4.4  --   --  5.0  --  4.9  --  5.7*   CHLORIDE  --  106  --   --  103  --   --   --  105   CO2  --  22  --   --  27  --   --   --  25   BUN  --  44*  --   --  67*  --   --   --  78*   CR  --  5.26*  --   --  5.57*  --   --   --  6.33*   ANIONGAP  --  12  --   --  8  --   --   --  5   CHRISTINE  --  8.8  --   --  8.8  --   --   --  8.7   * 140* 111*   < > 177*   < >  --    < > 197*   ALBUMIN  --   --   --   --  3.0*  --   --   --  2.7*    < > = values in this interval not displayed.       Recent Results (from the past 24 hour(s))   XR Chest Port 1 View    Narrative    XR CHEST PORT 1 VIEW  2/28/2022 9:27 AM       INDICATION: hypoxia  COMPARISON: 2/19/2022       Impression    IMPRESSION: Single lead cardiac defibrillator is unchanged.  Cardiomegaly with pulmonary vascular congestion. The left  hemidiaphragm is elevated. Probable trace pleural effusions. Bibasilar  atelectasis or infiltrate, somewhat previous.    HOANG NEGRON MD         SYSTEM ID:  [id number]     Medications     - MEDICATION INSTRUCTIONS -         - MEDICATION INSTRUCTIONS for Dialysis Patients -   Does not apply See Admin Instructions     apixaban ANTICOAGULANT  2.5 mg Oral BID     dexamethasone  6 mg Intravenous Q24H     famotidine  10 mg Oral Daily     gabapentin  100 mg Oral TID     guaiFENesin  600 mg Oral BID     insulin aspart  1-7 Units Subcutaneous TID AC     insulin aspart   1-5 Units Subcutaneous At Bedtime     levothyroxine  50 mcg Oral Daily     lidocaine  2 patch Transdermal Q24H     lidocaine   Transdermal Q8H     midodrine  10 mg Oral Once per day on Tue Thu Sat     midodrine  2.5 mg Oral TID w/meals     multivitamin RENAL  1 capsule Oral Daily     nitroGLYcerin  0.4 mg Sublingual Once     pantoprazole  40 mg Oral QAM AC     polyethylene glycol  8.5 g Oral Daily     pravastatin  40 mg Oral Daily     sodium chloride (PF)  3 mL Intracatheter Q8H               1}

## 2022-03-01 NOTE — PLAN OF CARE
Summary:  Acute on chronic hypoxic respiratory failure/ COVID  COVID-19 infection. ESRD; Fall risk   Cognitive Concerns/ Orientation : A&O x 4,forgetful at times  BEHAVIOR & AGGRESSION TOOL COLOR: GREEN  CIWA SCORE: NA   ABNL VS/O2: Tachy in low 100's at times.  Hypotensive with SBP 90's with denies chest heaviness. Increased O2 from 4L with sats in high 90's.  MOBILITY: Total, Assist of 2 with positioning-refused to be turned at times  PAIN MANAGMENT: Denies  DIET: Renal diet. Carb count-fair appetite today  ABNL LAB:   DRAIN/DEVICES: R PIV SL, L Fistula   TELEMETRY RHYTHM: Afib with CVR/RVR  TESTS/PROCEDURES: NA  DDAY/GOALS/PLACE: ESRD improves, not ready for comfort care.  Per nephrology-ok for SBP>75 if pt asymptomatic.  Palliative following with ongoing goals of care.On HD M/W/F.

## 2022-03-01 NOTE — PLAN OF CARE
Goal Outcome Evaluation:     Date & time: 2/28/22 1900- 1227  Summary:  Acute on chronic hypoxic respiratory failure/ COVID  COVID-19 infection. ESRD; Fall risk   Cognitive Concerns/ Orientation : A&O x 4,forgetful at times  BEHAVIOR & AGGRESSION TOOL COLOR: GREEN  CIWA SCORE: NA   ABNL VS/O2: Tachy in low 100's at times.  Hypotensive with SBP 80/90's. O2 at 4L with sats in high 90's.  MOBILITY: Total, Assist of 2 with positioning-refused to be turned at times  PAIN MANAGMENT: Denies  DIET: Renal diet. Carb count-fair appetite today  ABNL LAB: , 192  DRAIN/DEVICES: R PIV SL, L Fistula   TELEMETRY RHYTHM: Afib with CVR/RVR  TESTS/PROCEDURES: NA  DDAY/GOALS/PLACE: ESRD improves, not ready for comfort care.  Per nephrology-ok for SBP>75 if pt asymptomatic.  Palliative following with ongoing goals of care.On HD M/W/F.

## 2022-03-01 NOTE — PROGRESS NOTES
[facility]    Medicine Progress Note - Hospitalist Service        Date of Admission:  2/17/2022 12:33 AM    Assessment & Plan:   Westley Ness is a 76 year old male with history of coronary artery disease, ischemic cardiomyopathy with EF of 20% on his last echo in 03/2021.  End-stage renal disease on hemodialysis Monday/Wednesday/Friday, diabetes mellitus type 2, GERD fibrillation, atrial flutter on chronic anticoagulation with apixaban, status post ICD placement, and hypertensionwho was admitted on 2/17/2022 with hypoxia and worsened dyspnea.     Acute hypoxic respiratory failure  COVID-19 pneumonia  -Respiratory failure likely secondary to Covid infection+/- recent L rib fractures with L atelectasis +/- fluid overload.  -Diagnosed on on 2/11, fully vaccinated, was admitted between 2/11 to 2/15 and get better and subsequently discharge, received Dexamethasone during that admission, re-admitted 2 days later with worsening hypoxia.   -CXR 2/19 with left basilar/retrocardiac pulmonary opacities, could be small pleural effusion and associated basilar atelectasis/consolidation.   He is still hypoxic, last dose of got 6 days of Dexamethasone during this stay and discontinue on 2/23, continue to have SOB, hypoxia and hypotensive, currently on 4-5 LPM of oxygen.   Restarted him on Dexamethasone 6 mg IV today, CRP elevated and hypoxic.   IS, flutter and try to wean his oxygen down.  He does need dialysis with UF to improve volume status.     COVID precautions can be discontinue on 3/4/22 as per infection control, continue precaution for now, or if any worsening.   Restarted him on IV Dexamethasone on 2/28, with improvement in his symptoms, he is now on 4 LPM with 100% saturation, , no remdesivir due to ESRD, IS< flutter.   Try to wean his oxygen down and eventually off if he tolerates.      Chronic HFrEF  History of A. fib, ICD/PPM in place  Nonsustained V. tach  -PTA on metoprolol/ lisinopril / imdur.  Baseline BP low in the 100s. Last echo 03/21, EF 20-25%. ICD in place.   -Blood pressure has been soft/low during this hospital stay and patient not tolerating dialysis or any cardiac medication  -hold metoprolol, lisinopril and isosorbide mononitrate, unable to tolerate due to low BPs  -continue telemetry monitoring  -Hold Eliquis given hematuria on 2/26, no significant Hematuria at this time, will resume his Eliquis from today, 2/28.   -Cardiology has followed this hospital stay, per their assessment overall prognosis is quite guarded.  He has not been able to tolerate cardiomyopathy medication.  They have now signed off.  -Outpatient follow-up with cardiology in 1 month if goals of care remains restorative    Hypotension:  Patient was on Prednisone 5 mg daily, was not resume after stopping the Dexamethasone,  Although is blood pressure were low before as well. Check Cortisol come back slightly high,   Resume Dexamethasone.   He is getting Midodrine 10 mg prior to dialysis will continue with that.   Started him on Midodrine 2.5 mg TID for now, continue 10 mg prior to dialysis.  Start on oral Prednisone as he was taking at home, may be at higher doses and then wean it down.   Avoid fluid boluses. BP stable at this time.      Hematuria  -Noted on 2/23 and subsequently on 2/26  -no significant hematuria, hardly make any urine, resume Eliquis on 2/28    ESRD  Hypertension  Anemia of renal failure  Secondary hyperparathyroidism  -On Dialysis for 18 yrs, Left UE AVF with some bleeding issues. Baseline Dry wt 77.6, currently at 70.2.   -nephrology following  -Patient got dialyzed on 2/26 with removal of 2.5 L fluid.    Nephrology is following, likely dialysis today       Steroid induced hyperglycemia  -Hemoglobin A1c on 2/11 was 5.1  -Currently on medium resistance sliding scale insulin while on decadron  -Continue to monitor blood sugar for now, BS remain in good control.      Chronic Thrombocytopenia: stable and  improve at this time.   Hypothyroidism: Cont PTA synthroid.   HLD: continue PTA Pravastatin 40mg.     Goals of care  -Previous provider had extensive discussion about goals of care with patient, CODE STATUS changed to DNR/DNI.    Goals of care remain restorative at this time, he wants to continue with the dialysis and other treatment as well.        Overall improve  Will benefit from Dialysis today  Continue Dexamethasone IV for few days then will consider switching to oral prednisone with taper dosing.   PT and OT   Start weaning his oxygen now. He told me that he was in the process of getting home oxygen, so most likely  Need oxygen on discharge.     Diet: Combination Diet Regular Diet; Renal Diet (dialysis)     DVT Prophylaxis: DOAC   Velarde Catheter: Not present  Code Status: No CPR- Do NOT Intubate     Disposition Plan    Expected Discharge: 03/04/2022     Anticipated discharge location: home    Delays:     Placement - TCU  Other (Add Comment)         Entered: Nghia Cavanaugh MD 03/01/2022, 12:01 PM        Clinically Significant Risk Factors Present on Admission                      The patient's care was discussed with the Bedside Nurse and Patient.    Nghia Cavanaugh MD  [facility]  Contact information available via [facility] Paging/Directory    ______________________________________________________________________    Interval History   Patient seen and evaluated in his room today, feeling much better, breathing is improve, appetite slightly better, able to sleep well overnight, denies any pain, nausea, vomiting, headache, dizziness, abdominal pain or back pain at this time.     No other significant event overnight.       Data reviewed today: I reviewed all medications, new labs and imaging results over the last 24 hours. I personally reviewed no images or EKG's today.    Physical Exam   Vital signs:  Temp: 97.5  F (36.4  C) Temp src: Axillary BP: (!) 86/53 Pulse: 96   Resp: 16 SpO2: 93 % O2  "Device: Nasal cannula Oxygen Delivery: 4 LPM   Weight: 70.4 kg (155 lb 3.3 oz)  Estimated body mass index is 23.6 kg/m  as calculated from the following:    Height as of 2/12/22: 1.727 m (5' 8\").    Weight as of this encounter: 70.4 kg (155 lb 3.3 oz).      Wt Readings from Last 2 Encounters:   03/01/22 70.4 kg (155 lb 3.3 oz)   02/14/22 75.9 kg (167 lb 5.3 oz)       Gen: awake, alert, cooperative and in no distress.   Resp: Diminished air entry bilaterally, no crackles or wheezing today   CVS: RRR, no murmur  Abd/GI: Soft, non-tender. BS- normoactive.   Skin: Warm, dry no rashes  MSK: Trace pedal edema  Neuro- CN- intact. No focal deficits.      Data   Recent Labs   Lab 03/01/22  0842 03/01/22  0821 03/01/22  0158 02/28/22  1218 02/28/22  1151 02/24/22  0842 02/24/22  0758   WBC  --  10.3  --   --  12.6*  --  9.6   HGB  --  14.4  --   --  13.8  --  14.1   MCV  --  111*  --   --  109*  --  109*   PLT  --  145*  --   --  107*  --  125*   NA  --  137  --   --  140  --  138   POTASSIUM  --  5.2  --   --  4.4  --  5.0   CHLORIDE  --  108  --   --  106  --  103   CO2  --  20  --   --  22  --  27   BUN  --  58*  --   --  44*  --  67*   CR  --  6.03*  --   --  5.26*  --  5.57*   ANIONGAP  --  9  --   --  12  --  8   CHRISTINE  --  8.4*  --   --  8.8  --  8.8   * 146* 192*   < > 140*   < > 177*   ALBUMIN  --  2.4*  --   --   --   --  3.0*    < > = values in this interval not displayed.       No results found for this or any previous visit (from the past 24 hour(s)).  Medications     - MEDICATION INSTRUCTIONS -         - MEDICATION INSTRUCTIONS for Dialysis Patients -   Does not apply See Admin Instructions     apixaban ANTICOAGULANT  2.5 mg Oral BID     dexamethasone  6 mg Intravenous Q24H     famotidine  10 mg Oral Daily     gabapentin  100 mg Oral TID     guaiFENesin  600 mg Oral BID     insulin aspart  1-7 Units Subcutaneous TID AC     insulin aspart  1-5 Units Subcutaneous At Bedtime     levothyroxine  50 mcg Oral " Daily     lidocaine  2 patch Transdermal Q24H     lidocaine   Transdermal Q8H     midodrine  10 mg Oral Once per day on Tue Thu Sat     midodrine  2.5 mg Oral TID w/meals     multivitamin RENAL  1 capsule Oral Daily     nitroGLYcerin  0.4 mg Sublingual Once     pantoprazole  40 mg Oral QAM AC     pravastatin  40 mg Oral Daily     sodium chloride (PF)  3 mL Intracatheter Q8H

## 2022-03-01 NOTE — PROGRESS NOTES
Renal Medicine Note                                Westley Ness MRN# [id number]   Age: 76 year old YOB: 1945   Date of Admission: 2/17/2022 Hospital LOS: 12          Assessment/Plan:     76-year-old male with history of CAD, ICM with EF of 20-25%, ESRD, DM2, GERD, atrial flutter/fibrillation on chronic anticoagulation with apixaban, status post ICD placement, and hypertension re admitted with SOB        Following for ESRD management      1.  ESRD               -MWF [facility]               -AVF              -planned transition to [place] COVID unit ([facility]) will be re addressed   2.  Anemia  3.  Metabolic Bone Disease              -Hectorol  4.  COVID 19  5.  Acute on chronic systolic heart failure exacerbation  6.  Hypotension    -midodrine   -limits UF on dialysis complicating #5    5% albumin prime  TTHS schedule    Ongoing discussions regarding goals of care       Interval History:     Dialysis run parameters/orders reviewed     5% albumin prime  UF 2 liters as able  Midodrine pre run    Discussed with dialysis RN    IJ access  3 hours    Patient seen prior to initiation of run      ROS     GENERAL: NAD, No fever,chills  R: NEGATIVE for significant cough or SOB  CV: NEGATIVE for chest pain, palpitations  EXT: no change edema  ROS otherwise negative    Dialysis Parameters:     Vitals were reviewed  Patient Vitals for the past 8 hrs:   BP Temp Temp src Pulse Resp SpO2   03/01/22 1555 (!) 87/48 97.5  F (36.4  C) Axillary 97 16 98 %   03/01/22 1123 (!) 86/53 97.5  F (36.4  C) Axillary 96 16 93 %   03/01/22 0940 -- -- -- -- -- 92 %     I/O last 3 completed shifts:  In: 840 [P.O.:840]  Out: -     Vitals:    02/19/22 1337 02/20/22 0632 02/22/22 0200 02/24/22 0615   Weight: 72.3 kg (159 lb 6.3 oz) 71.3 kg (157 lb 3 oz) 72.3 kg (159 lb 4.8 oz) 72.5 kg (159 lb 13.3 oz)    03/01/22 0352   Weight: 70.4 kg (155 lb 3.3 oz)       Current Weight: 70.4  Dry Weight: 69 ?  Dialysis Temp: 36.5  C  Access  Device: IJ  Access Site: right  Dialyzer: Revaclear  Dialysis Bath: 2  Sodium Profile: n  UF Goal: 2  Blood Flow Rate (mL/min): 400  Total Treatment Time (hrs): 3  Heparin: Low dose as required      EPO dose: y  Zemplar: n  IV Fe: n      Medications and Allergies:     Reviewed      Physical Exam:       BP (!) 86/53 (BP Location: Right arm)   Pulse 96   Temp 97.5  F (36.4  C) (Axillary)   Resp 16   Wt 70.4 kg (155 lb 3.3 oz)   SpO2 93%   BMI 23.60 kg/m      GENERAL: resting  RESP: clear anteriorly  CV: RRR, normal S1 S2  ABDOMEN: S/NT, BS present  MS: trace edema  SKIN: catheter site clean without drainage    Data:       Recent Labs   Lab 03/01/22  1257 03/01/22  0842 03/01/22  0821   NA  --   --  137   POTASSIUM  --   --  5.2   CHLORIDE  --   --  108   CO2  --   --  20   ANIONGAP  --   --  9   *   < > 146*   BUN  --   --  58*   CR  --   --  6.03*   GFRESTIMATED  --   --  9*   CHRISTINE  --   --  8.4*    < > = values in this interval not displayed.         G Estevan Granger MD    [facility] - Nephrology  [phone]

## 2022-03-02 NOTE — PLAN OF CARE
Goal Outcome Evaluation:    Plan of Care Reviewed With: patient     Date & time: 3/1/22 3148-7440  Summary:  Acute on chronic hypoxic respiratory failure/ COVID  COVID-19 infection. ESRD; Fall risk   Cognitive Concerns/ Orientation : A&O x 4, forgetful at times  BEHAVIOR & AGGRESSION TOOL COLOR: GREEN  CIWA SCORE: NA   ABNL VS/O2: SBP 80s-90s, O2 between 2-4L, desat briefly to mid 80% on 2L, increased back to 4L, currently back on 2L sating 94% NC,  Other VSS, no signs of respiratory distress.   MOBILITY: Total, Assist of 2, refused to be turned at times, refused at get OOB. PT/OT following.   PAIN MANAGMENT: Denies  DIET: Renal diet. Carb count-fair appetite today  ABNL LAB: , 190, 207, Cr 6.03, CRP 67.5, phosphorus 5.2, Potassium 5.2,   DRAIN/DEVICES: R PIV SL, L Fistula +Bruit/thrill.   TELEMETRY RHYTHM: Afib with CVR  TESTS/PROCEDURES: Possible HD today.   DDAY/GOALS/PLACE: ESRD. Remains hypotensive SBP 80s/90s,  per nephrology-ok for SBP>75 if pt asymptomatic.

## 2022-03-02 NOTE — PROGRESS NOTES
[facility]  Palliative Care Daily Progress Note       Recommendations & Counseling       Goals of Care (see in depth discussion below): Restorative care at this time. Art has felt that he may have been at end of life during this hospital stay but is now starting to feel better. He wishes to continue HD as long as it is safe for him to do so.     Art is willing to go to TCU, SNF, [facility], or consider 24/7 nursing support at home. He has LTC insurance. He knows he cannot care for himself and will now need assistance with daily needs..    Palliative care will follow peripherally with intermittent chart checks and offer ongoing goals of care discussions and support as needed.     If more urgent needs are identified, please contact palliative care and we will see patient more urgently.  During regular M-F work hours (0621-9434) -call our group pager at [phone].     After regular work hours and on weekends/holidays, you can call our answering service at [phone].   Code status: No CPR / No Intubation    Advanced Care Planning:  ? Patient has a completed Health Care Directive: Yes, and on file.   ? HCA: Maria Dolores Pabon, Relationship:friend, Phone(s): [phone].  ? First Alternate: aMrsha Bush, friend:  Phone(s): [phone].       Case was reviewed with Dr Cavanaugh and CIELO Correia, [facility]  Contact information available via [facility] Paging/Directory      Attestation:  Total time on the floor involved in the patient's care: 35 minutes  Total time spent in counseling/care coordination: >50%     Assessments          Westley Ness is a 76 year old male with PMH significant for ischemic cardiomyopathy- EF 25%, s/p ICD, ESRD on HD MWF, DM2, GERD, atrial flutter/fibrillation on chronic anticoagulation with apixaban, and HTN. Recent admission to hospital on 2/11/22 with COVID-19 and discharged 2/15. He returned to hospital on 2/17 due to worsening SOB and O2  sats in the 70's (has been awaiting home O2 arrival but delivery had been delayed). He was found to have hypotension and CHF exacerbation and was admitted for continued cares. Palliative care was consulted for ongoing goals of care discussion due to very fragile medical condition.    Today, the patient was seen for:  Goals of care    Prognosis, Goals, or Advance Care Planning was addressed today with: Yes.  Mood, coping, and/or meaning in the context of serious illness were addressed today: Yes.  Summary/Comments: Art feels better today and he continues to work with his advisors regarding financial issues on several phone calls today. He enjoys meeting with our palliative care .            Interval History:     Chart review/discussion with unit or clinical team members:   Dialysis MWF.  Dr Cavanaugh has spoken with nephrology and reviewed how HD is benefiting Art currently; plans to continue treatments as long as this continues. He has been hesitant about PT and sometimes defers activities.     Per patient or family/caregivers today:  Art states he will have ongoing HD treatments as long as he feels good and the doctors will offer it to him. He is much more alert today speaking on phone frequently. When asked about PT he said it is difficult for him but he tries to do as much as he can. He is fearful that his chest hurts during PT but he cannot take his nitroglycerine due to hypotension. He is afraid he will have a heart attack. He wants to take things slow. I encouraged him to sit up in his bed as much as possible to get stronger.      Palliative Symptoms:  # Pain severity the last 12 hours: low  # Dyspnea severity the last 12 hours: none  # Nausea severity the last 12 hours: none  # Anxiety severity the last 12 hours: none             Review of Systems:     Besides above, an additional  system ROS was reviewed and is unremarkable          Medications:     I have reviewed this patient's medication profile and  medications during this hospitalization.    Noted meds:      - MEDICATION INSTRUCTIONS for Dialysis Patients -   Does not apply See Admin Instructions     apixaban ANTICOAGULANT  2.5 mg Oral BID     dexamethasone  6 mg Intravenous Q24H     famotidine  10 mg Oral Daily     gabapentin  100 mg Oral TID     guaiFENesin  600 mg Oral BID     insulin aspart  1-7 Units Subcutaneous TID AC     insulin aspart  1-5 Units Subcutaneous At Bedtime     levothyroxine  50 mcg Oral Daily     lidocaine  2 patch Transdermal Q24H     lidocaine   Transdermal Q8H     midodrine  10 mg Oral Once per day on Tue Thu Sat     midodrine  2.5 mg Oral TID w/meals     multivitamin RENAL  1 capsule Oral Daily     nitroGLYcerin  0.4 mg Sublingual Once     pantoprazole  40 mg Oral QAM AC     pravastatin  40 mg Oral Daily     sodium chloride (PF)  3 mL Intracatheter Q8H   acetaminophen **OR** acetaminophen, bisacodyl, glucose **OR** dextrose **OR** glucagon, hydrOXYzine, - MEDICATION INSTRUCTIONS -, metoprolol, naloxone **OR** naloxone **OR** naloxone **OR** naloxone, ondansetron **OR** ondansetron, oxyCODONE, prochlorperazine **OR** prochlorperazine **OR** prochlorperazine, senna-docusate **OR** senna-docusate, sodium chloride (PF)             Physical Exam:   Temp: 98.2  F (36.8  C) Temp src: Oral BP: 99/59 Pulse: 98   Resp: 18 SpO2: 91 % O2 Device: Nasal cannula Oxygen Delivery: 1 LPM  GENERAL:  Alert,  no distress, talking on phone.   HEAD: Normocephalic atraumatic  SCLERA: Anicteric  EXTREMITIES: Warm; no edema  ABDOMEN:  Soft, nontender  RESPIRATORY: Breathing appears relaxed and non labored.  NEUROLOGIC: Alert, answers questions, gives eye contact .  PSYCH: Calm, cooperative, engaged.           Data Reviewed:     Recent imaging reviewed, my comments on pertinents:   Results for orders placed or performed during the hospital encounter of 02/17/22   XR Chest Port 1 View    Impression    IMPRESSION: Stable cardiomegaly. Normal pulmonary  vascularity. There may be mild atelectasis of the left base. Lungs otherwise clear. No visible pneumothorax. Right subclavian pacemaker with single lead at the right ventricle. Old rib fractures on the   left.   US Ext Arterial Venous Dialys Acs Graft    Impression    IMPRESSION:   1. Left radiocephalic fistula appears largely patent with recurrent  cephalic arch stenosis. This stenosis likely accounts for prolonged  bleeding after dialysis. Recommend diagnostic cystogram with potential  intervention.        MELISA MANLEY MD         SYSTEM ID:  [id number]   XR Chest Port 1 View    Impression    IMPRESSION: Single AP view of the chest was obtained. Left chest wall cardiac pacer lead is partially visualized. Stable enlargement of the cardiac silhouette and mild pulmonary vascular congestion. Left basilar/retrocardiac pulmonary opacities, could   represent small pleural effusion and associated basilar atelectasis/consolidation. No significant right pleural effusion. Right basilar pulmonary opacities, likely atelectasis. No significant pneumothorax.     Echocardiogram Complete   Result Value Ref Range    LVEF  25-30%      Lab Results   Component Value Date    WBC 9.5 03/02/2022    HGB 13.7 03/02/2022    HCT 43.6 03/02/2022     (L) 03/02/2022     03/02/2022    POTASSIUM 4.0 03/02/2022    CHLORIDE 102 03/02/2022    CO2 30 03/02/2022    BUN 33 (H) 03/02/2022    CR 4.41 (H) 03/02/2022    GLC 73 03/02/2022    DD 1.75 (H) 02/17/2022    NTBNPI >175,000 (H) 02/17/2022    TROPONIN 0.022 07/24/2021    TROPI 0.025 07/05/2021    AST 17 02/18/2022    ALT 27 02/18/2022    GGT 88 (H) 03/19/2021    ALKPHOS 157 (H) 02/18/2022    BILITOTAL 1.9 (H) 02/18/2022    INR 1.23 (H) 02/11/2022     Lab Results   Component Value Date    ALBUMIN 3.0 02/24/2022    ALBUMIN 2.9 03/19/2021

## 2022-03-02 NOTE — PROGRESS NOTES
CLINICAL NUTRITION SERVICES - REASSESSMENT NOTE      Recommendations Ordered by Registered Dietitian (RD):   Ordered Magic Cup w/ meals @ dinner   Malnutrition: (2/25)  % Weight Loss:  > 7.5% in 3 months (severe malnutrition)  % Intake:  </= 50% for >/= 5 days (severe malnutrition)  Subcutaneous Fat Loss:  Deferred with COVID-19 precautions   Muscle Loss:  Deferred with COVID-19 precautions   Fluid Retention:  Mild to moderate per provider notes      Malnutrition Diagnosis: Severe malnutrition  In Context of:  Acute illness or injury with underlying chronic illness/disease      EVALUATION OF PROGRESS TOWARD GOALS   Diet:  Renal (dialysis)    Intake/Tolerance:   - % per flowsheet  - per chart review, pt requires tray set up   - 'appetite remain poor but trying to eat, denies any fever, chills, chest pain, nausea or vomiting at this time.'    - called pt per COVID-19 protocol. Has not been eating well the last few days. Not feeling hungry, forced eating, 'a little hungry this morning so I ordered an omelet'   - ate 3/4 cheese omelet w/ mushrooms, slice of bread  - likes the pound cake, cranberry juice, cookies  - previously eating turkey sandwiches for lunch, would eat all of it (cheese, lettuce, onion, usually haile)  - open to trying supplements, but concerned about fluid intake. Informed pt he is not currently on a fluid restriction. Would like to try magic cup.    NEW FINDINGS:   Vitals:    02/19/22 1337 02/20/22 0632 02/22/22 0200 02/24/22 0615   Weight: 72.3 kg (159 lb 6.3 oz) 71.3 kg (157 lb 3 oz) 72.3 kg (159 lb 4.8 oz) 72.5 kg (159 lb 13.3 oz)    03/01/22 0352   Weight: 70.4 kg (155 lb 3.3 oz)   Med: Nephrocaps  Lab: BUN 33 (H), Cr 4.41 (H), Phos 3.9 (WNL)  GI: Last BM 3/1  Skin: No edema  Renal: Dialysis MWF, tolerating    Per MD note 3/2:   'Overall improve, tolerating dialysis.   PT and OT to see him, out of bed to chair and walk  Baseline able to walk and living independently PTA   SW/CC for  "disposition.   Patient will be ready to discharge as early as 3/3 if have safe disposition.'    Per Palliative 3/1:   'Understands he \"may be at the end\" but would like to continue current cares and dialysis to see if he can regain some strength and improve.  States that he will stay on dialysis until it is no longer safe or offered by nephrology. He expresses that he feels \"much better today and my breathing is much improved.  I will keep going on as long as I can.\"'    ASSESSED NUTRITION NEEDS PER APPROVED PRACTICE GUIDELINES: (2/25)  Dosing Weight 72.5 kg (2/24)  Estimated Energy Needs: 9505-6048 kcals (25-30 Kcal/Kg)  Justification: maintenance  Estimated Protein Needs:  grams protein (1.2-1.5 g pro/Kg)  Justification: dialysis  Estimated Fluid Needs: per provider pending fluid status     Previous Goals:   Patient will consume % of nutritionally adequate meals TID vs start protein supplement or nutrition support   Evaluation: Not met    Previous Nutrition Diagnosis:   Inadequate oral intake related to suspect decreased appetite vs lack of motivation/interest in food as evidenced by only taking bites of food over the past 8 days of admission and 8% wt loss over the past 2 months   Evaluation: No change    CURRENT NUTRITION DIAGNOSIS  Inadequate oral intake related to suspect decreased appetite vs lack of motivation/interest in food as evidenced by only taking bites of food over the past 8 days of admission and 8% wt loss over the past 2 months     INTERVENTIONS  Recommendations / Nutrition Prescription  Renal (dialysis)  Magic Cup w/ dinner    Implementation  Medical Food Supplement - Ordered magic cup w/ dinner  Ordered pt lunch    Goals  Patient will consume % of nutritionally adequate meals TID vs start protein supplement or nutrition support     MONITORING AND EVALUATION:  Progress towards goals will be monitored and evaluated per protocol and Practice Guidelines    Afia Morrell  Dietetic " Intern

## 2022-03-02 NOTE — PROGRESS NOTES
Potassium   Date Value Ref Range Status   03/01/2022 5.2 3.4 - 5.3 mmol/L Final     Comment:     Specimen slightly hemolyzed, potassium may be falsely elevated.   07/05/2021 5.3 3.4 - 5.3 mmol/L Final   ]  Lab Results   Component Value Date    HGB 14.4 03/01/2022    HGB 12.0 07/05/2021     Weight: 70.4 kg (155 lb 3.3 oz)  POST WT  DIALYSIS PROCEDURE NOTE  Hepatitis status of previous patient on machine log was checked and ok to use with this patient hepatitis status.  Patient dialyzed for 3 hrs on a 2 K bath with a  net fluid removal of 0.8L.  A BFR of 350ml/min was obtained via L AVF.  Total heparin received during treatment:: 1400units.  Meds given:none Complications:hypotension   Procedure and ESRD teaching providede .  See flowsheet in EPIC for further details and post assessment. Transducer pods intact and checked every 15 mins.  Machine water alarm in place and functioning.  HEPATITIS B SURFACE ANTIGEN neg Date 2/14/22  HEPATITIS B SURFACE ANTIBODY maria dolores DATE 2/14/22  CHLORINE AND CHLORAMINES CHECK on WATER SYSTEM EVERY 4 hours.

## 2022-03-02 NOTE — PLAN OF CARE
Goal Outcome Evaluation:           Date & time: 3/1/22-3/2/22  Summary:  Acute on chronic hypoxic respiratory failure/ COVID  COVID-19 infection. ESRD; Fall risk   Cognitive Concerns/ Orientation : A&O x 4, forgetful at times  BEHAVIOR & AGGRESSION TOOL COLOR: GREEN  CIWA SCORE: NA   ABNL VS/O2: SBP 80s-90s, O2 between 2-4L, desat briefly to mid 80% on 2L, increased back to 4L, currently back on 2L sating 94% NC,  Other VSS, no signs of respiratory distress.   MOBILITY: Total, Assist of 2, refused to be turned at times, refuses to get OOB. PT/OT following.   PAIN MANAGMENT: Denies  DIET: Renal diet. Carb count-fair appetite today  ABNL LAB: , 109 Cr 6.03, CRP 67.5, phosphorus 5.2, Potassium 5.2,   DRAIN/DEVICES: R PIV SL, L Fistula +Bruit/thrill.   TELEMETRY RHYTHM: Afib with RVR  TESTS/PROCEDURES: Possible HD today.   DDAY/GOALS/PLACE: ESRD. Remains hypotensive SBP 80s/90s,  per nephrology-ok for SBP>75 if pt asymptomatic. Had dialysis during evening.

## 2022-03-02 NOTE — PROGRESS NOTES
Renal Medicine       Dialyzed 03/01/22   ml    Comfortable in bed  02 in place      Dialysis orders placed for am  May need to adjust schedule based on TCU placement        Recent Labs   Lab 03/02/22  1105 03/02/22  0820 03/02/22  0736   NA  --   --  138   POTASSIUM  --   --  4.0   CHLORIDE  --   --  102   CO2  --   --  30   ANIONGAP  --   --  6   GLC 73   < > 98   BUN  --   --  33*   CR  --   --  4.41*   GFRESTIMATED  --   --  13*   CHRISTINE  --   --  8.8    < > = values in this interval not displayed.           JUAREZ Granger    [facility]  [phone]

## 2022-03-02 NOTE — PROGRESS NOTES
[facility]    Medicine Progress Note - Hospitalist Service        Date of Admission:  2/17/2022 12:33 AM    Assessment & Plan:   Westley Ness is a 76 year old male with history of coronary artery disease, ischemic cardiomyopathy with EF of 20% on his last echo in 03/2021.  End-stage renal disease on hemodialysis Monday/Wednesday/Friday, diabetes mellitus type 2, GERD fibrillation, atrial flutter on chronic anticoagulation with apixaban, status post ICD placement, and hypertensionwho was admitted on 2/17/2022 with hypoxia and worsened dyspnea.     Acute hypoxic respiratory failure: now improved.  COVID-19 pneumonia  -Respiratory failure likely secondary to Covid infection+/- recent L rib fractures with L atelectasis +/- fluid overload.  -Diagnosed on on 2/11, fully vaccinated, was admitted between 2/11 to 2/15 and get better and subsequently discharge, received Dexamethasone during that admission, re-admitted 2 days later with worsening hypoxia.   -CXR 2/19 with left basilar/retrocardiac pulmonary opacities, could be small pleural effusion and associated basilar atelectasis/consolidation.   He is still hypoxic, last dose of got 6 days of Dexamethasone during this stay and discontinue on 2/23, continue to have SOB, hypoxia and hypotensive, currently on 4-5 LPM of oxygen.   Restarted him on Dexamethasone 6 mg IV today, CRP elevated and hypoxic.   IS, flutter and try to wean his oxygen down.  He does need dialysis with UF to improve volume status.     COVID precautions can be discontinue on 3/4/22 as per infection control, continue precaution for now, or if any worsening.   Restarted him on IV Dexamethasone on 2/28, with improvement in his symptoms, he is now on 1 LPM with 93 % saturation, , no remdesivir due to ESRD, IS< flutter.   Try to wean his oxygen down and eventually off if he tolerates. Overall improve, will give one more dose of dexamethasone tomorrow and then switch to oral Prednisone  with tapering dose to 5 mg daily his PTA dose.   Inflammatory markers are better now.      Chronic HFrEF  History of A. fib, ICD/PPM in place  Nonsustained V. tach  -PTA on metoprolol/ lisinopril / imdur. Baseline BP low in the 100s. Last echo 03/21, EF 20-25%. ICD in place.   -Blood pressure has been soft/low during this hospital stay and patient not tolerating dialysis or any cardiac medication  -hold metoprolol, lisinopril and isosorbide mononitrate, unable to tolerate due to low BPs  -continue telemetry monitoring  -Hold Eliquis given hematuria on 2/26, no significant Hematuria at this time, resume his Eliquis from today, 2/28.   -Cardiology has followed this hospital stay, per their assessment overall prognosis is quite guarded.  He has not been able to tolerate cardiomyopathy medication.  They have now signed off.  -Outpatient follow-up with cardiology in 1 month if goals of care remains restorative    Hypotension: improved now.   Patient was on Prednisone 5 mg daily, was not resume after stopping the Dexamethasone,  Although is blood pressure were low before as well. Check Cortisol come back slightly high,   Resume Dexamethasone.   He is getting Midodrine 10 mg prior to dialysis will continue with that.   Started him on Midodrine 2.5 mg TID for now, continue 10 mg prior to dialysis.  Overall remain stable, tolerating dialysis as well.      Hematuria  -Noted on 2/23 and subsequently on 2/26  -no significant hematuria, hardly make any urine, resume Eliquis on 2/28    ESRD  Hypertension  Anemia of renal failure  Secondary hyperparathyroidism  -On Dialysis for 18 yrs, Left UE AVF with some bleeding issues. Baseline Dry wt 77.6, currently at 70.2.   -nephrology following  -Patient got dialyzed on 2/26 with removal of 2.5 L fluid.    Nephrology is following, likely dialysis today       Steroid induced hyperglycemia  -Hemoglobin A1c on 2/11 was 5.1  -Currently on medium resistance sliding scale insulin while on  decadron  -Continue to monitor blood sugar for now, BS remain in good control.      Chronic Thrombocytopenia: stable and improve at this time.   Hypothyroidism: Cont PTA synthroid.   HLD: continue PTA Pravastatin 40mg.     Goals of care  -Previous provider had extensive discussion about goals of care with patient, CODE STATUS changed to DNR/DNI.    Goals of care remain restorative at this time, he wants to continue with the dialysis and other treatment as well.        Overall improve, tolerating dialysis.   PT and OT to see him, out of bed to chair and walk  Baseline able to walk and living independently PTA   SW/CC for disposition.   Patient will be ready to discharge as early as 3/3 if have safe disposition.        Diet: Combination Diet Regular Diet; Renal Diet (dialysis)     DVT Prophylaxis: DOAC   Velarde Catheter: Not present  Code Status: No CPR- Do NOT Intubate     Disposition Plan    Expected Discharge: 03/05/2022     Anticipated discharge location: home    Delays:     Placement - TCU  Other (Add Comment)         Entered: Nghia Cavanaugh MD 03/02/2022, 11:34 AM        Clinically Significant Risk Factors Present on Admission                      The patient's care was discussed with the Bedside Nurse and Patient.    Nghia Cavanaugh MD  [facility]  Contact information available via [facility] Paging/Directory    ______________________________________________________________________    Interval History   Patient feeling much better now, SOB improve, appetite remain poor but trying to eat, denies any fever, chills, chest pain, nausea or vomiting at this time.     No other significant event overnight.       Data reviewed today: I reviewed all medications, new labs and imaging results over the last 24 hours. I personally reviewed no images or EKG's today.    Physical Exam   Vital signs:  Temp: 97.7  F (36.5  C) Temp src: Oral BP: 99/55 Pulse: 76   Resp: 18 SpO2: 96 % O2 Device: Nasal cannula Oxygen  "Delivery: 1 LPM   Weight: 70.4 kg (155 lb 3.3 oz)  Estimated body mass index is 23.6 kg/m  as calculated from the following:    Height as of 2/12/22: 1.727 m (5' 8\").    Weight as of this encounter: 70.4 kg (155 lb 3.3 oz).      Wt Readings from Last 2 Encounters:   03/01/22 70.4 kg (155 lb 3.3 oz)   02/14/22 75.9 kg (167 lb 5.3 oz)       Gen: awake, alert, cooperative and in no distress.   Resp: Diminished air entry bilaterally, no crackles or wheezing today   CVS: RRR, no murmur  Abd/GI: Soft, non-tender. BS- normoactive.   Skin: Warm, dry no rashes  MSK: Trace pedal edema  Neuro- CN- intact. No focal deficits.      Data   Recent Labs   Lab 03/02/22  1105 03/02/22  0820 03/02/22  0736 03/01/22  0842 03/01/22  0821 02/28/22  1218 02/28/22  1151   WBC  --   --  9.5  --  10.3  --  12.6*   HGB  --   --  13.7  --  14.4  --  13.8   MCV  --   --  107*  --  111*  --  109*   PLT  --   --  132*  --  145*  --  107*   NA  --   --  138  --  137  --  140   POTASSIUM  --   --  4.0  --  5.2  --  4.4   CHLORIDE  --   --  102  --  108  --  106   CO2  --   --  30  --  20  --  22   BUN  --   --  33*  --  58*  --  44*   CR  --   --  4.41*  --  6.03*  --  5.26*   ANIONGAP  --   --  6  --  9  --  12   CHRISTINE  --   --  8.8  --  8.4*  --  8.8   GLC 73 96 98   < > 146*   < > 140*   ALBUMIN  --   --  2.7*  --  2.4*  --   --     < > = values in this interval not displayed.       No results found for this or any previous visit (from the past 24 hour(s)).  Medications     - MEDICATION INSTRUCTIONS -         - MEDICATION INSTRUCTIONS for Dialysis Patients -   Does not apply See Admin Instructions     apixaban ANTICOAGULANT  2.5 mg Oral BID     dexamethasone  6 mg Intravenous Q24H     famotidine  10 mg Oral Daily     gabapentin  100 mg Oral TID     guaiFENesin  600 mg Oral BID     insulin aspart  1-7 Units Subcutaneous TID AC     insulin aspart  1-5 Units Subcutaneous At Bedtime     levothyroxine  50 mcg Oral Daily     lidocaine  2 patch " Transdermal Q24H     lidocaine   Transdermal Q8H     midodrine  10 mg Oral Once per day on Tue Thu Sat     midodrine  2.5 mg Oral TID w/meals     multivitamin RENAL  1 capsule Oral Daily     nitroGLYcerin  0.4 mg Sublingual Once     pantoprazole  40 mg Oral QAM AC     pravastatin  40 mg Oral Daily     sodium chloride (PF)  3 mL Intracatheter Q8H

## 2022-03-02 NOTE — PROGRESS NOTES
"[facility]  Palliative Care Daily Progress Note       Recommendations & Counseling       Goals of Care (see in depth discussion below):    Discussed goals of care amidst very fragile condition, dialysis, weakness. Understands he \"may be at the end\" but would like to continue current cares and dialysis to see if he can regain some strength and improve.  States that he will stay on dialysis until it is no longer safe or offered by nephrology. He expresses that he feels \"much better today and my breathing is much improved.  I will keep going on as long as I can.\"    Palliative care will continue to follow and offer ongoing goals of care discussions and support.     Code status: No CPR / No Intubation    Advanced Care Planning:  ? Patient has a completed Health Care Directive: Yes, and on file.   ? HCA: Maria Dolores Pabon, Relationship:friend, Phone(s): [phone].  ? First Alternate: Marsha Bush, friend:  Phone(s): [phone].       Case was reviewed with Dr Bautsita and RN    Lou Correia, [facility]  Contact information available via [facility] Paging/Directory        Thank you for the opportunity to participate in the care of this patient and family. Our team: will continue to follow.     During regular M-F work hours ([id number]) -- if you are not sure who specifically to contact -- please contact us in [facility] Smart Web.     After regular work hours and on weekends/holidays, you can call our answering service at [phone].     Attestation:  Total time on the floor involved in the patient's care: 25 minutes  Total time spent in counseling/care coordination: >50%     Assessments          Westley Ness is a 76 year old male with PMH significant for ischemic cardiomyopathy- EF 25%, s/p ICD, ESRD on HD MWF, DM2, GERD, atrial flutter/fibrillation on chronic anticoagulation with apixaban, and HTN. Recent admission to hospital on 2/11/22 with COVID-19 and discharged " 2/15. He returned to hospital on 2/17 due to worsening SOB and O2 sats in the 70's (has been awaiting home O2 arrival but delivery had been delayed). He was found to have hypotension and CHF exacerbation and was admitted for continued cares. Palliative care was consulted for ongoing goals of care discussion due to very fragile medical condition.    Today, the patient was seen for:  Goals of care    Prognosis, Goals, or Advance Care Planning was addressed today with: Yes.  Mood, coping, and/or meaning in the context of serious illness were addressed today: Yes.  Summary/Comments:            Interval History:     Chart review/discussion with unit or clinical team members:   Dialysis MWF.  Tolerating yesterday okay.    Per patient or family/caregivers today:  Met with patient in room and checked in regarding comments he had made that he may only have 1 more hemodialysis run.  Art states that he is feeling much better today with his breathing and cannot commit to the 1 more dialysis.  He states that he will keep having dialysis as long as he feels good and can tolerate it.  As he is feeling better he would like to give himself time to see if he can continue on this trend.  Palliative care will continue to follow for goals of care in the days ahead.     Palliative Symptoms:  # Pain severity the last 12 hours: low  # Dyspnea severity the last 12 hours: none  # Nausea severity the last 12 hours: none  # Anxiety severity the last 12 hours: none             Review of Systems:     Besides above, an additional  system ROS was reviewed and is unremarkable          Medications:     I have reviewed this patient's medication profile and medications during this hospitalization.    Noted meds:      - MEDICATION INSTRUCTIONS for Dialysis Patients -   Does not apply See Admin Instructions     sodium chloride 0.9%  300 mL Hemodialysis Machine Once     albumin human  250 mL Intravenous Once in dialysis/CRRT     apixaban ANTICOAGULANT   2.5 mg Oral BID     dexamethasone  6 mg Intravenous Q24H     famotidine  10 mg Oral Daily     gabapentin  100 mg Oral TID     guaiFENesin  600 mg Oral BID     heparin  500 Units Hemodialysis Machine OR IV Push Once in dialysis/CRRT     insulin aspart  1-7 Units Subcutaneous TID AC     insulin aspart  1-5 Units Subcutaneous At Bedtime     levothyroxine  50 mcg Oral Daily     lidocaine  2 patch Transdermal Q24H     lidocaine   Transdermal Q8H     midodrine  10 mg Oral Once per day on Tue Thu Sat     midodrine  2.5 mg Oral TID w/meals     multivitamin RENAL  1 capsule Oral Daily     nitroGLYcerin  0.4 mg Sublingual Once     pantoprazole  40 mg Oral QAM AC     pravastatin  40 mg Oral Daily     sodium chloride (PF)  3 mL Intracatheter Q8H   sodium chloride 0.9%, acetaminophen **OR** acetaminophen, bisacodyl, glucose **OR** dextrose **OR** glucagon, hydrOXYzine, - MEDICATION INSTRUCTIONS -, metoprolol, naloxone **OR** naloxone **OR** naloxone **OR** naloxone, ondansetron **OR** ondansetron, oxyCODONE, prochlorperazine **OR** prochlorperazine **OR** prochlorperazine, senna-docusate **OR** senna-docusate, sodium chloride (PF)             Physical Exam:   Temp: 97.5  F (36.4  C) Temp src: Axillary BP: (!) 89/54 Pulse: 97   Resp: 16 SpO2: 95 % O2 Device: Nasal cannula Oxygen Delivery: 2 LPM  GENERAL:  Alert, fatigued, no distress, weak.  HEAD: Normocephalic atraumatic  SCLERA: Anicteric  EXTREMITIES: Warm; no edema  ABDOMEN:  Soft, nontender  RESPIRATORY: Breathing appears relaxed and non labored.  CARDIOVASCULAR: RRR  NEUROLOGIC: Alert, answers questions, gives eye contact .  PSYCH: Calm, cooperative.           Data Reviewed:     Recent imaging reviewed, my comments on pertinents:   Results for orders placed or performed during the hospital encounter of 02/17/22   XR Chest Port 1 View    Impression    IMPRESSION: Stable cardiomegaly. Normal pulmonary vascularity. There may be mild atelectasis of the left base. Lungs  otherwise clear. No visible pneumothorax. Right subclavian pacemaker with single lead at the right ventricle. Old rib fractures on the   left.   US Ext Arterial Venous Dialys Acs Graft    Impression    IMPRESSION:   1. Left radiocephalic fistula appears largely patent with recurrent  cephalic arch stenosis. This stenosis likely accounts for prolonged  bleeding after dialysis. Recommend diagnostic cystogram with potential  intervention.        MELISA MANLEY MD         SYSTEM ID:  [id number]   XR Chest Port 1 View    Impression    IMPRESSION: Single AP view of the chest was obtained. Left chest wall cardiac pacer lead is partially visualized. Stable enlargement of the cardiac silhouette and mild pulmonary vascular congestion. Left basilar/retrocardiac pulmonary opacities, could   represent small pleural effusion and associated basilar atelectasis/consolidation. No significant right pleural effusion. Right basilar pulmonary opacities, likely atelectasis. No significant pneumothorax.     Echocardiogram Complete   Result Value Ref Range    LVEF  25-30%      Lab Results   Component Value Date    WBC 10.3 03/01/2022    HGB 14.4 03/01/2022    HCT 47.4 03/01/2022     (L) 03/01/2022     03/01/2022    POTASSIUM 5.2 03/01/2022    CHLORIDE 108 03/01/2022    CO2 20 03/01/2022    BUN 58 (H) 03/01/2022    CR 6.03 (H) 03/01/2022     (H) 03/01/2022    DD 1.75 (H) 02/17/2022    NTBNPI >175,000 (H) 02/17/2022    TROPONIN 0.022 07/24/2021    TROPI 0.025 07/05/2021    AST 17 02/18/2022    ALT 27 02/18/2022    GGT 88 (H) 03/19/2021    ALKPHOS 157 (H) 02/18/2022    BILITOTAL 1.9 (H) 02/18/2022    INR 1.23 (H) 02/11/2022     Lab Results   Component Value Date    ALBUMIN 3.0 02/24/2022    ALBUMIN 2.9 03/19/2021

## 2022-03-03 NOTE — PLAN OF CARE
Goal Outcome Evaluation:      Date & time: 3/2/22-3/3/2022 [id number]  Summary:  Acute on chronic hypoxic respiratory failure/ COVID  COVID-19 infection. ESRD; Fall risk   Cognitive Concerns/ Orientation : A&O x 4, forgetful at times  BEHAVIOR & AGGRESSION TOOL COLOR: GREEN  CIWA SCORE: NA   ABNL VS/O2: BP 85/47 (denies symptoms), 129/59, 102/62 , O2 94%,97% on 1L NC, , other VSS, no signs of respiratory distress.   MOBILITY: Total, Assist of 2, turn/repo q2h,  pt reported he's reluctant because he started having chest pain last time he tried to get up, will try again today. PT/OT following.   PAIN MANAGMENT: Denies  DIET: Renal diet. Carb count-fair appetite today  ABNL LAB: ,202 Cr 4.41 (6.03), CRP 30.8 (67.5),   DRAIN/DEVICES: R PIV SL, Lt Fistula +Bruit/thrill present.   TELEMETRY RHYTHM: Afib with CVR  TESTS/PROCEDURES: HD tomorrow 3/3/22  DDAY/GOALS/PLACE: Possibly TCU in 2-3 days. SW following.   OTHER IMPORTANT INFO: Remains hypotensive SBP 80's-100s,  per nephrology-ok for SBP>75 if pt asymptomatic.

## 2022-03-03 NOTE — PROGRESS NOTES
[facility]    Medicine Progress Note - Hospitalist Service        Date of Admission:  2/17/2022 12:33 AM    Assessment & Plan:   Westley Ness is a 76 year old male with history of coronary artery disease, ischemic cardiomyopathy with EF of 20% on his last echo in 03/2021.  End-stage renal disease on hemodialysis Monday/Wednesday/Friday, diabetes mellitus type 2, GERD fibrillation, atrial flutter on chronic anticoagulation with apixaban, status post ICD placement, and hypertensionwho was admitted on 2/17/2022 with hypoxia and worsened dyspnea.     Acute hypoxic respiratory failure: now improved.  COVID-19 pneumonia  -Respiratory failure likely secondary to Covid infection+/- recent L rib fractures with L atelectasis +/- fluid overload.  -Diagnosed on on 2/11, fully vaccinated, was admitted between 2/11 to 2/15 and get better and subsequently discharge, received Dexamethasone during that admission, re-admitted 2 days later with worsening hypoxia.   -CXR 2/19 with left basilar/retrocardiac pulmonary opacities, could be small pleural effusion and associated basilar atelectasis/consolidation.   He is still hypoxic, last dose of got 6 days of Dexamethasone during this stay and discontinue on 2/23, continue to have SOB, hypoxia and hypotensive, currently on 4-5 LPM of oxygen.   Restarted him on Dexamethasone 6 mg IV today, CRP elevated and hypoxic.   IS, flutter and try to wean his oxygen down.  He does need dialysis with UF to improve volume status.     COVID precautions can be discontinue on 3/4/22 as per infection control, continue precaution for now, or if any worsening.   Restarted him on IV Dexamethasone on 2/28, with improvement in his symptoms, he is now on 1 LPM with 97 % saturation, , no remdesivir due to ESRD, IS< flutter.   Try to wean his oxygen down and eventually off if he tolerates. Overall improve now, stop IV Dexamethasone now and start on oral Prednisone 40 mg daily and taper 10 mg  every 4 day to his baseline of 5 mg.   Inflammatory markers improve, blood pressure improve and hypoxia improving, try to wean him off the oxygen at rest for now.      Chronic HFrEF  History of A. fib, ICD/PPM in place  Nonsustained V. tach  -PTA on metoprolol/ lisinopril / imdur. Baseline BP low in the 100s. Last echo 03/21, EF 20-25%. ICD in place.   -Blood pressure has been soft/low during this hospital stay and patient not tolerating dialysis or any cardiac medication  -hold metoprolol, lisinopril and isosorbide mononitrate, unable to tolerate due to low BPs  -continue telemetry monitoring  -Hold Eliquis given hematuria on 2/26, no significant Hematuria at this time, resume his Eliquis from today, 2/28.   -Cardiology has followed this hospital stay, per their assessment overall prognosis is quite guarded.  He has not been able to tolerate cardiomyopathy medication.  They have now signed off.  -Outpatient follow-up with cardiology in 1 month if goals of care remains restorative    Hypotension: improved now.   Patient was on Prednisone 5 mg daily, was not resume after stopping the Dexamethasone,  Although is blood pressure were low before as well. Check Cortisol come back slightly high,   Resume Dexamethasone.   He is getting Midodrine 10 mg prior to dialysis will continue with that.   Started him on Midodrine 2.5 mg TID for now, continue 10 mg prior to dialysis.  Overall remain stable, tolerating dialysis as well.      Hematuria  -Noted on 2/23 and subsequently on 2/26  -no significant hematuria, hardly make any urine, resume Eliquis on 2/28    ESRD  Hypertension  Anemia of renal failure  Secondary hyperparathyroidism  -On Dialysis for 18 yrs, Left UE AVF with some bleeding issues. Baseline Dry wt 77.6, currently at 70.2.   -nephrology following  Dialysis as per Nephrology, schedule for today      Steroid induced hyperglycemia  -Hemoglobin A1c on 2/11 was 5.1  -Currently on medium resistance sliding scale insulin  while on decadron  -Continue to monitor blood sugar for now, BS remain in good control.      Chronic Thrombocytopenia: stable and improve at this time.   Hypothyroidism: Cont PTA synthroid.   HLD: continue PTA Pravastatin 40mg.     Goals of care  -Previous provider had extensive discussion about goals of care with patient, CODE STATUS changed to DNR/DNI.    Goals of care remain restorative at this time, he wants to continue with the dialysis and other treatment as well.        Overall stable  Stop Dexamethasone  Start on tapering doses of prednisone.  Remove COVID precautions from tomorrow  Awaiting placement.       Diet: Combination Diet Regular Diet; Renal Diet (dialysis)  Snacks/Supplements Adult: Other; Dinner - magic cup (RD); With Meals     DVT Prophylaxis: DOAC   Velarde Catheter: Not present  Code Status: No CPR- Do NOT Intubate     Disposition Plan    Expected Discharge: 03/05/2022     Anticipated discharge location: home    Delays:     Placement - TCU  Other (Add Comment)         Entered: Nghia Cavanaugh MD 03/03/2022, 1:09 PM        Clinically Significant Risk Factors Present on Admission                      The patient's care was discussed with the Bedside Nurse and Patient.    Nghia Cavanaugh MD  [facility]  Contact information available via [facility] Paging/Directory    ______________________________________________________________________    Interval History   Overall feeling better, but not getting up much or work with therapy, no dialysis done yesterday but schedule for one today, denies any fever, chills, nausea, vomiting, headache or dizziness, appetite is poor but improving.     No other significant event overnight.       Data reviewed today: I reviewed all medications, new labs and imaging results over the last 24 hours. I personally reviewed no images or EKG's today.    Physical Exam   Vital signs:  Temp: 97.1  F (36.2  C) Temp src: Oral BP: (!) 86/52 Pulse: 100   Resp: 18  "SpO2: 100 % O2 Device: Nasal cannula Oxygen Delivery: 1/2 LPM   Weight: 70.4 kg (155 lb 3.3 oz)  Estimated body mass index is 23.6 kg/m  as calculated from the following:    Height as of 2/12/22: 1.727 m (5' 8\").    Weight as of this encounter: 70.4 kg (155 lb 3.3 oz).      Wt Readings from Last 2 Encounters:   03/01/22 70.4 kg (155 lb 3.3 oz)   02/14/22 75.9 kg (167 lb 5.3 oz)       Gen: awake, alert, cooperative and in no distress.   Resp: Diminished air entry bilaterally, no crackles or wheezing  CVS: RRR, no murmur  Abd/GI: Soft, non-tender. BS- normoactive.   Skin: Warm, dry no rashes  MSK: Trace pedal edema  Neuro- CN- intact. No focal deficits.      Data   Recent Labs   Lab 03/03/22  0943 03/03/22  0735 03/03/22  0141 03/02/22  0820 03/02/22  0736 03/01/22  0842 03/01/22  0821 02/28/22  1218 02/28/22  1151   WBC  --   --   --   --  9.5  --  10.3  --  12.6*   HGB  --   --   --   --  13.7  --  14.4  --  13.8   MCV  --   --   --   --  107*  --  111*  --  109*   PLT  --   --   --   --  132*  --  145*  --  107*   NA  --  138  --   --  138  --  137  --  140   POTASSIUM  --  4.2  --   --  4.0  --  5.2  --  4.4   CHLORIDE  --  102  --   --  102  --  108  --  106   CO2  --  31  --   --  30  --  20  --  22   BUN  --  52*  --   --  33*  --  58*  --  44*   CR  --  5.73*  --   --  4.41*  --  6.03*  --  5.26*   ANIONGAP  --  5  --   --  6  --  9  --  12   CHRISTINE  --  8.6  --   --  8.8  --  8.4*  --  8.8   * 118* 202*   < > 98   < > 146*   < > 140*   ALBUMIN  --   --   --   --  2.7*  --  2.4*  --   --     < > = values in this interval not displayed.       No results found for this or any previous visit (from the past 24 hour(s)).  Medications     heparin (porcine)       - MEDICATION INSTRUCTIONS -         - MEDICATION INSTRUCTIONS for Dialysis Patients -   Does not apply See Admin Instructions     sodium chloride 0.9%  300 mL Hemodialysis Machine Once     albumin human  250 mL Intravenous Once in dialysis/CRRT     " apixaban ANTICOAGULANT  2.5 mg Oral BID     doxercalciferol  2 mcg Intravenous Once in dialysis/CRRT     famotidine  10 mg Oral Daily     gabapentin  100 mg Oral TID     guaiFENesin  600 mg Oral BID     heparin  500 Units Hemodialysis Machine OR IV Push Once in dialysis/CRRT     insulin aspart  1-7 Units Subcutaneous TID AC     insulin aspart  1-5 Units Subcutaneous At Bedtime     levothyroxine  50 mcg Oral Daily     lidocaine  2 patch Transdermal Q24H     lidocaine   Transdermal Q8H     midodrine  10 mg Oral Once per day on Tue Thu Sat     midodrine  2.5 mg Oral TID w/meals     multivitamin RENAL  1 capsule Oral Daily     nitroGLYcerin  0.4 mg Sublingual Once     pantoprazole  40 mg Oral QAM AC     pravastatin  40 mg Oral Daily     predniSONE  40 mg Oral Daily     sodium chloride (PF)  3 mL Intracatheter Q8H

## 2022-03-03 NOTE — PLAN OF CARE
OT: Orders received. Chart reviewed and discussed with care team.  Acute care OT not indicated to prevent duplication of services. Per hospitalist note, pt ready to discharge 3/5/2022 and spoke with  who reports TCU referrals will be sent out soon. Defer OT to next level of care.  Defer discharge recommendations to PT.  Will complete orders.

## 2022-03-03 NOTE — PROGRESS NOTES
Renal Medicine Dialysis Note                                Westley Ness MRN# [id number]   Age: 76 year old YOB: 1945   Date of Admission: 2/17/2022 Hospital LOS: 14                  Assessment/Plan:        75-year-old male with history of CAD, ICM with EF of 20-25%, ESRD, DM2, GERD, atrial flutter/fibrillation on chronic anticoagulation with apixaban, status post ICD placement, and hypertension re admitted with SOB    Recent discharge 02/15/22  Last dialyzed 02/14/22     Following for ESRD management      1.  ESRD               -MWF [facility]               -AVF   -transitioning to [place] COVID unit (TT)    -was due to present today   2.  Anemia  3.  Metabolic Bone Disease              -Hectorol  4.  COVID 19  5.  Acute on chronic systolic heart failure exacerbation  6.  Hypotension       Midodrine  5% albumin prime    Next 03/05/22      Interval History:     Dialysis run parameters reviewed with dialysis RN at patient bedside.  Seen at initiation of run    AVF  3.5 hours  UF goal 2 liter as able    No OSBALDO  Heparin    Ill appearing     Able to return to [facility] on discharge       ROS:     GENERAL: NAD, No fever,chills  R: NEGATIVE for significant cough or SOB  CV: NEGATIVE for chest pain, palpitations  EXT: no change edema  ROS otherwise negative    Medications and Allergies:     Reviewed    Physical Exam:     Vitals were reviewed  Patient Vitals for the past 8 hrs:   BP Temp Temp src Pulse Resp SpO2   03/03/22 1345 94/56 -- -- -- -- --   03/03/22 0958 (!) 86/52 -- -- 100 -- --   03/03/22 0730 120/69 97.1  F (36.2  C) Oral 100 18 100 %     I/O last 3 completed shifts:  In: 240 [P.O.:240]  Out: -     Vitals:    02/19/22 1337 02/20/22 0632 02/22/22 0200 02/24/22 0615   Weight: 72.3 kg (159 lb 6.3 oz) 71.3 kg (157 lb 3 oz) 72.3 kg (159 lb 4.8 oz) 72.5 kg (159 lb 13.3 oz)    03/01/22 0352   Weight: 70.4 kg (155 lb 3.3 oz)         GENERAL: awake, alert, follows    Exam deferred  given COVID status    Hospitalist and ER exams reviewed     Data:     Recent Labs   Lab 03/03/22  1336 03/03/22  0943 03/03/22  0735 03/03/22  0141 03/02/22  0820 03/02/22  0736 03/01/22  0842 03/01/22  0821 02/28/22  1218 02/28/22  1151   NA  --   --  138  --   --  138  --  137  --  140   POTASSIUM  --   --  4.2  --   --  4.0  --  5.2  --  4.4   CHLORIDE  --   --  102  --   --  102  --  108  --  106   CO2  --   --  31  --   --  30  --  20  --  22   ANIONGAP  --   --  5  --   --  6  --  9  --  12   * 118* 118* 202*   < > 98   < > 146*   < > 140*   BUN  --   --  52*  --   --  33*  --  58*  --  44*   CR  --   --  5.73*  --   --  4.41*  --  6.03*  --  5.26*   GFRESTIMATED  --   --  10*  --   --  13*  --  9*  --  11*   CHRISTINE  --   --  8.6  --   --  8.8  --  8.4*  --  8.8    < > = values in this interval not displayed.         Recent Labs   Lab 03/03/22  1336 03/03/22  0943 03/03/22  0735   NA  --   --  138   POTASSIUM  --   --  4.2   CHLORIDE  --   --  102   CO2  --   --  31   ANIONGAP  --   --  5   *   < > 118*   BUN  --   --  52*   CR  --   --  5.73*   GFRESTIMATED  --   --  10*   CHRISTINE  --   --  8.6    < > = values in this interval not displayed.     Recent Labs   Lab 03/02/22 0736 03/01/22 0821   ALBUMIN 2.7* 2.4*     Recent Labs   Lab 03/02/22 0736 03/01/22  0821 02/28/22  1151   PHOS 3.9 5.2*  --    HGB 13.7 14.4 13.8         G Estevan Granger MD    [facility] - Nephrology  [phone]

## 2022-03-03 NOTE — PLAN OF CARE
Goal Outcome Evaluation:    Plan of Care Reviewed With: patient   Date & time: 3/2/22 9218-2089  Summary:  Acute on chronic hypoxic respiratory failure/ COVID  COVID-19 infection. ESRD; Fall risk   Cognitive Concerns/ Orientation : A&O x 4, forgetful at times  BEHAVIOR & AGGRESSION TOOL COLOR: GREEN  CIWA SCORE: NA   ABNL VS/O2: SBP 90s-100s, O2 93% on 1L NC, desat to 86% on RA at rest, other VSS, no signs of respiratory distress.   MOBILITY: Total, Assist of 2, turn/repo q2h, refused at get OOB, pt reported he's reluctant because he started having chest pain last time he tried to get up, but said he'll try getting OOB again tomorrow. PT/OT following.   PAIN MANAGMENT: Denies  DIET: Renal diet. Carb count-fair appetite today  ABNL LAB: BG 96, 73, 152, Cr 4.41 (6.03), CRP 30.8 (67.5),   DRAIN/DEVICES: R PIV SL, Lt Fistula +Bruit/thrill.   TELEMETRY RHYTHM: Afib with CVR  TESTS/PROCEDURES: HD tomorrow 3/3/22  DDAY/GOALS/PLACE: Possibly TCU in 2-3 days. SW following.   OTHER IMPORTANT INFO: Remains hypotensive SBP 90s-100s,  per nephrology-ok for SBP>75 if pt asymptomatic.

## 2022-03-03 NOTE — PROGRESS NOTES
SPIRITUAL HEALTH SERVICES  SPIRITUAL ASSESSMENT Progress Note  [facility]    REFERRAL SOURCE: palliative follow-up    On this visit, Art and I continued with a review of significant events in his life, considering memories and moments from which he's drawn meaning. In addition, Art expressed feeling hopeful about his continued recovery while acknowledging concerns for both his lack of appetite and the health of his heart.    Art welcomes continued spiritual support.    PLAN: I will follow-up with Art in a few days. Spiritual Health remains available, as needed.    Theresa Cope  Associate   Phone: [phone]

## 2022-03-03 NOTE — PROGRESS NOTES
Potassium   Date Value Ref Range Status   03/03/2022 4.2 3.4 - 5.3 mmol/L Final   07/05/2021 5.3 3.4 - 5.3 mmol/L Final     Hemoglobin   Date Value Ref Range Status   03/02/2022 13.7 13.3 - 17.7 g/dL Final   07/05/2021 12.0 (L) 13.3 - 17.7 g/dL Final     Creatinine   Date Value Ref Range Status   03/03/2022 5.73 (H) 0.66 - 1.25 mg/dL Final   07/05/2021 5.03 (H) 0.66 - 1.25 mg/dL Final     Urea Nitrogen   Date Value Ref Range Status   03/03/2022 52 (H) 7 - 30 mg/dL Final   07/05/2021 39 (H) 7 - 30 mg/dL Final     Sodium   Date Value Ref Range Status   03/03/2022 138 133 - 144 mmol/L Final   07/05/2021 140 133 - 144 mmol/L Final     INR   Date Value Ref Range Status   02/11/2022 1.23 (H) 0.85 - 1.15 Final   10/15/2020 1.31 (H) 0.86 - 1.14 Final       DIALYSIS PROCEDURE NOTE  Hepatitis status of previous patient on machine log was checked and verified ok to use with this patients hepatitis status.  Patient dialyzed for 3 hrs. on a K3 bath with a net fluid removal of  1.8L.  A BFR of 400 ml/min was obtained via a LAVF using 15 gauge needles.      The treatment plan was discussed with Dr. Robb during the treatment.    Total heparin received during the treatment: 1500 units.   Needle cannulation sites held x 15 min.       Meds  given: Hectoral  Complications: none    Person educated: patient. Knowledge base substantial. Barriers to learning: none. Educated on procedure via verbal mode. Patient verbalized understanding. Pt prefers verbal education style.     ICEBOAT? Timeout performed pre-treatment  I: Patient was identified using 2 identifiers  C:  Consent Signed Yes  E: Equipment preventative maintenance is current and dialysis delivery system OK to use  B:Results for MALDONADO LÓPEZ (MRN [id number]) as of 3/3/2022 16:31   Ref. Range 2/14/2022 08:45   Hep B Surface Agn Latest Ref Range: Nonreactive  Nonreactive   Hepatitis B Surface Antibody Latest Ref Range: >=12.00 m[IU]/mL 1.14 (L)      O: Dialysis orders present and  complete prior to treatment  A: Vascular access verified and assessed prior to treatment  T: Treatment was performed at a clinically appropriate time  ?: Patient was allowed to ask questions and address concerns prior to treatment  See flowsheet in EPIC for further details and post assessment.  Machine water alarm in place and functioning. Transducer pods intact and checked every 15min.   Pt dialyzed at bedside.  Chlorine/Chloramine water system checked every 4 hours.  Outpatient Dialysis at [facility]    Please remove patient dressing on AVF and AVG needle sites 24 hours after dialysis. If leaking occurs please apply a Band-Aid.

## 2022-03-04 NOTE — PROGRESS NOTES
Renal Medicine       Dialyzed 03/01/22  UF 1800 ml    Comfortable in bed  02 in place      Dialysis orders placed for am  Following 03/05 will plan 03/07 to re establish MWF schedule         Recent Labs   Lab 03/04/22  0750 03/03/22  0943 03/03/22  0735   NA  --   --  138   POTASSIUM  --   --  4.2   CHLORIDE  --   --  102   CO2  --   --  31   ANIONGAP  --   --  5   *   < > 118*   BUN  --   --  52*   CR  --   --  5.73*   GFRESTIMATED  --   --  10*   CHRISTINE  --   --  8.6    < > = values in this interval not displayed.           JUAREZ Granger    [facility]  [phone]

## 2022-03-04 NOTE — PROGRESS NOTES
[facility]    Medicine Progress Note - Hospitalist Service        Date of Admission:  2/17/2022 12:33 AM    Assessment & Plan:   Westley Ness is a 76 year old male with history of coronary artery disease, ischemic cardiomyopathy with EF of 20% on his last echo in 03/2021.  End-stage renal disease on hemodialysis Monday/Wednesday/Friday, diabetes mellitus type 2, GERD fibrillation, atrial flutter on chronic anticoagulation with apixaban, status post ICD placement, and hypertensionwho was admitted on 2/17/2022 with hypoxia and worsened dyspnea.     Acute hypoxic respiratory failure: now improved.  COVID-19 pneumonia  -Respiratory failure likely secondary to Covid infection+/- recent L rib fractures with L atelectasis +/- fluid overload.  -Diagnosed on on 2/11, fully vaccinated, was admitted between 2/11 to 2/15 and get better and subsequently discharge, received Dexamethasone during that admission, re-admitted 2 days later with worsening hypoxia.   -CXR 2/19 with left basilar/retrocardiac pulmonary opacities, could be small pleural effusion and associated basilar atelectasis/consolidation.   He is still hypoxic, last dose of got 6 days of Dexamethasone during this stay and discontinue on 2/23, continue to have SOB, hypoxia and hypotensive, currently on 4-5 LPM of oxygen.   Restarted him on Dexamethasone 6 mg IV on 2/28 and now stop on 3/3, CRP and Hypoxia improve.  He is now on tapering doses of Prednisone started from 40 mg daily   IS, flutter and try to wean his oxygen down.  Dialysis and UF as per Nephrology     COVID precautions discontinue on 3/4/22 as per infection control/   Restarted him on IV Dexamethasone on 2/28, with improvement in his symptoms, he is now on 1 LPM with 97 % saturation, , no remdesivir due to ESRD, IS< flutter.   Try to wean his oxygen down and eventually off if he tolerates. Overall improve now, stop IV Dexamethasone 3/3 and start on oral Prednisone 40 mg daily and  taper 10 mg every 4 day to his baseline of 5 mg.   Inflammatory markers improve, blood pressure improve and hypoxia improving, try to wean him off the oxygen at rest for now.        Chronic HFrEF  History of A. fib, ICD/PPM in place  Nonsustained V. tach  -PTA on metoprolol/ lisinopril / imdur. Baseline BP low in the 100s. Last echo 03/21, EF 20-25%. ICD in place.   -Blood pressure has been soft/low during this hospital stay and patient not tolerating dialysis or any cardiac medication  -hold metoprolol, lisinopril and isosorbide mononitrate, unable to tolerate due to low BPs  -continue telemetry monitoring  -Hold Eliquis given hematuria on 2/26, no significant Hematuria at this time, resume his Eliquis from today, 2/28.   -Cardiology has followed this hospital stay, per their assessment overall prognosis is quite guarded.  He has not been able to tolerate cardiomyopathy medication.  They have now signed off.  -Outpatient follow-up with cardiology in 1 month if goals of care remains restorative    Hypotension: improved now.   Patient was on Prednisone 5 mg daily, was not resume after stopping the Dexamethasone,  Although is blood pressure were low before as well. Check Cortisol come back slightly high,   Resume Dexamethasone.   He is getting Midodrine 10 mg prior to dialysis will continue with that.   Started him on Midodrine 2.5 mg TID for now, continue 10 mg prior to dialysis.  Overall remain stable, tolerating dialysis as well, I will now switch his Midodrine to as needed only and continue  10 mg prior to dialysis and see how is his blood pressure.      Hematuria  -Noted on 2/23 and subsequently on 2/26  -no significant hematuria, hardly make any urine, resume Eliquis on 2/28    ESRD  Hypertension  Anemia of renal failure  Secondary hyperparathyroidism  -On Dialysis for 18 yrs, Left UE AVF with some bleeding issues. Baseline Dry wt 77.6, currently at 70.2.   -nephrology following  Dialysis as per Nephrology,  schedule for today      Steroid induced hyperglycemia  -Hemoglobin A1c on 2/11 was 5.1  -Currently on medium resistance sliding scale insulin while on decadron  -Continue to monitor blood sugar for now, BS remain in good control.      Chronic Thrombocytopenia: stable and improve at this time.   Hypothyroidism: Cont PTA synthroid.   HLD: continue PTA Pravastatin 40mg.     Goals of care  -Previous provider had extensive discussion about goals of care with patient, CODE STATUS changed to DNR/DNI.    Goals of care remain restorative at this time, he wants to continue with the dialysis and other treatment as well.        Stop schedule Midodrine daily but continue as schedule prior to dialysis and see how he does  Stop COVID precautions now.  Robitussin for cough  PT/OT to continue as tolerate  He is ready to discharge once have the safe disposition     Discussed with patient and the nursing staff taking care of the patient today         Diet: Combination Diet Regular Diet; Renal Diet (dialysis)  Snacks/Supplements Adult: Other; Dinner - magic cup (RD); With Meals     DVT Prophylaxis: DOAC   Velarde Catheter: Not present  Code Status: No CPR- Do NOT Intubate     Disposition Plan    Expected Discharge: 03/04/2022     Anticipated discharge location: home    Delays:     Placement - TCU  Other (Add Comment)         Entered: Nghia Cavanaugh MD 03/04/2022, 10:14 AM        Clinically Significant Risk Factors Present on Admission                      The patient's care was discussed with the Bedside Nurse and Patient.    Nghia Cavanaguh MD  [facility]  Contact information available via [facility] Paging/Directory    ______________________________________________________________________    Interval History   Complaints of coughing, otherwise breathing stable,  No fever, chills, chest pain, nausea, vomiting, headache or dizziness, SOB on exertion.     No other significant event overnight.       Data reviewed today: I  "reviewed all medications, new labs and imaging results over the last 24 hours. I personally reviewed no images or EKG's today.    Physical Exam   Vital signs:  Temp: 97.6  F (36.4  C) Temp src: Oral BP: 100/58 Pulse: 98   Resp: 16 SpO2: 95 % O2 Device: Nasal cannula Oxygen Delivery: 1 LPM   Weight: 70.4 kg (155 lb 3.3 oz)  Estimated body mass index is 23.6 kg/m  as calculated from the following:    Height as of 2/12/22: 1.727 m (5' 8\").    Weight as of this encounter: 70.4 kg (155 lb 3.3 oz).      Wt Readings from Last 2 Encounters:   03/01/22 70.4 kg (155 lb 3.3 oz)   02/14/22 75.9 kg (167 lb 5.3 oz)       Gen: awake, alert, cooperative and in no distress.   Resp: Diminished air entry bilaterally, no crackles or wheezing  CVS: RRR, no murmur  Abd/GI: Soft, non-tender. BS- normoactive.   Skin: Warm, dry no rashes  MSK: Trace pedal edema  Neuro- CN- intact. No focal deficits.      Data   Recent Labs   Lab 03/04/22  0750 03/04/22  0215 03/03/22  2049 03/03/22  0943 03/03/22  0735 03/02/22  0820 03/02/22  0736 03/01/22  0842 03/01/22  0821 02/28/22  1218 02/28/22  1151   WBC  --   --   --   --   --   --  9.5  --  10.3  --  12.6*   HGB  --   --   --   --   --   --  13.7  --  14.4  --  13.8   MCV  --   --   --   --   --   --  107*  --  111*  --  109*   PLT  --   --   --   --   --   --  132*  --  145*  --  107*   NA  --   --   --   --  138  --  138  --  137  --  140   POTASSIUM  --   --   --   --  4.2  --  4.0  --  5.2  --  4.4   CHLORIDE  --   --   --   --  102  --  102  --  108  --  106   CO2  --   --   --   --  31  --  30  --  20  --  22   BUN  --   --   --   --  52*  --  33*  --  58*  --  44*   CR  --   --   --   --  5.73*  --  4.41*  --  6.03*  --  5.26*   ANIONGAP  --   --   --   --  5  --  6  --  9  --  12   CHRISTINE  --   --   --   --  8.6  --  8.8  --  8.4*  --  8.8   * 187* 198*   < > 118*   < > 98   < > 146*   < > 140*   ALBUMIN  --   --   --   --   --   --  2.7*  --  2.4*  --   --     < > = values in this " interval not displayed.       No results found for this or any previous visit (from the past 24 hour(s)).  Medications     - MEDICATION INSTRUCTIONS -         - MEDICATION INSTRUCTIONS for Dialysis Patients -   Does not apply See Admin Instructions     apixaban ANTICOAGULANT  2.5 mg Oral BID     famotidine  10 mg Oral Daily     gabapentin  100 mg Oral TID     guaiFENesin  600 mg Oral BID     insulin aspart  1-7 Units Subcutaneous TID AC     insulin aspart  1-5 Units Subcutaneous At Bedtime     levothyroxine  50 mcg Oral Daily     lidocaine  2 patch Transdermal Q24H     lidocaine   Transdermal Q8H     midodrine  10 mg Oral Once per day on Tue Thu Sat     midodrine  2.5 mg Oral TID w/meals     multivitamin RENAL  1 capsule Oral Daily     nitroGLYcerin  0.4 mg Sublingual Once     pantoprazole  40 mg Oral QAM AC     pravastatin  40 mg Oral Daily     predniSONE  40 mg Oral Daily     sodium chloride (PF)  3 mL Intracatheter Q8H

## 2022-03-04 NOTE — PLAN OF CARE
Goal Outcome Evaluation:    Summary:  Recovered COVID-19 infection. ESRD; Fall risk   Date & time: 3/4/80993083-9897  Cognitive Concerns/ Orientation : A&O x 4, forgetful at times  BEHAVIOR & AGGRESSION TOOL COLOR: GREEN  CIWA SCORE: NA   ABNL VS/O2: /58 and (asymptomatic),1 L O2 via NC mid 90's  MOBILITY: Total, Assist of 2, turn/repo q2h ( refuses at times) sat on side of bed for lunch  PAIN MANAGMENT: Right Flank pain 2/10 with movement  DIET: Renal diet.   ABNL LAB: , 175  DRAIN/DEVICES: R PIV SL, Lt Fistula/WNL.   TELEMETRY RHYTHM: Afib   TESTS/PROCEDURES: HD scheduled for tomorrow 3/5/22  DAY/GOALS/PLACE: Possibly TCU in 2-3 days. SW following.

## 2022-03-04 NOTE — PLAN OF CARE
Goal Outcome Evaluation:      Date & time: 3/3/2022-03/04/2022 ([id number])  Summary:  Acute on chronic hypoxic respiratory failure/  COVID-19 infection. ESRD; Fall risk   Cognitive Concerns/ Orientation : A&O x 4, forgetful at times  BEHAVIOR & AGGRESSION TOOL COLOR: GREEN  CIWA SCORE: NA   ABNL VS/O2: BP 99/55(asymptomatic),1 L O2 via NC (offered to wean off; pt declined), no signs of respiratory distress.  MOBILITY: Total, Assist of 2, turn/repo q2h ( refuses at times) ;  (per previous report, pt reported he's reluctant to get out of bed because he started having chest pain last time he tried to get up); PT/OT following.   PAIN MANAGMENT: Right Flank pain 2/10 at rest (Lidocaine patch applied)  DIET: Renal diet.   ABNL LAB: , 187;  Cr 5.73, CRP 25.2 (trending down)   DRAIN/DEVICES: R PIV SL, Lt Fistula/WNL.   TELEMETRY RHYTHM: Afib with CVR  TESTS/PROCEDURES: HD yesterday 3/3/22  DAY/GOALS/PLACE: Possibly TCU in 2-3 days. SW following.   OTHER IMPORTANT INFO: Encouraged with use of flutter valve (pt declined incentive spirometer). Remains hypotensive mostly SBP 90's,  per nephrology-ok for SBP>75 if pt asymptomatic.

## 2022-03-04 NOTE — PLAN OF CARE
Goal Outcome Evaluation:    Plan of Care Reviewed With: patient     Date & time: 3/3/2022: [id number]  Summary:  Acute on chronic hypoxic respiratory failure/  COVID-19 infection. ESRD; Fall risk   Cognitive Concerns/ Orientation : A&O x 4, forgetful at times  BEHAVIOR & AGGRESSION TOOL COLOR: GREEN  CIWA SCORE: NA   ABNL VS/O2: BP 86/52 (asymptomatic),1 L O2 via NC (offered to wean off; pt declined), no signs of respiratory distress except DACOSTA.  MOBILITY: Total, Assist of 2, turn/repo q2h ( at times refuses) ; offered to get out of bed but pt wanted to try at a later time. (per previous report, pt reported he's reluctant to get out of bed because he started having chest pain last time he tried to get up); PT/OT following.   PAIN MANAGMENT: Denies  DIET: Renal diet. fair appetite today  ABNL LAB: , 158, 153. Cr 5.73, CRP 25.2 (trending down)   DRAIN/DEVICES: R PIV SL, Lt Fistula/WNL.   TELEMETRY RHYTHM: Afib with CVR  TESTS/PROCEDURES: HD today (3/3/22)/ approx. 2L removed per report.   DAY/GOALS/PLACE: Possibly TCU in 2-3 days. SW following.   OTHER IMPORTANT INFO: Encouraged with use of flutter valve (pt declined incentive spirometer). Remains hypotensive mostly SBP 80's-100s,  per nephrology-ok for SBP>75 if pt asymptomatic.

## 2022-03-05 NOTE — PROGRESS NOTES
SPIRITUAL HEALTH SERVICES Progress Note  [facility] Med Unit [facility]    REFERRAL SOURCE: follow-up    Brief, supportive check-in with Art. Art had recently finished dialysis and shared that it didn't go as well as he had hoped it would. While we were visiting, a friend phoned his room. Art asked for another visit when possible.    PLAN: I will follow-up with Art later in the week and will refer to the unit  for more immediate care.    Theresa Cope  Associate   Phone: [phone]

## 2022-03-05 NOTE — PROGRESS NOTES
[facility]    Medicine Progress Note - Hospitalist Service        Date of Admission:  2/17/2022 12:33 AM    Assessment & Plan:   Westley Ness is a 76 year old male with history of coronary artery disease, ischemic cardiomyopathy with EF of 20% on his last echo in 03/2021.  End-stage renal disease on hemodialysis Monday/Wednesday/Friday, diabetes mellitus type 2, GERD fibrillation, atrial flutter on chronic anticoagulation with apixaban, status post ICD placement, and hypertensionwho was admitted on 2/17/2022 with hypoxia and worsened dyspnea.     Acute hypoxic respiratory failure: now improved.  COVID-19 pneumonia  -Respiratory failure likely secondary to Covid infection+/- recent L rib fractures with L atelectasis +/- fluid overload.  -Diagnosed on on 2/11, fully vaccinated, was admitted between 2/11 to 2/15 and get better and subsequently discharge, received Dexamethasone during that admission, re-admitted 2 days later with worsening hypoxia.   -CXR 2/19 with left basilar/retrocardiac pulmonary opacities, could be small pleural effusion and associated basilar atelectasis/consolidation.   He is still hypoxic, last dose of got 6 days of Dexamethasone during this stay and discontinue on 2/23, continue to have SOB, hypoxia and hypotensive, currently on 4-5 LPM of oxygen.   Restarted him on Dexamethasone 6 mg IV on 2/28 and now stop on 3/3, CRP and Hypoxia improve.  He is now on tapering doses of Prednisone started from 40 mg daily   IS, flutter and try to wean his oxygen down.  Dialysis and UF as per Nephrology     COVID precautions discontinue on 3/4/22 as per infection control/   Restarted him on IV Dexamethasone on 2/28, with improvement in his symptoms, he is now on 1 LPM with 97 % saturation, , no remdesivir due to ESRD, IS< flutter.   Try to wean his oxygen down and eventually off if he tolerates. Overall improve now, stop IV Dexamethasone 3/3 and start on oral Prednisone 40 mg daily and  taper 10 mg every 4 day to his baseline of 5 mg, will decrease to 30 mg from 3/6/22  Inflammatory markers improve, blood pressure improve and hypoxia improve, only needing 1 LPM at this time.        Chronic HFrEF  History of A. fib, ICD/PPM in place  Nonsustained V. tach  -PTA on metoprolol/ lisinopril / imdur. Baseline BP low in the 100s. Last echo 03/21, EF 20-25%. ICD in place.   -Blood pressure has been soft/low during this hospital stay and patient not tolerating dialysis or any cardiac medication  -hold metoprolol, lisinopril and isosorbide mononitrate, unable to tolerate due to low BPs  -continue telemetry monitoring  -Hold Eliquis given hematuria on 2/26, no significant Hematuria at this time, resume his Eliquis from today, 2/28.   -Cardiology has followed this hospital stay, per their assessment overall prognosis is quite guarded.  He has not been able to tolerate cardiomyopathy medication.  They have now signed off.  -Outpatient follow-up with cardiology in 1 month if goals of care remains restorative    Hypotension: improved now.   Patient was on Prednisone 5 mg daily, was not resume after stopping the Dexamethasone,  Although is blood pressure were low before as well. Check Cortisol come back slightly high,   Resume Dexamethasone.   He is getting Midodrine 10 mg prior to dialysis will continue with that.   Started him on Midodrine 2.5 mg TID for now, continue 10 mg prior to dialysis.  Overall remain stable, tolerating dialysis as well, I will now switch his Midodrine to as needed only and continue  10 mg prior to dialysis and see how is his blood pressure.      Hematuria  -Noted on 2/23 and subsequently on 2/26  -no significant hematuria, hardly make any urine, resume Eliquis on 2/28    ESRD  Hypertension  Anemia of renal failure  Secondary hyperparathyroidism  -On Dialysis for 18 yrs, Left UE AVF with some bleeding issues. Baseline Dry wt 77.6, currently at 70.2.   -nephrology following  Dialysis as per  Nephrology, schedule for today      Steroid induced hyperglycemia  -Hemoglobin A1c on 2/11 was 5.1  -Currently on medium resistance sliding scale insulin while on decadron  -Continue to monitor blood sugar for now, BS remain in good control.      Chronic Thrombocytopenia: stable and improve at this time.   Hypothyroidism: Cont PTA synthroid.   HLD: continue PTA Pravastatin 40mg.     Goals of care  -Previous provider had extensive discussion about goals of care with patient, CODE STATUS changed to DNR/DNI.    Goals of care remain restorative at this time, he wants to continue with the dialysis and other treatment as well.        Midodrine only on day of dialysis  BP remain stable  Awaiting placement.     Discussed with patient and the nursing staff taking care of the patient today         Diet: Combination Diet Regular Diet; Renal Diet (dialysis)  Snacks/Supplements Adult: Other; Dinner - magic cup (RD); With Meals     DVT Prophylaxis: DOAC   Velarde Catheter: Not present  Code Status: No CPR- Do NOT Intubate     Disposition Plan    Expected Discharge: 03/06/2022     Anticipated discharge location: home    Delays:     Placement - TCU  Other (Add Comment)         Entered: Nghia Cavanaugh MD 03/05/2022, 11:08 AM        Clinically Significant Risk Factors Present on Admission                      The patient's care was discussed with the Bedside Nurse and Patient.    Nghia Cavanaugh MD  [facility]  Contact information available via [facility] Paging/Directory    ______________________________________________________________________    Interval History   Patient seen during the dialysis today, no active complaints.     No other significant event overnight.       Data reviewed today: I reviewed all medications, new labs and imaging results over the last 24 hours. I personally reviewed no images or EKG's today.    Physical Exam   Vital signs:  Temp: 97.5  F (36.4  C) Temp src: Axillary BP: 101/70 Pulse: 90    "Resp: 16 SpO2: 100 % O2 Device: Nasal cannula Oxygen Delivery: 3 LPM   Weight: 70.4 kg (155 lb 3.3 oz)  Estimated body mass index is 23.6 kg/m  as calculated from the following:    Height as of 2/12/22: 1.727 m (5' 8\").    Weight as of this encounter: 70.4 kg (155 lb 3.3 oz).      Wt Readings from Last 2 Encounters:   03/01/22 70.4 kg (155 lb 3.3 oz)   02/14/22 75.9 kg (167 lb 5.3 oz)       Gen: awake, alert, cooperative and in no distress.   Resp: Diminished air entry bilaterally, no crackles or wheezing  CVS: RRR, no murmur  Abd/GI: Soft, non-tender. BS- normoactive.   Skin: Warm, dry no rashes  MSK: Trace pedal edema  Neuro- CN- intact. No focal deficits.      Data   Recent Labs   Lab 03/05/22  0910 03/05/22  0616 03/04/22 2058 03/03/22  0943 03/03/22  0735 03/02/22  0820 03/02/22  0736 03/01/22  0842 03/01/22  0821 02/28/22  1218 02/28/22  1151   WBC  --   --   --   --   --   --  9.5  --  10.3  --  12.6*   HGB  --   --   --   --   --   --  13.7  --  14.4  --  13.8   MCV  --   --   --   --   --   --  107*  --  111*  --  109*   PLT  --   --   --   --   --   --  132*  --  145*  --  107*     --   --   --  138  --  138  --  137  --  140   POTASSIUM 4.0  --   --   --  4.2  --  4.0  --  5.2  --  4.4   CHLORIDE 105  --   --   --  102  --  102  --  108  --  106   CO2 27  --   --   --  31  --  30  --  20  --  22   BUN 54*  --   --   --  52*  --  33*  --  58*  --  44*   CR 5.17*  --   --   --  5.73*  --  4.41*  --  6.03*  --  5.26*   ANIONGAP 8  --   --   --  5  --  6  --  9  --  12   CHRISTINE 8.9  --   --   --  8.6  --  8.8  --  8.4*  --  8.8   * 149* 247*   < > 118*   < > 98   < > 146*   < > 140*   ALBUMIN  --   --   --   --   --   --  2.7*  --  2.4*  --   --     < > = values in this interval not displayed.       No results found for this or any previous visit (from the past 24 hour(s)).  Medications     heparin (porcine)       - MEDICATION INSTRUCTIONS -         - MEDICATION INSTRUCTIONS for Dialysis Patients - "   Does not apply See Admin Instructions     apixaban ANTICOAGULANT  2.5 mg Oral BID     famotidine  10 mg Oral Daily     gabapentin  100 mg Oral TID     guaiFENesin  600 mg Oral BID     heparin  500 Units Hemodialysis Machine OR IV Push Once in dialysis/CRRT     insulin aspart  1-7 Units Subcutaneous TID AC     insulin aspart  1-5 Units Subcutaneous At Bedtime     levothyroxine  50 mcg Oral Daily     lidocaine  2 patch Transdermal Q24H     lidocaine   Transdermal Q8H     midodrine  10 mg Oral Once per day on Tue Thu Sat     multivitamin RENAL  1 capsule Oral Daily     nitroGLYcerin  0.4 mg Sublingual Once     pantoprazole  40 mg Oral QAM AC     pravastatin  40 mg Oral Daily     predniSONE  40 mg Oral Daily     sodium chloride (PF)  3 mL Intracatheter Q8H

## 2022-03-05 NOTE — PLAN OF CARE
Summary:  Recovered COVID-19 infection. ESRD; Fall risk   Date & time: 3/4/2022 1046-5797  Cognitive Concerns/ Orientation : A&O x 4, forgetful at times  BEHAVIOR & AGGRESSION TOOL COLOR: GREEN  CIWA SCORE: NA   ABNL VS/O2: /62 and (asymptomatic),1 L O2 via NC mid 90's  MOBILITY: Total, Assist of 2, turn/repo q2h ( refuses at times) not OOB this shift  PAIN MANAGMENT: Right Flank pain 2/10 with movement  DIET: Renal diet.   ABNL LAB: /252  DRAIN/DEVICES: R PIV SL, Lt Fistula/WNL.   TELEMETRY RHYTHM: Afib   TESTS/PROCEDURES: HD scheduled for tomorrow 3/5/22  DAY/GOALS/PLACE: Possibly TCU in 2-3 days. SW following.   OTHER IMPORTANT INFO: PT/OT following. Encouraged with use of flutter valve (pt declined incentive spirometer). Remains hypotensive mostly SBP 90's,  per nephrology-ok for SBP>75 if pt asymptomatic.

## 2022-03-05 NOTE — PLAN OF CARE
Goal Outcome Evaluation:  Summary:  Recovered COVID-19 infection. ESRD; Fall risk   Date & time: 3/5/2022 4769-9729  Cognitive Concerns/ Orientation : A&O x 4, forgetful at times  BEHAVIOR & AGGRESSION TOOL COLOR: GREEN  CIWA SCORE: NA   ABNL VS/O2: BP 87/52 (asymptomatic),1 L O2 via NC mid 90's. Low BPs during dialysis received prn Midodrine  MOBILITY: Total, Assist of 2, turn/repo q2h ( not OOB this shift due to HD  PAIN MANAGMENT: denies pain  DIET: Renal diet.   ABNL LAB: , 141  DRAIN/DEVICES: R PIV SL, Lt Fistula/WNL.   TELEMETRY RHYTHM: Afib   TESTS/PROCEDURES: HD this morning  DAY/GOALS/PLACE: Possibly TCU in 2-3 days. SW following.

## 2022-03-05 NOTE — PROGRESS NOTES
Potassium   Date Value Ref Range Status   03/03/2022 4.2 3.4 - 5.3 mmol/L Final   07/05/2021 5.3 3.4 - 5.3 mmol/L Final     Hemoglobin   Date Value Ref Range Status   03/02/2022 13.7 13.3 - 17.7 g/dL Final   07/05/2021 12.0 (L) 13.3 - 17.7 g/dL Final     Creatinine   Date Value Ref Range Status   03/03/2022 5.73 (H) 0.66 - 1.25 mg/dL Final   07/05/2021 5.03 (H) 0.66 - 1.25 mg/dL Final     Urea Nitrogen   Date Value Ref Range Status   03/03/2022 52 (H) 7 - 30 mg/dL Final   07/05/2021 39 (H) 7 - 30 mg/dL Final     Sodium   Date Value Ref Range Status   03/03/2022 138 133 - 144 mmol/L Final   07/05/2021 140 133 - 144 mmol/L Final     INR   Date Value Ref Range Status   02/11/2022 1.23 (H) 0.85 - 1.15 Final   10/15/2020 1.31 (H) 0.86 - 1.14 Final     Results for MALDONADO LÓPEZ (MRN [id number]) as of 3/5/2022 07:32   Ref. Range 2/14/2022 08:45   Hep B Surface Agn Latest Ref Range: Nonreactive  Nonreactive   Hepatitis B Surface Antibody Latest Ref Range: >=12.00 m[IU]/mL 1.14 (L)     DIALYSIS PROCEDURE NOTE  Hepatitis status of previous patient on machine log was checked and verified ok to use with this patients hepatitis status.  Patient dialyzed for 3 hrs. on a K3 bath with a net fluid removal of  0.8L.    A BFR of 450 ml/min was obtained via a left upper-arm AV fistula using 15 gauge needles.      The treatment plan was discussed with Dr. Yang during the treatment.    Total heparin received during the treatment: 0 units.   Needle cannulation sites held x 15 min.   Meds  given: 5% albumin prime (250ml), hectorol   Complications: asymptomatic hypotension with SBP in the 70-80's- UF goal decreased and eventually turned off. PRN midodrine requested and given without improvement. MD aware. SBP 90's after rinseback and VSS.   Prolonged bleeding at arterial needle access site 75 minutes (hemostasis achieved in 10-15 min in venous needle site). MD notified.     Person educated: patient. Knowledge base substantial. Barriers  to learning: none. Educated on procedure via verbal mode. patient verbalized understanding. Pt prefers verbal education style.     ICEBOAT? Timeout performed pre-treatment  I: Patient was identified using 2 identifiers  C:  Consent Signed Yes  E: Equipment preventative maintenance is current and dialysis delivery system OK to use  B: see above  O: Dialysis orders present and complete prior to treatment  A: Vascular access verified and assessed prior to treatment  T: Treatment was performed at a clinically appropriate time  ?: Patient was allowed to ask questions and address concerns prior to treatment  See flowsheet in EPIC for further details and post assessment.  Machine water alarm in place and functioning. Transducer pods intact and checked every 15min.   Pt returned via bed.  Chlorine/Chloramine water system checked every 4 hours.  Outpatient Dialysis at [facility].

## 2022-03-05 NOTE — PROGRESS NOTES
Renal Medicine Dialysis Note                                Westley Ness MRN# [id number]   Age: 76 year old YOB: 1945   Date of Admission: 2/17/2022 Hospital LOS: 16                  Assessment/Plan:        75-year-old male with history of CAD, ICM with EF of 20-25%, ESRD, DM2, GERD, atrial flutter/fibrillation on chronic anticoagulation with apixaban, status post ICD placement, and hypertension re admitted with SOB    Recent discharge 02/15/22  Last dialyzed 02/14/22     Following for ESRD management      1.  ESRD               -MWF [facility]               -AVF   -transitioning to [facility] unit (TT)    -was due to present today   2.  Anemia  3.  Metabolic Bone Disease              -Hectorol  4.  COVID 19   -recovered  5.  Acute on chronic systolic heart failure exacerbation  6.  Hypotension       Midodrine  5% albumin prime    Next 03/07/22  To resume outpatient MWF schedule       Interval History:     Dialysis run parameters reviewed with dialysis RN at patient bedside.  Seen at initiation of run    AVF  3.5 hours  UF goal 2 liter as able    No OSBALDO  Heparin    Ill appearing     Able to return to [facility] on discharge       ROS:     GENERAL: NAD, No fever,chills  R: NEGATIVE for significant cough or SOB  CV: NEGATIVE for chest pain, palpitations  EXT: no change edema  ROS otherwise negative    Medications and Allergies:     Reviewed    Physical Exam:     Vitals were reviewed  Patient Vitals for the past 8 hrs:   BP Temp Temp src Pulse Resp SpO2   03/05/22 0845 101/70 97.5  F (36.4  C) Axillary 90 16 100 %   03/05/22 0732 101/63 97.7  F (36.5  C) Axillary 85 18 94 %     No intake/output data recorded.    Vitals:    02/19/22 1337 02/20/22 0632 02/22/22 0200 02/24/22 0615   Weight: 72.3 kg (159 lb 6.3 oz) 71.3 kg (157 lb 3 oz) 72.3 kg (159 lb 4.8 oz) 72.5 kg (159 lb 13.3 oz)    03/01/22 0352   Weight: 70.4 kg (155 lb 3.3 oz)         GENERAL: awake, alert, follows    Exam  deferred given COVID status    Hospitalist and ER exams reviewed     Data:     Recent Labs   Lab 03/05/22 0616 03/04/22 2058 03/04/22  1633 03/04/22  1233 03/03/22  0943 03/03/22  0735 03/02/22  0820 03/02/22  0736 03/01/22  0842 03/01/22  0821 02/28/22  1218 02/28/22  1151   NA  --   --   --   --   --  138  --  138  --  137  --  140   POTASSIUM  --   --   --   --   --  4.2  --  4.0  --  5.2  --  4.4   CHLORIDE  --   --   --   --   --  102  --  102  --  108  --  106   CO2  --   --   --   --   --  31  --  30  --  20  --  22   ANIONGAP  --   --   --   --   --  5  --  6  --  9  --  12   * 247* 252* 175*   < > 118*   < > 98   < > 146*   < > 140*   BUN  --   --   --   --   --  52*  --  33*  --  58*  --  44*   CR  --   --   --   --   --  5.73*  --  4.41*  --  6.03*  --  5.26*   GFRESTIMATED  --   --   --   --   --  10*  --  13*  --  9*  --  11*   CHRISTINE  --   --   --   --   --  8.6  --  8.8  --  8.4*  --  8.8    < > = values in this interval not displayed.         Recent Labs   Lab 03/05/22 0616 03/03/22  0943 03/03/22  0735   NA  --   --  138   POTASSIUM  --   --  4.2   CHLORIDE  --   --  102   CO2  --   --  31   ANIONGAP  --   --  5   *   < > 118*   BUN  --   --  52*   CR  --   --  5.73*   GFRESTIMATED  --   --  10*   CHRISTINE  --   --  8.6    < > = values in this interval not displayed.     Recent Labs   Lab 03/02/22 0736 03/01/22 0821   ALBUMIN 2.7* 2.4*     Recent Labs   Lab 03/02/22 0736 03/01/22  0821 02/28/22  1151   PHOS 3.9 5.2*  --    HGB 13.7 14.4 13.8         G Estevan Granger MD    [facility] - Nephrology  [phone]

## 2022-03-05 NOTE — PLAN OF CARE
Summary:  Recovered COVID-19 infection. ESRD; Fall risk   Date & time: 3/4/2022 7531-1161  Cognitive Concerns/ Orientation : A&O x 4, forgetful at times  BEHAVIOR & AGGRESSION TOOL COLOR: GREEN  CIWA SCORE: NA   ABNL VS/O2: soft BPs (asymptomatic), 1L O2 via NC   MOBILITY: Total, Assist of 2, turn/repo q2h ( refuses at times) sat on side of bed for lunch  PAIN MANAGMENT: Right Flank pain 2/10 with movement  DIET: Renal diet.   ABNL LAB: BG   DRAIN/DEVICES: R PIV SL, Lt Fistula/WNL.   TELEMETRY RHYTHM: Afib   TESTS/PROCEDURES: HD scheduled for  3/5/22  DAY/GOALS/PLACE: Possibly TCU in 2-3 days. SW following.

## 2022-03-06 NOTE — PLAN OF CARE
Summary:  Recovered COVID-19 infection. ESRD; Fall risk   Date & time: 3/5/2022 4552-4336  Cognitive Concerns/ Orientation : A&O x 4, forgetful at times  BEHAVIOR & AGGRESSION TOOL COLOR: GREEN  CIWA SCORE: NA   ABNL VS/O2: /63, 92/56 (asymptomatic),1 L O2 via NC mid 90's - complained of light headedness and was 89% - increased to 3L. Low BPs during dialysis received prn Midodrine  MOBILITY: Total, Assist of 2, turn/repo q2h ( not OOB this shift)  PAIN MANAGMENT: denies pain  DIET: Renal diet.   ABNL LAB: ,173/209  DRAIN/DEVICES: R PIV SL, Lt Fistula/WNL.   TELEMETRY RHYTHM: Afib   TESTS/PROCEDURES: HD this morning  DAY/GOALS/PLACE: Possibly TCU in 2-3 days. SW following.   OTHER IMPORTANT INFO: HD will switch back to M/W/F schedule next week. PT/OT following. Encouraged with use of flutter valve (pt declined incentive spirometer). Remains hypotensive mostly SBP 90's,  per nephrology-ok for SBP>75 if pt asymptomatic.

## 2022-03-06 NOTE — PLAN OF CARE
Goal Outcome Evaluation:    Summary:  Recovered COVID-19 infection. ESRD; Fall risk   Date & time: 3/6/2022 3421-9185  Cognitive Concerns/ Orientation : A&O x 4, forgetful at times  BEHAVIOR & AGGRESSION TOOL COLOR: GREEN  CIWA SCORE: NA   ABNL VS/O2: VSS except soft BP (99/64) on 2 LPM O2 NC  MOBILITY: Total, Assist of 2, turn/repo q2h   PAIN MANAGMENT: denies  DIET: Renal diet. 50%breakfast, refused lunch  ABNL LAB: , 172  DRAIN/DEVICES: R PIV SL, L HD AVF, bruit/thrill present.   TELEMETRY RHYTHM: Afib  CVR  TESTS/PROCEDURES: HD yesterday (3/05) then MWF  DAY/GOALS/PLACE: Expected discharge 3/07 to TCU per MD notes.   OTHER IMPORTANT INFO: PT/OT following. SW following.

## 2022-03-06 NOTE — PLAN OF CARE
Summary:  Recovered COVID-19 infection. ESRD; Fall risk   Date & time: 3/5/2022 4592-7402  Cognitive Concerns/ Orientation : A&O x 4, forgetful at times  BEHAVIOR & AGGRESSION TOOL COLOR: GREEN  CIWA SCORE: NA   ABNL VS/O2: VSS except soft BP (102/62) on 3 LPM O2 NC  MOBILITY: Total, Assist of 2, turn/repo q2h   PAIN MANAGMENT: PRN Tylenol x1 for headache.  DIET: Renal diet.   ABNL LAB:   DRAIN/DEVICES: R PIV SL, L HD AVF, bruit/thrill present.   TELEMETRY RHYTHM: Afib  CVR  TESTS/PROCEDURES: HD yesterday (3/05) then MWF  DAY/GOALS/PLACE: Expected discharge 3/07 to TCU per MD notes.   OTHER IMPORTANT INFO: PT/OT following. SW following.

## 2022-03-06 NOTE — PROGRESS NOTES
[facility]    Medicine Progress Note - Hospitalist Service        Date of Admission:  2/17/2022 12:33 AM    Assessment & Plan:   Westley Ness is a 76 year old male with history of coronary artery disease, ischemic cardiomyopathy with EF of 20% on his last echo in 03/2021.  End-stage renal disease on hemodialysis Monday/Wednesday/Friday, diabetes mellitus type 2, GERD fibrillation, atrial flutter on chronic anticoagulation with apixaban, status post ICD placement, and hypertensionwho was admitted on 2/17/2022 with hypoxia and worsened dyspnea.     Acute hypoxic respiratory failure: now improved.  COVID-19 pneumonia  -Respiratory failure likely secondary to Covid infection+/- recent L rib fractures with L atelectasis +/- fluid overload.  -Diagnosed on on 2/11, fully vaccinated, was admitted between 2/11 to 2/15 and get better and subsequently discharge, received Dexamethasone during that admission, re-admitted 2 days later with worsening hypoxia.   -CXR 2/19 with left basilar/retrocardiac pulmonary opacities, could be small pleural effusion and associated basilar atelectasis/consolidation.   He is still hypoxic, last dose of got 6 days of Dexamethasone during this stay and discontinue on 2/23, continue to have SOB, hypoxia and hypotensive, currently on 4-5 LPM of oxygen.   Restarted him on Dexamethasone 6 mg IV on 2/28 and now stop on 3/3, CRP and Hypoxia improve.  He is now on tapering doses of Prednisone started from 40 mg daily   IS, flutter and try to wean his oxygen down.  Dialysis and UF as per Nephrology     COVID precautions discontinue on 3/4/22 as per infection control/   Restarted him on IV Dexamethasone on 2/28, with improvement in his symptoms, he is now on 1 LPM with 97 % saturation, , no remdesivir due to ESRD, IS< flutter.   Try to wean his oxygen down and eventually off if he tolerates. Overall improve now, stop IV Dexamethasone 3/3 and start on oral Prednisone 40 mg daily and  taper 10 mg every 4 day to his baseline of 5 mg, will decrease to 30 mg from 3/6/22  Inflammatory markers improve, blood pressure improve and hypoxia improve, only needing 1-3 LPM at this time.        Chronic HFrEF  History of A. fib, ICD/PPM in place  Nonsustained V. tach  -PTA on metoprolol/ lisinopril / imdur. Baseline BP low in the 100s. Last echo 03/21, EF 20-25%. ICD in place.   -Blood pressure has been soft/low during this hospital stay and patient not tolerating dialysis or any cardiac medication  -hold metoprolol, lisinopril and isosorbide mononitrate, unable to tolerate due to low BPs  -continue telemetry monitoring  -Hold Eliquis given hematuria on 2/26, no significant Hematuria at this time, resume his Eliquis from today, 2/28.   -Cardiology has followed this hospital stay, per their assessment overall prognosis is quite guarded.  He has not been able to tolerate cardiomyopathy medication.  They have now signed off.  -Outpatient follow-up with cardiology in 1 month if goals of care remains restorative    Hypotension: improved now.   Patient was on Prednisone 5 mg daily, was not resume after stopping the Dexamethasone,  Although is blood pressure were low before as well. Check Cortisol come back slightly high,   Resume Dexamethasone.   He is getting Midodrine 10 mg prior to dialysis will continue with that.   Started him on Midodrine 2.5 mg TID for now, continue 10 mg prior to dialysis.  Overall remain stable, tolerating dialysis as well, I will now switch his Midodrine to as needed only and continue  10 mg prior to dialysis and see how is his blood pressure.      Hematuria  -Noted on 2/23 and subsequently on 2/26  -no significant hematuria, hardly make any urine, resume Eliquis on 2/28    ESRD  Hypertension  Anemia of renal failure  Secondary hyperparathyroidism  -On Dialysis for 18 yrs, Left UE AVF with some bleeding issues. Baseline Dry wt 77.6, currently at 70.2.   -nephrology following  Dialysis as  per Nephrology, schedule for today      Steroid induced hyperglycemia  -Hemoglobin A1c on 2/11 was 5.1  -Currently on medium resistance sliding scale insulin while on decadron  -Continue to monitor blood sugar for now, BS remain in good control.      Chronic Thrombocytopenia: stable and improve at this time.   Hypothyroidism: Cont PTA synthroid.   HLD: continue PTA Pravastatin 40mg.     Goals of care  -Previous provider had extensive discussion about goals of care with patient, CODE STATUS changed to DNR/DNI.    Goals of care remain restorative at this time, he wants to continue with the dialysis and other treatment as well.        Overall remain stable and Awaiting placement.     Discussed with patient and the nursing staff taking care of the patient today         Diet: Combination Diet Regular Diet; Renal Diet (dialysis)  Snacks/Supplements Adult: Other; Dinner - magic cup (RD); With Meals     DVT Prophylaxis: DOAC   Velarde Catheter: Not present  Code Status: No CPR- Do NOT Intubate     Disposition Plan    Expected Discharge: 03/07/2022     Anticipated discharge location: home    Delays:     Placement - TCU  Other (Add Comment)         Entered: Nghia Cavanaugh MD 03/06/2022, 10:29 AM        Clinically Significant Risk Factors Present on Admission                      The patient's care was discussed with the Bedside Nurse and Patient.    Nghia Cavanaugh MD  [facility]  Contact information available via [facility] Paging/Directory    ______________________________________________________________________    Interval History    Sleeping at time of my visit, did woke up, wanted to sleep more, offer no complaints.     No other significant event overnight.       Data reviewed today: I reviewed all medications, new labs and imaging results over the last 24 hours. I personally reviewed no images or EKG's today.    Physical Exam   Vital signs:  Temp: 97.4  F (36.3  C) Temp src: Oral BP: 99/64 Pulse: 86    "Resp: 16 SpO2: 97 % O2 Device: Nasal cannula Oxygen Delivery: 3 LPM   Weight: 70 kg (154 lb 5.2 oz)  Estimated body mass index is 23.46 kg/m  as calculated from the following:    Height as of 2/12/22: 1.727 m (5' 8\").    Weight as of this encounter: 70 kg (154 lb 5.2 oz).      Wt Readings from Last 2 Encounters:   03/06/22 70 kg (154 lb 5.2 oz)   02/14/22 75.9 kg (167 lb 5.3 oz)       Gen: sleeping comfortably, in no distress.    Resp: Diminished air entry bilaterally, no crackles or wheezing  CVS: RRR, no murmur  Abd/GI: Soft, non-tender. BS- normoactive.   Skin: Warm, dry no rashes  MSK: Trace pedal edema  Neuro- CN- intact. No focal deficits.      Data   Recent Labs   Lab 03/06/22  0932 03/06/22  0201 03/05/22  2052 03/05/22  1350 03/05/22  0910 03/03/22  0943 03/03/22  0735 03/02/22  0820 03/02/22  0736 03/01/22  0842 03/01/22  0821 02/28/22  1218 02/28/22  1151   WBC  --   --   --   --   --   --   --   --  9.5  --  10.3  --  12.6*   HGB  --   --   --   --   --   --   --   --  13.7  --  14.4  --  13.8   MCV  --   --   --   --   --   --   --   --  107*  --  111*  --  109*   PLT  --   --   --   --   --   --   --   --  132*  --  145*  --  107*   NA  --   --   --   --  140  --  138  --  138  --  137  --  140   POTASSIUM  --   --   --   --  4.0  --  4.2  --  4.0  --  5.2  --  4.4   CHLORIDE  --   --   --   --  105  --  102  --  102  --  108  --  106   CO2  --   --   --   --  27  --  31  --  30  --  20  --  22   BUN  --   --   --   --  54*  --  52*  --  33*  --  58*  --  44*   CR  --   --   --   --  5.17*  --  5.73*  --  4.41*  --  6.03*  --  5.26*   ANIONGAP  --   --   --   --  8  --  5  --  6  --  9  --  12   CHRISTINE  --   --   --   --  8.9  --  8.6  --  8.8  --  8.4*  --  8.8   * 239* 233*   < > 146*   < > 118*   < > 98   < > 146*   < > 140*   ALBUMIN  --   --   --   --   --   --   --   --  2.7*  --  2.4*  --   --     < > = values in this interval not displayed.       No results found for this or any previous " visit (from the past 24 hour(s)).  Medications     - MEDICATION INSTRUCTIONS -         - MEDICATION INSTRUCTIONS for Dialysis Patients -   Does not apply See Admin Instructions     apixaban ANTICOAGULANT  2.5 mg Oral BID     famotidine  10 mg Oral Daily     gabapentin  100 mg Oral TID     guaiFENesin  600 mg Oral BID     insulin aspart  1-7 Units Subcutaneous TID AC     insulin aspart  1-5 Units Subcutaneous At Bedtime     levothyroxine  50 mcg Oral Daily     lidocaine  2 patch Transdermal Q24H     lidocaine   Transdermal Q8H     midodrine  10 mg Oral Once per day on Tue Thu Sat     multivitamin RENAL  1 capsule Oral Daily     nitroGLYcerin  0.4 mg Sublingual Once     pantoprazole  40 mg Oral QAM AC     pravastatin  40 mg Oral Daily     predniSONE  30 mg Oral Daily     sodium chloride (PF)  3 mL Intracatheter Q8H

## 2022-03-07 NOTE — PLAN OF CARE
Summary:  Recovered COVID-19 infection. ESRD; Fall risk   Date & time: 3/6/2022 2691-2025  Cognitive Concerns/ Orientation : A&O x 4, forgetful at times  BEHAVIOR & AGGRESSION TOOL COLOR: GREEN  CIWA SCORE: NA   ABNL VS/O2: VSS except soft BP (96/58) on 2 LPM O2 NC- refused weaning  MOBILITY: Total, Assist of 2, turn/repo q2h   PAIN MANAGMENT: denies  DIET: Renal diet. Great appetite  ABNL LAB: , 211  DRAIN/DEVICES: R PIV SL, L HD AVF, bruit/thrill present.   TELEMETRY RHYTHM: Afib  CVR  TESTS/PROCEDURES: HD yesterday (3/05) then MWF  DAY/GOALS/PLACE: Expected discharge 3/07 to TCU per MD notes.   OTHER IMPORTANT INFO: PT/OT following. SW following.

## 2022-03-07 NOTE — PROGRESS NOTES
"Care Management Follow Up    Length of Stay (days): 18    Expected Discharge Date: 03/07/2022     Concerns to be Addressed: adjustment to diagnosis/illness     Patient plan of care discussed at interdisciplinary rounds: Yes    Anticipated Discharge Disposition: Skilled Nursing Facilty     Anticipated Discharge Services:    Anticipated Discharge DME:      Patient/family educated on Medicare website which has current facility and service quality ratings:    Education Provided on the Discharge Plan:    Patient/Family in Agreement with the Plan: yes    Referrals Placed by CM/SW:    Private pay costs discussed: private room/amenity fees and transportation costs    Additional Information:  Met with patient and also spoke with his caregiver Lynn Juarez at [phone] regarding TCU placement. Explained to Lynn that [facility] and [facility] declined patient due to \"complex care needs and acuity\", [facility] does not accept patient's who are on dialysis and [facility] has no beds at this.  To patient, writer explained the facilities of his interest were either at capacity or did not feel they could meet his needs.  Explained to both parties that writer will need to expand the geographic area.  Patient requests the TCU be as close as possible to his dialysis site.  Writer discussed transportation to and from dialysis and asked patent if he is getting out of bed and sitting up.  He sated he has only gotten out of bed once.  Writer expressed concern if he remains in bed he will not be able to tolerate medivan transport to and from dialysis and the only other option is stretcher. Reviewed cost of stretcher and explained it is unknown if Medicare will cover the expense. Patient stated he thought he may need to start out going by stretcher.  [facility], [facility], [facility] and placed call to " [facility].  Explained to patient once options are known writer will review with him.   Over the phone educated Ms Juarez on the use of Medicare web site.  She will also be kept updated regarding progress with TCU options at patient request..     UPDATE  [facility] ACCEPTED PATIENT IF HE WILL BE ABLE TO TRANSPORT BY [facility].  THEY WILL NOT ACCEPT IF HE NEEDS TO TRAVEL BY [facility] TO [facility].  PT NOTE ON 3/4 INDICATED HE WAS ONLY ABLE TO SIT UP FOR 10 MINUTES DUE TO FATIGUE.  PATIENT TELLS WRITER HE HAS ONLY SAT IN THE CHAIR ONCE.  WRITER IS ASKING PATIENT TO TRY SITTING UP FOR MEALS.   OTHERWISE THE NEED FOR STRETCHER MAY ALSO BE A BARRIER TO OTHER TCU'S     KAITLIN ThorntonSW

## 2022-03-07 NOTE — PROGRESS NOTES
Renal Medicine Dialysis Note                                Westley Ness MRN# [id number]   Age: 76 year old YOB: 1945   Date of Admission: 2/17/2022 Hospital LOS: 18                  Assessment/Plan:        75-year-old male with history of CAD, ICM with EF of 20-25%, ESRD, DM2, GERD, atrial flutter/fibrillation on chronic anticoagulation with apixaban, status post ICD placement, and hypertension re admitted with SOB       Following for ESRD management      1.  ESRD               -MWF [facility]               -AVF     2.  Anemia - not on OSBALDO  3.  Metabolic Bone Disease              -Hectorol  4.  COVID 19   -recovered  5.  Acute on chronic systolic heart failure exacerbation  6.  Hypotension       Midodrine  5% albumin prime    Next 03/09/22        Interval History:     Dialysis run parameters reviewed with dialysis RN at patient bedside.  Seen at initiation of run    AVF  3.5 hours  UF goal 2 liter as able. Midodrine prior to HD . Albumin prime     No OSBALDO  Heparin    HD running okay . BP on soft side. Reports exertional dyspnea . Laying flat           Medications and Allergies:     Reviewed    Physical Exam:     Vitals were reviewed  Patient Vitals for the past 8 hrs:   BP Temp Temp src Pulse Resp SpO2   03/07/22 1355 97/55 -- -- 105 -- --   03/07/22 1345 (!) 86/74 97.9  F (36.6  C) Oral 107 17 --   03/07/22 1325 120/66 97.6  F (36.4  C) Axillary -- 18 97 %   03/07/22 0733 105/64 97.1  F (36.2  C) Tympanic 95 18 95 %     No intake/output data recorded.    Vitals:    02/20/22 0632 02/22/22 0200 02/24/22 0615 03/01/22 0352   Weight: 71.3 kg (157 lb 3 oz) 72.3 kg (159 lb 4.8 oz) 72.5 kg (159 lb 13.3 oz) 70.4 kg (155 lb 3.3 oz)    03/06/22 0602   Weight: 70 kg (154 lb 5.2 oz)         GENERAL: awake, alert, follows  Chest - diminished breath sounds at bases   CVS - s1+s2  PA - soft   CNS - non focal   Lt UE AVF     Data:     Recent Labs   Lab 03/07/22  1229 03/07/22  0751 03/07/22  0242  03/06/22  2059 03/05/22  1350 03/05/22  0910 03/03/22  0943 03/03/22  0735 03/02/22  0820 03/02/22  0736 03/01/22  0842 03/01/22 0821   NA  --   --   --   --   --  140  --  138  --  138  --  137   POTASSIUM  --   --   --   --   --  4.0  --  4.2  --  4.0  --  5.2   CHLORIDE  --   --   --   --   --  105  --  102  --  102  --  108   CO2  --   --   --   --   --  27  --  31  --  30  --  20   ANIONGAP  --   --   --   --   --  8  --  5  --  6  --  9   * 162* 206* 342*   < > 146*   < > 118*   < > 98   < > 146*   BUN  --   --   --   --   --  54*  --  52*  --  33*  --  58*   CR  --   --   --   --   --  5.17*  --  5.73*  --  4.41*  --  6.03*   GFRESTIMATED  --   --   --   --   --  11*  --  10*  --  13*  --  9*   CHRISTINE  --   --   --   --   --  8.9  --  8.6  --  8.8  --  8.4*    < > = values in this interval not displayed.         Recent Labs   Lab 03/07/22  1229 03/05/22  1350 03/05/22  0910   NA  --   --  140   POTASSIUM  --   --  4.0   CHLORIDE  --   --  105   CO2  --   --  27   ANIONGAP  --   --  8   *   < > 146*   BUN  --   --  54*   CR  --   --  5.17*   GFRESTIMATED  --   --  11*   CHRISTINE  --   --  8.9    < > = values in this interval not displayed.     Recent Labs   Lab 03/02/22 0736 03/01/22 0821   ALBUMIN 2.7* 2.4*     Recent Labs   Lab 03/06/22  1034 03/02/22  0736 03/01/22 0821   PHOS  --  3.9 5.2*   HGB 14.2 13.7 14.4       Benjamin Weeks MD  [facility] - Nephrology   [phone]

## 2022-03-07 NOTE — PROGRESS NOTES
Potassium   Date Value Ref Range Status   03/05/2022 4.0 3.4 - 5.3 mmol/L Final   07/05/2021 5.3 3.4 - 5.3 mmol/L Final     Hemoglobin   Date Value Ref Range Status   03/06/2022 14.2 13.3 - 17.7 g/dL Final   07/05/2021 12.0 (L) 13.3 - 17.7 g/dL Final     Creatinine   Date Value Ref Range Status   03/05/2022 5.17 (H) 0.66 - 1.25 mg/dL Final   07/05/2021 5.03 (H) 0.66 - 1.25 mg/dL Final     Urea Nitrogen   Date Value Ref Range Status   03/05/2022 54 (H) 7 - 30 mg/dL Final   07/05/2021 39 (H) 7 - 30 mg/dL Final     Sodium   Date Value Ref Range Status   03/05/2022 140 133 - 144 mmol/L Final   07/05/2021 140 133 - 144 mmol/L Final     INR   Date Value Ref Range Status   02/11/2022 1.23 (H) 0.85 - 1.15 Final   10/15/2020 1.31 (H) 0.86 - 1.14 Final       DIALYSIS PROCEDURE NOTE  Hepatitis status of previous patient on machine log was checked and verified ok to use with this patients hepatitis status.  Patient dialyzed for 3 hrs. on a K3 bath with a net fluid removal of  1.6L.  A BFR of 350 ml/min was obtained via a upper LAVF using 15 gauge needles.      The treatment plan was discussed with Dr. Weeks during the treatment.    Total heparin received during the treatment: 1500 units.   Needle cannulation sites held x 15 min.     Meds  given: 5% human albumin 250ml patient prime  Complications: hypotension resulted in slightly reduced UF goal, 200ml less.       Person educated: patient. Knowledge base: substantial. Barriers to learning: none. Educated on access care via verbal mode. Patient verbalized understanding. Pt prefers verbal education style.     ICEBOAT? Timeout performed pre-treatment  I: Patient was identified using 2 identifiers  C:  Consent Signed Yes  E: Equipment preventative maintenance is current and dialysis delivery system OK to use  B: Hepatitis B Surface Antigen: Negative; Draw Date: 2/14/2022      Hepatitis B Surface Antibody: Ssuceptible; Draw Date: 2/14/2022  O: Dialysis orders present and complete  prior to treatment  A: Vascular access verified and assessed prior to treatment  T: Treatment was performed at a clinically appropriate time  ?: Patient was allowed to ask questions and address concerns prior to treatment  See flowsheet in EPIC for further details and post assessment.  Machine water alarm in place and functioning. Transducer pods intact and checked every 15min.   Pt returned via bed.  Chlorine/Chloramine water system checked every 4 hours.  Outpatient Dialysis at [facility]    Please remove patient dressing on AVF and AVG needle sites 24 hours after dialysis. If leaking occurs please apply a Band-Aid.

## 2022-03-07 NOTE — PROGRESS NOTES
[facility]    Medicine Progress Note - Hospitalist Service        Date of Admission:  2/17/2022 12:33 AM    Assessment & Plan:   Westley Ness is a 76 year old male with history of coronary artery disease, ischemic cardiomyopathy with EF of 20% on his last echo in 03/2021.  End-stage renal disease on hemodialysis Monday/Wednesday/Friday, diabetes mellitus type 2, GERD fibrillation, atrial flutter on chronic anticoagulation with apixaban, status post ICD placement, and hypertensionwho was admitted on 2/17/2022 with hypoxia and worsened dyspnea.     Acute hypoxic respiratory failure: now improved.  COVID-19 pneumonia  -Respiratory failure likely secondary to Covid infection+/- recent L rib fractures with L atelectasis +/- fluid overload.  -Diagnosed on on 2/11, fully vaccinated, was admitted between 2/11 to 2/15 and get better and subsequently discharge, received Dexamethasone during that admission, re-admitted 2 days later with worsening hypoxia.   -CXR 2/19 with left basilar/retrocardiac pulmonary opacities, could be small pleural effusion and associated basilar atelectasis/consolidation.   He is still hypoxic, last dose of got 6 days of Dexamethasone during this stay and discontinue on 2/23, continue to have SOB, hypoxia and hypotensive, currently on 4-5 LPM of oxygen.   Restarted him on Dexamethasone 6 mg IV on 2/28 and now stop on 3/3, CRP and Hypoxia improve.  He is now on tapering doses of Prednisone started from 40 mg daily   IS, flutter and try to wean his oxygen down.  Dialysis and UF as per Nephrology     COVID precautions discontinue on 3/4/22 as per infection control  Restarted him on IV Dexamethasone on 2/28, with improvement in his symptoms, he is now on 1 LPM with 97 % saturation, , no remdesivir due to ESRD, IS< flutter.   Try to wean his oxygen down and eventually off if he tolerates. Overall improve now, stop IV Dexamethasone 3/3 and start on oral Prednisone 40 mg daily and taper  10 mg every 4 day to his baseline of 5 mg, will decrease to 30 mg from 3/5/22, will decrease to 20 mg from tomorrow   Inflammatory markers improve, blood pressure improve and hypoxia improve, only needing 1-3 LPM at this time.        Chronic HFrEF  History of A. fib, ICD/PPM in place  Nonsustained V. tach  -PTA on metoprolol/ lisinopril / imdur. Baseline BP low in the 100s. Last echo 03/21, EF 20-25%. ICD in place.   -Blood pressure has been soft/low during this hospital stay and patient not tolerating dialysis or any cardiac medication  -hold metoprolol, lisinopril and isosorbide mononitrate, unable to tolerate due to low BPs  -continue telemetry monitoring  -Hold Eliquis given hematuria on 2/26, no significant Hematuria at this time, resume his Eliquis from today, 2/28.   -Cardiology has followed this hospital stay, per their assessment overall prognosis is quite guarded.  He has not been able to tolerate cardiomyopathy medication.  They have now signed off.  -Outpatient follow-up with cardiology in 1 month if goals of care remains restorative    Hypotension: improved now.   Patient was on Prednisone 5 mg daily, was not resume after stopping the Dexamethasone,  Although is blood pressure were low before as well. Check Cortisol come back slightly high,   Resume Dexamethasone.   He is getting Midodrine 10 mg prior to dialysis will continue with that.   Started him on Midodrine 2.5 mg TID for now, continue 10 mg prior to dialysis.  Overall remain stable, tolerating dialysis as well, I will now switch his Midodrine to as needed only and continue  10 mg prior to dialysis and see how is his blood pressure.      Hematuria  -Noted on 2/23 and subsequently on 2/26  -no significant hematuria, hardly make any urine, resume Eliquis on 2/28    ESRD  Hypertension  Anemia of renal failure  Secondary hyperparathyroidism  -On Dialysis for 18 yrs, Left UE AVF with some bleeding issues. Baseline Dry wt 77.6, currently at 70.2.    -nephrology following  Dialysis as per Nephrology, schedule for today      Steroid induced hyperglycemia  -Hemoglobin A1c on 2/11 was 5.1  -Currently on medium resistance sliding scale insulin while on decadron  -Continue to monitor blood sugar for now, BS remain in good control.      Chronic Thrombocytopenia: stable and improve at this time.   Hypothyroidism: Cont PTA synthroid.   HLD: continue PTA Pravastatin 40mg.     Goals of care  -Previous provider had extensive discussion about goals of care with patient, CODE STATUS changed to DNR/DNI.    Goals of care remain restorative at this time, he wants to continue with the dialysis and other treatment as well.        Overall is stable, Chronic SOB is multifactorial and overall guarded prognosis.     Discussed with patient and the nursing staff taking care of the patient today   Awaiting placement at this time.         Diet: Combination Diet Regular Diet; Renal Diet (dialysis)  Snacks/Supplements Adult: Other; Dinner - magic cup (RD); With Meals     DVT Prophylaxis: DOAC   Velarde Catheter: Not present  Code Status: No CPR- Do NOT Intubate     Disposition Plan    Expected Discharge: 03/07/2022     Anticipated discharge location: home    Delays:     Placement - TCU  Other (Add Comment)         Entered: Nghia Cavanaugh MD 03/07/2022, 10:24 AM        Clinically Significant Risk Factors Present on Admission                      The patient's care was discussed with the Bedside Nurse and Patient.    Nghia Cavanaugh MD  [facility]  Contact information available via [facility] Paging/Directory    ______________________________________________________________________    Interval History    Feeling some what tired, cough better, still have some SOB, no fever, chills, chest pain, headache or dizziness.     No other significant event overnight.       Data reviewed today: I reviewed all medications, new labs and imaging results over the last 24 hours. I  "personally reviewed no images or EKG's today.    Physical Exam   Vital signs:  Temp: 97.1  F (36.2  C) Temp src: Tympanic BP: 105/64 Pulse: 95   Resp: 18 SpO2: 95 % O2 Device: Nasal cannula Oxygen Delivery: 1 LPM   Weight: 70 kg (154 lb 5.2 oz)  Estimated body mass index is 23.46 kg/m  as calculated from the following:    Height as of 2/12/22: 1.727 m (5' 8\").    Weight as of this encounter: 70 kg (154 lb 5.2 oz).      Wt Readings from Last 2 Encounters:   03/06/22 70 kg (154 lb 5.2 oz)   02/14/22 75.9 kg (167 lb 5.3 oz)       Gen: awake, alert and oriented, in no distress.   Resp: Diminished air entry bilaterally, no crackles or wheezing  CVS: RRR, no murmur  Abd/GI: Soft, non-tender. BS- normoactive.   Skin: Warm, dry no rashes  MSK: Trace pedal edema  Neuro- CN- intact. No focal deficits.      Data   Recent Labs   Lab 03/07/22  0751 03/07/22  0248 03/06/22  2059 03/06/22  1326 03/06/22  1034 03/05/22  1350 03/05/22  0910 03/03/22  0943 03/03/22  0735 03/02/22  0820 03/02/22  0736 03/01/22  0842 03/01/22  0821   WBC  --   --   --   --  10.5  --   --   --   --   --  9.5  --  10.3   HGB  --   --   --   --  14.2  --   --   --   --   --  13.7  --  14.4   MCV  --   --   --   --  107*  --   --   --   --   --  107*  --  111*   PLT  --   --   --   --  128*  --   --   --   --   --  132*  --  145*   NA  --   --   --   --   --   --  140  --  138  --  138  --  137   POTASSIUM  --   --   --   --   --   --  4.0  --  4.2  --  4.0  --  5.2   CHLORIDE  --   --   --   --   --   --  105  --  102  --  102  --  108   CO2  --   --   --   --   --   --  27  --  31  --  30  --  20   BUN  --   --   --   --   --   --  54*  --  52*  --  33*  --  58*   CR  --   --   --   --   --   --  5.17*  --  5.73*  --  4.41*  --  6.03*   ANIONGAP  --   --   --   --   --   --  8  --  5  --  6  --  9   CHRISTINE  --   --   --   --   --   --  8.9  --  8.6  --  8.8  --  8.4*   * 206* 342*   < >  --    < > 146*   < > 118*   < > 98   < > 146*   ALBUMIN  --   " --   --   --   --   --   --   --   --   --  2.7*  --  2.4*    < > = values in this interval not displayed.       No results found for this or any previous visit (from the past 24 hour(s)).  Medications     - MEDICATION INSTRUCTIONS -         - MEDICATION INSTRUCTIONS for Dialysis Patients -   Does not apply See Admin Instructions     apixaban ANTICOAGULANT  2.5 mg Oral BID     famotidine  10 mg Oral Daily     gabapentin  100 mg Oral TID     guaiFENesin  600 mg Oral BID     insulin aspart  1-7 Units Subcutaneous TID AC     insulin aspart  1-5 Units Subcutaneous At Bedtime     levothyroxine  50 mcg Oral Daily     lidocaine  2 patch Transdermal Q24H     lidocaine   Transdermal Q8H     midodrine  10 mg Oral Once per day on Tue Thu Sat     multivitamin RENAL  1 capsule Oral Daily     nitroGLYcerin  0.4 mg Sublingual Once     pantoprazole  40 mg Oral QAM AC     pravastatin  40 mg Oral Daily     predniSONE  30 mg Oral Daily     sodium chloride (PF)  3 mL Intracatheter Q8H

## 2022-03-07 NOTE — PLAN OF CARE
Summary:  Recovered COVID-19 infection. ESRD; Fall risk   Date & time: 3/07/22 1613-8444  Cognitive Concerns/ Orientation : A&O x 4, forgetful at times  BEHAVIOR & AGGRESSION TOOL COLOR: GREEN  CIWA SCORE: NA   ABNL VS/O2: VSS except soft BP (96/58) on 1 LPM NC   MOBILITY: Total, Assist of 2, turn/repo q2h   PAIN MANAGMENT: denies  DIET: Renal diet.   ABNL LAB: BG   DRAIN/DEVICES: R PIV SL, L HD AVF, bruit/thrill present.   TELEMETRY RHYTHM: Afib  CVR  TESTS/PROCEDURES: HD today (MWF schedule)  DAY/GOALS/PLACE: Expected discharge today 3/07 to TCU per MD notes.   OTHER IMPORTANT INFO: PT/OT following. SW following.

## 2022-03-07 NOTE — PROGRESS NOTES
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    I provided empathetic listening, facilitation of memory sharing and life review    PLAN: I will advise palliative  of care provided. Spiritual Health Services remains available for patient, family, and staff support.     Please page the on call  by placing a STAT or ASAP Epic referral for Spiritual Health (which will roll to the on call pager).        JAMISON Page.   Associate   Pager [phone]

## 2022-03-07 NOTE — PLAN OF CARE
Goal Outcome Evaluation:    Plan of Care Reviewed With: patient     Overall Patient Progress: no change    Summary:  Recovered COVID-19 infection. ESRD; Fall risk   Date & time: 3/07/22 2516-9715  Cognitive Concerns/ Orientation : A&O x 4, forgetful at times  BEHAVIOR & AGGRESSION TOOL COLOR: GREEN  CIWA SCORE: NA   ABNL VS/O2: VSS except soft BP, given 10 mg midodrine prior to going to dialysis at 1330.  He is on 2 LPM O2 NC lungs diminished with infrequent at times productive cough.  MOBILITY: Total, Assist of 2, turn/repo q2h -encourage up to chair for meals  PAIN MANAGMENT: denies  DIET: Renal diet- tolerated well with fair appetite- glucose 162/164.  ABNL LAB: BG   DRAIN/DEVICES: R PIV SL, L HD AVF, bruit/thrill present.   TELEMETRY RHYTHM: Afib  CVR  TESTS/PROCEDURES: HD today (MWF schedule)  DAY/GOALS/PLACE: Expected discharge today 3/08 to TCU per MD notes.   OTHER IMPORTANT INFO: PT/OT following. PT/SW following.

## 2022-03-07 NOTE — PROGRESS NOTES
CLINICAL NUTRITION SERVICES - REASSESSMENT NOTE      Recommendations Ordered by Registered Dietitian (RD):   Add Room Service w/ Assist for meal ordering support and consistency - should patient remain inpatient   Continue magic cup w/ dinner    Malnutrition: (3/7)  % Weight Loss:  > 7.5% in 3 months (severe malnutrition)  % Intake: </= 50% for >/= 5 days (severe malnutrition) -- earlier this admission, now improving   Subcutaneous Fat Loss:  Unable to observe, patient unavailable   Muscle Loss:  Patellar region moderate-severe depletion, Anterior thigh region moderate-severe depletion and Posterior calf region moderate-severe depletion  Fluid Retention:  None noted    Malnutrition Diagnosis: Severe malnutrition  In Context of:  Acute on Chronic illness or disease       EVALUATION OF PROGRESS TOWARD GOALS   Diet:  Renal (dialysis)  Magic cup daily w/ dinner     Intake/Tolerance:    Chart reviewed  Intake and appetite appear to have improved over the past 5 days  Flowsheets now record % meal consumption daily   Meals ordered are still fairly small, and he varies ordering food 1-3x per day     Patient busy with therapies during attempted visit - unable to review eating patterns or complete nutrition focused physical exam in person  Briefly visualized patient through doorway and noticed moderate-severe LE muscle wasting   Per chart, patient noted with ongoing fatigue & SOB      NEW FINDINGS:   COVID recovered status 3/4  [facility] [place] [facility] if able to transport w/o stretcher     Previous Goals:   Patient will consume % of nutritionally adequate meals TID vs start protein supplement or nutrition support   Evaluation: Partially Met    Previous Nutrition Diagnosis:   Inadequate oral intake related to suspect decreased appetite vs lack of motivation/interest in food as evidenced by only taking bites of food over the past 8 days of admission and 8% wt loss over the past 2  months   Evaluation: No change      MALNUTRITION (updated)  % Weight Loss:  > 7.5% in 3 months (severe malnutrition)  % Intake: </= 50% for >/= 5 days (severe malnutrition) -- earlier this admission, now improving   Subcutaneous Fat Loss:  Unable to observe, patient unavailable   Muscle Loss:  Patellar region moderate-severe depletion, Anterior thigh region moderate-severe depletion and Posterior calf region moderate-severe depletion  Fluid Retention:  None noted    Malnutrition Diagnosis: Severe malnutrition  In Context of:  Acute on Chronic illness or disease      CURRENT NUTRITION DIAGNOSIS  Inadequate oral intake related to variable appetite and ?lack of motivation/interest in food as evidenced by improving meal% intakes however ordering small, nutritionally inadequate meals 1-3x/day     INTERVENTIONS  Recommendations / Nutrition Prescription  Add Room Service w/ Assist for meal ordering support and consistency   Continue magic cup w/ dinner     Implementation  Composition of Meals and Snacks: as above     Goals  Patient will consume at least 75% nutritional adequate meals TID      MONITORING AND EVALUATION:  Progress towards goals will be monitored and evaluated per protocol and Practice Guidelines      Jacquelyn Duarte RD, LD  Clinical Dietitian   Unit pager [phone]

## 2022-03-08 NOTE — PLAN OF CARE
Discharge    Patient discharged to TCU via stretcher with [facility]      Listed belongings gathered and given to patient (including from security/pharmacy). Yes  Care Plan and Patient education resolved: Yes  Prescriptions if needed, hard copies sent with patient  NA  Medication Bin checked and emptied on discharge Yes  SW/care coordinator/charge RN aware of discharge: Yes

## 2022-03-08 NOTE — PLAN OF CARE
Goal Outcome Evaluation:    Summary:  Recovered COVID-19 infection. ESRD; Fall risk   Date & time: 3/07/22-3/2022 1444-8844  Cognitive Concerns/ Orientation : A&O x 4, forgetful at times  BEHAVIOR & AGGRESSION TOOL COLOR: GREEN  CIWA SCORE: NA   ABNL VS/O2: VSS on 3L NC ex soft BP (86/56)  MOBILITY: Total, Assist of 2, turn/repo q2h   PAIN MANAGMENT: Was complaining of stomach pain. Felt relief after large bowel movement.  DIET: Renal diet.   BOWEL/BLADDER: Continent of stool, 1 large soft BM.   ABNL LAB:   DRAIN/DEVICES: R PIV SL, L HD AVF, bruit/thrill present.   TELEMETRY RHYTHM: NSR  TESTS/PROCEDURES: HD done 3/7.- 1.6 L of fluid removed.  DAY/GOALS/PLACE: Expected discharge to TCU pending. SW working on placement.  OTHER IMPORTANT INFO: PT/OT following. SW following.

## 2022-03-08 NOTE — PLAN OF CARE
Goal Outcome Evaluation:    Plan of Care Reviewed With: patient     Overall Patient Progress: improving         Summary:  Recovered COVID-19 infection. ESRD; Fall risk   Date & time: 3/07/22 3-11pm  Cognitive Concerns/ Orientation : A&O x 4, forgetful at times  BEHAVIOR & AGGRESSION TOOL COLOR: GREEN  CIWA SCORE: NA   ABNL VS/O2: BP 88/51. Gave Prn Midodrine. on 3 LPM NC   MOBILITY: Total, Assist of 2, turn/repo q2h   PAIN MANAGMENT: denies. Refused Lidocaine patch  DIET: Renal diet.   BOWEL/BLADDER: Incontinent of stool. Pt on HD  ABNL LAB: /243   DRAIN/DEVICES: R PIV SL, L HD AVF, bruit/thrill present.   TELEMETRY RHYTHM: Afib  CVR  TESTS/PROCEDURES: HD done today. 1.6 L of fluid removed.  DAY/GOALS/PLACE: Expected discharge to TCU pending. SW working on placement.  OTHER IMPORTANT INFO: PT/OT following. SW following.

## 2022-03-08 NOTE — PLAN OF CARE
Physical Therapy Discharge Summary    Reason for therapy discharge:    Discharged to transitional care facility.    Progress towards therapy goal(s). See goals on Care Plan in [facility] electronic health record for goal details.  Goals partially met.  Barriers to achieving goals:   discharge from facility.    Therapy recommendation(s):    Continued therapy is recommended.  Rationale/Recommendations:  To further increase independence with mobility.

## 2022-03-08 NOTE — DISCHARGE SUMMARY
[facility]    Discharge Summary  Hospitalist    Date of Admission:  2/17/2022  Date of Discharge:  3/8/2022  Discharging Provider: Nghia Cavanaugh MD  Date of Service (when I saw the patient): 03/08/22    Discharge Diagnoses   Please refer below     History of Present Illness   Westley Ness is an 76 year old male who presented with SOB and hypoxia     Hospital Course   Westley Ness is a 76 year old male with history of coronary artery disease, ischemic cardiomyopathy with EF of 20% on his last echo in 03/2021.  End-stage renal disease on hemodialysis Monday/Wednesday/Friday, diabetes mellitus type 2, GERD fibrillation, atrial flutter on chronic anticoagulation with apixaban, status post ICD placement, and hypertensionwho was admitted on 2/17/2022 with hypoxia and worsened dyspnea.    Final Discharge Diagnosis and Hospital Course      Acute hypoxic respiratory failure: now improved.  COVID-19 pneumonia  -Respiratory failure likely secondary to Covid infection+/- recent L rib fractures with L atelectasis +/- fluid overload.  -Diagnosed on on 2/11, fully vaccinated, was admitted between 2/11 to 2/15 and get better and subsequently discharge, received Dexamethasone during that admission, re-admitted 2 days later with worsening hypoxia.   -CXR 2/19 with left basilar/retrocardiac pulmonary opacities, could be small pleural effusion and associated basilar atelectasis/consolidation.   He was still hypoxic,  got 6 days of Dexamethasone during this stay and discontinue on 2/23, continue to have SOB, hypoxia and hypotensive,  Restarted him on Dexamethasone 6 mg IV on 2/28 and now stop on 3/3, CRP and Hypoxia improve.  He is now on tapering doses of Prednisone started from 40 mg daily on 3/3  IS, flutter and try to wean his oxygen down.  Dialysis and UF as per Nephrology      COVID precautions discontinue on 3/4/22 as per infection control  Restarted him on IV Dexamethasone on 2/28, with  improvement in his symptoms, he is now on 1 LPM with 97 % saturation, , no remdesivir due to ESRD, IS< flutter.   Try to wean his oxygen down and eventually off if he tolerates. Overall improve now, stop IV Dexamethasone 3/3 and start on oral Prednisone 40 mg daily and taper 10 mg every 4 day to his baseline of 5 mg, will decrease to 30 mg from 3/5/22,   Decrease to 20 mg for 3 days then 10 mg for 3 days then he will go back to his 5 mg daily dose.  Inflammatory markers improve, blood pressure improve and hypoxia improve, only needing 1-3 LPM at this time.         Chronic HFrEF  History of A. fib, ICD/PPM in place  Nonsustained V. tach  -PTA on metoprolol/ lisinopril / imdur. Baseline BP low in the 100s. Last echo 03/21, EF 20-25%. ICD in place.   -Blood pressure has been soft/low during this hospital stay and patient not tolerating dialysis or any cardiac medication  -hold metoprolol, lisinopril and isosorbide mononitrate, unable to tolerate due to low BPs  -continue telemetry monitoring  -Hold Eliquis given hematuria on 2/26, no significant Hematuria at this time, resume his Eliquis from today, 2/28.   -Cardiology has followed this hospital stay, per their assessment overall prognosis is quite guarded.  He has not been able to tolerate cardiomyopathy medication.  They have now signed off.  He is now off Lisinopril, Imdur, metoprolol and mexiletine as not able to tolerate given low blood pressure.   -Outpatient follow-up with cardiology in 1 month if goals of care remains restorative     Hypotension: improved now.   Patient was on Prednisone 5 mg daily, was not resume after stopping the Dexamethasone,  Although is blood pressure were low before as well. Check Cortisol come back slightly high,   Resume Dexamethasone.   He is getting Midodrine 10 mg prior to dialysis will continue with that.   Started him on Midodrine 2.5 mg TID for now, continue 10 mg prior to dialysis.  Overall remain stable, tolerating dialysis as  well, I will now switch his Midodrine to as needed only and continue  10 mg prior to dialysis and see how is his blood pressure.      Hematuria  -Noted on 2/23 and subsequently on 2/26  -no significant hematuria, hardly make any urine, resume Eliquis on 2/28     ESRD  Hypertension  Anemia of renal failure  Secondary hyperparathyroidism  -On Dialysis for 18 yrs, Left UE AVF with some bleeding issues. Baseline Dry wt 77.6, currently at 70.2.   -nephrology following  Dialysis as per Nephrology,        Steroid induced hyperglycemia  -Hemoglobin A1c on 2/11 was 5.1  -Currently on medium resistance sliding scale insulin while on decadron  -Continue to monitor blood sugar for now, BS remain in good control.      Chronic Thrombocytopenia: stable and improve at this time.   Hypothyroidism: Cont PTA synthroid.   HLD: continue PTA Pravastatin 40mg.      Goals of care  -Previous provider had extensive discussion about goals of care with patient, CODE STATUS changed to DNR/DNI.    Goals of care remain restorative at this time, he wants to continue with the dialysis and other treatment as well.      He will be discharge to TCU in stable condition today         Nghia Cavanaugh MD, MD    Significant Results and Procedures       Pending Results   These results will be followed up by PCP  Unresulted Labs Ordered in the Past 30 Days of this Admission     No orders found from 1/18/2022 to 2/18/2022.          Code Status   DNR / DNI       Primary Care Physician   Josselin Kolb    Physical Exam   Temp: 97.5  F (36.4  C) Temp src: Oral BP: (!) 120/34 Pulse: 88   Resp: 18 SpO2: 100 % O2 Device: Nasal cannula Oxygen Delivery: 3 LPM  Vitals:    02/24/22 0615 03/01/22 0352 03/06/22 0602   Weight: 72.5 kg (159 lb 13.3 oz) 70.4 kg (155 lb 3.3 oz) 70 kg (154 lb 5.2 oz)     Vital Signs with Ranges  Temp:  [97.4  F (36.3  C)-98.1  F (36.7  C)] 97.5  F (36.4  C)  Pulse:  [] 88  Resp:  [17-18] 18  BP: ()/(34-74) 120/34  SpO2:  [96  %-100 %] 100 %  I/O last 3 completed shifts:  In: 400 [P.O.:400]  Out: 1602 [Urine:2; Other:1600]    Constitutional: awake, alert, cooperative, no apparent distress, and appears stated age  Eyes: Lids and lashes normal, pupils equal, round and reactive to light, extra ocular muscles intact, sclera clear, conjunctiva normal  Respiratory: No increased work of breathing,diminished air entry bilaterally, no crackles or wheezing  Cardiovascular: Normal apical impulse, regular rate and rhythm, normal S1 and S2, no S3 or S4, and no murmur noted  GI: No scars, normal bowel sounds, soft, non-distended, non-tender, no masses palpated, no hepatosplenomegally  Musculoskeletal: no lower extremity pitting edema present  Neurologic: no focal deficit, but overall deconditioned.     Discharge Disposition   Discharged to short-term care facility  Condition at discharge: Stable    Consultations This Hospital Stay   NEPHROLOGY IP CONSULT  CARE MANAGEMENT / SOCIAL WORK IP CONSULT  PHYSICAL THERAPY ADULT IP CONSULT  CARDIOLOGY IP CONSULT  INFECTION PREVENTION IP CONSULT  CARE MANAGEMENT / SOCIAL WORK IP CONSULT  PALLIATIVE CARE ADULT IP CONSULT  SPIRITUAL HEALTH SERVICES IP CONSULT  CARDIOLOGY IP CONSULT  PHYSICAL THERAPY ADULT IP CONSULT  OCCUPATIONAL THERAPY ADULT IP CONSULT  PHYSICAL THERAPY ADULT IP CONSULT  OCCUPATIONAL THERAPY ADULT IP CONSULT    Time Spent on this Encounter   I, Nghia Cavanaugh MD, personally saw the patient today and spent greater than 30 minutes discharging this patient.    Discharge Orders      Medication Therapy Management Referral      Follow-Up with Cardiology [facility]      General info for SNF    Length of Stay Estimate: Short Term Care: Estimated # of Days <30  Condition at Discharge: Stable  Level of care:skilled   Rehabilitation Potential: Fair  Admission H&P remains valid and up-to-date: Yes  Recent Chemotherapy: N/A  Use Nursing Home Standing Orders: Yes     Mantoux instructions    Give two-step Mantoux (PPD)  Per Facility Policy Yes     Follow Up and recommended labs and tests    Follow up with Nursing home physician.  No follow up labs or test are needed.     Reason for your hospital stay    COVID 19 Pneumonia and acute hypoxic respiratory failure secondary to COVID     Glucose monitor nursing POCT    Before meals and at bedtime     Daily weights    Call Provider for weight gain of more than 2 pounds per day or 5 pounds per week.     Activity - Up with nursing assistance     Activity - Up with assistive device     No CPR- Do NOT Intubate     Physical Therapy Adult Consult    Evaluate and treat as clinically indicated.    Reason: generalized weakness     Occupational Therapy Adult Consult    Evaluate and treat as clinically indicated.    Reason:  weakness     Oxygen Adult/Peds     Diet    Follow this diet upon discharge: Orders Placed This Encounter      Snacks/Supplements Adult: Other; Dinner - magic cup (RD); With Meals      Room Service      Combination Diet Regular Diet; Renal Diet (dialysis)     Discharge Medications   Current Discharge Medication List      START taking these medications    Details   guaiFENesin-dextromethorphan (ROBITUSSIN DM) 100-10 MG/5ML syrup Take 10 mLs by mouth every 4 hours as needed for cough    Associated Diagnoses: Infection due to 2019 novel coronavirus      midodrine (PROAMATINE) 10 MG tablet Take 1 tablet (10 mg) by mouth three times a week    Associated Diagnoses: End stage renal failure on dialysis (H)      !! predniSONE (DELTASONE) 10 MG tablet Take 2 tab for 3 days then 1 tab for 3 days them 5 mg daily    Associated Diagnoses: Infection due to 2019 novel coronavirus; Hypoxia       !! - Potential duplicate medications found. Please discuss with provider.      CONTINUE these medications which have CHANGED    Details   acetaminophen (TYLENOL) 325 MG tablet Take 2 tablets (650 mg) by mouth every 4 hours as needed for mild pain    Associated Diagnoses: Infection due to 2019 novel  coronavirus         CONTINUE these medications which have NOT CHANGED    Details   apixaban ANTICOAGULANT (ELIQUIS ANTICOAGULANT) 2.5 MG tablet Take 1 tablet (2.5 mg) by mouth 2 times daily  Qty: 180 tablet, Refills: 0    Comments: Overdue for a follow up visit. Please make an appointment  Associated Diagnoses: Atrial fibrillation (H)      B Complex-C-Folic Acid (DOROTEO CAPS) 1 MG CAPS TAKE 1 CAPSULE BY MOUTH EVERY DAY  Qty: 100 capsule, Refills: 2    Associated Diagnoses: ESRD on hemodialysis (H)      famotidine (PEPCID) 20 MG tablet Take 20 mg by mouth daily       gabapentin (NEURONTIN) 100 MG capsule Take 1 capsule (100 mg) by mouth 3 times daily  Qty: 270 capsule, Refills: 3    Associated Diagnoses: Difficulty walking      levothyroxine (SYNTHROID/LEVOTHROID) 50 MCG tablet TAKE 1 TABLET BY MOUTH EVERY DAY  Qty: 90 tablet, Refills: 3    Associated Diagnoses: Hypothyroidism, unspecified type      lidocaine (LIDODERM) 5 % patch Place 1 patch onto the skin every 24 hours To prevent lidocaine toxicity, patient should be patch free for 12 hrs daily.  Qty: 14 patch, Refills: 0    Associated Diagnoses: Chest wall pain      nitroGLYcerin (NITROSTAT) 0.4 MG sublingual tablet Place 1 tablet (0.4 mg) under the tongue every 5 minutes as needed for chest pain UP TO 3 PER EPISODE  Qty: 20 tablet, Refills: 0    Associated Diagnoses: Angina pectoris (H)      pantoprazole (PROTONIX) 40 MG EC tablet TAKE 1 TABLET (40 MG) BY MOUTH EVERY MORNING  Qty: 30 tablet, Refills: 8    Associated Diagnoses: Acid reflux disease      pravastatin (PRAVACHOL) 40 MG tablet TAKE 1 TABLET BY MOUTH EVERY DAY  Qty: 90 tablet, Refills: 3    Associated Diagnoses: Atherosclerotic heart disease of native coronary artery without angina pectoris      !! predniSONE (DELTASONE) 5 MG tablet TAKE 1 TABLET BY MOUTH EVERY DAY  Qty: 90 tablet, Refills: 3    Associated Diagnoses: Bilateral foot pain      vitamin D3 (CHOLECALCIFEROL) 50 mcg (2000 units) tablet TAKE 1  TABLET BY MOUTH EVERY DAY  Qty: 30 tablet, Refills: 5    Associated Diagnoses: Vitamin D deficiency       !! - Potential duplicate medications found. Please discuss with provider.      STOP taking these medications       clopidogrel (PLAVIX) 75 MG tablet Comments:   Reason for Stopping:         isosorbide mononitrate (IMDUR) 30 MG 24 hr tablet Comments:   Reason for Stopping:         isosorbide mononitrate (IMDUR) 30 MG 24 hr tablet Comments:   Reason for Stopping:         lisinopril (ZESTRIL) 2.5 MG tablet Comments:   Reason for Stopping:         loperamide (IMODIUM A-D) 2 MG tablet Comments:   Reason for Stopping:         loperamide (IMODIUM A-D) 2 MG tablet Comments:   Reason for Stopping:         meclizine 25 MG CHEW Comments:   Reason for Stopping:         metoprolol succinate ER (TOPROL-XL) 25 MG 24 hr tablet Comments:   Reason for Stopping:         mexiletine (MEXITIL) 150 MG capsule Comments:   Reason for Stopping:             Allergies   Allergies   Allergen Reactions     Contrast Dye Hives     Does fine if he uses benadryl prior.     Data   Most Recent 3 CBC's:Recent Labs   Lab Test 03/06/22  1034 03/02/22  0736 03/01/22  0821   WBC 10.5 9.5 10.3   HGB 14.2 13.7 14.4   * 107* 111*   * 132* 145*      Most Recent 3 BMP's:  Recent Labs   Lab Test 03/08/22  1113 03/08/22  0811 03/08/22  0310 03/05/22  1350 03/05/22  0910 03/03/22  0943 03/03/22  0735 03/02/22  0820 03/02/22  0736   NA  --   --   --   --  140  --  138  --  138   POTASSIUM  --   --   --   --  4.0  --  4.2  --  4.0   CHLORIDE  --   --   --   --  105  --  102  --  102   CO2  --   --   --   --  27  --  31  --  30   BUN  --   --   --   --  54*  --  52*  --  33*   CR  --   --   --   --  5.17*  --  5.73*  --  4.41*   ANIONGAP  --   --   --   --  8  --  5  --  6   CHRISTINE  --   --   --   --  8.9  --  8.6  --  8.8   * 102* 157*   < > 146*   < > 118*   < > 98    < > = values in this interval not displayed.     Most Recent 2  LFT's:  Recent Labs   Lab Test 02/18/22  0556 02/17/22  0125   AST 17 18   ALT 27 24   ALKPHOS 157* 162*   BILITOTAL 1.9* 1.8*     Most Recent INR's and Anticoagulation Dosing History:  Anticoagulation Dose History     Recent Dosing and Labs Latest Ref Rng & Units 8/12/2011 7/14/2016 12/11/2017 4/23/2018 7/22/2019 10/15/2020 2/11/2022    INR 0.85 - 1.15 1.09 0.91 1.00 1.20(H) 0.97 1.31(H) 1.23(H)        Most Recent 3 Troponin's:  Recent Labs   Lab Test 07/24/21  1217 07/05/21  0607 03/17/21  2150 03/17/21  1859   TROPI  --  0.025 0.025 0.029   TROPONIN 0.022  --   --   --      Most Recent Cholesterol Panel:  Recent Labs   Lab Test 10/19/20  1609   CHOL 101   LDL 29   HDL 60   TRIG 60     Most Recent 6 Bacteria Isolates From Any Culture (See EPIC Reports for Culture Details):  Recent Labs   Lab Test 08/29/18  1447 08/08/18  1426 07/04/18  0425 05/24/18  1535 05/24/18  1534 04/26/18  0914   CULT 10,000 to 50,000 colonies/mL  Enterococcus faecalis  *  <10,000 colonies/mL  urogenital estefany   10,000 to 50,000 colonies/mL  Enterococcus faecalis  * >100,000 colonies/mL  Enterococcus faecalis  * No growth No growth No growth     Most Recent TSH, T4 and A1c Labs:  Recent Labs   Lab Test 02/11/22  0653 01/26/21  0000 03/26/19  0847   TSH  --   --  4.40*   A1C 5.1   < > 5.1    < > = values in this interval not displayed.     Results for orders placed or performed during the hospital encounter of 02/17/22   XR Chest Port 1 View    Narrative    EXAM: XR CHEST PORT 1 VIEW  LOCATION: [facility]  DATE/TIME: 2/17/2022 2:05 AM    INDICATION: shortness of breath  COMPARISON: CT chest 02/11/2022. Chest radiographs 03/17/2021.      Impression    IMPRESSION: Stable cardiomegaly. Normal pulmonary vascularity. There may be mild atelectasis of the left base. Lungs otherwise clear. No visible pneumothorax. Right subclavian pacemaker with single lead at the right ventricle. Old rib fractures on the   left.   US Ext  Arterial Venous Dialys Acs Graft    Narrative    EXAM: Duplex ultrasound of left upper extremity arteriovenous dated  2/17/2022 9:33 AM     HISTORY: End-stage renal disease, left upper extremity radiocephalic  fistula with prolonged bleeding after dialysis and concern for  stenosis     COMPARISON: 7/9/2019    TECHNIQUE: Grayscale (B-mode), color Doppler, and duplex spectral  Doppler ultrasound of the upper extremity arteries. Velocity  measurements obtained with angle correction of 60 degrees or less.    FINDINGS:     Left radial artery inflow demonstrating expected low resistance  waveforms. The arterial venous anastomosis appears widely patent. The  cephalic vein outflow appears patent with markedly normal waveforms.  Elevated velocity at the cephalic arch.      Impression    IMPRESSION:   1. Left radiocephalic fistula appears largely patent with recurrent  cephalic arch stenosis. This stenosis likely accounts for prolonged  bleeding after dialysis. Recommend diagnostic cystogram with potential  intervention.        MELISA MANLEY MD         SYSTEM ID:  [id number]   XR Chest Port 1 View    Narrative    EXAM: XR CHEST PORTABLE 1 VIEW  LOCATION: [facility]  DATE/TIME: 02/19/2022, 9:45 AM    INDICATION: Hypoxic respiratory failure.  COMPARISON: Chest x-ray on 02/17/2022.      Impression    IMPRESSION: Single AP view of the chest was obtained. Left chest wall cardiac pacer lead is partially visualized. Stable enlargement of the cardiac silhouette and mild pulmonary vascular congestion. Left basilar/retrocardiac pulmonary opacities, could   represent small pleural effusion and associated basilar atelectasis/consolidation. No significant right pleural effusion. Right basilar pulmonary opacities, likely atelectasis. No significant pneumothorax.     XR Chest Port 1 View    Narrative    XR CHEST PORT 1 VIEW  2/28/2022 9:27 AM       INDICATION: hypoxia  COMPARISON: 2/19/2022       Impression     IMPRESSION: Single lead cardiac defibrillator is unchanged.  Cardiomegaly with pulmonary vascular congestion. The left  hemidiaphragm is elevated. Probable trace pleural effusions. Bibasilar  atelectasis or infiltrate, somewhat previous.    HOANG NEGRON MD         SYSTEM ID:  [id number]   Echocardiogram Complete     Value    LVEF  25-30%    Narrative    [id number]  [id number]  [id number]  [id number]^CLAUDE^WICHO^JANIS     [facility]  Echocardiography Laboratory  [address] [place] [address]     Name: SOCORRO LÓPEZ  MRN: [id number]  : 1945  Study Date: 2022 11:15 AM  Age: 76 yrs  Gender: Male  Patient Location: [facility]  Reason For Study: CHF  Ordering Physician: WICHO ARCE  Performed By: Deepika Noriega     HR: 99  BP: 85/59 mmHg  ______________________________________________________________________________  Procedure  Complete Echo Adult. Optison (NDC #[id number]) given intravenously.  ______________________________________________________________________________  Interpretation Summary     Left ventricular systolic function is severely reduced.  The visual ejection fraction is 25-30%.  The left ventricle is mildly dilated.  Severe large area of akinesis to dyskinesis of the apical inferior wall, mid  anteroseptal wall and entire apex. No thrombus on contrast imaging.  The right ventricle is moderately dilated.  Severely decreased right ventricular systolic function  Dilation of the inferior vena cava is present with abnormal respiratory  variation in diameter.  IVC diameter >2.1 cm collapsing <50% with sniff suggests a high RA pressure  estimated at 15 mmHg or greater.  There is no pericardial effusion.  Moderate left pleural effusion.  Mild aortic root dilatation (4.2 cm)     Compared to prior study 3/18/2021, left ventricular function is similar but  above segmental wall motion abnormalities more pronounced, consistent with  ischemic cardiomyopathy. There is moderate  aortic root dilation and a new  pleural effusion. Evidence of severely elevated biventricular filling  pressures is present.  ______________________________________________________________________________  Left Ventricle  The left ventricle is mildly dilated. There is normal left ventricular wall  thickness. The visual ejection fraction is 25-30%. Left ventricular systolic  function is severely reduced. Left ventricular diastolic function is  indeterminate. There is severe global hypokinesia of the left ventricle.  Flattened septum is consistent with RV pressure/volume overload. Severe large  area of akinesis to dyskinesis of the apical inferior wall, mid anteroseptal  wall and entire apex. No thrombus on contrast imaging.     Right Ventricle  The right ventricle is moderately dilated. Severely decreased right  ventricular systolic function. There is a pacemaker lead in the right  ventricle.     Atria  The left atrium is severely dilated. The right atrium is severely dilated.  Intact atrial septum.     Mitral Valve  The mitral valve leaflets appear normal. There is no evidence of stenosis,  fluttering, or prolapse. There is mild (1+) mitral regurgitation.     Tricuspid Valve  The tricuspid valve is normal in structure and function. The right ventricular  systolic pressure is approximated at 14.2 mmHg plus the right atrial pressure.  Right ventricular systolic pressure is normal. There is moderate (2+)  tricuspid regurgitation.     Aortic Valve  There is trivial trileaflet aortic sclerosis.     Pulmonic Valve  Normal pulmonic valve. There is mild (1+) pulmonic valvular regurgitation.     Vessels  Mild aortic root dilatation. Normal size ascending aorta. Dilation of the  inferior vena cava is present with abnormal respiratory variation in diameter.  IVC diameter >2.1 cm collapsing <50% with sniff suggests a high RA pressure  estimated at 15 mmHg or greater.     Pericardium  There is no pericardial effusion. Moderate  left pleural effusion.     Rhythm  The rhythm was atrial fibrillation.     ______________________________________________________________________________  MMode/2D Measurements & Calculations  IVSd: 0.83 cm  LVIDd: 5.5 cm  LVIDs: 4.5 cm  LVPWd: 1.00 cm  FS: 18.5 %  LV mass(C)d: 192.1 grams     Ao root diam: 4.2 cm  LA dimension: 5.1 cm  asc Aorta Diam: 3.6 cm  LA/Ao: 1.2  RWT: 0.36     Doppler Measurements & Calculations  MV E max zach: 79.5 cm/sec  MV dec slope: 525.4 cm/sec2  PA acc time: 0.08 sec  TR max zach: 187.6 cm/sec  TR max P.2 mmHg     ______________________________________________________________________________  Report approved by: Mariana Neumann 2022 03:35 PM             Most Recent D-dimer:Recent Labs   Lab Test 22  0636   DD 1.75*

## 2022-03-08 NOTE — PROGRESS NOTES
Care Management Discharge Note    Discharge Date: 03/08/2022       Discharge Disposition: Skilled Nursing Facilty    Discharge Services:      Discharge DME:      Discharge Transportation: agency    Private pay costs discussed: transportation costs    PAS Confirmation Code:  [id number]  Patient/family educated on Medicare website which has current facility and service quality ratings:  yes    Education Provided on the Discharge Plan: yes   Persons Notified of Discharge Plans: Lynn Juarez- caregiver [phone].  Patient/Family in Agreement with the Plan: yes    Handoff Referral Completed: Yes    Additional Information:  Met with patient and reviewed [facility] TCU offering him admission today into a double occupancy room. Explained the TCU guideline that he be able to transfer by w/c rather than stretcher for dialysis transport.   Patient said he is willing to try medivan however said he has used [facility] using a w/c or stretcher when needed and he is aware of the cost involved.  This was passed on to the admission staff at the TCU. Also requested patient's name be placed on the wait list for a private room at his request and with his understanding of approximate daily cost of $50.  Orders were sent via In Basket before discharge and two medication changes, the TCU requested were made and re-sent.  Patient was transferred shortly after 1200 via [facility] with oxygen.             TATO Thornton

## 2022-03-08 NOTE — PROGRESS NOTES
Care Management Follow Up    Length of Stay (days): 19    Expected Discharge Date: 03/09/2022     Concerns to be Addressed: adjustment to diagnosis/illness     Patient plan of care discussed at interdisciplinary rounds: Yes    Anticipated Discharge Disposition: Skilled Nursing Facilty     Anticipated Discharge Services:    Anticipated Discharge DME:      Patient/family educated on Medicare website which has current facility and service quality ratings:    Education Provided on the Discharge Plan:    Patient/Family in Agreement with the Plan: yes    Referrals Placed by CM/SW:    Private pay costs discussed: transportation costs    Additional Information:  [facility] has offered patient admission today if he will commit to using a medivan for transport to dialysis rather than a stretcher.  Physical Therapist from 3/7 stated patient has the tolerance to sit up in a w/c.  This is the only option based on referrals made.  They can put patient on a wait list for a private room but cannot guarantee when he can be moved into a private room.   Will speak with patient.       KAITLIN ThorntonSW

## 2022-03-09 NOTE — ED NOTES
Patient was able to pass a BM, noted that he might need to go again but would like to take a break. Patient was boosted in bed and new depends were placed. Patient was then placed on a new bedpan.

## 2022-03-09 NOTE — DISCHARGE INSTRUCTIONS
I do not think your low blood pressures today are related to shock from blood loss, infection, or heart attack.  I think you have advanced heart failure, and this means that your blood pressures tend to run low.  In the ED, we gave you fluids, steroids, and midodrine to help support your blood pressure.  Continue the steroids that have been prescribed, as well as 10 mg of midodrine before dialysis.  Return to the ED for fever, confusion, chest discomfort, or other concerns.  Start taking midodrine 2.5mg by mouth 3 times daily.

## 2022-03-09 NOTE — PROGRESS NOTES
Clinic Care Coordination Contact  Care Coordination Transition Communication    Referral Source: IP Report    Clinical Data: Patient was hospitalized at [facility] from 2/17/2022 to 3/8/2022 with diagnosis of COVID-19 pneumonia, chronic HFrEF, hx of A. Fib with ICD in place.     Transition to Facility:              Facility Name: [facility] phone number/fax: [phone]    Plan: RN/SW Care Coordinator will await notification from facility staff informing RN/SW Care Coordinator of patient's discharge plans/needs. RN/SW Care Coordinator will review chart and outreach to facility staff every 4 weeks and as needed.     Deepika Singh RN, BSN, PHN  Primary Care / Care Coordinator   [facility]  E-mail [email or web address]   [phone]

## 2022-03-09 NOTE — PROGRESS NOTES
[facility] GERIATRICS    PRIMARY CARE PROVIDER AND CLINIC:  Josselin Kolb MD, [address] [place] [address] / [facility] [id number]  Chief Complaint   Patient presents with     Hospital F/U      [facility] Record Number:  [id number]  Place of Service where encounter took place:  [facility] ([facility]) [[id number]]    Westley Ness  is a 76 year old  (1945), admitted to the above facility from  [facility]. Hospital stay 2/17/22 through 3/8/22..     HPI:    Westley Ness is a 76-year-old male with a past medical history of end-stage renal disease on dialysis MWF, ischemic cardiomyopathy status post ICD placement, paroxysmal atrial fibrillation, coronary artery disease and osteoarthritis on chronic steroids who recently had to back-to-back hospitalizations at [facility] in the setting of COVID-19.    He was initially admitted 2/11-2/15 for hypoxia in the setting of COVID-19.  He is fully vaccinated.  He was treated with dexamethasone.  Not a candidate for remdesivir due to renal disease.  He was ultimately discharged home on 2/15/2022.  Unfortunately, he represented 2 days later with worsening shortness of breath and hypoxia.  He was readmitted.  Chest x-ray on admission with left basilar/retrocardiac pulmonary opacities could be small pleural effusion.  He was treated with a prolonged course of dexamethasone and initiated on tapering doses of prednisone.  Hospital course was complicated by hypotension.  He has a history of cardiomyopathy and historically has been maintained on metoprolol lisinopril and Imdur.  Due to persistent hypotension all of his cardiac medications have been discontinued.    He discharged to [facility] 3/8. Went to dialysis 3/9 and developed hypotension and chest pain. Was sent to the ER. Labs unremarkable. High sensitivity trop not consistent with ACS. He was given IV fluids and discharged back to [facility]      Today he is seen in his room. Reports he  feels quite weak. SBPs in 80s this am. Discussed adding back midodrine 3x daily on non dialysis days as he was receiving in the hospital. Breathing is stable. No dizziness. Lives independently but has a  that helps him. No N/V. No constipation        CODE STATUS/ADVANCE DIRECTIVES DISCUSSION:  Prior  DNR / DNI  ALLERGIES:   Allergies   Allergen Reactions     Contrast Dye Hives     Does fine if he uses benadryl prior.      PAST MEDICAL HISTORY:   Past Medical History:   Diagnosis Date     Acid reflux disease      Benign essential hypertension      CAD (coronary artery disease)     s/p multiple NSTEMIs and PCI's     ESRD on hemodialysis (H)     MWF     History of atrial flutter     s/p ablation     Hypothyroidism      Ischemic cardiomyopathy     EF 25-30%, s/p AICD      PAST SURGICAL HISTORY:   has a past surgical history that includes vascular surgery (2004, 2010); LEFT HEART CATHETERIZATION (7/14/2016); LEFT HEART CATHETERIZATION (9/21/2016); EP Ablation atrial flutter (06/08/2017, 12/11/17); cholecystectomy, open (2013); implant ventricular device (08/15/2011); tonsillectomy & adenoidectomy; Elbow surgery (Left, 2008); Repair fistula arteriovenous upper extremity (Left, 3/5/2019); IR Dialysis Fistulogram Left (7/22/2019); EP Implantable Cardio-Defibrillator Generator Change Single (N/A, 11/21/2019); Coronary Angiogram (N/A, 10/19/2020); Left Heart Cath (N/A, 10/19/2020); and Percutaneous Coronary Intervention Stent Drug Eluting (N/A, 10/19/2020).  FAMILY HISTORY: family history includes Bladder Cancer in his father; Cerebrovascular Disease in his mother; Hypertension in his father; Myocardial Infarction in his paternal grandmother.  SOCIAL HISTORY:   reports that he quit smoking about 25 years ago. His smoking use included cigarettes. He started smoking about 57 years ago. He has a 70.00 pack-year smoking history. He has never used smokeless tobacco. He reports previous alcohol use. He reports  that he does not use drugs.  Patient's living condition: lives alone    Post Discharge Medication Reconciliation Status: discharge medications reconciled and changed, per note/orders  Current Outpatient Medications   Medication Sig     acetaminophen (TYLENOL) 325 MG tablet Take 2 tablets (650 mg) by mouth every 4 hours as needed for mild pain     apixaban ANTICOAGULANT (ELIQUIS ANTICOAGULANT) 2.5 MG tablet Take 1 tablet (2.5 mg) by mouth 2 times daily     famotidine (PEPCID) 20 MG tablet Take 20 mg by mouth daily      gabapentin (NEURONTIN) 100 MG capsule Take 1 capsule (100 mg) by mouth 3 times daily     guaiFENesin-dextromethorphan (ROBITUSSIN DM) 100-10 MG/5ML syrup Take 10 mLs by mouth every 4 hours as needed for cough     levothyroxine (SYNTHROID/LEVOTHROID) 50 MCG tablet TAKE 1 TABLET BY MOUTH EVERY DAY     lidocaine (LIDODERM) 5 % patch Place 1 patch onto the skin every 24 hours To prevent lidocaine toxicity, patient should be patch free for 12 hrs daily.     midodrine (PROAMATINE) 10 MG tablet Take 1 tablet (10 mg) by mouth three times a week     nitroGLYcerin (NITROSTAT) 0.4 MG sublingual tablet Place 1 tablet (0.4 mg) under the tongue every 5 minutes as needed for chest pain UP TO 3 PER EPISODE     pantoprazole (PROTONIX) 40 MG EC tablet TAKE 1 TABLET (40 MG) BY MOUTH EVERY MORNING     pravastatin (PRAVACHOL) 40 MG tablet TAKE 1 TABLET BY MOUTH EVERY DAY     predniSONE (DELTASONE) 10 MG tablet Take 2 tab for 3 days then 1 tab for 3 days them 5 mg daily     [START ON 3/14/2022] predniSONE (DELTASONE) 5 MG tablet Take 1 tablet (5 mg) by mouth daily     vitamin D3 (CHOLECALCIFEROL) 50 mcg (2000 units) tablet TAKE 1 TABLET BY MOUTH EVERY DAY     B Complex-C-Folic Acid (DOROTEO CAPS) 1 MG CAPS TAKE 1 CAPSULE BY MOUTH EVERY DAY     midodrine (PROAMATINE) 2.5 MG tablet Take 1 tablet (2.5 mg) by mouth 3 times daily     No current facility-administered medications for this visit.       ROS:  10 point ROS of systems  "including Constitutional, Eyes, Respiratory, Cardiovascular, Gastroenterology, Genitourinary, Integumentary, Musculoskeletal, Psychiatric were all negative except for pertinent positives noted in my HPI.    Vitals:  BP 98/65   Pulse 106   Temp 98  F (36.7  C)   Resp 19   Ht 1.727 m (5' 8\")   Wt 69.9 kg (154 lb)   SpO2 97%   BMI 23.42 kg/m    Exam:  Physical Exam  Constitutional:       Appearance: Normal appearance. He is normal weight.   HENT:      Head: Normocephalic and atraumatic.   Eyes:      General: No scleral icterus.  Cardiovascular:      Rate and Rhythm: Regular rhythm. Tachycardia present.      Heart sounds: No murmur heard.  Pulmonary:      Effort: Pulmonary effort is normal. No respiratory distress.      Breath sounds: Normal breath sounds.   Abdominal:      General: Abdomen is flat. There is no distension.      Palpations: Abdomen is soft.   Musculoskeletal:         General: Normal range of motion.      Right lower leg: No edema.      Left lower leg: No edema.   Skin:     General: Skin is warm and dry.      Findings: No rash.   Neurological:      General: No focal deficit present.      Mental Status: He is alert. Mental status is at baseline.   Psychiatric:         Mood and Affect: Mood normal.           Lab/Diagnostic data:  Recent labs in [facility] reviewed by me today.  and   Most Recent 3 CBC's:  Recent Labs   Lab Test 03/09/22  1322 03/06/22  1034 03/02/22  0736   WBC 10.4 10.5 9.5   HGB 14.1 14.2 13.7   * 107* 107*   * 128* 132*     Most Recent 3 BMP's:  Recent Labs   Lab Test 03/09/22  1322 03/08/22  1113 03/08/22  0811 03/05/22  1350 03/05/22  0910 03/03/22  0943 03/03/22  0735     --   --   --  140  --  138   POTASSIUM 3.6  --   --   --  4.0  --  4.2   CHLORIDE 98  --   --   --  105  --  102   CO2 32  --   --   --  27  --  31   BUN 22  --   --   --  54*  --  52*   CR 2.47*  --   --   --  5.17*  --  5.73*   ANIONGAP 5  --   --   --  8  --  5   CHRISTINE 9.1  --   --   --  8.9  -- "  8.6   * 173* 102*   < > 146*   < > 118*    < > = values in this interval not displayed.     Most Recent TSH and T4:  Recent Labs   Lab Test 03/26/19  0847   TSH 4.40*     Most Recent Hemoglobin A1c:  Recent Labs   Lab Test 02/11/22  0653   A1C 5.1       ASSESSMENT/PLAN:    (J96.01) Acute respiratory failure with hypoxia (H)  (primary encounter diagnosis), improving  (U07.1) Infection due to 2019 novel coronavirus, recovered  (R53.81) Physical deconditioning: Initially diagnosed 2/11/2022.  Fully vaccinated.  Admitted 2/11/2/15 and treated with dexamethasone.  No remdesivir due to end-stage renal disease.  Discharged home but unfortunately represented with worsening shortness of breath and hypoxia.  Chest x-ray did not show secondary pneumonia.  Not treated with antibiotics.  Received prolonged course of dexamethasone and transition to oral prednisone with plan taper as outpatient.  O2 needs decreased and currently requiring 1-2 L at time of discharge  -Continue prednisone taper.  Tapering 10 mg every 3-4 days to baseline dose of 5 mg daily  -Continue supplemental oxygen.  Encourage pulmonary toilet.  Wean O2 as tolerated  -Anticoagulated with Eliquis prior to admission  -PT/OT/social work    (I50.22) Chronic systolic (congestive) heart failure (H)  (I25.5) Ischemic cardiomyopathy  (Z95.810) S/P ICD (internal cardiac defibrillator) procedure: Most recent TTE 2/17/2022 with EF of 25-30%, akinesis to dyskinesis of the apical inferior wall, mid anterior septal wall and entire apex.  Severely decreased right ventricular systolic function.  Not on diuretics.  Previously maintained on a regimen of metoprolol, lisinopril and Imdur though now unable to tolerate due to hypotension.  -Continue volume management with dialysis  -Monitor vital signs per protocol.  If blood pressure improves can consider reinitiation of low-dose cardiac medications  -Follow-up with cardiology as outpatient      (I25.10) Coronary artery  disease involving native coronary artery of native heart without angina pectoris: Most recent angiogram angiogram was in 10/2020, which shows subtotal occlusion of left anterior descending (LAD) with known infarct area, patent proximal left circumflex, severe stenosis of the large OM1 distal to the stent, which was stented with FOREST at that time  -Continue pravastatin  -Not on ASA given anticoagulation with Eliquis  -No longer on beta-blocker or ACE due to hypotension    (I48.0) PAF (paroxysmal atrial fibrillation) (H): Heart rates 80/100 since admission.  Not on any rate controlling agents. Slightly tachycardic this am   -Continue Eliquis    (I95.9) Hypotension, unspecified hypotension type: Hospital course complicated by hypotension.  All antihypertensives were discontinued.  Continues on midodrine 10 mg prior to dialysis.  Was briefly receiving midodrine 2.5 mg 3 times daily on non dialysis days in the hospital but this was discontinued prior to discharge. Given his hypotension yesterday will restart  - Midodrine 2.5 mg tid on Tue, Thurs, Sat & Sun  - Midodrine 10 mg prior to dialysis, will monitor and consider additional prn Midodrine on dialysis days to be taken during dialysis if needed.     (E11.22,  N18.6,  Z99.2) Type 2 diabetes mellitus with chronic kidney disease on chronic dialysis, without long-term current use of insulin (H)  (R73.9) Steroid induced hyperglycemia: A1c 5.1.  Was on sliding scale insulin in the hospital.  Not discharged on NovoLog.  Blood glucose on arrival to TCU greater than 300  -Start sliding scale insulin with meals      (N18.6,  Z99.2) End stage renal failure on dialysis (H): On dialysis Monday Wednesday Friday.  Follows with Dr. Gonzales  -Continue dialysis    (K21.00) Gastroesophageal reflux disease with esophagitis, unspecified whether hemorrhage  -Continue Protonix    (E03.9) Hypothyroidism, unspecified type  -Continue Synthroid    (M19.90) Osteoarthritis, unspecified  osteoarthritis type, unspecified site: Historically maintained on prednisone 5 mg daily  -Taper prednisone back to home dose of fentanyl and CAD      Orders:  Midodrine 2.5 mg tid on non dialysis days  SSI with meals      Electronically signed by:  Eve Barroso PA-C

## 2022-03-09 NOTE — ED NOTES
Bed: ED14  Expected date:   Expected time:   Means of arrival:   Comments:  Asaf - 513 - 76 GAMAL olea eta 1158

## 2022-03-09 NOTE — LETTER
[facility]   To:   [facility]          Please give to facility    From:   Deepika Singh RN  Care Coordinator   [facility]   P: [phone]  [email or web address]   Patient Name:  Westley Ness YOB: 1945   Admit date: 3/8/2022      *Information Needed:  Please contact me when the patient will discharge (or if they will move to long term care)- include the discharge date, disposition, and main diagnosis   - If the patient is discharged with home care services, please provide the name of the agency    Also- Please inform me if a care conference is being held.   Phone, Fax or Email with information        Deepika Singh RN, BSN, PHN  Primary Care / Care Coordinator   [facility]  E-mail [email or web address]   [phone]                Thank you

## 2022-03-09 NOTE — ED PROVIDER NOTES
History   Chief Complaint:  Fall       The history is provided by the patient.      Westley Ness is a 76 year old male on Eliquis with history of atrial flutter, ESRD on hemodialysis and hypertension who presents with fall. He was recently hospitalized due to COVID-19 from 2/17/22 until 3/8/22 and released yesterday. Today, he was at dialysis when he became hypotensive. He was 5 minutes short of completing dialysis, which was stopped secondary to low blood pressure.  He states he is 10 lbs less than his dry weight today. He cannot make urine himself. He endorses weakness, shortness of breath, and rib pain secondary to constant pressure from a monitor he had in the hospital. No fever and he states he has not been drinking adequate fluids. He was given a liter bolus with paramedics.  He is not ambulatory at baseline.    Discharge summary 3/7/22:   Chronic HFrEF  History of A. fib, ICD/PPM in place  Nonsustained V. tach  -PTA on metoprolol/ lisinopril / imdur. Baseline BP low in the 100s. Last echo 03/21, EF 20-25%. ICD in place.   -Blood pressure has been soft/low during this hospital stay and patient not tolerating dialysis or any cardiac medication  -hold metoprolol, lisinopril and isosorbide mononitrate, unable to tolerate due to low BPs  -continue telemetry monitoring  -Hold Eliquis given hematuria on 2/26, no significant Hematuria at this time, resume his Eliquis from today, 2/28.   -Cardiology has followed this hospital stay, per their assessment overall prognosis is quite guarded.  He has not been able to tolerate cardiomyopathy medication.  They have now signed off.  -Outpatient follow-up with cardiology in 1 month if goals of care remains restorative    Hypotension: improved now.   Patient was on Prednisone 5 mg daily, was not resume after stopping the Dexamethasone,  Although is blood pressure were low before as well. Check Cortisol come back slightly high,   Resume Dexamethasone.   He is getting  Midodrine 10 mg prior to dialysis will continue with that.   Started him on Midodrine 2.5 mg TID for now, continue 10 mg prior to dialysis.  Overall remain stable, tolerating dialysis as well, I will now switch his Midodrine to as needed only and continue  10 mg prior to dialysis and see how is his blood pressure.     Review of Systems   Constitutional: Positive for appetite change. Negative for fever.   Respiratory: Positive for shortness of breath. Negative for cough.    Cardiovascular: Negative for chest pain and palpitations.   Gastrointestinal: Negative for abdominal pain, diarrhea and vomiting.   Neurological: Positive for weakness. Negative for syncope.   All other systems reviewed and are negative.    Allergies:  Contrast Dye    Medications:  Eliquis   B Complex-C-Folic Acid  famotidine   gabapentin  levothyroxine   midodrine   nitroglycerin   pantoprazole   pravastatin    Past Medical History:     Acid reflux disease   Hypertension   CAD (coronary artery disease)   ESRD on hemodialysis   Atrial flutter   Hypothyroidism   Ischemic cardiomyopathy   Hypothyroidism  Infection due to 2019 novel coronavirus  Hypoxia  Heart failure    Past Surgical History:    ICD (implantable cardioverter-defibrillator) in place  Cholecystectomy   CV angiogram   CV PCI stent drug eluting   Elbow surgery   H ablation atrial flutter   HC left heart catheterization x3  Implant ventricular device  IR dialysis Fistulogram, left   Repair fistula arteriovenous upper extremity. Left   Tonsillectomy & Adenoidectomy   Vascular surgery     Family History:    Mother: cerebrovascular disease   Father: hypertension, bladder cancer     Social History:  Arrival via EMS.   PCP: Josselin Kolb    Physical Exam     Patient Vitals for the past 24 hrs:   BP Temp Temp src Pulse Resp SpO2   03/09/22 1700 102/81 -- -- 96 15 96 %   03/09/22 1645 (!) 86/57 -- -- 96 12 96 %   03/09/22 1630 90/58 -- -- 90 19 95 %   03/09/22 1530 (!) 82/51 -- -- 89 17 100  %   03/09/22 1500 (!) 77/52 -- -- 87 16 100 %   03/09/22 1430 (!) 80/46 -- -- 92 13 100 %   03/09/22 1415 (!) 88/52 -- -- 87 13 100 %   03/09/22 1400 (!) 73/41 -- -- 94 14 100 %   03/09/22 1241 -- 97.4  F (36.3  C) Temporal -- -- --   03/09/22 1230 -- -- -- 96 15 100 %   03/09/22 1201 90/53 -- -- 102 16 98 %       Physical Exam  General: chronically ill-appearing, alert, pleasant  HENT: mucous membranes moist  CV: regular rate, regular rhythm  Resp: normal effort, clear throughout, no crackles or wheezing  GI: abdomen soft and nontender, no guarding  MSK: no bony tenderness  Skin: appropriately warm and dry  Extremities: no edema, calves non-tender.  Extremities wasted.  Neuro: alert, clear speech, oriented, generally weak  Psych: normal mood and affect    Emergency Department Course   ECG:  ECG taken at 1217, ECG read at 1247  Atrial fibrillation   Right superior axis deviation   Incomplete Right bundle branch block   Right ventricular hypertrophy   Cannot rule out anteroseptal infarct, age undetermined  Abnormal ECG  No significant change when compared to EKG dated 2/26/22.  Rate 96 bpm. NC interval * ms. QRS duration 98 ms. QT/QTc 318/401 ms. P-R-T axes * 221 35.    Imaging:  POC US ECHO LIMITED   Final Result   [facility] Procedure Note        Limited Bedside ED Cardiac Ultrasound:      PROCEDURE: PERFORMED BY: Dr. Adriane Kerr MD   INDICATIONS/SYMPTOM:  Hypotension/shock   PROBE: Cardiac phased array probe   BODY LOCATION: Chest   FINDINGS:    The ultrasound was performed utilizing the parasternal long axis and apical 4 chamber views.   Cardiac contractility:  Present   Gross estimation of cardiac kinesis: severely depressed   Pericardial Effusion:  None   RV:LV ratio: LV > RV   INTERPRETATION:    Chamber size and motion were grossly normal with LV > RV, severely depressed cardiac kinesis.  No pericardial effusion was found.  IVC visualized and findings indicate unable to estimate.   IMAGE  DOCUMENTATION: Images were archived to PACs system.              Chest XR,  PA & LAT   Final Result   IMPRESSION: Minimal effusions, left worse than right. Mild associated   bibasilar atelectasis and/or infiltrate. Findings are improved   compared to previous. The cardiac silhouette is prominent but stable.   Pulmonary vasculature is unremarkable.       ONEIL DEY MD            SYSTEM ID:  [id number]        Report per radiology    Laboratory:  Labs Ordered and Resulted from Time of ED Arrival to Time of ED Departure   COMPREHENSIVE METABOLIC PANEL - Abnormal       Result Value    Sodium 135      Potassium 3.6      Chloride 98      Carbon Dioxide (CO2) 32      Anion Gap 5      Urea Nitrogen 22      Creatinine 2.47 (*)     Calcium 9.1      Glucose 149 (*)     Alkaline Phosphatase 181 (*)     AST 48 (*)     ALT 69      Protein Total 5.9 (*)     Albumin 3.2 (*)     Bilirubin Total 1.6 (*)     GFR Estimate 26 (*)    CBC WITH PLATELETS AND DIFFERENTIAL - Abnormal    WBC Count 10.4      RBC Count 4.20 (*)     Hemoglobin 14.1      Hematocrit 46.1       (*)     MCH 33.6 (*)     MCHC 30.6 (*)     RDW 13.6      Platelet Count 117 (*)     % Neutrophils 81      % Lymphocytes 8      % Monocytes 7      % Eosinophils 3      % Basophils 0      % Immature Granulocytes 1      NRBCs per 100 WBC 0      Absolute Neutrophils 8.4 (*)     Absolute Lymphocytes 0.8      Absolute Monocytes 0.8      Absolute Eosinophils 0.3      Absolute Basophils 0.0      Absolute Immature Granulocytes 0.1      Absolute NRBCs 0.0     LACTIC ACID WHOLE BLOOD - Normal    Lactic Acid 0.9     TROPONIN I - Normal    Troponin I High Sensitivity 77     TROPONIN I - Normal    Troponin I High Sensitivity 63          Emergency Department Course:    Reviewed:  I reviewed nursing notes, vitals, past medical history and Care Everywhere    Assessments:  1223 I obtained history and examined the patient as noted above.   1408 I rechecked the patient and explained  findings.   1416 Bedside ultrasound.     Consults:  I spoke to staff at [facility] regarding midodrine dosing.    Interventions:  1336 NS, 1 L, IV  1625 Solu-cortef 100mg IV   1700 Midodrine 5 mg Oral    Disposition:  The patient was discharged to home.     Impression & Plan       Medical Decision Making:  Westley Ness is a 76 year old male with history of prolonged hospitalization secondary to COVID-19 pneumonia, history of coronary artery disease, ischemic cardiomyopathy with EF of 20%, end-stage renal disease on hemodialysis Monday/Wednesday/Friday, diabetes, atrial flutter on apixaban, ICD placement, who presents from dialysis with hypotension.  On exam, the patient is afebrile, with normal heart rate.  He is mentating well.  Extremities are warm and well perfused.  He has few complaints.  Labs demonstrate chronic renal failure, chronic malnutrition with low albumin, normal lactate, normal red blood cell count and hemoglobin.  The patient has a troponin of 77, which decreased to 63 over 2 hours.  Review of records indicates that he had difficulty with hypotension throughout his hospital stay, which occasionally limited the ability to performdialysis.  He was discharged on dexamethasone, midodrine 10 mg before dialysis, and midodrine 3 times a day.  It seems that he has been getting the steroids and the midodrine prior to dialysis, but not the daily midodrine.  In addition, the patient was 10 pounds below his dry weight, and appears volume depleted on exam.  I suspect his chronic hypotension is related to end-stage heart failure, with EF of 20%.  I do not suspect a new process, including obstructive shock, cardiogenic shock, septic shock, or hemorrhagic shock.  I do not think repeat hospitalization would be constructive.  I recommend continuing the outpatient regimen of midodrine and dexamethasone.  The patient did seem to respond somewhat to IV fluids, steroids, and midodrine dosing here.  Otherwise, his case  is quite tenuous, given ongoing chronically low blood pressures and need for dialysis.  I think there is a high likelihood that he will have ongoing difficulty tolerating dialysis secondary to hypotension.  Follow-up with primary care physician in the next 2 to 5 days.  Return to the ED for new symptoms such as fever, altered mental status, syncope, or other concerns.    Diagnosis:    ICD-10-CM    1. Hypotension, unspecified hypotension type  I95.9    2. Volume depletion  E86.9    3. Ischemic cardiomyopathy  I25.5        Discharge Medications:  Discharge Medication List as of 3/9/2022  5:56 PM      START taking these medications    Details   !! midodrine (PROAMATINE) 2.5 MG tablet Take 1 tablet (2.5 mg) by mouth 3 times daily, Disp-90 tablet, R-0, Local Print       !! - Potential duplicate medications found. Please discuss with provider.          Scribe Disclosure:  I, Bhumika Otero, am serving as a scribe at 12:05 PM on 3/9/2022 to document services personally performed by Adriane Kerr MD based on my observations and the provider's statements to me.        Adriane Kerr MD  03/09/22 [id number]

## 2022-03-10 NOTE — LETTER
3/10/2022        RE: Westley Ness  [address] [facility] [address]        [facility]    PRIMARY CARE PROVIDER AND CLINIC:  Josselin Kolb MD, [address]  / [facility] [id number]  Chief Complaint   Patient presents with     Hospital F/U      [facility] Medical Record Number:  [id number]  Place of Service where encounter took place:  [facility] ([facility]) [[id number]]    Westley Ness  is a 76 year old  (1945), admitted to the above facility from  [facility]. Hospital stay 2/17/22 through 3/8/22..     HPI:    Westley Ness is a 76-year-old male with a past medical history of end-stage renal disease on dialysis MWF, ischemic cardiomyopathy status post ICD placement, paroxysmal atrial fibrillation, coronary artery disease and osteoarthritis on chronic steroids who recently had to back-to-back hospitalizations at [facility] in the setting of COVID-19.    He was initially admitted 2/11-2/15 for hypoxia in the setting of COVID-19.  He is fully vaccinated.  He was treated with dexamethasone.  Not a candidate for remdesivir due to renal disease.  He was ultimately discharged home on 2/15/2022.  Unfortunately, he represented 2 days later with worsening shortness of breath and hypoxia.  He was readmitted.  Chest x-ray on admission with left basilar/retrocardiac pulmonary opacities could be small pleural effusion.  He was treated with a prolonged course of dexamethasone and initiated on tapering doses of prednisone.  Hospital course was complicated by hypotension.  He has a history of cardiomyopathy and historically has been maintained on metoprolol lisinopril and Imdur.  Due to persistent hypotension all of his cardiac medications have been discontinued.    He discharged to [facility] 3/8. Went to dialysis 3/9 and developed hypotension and chest pain. Was sent to the ER. Labs unremarkable. High sensitivity trop not consistent with ACS. He was  given IV fluids and discharged back to [facility]      Today he is seen in his room. Reports he feels quite weak. SBPs in 80s this am. Discussed adding back midodrine 3x daily on non dialysis days as he was receiving in the hospital. Breathing is stable. No dizziness. Lives independently but has a  that helps him. No N/V. No constipation        CODE STATUS/ADVANCE DIRECTIVES DISCUSSION:  Prior  DNR / DNI  ALLERGIES:   Allergies   Allergen Reactions     Contrast Dye Hives     Does fine if he uses benadryl prior.      PAST MEDICAL HISTORY:   Past Medical History:   Diagnosis Date     Acid reflux disease      Benign essential hypertension      CAD (coronary artery disease)     s/p multiple NSTEMIs and PCI's     ESRD on hemodialysis (H)     MWF     History of atrial flutter     s/p ablation     Hypothyroidism      Ischemic cardiomyopathy     EF 25-30%, s/p AICD      PAST SURGICAL HISTORY:   has a past surgical history that includes vascular surgery (2004, 2010); LEFT HEART CATHETERIZATION (7/14/2016); LEFT HEART CATHETERIZATION (9/21/2016); EP Ablation atrial flutter (06/08/2017, 12/11/17); cholecystectomy, open (2013); implant ventricular device (08/15/2011); tonsillectomy & adenoidectomy; Elbow surgery (Left, 2008); Repair fistula arteriovenous upper extremity (Left, 3/5/2019); IR Dialysis Fistulogram Left (7/22/2019); EP Implantable Cardio-Defibrillator Generator Change Single (N/A, 11/21/2019); Coronary Angiogram (N/A, 10/19/2020); Left Heart Cath (N/A, 10/19/2020); and Percutaneous Coronary Intervention Stent Drug Eluting (N/A, 10/19/2020).  FAMILY HISTORY: family history includes Bladder Cancer in his father; Cerebrovascular Disease in his mother; Hypertension in his father; Myocardial Infarction in his paternal grandmother.  SOCIAL HISTORY:   reports that he quit smoking about 25 years ago. His smoking use included cigarettes. He started smoking about 57 years ago. He has a 70.00 pack-year smoking  history. He has never used smokeless tobacco. He reports previous alcohol use. He reports that he does not use drugs.  Patient's living condition: lives alone    Post Discharge Medication Reconciliation Status: discharge medications reconciled and changed, per note/orders  Current Outpatient Medications   Medication Sig     acetaminophen (TYLENOL) 325 MG tablet Take 2 tablets (650 mg) by mouth every 4 hours as needed for mild pain     apixaban ANTICOAGULANT (ELIQUIS ANTICOAGULANT) 2.5 MG tablet Take 1 tablet (2.5 mg) by mouth 2 times daily     famotidine (PEPCID) 20 MG tablet Take 20 mg by mouth daily      gabapentin (NEURONTIN) 100 MG capsule Take 1 capsule (100 mg) by mouth 3 times daily     guaiFENesin-dextromethorphan (ROBITUSSIN DM) 100-10 MG/5ML syrup Take 10 mLs by mouth every 4 hours as needed for cough     levothyroxine (SYNTHROID/LEVOTHROID) 50 MCG tablet TAKE 1 TABLET BY MOUTH EVERY DAY     lidocaine (LIDODERM) 5 % patch Place 1 patch onto the skin every 24 hours To prevent lidocaine toxicity, patient should be patch free for 12 hrs daily.     midodrine (PROAMATINE) 10 MG tablet Take 1 tablet (10 mg) by mouth three times a week     nitroGLYcerin (NITROSTAT) 0.4 MG sublingual tablet Place 1 tablet (0.4 mg) under the tongue every 5 minutes as needed for chest pain UP TO 3 PER EPISODE     pantoprazole (PROTONIX) 40 MG EC tablet TAKE 1 TABLET (40 MG) BY MOUTH EVERY MORNING     pravastatin (PRAVACHOL) 40 MG tablet TAKE 1 TABLET BY MOUTH EVERY DAY     predniSONE (DELTASONE) 10 MG tablet Take 2 tab for 3 days then 1 tab for 3 days them 5 mg daily     [START ON 3/14/2022] predniSONE (DELTASONE) 5 MG tablet Take 1 tablet (5 mg) by mouth daily     vitamin D3 (CHOLECALCIFEROL) 50 mcg (2000 units) tablet TAKE 1 TABLET BY MOUTH EVERY DAY     B Complex-C-Folic Acid (DOROTEO CAPS) 1 MG CAPS TAKE 1 CAPSULE BY MOUTH EVERY DAY     midodrine (PROAMATINE) 2.5 MG tablet Take 1 tablet (2.5 mg) by mouth 3 times daily     No  "current facility-administered medications for this visit.       ROS:  10 point ROS of systems including Constitutional, Eyes, Respiratory, Cardiovascular, Gastroenterology, Genitourinary, Integumentary, Musculoskeletal, Psychiatric were all negative except for pertinent positives noted in my HPI.    Vitals:  BP 98/65   Pulse 106   Temp 98  F (36.7  C)   Resp 19   Ht 1.727 m (5' 8\")   Wt 69.9 kg (154 lb)   SpO2 97%   BMI 23.42 kg/m    Exam:  Physical Exam  Constitutional:       Appearance: Normal appearance. He is normal weight.   HENT:      Head: Normocephalic and atraumatic.   Eyes:      General: No scleral icterus.  Cardiovascular:      Rate and Rhythm: Regular rhythm. Tachycardia present.      Heart sounds: No murmur heard.  Pulmonary:      Effort: Pulmonary effort is normal. No respiratory distress.      Breath sounds: Normal breath sounds.   Abdominal:      General: Abdomen is flat. There is no distension.      Palpations: Abdomen is soft.   Musculoskeletal:         General: Normal range of motion.      Right lower leg: No edema.      Left lower leg: No edema.   Skin:     General: Skin is warm and dry.      Findings: No rash.   Neurological:      General: No focal deficit present.      Mental Status: He is alert. Mental status is at baseline.   Psychiatric:         Mood and Affect: Mood normal.           Lab/Diagnostic data:  Recent labs in [facility] reviewed by me today.  and   Most Recent 3 CBC's:  Recent Labs   Lab Test 03/09/22  1322 03/06/22  1034 03/02/22  0736   WBC 10.4 10.5 9.5   HGB 14.1 14.2 13.7   * 107* 107*   * 128* 132*     Most Recent 3 BMP's:  Recent Labs   Lab Test 03/09/22  1322 03/08/22  1113 03/08/22  0811 03/05/22  1350 03/05/22  0910 03/03/22  0943 03/03/22  0735     --   --   --  140  --  138   POTASSIUM 3.6  --   --   --  4.0  --  4.2   CHLORIDE 98  --   --   --  105  --  102   CO2 32  --   --   --  27  --  31   BUN 22  --   --   --  54*  --  52*   CR 2.47*  --   " --   --  5.17*  --  5.73*   ANIONGAP 5  --   --   --  8  --  5   CHRISTINE 9.1  --   --   --  8.9  --  8.6   * 173* 102*   < > 146*   < > 118*    < > = values in this interval not displayed.     Most Recent TSH and T4:  Recent Labs   Lab Test 03/26/19  0847   TSH 4.40*     Most Recent Hemoglobin A1c:  Recent Labs   Lab Test 02/11/22  0653   A1C 5.1       ASSESSMENT/PLAN:    (J96.01) Acute respiratory failure with hypoxia (H)  (primary encounter diagnosis), improving  (U07.1) Infection due to 2019 novel coronavirus, recovered  (R53.81) Physical deconditioning: Initially diagnosed 2/11/2022.  Fully vaccinated.  Admitted 2/11/2/15 and treated with dexamethasone.  No remdesivir due to end-stage renal disease.  Discharged home but unfortunately represented with worsening shortness of breath and hypoxia.  Chest x-ray did not show secondary pneumonia.  Not treated with antibiotics.  Received prolonged course of dexamethasone and transition to oral prednisone with plan taper as outpatient.  O2 needs decreased and currently requiring 1-2 L at time of discharge  -Continue prednisone taper.  Tapering 10 mg every 3-4 days to baseline dose of 5 mg daily  -Continue supplemental oxygen.  Encourage pulmonary toilet.  Wean O2 as tolerated  -Anticoagulated with Eliquis prior to admission  -PT/OT/social work    (I50.22) Chronic systolic (congestive) heart failure (H)  (I25.5) Ischemic cardiomyopathy  (Z95.810) S/P ICD (internal cardiac defibrillator) procedure: Most recent TTE 2/17/2022 with EF of 25-30%, akinesis to dyskinesis of the apical inferior wall, mid anterior septal wall and entire apex.  Severely decreased right ventricular systolic function.  Not on diuretics.  Previously maintained on a regimen of metoprolol, lisinopril and Imdur though now unable to tolerate due to hypotension.  -Continue volume management with dialysis  -Monitor vital signs per protocol.  If blood pressure improves can consider reinitiation of low-dose  cardiac medications  -Follow-up with cardiology as outpatient      (I25.10) Coronary artery disease involving native coronary artery of native heart without angina pectoris: Most recent angiogram angiogram was in 10/2020, which shows subtotal occlusion of left anterior descending (LAD) with known infarct area, patent proximal left circumflex, severe stenosis of the large OM1 distal to the stent, which was stented with FOREST at that time  -Continue pravastatin  -Not on ASA given anticoagulation with Eliquis  -No longer on beta-blocker or ACE due to hypotension    (I48.0) PAF (paroxysmal atrial fibrillation) (H): Heart rates 80/100 since admission.  Not on any rate controlling agents. Slightly tachycardic this am   -Continue Eliquis    (I95.9) Hypotension, unspecified hypotension type: Hospital course complicated by hypotension.  All antihypertensives were discontinued.  Continues on midodrine 10 mg prior to dialysis.  Was briefly receiving midodrine 2.5 mg 3 times daily on non dialysis days in the hospital but this was discontinued prior to discharge. Given his hypotension yesterday will restart  - Midodrine 2.5 mg tid on Tue, Thurs, Sat & Sun  - Midodrine 10 mg prior to dialysis, will monitor and consider additional prn Midodrine on dialysis days to be taken during dialysis if needed.     (E11.22,  N18.6,  Z99.2) Type 2 diabetes mellitus with chronic kidney disease on chronic dialysis, without long-term current use of insulin (H)  (R73.9) Steroid induced hyperglycemia: A1c 5.1.  Was on sliding scale insulin in the hospital.  Not discharged on NovoLog.  Blood glucose on arrival to TCU greater than 300  -Start sliding scale insulin with meals      (N18.6,  Z99.2) End stage renal failure on dialysis (H): On dialysis Monday Wednesday Friday.  Follows with Dr. Gonzales  -Continue dialysis    (K21.00) Gastroesophageal reflux disease with esophagitis, unspecified whether hemorrhage  -Continue Protonix    (E03.9)  Hypothyroidism, unspecified type  -Continue Synthroid    (M19.90) Osteoarthritis, unspecified osteoarthritis type, unspecified site: Historically maintained on prednisone 5 mg daily  -Taper prednisone back to home dose of fentanyl and CAD      Orders:  Midodrine 2.5 mg tid on non dialysis days  SSI with meals      Electronically signed by:  Eve Barroso PA-C                     Sincerely,        Eve Barroso PA-C

## 2022-03-11 NOTE — LETTER
"    3/11/2022        RE: Westley Ness  [address] [facility] [address]        [facility]  PHYSICIAN NOTE    PRIMARY CARE PROVIDER AND CLINIC:  Josselin Kolb MD, [address]  / [facility] [address]    Chief Complaint   Patient presents with     Hospital F/U     [facility] Record Number:  [id number]  Place of Service where encounter took place:  [facility] ([facility]) [[id number]]    Westley Ness is a 76 year old (1945), admitted to the above facility from  [facility]. Hospital stay 2/11/22 - 2/15/22 followed by repeat hospitalization 2/17/22-3/8/22 and ED visit 3/9/22. Admitted to this facility for  rehab, medical management and nursing care.     HPI:    HPI information obtained from: facility chart records, facility staff, patient report and [facility] chart review.     Brief summary of hospital course: Mr. Christy \"Art\" Grover is a 76yoM with several comorbidities as outlined below, namely ESRD on HD for several years as well as cardiomyopathy with ICD in place, EF 25-30% and afib on Eliquis therapy. Recently hospitalized with COVID-19 infection last month with treatment including increase in chronic steroids (for which he takes Prednisone 5 mg daily long standing for noted OA diagnosis) though unable to use Remdesivir d/t ESRD. He did require on-going oxygen supplementation as well. Struggled throughout hospitalization with on-going hypotension and all his cardiomyopathy meds had to be discontinued. Started on higher dose of Midodrine. Quite deconditioned and discharged to U still needing 1-2 LPM nasal canula O2.    Updates on status since skilled nursing admission: The day after admission to TCU he had an ED visit d/t post-dialysis hypotension again. No new etiology determined but did respond to IVF and Midodrine and he returned to U. Continues on oxygen therapy. Art is seen in his room today also after returning from " "dialysis about an hour ago. When he returned, again, BP low (76/48) but supposedly asymptomatic. RN placed him in [place] and pushed fluids. Art is resting in bed now, BP is better but still low. Confirms asymptomatic but is globally weak/deconditioned. He did eat about 50% of food on the plate in front of him and was watching TV. He is cognizant that he's been struggling with the hypotension and that is one of his main goals to improve. Asks a good question about whether Midodrine is dialyzable. Thinks he was starting to deal with some hypotension even a year ago pre-COVID infection. Says he was told he is still 1.2 L below his dry weight and no fluid was pulled at dialysis today. He has goal to eventually return home where he lives alone though receives some in-home care. In regards to on-going O2 use, says he actually just recently again prior to COVID diagnosis was working with primary care team on O2 needs and has a regulator at home with prescription in process. Says d/t some less activity the past several months. Has wheelchair too. Says with his OA, he had multiple joint \"wandering pain\" that disappeared with the low dose 5 mg prednisone which he really appreciated early in 2022.     CODE STATUS/ADVANCE DIRECTIVES DISCUSSION:   DNR / DNI  Patient's living condition: lives alone    ALLERGIES: Contrast dye    Past Medical History:   Diagnosis Date     Acid reflux disease      Benign essential hypertension      CAD (coronary artery disease)     s/p multiple NSTEMIs and PCI's     ESRD on hemodialysis (H)     MWF     History of atrial flutter     s/p ablation     Hypothyroidism      Ischemic cardiomyopathy     EF 25-30%, s/p AICD      Past Surgical History:   Procedure Laterality Date     CHOLECYSTECTOMY, OPEN  2013     CV CORONARY ANGIOGRAM N/A 10/19/2020    Procedure: Coronary Angiogram;  Surgeon: Theodora Salazar MD;  Location: [facility]     CV LEFT HEART CATH N/A 10/19/2020    " Procedure: Left Heart Cath;  Surgeon: Theodora Salazar MD;  Location:  HEART CARDIAC CATH LAB     CV PCI STENT DRUG ELUTING N/A 10/19/2020    Procedure: Percutaneous Coronary Intervention Stent Drug Eluting;  Surgeon: Theodora Salazar MD;  Location:  HEART CARDIAC CATH LAB     ELBOW SURGERY Left     ORIF - plates still in place     EP ICD GENERATOR CHANGE SINGLE N/A 2019    Procedure: EP ICD Generator Change Single;  Surgeon: Jono Mejia MD;  Location:  HEART CARDIAC CATH LAB     H ABLATION ATRIAL FLUTTER  2017, 17     HC LEFT HEART CATHETERIZATION  2016     HC LEFT HEART CATHETERIZATION  2016     IMPLANT VENTRICULAR DEVICE  08/15/2011     IR DIALYSIS FISTULOGRAM LEFT  2019     REPAIR FISTULA ARTERIOVENOUS UPPER EXTREMITY Left 3/5/2019    Procedure: REPAIR LEFT UPPER ARM ARTERIOVENOUS FISTULA SKIN ULCER;  Surgeon: Westley Griggs MD;  Location:  OR     TONSILLECTOMY & ADENOIDECTOMY       VASCULAR SURGERY  ,     LUE fistulas (upper and lower); upper one is functional     Family History   Problem Relation Age of Onset     Cerebrovascular Disease Mother         later in life     Hypertension Father      Bladder Cancer Father      Myocardial Infarction Paternal Grandmother      Diabetes No family hx of      Prostate Cancer No family hx of      Colon Cancer No family hx of      Social History     Tobacco Use     Smoking status: Former Smoker     Packs/day: 2.00     Years: 35.00     Pack years: 70.00     Types: Cigarettes     Start date:      Quit date: 1996     Years since quittin.3     Smokeless tobacco: Never Used   Substance Use Topics     Alcohol use: Not Currently     Alcohol/week: 0.0 standard drinks     Drug use: No        Post-discharge medication reconciliation status: Reviewed and [facility] in [facility] according to facility MAR    Current Outpatient Medications   Medication Sig Dispense Refill     acetaminophen (TYLENOL) 325 MG  tablet Take 2 tablets (650 mg) by mouth every 4 hours as needed for mild pain       apixaban ANTICOAGULANT (ELIQUIS ANTICOAGULANT) 2.5 MG tablet Take 1 tablet (2.5 mg) by mouth 2 times daily 180 tablet 0     B Complex-C-Folic Acid (DOROTEO CAPS) 1 MG CAPS TAKE 1 CAPSULE BY MOUTH EVERY  capsule 2     famotidine (PEPCID) 20 MG tablet Take 20 mg by mouth daily        gabapentin (NEURONTIN) 100 MG capsule Take 1 capsule (100 mg) by mouth 3 times daily 270 capsule 3     guaiFENesin-dextromethorphan (ROBITUSSIN DM) 100-10 MG/5ML syrup Take 10 mLs by mouth every 4 hours as needed for cough       insulin aspart (NOVOLOG PEN) 100 UNIT/ML pen Do Not give if Pre-Meal BG less than 140.   Pre-Meal  - 189 give 1 unit.   Pre-Meal  - 239 give 2 units.   Pre-Meal  - 289 give 3 units.   Pre-Meal  - 339 give 4 units.    Pre-Meal - 399 give 5 units.   Pre-Meal -449 give 6 units  Pre-Meal BG greater or equal to 450 give 7 units.   To be given with prandial insulin. Based on pre-meal blood glucose. 15 mL      levothyroxine (SYNTHROID/LEVOTHROID) 50 MCG tablet TAKE 1 TABLET BY MOUTH EVERY DAY 90 tablet 3     lidocaine (LIDODERM) 5 % patch Place 1 patch onto the skin every 24 hours To prevent lidocaine toxicity, patient should be patch free for 12 hrs daily. 14 patch 0     midodrine (PROAMATINE) 10 MG tablet Take 1 tablet (10 mg) by mouth three times a week       midodrine (PROAMATINE) 2.5 MG tablet 2.5 mg tid on Tue, Thurs,  Sat and Sun       nitroGLYcerin (NITROSTAT) 0.4 MG sublingual tablet Place 1 tablet (0.4 mg) under the tongue every 5 minutes as needed for chest pain UP TO 3 PER EPISODE 20 tablet 0     pantoprazole (PROTONIX) 40 MG EC tablet TAKE 1 TABLET (40 MG) BY MOUTH EVERY MORNING 30 tablet 8     pravastatin (PRAVACHOL) 40 MG tablet TAKE 1 TABLET BY MOUTH EVERY DAY 90 tablet 3     predniSONE (DELTASONE) 10 MG tablet Take 2 tab for 3 days then 1 tab for 3 days them 5 mg daily       [START  "ON 3/14/2022] predniSONE (DELTASONE) 5 MG tablet Take 1 tablet (5 mg) by mouth daily 90 tablet 3     vitamin D3 (CHOLECALCIFEROL) 50 mcg (2000 units) tablet TAKE 1 TABLET BY MOUTH EVERY DAY 30 tablet 5       ROS:  10 point ROS of systems including Constitutional, Eyes, Respiratory, Cardiovascular, Gastroenterology, Genitourinary, Integumentary, Musculoskeletal, Psychiatric were all negative except for pertinent positives noted in my HPI.    Exam:  BP (!) 89/61   Pulse 95   Temp 98.7  F (37.1  C)   Resp 16   Ht 1.727 m (5' 8\")   Wt 70.1 kg (154 lb 9.6 oz)   SpO2 94%   BMI 23.51 kg/m    Lying in bed casually dressed in NAD, appears frail and deconditioned  No scleral icterus  Moist oral mucosa  Heart irregularly irregular rate controlled, extremities warm without edema  Wearing nasal canula O2, no cough, breathing non-labored but poor respiratory effort and prolonged expiratory phase, clear as auscultated  Abdomen soft, NT, hypoactive bowel sounds  L arm fistula noted/covered  Skin warm and dry, non-pale or diaphoretic  Normal speech, no tremor, asks good questions, getting slightly sleepy during visit  Mood euthymic    Lab/Diagnostic data:  A1c 5.1% Feb 2022; POC thus far 200-300s but likely r/t steroid burst  No recent TSH on file since 2019 (?done at dialysis)  Cortisol elevated slightly to 22.1 Feb 2022Feb 2022 Feb 2022:  TTE noting EF 25-30% with segmental WMA consistent with ischemic cardiomyopathy    3/9/22: normal electrolytes, Cr 2.47 on dialysis, low protein/albumin, WBC 10.4, Hgb 14.1, Platelet 117    ASSESSMENT/PLAN:  (I95.9) Hypotension, unspecified hypotension type  (primary encounter diagnosis)  Asymptomatic today with post dialysis hypotension; improved with rest/fluids  Didn't get excessive fluid pulled at dialysis per his report as was actually below dry weight   Is on Midodrine now as above 10 mg on dialysis mornings and 2.5 mg TID non-dialysis days and will see if that helps him  Per [employer], " "Midodrine is dialyzable but the \"extent undetermined\"  It could be that his ischemic cardiomyopathy, ESRD, advancing age, recent COVID-19 infection, various doses of steroids ALL contributed to his worsening hypotension recently but he's noted it on-and-off about a year and now no longer is on any anti-hypertensives    (I25.5) Ischemic cardiomyopathy  (I48.0) PAF (paroxysmal atrial fibrillation) (H)  See TTE above with significant cardiomyopathy; has ICD in place  Unable to tolerate any cardiomyopathy meds d/t above mentioned hypotension  Per cardiology prognosis unfortunately guarded in context of this with his ESRD requiring HD  Is on Eliquis for paroxysmal afib  He appears euvolemic today; as per HPI he is still slightly below dry weight     (N18.6,  Z99.2) ESRD (end stage renal disease) on dialysis (H)  Continues 3x/week HD via LUE fistula now for several years    (J96.21) Acute and chronic respiratory failure with hypoxia (H)  Sounds multifactorial as he was also describing being in the process of obtaining home O2 prior to his recent COVID diagnosis last month  Wean O2 if able but may be challenging d/t comorbidities    (Z86.16) Personal history of COVID-19  He completed Moderna series with a Moderna booster  Unable to have Remdesivir d/t ESRD but did have significant doses of Dexamethasone while hospitalized x 2 and now on prednisone taper  Still requiring O2 as above but >1 month out from initial diagnosis    (R53.81) Physical deconditioning  Facility occupational therapy and physical therapy eval and treat for safe dispo as lives alone    (M89.49) Primary osteoarthritis involving multiple joints  (Z79.52) On prednisone therapy  He reports significant benefit from low dose Prednisone 5 mg daily and Gabapentin for his OA followed as outpatient  Does use WC at home  Do wonder if some of his hypotension recent in part r/t adrenal insufficiency given chronic steroids and acute illness; glad now taking care to do " slow prednisone taper  Cortisol level was slightly elevated in Feb hospital stay    (E11.22,  N18.6,  Z99.2) Type 2 diabetes mellitus with chronic kidney disease on chronic dialysis, without long-term current use of insulin (H)  (R73.9,  T38.0X5A) Steroid-induced hyperglycemia  A1c 5.1% Feb 2022; POC thus far 200-300s but likely r/t steroid burst  Prednisone dose is planned to be reduced early next week  Is on low dose sliding scale insulin alone at this time    (K21.00) Gastroesophageal reflux disease with esophagitis, unspecified whether hemorrhage  He says he benefits from combination of Famotidine + Pantoprazole    (E03.9) Hypothyroidism, unspecified type  No recent TSH since 2019 in our system but may get labs at dialysis in this regard - would check  Is on Levothyroxine therapy        Electronically signed by:  Lisa Walker DO          Sincerely,        Lisa Walker DO

## 2022-03-11 NOTE — PROGRESS NOTES
"[facility]  PHYSICIAN NOTE    PRIMARY CARE PROVIDER AND CLINIC:  Josselin Kolb MD, [address] [place] [address] / [facility] [id number]    Chief Complaint   Patient presents with     Hospital F/U     [facility] Medical Record Number:  [id number]  Place of Service where encounter took place:  [facility] ([facility]) [[id number]]    Westley Ness is a 76 year old (1945), admitted to the above facility from  [facility]. Hospital stay 2/11/22 - 2/15/22 followed by repeat hospitalization 2/17/22-3/8/22 and ED visit 3/9/22. Admitted to this facility for  rehab, medical management and nursing care.     HPI:    HPI information obtained from: facility chart records, facility staff, patient report and [facility] chart review.     Brief summary of hospital course: Mr. Christy \"Art\"Grover is a 76yoM with several comorbidities as outlined below, namely ESRD on HD for several years as well as cardiomyopathy with ICD in place, EF 25-30% and afib on Eliquis therapy. Recently hospitalized with COVID-19 infection last month with treatment including increase in chronic steroids (for which he takes Prednisone 5 mg daily long standing for noted OA diagnosis) though unable to use Remdesivir d/t ESRD. He did require on-going oxygen supplementation as well. Struggled throughout hospitalization with on-going hypotension and all his cardiomyopathy meds had to be discontinued. Started on higher dose of Midodrine. Quite deconditioned and discharged to TCU still needing 1-2 LPM nasal canula O2.    Updates on status since skilled nursing admission: The day after admission to TCU he had an ED visit d/t post-dialysis hypotension again. No new etiology determined but did respond to IVF and Midodrine and he returned to TCU. Continues on oxygen therapy. Art is seen in his room today also after returning from dialysis about an hour ago. When he returned, again, BP low (76/48) but supposedly " "asymptomatic. RN placed him in [place] and pushed fluids. Art is resting in bed now, BP is better but still low. Confirms asymptomatic but is globally weak/deconditioned. He did eat about 50% of food on the plate in front of him and was watching TV. He is cognizant that he's been struggling with the hypotension and that is one of his main goals to improve. Asks a good question about whether Midodrine is dialyzable. Thinks he was starting to deal with some hypotension even a year ago pre-COVID infection. Says he was told he is still 1.2 L below his dry weight and no fluid was pulled at dialysis today. He has goal to eventually return home where he lives alone though receives some in-home care. In regards to on-going O2 use, says he actually just recently again prior to COVID diagnosis was working with primary care team on O2 needs and has a regulator at home with prescription in process. Says d/t some less activity the past several months. Has wheelchair too. Says with his OA, he had multiple joint \"wandering pain\" that disappeared with the low dose 5 mg prednisone which he really appreciated early in 2022.     CODE STATUS/ADVANCE DIRECTIVES DISCUSSION:   DNR / DNI  Patient's living condition: lives alone    ALLERGIES: Contrast dye    Past Medical History:   Diagnosis Date     Acid reflux disease      Benign essential hypertension      CAD (coronary artery disease)     s/p multiple NSTEMIs and PCI's     ESRD on hemodialysis (H)     MWF     History of atrial flutter     s/p ablation     Hypothyroidism      Ischemic cardiomyopathy     EF 25-30%, s/p AICD      Past Surgical History:   Procedure Laterality Date     CHOLECYSTECTOMY, OPEN  2013     CV CORONARY ANGIOGRAM N/A 10/19/2020    Procedure: Coronary Angiogram;  Surgeon: Theodora Salazar MD;  Location:  HEART CARDIAC CATH LAB     CV LEFT HEART CATH N/A 10/19/2020    Procedure: Left Heart Cath;  Surgeon: Theodora Salazar MD;  Location:  HEART CARDIAC " CATH LAB     CV PCI STENT DRUG ELUTING N/A 10/19/2020    Procedure: Percutaneous Coronary Intervention Stent Drug Eluting;  Surgeon: Theodora Salazar MD;  Location:  HEART CARDIAC CATH LAB     ELBOW SURGERY Left     ORIF - plates still in place     EP ICD GENERATOR CHANGE SINGLE N/A 2019    Procedure: EP ICD Generator Change Single;  Surgeon: Jono Mejia MD;  Location:  HEART CARDIAC CATH LAB     H ABLATION ATRIAL FLUTTER  2017, 17     HC LEFT HEART CATHETERIZATION  2016     HC LEFT HEART CATHETERIZATION  2016     IMPLANT VENTRICULAR DEVICE  08/15/2011     IR DIALYSIS FISTULOGRAM LEFT  2019     REPAIR FISTULA ARTERIOVENOUS UPPER EXTREMITY Left 3/5/2019    Procedure: REPAIR LEFT UPPER ARM ARTERIOVENOUS FISTULA SKIN ULCER;  Surgeon: Westley Griggs MD;  Location:  OR     TONSILLECTOMY & ADENOIDECTOMY       VASCULAR SURGERY  ,     LUE fistulas (upper and lower); upper one is functional     Family History   Problem Relation Age of Onset     Cerebrovascular Disease Mother         later in life     Hypertension Father      Bladder Cancer Father      Myocardial Infarction Paternal Grandmother      Diabetes No family hx of      Prostate Cancer No family hx of      Colon Cancer No family hx of      Social History     Tobacco Use     Smoking status: Former Smoker     Packs/day: 2.00     Years: 35.00     Pack years: 70.00     Types: Cigarettes     Start date:      Quit date: 1996     Years since quittin.3     Smokeless tobacco: Never Used   Substance Use Topics     Alcohol use: Not Currently     Alcohol/week: 0.0 standard drinks     Drug use: No        Post-discharge medication reconciliation status: Reviewed and updated in [facility] according to facility MAR    Current Outpatient Medications   Medication Sig Dispense Refill     acetaminophen (TYLENOL) 325 MG tablet Take 2 tablets (650 mg) by mouth every 4 hours as needed for mild pain        apixaban ANTICOAGULANT (ELIQUIS ANTICOAGULANT) 2.5 MG tablet Take 1 tablet (2.5 mg) by mouth 2 times daily 180 tablet 0     B Complex-C-Folic Acid (DOROTEO CAPS) 1 MG CAPS TAKE 1 CAPSULE BY MOUTH EVERY  capsule 2     famotidine (PEPCID) 20 MG tablet Take 20 mg by mouth daily        gabapentin (NEURONTIN) 100 MG capsule Take 1 capsule (100 mg) by mouth 3 times daily 270 capsule 3     guaiFENesin-dextromethorphan (ROBITUSSIN DM) 100-10 MG/5ML syrup Take 10 mLs by mouth every 4 hours as needed for cough       insulin aspart (NOVOLOG PEN) 100 UNIT/ML pen Do Not give if Pre-Meal BG less than 140.   Pre-Meal  - 189 give 1 unit.   Pre-Meal  - 239 give 2 units.   Pre-Meal  - 289 give 3 units.   Pre-Meal  - 339 give 4 units.    Pre-Meal - 399 give 5 units.   Pre-Meal -449 give 6 units  Pre-Meal BG greater or equal to 450 give 7 units.   To be given with prandial insulin. Based on pre-meal blood glucose. 15 mL      levothyroxine (SYNTHROID/LEVOTHROID) 50 MCG tablet TAKE 1 TABLET BY MOUTH EVERY DAY 90 tablet 3     lidocaine (LIDODERM) 5 % patch Place 1 patch onto the skin every 24 hours To prevent lidocaine toxicity, patient should be patch free for 12 hrs daily. 14 patch 0     midodrine (PROAMATINE) 10 MG tablet Take 1 tablet (10 mg) by mouth three times a week       midodrine (PROAMATINE) 2.5 MG tablet 2.5 mg tid on Tue, Thurs,  Sat and Sun       nitroGLYcerin (NITROSTAT) 0.4 MG sublingual tablet Place 1 tablet (0.4 mg) under the tongue every 5 minutes as needed for chest pain UP TO 3 PER EPISODE 20 tablet 0     pantoprazole (PROTONIX) 40 MG EC tablet TAKE 1 TABLET (40 MG) BY MOUTH EVERY MORNING 30 tablet 8     pravastatin (PRAVACHOL) 40 MG tablet TAKE 1 TABLET BY MOUTH EVERY DAY 90 tablet 3     predniSONE (DELTASONE) 10 MG tablet Take 2 tab for 3 days then 1 tab for 3 days them 5 mg daily       [START ON 3/14/2022] predniSONE (DELTASONE) 5 MG tablet Take 1 tablet (5 mg) by mouth daily  "90 tablet 3     vitamin D3 (CHOLECALCIFEROL) 50 mcg (2000 units) tablet TAKE 1 TABLET BY MOUTH EVERY DAY 30 tablet 5       ROS:  10 point ROS of systems including Constitutional, Eyes, Respiratory, Cardiovascular, Gastroenterology, Genitourinary, Integumentary, Musculoskeletal, Psychiatric were all negative except for pertinent positives noted in my HPI.    Exam:  BP (!) 89/61   Pulse 95   Temp 98.7  F (37.1  C)   Resp 16   Ht 1.727 m (5' 8\")   Wt 70.1 kg (154 lb 9.6 oz)   SpO2 94%   BMI 23.51 kg/m    Lying in bed casually dressed in NAD, appears frail and deconditioned  No scleral icterus  Moist oral mucosa  Heart irregularly irregular rate controlled, extremities warm without edema  Wearing nasal canula O2, no cough, breathing non-labored but poor respiratory effort and prolonged expiratory phase, clear as auscultated  Abdomen soft, NT, hypoactive bowel sounds  L arm fistula noted/covered  Skin warm and dry, non-pale or diaphoretic  Normal speech, no tremor, asks good questions, getting slightly sleepy during visit  Mood euthymic    Lab/Diagnostic data:  A1c 5.1% Feb 2022; POC thus far 200-300s but likely r/t steroid burst  No recent TSH on file since 2019 (?done at dialysis)  Cortisol elevated slightly to 22.1 Feb 2022Feb 2022 Feb 2022:  TTE noting EF 25-30% with segmental WMA consistent with ischemic cardiomyopathy    3/9/22: normal electrolytes, Cr 2.47 on dialysis, low protein/albumin, WBC 10.4, Hgb 14.1, Platelet 117    ASSESSMENT/PLAN:  (I95.9) Hypotension, unspecified hypotension type  (primary encounter diagnosis)  Asymptomatic today with post dialysis hypotension; improved with rest/fluids  Didn't get excessive fluid pulled at dialysis per his report as was actually below dry weight   Is on Midodrine now as above 10 mg on dialysis mornings and 2.5 mg TID non-dialysis days and will see if that helps him  Per UpToDate, Midodrine is dialyzable but the \"extent undetermined\"  It could be that his ischemic " cardiomyopathy, ESRD, advancing age, recent COVID-19 infection, various doses of steroids ALL contributed to his worsening hypotension recently but he's noted it on-and-off about a year and now no longer is on any anti-hypertensives    (I25.5) Ischemic cardiomyopathy  (I48.0) PAF (paroxysmal atrial fibrillation) (H)  See TTE above with significant cardiomyopathy; has ICD in place  Unable to tolerate any cardiomyopathy meds d/t above mentioned hypotension  Per cardiology prognosis unfortunately guarded in context of this with his ESRD requiring HD  Is on Eliquis for paroxysmal afib  He appears euvolemic today; as per HPI he is still slightly below dry weight     (N18.6,  Z99.2) ESRD (end stage renal disease) on dialysis (H)  Continues 3x/week HD via LUE fistula now for several years    (J96.21) Acute and chronic respiratory failure with hypoxia (H)  Sounds multifactorial as he was also describing being in the process of obtaining home O2 prior to his recent COVID diagnosis last month  Wean O2 if able but may be challenging d/t comorbidities    (Z86.16) Personal history of COVID-19  He completed Moderna series with a Moderna booster  Unable to have Remdesivir d/t ESRD but did have significant doses of Dexamethasone while hospitalized x 2 and now on prednisone taper  Still requiring O2 as above but >1 month out from initial diagnosis    (R53.81) Physical deconditioning  Facility occupational therapy and physical therapy eval and treat for safe dispo as lives alone    (M89.49) Primary osteoarthritis involving multiple joints  (Z79.52) On prednisone therapy  He reports significant benefit from low dose Prednisone 5 mg daily and Gabapentin for his OA followed as outpatient  Does use WC at home  Do wonder if some of his hypotension recent in part r/t adrenal insufficiency given chronic steroids and acute illness; glad now taking care to do slow prednisone taper  Cortisol level was slightly elevated in Feb hospital  stay    (E11.22,  N18.6,  Z99.2) Type 2 diabetes mellitus with chronic kidney disease on chronic dialysis, without long-term current use of insulin (H)  (R73.9,  T38.0X5A) Steroid-induced hyperglycemia  A1c 5.1% Feb 2022; POC thus far 200-300s but likely r/t steroid burst  Prednisone dose is planned to be reduced early next week  Is on low dose sliding scale insulin alone at this time    (K21.00) Gastroesophageal reflux disease with esophagitis, unspecified whether hemorrhage  He says he benefits from combination of Famotidine + Pantoprazole    (E03.9) Hypothyroidism, unspecified type  No recent TSH since 2019 in our system but may get labs at dialysis in this regard - would check  Is on Levothyroxine therapy        Electronically signed by:  Lisa Walker DO

## 2022-03-14 PROBLEM — J96.21 ACUTE AND CHRONIC RESPIRATORY FAILURE WITH HYPOXIA (H): Status: ACTIVE | Noted: 2022-01-01

## 2022-03-15 NOTE — LETTER
3/15/2022        RE: Westley Ness  [address] [facility] [address]        [facility]    Chief Complaint   Patient presents with     RECHECK       HPI:  Westley Ness is a 76 year old  (1945), who is being seen today for an episodic care visit at: [facility] ([facility]) [[id number]].     Brief summary: Westley Ness is a 76-year-old male with a past medical history of end-stage renal disease on dialysis MWF, ischemic cardiomyopathy status post ICD placement, paroxysmal atrial fibrillation, coronary artery disease and osteoarthritis on chronic steroids who recently had to back-to-back hospitalizations at [facility] in the setting of COVID-19.    He was initially admitted 2/11-2/15 for hypoxia in the setting of COVID-19.  He is fully vaccinated.  He was treated with dexamethasone.  Not a candidate for remdesivir due to renal disease.  He was ultimately discharged home on 2/15/2022.  Unfortunately, he represented 2 days later with worsening shortness of breath and hypoxia.  He was readmitted.  Chest x-ray on admission with left basilar/retrocardiac pulmonary opacities could be small pleural effusion.  He was treated with a prolonged course of dexamethasone and initiated on tapering doses of prednisone.  Hospital course was complicated by hypotension.  He has a history of cardiomyopathy and historically has been maintained on metoprolol lisinopril and Imdur.  Due to persistent hypotension all of his cardiac medications have been discontinued.    He discharged to [facility] 3/8. Went to dialysis 3/9 and developed hypotension and chest pain. Was sent to the ER. Labs unremarkable. High sensitivity trop not consistent with ACS. He was given IV fluids and discharged back to [facility]    Today's concern is: Today Art is seen for a follow up visit. He continues to struggle with hypotension despite Midodrine. SBPS in 80s today. Has not been out of bed or really able to work with  "therapy. Notes ongoing dizziness.  Has been taking midodrine prior to dialysis but SBPs still running low. Denies chest pain or SOB. Appetite is low.Has care conference today at 11    Allergies, and PMH/PSH reviewed in [facility] today.    REVIEW OF SYSTEMS:  10 point ROS of systems including Constitutional, Eyes, Respiratory, Cardiovascular, Gastroenterology, Genitourinary, Integumentary, Musculoskeletal, Psychiatric were all negative except for pertinent positives noted in my HPI.    Objective:   BP (!) 81/59   Pulse 97   Temp 99.1  F (37.3  C)   Resp 17   Ht 1.727 m (5' 8\")   Wt 69.9 kg (154 lb)   SpO2 99%   BMI 23.42 kg/m      Physical Exam  Constitutional:       Comments: Frail appearing   HENT:      Head: Normocephalic and atraumatic.   Eyes:      General: No scleral icterus.  Cardiovascular:      Rate and Rhythm: Normal rate. Rhythm irregular.      Heart sounds: No murmur heard.  Pulmonary:      Effort: Pulmonary effort is normal. No respiratory distress.      Breath sounds: Normal breath sounds.   Abdominal:      General: Abdomen is flat. There is no distension.      Palpations: Abdomen is soft.   Musculoskeletal:         General: Normal range of motion.      Right lower leg: No edema.      Left lower leg: No edema.   Skin:     General: Skin is warm and dry.      Findings: No rash.   Neurological:      General: No focal deficit present.      Mental Status: He is alert.      Comments: Lethargic but awakens and participates in interview   Psychiatric:         Mood and Affect: Mood normal.          Recent labs in [facility] reviewed by me today.  and   Most Recent 3 CBC's:  Recent Labs   Lab Test 03/09/22  1322 03/06/22  1034 03/02/22  0736   WBC 10.4 10.5 9.5   HGB 14.1 14.2 13.7   * 107* 107*   * 128* 132*     Most Recent 3 BMP's:  Recent Labs   Lab Test 03/09/22  1322 03/08/22  1113 03/08/22  0811 03/05/22  1350 03/05/22  0910 03/03/22  0943 03/03/22  0735     --   --   --  140  --  138 "   POTASSIUM 3.6  --   --   --  4.0  --  4.2   CHLORIDE 98  --   --   --  105  --  102   CO2 32  --   --   --  27  --  31   BUN 22  --   --   --  54*  --  52*   CR 2.47*  --   --   --  5.17*  --  5.73*   ANIONGAP 5  --   --   --  8  --  5   CHRISTINE 9.1  --   --   --  8.9  --  8.6   * 173* 102*   < > 146*   < > 118*    < > = values in this interval not displayed.       Assessment/Plan:   (J96.01) Acute respiratory failure with hypoxia (H)  (primary encounter diagnosis), improving  (U07.1) Infection due to 2019 novel coronavirus, recovered  (R53.81) Physical deconditioning  (R54) Frailty: Initially diagnosed 2/11/2022.  Fully vaccinated.  Admitted 2/11/2/15 and treated with dexamethasone.  No remdesivir due to end-stage renal disease.  Discharged home but unfortunately represented with worsening shortness of breath and hypoxia.  Chest x-ray did not show secondary pneumonia.  Not treated with antibiotics.  Received prolonged course of dexamethasone and transition to oral prednisone with plan taper as outpatient.  O2 needs decreased and currently requiring 1-2 L at time of discharge  -Completed prednisone taper and back on PTA dosage of 5 mg/d  -Continue supplemental oxygen.  Encourage pulmonary toilet.  Wean O2 as tolerated  -Anticoagulated with Eliquis prior to admission  -PT/OT/social work. Making minimal progress with therapy    (I50.22) Chronic systolic (congestive) heart failure (H)  (I25.5) Ischemic cardiomyopathy  (Z95.810) S/P ICD (internal cardiac defibrillator) procedure: Most recent TTE 2/17/2022 with EF of 25-30%, akinesis to dyskinesis of the apical inferior wall, mid anterior septal wall and entire apex.  Severely decreased right ventricular systolic function.  Not on diuretics.  Previously maintained on a regimen of metoprolol, lisinopril and Imdur though now unable to tolerate due to hypotension.  -Continue volume management with dialysis  -Monitor vital signs per protocol.  If blood pressure improves  can consider reinitiation of low-dose cardiac medications  -Follow-up with cardiology as outpatient      (I25.10) Coronary artery disease involving native coronary artery of native heart without angina pectoris: Most recent angiogram angiogram was in 10/2020, which shows subtotal occlusion of left anterior descending (LAD) with known infarct area, patent proximal left circumflex, severe stenosis of the large OM1 distal to the stent, which was stented with FOREST at that time  -Continue pravastatin  -Not on ASA given anticoagulation with Eliquis  -No longer on beta-blocker or ACE due to hypotension    (I48.0) PAF (paroxysmal atrial fibrillation) (H): Heart rates 80/100 since admission.  Not on any rate controlling agents. Slightly tachycardic this am   -Continue Eliquis    (I95.9) Hypotension, unspecified hypotension type, persistent: Hospital course complicated by hypotension.  All antihypertensives were discontinued.  Continues on midodrine 10 mg prior to dialysis.  Was briefly receiving midodrine 2.5 mg 3 times daily on non dialysis days in the hospital but this was discontinued prior to discharge but restarted on admission to TCU. SBPs remain in the 80s  - Increase Midodrine to 5 mg Tue, Thurs, Sat & Sun  - Midodrine 10 mg prior to dialysis, will monitor and consider additional prn Midodrine on dialysis days to be taken during dialysis if needed.     (E11.22,  N18.6,  Z99.2) Type 2 diabetes mellitus with chronic kidney disease on chronic dialysis, without long-term current use of insulin (H)  (R73.9) Steroid induced hyperglycemia: A1c 5.1.  Was on sliding scale insulin in the hospital.  Not discharged on NovoLog.  Blood glucose on arrival to TCU greater than 300. Improving now with tapering prednisone  -Continue SSI with meals. Will likely not need insulin at discharge    (N18.6,  Z99.2) End stage renal failure on dialysis (H): On dialysis Monday Wednesday Friday.  Follows with Dr. Gonzales  -Continue  dialysis    (K21.00) Gastroesophageal reflux disease with esophagitis, unspecified whether hemorrhage  -Continue Protonix    (E03.9) Hypothyroidism, unspecified type  - No TSH checked since 2019, will check TSH/T4  -Continue Synthroid    (M19.90) Osteoarthritis, unspecified osteoarthritis type, unspecified site: Historically maintained on prednisone 5 mg daily  -Continues on Prednisone 5 mg/d    (E43) Severe Malnutrition: Weights down approx 10 lbs in the past month. Poor oral intake  - Nutrition following. Patient would prefer regular diet over renal which seems appropriate  -Continue nutrition supplement    Orders:  Increase Midodrine to 5 mg tid on non dialysis days  TSH/T4 3/17 dx hypothyroidism    Electronically signed by: Eve Barroso PA-C           Sincerely,        Eve Barroso PA-C

## 2022-03-15 NOTE — PROGRESS NOTES
[facility] GERIATRIC SERVICES    Chief Complaint   Patient presents with     RECHECK       HPI:  Westley Ness is a 76 year old  (1945), who is being seen today for an episodic care visit at: [facility] ([facility]) [[id number]].     Brief summary: Westley Ness is a 76-year-old male with a past medical history of end-stage renal disease on dialysis MWF, ischemic cardiomyopathy status post ICD placement, paroxysmal atrial fibrillation, coronary artery disease and osteoarthritis on chronic steroids who recently had to back-to-back hospitalizations at [facility] in the setting of COVID-19.    He was initially admitted 2/11-2/15 for hypoxia in the setting of COVID-19.  He is fully vaccinated.  He was treated with dexamethasone.  Not a candidate for remdesivir due to renal disease.  He was ultimately discharged home on 2/15/2022.  Unfortunately, he represented 2 days later with worsening shortness of breath and hypoxia.  He was readmitted.  Chest x-ray on admission with left basilar/retrocardiac pulmonary opacities could be small pleural effusion.  He was treated with a prolonged course of dexamethasone and initiated on tapering doses of prednisone.  Hospital course was complicated by hypotension.  He has a history of cardiomyopathy and historically has been maintained on metoprolol lisinopril and Imdur.  Due to persistent hypotension all of his cardiac medications have been discontinued.    He discharged to [facility] 3/8. Went to dialysis 3/9 and developed hypotension and chest pain. Was sent to the ER. Labs unremarkable. High sensitivity trop not consistent with ACS. He was given IV fluids and discharged back to [facility]    Today's concern is: Today Art is seen for a follow up visit. He continues to struggle with hypotension despite Midodrine. SBPS in 80s today. Has not been out of bed or really able to work with therapy. Notes ongoing dizziness.  Has been taking midodrine prior to dialysis but SBPs  "still running low. Denies chest pain or SOB. Appetite is low.Has care conference today at 11    Allergies, and PMH/PSH reviewed in [facility] today.    REVIEW OF SYSTEMS:  10 point ROS of systems including Constitutional, Eyes, Respiratory, Cardiovascular, Gastroenterology, Genitourinary, Integumentary, Musculoskeletal, Psychiatric were all negative except for pertinent positives noted in my HPI.    Objective:   BP (!) 81/59   Pulse 97   Temp 99.1  F (37.3  C)   Resp 17   Ht 1.727 m (5' 8\")   Wt 69.9 kg (154 lb)   SpO2 99%   BMI 23.42 kg/m      Physical Exam  Constitutional:       Comments: Frail appearing   HENT:      Head: Normocephalic and atraumatic.   Eyes:      General: No scleral icterus.  Cardiovascular:      Rate and Rhythm: Normal rate. Rhythm irregular.      Heart sounds: No murmur heard.  Pulmonary:      Effort: Pulmonary effort is normal. No respiratory distress.      Breath sounds: Normal breath sounds.   Abdominal:      General: Abdomen is flat. There is no distension.      Palpations: Abdomen is soft.   Musculoskeletal:         General: Normal range of motion.      Right lower leg: No edema.      Left lower leg: No edema.   Skin:     General: Skin is warm and dry.      Findings: No rash.   Neurological:      General: No focal deficit present.      Mental Status: He is alert.      Comments: Lethargic but awakens and participates in interview   Psychiatric:         Mood and Affect: Mood normal.          Recent labs in [facility] reviewed by me today.  and   Most Recent 3 CBC's:  Recent Labs   Lab Test 03/09/22  1322 03/06/22  1034 03/02/22  0736   WBC 10.4 10.5 9.5   HGB 14.1 14.2 13.7   * 107* 107*   * 128* 132*     Most Recent 3 BMP's:  Recent Labs   Lab Test 03/09/22  1322 03/08/22  1113 03/08/22  0811 03/05/22  1350 03/05/22  0910 03/03/22  0943 03/03/22  0735     --   --   --  140  --  138   POTASSIUM 3.6  --   --   --  4.0  --  4.2   CHLORIDE 98  --   --   --  105  --  102   CO2 32 "  --   --   --  27  --  31   BUN 22  --   --   --  54*  --  52*   CR 2.47*  --   --   --  5.17*  --  5.73*   ANIONGAP 5  --   --   --  8  --  5   CHRISTINE 9.1  --   --   --  8.9  --  8.6   * 173* 102*   < > 146*   < > 118*    < > = values in this interval not displayed.       Assessment/Plan:   (J96.01) Acute respiratory failure with hypoxia (H)  (primary encounter diagnosis), improving  (U07.1) Infection due to 2019 novel coronavirus, recovered  (R53.81) Physical deconditioning  (R54) Frailty: Initially diagnosed 2/11/2022.  Fully vaccinated.  Admitted 2/11/2/15 and treated with dexamethasone.  No remdesivir due to end-stage renal disease.  Discharged home but unfortunately represented with worsening shortness of breath and hypoxia.  Chest x-ray did not show secondary pneumonia.  Not treated with antibiotics.  Received prolonged course of dexamethasone and transition to oral prednisone with plan taper as outpatient.  O2 needs decreased and currently requiring 1-2 L at time of discharge  -Completed prednisone taper and back on PTA dosage of 5 mg/d  -Continue supplemental oxygen.  Encourage pulmonary toilet.  Wean O2 as tolerated  -Anticoagulated with Eliquis prior to admission  -PT/OT/social work. Making minimal progress with therapy    (I50.22) Chronic systolic (congestive) heart failure (H)  (I25.5) Ischemic cardiomyopathy  (Z95.810) S/P ICD (internal cardiac defibrillator) procedure: Most recent TTE 2/17/2022 with EF of 25-30%, akinesis to dyskinesis of the apical inferior wall, mid anterior septal wall and entire apex.  Severely decreased right ventricular systolic function.  Not on diuretics.  Previously maintained on a regimen of metoprolol, lisinopril and Imdur though now unable to tolerate due to hypotension.  -Continue volume management with dialysis  -Monitor vital signs per protocol.  If blood pressure improves can consider reinitiation of low-dose cardiac medications  -Follow-up with cardiology as  outpatient      (I25.10) Coronary artery disease involving native coronary artery of native heart without angina pectoris: Most recent angiogram angiogram was in 10/2020, which shows subtotal occlusion of left anterior descending (LAD) with known infarct area, patent proximal left circumflex, severe stenosis of the large OM1 distal to the stent, which was stented with FOREST at that time  -Continue pravastatin  -Not on ASA given anticoagulation with Eliquis  -No longer on beta-blocker or ACE due to hypotension    (I48.0) PAF (paroxysmal atrial fibrillation) (H): Heart rates 80/100 since admission.  Not on any rate controlling agents. Slightly tachycardic this am   -Continue Eliquis    (I95.9) Hypotension, unspecified hypotension type, persistent: Hospital course complicated by hypotension.  All antihypertensives were discontinued.  Continues on midodrine 10 mg prior to dialysis.  Was briefly receiving midodrine 2.5 mg 3 times daily on non dialysis days in the hospital but this was discontinued prior to discharge but restarted on admission to TCU. SBPs remain in the 80s  - Increase Midodrine to 5 mg Tue, Thurs, Sat & Sun  - Midodrine 10 mg prior to dialysis, will monitor and consider additional prn Midodrine on dialysis days to be taken during dialysis if needed.     (E11.22,  N18.6,  Z99.2) Type 2 diabetes mellitus with chronic kidney disease on chronic dialysis, without long-term current use of insulin (H)  (R73.9) Steroid induced hyperglycemia: A1c 5.1.  Was on sliding scale insulin in the hospital.  Not discharged on NovoLog.  Blood glucose on arrival to TCU greater than 300. Improving now with tapering prednisone  -Continue SSI with meals. Will likely not need insulin at discharge    (N18.6,  Z99.2) End stage renal failure on dialysis (H): On dialysis Monday Wednesday Friday.  Follows with Dr. Gonzales  -Continue dialysis    (K21.00) Gastroesophageal reflux disease with esophagitis, unspecified whether  hemorrhage  -Continue Protonix    (E03.9) Hypothyroidism, unspecified type  - No TSH checked since 2019, will check TSH/T4  -Continue Synthroid    (M19.90) Osteoarthritis, unspecified osteoarthritis type, unspecified site: Historically maintained on prednisone 5 mg daily  -Continues on Prednisone 5 mg/d    (E43) Severe Malnutrition: Weights down approx 10 lbs in the past month. Poor oral intake  - Nutrition following. Patient would prefer regular diet over renal which seems appropriate  -Continue nutrition supplement    Orders:  Increase Midodrine to 5 mg tid on non dialysis days  TSH/T4 3/17 dx hypothyroidism    Electronically signed by: Eve Barroso PA-C

## 2022-03-16 PROBLEM — G93.41 METABOLIC ENCEPHALOPATHY: Status: ACTIVE | Noted: 2022-01-01

## 2022-03-16 PROBLEM — I95.9 HYPOTENSION, UNSPECIFIED HYPOTENSION TYPE: Status: ACTIVE | Noted: 2022-01-01

## 2022-03-16 PROBLEM — J96.22 ACUTE AND CHRONIC RESPIRATORY FAILURE WITH HYPERCAPNIA (H): Status: ACTIVE | Noted: 2022-01-01

## 2022-03-16 NOTE — ED NOTES
Bed: ED24  Expected date:   Expected time:   Means of arrival:   Comments:  Asaf - 514 - 76 M lethargic after dialysis eta 1032

## 2022-03-16 NOTE — PHARMACY-ADMISSION MEDICATION HISTORY
"Pharmacy Medication History  Admission medication history interview status for the 3/16/2022  admission is complete. See EPIC admission navigator for prior to admission medications     Location of Interview: Outside patient room but on unit  Medication history sources: MAR ([facility]) and discharge summary 3/8/22, geriatrics encounters since discharge    Significant changes made to the medication list:  Added supplements (magic cup and nepro)     In the past week, patient estimated taking medication this percent of the time: 50-90% due to other (patient refused doses in the past week of lidocaine patch, other medications marked frequently on MAR as \"patient unavailable\")     Additional medication history information:   Tylenol, robitussin dm, and nitrostat not on facility MAR, however, on discharge med list 3/8/22, kept on [facility] med list.     Medication reconciliation completed by provider prior to medication history? No    Time spent in this activity: 25 minutes    Prior to Admission medications    Medication Sig Last Dose Taking? Auth Provider   acetaminophen (TYLENOL) 325 MG tablet Take 2 tablets (650 mg) by mouth every 4 hours as needed for mild pain Unknown at PRN Yes Nghia Cavanaugh MD   apixaban ANTICOAGULANT (ELIQUIS ANTICOAGULANT) 2.5 MG tablet Take 1 tablet (2.5 mg) by mouth 2 times daily 3/15/2022 at PM Yes Brooklynn Griffith PA-C   B Complex-C-Folic Acid (DOROTEO CAPS) 1 MG CAPS TAKE 1 CAPSULE BY MOUTH EVERY DAY 3/15/2022 at AM Yes Josselin Kolb MD   famotidine (PEPCID) 20 MG tablet Take 20 mg by mouth daily  3/15/2022 at AM Yes Reported, Patient   gabapentin (NEURONTIN) 100 MG capsule Take 1 capsule (100 mg) by mouth 3 times daily 3/15/2022 at HS Yes Josselin Kolb MD   guaiFENesin-dextromethorphan (ROBITUSSIN DM) 100-10 MG/5ML syrup Take 10 mLs by mouth every 4 hours as needed for cough Unknown at PRN Yes Nghia Cavanaugh MD   insulin aspart (NOVOLOG PEN) 100 UNIT/ML pen " Do Not give if Pre-Meal BG less than 140.   Pre-Meal  - 189 give 1 unit.   Pre-Meal  - 239 give 2 units.   Pre-Meal  - 289 give 3 units.   Pre-Meal  - 339 give 4 units.    Pre-Meal - 399 give 5 units.   Pre-Meal -449 give 6 units  Pre-Meal BG greater or equal to 450 give 7 units.   To be given with prandial insulin. Based on pre-meal blood glucose. 3/15/2022 at PM Yes Eve Barroso PA-C   levothyroxine (SYNTHROID/LEVOTHROID) 50 MCG tablet TAKE 1 TABLET BY MOUTH EVERY DAY 3/15/2022 at [id number] Yes Josselin Kolb MD   lidocaine (LIDODERM) 5 % patch Place 1 patch onto the skin every 24 hours To prevent lidocaine toxicity, patient should be patch free for 12 hrs daily. 3/13/2022 at refuses >50% of recent doses Yes Bryant Ozuna MD   midodrine (PROAMATINE) 10 MG tablet Take 10 mg by mouth on MWF mornings prior to dialysis 3/14/2022 at AM Yes Reported, Patient   midodrine (PROAMATINE) 2.5 MG tablet Take 5 mg by mouth 3 times daily take on non-dialysis days only (Tues, Thurs, Sat and Sun)   [id number], [id number], [id number] 3/15/2022 at AM Yes Reported, Patient   nitroGLYcerin (NITROSTAT) 0.4 MG sublingual tablet Place 1 tablet (0.4 mg) under the tongue every 5 minutes as needed for chest pain UP TO 3 PER EPISODE Unknown at PRN Yes Josselin Kolb MD   Nutritional Supplements (NEPRO) LIQD Take 240 mLs by mouth daily (with lunch) 3/15/2022 at midday Yes Unknown, Entered By History   Nutritional Supplements (NUTRITIONAL SUPPLEMENT PO) Take 120 mLs by mouth 3 times daily (with meals) Magic Cup 3/13/2022 at X3 doses Yes Unknown, Entered By History   pantoprazole (PROTONIX) 40 MG EC tablet TAKE 1 TABLET (40 MG) BY MOUTH EVERY MORNING 3/15/2022 at AM Yes Josselin Kolb MD   pravastatin (PRAVACHOL) 40 MG tablet TAKE 1 TABLET BY MOUTH EVERY DAY 3/15/2022 at  Yes Josselin Kolb MD   predniSONE (DELTASONE) 5 MG tablet Take 1 tablet (5 mg) by mouth daily 3/15/2022 at  AM Yes Nghia Cavanaugh MD   vitamin D3 (CHOLECALCIFEROL) 50 mcg (2000 units) tablet TAKE 1 TABLET BY MOUTH EVERY DAY 3/15/2022 at AM Yes Josselin Kolb MD       The information provided in this note is only as accurate as the sources available at the time of update(s)   Oscar CastroD

## 2022-03-16 NOTE — ED PROVIDER NOTES
History   Chief Complaint:  Generalized Weakness       HPI   Westley Ness is a 76 year old male anticoagulated on Eliquis with history of chronic hypotension, ESRD on dialysis, CAD, ischemic cardiomyopathy,  who presents via EMS with generalized weakness. EMS states the patient lives in assisted living and can normally walk, but he needed a louisa today to get him into his dialysis chair. While in dialysis, staff noticed he started becoming confused and altered, so they called 911. The patient was able to finish his full run of dialysis. Staff states he thought it was October and that it was 3:00 in the morning. The patient was bradycardic and hypotensive for EMS. Blood sugar 102, 83% O2 on room air. Presenting in the ED, the patient denies any pain and just states he feels tired. He does not feel like his breathing worth.  He does not make any urine.    Per chart review the patient was hospitalized for Covid-19 from 2/17-3/8. The next day he was seen in the ED again for a similar episode as today, becoming hypotensive at dialysis. He received a chest Xray here, a cardiac ultrasound, and labs (see all results below) which showed chronic renal failure and chronic malnutrition. He was discharged on dexamethasone and midodrine, with diagnoses of hypotension, volume depletion, and ischemic cardiomyopathy.    Chest XR, PA & LAT from 3/9/22:  Minimal effusions, left worse than right. Mild associated bibasilar atelectasis and/or infiltrate. Findings are improved compared to previous. The cardiac silhouette is prominent but stable. Pulmonary vasculature is unremarkable.  As read by Radiology.    POC US Echo Limited from 3/9/22:  Chamber size and motion were grossly normal with LV > RV, severely depressed cardiac kinesis. No pericardial effusion was found. IVC visualized and findings indicate unable to estimate.    Workup from 3/9/22:  CBC: WBC 10.4, HGB 14.1,  (L)  CMP: Creatinine 2.47 (H), Glcuose 149 (H),  Alkaline Phosphatase 181 (H), AST 48 (H), Protein Total 5.9 (L), Albumin 3.2 (L), Bilirubin Total 1.6 (H), GFR Estimate 26 (L), o/w WNL  Lactic acid (result time 1343) 0.9  Troponin: 77    Review of Systems   Musculoskeletal: Negative for arthralgias and myalgias.   Neurological: Positive for weakness.   All other systems reviewed and are negative.    Allergies:  Contrast Dye    Medications:  Tylenol  Eliquis   Neurotonin  Robitussin  Novolog  Synthroid/Levothyroid  Nitrostat  Protonix  Pravachol  Deltasone  Pepcid  Proamatine    Past Medical History:     Acid reflux  Hypertension  CAD  ESRD on dialysis  Atrial flutter  Hypothyroidism  Ischemic cardiomyopathy  Covid-19  Acute on chronic respiratory failure with hypoxia  Heart failure      Past Surgical History:    Cholecystectomy  Left heart catheterization  ORIF left elbow  Ablation of atrial flutter  Ventricular device implant  Fistula repair  Tonsillectomy and adenoidectomy     Family History:    Mother: Cerebrovascular disease  Father: Hypertension, Bladder cancer    Social History:  The patient presents alone. He lives in assisted living. Former smoker.    Physical Exam     Patient Vitals for the past 24 hrs:   BP Temp Temp src Pulse Resp SpO2   03/16/22 1445 (!) 81/53 -- -- 95 15 100 %   03/16/22 1430 (!) 74/56 -- -- 85 11 100 %   03/16/22 1410 -- -- -- 94 13 99 %   03/16/22 1400 (!) 89/47 -- -- 79 14 93 %   03/16/22 1350 (!) 78/61 -- -- 80 13 94 %   03/16/22 1340 -- -- -- 85 12 99 %   03/16/22 1330 (!) 77/56 -- -- 87 14 98 %   03/16/22 1320 (!) 81/53 -- -- 85 17 97 %   03/16/22 1310 107/54 -- -- 83 13 --   03/16/22 1300 (!) 68/48 -- -- 84 14 --   03/16/22 1250 (!) 77/53 -- -- 93 12 --   03/16/22 1245 (!) 63/41 -- -- 90 8 --   03/16/22 1230 (!) 71/49 -- -- 87 9 --   03/16/22 1210 (!) 55/41 -- -- 82 14 --   03/16/22 1200 (!) 74/53 -- -- 90 13 92 %   03/16/22 1150 (!) 70/61 -- -- 92 8 95 %   03/16/22 1100 90/58 -- -- 87 -- --   03/16/22 1050 (!) 86/51 -- -- 96  -- --   03/16/22 1041 -- -- -- 98 16 90 %   03/16/22 1040 (!) 88/54 97.6  F (36.4  C) Oral 96 22 (!) 83 %       Physical Exam  General: Lethargic  but arousable.  Ill-appearing.  Head:  Scalp is NC/AT  Eyes:  Conjunctiva normal, PERRL  ENT:  The external nose and ears are normal.     Oropharynx clear.    Neck:  Normal range of motion without rigidity.  CV:  Irregularly irregular with controlled rate.    No pathologic murmur, rubs, or gallops.  Resp:  Bibasilar crackles.  No wheezes or rhonchi.  Abdomen: Abdomen is soft, no distension, no tenderness, no masses.   MS:  No lower extremity edema or asymmetric calf swelling. Normal ROM in all joints without effusions.    No midline cervical, thoracic, or lumbar tenderness  Skin:  Warm and dry, No rash or lesions noted.  Neuro: Sleepy but A and O x3.  GCS 14 (E3V5M6)  Moves all extremities. No facial asymmetry. No slurred speech  Psych:  Lethargic but arousable.  Cooperative.      Emergency Department Course   ECG  ECG obtained at 1046, ECG read at 1048  Atrial fibrillation  Incomplete right bundle branch block  Possible Right ventricular hypertrophy  Abnormal QRS-T angle, consider primary T wave abnormality  Prolonged QT  Abnormal ECG  Rate 93 bpm. WV interval * ms. QRS duration 98 ms. QT/QTc 408/507 ms. P-R-T axes * 156 -13.     Imaging:  Head CT w/o contrast   Final Result   IMPRESSION:   1.  Progressed left frontal acute and chronic sinusitis.   2.  Interval opacification of the right middle ear and mastoids.   3.  Interval mild left mastoid opacification.   4.  Stable mild to moderate presumed chronic small vessel ischemic   change and generalized volume loss.      Radiation dose for this scan was reduced using automated exposure   control, adjustment of the mA and/or kV according to patient size, or   iterative reconstruction technique      SALLY OCHOA MD            SYSTEM ID:  [id number]      XR Chest Port 1 View   Final Result   IMPRESSION: Stable cardiomegaly.  Opacification of the left lung base   shows a mild degree of improved aeration medially. Some persistent   airspace disease is a possibility at this location along with stable   small left pleural fluid. Trace right base pleural fluid also again   noted. Bilateral interstitial and vascular prominence may be   stable-appearing pulmonary edema.      MELISA HEWITT MD            SYSTEM ID:  [id number]        Report per radiology    Laboratory:  Labs Ordered and Resulted from Time of ED Arrival to Time of ED Departure   COMPREHENSIVE METABOLIC PANEL - Abnormal       Result Value    Sodium 134      Potassium 3.4      Chloride 98      Carbon Dioxide (CO2) 29      Anion Gap 7      Urea Nitrogen 16      Creatinine 2.72 (*)     Calcium 8.6      Glucose 131 (*)     Alkaline Phosphatase 184 (*)     AST 25      ALT 42      Protein Total 6.0 (*)     Albumin 3.0 (*)     Bilirubin Total 1.4 (*)     GFR Estimate 23 (*)    NT PROBNP INPATIENT - Abnormal    N terminal Pro BNP Inpatient 115,907 (*)    BLOOD GAS VENOUS WITH OXYHEMOGLOBIN - Abnormal    pH Venous 7.24 (*)     pCO2 Venous 80 (*)     pO2 Venous 22 (*)     Bicarbonate Venous 35 (*)     FIO2 28      Oxyhemoglobin Venous 39 (*)     Base Excess/Deficit (+/-) 4.5 (*)    TROPONIN I - Abnormal    Troponin I High Sensitivity 1,683 (*)    TSH WITH FREE T4 REFLEX - Abnormal    TSH 4.60 (*)    CBC WITH PLATELETS AND DIFFERENTIAL - Abnormal    WBC Count 8.6      RBC Count 3.74 (*)     Hemoglobin 12.6 (*)     Hematocrit 40.5       (*)     MCH 33.7 (*)     MCHC 31.1 (*)     RDW 14.2      Platelet Count 119 (*)     % Neutrophils 79      % Lymphocytes 7      % Monocytes 8      % Eosinophils 4      % Basophils 1      % Immature Granulocytes 1      NRBCs per 100 WBC 0      Absolute Neutrophils 6.8      Absolute Lymphocytes 0.6 (*)     Absolute Monocytes 0.7      Absolute Eosinophils 0.4      Absolute Basophils 0.1      Absolute Immature Granulocytes 0.1      Absolute NRBCs 0.0     BLOOD  GAS VENOUS WITH OXYHEMOGLOBIN - Abnormal    pH Venous 7.30 (*)     pCO2 Venous 66 (*)     pO2 Venous 30      Bicarbonate Venous 32 (*)     FIO2 40      Oxyhemoglobin Venous 51 (*)     Base Excess/Deficit (+/-) 4.4 (*)    LACTIC ACID WHOLE BLOOD - Normal    Lactic Acid 0.8     T4 FREE - Normal    Free T4 1.01     RBC AND PLATELET MORPHOLOGY    Platelet Assessment        Value: Automated Count Confirmed. Platelet morphology is normal.    RBC Morphology Confirmed RBC Indices          Procedures  None    Emergency Department Course:    Reviewed:  I reviewed nursing notes, vitals, past medical history and Care Everywhere    Assessments:  1038 I obtained history and examined the patient as noted above.   1210 I rechecked the patient and explained findings.  1500 I re-checked the patient.  Tolerating BIPAP. More alert.  Consults:  1327 I spoke with Dr. Dave the hospitalist.     Interventions:  Medications   heparin infusion 25,000 units in D5W 250 mL ANTICOAGULANT (850 Units/hr Intravenous New Bag 3/16/22 1604)   OLANZapine (zyPREXA) injection 2.5 mg (2.5 mg Intramuscular Given 3/16/22 1333)   sterile water (preservative free) injection (0.5 mLs  Given 3/16/22 1334)   0.9% sodium chloride BOLUS (0 mLs Intravenous Stopped 3/16/22 1704)       Disposition:  The patient was admitted to the hospital under the care of Dr. Dave.     Impression & Plan     CMS Diagnoses: None    Medical Decision Makin-year-old male with end-stage renal disease on dialysis CAD CHF with reduced EF among others presents with lethargy and weakness.  Had just finished dialysis run which was able to be fully completed.  Work-up here demonstrates acute hypercapnic respiratory failure as the likely cause of metabolic encephalopathy.  Patient is quite somnolent but is DNR/DNI.  Placed on BiPAP with improvement in hypercapnia.  Etiology of worsening respiratory function somewhat unclear but likely due to worsening CHF.  BNP quite elevated as is  troponin though no chest pain and EKG nonischemic likely driven by end-stage renal disease and CHF though will heparinize at this time after discussing with hospitalist.  X-ray shows fluid on lungs but not significantly changed from prior.  Already completed full dialysis run.  Patient does not make urine and is not a candidate for diuresis and additionally hypotensive.      Hypotension and mild hypoxia appears to be relatively baseline for the patient and he is on midodrine already for this.  Afebrile with normal white count and lactate and no evidence of bacterial infection or sepsis or indication for empiric antibiotics.  Hemoglobin at baseline no evidence of hemorrhage.  Certainly worsening cardiac function could account for his lower pressures though again this appears to be baseline recently.  He is adamant that he would not want heroic measures or surgery and therefore do not feel there to be any indication for emergent Cath Lab activation or PCI.  Anticoagulated on Eliquis not tachycardic doubt pulmonary embolism.  No pneumothorax or other signs of obstructive shock.    We will admit on BiPAP at this time for treatment of hypercapnic respiratory failure and further evaluation.  Would likely benefit from further goals of care discussions, as likely approaching end-of-life care.  Discussed with hospitalist who agrees to accept the patient.    Critical Care time: 40 minutes excluding procedures.    Diagnosis:    ICD-10-CM    1. Acute and chronic respiratory failure with hypercapnia (H)  J96.22    2. Metabolic encephalopathy  G93.41    3. Hypotension, unspecified hypotension type  I95.9        Discharge Medications:  New Prescriptions    No medications on file       Scribe Disclosure:  Unique CARMONA, am serving as a scribe at 10:38 AM on 3/16/2022 to document services personally performed by Bob Cortés PA-C based on my observations and the provider's statements to me.      Bob Cortés  KALANI Squires  03/16/22 1750       Bob Cortés PA-C  03/16/22 1756

## 2022-03-16 NOTE — H&P
[facility]    History and Physical - Hospitalist Service       Date of Admission:  3/16/2022    Assessment & Plan      Westley Ness is a 76 year old male with past medical history of HFrEF with EF 25-30%, s/p ICD/PPM, Afib, ESRD on HD M/W/F, hypotension and recent hospitalization for COVID 19 (2/17-3/8) who is presenting from dialysis center due to confusion and generalized weakness. Admitted for acute on chronic hypoxic and hypercapnic resp failure    Acute on chronic hypoxic and hypercapnic respiratory failure  Heart failure with reduced ejection fraction  Ischemic cardiomyopathy s/p ICD placement.   NSTEMI   Presents from HD due to confusion and hypotension. Hypotensive in the 70s. VBG shows pH of 7.24, pCO2 80, pO222. N-terminal proBNP 235400 (>175K on 2/17/22). Troponin 1683.  EKG a.fib. patient is without chest pain. Chest x-ray shows stable cardiomegaly, opacification of the left lung shows a mild degree of improvement aeration medially, some persistent airspace disease is a possibility at the location along with stable left pleural effusion.  Bilateral interstitial and vascular prominence may be stable appearing pulmonary edema.  Patient has issue with hypotension with bp as low as in the 80s, and on midodrine. Most recent bp from progress note on on 3/15 was 81/59.  Patient otherwise does not have complaint of chest pain.   TTE 2/17/2022-LVEF 25-30%, severe large area of akinesis to dyskinesis disease of the apical inferior wall, mid mid anteroseptal wall and entire apex, the right ventricle is moderately dilated, severe decreased right ventricular systolic function.  - continue with bipap and repeat VBG  - given that he wants to see how he does in the next day, continue restorative care  - heparin drip initiated  - serial troponin  - cardiology consult  - wean bipap as able  - most recently discharged on 1-3L/min oxygen   - [facility] admission, wean bipap as able, most recent repeat  VBG showed improving CO2    Addendum-930pm  Notified by RN, that patient changed to AVAPS due to low tidal volume/patient trigger and he was less responsive as well. Patient evaluated and has been on AVAPS. He opens his eyes, able to follow simple commands. He is less interactive than when seen earlier. Denies pain.   - check VBG  -cardiology has ordered blood cultures  - patient does not specifically report infectious s/s, afebrile,lactic was normal.   - empirically cover him with vanc/zosyn  - if no change in his overall condition in the next day, need to discuss further goals of care with patient and/or healthcare agent.     Coronary artery disease with ischemic cardiomyopathy:   OM1 stenting in October 2020 was last intervention.  Known subtotal occlusion of LAD with infarct.  As above with elevated troponin and patient does not have chest pain.   - heparin as above  - cardiology consult    Atrial fibrillation/flutter, history of pacemaker placement and ICD  - hold eliquis while he is on heparin    Acute metabolic encephalopathy  - suspect this is due to elevated CO2. Did improve after being on bipap  - monitor    End-stage renal disease on hemodialysis  - had full run dilaysis on 3/16  - Nephrology consult    Hypotension  Has issue with hypotension and uses Midodrine on HD and non dialysis days. Cortisol check last admission was 22.    - recently taken off anti-hypertensive meds  - midodrine when able to take oral  - SBP most recently per progress note has been in the 80s  - will give 250cc bolus given that patient just had a run of HD with fluid removal  - consider albumin if SBP <80    Hypothyroidism  Hyperlipidemia  - resume PO meds one off bipap    Osteoarthritis  Per chart review, patient chronic on 5mg PO steroids.     Goals of care-  Patient clearly states he does not wish cpr or intubation. However, he will like to continue current treatment and see how he does in the next day. Maria Dolores, health care agent,  also was on the phone during discussion with patient. She supports what ever decision patient makes.      Recovered COVID- hospitalized from 2/11-2/15, then returned to hospital with shortness of breath requiring hospital stay form 2/17-3/08/22.     Diet:  NPO  DVT Prophylaxis: Heparin gtt  Velarde Catheter: Not present  Central Lines: None  Cardiac Monitoring: None  Code Status:  DNR-DNI. Discussed with patient and also health care daniel Benavides    Patient is critically ill with acute on chronic respiratory failure, NSTEMI and ongoing hypotension. 60 minutes of critical time spent in evaluation, management, counseling and coordination of care.       Clinically Significant Risk Factors Present on Admission              # Coagulation Defect: home medication list includes an anticoagulant medication  # Thrombocytopenia: Plts = 119 10e3/uL (Ref range: 150 - 450 10e3/uL) on admission, will monitor for bleeding       Disposition Plan   Expected Discharge:   Anticipated discharge location:  Awaiting care coordination huddle  Delays:           The patient's care was discussed with the Patient and Patient's Family.    Parish Dave MD  Hospitalist Service  [facility]  Securely message with the Vocera Web Console (learn more here)  Text page via [facility] Paging/Directory         ______________________________________________________________________    Chief Complaint   confusion    History is obtained from the patient    History of Present Illness   Westley Ness is a 76 year old male with past medical history of HFrEF with EF 25-30%, s/p ICD/PPM, Afib, ESRD on HD M/W/F, hypotension and recent hospitalization for COVID 19 (2/17-3/8) who is presenting from dialysis center due to confusion and generalized weakness. Per report, patient normally walks and  needed a Lois to sit in his dialysis chair. Staff at dialysis noted that patient was progressively getting confused so activated EMS.   Patient did have full run of HD prior to transfer to the ED.     Per report, on initial presentation to the ED, he was confused, but able to answer basic questions. He was hypotensive with blood pressure as low as 70s.  Saturation was 83% on room air, he was placed on 2 L nasal cannula with improvement to 90%.  VBG shows pH of 7.24, pCO2 80, pO222. N-terminal proBNP 892869, TSH 4.6.  EKG shows atrial fibrillation, right bundle branch block.  Chest x-ray shows stable cardiomegaly, opacification of the left lung shows a mild degree of improvement aeration medially, some persistent airspace disease is a possibility at the location along with stable left pleural effusion.  Bilateral interstitial and vascular prominence may be stable appearing pulmonary edema.  Patient was placed on bipap and per discussion with RN, patient appeared more alert once on bipap. Given respiratory failure, admission requested for further management     Patient seen and examined. Denies chest pain or feeling short of breath, but states the bipap makes it hard for him to breath.  Denies n/v, abdominal pain, and diarrhea.  Discussed with patient concern about his low blood pressure, his respiratory failure. He wants to see if he can improve. Code status is discussed, he is DNR-DNI.  Also discussed with his health care agent Maria Dolores on the phone in patient's room.      Review of Systems    The 10 point Review of Systems is negative other than noted in the HPI    Past Medical History    I have reviewed this patient's medical history and updated it with pertinent information if needed.   Past Medical History:   Diagnosis Date     Acid reflux disease      Benign essential hypertension      CAD (coronary artery disease)     s/p multiple NSTEMIs and PCI's     ESRD on hemodialysis (H)     MWF     History of atrial flutter     s/p ablation     Hypothyroidism      Ischemic cardiomyopathy     EF 25-30%, s/p AICD       Past Surgical History   I have reviewed  this patient's surgical history and updated it with pertinent information if needed.  Past Surgical History:   Procedure Laterality Date     CHOLECYSTECTOMY, OPEN  2013     CV CORONARY ANGIOGRAM N/A 10/19/2020    Procedure: Coronary Angiogram;  Surgeon: Theodora Salazar MD;  Location:  HEART CARDIAC CATH LAB     CV LEFT HEART CATH N/A 10/19/2020    Procedure: Left Heart Cath;  Surgeon: Theodora Salazar MD;  Location:  HEART CARDIAC CATH LAB     CV PCI STENT DRUG ELUTING N/A 10/19/2020    Procedure: Percutaneous Coronary Intervention Stent Drug Eluting;  Surgeon: Theodora Salazar MD;  Location:  HEART CARDIAC CATH LAB     ELBOW SURGERY Left     ORIF - plates still in place     EP ICD GENERATOR REPLACEMENT SINGLE N/A 2019    Procedure: EP ICD Generator Change Single;  Surgeon: Jono Mejia MD;  Location:  HEART CARDIAC CATH LAB     H ABLATION ATRIAL FLUTTER  2017, 17     HC LEFT HEART CATHETERIZATION  2016     HC LEFT HEART CATHETERIZATION  2016     IMPLANT VENTRICULAR DEVICE  08/15/2011     IR DIALYSIS FISTULOGRAM LEFT  2019     REPAIR FISTULA ARTERIOVENOUS UPPER EXTREMITY Left 3/5/2019    Procedure: REPAIR LEFT UPPER ARM ARTERIOVENOUS FISTULA SKIN ULCER;  Surgeon: Westley Griggs MD;  Location:  OR     TONSILLECTOMY & ADENOIDECTOMY       VASCULAR SURGERY  ,     LUE fistulas (upper and lower); upper one is functional       Social History   I have reviewed this patient's social history and updated it with pertinent information if needed.  Social History     Tobacco Use     Smoking status: Former Smoker     Packs/day: 2.00     Years: 35.00     Pack years: 70.00     Types: Cigarettes     Start date:      Quit date: 1996     Years since quittin.3     Smokeless tobacco: Never Used   Substance Use Topics     Alcohol use: Not Currently     Alcohol/week: 0.0 standard drinks     Drug use: No       Family History   I have reviewed this  patient's family history and updated it with pertinent information if needed.  Family History   Problem Relation Age of Onset     Cerebrovascular Disease Mother         later in life     Hypertension Father      Bladder Cancer Father      Myocardial Infarction Paternal Grandmother      Diabetes No family hx of      Prostate Cancer No family hx of      Colon Cancer No family hx of        Prior to Admission Medications   Prior to Admission Medications   Prescriptions Last Dose Informant Patient Reported? Taking?   B Complex-C-Folic Acid (DOROTEO CAPS) 1 MG CAPS 3/15/2022 at AM Nursing Home No Yes   Sig: TAKE 1 CAPSULE BY MOUTH EVERY DAY   Nutritional Supplements (NEPRO) LIQD 3/15/2022 at midday Nursing Home Yes Yes   Sig: Take 240 mLs by mouth daily (with lunch)   Nutritional Supplements (NUTRITIONAL SUPPLEMENT PO) 3/13/2022 at X3 doses Nursing Home Yes Yes   Sig: Take 120 mLs by mouth 3 times daily (with meals) Magic Cup   acetaminophen (TYLENOL) 325 MG tablet Unknown at PRN Nursing Home No Yes   Sig: Take 2 tablets (650 mg) by mouth every 4 hours as needed for mild pain   apixaban ANTICOAGULANT (ELIQUIS ANTICOAGULANT) 2.5 MG tablet 3/15/2022 at PM Nursing Home No Yes   Sig: Take 1 tablet (2.5 mg) by mouth 2 times daily   famotidine (PEPCID) 20 MG tablet 3/15/2022 at AM Nursing Home Yes Yes   Sig: Take 20 mg by mouth daily    gabapentin (NEURONTIN) 100 MG capsule 3/15/2022 at HS Nursing Home No Yes   Sig: Take 1 capsule (100 mg) by mouth 3 times daily   guaiFENesin-dextromethorphan (ROBITUSSIN DM) 100-10 MG/5ML syrup Unknown at PRN Nursing Home No Yes   Sig: Take 10 mLs by mouth every 4 hours as needed for cough   insulin aspart (NOVOLOG PEN) 100 UNIT/ML pen 3/15/2022 at PM Nursing Home No Yes   Sig: Do Not give if Pre-Meal BG less than 140.   Pre-Meal  - 189 give 1 unit.   Pre-Meal  - 239 give 2 units.   Pre-Meal  - 289 give 3 units.   Pre-Meal  - 339 give 4 units.    Pre-Meal - 399 give 5  units.   Pre-Meal -449 give 6 units  Pre-Meal BG greater or equal to 450 give 7 units.   To be given with prandial insulin. Based on pre-meal blood glucose.   levothyroxine (SYNTHROID/LEVOTHROID) 50 MCG tablet 3/15/2022 at [id number] Nursing Home No Yes   Sig: TAKE 1 TABLET BY MOUTH EVERY DAY   lidocaine (LIDODERM) 5 % patch 3/13/2022 at refuses >50% of recent doses Nursing Home No Yes   Sig: Place 1 patch onto the skin every 24 hours To prevent lidocaine toxicity, patient should be patch free for 12 hrs daily.   midodrine (PROAMATINE) 10 MG tablet 3/14/2022 at AM Nursing Home Yes Yes   Sig: Take 10 mg by mouth on MWF mornings prior to dialysis   midodrine (PROAMATINE) 2.5 MG tablet 3/15/2022 at AM Nursing Home Yes Yes   Sig: Take 5 mg by mouth 3 times daily take on non-dialysis days only (Tues, Thurs, Sat and Sun)   [id number], 6304-3630, [id number]   nitroGLYcerin (NITROSTAT) 0.4 MG sublingual tablet Unknown at PRN Nursing Home No Yes   Sig: Place 1 tablet (0.4 mg) under the tongue every 5 minutes as needed for chest pain UP TO 3 PER EPISODE   pantoprazole (PROTONIX) 40 MG EC tablet 3/15/2022 at AM Nursing Home No Yes   Sig: TAKE 1 TABLET (40 MG) BY MOUTH EVERY MORNING   pravastatin (PRAVACHOL) 40 MG tablet 3/15/2022 at  Nursing Home No Yes   Sig: TAKE 1 TABLET BY MOUTH EVERY DAY   predniSONE (DELTASONE) 5 MG tablet 3/15/2022 at AM Nursing Home No Yes   Sig: Take 1 tablet (5 mg) by mouth daily   vitamin D3 (CHOLECALCIFEROL) 50 mcg (2000 units) tablet 3/15/2022 at AM Nursing Home No Yes   Sig: TAKE 1 TABLET BY MOUTH EVERY DAY      Facility-Administered Medications: None     Allergies   Allergies   Allergen Reactions     Contrast Dye Hives     Does fine if he uses benadryl prior.       Physical Exam   Vital Signs: Temp: 97.6  F (36.4  C) Temp src: Oral BP: (!) 89/47 Pulse: 94   Resp: 13 SpO2: 99 % O2 Device: BiPAP/CPAP Oxygen Delivery: 2 LPM  Weight: 0 lbs 0 oz    General Appearance: on BiPAP, briefly removed  per patient request during visit,  Awake and is able to answer questions appropriately, but started to get sleepy and was placed back on bipap  HEENT: NCAT, oral mucosa is dry, no conjuctival injection  Respiratory: bibasilar crackles, upper lungs are CTAB  Cardiovascular: irrregular rate and rhythm, no LE edema  GI: soft and non-tender  Skin: warm and dry  Musculoskeletal:   Neurologic: alert and oriented, moving all extremities  Psychiatric: mood and affect appear normal    Data   Data reviewed today: I reviewed all medications, new labs and imaging results over the last 24 hours. I personally reviewed the EKG, CXR, head CT as above image(s) showing as above.    Recent Labs   Lab 03/16/22  1152 03/16/22  1055   WBC  --  8.6   HGB  --  12.6*   MCV  --  108*   PLT  --  119*     --    POTASSIUM 3.4  --    CHLORIDE 98  --    CO2 29  --    BUN 16  --    CR 2.72*  --    ANIONGAP 7  --    CHRISTINE 8.6  --    *  --    ALBUMIN 3.0*  --    PROTTOTAL 6.0*  --    BILITOTAL 1.4*  --    ALKPHOS 184*  --    ALT 42  --    AST 25  --      Recent Results (from the past 24 hour(s))   XR Chest Port 1 View    Narrative    CHEST TWO VIEWS   3/16/2022 11:05 AM     HISTORY: Hypoxia.  Dialysis.    COMPARISON: 3/9/2022.      Impression    IMPRESSION: Stable cardiomegaly. Opacification of the left lung base  shows a mild degree of improved aeration medially. Some persistent  airspace disease is a possibility at this location along with stable  small left pleural fluid. Trace right base pleural fluid also again  noted. Bilateral interstitial and vascular prominence may be  stable-appearing pulmonary edema.    MELISA HEWITT MD         SYSTEM ID:  [id number]   Head CT w/o contrast    Narrative    CT OF THE HEAD WITHOUT CONTRAST   3/16/2022 11:12 AM     COMPARISON: Head CT 10/15/2020.    HISTORY:  Mental status change, unknown cause     TECHNIQUE:  Axial CT images of the head from the skull base to the  vertex were acquired without IV  contrast.    FINDINGS:   INTRACRANIAL CONTENTS: No intracranial hemorrhage, extraaxial  collection, or mass effect.  No CT evidence of acute infarct.   Mild  to moderate presumed chronic small vessel ischemic change with mild to  moderate generalized volume loss.    VISUALIZED ORBITS/SINUSES/MASTOIDS: No significant orbital  abnormality.  Progressed pneumatized secretions in the left frontal  sinus. Interval right middle ear and mastoid opacification. Interval  development of mild left mastoid opacification. Chronic thickening of  the left maxillary antral walls.    OSSEOUS STRUCTURES/SOFT TISSUES: No significant abnormality.      Impression    IMPRESSION:  1.  Progressed left frontal acute and chronic sinusitis.  2.  Interval opacification of the right middle ear and mastoids.  3.  Interval mild left mastoid opacification.  4.  Stable mild to moderate presumed chronic small vessel ischemic  change and generalized volume loss.    Radiation dose for this scan was reduced using automated exposure  control, adjustment of the mA and/or kV according to patient size, or  iterative reconstruction technique    SALLY OCHOA MD         SYSTEM ID:  [id number]

## 2022-03-16 NOTE — ED TRIAGE NOTES
Pt presents to the ED via EMS for evaluation of altered mental status and generalized weakness.   Per EMS, from assisted living, picked up at dialysis run today, able to finish dialysis run, per EMS increasing weakness normally able to ambulate at baseline, had to be louisa lifted into chair, altered mental status, pt reporting it was October, blood sugar post dialysis run: 102, hypoxic, hypotensive. On 2l NC.

## 2022-03-16 NOTE — ED NOTES
[facility]  ED Nurse Handoff Report    ED Chief complaint: Generalized Weakness      ED Diagnosis:   Final diagnoses:   None       Code Status: To be addressed by admitting MD.     Allergies:   Allergies   Allergen Reactions     Contrast Dye Hives     Does fine if he uses benadryl prior.       Patient Story:   Pt presents to the ED via EMS for evaluation of altered mental status and generalized weakness.   Per EMS, from assisted living, picked up at dialysis run today, able to finish dialysis run, per EMS increasing weakness normally able to ambulate at baseline, had to be louisa lifted into chair, altered mental status, pt reporting it was October, blood sugar post dialysis run: 102, hypoxic, hypotensive. On 2l NC.   Focused Assessment:   Open eyes to voice, oriented x4, pt appears fatigued  Hypotensive 70s/40s  BIPAP for CO2 of 80     Treatments and/or interventions provided: IV, lab work, imaging   Patient's response to treatments and/or interventions: Tolerated well     To be done/followed up on inpatient unit:  Further evaluation     Does this patient have any cognitive concerns?: NA    Activity level - Baseline/Home:  Unknown  Activity Level - Current:   Total Care    Patient's Preferred language: English   Needed?: No    Isolation: None  Infection: Not Applicable  Patient tested for COVID 19 prior to admission:   Bariatric?: No    Vital Signs:   Vitals:    03/16/22 1050 03/16/22 1100 03/16/22 1150 03/16/22 1200   BP: (!) 86/51 90/58 (!) 70/61 (!) 74/53   Pulse: 96 87 92 90   Resp:   8 13   Temp:       TempSrc:       SpO2:   95% 92%       Cardiac Rhythm:     Was the PSS-3 completed:   Yes  What interventions are required if any?               Family Comments: Friend Maria Dolores updated by ED Provider   OBS brochure/video discussed/provided to patient/family: N/A              Name of person given brochure if not patient: NA              Relationship to patient: NA    For the majority of the  shift this patient's behavior was Green.   Behavioral interventions performed were frequent rounding.    ED NURSE PHONE NUMBER: [phone]

## 2022-03-17 NOTE — PROVIDER NOTIFICATION
MD Notification    Notified Person: Marlo Walker    Notification Date/Time: 3/16/2022 8:47 PM     Notification Interaction: Paged    Purpose of Notification: FYI, BiPAP changed to AVAPS due to low tidal volumes/patient trigger.    Orders Received: MD Macie ordered Dextrose 50% to be given for low BS, IV Zosyn & Vanco    Comments: NA

## 2022-03-17 NOTE — PLAN OF CARE
Assumed care [id number]. Alert to self, difficult to assess mentation throughout night due to lethargic state/BiPAP. Does following commands. On AVAPS setting for several hours, VBGs unchanged from ED after several hours on BiPAP. Blood pressures hypotensive, cardiology @ bedside in evening & set goal of >50 for MAP overnight. Incontinent of BM several times. Stool watery with some blood, provider paged to update. Heparin gtt continues @ 950 units/hr. Blood sugars unstable overnight, requiring Q1hr checks. Several doses of D50 pushed. Critically low this AM @ 28, provider updated & D10 ordered @ 50 mL/hr. Tele A fib, controlled rate. Turned & repositioned in bed by staff. Midline placed on eveenings. See multiple provider notification notes for timeline of shift.

## 2022-03-17 NOTE — PLAN OF CARE
Goal Outcome Evaluation:    Plan of Care Reviewed With: other (see comments) (Lynn assistant)     Overall Patient Progress: no change       Received from ED per cart with RN and RT around approximately 1800. Heparin gtt infusing at 850 units/hr, PTT level due at 2200. Pt on BiPAP, FiO2 of 40%. Oxygen saturation in 90s. Shallow breathing, diminished/coarse lung sounds. Blood pressure of 84/55. Other vitals stable. Tele-A Fib controlled with occasional PVC. Skin assessed-blanchable redness on buttocks, sacrum, and scrotum. Skin tears, bruising, and scabs all over body. Ulceration on right cheek, covered with mepilex. In-place mepilex on right hand from facility, dated 3/12/22-did not remove dressing at this time, oncoming RN aware. Protective liquicell on face and sacral mepilex placed. Pt shook head when asked if he had any pain. BM smear. Anuric-pt received hemodialysis today. Safety check completed. Pt repositioned.

## 2022-03-17 NOTE — PROVIDER NOTIFICATION
MD Notification    Notified Person: Marlo Wlaker    Notification Date/Time: 3/16/2022 10:50 PM     Notification Interaction: Paged    Purpose of Notification: FYI, Trop critical @ 1520 (tending down, Q6hr checks) & VBGs unchanged from earlier this evening.    Orders Received: Continue to Monitor, repage hospitalist w/ concerns/changes.    Comments: NA

## 2022-03-17 NOTE — PROVIDER NOTIFICATION
03/16/22 2100   AVAPS Settings   Min P (cmH20) AVAPS 10   Max P (cmH20) AVAPS 25   Target VT (mL) AVAPS 500   Set Rate (breath/min) 16 breath/min   Timed Inspiration (Sec) 0.9   Rise Time 2     Low VT and VE on ST mode. Vt 150-200 VE 3. Placed on AVAPS return -600 VE 9-11. RN informed.

## 2022-03-17 NOTE — PROVIDER NOTIFICATION
MD Notification    Notified Person: Alis Anderson    Notification Date/Time: 3/17/2022 2:58 AM     Notification Interaction: Paged    Purpose of Notification: FYI, pushing 25 mL dextrose third time tonight. Having small watery stools with blood. GI bleed?    Orders Received: CTM blood glucose levels. Palliative care consult placed. Draw labs now.    Comments: NA

## 2022-03-17 NOTE — PROVIDER NOTIFICATION
MD Notification    Notified Person: Dr. Rush    Notification Date/Time: 3/17/2022 7:06 AM     Notification Interaction: Paged    Purpose of Notification: JUANJO, BS has been low overnight. Now 216. D10W @ 50 mL started ~30 minutes ago.    Orders Received: NA    Comments: NA

## 2022-03-17 NOTE — PROVIDER NOTIFICATION
MD Notification    Notified Person: MD    Notified Person Name: Dr. Anderson    Notification Date/Time: 3/17/22 at 0608    Notification Interaction: Amcom    Purpose of Notification:  FYI pt's glucose is 28. RN is pushing D50 again.

## 2022-03-17 NOTE — PROGRESS NOTES
[facility]    Medicine Progress Note - Hospitalist Service    Date of Admission:  3/16/2022    Assessment & Plan        Westley Ness is a 76 year old male with past medical history of HFrEF with EF 25-30%, s/p ICD/PPM, Afib, ESRD on HD M/W/F, hypotension and recent hospitalization for COVID 19 (2/17-3/8) who is presenting from dialysis center due to confusion and generalized weakness. Admitted for acute on chronic hypoxic and hypercapnic resp failure    Acute on chronic hypoxic and hypercapnic respiratory failure  Heart failure with reduced ejection fraction  Ischemic cardiomyopathy s/p ICD placement.   NSTEMI   Presents from HD due to confusion and hypotension. Hypotensive in the 70s. VBG shows pH of 7.24, pCO2 80, pO222. N-terminal proBNP 199080 (>175K on 2/17/22). Troponin 1683.  EKG a.fib. patient is without chest pain. Chest x-ray shows stable cardiomegaly, opacification of the left lung shows a mild degree of improvement aeration medially, some persistent airspace disease is a possibility at the location along with stable left pleural effusion.  Bilateral interstitial and vascular prominence may be stable appearing pulmonary edema.  Patient has issue with hypotension with bp as low as in the 80s, and on midodrine. Most recent bp from progress note on on 3/15 was 81/59.  Patient otherwise does not have complaint of chest pain.   TTE 2/17/2022-LVEF 25-30%, severe large area of akinesis to dyskinesis disease of the apical inferior wall, mid mid anteroseptal wall and entire apex, the right ventricle is moderately dilated, severe decreased right ventricular systolic function.  - continue with bipap  - repeat VBG in AM  - given that he wants to see how he does in the next day, continue restorative care  - heparin drip initiated, defer to cardiology in setting of NSTEMI  - serial troponins fairly flat, ? Type II NSTEMI  - cardiology consulted, appreciate their assistance  - wean bipap as  able  - empirically on vancomycin/zosyn, although no definite infection noted to this point   - check MRSA nasal swab, can discontinue vancomycin if negative  - most recently discharged on 1-3L/min oxygen   - [facility] admission, wean bipap as able, most recent repeat VBG showed improving CO2  - goals of care as noted below    Coronary artery disease with ischemic cardiomyopathy:   OM1 stenting in October 2020 was last intervention.  Known subtotal occlusion of LAD with infarct.  As above with elevated troponin and patient does not have chest pain.   - heparin as above  - cardiology consult    Atrial fibrillation/flutter, history of pacemaker placement and ICD  - hold eliquis while he is on heparin, consider restarting Eliquis when off BiPAP    Acute metabolic encephalopathy  - suspect this is due to elevated CO2  - Improved with BiPAP  - monitor    End-stage renal disease on hemodialysis  - had full run dilaysis on 3/16.  - Nephrology consulted.    Hypotension  Has issues with hypotension and uses Midodrine on HD and non dialysis days. Cortisol check last admission was 22.    - recently taken off anti-hypertensive meds.  - midodrine when able to take oral.  - SBP most recently per progress note has been in the 80s.  - consider albumin if SBP <80.    Hypothyroidism  Hyperlipidemia  - Resume PO meds once off BiPAP.    Osteoarthritis  Per chart review, patient chronic on 5mg PO steroids, resume once off BiPAP.     Goals of care  Patient clearly stated he does not wish cpr or intubation. However, he will like to continue current treatment and see how he does in the next day. Maria Dolores, health care agent, also was on the phone during admitting providers discussion with patient. She supports what ever decision patient makes. Palliative care consulted, appreciate their assistance. Unable to discuss with Maria Dolores today.    Recovered COVID- hospitalized from 2/11-2/15, then returned to hospital with shortness of breath requiring  hospital stay form 2/17-3/08/22.       Diet: NPO for Medical/Clinical Reasons Except for: No Exceptions    DVT Prophylaxis: heparin infusion  Velarde Catheter: Not present  Central Lines: PRESENT     Cardiac Monitoring: ACTIVE order. Indication: Acute decompensated heart failure (48 hours)  Code Status: No CPR- Do NOT Intubate      Disposition Plan   Expected Discharge: 03/19/2022     Anticipated discharge location: other (comment) (TCU)    Delays:            The patient's care was discussed with the Bedside Nurse and Patient.    Jah Rush MD  Hospitalist Service  [facility]  Securely message with the Vocera Web Console (learn more here)  Text page via [employer] Paging/Directory         Clinically Significant Risk Factors Present on Admission              # Coagulation Defect: home medication list includes an anticoagulant medication  # Thrombocytopenia: Plts = 101 10e3/uL (Ref range: 150 - 450 10e3/uL) on admission, will monitor for bleeding       ______________________________________________________________________    Interval History   Westley Ness was seen today. He was fairly alert when I saw him and was able to answer orientation questions appropriately. Somewhat short of breath and fatigued. Denies fevers, chest pain, nausea, abdominal pain. Briefly discussed goals of care with him, he seems to want to continue current cares but conversation was limited due to BiPAP and fatigue. Unable to reach his healthcare agent this afternoon.    Data reviewed today: I reviewed all medications, new labs and imaging results over the last 24 hours. I personally reviewed no images or EKG's today.    Physical Exam   Vital Signs: Temp: 97.6  F (36.4  C) Temp src: Axillary BP: (!) 74/47 Pulse: 83   Resp: 18 SpO2: 97 % O2 Device: BiPAP/CPAP    Weight: 0 lbs 0 oz  Constitutional: awake, alert, cooperative, laying in the hospital bed  Respiratory: BiPAP on, crackles at the bases  Cardiovascular:  irregular rhythm, normal rate, normal S1 and S2, no murmur noted  GI: normal bowel sounds, soft, non-distended, non-tender  Skin: warm, dry  Musculoskeletal: no lower extremity pitting edema present  Neurologic: awake, alert, oriented to name, place and time, moves all extremities    Data   Recent Labs   Lab 03/17/22  1145 03/17/22  1027 03/17/22  0725 03/17/22  0602 03/17/22  0418 03/17/22  0417 03/16/22  2123 03/16/22  1152 03/16/22  1055   0000   WBC  --   --   --   --  7.3  --   --   --  8.6  --    HGB  --   --   --   --  10.5*  --   --   --  12.6*  --    MCV  --   --   --   --  109*  --   --   --  108*  --    PLT  --   --   --   --  101*  --   --   --  119*  --    NA  --   --   --   --   --  133  --  134  --   --    POTASSIUM  --   --   --   --   --  3.4  --  3.4  --   --    CHLORIDE  --   --   --   --   --  97  --  98  --   --    CO2  --   --   --   --   --  29  --  29  --   --    BUN  --   --   --   --   --  21  --  16  --   --    CR  --   --   --   --   --  3.40*  --  2.72*  --   --    ANIONGAP  --   --   --   --   --  7  --  7  --   --    CHRISTINE  --   --   --   --   --  8.0*  --  8.6  --   --    * 124* 165*   < >  --  111*   < > 131*  --    < >   ALBUMIN  --   --   --   --   --  2.4*  --  3.0*  --   --    PROTTOTAL  --   --   --   --   --  4.9*  --  6.0*  --   --    BILITOTAL  --   --   --   --   --  1.6*  --  1.4*  --   --    ALKPHOS  --   --   --   --   --  149  --  184*  --   --    ALT  --   --   --   --   --  30  --  42  --   --    AST  --   --   --   --   --  16  --  25  --   --     < > = values in this interval not displayed.     Medications     dextrose 10% 25 mL/hr at 03/17/22 1434     - MEDICATION INSTRUCTIONS -       - MEDICATION INSTRUCTIONS -       - MEDICATION INSTRUCTIONS -         - MEDICATION INSTRUCTIONS for Dialysis Patients -   Does not apply See Admin Instructions     insulin aspart  1-4 Units Subcutaneous Q4H     midodrine  10 mg Oral TID w/meals     piperacillin-tazobactam  2.25 g  Intravenous Q6H     sodium chloride (PF)  10 mL Intracatheter Q8H     sodium chloride (PF)  3 mL Intracatheter Q8H     sodium chloride (PF)  3 mL Intracatheter Q8H     vancomycin place qiu - receiving intermittent dosing  1 each Intravenous See Admin Instructions

## 2022-03-17 NOTE — CONSULTS
Care Management Initial Consult    General Information  Assessment completed with: Caregiver, Lynn Juarez- caregiver [phone]  Type of CM/SW Visit: Initial Assessment    Primary Care Provider verified and updated as needed: Yes   Readmission within the last 30 days: current reason for admission unrelated to previous admission      Reason for Consult: discharge planning  Advance Care Planning:            Communication Assessment  Patient's communication style: spoken language (English or Bilingual)             Cognitive  Cognitive/Neuro/Behavioral: .WDL except  Level of Consciousness: obtunded, lethargic  Arousal Level: arouses to touch/gentle shaking  Orientation: other (see comments) ([facility])  Mood/Behavior: hypoactive (quiet, withdrawn)  Best Language: 1 - Mild to moderate  Speech: slow    Living Environment:   People in home: other (see comments) (Currently at [facility]. Prior to that, patient was at [facility])     Current living Arrangements: other (see comments) (Currently at [facility]. Was at [facility] previous to [facility])  Name of Facility: [facility]   Able to return to prior arrangements: yes       Family/Social Support:  Care provided by: other (see comments) (facility staff)  Provides care for: no one  Marital Status: Single  Facility resident(s)/Staff, PCA          Description of Support System: Supportive, Involved    Support Assessment: Adequate family and caregiver support, Adequate social supports    Current Resources:   Patient receiving home care services:       Community Resources:    Equipment currently used at home:    Supplies currently used at home:      Employment/Financial:  Employment Status:          Financial Concerns:             Lifestyle & Psychosocial Needs:  Social Determinants of Health     Tobacco Use: Medium Risk     Smoking Tobacco Use: Former Smoker     Smokeless Tobacco Use: Never Used   Alcohol Use: Not on file   Financial Resource Strain: Low Risk      Difficulty of Paying  Living Expenses: Not hard at all   Food Insecurity: No Food Insecurity     Worried About Running Out of Food in the Last Year: Never true     Ran Out of Food in the Last Year: Never true   Transportation Needs: No Transportation Needs     Lack of Transportation (Medical): No     Lack of Transportation (Non-Medical): No   Physical Activity: Not on file   Stress: Not on file   Social Connections: Not on file   Intimate Partner Violence: Not on file   Depression: Not at risk     PHQ-2 Score: 0   Housing Stability: Not on file       Functional Status:  Prior to admission patient needed assistance:              Mental Health Status:          Chemical Dependency Status:                Values/Beliefs:  Spiritual, Cultural Beliefs, Baptism Practices, Values that affect care:                 Additional Information:  Writer received consult for discharge planning. Writer reviewed previous hospital notes and Patient has a care giver Lynn who takes care of all of his affairs. Writer reviewed notes for patient and he is confused; CLEVE spoke with caregiver from previous notes. Lynn can be reached at [phone]. Lynn was aware of the hospitalization and states that she was here with him yesterday and is the only local person. Lynn states patient does not have any family.  Lynn is not POA but states she is the only local friend he has and the POA's are both out of state and have not gotten along at all in decision making. Lynn states her role is to gather information and to give it to the friends who are his POA and then they make decisions. Lynn does not make decisions unless patient states she can. Lynn states she is the one who goes to the care conferences at the facility for patient as well. Lynn states she left a message for facility [facility] to let her know they want to hold patients bed so he can return there at discharge. Patient likely will need transportation for discharge. CLEVE will continue to follow.     Maria Dolores  and Joaquina (in patients contact list) are POA and decision makers if decisions need to be made and patient is unable to do so.     ODETTE Kc

## 2022-03-17 NOTE — CONSULTS
[facility]  Palliative Care Consultation Note    Patient: Westley Ness  Date of Admission:  3/16/2022    Requesting Clinician / Team: Dr Anderson  Reason for consult: Goals of care  CHF/Renal Failure on HD    Recommendations:    Goals of Care (see in depth discussion below): Calls placed to [facility] Maria Dolores Pabon but she was not available so I left a message. I had spoken with her several times during Art's last hospitalization regarding his condition and about possible comfort focused care.     Currently on BiPAP support, HD- Art's condition has been worsening with more frequent hospitalizations. We had previously discussed this would be the likely course, however, Art had wanted to keep fighting prior to last discharge with understanding that his situation was very delicate and he may not survive long.     Palliative care team will offer emotional support to Art and Maria Dolores with /SW as needed as we know Art well from last admission.     Palliative care will follow for ongoing goals of care discussions. I am not on site tomorrow but my colleagues Hannah Cohen CNP and Yessenia Greene/CLEVE can be contacted if needed.       Code status: No CPR / No Intubation    Advanced Care Planning:    Patient has a completed Health Care Directive: Yes, and on file.     Health care agent is Maria Dolores Pabon    Symptom Management:  Respiratory compromise related to HF, ESRF on HD, fluid volume balance. Currently on BIPAP support. Notes indicate patient would not wish to be intubated, which is consistent with previous discussion with palliative care. If symptoms of SOB become problematic would recommend hydromorphone 0.5 mg SL every 4 hrs as needed.    Weakness/fatigue related to multiple health issues, end stage disease.  -Supportive care on unit.   -Assist with repositioning for comfort.    Patient case was reviewed with Dr Rush, Cards/NP.    Lou Correia, CNS  [facility]  Hospital  Contact information available via [facility] Paging/Directory        Thank you for the opportunity to participate in the care of this patient and family. Our team: will continue to follow.     During regular M-F work hours (2604-5435) -- if you are not sure who specifically to contact -- please contact us on [facility] Smart Web.     After regular work hours and on weekends/holidays, you can call our answering service at [phone].     Attestation:  Total time on the floor involved in the patient's care: 45 minutes  Total time spent in counseling/care coordination: >50% discussing patient condition, assessment of symptoms, reviewing current status with MD and RN,     Assessments:  Westley Ness is a 76 year old male with PMH significant for ischemic cardiomyopathy- EF 25- 30%, s/p ICD, ESRD on HD MWF, DM2, GERD, atrial flutter/fibrillation on chronic anticoagulation with apixaban, and chronic hypotension, . Recent admissions to hospital on 2/11/22 with COVID-19 and discharged 2/15. He returned to hospital on 2/17 due to worsening SOB and O2 sats in the 70s and hypotension with CHF exacerbation -was discharged to TCU. He suffered a fall at TCU and was seen in ED on 3/09 with dehydration, hypotension.  Bill was transported to ED from his dialysis center on 3/16 due to confusion and generalized weakness and was ultimately admitted for acute on chronic hypoxic and hypercapnic respiratory failure. He is now on BiPAP support for his breathing. This is his third hospitalization over the past 3 months. Palliative care has followed closely during last hospital stay.    Today, the patient was seen for:  CHF/Renal failure; ongoing goals of care discussion due to very fragile medical condition.    Prognosis, Goals, & Planning:        Prognosis, Goals, and/or Advance Care Planning were addressed today: No      Functional Status just prior to hospitalization: 3 (Capable of only limited self-care; needs help with ADLs; in  bed/chair >50% of waking hours)          Patient has decision-making capacity today for complex decisions: No      Patient's decision making preferences: shared with support from loved ones          I have concerns about the patient/family's health literacy today: No           Coping, Meaning, & Spirituality:   Mood, coping, and/or meaning in the context of serious illness were addressed today: Yes  Summary/Comments: Wallace, Willie. Enjoyed meeting with  services during past hospitalization.    Social:     Living situation: [place] from [place]; prior to previous hospitalizations lived in senior apartment    Becerril family / caregivers: Friend Maria Dolores is his health care agent. they have known each other for 40 years.    Occupational history: Worked for [employer] as ; also involve in community theatre internationally    History of Present Illness:   History gathered today from: medical chart, medical team members  Adopted from H&P:  Westley Ness is a 76 year old male with PMH significant for ischemic cardiomyopathy- EF 25%, s/p ICD, ESRD on HD MWF, DM2, GERD, atrial flutter/fibrillation on chronic anticoagulation with apixaban, and chronic hypotension. Art was transported to ED from his dialysis center on 3/16 due to confusion and generalized weakness and was ultimately admitted for acute on chronic hypoxic and hypercapnic respiratory failure.     Key Palliative Symptom Data:  Art is unable to answer questions at this time due to severe fatigue/on BiPAP.    Patient is on opioids: assessed and bowels ok/no needed changes to plan of care today.    ROS:  Comprehensive ROS is unobtainable due to mental status, severe fatigue.     Past Medical History:  Past Medical History:   Diagnosis Date     Acid reflux disease      Benign essential hypertension      CAD (coronary artery disease)     s/p multiple NSTEMIs and PCI's     ESRD on hemodialysis (H)     MWF     History of atrial flutter     s/p  ablation     Hypothyroidism      Ischemic cardiomyopathy     EF 25-30%, s/p AICD        Past Surgical History:  Past Surgical History:   Procedure Laterality Date     CHOLECYSTECTOMY, OPEN  2013     CV CORONARY ANGIOGRAM N/A 10/19/2020    Procedure: Coronary Angiogram;  Surgeon: Theodora Salazar MD;  Location:  HEART CARDIAC CATH LAB     CV LEFT HEART CATH N/A 10/19/2020    Procedure: Left Heart Cath;  Surgeon: Theodora Salazar MD;  Location:  HEART CARDIAC CATH LAB     CV PCI STENT DRUG ELUTING N/A 10/19/2020    Procedure: Percutaneous Coronary Intervention Stent Drug Eluting;  Surgeon: Theodora Salazar MD;  Location:  HEART CARDIAC CATH LAB     ELBOW SURGERY Left 2008    ORIF - plates still in place     EP ICD GENERATOR REPLACEMENT SINGLE N/A 11/21/2019    Procedure: EP ICD Generator Change Single;  Surgeon: Jono Mejia MD;  Location:  HEART CARDIAC CATH LAB     H ABLATION ATRIAL FLUTTER  06/08/2017, 12/11/17     HC LEFT HEART CATHETERIZATION  7/14/2016     HC LEFT HEART CATHETERIZATION  9/21/2016     IMPLANT VENTRICULAR DEVICE  08/15/2011     IR DIALYSIS FISTULOGRAM LEFT  7/22/2019     REPAIR FISTULA ARTERIOVENOUS UPPER EXTREMITY Left 3/5/2019    Procedure: REPAIR LEFT UPPER ARM ARTERIOVENOUS FISTULA SKIN ULCER;  Surgeon: Westley Griggs MD;  Location:  OR     TONSILLECTOMY & ADENOIDECTOMY       VASCULAR SURGERY  2004, 2010    LUE fistulas (upper and lower); upper one is functional         Family History:  Family History   Problem Relation Age of Onset     Cerebrovascular Disease Mother         later in life     Hypertension Father      Bladder Cancer Father      Myocardial Infarction Paternal Grandmother      Diabetes No family hx of      Prostate Cancer No family hx of      Colon Cancer No family hx of          Allergies:  Allergies   Allergen Reactions     Contrast Dye Hives     Does fine if he uses benadryl prior.        Medications:  I have reviewed this patient's medication  profile and medications from this hospitalization.     Noted scheduled meds are:    - MEDICATION INSTRUCTIONS for Dialysis Patients -   Does not apply See Admin Instructions     insulin aspart  1-4 Units Subcutaneous Q4H     piperacillin-tazobactam  2.25 g Intravenous Q6H     sodium chloride (PF)  10 mL Intracatheter Q8H     sodium chloride (PF)  3 mL Intracatheter Q8H     sodium chloride (PF)  3 mL Intracatheter Q8H     vancomycin place qiu - receiving intermittent dosing  1 each Intravenous See Admin Instructions       Noted PRN meds are:  acetaminophen **OR** acetaminophen, artificial tears ophthalmic solution, glucose **OR** dextrose **OR** glucagon, lidocaine 4%, lidocaine (buffered or not buffered), lidocaine (buffered or not buffered), lidocaine (buffered or not buffered), melatonin, nitroGLYcerin, - MEDICATION INSTRUCTIONS -, ondansetron **OR** ondansetron, - MEDICATION INSTRUCTIONS -, - MEDICATION INSTRUCTIONS -, prochlorperazine **OR** prochlorperazine **OR** prochlorperazine, senna-docusate **OR** senna-docusate, sodium chloride (PF), sodium chloride (PF), sodium chloride (PF)    Physical Exam:  Vital Signs: Temp: 97.4  F (36.3  C) Temp src: Axillary BP: (!) 74/47 Pulse: 83   Resp: 18 SpO2: 98 % O2 Device: BiPAP/CPAP Oxygen Delivery: 2 LPM  Weight: 0 lbs 0 oz    Physical Exam  GENERAL:  severely fatigued, no distress, pale, appears critically ill.  HEAD: Normocephalic atraumatic  EXTREMITIES: Warm; bilateral LE edema  ABDOMEN:  Soft, nontender  RESPIRATORY: Breathing with BiPAP assist.  CARDIOVASCULAR: RRR  NEUROLOGIC: Lethargic.  PSYCH: not responsive to interaction.    Data reviewed:  Recent imaging reviewed.  Results for orders placed or performed during the hospital encounter of 03/16/22   Head CT w/o contrast    Impression    IMPRESSION:  1.  Progressed left frontal acute and chronic sinusitis.  2.  Interval opacification of the right middle ear and mastoids.  3.  Interval mild left mastoid  opacification.  4.  Stable mild to moderate presumed chronic small vessel ischemic  change and generalized volume loss.    Radiation dose for this scan was reduced using automated exposure  control, adjustment of the mA and/or kV according to patient size, or  iterative reconstruction technique    SALLY OCHOA MD         SYSTEM ID:  [id number]   XR Chest Port 1 View    Impression    IMPRESSION: Stable cardiomegaly. Opacification of the left lung base  shows a mild degree of improved aeration medially. Some persistent  airspace disease is a possibility at this location along with stable  small left pleural fluid. Trace right base pleural fluid also again  noted. Bilateral interstitial and vascular prominence may be  stable-appearing pulmonary edema.    MELISA HEWITT MD         SYSTEM ID:  [id number]     Lab Results   Component Value Date    WBC 7.3 03/17/2022    WBC 8.6 03/16/2022    WBC 10.4 03/09/2022    HGB 10.5 (L) 03/17/2022    HGB 12.6 (L) 03/16/2022    HGB 14.1 03/09/2022    HCT 34.0 (L) 03/17/2022    HCT 40.5 03/16/2022    HCT 46.1 03/09/2022     (L) 03/17/2022     (L) 03/16/2022     (L) 03/09/2022     03/17/2022     03/16/2022     03/09/2022    POTASSIUM 3.4 03/17/2022    POTASSIUM 3.4 03/16/2022    POTASSIUM 3.6 03/09/2022    CHLORIDE 97 03/17/2022    CHLORIDE 98 03/16/2022    CHLORIDE 98 03/09/2022    CO2 29 03/17/2022    CO2 29 03/16/2022    CO2 32 03/09/2022    BUN 21 03/17/2022    BUN 16 03/16/2022    BUN 22 03/09/2022    CR 3.40 (H) 03/17/2022    CR 2.72 (H) 03/16/2022    CR 2.47 (H) 03/09/2022     (H) 03/17/2022     (H) 03/17/2022     (H) 03/17/2022    DD 1.75 (H) 02/17/2022    DD 0.71 (H) 02/11/2022    DD 0.9 (H) 04/07/2017    NTBNPI 115,907 (H) 03/16/2022    NTBNPI >175,000 (H) 02/17/2022    NTBNPI 150,621 (H) 10/15/2020    TROPONIN 0.022 07/24/2021    TROPI 0.025 07/05/2021    TROPI 0.025 03/17/2021    TROPI 0.029 03/17/2021    AST 16  03/17/2022    AST 25 03/16/2022    AST 48 (H) 03/09/2022    ALT 30 03/17/2022    ALT 42 03/16/2022    ALT 69 03/09/2022    GGT 88 (H) 03/19/2021    ALKPHOS 149 03/17/2022    ALKPHOS 184 (H) 03/16/2022    ALKPHOS 181 (H) 03/09/2022    BILITOTAL 1.6 (H) 03/17/2022    BILITOTAL 1.4 (H) 03/16/2022    BILITOTAL 1.6 (H) 03/09/2022    INR 1.23 (H) 02/11/2022    INR 1.31 (H) 10/15/2020    INR 0.97 07/22/2019      Lab Results   Component Value Date    ALBUMIN 2.4 03/17/2022    ALBUMIN 2.9 03/19/2021          Recent Labs   Lab 03/16/22  2146 03/16/22  1458 03/16/22  1055   O2PER 99 40 28

## 2022-03-17 NOTE — CONSULTS
RENAL CONSULTATION NOTE    REFERRING MD:  Parish Dave MD    REASON FOR CONSULTATION:  ESRD/HD    HPI: 76-year-old male with history of CAD, ICM with EF of 20-25%, ESRD and DM2, who was sent here after dialysis due to severe weakness and confusion.   Per dialysis RN, patient has been getting weaker.  He had confusion on Monday.   He had his full HD treatment on Wednesday and ~ 1.1 liters UF was removed.   BP usually runs low.  He normally does have large IDWG.   SBP was in the 70-80s in the ER and SBP remains about the same.   Currently, he is on BIPAP.  He looks terrible.     ROS:  A complete review of systems was performed and is negative except as noted above.    PMH:    Past Medical History:   Diagnosis Date     Acid reflux disease      Benign essential hypertension      CAD (coronary artery disease)     s/p multiple NSTEMIs and PCI's     ESRD on hemodialysis (H)     MWF     History of atrial flutter     s/p ablation     Hypothyroidism      Ischemic cardiomyopathy     EF 25-30%, s/p AICD       PSH:    Past Surgical History:   Procedure Laterality Date     CHOLECYSTECTOMY, OPEN  2013     CV CORONARY ANGIOGRAM N/A 10/19/2020    Procedure: Coronary Angiogram;  Surgeon: Theodora Salazar MD;  Location:  HEART CARDIAC CATH LAB     CV LEFT HEART CATH N/A 10/19/2020    Procedure: Left Heart Cath;  Surgeon: Theodora Salazar MD;  Location:  HEART CARDIAC CATH LAB     CV PCI STENT DRUG ELUTING N/A 10/19/2020    Procedure: Percutaneous Coronary Intervention Stent Drug Eluting;  Surgeon: Theodora Salazar MD;  Location:  HEART CARDIAC CATH LAB     ELBOW SURGERY Left 2008    ORIF - plates still in place     EP ICD GENERATOR REPLACEMENT SINGLE N/A 11/21/2019    Procedure: EP ICD Generator Change Single;  Surgeon: Jono Mejia MD;  Location:  HEART CARDIAC CATH LAB     H ABLATION ATRIAL FLUTTER  06/08/2017, 12/11/17     HC LEFT HEART CATHETERIZATION  7/14/2016     HC LEFT HEART CATHETERIZATION   2016     IMPLANT VENTRICULAR DEVICE  08/15/2011     IR DIALYSIS FISTULOGRAM LEFT  2019     REPAIR FISTULA ARTERIOVENOUS UPPER EXTREMITY Left 3/5/2019    Procedure: REPAIR LEFT UPPER ARM ARTERIOVENOUS FISTULA SKIN ULCER;  Surgeon: Westley Griggs MD;  Location:  OR     TONSILLECTOMY & ADENOIDECTOMY       VASCULAR SURGERY  ,     LUE fistulas (upper and lower); upper one is functional       MEDICATIONS:      - MEDICATION INSTRUCTIONS for Dialysis Patients -   Does not apply See Admin Instructions     insulin aspart  1-4 Units Subcutaneous Q4H     piperacillin-tazobactam  2.25 g Intravenous Q6H     sodium chloride (PF)  10 mL Intracatheter Q8H     sodium chloride (PF)  3 mL Intracatheter Q8H     sodium chloride (PF)  3 mL Intracatheter Q8H     vancomycin place qiu - receiving intermittent dosing  1 each Intravenous See Admin Instructions       ALLERGIES:    Allergies as of 2022 - Reviewed 2022   Allergen Reaction Noted     Contrast dye Hives        FH:    Family History   Problem Relation Age of Onset     Cerebrovascular Disease Mother         later in life     Hypertension Father      Bladder Cancer Father      Myocardial Infarction Paternal Grandmother      Diabetes No family hx of      Prostate Cancer No family hx of      Colon Cancer No family hx of        SH:    Social History     Socioeconomic History     Marital status: Single     Spouse name: Not on file     Number of children: Not on file     Years of education: Not on file     Highest education level: Not on file   Occupational History     Occupation: Retired - CPA   Tobacco Use     Smoking status: Former Smoker     Packs/day: 2.00     Years: 35.00     Pack years: 70.00     Types: Cigarettes     Start date:      Quit date: 1996     Years since quittin.3     Smokeless tobacco: Never Used   Substance and Sexual Activity     Alcohol use: Not Currently     Alcohol/week: 0.0 standard drinks     Drug use: No      Sexual activity: Never   Other Topics Concern     Parent/sibling w/ CABG, MI or angioplasty before 65F 55M? No      Service Not Asked     Blood Transfusions Not Asked     Caffeine Concern No     Occupational Exposure Not Asked     Hobby Hazards Not Asked     Sleep Concern No     Stress Concern Not Asked     Weight Concern Not Asked     Special Diet Yes     Back Care Not Asked     Exercise Yes     Comment: Walking     Bike Helmet Not Asked     Seat Belt Yes     Self-Exams Not Asked   Social History Narrative    Single.    No kids.     Maria Dolores Pabon (friend- healthcare POA), Joaquina Trudymaryan (friend - healthcare POA)    No formal exercise.      Social Determinants of Health     Financial Resource Strain: Low Risk      Difficulty of Paying Living Expenses: Not hard at all   Food Insecurity: No Food Insecurity     Worried About Running Out of Food in the Last Year: Never true     Ran Out of Food in the Last Year: Never true   Transportation Needs: No Transportation Needs     Lack of Transportation (Medical): No     Lack of Transportation (Non-Medical): No   Physical Activity: Not on file   Stress: Not on file   Social Connections: Not on file   Intimate Partner Violence: Not on file   Housing Stability: Not on file       PHYSICAL EXAM:    BP (!) 78/56   Pulse 82   Temp 97.4  F (36.3  C) (Axillary)   Resp 12   SpO2 98%   GENERAL: Very frail. He terrible.  HEENT:  Normocephalic. No gross abnormalities.  Pupils equal.  On BIPAP.   CV: RRR, no murmurs, no clicks, gallops, or rubs, no edema  RESP: No wheezes. No crackles.   GI: Abdomen obese, soft, NT  MUSCULOSKELETAL: extremities nl - no gross deformities noted. Cathetic. No edema.   SKIN: no suspicious lesions or rashes, dry to touch  NEURO:  Arouse to voice. He knows he is at [facility] but does not know the date.   PSYCH: Unable   LYMPH: No palpable ant/post cervical     LABS:      CBC RESULTS:     Recent Labs   Lab 03/17/22  0418 03/16/22  1055   WBC 7.3 8.6   RBC  3.12* 3.74*   HGB 10.5* 12.6*   HCT 34.0* 40.5   * 119*       BMP RESULTS:  Recent Labs   Lab 03/17/22  0725 03/17/22  0700 03/17/22  0619 03/17/22  0602 03/17/22  0417 03/17/22  0410 03/16/22  2123 03/16/22  1152   NA  --   --   --   --  133  --   --  134   POTASSIUM  --   --   --   --  3.4  --   --  3.4   CHLORIDE  --   --   --   --  97  --   --  98   CO2  --   --   --   --  29  --   --  29   BUN  --   --   --   --  21  --   --  16   CR  --   --   --   --  3.40*  --   --  2.72*   * 216* 155* 28* 111* 108*   < > 131*   CHRISTINE  --   --   --   --  8.0*  --   --  8.6    < > = values in this interval not displayed.       INRNo lab results found in last 7 days.     DIAGNOSTICS:  Reviewed    CXR:  Stable cardiomegaly. Opacification of the left lung base  shows a mild degree of improved aeration medially. Some persistent  airspace disease is a possibility at this location along with stable  small left pleural fluid. Trace right base pleural fluid also again  noted. Bilateral interstitial and vascular prominence may be  stable-appearing pulmonary edema.    A/P:  76-year-old male with history of CAD, ICM with EF of 20-25%, ESRD and DM2, who was sent from the dialysis unit for weakness and confusion after dialysis.      # ESRD               -[facility] [place] [facility]               -LAVF              -3.5 hrs   -70 kg EDW   -16 g needle Qb at 350   -Heparin 500 units load   -midodrine preHD   -BP is soft  # Anemia:   -not on Epo currently  # Metabolic Bone Disease              -12 mcg Hectorol   -30 mg Senspiar in-center  # COVID 19 in Feb.               -recovered  # Severe BiV failure with EF of 20-25%. Severe hypotension.    Plan:   # Patient has been declining. Severe weakness and confusion from hypotension due to severe BiV failure. He does not have any peripheral edema. Typically, he does not gain much fluid in between dialysis. Currently, SBP is 70-80s from severe BiV failure. Hemodialysis will be extremely  difficult with this degree of hypotension and severe BiV failure. Dr. Rush is requesting palliative care consult, which I highly support.   # Plan for dialysis tomorrow but may not tolerate it due to severe BiV failure.   # Stop Sensipar due to hypocalcemia  # Start midodrine 10 mg tid    I discussed the case with Dr. Rush in person     Brady Kelsey MD  [facility] - Nephrology  Office Phone: [phone]  Pager: [phone]

## 2022-03-17 NOTE — PROVIDER NOTIFICATION
Paged regarding multiple concerns. Pt was admitted earlier this evening with Respiratory failure, CHF, NSTEMI, renal failure.    He is encephalopathic, he has needed increased respiratory respiratory support overnight with no significant improvement in his pCO2. He is hypotensive. He has been persistently hypoglycemic. RN noticed small amounts of melanic stools - he is currently on a heparin drip.     Will repeat labs including BMP, CBC, VBG, and lactate early.    The patient has multi-organ failure. He is DNR/DNI. Consult to palliative care placed to discuss goals of care going forward.     Alis Anderson     Blood glucose was 28 this morning. It is unclear why he is so persistently hypoglycemic. RN gave D50. Will order D10 @50cc/hr, will have to watch volume status closely with CHF.     Alis Anderson

## 2022-03-17 NOTE — PROGRESS NOTES
[facility]    Cardiology Progress Note     Assessment & Plan   Westley Ness is a 76 year old male who was admitted on 3/16/2022.    1. Paroxsymal Atrial fibrillation- on AC   2. Respiratory failure-likely from volume overload?  3. CAD-known  of proximal LAD, had PCI of proximal circumflex and of ostial and mid circumflex  4. ESRD on hemodialysis  5. Chronic hypotension-on midodrine  6. Now with kitty and concern for GIB    Recommendations:-   1. Remains very hypotensive which is not unusual for him. Would recommend keeping MAP of >50 mmHg.  Unable to give po midodrine currently.   2. In and out of A FIB but remains stable from that stand point. Discontinue heparin drip and restart coumadin at the time of discharge.   3. Not on any other cardiac meds at this time due to hypotension.   4. Consult palliative care and have end of life discussion.   5. Unfortunately, I do not believe there is anything more cardiology could do for him.     Will sign off.     Hector Webster MD  Text Page (7am - 5pm, M-F)    Interval History   Continues to be somnolent and minimally responsive. Now with diarrhea with kitty? Blood glucose is 28 this morning.     Physical Exam   Temp: 97.6  F (36.4  C) Temp src: Axillary BP: (!) 74/47 Pulse: 83   Resp: 18 SpO2: 97 % O2 Device: BiPAP/CPAP    There were no vitals filed for this visit.  Vital Signs with Ranges  Temp:  [97.4  F (36.3  C)-97.6  F (36.4  C)] 97.6  F (36.4  C)  Pulse:  [66-99] 83  Resp:  [4-27] 18  BP: ()/(41-70) 74/47  FiO2 (%):  [28 %-50 %] 50 %  SpO2:  [90 %-100 %] 97 %  No intake/output data recorded.  Patient Active Problem List   Diagnosis     CAD (coronary artery disease)     ICD (implantable cardioverter-defibrillator) in place     Ischemic cardiomyopathy     Typical atrial flutter (H)     ESRD on hemodialysis (H)     Hypothyroidism     Acid reflux disease     History of atrial flutter     Benign essential hypertension     Skin tear  of forearm without complication, left, initial encounter     End-stage renal disease (H)     End stage renal failure on dialysis (H)     Infection due to 2019 novel coronavirus     Hypoxia     Elevated troponin     Heart failure, unspecified HF chronicity, unspecified heart failure type (H)     Acute and chronic respiratory failure with hypoxia (H)     Metabolic encephalopathy     Acute and chronic respiratory failure with hypercapnia (H)     Hypotension, unspecified hypotension type       Constitutional: Sleepy, minimally responsive   Lungs: B/l poor air entry   Cardiovascular: Regular rate and rhythm       Skin: Normal   Extremity: No edema       Medications     dextrose 10% 50 mL/hr at 03/17/22 1054     heparin 950 Units/hr (03/17/22 0638)     - MEDICATION INSTRUCTIONS -       - MEDICATION INSTRUCTIONS -       - MEDICATION INSTRUCTIONS -         - MEDICATION INSTRUCTIONS for Dialysis Patients -   Does not apply See Admin Instructions     insulin aspart  1-4 Units Subcutaneous Q4H     piperacillin-tazobactam  2.25 g Intravenous Q6H     sodium chloride (PF)  10 mL Intracatheter Q8H     sodium chloride (PF)  3 mL Intracatheter Q8H     sodium chloride (PF)  3 mL Intracatheter Q8H     vancomycin place qiu - receiving intermittent dosing  1 each Intravenous See Admin Instructions       Data   Results for orders placed or performed during the hospital encounter of 03/16/22 (from the past 24 hour(s))   Blood gas venous and oxyhgb   Result Value Ref Range    pH Venous 7.30 (L) 7.32 - 7.43    pCO2 Venous 66 (H) 40 - 50 mm Hg    pO2 Venous 30 25 - 47 mm Hg    Bicarbonate Venous 32 (H) 21 - 28 mmol/L    FIO2 40     Oxyhemoglobin Venous 51 (L) 70 - 75 %    Base Excess/Deficit (+/-) 4.4 (H) -7.7 - 1.9 mmol/L   Glucose by meter   Result Value Ref Range    GLUCOSE BY METER POCT 72 70 - 99 mg/dL   Partial thromboplastin time   Result Value Ref Range    aPTT 33 22 - 38 Seconds   Blood gas venous   Result Value Ref Range    pH  Venous 7.30 (L) 7.32 - 7.43    pCO2 Venous 66 (H) 40 - 50 mm Hg    pO2 Venous 25 25 - 47 mm Hg    Bicarbonate Venous 32 (H) 21 - 28 mmol/L    Base Excess/Deficit (+/-) 4.1 (H) -7.7 - 1.9 mmol/L    FIO2 99    Troponin I   Result Value Ref Range    Troponin I High Sensitivity 1,528 (HH) <79 ng/L   Glucose by meter   Result Value Ref Range    GLUCOSE BY METER POCT 97 70 - 99 mg/dL   Single Lumen Midline Placement    Narrative    Haily Leigh RN     3/17/2022 12:07 AM  [facility]    Single Lumen Midline Placement    Date/Time: 3/16/2022 10:45 PM  Performed by: Haily Leigh RN  Authorized by: Parish Dave MD   Indications: vascular access      UNIVERSAL PROTOCOL   Site Marked: Yes  Prior Images Obtained and Reviewed:  Yes  Required items: Required blood products, implants, devices and special   equipment available    Patient identity confirmed:  Verbally with patient and arm band  NA - No sedation, light sedation, or local anesthesia  Confirmation Checklist:  Patient's identity using two indicators, relevant   allergies, procedure was appropriate and matched the consent or emergent   situation and correct equipment/implants were available  Time out: Immediately prior to the procedure a time out was called    Universal Protocol: the Joint Commission Universal Protocol was followed    Preparation: Patient was prepped and draped in usual sterile fashion    ESBL (mL):  0.5     ANESTHESIA    Anesthesia: Local infiltration  Local Anesthetic:  Lidocaine 1% without epinephrine  Anesthetic Total (mL):  0.5      SEDATION    Patient Sedated: No        Preparation: skin prepped with ChloraPrep  Skin prep agent: skin prep agent completely dried prior to procedure  Sterile barriers: maximum sterile barriers were used: cap, mask, sterile   gown, sterile gloves, and large sterile sheet  Hand hygiene: hand hygiene performed prior to central venous catheter   insertion  Type of line used:  Midline  Catheter type: single lumen  Lumen type: non-valved  Catheter size: 18g 8 cm.  Brand: Synedgen  Lot number: [id number]  Placement method: venipuncture and ultrasound  Number of attempts: 1  Successful placement: yes  Orientation: right  Location: brachial vein (lateral)  Extremity circumference: 25  Visible catheter length: 0  Internal length: 8 cm  Total catheter length: 8  Dressing and securement: blood removed, chlorhexidine disc applied,   occlusive dressing applied, dressing applied, site cleaned, statlock,   sterile dressing applied and taped  Post procedure assessment: blood return through all ports  PROCEDURE   Patient Tolerance:  Patient tolerated the procedure well with no immediate   complicationsDescribe Procedure: RN explained procedure to patient and   patient states he verbalized an understanding. RN placed 18 g 8 cm midline   in brachial vein. Blood return noted. Patient tolerated well. Primary RN   notified that midline is ready to use.   Glucose by meter   Result Value Ref Range    GLUCOSE BY METER POCT 137 (H) 70 - 99 mg/dL   Glucose by meter   Result Value Ref Range    GLUCOSE BY METER POCT 107 (H) 70 - 99 mg/dL   Glucose by meter   Result Value Ref Range    GLUCOSE BY METER POCT 72 70 - 99 mg/dL   Glucose by meter   Result Value Ref Range    GLUCOSE BY METER POCT 103 (H) 70 - 99 mg/dL   Glucose by meter   Result Value Ref Range    GLUCOSE BY METER POCT 108 (H) 70 - 99 mg/dL   Partial thromboplastin time   Result Value Ref Range    aPTT 89 (H) 22 - 38 Seconds   Comprehensive metabolic panel   Result Value Ref Range    Sodium 133 133 - 144 mmol/L    Potassium 3.4 3.4 - 5.3 mmol/L    Chloride 97 94 - 109 mmol/L    Carbon Dioxide (CO2) 29 20 - 32 mmol/L    Anion Gap 7 3 - 14 mmol/L    Urea Nitrogen 21 7 - 30 mg/dL    Creatinine 3.40 (H) 0.66 - 1.25 mg/dL    Calcium 8.0 (L) 8.5 - 10.1 mg/dL    Glucose 111 (H) 70 - 99 mg/dL    Alkaline Phosphatase 149 40 - 150 U/L    AST 16 0 - 45 U/L    ALT 30 0 -  70 U/L    Protein Total 4.9 (L) 6.8 - 8.8 g/dL    Albumin 2.4 (L) 3.4 - 5.0 g/dL    Bilirubin Total 1.6 (H) 0.2 - 1.3 mg/dL    GFR Estimate 18 (L) >60 mL/min/1.73m2   Troponin I   Result Value Ref Range    Troponin I High Sensitivity 1,324 (HH) <79 ng/L   CBC with platelets   Result Value Ref Range    WBC Count 7.3 4.0 - 11.0 10e3/uL    RBC Count 3.12 (L) 4.40 - 5.90 10e6/uL    Hemoglobin 10.5 (L) 13.3 - 17.7 g/dL    Hematocrit 34.0 (L) 40.0 - 53.0 %     (H) 78 - 100 fL    MCH 33.7 (H) 26.5 - 33.0 pg    MCHC 30.9 (L) 31.5 - 36.5 g/dL    RDW 14.2 10.0 - 15.0 %    Platelet Count 101 (L) 150 - 450 10e3/uL   Lactic acid whole blood   Result Value Ref Range    Lactic Acid 0.7 0.7 - 2.0 mmol/L   Glucose by meter   Result Value Ref Range    GLUCOSE BY METER POCT 28 (LL) 70 - 99 mg/dL   Glucose by meter   Result Value Ref Range    GLUCOSE BY METER POCT 155 (H) 70 - 99 mg/dL   Glucose by meter   Result Value Ref Range    GLUCOSE BY METER POCT 216 (H) 70 - 99 mg/dL   Glucose by meter   Result Value Ref Range    GLUCOSE BY METER POCT 165 (H) 70 - 99 mg/dL   Glucose by meter   Result Value Ref Range    GLUCOSE BY METER POCT 124 (H) 70 - 99 mg/dL   Partial thromboplastin time   Result Value Ref Range    aPTT 59 (H) 22 - 38 Seconds   Glucose by meter   Result Value Ref Range    GLUCOSE BY METER POCT 126 (H) 70 - 99 mg/dL

## 2022-03-17 NOTE — PROGRESS NOTES
Antimicrobial Stewardship Team Note    Antimicrobial Stewardship Program - A joint venture between [facility] and [facility] to optimize antibiotic management.     Patient: Westley Ness  MRN: [id number]  Allergies: Contrast dye    Brief Summary: Westley Ness is a 76 year old male admitted on 3/16/22 with confusion and generalized weakness. PMH significant for HFrEF, Afib, ESRD on HD, hypotension, and recent hospitalization for COVID-19 (2/17-3/8). He was empirically started on Zosyn and vancomycin on 3/16 given that he became less responsive and required more O2.    CXR with opacification of the L lung base with a mild degree of improved aeration medially. Some persistent airspace disease is a possibility along with stable small left pleural fluid. No evidence of leukocytosis or fever since admission. Patient on BIPAP.         Active Anti-infective Medications   (From admission, onward)                 Start     Stop    03/16/22 2130  piperacillin-tazobactam  2.25 g,   Intravenous,   EVERY 6 HOURS        Sepsis        --    03/16/22 2113  Vancomycin Place [place] - Receiving Intermittent Dosing  1 each,   Intravenous,   SEE ADMIN INSTRUCTIONS        Sepsis        --                  Assessment: Possible pneumonia  Patient presented with generalized weakness and confusion and increased O2 requirements. He was started on Zosyn and vancomycin for sepsis. Although chest imaging not as concerning for infectious process, reasonable to cover empirically for pneumonia until cultures negative for 48h. Suspect symptoms more likely d/t COVID-19 sequelae vs bacterial infection. To limit vancomycin in this patient with ESRD, recommend obtaining MRSA nares and stopping vancomycin if negative.    Recommendations:  Obtain MRSA nares PCR. If negative, stop vancomycin.  Agree with empiric course of Zosyn.    Discussed with ID Staff MD Noy Jimenez, PharmD    Vital  Signs/Clinical Features:  Vitals  Report        03/15 0700  03/16 0659 03/16 0700  03/17 0659 03/17 0700  03/17 1142   Most Recent      Temp ( F)   97.4 -  97.6    97.4 -  97.6     97.6 (36.4) 03/17 1118    Pulse   66 -  99    82 -  86     83 03/17 0900    Resp   4 -  27    12 -  18     18 03/17 0900    BP   55/41 -  107/54    74/47 -  83/46     74/47 03/17 0900    SpO2 (%)   83 -  100    97 -  98     97 03/17 1118            Labs  Estimated Creatinine Clearance: 18.3 mL/min (A) (based on SCr of 3.4 mg/dL (H)).  Recent Labs   Lab Test 03/02/22  0736 03/03/22  0735 03/05/22  0910 03/09/22  1322 03/16/22  1152 03/17/22  0417   CR 4.41* 5.73* 5.17* 2.47* 2.72* 3.40*       Recent Labs   Lab Test 01/11/21  0911 01/12/21  0700 01/13/21  0718 01/15/21  0730 03/17/21  1257 03/18/21  1005 03/19/21  0601 07/05/21  0607 07/24/21  1217 03/01/22  0821 03/02/22  0736 03/06/22  1034 03/09/22  1322 03/16/22  1055 03/17/22  0418   WBC 8.8 7.1 6.1  --  5.2 4.7   < > 6.8   < > 10.3 9.5 10.5 10.4 8.6 7.3   ANEU 7.2 5.4 4.9  --  3.8 3.2  --  5.3  --   --   --   --   --   --   --    ALYM 0.9 1.1 0.6*  --  0.6* 0.7*  --  0.8  --   --   --   --   --   --   --    PHYLLIS 0.5 0.5 0.4  --  0.4 0.5  --  0.5  --   --   --   --   --   --   --    AEOS 0.1 0.2 0.1  --  0.2 0.3  --  0.1  --   --   --   --   --   --   --    HGB 10.9* 10.8* 10.5*   < > 10.8* 10.5*   < > 12.0*   < > 14.4 13.7 14.2 14.1 12.6* 10.5*   HCT 35.9* 35.3* 32.8*  --  35.7* 33.9*   < > 39.1*   < > 47.4 43.6 45.3 46.1 40.5 34.0*   * 107* 103*  --  104* 105*   < > 110*   < > 111* 107* 107* 110* 108* 109*    131* 114*  --  140* 123*   < > 172   < > 145* 132* 128* 117* 119* 101*    < > = values in this interval not displayed.       Recent Labs   Lab Test 02/11/22  1647 02/17/22  0125 02/18/22  0556 02/22/22  0537 02/24/22  0758 03/01/22  0821 03/02/22  0736 03/09/22  1322 03/16/22  1152 03/17/22  0417   BILITOTAL 1.5* 1.8* 1.9*  --   --   --   --  1.6* 1.4* 1.6*    ALKPHOS 209* 162* 157*  --   --   --   --  181* 184* 149   ALBUMIN 3.1* 3.2* 3.0*   < > 3.0* 2.4* 2.7* 3.2* 3.0* 2.4*   AST 16 18 17  --   --   --   --  48* 25 16   ALT 22 24 27  --   --   --   --  69 42 30    < > = values in this interval not displayed.       Recent Labs   Lab Test 12/19/17  1025 04/23/18  0632 01/11/21  0911 02/11/22  0653 02/17/22  0134 02/19/22  1148 02/22/22  1234 02/28/22  1151 03/01/22  0821 03/02/22  0736 03/03/22  0735 03/05/22  0910 03/09/22  1322 03/16/22  1055 03/17/22  0418   PCAL 0.88  --   --   --   --   --   --   --   --   --   --   --   --   --   --    LACT  --    < > 0.9  --  1.6  --  1.0  --   --   --   --   --  0.9 0.8 0.7   CRP  --   --   --    < >  --  124.0*  --  96.0* 67.5* 30.8* 25.2* 11.0*  --   --   --     < > = values in this interval not displayed.             Culture Results:  7-Day Micro Results       Procedure Component Value Units Date/Time    Blood Culture Peripheral Blood [[id number]] Collected: 03/16/22 2200    Order Status: Resulted Lab Status: In process Updated: 03/16/22 2200    Specimen: Peripheral Blood     Blood Culture Artery, Radial, Right Updated: 03/16/22 2231    Order Status: Canceled Lab Status: No result     Specimen: Blood from Artery, Radial, Right             Recent Labs   Lab Test 07/11/16  1715 07/04/18  0425 08/08/18  1415 08/29/18  1447   URINEPH 8.5* 7.5* 8.0* 8.5*   NITRITE Negative Negative Positive* Negative   LEUKEST Negative Large* Moderate* Moderate*   WBCU 0 >182* >100* >100*                         Imaging: XR Chest Port 1 View    Result Date: 3/16/2022  CHEST TWO VIEWS   3/16/2022 11:05 AM HISTORY: Hypoxia.  Dialysis. COMPARISON: 3/9/2022.     IMPRESSION: Stable cardiomegaly. Opacification of the left lung base shows a mild degree of improved aeration medially. Some persistent airspace disease is a possibility at this location along with stable small left pleural fluid. Trace right base pleural fluid also again noted. Bilateral  interstitial and vascular prominence may be stable-appearing pulmonary edema. MELISA HEWITT MD   SYSTEM ID:  [id number]    Head CT w/o contrast    Result Date: 3/16/2022  CT OF THE HEAD WITHOUT CONTRAST 3/16/2022 11:12 AM COMPARISON: Head CT 10/15/2020. HISTORY:  Mental status change, unknown cause TECHNIQUE:  Axial CT images of the head from the skull base to the vertex were acquired without IV contrast. FINDINGS: INTRACRANIAL CONTENTS: No intracranial hemorrhage, extraaxial collection, or mass effect.  No CT evidence of acute infarct.   Mild to moderate presumed chronic small vessel ischemic change with mild to moderate generalized volume loss. VISUALIZED ORBITS/SINUSES/MASTOIDS: No significant orbital abnormality.  Progressed pneumatized secretions in the left frontal sinus. Interval right middle ear and mastoid opacification. Interval development of mild left mastoid opacification. Chronic thickening of the left maxillary antral walls. OSSEOUS STRUCTURES/SOFT TISSUES: No significant abnormality.     IMPRESSION: 1.  Progressed left frontal acute and chronic sinusitis. 2.  Interval opacification of the right middle ear and mastoids. 3.  Interval mild left mastoid opacification. 4.  Stable mild to moderate presumed chronic small vessel ischemic change and generalized volume loss. Radiation dose for this scan was reduced using automated exposure control, adjustment of the mA and/or kV according to patient size, or iterative reconstruction technique SALLY OCHOA MD   SYSTEM ID:  [id number]

## 2022-03-17 NOTE — PROCEDURES
[facility]    Single Lumen Midline Placement    Date/Time: 3/16/2022 10:45 PM  Performed by: Haily Leigh RN  Authorized by: Parish Dave MD   Indications: vascular access      UNIVERSAL PROTOCOL   Site Marked: Yes  Prior Images Obtained and Reviewed:  Yes  Required items: Required blood products, implants, devices and special equipment available    Patient identity confirmed:  Verbally with patient and arm band  NA - No sedation, light sedation, or local anesthesia  Confirmation Checklist:  Patient's identity using two indicators, relevant allergies, procedure was appropriate and matched the consent or emergent situation and correct equipment/implants were available  Time out: Immediately prior to the procedure a time out was called    Universal Protocol: the Joint Commission Universal Protocol was followed    Preparation: Patient was prepped and draped in usual sterile fashion    ESBL (mL):  0.5     ANESTHESIA    Anesthesia: Local infiltration  Local Anesthetic:  Lidocaine 1% without epinephrine  Anesthetic Total (mL):  0.5      SEDATION    Patient Sedated: No        Preparation: skin prepped with ChloraPrep  Skin prep agent: skin prep agent completely dried prior to procedure  Sterile barriers: maximum sterile barriers were used: cap, mask, sterile gown, sterile gloves, and large sterile sheet  Hand hygiene: hand hygiene performed prior to central venous catheter insertion  Type of line used: Midline  Catheter type: single lumen  Lumen type: non-valved  Catheter size: 18g 8 cm.  Brand: [employer]!  Lot number: [id number]  Placement method: venipuncture and ultrasound  Number of attempts: 1  Successful placement: yes  Orientation: right  Location: brachial vein (lateral)  Extremity circumference: 25  Visible catheter length: 0  Internal length: 8 cm  Total catheter length: 8  Dressing and securement: blood removed, chlorhexidine disc applied, occlusive dressing applied, dressing  applied, site cleaned, statlock, sterile dressing applied and taped  Post procedure assessment: blood return through all ports  PROCEDURE   Patient Tolerance:  Patient tolerated the procedure well with no immediate complicationsDescribe Procedure: RN explained procedure to patient and patient states he verbalized an understanding. RN placed 18 g 8 cm midline in brachial vein. Blood return noted. Patient tolerated well. Primary RN notified that midline is ready to use.

## 2022-03-17 NOTE — PHARMACY-VANCOMYCIN DOSING SERVICE
"Pharmacy Vancomycin Initial Note  Date of Service 2022  Patient's  1945  76 year old, male    Indication: Sepsis    Current estimated CrCl = Estimated Creatinine Clearance: 22.8 mL/min (A) (based on SCr of 2.72 mg/dL (H)).    Creatinine for last 3 days  3/16/2022: 11:52 AM Creatinine 2.72 mg/dL    Recent Vancomycin Level(s) for last 3 days  No results found for requested labs within last 72 hours.      Vancomycin IV Administrations (past 72 hours)      No vancomycin orders with administrations in past 72 hours.                Nephrotoxins and other renal medications (From now, onward)    Start     Dose/Rate Route Frequency Ordered Stop    22  vancomycin 1500 mg in 0.9% NaCl 250 ml intermittent infusion 1,500 mg         1,500 mg  over 90 Minutes Intravenous ONCE 22  piperacillin-tazobactam (ZOSYN) 2.25 g vial to attach to  ml bag        Note to Pharmacy: For [facility], [facility] and [facility]: For Zosyn-naive patients, use the \"Zosyn initial dose + extended infusion\" order panel.    2.25 g  over 30 Minutes Intravenous EVERY 6 HOURS 22  vancomycin place qiu - receiving intermittent dosing         1 each Intravenous SEE ADMIN INSTRUCTIONS 22            Contrast Orders - past 72 hours (72h ago, onward)            None                  Plan:  1. Start vancomycin  1500 mg IV loading dose. Then follow renal replacement protocol for hemodialysis   2. Vancomycin monitoring method: Renal Replacement Therapy  3. Vancomycin therapeutic monitoring goal: 15-20 mg/L  4. Pharmacy will check vancomycin levels as appropriate in 1-3 Days.    5. Serum creatinine levels will be ordered per nephrology.      [facility], located within [facility]    "

## 2022-03-17 NOTE — PROVIDER NOTIFICATION
MD Notification    Notified Person: Dr. Anderson    Notification Date/Time: 3/17/2022 5:43 AM     Notification Interaction: Paged    Purpose of Notification: bear GEIGER @ 0945. Trending down from 1528.    Orders Received: NA    Comments: NA

## 2022-03-17 NOTE — CONSULTS
[facility]  Cardiology Consultation     Date of Admission:  3/16/2022    Assessment & Plan   Westley Ness is a 76 year old male with    1.  Respiratory failure-likely from volume overload  2.  History of ischemic cardiomyopathy status post ICD  3.  CAD-known  of proximal LAD, had PCI of proximal circumflex and of ostial and mid circumflex  4.  ESRD on hemodialysis  5.  Chronic hypotension-on midodrine    Recommendations:   1.  Rule out infection and other noncardiac causes of patient's presentation.  Patient has significantly elevated BNP but this is unreliable due to ESRD.  For comparison, NT proBNP was greater than 175,000 a month ago.  Would recommend fluid removal as deemed necessary by nephrology  2.  In the absence of chest pain or other cardiac symptoms I believe this is type II NSTEMI which we can manage conservatively.  Moreover, on last angiogram in 2020 prior stents were open and there was no other fixable CAD.  Today, EKG is nonischemic and echocardiogram is pending.   3.  Continue heparin drip while in-patient.  In general we do it for 48 hours for CAD but in his case because we are holding Eliquis I would continue it for the entire duration of hospitalization.    Hector Webster    Code Status    No CPR- Do NOT Intubate    Reason for Consult   Reason for consult: Ischemic cardiomyopathy, CAD    Primary Care Physician   Josselin Kolb    Chief Complaint   Confusion    History of Present Illness   Westley Ness is a 76 year old male with past medical history of coronary disease, ischemic cardiomyopathy, A. fib, ESRD on hemodialysis, recent Covid pneumonia and chronic hypertension presents to the hospital from dialysis center for complaints of confusion and generalized weakness.  Noted to be in respiratory failure thought to be secondary to heart failure exacerbation.    In the emergency department noted to be significantly hypotensive with systolics in 70s.  NT proBNP  was over 110,000, troponin 1683.  EKG showed atrial fibrillation.  Chest x-ray showed pulmonary edema and left-sided pleural effusion.  Last echocardiogram in 2022 showed ejection fraction of 25-30% with wall motion abnormalities in LAD territory and severe RV systolic dysfunction.  Repeat echocardiogram is pending.    Patient Active Problem List   Diagnosis     CAD (coronary artery disease)     ICD (implantable cardioverter-defibrillator) in place     Ischemic cardiomyopathy     Typical atrial flutter (H)     ESRD on hemodialysis (H)     Hypothyroidism     Acid reflux disease     History of atrial flutter     Benign essential hypertension     Skin tear of forearm without complication, left, initial encounter     End-stage renal disease (H)     End stage renal failure on dialysis (H)     Infection due to 2019 novel coronavirus     Hypoxia     Elevated troponin     Heart failure, unspecified HF chronicity, unspecified heart failure type (H)     Acute and chronic respiratory failure with hypoxia (H)     Metabolic encephalopathy     Acute and chronic respiratory failure with hypercapnia (H)     Hypotension, unspecified hypotension type       Past Medical History   I have reviewed this patient's medical history and updated it with pertinent information if needed.   Past Medical History:   Diagnosis Date     Acid reflux disease      Benign essential hypertension      CAD (coronary artery disease)     s/p multiple NSTEMIs and PCI's     ESRD on hemodialysis (H)     MWF     History of atrial flutter     s/p ablation     Hypothyroidism      Ischemic cardiomyopathy     EF 25-30%, s/p AICD       Past Surgical History   I have reviewed this patient's surgical history and updated it with pertinent information if needed.  Past Surgical History:   Procedure Laterality Date     CHOLECYSTECTOMY, OPEN  2013     CV CORONARY ANGIOGRAM N/A 10/19/2020    Procedure: Coronary Angiogram;  Surgeon: Theodora Salazar MD;  Location: [facility]  CARDIAC CATH LAB     CV LEFT HEART CATH N/A 10/19/2020    Procedure: Left Heart Cath;  Surgeon: Theodora Salazar MD;  Location:  HEART CARDIAC CATH LAB     CV PCI STENT DRUG ELUTING N/A 10/19/2020    Procedure: Percutaneous Coronary Intervention Stent Drug Eluting;  Surgeon: Theodora Salazar MD;  Location:  HEART CARDIAC CATH LAB     ELBOW SURGERY Left 2008    ORIF - plates still in place     EP ICD GENERATOR REPLACEMENT SINGLE N/A 11/21/2019    Procedure: EP ICD Generator Change Single;  Surgeon: Jono Mejia MD;  Location:  HEART CARDIAC CATH LAB     H ABLATION ATRIAL FLUTTER  06/08/2017, 12/11/17     HC LEFT HEART CATHETERIZATION  7/14/2016     HC LEFT HEART CATHETERIZATION  9/21/2016     IMPLANT VENTRICULAR DEVICE  08/15/2011     IR DIALYSIS FISTULOGRAM LEFT  7/22/2019     REPAIR FISTULA ARTERIOVENOUS UPPER EXTREMITY Left 3/5/2019    Procedure: REPAIR LEFT UPPER ARM ARTERIOVENOUS FISTULA SKIN ULCER;  Surgeon: Westley Griggs MD;  Location:  OR     TONSILLECTOMY & ADENOIDECTOMY       VASCULAR SURGERY  2004, 2010    LUE fistulas (upper and lower); upper one is functional       Prior to Admission Medications   Prior to Admission Medications   Prescriptions Last Dose Informant Patient Reported? Taking?   B Complex-C-Folic Acid (DOROTEO CAPS) 1 MG CAPS 3/15/2022 at AM Nursing Home No Yes   Sig: TAKE 1 CAPSULE BY MOUTH EVERY DAY   Nutritional Supplements (NEPRO) LIQD 3/15/2022 at midday Nursing Home Yes Yes   Sig: Take 240 mLs by mouth daily (with lunch)   Nutritional Supplements (NUTRITIONAL SUPPLEMENT PO) 3/13/2022 at X3 doses Nursing Home Yes Yes   Sig: Take 120 mLs by mouth 3 times daily (with meals) Magic Cup   acetaminophen (TYLENOL) 325 MG tablet Unknown at PRN Nursing Home No Yes   Sig: Take 2 tablets (650 mg) by mouth every 4 hours as needed for mild pain   apixaban ANTICOAGULANT (ELIQUIS ANTICOAGULANT) 2.5 MG tablet 3/15/2022 at PM Nursing Home No Yes   Sig: Take 1 tablet (2.5 mg) by  mouth 2 times daily   famotidine (PEPCID) 20 MG tablet 3/15/2022 at AM Nursing Home Yes Yes   Sig: Take 20 mg by mouth daily    gabapentin (NEURONTIN) 100 MG capsule 3/15/2022 at HS Nursing Home No Yes   Sig: Take 1 capsule (100 mg) by mouth 3 times daily   guaiFENesin-dextromethorphan (ROBITUSSIN DM) 100-10 MG/5ML syrup Unknown at PRN Nursing Home No Yes   Sig: Take 10 mLs by mouth every 4 hours as needed for cough   insulin aspart (NOVOLOG PEN) 100 UNIT/ML pen 3/15/2022 at PM Nursing Home No Yes   Sig: Do Not give if Pre-Meal BG less than 140.   Pre-Meal  - 189 give 1 unit.   Pre-Meal  - 239 give 2 units.   Pre-Meal  - 289 give 3 units.   Pre-Meal  - 339 give 4 units.    Pre-Meal - 399 give 5 units.   Pre-Meal -449 give 6 units  Pre-Meal BG greater or equal to 450 give 7 units.   To be given with prandial insulin. Based on pre-meal blood glucose.   levothyroxine (SYNTHROID/LEVOTHROID) 50 MCG tablet 3/15/2022 at 6569-2013 Nursing Home No Yes   Sig: TAKE 1 TABLET BY MOUTH EVERY DAY   lidocaine (LIDODERM) 5 % patch 3/13/2022 at refuses >50% of recent doses Nursing Home No Yes   Sig: Place 1 patch onto the skin every 24 hours To prevent lidocaine toxicity, patient should be patch free for 12 hrs daily.   midodrine (PROAMATINE) 10 MG tablet 3/14/2022 at AM Nursing Home Yes Yes   Sig: Take 10 mg by mouth on MWF mornings prior to dialysis   midodrine (PROAMATINE) 2.5 MG tablet 3/15/2022 at AM Nursing Home Yes Yes   Sig: Take 5 mg by mouth 3 times daily take on non-dialysis days only (Tues, Thurs, Sat and Sun)   2587-9911, 3062-1535, 9280-7882   nitroGLYcerin (NITROSTAT) 0.4 MG sublingual tablet Unknown at PRN Nursing Home No Yes   Sig: Place 1 tablet (0.4 mg) under the tongue every 5 minutes as needed for chest pain UP TO 3 PER EPISODE   pantoprazole (PROTONIX) 40 MG EC tablet 3/15/2022 at AM Nursing Home No Yes   Sig: TAKE 1 TABLET (40 MG) BY MOUTH EVERY MORNING   pravastatin  (PRAVACHOL) 40 MG tablet 3/15/2022 at HS Nursing Home No Yes   Sig: TAKE 1 TABLET BY MOUTH EVERY DAY   predniSONE (DELTASONE) 5 MG tablet 3/15/2022 at AM Nursing Home No Yes   Sig: Take 1 tablet (5 mg) by mouth daily   vitamin D3 (CHOLECALCIFEROL) 50 mcg (2000 units) tablet 3/15/2022 at AM Nursing Home No Yes   Sig: TAKE 1 TABLET BY MOUTH EVERY DAY      Facility-Administered Medications: None     Current Facility-Administered Medications   Medication Dose Route Frequency     - MEDICATION INSTRUCTIONS for Dialysis Patients -   Does not apply See Admin Instructions     insulin aspart  1-4 Units Subcutaneous Q4H     sodium chloride (PF)  3 mL Intracatheter Q8H     sodium chloride (PF)  3 mL Intracatheter Q8H     Current Facility-Administered Medications   Medication Last Rate     heparin       - MEDICATION INSTRUCTIONS -       - MEDICATION INSTRUCTIONS -       - MEDICATION INSTRUCTIONS -       Allergies   Allergies   Allergen Reactions     Contrast Dye Hives     Does fine if he uses benadryl prior.       Social History    reports that he quit smoking about 25 years ago. His smoking use included cigarettes. He started smoking about 57 years ago. He has a 70.00 pack-year smoking history. He has never used smokeless tobacco. He reports previous alcohol use. He reports that he does not use drugs.    Family History   Family History   Problem Relation Age of Onset     Cerebrovascular Disease Mother         later in life     Hypertension Father      Bladder Cancer Father      Myocardial Infarction Paternal Grandmother      Diabetes No family hx of      Prostate Cancer No family hx of      Colon Cancer No family hx of        Review of Systems   The comprehensive 10 point Review of Systems is negative other than noted in the HPI or here.     Physical Exam   Temp: 97.4  F (36.3  C) Temp src: Axillary BP: (!) 89/59 Pulse: 91   Resp: 15 SpO2: 94 % O2 Device: BiPAP/CPAP Oxygen Delivery: 2 LPM  Vital Signs with Ranges  Temp:  [97.4   F (36.3  C)-97.6  F (36.4  C)] 97.4  F (36.3  C)  Pulse:  [79-98] 91  Resp:  [8-27] 15  BP: ()/(41-61) 89/59  FiO2 (%):  [40 %] 40 %  SpO2:  [83 %-100 %] 94 %  0 lbs 0 oz    Constitutional: Sleepy, bipap in place   Lungs:  Bilateral crackles   Cardiovascular:  Irregular rhythm   Lymphm node  Neck No enlargement  No jugular vein extension or carotid bruit   Skin: Normal   Extremity: No edema       Data   Results for orders placed or performed during the hospital encounter of 03/16/22 (from the past 24 hour(s))   EKG 12-lead, tracing only   Result Value Ref Range    Systolic Blood Pressure  mmHg    Diastolic Blood Pressure  mmHg    Ventricular Rate 93 BPM    Atrial Rate 83 BPM    DC Interval  ms    QRS Duration 98 ms     ms    QTc 507 ms    P Axis  degrees    R AXIS 156 degrees    T Axis -13 degrees    Interpretation ECG       Atrial fibrillation  Incomplete right bundle branch block  Possible Right ventricular hypertrophy  Abnormal QRS-T angle, consider primary T wave abnormality  Prolonged QT  Abnormal ECG  When compared with ECG of 09-MAR-2022 12:17,  Questionable change in QRS axis  Minimal criteria for Anteroseptal infarct are no longer Present  QT has lengthened  Confirmed by GENERATED REPORT, COMPUTER ([id number]),  Lula Domínguez ([id number]) on 3/16/2022 12:51:47 PM     CBC with platelets differential    Narrative    The following orders were created for panel order CBC with platelets differential.  Procedure                               Abnormality         Status                     ---------                               -----------         ------                     CBC with platelets and d...[[id number]]  Abnormal            Final result               RBC and Platelet Morphology[[id number]]                      Final result                 Please view results for these tests on the individual orders.   Lactic acid whole blood   Result Value Ref Range    Lactic Acid 0.8 0.7 - 2.0 mmol/L   Blood gas  venous and oxyhgb   Result Value Ref Range    pH Venous 7.24 (L) 7.32 - 7.43    pCO2 Venous 80 (HH) 40 - 50 mm Hg    pO2 Venous 22 (L) 25 - 47 mm Hg    Bicarbonate Venous 35 (H) 21 - 28 mmol/L    FIO2 28     Oxyhemoglobin Venous 39 (L) 70 - 75 %    Base Excess/Deficit (+/-) 4.5 (H) -7.7 - 1.9 mmol/L   CBC with platelets and differential   Result Value Ref Range    WBC Count 8.6 4.0 - 11.0 10e3/uL    RBC Count 3.74 (L) 4.40 - 5.90 10e6/uL    Hemoglobin 12.6 (L) 13.3 - 17.7 g/dL    Hematocrit 40.5 40.0 - 53.0 %     (H) 78 - 100 fL    MCH 33.7 (H) 26.5 - 33.0 pg    MCHC 31.1 (L) 31.5 - 36.5 g/dL    RDW 14.2 10.0 - 15.0 %    Platelet Count 119 (L) 150 - 450 10e3/uL    % Neutrophils 79 %    % Lymphocytes 7 %    % Monocytes 8 %    % Eosinophils 4 %    % Basophils 1 %    % Immature Granulocytes 1 %    NRBCs per 100 WBC 0 <1 /100    Absolute Neutrophils 6.8 1.6 - 8.3 10e3/uL    Absolute Lymphocytes 0.6 (L) 0.8 - 5.3 10e3/uL    Absolute Monocytes 0.7 0.0 - 1.3 10e3/uL    Absolute Eosinophils 0.4 0.0 - 0.7 10e3/uL    Absolute Basophils 0.1 0.0 - 0.2 10e3/uL    Absolute Immature Granulocytes 0.1 <=0.4 10e3/uL    Absolute NRBCs 0.0 10e3/uL   RBC and Platelet Morphology   Result Value Ref Range    Platelet Assessment  Automated Count Confirmed. Platelet morphology is normal.     Automated Count Confirmed. Platelet morphology is normal.    RBC Morphology Confirmed RBC Indices    Extra Tube    Narrative    The following orders were created for panel order Extra Tube.  Procedure                               Abnormality         Status                     ---------                               -----------         ------                     Extra Blood Culture Bottle[[id number]]                       Final result               Extra Blue Top Tube[[id number]]                              Final result                 Please view results for these tests on the individual orders.   Extra Blue Top Tube   Result Value Ref Range     Hold Specimen JI    Extra Blood Culture Bottle   Result Value Ref Range    Hold Specimen JIC    XR Chest Port 1 View    Narrative    CHEST TWO VIEWS   3/16/2022 11:05 AM     HISTORY: Hypoxia.  Dialysis.    COMPARISON: 3/9/2022.      Impression    IMPRESSION: Stable cardiomegaly. Opacification of the left lung base  shows a mild degree of improved aeration medially. Some persistent  airspace disease is a possibility at this location along with stable  small left pleural fluid. Trace right base pleural fluid also again  noted. Bilateral interstitial and vascular prominence may be  stable-appearing pulmonary edema.    MELISA HEWITT MD         SYSTEM ID:  [id number]   Head CT w/o contrast    Narrative    CT OF THE HEAD WITHOUT CONTRAST   3/16/2022 11:12 AM     COMPARISON: Head CT 10/15/2020.    HISTORY:  Mental status change, unknown cause     TECHNIQUE:  Axial CT images of the head from the skull base to the  vertex were acquired without IV contrast.    FINDINGS:   INTRACRANIAL CONTENTS: No intracranial hemorrhage, extraaxial  collection, or mass effect.  No CT evidence of acute infarct.   Mild  to moderate presumed chronic small vessel ischemic change with mild to  moderate generalized volume loss.    VISUALIZED ORBITS/SINUSES/MASTOIDS: No significant orbital  abnormality.  Progressed pneumatized secretions in the left frontal  sinus. Interval right middle ear and mastoid opacification. Interval  development of mild left mastoid opacification. Chronic thickening of  the left maxillary antral walls.    OSSEOUS STRUCTURES/SOFT TISSUES: No significant abnormality.      Impression    IMPRESSION:  1.  Progressed left frontal acute and chronic sinusitis.  2.  Interval opacification of the right middle ear and mastoids.  3.  Interval mild left mastoid opacification.  4.  Stable mild to moderate presumed chronic small vessel ischemic  change and generalized volume loss.    Radiation dose for this scan was reduced using automated  exposure  control, adjustment of the mA and/or kV according to patient size, or  iterative reconstruction technique    SALLY OCHOA MD         SYSTEM ID:  [id number]   Comprehensive metabolic panel   Result Value Ref Range    Sodium 134 133 - 144 mmol/L    Potassium 3.4 3.4 - 5.3 mmol/L    Chloride 98 94 - 109 mmol/L    Carbon Dioxide (CO2) 29 20 - 32 mmol/L    Anion Gap 7 3 - 14 mmol/L    Urea Nitrogen 16 7 - 30 mg/dL    Creatinine 2.72 (H) 0.66 - 1.25 mg/dL    Calcium 8.6 8.5 - 10.1 mg/dL    Glucose 131 (H) 70 - 99 mg/dL    Alkaline Phosphatase 184 (H) 40 - 150 U/L    AST 25 0 - 45 U/L    ALT 42 0 - 70 U/L    Protein Total 6.0 (L) 6.8 - 8.8 g/dL    Albumin 3.0 (L) 3.4 - 5.0 g/dL    Bilirubin Total 1.4 (H) 0.2 - 1.3 mg/dL    GFR Estimate 23 (L) >60 mL/min/1.73m2   Nt probnp inpatient (BNP)   Result Value Ref Range    N terminal Pro BNP Inpatient 115,907 (H) 0-1,800 pg/mL   Troponin I   Result Value Ref Range    Troponin I High Sensitivity 1,683 (HH) <79 ng/L   TSH with free T4 reflex   Result Value Ref Range    TSH 4.60 (H) 0.40 - 4.00 mU/L   T4 free   Result Value Ref Range    Free T4 1.01 0.76 - 1.46 ng/dL   Blood gas venous and oxyhgb   Result Value Ref Range    pH Venous 7.30 (L) 7.32 - 7.43    pCO2 Venous 66 (H) 40 - 50 mm Hg    pO2 Venous 30 25 - 47 mm Hg    Bicarbonate Venous 32 (H) 21 - 28 mmol/L    FIO2 40     Oxyhemoglobin Venous 51 (L) 70 - 75 %    Base Excess/Deficit (+/-) 4.4 (H) -7.7 - 1.9 mmol/L

## 2022-03-18 NOTE — PHARMACY
Prescriber Notification Note    The pharmacist has communicated with this patient's provider regarding a concern or therapy recommendation.    Notified Person: Marlo Walker NP  Date/Time of Notification: 3/17/22 @5970  Interaction: phone  Concern/Recommendation: MRSA nares negative, ok to discontinue vancomycin?    TORB received to discontinue vancomycin consult, intermittent dosing order, and vancomycin level for 3/18 am.    Charissa Walker, PharmD

## 2022-03-18 NOTE — PLAN OF CARE
Date: March 18, 2022  Shift: 0700-1930  Name: Westley Ness  Age: 76 year old  YOB: 1945    Reason for Admission: Metabolic encephalopathy [G93.41]  Acute and chronic respiratory failure with hypercapnia (H) [J96.22]  Hypotension, unspecified hypotension type [I95.9]     Cognitive/Mentation: A&Ox4, lethargic  Neuros/CMS: Intact    VS: Hypotensive, on BiPap 50L  Cardiac: Afib CVR  GI: hypo BS, -Flatus, no BM  : Anuric  Pulmonary: LS dim  Pain: Denies     Drains: R midline 10% dextrose @25ml/hr  Skin: Scattered bruising, Mepilex to multipule sites, 2 sutures to L fistula.   Activity: T&R, lift    Diet: NPO     Discharge: Pending     Shift summary: Dialysis done today.

## 2022-03-18 NOTE — PROGRESS NOTES
Renal Medicine Progress Note            Assessment/Plan:       76-year-old male with history of CAD, ICM with EF of 20-25%, ESRD and DM2, who was sent from the dialysis unit for weakness and confusion after dialysis.      # ESRD               -F [facility]               -LAVF              -3.5 hrs               -70 kg EDW               -16 g needle Qb at 350               -Heparin 500 units load               -midodrine preHD               -BP is soft  # Anemia:               -not on Epo currently  # Metabolic Bone Disease              -12 mcg Hectorol               -30 mg Sensipar in-center  # COVID 19 in Feb.               -recovered  # Severe BiV failure with EF of 20-25%. Severe hypotension.     Plan:   # Patient continues to decline. He has severe BiV failure with EF of ~ 20%. Worsening hypotension from decompensated BiV failure making hemodialysis very tricky. He might not tolerate tolerate dialysis. His overall prognosis is extremely poor. However, patient continues to pursue treatment. Palliative is following.   # Will attempt gentle HD with albumin prime and no UF.   # Perhaps, it is time to consult ethics         Interval History:     Patient remains on BIPAP. He is getting dextrose infusion for hypoglycemia. BP is low. On Zosyn.           Medications and Allergies:       - MEDICATION INSTRUCTIONS for Dialysis Patients -   Does not apply See Admin Instructions     insulin aspart  1-4 Units Subcutaneous Q4H     midodrine  10 mg Oral TID w/meals     piperacillin-tazobactam  2.25 g Intravenous Q6H     sodium chloride (PF)  10 mL Intracatheter Q8H     sodium chloride (PF)  3 mL Intracatheter Q8H     sodium chloride (PF)  3 mL Intracatheter Q8H        Allergies   Allergen Reactions     Contrast Dye Hives     Does fine if he uses benadryl prior.            Physical Exam:   Vitals were reviewed   , Blood pressure (!) 50/38, pulse 98, temperature 97.4  F (36.3  C), temperature source Axillary, resp.  rate 17, weight 69.3 kg (152 lb 12.5 oz), SpO2 97 %.    Wt Readings from Last 3 Encounters:   03/18/22 69.3 kg (152 lb 12.5 oz)   03/15/22 69.9 kg (154 lb)   03/11/22 70.1 kg (154 lb 9.6 oz)       Intake/Output Summary (Last 24 hours) at 3/18/2022 1003  Last data filed at 3/17/2022 1400  Gross per 24 hour   Intake 250 ml   Output --   Net 250 ml       GENERAL APPEARANCE: extremely frail  HEENT:  On BiPAP  RESP: Diminish. Sound of bipap.   CV: RRR, nl S1/S2  ABDOMEN: soft, NT.  EXTREMITIES/SKIN: no rashes/lesions on observed skin; no edema  NEURO: very lethargic. Open eyes to voice.          Data:     CBC RESULTS:     Recent Labs   Lab 03/18/22  0610 03/17/22  0418 03/16/22  1055   WBC 7.3 7.3 8.6   RBC 3.27* 3.12* 3.74*   HGB 10.9* 10.5* 12.6*   HCT 35.5* 34.0* 40.5   * 101* 119*       Basic Metabolic Panel:  Recent Labs   Lab 03/18/22  0836 03/18/22  0826 03/18/22  0610 03/18/22  0605 03/18/22  0036 03/17/22  2246 03/17/22  2214 03/17/22  0602 03/17/22  0417 03/16/22  2123 03/16/22  1152   NA  --   --  134  --   --   --  135  --  133  --  134   POTASSIUM  --   --  3.9  --   --   --  3.7  --  3.4  --  3.4   CHLORIDE  --   --  100  --   --   --  99  --  97  --  98   CO2  --   --  28  --   --   --  29  --  29  --  29   BUN  --   --  26  --   --   --  24  --  21  --  16   CR  --   --  4.55*  --   --   --  4.31*  --  3.40*  --  2.72*   GLC 84 81 95 93 104* 127* 201*  178*   < > 111*   < > 131*   CHRISTINE  --   --  8.4*  --   --   --  8.4*  --  8.0*  --  8.6    < > = values in this interval not displayed.       INRNo lab results found in last 7 days.   Attestation:   I have reviewed today's relevant vital signs, notes, medications, labs and imaging.    Brady Kelsey MD  [facility] - Nephrology  Office phone :[phone]  Pager: [phone]

## 2022-03-18 NOTE — PROGRESS NOTES
Potassium   Date Value Ref Range Status   03/18/2022 3.9 3.4 - 5.3 mmol/L Final   07/05/2021 5.3 3.4 - 5.3 mmol/L Final     Hemoglobin   Date Value Ref Range Status   03/18/2022 10.9 (L) 13.3 - 17.7 g/dL Final   07/05/2021 12.0 (L) 13.3 - 17.7 g/dL Final     Creatinine   Date Value Ref Range Status   03/18/2022 4.55 (H) 0.66 - 1.25 mg/dL Final   07/05/2021 5.03 (H) 0.66 - 1.25 mg/dL Final     Urea Nitrogen   Date Value Ref Range Status   03/18/2022 26 7 - 30 mg/dL Final   07/05/2021 39 (H) 7 - 30 mg/dL Final     Sodium   Date Value Ref Range Status   03/18/2022 134 133 - 144 mmol/L Final   07/05/2021 140 133 - 144 mmol/L Final     INR   Date Value Ref Range Status   02/11/2022 1.23 (H) 0.85 - 1.15 Final   10/15/2020 1.31 (H) 0.86 - 1.14 Final     Results for MALDONADO LÓPEZ (MRN [id number]) as of 3/18/2022 16:49   Ref. Range 2/14/2022 08:45   Hep B Surface Agn Latest Ref Range: Nonreactive  Nonreactive   Hepatitis B Surface Antibody Latest Ref Range: >=12.00 m[IU]/mL 1.14 (L)     DIALYSIS PROCEDURE NOTE  Hepatitis status of previous patient on machine log was checked and verified ok to use with this patients hepatitis status.  Patient dialyzed for 3.5 hrs. on a K3 bath with a net fluid removal of  0L.  A BFR of 300 ml/min was obtained via a Left AVF using 16 gauge needles.      The treatment plan was discussed with Dr. Kelsey during the treatment.    Total heparin received during the treatment: 0 units.   Needle cannulation sites held x 20 min.       Meds  given: Hectorol 12 mcg   Complications: none      Person educated: Patient. Knowledge base substancial. Barriers to learning: lethargy. Educated on procedure via verbal mode. Patient verbalized understanding. Pt prefers verbal education style.     ICEBOAT? Timeout performed pre-treatment  I: Patient was identified using 2 identifiers  C:  Consent Signed Yes  E: Equipment preventative maintenance is current and dialysis delivery system OK to use  B:see note above  O:  Dialysis orders present and complete prior to treatment  A: Vascular access verified and assessed prior to treatment  T: Treatment was performed at a clinically appropriate time  ?: Patient was allowed to ask questions and address concerns prior to treatment  See flowsheet in EPIC for further details and post assessment.  Machine water alarm in place and functioning. Transducer pods intact and checked every 15min.   Chlorine/Chloramine water system checked every 4 hours.  Outpatient Dialysis at [facility]    Please remove patient dressing on AVF and AVG needle sites 24 hours after dialysis. If leaking occurs please apply a Band-Aid.

## 2022-03-18 NOTE — PROGRESS NOTES
[facility]  Palliative Care Daily Progress Note       Recommendations & Counseling       Continue present level of cares     Pt seeking information today about what it looks like to be on comfort measures and how to proceed with a compassionate bipap removal. We talked extensively about the process, the medications we use (dilaudid, ativan), and expected prognosis (measured in hours to 1-2 days)     Whether bipap is removed or not, I did share with Art and his friend Lynn today that I believe he has multi organ system failure and that he is dying. He agrees with this. I shared with him that no intervention is likely to stop this process at this time. Unfortunately, any further intervention could lead to further discomfort/suffering     Pt has a friend/health care agent, Maria Dolores, who is currently in [place]. She has been contacted and encouraged to come to [place]. She is attempting to get a flight for tomorrow, Saturday 3/19. It seems like it may be important for Art to be able to see her    Unfortunately, I prepared Art that the coming hours will be very uncertain. I prepared him that further decline is very likely. It is unclear to me if he will remain stable enough to see Maria Dolores in the coming days     Art is undecided about going to the ICU for pressors. I advised and recommended against it, as an ICU stay will not change his overall outcome or trajectory. As of right now, his BP is generally better and thus he will defer decision making about this for now    Should Art become unconscious and lose his ability to make complex medical decisions for himself, Maria Dolores would serve in this role as surrogate decision maker. Art has told me, his friend Lynn, and  Theresa today that in this circumstance, he would want to shift the priority of his care toward comfort and move forward with a compassionate bipap removal     Should we proceed with a compassionate bipap removal in the coming days, highly  recommend premedication with dilaudid 0.3-0.5mg IV and ativan 0.5mg IV 10 minutes before bipap removal, also recommend robinul 0.2mg about 30 minutes before bipap. Remove bipap to room air. RN instructed to have additional dilaudid and ativan boluses every 10 minutes PRN for air hunger, pain, anxiety. If further direction is needed, can contact on call palliative MD, Dr. Narvaez, this evening/weekend. She can be reached at [phone]    Appreciate spiritual health support from nanette Cardenas. She is providing a blessing/communion      Case was reviewed with nursing staff and Dr. Mayers.    MARCIO [facility]  Contact information available via [facility] Paging/Directory      Thank you for the opportunity to participate in the care of this patient and family. Our team: will continue to follow.     During regular M-F work hours ([phone]) -- if you are not sure who specifically to contact -- please contact us in [facility] Smart Web.     After regular work hours and on weekends/holidays, you can call our answering service at [phone].     Attestation:  Total time on the floor involved in the patient's care: 45 minutes  Total time spent in counseling/care coordination: >50% spent in counseling goals of care, symptom management in setting of HFrEF, ESRD on HD, acute on chronic hypoxic and hypercapnic respiratory failure      Assessments          Westley Ness is a 76 year old male with PMH significant for ischemic cardiomyopathy- EF 25- 30%, s/p ICD, ESRD on HD MWF, DM2, GERD, atrial flutter/fibrillation on chronic anticoagulation with apixaban, and chronic hypotension. Recent admissions to hospital on 2/11/22 with COVID-19 and discharged 2/15. He returned to hospital on 2/17 due to worsening SOB and O2 sats in the 70s and hypotension with CHF exacerbation -was discharged to TCU. He suffered a fall at TCU and was seen in ED on 3/09 with dehydration, hypotension.  Art was  transported to ED from his dialysis center on 3/16 due to confusion and generalized weakness and was ultimately admitted for acute on chronic hypoxic and hypercapnic respiratory failure. He is now on BiPAP support for his breathing. This is his third hospitalization over the past 3 months. Palliative care has followed closely during last hospital stay, well known to my colleague, Lou SANCHEZ.     Today, the patient was seen for:  Goals of care, symptom management in setting of HFrEF, ESRD on HD, acute on chronic hypoxic and hypercapnic respiratory failure     Visited with Art, along with friend Lynn, and  Theresa Allen. Art is nice and alert, conversant. He is wondering about what it would look like to remove bipap.     We acknowledge that his lungs are shutting down, in addition to multiple organs working suboptimally (heart, kidneys). I express worry that he is dying. No intervention or outcome is likely to change this trajectory. He ultimately agrees.     We talked extensively about the compassionate bipap removal process, the medications we use (dilaudid, ativan), and expected prognosis (measured in hours to 1-2 days). Prepared Art and Lynn that he would die in the hospital.     Art has a friend/health care agent, Maria Dolores, who is currently in [place]. She has been contacted and encouraged to come to [place]. She is attempting to get a flight for tomorrow, Saturday 3/19. It seems like it may be important for Art to be able to see her. Unfortunately, I prepared Art that the coming hours will be very uncertain. I prepared him that further decline is very likely. It is unclear to me if he will remain stable enough to see Maria Dolores in the coming days.     Art was able to clearly state that should he become unconscious and lose his ability to make complex medical decisions for himself, Maria Dolores would serve in this role as surrogate decision maker. Art has told me, his friend Lynn, and chaplain Cardenas today  that in this circumstance, he would want to shift the priority of his care toward comfort and move forward with a compassionate bipap removal.     Art is undecided about going to the ICU for pressors. I advised and recommended against it, as an ICU stay will not change his overall outcome or trajectory. As of right now, his BP is generally better and thus he will defer decision making about this for now.     Dialysis RN then entered. They will attempt dialysis this afternoon as best as possible. Art understands that treatment will stop if his body cannot tolerate it.     Prognosis, Goals, or Advance Care Planning was addressed today with: Yes.  Mood, coping, and/or meaning in the context of serious illness were addressed today: Yes.  Summary/Comments: Appreciate spiritual health support from  Sarah. She is providing a blessing/communion.             Interval History:     Chart review/discussion with unit or clinical team members:   Bipap dependent. Hypotensive.     Per patient or family/caregivers today:  Art is wondering about comfort measures, bipap removal, tolerance to dialysis, pressor support.     Key Palliative Symptoms:  # Pain severity the last 12 hours: none  # Dyspnea severity the last 12 hours: none  # Nausea severity the last 12 hours: none  # Anxiety severity the last 12 hours: none    Patient is on opioids: bowels not assessed today.           Review of Systems:     Besides above, an additional N/A system ROS was reviewed and is unremarkable          Medications:     I have reviewed this patient's medication profile and medications during this hospitalization.    Noted meds:    Midodrine 10mg PO TID   Soulmedrol 4mg IV daily   Zosyn            Physical Exam:   Temp: 97.3  F (36.3  C) Temp src: Axillary BP: (!) 82/55 Pulse: 98   Resp: 17 SpO2: 97 % O2 Device: BiPAP/CPAP    CONSTITUTIONAL: Chronically ill elderly man seen resting in bed in NAD, bipap in place, A&Ox3. Calm and cooperative.  Friend at bedside   HEENT: NCAT  RESPIRATORY: NL respiratory effort on bipap    NEUROLOGIC: Appropriately responsive during interview  PSYCH: Affect engaged, congruent            Data Reviewed:     Recent imaging reviewed, my comments on pertinents:   Results for orders placed or performed during the hospital encounter of 03/16/22   Head CT w/o contrast    Impression    IMPRESSION:  1.  Progressed left frontal acute and chronic sinusitis.  2.  Interval opacification of the right middle ear and mastoids.  3.  Interval mild left mastoid opacification.  4.  Stable mild to moderate presumed chronic small vessel ischemic  change and generalized volume loss.    Radiation dose for this scan was reduced using automated exposure  control, adjustment of the mA and/or kV according to patient size, or  iterative reconstruction technique    SALLY OCHOA MD         SYSTEM ID:  [id number]   XR Chest Port 1 View    Impression    IMPRESSION: Stable cardiomegaly. Opacification of the left lung base  shows a mild degree of improved aeration medially. Some persistent  airspace disease is a possibility at this location along with stable  small left pleural fluid. Trace right base pleural fluid also again  noted. Bilateral interstitial and vascular prominence may be  stable-appearing pulmonary edema.    MELISA HEWITT MD         SYSTEM ID:  [id number]       Recent lab data reviewed, my comments on pertinents:   Na 134  K 3.9  Creat 4.55  Albumin 2.3  Alk phos 152  WBC 7.3  Hgb 10.9  Plt 106

## 2022-03-18 NOTE — PROVIDER NOTIFICATION
Provider notified via amcom:     Pt's BPs keep tanking to the 50s. Bg currently 63. Dextrose Inj will be given. What do you recommend?    Provider:     Inform RRT, have them evaluate pt.

## 2022-03-18 NOTE — PROGRESS NOTES
[facility]    Medicine Progress Note - Hospitalist Service    Date of Admission:  3/16/2022    Assessment & Plan        Westley Ness is a 76 year old male with past medical history of HFrEF with EF 25-30%, s/p ICD/PPM, Afib, ESRD on HD M/W/F, hypotension and recent hospitalization for COVID 19 (2/17-3/8) who is presenting from dialysis center due to confusion and generalized weakness. Admitted for acute on chronic hypoxic and hypercapnic resp failure     Acute on chronic hypoxic and hypercapnic respiratory failure  Heart failure with reduced ejection fraction  Ischemic cardiomyopathy s/p ICD placement.   NSTEMI   Presents from HD due to confusion and hypotension. Hypotensive in the 70s. VBG shows pH of 7.24, pCO2 80, pO222. N-terminal proBNP 381756 (>175K on 2/17/22). Troponin 1683.  EKG a.fib. patient is without chest pain. Chest x-ray shows stable cardiomegaly, opacification of the left lung shows a mild degree of improvement aeration medially, some persistent airspace disease is a possibility at the location along with stable left pleural effusion.  Bilateral interstitial and vascular prominence may be stable appearing pulmonary edema.  Patient has issue with hypotension with bp as low as in the 80s, and on midodrine. Most recent bp from progress note on on 3/15 was 81/59.  Patient otherwise does not have complaint of chest pain.   TTE 2/17/2022-LVEF 25-30%, severe large area of akinesis to dyskinesis disease of the apical inferior wall, mid mid anteroseptal wall and entire apex, the right ventricle is moderately dilated, severe decreased right ventricular systolic function.  - continue with bipap  - given that he wants to see how he does in the next day, continue restorative care  - heparin drip initiated, defer to cardiology in setting of NSTEMI  - serial troponins fairly flat, ? Type II NSTEMI  - cardiology consulted, appreciate their assistance  - wean bipap as able  -  empirically on vancomycin/zosyn, although no definite infection noted to this point              - checked MRSA nasal swab - negative --> discontinue vanc  - [facility] admission, wean bipap as able, most recent repeat VBG showed improving CO2  - goals of care as noted below     Coronary artery disease with ischemic cardiomyopathy:   OM1 stenting in October 2020 was last intervention.  Known subtotal occlusion of LAD with infarct.  As above with elevated troponin and patient does not have chest pain.   - heparin as above  - cardiology consult     Atrial fibrillation/flutter, history of pacemaker placement and ICD  - hold eliquis while he is on heparin, consider restarting Eliquis when off BiPAP     Acute metabolic encephalopathy  - suspect this is due to elevated CO2  - Improved with BiPAP  - monitor     End-stage renal disease on hemodialysis  - had full run dilaysis on 3/16.  - Nephrology consulted.     Hypotension  Has issues with hypotension and uses Midodrine on HD and non dialysis days. Cortisol check last admission was 22.    - recently taken off anti-hypertensive meds.  - midodrine when able to take oral.  - SBP most recently per progress note has been in the 80s.  - consider albumin if SBP <80.     Hypothyroidism  Hyperlipidemia  - Resume PO meds once off BiPAP.     Osteoarthritis  Per chart review, patient chronic on 5mg PO steroids, resume once off BiPAP.      Goals of care  Patient clearly stated he does not wish cpr or intubation. However, he will like to continue current treatment and see how he does in the next day. Maria Dolores, health care agent, also was on the phone during admitting providers discussion with patient. She supports what ever decision patient makes. Palliative care consulted, appreciate their assistance. Unable to discuss with Maria Dolores today 3/18.     Recovered COVID- hospitalized from 2/11-2/15, then returned to hospital with shortness of breath requiring hospital stay form  2/17-3/08/22.         Diet: NPO for Medical/Clinical Reasons Except for: No Exceptions    DVT Prophylaxis: Heparin   Velarde Catheter: Not present  Central Lines: PRESENT     Cardiac Monitoring: ACTIVE order. Indication: Acute decompensated heart failure (48 hours)  Code Status: No CPR- Do NOT Intubate      Disposition Plan   Expected Discharge: 03/20/2022     Anticipated discharge location: other (comment) (TCU)           The patient's care was discussed with the Bedside Nurse, Patient and Patient's Family.    Samy Mayers MD  Hospitalist Service  [facility]  Securely message with the Vocera Web Console (learn more here)  Text page via [employer] Paging/Directory         Clinically Significant Risk Factors Present on Admission                 ______________________________________________________________________    Interval History   Care assumed today. Awake and conversing while on bipap. BP remain soft.  Wishes for R wrist tape to be swapped out. No new pain or fevers otherwise. May not be able to tolerate HD given BP.    Data reviewed today: I reviewed all medications, new labs and imaging results over the last 24 hours. I personally reviewed no images or EKG's today.    Physical Exam   Vital Signs: Temp: 97.3  F (36.3  C) Temp src: Axillary BP: (!) 82/55 Pulse: 98   Resp: 17 SpO2: 97 % O2 Device: BiPAP/CPAP    Weight: 152 lbs 12.46 oz    Gen: NAD, pleasant, elderly, frail  HEENT: EOMI, MMM  Resp: bipap sounds, no focal crackles,  no wheezes, no increased work of resp  CV: S1S2 heard, reg rhythm, reg rate  Abdo: soft, nontender, nondistended, bowel sounds present  Ext: calves nontender, well perfused  Neuro: awake and alert, CN grossly intact, no facial asymmetry      Data   Recent Labs   Lab 03/18/22  1123 03/18/22  0836 03/18/22  0826 03/18/22  0610 03/17/22  2246 03/17/22  2214 03/17/22  0602 03/17/22  0418 03/17/22  0417 03/16/22  1152 03/16/22  1055   WBC  --   --   --  7.3  --   --    --  7.3  --   --  8.6   HGB  --   --   --  10.9*  --   --   --  10.5*  --   --  12.6*   MCV  --   --   --  109*  --   --   --  109*  --   --  108*   PLT  --   --   --  106*  --   --   --  101*  --   --  119*   NA  --   --   --  134  --  135  --   --  133   < >  --    POTASSIUM  --   --   --  3.9  --  3.7  --   --  3.4   < >  --    CHLORIDE  --   --   --  100  --  99  --   --  97   < >  --    CO2  --   --   --  28  --  29  --   --  29   < >  --    BUN  --   --   --  26  --  24  --   --  21   < >  --    CR  --   --   --  4.55*  --  4.31*  --   --  3.40*   < >  --    ANIONGAP  --   --   --  6  --  7  --   --  7   < >  --    CHRISTINE  --   --   --  8.4*  --  8.4*  --   --  8.0*   < >  --    GLC 81 84 81 95   < > 201*  178*   < >  --  111*   < >  --    ALBUMIN  --   --   --  2.3*  --   --   --   --  2.4*   < >  --    PROTTOTAL  --   --   --  4.9*  --   --   --   --  4.9*   < >  --    BILITOTAL  --   --   --  1.2  --   --   --   --  1.6*   < >  --    ALKPHOS  --   --   --  152*  --   --   --   --  149   < >  --    ALT  --   --   --  26  --   --   --   --  30   < >  --    AST  --   --   --  16  --   --   --   --  16   < >  --     < > = values in this interval not displayed.     No results found for this or any previous visit (from the past 24 hour(s)).

## 2022-03-18 NOTE — PROVIDER NOTIFICATION
Provider notified via amcom:     Pt's last MAP was 46 and 54. BPs still low. What do you recommend?    Provider:     Give albumin infusion.

## 2022-03-18 NOTE — PLAN OF CARE
Pt. Alert and oriented x3, lethargic at times. Hypotensive Mid 70/40s, MAP below 50s. Albumin running @125 for 2hrs. Slight improvement with MAP since albumin. On BiPAP @50% FiO2. BG 63 @HS. Dextrose 50% given via peripheral IV. Current . Vitals otherwise stable. Lung sounds diminished. Shallow breathing. Assist of 2 w/ lift. NPO diet. Turn & repo. Bowel sounds audible. No BM on shift. Anuric-hemodialysis. Skin: Multiple skin tears, scabs and bruises scattered.  Redness on scrotum & buttocks. Denies pain or nausea. Labs: VBGs done, Unchanged. Palliative consult today. Tele: A-fib.

## 2022-03-18 NOTE — CODE/RAPID RESPONSE
[facility]    House NORMA RRT Note  3/17/2022   Time Called: 2147    RRT called for: Hypotension, hypoglycemia    Assessment & Plan     Asymptomatic acute on chronic hypotension suspect multifactorial 2/2 ischemic cardiomyopathy, ESRD, NPO status.  Hypoglycemia suspect 2/2 NPO status.  Alternatively considered sepsis   Acute on chronic hypoxic hypercapnic respiratory failure suspect multifactorial 2/2 HFrEF, ESRD, recovered COVID 19.   - Upon arrival, pt lying in bed, eyes closed, on Bipap, in no overt distress, with noted SBP 70s-80s, MAPs 50s-60s.  Pt quickly opens eyes to voice, able to converse without difficulty, following commands, asking appropriate questions.  Pt warm, pink, dry, no mottling noted.  Pt's HR low 100s, RR 10s, afebrile.  Pt reports no chest pain, SOB (notes improved from previous), nausea, diaphoresis.  Nursing notes pt recently had a soft, brown stool; no concern for GIB noted.       INTERVENTIONS:  - Stat VBG, BMP, Mg  - Pending VBG results, could trial HFNC   - If pt able to tolerate HFNC, will attempt to administer PTA midodrine   - Noted D10 infusion ordered; however, pt currently not receiving.  Will resume at 25 ml/hr in setting of pt requiring D50 pushes for recent hypoglycemia  - If pt continues to have ongoing hypotension (SBP < 80 or MAP < 50), will judiciously administer albumin  - Will give 2g Mg now  - Troponin remains flat (downtrending)  - Will have RT adjust Bipap setting per VBG results  - Heparin infusion recently discontinued; had been on Eliquis PTA.  If respiratory status continues to worsen/not improve and pt remains on restorative pathway, may need to consider CT PE study    At the end of the RRT pt resting in bed, on Bipap therapy, MAPs > 50s, SBP 80s, in no overt distress    Addendum: 0214: Paged by nursing as pt's MAPs 40s-50s.  Noted pt's SBP 60s-80s.  Will judiciously administer 12.5 g albumin 5% now over 2 hours.      Discussed with and  defer further cares to nursing and hospitalist    Interval History     Westley Ness is a 76 year old male who was admitted on 3/16/2022 for confusion and hypotension.    Medical history significant for: HFrEF, a-fib s/p PPM, ischemic cardiomyopathy s/p ICD, ESRD on HD, hypotension, CAD, hypothyroidism, HLP, OA    Code Status: No CPR- Do NOT Intubate    Allergies   Allergies   Allergen Reactions     Contrast Dye Hives     Does fine if he uses benadryl prior.       Physical Exam   Vital Signs with Ranges:  Temp:  [97.4  F (36.3  C)-97.8  F (36.6  C)] 97.7  F (36.5  C)  Pulse:  [66-96] 93  Resp:  [4-18] 12  BP: (69-89)/(41-61) 85/59  FiO2 (%):  [35 %-50 %] 50 %  SpO2:  [87 %-100 %] 99 %  I/O last 3 completed shifts:  In: 382.5 [I.V.:382.5]  Out: -     Constitutional: Pt lying in bed, resting, on Bipap, in no overt distress  Neck: No upper airway wheezes or stridor noted  Pulmonary: In on apparent respiratory distress, clear to auscultation BUL, diminished BLL, no obvious crackles or wheezes noted  Cardiovascular: Mildly tachycardic, irregular rate and rhythm, normal S1S2, no murmur, rub or gallop onted  GI: Round, soft, nondistended, nontender to palpation, no guarding or rebound tenderness noted  Skin/Integumen: Warm, dry, pink, scattered ecchymoses throughout, no obvious mottling noted  Neuro: Opens eyes to voice, PERRL, follows commands, clear speech, no focal neuro deficits noted  Psych:  Calm  Extremities: Trace BLE edema noted    Data       CBC with Diff:  Recent Labs   Lab Test 03/17/22  0418 02/12/22  0737 02/11/22  1647   WBC 7.3   < > 7.5   HGB 10.5*   < > 12.5*   *   < > 111*   *   < > 112*   INR  --   --  1.23*    < > = values in this interval not displayed.        Comprehensive Metabolic Panel:  Recent Labs   Lab 03/17/22  2246 03/17/22  2214 03/17/22  0602 03/17/22  0417   NA  --  135  --  133   POTASSIUM  --  3.7  --  3.4   CHLORIDE  --  99  --  97   CO2  --  29  --  29   ANIONGAP  --   7  --  7   * 201*  178*   < > 111*   BUN  --  24  --  21   CR  --  4.31*  --  3.40*   GFRESTIMATED  --  14*  --  18*   CHRISTINE  --  8.4*  --  8.0*   MAG  --  1.6  --   --    PROTTOTAL  --   --   --  4.9*   ALBUMIN  --   --   --  2.4*   BILITOTAL  --   --   --  1.6*   ALKPHOS  --   --   --  149   AST  --   --   --  16   ALT  --   --   --  30    < > = values in this interval not displayed.       Recent Labs   Lab 03/17/22  2214 03/17/22  1235 03/16/22  2146   PHV 7.28* 7.29* 7.30*   PO2V 30 41 25   PCO2V 63* 64* 66*   HCO3V 30* 31* 32*     Recent Labs   Lab 03/17/22  2246 03/17/22  2214 03/17/22  2119 03/17/22  1752   * 201*  178* 63* 93       Time Spent on this Encounter   I spent 20 minutes on the unit/floor managing the care of Westley Ness. Over 50% of my time was spent counseling the patient and/or coordinating care regarding services listed in this note.    MARCIO Su [facility] NORMA

## 2022-03-18 NOTE — PROGRESS NOTES
"SPIRITUAL HEALTH SERVICES Progress Note  [facility]    Saw pt Art per palliative consult. Art is familiar to me from a previous hospitalization. On this visit, his friend, Lynn, was present. Palliative provider, Hannah, arrived near the end of our visit.    Illness Narrative - Art named that he wasn't doing very well today. He reflected on some decisions related to goals of care, and stated that \"it's not much of a decision.\" He shared that one option will \"give him two or three more days.\" He acknowledged that he's nearing the \"end of the story.\"    Distress - Art is hoping to speak with his friend, Maria Dolores, before making any final decisions. He would like to speak to her in person, as it's difficult to speak/hear over the phone with his mask. She is trying to make travel arrangements to [place] from [place].    Coping - Art finds Communion meaningful and asked what options were available to him since he is NPO. Together, we considered a Communion blessing, which was provided.    Meaning-Making - Art said, \"I know it's almost time - but not quite yet. I just want to go peacefully.\"    I offered emotional and spiritual support through reflective conversation, prayer, and validation of Art's experience of his illness/hospitalization.    Plan - Will continue to follow. Spiritual Health remains available, as needed.    Theresa Cope  Associate   Phone: [phone]   "

## 2022-03-19 NOTE — PROGRESS NOTES
[facility]    Medicine Progress Note - Hospitalist Service    Date of Admission:  3/16/2022    Assessment & Plan        Westley Ness is a 76 year old male with past medical history of HFrEF with EF 25-30%, s/p ICD/PPM, Afib, ESRD on HD M/W/F, hypotension and recent hospitalization for COVID 19 (2/17-3/8) who is presenting from dialysis center due to confusion and generalized weakness. Admitted for acute on chronic hypoxic and hypercapnic resp failure     Acute on chronic hypoxic and hypercapnic respiratory failure  Heart failure with reduced ejection fraction  Ischemic cardiomyopathy s/p ICD placement.   NSTEMI   Presents from HD due to confusion and hypotension. Hypotensive in the 70s. VBG shows pH of 7.24, pCO2 80, pO222. N-terminal proBNP 063861 (>175K on 2/17/22). Troponin 1683.  EKG a.fib. patient is without chest pain. Chest x-ray shows stable cardiomegaly, opacification of the left lung shows a mild degree of improvement aeration medially, some persistent airspace disease is a possibility at the location along with stable left pleural effusion.  Bilateral interstitial and vascular prominence may be stable appearing pulmonary edema.  Patient has issue with hypotension with bp as low as in the 80s, and on midodrine. Most recent bp from progress note on on 3/15 was 81/59.  Patient otherwise does not have complaint of chest pain.   TTE 2/17/2022-LVEF 25-30%, severe large area of akinesis to dyskinesis disease of the apical inferior wall, mid mid anteroseptal wall and entire apex, the right ventricle is moderately dilated, severe decreased right ventricular systolic function.  - continue with bipap  - given that he wants to see how he does in the next day, continue restorative care  - heparin drip initiated, defer to cardiology in setting of NSTEMI  - serial troponins fairly flat, ? Type II NSTEMI  - cardiology consulted, appreciate their assistance  - wean bipap as able  -  empirically on vancomycin/zosyn, although no definite infection noted to this point              - checked MRSA nasal swab - negative --> discontinue vanc  - [facility] admission, wean bipap as able, most recent repeat VBG showed improving CO2  - goals of care as noted below  - 3/19 stabilized to some degree and now on 2L nasal cannula     Coronary artery disease with ischemic cardiomyopathy:   OM1 stenting in October 2020 was last intervention.  Known subtotal occlusion of LAD with infarct.  As above with elevated troponin and patient does not have chest pain.   - heparin as above, now returning to PO apixaban  - cardiology consult     Atrial fibrillation/flutter, history of pacemaker placement and ICD  - hold eliquis while he is on heparin, consider restarting Eliquis when off BiPAP  -- 3/19 off bipap, stable for now on NC, resumed PTA apixaban     Acute metabolic encephalopathy  - suspect this is due to elevated CO2  - Improved with BiPAP previously and now on 1-2L NC - mentating more clearly and responding appropriately to questions  - monitor     End-stage renal disease on hemodialysis  - had full run dilaysis on 3/16.  - Nephrology consulted.     Hypotension  Has issues with hypotension and uses Midodrine on HD and non dialysis days. Cortisol check last admission was 22.    - recently taken off anti-hypertensive meds.  - midodrine when able to take oral.  - SBP most recently per progress note has been in the 80s.  - consider albumin if SBP <80.     Hypothyroidism  Hyperlipidemia  - Resume PO meds once off BiPAP.     Osteoarthritis  Per chart review, patient chronic on 5mg PO steroids, resume once off BiPAP.      Goals of care  Patient clearly stated he does not wish cpr or intubation. However, he will like to continue current treatment and see how he does in the next day. Maria Dolores, health care agent, also was on the phone during admitting providers discussion with patient. She supports what ever decision patient  "makes. Palliative care consulted, appreciate their assistance.   - Palliative discussed with patient afternoon 3/18 - concerns that his body is shutting down and organ systems failing. Hospitalist discussed goals of care and ultimately he was unable to determine if he was okay with escalation of care such as ICU and vasopressors.  Palliative care discussed with him, as well as hospitalist, that it seemed unlikely that he would improve overall if he would need ICU/pressors and that it would be unlikely to change longterm outcome.    - as above, 3/19 he is on NC, awake, and stable for the time being. Will continue to discuss and monitor closely. Discussed with RN.     Recovered COVID- hospitalized from 2/11-2/15, then returned to hospital with shortness of breath requiring hospital stay form 2/17-3/08/22.        Diet: Regular Diet Adult    DVT Prophylaxis: DOAC  Velarde Catheter: Not present  Central Lines: PRESENT     Cardiac Monitoring: ACTIVE order. Indication: Acute decompensated heart failure (48 hours)  Code Status: No CPR- Do NOT Intubate      Disposition Plan   Expected Discharge: 03/21/2022     Anticipated discharge location: other (comment) (TCU)    Delays:            The patient's care was discussed with the Bedside Nurse and Patient.    Samy Mayers MD  Hospitalist Service  [facility]  Securely message with the Vocera Web Console (learn more here)  Text page via [employer] Paging/Directory         Clinically Significant Risk Factors Present on Admission                 ______________________________________________________________________    Interval History   Seen and examined this afternoon. Somewhat surprisingly doing ok on nasal cannula 2L.  Awake and alert. Denies new pain, sob, or fevers.  Awaiting arrival of his \"friend\" Maria Dolores who is flying in from [place] tonight/early AM 3/20.    Data reviewed today: I reviewed all medications, new labs and imaging results over the last " 24 hours. I personally reviewed no images or EKG's today.    Physical Exam   Vital Signs: Temp: 97.4  F (36.3  C) Temp src: Axillary BP: (!) 83/52 Pulse: 101   Resp: 9 SpO2: 98 % O2 Device: Oxymask Oxygen Delivery: 5 LPM  Weight: 152 lbs 12.46 oz    Gen: NAD, pleasant, elderly, frail  HEENT: EOMI, MMM  Resp: no focal crackles,  no wheezes, no increased work of resp  CV: S1S2 heard, reg rhythm, reg rate  Abdo: soft, nontender, nondistended, bowel sounds present  Ext: calves nontender, well perfused  Neuro: aaox3, CN grossly intact, no facial asymmetry      Data   Recent Labs   Lab 03/19/22  2206 03/19/22  1821 03/19/22  1437 03/19/22  1021 03/19/22  0916 03/19/22  0528 03/18/22  0826 03/18/22  0610 03/17/22  2246 03/17/22  2214 03/17/22  0602 03/17/22  0418 03/17/22  0417   WBC  --   --   --   --   --  7.4  --  7.3  --   --   --  7.3  --    HGB  --   --   --   --   --  10.6*  --  10.9*  --   --   --  10.5*  --    MCV  --   --   --   --   --  105*  --  109*  --   --   --  109*  --    PLT  --   --   --   --   --  101*  --  106*  --   --   --  101*  --    NA  --   --   --  137  --   --   --  134  --  135  --   --  133   POTASSIUM  --   --   --  3.3*  --   --   --  3.9  --  3.7  --   --  3.4   CHLORIDE  --   --   --  102  --   --   --  100  --  99  --   --  97   CO2  --   --   --  29  --   --   --  28  --  29  --   --  29   BUN  --   --   --  13  --   --   --  26  --  24  --   --  21   CR  --   --   --  3.27*  --   --   --  4.55*  --  4.31*  --   --  3.40*   ANIONGAP  --   --   --  6  --   --   --  6  --  7  --   --  7   CHRISTINE  --   --   --  8.4*  --   --   --  8.4*  --  8.4*  --   --  8.0*   * 147* 129* 108*   < >  --    < > 95   < > 201*  178*   < >  --  111*   ALBUMIN  --   --   --  2.3*  --   --   --  2.3*  --   --   --   --  2.4*   PROTTOTAL  --   --   --   --   --   --   --  4.9*  --   --   --   --  4.9*   BILITOTAL  --   --   --   --   --   --   --  1.2  --   --   --   --  1.6*   ALKPHOS  --   --   --   --    --   --   --  152*  --   --   --   --  149   ALT  --   --   --   --   --   --   --  26  --   --   --   --  30   AST  --   --   --   --   --   --   --  16  --   --   --   --  16    < > = values in this interval not displayed.     No results found for this or any previous visit (from the past 24 hour(s)).

## 2022-03-19 NOTE — PLAN OF CARE
Goal Outcome Evaluation:    A&O x4. VSS, BP 97/69, 1L NC. Tele: A-fib CVR. Midline patent, D10 infusing at 25mL/hr. IV zosyn. LS: diminished BS: audible. -void, +bm brown formed. T/R. Up A-2 lift. Tolerating regular diet. Plan for RAFY Whitten to arrive early tomorrow to address care plan with pt and team.

## 2022-03-19 NOTE — PROGRESS NOTES
A&O x4. VSS with hypotension in 80's and 90's, map >60. BiPap at 16/7 40%. Tele: A-fib CVR. Midline patent, D10 infusing at 25mL/hr. IV zosyn infused as ordered. LD: diminished BS: audible. -void, +bm smear. T/R. Up A-2 lift. Dialysis completed early in shift.

## 2022-03-20 NOTE — PROGRESS NOTES
[facility]    Medicine Progress Note - Hospitalist Service    Date of Admission:  3/16/2022    Assessment & Plan        Westley Ness is a 76 year old male with past medical history of HFrEF with EF 25-30%, s/p ICD/PPM, Afib, ESRD on HD M/W/F, hypotension and recent hospitalization for COVID 19 (2/17-3/8) who is presenting from dialysis center due to confusion and generalized weakness. Admitted for acute on chronic hypoxic and hypercapnic resp failure     Acute on chronic hypoxic and hypercapnic respiratory failure  Heart failure with reduced ejection fraction  Ischemic cardiomyopathy s/p ICD placement.   NSTEMI   Presents from HD due to confusion and hypotension. Hypotensive in the 70s. VBG shows pH of 7.24, pCO2 80, pO222. N-terminal proBNP 988495 (>175K on 2/17/22). Troponin 1683.  EKG a.fib. patient is without chest pain. Chest x-ray shows stable cardiomegaly, opacification of the left lung shows a mild degree of improvement aeration medially, some persistent airspace disease is a possibility at the location along with stable left pleural effusion.  Bilateral interstitial and vascular prominence may be stable appearing pulmonary edema.  Patient has issue with hypotension with bp as low as in the 80s, and on midodrine. Most recent bp from progress note on on 3/15 was 81/59.  Patient otherwise does not have complaint of chest pain.   TTE 2/17/2022-LVEF 25-30%, severe large area of akinesis to dyskinesis disease of the apical inferior wall, mid mid anteroseptal wall and entire apex, the right ventricle is moderately dilated, severe decreased right ventricular systolic function.  - continue with bipap  - given that he wants to see how he does in the next day, continue restorative care  - heparin drip initiated, defer to cardiology in setting of NSTEMI  - serial troponins fairly flat, ? Type II NSTEMI  - cardiology consulted, appreciate their assistance  - wean bipap as able  -  empirically on vancomycin/zosyn, although no definite infection noted to this point              - checked MRSA nasal swab - negative --> discontinue vanc  - [facility] admission, wean bipap as able, most recent repeat VBG showed improving CO2  - goals of care as noted below  - 3/19 & 3/20 stabilized to some degree and now on 2L nasal cannula     Coronary artery disease with ischemic cardiomyopathy:   OM1 stenting in October 2020 was last intervention.  Known subtotal occlusion of LAD with infarct.  As above with elevated troponin and patient does not have chest pain.   -Previously on heparin infusion, now resumed p.o. apixaban 3/19 as he was off BiPAP  - cardiology consulted previously     Atrial fibrillation/flutter, history of pacemaker placement and ICD  - hold eliquis while he is on heparin, consider restarting Eliquis when off BiPAP  -- 3/19 off bipap, stable for now on NC, resumed PTA apixaban     Acute metabolic encephalopathy  - suspect this is due to elevated CO2  - Improved with BiPAP previously and now on 1-2L NC - mentating more clearly and responding appropriately to questions  - monitor     End-stage renal disease on hemodialysis  - had full run dilaysis on 3/16.  - Nephrology consulted.  -3/20-myself and nephrologist discussed with patient regarding overall very poor prognosis and concerns by shutting down, he however, insists on trying to get better and wishes to proceed with dialysis if possible on Monday.     Hypotension  Has issues with hypotension and uses Midodrine on HD and non dialysis days. Cortisol check last admission was 22.    - recently taken off anti-hypertensive meds.  - midodrine when able to take oral.  - SBP most recently per progress note has been in the 80s.  - consider albumin if SBP <80.     Hypothyroidism  Hyperlipidemia  - Resume PO meds once off BiPAP.     Osteoarthritis  Per chart review, patient chronic on 5mg PO steroids, resume once off BiPAP.      Goals of care  Patient  "clearly stated he does not wish cpr or intubation. However, he will like to continue current treatment and see how he does in the next day. Maria Dolores, health care agent, also was on the phone during admitting providers discussion with patient. She supports what ever decision patient makes. Palliative care consulted, appreciate their assistance.   - Palliative discussed with patient afternoon 3/18 - concerns that his body is shutting down and organ systems failing. Hospitalist discussed goals of care and ultimately he was unable to determine if he was okay with escalation of care such as ICU and vasopressors.  Palliative care discussed with him, as well as hospitalist, that it seemed unlikely that he would improve overall if he would need ICU/pressors and that it would be unlikely to change longterm outcome.    - as above, 3/19 he is on NC, awake, and stable for the time being. Will continue to discuss and monitor closely. Discussed with RN.  -3/20-again, discussed with patient overall poor prognosis and concerns that his organ systems are failing.  Offered a comfort care approach after daughter arrives but he quite clearly wishes to \"try to get better\" and proceed with dialysis if possible tomorrow.     Recovered COVID- hospitalized from 2/11-2/15, then returned to hospital with shortness of breath requiring hospital stay form 2/17-3/08/22.        Diet: Regular Diet Adult    DVT Prophylaxis: DOAC  Velarde Catheter: Not present  Central Lines: PRESENT     Cardiac Monitoring: ACTIVE order. Indication: Acute decompensated heart failure (48 hours)  Code Status: No CPR- Do NOT Intubate      Disposition Plan   Expected Discharge: 03/22/2022     Anticipated discharge location: other (comment) (TCU)    Delays:            The patient's care was discussed with the Bedside Nurse and Patient. And nephrologist consultant.    Samy Mayers MD  Hospitalist Service  [facility]  Securely message with the " "[employer] Web Console (learn more here)  Text page via [facility] Paging/Directory         Clinically Significant Risk Factors Present on Admission             # Overweight: Estimated body mass index is 25.04 kg/m  as calculated from the following:    Height as of 3/15/22: 1.727 m (5' 8\").    Weight as of this encounter: 74.7 kg (164 lb 11.2 oz).      ______________________________________________________________________    Interval History   Patient seen and examined midday.  Initially little more sleepy than yesterday but upon further questioning and discussion he is awake and alert and oriented appropriately.  To summarize, he is quite clear that he is not interested in comfort cares only at this time.  He wishes to get better and is hopeful to get dialysis tomorrow.  He denies any fever/chills, shortness of breath or new pain    Data reviewed today: I reviewed all medications, new labs and imaging results over the last 24 hours. I personally reviewed no images or EKG's today.    Physical Exam   Vital Signs: Temp: 97.3  F (36.3  C) Temp src: Oral BP: (!) 84/58 Pulse: 85   Resp: 11 SpO2: (!) 81 % O2 Device: Nasal cannula Oxygen Delivery: 1 LPM  Weight: 164 lbs 11.2 oz    Gen: NAD, pleasant, elderly frail  HEENT: EOMI, MMM  Resp: no crackles,  no wheezes, no increased work of resp  CV: S1S2 heard, reg rhythm, reg rate  Abdo: soft, nontender, nondistended, bowel sounds present  Ext: calves nontender, well perfused  Neuro: aaox3, CN grossly intact, no facial asymmetry      Data   Recent Labs   Lab 03/20/22  0510 03/20/22  0218 03/19/22  2206 03/19/22  1437 03/19/22  1021 03/19/22  0916 03/19/22  0528 03/18/22  0826 03/18/22  0610   WBC 9.6  --   --   --   --   --  7.4  --  7.3   HGB 10.8*  --   --   --   --   --  10.6*  --  10.9*   *  --   --   --   --   --  105*  --  109*   *  --   --   --   --   --  101*  --  106*     --   --   --  137  --   --   --  134   POTASSIUM 3.7  --   --   --  3.3*  --   --   -- "  3.9   CHLORIDE 101  --   --   --  102  --   --   --  100   CO2 30  --   --   --  29  --   --   --  28   BUN 23  --   --   --  13  --   --   --  26   CR 4.17*  --   --   --  3.27*  --   --   --  4.55*   ANIONGAP 7  --   --   --  6  --   --   --  6   CHRISTINE 9.2  --   --   --  8.4*  --   --   --  8.4*   * 151* 186*   < > 108*   < >  --    < > 95   ALBUMIN 2.3*  --   --   --  2.3*  --   --   --  2.3*   PROTTOTAL 5.5*  --   --   --   --   --   --   --  4.9*   BILITOTAL 1.6*  --   --   --   --   --   --   --  1.2   ALKPHOS 166*  --   --   --   --   --   --   --  152*   ALT 25  --   --   --   --   --   --   --  26   AST 17  --   --   --   --   --   --   --  16    < > = values in this interval not displayed.     No results found for this or any previous visit (from the past 24 hour(s)).

## 2022-03-20 NOTE — PROGRESS NOTES
A&O x4 forgetful at times, VSS on 1L NC. Soft B/P's in 80's and 90's. Tele: A_fib CVR. Pain denied. Midline patent, IV zosyn infused as ordered. D10 infusing at 25mL/hr. Blood sugars stable. LS: diminished. BS: audible. -void, +bm. Up A-2 lift.

## 2022-03-20 NOTE — PROVIDER NOTIFICATION
Paged hospitalist: Pt has regular diet and is eating now. Can we d/c q4 checks and s/s and get before meal/HS checks and coverage?   Awaiting reply.

## 2022-03-20 NOTE — PLAN OF CARE
Goal Outcome Evaluation:  A&O x4, lethargic today and tend to fall asleep easily. VSS, /77, 1L NC. Tele: A-fib CVR. Midline patent, D10 infusing at 25mL/hr.BS: audible. -void, +bm brown loose. T/R. Up A-2 lift. Tolerating regular diet, but decreased appetite. Plan for RAFY Whitten to participate tomorrow to address care plan with pt and team.

## 2022-03-20 NOTE — PROGRESS NOTES
Notes, labs, vitals reviewed.    Pt has stitches in his LAF access at the lower aneurysms.  Good thrill/bruit    1.  ESKD.  Pt ran on Friday, but unable to pull fluid due to low BP.  Will try alb prime tmrw to see if it helps, if he has dialysis.  Orders placed in case he does have it tmrw.  Keep stitches in place for now.  2.  FTT.  Pt is being followed closely by Palliative and discussions have been held re potentially moving to comfort care, which is appropriate.  There will be a discussion today.

## 2022-03-21 NOTE — PROGRESS NOTES
Assessment and Plan:   ESRD: Admitted with confusion and hypotension. Runs MWF at the [facility]. Running today, getting hectorol during the run. On oral midodrine. No heparin - pt on eliquis.     Labs show K 3.9, HCO3 28, Cr 5.22. alb 2.3.     Set for 3 h, 350 BFR, 0-1 L UF, LAF. K protocol, 35 HCO3 and 140 Na.             Interval History:   CHF:   S/P ICD, ASCVD.  LV EF 25-30%. Afib.    Anemia: Hgb 10.8.            Review of Systems:   Somnolent. Nurses note no pain, + coughing. Decreased appetite.            Medications:       - MEDICATION INSTRUCTIONS for Dialysis Patients -   Does not apply See Admin Instructions     apixaban ANTICOAGULANT  2.5 mg Oral BID     insulin aspart  1-3 Units Subcutaneous TID AC     insulin aspart  1-3 Units Subcutaneous At Bedtime     methylPREDNISolone  4 mg Intravenous Daily     midodrine  10 mg Oral TID w/meals     - MEDICATION INSTRUCTIONS -   Does not apply Once     sodium chloride (PF)  10 mL Intracatheter Q8H     sodium chloride (PF)  3 mL Intracatheter Q8H     sodium chloride (PF)  3 mL Intracatheter Q8H       dextrose 10% 25 mL/hr at 03/21/22 0044     - MEDICATION INSTRUCTIONS -       - MEDICATION INSTRUCTIONS -       - MEDICATION INSTRUCTIONS -       - MEDICATION INSTRUCTIONS -       Current active medications and PTA medications reviewed, see medication list for details.            Physical Exam:   Vitals were reviewed  Patient Vitals for the past 24 hrs:   BP Temp Temp src Pulse Resp SpO2 Weight   03/21/22 1230 94/48 -- -- 82 14 99 % --   03/21/22 1215 95/53 -- -- 78 12 100 % --   03/21/22 1200 94/66 -- -- 80 13 98 % --   03/21/22 1145 90/80 -- -- 93 11 90 % --   03/21/22 1140 90/80 -- -- 88 14 91 % --   03/21/22 1130 102/58 -- -- 82 13 (!) 88 % --   03/21/22 1115 115/74 97.5  F (36.4  C) Axillary 94 11 91 % --   03/21/22 1000 111/70 -- -- 97 14 93 % --   03/21/22 0910 -- -- -- 93 16 94 % --   03/21/22 0900 -- -- -- -- -- (!) 87 % --   03/21/22 0800  93/59 -- -- 85 13 99 % --   22 0723 -- 98.3  F (36.8  C) Axillary -- -- -- --   22 0600 106/61 -- -- 85 13 99 % --   22 0400 116/55 97.3  F (36.3  C) Oral 82 12 100 % --   22 0222 -- -- -- -- -- -- 73.6 kg (162 lb 4.8 oz)   22 0200 104/58 -- -- 90 14 93 % --   22 0000 107/77 -- -- 92 13 97 % --   22 2200 97/75 -- -- 92 12 96 % --   22 2100 -- 97.6  F (36.4  C) Oral -- -- -- --   22 2000 116/82 -- -- 92 13 96 % --   22 1800 101/77 -- -- 86 11 93 % --   22 1600 90/52 -- -- 79 13 96 % --   22 1536 -- 97.2  F (36.2  C) Oral -- -- -- --   22 1400 113/77 -- -- 86 14 94 % --       Temp:  [97.2  F (36.2  C)-98.3  F (36.8  C)] 97.5  F (36.4  C)  Pulse:  [78-97] 82  Resp:  [11-16] 14  BP: ()/(48-82) 94/48  SpO2:  [87 %-100 %] 99 %    Temperatures:  Current - Temp: 97.5  F (36.4  C); Max - Temp  Av.6  F (36.4  C)  Min: 97.2  F (36.2  C)  Max: 98.3  F (36.8  C)  Respiration range: Resp  Av.9  Min: 11  Max: 16  Pulse range: Pulse  Av.2  Min: 78  Max: 97  Blood pressure range: Systolic (24hrs), Av , Min:90 , Max:116   ; Diastolic (24hrs), Av, Min:48, Max:82    Pulse oximetry range: SpO2  Av.7 %  Min: 87 %  Max: 100 %    I/O last 3 completed shifts:  In: 240 [P.O.:240]  Out: -       Intake/Output Summary (Last 24 hours) at 3/21/2022 1235  Last data filed at 3/21/2022 0800  Gross per 24 hour   Intake 990 ml   Output --   Net 990 ml     Somnolent, gasping respirations  LAF with needles in place         Wt Readings from Last 4 Encounters:   22 73.6 kg (162 lb 4.8 oz)   03/15/22 69.9 kg (154 lb)   22 70.1 kg (154 lb 9.6 oz)   22 69.9 kg (154 lb)          Data:          Lab Results   Component Value Date     2022     2022     2022     2021     2021     2021      Lab Results   Component Value Date    CHLORIDE 101 2022    CHLORIDE  101 03/20/2022    CHLORIDE 102 03/19/2022    CHLORIDE 106 07/05/2021    CHLORIDE 105 03/19/2021    CHLORIDE 106 03/18/2021    Lab Results   Component Value Date    BUN 30 03/21/2022    BUN 23 03/20/2022    BUN 13 03/19/2022    BUN 39 07/05/2021    BUN 23 03/19/2021    BUN 31 03/18/2021      Lab Results   Component Value Date    POTASSIUM 3.9 03/21/2022    POTASSIUM 3.7 03/20/2022    POTASSIUM 3.3 03/19/2022    POTASSIUM 5.3 07/05/2021    POTASSIUM 3.8 03/19/2021    POTASSIUM 3.5 03/18/2021    Lab Results   Component Value Date    CO2 28 03/21/2022    CO2 30 03/20/2022    CO2 29 03/19/2022    CO2 25 07/05/2021    CO2 28 03/19/2021    CO2 27 03/18/2021    Lab Results   Component Value Date    CR 5.22 03/21/2022    CR 4.17 03/20/2022    CR 3.27 03/19/2022    CR 5.03 07/05/2021    CR 4.03 03/19/2021    CR 4.90 03/18/2021        Recent Labs   Lab Test 03/20/22  0510 03/19/22  0528 03/18/22  0610   WBC 9.6 7.4 7.3   HGB 10.8* 10.6* 10.9*   HCT 35.1* 32.9* 35.5*   * 105* 109*   * 101* 106*     Recent Labs   Lab Test 03/20/22  0510 03/18/22  0610 03/17/22  0417 07/24/21  1217 03/19/21  0601   AST 17 16 16   < > 13   ALT 25 26 30   < > 21   GGT  --   --   --   --  88*   ALKPHOS 166* 152* 149   < > 341*   BILITOTAL 1.6* 1.2 1.6*   < > 1.2    < > = values in this interval not displayed.       Recent Labs   Lab Test 03/17/22  2214 02/22/22  1744 01/11/21  0911   MAG 1.6 1.7 1.9     Recent Labs   Lab Test 03/21/22  0527 03/19/22  1021 03/02/22  0736   PHOS 5.2* 2.8 3.9     Recent Labs   Lab Test 03/21/22  0527 03/20/22  0510 03/19/22  1021   CHRISTINE 8.9 9.2 8.4*       Lab Results   Component Value Date    CHRISTINE 8.9 03/21/2022     Lab Results   Component Value Date    WBC 9.6 03/20/2022    HGB 10.8 (L) 03/20/2022    HCT 35.1 (L) 03/20/2022     (H) 03/20/2022     (L) 03/20/2022     Lab Results   Component Value Date     03/21/2022    POTASSIUM 3.9 03/21/2022    CHLORIDE 101 03/21/2022    CO2 28  03/21/2022     (H) 03/21/2022     Lab Results   Component Value Date    BUN 30 03/21/2022    CR 5.22 (H) 03/21/2022     Lab Results   Component Value Date    MAG 1.6 03/17/2022     Lab Results   Component Value Date    PHOS 5.2 (H) 03/21/2022       Creatinine   Date Value Ref Range Status   03/21/2022 5.22 (H) 0.66 - 1.25 mg/dL Final   03/20/2022 4.17 (H) 0.66 - 1.25 mg/dL Final   03/19/2022 3.27 (H) 0.66 - 1.25 mg/dL Final   03/18/2022 4.55 (H) 0.66 - 1.25 mg/dL Final   03/17/2022 4.31 (H) 0.66 - 1.25 mg/dL Final   03/17/2022 3.40 (H) 0.66 - 1.25 mg/dL Final   07/05/2021 5.03 (H) 0.66 - 1.25 mg/dL Final   03/19/2021 4.03 (H) 0.66 - 1.25 mg/dL Final   03/18/2021 4.90 (H) 0.66 - 1.25 mg/dL Final   03/17/2021 3.99 (H) 0.66 - 1.25 mg/dL Final   01/14/2021 4.55 (H) 0.66 - 1.25 mg/dL Final   01/13/2021 3.42 (H) 0.66 - 1.25 mg/dL Final       Attestation:  I have reviewed today's vital signs, notes, medications, labs and imaging.  Seen on dialysis.      Jah Hammond MD

## 2022-03-21 NOTE — PROGRESS NOTES
SPIRITUAL HEALTH SERVICES Progress Note  [facility]    REFERRAL SOURCE: palliative/care conference    Attended care conference for Art. Those present included: Art, his friend and healthcare agent - Maria Dolores, eva Blake, and Dr. Mayers.    Art was able to name that his goal is to continue with dialysis until he is no longer able to, at which point, he would likely transition to comfort care. He and Maria Dolores asked many clarifying questions regarding comfort care and expressed understanding that it is care focused on keeping Art as comfortable as possible when the time comes that dialysis is no longer an option.    Art also stated for the group that he does not wish to go on BiPAP again.    Maria Dolores asked about holding his bed at [facility]. It was affirmed that his bed should be held, acknowledging that he may likely return to the hospital setting should his health decline again.    I summarized the meeting for Art's friend, Marsha, via telephone - following the group's discussion and remained for emotional support and processing with Art and Maria Dolores.      PLAN: Will continue to follow. Spiritual Health remains available, as needed.    Theresa Cope  Associate   Phone: [phone]

## 2022-03-21 NOTE — PROGRESS NOTES
[facility]  Palliative Care Daily Progress Note       Recommendations & Counseling       Goals of care discussion with Art, healthcare agent Maria Dolores, palliative care APRN and , and Dr. Mayers.    Art states that he does not want to have BiPAP used again for respiratory support.      Art does wish to continue hemodialysis at this time if nephrology will still consider administration.  He is agreeable to discontinuing dialysis once he is too unstable medically to receive dialysis or his nephrologist believe it is unsafe to administer.    Art will return to TCU with hemodialysis scheduled.  He understands it is likely his condition will decompensate again and he will return to hospital. It is likely he would be placed on comfort care at that time.    Code Status.  -No Intubation  -No CPR  -No BIPAP     Case was reviewed with Dr Talib Correia, [facility]  Contact information available via [facility] Paging/Directory        Thank you for the opportunity to participate in the care of this patient and family. Our team: will continue to follow.     During regular M-F work hours ([phone]) -- if you are not sure who specifically to contact -- please contact us in [facility] Smart Web.     After regular work hours and on weekends/holidays, you can call our answering service at [phone].     Attestation:  Total time on the floor involved in the patient's care reviewing condition, goals of care, speaking with DARIUS Whitten on the phone, discussing case with staff member, MD : 35 minutes  Total time spent in counseling/care coordination: >50%    Time in addition to above E/M time is 30 minutes in room with patient from 2 PM - 2:30 PM, DARIUS Whitten, palliative team APRN/, and Dr Mayers discussing current condition, goals of care, comfort focused care, plan of care for discharge.    Total time: 65 minutes     Assessments          Westley Ness is a 76 year  old male with PMH significant for ischemic cardiomyopathy- EF 25- 30%, s/p ICD, ESRD on HD MWF, DM2, GERD, atrial flutter/fibrillation on chronic anticoagulation with apixaban, and chronic hypotension, . Recent admissions to hospital on 2/11/22 with COVID-19 and discharged 2/15. He returned to hospital on 2/17 due to worsening SOB and O2 sats in the 70s and hypotension with CHF exacerbation -was discharged to TCU. He suffered a fall at TCU and was seen in ED on 3/09 with dehydration, hypotension.  Art was transported to ED from his dialysis center on 3/16 due to confusion and generalized weakness and was ultimately admitted for acute on chronic hypoxic and hypercapnic respiratory failure. He is now on BiPAP support for his breathing. This is his third hospitalization over the past 3 months. Palliative care has followed closely during last hospital stay.    Today, the patient was seen for:  Goals of care, respiratory failure, ESRD on HD.    Prognosis, Goals, or Advance Care Planning was addressed today with: Yes    Mood, coping, and/or meaning in the context of serious illness were addressed today: Yes.  Summary/Comments:  from palliative services offered support to Lois.            Interval History:     Chart review/discussion with unit or clinical team members:   Dr. Mayers spoke with nephrology who are agreeable to offering hemodialysis to Art at this time.  He is stable enough for discharge and will be going back to his previous TCU.  He will likely experience decompensation and return to hospital and at that point could be placed on comfort care at that time.    Per patient or family/caregivers today:  Healthcare agent Maria Dolores states that she will be returning to [place] on Wednesday.  She thought Art would be going on comfort care after she arrived here, however, Art's condition improved over the past few days and it appears he may be able to leave the hospital for TCU again. We  discussed how it is highly likely that his medical condition will worsen again, as it did previously. and he will return to hospital. When this happens Bill would like to go on comfort care at that time. He does not wish to have BiPAP respiratory support again.     Key Palliative Symptoms:  We are not helping to manage these symptoms currently in this patient.    Patient is on opioids: bowels not assessed today.           Review of Systems:     Besides above, an additional  system ROS was reviewed and is unremarkable          Medications:     I have reviewed this patient's medication profile and medications during this hospitalization.    Noted meds:      - MEDICATION INSTRUCTIONS for Dialysis Patients -   Does not apply See Admin Instructions     apixaban ANTICOAGULANT  2.5 mg Oral BID     insulin aspart  1-3 Units Subcutaneous TID AC     insulin aspart  1-3 Units Subcutaneous At Bedtime     methylPREDNISolone  4 mg Intravenous Daily     midodrine  10 mg Oral TID w/meals     sodium chloride (PF)  10 mL Intracatheter Q8H     sodium chloride (PF)  3 mL Intracatheter Q8H     sodium chloride (PF)  3 mL Intracatheter Q8H     acetaminophen **OR** acetaminophen, artificial tears ophthalmic solution, glucose **OR** dextrose **OR** glucagon, lidocaine 4%, lidocaine (buffered or not buffered), lidocaine (buffered or not buffered), lidocaine (buffered or not buffered), melatonin, nitroGLYcerin, - MEDICATION INSTRUCTIONS -, ondansetron **OR** ondansetron, - MEDICATION INSTRUCTIONS -, - MEDICATION INSTRUCTIONS -, prochlorperazine **OR** prochlorperazine **OR** prochlorperazine, senna-docusate **OR** senna-docusate, sodium chloride (PF), sodium chloride (PF), sodium chloride (PF)             Physical Exam:   Temp: 97.8  F (36.6  C) Temp src: Oral BP: 100/69 Pulse: 101   Resp: 8 SpO2: 95 % O2 Device: Nasal cannula Oxygen Delivery: 3 LPM  GENERAL:  Alert, fatigued, no distress, on HD.  HEAD: Normocephalic atraumatic  SCLERA:  Anicteric  EXTREMITIES: Warm; bilateral LE edema  ABDOMEN: rounded  RESPIRATORY: Breathing non labored  NEUROLOGIC: Alert.  PSYCH: Calm, cooperative.           Data Reviewed:     Recent imaging reviewed, my comments on pertinents:   Results for orders placed or performed during the hospital encounter of 03/16/22   Head CT w/o contrast    Impression    IMPRESSION:  1.  Progressed left frontal acute and chronic sinusitis.  2.  Interval opacification of the right middle ear and mastoids.  3.  Interval mild left mastoid opacification.  4.  Stable mild to moderate presumed chronic small vessel ischemic  change and generalized volume loss.    Radiation dose for this scan was reduced using automated exposure  control, adjustment of the mA and/or kV according to patient size, or  iterative reconstruction technique    SALLY OCHOA MD         SYSTEM ID:  [id number]   XR Chest Port 1 View    Impression    IMPRESSION: Stable cardiomegaly. Opacification of the left lung base  shows a mild degree of improved aeration medially. Some persistent  airspace disease is a possibility at this location along with stable  small left pleural fluid. Trace right base pleural fluid also again  noted. Bilateral interstitial and vascular prominence may be  stable-appearing pulmonary edema.    MELISA HEWITT MD         SYSTEM ID:  [id number]     Lab Results   Component Value Date    WBC 9.6 03/20/2022    WBC 7.4 03/19/2022    WBC 7.3 03/18/2022    HGB 10.8 (L) 03/20/2022    HGB 10.6 (L) 03/19/2022    HGB 10.9 (L) 03/18/2022    HCT 35.1 (L) 03/20/2022    HCT 32.9 (L) 03/19/2022    HCT 35.5 (L) 03/18/2022     (L) 03/20/2022     (L) 03/19/2022     (L) 03/18/2022     03/21/2022     03/20/2022     03/19/2022    POTASSIUM 3.9 03/21/2022    POTASSIUM 3.7 03/20/2022    POTASSIUM 3.3 (L) 03/19/2022    CHLORIDE 101 03/21/2022    CHLORIDE 101 03/20/2022    CHLORIDE 102 03/19/2022    CO2 28 03/21/2022    CO2 30 03/20/2022     CO2 29 03/19/2022    BUN 30 03/21/2022    BUN 23 03/20/2022    BUN 13 03/19/2022    CR 5.22 (H) 03/21/2022    CR 4.17 (H) 03/20/2022    CR 3.27 (H) 03/19/2022     (H) 03/21/2022     (H) 03/21/2022     (H) 03/21/2022    DD 1.75 (H) 02/17/2022    DD 0.71 (H) 02/11/2022    DD 0.9 (H) 04/07/2017    NTBNPI 115,907 (H) 03/16/2022    NTBNPI >175,000 (H) 02/17/2022    NTBNPI 150,621 (H) 10/15/2020    TROPONIN 0.022 07/24/2021    TROPI 0.025 07/05/2021    TROPI 0.025 03/17/2021    TROPI 0.029 03/17/2021    AST 17 03/20/2022    AST 16 03/18/2022    AST 16 03/17/2022    ALT 25 03/20/2022    ALT 26 03/18/2022    ALT 30 03/17/2022    GGT 88 (H) 03/19/2021    ALKPHOS 166 (H) 03/20/2022    ALKPHOS 152 (H) 03/18/2022    ALKPHOS 149 03/17/2022    BILITOTAL 1.6 (H) 03/20/2022    BILITOTAL 1.2 03/18/2022    BILITOTAL 1.6 (H) 03/17/2022    INR 1.23 (H) 02/11/2022    INR 1.31 (H) 10/15/2020    INR 0.97 07/22/2019

## 2022-03-21 NOTE — PROGRESS NOTES
Potassium   Date Value Ref Range Status   03/21/2022 3.9 3.4 - 5.3 mmol/L Final   07/05/2021 5.3 3.4 - 5.3 mmol/L Final     Hemoglobin   Date Value Ref Range Status   03/20/2022 10.8 (L) 13.3 - 17.7 g/dL Final   07/05/2021 12.0 (L) 13.3 - 17.7 g/dL Final     Creatinine   Date Value Ref Range Status   03/21/2022 5.22 (H) 0.66 - 1.25 mg/dL Final   07/05/2021 5.03 (H) 0.66 - 1.25 mg/dL Final     Urea Nitrogen   Date Value Ref Range Status   03/21/2022 30 7 - 30 mg/dL Final   07/05/2021 39 (H) 7 - 30 mg/dL Final     Sodium   Date Value Ref Range Status   03/21/2022 137 133 - 144 mmol/L Final   07/05/2021 140 133 - 144 mmol/L Final     INR   Date Value Ref Range Status   02/11/2022 1.23 (H) 0.85 - 1.15 Final   10/15/2020 1.31 (H) 0.86 - 1.14 Final       DIALYSIS PROCEDURE NOTE  Hepatitis status of previous patient on machine log was checked and verified ok to use with this patients hepatitis status.  Patient dialyzed for 3 hrs. on a K3 bath with a net fluid removal of  1L.  A BFR of 350 ml/min was obtained via a LAF using 16 gauge needles.      The treatment plan was discussed with Dr. Hammond during the treatment.    Total heparin received during the treatment: 0 units.   Needle cannulation sites held x 40 min.       Meds  given: 5% Albumin 250ml IV prime. Hectoral 12mcg IV   Complications: NONE      Person educated: pt. Knowledge base substantial. Barriers to learning: none. Educated on diet via verbal mode. Patient verbalized understanding. Pt prefers verbal education style.     ICEBOAT? Timeout performed pre-treatment  I: Patient was identified using 2 identifiers  C:  Consent Signed Yes  E: Equipment preventative maintenance is current and dialysis delivery system OK to use  B: Hepatitis B Surface Antigen: unknown; Draw Date: 3/21/22      Hepatitis B Surface Antibody: unknown; Draw Date: 3/21/22  O: Dialysis orders present and complete prior to treatment  A: Vascular access verified and assessed prior to  treatment  T: Treatment was performed at a clinically appropriate time  ?: Patient was allowed to ask questions and address concerns prior to treatment  See flowsheet in EPIC for further details and post assessment.  Machine water alarm in place and functioning. Transducer pods intact and checked every 15min.   Pt dialyzed in his own room.  Chlorine/Chloramine water system checked every 4 hours.  Outpatient Dialysis at [facility]    *Please remove patient dressing on AVF and AVG needle sites 24 hours after dialysis. If leaking occurs please apply a Band-Aid.

## 2022-03-21 NOTE — PROGRESS NOTES
A&&O x4, forgetful at times. VSS. Tele: A-fib CVR, 10 beats of SVT. Intermittent congested cough. Pain denied. Midline patent, D10 infusing at 25 mL/hr. LS: dim BS: audible. -void, +bm. Bedfast at this time. Plan for care conference later in day.

## 2022-03-21 NOTE — PROGRESS NOTES
Care Management Follow Up    Length of Stay (days): 5    Expected Discharge Date: 03/23/2022     Concerns to be Addressed: discharge planning     Patient plan of care discussed at interdisciplinary rounds: Yes    Anticipated Discharge Disposition: Transitional Care     Anticipated Discharge Services: None  Anticipated Discharge DME: None    Patient/family educated on Medicare website which has current facility and service quality ratings: no  Education Provided on the Discharge Plan:    Patient/Family in Agreement with the Plan: yes    Referrals Placed by CM/SW:    Private pay costs discussed: Not applicable    Additional Information:  Writer spoke with MD who states patient is good to return back to his facility. Patient has a bed hold at [facility]. Writer called and was unable to leave a message with Admissions. Writer sent a referral via [facility] stating patient is able to return to the facility on 3/22. Writer spoke with Estella in Admissions who states patient can come back around 11. Writer set up transport via [facility] due to patient being weak, supervision for safety in the back of the rig and being assist of 2 with a lift for 10:45am on 3/22. MD Updated       ODETTE Kc

## 2022-03-21 NOTE — PROGRESS NOTES
[facility]    Medicine Progress Note - Hospitalist Service    Date of Admission:  3/16/2022    Assessment & Plan        Westley Ness is a 76 year old male with past medical history of HFrEF with EF 25-30%, s/p ICD/PPM, Afib, ESRD on HD M/W/F, hypotension and recent hospitalization for COVID 19 (2/17-3/8) who is presenting from dialysis center due to confusion and generalized weakness. Admitted for acute on chronic hypoxic and hypercapnic resp failure     Acute on chronic hypoxic and hypercapnic respiratory failure  Heart failure with reduced ejection fraction  Ischemic cardiomyopathy s/p ICD placement  NSTEMI   Presents from HD due to confusion and hypotension. Hypotensive in the 70s. VBG shows pH of 7.24, pCO2 80, pO222. N-terminal proBNP 500199 (>175K on 2/17/22). Troponin 1683.  EKG a.fib. patient is without chest pain. Chest x-ray shows stable cardiomegaly, opacification of the left lung shows a mild degree of improvement aeration medially, some persistent airspace disease is a possibility at the location along with stable left pleural effusion.  Bilateral interstitial and vascular prominence may be stable appearing pulmonary edema.  Patient has issue with hypotension with bp as low as in the 80s, and on midodrine. Most recent bp from progress note on on 3/15 was 81/59.  Patient otherwise does not have complaint of chest pain.   TTE 2/17/2022-LVEF 25-30%, severe large area of akinesis to dyskinesis disease of the apical inferior wall, mid mid anteroseptal wall and entire apex, the right ventricle is moderately dilated, severe decreased right ventricular systolic function.  - continue with bipap  - given that he wants to see how he does in the next day, continue restorative care  - heparin drip initiated, defer to cardiology in setting of NSTEMI  - serial troponins fairly flat, ? Type II NSTEMI  - cardiology consulted, appreciate their assistance  - wean bipap as able  - empirically  on vancomycin/zosyn, although no definite infection noted to this point              - checked MRSA nasal swab - negative --> discontinue vanc  - [facility] admission, wean bipap as able, most recent repeat VBG showed improving CO2  - goals of care as noted below  - 3/19 & 3/20 stabilized to some degree and now on 2L nasal cannula     Coronary artery disease with ischemic cardiomyopathy:   OM1 stenting in October 2020 was last intervention.  Known subtotal occlusion of LAD with infarct.  As above with elevated troponin and patient does not have chest pain.   -Previously on heparin infusion, now resumed p.o. apixaban 3/19 as he was off BiPAP  - cardiology consulted previously     Atrial fibrillation/flutter, history of pacemaker placement and ICD  - hold eliquis while he is on heparin, consider restarting Eliquis when off BiPAP  -- 3/19 - 3/21 off bipap, stable for now on NC, resumed PTA apixaban     Acute metabolic encephalopathy  Likely secondary to acute respiratory acidosis due to hypercapnia (see above)  - suspect this is due to elevated CO2.  Initial ABG showed pH 7.24 and PCO2 of 80.  PO2 22.  - Improved with BiPAP previously and now on 1-2L NC - mentating more clearly and responding appropriately to questions  - monitor - stable / improved 3/19 thru 3/21    End-stage renal disease on hemodialysis  - had full run dilaysis on 3/16.  - Nephrology consulted.  -3/20-myself and nephrologist discussed with patient regarding overall very poor prognosis and concerns by shutting down, he however, insists on trying to get better and wishes to proceed with dialysis if possible on Monday.     Hypotension  Has issues with hypotension and uses Midodrine on HD and non dialysis days. Cortisol check last admission was 22.    - recently taken off anti-hypertensive meds.  - midodrine when able to take oral.  - SBP most recently per progress note has been in the 80s.  - consider albumin if SBP  <80.     Hypothyroidism  Hyperlipidemia  - Resume PO meds once off BiPAP.     Osteoarthritis  Per chart review, patient chronic on 5mg PO steroids, resume once off BiPAP.      Goals of care  - 3/21 -care conference at 2 PM with patient, his friend Maria Dolores (healthcare agent), palliative, hospice, and RN staff as well as his primary nephrologist who has known him for nearly 20 years.  After lengthy discussions, is quite clear that he does not want any BiPAP/CPAP at any time and he understands he would likely not wake up assuming it was from hypercapnia.  Additionally he is not interested in pursuing escalation of care such as ICU admission with central line or vasopressors.  He remains DNR/DNI.  He wishes to continue pursuing dialysis as long as it is offered and he is able to tolerate it.  We have all discussed with him multiple times that seems likely to be soon that he will not be able to make it through dialysis likely secondary to hypotension.  He understands he is a very weak heart among multiple other medical issues.  Plan will be to discharge back to [facility] and continue dialysis in [place] as long as he is able.  He understands and all are in agreement that he is very likely to decompensate in the coming weeks or months and at that time he will plan to pursue comfort care.    Recovered COVID- hospitalized from 2/11-2/15, then returned to hospital with shortness of breath requiring hospital stay form 2/17-3/08/22.        Diet: Regular Diet Adult    DVT Prophylaxis: DOAC  Velarde Catheter: Not present  Central Lines: PRESENT     Cardiac Monitoring: None  Code Status: No CPR- Do NOT Intubate      Disposition Plan   Expected Discharge: 03/22/2022     Anticipated discharge location: other (comment) (TCU)         The patient's care was discussed with the Bedside Nurse, Care Coordinator/, Patient, Patient's Family and Palliative, Nephrology Consultant.    Samy Mayers MD  Hospitalist  [facility]  Securely message with the [employer] Web Console (learn more here)  Text page via [employer] Paging/Directory     Total encounter time 48 minutes with greater than 50% spent in discussion, counseling, and coordination of care with patient, his friend Maria Dolores ([facility]), palliative care consultant, nephrologist, dialysis RN, bedside RN, and CT/SW.  Additional time was spent in chart review and physical exam.    Clinically Significant Risk Factors Present on Admission                    ______________________________________________________________________    Interval History   Patient seen and examined mid afternoon as part of care conference (see above).  Overall nothing new, no shortness of breath worsening, fevers, or new pain.  He is tolerating dialysis so far today.  He is hopeful to continue dialysis and discharge to TCU possibly tomorrow.    Data reviewed today: I reviewed all medications, new labs and imaging results over the last 24 hours. I personally reviewed no images or EKG's today.    Physical Exam   Vital Signs: Temp: 98.3  F (36.8  C) Temp src: Axillary BP: 111/70 Pulse: 97   Resp: 14 SpO2: 93 % O2 Device: Nasal cannula Oxygen Delivery: 1 LPM  Weight: 162 lbs 4.8 oz    Gen: NAD, pleasant, elderly  HEENT: EOMI, MMM  Resp: no focal crackles,  no wheezes, no increased work of resp  CV: S1S2 heard, reg rhythm, reg rate  Abdo: soft, nontender, nondistended, bowel sounds present  Ext: calves nontender, well perfused  Neuro: aaox3, CN grossly intact, no facial asymmetry      Data   Recent Labs   Lab 03/21/22  1604 03/21/22  1129 03/21/22  0527 03/20/22  1752 03/20/22  0510 03/19/22  1437 03/19/22  1021 03/19/22  0916 03/19/22  0528 03/18/22  0826 03/18/22  0610   WBC  --   --   --   --  9.6  --   --   --  7.4  --  7.3   HGB  --   --   --   --  10.8*  --   --   --  10.6*  --  10.9*   MCV  --   --   --   --  108*  --   --   --  105*  --  109*   PLT  --   --   --   --  129*  --    --   --  101*  --  106*   NA  --   --  137  --  138  --  137  --   --   --  134   POTASSIUM  --   --  3.9  --  3.7  --  3.3*  --   --   --  3.9   CHLORIDE  --   --  101  --  101  --  102  --   --   --  100   CO2  --   --  28  --  30  --  29  --   --   --  28   BUN  --   --  30  --  23  --  13  --   --   --  26   CR  --   --  5.22*  --  4.17*  --  3.27*  --   --   --  4.55*   ANIONGAP  --   --  8  --  7  --  6  --   --   --  6   CHRISTINE  --   --  8.9  --  9.2  --  8.4*  --   --   --  8.4*   GLC 93 131* 123*   < > 118*   < > 108*   < >  --    < > 95   ALBUMIN  --   --  2.3*  --  2.3*  --  2.3*  --   --   --  2.3*   PROTTOTAL  --   --   --   --  5.5*  --   --   --   --   --  4.9*   BILITOTAL  --   --   --   --  1.6*  --   --   --   --   --  1.2   ALKPHOS  --   --   --   --  166*  --   --   --   --   --  152*   ALT  --   --   --   --  25  --   --   --   --   --  26   AST  --   --   --   --  17  --   --   --   --   --  16    < > = values in this interval not displayed.     No results found for this or any previous visit (from the past 24 hour(s)).

## 2022-03-22 NOTE — PROGRESS NOTES
Assessment and Plan:   ESRD: long term dialysis patient. Runs MWF. Has LUAF.   Plan dialysis in am. Orders written.               Interval History:   CHF: reduced EF. Afib. ICD  Anemia:                  Review of Systems:   Some SOB. No pain. Notes relatively prolonged bleeding post run yesterday. Will run without heparin. Also some nausea after the run.           Medications:       - MEDICATION INSTRUCTIONS for Dialysis Patients -   Does not apply See Admin Instructions     apixaban ANTICOAGULANT  2.5 mg Oral BID     insulin aspart  1-3 Units Subcutaneous TID AC     insulin aspart  1-3 Units Subcutaneous At Bedtime     methylPREDNISolone  4 mg Intravenous Daily     midodrine  10 mg Oral TID w/meals     sodium chloride (PF)  10 mL Intracatheter Q8H     sodium chloride (PF)  3 mL Intracatheter Q8H     sodium chloride (PF)  3 mL Intracatheter Q8H       dextrose 10% 25 mL/hr at 03/21/22 2016     - MEDICATION INSTRUCTIONS -       - MEDICATION INSTRUCTIONS -       - MEDICATION INSTRUCTIONS -       Current active medications and PTA medications reviewed, see medication list for details.            Physical Exam:   Vitals were reviewed  Patient Vitals for the past 24 hrs:   BP Temp Temp src Pulse Resp SpO2 Weight   03/22/22 1000 114/86 -- -- 93 13 96 % --   03/22/22 0800 98/69 -- -- 101 8 90 % --   03/22/22 0710 -- 97.4  F (36.3  C) Oral -- -- -- --   03/22/22 0600 114/67 -- -- 96 15 95 % 73 kg (161 lb)   03/22/22 0436 -- 97.8  F (36.6  C) Oral -- -- -- --   03/22/22 0400 112/61 -- -- 89 11 95 % --   03/22/22 0200 116/71 -- -- 93 14 93 % --   03/22/22 0000 111/70 -- -- 93 17 98 % --   03/21/22 2200 108/67 -- -- 94 13 97 % --   03/21/22 2001 104/49 98.2  F (36.8  C) Axillary 90 14 99 % --   03/21/22 1800 108/63 -- -- 93 16 99 % --   03/21/22 1700 (!) 98/25 -- -- 90 15 100 % --   03/21/22 1600 116/72 -- -- 89 17 94 % --   03/21/22 1515 100/69 97.8  F (36.6  C) Oral 101 8 95 % --   03/21/22 1500 111/66 -- -- 79 18 94  % --   22 1443 (!) 64/42 -- -- 96 14 95 % --   22 1435 94/79 -- -- 92 14 96 % --   22 1430 (!) 75/58 -- -- 90 12 95 % --   22 1415 95/73 -- -- 93 16 100 % --   22 1400 114/85 -- -- 89 15 100 % --   22 1345 103/77 -- -- 84 13 99 % --   22 1330 111/63 -- -- 92 12 99 % --   22 1315 99/76 -- -- 96 16 99 % --   22 1300 93/54 -- -- 76 11 100 % --   22 1245 93/62 -- -- 80 13 99 % --   22 1230 94/48 -- -- 82 14 99 % --   22 1215 95/53 -- -- 78 12 100 % --   22 1200 94/66 -- -- 80 13 98 % --   22 1145 90/80 -- -- 93 11 90 % --   22 1140 90/80 -- -- 88 14 91 % --       Temp:  [97.4  F (36.3  C)-98.2  F (36.8  C)] 97.4  F (36.3  C)  Pulse:  [] 93  Resp:  [8-18] 13  BP: ()/(25-86) 114/86  SpO2:  [90 %-100 %] 96 %    Temperatures:  Current - Temp: 97.4  F (36.3  C); Max - Temp  Av.8  F (36.6  C)  Min: 97.4  F (36.3  C)  Max: 98.2  F (36.8  C)  Respiration range: Resp  Av.5  Min: 8  Max: 18  Pulse range: Pulse  Av.6  Min: 76  Max: 101  Blood pressure range: Systolic (24hrs), Av , Min:64 , Max:116   ; Diastolic (24hrs), Av, Min:25, Max:86    Pulse oximetry range: SpO2  Av.6 %  Min: 90 %  Max: 100 %    I/O last 3 completed shifts:  In: 1100 [P.O.:200; I.V.:900]  Out: 1000 [Other:1000]      Intake/Output Summary (Last 24 hours) at 3/22/2022 1137  Last data filed at 3/22/2022 0800  Gross per 24 hour   Intake 700 ml   Output 1000 ml   Net -300 ml       Alert, weak  LAF with good pulse and bruit  Lungs with clear ant BS  LE no edema  Cor irreg, nl S1 S2 no M or rub       Wt Readings from Last 4 Encounters:   22 73 kg (161 lb)   03/15/22 69.9 kg (154 lb)   22 70.1 kg (154 lb 9.6 oz)   22 69.9 kg (154 lb)          Data:          Lab Results   Component Value Date     2022     2022     2022     2021     2021     2021     Lab Results   Component Value Date    CHLORIDE 101 03/21/2022    CHLORIDE 101 03/20/2022    CHLORIDE 102 03/19/2022    CHLORIDE 106 07/05/2021    CHLORIDE 105 03/19/2021    CHLORIDE 106 03/18/2021    Lab Results   Component Value Date    BUN 30 03/21/2022    BUN 23 03/20/2022    BUN 13 03/19/2022    BUN 39 07/05/2021    BUN 23 03/19/2021    BUN 31 03/18/2021      Lab Results   Component Value Date    POTASSIUM 3.9 03/21/2022    POTASSIUM 3.7 03/20/2022    POTASSIUM 3.3 03/19/2022    POTASSIUM 5.3 07/05/2021    POTASSIUM 3.8 03/19/2021    POTASSIUM 3.5 03/18/2021    Lab Results   Component Value Date    CO2 28 03/21/2022    CO2 30 03/20/2022    CO2 29 03/19/2022    CO2 25 07/05/2021    CO2 28 03/19/2021    CO2 27 03/18/2021    Lab Results   Component Value Date    CR 5.22 03/21/2022    CR 4.17 03/20/2022    CR 3.27 03/19/2022    CR 5.03 07/05/2021    CR 4.03 03/19/2021    CR 4.90 03/18/2021        Recent Labs   Lab Test 03/22/22  0439 03/20/22  0510 03/19/22  0528   WBC 9.5 9.6 7.4   HGB 11.0* 10.8* 10.6*   HCT 35.6* 35.1* 32.9*   * 108* 105*   * 129* 101*     Recent Labs   Lab Test 03/20/22  0510 03/18/22  0610 03/17/22  0417 07/24/21  1217 03/19/21  0601   AST 17 16 16   < > 13   ALT 25 26 30   < > 21   GGT  --   --   --   --  88*   ALKPHOS 166* 152* 149   < > 341*   BILITOTAL 1.6* 1.2 1.6*   < > 1.2    < > = values in this interval not displayed.       Recent Labs   Lab Test 03/17/22  2214 02/22/22  1744 01/11/21  0911   MAG 1.6 1.7 1.9     Recent Labs   Lab Test 03/21/22  0527 03/19/22  1021 03/02/22  0736   PHOS 5.2* 2.8 3.9     Recent Labs   Lab Test 03/21/22  0527 03/20/22  0510 03/19/22  1021   CHRISTINE 8.9 9.2 8.4*       Lab Results   Component Value Date    CHRISTINE 8.9 03/21/2022     Lab Results   Component Value Date    WBC 9.5 03/22/2022    HGB 11.0 (L) 03/22/2022    HCT 35.6 (L) 03/22/2022     (H) 03/22/2022     (L) 03/22/2022     Lab Results   Component Value Date     03/21/2022     POTASSIUM 3.9 03/21/2022    CHLORIDE 101 03/21/2022    CO2 28 03/21/2022    GLC 98 03/22/2022     Lab Results   Component Value Date    BUN 30 03/21/2022    CR 5.22 (H) 03/21/2022     Lab Results   Component Value Date    MAG 1.6 03/17/2022     Lab Results   Component Value Date    PHOS 5.2 (H) 03/21/2022       Creatinine   Date Value Ref Range Status   03/21/2022 5.22 (H) 0.66 - 1.25 mg/dL Final   03/20/2022 4.17 (H) 0.66 - 1.25 mg/dL Final   03/19/2022 3.27 (H) 0.66 - 1.25 mg/dL Final   03/18/2022 4.55 (H) 0.66 - 1.25 mg/dL Final   03/17/2022 4.31 (H) 0.66 - 1.25 mg/dL Final   03/17/2022 3.40 (H) 0.66 - 1.25 mg/dL Final   07/05/2021 5.03 (H) 0.66 - 1.25 mg/dL Final   03/19/2021 4.03 (H) 0.66 - 1.25 mg/dL Final   03/18/2021 4.90 (H) 0.66 - 1.25 mg/dL Final   03/17/2021 3.99 (H) 0.66 - 1.25 mg/dL Final   01/14/2021 4.55 (H) 0.66 - 1.25 mg/dL Final   01/13/2021 3.42 (H) 0.66 - 1.25 mg/dL Final       Attestation:  I have reviewed today's vital signs, notes, medications, labs and imaging.     Jah Hammond MD

## 2022-03-22 NOTE — PLAN OF CARE
1900h-0700h: Pt AOx4, forgetful at times. O2Sat 97% on 3lpm of O2 via NC. Clear diminished breath sound in UL, RML, LLL & coarse crackle in RLL. Regular diet, very poor appetite. Offered fluids, food/snack but pt refused.  at bed time, no coverage. Anuric. Recent dialysis done 03/21 next on Wed. Fistula w/ bruit & thrill on L arm. Midline on BASIL, D10 infusing at 25 ml/hr. Repositoned to sides every 2h a/ assist of 2, refuses at times. Trace edema on BLE, sensation intact & dorsal pulses palpable.

## 2022-03-22 NOTE — PROGRESS NOTES
[facility]  Palliative Care Daily Progress Note       Recommendations & Counseling       Goals of care discussion in room with Art, healthcare agent Maria Dolores, palliative care MARCIO, RN and Dr. Mayers.    Art is now considering comfort care and discontinuing HD due to it being difficult for him to travel to his dialysis center from [facility]. He feels that continuing dialysis may be too much of a burden. He will discuss with his [facility] Maria Dolores and will make a decision later today or tomorrow. We reviewed dying process for those who decide to stop HD.     We discussed different settings for comfort care: he could start comfort care here at the hospital and if he remains stable discharge to hospice service in a hospice house or LTC setting. If his condition would become unstable he would stay here on inpatient GIP hospice until passing away.    Code Status.  -No Intubation  -No CPR  -No BIPAP     Case was reviewed with Dr Talib Correia, [facility]  Contact information available via [facility] Paging/Directory        Thank you for the opportunity to participate in the care of this patient and family. Our team: will continue to follow.     During regular M-F work hours ([phone]) -- if you are not sure who specifically to contact -- please contact us in [facility] Smart Web.     After regular work hours and on weekends/holidays, you can call our answering service at [phone].     Attestation:  Total time on the floor involved in the patient's care reviewing condition, goals of care, speaking with DARIUS Whitten on the phone, discussing case with staff member, MD : 35 minutes  Total time spent in counseling/care coordination: >50%       Assessments          Westley Ness is a 76 year old male with PMH significant for ischemic cardiomyopathy- EF 25- 30%, s/p ICD, ESRD on HD MWF, DM2, GERD, atrial flutter/fibrillation on chronic anticoagulation with apixaban, and chronic  hypotension, . Recent admissions to hospital on 2/11/22 with COVID-19 and discharged 2/15. He returned to hospital on 2/17 due to worsening SOB and O2 sats in the 70s and hypotension with CHF exacerbation -was discharged to TCU. He suffered a fall at TCU and was seen in ED on 3/09 with dehydration, hypotension.  Art was transported to ED from his dialysis center on 3/16 due to confusion and generalized weakness and was ultimately admitted for acute on chronic hypoxic and hypercapnic respiratory failure. He is now on BiPAP support for his breathing. This is his third hospitalization over the past 3 months. Palliative care has followed closely during last hospital stay.    Today, the patient was seen for:  Goals of care, respiratory failure, ESRD on HD.    Prognosis, Goals, or Advance Care Planning was addressed today with: Yes    Mood, coping, and/or meaning in the context of serious illness were addressed today: Yes.  Summary/Comments:             Interval History:     Chart review/discussion with unit or clinical team members:   Dr Mayers, patient's RN, DARIUS Whitten,  and palliative care APRN discussed updated plan of care as Art has decided he may want comfort care/hospice would like more time to consider.     Per patient or family/caregivers today:  Dr Mayers, patient's RN, DARIUS Whitten, and palliative care APRN met in room.  Art is having second thoughts about continuing on with dialysis at the TCU setting as it is very physically taxing for him to go through the transport to his dialysis center.  He states that he wonders when he will no longer be able to tolerate dialysis or when his last treatment will be-this causes him some anxiety.  He is now starting to consider the possibility that determining his own stop date for hemodialysis may give him more control and decrease his anxiety.  Also discussed how the dying process usually unfolds with patients who stop their dialysis.  Art would likely become more  fatigued and lethargic eventually going into a deep sleep and coma then death.  Under the care of comfort care/hospice he would be treated for any signs of shortness of breath, pain, nausea or other uncomfortable symptoms as they arise.     Art would like to stay here at the hospital with comfort care if he decides on comfort care.  It was explained that if his medical condition on comfort care was medically stable that he may be able to transfer to a hospice facility or long-term care with hospice.  If his condition would be too unstable for transfer he could stay here in hospital with hospice inpatient GIP support.  Art and his healthcare agent Maria Dolores will be discussing his choices over the next few hours.  He states he will come to a decision about comfort care/discontinuation of HD later today or tomorrow morning.    Key Palliative Symptoms:  We are not helping to manage these symptoms currently in this patient.    Patient is on opioids: bowels not assessed today. Not on opioids.           Review of Systems:     Besides above, an additional  system ROS was reviewed and is unremarkable          Medications:     I have reviewed this patient's medication profile and medications during this hospitalization.    Noted meds:      - MEDICATION INSTRUCTIONS for Dialysis Patients -   Does not apply See Admin Instructions     apixaban ANTICOAGULANT  2.5 mg Oral BID     insulin aspart  1-3 Units Subcutaneous TID AC     insulin aspart  1-3 Units Subcutaneous At Bedtime     methylPREDNISolone  4 mg Intravenous Daily     midodrine  10 mg Oral TID w/meals     sodium chloride (PF)  10 mL Intracatheter Q8H     sodium chloride (PF)  3 mL Intracatheter Q8H     sodium chloride (PF)  3 mL Intracatheter Q8H     acetaminophen **OR** acetaminophen, artificial tears ophthalmic solution, glucose **OR** dextrose **OR** glucagon, lidocaine 4%, lidocaine (buffered or not buffered), lidocaine (buffered or not buffered), lidocaine (buffered  or not buffered), melatonin, nitroGLYcerin, - MEDICATION INSTRUCTIONS -, ondansetron **OR** ondansetron, - MEDICATION INSTRUCTIONS -, - MEDICATION INSTRUCTIONS -, prochlorperazine **OR** prochlorperazine **OR** prochlorperazine, senna-docusate **OR** senna-docusate, sodium chloride (PF), sodium chloride (PF), sodium chloride (PF)             Physical Exam:   Temp: 97.4  F (36.3  C) Temp src: Oral BP: 114/86 Pulse: 93   Resp: 13 SpO2: 96 % O2 Device: Nasal cannula Oxygen Delivery: 2 LPM  GENERAL:  Alert, fatigued, no distress, weak.  HEAD: Normocephalic atraumatic  SCLERA: Anicteric  ABDOMEN: rounded  RESPIRATORY: Breathing non labored  NEUROLOGIC: Alert.  PSYCH: Calm, cooperative.           Data Reviewed:     Recent imaging reviewed, my comments on pertinents:   Results for orders placed or performed during the hospital encounter of 03/16/22   Head CT w/o contrast    Impression    IMPRESSION:  1.  Progressed left frontal acute and chronic sinusitis.  2.  Interval opacification of the right middle ear and mastoids.  3.  Interval mild left mastoid opacification.  4.  Stable mild to moderate presumed chronic small vessel ischemic  change and generalized volume loss.    Radiation dose for this scan was reduced using automated exposure  control, adjustment of the mA and/or kV according to patient size, or  iterative reconstruction technique    SALLY OCHOA MD         SYSTEM ID:  [id number]   XR Chest Port 1 View    Impression    IMPRESSION: Stable cardiomegaly. Opacification of the left lung base  shows a mild degree of improved aeration medially. Some persistent  airspace disease is a possibility at this location along with stable  small left pleural fluid. Trace right base pleural fluid also again  noted. Bilateral interstitial and vascular prominence may be  stable-appearing pulmonary edema.    MELISA HEWITT MD         SYSTEM ID:  [id number]     Lab Results   Component Value Date    WBC 9.5 03/22/2022    WBC 9.6  03/20/2022    WBC 7.4 03/19/2022    HGB 11.0 (L) 03/22/2022    HGB 10.8 (L) 03/20/2022    HGB 10.6 (L) 03/19/2022    HCT 35.6 (L) 03/22/2022    HCT 35.1 (L) 03/20/2022    HCT 32.9 (L) 03/19/2022     (L) 03/22/2022     (L) 03/20/2022     (L) 03/19/2022     03/21/2022     03/20/2022     03/19/2022    POTASSIUM 3.9 03/21/2022    POTASSIUM 3.7 03/20/2022    POTASSIUM 3.3 (L) 03/19/2022    CHLORIDE 101 03/21/2022    CHLORIDE 101 03/20/2022    CHLORIDE 102 03/19/2022    CO2 28 03/21/2022    CO2 30 03/20/2022    CO2 29 03/19/2022    BUN 30 03/21/2022    BUN 23 03/20/2022    BUN 13 03/19/2022    CR 5.22 (H) 03/21/2022    CR 4.17 (H) 03/20/2022    CR 3.27 (H) 03/19/2022    GLC 98 03/22/2022     (H) 03/22/2022     (H) 03/21/2022    DD 1.75 (H) 02/17/2022    DD 0.71 (H) 02/11/2022    DD 0.9 (H) 04/07/2017    NTBNPI 115,907 (H) 03/16/2022    NTBNPI >175,000 (H) 02/17/2022    NTBNPI 150,621 (H) 10/15/2020    TROPONIN 0.022 07/24/2021    TROPI 0.025 07/05/2021    TROPI 0.025 03/17/2021    TROPI 0.029 03/17/2021    AST 17 03/20/2022    AST 16 03/18/2022    AST 16 03/17/2022    ALT 25 03/20/2022    ALT 26 03/18/2022    ALT 30 03/17/2022    GGT 88 (H) 03/19/2021    ALKPHOS 166 (H) 03/20/2022    ALKPHOS 152 (H) 03/18/2022    ALKPHOS 149 03/17/2022    BILITOTAL 1.6 (H) 03/20/2022    BILITOTAL 1.2 03/18/2022    BILITOTAL 1.6 (H) 03/17/2022    INR 1.23 (H) 02/11/2022    INR 1.31 (H) 10/15/2020    INR 0.97 07/22/2019

## 2022-03-22 NOTE — PROGRESS NOTES
"Care Management Discharge Note    Discharge Date: 03/22/2022       Discharge Disposition: Transitional Care    Discharge Services: None    Discharge DME: None    Discharge Transportation: health plan transportation    Private pay costs discussed: transportation costs    PAS Confirmation Code:  N/A  Patient/family educated on Medicare website which has current facility and service quality ratings: no    Education Provided on the Discharge Plan:    Persons Notified of Discharge Plans: Patient, nurse, charge nurse, HUC, MD, TCU  Patient/Family in Agreement with the Plan: yes    Handoff Referral Completed: Yes    Additional Information:  Patient discharging at 1045 via stretcher transport. Writer completed PCS, faxed and placed on chart. Writer updated [facility] of plans for discharge. Writer paged MD to sign orders. Writer left  for admissions at [facility] informing them of transport time. Spoke with Estella at [facility] who confirmed transport time.     Addendum 0900: Call received from Maria Dolores, patient's friend/HCD. Maria Dolores explained her frustration regarding moving forward with discharge to TCU and stated that \"this was never part of the plan\" and that she does \"not want patient to discharge today, do not send him\". Maria Dolores explained that patient is also frustrated as TCU was not part of the plan. Maria Dolores mentioned comfort cares and that after patient was going to stop treatment, she though he would be on hospice in the hospice. Writer explained that she will have the MD call her to discuss plan of care further to ensure everyone is on the same page. Writer paged MD asking for him to call the [facility] as soon as possible as this may delay discharge.     Writer spoke with MD who stated he will discuss with patient as he is his own decision maker. MD aware of 1045 ride.       Addendum 1000: Estella at [facility] called asking that they get the orders with the signature of the MD. Writer expressed that MD is aware and is working on it. Writer " paged MD again regarding facility request.     Addendum 1030: Writer spoke with  who confirmed that writer can cancel 1045 ride as plan of care is still being discussed. Writer called  [facility] transport and spoke to Anish and cancelled the 1045 ride. Writer called and left  with admissions at [facility] that the ride for 1045 is bring cancelled.     Addendum 1220: Writer was updated by bedside nurse that if patient decides TCU he would like to see if [facility] has an openings. Writer sent referral. Nurse also indicated that patient/friend may want information on hospice home placement if they decide to go that route. Writer met with patient and maria dolores at bedside. Writer provided hospice list and hospice home. Writer discussed what a hospice home is and locations hospice can be received (LTC, [facility]EE[facility], Home, Hospice home). Maria Doolres expressed they want patient to have comfort care in the hospital if that is the route they went. Writer expressed that if patient is stable for discharge, then we would look for hospice placement and it is ultimately up to the MD to make the determination on if patient is ready for discharge. Patient to continue to discuss plan of care with friends.     Writer updated Estella in admissions that patient is not discharging today and patient is determining if he wants TCU or comfort care/hospice and will follow up with her tomorrow regarding patient's decision.     Addendum 1600: Writer was asked by bedside RN to meet with patient and Maria Dolores to answer questions. Writer met at bedside and answered questions regarding hospice. Writer explained that if patient chose to enroll in comfort cares, that is the act of ending preventative measures and remaining comfortable. Writer explained in order to remain in the hospital, patient could enroll in GIP if qualifies. Writer explained that the MD would have to make a referral for patient to get assessed. Writer explained in great detail  "that she does not make the determination on if patient qualifies for this program. Writer explained that if patient does not meet GIP qualifications and is stable for dischargewe would look for hospice placement in the community. Patient and Maria Dolores are overwhelmed with processing the information they have received today and stated \"we were not informed that patient cannot remain in the hospital on comfort cares\". Writer reiterated that it is up to the MD to determine if patient should remain in the hospital on comfort cares, or would benefit to discharge to the community with hospice in place. Writer explained in great detail, locations of hospice, purpose of hospice and the individuals involved. Maria Dolores expressed her appreciation of  coming back and discussing TCU/Hospice/comfort care further. Then during conversation stated \"I dont remember you discussing this\" while referring to [facility]. Patient and friend are overwhelmed and frustrated with the discharge planning process to determine goals of care.Patient may benefit from another care conference to ensure everyone is agreeable and understanding of the plan of care. Writer to update MD regarding this.     CHRISTOPHER Martinez, [facility]   Social Work   [facility]    "

## 2022-03-22 NOTE — PROGRESS NOTES
[facility]    Medicine Progress Note - Hospitalist Service    Date of Admission:  3/16/2022    Assessment & Plan        Westley Ness is a 76 year old male with past medical history of HFrEF with EF 25-30%, s/p ICD/PPM, Afib, ESRD on HD M/W/F, hypotension and recent hospitalization for COVID 19 (2/17-3/8) who is presenting from dialysis center due to confusion and generalized weakness. Admitted for acute on chronic hypoxic and hypercapnic resp failure     Acute on chronic hypoxic and hypercapnic respiratory failure  Heart failure with reduced ejection fraction  Ischemic cardiomyopathy s/p ICD placement  NSTEMI   Presents from HD due to confusion and hypotension. Hypotensive in the 70s. VBG shows pH of 7.24, pCO2 80, pO2 22. N-terminal proBNP 693516 (>175K on 2/17/22). Troponin 1683.  EKG a.fib. patient is without chest pain. Chest x-ray shows stable cardiomegaly, opacification of the left lung shows a mild degree of improvement aeration medially, some persistent airspace disease is a possibility at the location along with stable left pleural effusion.  Bilateral interstitial and vascular prominence may be stable appearing pulmonary edema.  Patient has issue with hypotension with bp as low as in the 80s, and on midodrine. Most recent bp from progress note on on 3/15 was 81/59.  Patient otherwise does not have complaint of chest pain.   TTE 2/17/2022-LVEF 25-30%, severe large area of akinesis to dyskinesis disease of the apical inferior wall, mid mid anteroseptal wall and entire apex, the right ventricle is moderately dilated, severe decreased right ventricular systolic function.  - continue with bipap  - given that he wants to see how he does in the next day, continue restorative care  - heparin drip initiated, defer to cardiology in setting of NSTEMI  - serial troponins fairly flat, ? Type II NSTEMI  - cardiology consulted, appreciate their assistance  - wean bipap as able  - empirically  on vancomycin/zosyn, although no definite infection noted to this point              - checked MRSA nasal swab - negative --> discontinue vanc  - [facility] admission, wean bipap as able, most recent repeat VBG showed improving CO2  - goals of care as noted below  - 3/19 & 3/20 stabilized to some degree and now on 2L nasal cannula     Coronary artery disease with ischemic cardiomyopathy:   OM1 stenting in October 2020 was last intervention.  Known subtotal occlusion of LAD with infarct.  As above with elevated troponin and patient does not have chest pain.   -Previously on heparin infusion, now resumed p.o. apixaban 3/19 as he was off BiPAP  - cardiology consulted previously     Atrial fibrillation/flutter, history of pacemaker placement and ICD  - hold eliquis while he is on heparin, consider restarting Eliquis when off BiPAP  -- 3/19 - 3/22 off bipap, stable for now on NC, resumed PTA apixaban     Acute metabolic encephalopathy  Likely secondary to acute respiratory acidosis due to hypercapnia (see above)  - suspect this is due to elevated CO2.  Initial ABG showed pH 7.24 and PCO2 of 80.  PO2 22.  - Improved with BiPAP previously and now on 1-2L NC - mentating more clearly and responding appropriately to questions  - monitor - stable / improved 3/19 thru 3/21     End-stage renal disease on hemodialysis  - had full run dilaysis on 3/16.  - Nephrology consulted.  -3/20-myself and nephrologist discussed with patient regarding overall very poor prognosis and concerns by shutting down, he however, insists on trying to get better and wishes to proceed with dialysis if possible on Monday.     Hypotension  Has issues with hypotension and uses Midodrine on HD and non dialysis days. Cortisol check last admission was 22.    - recently taken off anti-hypertensive meds.  - midodrine when able to take oral.  - SBP most recently per progress note has been in the 80s.  - consider albumin if SBP  <80.     Hypothyroidism  Hyperlipidemia  - Resume PO meds once off BiPAP.     Osteoarthritis  Per chart review, patient chronic on 5mg PO steroids, resume once off BiPAP.      Goals of care  - 3/21 -care conference at 2 PM with patient, his friend Maria Dolores (healthcare agent), palliative, hospice, and RN staff as well as his primary nephrologist who has known him for nearly 20 years.  After lengthy discussions, is quite clear that he does not want any BiPAP/CPAP at any time and he understands he would likely not wake up assuming it was from hypercapnia.  Additionally he is not interested in pursuing escalation of care such as ICU admission with central line or vasopressors.  He remains DNR/DNI.  He wishes to continue pursuing dialysis as long as it is offered and he is able to tolerate it.  We have all discussed with him multiple times that seems likely to be soon that he will not be able to make it through dialysis likely secondary to hypotension.  He understands he is a very weak heart among multiple other medical issues.  Plan will be to discharge back to [facility] TCU and continue dialysis in [place] as long as he is able.  He understands and all are in agreement that he is very likely to decompensate in the coming weeks or months and at that time he will plan to pursue comfort care.  --- 3/22 had been plans for TCU discharge and continued dialysis at [place] as discussed yesterday.  However patient and Maria Dolores his friend/HCA report not being clear on this plan.  We again had lengthy discussions with RN, Maria Dolores, by self, and palliative regarding goals of care and essentially the dying process on stopping hemodialysis.  Patient is having great difficulty with this decision and ultimately requested the day to discuss with Maria Dolores and will get back to us hopefully later today or tomorrow.  He is considering TCU discharge with attempted dialysis continued, comfort care initiation here in hospital, or hospice  discharge.  Otherwise for now we will keep regimen in place as above.      Recovered COVID- hospitalized from 2/11-2/15, then returned to hospital with shortness of breath requiring hospital stay form 2/17-3/08/22.           Diet: Regular Diet Adult  Diet    DVT Prophylaxis: DOAC  Velarde Catheter: Not present  Central Lines: PRESENT     Cardiac Monitoring: None  Code Status: No CPR- Do NOT Intubate      Disposition Plan   Expected Discharge: 03/23/2022     Anticipated discharge location: other (comment) (TCU)    Delays:    Deciding goals of care        The patient's care was discussed with the Bedside Nurse, Care Coordinator/, Patient, Patient's Family and palliative Consultant.    Samy Mayers MD  Hospitalist Service  [facility]  Securely message with the Vocera Web Console (learn more here)  Text page via [employer] Paging/Directory     Total encounter time 48 minutes with greater than 50% spent in detailed discussion and counseling with patient, his friend/HCA, palliative care, bedside nurse and hospitalist regarding goals of care and different pathways, additionally coordination of care with all of the aforementioned parties.  Additional time spent in chart review and physical exam.      Clinically Significant Risk Factors Present on Admission                    ______________________________________________________________________    Interval History   Seen and examined this morning.  Essentially had a repeat care conference with RN, patient, friend Maria Dolores, hospitalist, and palliative care regarding goals of care and his options.  As above he is having difficulty traversing the topic is understandably complex.  Otherwise he reports nothing new-no new pain, change in breathing, fevers or chills.    Data reviewed today: I reviewed all medications, new labs and imaging results over the last 24 hours. I personally reviewed no images or EKG's today.    Physical Exam   Vital  Signs: Temp: 97.5  F (36.4  C) Temp src: Axillary BP: 115/69 Pulse: 109   Resp: 13 SpO2: 93 % O2 Device: Nasal cannula Oxygen Delivery: 2 LPM  Weight: 161 lbs 0 oz    Gen: NAD, pleasant, elderly  HEENT: EOMI, MMM  Resp: no focal crackles,  no wheezes, no increased work of resp  CV: S1S2 heard, reg rhythm, reg rate  Abdo: soft, nontender, nondistended, bowel sounds present  Ext: calves nontender, well perfused  Neuro: aaox3, CN grossly intact, no facial asymmetry      Data   Recent Labs   Lab 03/22/22  1318 03/22/22  0847 03/22/22  0439 03/22/22  0144 03/21/22  1129 03/21/22  0527 03/20/22  1752 03/20/22  0510 03/19/22  1437 03/19/22  1021 03/19/22  0916 03/19/22  0528 03/18/22  0826 03/18/22  0610   WBC  --   --  9.5  --   --   --   --  9.6  --   --   --  7.4  --  7.3   HGB  --   --  11.0*  --   --   --   --  10.8*  --   --   --  10.6*  --  10.9*   MCV  --   --  109*  --   --   --   --  108*  --   --   --  105*  --  109*   PLT  --   --  148*  --   --   --   --  129*  --   --   --  101*  --  106*   NA  --   --   --   --   --  137  --  138  --  137  --   --   --  134   POTASSIUM  --   --   --   --   --  3.9  --  3.7  --  3.3*  --   --   --  3.9   CHLORIDE  --   --   --   --   --  101  --  101  --  102  --   --   --  100   CO2  --   --   --   --   --  28  --  30  --  29  --   --   --  28   BUN  --   --   --   --   --  30  --  23  --  13  --   --   --  26   CR  --   --   --   --   --  5.22*  --  4.17*  --  3.27*  --   --   --  4.55*   ANIONGAP  --   --   --   --   --  8  --  7  --  6  --   --   --  6   CHRISTINE  --   --   --   --   --  8.9  --  9.2  --  8.4*  --   --   --  8.4*   * 98  --  157*   < > 123*   < > 118*   < > 108*   < >  --    < > 95   ALBUMIN  --   --   --   --   --  2.3*  --  2.3*  --  2.3*  --   --   --  2.3*   PROTTOTAL  --   --   --   --   --   --   --  5.5*  --   --   --   --   --  4.9*   BILITOTAL  --   --   --   --   --   --   --  1.6*  --   --   --   --   --  1.2   ALKPHOS  --   --   --   --   --    --   --  166*  --   --   --   --   --  152*   ALT  --   --   --   --   --   --   --  25  --   --   --   --   --  26   AST  --   --   --   --   --   --   --  17  --   --   --   --   --  16    < > = values in this interval not displayed.     No results found for this or any previous visit (from the past 24 hour(s)).

## 2022-03-22 NOTE — PROGRESS NOTES
Shift Summary [id number]:    Neuro: A&Ox4, moves all extremities to commands.PERRLA. Able to make needs known and use call light appropriately.     Cardiac: A-fib CVR, Edema noted in RUE, pulses palpable. BP stable, scheduled midodrine.    Respiratory: 2L via NC, diminished LS, crackles noted in bases.     GI: tolerating regular diet, no BM today.     : anuric, dialysis M/W/F    Integumentary: scattered bruising noted. RUE weeping. Foam preventative dressing to coccyx.    Activity: Q2H turns.     Pt discharge was postponed d/t pt not being able to decide if he would like to transition to comfort cares or continue with dialysis. Maria Dolores was at bedside, updated by doctor and .     Lab were reviewed. Continue to monitor.     Nilam Lam RN on 3/22/2022 at 6:14 PM

## 2022-03-23 NOTE — PROGRESS NOTES
Potassium   Date Value Ref Range Status   03/21/2022 3.9 3.4 - 5.3 mmol/L Final   07/05/2021 5.3 3.4 - 5.3 mmol/L Final     Hemoglobin   Date Value Ref Range Status   03/22/2022 11.0 (L) 13.3 - 17.7 g/dL Final   07/05/2021 12.0 (L) 13.3 - 17.7 g/dL Final     Creatinine   Date Value Ref Range Status   03/21/2022 5.22 (H) 0.66 - 1.25 mg/dL Final   07/05/2021 5.03 (H) 0.66 - 1.25 mg/dL Final     Urea Nitrogen   Date Value Ref Range Status   03/21/2022 30 7 - 30 mg/dL Final   07/05/2021 39 (H) 7 - 30 mg/dL Final     Sodium   Date Value Ref Range Status   03/21/2022 137 133 - 144 mmol/L Final   07/05/2021 140 133 - 144 mmol/L Final     INR   Date Value Ref Range Status   02/11/2022 1.23 (H) 0.85 - 1.15 Final   10/15/2020 1.31 (H) 0.86 - 1.14 Final     Results for MALDONADO LÓPEZ (MRN [id number]) as of 3/23/2022 09:49   Ref. Range 3/21/2022 12:51   Hep B Surface Agn Latest Ref Range: Nonreactive  Nonreactive   Hepatitis B Surface Antibody Latest Ref Range: >=12.00 m[IU]/mL 0.74 (L)     DIALYSIS PROCEDURE NOTE  Hepatitis status of previous patient on machine log was checked and verified ok to use with this patients hepatitis status.  Patient dialyzed for 3 hrs. on a K3 bath with a net fluid removal of  1.5L.  A BFR of 350 ml/min was obtained via a Left AVF using 16 gauge needles.      The treatment plan was discussed with Dr. Hammond during the treatment.    Total heparin received during the treatment: 0 units.   Needle cannulation sites held x 20 min.       Meds  given: Epogen, and Hectorol   Complications: none      Person educated: Patient. Knowledge base substancial. Barriers to learning: none. Educated on procedure via verbal mode. Patient/family verbalized understanding. Pt prefers verbal education style.     ICEBOAT? Timeout performed pre-treatment  I: Patient was identified using 2 identifiers  C:  Consent Signed Yes  E: Equipment preventative maintenance is current and dialysis delivery system OK to use  B: see  note  O: Dialysis orders present and complete prior to treatment  A: Vascular access verified and assessed prior to treatment  T: Treatment was performed at a clinically appropriate time  ?: Patient was allowed to ask questions and address concerns prior to treatment  See flowsheet in EPIC for further details and post assessment.  Machine water alarm in place and functioning. Transducer pods intact and checked every 15min.   Chlorine/Chloramine water system checked every 4 hours.  Outpatient Dialysis at [facility]    Please remove patient dressing on AVF and AVG needle sites 24 hours after dialysis. If leaking occurs please apply a Band-Aid.

## 2022-03-23 NOTE — PROGRESS NOTES
[facility]    Medicine Progress Note - Hospitalist Service    Date of Admission:  3/16/2022    Assessment & Plan        Westley Ness is a 76 year old male with past medical history of HFrEF with EF 25-30%, s/p ICD/PPM, Afib, ESRD on HD M/W/F, hypotension and recent hospitalization for COVID 19 (2/17-3/8) who is presenting from dialysis center due to confusion and generalized weakness. Admitted for acute on chronic hypoxic and hypercapnic resp failure     Goals of care  3/23 - we have had care conferences the last 2 days including palliative care, hospice, bedside RN, patient and his friend/HCA/POA Maria Dolores at bedside.  To summarize, he is unable to decide if he is ready to stop dialysis and enter comfort cares.  Art is becoming more understanding of the unlikelihood of him being able to tolerate TCU discharge and transport to dialysis largely due to hypotension.  - [facility] hospice has been consulted and will meet with patient and his other friends/healthcare agents/POA (Joaquina and Sarah Johansen) who are arriving 3/24.  Greatly appreciate [facility] hospice assistance in helping Art decide his path of care.    Acute on chronic hypoxic and hypercapnic respiratory failure  Heart failure with reduced ejection fraction  Ischemic cardiomyopathy s/p ICD placement  NSTEMI   Presents from HD due to confusion and hypotension. Hypotensive in the 70s. VBG shows pH of 7.24, pCO2 80, pO2 22. N-terminal proBNP 541277 (>175K on 2/17/22). Troponin 1683.  EKG a.fib. patient is without chest pain. Chest x-ray shows stable cardiomegaly, opacification of the left lung shows a mild degree of improvement aeration medially, some persistent airspace disease is a possibility at the location along with stable left pleural effusion.  Bilateral interstitial and vascular prominence may be stable appearing pulmonary edema.  Patient has issue with hypotension with bp as low as in the 80s, and on midodrine. Most recent bp from progress  note on on 3/15 was 81/59.  Patient otherwise does not have complaint of chest pain.   TTE 2/17/2022-LVEF 25-30%, severe large area of akinesis to dyskinesis disease of the apical inferior wall, mid mid anteroseptal wall and entire apex, the right ventricle is moderately dilated, severe decreased right ventricular systolic function.  - Previously on heparin for NSTEMI felt likely to be type II, now back on PTA apixaban  - Cardiology signed off  - Patient remained stable on nasal cannula-he will not go back on BiPAP  - Empiric antibiotics have since been discontinued patient remains afebrile and nontoxic  - as of 3/23, he remains stable on 1-2L NC since 3/19     Coronary artery disease with ischemic cardiomyopathy:   OM1 stenting in October 2020 was last intervention.  Known subtotal occlusion of LAD with infarct.  As above with elevated troponin and patient does not have chest pain.   - Previously on heparin infusion, now resumed p.o. apixaban 3/19 as he was off BiPAP  - cardiology consulted previously, no longer following     Atrial fibrillation/flutter, history of pacemaker placement and ICD  PTA apixaban resumed.  Not on any rate control.      Acute metabolic encephalopathy  Likely secondary to acute respiratory acidosis due to hypercapnia (see above)  - suspect this is due to elevated CO2.  Initial ABG showed pH 7.24 and PCO2 of 80.  PO2 22.  - Improved with BiPAP - remains AAO appropriately since 3/19     End-stage renal disease on hemodialysis  - had full run dilaysis on 3/16.  - Nephrology consulted.  - HD on 3/23     Hypotension   Has issues with hypotension and uses Midodrine on HD and non dialysis days. Cortisol check last admission was 22.    - recently taken off anti-hypertensive meds.  - midodrine when able to take oral.  - consider albumin if SBP <80.     Hypothyroidism  Hyperlipidemia  - Resumed PO meds      Osteoarthritis  Per chart review, patient chronic on 5mg PO steroids (resumed)  - previously  on solumedrol 4 mg iv when he was on bipap      Recovered COVID  Hospitalized from 2/11-2/15, then returned to hospital with shortness of breath requiring hospital stay form 2/17-3/08/22.         Diet: Regular Diet Adult  Diet    DVT Prophylaxis: DOAC  Velarde Catheter: Not present  Central Lines: PRESENT     Cardiac Monitoring: None  Code Status: No CPR- Do NOT Intubate      Disposition Plan   Expected Discharge: 03/24/2022     Anticipated discharge location: other (comment) (TCU)    Delays:    decisions of goals of care        The patient's care was discussed with the Bedside Nurse, Care Coordinator/, Patient, Patient's Family and Nephrology and Hospice Consultant.    Samy Mayers MD  Hospitalist Service  [facility]  Securely message with the Vocera Web Console (learn more here)  Text page via [employer] Paging/Directory     Total encounter time 45 minutes with greater than 50% spent in discussion/counseling and coordination of care with patient, friend Maria Dolores who is POA/HCA, care coordinators and social work, bedside RN, nephrology consultant, and newly consulted hospice team regarding plan of care and different options for Bill.  Additional time spent in chart review and physical exam.    Clinically Significant Risk Factors Present on Admission                    ______________________________________________________________________    Interval History   Patient seen and examined this morning with Maria Dolores at bedside.  He is getting dialyzed and tolerating it thus far.  He denies any new shortness of breath, fevers, or pain.  Continues to have difficulty with decisions regarding goals of care.  Again today, we spent substantial time discussing possible options of care including initiating comfort care here in the hospital versus attempting TCU discharge with ongoing dialysis if tolerated.  In the end, we concluded that [facility] hospice would be consulted to see if he would be accepted  so that he can have this information available to him when his friends arrive tomorrow.  Joaquina and Sarah Johansen are apparently secondary healthcare agent/POA and executor of his estate.  Again, I reiterated to Bill that he is making his own decisions at this point.      Data reviewed today: I reviewed all medications, new labs and imaging results over the last 24 hours. I personally reviewed no images or EKG's today.    Physical Exam   Vital Signs: Temp: 97.1  F (36.2  C) Temp src: Oral BP: (!) 102/97 Pulse: 96   Resp: 14 SpO2: 97 % O2 Device: Nasal cannula Oxygen Delivery: 2 LPM  Weight: 167 lbs 3.2 oz    Gen: NAD, pleasant, elderly  HEENT: EOMI, MMM  Resp: no crackles,  no wheezes, no increased work of resp  CV: S1S2 heard, reg rhythm, reg rate  Abdo: soft, nontender, nondistended, bowel sounds present  Ext: calves nontender, well perfused  Neuro: aaox3, CN grossly intact, no facial asymmetry      Data   Recent Labs   Lab 03/23/22  1422 03/23/22  0813 03/23/22  0155 03/22/22  0847 03/22/22  0439 03/21/22  1129 03/21/22  0527 03/20/22  1752 03/20/22  0510 03/19/22  1437 03/19/22  1021 03/19/22  0916 03/19/22  0528 03/18/22  0826 03/18/22  0610   WBC  --   --   --   --  9.5  --   --   --  9.6  --   --   --  7.4  --  7.3   HGB  --   --   --   --  11.0*  --   --   --  10.8*  --   --   --  10.6*  --  10.9*   MCV  --   --   --   --  109*  --   --   --  108*  --   --   --  105*  --  109*   PLT  --   --   --   --  148*  --   --   --  129*  --   --   --  101*  --  106*   NA  --   --   --   --   --   --  137  --  138  --  137  --   --   --  134   POTASSIUM  --   --   --   --   --   --  3.9  --  3.7  --  3.3*  --   --   --  3.9   CHLORIDE  --   --   --   --   --   --  101  --  101  --  102  --   --   --  100   CO2  --   --   --   --   --   --  28  --  30  --  29  --   --   --  28   BUN  --   --   --   --   --   --  30  --  23  --  13  --   --   --  26   CR  --   --   --   --   --   --  5.22*  --  4.17*  --  3.27*  --   --   --   4.55*   ANIONGAP  --   --   --   --   --   --  8  --  7  --  6  --   --   --  6   CHRISTINE  --   --   --   --   --   --  8.9  --  9.2  --  8.4*  --   --   --  8.4*   * 138* 194*   < >  --    < > 123*   < > 118*   < > 108*   < >  --    < > 95   ALBUMIN  --   --   --   --   --   --  2.3*  --  2.3*  --  2.3*  --   --   --  2.3*   PROTTOTAL  --   --   --   --   --   --   --   --  5.5*  --   --   --   --   --  4.9*   BILITOTAL  --   --   --   --   --   --   --   --  1.6*  --   --   --   --   --  1.2   ALKPHOS  --   --   --   --   --   --   --   --  166*  --   --   --   --   --  152*   ALT  --   --   --   --   --   --   --   --  25  --   --   --   --   --  26   AST  --   --   --   --   --   --   --   --  17  --   --   --   --   --  16    < > = values in this interval not displayed.     No results found for this or any previous visit (from the past 24 hour(s)).

## 2022-03-23 NOTE — CONSULTS
Met with Bill to talk about the hospice philosophy, benefits of the program, and services provided. Pt is still undecided about stopping dialysis and what course of action to take as far as a discharge plan. Hospice team will plan to meet with the pt and the pt's POA's tomorrow.

## 2022-03-23 NOTE — PLAN OF CARE
Date & Time: 3/23 3311-2342  Diagnosis: Metabolic encephalopathy  Procedures: NA  Orientation/Cognitive: AOx4, forgetful  VS/O2: VSS (soft BPs), 3L O2 NC   Mobility: A2 + lift   Diet: Regular  Pain Management: Denies  Bowel & Bladder: Anuric  Skin: Blanchable redness on coccyx  Abnormal Labs:  & 107, plt 148, Cr 5.22, Ph 5.2  Tele: NA  IV Access/Drips/Fluids: Midline D10 @ 25ml/hr, HD fistula  Drains: NA  Tests: NA  Consults: Nephrology, hospitalist, hospice  Discharge Plan: Pending   Other: HD MWF

## 2022-03-23 NOTE — PLAN OF CARE
Problem: Plan of Care - These are the overarching goals to be used throughout the patient stay.    Goal: Plan of Care Review/Shift Note  Description: The Plan of Care Review/Shift note should be completed every shift.  The Outcome Evaluation is a brief statement about your assessment that the patient is improving, declining, or no change.  This information will be displayed automatically on your shift note.  Outcome: Ongoing, Progressing     Problem: Adjustment to Illness (Chronic Kidney Disease)  Goal: Optimal Coping with Chronic Illness  Outcome: Ongoing, Progressing   Goal Outcome Evaluation:        Patient debating hospice care and stopping dialysis.  Hospice RN spoke with patient today, planning on coming back tomorrow as well.  Refusing supper this evening.

## 2022-03-23 NOTE — PLAN OF CARE
1900h-0700h: Pt AOx4, forgetful at times. O2Sat 93% on 2lpm of O2 via NC. Clear breath sound in UL, diminished RML, LLL & coarse crackles in RLL. Regular diet, very poor appetite. Offered fluids, food/snack. Ate 3 pcs of ravindra crackers at midnight.  at bed time, no coverage. Anuric. Recent dialysis done 03/21. Fistula w/ bruit & thrill on L arm. Midline on BASIL, D10 infusing at 25 ml/hr. Repositoned to sides every 2h w/ assist of 2, refuses at times. Trace edema on BLE, sensation intact & dorsal pulses palpable.     Care conference and Dialysis today 03/23.  Patient would like to go to [facility] if his insurance will cover.

## 2022-03-23 NOTE — PROGRESS NOTES
"Clinical Nutrition Brief Note    Patient to be seen by the Registered Dietitian for LOS    Chart reviewed - GOC discussions ongoing, care conference dated for tomorrow to include pt's friends (plan to decide if wishes to discontinue dialysis and transition to comfort care)     He is on a regular diet with a documented poor appetite  Review of meals ordered show 1-2 small meals ordered/day with variable % consumption     Recent weights trending up with fluid shifts - noted trace-mild edema  Appears to have lost ~19 lbs from 12/3/21-3/11/22 (11% body wt) - this is clinically significant    Vitals:    03/20/22 0500 03/20/22 0900 03/21/22 0222 03/22/22 0600   Weight: 74.7 kg (164 lb 11.2 oz) 70.4 kg (155 lb 4.8 oz) 73.6 kg (162 lb 4.8 oz) 73 kg (161 lb)    03/23/22 0220   Weight: 75.8 kg (167 lb 3.2 oz)     Wt Readings from Last 10 Encounters:   03/23/22 75.8 kg (167 lb 3.2 oz)   03/15/22 69.9 kg (154 lb)   03/11/22 70.1 kg (154 lb 9.6 oz)   03/09/22 69.9 kg (154 lb)   03/06/22 70 kg (154 lb 5.2 oz)   02/14/22 75.9 kg (167 lb 5.3 oz)   12/16/21 78.5 kg (173 lb)   12/03/21 78.6 kg (173 lb 4.5 oz)   04/13/21 78 kg (171 lb 15.3 oz)   03/18/21 75.8 kg (167 lb 3.2 oz)       RD off site today - called patient room phone and patient had asked writer to call back in 5 minutes. Upon call back on multiple attempts, phone line was busy    \"No escalation of cares\" documented - do not anticipate alternate means of nutrition will be warranted     Would recommend encouragement of improved PO intake and the use of dietary supplements available such as Ensure Enlive, Magic Cup, Gelatein Plus or [place] Instant Breakfast     Will check back tomorrow for GOC determined and/or need for protein supplements  Please consult should specific nutrition needs arise, thanks    Jacquelyn Duarte RD, LD  Clinical Dietitian           "

## 2022-03-23 NOTE — PROGRESS NOTES
"SPIRITUAL HEALTH SERVICES Progress Note  [facility]    Saw pt Bill per palliative/follow-up. Art's friend, Maria Dolores, was present at bedside. Their friend, Marsha, was present via telephone.    Illness Narrative - Art and Maria Dolores reflected on their experience of the last few days, contemplating decisions around dialysis, the possibility of discharge, and comfort care.    Distress - They named some confusion and distress around \"where comfort care happens\" should Art decide to discontinue dialysis. Together, we reviewed the nature of comfort care. Maria Dolores shared that they were hoping he could remain here at the hospital but acknowledged he may need to discharge.    Coping - Art shared that it helps to talk to his friends about his decisions. Marsha and Mayra are both available tomorrow, 3/24, to come to the hospital for any conversations/care conferences that may take place. Maria Dolores returns to [place] this afternoon.    Meaning-Making - Art said, \"I feel like today is my last run, but I want to talk to Marsha and Mayra first.\" He expressed wanting to have some sense of \"control\" in making the decision to discontinue dialysis since so many other things (he named whether or not he would be discharged to another facility) seemed outside of his control. \"I'm ready to die,\" he said. \"That's why I'm having these conversations.\"    Plan - Will continue to follow. Spiritual Health remains available, as needed.    Theresa Cope  Associate   Phone: [phone]   "

## 2022-03-23 NOTE — PROGRESS NOTES
Assessment and Plan:   ESRSD: running MWF. Plan 3h,  BFR, 16 ga needles, 1.5 L UF. 4K 33 HCO3 138 Na. Hecto and EPO on the run. Getting midodrine for BP support.             Interval History:   End of life issues: doesn't think he would tolerate outpatient dialysis with need for transportation. Wants to do in patient hospice here rather than transfer out. Discussed with IM.                    Review of Systems:   Tolerating dialysis well this am.   CHF: afib.   Anemia:           Medications:       - MEDICATION INSTRUCTIONS for Dialysis Patients -   Does not apply See Admin Instructions     apixaban ANTICOAGULANT  2.5 mg Oral BID     insulin aspart  1-3 Units Subcutaneous TID AC     insulin aspart  1-3 Units Subcutaneous At Bedtime     methylPREDNISolone  4 mg Intravenous Daily     midodrine  10 mg Oral TID w/meals     sodium chloride (PF)  10 mL Intracatheter Q8H     sodium chloride (PF)  3 mL Intracatheter Q8H     sodium chloride (PF)  3 mL Intracatheter Q8H       dextrose 10% 25 mL/hr at 03/21/22 2016     - MEDICATION INSTRUCTIONS -       - MEDICATION INSTRUCTIONS -       - MEDICATION INSTRUCTIONS -       Current active medications and PTA medications reviewed, see medication list for details.            Physical Exam:   Vitals were reviewed  Patient Vitals for the past 24 hrs:   BP Temp Temp src Pulse Resp SpO2 Weight   03/23/22 1015 107/64 -- -- 93 -- -- --   03/23/22 1000 116/78 -- -- 105 -- -- --   03/23/22 0945 109/82 -- -- 102 -- -- --   03/23/22 0937 114/89 -- -- 105 15 -- --   03/23/22 0900 -- -- -- 101 11 -- --   03/23/22 0845 108/79 97.4  F (36.3  C) Oral 100 14 97 % --   03/23/22 0815 -- -- -- 93 14 92 % --   03/23/22 0733 -- 97.3  F (36.3  C) Axillary -- -- -- --   03/23/22 0700 -- -- -- 86 13 100 % --   03/23/22 0600 (!) 107/90 -- -- 92 14 100 % --   03/23/22 0447 -- 97.5  F (36.4  C) Axillary -- -- -- --   03/23/22 0400 93/62 -- -- 80 13 97 % --   03/23/22 0220 -- -- -- -- -- -- 75.8  kg (167 lb 3.2 oz)   22 0200 109/80 -- -- 92 13 100 % --   22 0000 95/67 98  F (36.7  C) Oral 92 12 99 % --   22 2200 106/69 97.8  F (36.6  C) Oral 97 17 93 % --   22 1917 -- 97.8  F (36.6  C) Oral -- -- -- --   22 1500 -- 97.5  F (36.4  C) Axillary -- -- -- --   22 1400 115/69 -- -- 109 13 93 % --   22 1200 121/76 -- -- 111 12 94 % --       Temp:  [97.3  F (36.3  C)-98  F (36.7  C)] 97.4  F (36.3  C)  Pulse:  [] 93  Resp:  [11-17] 15  BP: ()/(62-90) 107/64  SpO2:  [92 %-100 %] 97 %    Temperatures:  Current - Temp: 97.4  F (36.3  C); Max - Temp  Av.6  F (36.4  C)  Min: 97.3  F (36.3  C)  Max: 98  F (36.7  C)  Respiration range: Resp  Av.4  Min: 11  Max: 17  Pulse range: Pulse  Av.2  Min: 80  Max: 111  Blood pressure range: Systolic (24hrs), Av , Min:93 , Max:121   ; Diastolic (24hrs), Av, Min:62, Max:90    Pulse oximetry range: SpO2  Av.5 %  Min: 92 %  Max: 100 %    I/O last 3 completed shifts:  In: 453 [I.V.:453]  Out: -       Intake/Output Summary (Last 24 hours) at 3/23/2022 1033  Last data filed at 3/22/2022 2006  Gross per 24 hour   Intake 53 ml   Output --   Net 53 ml       Alert and responsive  LUAF with needles in place, good bruit  Lungs with clear ant BS  Cor irreg, no rub or M  LE no edema       Wt Readings from Last 4 Encounters:   22 75.8 kg (167 lb 3.2 oz)   03/15/22 69.9 kg (154 lb)   22 70.1 kg (154 lb 9.6 oz)   22 69.9 kg (154 lb)          Data:          Lab Results   Component Value Date     2022     2022     2022     2021     2021     2021    Lab Results   Component Value Date    CHLORIDE 101 2022    CHLORIDE 101 2022    CHLORIDE 102 2022    CHLORIDE 106 2021    CHLORIDE 105 2021    CHLORIDE 106 2021    Lab Results   Component Value Date    BUN 30 2022    BUN 23 2022    BUN 13  03/19/2022    BUN 39 07/05/2021    BUN 23 03/19/2021    BUN 31 03/18/2021      Lab Results   Component Value Date    POTASSIUM 3.9 03/21/2022    POTASSIUM 3.7 03/20/2022    POTASSIUM 3.3 03/19/2022    POTASSIUM 5.3 07/05/2021    POTASSIUM 3.8 03/19/2021    POTASSIUM 3.5 03/18/2021    Lab Results   Component Value Date    CO2 28 03/21/2022    CO2 30 03/20/2022    CO2 29 03/19/2022    CO2 25 07/05/2021    CO2 28 03/19/2021    CO2 27 03/18/2021    Lab Results   Component Value Date    CR 5.22 03/21/2022    CR 4.17 03/20/2022    CR 3.27 03/19/2022    CR 5.03 07/05/2021    CR 4.03 03/19/2021    CR 4.90 03/18/2021        Recent Labs   Lab Test 03/22/22  0439 03/20/22  0510 03/19/22  0528   WBC 9.5 9.6 7.4   HGB 11.0* 10.8* 10.6*   HCT 35.6* 35.1* 32.9*   * 108* 105*   * 129* 101*     Recent Labs   Lab Test 03/20/22  0510 03/18/22  0610 03/17/22  0417 07/24/21  1217 03/19/21  0601   AST 17 16 16   < > 13   ALT 25 26 30   < > 21   GGT  --   --   --   --  88*   ALKPHOS 166* 152* 149   < > 341*   BILITOTAL 1.6* 1.2 1.6*   < > 1.2    < > = values in this interval not displayed.       Recent Labs   Lab Test 03/17/22  2214 02/22/22  1744 01/11/21  0911   MAG 1.6 1.7 1.9     Recent Labs   Lab Test 03/21/22  0527 03/19/22  1021 03/02/22  0736   PHOS 5.2* 2.8 3.9     Recent Labs   Lab Test 03/21/22  0527 03/20/22  0510 03/19/22  1021   CHRISTINE 8.9 9.2 8.4*       Lab Results   Component Value Date    CHRISTINE 8.9 03/21/2022     Lab Results   Component Value Date    WBC 9.5 03/22/2022    HGB 11.0 (L) 03/22/2022    HCT 35.6 (L) 03/22/2022     (H) 03/22/2022     (L) 03/22/2022     Lab Results   Component Value Date     03/21/2022    POTASSIUM 3.9 03/21/2022    CHLORIDE 101 03/21/2022    CO2 28 03/21/2022     (H) 03/23/2022     Lab Results   Component Value Date    BUN 30 03/21/2022    CR 5.22 (H) 03/21/2022     Lab Results   Component Value Date    MAG 1.6 03/17/2022     Lab Results   Component Value  Date    PHOS 5.2 (H) 03/21/2022       Creatinine   Date Value Ref Range Status   03/21/2022 5.22 (H) 0.66 - 1.25 mg/dL Final   03/20/2022 4.17 (H) 0.66 - 1.25 mg/dL Final   03/19/2022 3.27 (H) 0.66 - 1.25 mg/dL Final   03/18/2022 4.55 (H) 0.66 - 1.25 mg/dL Final   03/17/2022 4.31 (H) 0.66 - 1.25 mg/dL Final   03/17/2022 3.40 (H) 0.66 - 1.25 mg/dL Final   07/05/2021 5.03 (H) 0.66 - 1.25 mg/dL Final   03/19/2021 4.03 (H) 0.66 - 1.25 mg/dL Final   03/18/2021 4.90 (H) 0.66 - 1.25 mg/dL Final   03/17/2021 3.99 (H) 0.66 - 1.25 mg/dL Final   01/14/2021 4.55 (H) 0.66 - 1.25 mg/dL Final   01/13/2021 3.42 (H) 0.66 - 1.25 mg/dL Final       Attestation:  I have reviewed today's vital signs, notes, medications, labs and imaging.  Seen on dialysis.      Jah Hammond MD

## 2022-03-24 NOTE — PROGRESS NOTES
[facility]    Hospitalist Progress Note    Assessment & Plan   Westley Ness is a 76 year old male with PMHx of HFrEF with EF 25-30%, s/p ICD/PPM, Afib, ESRD on HD M/W/F, hypotension and recent hospitalization for COVID 19 (2/11/22-2/15/22, then again from 2/17/22-3/8/22) who was admitted on 3/16/2022 for evaluation of confusion and weakness that was noted at dialysis. Additionally, he was noted to have acute on chronic hypoxic and hypercapnic resp failure     Goals of care  * Patient has had several recent hospitalizations for ongoing medical issues.   * GOC discussion had this stay on 3/21-3/23 regarding options for continued restorative cares with continued dialysis vs transition to hospice. Patient has had difficult time making decision  * Ultimately decided to transition to hospice care after dialysis session on 3/25.   -- hospice GIP team notified    Acute on chronic hypoxic and hypercapnic respiratory failure  Heart failure with reduced ejection fraction  Ischemic cardiomyopathy s/p ICD placement  NSTEMI   * Last echo from 2/17/22 showed EF 25-30%, severe large area of akinesis to dyskinesis disease of the apical inferior wall, mid mid anteroseptal wall and entire apex, the right ventricle is moderately dilated, severe decreased right ventricular systolic function.  * Presented from HD due to confusion and hypotension.  Has known hx of hypotension with SBPs as low as the 80s (was 81/59 on 3/15). and is on midodrine. On presentation, SBPs 70s. VBG shows pH of 7.24, pCO2 80, pO2 22. proBNP 115k (>175K on 2/17/22). Troponin 1683.  EKG a.fib. patient is without chest pain. CXR shows stable cardiomegaly, opacification of the left lung shows a mild degree of improvement aeration medially, some persistent airspace disease is a possibility at the location along with stable left pleural effusion. Bilateral interstitial and vascular prominence may be stable appearing pulmonary edema. No  reported chest pain.   * Was started on abx, heparin gtt for suspected NSTEMI.   * Seen by cards. Felt to be type II NSTEMI so was subsequently transitioned back to Eliquis.   * Fluid managed with dialysis.   * Empiric abx stopped 3/20  -- remains stable on NC, no plans for escalation of O2 needs (no BiPAP/CPAP)     CAD with ischemic cardiomyopathy   OM1 stenting in 10/2020 was last intervention  Known subtotal occlusion of LAD with infarct  Hyperlipidemia  * As above, presented with elevated troponin. No reported chest pain. Was placed on heparin gtt, cardiology consulted. Ultimately felt to be demand ischemia so heparin gtt was stopped and was resumed on apixaban.   * Holding pravastatin dt acute illness and transition to hospice  * Cardiology has since signed off     Atrial fibrillation/flutter, history of pacemaker placement and ICD  * Not on rate control meds at baseline.   * Chronically anticoagulated with Eliquis      ESRD on hemodialysis  * Dialyzing per routine per nephrology this stay  -- as above, per [facility] discussion patient will have last dialysis run on 3/25 then will transition to hospice     Hypotension   * Ongoing issues with hypotension at baseline. Uses midodrine on non-dialysis days. Recently taken off all antihypertensive.   * During hospital stay in 2/2022, cortisol level was checked and was normal.   -- cont midodrine 10mg TID    Acute metabolic encephalopathy, likely dt respiratory issues: Resolved  * Hypercapnic on presentation -- initial ABG showed pH 7.24 and PCO2 of 80.  PO2 22.  * CO2 normalized with BiPAP.   * Patient subsequently A&O as of 3/19    Hypothyroidism  * Chronic and stable on levothyroxine     Osteoarthritis  * On chronic prednisone 5mg po daily, which was continued once off BiPAP.     Stress induced hyperglycemia  * A1c was 5.1 in 2/2022. Using sliding scale insulin prior to admission.   -- cont sliding scale insulin while hospitalized, can discontinue on transition to  hospice      Recovered COVID  Hospitalized from 2/11/22-2/15/22, then returned to hospital with shortness of breath requiring hospitalization from 2/17/22-3/8/22.     FEN: no IVFs, lytes stable, regular diet  DVT Prophylaxis: on apixaban as above  Code Status: No CPR- Do NOT Intubate    Disposition: Patient now planning to transition to hospice after dialysis session on 3/25. Hospice GIP team notified.     Friends at bedside in agreement with care plan, questions answered.     Latesha Dickens    Interval History   Seen this afternoon. Patient has decided to transition to hospice after dialysis run tomorrow. Friends at bedside and are supportive of this decision. Patient reports some nausea and shortness of breath today. No pain. Is worried that he's going to feel poorly after he stops dialysis, we discussed that we will have medications available to keep him comfortable and mitigate any bothersome symptoms.     -Data reviewed today: I reviewed all new labs and imaging results over the last 24 hours. I personally reviewed no images or EKG's today.    Physical Exam   Temp: 97.3  F (36.3  C) Temp src: Axillary BP: 118/81 Pulse: 105   Resp: 16 SpO2: 93 % O2 Device: Nasal cannula Oxygen Delivery: 1 LPM  Vitals:    03/22/22 0600 03/23/22 0220 03/24/22 0421   Weight: 73 kg (161 lb) 75.8 kg (167 lb 3.2 oz) 74.4 kg (164 lb)     Vital Signs with Ranges  Temp:  [97.3  F (36.3  C)-98.2  F (36.8  C)] 97.3  F (36.3  C)  Pulse:  [] 105  Resp:  [12-16] 16  BP: ()/(36-81) 118/81  SpO2:  [92 %-100 %] 93 %  I/O last 3 completed shifts:  In: 25 [P.O.:25]  Out: 1500 [Other:1500]    Constitutional: Chronically ill appearing male, resting comfortably, alert and conversing appropriately, NAD  Respiratory: CTA through anterior fields, no wheeze, no increased work of breathing  Cardiovascular: HR tachycardic with regular rhythm, no MGR, no peripheral edema  GI: S, NT, ND, +BS  Skin/Integumen: pale, warm/dry  Other:       Medications     dextrose 10% 25 mL/hr at 03/24/22 0515     - MEDICATION INSTRUCTIONS -       - MEDICATION INSTRUCTIONS -       - MEDICATION INSTRUCTIONS -         - MEDICATION INSTRUCTIONS for Dialysis Patients -   Does not apply See Admin Instructions     apixaban ANTICOAGULANT  2.5 mg Oral BID     insulin aspart  1-3 Units Subcutaneous TID AC     insulin aspart  1-3 Units Subcutaneous At Bedtime     midodrine  10 mg Oral TID w/meals     predniSONE  5 mg Oral Daily     sodium chloride (PF)  10 mL Intracatheter Q8H     sodium chloride (PF)  3 mL Intracatheter Q8H     sodium chloride (PF)  3 mL Intracatheter Q8H       Data   Recent Labs   Lab 03/24/22  0604 03/23/22  2205 03/23/22  1959 03/22/22  0847 03/22/22  0439 03/21/22  1129 03/21/22  0527 03/20/22  1752 03/20/22  0510 03/18/22  0826 03/18/22  0610   WBC 6.8  --   --   --  9.5  --   --   --  9.6   < > 7.3   HGB 10.8*  --   --   --  11.0*  --   --   --  10.8*   < > 10.9*   *  --   --   --  109*  --   --   --  108*   < > 109*   *  --   --   --  148*  --   --   --  129*   < > 106*     --   --   --   --   --  137  --  138   < > 134   POTASSIUM 3.5  --   --   --   --   --  3.9  --  3.7   < > 3.9   CHLORIDE 105  --   --   --   --   --  101  --  101   < > 100   CO2 29  --   --   --   --   --  28  --  30   < > 28   BUN 16  --   --   --   --   --  30  --  23   < > 26   CR 3.51*  --   --   --   --   --  5.22*  --  4.17*   < > 4.55*   ANIONGAP 5  --   --   --   --   --  8  --  7   < > 6   CHRISTINE 8.9  --   --   --   --   --  8.9  --  9.2   < > 8.4*   * 133* 170*   < >  --    < > 123*   < > 118*   < > 95   ALBUMIN  --   --   --   --   --   --  2.3*  --  2.3*   < > 2.3*   PROTTOTAL  --   --   --   --   --   --   --   --  5.5*  --  4.9*   BILITOTAL  --   --   --   --   --   --   --   --  1.6*  --  1.2   ALKPHOS  --   --   --   --   --   --   --   --  166*  --  152*   ALT  --   --   --   --   --   --   --   --  25  --  26   AST  --   --   --   --    --   --   --   --  17  --  16    < > = values in this interval not displayed.       No results found for this or any previous visit (from the past 24 hour(s)).

## 2022-03-24 NOTE — PLAN OF CARE
1900h-0700h: Pt AOx4, forgetful at times. O2Sat 96% on 3lpm of O2 via NC. Clear breath sound in UL, diminished RML & LL. Regular diet, very poor appetite. Offered fluids, food/snack. Anuric. BM x1 medium brown. Recent dialysis done 03/23 in AM shift, drawn 1.5 L of fluid. BP within range. Fistula w/ bruit & thrill on L arm. Midline on BASIL, D10 infusing at 25 ml/hr. Repositoned to sides every 2h w/ assist of 2, refuses at times. Trace edema on BLE, sensation intact & dorsal pulses palpable.     Care conference and Dialysis today 03/24.  Patient still undecided if will continue dialysis & choose hospice care.      0206h: nauseated. Zofran IV prn given; effective. . Wide pulse pressure at 1208h 123/36 mmHg

## 2022-03-24 NOTE — PROGRESS NOTES
"SPIRITUAL HEALTH SERVICES Progress Note  Hospital: [facility] Unit: [id number]    Visit Summary: Nurse request for [facility] visit. 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     Art is struggling with continuing dialysis or to \"just stop treatment all together and receive comfort care.\"   His melba \"will support me\" in this decision. His support system is his friends. He requested communion and received it.      I offered spiritual and emotional support through reflective listening that affirmed emotions, experience, and meaning.    Plan: [facility] remains available for support.    Kristie Philippe   Intern   Pager: [phone]    "

## 2022-03-24 NOTE — PROGRESS NOTES
Patient is A/O x 4 with some forgetfulness, vss, Bp soft in low 100s , patient stayed in bed, did not want to get up, refuses repositioning, refused lunch and supper, states no appetite, palliative care following, patient has AV fistula on left upper arm for dialysis that is patent, patient will have last dialysis tomorrow after which will transition to hospice care, discharge to transitional care facility pending placement.

## 2022-03-24 NOTE — PROGRESS NOTES
Care Management Follow Up    Length of Stay (days): 8    Expected Discharge Date: 03/25/2022     Concerns to be Addressed: discharge planning     Patient plan of care discussed at interdisciplinary rounds: Yes    Anticipated Discharge Disposition: Transitional Care     Anticipated Discharge Services: None  Anticipated Discharge DME: None    Patient/family educated on Medicare website which has current facility and service quality ratings: no  Education Provided on the Discharge Plan:    Patient/Family in Agreement with the Plan: yes    Referrals Placed by CM/SW:    Private pay costs discussed: Not applicable    Additional Information:  Writer spoke with CLEVE Cardenas and OLAMIDE Fernandez from [facility]. After patient stops dialysis tomorrow Rebecca QUIÑONEZ will reassess patient and most likely sign him on to [facility]. Rebecca will update CLEVE after they meet.       ODETTE Kc

## 2022-03-24 NOTE — PROGRESS NOTES
BRIEF NUTRITION NOTE:    Following up the need to order oral nutritional supplements as per RD brief note yesterday.    NEW FINDINGS:  Patient has decided to transition to hospice after HD tomorrow.  Today he declined my offer of supplements.    Will be available only prn.    Sarah Garza, RD, LD, CNSC

## 2022-03-24 NOTE — PROGRESS NOTES
Assessment and Plan:   ESRD on dialysis: ran yesterday. 3 h, net UF 1.5 L. No heparin.     Wt 75.8 > 74.4. Getting D10W IV at 25 ml/h.   Will place orders for dialysis 3/25. He wants to get last dialysis tomorrow and then go to comfort care/hospice.         Interval History:   End of life issues: doesn't think he could tolerate TCU with transport to outpt dialysis. Considering hospice.         CHF:  Hypotension:  BP currently 106/72 with Pulse ~ 100.   Anemia: Hgb 10.8.        Review of Systems:   Tolerated dialysis well yesterday. Not out of bed today.           Medications:       - MEDICATION INSTRUCTIONS for Dialysis Patients -   Does not apply See Admin Instructions     apixaban ANTICOAGULANT  2.5 mg Oral BID     insulin aspart  1-3 Units Subcutaneous TID AC     insulin aspart  1-3 Units Subcutaneous At Bedtime     midodrine  10 mg Oral TID w/meals     predniSONE  5 mg Oral Daily     sodium chloride (PF)  10 mL Intracatheter Q8H     sodium chloride (PF)  3 mL Intracatheter Q8H     sodium chloride (PF)  3 mL Intracatheter Q8H       dextrose 10% 25 mL/hr at 03/24/22 0515     - MEDICATION INSTRUCTIONS -       - MEDICATION INSTRUCTIONS -       - MEDICATION INSTRUCTIONS -       Current active medications and PTA medications reviewed, see medication list for details.            Physical Exam:   Vitals were reviewed  Patient Vitals for the past 24 hrs:   BP Temp Temp src Pulse Resp SpO2 Weight   03/24/22 1000 118/81 -- -- 105 16 93 % --   03/24/22 0800 119/77 -- -- 99 16 92 % --   03/24/22 0700 -- 97.3  F (36.3  C) Axillary -- -- -- --   03/24/22 0626 111/75 -- -- 90 12 94 % --   03/24/22 0421 -- -- -- -- -- -- 74.4 kg (164 lb)   03/24/22 0218 120/49 -- -- 74 16 98 % --   03/24/22 0000 (!) 125/36 98.2  F (36.8  C) Oral 82 12 95 % --   03/23/22 2200 139/58 -- -- 92 12 96 % --   03/23/22 2000 115/73 98  F (36.7  C) Oral 90 12 96 % --   03/23/22 1729 91/73 98.2  F (36.8  C) Oral 103 12 98 % --   03/23/22 1718 --  -- -- -- -- 100 % --   22 1628 93/54 -- -- 103 14 -- --   22 1527 92/65 97.9  F (36.6  C) Oral 107 15 100 % --   22 1300 (!) 102/97 97.1  F (36.2  C) Oral 96 14 -- --   22 1237 98/82 -- -- 96 -- -- --       Temp:  [97.1  F (36.2  C)-98.2  F (36.8  C)] 97.3  F (36.3  C)  Pulse:  [] 105  Resp:  [12-16] 16  BP: ()/(36-97) 118/81  SpO2:  [92 %-100 %] 93 %    Temperatures:  Current - Temp: 97.3  F (36.3  C); Max - Temp  Av.8  F (36.6  C)  Min: 97.1  F (36.2  C)  Max: 98.2  F (36.8  C)  Respiration range: Resp  Av.7  Min: 12  Max: 16  Pulse range: Pulse  Av.8  Min: 74  Max: 107  Blood pressure range: Systolic (24hrs), Av , Min:91 , Max:139   ; Diastolic (24hrs), Av, Min:36, Max:97    Pulse oximetry range: SpO2  Av.2 %  Min: 92 %  Max: 100 %    I/O last 3 completed shifts:  In: 25 [P.O.:25]  Out: 1500 [Other:1500]      Intake/Output Summary (Last 24 hours) at 3/24/2022 1230  Last data filed at 3/23/2022 1700  Gross per 24 hour   Intake 25 ml   Output 1500 ml   Net -1475 ml       Alert and responsive, weak  Lungs with clear ant BS  Cor RRR nl S1 S2 no M  LE no edema  LUAF with good pulse and bruit       Wt Readings from Last 4 Encounters:   22 74.4 kg (164 lb)   03/15/22 69.9 kg (154 lb)   22 70.1 kg (154 lb 9.6 oz)   22 69.9 kg (154 lb)          Data:          Lab Results   Component Value Date     2022     2022     2022     2021     2021     2021    Lab Results   Component Value Date    CHLORIDE 105 2022    CHLORIDE 101 2022    CHLORIDE 101 2022    CHLORIDE 106 2021    CHLORIDE 105 2021    CHLORIDE 106 2021    Lab Results   Component Value Date    BUN 16 2022    BUN 30 2022    BUN 23 2022    BUN 39 2021    BUN 23 2021    BUN 31 2021      Lab Results   Component Value Date    POTASSIUM 3.5 2022     POTASSIUM 3.9 03/21/2022    POTASSIUM 3.7 03/20/2022    POTASSIUM 5.3 07/05/2021    POTASSIUM 3.8 03/19/2021    POTASSIUM 3.5 03/18/2021    Lab Results   Component Value Date    CO2 29 03/24/2022    CO2 28 03/21/2022    CO2 30 03/20/2022    CO2 25 07/05/2021    CO2 28 03/19/2021    CO2 27 03/18/2021    Lab Results   Component Value Date    CR 3.51 03/24/2022    CR 5.22 03/21/2022    CR 4.17 03/20/2022    CR 5.03 07/05/2021    CR 4.03 03/19/2021    CR 4.90 03/18/2021        Recent Labs   Lab Test 03/24/22  0604 03/22/22  0439 03/20/22  0510   WBC 6.8 9.5 9.6   HGB 10.8* 11.0* 10.8*   HCT 35.7* 35.6* 35.1*   * 109* 108*   * 148* 129*     Recent Labs   Lab Test 03/20/22  0510 03/18/22  0610 03/17/22  0417 07/24/21  1217 03/19/21  0601   AST 17 16 16   < > 13   ALT 25 26 30   < > 21   GGT  --   --   --   --  88*   ALKPHOS 166* 152* 149   < > 341*   BILITOTAL 1.6* 1.2 1.6*   < > 1.2    < > = values in this interval not displayed.       Recent Labs   Lab Test 03/17/22  2214 02/22/22  1744 01/11/21  0911   MAG 1.6 1.7 1.9     Recent Labs   Lab Test 03/21/22  0527 03/19/22  1021 03/02/22  0736   PHOS 5.2* 2.8 3.9     Recent Labs   Lab Test 03/24/22  0604 03/21/22  0527 03/20/22  0510   CHRISTINE 8.9 8.9 9.2       Lab Results   Component Value Date    CHRISTINE 8.9 03/24/2022     Lab Results   Component Value Date    WBC 6.8 03/24/2022    HGB 10.8 (L) 03/24/2022    HCT 35.7 (L) 03/24/2022     (H) 03/24/2022     (L) 03/24/2022     Lab Results   Component Value Date     03/24/2022    POTASSIUM 3.5 03/24/2022    CHLORIDE 105 03/24/2022    CO2 29 03/24/2022     (H) 03/24/2022     Lab Results   Component Value Date    BUN 16 03/24/2022    CR 3.51 (H) 03/24/2022     Lab Results   Component Value Date    MAG 1.6 03/17/2022     Lab Results   Component Value Date    PHOS 5.2 (H) 03/21/2022       Creatinine   Date Value Ref Range Status   03/24/2022 3.51 (H) 0.66 - 1.25 mg/dL Final   03/21/2022 5.22 (H)  0.66 - 1.25 mg/dL Final   03/20/2022 4.17 (H) 0.66 - 1.25 mg/dL Final   03/19/2022 3.27 (H) 0.66 - 1.25 mg/dL Final   03/18/2022 4.55 (H) 0.66 - 1.25 mg/dL Final   03/17/2022 4.31 (H) 0.66 - 1.25 mg/dL Final   07/05/2021 5.03 (H) 0.66 - 1.25 mg/dL Final   03/19/2021 4.03 (H) 0.66 - 1.25 mg/dL Final   03/18/2021 4.90 (H) 0.66 - 1.25 mg/dL Final   03/17/2021 3.99 (H) 0.66 - 1.25 mg/dL Final   01/14/2021 4.55 (H) 0.66 - 1.25 mg/dL Final   01/13/2021 3.42 (H) 0.66 - 1.25 mg/dL Final       Attestation:  I have reviewed today's vital signs, notes, medications, labs and imaging.     Jah Hammond MD

## 2022-03-25 NOTE — H&P
[facility]    History and Physical - Hospitalist Service       Date of Admission:  3/25/2022    Assessment & Plan   Westley Ness is a 76 year old male who is being transitioned to inpatient hospice on 3/25/2022. Please see the discharge summary from the same date for more details of his hospital course.    Hospice Care Patient  * Transitioned to hospice GIP on 3/25.   * Ongoing shortness of breath and anxiety (describes as spinning/racing thoughts), also reports some loose stools  * Will plan to use high concentration solutions when able.  -- per discussion with hospice MD this evening, will add sched morphine 5mg q4 and sched Ativan 0.5mg q4h  -- prns morphine and ativan also available  -- prns for bowels (presently requesting Imodium for loose stools)  -- friends at bedside offering continued support    ----------------------------  Acute on chronic hypoxic and hypercapnic respiratory failure  Heart failure with reduced ejection fraction  Ischemic cardiomyopathy s/p ICD placement  NSTEMI   CAD with ischemic cardiomyopathy   OM1 stenting in 10/2020 was last intervention  Known subtotal occlusion of LAD with infarct  Hyperlipidemia  Atrial fibrillation/flutter, history of pacemaker placement and ICD  ESRD on hemodialysis  Hypotension   Acute metabolic encephalopathy, likely dt respiratory issues: Resolved  Hypothyroidism  Osteoarthritis  Stress induced hyperglycemia  Recovered COVID     Diet: Regular Diet Adult    Velarde Catheter: Not present  Central Lines: PRESENT     Code Status: No CPR- Do NOT Intubate    Expected Discharge: Working on discharge with hospice    Latesha Dickens DO  Hospitalist Service  [facility]  Securely message with the Vocera Web Console (learn more here)  Text page via [employer] Paging/Directory     ______________________________________________________________________    Chief Complaint   Transitioning to inpatient  hospice    History of Present Illness   Westley Ness is a 76 year old male who is being transitioned to inpatient hospice.  Please see the discharge summary from the same date for more details; a brief summary of his current symptoms is included here: Intermittent shortness of breath and anxiety.    Review of Systems    Review of systems was not needed on this patient    Past Medical History    Please see the medical & surgical history outlined in the H&P from the previous hospital encounter    Social History   Please see the social history outlined in the H&P from the previous hospital encounter    Family History   Please see the family history outlined in the H&P from the previous hospital encounter    Prior to Admission Medications   Prior to Admission Medications   Prescriptions Last Dose Informant Patient Reported? Taking?   B Complex-C-Folic Acid (DOROTEO CAPS) 1 MG CAPS  Nursing Home No No   Sig: TAKE 1 CAPSULE BY MOUTH EVERY DAY   Nutritional Supplements (NEPRO) LIQD  Nursing Home Yes No   Sig: Take 240 mLs by mouth daily (with lunch)   Nutritional Supplements (NUTRITIONAL SUPPLEMENT PO)  Nursing Home Yes No   Sig: Take 120 mLs by mouth 3 times daily (with meals) Magic Cup   acetaminophen (TYLENOL) 325 MG tablet  Nursing Home No No   Sig: Take 2 tablets (650 mg) by mouth every 4 hours as needed for mild pain   apixaban ANTICOAGULANT (ELIQUIS ANTICOAGULANT) 2.5 MG tablet  Nursing Home No No   Sig: Take 1 tablet (2.5 mg) by mouth 2 times daily   famotidine (PEPCID) 20 MG tablet  Nursing Home Yes No   Sig: Take 20 mg by mouth daily    gabapentin (NEURONTIN) 100 MG capsule  Nursing Home No No   Sig: Take 1 capsule (100 mg) by mouth 3 times daily   guaiFENesin-dextromethorphan (ROBITUSSIN DM) 100-10 MG/5ML syrup  Nursing Home No No   Sig: Take 10 mLs by mouth every 4 hours as needed for cough   insulin aspart (NOVOLOG PEN) 100 UNIT/ML pen  Nursing Home No No   Sig: Do Not give if Pre-Meal BG less than 140.    Pre-Meal  - 189 give 1 unit.   Pre-Meal  - 239 give 2 units.   Pre-Meal  - 289 give 3 units.   Pre-Meal  - 339 give 4 units.    Pre-Meal - 399 give 5 units.   Pre-Meal -449 give 6 units  Pre-Meal BG greater or equal to 450 give 7 units.   To be given with prandial insulin. Based on pre-meal blood glucose.   levothyroxine (SYNTHROID/LEVOTHROID) 50 MCG tablet  Nursing Home No No   Sig: TAKE 1 TABLET BY MOUTH EVERY DAY   lidocaine (LIDODERM) 5 % patch  Nursing Home No No   Sig: Place 1 patch onto the skin every 24 hours To prevent lidocaine toxicity, patient should be patch free for 12 hrs daily.   midodrine (PROAMATINE) 10 MG tablet   No No   Sig: Take 1 tablet (10 mg) by mouth 3 times daily (with meals)   nitroGLYcerin (NITROSTAT) 0.4 MG sublingual tablet  Nursing Home No No   Sig: Place 1 tablet (0.4 mg) under the tongue every 5 minutes as needed for chest pain UP TO 3 PER EPISODE   pantoprazole (PROTONIX) 40 MG EC tablet  Nursing Home No No   Sig: TAKE 1 TABLET (40 MG) BY MOUTH EVERY MORNING   pravastatin (PRAVACHOL) 40 MG tablet  Nursing Home No No   Sig: TAKE 1 TABLET BY MOUTH EVERY DAY   predniSONE (DELTASONE) 5 MG tablet  Nursing Home No No   Sig: Take 1 tablet (5 mg) by mouth daily   vitamin D3 (CHOLECALCIFEROL) 50 mcg (2000 units) tablet  Nursing Home No No   Sig: TAKE 1 TABLET BY MOUTH EVERY DAY      Facility-Administered Medications: None     Allergies   Allergies   Allergen Reactions     Contrast Dye Hives     Does fine if he uses benadryl prior.       Physical Exam   Vital Signs:                    Weight: 0 lbs 0 oz    Please see physical exam from my DISCHARGE SUMMARY on the same date

## 2022-03-25 NOTE — PROGRESS NOTES
Assessment and Plan:   ESRD: HD today for UF and clearance. Orders reviewed. Using LAF.             Interval History:   CHF: low BP with relative tachycardia.   On midodrine.    Hospice: start after today's run.     Hypoglycemia: on D10 W drip.            Review of Systems:   Has been confined to bed, not ambulating. Not eating. C/O nausea.           Medications:       - MEDICATION INSTRUCTIONS for Dialysis Patients -   Does not apply See Admin Instructions     apixaban ANTICOAGULANT  2.5 mg Oral BID     insulin aspart  1-3 Units Subcutaneous TID AC     insulin aspart  1-3 Units Subcutaneous At Bedtime     midodrine  10 mg Oral TID w/meals     predniSONE  5 mg Oral Daily     sodium chloride (PF)  10 mL Intracatheter Q8H     sodium chloride (PF)  3 mL Intracatheter Q8H     sodium chloride (PF)  3 mL Intracatheter Q8H       dextrose 10% 25 mL/hr at 03/24/22 2026     - MEDICATION INSTRUCTIONS -       - MEDICATION INSTRUCTIONS -       - MEDICATION INSTRUCTIONS -       Current active medications and PTA medications reviewed, see medication list for details.            Physical Exam:   Vitals were reviewed  Patient Vitals for the past 24 hrs:   BP Temp Temp src Pulse Resp SpO2 Weight   03/25/22 1015 107/74 -- -- 102 -- -- --   03/25/22 1000 114/82 -- -- 106 -- -- --   03/25/22 0945 115/66 -- -- 96 -- -- --   03/25/22 0930 109/74 -- -- 104 -- -- --   03/25/22 0915 91/57 -- -- 98 -- -- --   03/25/22 0900 100/69 -- -- 99 -- -- --   03/25/22 0845 114/80 -- -- 100 -- -- --   03/25/22 0830 102/77 -- -- 94 -- -- --   03/25/22 0820 116/76 98.4  F (36.9  C) Oral 100 16 -- --   03/25/22 0741 108/66 98.2  F (36.8  C) Axillary -- -- -- --   03/25/22 0500 -- -- -- -- -- -- 74 kg (163 lb 3.2 oz)   03/25/22 0345 111/84 -- -- 95 14 100 % --   03/25/22 0200 -- -- -- 88 14 99 % --   03/25/22 0004 102/66 98  F (36.7  C) Axillary 82 13 99 % --   03/1945 100/63 97.9  F (36.6  C) Axillary 93 15 99 % --   03/24/22 1500 105/78  97.4  F (36.3  C) Axillary 103 18 98 % --   22 1420 122/71 -- -- -- -- -- --   22 1400 106/72 -- -- 99 18 97 % --       Temp:  [97.4  F (36.3  C)-98.4  F (36.9  C)] 98.4  F (36.9  C)  Pulse:  [] 102  Resp:  [13-18] 16  BP: ()/(57-84) 107/74  SpO2:  [97 %-100 %] 100 %    Temperatures:  Current - Temp: 98.4  F (36.9  C); Max - Temp  Av  F (36.7  C)  Min: 97.4  F (36.3  C)  Max: 98.4  F (36.9  C)  Respiration range: Resp  Avg: 15.4  Min: 13  Max: 18  Pulse range: Pulse  Av.3  Min: 82  Max: 106  Blood pressure range: Systolic (24hrs), Av , Min:91 , Max:122   ; Diastolic (24hrs), Av, Min:57, Max:84    Pulse oximetry range: SpO2  Av.7 %  Min: 97 %  Max: 100 %    No intake/output data recorded.    No intake or output data in the 24 hours ending 22 1025    Alert, weak  LAF with needles in place       Wt Readings from Last 4 Encounters:   22 74 kg (163 lb 3.2 oz)   03/15/22 69.9 kg (154 lb)   22 70.1 kg (154 lb 9.6 oz)   22 69.9 kg (154 lb)          Data:          Lab Results   Component Value Date     2022     2022     2022     2021     2021     2021    Lab Results   Component Value Date    CHLORIDE 105 2022    CHLORIDE 101 2022    CHLORIDE 101 2022    CHLORIDE 106 2021    CHLORIDE 105 2021    CHLORIDE 106 2021    Lab Results   Component Value Date    BUN 16 2022    BUN 30 2022    BUN 23 2022    BUN 39 2021    BUN 23 2021    BUN 31 2021      Lab Results   Component Value Date    POTASSIUM 3.5 2022    POTASSIUM 3.9 2022    POTASSIUM 3.7 2022    POTASSIUM 5.3 2021    POTASSIUM 3.8 2021    POTASSIUM 3.5 2021    Lab Results   Component Value Date    CO2 29 2022    CO2 28 2022    CO2 30 2022    CO2 25 2021    CO2 28 2021    CO2 27 2021    Lab  Results   Component Value Date    CR 3.51 03/24/2022    CR 5.22 03/21/2022    CR 4.17 03/20/2022    CR 5.03 07/05/2021    CR 4.03 03/19/2021    CR 4.90 03/18/2021        Recent Labs   Lab Test 03/25/22  0626 03/24/22  0604 03/22/22  0439   WBC 8.0 6.8 9.5   HGB 11.0* 10.8* 11.0*   HCT 36.8* 35.7* 35.6*   * 111* 109*   * 113* 148*     Recent Labs   Lab Test 03/20/22  0510 03/18/22  0610 03/17/22  0417 07/24/21  1217 03/19/21  0601   AST 17 16 16   < > 13   ALT 25 26 30   < > 21   GGT  --   --   --   --  88*   ALKPHOS 166* 152* 149   < > 341*   BILITOTAL 1.6* 1.2 1.6*   < > 1.2    < > = values in this interval not displayed.       Recent Labs   Lab Test 03/17/22  2214 02/22/22  1744 01/11/21  0911   MAG 1.6 1.7 1.9     Recent Labs   Lab Test 03/21/22  0527 03/19/22  1021 03/02/22  0736   PHOS 5.2* 2.8 3.9     Recent Labs   Lab Test 03/24/22  0604 03/21/22  0527 03/20/22  0510   CHRISTINE 8.9 8.9 9.2       Lab Results   Component Value Date    CHRISTINE 8.9 03/24/2022     Lab Results   Component Value Date    WBC 8.0 03/25/2022    HGB 11.0 (L) 03/25/2022    HCT 36.8 (L) 03/25/2022     (H) 03/25/2022     (L) 03/25/2022     Lab Results   Component Value Date     03/24/2022    POTASSIUM 3.5 03/24/2022    CHLORIDE 105 03/24/2022    CO2 29 03/24/2022     (H) 03/25/2022     Lab Results   Component Value Date    BUN 16 03/24/2022    CR 3.51 (H) 03/24/2022     Lab Results   Component Value Date    MAG 1.6 03/17/2022     Lab Results   Component Value Date    PHOS 5.2 (H) 03/21/2022       Creatinine   Date Value Ref Range Status   03/24/2022 3.51 (H) 0.66 - 1.25 mg/dL Final   03/21/2022 5.22 (H) 0.66 - 1.25 mg/dL Final   03/20/2022 4.17 (H) 0.66 - 1.25 mg/dL Final   03/19/2022 3.27 (H) 0.66 - 1.25 mg/dL Final   03/18/2022 4.55 (H) 0.66 - 1.25 mg/dL Final   03/17/2022 4.31 (H) 0.66 - 1.25 mg/dL Final   07/05/2021 5.03 (H) 0.66 - 1.25 mg/dL Final   03/19/2021 4.03 (H) 0.66 - 1.25 mg/dL Final    03/18/2021 4.90 (H) 0.66 - 1.25 mg/dL Final   03/17/2021 3.99 (H) 0.66 - 1.25 mg/dL Final   01/14/2021 4.55 (H) 0.66 - 1.25 mg/dL Final   01/13/2021 3.42 (H) 0.66 - 1.25 mg/dL Final       Attestation:  I have reviewed today's vital signs, notes, medications, labs and imaging.  Seen on dialysis.      Jah Hammond MD

## 2022-03-25 NOTE — PROGRESS NOTES
[facility]    Nephrology Progress Note     Assessment & Plan     <ESRD: Final treatment of dialysis today. Tolerating well. No issues.    - Dialysys completed today.     <NAUSEA: Has prn ondansetron written for.    -antiemetic prn.        Brice Mccoy MD  [facility], Nephrology  Cell:[phone]  Pager:[phone]    Securely message with the Vocera Web Console (learn more here)  Text page via [employer] Paging/Directory     =========================    Dialysis Orders:    Primary Neph:  Dialysis clinic:  Duration:  Access:  Blood flow:  Target weight:  Needles:  Heparin:  EPO:  BMD:  Renal vitamin:    ======================    <><><><><><><><><><><>    Interval History     Seen on dialysis. Treatment discussed with OLAMIDE Moncada. Feeling somewhat nauseated. Has ondansetron prn.    Temp: 98.4  F (36.9  C) Temp src: Oral BP: 107/74 Pulse: 102   Resp: 16 SpO2: 100 % O2 Device: Nasal cannula Oxygen Delivery: 2 LPM    Vitals:    03/23/22 0220 03/24/22 0421 03/25/22 0500   Weight: 75.8 kg (167 lb 3.2 oz) 74.4 kg (164 lb) 74 kg (163 lb 3.2 oz)           Vital Signs with Ranges    Temp:  [97.4  F (36.3  C)-98.4  F (36.9  C)] 98.4  F (36.9  C)  Pulse:  [] 102  Resp:  [13-18] 16  BP: ()/(57-84) 107/74  SpO2:  [97 %-100 %] 100 %    No intake/output data recorded.    Physical Exam    BP Readings from Last 10 Encounters:   03/25/22 107/74   03/15/22 (!) 81/59   03/11/22 (!) 89/61   03/09/22 98/65   03/09/22 100/76   03/08/22 (!) 120/34   02/15/22 101/58   01/18/22 116/60   12/21/21 134/77   12/16/21 110/63        GENERAL: Frail and chronically ill appearing. Fluent.  HEENT:  Normocephalic. No gross abnormalities.  Neck The jugular venous pressure is elevated.  RESP:Breathing unlabored.  SKIN: no new lesions or rashes, dry to touch.  NEURO:  No overt deficit.  PSYCH: mood good, affect appropriate    Medications       dextrose 10% 25 mL/hr at 03/24/22 2026     - MEDICATION  INSTRUCTIONS -       - MEDICATION INSTRUCTIONS -       - MEDICATION INSTRUCTIONS -           - MEDICATION INSTRUCTIONS for Dialysis Patients -   Does not apply See Admin Instructions     apixaban ANTICOAGULANT  2.5 mg Oral BID     insulin aspart  1-3 Units Subcutaneous TID AC     insulin aspart  1-3 Units Subcutaneous At Bedtime     midodrine  10 mg Oral TID w/meals     predniSONE  5 mg Oral Daily     sodium chloride (PF)  10 mL Intracatheter Q8H     sodium chloride (PF)  3 mL Intracatheter Q8H     sodium chloride (PF)  3 mL Intracatheter Q8H       Data     UA RESULTS:  Recent Labs   Lab Test 08/29/18  1447   COLOR Yellow   APPEARANCE Cloudy   URINEGLC Negative   URINEBILI Small*   URINEKETONE Negative   SG 1.020   UBLD Large*   URINEPH 8.5*   PROTEIN >=300*   UROBILINOGEN 1.0   NITRITE Negative   LEUKEST Moderate*   RBCU 10-25*   WBCU >100*      BMP  Recent Labs   Lab 03/25/22  0742 03/25/22  0203 03/24/22  2123 03/24/22  1725 03/24/22  0604 03/21/22  1129 03/21/22  0527 03/20/22  1752 03/20/22  0510 03/19/22  1437 03/19/22  1021   NA  --   --   --   --  139  --  137  --  138  --  137   POTASSIUM  --   --   --   --  3.5  --  3.9  --  3.7  --  3.3*   CHLORIDE  --   --   --   --  105  --  101  --  101  --  102   CHRISTINE  --   --   --   --  8.9  --  8.9  --  9.2  --  8.4*   CO2  --   --   --   --  29  --  28  --  30  --  29   BUN  --   --   --   --  16  --  30  --  23  --  13   CR  --   --   --   --  3.51*  --  5.22*  --  4.17*  --  3.27*   * 126* 130* 132* 122*   < > 123*   < > 118*   < > 108*    < > = values in this interval not displayed.     [email or web address](phos:4)  CBC)  Recent Labs   Lab 03/25/22  0626 03/24/22  0604 03/22/22  0439 03/20/22  0510   WBC 8.0 6.8 9.5 9.6   HGB 11.0* 10.8* 11.0* 10.8*   HCT 36.8* 35.7* 35.6* 35.1*   * 111* 109* 108*   * 113* 148* 129*     Lab Results   Component Value Date    ALBUMIN 2.3 03/21/2022    ALBUMIN 2.3 03/20/2022    ALBUMIN 2.3 03/19/2022    ALBUMIN 2.9  03/19/2021    ALBUMIN 3.4 03/17/2021    ALBUMIN 3.0 01/14/2021        Recent Labs   Lab 03/25/22  0626   HGB 11.0*   HCT 36.8*   *       Recent Labs   Lab 03/20/22  0510   AST 17   ALT 25   ALKPHOS 166*   BILITOTAL 1.6*     No lab results found in last 7 days.  25 OH Vit D2   Date Value Ref Range Status   10/18/2020 <5 ug/L Final     25 OH Vit D3   Date Value Ref Range Status   10/18/2020 8 ug/L Final     25 OH Vit D total   Date Value Ref Range Status   10/18/2020 <13 (L) 20 - 75 ug/L Final     Comment:     Season, race, dietary intake, and treatment affect the concentration of   25-hydroxy-Vitamin D. Values may decrease during winter months and increase   during summer months. Values 20-29 ug/L may indicate Vitamin D insufficiency   and values <20 ug/L may indicate Vitamin D deficiency.  This test was developed and its performance characteristics determined by the   [facility].   It has not been cleared or approved by the FDA. The laboratory is regulated   under CLIA as qualified to perform high-complexity testing. This test is used   for clinical purposes. It should not be regarded as investigational or for   research.       No results for input(s): PTHI in the last 168 hours.    Attestation:   I have reviewed today's relevant vital signs, notes, medications, labs and imaging.    No therapy plan of the specified type found.

## 2022-03-25 NOTE — PROGRESS NOTES
Clinic Care Coordination Contact    Situation: Patient chart reviewed by care coordinator.    Background: Primary Care- Clinic Care Coordination previously following patient after hospitalization in Feb 2022. [facility]  He was readmitted to [facility] on 3/16/22.     Assessment: Per inpatient notes on 3/25/22, patient is transitioning to hospice. IP  notes indicate the discharge plan is to sign on to [facility] services.    Plan/Recommendations: Primary care- Care Coordination will no longer follow patient due to above reason.  CCC will remain available if plan of care changes by placing new referral if appropriate.    RUI PonceN, RN (assisting in coverage to Deepika Singh RN CC)  [facility]  - Clinic Care Coordinator

## 2022-03-25 NOTE — CONSULTS
[facility]    Consult Note - [facility]    ______________________________________________________________________    [facility] 24/7 Contact Number: [phone]    - Providers: Please contact [facility] with changes in orders or clinical plan of care   - Nursing: Please contact [facility] with significant changes in patient condition    Provider Workflow Guidance: Refer to the discharge/re-admit as IP hospice tipsheet     Hospice will notify the care team (including the hospitalist) to confirm date of inpatient hospice (GIP) admission.    New Epic encounter will not be created until hospice completes admission.   ______________________________________________________________________        Hospice Diagnosis: ESRD    Indication for Inpatient Hospice: Pt is eligible for hospital GIP d/t symptom management needs. Pt has anxiety and increased respiratory effort AEB fidgeting and restlessness and resting RR of 26. Increased O2 to 4L for comfort. Goal is that pt will rest peacefully and resp rate will be 20 or less.    Goals for Hospital Discharge: Pt is not stable to transport at this time. If pt stabilizes, discharge plan would be [facility] hospice home.    Plan of Care Discussed with the Following:   - Nurse: Delano  - Hospitalist/Rounding Provider: Dr. Latesha Dickens   - Westley's Family/Preferred Contact: POA Sarah Johansen and friend Lynn  - Hospice Provider: Dr. Scott Escobar    Summary of Visit (includes assessment, medications and any new orders):   Pt is alert but wiped out from having dialysis earlier today. Spoke to pt and friends Sarah Johansen and Lynn about the hospice philosophy, benefits of the program, and services provided. Pt is extremely anxious and SOB. Pt also reports having frequent loose stools and would like some Imodium ordered.     Med recommendations per Dr. Escboar include:    Morphine 20 mg/mL, give 5 mg po Q 4 hrs scheduled and 5-10 mg po  Q 1 hr PRN pain/dyspnea  Lorazepam 2mg/mL, give 0.5 mg po Q 4 hrs scheduled and 0.5-1 mg po Q 2 hrs PRN anxiety  Imodium 2 mg tab, take 1 tab after each stool with max dose of 16 mg per day      Jill Schoenecker, RN

## 2022-03-25 NOTE — PROGRESS NOTES
Potassium   Date Value Ref Range Status   03/24/2022 3.5 3.4 - 5.3 mmol/L Final   07/05/2021 5.3 3.4 - 5.3 mmol/L Final     Hemoglobin   Date Value Ref Range Status   03/25/2022 11.0 (L) 13.3 - 17.7 g/dL Final   07/05/2021 12.0 (L) 13.3 - 17.7 g/dL Final     Creatinine   Date Value Ref Range Status   03/24/2022 3.51 (H) 0.66 - 1.25 mg/dL Final   07/05/2021 5.03 (H) 0.66 - 1.25 mg/dL Final     Urea Nitrogen   Date Value Ref Range Status   03/24/2022 16 7 - 30 mg/dL Final   07/05/2021 39 (H) 7 - 30 mg/dL Final     Sodium   Date Value Ref Range Status   03/24/2022 139 133 - 144 mmol/L Final   07/05/2021 140 133 - 144 mmol/L Final     INR   Date Value Ref Range Status   02/11/2022 1.23 (H) 0.85 - 1.15 Final   10/15/2020 1.31 (H) 0.86 - 1.14 Final     Results for MALDONADO LÓPEZ (MRN [id number]) as of 3/25/2022 13:13   Ref. Range 3/21/2022 12:51   Hep B Surface Agn Latest Ref Range: Nonreactive  Nonreactive   Hepatitis B Surface Antibody Latest Ref Range: >=12.00 m[IU]/mL 0.74 (L)     DIALYSIS PROCEDURE NOTE  Hepatitis status of previous3 patient on machine log was checked and verified ok to use with this patients hepatitis status.  Patient dialyzed for 3 hrs. on a K3 bath with a net fluid removal of  1L.  A BFR of 350 ml/min was obtained via a LAVF using 16 gauge needles.      The treatment plan was discussed with Dr. Mcmahan during the treatment.    Total heparin received during the treatment: 0 units.   Needle cannulation sites held x 10 min.       Meds  given: none   Complications: none      Person educated: person. Knowledge base substantial. Barriers to learning: none. Educated on procedure via verbal mode. Patient verbalized understanding. Pt prefers oral education style.     ICEBOAT? Timeout performed pre-treatment  I: Patient was identified using 2 identifiers  C:  Consent Signed Yes  E: Equipment preventative maintenance is current and dialysis delivery system OK to use  B: Hepatitis B Surface Antigen: negative;  Draw Date: 3/21/2022      Hepatitis B Surface Antibody: SUSCEPTIBLE; Draw Date: 3/21/2022  O: Dialysis orders present and complete prior to treatment  A: Vascular access verified and assessed prior to treatment  T: Treatment was performed at a clinically appropriate time  ?: Patient was allowed to ask questions and address concerns prior to treatment  See flowsheet in EPIC for further details and post assessment.  Machine water alarm in place and functioning. Transducer pods intact and checked every 15min.   Pt returned via bed.  Chlorine/Chloramine water system checked every 4 hours.  Last run prior to going to comfort care.     Please remove patient dressing on AVF and AVG needle sites 24 hours after dialysis. If leaking occurs please apply a Band-Aid.

## 2022-03-25 NOTE — PROGRESS NOTES
[facility]  Palliative Care Daily Progress Note       Recommendations & Counseling       Oseas had made the decision to stop dialysis after his 3/25 HD run. He had spoken with hospice and will transition to [facility] inpatient hospice today for comfort focused care.     Code Status.  -No Intubation  -No CPR  -No BIPAP    Anxiety related to end of life decisions.  -Recommend lorazepam 0.5 mg every 4 hours as needed.  -Offer supportive milieu and allow Art to express feelings.  - support as needed.      Case was reviewed with Dr Talib Correia, CNS  [facility]  Contact information available via [facility] Paging/Directory        Thank you for the opportunity to participate in the care of this patient and family. Our team: will continue to follow.     During regular M-F work hours ([phone]) -- if you are not sure who specifically to contact -- please contact us in [facility] Smart Web.     After regular work hours and on weekends/holidays, you can call our answering service at [phone].     Attestation:  Total time on the floor involved in the patient's care reviewing condition, goals of care, discussing case with staff member, symptom assessment : 35 minutes  Total time spent in counseling/care coordination: >50%       Assessments          Westley Ness is a 76 year old male with PMH significant for ischemic cardiomyopathy- EF 25- 30%, s/p ICD, ESRD on HD MWF, DM2, GERD, atrial flutter/fibrillation on chronic anticoagulation with apixaban, and chronic hypotension, . Recent admissions to hospital on 2/11/22 with COVID-19 and discharged 2/15. He returned to hospital on 2/17 due to worsening SOB and O2 sats in the 70s and hypotension with CHF exacerbation -was discharged to TCU. He suffered a fall at TCU and was seen in ED on 3/09 with dehydration, hypotension.  Art was transported to ED from his dialysis center on 3/16 due to confusion and generalized weakness  and was ultimately admitted for acute on chronic hypoxic and hypercapnic respiratory failure. He is now on BiPAP support for his breathing. This is his third hospitalization over the past 3 months. Palliative care has followed closely during last hospital stay.    Today, the patient was seen for:  Goals of care, respiratory failure, ESRD on HD.    Prognosis, Goals, or Advance Care Planning was addressed today with: Yes    Mood, coping, and/or meaning in the context of serious illness were addressed today: Yes.  Summary/Comments:             Interval History:     Chart review/discussion with unit or clinical team members:   Discussed case with RN, hospice SW.  Visitor from Art's room came out to  to ask for pain medication as Art was feeling very uncomfortable.     Per patient or family/caregivers today:  Writer went to check on Art to assess for symptoms as visitor from room had requested assistance for symptoms. When I came to [facility] I introduced myself to Art and his friend Lynn and his retired dialysis NP. He requested that I come back later. Due to recent request from friend for pain medication, I expressed concern regarding the symptoms he might be having such as pain, and he stated that he was more so getting nervous about his decision to stop dialysis. He had had lorazepam prior to HD today and he felt this helped him feel more peaceful. I offered to place some as needed lorazepam on his as needed medication list for extra doses and he would like this.     Discussed his feelings regarding comfort care and the process of making that decision over many days with his friend Maria Dolores. He still wishes to proceed with comfort care. The process of getting to and from dialysis center was very tiring for him and he did not wish to go through that process several times per week. He will be speaking with hospice soon to transition to inpatient hospice program.     Key Palliative Symptoms:  We are not  helping to manage these symptoms currently in this patient.             Review of Systems:     Besides above, an additional  system ROS was reviewed and is unremarkable          Medications:     I have reviewed this patient's medication profile and medications during this hospitalization.    Noted meds:      - MEDICATION INSTRUCTIONS for Dialysis Patients -   Does not apply See Admin Instructions     apixaban ANTICOAGULANT  2.5 mg Oral BID     sodium chloride (PF) 0.9%  1.3-2.6 mL Intracatheter Once in dialysis/CRRT    Followed by     heparin  3 mL Intracatheter Once in dialysis/CRRT     sodium chloride (PF) 0.9%  1.3-2.6 mL Intracatheter Once in dialysis/CRRT    Followed by     heparin  3 mL Intracatheter Once in dialysis/CRRT     insulin aspart  1-3 Units Subcutaneous TID AC     insulin aspart  1-3 Units Subcutaneous At Bedtime     midodrine  10 mg Oral TID w/meals     - MEDICATION INSTRUCTIONS -   Does not apply Once     predniSONE  5 mg Oral Daily     sodium chloride (PF)  10 mL Intracatheter Q8H     sodium chloride (PF)  3 mL Intracatheter Q8H     sodium chloride (PF)  3 mL Intracatheter Q8H     sodium chloride (PF)  9 mL Intracatheter During Dialysis/CRRT (from stock)     sodium chloride (PF)  9 mL Intracatheter During Dialysis/CRRT (from stock)     sodium chloride 0.9%, acetaminophen **OR** acetaminophen, albumin human, alteplase, alteplase, artificial tears ophthalmic solution, glucose **OR** dextrose **OR** glucagon, lidocaine 4%, lidocaine (buffered or not buffered), lidocaine (buffered or not buffered), lidocaine (buffered or not buffered), LORazepam, melatonin, nitroGLYcerin, - MEDICATION INSTRUCTIONS -, ondansetron **OR** ondansetron, - MEDICATION INSTRUCTIONS -, - MEDICATION INSTRUCTIONS -, prochlorperazine **OR** prochlorperazine **OR** prochlorperazine, senna-docusate **OR** senna-docusate, sodium chloride (PF), sodium chloride (PF), sodium chloride (PF), sodium chloride (PF), sodium chloride (PF)              Physical Exam:   Temp: 97.8  F (36.6  C) Temp src: Oral BP: 112/64 Pulse: 100   Resp: 16 SpO2: 97 % O2 Device: Nasal cannula Oxygen Delivery: 2 LPM  GENERAL:  Alert, fatigued, no distress, weak.  HEAD: Normocephalic atraumatic  SCLERA: Anicteric  ABDOMEN: rounded  RESPIRATORY: Breathing non labored  NEUROLOGIC: Alert.  PSYCH: Calm, cooperative.           Data Reviewed:     Recent imaging reviewed, my comments on pertinents:   Results for orders placed or performed during the hospital encounter of 03/16/22   Head CT w/o contrast    Impression    IMPRESSION:  1.  Progressed left frontal acute and chronic sinusitis.  2.  Interval opacification of the right middle ear and mastoids.  3.  Interval mild left mastoid opacification.  4.  Stable mild to moderate presumed chronic small vessel ischemic  change and generalized volume loss.    Radiation dose for this scan was reduced using automated exposure  control, adjustment of the mA and/or kV according to patient size, or  iterative reconstruction technique    SALLY OCHOA MD         SYSTEM ID:  [id number]   XR Chest Port 1 View    Impression    IMPRESSION: Stable cardiomegaly. Opacification of the left lung base  shows a mild degree of improved aeration medially. Some persistent  airspace disease is a possibility at this location along with stable  small left pleural fluid. Trace right base pleural fluid also again  noted. Bilateral interstitial and vascular prominence may be  stable-appearing pulmonary edema.    MELISA HEWITT MD         SYSTEM ID:  [id number]     Lab Results   Component Value Date    WBC 8.0 03/25/2022    WBC 6.8 03/24/2022    WBC 9.5 03/22/2022    HGB 11.0 (L) 03/25/2022    HGB 10.8 (L) 03/24/2022    HGB 11.0 (L) 03/22/2022    HCT 36.8 (L) 03/25/2022    HCT 35.7 (L) 03/24/2022    HCT 35.6 (L) 03/22/2022     (L) 03/25/2022     (L) 03/24/2022     (L) 03/22/2022     03/24/2022     03/21/2022     03/20/2022     POTASSIUM 3.5 03/24/2022    POTASSIUM 3.9 03/21/2022    POTASSIUM 3.7 03/20/2022    CHLORIDE 105 03/24/2022    CHLORIDE 101 03/21/2022    CHLORIDE 101 03/20/2022    CO2 29 03/24/2022    CO2 28 03/21/2022    CO2 30 03/20/2022    BUN 16 03/24/2022    BUN 30 03/21/2022    BUN 23 03/20/2022    CR 3.51 (H) 03/24/2022    CR 5.22 (H) 03/21/2022    CR 4.17 (H) 03/20/2022     (H) 03/25/2022     (H) 03/25/2022     (H) 03/24/2022    DD 1.75 (H) 02/17/2022    DD 0.71 (H) 02/11/2022    DD 0.9 (H) 04/07/2017    NTBNPI 115,907 (H) 03/16/2022    NTBNPI >175,000 (H) 02/17/2022    NTBNPI 150,621 (H) 10/15/2020    TROPONIN 0.022 07/24/2021    TROPI 0.025 07/05/2021    TROPI 0.025 03/17/2021    TROPI 0.029 03/17/2021    AST 17 03/20/2022    AST 16 03/18/2022    AST 16 03/17/2022    ALT 25 03/20/2022    ALT 26 03/18/2022    ALT 30 03/17/2022    GGT 88 (H) 03/19/2021    ALKPHOS 166 (H) 03/20/2022    ALKPHOS 152 (H) 03/18/2022    ALKPHOS 149 03/17/2022    BILITOTAL 1.6 (H) 03/20/2022    BILITOTAL 1.2 03/18/2022    BILITOTAL 1.6 (H) 03/17/2022    INR 1.23 (H) 02/11/2022    INR 1.31 (H) 10/15/2020    INR 0.97 07/22/2019

## 2022-03-25 NOTE — PROGRESS NOTES
"[facility]    Social Work Progress Note - [facility]    ______________________________________________________________________    [facility] 24/7 Contact Number: [phone]    - Providers: Please contact [facility] with changes in orders or clinical plan of care   - Nursing: Please contact [facility] with significant changes in patient condition  - Social Work: Please contact [facility] for discharge phanning/updates  ______________________________________________________________________      Summary of Visit (includes psychosocial assessment/updates, acceptance of palliative care/hospice philosophy, discharge plans):   CLEEV met with Art and and two of his friends. As discussed yesterday with Art, the plan was for CLEVE to visit him after he completes his final dialysis treatment and to sign on to hospice. CLEVE prepared all the necessary paper work for Art and went through the first form thoroughly and handed it over for him to sign and Art declined stating, \"My  is going to sign this\". CLEVE explained that Art is fully competent and is the only person who can sign consents for himself at this time. Art said, \"I need my advisors to look through everything prior to signing everything\".     Art continues to be ambivalent about signing on to hospice, and has many \"advisors\" telling him different things. CLEVE informed Art that no one will force him to sign on to hospice if he does not want to and that his decisions are in his own hands. Art asked for the forms so he could give them to his  and CLEVE shortly summarized each of the forms prior to handing it over. Once again, CLEVE reiterated that it's ultimately his choice whether or not he has hospice and that SW and hospice staff is not going to push him again. CLEVE left the hospice forms and informational packet along with her phone number in Bill's room. SW also explained that Hospice is not at the hospital on " Saturdays and Sunday so if he does want to sign on he should inform her, otherwise he will need to wait until Monday.    Plan of Care Discussed with the Following:   - Nurse: Delano   - Hospitalist/Rounding Provider: Dr. Brooklynn Dickens    - Westley's Family/Preferred Contact: Joaquina   - Hospice Provider: Dr. Jorden Jensen, [place] [phone]

## 2022-03-25 NOTE — PLAN OF CARE
Goal Outcome Evaluation:    Plan of Care Reviewed With: patient     Overall Patient Progress: no change     DATE & TIME: 03/24/2022; 7175 - 3593     Reason for Hospitalization: Metabolic encephalopathy; Acute Respiratory failure with hypercapnia         Cognitive Concerns/ Orientation : A & O X4; Forgetful at times, calm and follows commands.  BEHAVIOR & AGGRESSION TOOL COLOR: green.  CIWA SCORE: na      ABNL VS/O2:  VSS on 2L of O2. Lungs diminished.  MOBILITY: A x2 with a lift. Pt has not been OOB.  PAIN MANAGMENT: denies pain; nausea or vomitting.  DIET: Reg Diet  BOWEL/BLADDER: Incontinent to bowel. Had a loose BM this shift. BS +. HD on M/W/F for ESRD.  ABNL LAB/BG:  BGL: 130 at 2200 and at 0200: 84 mg/dl.  DRAIN/DEVICES: Midline infusing D10 at 25 mls/hr. HD AV site on the Left upper arm with bruit sound and thrills.  TELEMETRY RHYTHM: SR and had 9 runs of asymptomatic VT this shift.  SKIN: Generalized bruising and generalized trace edema.  TESTS/PROCEDURES: CBC with platelets this morning  D/C DATE: Pending discharge to a TCU  Discharge Barriers: palliative clearance  OTHER IMPORTANT INFO: Pt will be having his last dialysis today and will be transition to hospice as per palliative consult. SW is following

## 2022-03-26 NOTE — PROGRESS NOTES
[facility]    Progress Note - [facility]    ______________________________________________________________________    [facility] 24/[facility] Contact Number: [phone]    - Providers: Please contact [facility] with changes in orders or clinical plan of care   - Nursing: Please contact [facility] with significant changes in patient condition  ______________________________________________________________________        Plan of Care Discussed with the Following:   - Nurse: Nurses:  Delano Prabhakar RN (Tel. [phone]) Raul Lynne RN  - Hospitalist/Rounding Provider:    Cullen Merritt MD      Hospitalist - Internal Medicine     Since 3/25/2022     [phone]       - Westley's Family/Preferred Contact: Joaquina [phone]; RAFY Johansen and friend Lynn  - Hospice Provider: Lashay Schulte -[phone]    Summary of Visit (includes assessment, medications and any new orders):   Patient was in bed resting and able to respond writer appropriately. He was able to prononce his last name. He appeared comfortable but wanted to reposition but he was unable to do so by himself. Staff came in to reposition patient and administer scheduled Morphine and Ativan. He received 5 scheduled doses of ativan 0.5 mg sublingually and 4 scheduled doses of Roxanol 5 mg sublingually. He didn't receive any PRN doses of medications. He reportedly did have a BM on Friday 3/25 by staff.       Will continue to provide comfort cares as  Follows:  Turn and reposition every 2 hours  Scheduled roxanol and ativan  Oral cares    Dicharge plans:   Tenative plans are to keep patient in hospital setting until he passes per Joaquina who is a friend of patient. Will reevaluate during daily rounds.    Lashay Schulte RN

## 2022-03-26 NOTE — PROGRESS NOTES
[facility]    Medicine Progress Note - Hospitalist Service    Date of Admission:  3/25/2022  Date of Service: 03/26/2022    Assessment & Plan            Westley Ness is a 76 year old male who is being transitioned to inpatient hospice on 3/25/2022. Please see the discharge summary from the same date for more details of his hospital course.    Hospice Care Patient  * Transitioned to hospice GIP on 3/25.   * Ongoing shortness of breath and anxiety (describes as spinning/racing thoughts)  * Will plan to use high concentration solutions when able.  -- sched morphine 5mg q4 and sched Ativan 0.5mg q4h  -- prns morphine and ativan also available  -- prns for bowels (presently requesting Imodium for loose stools)    ----------------------------  Acute on chronic hypoxic and hypercapnic respiratory failure  Heart failure with reduced ejection fraction  Ischemic cardiomyopathy s/p ICD placement  NSTEMI   CAD with ischemic cardiomyopathy   OM1 stenting in 10/2020 was last intervention  Known subtotal occlusion of LAD with infarct  Hyperlipidemia  Atrial fibrillation/flutter, history of pacemaker placement and ICD  ESRD on hemodialysis  Hypotension   Acute metabolic encephalopathy, likely dt respiratory issues: Resolved  Hypothyroidism  Osteoarthritis  Stress induced hyperglycemia  Recovered COVID       Diet: Regular Diet Adult    DVT Prophylaxis: Low Risk/Ambulatory with no VTE prophylaxis indicated  Velarde Catheter: Not present  Central Lines: PRESENT     Cardiac Monitoring: None  Code Status: No CPR- Do NOT Intubate      Disposition Plan   Expected Discharge: 03/27/2022     Anticipated discharge location:  Awaiting care coordination huddle  Delays:          The patient's care was discussed with the Bedside Nurse and Patient.    Cullen Merritt MD  Hospitalist Service  [facility]  Securely message with the Vocera Web Console (learn more here)  Text page via [facility]  Paging/Directory         Clinically Significant Risk Factors Present on Admission                 ______________________________________________________________________    Interval History     No CP  SOB stable  No fevers or chills  No new complaints    Data reviewed today: I reviewed all medications, new labs and imaging results over the last 24 hours. I personally reviewed no images or EKG's today.    Physical Exam   Vital Signs: Temp: 97.6  F (36.4  C) Temp src: Oral BP: 100/64 Pulse: 96   Resp: 18 SpO2: 98 % O2 Device: Nasal cannula Oxygen Delivery: 2 LPM  Weight: 0 lbs 0 oz    General: Resting comfortably, NAD  CVS: extremities perfused  Respiratory: breathing nonlabored  GI: S, NT, ND  Skin: Warm/dry  Neuro: CNs intact, no focal motor/sensory deficitis    Data   Recent Labs   Lab 03/25/22  0742 03/25/22  0626 03/25/22  0203 03/24/22  2123 03/24/22  1253 03/24/22  0604 03/22/22  0847 03/22/22  0439 03/21/22  1129 03/21/22  0527 03/20/22  1752 03/20/22  0510   WBC  --  8.0  --   --   --  6.8  --  9.5  --   --   --  9.6   HGB  --  11.0*  --   --   --  10.8*  --  11.0*  --   --   --  10.8*   MCV  --  111*  --   --   --  111*  --  109*  --   --   --  108*   PLT  --  108*  --   --   --  113*  --  148*  --   --   --  129*   NA  --   --   --   --   --  139  --   --   --  137  --  138   POTASSIUM  --   --   --   --   --  3.5  --   --   --  3.9  --  3.7   CHLORIDE  --   --   --   --   --  105  --   --   --  101  --  101   CO2  --   --   --   --   --  29  --   --   --  28  --  30   BUN  --   --   --   --   --  16  --   --   --  30  --  23   CR  --   --   --   --   --  3.51*  --   --   --  5.22*  --  4.17*   ANIONGAP  --   --   --   --   --  5  --   --   --  8  --  7   CHRISTINE  --   --   --   --   --  8.9  --   --   --  8.9  --  9.2   *  --  126* 130*   < > 122*   < >  --    < > 123*   < > 118*   ALBUMIN  --   --   --   --   --   --   --   --   --  2.3*  --  2.3*   PROTTOTAL  --   --   --   --   --   --   --   --   --    --   --  5.5*   BILITOTAL  --   --   --   --   --   --   --   --   --   --   --  1.6*   ALKPHOS  --   --   --   --   --   --   --   --   --   --   --  166*   ALT  --   --   --   --   --   --   --   --   --   --   --  25   AST  --   --   --   --   --   --   --   --   --   --   --  17    < > = values in this interval not displayed.     No results found for this or any previous visit (from the past 24 hour(s)).  Medications     - MEDICATION INSTRUCTIONS -         LORazepam  0.5 mg Oral Q4H     morphine sulfate  5 mg Oral Q4H     sodium chloride (PF)  3 mL Intracatheter Q8H

## 2022-03-26 NOTE — PLAN OF CARE
Pt has been comfortable, doing well on scheduled Morphine and Tylenol. Declining to eat, occasionally would be agreeable to repositioning, otherwise wants to be left undisturbed.   Pt transferred to [facility].   Goal Outcome Evaluation: Declining.

## 2022-03-26 NOTE — PROGRESS NOTES
Alert and oriented x4, forgetful at times. Assist of 2 for repo, lift when OOB. Tolerating regular diet. Lung sounds dim. BM -. Scattered bruises and scabs, blanchable redness to coccyx, buttocks and thigh. Pain managed with morphine. Denies nausea. Pt transitioned to comfort cares 3/25. Bed switched to pulsate mattress.

## 2022-03-26 NOTE — PHARMACY-ADMISSION MEDICATION HISTORY
Pharmacy Medication History  Admission medication history interview status for the 3/25/2022  admission is complete. See EPIC admission navigator for prior to admission medications     Location of Interview: Outside patient room but on unit  Medication history sources: Medication history completed during non-hospice admission.       Prior to Admission medications    Medication Sig Last Dose Taking? Auth Provider   acetaminophen (TYLENOL) 325 MG tablet Take 2 tablets (650 mg) by mouth every 4 hours as needed for mild pain   Nghia Cavanaugh MD   apixaban ANTICOAGULANT (ELIQUIS ANTICOAGULANT) 2.5 MG tablet Take 1 tablet (2.5 mg) by mouth 2 times daily   Brooklynn Griffith PA-C B Complex-C-Folic Acid (DOROTEO CAPS) 1 MG CAPS TAKE 1 CAPSULE BY MOUTH EVERY DAY   Josselin Kolb MD   famotidine (PEPCID) 20 MG tablet Take 20 mg by mouth daily    Reported, Patient   gabapentin (NEURONTIN) 100 MG capsule Take 1 capsule (100 mg) by mouth 3 times daily   Josselin Kolb MD   guaiFENesin-dextromethorphan (ROBITUSSIN DM) 100-10 MG/5ML syrup Take 10 mLs by mouth every 4 hours as needed for cough   Nghia Cavanaugh MD   insulin aspart (NOVOLOG PEN) 100 UNIT/ML pen Do Not give if Pre-Meal BG less than 140.   Pre-Meal  - 189 give 1 unit.   Pre-Meal  - 239 give 2 units.   Pre-Meal  - 289 give 3 units.   Pre-Meal  - 339 give 4 units.    Pre-Meal - 399 give 5 units.   Pre-Meal -449 give 6 units  Pre-Meal BG greater or equal to 450 give 7 units.   To be given with prandial insulin. Based on pre-meal blood glucose.   Eve Barroso PA-C   levothyroxine (SYNTHROID/LEVOTHROID) 50 MCG tablet TAKE 1 TABLET BY MOUTH EVERY DAY   Josselin Kolb MD   lidocaine (LIDODERM) 5 % patch Place 1 patch onto the skin every 24 hours To prevent lidocaine toxicity, patient should be patch free for 12 hrs daily.   Bryant Ozuna MD   midodrine (PROAMATINE) 10 MG tablet Take 1 tablet (10  mg) by mouth 3 times daily (with meals)   Samy Mayers MD   nitroGLYcerin (NITROSTAT) 0.4 MG sublingual tablet Place 1 tablet (0.4 mg) under the tongue every 5 minutes as needed for chest pain UP TO 3 PER EPISODE   Josselin Kolb MD   Nutritional Supplements (NEPRO) LIQD Take 240 mLs by mouth daily (with lunch)   Unknown, Entered By History   Nutritional Supplements (NUTRITIONAL SUPPLEMENT PO) Take 120 mLs by mouth 3 times daily (with meals) Magic Cup   Unknown, Entered By History   pantoprazole (PROTONIX) 40 MG EC tablet TAKE 1 TABLET (40 MG) BY MOUTH EVERY MORNING   Josselin Kolb MD   pravastatin (PRAVACHOL) 40 MG tablet TAKE 1 TABLET BY MOUTH EVERY DAY   Josselin Kolb MD   predniSONE (DELTASONE) 5 MG tablet Take 1 tablet (5 mg) by mouth daily   Nghia Cavanaugh MD   vitamin D3 (CHOLECALCIFEROL) 50 mcg (2000 units) tablet TAKE 1 TABLET BY MOUTH EVERY DAY   Josselin Kolb MD       The information provided in this note is only as accurate as the sources available at the time of update(s)

## 2022-03-27 NOTE — PLAN OF CARE
Comfort cares. Formal assessment deferred. Pt passed at 1454. Organ donation line called.     Summary: In patient Hospice secondary to respiratory failure. ECKD  and co morbidities..   Primary Diagnosis: Respiratory failure  Formal assessment deferred due to comfort cares  Orientation: lethargic, does not open eyes to voice   Aggression Stop Light: green  Mobility: bedrest. Turn and repo as needed.   Pain Management: hospice, scheduled ativan and Roxanol   Diet: regular. No oral intake this shift   Bowel/Bladder: incontinent  Abnormal Lab/Assessments: N/A  Drain/Device/Wound: Midline  Consults: N/A  D/C Day/Goals/Place: Inpatient Hospice

## 2022-03-27 NOTE — DISCHARGE SUMMARY
[facility]    Death Summary - Hospitalist Service     Date of Admission:  3/25/2022  Date of Death: 3/27/2022  Discharging Provider: Cullen Merritt MD    Discharge Diagnoses     Acute on chronic hypoxic and hypercapnic respiratory failure  Heart failure with reduced ejection fraction  Ischemic cardiomyopathy s/p ICD placement  NSTEMI     Cause of death: Acute on chronic hypoxic and hypercapnic respiratory failure    Hospital Course              Westley Ness is a 76 year old male who is being transitioned to inpatient hospice on 3/25/2022. Please see the discharge summary from the same date for more details of his hospital course.    Hospice Care Patient  * Transitioned to hospice GIP on 3/25.   * Ongoing shortness of breath and anxiety (describes as spinning/racing thoughts)  * Will plan to use high concentration solutions when able.  -- sched morphine 5mg q4 and sched Ativan 0.5mg q4h  -- prns morphine and ativan also available  -- prns for bowels (presently requesting Imodium for loose stools)    ----------------------------  Acute on chronic hypoxic and hypercapnic respiratory failure  Heart failure with reduced ejection fraction  Ischemic cardiomyopathy s/p ICD placement  NSTEMI   CAD with ischemic cardiomyopathy   OM1 stenting in 10/2020 was last intervention  Known subtotal occlusion of LAD with infarct  Hyperlipidemia  Atrial fibrillation/flutter, history of pacemaker placement and ICD  ESRD on hemodialysis  Hypotension   Acute metabolic encephalopathy, likely dt respiratory issues: Resolved  Hypothyroidism  Osteoarthritis  Stress induced hyperglycemia  Recovered COVID        Cullen Merritt MD  [facility]  ______________________________________________________________________      Significant Results and Procedures   Most Recent 3 CBC's:Recent Labs   Lab Test 03/25/22  0626 03/24/22  0604 03/22/22  0439   WBC 8.0 6.8 9.5   HGB 11.0* 10.8* 11.0*   * 111*  109*   * 113* 148*     Most Recent 3 BMP's:Recent Labs   Lab Test 03/25/22  0742 03/25/22  0203 03/24/22  2123 03/24/22  1253 03/24/22  0604 03/21/22  1129 03/21/22  0527 03/20/22  1752 03/20/22  0510   NA  --   --   --   --  139  --  137  --  138   POTASSIUM  --   --   --   --  3.5  --  3.9  --  3.7   CHLORIDE  --   --   --   --  105  --  101  --  101   CO2  --   --   --   --  29  --  28  --  30   BUN  --   --   --   --  16  --  30  --  23   CR  --   --   --   --  3.51*  --  5.22*  --  4.17*   ANIONGAP  --   --   --   --  5  --  8  --  7   CHRISTINE  --   --   --   --  8.9  --  8.9  --  9.2   * 126* 130*   < > 122*   < > 123*   < > 118*    < > = values in this interval not displayed.     Most Recent 2 LFT's:Recent Labs   Lab Test 03/20/22  0510 03/18/22  0610   AST 17 16   ALT 25 26   ALKPHOS 166* 152*   BILITOTAL 1.6* 1.2     Most Recent 3 INR's:Recent Labs   Lab Test 02/11/22  1647 10/15/20  1830 07/22/19  0757   INR 1.23* 1.31* 0.97   ,   Results for orders placed or performed during the hospital encounter of 03/16/22   Head CT w/o contrast    Narrative    CT OF THE HEAD WITHOUT CONTRAST   3/16/2022 11:12 AM     COMPARISON: Head CT 10/15/2020.    HISTORY:  Mental status change, unknown cause     TECHNIQUE:  Axial CT images of the head from the skull base to the  vertex were acquired without IV contrast.    FINDINGS:   INTRACRANIAL CONTENTS: No intracranial hemorrhage, extraaxial  collection, or mass effect.  No CT evidence of acute infarct.   Mild  to moderate presumed chronic small vessel ischemic change with mild to  moderate generalized volume loss.    VISUALIZED ORBITS/SINUSES/MASTOIDS: No significant orbital  abnormality.  Progressed pneumatized secretions in the left frontal  sinus. Interval right middle ear and mastoid opacification. Interval  development of mild left mastoid opacification. Chronic thickening of  the left maxillary antral walls.    OSSEOUS STRUCTURES/SOFT TISSUES: No significant  abnormality.      Impression    IMPRESSION:  1.  Progressed left frontal acute and chronic sinusitis.  2.  Interval opacification of the right middle ear and mastoids.  3.  Interval mild left mastoid opacification.  4.  Stable mild to moderate presumed chronic small vessel ischemic  change and generalized volume loss.    Radiation dose for this scan was reduced using automated exposure  control, adjustment of the mA and/or kV according to patient size, or  iterative reconstruction technique    SALLY OCHOA MD         SYSTEM ID:  [id number]   XR Chest Port 1 View    Narrative    CHEST TWO VIEWS   3/16/2022 11:05 AM     HISTORY: Hypoxia.  Dialysis.    COMPARISON: 3/9/2022.      Impression    IMPRESSION: Stable cardiomegaly. Opacification of the left lung base  shows a mild degree of improved aeration medially. Some persistent  airspace disease is a possibility at this location along with stable  small left pleural fluid. Trace right base pleural fluid also again  noted. Bilateral interstitial and vascular prominence may be  stable-appearing pulmonary edema.    MELISA HEWITT MD         SYSTEM ID:  [id number]       Consultations This Hospital Stay   PHARMACY IP CONSULT  PALLIATIVE CARE ADULT IP CONSULT    Primary Care Physician   Josselin Kolb    Time Spent on this Encounter   I, Cullen Merritt MD, personally saw the patient today and spent greater than 30 minutes discharging this patient.

## 2022-03-27 NOTE — PLAN OF CARE
Goal Outcome Evaluation:     Patient remained lethargic overnight. He opened his eyes with movement. He does not appear to be in pain except some moaning during repositioning. Scheduled Morphine given along with lorazepam. Atropine given once for secretions. Turn and repositioned.

## 2022-03-27 NOTE — PROGRESS NOTES
House Officer Death Pronouncement    Called by nursing staff to pronounce Westley Ness dead.    Physical Exam: Spontaneous respirations absent, Breath sounds absent, Carotid pulse absent, Heart sounds absent and Pupillary light reflex absent    Patient was pronounced dead at 254pm: , 2022.    No data filed       Active Problems:    Acute and chronic respiratory failure with hypoxia (H)    Metabolic encephalopathy    Acute and chronic respiratory failure with hypercapnia (H)    Hypotension, unspecified hypotension type       Infectious disease present?: NO    Communicable disease present? (examples: HIV, chicken pox, TB, Ebola, CJD) :  NO    Multi-drug resistant organism present? (example: MRSA): NO    Please consider an autopsy if any of the following exist:  NO Unexpected or unexplained death during or following any dental, medical, or surgical diagnostic treatment procedures.   NO Death of mother at or up to seven days after delivery.     NO All  and pediatric deaths.     NO Death where the cause is sufficiently obscure to delay completion of the death certificate.   NO Deaths in which autopsy would confirm a suspected illness/condition that would affect surviving family members or recipients of transplanted organs.     The following deaths must be reported to the 's Office:  NO A death that may be due entirely or in part to any factors other than natural disease (recent surgery, recent trauma, suspected abuse/neglect).   NO A death that may be an accident, suicide, or homicide.     NO Any sudden, unexpected death in which there is no prior history of significant heart disease or any other condition associated with sudden death.   NO A death under suspicious, unusual, or unexpected circumstances.    NO Any death which is apparently due to natural causes but in which the  does not have a personal physician familiar with the patient s medical history, social, or environmental  situation or the circumstances of the terminal event.   NO Any death apparently due to Sudden Infant Death Syndrome.     NO Deaths that occur during, in association with, or as consequences of a diagnostic, therapeutic, or anesthetic procedure.   NO Any death in which a fracture of a major bone has occurred within the past (6) six months.   NO A death of persons note seen by their physician within 120 days of demise.     NO Any death in which the  was an inmate of a public institution or was in the custody of Law Enforcement personnel.   NO  All unexpected deaths of children   NO Solid organ donors   NO Unidentified bodies   Unknown, TBD Deaths of persons whose bodies are to be cremated or otherwise disposed of so that the bodies will later be unavailable for examination;   NO Deaths unattended by a physician outside of a licensed healthcare facility or licensed residential hospice program   NO Deaths occurring within 24 hours of arrival to a health care facility if death is unexpected.    NO Deaths associated with the decedent s employment.   NO Deaths attributed to acts of terrorism.   NO Any death in which there is uncertainty as to whether it is a medical examiner s care should be discussed with the medical investigator.      Death Certificate to be directed to Dr. Cullen Merritt   Attending physician, Dr. Cullen Merritt, notified of death.    Body disposition: Autopsy was discussed with  Healthcare POA by RN.  Permission for autopsy was declined.    Plans re: cremation vs burial are pending POA review of pt's documents at home.  She will call back to OLAMIDE.    [place], [facility] [place]  [phone]     Text Page

## 2022-03-27 NOTE — PROGRESS NOTES
[facility]    Progress Note - [facility]    ______________________________________________________________________    [facility] 24/7 Contact Number: [phone]    - Providers: Please contact [facility] with changes in orders or clinical plan of care   - Nursing: Please contact [facility] with significant changes in patient condition  ______________________________________________________________________        Plan of Care Discussed with the Following:   - Nurse: Nurses:  Deepika Pickett RN (Tel. RN7)  - Hospitalist/Rounding Provider:  Cullen Merritt MD      Hospitalist - Internal Medicine     Since 3/25/2022     [phone]         - Westley's Family/Preferred Contact: Marsha [phone]  - Hospice Provider: Lashay Schulte -[phone]    Summary of Visit (includes assessment, medications and any new orders):   Patient was transferred to 8th floor oncology which is much more appropriate setting. A notable change in condition was noted.  He was unresponsive to writer today. He was mouth breathing and appeared comfortable with no labored breathing. Writer provided oral swabs and lip balm. Vital signs   Pulse irregular rate Apical 112   Temp 95.7 Axillary  Oxygen saturation 70% on 2 liters nasal cannula  Respirations 12 non labored - lung sounds clear but diminished in lower lobes.   Received scheduled Ativan and Roxanol every 4 hours for a total of six doses over the past 24 hours. Received one dose of Atropine drops overnight. To continue comfort cares as follows:   Turn and reposition every two hours  Oral cares   Scheduled roxanol every 4 hours pain and labored breathing  Scheduled ativan every 4 hours anxiety  PRN Atropine for terminal secretions      Writer called [facility] to update them on status of patient as actively transitioning since yesterday Saturday March 26th.    Discharge plans:  No formal plans initiated. Will discuss  during care coordination huddle.       Lashay Schulte RN

## 2022-03-27 NOTE — PROGRESS NOTES
Transferred to  today. Inpatient hospice, on comfort cares. T/R q2, oral cares. Denies pain. Sleeps in between cares, calm. On scheduled Roxanol and lorazepam q4 hrs. On 2L NC for comfort. Midline in RUE. Reg diet, no po intake this shift. Had last dialysis yesterday, anuric.

## 2022-03-27 NOTE — PROGRESS NOTES
[facility] Progress Note  SH  824    Paged by hospice nurse to room.  She said someone had asked for last rites for Bill.  When I arrived to room, his friend said he had not requested last rites for Bill.  Bill's friend and I chatted about Bill for awhile and his friend expressed no other needs.    Briana Molina MA  Associate   [phone] - pager

## 2022-05-05 DIAGNOSIS — K21.9 ACID REFLUX DISEASE: ICD-10-CM

## 2022-05-05 RX ORDER — PANTOPRAZOLE SODIUM 40 MG/1
TABLET, DELAYED RELEASE ORAL
Qty: 90 TABLET | Refills: 2 | OUTPATIENT
Start: 2022-05-05

## 2022-06-09 NOTE — PLAN OF CARE
DATE:  10/17/2020   TIME OF RECEIPT FROM LAB:  0748  LAB TEST:  , Heparin 10A 1.55  LAB VALUE:  See above  RESULTS GIVEN WITH READ-BACK TO (PROVIDER):  Gbaby Stoner (Pharmacist)     TIME LAB VALUE REPORTED TO PROVIDER:   0810     Patient requests all Lab, Cardiology, and Radiology Results on their Discharge Instructions

## 2023-02-08 NOTE — ED PROVIDER NOTES
ED ATTENDING PHYSICIAN NOTE:   I evaluated this patient in conjunction with Bob Cortés PA-C  I have participated in the care of the patient and personally performed key elements of the history, exam, and medical decision making.      HPI:   Westley Ness is a 76 year old male with a history of chronic hypotension, CAD, ESRD on dialysis, atrial flutter, acquired hypothyroidism, heart failure, and respiratory failure with hypoxia who presents via EMS for evaluation of generalized weakness and confusion. The patient lives in assisted living. Per EMS, the patient required a Lois to sit in his dialysis chair. During dialysis, staff noticed that he was becoming confused to the point that he thought it was 0300 in the month of October, so EMS was called. He was able to complete the full dialysis run. With EMS, the patient was bradycardic and hypotensive. They measured his blood glucose at 102 and O2 sats were 83% on RA. Here, the patient reports feeling fatigued and denies any other symptoms, including worsening shortness of breath.  Previous documentation of DNR/DNI.  Patient confirms that he does not want ICU care, does not want to be intubated, would not want CPR.     EXAM:    He appears ill and is semirecumbent in the bed in room 24.  Blood pressure is low primarily in the 80s.  He is not tachycardic.  Oxygen saturation in the high 90s on supplemental oxygen by nasal cannula.  Lung sounds coarse bilaterally.  He is confused but able to answer some basic questions and follow very simple commands.  No nuchal rigidity.     MEDICAL DECISION MAKING/ASSESSMENT AND PLAN:    He presents with reported worsening confusion in the setting of numerous comorbidities including profound cardiomyopathy, end-stage renal disease for which she had a full run of dialysis, and recent hospitalization.  He has an abnormal chest x-ray and also has evidence of CO2 retention which I think is likely a significant contributor to his  encephalopathy, for which BiPAP was initiated.  Due to pre-existing goals of care, confirmed with the patient and his primary medical contact by phone, we will not escalate care beyond peripheral IV treatments and BiPAP, acknowledging that his age, comorbidities, and current acute illness placement very high risk for permanent morbidity and even mortality in the near term.  We have arranged for admission to a monitored bed under the care of the hospitalist service.     DIAGNOSIS:     ICD-10-CM    1. Acute and chronic respiratory failure with hypercapnia (H)  J96.22    2. Metabolic encephalopathy  G93.41    3. Hypotension, unspecified hypotension type  I95.9      DISPOSITION:   The patient was admitted to the in-patient hospitalist service under the care of Jolie.      Scribe Disclosure:  IOneida, am serving as a scribe at 11:21 AM on 3/16/2022 to document services personally performed by Brice Vickers MD based on my observations and the provider's statements to me.     3/16/2022  [facility] EMERGENCY DEPT    This note was completed in part using Dragon voice recognition software. Although reviewed after completion, some word and grammatical errors may occur.     Brice Vickers MD  03/16/22 [id number]     Paramedian Forehead Flap Text: A decision was made to reconstruct the defect utilizing an interpolation axial flap and a staged reconstruction.  A telfa template was made of the defect.  This telfa template was then used to outline the paramedian forehead pedicle flap.  The donor area for the pedicle flap was then injected with anesthesia.  The flap was excised through the skin and subcutaneous tissue down to the layer of the underlying musculature.  The pedicle flap was carefully excised within this deep plane to maintain its blood supply.  The edges of the donor site were undermined.   The donor site was closed in a primary fashion.  The pedicle was then rotated into position and sutured.  Once the tube was sutured into place, adequate blood supply was confirmed with blanching and refill.  The pedicle was then wrapped with xeroform gauze and dressed appropriately with a telfa and gauze bandage to ensure continued blood supply and protect the attached pedicle.

## 2024-02-10 NOTE — H&P
"[facility]    History and Physical - Hospitalist Service       Date of Admission:  2/17/2022    Assessment & Plan      Westley Ness is a 76 year old male admitted on 2/17/2022. He presents to the emergency department after recent hospitalization with worsened dyspnea.  Found to have hypoxia in the mid 70% range requiring 4 L nasal cannula oxygen for correction.  Hypotensive in the 70 systolic range, though appears to be in congestive heart failure with exacerbation.    End-stage renal disease on dialysis: Has been dialyzing for the past 18 years.  Currently with left upper extremity AV fistula.  Has some bleeding issues requiring vascular surgery stitch placement during last admission.  Monday Wednesday Friday dialysis with Dr. Hammond as an outpatient, though recently changed to Tuesday Thursday Saturday following recent admission.  Reports his dry weight is 77.6, though currently weight at 70.2 and with heart failure  -Nephrology consulted for dialysis  -Left upper extremity fistula graft    # Confirmed COVID-19 infection       Symptom Onset Date used to determine duration of isolation.  If unsure, enter \"unknown\"   Date of 1st Positive Test  2/11/2022   Vaccination Status Fully Vaccinated with booster       - COVID-19 special precautions, continuous pulse-ox  - Oxygen: continue current support with nasal cannula at 4 L/min; titrate to keep SpO2 between 90-96%  - Imaging: Echocardiogram pending  - IV fluids: not indicated at this time .  End-stage renal disease on dialysis with concern for systolic heart failure exacerbation   Antibiotics: not indicated   - COVID-Focused Medications: Dexamethasone 6 mg x 10 days or until hospital discharge, started on 2/11  - DVT Prophylaxis:         - At high risk of thrombotic complications due to COVID-19 (DDimer = N/A )         - Already on therapeutic anticoagulation with Apixaban  CRP is elevated and increased from prior Suggesting some active " Verbal/bedside report received from YANETH Norton. Report included SBAR, ED summary, vitals, and lab/diagnostic results.    component of COVID-19 contributing to current presentation  Not a candidate for remdesivir given renal failure    Acute on chronic systolic heart failure exacerbation:  Coronary artery disease with ischemic cardiomyopathy: Last ejection fraction of 20 to 25% in March 2021.  OM1 stenting in October 2020 was last intervention.  Known subtotal occlusion of LAD with infarct.  -Nephrology consulted for dialysis  -Repeat TTE  -Cardiology consult  -Holding prior to admission metoprolol, lisinopril, Imdur  -Resume prior to admission pravastatin 40 daily  -Continue prior to admission clopidogrel 75 mg daily    Atrial fibrillation/flutter: Noted on pacer/ICD interrogation in the past.  -Holding scheduled metoprolol, have added IV as needed metoprolol  -Continue prior to admission Eliquis    Hypotension: Patient with blood pressures in the 70s to 80s systolic range.  He tells me that his normal blood pressure runs in the 100 systolic range and it is not atypical for him to have blood pressures in the 80s during dialysis.  Tolerated dialysis with albumin during last admission.  -Holding prior to admission metoprolol, lisinopril, Imdur    Hypothyroidism:  -Resume prior to admission levothyroxine when reconciled by pharmacy.       Diet:    Renal diet  DVT Prophylaxis: Continue prior to admission apixaban  Velarde Catheter: Not present  Central Lines: None  Cardiac Monitoring: None  Code Status:   Full code.  Ross discussion regarding CODE STATUS in the setting of ischemic cardiomyopathy with chronic low blood pressure and end-stage renal disease on dialysis.  Patient tells me that he was told 18 years ago that he would be able to dialyze for approximately 10 years.  He tells me that he recognizes that he could die any day, and feels that he would be okay if he were to die    Clinically Significant Risk Factors Present on Admission              # Coagulation Defect: home medication list includes an anticoagulant medication  #  "Platelet Defect: home medication list includes an antiplatelet medication       Disposition Plan   Expected Discharge:    Anticipated discharge location:  Awaiting care coordination huddle  Delays:           The patient's care was discussed with the Patient, ER provider.  Also discussed with nephrology team at time of admission in the setting of hypotension requiring dialysis.    Eric Edwards MD  Hospitalist Service  [facility]  Securely message with the Vocera Web Console (learn more here)  Text page via [employer] Paging/Directory         ______________________________________________________________________    Chief Complaint   Shortness of breath    History is obtained from the patient, chart review, discussion with Dr. Romo in the emergency department.    History of Present Illness   Westley Ness is a 76 year old male who presents to the emergency department with increased shortness of breath in the setting of end-stage renal disease on dialysis, chronic systolic heart failure, recent COVID-19 diagnosis.    Patient was actually recently admitted to [facility] 2/11/2022 for shortness of breath, found to be Covid positive at that time.  Patient has some chronic shortness of breath which is a result of his end-stage renal disease and cardiomyopathy to the point where he requires oxygen during his dialysis runs, though does not typically have a home oxygen prescription.  Has had worsening exertional dyspnea and fatigue over the past months to the point where he now has a walker at the landing of any of the stairs in his house, purchased a wheelchair for when he is out of the house which has \"extended to his range.\"  At last admission, he did not have fevers or chills, change in cough, though presented with a home oximeter reading in the 70% range.  This was found to be an accurate during his hospitalization as he did not require oxygen at discharge, though also found to be " Covid positive.  Patient tells me that his home nurse was asymptomatic positive for Covid after her  became ill.  Patient has been vaccinated against Covid including booster shot.    Patient discharged from [facility] the other day after receiving 3 days of Decadron for his COVID-19 and dialysis.  No hypoxia, though tells me that he has ordered oxygen and is paying out-of-pocket when his condenser arrives given his dyspnea and feeling better with oxygen therapy.  At home again today, he felt worse, again measured low oxygen on his home oximeter.  Presented to the emergency department where he was found to have blood pressures in the 70 systolic range, hypoxia requiring 4 L nasal cannula oxygen for oxygen support.  His CRP is increased from recent value.  His weight is approximately 7 kg below his reported dry weight, though patient with rhonchorous breath sounds and an unmeasurable high BNP.  Discussed concern with heart failure with patient, and he is in agreement that he is likely holding onto water.  He tells me that his blood pressures are always low, typically in the 100 range.  During last admission he received albumin with his dialysis run and tolerated this.  He has no lightheadedness, does not feel that he is symptomatic from his low blood pressure.    Discussed continuing Decadron treatment for his COVID-19 given elevated CRP.  Repeating echocardiogram and will require dialysis.  I did discuss with nephrology team given his low blood pressures to ensure appropriate bed placement.  Plan is for again trial of dialysis with albumin rather than switch to a Doug catheter and CRRT.    Review of Systems    The 10 point Review of Systems is negative other than noted in the HPI or here.  No fevers or chills  Has a chronic cough which he reports has been present since 1964    Past Medical History    I have reviewed this patient's medical history and updated it with pertinent information if needed.    Past Medical History:   Diagnosis Date     Acid reflux disease      Benign essential hypertension      CAD (coronary artery disease)     s/p multiple NSTEMIs and PCI's     ESRD on hemodialysis (H)     MWF     History of atrial flutter     s/p ablation     Hypothyroidism      Ischemic cardiomyopathy     EF 25-30%, s/p AICD       Past Surgical History   I have reviewed this patient's surgical history and updated it with pertinent information if needed.  Past Surgical History:   Procedure Laterality Date     CHOLECYSTECTOMY, OPEN  2013     CV CORONARY ANGIOGRAM N/A 10/19/2020    Procedure: Coronary Angiogram;  Surgeon: Theodroa Salazar MD;  Location:  HEART CARDIAC CATH LAB     CV LEFT HEART CATH N/A 10/19/2020    Procedure: Left Heart Cath;  Surgeon: Theodora Salazar MD;  Location:  HEART CARDIAC CATH LAB     CV PCI STENT DRUG ELUTING N/A 10/19/2020    Procedure: Percutaneous Coronary Intervention Stent Drug Eluting;  Surgeon: Theodora Salazar MD;  Location:  HEART CARDIAC CATH LAB     ELBOW SURGERY Left 2008    ORIF - plates still in place     EP ICD GENERATOR CHANGE SINGLE N/A 11/21/2019    Procedure: EP ICD Generator Change Single;  Surgeon: Jono Mejia MD;  Location:  HEART CARDIAC CATH LAB     H ABLATION ATRIAL FLUTTER  06/08/2017, 12/11/17     HC LEFT HEART CATHETERIZATION  7/14/2016     HC LEFT HEART CATHETERIZATION  9/21/2016     IMPLANT VENTRICULAR DEVICE  08/15/2011     IR DIALYSIS FISTULOGRAM LEFT  7/22/2019     REPAIR FISTULA ARTERIOVENOUS UPPER EXTREMITY Left 3/5/2019    Procedure: REPAIR LEFT UPPER ARM ARTERIOVENOUS FISTULA SKIN ULCER;  Surgeon: Westley Griggs MD;  Location:  OR     TONSILLECTOMY & ADENOIDECTOMY       VASCULAR SURGERY  2004, 2010    LUE fistulas (upper and lower); upper one is functional       Social History   I have reviewed this patient's social history and updated it with pertinent information if needed.  Social History     Tobacco Use     Smoking  status: Former Smoker     Packs/day: 2.00     Years: 35.00     Pack years: 70.00     Types: Cigarettes     Start date:      Quit date: 1996     Years since quittin.3     Smokeless tobacco: Never Used   Substance Use Topics     Alcohol use: Not Currently     Alcohol/week: 0.0 standard drinks     Drug use: No       Family History   I have reviewed this patient's family history and updated it with pertinent information if needed.  Family History   Problem Relation Age of Onset     Cerebrovascular Disease Mother         later in life     Hypertension Father      Bladder Cancer Father      Myocardial Infarction Paternal Grandmother      Diabetes No family hx of      Prostate Cancer No family hx of      Colon Cancer No family hx of        Prior to Admission Medications   Prior to Admission Medications   Prescriptions Last Dose Informant Patient Reported? Taking?   B Complex-C-Folic Acid (DOROTEO CAPS) 1 MG CAPS  Self No No   Sig: TAKE 1 CAPSULE BY MOUTH EVERY DAY   acetaminophen (TYLENOL) 500 MG tablet  Self Yes No   Sig: Take 500-1,000 mg by mouth every 6 hours as needed for pain    apixaban ANTICOAGULANT (ELIQUIS ANTICOAGULANT) 2.5 MG tablet  Self No No   Sig: Take 1 tablet (2.5 mg) by mouth 2 times daily   clopidogrel (PLAVIX) 75 MG tablet  Self No No   Sig: TAKE 1 TABLET BY MOUTH EVERY DAY   famotidine (PEPCID) 20 MG tablet  Self Yes No   Sig: Take 20 mg by mouth daily    gabapentin (NEURONTIN) 100 MG capsule  Self No No   Sig: Take 1 capsule (100 mg) by mouth 3 times daily   isosorbide mononitrate (IMDUR) 30 MG 24 hr tablet  Self No No   Sig: Take in the evening on the days prior dialysis. Take on , Tuesday and Thursday   Patient taking differently: Take 15 mg by mouth three times a week Take in the evening on the days prior dialysis. Take on , Tuesday and Thursday   isosorbide mononitrate (IMDUR) 30 MG 24 hr tablet  Self No No   Sig: Take 1 tablet (30 mg) the evening of dialysis days (Mon,  Wed, Fri)  and Saturdays.   Patient taking differently: Take 1 tablet (30 mg) 4 days per week on the evening of dialysis days (Mon, Wed, Fri)  and Saturdays.   levothyroxine (SYNTHROID/LEVOTHROID) 50 MCG tablet  Self No No   Sig: TAKE 1 TABLET BY MOUTH EVERY DAY   lidocaine (LIDODERM) 5 % patch   No No   Sig: Place 1 patch onto the skin every 24 hours To prevent lidocaine toxicity, patient should be patch free for 12 hrs daily.   lisinopril (ZESTRIL) 2.5 MG tablet  Self No No   Sig: Take 1 tablet (2.5 mg) by mouth 2 times daily (Take one tablet after dialysis. Take second tablet at bedtime.)   loperamide (IMODIUM A-D) 2 MG tablet  Self Yes No   Sig: Take 2 mg by mouth three times a week MWF on dialysis days   loperamide (IMODIUM A-D) 2 MG tablet  Self Yes No   Sig: Take 1 mg by mouth four times a week On non-dialysis days - Tu, Th, Sa, Su    meclizine 25 MG CHEW  Self No No   Sig: Take 1 tablet (25 mg) by mouth every 6 hours as needed for dizziness   metoprolol succinate ER (TOPROL-XL) 25 MG 24 hr tablet  Self No No   Sig: Take 0.5 tablets (12.5 mg) by mouth daily Please call for an appointment for further refills. [phone]   mexiletine (MEXITIL) 150 MG capsule  Self No No   Sig: Take 1 capsule (150 mg) by mouth 2 times daily   nitroGLYcerin (NITROSTAT) 0.4 MG sublingual tablet  Self No No   Sig: Place 1 tablet (0.4 mg) under the tongue every 5 minutes as needed for chest pain UP TO 3 PER EPISODE   oxyCODONE (ROXICODONE) 5 MG tablet  Self No No   Sig: Take 1 tablet (5 mg) by mouth every 6 hours as needed for pain No driving a car or drinking alcohol for12 hours after taking this medication.   pantoprazole (PROTONIX) 40 MG EC tablet  Self No No   Sig: TAKE 1 TABLET (40 MG) BY MOUTH EVERY MORNING   pravastatin (PRAVACHOL) 40 MG tablet  Self No No   Sig: TAKE 1 TABLET BY MOUTH EVERY DAY   predniSONE (DELTASONE) 5 MG tablet  Self No No   Sig: TAKE 1 TABLET BY MOUTH EVERY DAY   vitamin D3 (CHOLECALCIFEROL) 50 mcg  (2000 units) tablet  Self No No   Sig: TAKE 1 TABLET BY MOUTH EVERY DAY      Facility-Administered Medications: None     Allergies   Allergies   Allergen Reactions     Contrast Dye Hives     Does fine if he uses benadryl prior.       Physical Exam   Vital Signs: Temp: 97.6  F (36.4  C) Temp src: Oral BP: (!) 77/63 Pulse: 87   Respiratory rate of approximately 20 SpO2: 97 % O2 Device: Nasal cannula Oxygen Delivery: 4 LPM  Weight: 154 lbs 12.8 oz    General Appearance: Somewhat chronically ill-appearing 76-year-old male resting comfortably in bed.  He does not appear to be in any distress at this time.  I woke him from sleep on initial assessment.  Eyes: No scleral icterus or injection  HEENT: Normocephalic and atraumatic  Respiratory: Breath sounds are rhonchorous throughout lung fields bilaterally.  No wheezing.  Generally wet sounding and concerning for heart failure  Cardiovascular: Regular rate and rhythm present.  Right-sided cardiac device implanted.  Patient with left upper extremity fistula with bruit and thrill  GI: Abdomen soft, nontender palpation.  No palpable mass.  Lymph/Hematologic: No lower extremity edema  Skin: Mildly pallorous.  Some senile bruising and skin tears of upper extremities bilaterally.  Left upper extremity fistula with recent stitch placement related to bleeding after dialysis decannulation  Musculoskeletal: Patient with moderate or more muscular wasting notable in upper and lower extremities with associated subcutaneous fat loss.  Neurologic: Alert, conversant, appropriate conversation.  Mental status is grossly intact.  Psychiatric: Very pleasant, normal affect    Data   Data reviewed today: I reviewed all medications, new labs and imaging results over the last 24 hours. I personally reviewed the chest x-ray image(s) showing Right-sided pacer defibrillator, some basilar congestion.    Recent Labs   Lab 02/17/22  0125 02/15/22  1243 02/15/22  1139 02/14/22  1625 02/14/22  0871  02/11/22  1657 02/11/22  1647   WBC 9.1  --  11.5*  --  7.0   < > 7.5   HGB 12.7*  --  12.8*  --  12.3*   < > 12.5*   *  --  109*  --  110*   < > 111*   *  --  132*  --  130*   < > 112*   INR  --   --   --   --   --   --  1.23*     --  135  --  135   < > 136   POTASSIUM 4.9  --  4.9  --  4.4   < > 4.4   CHLORIDE 100  --  99  --  101   < > 102   CO2 28  --  26  --  26   < > 27   BUN 44*  --  27  --  36*   < > 25   CR 5.23*  --  3.72*  --  4.65*   < > 4.73*   ANIONGAP 8  --  10  --  8   < > 7   CHRISTINE 8.5  --  8.6  --  8.2*   < > 8.0*   * 136* 160*   < > 113*   < > 180*   ALBUMIN 3.2*  --   --   --   --   --  3.1*   PROTTOTAL 6.0*  --   --   --   --   --  6.0*   BILITOTAL 1.8*  --   --   --   --   --  1.5*   ALKPHOS 162*  --   --   --   --   --  209*   ALT 24  --   --   --   --   --  22   AST 18  --   --   --   --   --  16    < > = values in this interval not displayed.

## 2024-06-01 NOTE — TELEPHONE ENCOUNTER
MULTIVITAMIN RENAL 1 MG capsule (TRIPHROCAPS)      Last Written Prescription Date:  11/6/18  Last Fill Quantity: 90,   # refills: 3  Last Office Visit with Dr. Kolb was 2/26/19.  Future Office visit:       Routing refill request to provider for review/approval because:  Drug not on the [facility] [place] or [facility] refill protocol or controlled substance     No Vaccines Administered.

## 2024-08-26 NOTE — PROVIDER NOTIFICATION
"MD Notification    Notified Person: MD    Notified Person Name: Raffaele    Notification Date/Time: 3/19/21 1240    Notification Interaction: web-based paging    Purpose of Notification: \"pt said cardiology has seen patient and plans to manage with meds, pt would like to discharge today after dialysis if possible. Thank you. \"    Orders Received:    Comments:    " [Negative] : Genitourinary

## (undated) DEVICE — BLADE ESU PLASMABLADE SPATULA TIP 4MM PS200-040

## (undated) DEVICE — KIT LG BORE TOUHY ACCESS PLUS MAP152

## (undated) DEVICE — DEFIB PRO-PADZ LVP LQD GEL ADULT 8900-2105-01

## (undated) DEVICE — SPONGE RAY-TEC 4X8" 7318

## (undated) DEVICE — SOL WATER IRRIG 1000ML BOTTLE 2F7114

## (undated) DEVICE — CATH LAUNCHER 6FR LA6EBU375

## (undated) DEVICE — NDL ANGIOCATH 20GA 1.25" 4056

## (undated) DEVICE — CATH ANGIO INFINITI 3DRC 4FRX100CM 538476

## (undated) DEVICE — LINEN TOWEL PACK X5 5464

## (undated) DEVICE — CLOSURE ANGIOSEAL 6FR 610130

## (undated) DEVICE — TUBING SUCTION 12"X1/4" N612

## (undated) DEVICE — SU PROLENE 5-0 P-2 18" 8686G

## (undated) DEVICE — GUIDEWIRE VASC 0.014INX180CM RUNTHROUGH 25-1011

## (undated) DEVICE — KIT HAND CONTROL ANGIOTOUCH ACIST 65CM AT-P65

## (undated) DEVICE — SOL NACL 0.9% IRRIG 1000ML BOTTLE 07138-09

## (undated) DEVICE — SU VICRYL 3-0 SH 27" J316H

## (undated) DEVICE — PACK AV FISTULA CUSTOM SCV15AVFS1

## (undated) DEVICE — PACK PCMKR PERM SRG PROC LF SAN32PC573

## (undated) DEVICE — SYR 20ML LL W/O NDL 302830

## (undated) DEVICE — SUCTION CANISTER MEDIVAC LINER 3000ML W/LID 65651-530

## (undated) DEVICE — DECANTER BAG 2002S

## (undated) DEVICE — RAD INTRODUCER KIT MICRO 5FRX10CM .018 NITINOL G/W

## (undated) DEVICE — SU SILK 4-0 TIE 24" SA73H

## (undated) DEVICE — CATH ANGIO INFINITI PIGTAIL 145 6 SH 6FRX110CM  534-652S

## (undated) DEVICE — SU MONOCRYL 5-0 PS-2 18" UND Y495G

## (undated) DEVICE — INTRODUCER SHEATH GREEN 6.5FRX11CM .038IN PSI-6F-11-038ACT

## (undated) DEVICE — SYR 03ML LL W/O NDL 309657

## (undated) DEVICE — SYR 10ML SLIP TIP W/O NDL

## (undated) DEVICE — RAD G/W INQWIRE .035X260CM J-TIP EXCHANGE IQ35F260J1O5RS

## (undated) DEVICE — VALVE HEMOSTASIS .096" COPILOT MECH 1003331

## (undated) DEVICE — TOURNIQUET CUFF 12" REPRO LIGHT BLUE 60-7070-102

## (undated) DEVICE — SU SILK 3-0 TIE 24" SA74H

## (undated) DEVICE — NDL 19GA 1.5"

## (undated) DEVICE — PREP CHLORAPREP 26ML TINTED ORANGE  260815

## (undated) DEVICE — RAD INFLATOR BASIC COMPAK  IN4130

## (undated) DEVICE — CATH DIAG 4FR JL 4.5 538417

## (undated) DEVICE — GLOVE PROTEXIS W/NEU-THERA 7.5  2D73TE75

## (undated) DEVICE — CATH BALLOON EMERGE 2.0X20MM H7493918920200

## (undated) DEVICE — INTRO SHEATH 4FRX10CM PINNACLE RSS402

## (undated) DEVICE — BNDG ROLLER GAUZE CONFORM 4"X4YD 41-54

## (undated) DEVICE — CATH BALLOON EMERGE 2.0X12MM H7493918912200

## (undated) DEVICE — SOL NACL 0.9% INJ 250ML BAG 2B1322Q

## (undated) DEVICE — ESU GROUND PAD UNIVERSAL W/O CORD

## (undated) RX ORDER — LIDOCAINE HYDROCHLORIDE 10 MG/ML
INJECTION, SOLUTION EPIDURAL; INFILTRATION; INTRACAUDAL; PERINEURAL
Status: DISPENSED
Start: 2019-11-21

## (undated) RX ORDER — ONDANSETRON 2 MG/ML
INJECTION INTRAMUSCULAR; INTRAVENOUS
Status: DISPENSED
Start: 2019-03-05

## (undated) RX ORDER — LIDOCAINE HYDROCHLORIDE 20 MG/ML
INJECTION, SOLUTION EPIDURAL; INFILTRATION; INTRACAUDAL; PERINEURAL
Status: DISPENSED
Start: 2019-03-05

## (undated) RX ORDER — NALOXONE HYDROCHLORIDE 0.4 MG/ML
INJECTION, SOLUTION INTRAMUSCULAR; INTRAVENOUS; SUBCUTANEOUS
Status: DISPENSED
Start: 2017-12-11

## (undated) RX ORDER — FENTANYL CITRATE 50 UG/ML
INJECTION, SOLUTION INTRAMUSCULAR; INTRAVENOUS
Status: DISPENSED
Start: 2017-12-11

## (undated) RX ORDER — PROPOFOL 10 MG/ML
INJECTION, EMULSION INTRAVENOUS
Status: DISPENSED
Start: 2019-03-05

## (undated) RX ORDER — CEFAZOLIN SODIUM 2 G/100ML
INJECTION, SOLUTION INTRAVENOUS
Status: DISPENSED
Start: 2019-03-05

## (undated) RX ORDER — FENTANYL CITRATE 50 UG/ML
INJECTION, SOLUTION INTRAMUSCULAR; INTRAVENOUS
Status: DISPENSED
Start: 2019-11-21

## (undated) RX ORDER — LIDOCAINE HYDROCHLORIDE 10 MG/ML
INJECTION, SOLUTION EPIDURAL; INFILTRATION; INTRACAUDAL; PERINEURAL
Status: DISPENSED
Start: 2017-12-11

## (undated) RX ORDER — BUPIVACAINE HYDROCHLORIDE 2.5 MG/ML
INJECTION, SOLUTION EPIDURAL; INFILTRATION; INTRACAUDAL
Status: DISPENSED
Start: 2019-11-21

## (undated) RX ORDER — FENTANYL CITRATE 50 UG/ML
INJECTION, SOLUTION INTRAMUSCULAR; INTRAVENOUS
Status: DISPENSED
Start: 2017-06-08

## (undated) RX ORDER — GLYCOPYRROLATE 0.2 MG/ML
INJECTION, SOLUTION INTRAMUSCULAR; INTRAVENOUS
Status: DISPENSED
Start: 2017-12-11

## (undated) RX ORDER — CEFAZOLIN SODIUM 2 G/100ML
INJECTION, SOLUTION INTRAVENOUS
Status: DISPENSED
Start: 2019-11-21

## (undated) RX ORDER — FLUMAZENIL 0.1 MG/ML
INJECTION, SOLUTION INTRAVENOUS
Status: DISPENSED
Start: 2017-12-11

## (undated) RX ORDER — HEPARIN SODIUM 1000 [USP'U]/ML
INJECTION, SOLUTION INTRAVENOUS; SUBCUTANEOUS
Status: DISPENSED
Start: 2017-06-08

## (undated) RX ORDER — FENTANYL CITRATE 50 UG/ML
INJECTION, SOLUTION INTRAMUSCULAR; INTRAVENOUS
Status: DISPENSED
Start: 2019-03-05

## (undated) RX ORDER — HEPARIN SODIUM 1000 [USP'U]/ML
INJECTION, SOLUTION INTRAVENOUS; SUBCUTANEOUS
Status: DISPENSED
Start: 2017-12-11